# Patient Record
Sex: MALE | Race: OTHER | HISPANIC OR LATINO | URBAN - METROPOLITAN AREA
[De-identification: names, ages, dates, MRNs, and addresses within clinical notes are randomized per-mention and may not be internally consistent; named-entity substitution may affect disease eponyms.]

---

## 2017-01-19 ENCOUNTER — EMERGENCY (EMERGENCY)
Facility: HOSPITAL | Age: 62
LOS: 1 days | Discharge: HOME | End: 2017-01-19
Attending: EMERGENCY MEDICINE | Admitting: EMERGENCY MEDICINE
Payer: MEDICARE

## 2017-01-19 VITALS
DIASTOLIC BLOOD PRESSURE: 81 MMHG | TEMPERATURE: 99 F | OXYGEN SATURATION: 99 % | WEIGHT: 160.06 LBS | SYSTOLIC BLOOD PRESSURE: 128 MMHG | HEART RATE: 88 BPM | RESPIRATION RATE: 17 BRPM

## 2017-01-19 VITALS
RESPIRATION RATE: 16 BRPM | TEMPERATURE: 99 F | HEART RATE: 74 BPM | SYSTOLIC BLOOD PRESSURE: 139 MMHG | OXYGEN SATURATION: 99 % | DIASTOLIC BLOOD PRESSURE: 79 MMHG

## 2017-01-19 DIAGNOSIS — Z88.0 ALLERGY STATUS TO PENICILLIN: ICD-10-CM

## 2017-01-19 DIAGNOSIS — Z79.01 LONG TERM (CURRENT) USE OF ANTICOAGULANTS: ICD-10-CM

## 2017-01-19 DIAGNOSIS — K64.9 UNSPECIFIED HEMORRHOIDS: ICD-10-CM

## 2017-01-19 DIAGNOSIS — K62.5 HEMORRHAGE OF ANUS AND RECTUM: ICD-10-CM

## 2017-01-19 DIAGNOSIS — Z88.8 ALLERGY STATUS TO OTHER DRUGS, MEDICAMENTS AND BIOLOGICAL SUBSTANCES STATUS: ICD-10-CM

## 2017-01-19 DIAGNOSIS — Z79.899 OTHER LONG TERM (CURRENT) DRUG THERAPY: ICD-10-CM

## 2017-01-19 DIAGNOSIS — Z79.82 LONG TERM (CURRENT) USE OF ASPIRIN: ICD-10-CM

## 2017-01-19 DIAGNOSIS — I25.10 ATHEROSCLEROTIC HEART DISEASE OF NATIVE CORONARY ARTERY WITHOUT ANGINA PECTORIS: ICD-10-CM

## 2017-01-19 LAB
ALBUMIN SERPL ELPH-MCNC: 3.9 G/DL — SIGNIFICANT CHANGE UP (ref 3.4–5)
ALP SERPL-CCNC: 58 U/L — SIGNIFICANT CHANGE UP (ref 40–120)
ALT FLD-CCNC: 27 U/L — SIGNIFICANT CHANGE UP (ref 12–42)
ANION GAP SERPL CALC-SCNC: 12 MMOL/L — SIGNIFICANT CHANGE UP (ref 9–16)
APTT BLD: 27.1 SEC — LOW (ref 27.5–37.4)
AST SERPL-CCNC: 39 U/L — HIGH (ref 15–37)
BASOPHILS NFR BLD AUTO: 0.5 % — SIGNIFICANT CHANGE UP (ref 0–2)
BILIRUB SERPL-MCNC: 0.7 MG/DL — SIGNIFICANT CHANGE UP (ref 0.2–1.2)
BUN SERPL-MCNC: 15 MG/DL — SIGNIFICANT CHANGE UP (ref 7–23)
CALCIUM SERPL-MCNC: 8.7 MG/DL — SIGNIFICANT CHANGE UP (ref 8.5–10.5)
CHLORIDE SERPL-SCNC: 100 MMOL/L — SIGNIFICANT CHANGE UP (ref 96–108)
CO2 SERPL-SCNC: 25 MMOL/L — SIGNIFICANT CHANGE UP (ref 22–31)
CREAT SERPL-MCNC: 1.01 MG/DL — SIGNIFICANT CHANGE UP (ref 0.5–1.3)
EOSINOPHIL NFR BLD AUTO: 0.2 % — SIGNIFICANT CHANGE UP (ref 0–6)
GLUCOSE SERPL-MCNC: 76 MG/DL — SIGNIFICANT CHANGE UP (ref 70–99)
HCT VFR BLD CALC: 31 % — LOW (ref 39–50)
HGB BLD-MCNC: 10.1 G/DL — LOW (ref 13–17)
INR BLD: 1.02 — SIGNIFICANT CHANGE UP (ref 0.88–1.16)
LYMPHOCYTES # BLD AUTO: 35 % — SIGNIFICANT CHANGE UP (ref 13–44)
MCHC RBC-ENTMCNC: 25.9 PG — LOW (ref 27–34)
MCHC RBC-ENTMCNC: 32.6 G/DL — SIGNIFICANT CHANGE UP (ref 32–36)
MCV RBC AUTO: 79.5 FL — LOW (ref 80–100)
MONOCYTES NFR BLD AUTO: 12.9 % — SIGNIFICANT CHANGE UP (ref 2–14)
NEUTROPHILS NFR BLD AUTO: 51.4 % — SIGNIFICANT CHANGE UP (ref 43–77)
PLATELET # BLD AUTO: 223 K/UL — SIGNIFICANT CHANGE UP (ref 150–400)
POTASSIUM SERPL-MCNC: 4.1 MMOL/L — SIGNIFICANT CHANGE UP (ref 3.5–5.3)
POTASSIUM SERPL-SCNC: 4.1 MMOL/L — SIGNIFICANT CHANGE UP (ref 3.5–5.3)
PROT SERPL-MCNC: 7.7 G/DL — SIGNIFICANT CHANGE UP (ref 6.4–8.2)
PROTHROM AB SERPL-ACNC: 11.3 SEC — SIGNIFICANT CHANGE UP (ref 10–13.1)
RBC # BLD: 3.9 M/UL — LOW (ref 4.2–5.8)
RBC # FLD: 17.8 % — HIGH (ref 10.3–16.9)
SODIUM SERPL-SCNC: 137 MMOL/L — SIGNIFICANT CHANGE UP (ref 135–145)
WBC # BLD: 4 K/UL — SIGNIFICANT CHANGE UP (ref 3.8–10.5)
WBC # FLD AUTO: 4 K/UL — SIGNIFICANT CHANGE UP (ref 3.8–10.5)

## 2017-01-19 PROCEDURE — 99284 EMERGENCY DEPT VISIT MOD MDM: CPT

## 2017-01-19 PROCEDURE — 85730 THROMBOPLASTIN TIME PARTIAL: CPT

## 2017-01-19 PROCEDURE — 85025 COMPLETE CBC W/AUTO DIFF WBC: CPT

## 2017-01-19 PROCEDURE — 99283 EMERGENCY DEPT VISIT LOW MDM: CPT

## 2017-01-19 PROCEDURE — 85610 PROTHROMBIN TIME: CPT

## 2017-01-19 PROCEDURE — 80053 COMPREHEN METABOLIC PANEL: CPT

## 2017-01-19 PROCEDURE — 36415 COLL VENOUS BLD VENIPUNCTURE: CPT

## 2017-01-19 RX ORDER — HYDROCORTISONE 1 %
1 OINTMENT (GRAM) TOPICAL
Qty: 1 | Refills: 0
Start: 2017-01-19 | End: 2017-02-02

## 2017-01-19 NOTE — ED ADULT TRIAGE NOTE - CHIEF COMPLAINT QUOTE
Pr c/o bright red rectal bleeding while taking a shover. C/O diffused abdominal discomfort. Denies N/V, dizziness, SOB, CP. "I have not been eating or sleeping well." H/O hemorrhoids.

## 2017-01-19 NOTE — ED PROVIDER NOTE - MEDICAL DECISION MAKING DETAILS
hemorrhoid - extruding- no active bleeding-stable for d/c hemorrhoid - extruding- no active bleeding-stable for d/c - no abd pain or tenderness- well appearing NAD-- siz baths local wound care hydrocortisone cream and GI follow up rec

## 2017-01-19 NOTE — ED ADULT NURSE NOTE - PMH
Atherosclerosis of coronary artery  Coronary artery disease  ETOH abuse    Gastritis    Myocardial infarction involving other coronary artery    Pacemaker    Paroxysmal a-fib

## 2017-01-19 NOTE — ED ADULT NURSE NOTE - OBJECTIVE STATEMENT
60yo M, A&ox3, came into ER c/o rectal bleeding x1 episode while in shower. States bright red blood that lasted [id number]. States previously happened but only when straining bowls. Normal bm earlier today/ States called pcp and was urged to come in for evaluation. Pt appears pale. Denies lightheadedness, dizziness, sob, n/v, cp. Denies bloody emesis. PT admits to drinking x2 small bottles of etoh. No abdominal pain, + hyperactive bowl sounds. No tenderness. Pt states some abdominal discomfort though. Lungs clear, no jvd, no edema. No cp. No active bleeding noted. Noted defibrillator in situ left anterior chest wall. Will continue to monitor.

## 2017-01-19 NOTE — ED ADULT NURSE NOTE - CHPI ED SYMPTOMS NEG
no burning urination/no fever/no dysuria/no abdominal distension/no diarrhea/no chills/no nausea/no hematuria/no vomiting

## 2017-01-19 NOTE — ED PROVIDER NOTE - OBJECTIVE STATEMENT
60 yo male with hx of hemorhoids- Afib- ETOH abuse -PPM -prev MI- CAD- with episode of rectal bleeding 2 hr ago - no straining or constipation- no abd pain or fevers or chills-  tolerating po well -

## 2017-01-19 NOTE — ED ADULT NURSE NOTE - PSH
Automatic implantable cardiac defibrillator in situ  ICD (implantable cardioverter-defibrillator) in place

## 2017-02-14 ENCOUNTER — INPATIENT (INPATIENT)
Facility: HOSPITAL | Age: 62
LOS: 6 days | Discharge: HOME | DRG: 897 | End: 2017-02-21
Attending: INTERNAL MEDICINE | Admitting: INTERNAL MEDICINE
Payer: MEDICARE

## 2017-02-14 VITALS
HEART RATE: 98 BPM | RESPIRATION RATE: 18 BRPM | SYSTOLIC BLOOD PRESSURE: 101 MMHG | DIASTOLIC BLOOD PRESSURE: 71 MMHG | OXYGEN SATURATION: 96 % | TEMPERATURE: 100 F

## 2017-02-14 DIAGNOSIS — N17.9 ACUTE KIDNEY FAILURE, UNSPECIFIED: ICD-10-CM

## 2017-02-14 DIAGNOSIS — Z95.5 PRESENCE OF CORONARY ANGIOPLASTY IMPLANT AND GRAFT: ICD-10-CM

## 2017-02-14 DIAGNOSIS — I21.29 ST ELEVATION (STEMI) MYOCARDIAL INFARCTION INVOLVING OTHER SITES: ICD-10-CM

## 2017-02-14 DIAGNOSIS — I48.0 PAROXYSMAL ATRIAL FIBRILLATION: ICD-10-CM

## 2017-02-14 DIAGNOSIS — Z41.8 ENCOUNTER FOR OTHER PROCEDURES FOR PURPOSES OTHER THAN REMEDYING HEALTH STATE: ICD-10-CM

## 2017-02-14 DIAGNOSIS — F32.9 MAJOR DEPRESSIVE DISORDER, SINGLE EPISODE, UNSPECIFIED: ICD-10-CM

## 2017-02-14 DIAGNOSIS — F10.230 ALCOHOL DEPENDENCE WITH WITHDRAWAL, UNCOMPLICATED: ICD-10-CM

## 2017-02-14 DIAGNOSIS — R63.8 OTHER SYMPTOMS AND SIGNS CONCERNING FOOD AND FLUID INTAKE: ICD-10-CM

## 2017-02-14 DIAGNOSIS — Z02.9 ENCOUNTER FOR ADMINISTRATIVE EXAMINATIONS, UNSPECIFIED: ICD-10-CM

## 2017-02-14 DIAGNOSIS — K29.70 GASTRITIS, UNSPECIFIED, WITHOUT BLEEDING: ICD-10-CM

## 2017-02-14 LAB
ALBUMIN SERPL ELPH-MCNC: 3.7 G/DL — SIGNIFICANT CHANGE UP (ref 3.4–5)
ALP SERPL-CCNC: 59 U/L — SIGNIFICANT CHANGE UP (ref 40–120)
ALT FLD-CCNC: 57 U/L — HIGH (ref 12–42)
ANION GAP SERPL CALC-SCNC: 8 MMOL/L — LOW (ref 9–16)
APTT BLD: 123.6 SEC — CRITICAL HIGH (ref 27.5–37.4)
AST SERPL-CCNC: 79 U/L — HIGH (ref 15–37)
BASOPHILS NFR BLD AUTO: 0.8 % — SIGNIFICANT CHANGE UP (ref 0–2)
BILIRUB SERPL-MCNC: 0.6 MG/DL — SIGNIFICANT CHANGE UP (ref 0.2–1.2)
BUN SERPL-MCNC: 22 MG/DL — SIGNIFICANT CHANGE UP (ref 7–23)
CALCIUM SERPL-MCNC: 8.8 MG/DL — SIGNIFICANT CHANGE UP (ref 8.5–10.5)
CHLORIDE SERPL-SCNC: 101 MMOL/L — SIGNIFICANT CHANGE UP (ref 96–108)
CO2 SERPL-SCNC: 25 MMOL/L — SIGNIFICANT CHANGE UP (ref 22–31)
CREAT SERPL-MCNC: 1.67 MG/DL — HIGH (ref 0.5–1.3)
EOSINOPHIL NFR BLD AUTO: 1.4 % — SIGNIFICANT CHANGE UP (ref 0–6)
ETHANOL SERPL-MCNC: 45 MG/DL — HIGH
EXTRA BLUE TOP TUBE: SIGNIFICANT CHANGE UP
GLUCOSE SERPL-MCNC: 112 MG/DL — HIGH (ref 70–99)
HCT VFR BLD CALC: 33.5 % — LOW (ref 39–50)
HGB BLD-MCNC: 11.2 G/DL — LOW (ref 13–17)
LYMPHOCYTES # BLD AUTO: 24 % — SIGNIFICANT CHANGE UP (ref 13–44)
MAGNESIUM SERPL-MCNC: 1.8 MG/DL — SIGNIFICANT CHANGE UP (ref 1.6–2.4)
MCHC RBC-ENTMCNC: 26.9 PG — LOW (ref 27–34)
MCHC RBC-ENTMCNC: 33.4 G/DL — SIGNIFICANT CHANGE UP (ref 32–36)
MCV RBC AUTO: 80.5 FL — SIGNIFICANT CHANGE UP (ref 80–100)
MONOCYTES NFR BLD AUTO: 6.4 % — SIGNIFICANT CHANGE UP (ref 2–14)
NEUTROPHILS NFR BLD AUTO: 67.4 % — SIGNIFICANT CHANGE UP (ref 43–77)
PCP SPEC-MCNC: SIGNIFICANT CHANGE UP
PLATELET # BLD AUTO: 179 K/UL — SIGNIFICANT CHANGE UP (ref 150–400)
POTASSIUM SERPL-MCNC: 4.9 MMOL/L — SIGNIFICANT CHANGE UP (ref 3.5–5.3)
POTASSIUM SERPL-SCNC: 4.9 MMOL/L — SIGNIFICANT CHANGE UP (ref 3.5–5.3)
PROT SERPL-MCNC: 7.7 G/DL — SIGNIFICANT CHANGE UP (ref 6.4–8.2)
RBC # BLD: 4.16 M/UL — LOW (ref 4.2–5.8)
RBC # FLD: 20.2 % — HIGH (ref 10.3–16.9)
SODIUM SERPL-SCNC: 134 MMOL/L — LOW (ref 135–145)
WBC # BLD: 5 K/UL — SIGNIFICANT CHANGE UP (ref 3.8–10.5)
WBC # FLD AUTO: 5 K/UL — SIGNIFICANT CHANGE UP (ref 3.8–10.5)

## 2017-02-14 PROCEDURE — 99291 CRITICAL CARE FIRST HOUR: CPT | Mod: 25

## 2017-02-14 PROCEDURE — 93010 ELECTROCARDIOGRAM REPORT: CPT

## 2017-02-14 RX ORDER — HEPARIN SODIUM 5000 [USP'U]/ML
3000 INJECTION INTRAVENOUS; SUBCUTANEOUS EVERY 6 HOURS
Qty: 0 | Refills: 0 | Status: DISCONTINUED | OUTPATIENT
Start: 2017-02-14 | End: 2017-02-14

## 2017-02-14 RX ORDER — HEPARIN SODIUM 5000 [USP'U]/ML
6000 INJECTION INTRAVENOUS; SUBCUTANEOUS EVERY 6 HOURS
Qty: 0 | Refills: 0 | Status: DISCONTINUED | OUTPATIENT
Start: 2017-02-14 | End: 2017-02-14

## 2017-02-14 RX ORDER — LISINOPRIL 2.5 MG/1
20 TABLET ORAL DAILY
Qty: 0 | Refills: 0 | Status: DISCONTINUED | OUTPATIENT
Start: 2017-02-15 | End: 2017-02-21

## 2017-02-14 RX ORDER — SODIUM CHLORIDE 9 MG/ML
1000 INJECTION INTRAMUSCULAR; INTRAVENOUS; SUBCUTANEOUS ONCE
Qty: 0 | Refills: 0 | Status: COMPLETED | OUTPATIENT
Start: 2017-02-14 | End: 2017-02-14

## 2017-02-14 RX ORDER — THIAMINE MONONITRATE (VIT B1) 100 MG
100 TABLET ORAL DAILY
Qty: 0 | Refills: 0 | Status: DISCONTINUED | OUTPATIENT
Start: 2017-02-15 | End: 2017-02-21

## 2017-02-14 RX ORDER — HEPARIN SODIUM 5000 [USP'U]/ML
1000 INJECTION INTRAVENOUS; SUBCUTANEOUS
Qty: 25000 | Refills: 0 | Status: DISCONTINUED | OUTPATIENT
Start: 2017-02-14 | End: 2017-02-15

## 2017-02-14 RX ORDER — PANTOPRAZOLE SODIUM 20 MG/1
40 TABLET, DELAYED RELEASE ORAL
Qty: 0 | Refills: 0 | Status: DISCONTINUED | OUTPATIENT
Start: 2017-02-14 | End: 2017-02-21

## 2017-02-14 RX ORDER — ONDANSETRON 8 MG/1
4 TABLET, FILM COATED ORAL ONCE
Qty: 0 | Refills: 0 | Status: COMPLETED | OUTPATIENT
Start: 2017-02-14 | End: 2017-02-14

## 2017-02-14 RX ORDER — MULTIVIT-MIN/FERROUS GLUCONATE 9 MG/15 ML
1 LIQUID (ML) ORAL DAILY
Qty: 0 | Refills: 0 | Status: DISCONTINUED | OUTPATIENT
Start: 2017-02-14 | End: 2017-02-21

## 2017-02-14 RX ORDER — SODIUM CHLORIDE 9 MG/ML
1000 INJECTION, SOLUTION INTRAVENOUS
Qty: 0 | Refills: 0 | Status: DISCONTINUED | OUTPATIENT
Start: 2017-02-14 | End: 2017-02-14

## 2017-02-14 RX ORDER — CLOPIDOGREL BISULFATE 75 MG/1
75 TABLET, FILM COATED ORAL DAILY
Qty: 0 | Refills: 0 | Status: DISCONTINUED | OUTPATIENT
Start: 2017-02-15 | End: 2017-02-21

## 2017-02-14 RX ORDER — FERROUS SULFATE 325(65) MG
325 TABLET ORAL DAILY
Qty: 0 | Refills: 0 | Status: DISCONTINUED | OUTPATIENT
Start: 2017-02-15 | End: 2017-02-21

## 2017-02-14 RX ORDER — ATORVASTATIN CALCIUM 80 MG/1
40 TABLET, FILM COATED ORAL AT BEDTIME
Qty: 0 | Refills: 0 | Status: DISCONTINUED | OUTPATIENT
Start: 2017-02-14 | End: 2017-02-21

## 2017-02-14 RX ORDER — FOLIC ACID 0.8 MG
1 TABLET ORAL DAILY
Qty: 0 | Refills: 0 | Status: DISCONTINUED | OUTPATIENT
Start: 2017-02-15 | End: 2017-02-21

## 2017-02-14 RX ORDER — HEPARIN SODIUM 5000 [USP'U]/ML
INJECTION INTRAVENOUS; SUBCUTANEOUS
Qty: 25000 | Refills: 0 | Status: DISCONTINUED | OUTPATIENT
Start: 2017-02-14 | End: 2017-02-14

## 2017-02-14 RX ORDER — ASPIRIN/CALCIUM CARB/MAGNESIUM 324 MG
81 TABLET ORAL DAILY
Qty: 0 | Refills: 0 | Status: DISCONTINUED | OUTPATIENT
Start: 2017-02-14 | End: 2017-02-21

## 2017-02-14 RX ORDER — METOPROLOL TARTRATE 50 MG
100 TABLET ORAL DAILY
Qty: 0 | Refills: 0 | Status: DISCONTINUED | OUTPATIENT
Start: 2017-02-15 | End: 2017-02-21

## 2017-02-14 RX ORDER — HEPARIN SODIUM 5000 [USP'U]/ML
6000 INJECTION INTRAVENOUS; SUBCUTANEOUS ONCE
Qty: 0 | Refills: 0 | Status: COMPLETED | OUTPATIENT
Start: 2017-02-14 | End: 2017-02-14

## 2017-02-14 RX ADMIN — Medication 2 MILLIGRAM(S): at 13:22

## 2017-02-14 RX ADMIN — SODIUM CHLORIDE 250 MILLILITER(S): 9 INJECTION, SOLUTION INTRAVENOUS at 10:12

## 2017-02-14 RX ADMIN — Medication 25 MILLIGRAM(S): at 10:04

## 2017-02-14 RX ADMIN — Medication 75 MILLIGRAM(S): at 22:12

## 2017-02-14 RX ADMIN — HEPARIN SODIUM 1300 UNIT(S)/HR: 5000 INJECTION INTRAVENOUS; SUBCUTANEOUS at 15:38

## 2017-02-14 RX ADMIN — Medication 2 MILLIGRAM(S): at 17:24

## 2017-02-14 RX ADMIN — SODIUM CHLORIDE 1000 MILLILITER(S): 9 INJECTION INTRAMUSCULAR; INTRAVENOUS; SUBCUTANEOUS at 09:45

## 2017-02-14 RX ADMIN — HEPARIN SODIUM 6000 UNIT(S): 5000 INJECTION INTRAVENOUS; SUBCUTANEOUS at 15:37

## 2017-02-14 RX ADMIN — HEPARIN SODIUM 10 UNIT(S)/HR: 5000 INJECTION INTRAVENOUS; SUBCUTANEOUS at 21:30

## 2017-02-14 RX ADMIN — ATORVASTATIN CALCIUM 40 MILLIGRAM(S): 80 TABLET, FILM COATED ORAL at 22:12

## 2017-02-14 RX ADMIN — Medication 50 MILLIGRAM(S): at 14:05

## 2017-02-14 RX ADMIN — ONDANSETRON 4 MILLIGRAM(S): 8 TABLET, FILM COATED ORAL at 10:04

## 2017-02-14 RX ADMIN — SODIUM CHLORIDE 1000 MILLILITER(S): 9 INJECTION INTRAMUSCULAR; INTRAVENOUS; SUBCUTANEOUS at 13:22

## 2017-02-14 NOTE — ED PROVIDER NOTE - CRITICAL CARE PROVIDED
documentation/consultation with other physicians/additional history taking/interpretation of diagnostic studies/direct patient care (not related to procedure)

## 2017-02-14 NOTE — CONSULT NOTE ADULT - PROBLEM SELECTOR RECOMMENDATION 4
Stable, will continue with ASA, Plavix and Statin.  Patient would need refill for statin on discharge.

## 2017-02-14 NOTE — CONSULT NOTE ADULT - PROBLEM SELECTOR RECOMMENDATION 9
Current CIWA 8-9 (Tremor and anxiety), no problem with serial addition, no hallucination, no neurologic deficit. Was given 25mg Librium in the ED, 30mg taken at home, in addition to 30mg IV valium and 2mg IV ativan, clinically improving.  -Recommend another 50mg PO ativan, and can do 100mg BID after. Ativan IV as needed.  -Continue with banana bag, slow rate since he has CHF, careful with further fluid resuscitation. Current CIWA 8-9 (Tremor and anxiety), no problem with serial addition, no hallucination, no neurologic deficit. Was given 25mg Librium in the ED, 30mg taken at home, in addition to 30mg IV valium and 2mg IV ativan, clinically improving.  -Recommend another 50mg PO ativan, and can do 100mg BID after. Ativan IV as needed.  -Continue with banana bag, slow rate since he has CHF, careful with further fluid resuscitation.  -Would get utox. Current CIWA 8-9 (Tremor and anxiety), no problem with serial addition, no hallucination, no neurologic deficit. Was given 25mg Librium in the ED, 30mg taken at home, in addition to 30mg IV valium and 2mg IV ativan, clinically improving.  -Recommend another 50mg PO ativan, and can do 75 TID after. Ativan IV 2mg Q2H  as needed.  -Continue with banana bag, slow rate since he has CHF, careful with further fluid resuscitation.  -Would get utox. Current CIWA 8-9 (Tremor and anxiety), no problem with serial addition, no hallucination, no neurologic deficit. Was given 25mg Librium in the ED, 30mg taken at home, in addition to 30mg IV valium and 2mg IV ativan, clinically improving.  -Recommend another 50mg PO ativan, and can do 75 TID after. Ativan IV 2mg Q2H  as needed.  -Continue with banana bag, slow rate since he has CHF, careful with further fluid resuscitation.  -Would get utox.  Will admit to 7L

## 2017-02-14 NOTE — PATIENT PROFILE ADULT. - NS TRANSFER PATIENT BELONGINGS
Electronic Device (specify)/Clothing/Jewelry/Money (specify)/S[employer]ung Phone/[employer] Tablet

## 2017-02-14 NOTE — H&P ADULT. - HISTORY OF PRESENT ILLNESS
Patient is a 60 yo M with a PMHx of CAD s/p MI (~2003), stent placement in July of 2016, CHF (EF 35-40%) s/p AICD, gastritis with GIB, A. fib NOT on AC (Patient refused to take), and history of EtOH abuse with recent hospitalization for withdrawal in december of 2015, who presented with withdrawal symptoms. The patient has had withdrawal symptoms in the past for which he was seen at [facility], and this hospital, has not required intubation or had a seizure in the past. His last drink was last night, has had tremors since waking up this morning, took his home dose of librium of 30 but did not have any improvement of his symptoms.He drinks about 1 pint of vodka a day however has been having more "nips" due to stress with the mother of his child.    The patient denies: headaches, nausea or vomiting, abdominal pain, constipation, diarrhea, black or tarry stools, dysuria, decreased exercise tolerance or lower extremity edema, chest pain, SOB, cough, fever, chills,

## 2017-02-14 NOTE — ED ADULT NURSE REASSESSMENT NOTE - NS ED NURSE REASSESS COMMENT FT1
lab results reviewed, pt made aware of plan of care, medicated as prescribed, will continue to monitor.

## 2017-02-14 NOTE — H&P ADULT. - PROBLEM SELECTOR PLAN 1
Patient last drink last night, friends took all of his alcohol and he was tremulous this AM. Took 30 of librium at home but did not have resolution of symptoms. Current CIWA of 9, given 2 of ativan while at bedside  -c/w Librium 75mg PO q8h + Ativan 2mg IV q2h PRN   -c/w Multivitamin, Thiamine, B12, Folate supplementation  -CIWA q2h  -f/u utox

## 2017-02-14 NOTE — H&P ADULT. - RS GEN PE MLT RESP DETAILS PC
clear to auscultation bilaterally/good air movement/breath sounds equal/airway patent/respirations non-labored/normal

## 2017-02-14 NOTE — PROGRESS NOTE ADULT - SUBJECTIVE AND OBJECTIVE BOX
Pt is well knopwn to me s/p long hx of alcohol dependency , s/p anxiety depressive disorder as he goes through a divorce    s/p Hx of CAD with PTCA and stent placement   Pt is admitted for withdrawal therapy    PAST MEDICAL & SURGICAL HISTORY:  Pacemaker  ETOH abuse  Paroxysmal a-fib  Myocardial infarction involving other coronary artery  Gastritis  Essential hypertension: Hypertension  Other specified cardiac dysrhythmias: SVT (supraventricular tachycardia)  Atherosclerosis of coronary artery: Coronary artery disease  History of surgery to heart and great vessels, presenting hazards to health: History of percutaneous coronary intervention  Automatic implantable cardiac defibrillator in situ: ICD (implantable cardioverter-defibrillator) in place    MEDICATIONS  (STANDING):  multivitamin/thiamine/folic acid in sodium chloride 0.9% 1000milliLiter(s) IV Continuous <Continuous>  heparin  Infusion. Unit(s)/Hr IV Continuous <Continuous>  Pt stopped anticoagulation RX as out patient  MEDICATIONS  (PRN):  heparin  Injectable 6000Unit(s) IV Push every 6 hours PRN For aPTT less than 40  heparin  Injectable 3000Unit(s) IV Push every 6 hours PRN For aPTT between 40 - 57    Vital Signs Last 24 Hrs  T(C): 37.3, Max: 37.7 (02-14 @ 09:22)  T(F): 99.2, Max: 99.8 (02-14 @ 09:22)  HR: 78 (78 - 98)  BP: 104/62 (93/71 - 158/80)  BP(mean): --  RR: 19 (16 - 19)  SpO2: 98% (96% - 100%)    Lungs clear   CV s1 s2  abd soft  Ext stable                        11.2   5.0   )-----------( 179      ( 14 Feb 2017 09:52 )             33.5   14 Feb 2017 09:52    134    |  101    |  22     ----------------------------<  112    4.9     |  25     |  1.67     Ca    8.8        14 Feb 2017 09:52  Mg     1.8       14 Feb 2017 09:52    TPro  7.7    /  Alb  3.7    /  TBili  0.6    /  DBili  x      /  AST  79     /  ALT  57     /  AlkPhos  59     14 Feb 2017 09:52  PT/INR - ( 14 Feb 2017 09:52 )   PT: 10.5 sec;   INR: 0.95          PTT - ( 14 Feb 2017 09:52 )  PTT:27.6 sec    NSR   old ASMI  R axis    CXR neg  Last echo EF 35-40 %

## 2017-02-14 NOTE — H&P ADULT. - NEGATIVE CARDIOVASCULAR SYMPTOMS
no chest pain/no dyspnea on exertion/no orthopnea/no palpitations/no paroxysmal nocturnal dyspnea/no peripheral edema

## 2017-02-14 NOTE — ED PROVIDER NOTE - PROGRESS NOTE DETAILS
Pt has received a total of 55mg of Librium and 30mg of IV Valium today.  Reports sxs have somewhat improved but tremors feel like they are worsening.  MICU was consulted at 110pm given severity of withdrawal.  They recommend switching to Ativan and giving 2mg. MICU completes their consultation and they would like to admit pt to step down unit under Dr. Carreon.

## 2017-02-14 NOTE — H&P ADULT. - PROBLEM SELECTOR PLAN 2
-c/w Librium 25mg PO q6h + Ativan 2mg IV q2h PRN   -c/w Multivitamin, Thiamine, B12, Folate supplementation  -CIWA q6h. Patient with history of paroxysmal a-fib, currently in sinus, without any symptoms or palpitations. S/p AICD. CHA2DS2VASC=3. Has not been taking his home prescribed eliquis as he 'does not believe in it'- never took medication. Will need education and discussion on anticoagulation needs before discharge.   - Started on heparin drip, goal aPTT 60-90

## 2017-02-14 NOTE — CONSULT NOTE ADULT - ASSESSMENT
62 yo male pmh of sCHF (EF 30-45%) s/p AICD, CAD s/p stent July of 2016, HTN, paroxysmal afib (self DCed eliquis), EtOH abuse, last admission for withdrawn in december 2016, who presented to ED with anxiety and tremor, likely due to alcohol withdraw

## 2017-02-14 NOTE — ED ADULT TRIAGE NOTE - CHIEF COMPLAINT QUOTE
"I think i'm in withdrawal"   biba from home for severe tremors since this morning with abd pain and nausea, hx of alcohol abuse  last drink was 9pm last night

## 2017-02-14 NOTE — ED ADULT NURSE REASSESSMENT NOTE - NS ED NURSE REASSESS COMMENT FT1
Patient given another sandwich, awaiting disposition and aware with plan of care, will continue to monitor.

## 2017-02-14 NOTE — ED PROVIDER NOTE - DIAGNOSIS COUNSELING, MDM
conducted a detailed discussion... I had a detailed discussion with the patient regarding the historical points, exam findings, and any diagnostic results supporting the admit diagnosis.

## 2017-02-14 NOTE — CONSULT NOTE ADULT - PROBLEM SELECTOR RECOMMENDATION 2
Likely pre-renal due to poor PO intake and alcohol dieuresis effect. Would continue with fluid rescitation at slow rate due to CHF. No sign of CHF exacerbation at this time. Likely pre-renal due to poor PO intake and alcohol dieuresis effect. Would continue with fluid rescitation at slow rate due to CHF. No sign of CHF exacerbation at this time.  Would get UA, urine lytes.

## 2017-02-14 NOTE — H&P ADULT. - ASSESSMENT
Patient is a 60 yo M with a PMHx of CAD s/p MI (~2003), stent placement in July of 2016, CHF (EF 35-40%) s/p AICD, gastritis with GIB, A. fib NOT on AC (Patient refused to take), and history of EtOH abuse who presented with withdrawal symptoms.    In the ED patient was afebrile, HR 98 /71, saturating well on RA. Given 25mg librium, 30mg of IV valium. S/P 2L of NS and 1L Banana bag.

## 2017-02-14 NOTE — ED PROVIDER NOTE - MEDICAL DECISION MAKING DETAILS
Pt with etoh withdrawal.  Will place IV, check labs, start hydration, and give banana bag.  Will start with PO Librium (only 25mg since already took 30mg this AM) and IV Valium 10mg.  Will re-evaluate frequently to check on response to treatment.

## 2017-02-14 NOTE — ED PROVIDER NOTE - OBJECTIVE STATEMENT
Pt is a 62yo M with a h/o CAD (stents in past), PM, HTN, HL, PAF (no on AC) and AA.  Pt presents reporting that he feels like he is in alcohol withdrawal.  Reports that he is a daily drinker and usually drinks more than a pint of vodka a day.  Last drink reported at 8pm last night.  He reports worsening anxiety and tremors.  He has had etoh w/d in the past but never with seizures or DTs.  Reports this episode of w/d is worse than previous.  Denies any HA or sweating.  Denies any AH/VH/TH.  Denies abd pain but does report mild nausea.  No vomiting.  Pt's PCP wrote him for librium 10mg and he reports taking 3 (30mg) this morning.

## 2017-02-14 NOTE — H&P ADULT. - PROBLEM SELECTOR PLAN 4
Patient with major psychosocial stressors, has been seen by psychiatry in the past however patient has not had good follow up and continues to be exposed to stressors.   - Psychiatry consult  - Will consider start of SSRI  - Social Work consult, may not feel safe at home (did not want to speak about it at this time)

## 2017-02-14 NOTE — ED ADULT NURSE NOTE - OBJECTIVE STATEMENT
pt is a 62 y/o male c/o body tremors since he woke up this morning. pt states "I think I am in withdrawal, this happened before, my last drink was last night around 8pm." PMH 3 heart attacks. pt also c/o nausea and mild SOB since this morning. denies any vomiting, diarrhea, dark tarry stools, blood in stool, coughing up blood, fever/chills, seizures, LOC, falls, visual/tactile hallucinations. no tx prior to arrival. pt states he usually drinks more than a pint of vodka a day. resting comfortably with MD at bedside. will continue to monitor. pt is a 62 y/o male c/o body tremors since he woke up this morning. pt states "I think I am in withdrawal, this happened before, my last drink was last night around 8pm." PMH 3 heart attacks. pt also c/o nausea and mild SOB since this morning. denies any vomiting, diarrhea, dark tarry stools, blood in stool, coughing up blood, fever/chills, seizures, LOC, falls, visual/tactile hallucinations. pt reports taking 30 mg of librium this morning with no relief. pt states he usually drinks more than a pint of vodka a day. resting comfortably with MD at bedside. will continue to monitor. pt is a 62 y/o male c/o body tremors since he woke up this morning. pt states "I think I am in withdrawal, this happened before, my last drink was last night around 8pm last night." PMH 3 heart attacks. pt also c/o nausea and mild SOB since this morning. denies any vomiting, diarrhea, dark tarry stools, blood in stool, coughing up blood, fever/chills, seizures, LOC, falls, visual/tactile hallucinations. pt reports taking 30 mg of librium this morning with no relief. pt states he usually drinks more than a pint of vodka a day. resting comfortably with MD at bedside. will continue to monitor.

## 2017-02-14 NOTE — CONSULT NOTE ADULT - PROBLEM SELECTOR RECOMMENDATION 3
Patient now in sinus, self DC eliquis due to personal belief.  Agreeable to heparin drip now, avoid lovenox due to DANUTA.  Continue with metoprolol for rate control Patient now in sinus, self DC eliquis due to personal belief.  Agreeable to heparin drip now, avoid lovenox due to DANUTA.   Continue with metoprolol for rate control Patient now in sinus, self DC eliquis due to personal belief. High [id number]  Agreeable to heparin drip now, avoid lovenox due to DANUTA.   Continue with metoprolol for rate control Patient now in sinus, self DC eliquis due to personal belief. High [id number]  Agreeable to heparin drip now, avoid lovenox now due to DANUTA. Would further discuss with him about AC choice before discharge.  Continue with metoprolol for rate control Patient now in sinus, self DC eliquis due to personal belief. High [id number] = 3  Agreeable to heparin drip now, avoid lovenox now due to DANUTA. Would further discuss with him about AC choice before discharge.  Continue with metoprolol for rate control

## 2017-02-15 DIAGNOSIS — I50.22 CHRONIC SYSTOLIC (CONGESTIVE) HEART FAILURE: ICD-10-CM

## 2017-02-15 DIAGNOSIS — I51.3 INTRACARDIAC THROMBOSIS, NOT ELSEWHERE CLASSIFIED: ICD-10-CM

## 2017-02-15 DIAGNOSIS — I25.10 ATHEROSCLEROTIC HEART DISEASE OF NATIVE CORONARY ARTERY WITHOUT ANGINA PECTORIS: ICD-10-CM

## 2017-02-15 DIAGNOSIS — F10.239 ALCOHOL DEPENDENCE WITH WITHDRAWAL, UNSPECIFIED: ICD-10-CM

## 2017-02-15 DIAGNOSIS — Z29.9 ENCOUNTER FOR PROPHYLACTIC MEASURES, UNSPECIFIED: ICD-10-CM

## 2017-02-15 LAB
ANION GAP SERPL CALC-SCNC: 6 MMOL/L — LOW (ref 9–16)
ANION GAP SERPL CALC-SCNC: 7 MMOL/L — LOW (ref 9–16)
APTT BLD: 53.7 SEC — HIGH (ref 27.5–37.4)
APTT BLD: 56.5 SEC — HIGH (ref 27.5–37.4)
APTT BLD: 61.9 SEC — HIGH (ref 27.5–37.4)
APTT BLD: 99.7 SEC — HIGH (ref 27.5–37.4)
BASOPHILS NFR BLD AUTO: 1.5 % — SIGNIFICANT CHANGE UP (ref 0–2)
BLD GP AB SCN SERPL QL: NEGATIVE — SIGNIFICANT CHANGE UP
BUN SERPL-MCNC: 21 MG/DL — SIGNIFICANT CHANGE UP (ref 7–23)
BUN SERPL-MCNC: 22 MG/DL — SIGNIFICANT CHANGE UP (ref 7–23)
CALCIUM SERPL-MCNC: 8.5 MG/DL — SIGNIFICANT CHANGE UP (ref 8.5–10.5)
CALCIUM SERPL-MCNC: 8.6 MG/DL — SIGNIFICANT CHANGE UP (ref 8.5–10.5)
CHLORIDE SERPL-SCNC: 101 MMOL/L — SIGNIFICANT CHANGE UP (ref 96–108)
CHLORIDE SERPL-SCNC: 102 MMOL/L — SIGNIFICANT CHANGE UP (ref 96–108)
CO2 SERPL-SCNC: 28 MMOL/L — SIGNIFICANT CHANGE UP (ref 22–31)
CO2 SERPL-SCNC: 28 MMOL/L — SIGNIFICANT CHANGE UP (ref 22–31)
CREAT SERPL-MCNC: 1.2 MG/DL — SIGNIFICANT CHANGE UP (ref 0.5–1.3)
CREAT SERPL-MCNC: 1.4 MG/DL — HIGH (ref 0.5–1.3)
EOSINOPHIL NFR BLD AUTO: 5.2 % — SIGNIFICANT CHANGE UP (ref 0–6)
FERRITIN SERPL-MCNC: 161.3 NG/ML — SIGNIFICANT CHANGE UP (ref 26–388)
GLUCOSE SERPL-MCNC: 87 MG/DL — SIGNIFICANT CHANGE UP (ref 70–99)
GLUCOSE SERPL-MCNC: 89 MG/DL — SIGNIFICANT CHANGE UP (ref 70–99)
HCT VFR BLD CALC: 28.8 % — LOW (ref 39–50)
HCT VFR BLD CALC: 29.7 % — LOW (ref 39–50)
HGB BLD-MCNC: 9.3 G/DL — LOW (ref 13–17)
HGB BLD-MCNC: 9.7 G/DL — LOW (ref 13–17)
IRON SATN MFR SERPL: 144 UG/DL — SIGNIFICANT CHANGE UP (ref 65–175)
IRON SATN MFR SERPL: 55 % — HIGH (ref 26–39)
LYMPHOCYTES # BLD AUTO: 33.4 % — SIGNIFICANT CHANGE UP (ref 13–44)
MAGNESIUM SERPL-MCNC: 1.5 MG/DL — LOW (ref 1.6–2.4)
MAGNESIUM SERPL-MCNC: 1.7 MG/DL — SIGNIFICANT CHANGE UP (ref 1.6–2.4)
MCHC RBC-ENTMCNC: 26.5 PG — LOW (ref 27–34)
MCHC RBC-ENTMCNC: 26.7 PG — LOW (ref 27–34)
MCHC RBC-ENTMCNC: 32.3 G/DL — SIGNIFICANT CHANGE UP (ref 32–36)
MCHC RBC-ENTMCNC: 32.7 G/DL — SIGNIFICANT CHANGE UP (ref 32–36)
MCV RBC AUTO: 81.8 FL — SIGNIFICANT CHANGE UP (ref 80–100)
MCV RBC AUTO: 82.1 FL — SIGNIFICANT CHANGE UP (ref 80–100)
MONOCYTES NFR BLD AUTO: 6.7 % — SIGNIFICANT CHANGE UP (ref 2–14)
NEUTROPHILS NFR BLD AUTO: 53.2 % — SIGNIFICANT CHANGE UP (ref 43–77)
PHOSPHATE SERPL-MCNC: 2.9 MG/DL — SIGNIFICANT CHANGE UP (ref 2.5–4.5)
PHOSPHATE SERPL-MCNC: 2.9 MG/DL — SIGNIFICANT CHANGE UP (ref 2.5–4.5)
PLATELET # BLD AUTO: 107 K/UL — LOW (ref 150–400)
PLATELET # BLD AUTO: 110 K/UL — LOW (ref 150–400)
POTASSIUM SERPL-MCNC: 4.2 MMOL/L — SIGNIFICANT CHANGE UP (ref 3.5–5.3)
POTASSIUM SERPL-MCNC: 4.2 MMOL/L — SIGNIFICANT CHANGE UP (ref 3.5–5.3)
POTASSIUM SERPL-SCNC: 4.2 MMOL/L — SIGNIFICANT CHANGE UP (ref 3.5–5.3)
POTASSIUM SERPL-SCNC: 4.2 MMOL/L — SIGNIFICANT CHANGE UP (ref 3.5–5.3)
RBC # BLD: 3.51 M/UL — LOW (ref 4.2–5.8)
RBC # BLD: 3.63 M/UL — LOW (ref 4.2–5.8)
RBC # FLD: 20 % — HIGH (ref 10.3–16.9)
RBC # FLD: 20 % — HIGH (ref 10.3–16.9)
RH IG SCN BLD-IMP: POSITIVE — SIGNIFICANT CHANGE UP
SODIUM SERPL-SCNC: 136 MMOL/L — SIGNIFICANT CHANGE UP (ref 135–145)
SODIUM SERPL-SCNC: 136 MMOL/L — SIGNIFICANT CHANGE UP (ref 135–145)
TIBC SERPL-MCNC: 260 UG/DL — SIGNIFICANT CHANGE UP (ref 250–450)
WBC # BLD: 3.3 K/UL — LOW (ref 3.8–10.5)
WBC # BLD: 3.7 K/UL — LOW (ref 3.8–10.5)
WBC # FLD AUTO: 3.3 K/UL — LOW (ref 3.8–10.5)
WBC # FLD AUTO: 3.7 K/UL — LOW (ref 3.8–10.5)

## 2017-02-15 PROCEDURE — 99223 1ST HOSP IP/OBS HIGH 75: CPT

## 2017-02-15 PROCEDURE — 99233 SBSQ HOSP IP/OBS HIGH 50: CPT | Mod: GC

## 2017-02-15 PROCEDURE — 93306 TTE W/DOPPLER COMPLETE: CPT | Mod: 26

## 2017-02-15 RX ORDER — MAGNESIUM SULFATE 500 MG/ML
2 VIAL (ML) INJECTION ONCE
Qty: 0 | Refills: 0 | Status: COMPLETED | OUTPATIENT
Start: 2017-02-15 | End: 2017-02-15

## 2017-02-15 RX ORDER — HEPARIN SODIUM 5000 [USP'U]/ML
900 INJECTION INTRAVENOUS; SUBCUTANEOUS
Qty: 25000 | Refills: 0 | Status: DISCONTINUED | OUTPATIENT
Start: 2017-02-15 | End: 2017-02-15

## 2017-02-15 RX ORDER — HEPARIN SODIUM 5000 [USP'U]/ML
950 INJECTION INTRAVENOUS; SUBCUTANEOUS
Qty: 25000 | Refills: 0 | Status: DISCONTINUED | OUTPATIENT
Start: 2017-02-15 | End: 2017-02-16

## 2017-02-15 RX ADMIN — HEPARIN SODIUM 9 UNIT(S)/HR: 5000 INJECTION INTRAVENOUS; SUBCUTANEOUS at 03:40

## 2017-02-15 RX ADMIN — Medication 100 MILLIGRAM(S): at 07:30

## 2017-02-15 RX ADMIN — Medication 75 MILLIGRAM(S): at 21:30

## 2017-02-15 RX ADMIN — ATORVASTATIN CALCIUM 40 MILLIGRAM(S): 80 TABLET, FILM COATED ORAL at 21:30

## 2017-02-15 RX ADMIN — Medication 1 MILLIGRAM(S): at 11:24

## 2017-02-15 RX ADMIN — CLOPIDOGREL BISULFATE 75 MILLIGRAM(S): 75 TABLET, FILM COATED ORAL at 11:20

## 2017-02-15 RX ADMIN — LISINOPRIL 20 MILLIGRAM(S): 2.5 TABLET ORAL at 06:42

## 2017-02-15 RX ADMIN — PANTOPRAZOLE SODIUM 40 MILLIGRAM(S): 20 TABLET, DELAYED RELEASE ORAL at 06:42

## 2017-02-15 RX ADMIN — Medication 1 TABLET(S): at 11:20

## 2017-02-15 RX ADMIN — Medication 2 MILLIGRAM(S): at 11:38

## 2017-02-15 RX ADMIN — Medication 50 GRAM(S): at 05:26

## 2017-02-15 RX ADMIN — HEPARIN SODIUM 9 UNIT(S)/HR: 5000 INJECTION INTRAVENOUS; SUBCUTANEOUS at 11:20

## 2017-02-15 RX ADMIN — Medication 325 MILLIGRAM(S): at 11:20

## 2017-02-15 RX ADMIN — Medication 81 MILLIGRAM(S): at 11:20

## 2017-02-15 RX ADMIN — Medication 75 MILLIGRAM(S): at 13:36

## 2017-02-15 RX ADMIN — Medication 50 GRAM(S): at 08:42

## 2017-02-15 RX ADMIN — Medication 75 MILLIGRAM(S): at 06:42

## 2017-02-15 RX ADMIN — Medication 2 MILLIGRAM(S): at 17:45

## 2017-02-15 RX ADMIN — Medication 100 MILLIGRAM(S): at 11:20

## 2017-02-15 NOTE — DIETITIAN INITIAL EVALUATION ADULT. - ENERGY NEEDS
IBW: 75.5kg %IBW: 94%  BMI: 22.4; admission wt utilized for needs 2/2 pt within % of IBW; needs increased per unintentional/sig wt loss

## 2017-02-15 NOTE — DIETITIAN INITIAL EVALUATION ADULT. - PROBLEM SELECTOR PLAN 2
Patient with history of paroxysmal a-fib, currently in sinus, without any symptoms or palpitations. S/p AICD. CHA2DS2VASC=3. Has not been taking his home prescribed eliquis as he 'does not believe in it'- never took medication. Will need education and discussion on anticoagulation needs before discharge.   - Started on heparin drip, goal aPTT 60-90

## 2017-02-15 NOTE — PROGRESS NOTE ADULT - ASSESSMENT
Patient is a 62 yo M with a PMHx of CAD s/p MI (~2003), stent placement in July of 2016, CHF (EF 35-40%) s/p AICD, gastritis with GIB, A. fib NOT on AC (Patient refused to take), and history of EtOH abuse who presented with withdrawal symptoms, has been controlled on librium and ativan, CIWA of 4 this AM, up for step down to regional medical floor

## 2017-02-15 NOTE — PROGRESS NOTE ADULT - ATTENDING COMMENTS
Pt does not want to use anticoagulation as outpatient due to his "active lifestyle and participation in contact sports" despite understanding risk of stroke. He will continue on Heparin gtt for afib in hospital and we offered alcohol rehab which he declined stating he has a list of programs at home some of which he has attended.

## 2017-02-15 NOTE — PROGRESS NOTE ADULT - SUBJECTIVE AND OBJECTIVE BOX
PGY-1 TRANSFER NOTE  Hospital Course: Patient is a 62 yo M with a PMHx of CAD s/p MI (~2003), stent placement in July of 2016, CHF (EF 35-40%) s/p AICD, gastritis with GIB, A. fib NOT on AC (Patient refused to take due to active lifestyle with boxing and kickboxing), and history of EtOH abuse with recent hospitalization for withdrawal in december of 2016, who presented with withdrawal symptoms on 2/14. The patient has had withdrawal symptoms in the past for which he was seen at [facility], and this hospital, has not required intubation or had a seizure in the past. His last drink was 2/13 at night, has had tremors since waking up on 2/14, took his home PRN medication of librium of 30 but did not have any improvement of his symptoms. He drinks about 1 pint of vodka a day however has been having more "nips" due to stress with the mother of his child. On admission patient was afebrile, HR 98 /71, saturating well on RA. Given 25mg librium, 30mg of IV valium, 2L of NS and 1L Banana bag. Patient started on 75 mg librium PO q8Hr, and ativan 2mg IV q2hr. Patient did well overnight, required IV ativan only twice. Patient seen in the morning od 2/15, CIWA of 3-4, given tremulousness decided to keep on 75 of librium as above but since doing well switch ativan to PO. Patient has social issues that can complicate discharge regarding his insurance and access to medication as well as above stated avoidance of anticoagulation as an outpatient. Patient has otherwise been hemodynamically stable, and will be moved to regional medical floor.     Overnight Events: Patient slept overnight, has no complaints, did not require additional ativan.     Subjective: Patient seen and examined at bedside. States that other than stress from social issues he feels okay. The patient denies: headaches, nausea or vomiting, abdominal pain, constipation, diarrhea, black or tarry stools, dysuria, decreased exercise tolerance or lower extremity edema, chest pain, SOB, cough, fever, chills. ROS otherwise negative.     [OBJECTIVE]:    Vital Signs:  T(F): , Max: 99.2 (02-14 @ 13:04)  HR:  (58 - 89)  BP:  (104/62 - 128/76)  BP(mean):  (87 - 96)  RR:  (16 - 19)  SpO2:  (97% - 99%)  Wt(kg): --  CVP(cm H2O): --    I & Os for 24h ending 02-15 @ 07:00  =============================================  IN: 87 ml / OUT: 400 ml / NET: -313 ml    I & Os for current day (as of 02-15 @ 11:48)  =============================================  IN: 18 ml / OUT: 0 ml / NET: 18 ml    CAPILLARY BLOOD GLUCOSE      Physcial Exam:  T(F): 98.3  HR: 76  BP: 117/81  RR: 16  SpO2: 98%  Wt(kg): --    General: AO x 3, NAD, Comfortable, Anxious but feeling well  HEENT: PERRL/ EOMI, no scleral icterus, no ptosis, MMM, JVD, no thyromegaly, poor dentition  Respiratory: CTA b/l, no wheezes, rales or rhonchi  Cardiovascular: Regular, +S1 + S2  Abdomen: Soft, NTND, normoactive bowel sounds, no rebound, no guarding, no suprapubic tenderness  Extremities: No cyanosis, no clubbing, no edema, pulses equal, no calf tenderness  Skin: No rashes  Lymphatic: No cervical/supraclavicular LAD  Neurological: CN II-XII grossly intact, follows commands, moves all extremities  CIWA of 4    Medications:  MEDICATIONS  (STANDING):  aspirin enteric coated 81milliGRAM(s) Oral daily  atorvastatin 40milliGRAM(s) Oral at bedtime  clopidogrel Tablet 75milliGRAM(s) Oral daily  metoprolol succinate ER 100milliGRAM(s) Oral daily  thiamine 100milliGRAM(s) Oral daily  folic acid 1milliGRAM(s) Oral daily  multivitamin/minerals 1Tablet(s) Oral daily  lisinopril 20milliGRAM(s) Oral daily  ferrous    sulfate 325milliGRAM(s) Oral daily  pantoprazole    Tablet 40milliGRAM(s) Oral before breakfast  heparin  Infusion 900Unit(s)/Hr IV Continuous <Continuous>  chlordiazePOXIDE 75milliGRAM(s) Oral every 8 hours    MEDICATIONS  (PRN):  LORazepam     Tablet 2milliGRAM(s) Oral every 4 hours PRN Agitation      Allergies:  Allergies    Capoten (Short breath; Rash; Hives)  penicillin (Short breath; Rash; Hives)    Intolerances        Labs:                        9.7    3.3   )-----------( 110      ( 15 Feb 2017 06:11 )             29.7     15 Feb 2017 06:09    136    |  101    |  21     ----------------------------<  87     4.2     |  28     |  1.20     Ca    8.6        15 Feb 2017 06:09  Phos  2.9       15 Feb 2017 06:09  Mg     1.7       15 Feb 2017 06:09    TPro  7.7    /  Alb  3.7    /  TBili  0.6    /  DBili  x      /  AST  79     /  ALT  57     /  AlkPhos  59     14 Feb 2017 09:52    PT/INR - ( 14 Feb 2017 09:52 )   PT: 10.5 sec;   INR: 0.95          PTT - ( 15 Feb 2017 03:03 )  PTT:99.7 sec      Radiology and other tests:    EXAM:  XR CHEST 1 VIEW PORT URGENT                           PROCEDURE DATE:  02/14/2017                 INTERPRETATION:  Indication: etoh w/d    A single portable view of the chest is submitted. Comparison is made to   the most recent prior study dated 12/12/2016. No significant interval   change is present. Cardiac size is difficult to assess secondary to   portable technique.  The pulmonary vasculature is within normal limits.    The lungs are expanded and free of infiltrates or effusions.  There is no   pneumothorax.     Impression:    No significant interval change from the prior examination            "Thank you for the opportunity to participate in the care of this   patient."        MELBA DEY M.D., ATTENDING RADIOLOGIST  This document has been electronically signed. Feb 14 2017 10:09AM

## 2017-02-15 NOTE — PROGRESS NOTE ADULT - SUBJECTIVE AND OBJECTIVE BOX
PGY-2 Medicine Transfer Acceptance Note    Patient is a 61 year old male with past medical history of ETOH Abuse and Withdrawal (Patient with History of ETOH Withdrawal with Recent Admission To [facility]), CAD (S/P Stent Placement in 2016 on Aspirin and Plavix), Systolic Congestive Heart Failure (EF 35-40% with AICD), Atrial Fibrillation, GI Bleeding who presented with Signs and Symptoms of ETOH Withdrawal. Patient reports Last Drink on Evening on 2017, and Woke Up on 2017 with Severe Tremors. Patient reports that he is Prescribed Librium, and took One 30mg Tablet, without Relief of Symptoms. Patient reports he Drinks 1 Pint Vodka daily, and if he gets "Tremors" will Drink "Airplane" Size Bottles of Vodka to alleviate symptoms. Patient reports he has been Drinking this Amount for Approximately 1 Month, reporting he has been "Stressed". Patient reports he was Sober for 5 Weeks prior to that. Patient Treated with IV Ativan and Librium on Admission. Librium Titrated Up to 75mg PO Q8 hours, and IV Ativan eventually Switched For Ativan 2mg PO Q4 hours PRN for Withdrawal Symptoms. Patient currently doing well on Currently Regimen. In addition, patient had Repeat Echocardiogram performed during this Admission, which demonstrated EF 35%, but Now With Clot in Left Ventricular Star. Patient had been started on Heparin Drip prior to Results, Continue on Heparin Drip For Now.     Patient Feeling "Much Better". Denies Headaches, Vomiting, Diaphoresis, Visual/Auditory/Tactile Hallucinations, Agitation, and is AAOx3. Patient reports Mild Nausea with Decreased Appetite, and Anxiety, as well as Hand Tremor, which is also "Much Improved". Patient denies Fever, Chills, Chest Pain, SOB, Palpitations, Abdominal Pain, Diarrhea, Constipation, Dysuria, Hematuria, Pain/Swelling of Lower Extremities. Feeling Well.     Physical Examination:   Vital Signs Last 24 Hrs  T(C): 36.7, Max: 36.8 (-15 @ 01:00)  T(F): 98.1, Max: 98.3 (-15 @ 10:02)  HR: 89 (58 - 92)  BP: 103/71 (103/71 - 128/76)  BP(mean): 94 (87 - 96)  RR: 18 (16 - 18)  SpO2: 99% (97% - 99%)    General: Patient with Mild Hand Tremor at Rest, NAD, Non-Toxic Appearing, Pleasant.   HEENT: NCAT, PERRLA B/L, EOMI B/L, + Horizontal Nystagmus, MMM  Neck: Supple, No JVD, No Thyromegaly  Heart: Normal S1+S2, RRR, No M/R/G  Lungs: CTA B/L, No W/R/R  Abdomen: Soft, NT/ND, Normoactive Bowel Sounds  Extremities: Warm, Well Perfused, 2+ Radial and DP Pulses Bilaterally, No Edema, No Calf Tenderness  Neurological: CN II-XII Intact, 5/5 Motor Strength in Upper and Lower Extremities Bilaterally, Sensation Intact, Normal DTRs Bilaterally Patellar, Normal Finger To Nose, AAOx3    Labs/Imagin.7    3.3   )-----------( 110      ( 15 Feb 2017 06:11 )             29.7   15 Feb 2017 06:09    136    |  101    |  21     ----------------------------<  87     4.2     |  28     |  1.20     Ca    8.6        15 Feb 2017 06:09  Phos  2.9       15 Feb 2017 06:09  Mg     1.7       15 Feb 2017 06:09    TPro  7.7    /  Alb  3.7    /  TBili  0.6    /  DBili  x      /  AST  79     /  ALT  57     /  AlkPhos  59     2017 09:52  PT/INR - ( 2017 09:52 )   PT: 10.5 sec;   INR: 0.95          PTT - ( 15 Feb 2017 18:53 )  PTT:53.7 sec    Echocardiogram: Normal left ventricular size and wall thickness. The apical and mid interventricular septum, apical anterior, apical inferior and true apex are all akinetic.  The left ventricular ejection fraction is moderately reduced. The left ventricular ejection fraction is 35%.  A clot (1.4cm) is noted in the left ventriclar apex. The left atrial size is normal. Right atrial size is normal. The right ventricle is normal in size and function. There is a pacemaker wire in the right heart.  No evidence for any hemodynamically significant valvular disease. There was insufficient TR detected from which to calculate pulmonary artery systolic pressure.  The inferior vena cava is normal in size (<2.1 cm) with normal inspiratory collapse (>50%) consistent with normal right atrial pressure.  No aortic root dilatation. Normal size aortic arch with no hemodynamic evidence for coarctation  There is no pericardial effusion.

## 2017-02-15 NOTE — DIETITIAN INITIAL EVALUATION ADULT. - PROBLEM SELECTOR PLAN 3
Patient with poor PO intake other than alcohol, most likely pre-renal cause. Patient is s/p 3L in the ED, will hold off on further fluids due to low EF CHF (35-40 on past admission)  - Recheck Cr in AM, if not resolved will order urine lytes and retroperitoneal U/S

## 2017-02-15 NOTE — DIETITIAN INITIAL EVALUATION ADULT. - OTHER INFO
pt admitted for EtOH withdrawl; observed during lunch meal, was able to consume ~60%, denies any GI stress, w/allergies scallops and shrimps; pt states that d/t depression he has not been eating well x 1 month and lost 14#, otherwise pt was following healthy diet in compliance w/ DASH/TLC; pt is familiar w/ Ensure supplement; shows willingness to consume to defer further wt loss--communicated to MD;  diet education reviewed; micronutrient supplementation per EtOH abuse explained, and available sources provided if pt willing/ready to quit. Skin intact w/ no edema.

## 2017-02-15 NOTE — CONSULT NOTE ADULT - SUBJECTIVE AND OBJECTIVE BOX
HPI:  Patient is a 62 yo M with a PMHx of CAD s/p MI (~2003), stent placement in July of 2016, CHF (EF 35-40%) s/p AICD, gastritis with GIB, A. fib NOT on AC (Patient refused to take), and history of EtOH abuse with recent hospitalization for withdrawal in december of 2015, who presented with withdrawal symptoms. The patient has had withdrawal symptoms in the past for which he was seen at [facility], and this hospital, has not required intubation or had a seizure in the past. His last drink was last night, has had tremors since waking up this morning, took his home dose of librium of 30 but did not have any improvement of his symptoms.He drinks about 1 pint of vodka a day however has been having more "nips" due to stress with the mother of his child.    The patient denies: headaches, nausea or vomiting, abdominal pain, constipation, diarrhea, black or tarry stools, dysuria, decreased exercise tolerance or lower extremity edema, chest pain, SOB, cough, fever, chills, (14 Feb 2017 17:31)      Psych HPI:    Past Psych Hx:    PAST MEDICAL & SURGICAL HISTORY:  Pacemaker  ETOH abuse  Paroxysmal a-fib  Myocardial infarction involving other coronary artery  Gastritis  Essential hypertension: Hypertension  Other specified cardiac dysrhythmias: SVT (supraventricular tachycardia)  Atherosclerosis of coronary artery: Coronary artery disease  History of surgery to heart and great vessels, presenting hazards to health: History of percutaneous coronary intervention  Automatic implantable cardiac defibrillator in situ: ICD (implantable cardioverter-defibrillator) in place      Allergies    Capoten (Short breath; Rash; Hives)  penicillin (Short breath; Rash; Hives)    Intolerances      MEDICATIONS  (STANDING):  aspirin enteric coated 81milliGRAM(s) Oral daily  atorvastatin 40milliGRAM(s) Oral at bedtime  clopidogrel Tablet 75milliGRAM(s) Oral daily  metoprolol succinate ER 100milliGRAM(s) Oral daily  thiamine 100milliGRAM(s) Oral daily  folic acid 1milliGRAM(s) Oral daily  multivitamin/minerals 1Tablet(s) Oral daily  lisinopril 20milliGRAM(s) Oral daily  ferrous    sulfate 325milliGRAM(s) Oral daily  pantoprazole    Tablet 40milliGRAM(s) Oral before breakfast  chlordiazePOXIDE 75milliGRAM(s) Oral every 8 hours  heparin  Infusion 950Unit(s)/Hr IV Continuous <Continuous>    MEDICATIONS  (PRN):  LORazepam     Tablet 2milliGRAM(s) Oral every 4 hours PRN Agitation      SH:  Sub Abuse Hx:    FPH:    ROS: Psych: See HPI.  All other systems negative.                          9.7    3.3   )-----------( 110      ( 15 Feb 2017 06:11 )             29.7   15 Feb 2017 06:09    136    |  101    |  21     ----------------------------<  87     4.2     |  28     |  1.20     Ca    8.6        15 Feb 2017 06:09  Phos  2.9       15 Feb 2017 06:09  Mg     1.7       15 Feb 2017 06:09    TPro  7.7    /  Alb  3.7    /  TBili  0.6    /  DBili  x      /  AST  79     /  ALT  57     /  AlkPhos  59     14 Feb 2017 09:52    TSH:     Utox:  Imaging:  Other Tests:    Old Records reviewed:    Collateral:    EXAM:  Vital Signs Last 24 Hrs  T(C): 36.7, Max: 36.8 (02-15 @ 01:00)  T(F): 98.1, Max: 98.3 (02-15 @ 10:02)  HR: 89 (58 - 92)  BP: 103/71 (103/71 - 128/76)  BP(mean): 94 (87 - 96)  RR: 18 (16 - 18)  SpO2: 99% (97% - 99%)  Gen Appearance:  Gait/Station/Muscle Tone:  Abnl Movements:  Speech:  TP;  Associations:  TC:  Mood:  Affect:  Consciousness/orientation:  Memory:   Recent:    Remote:  Attention/Concentration:  Language:  Fund of Knowledge:  Insight:  Judgment:    Suicide Risk Assessment:    IMP:    DX:    REC:  Observation Status:  Additional Work-up:  Medication Recs:  Follow-up: Pt seen; chart reviewed and discussed with team.   HPI from admit:  "Patient is a 60 yo M with a PMHx of CAD s/p MI (~2003), stent placement in July of 2016, CHF (EF 35-40%) s/p AICD, gastritis with GIB, A. fib NOT on AC (Patient refused to take), and history of EtOH abuse with recent hospitalization for withdrawal in December of 2015, who presented with withdrawal symptoms. The patient has had withdrawal symptoms in the past for which he was seen at [facility], and this hospital, has not required intubation or had a seizure in the past. His last drink was last night, has had tremors since waking up this morning, took his home dose of librium of 30 but did not have any improvement of his symptoms.He drinks about 1 pint of vodka a day however has been having more "nips" due to stress with the mother of his child.    The patient denies: headaches, nausea or vomiting, abdominal pain, constipation, diarrhea, black or tarry stools, dysuria, decreased exercise tolerance or lower extremity edema, chest pain, SOB, cough, fever, chills," (14 Feb 2017 17:31)      Psych HPI: As above. Pt reports, "I have had 3 weeks of a deep depression. My past keeps haunting me, but I am doing better."    Of note: Pt seen by lindsey RUSS during prior medicine admit, for similar reasons. At that time, pt encouraged to pursue outpt psychiatric tx to deal with multiple psychosocial issues, mostly regarding relationship with his ex-wife.    Past Psych Hx: As above. No prior inpt/outpt rx/tx.    PAST MEDICAL & SURGICAL HISTORY:  As above.  Pacemaker  ETOH abuse  Paroxysmal a-fib  Myocardial infarction involving other coronary artery  Gastritis  Essential hypertension: Hypertension  Other specified cardiac dysrhythmias: SVT (supraventricular tachycardia)  Atherosclerosis of coronary artery: Coronary artery disease  History of surgery to heart and great vessels, presenting hazards to health: History of percutaneous coronary intervention  Automatic implantable cardiac defibrillator in situ: ICD (implantable cardioverter-defibrillator) in place      Allergies    Capoten (Short breath; Rash; Hives)  penicillin (Short breath; Rash; Hives)    Intolerances      MEDICATIONS  (STANDING):  aspirin enteric coated 81milliGRAM(s) Oral daily  atorvastatin 40milliGRAM(s) Oral at bedtime  clopidogrel Tablet 75milliGRAM(s) Oral daily  metoprolol succinate ER 100milliGRAM(s) Oral daily  thiamine 100milliGRAM(s) Oral daily  folic acid 1milliGRAM(s) Oral daily  multivitamin/minerals 1Tablet(s) Oral daily  lisinopril 20milliGRAM(s) Oral daily  ferrous    sulfate 325milliGRAM(s) Oral daily  pantoprazole    Tablet 40milliGRAM(s) Oral before breakfast  chlordiazePOXIDE 75milliGRAM(s) Oral every 8 hours  heparin  Infusion 950Unit(s)/Hr IV Continuous <Continuous>    MEDICATIONS  (PRN):  LORazepam     Tablet 2milliGRAM(s) Oral every 4 hours PRN Agitation      Social Hx:   from 2nd wife; one 10-year-old daughter he adores. Pt also has 2 adult sons, ages 31 and 37 and a step son.     Substance Abuse Hx: As above. Denies illicit drugs or cigarettes.    Family Hx:     ROS: Psych: See HPI.  All other systems negative.                          9.7    3.3   )-----------( 110      ( 15 Feb 2017 06:11 )             29.7   15 Feb 2017 06:09    136    |  101    |  21     ----------------------------<  87     4.2     |  28     |  1.20     Ca    8.6        15 Feb 2017 06:09  Phos  2.9       15 Feb 2017 06:09  Mg     1.7       15 Feb 2017 06:09    TPro  7.7    /  Alb  3.7    /  TBili  0.6    /  DBili  x      /  AST  79     /  ALT  57     /  AlkPhos  59     14 Feb 2017 09:52    TSH:     Utox:  Imaging:  Other Tests:    Old Records reviewed:    Collateral:    EXAM:  Vital Signs Last 24 Hrs  T(C): 36.7, Max: 36.8 (02-15 @ 01:00)  T(F): 98.1, Max: 98.3 (02-15 @ 10:02)  HR: 89 (58 - 92)  BP: 103/71 (103/71 - 128/76)  BP(mean): 94 (87 - 96)  RR: 18 (16 - 18)  SpO2: 99% (97% - 99%)    MSE:  Gen Appearance: In bed. Recalls meeting me. Cooperative.   Gait/Station/Muscle Tone: In bed.  Abnl Movements: N/A  Speech: Reg rate and vol  TP; Logical, GD,   TC: No SI/HI, AH/VH/PI (Of note, during last admit, pt noted to have paranoid delusions regarding his ex-wife.)  Mood: "Anxious."  Affect: Mood congruent, appropriate, stable.  Consciousness/orientation: A+Ox4  Memory:   Recent:    Remote:  Attention/Concentration: Intact  Language: Articulate  Fund of Knowledge: Intact  Insight: Fair  Judgment: Fair    Suicide Risk Assessment: No hx of/current SI/intent/plan. Low risk. Pt seen; chart reviewed and discussed with team.   HPI from admit:  "Patient is a 60 yo M with a PMHx of CAD s/p MI (~2003), stent placement in July of 2016, CHF (EF 35-40%) s/p AICD, gastritis with GIB, A. fib NOT on AC (Patient refused to take), and history of EtOH abuse with recent hospitalization for withdrawal in December of 2015, who presented with withdrawal symptoms. The patient has had withdrawal symptoms in the past for which he was seen at [facility], and this hospital, has not required intubation or had a seizure in the past. His last drink was last night, has had tremors since waking up this morning, took his home dose of librium of 30 but did not have any improvement of his symptoms.He drinks about 1 pint of vodka a day however has been having more "nips" due to stress with the mother of his child.    The patient denies: headaches, nausea or vomiting, abdominal pain, constipation, diarrhea, black or tarry stools, dysuria, decreased exercise tolerance or lower extremity edema, chest pain, SOB, cough, fever, chills," (14 Feb 2017 17:31)      Psych HPI: As above. Pt reports, "I have had 3 weeks of a deep depression. My past keeps haunting me, but I am doing better."    Of note: Pt seen by lindsey RUSS during prior medicine admit, for similar reasons. At that time, pt encouraged to pursue outpt psychiatric tx to deal with multiple psychosocial issues, mostly regarding relationship with his ex-wife.    Past Psych Hx: As above. No prior inpt/outpt rx/tx.    PAST MEDICAL & SURGICAL HISTORY:  As above.  Pacemaker  ETOH abuse  Paroxysmal a-fib  Myocardial infarction involving other coronary artery  Gastritis  Essential hypertension: Hypertension  Other specified cardiac dysrhythmias: SVT (supraventricular tachycardia)  Atherosclerosis of coronary artery: Coronary artery disease  History of surgery to heart and great vessels, presenting hazards to health: History of percutaneous coronary intervention  Automatic implantable cardiac defibrillator in situ: ICD (implantable cardioverter-defibrillator) in place      Allergies    Capoten (Short breath; Rash; Hives)  penicillin (Short breath; Rash; Hives)    Intolerances      MEDICATIONS  (STANDING):  aspirin enteric coated 81milliGRAM(s) Oral daily  atorvastatin 40milliGRAM(s) Oral at bedtime  clopidogrel Tablet 75milliGRAM(s) Oral daily  metoprolol succinate ER 100milliGRAM(s) Oral daily  thiamine 100milliGRAM(s) Oral daily  folic acid 1milliGRAM(s) Oral daily  multivitamin/minerals 1Tablet(s) Oral daily  lisinopril 20milliGRAM(s) Oral daily  ferrous    sulfate 325milliGRAM(s) Oral daily  pantoprazole    Tablet 40milliGRAM(s) Oral before breakfast  chlordiazePOXIDE 75milliGRAM(s) Oral every 8 hours  heparin  Infusion 950Unit(s)/Hr IV Continuous <Continuous>    MEDICATIONS  (PRN):  LORazepam     Tablet 2milliGRAM(s) Oral every 4 hours PRN Agitation      Social Hx:   from 2nd wife; one 10-year-old daughter he adores. Pt also has 2 adult sons, ages 31 and 37 and a step son.     Substance Abuse Hx: As above. Denies illicit drugs or cigarettes. Pt states he was clean and sober x 5 weeks in October but relapsed due to pressures of the holidays.    Family Hx:     ROS: Psych: See HPI.  All other systems negative.                          9.7    3.3   )-----------( 110      ( 15 Feb 2017 06:11 )             29.7   15 Feb 2017 06:09    136    |  101    |  21     ----------------------------<  87     4.2     |  28     |  1.20     Ca    8.6        15 Feb 2017 06:09  Phos  2.9       15 Feb 2017 06:09  Mg     1.7       15 Feb 2017 06:09    TPro  7.7    /  Alb  3.7    /  TBili  0.6    /  DBili  x      /  AST  79     /  ALT  57     /  AlkPhos  59     14 Feb 2017 09:52    TSH:     Utox:  Imaging:  Other Tests:    Old Records reviewed:    Collateral:    EXAM:  Vital Signs Last 24 Hrs  T(C): 36.7, Max: 36.8 (02-15 @ 01:00)  T(F): 98.1, Max: 98.3 (02-15 @ 10:02)  HR: 89 (58 - 92)  BP: 103/71 (103/71 - 128/76)  BP(mean): 94 (87 - 96)  RR: 18 (16 - 18)  SpO2: 99% (97% - 99%)    MSE:  Gen Appearance: In bed. Recalls meeting me. Cooperative.   Gait/Station/Muscle Tone: In bed.  Abnl Movements: N/A  Speech: Reg rate and vol  TP; Logical, GD,   TC: No SI/HI, AH/VH/PI (Of note, during last admit, pt noted to have paranoid delusions regarding his ex-wife.)  Mood: "Anxious."  Affect: Mood congruent, appropriate, stable.  Consciousness/orientation: A+Ox4  Memory:   Recent:    Remote:  Attention/Concentration: Intact  Language: Articulate  Fund of Knowledge: Intact  Insight: Fair  Judgment: Fair    Suicide Risk Assessment: No hx of/current SI/intent/plan. Low risk.

## 2017-02-15 NOTE — PROGRESS NOTE ADULT - SUBJECTIVE AND OBJECTIVE BOX
Pt is better today   les shaky  Long discussion held with Pt about cessation drinking    MEDICATIONS  (STANDING):  aspirin enteric coated 81milliGRAM(s) Oral daily  atorvastatin 40milliGRAM(s) Oral at bedtime  clopidogrel Tablet 75milliGRAM(s) Oral daily  metoprolol succinate ER 100milliGRAM(s) Oral daily  chlordiazePOXIDE 75milliGRAM(s) Oral every 8 hours  thiamine 100milliGRAM(s) Oral daily  folic acid 1milliGRAM(s) Oral daily  multivitamin/minerals 1Tablet(s) Oral daily  lisinopril 20milliGRAM(s) Oral daily  ferrous    sulfate 325milliGRAM(s) Oral daily  pantoprazole    Tablet 40milliGRAM(s) Oral before breakfast  heparin  Infusion 900Unit(s)/Hr IV Continuous <Continuous>    MEDICATIONS  (PRN):  LORazepam   Injectable 2milliGRAM(s) IV Push every 2 hours PRN Alcohol Withdrawal symptoms      PAST MEDICAL & SURGICAL HISTORY:  Pacemaker  ETOH abuse  Paroxysmal a-fib  Myocardial infarction involving other coronary artery  Gastritis  Essential hypertension: Hypertension  Other specified cardiac dysrhythmias: SVT (supraventricular tachycardia)  Atherosclerosis of coronary artery: Coronary artery disease  History of surgery to heart and great vessels, presenting hazards to health: History of percutaneous coronary intervention  Automatic implantable cardiac defibrillator in situ: ICD (implantable cardioverter-defibrillator) in place      ICU Vital Signs Last 24 Hrs  T(C): 36.7, Max: 37.3 (02-14 @ 13:04)  T(F): 98.1, Max: 99.2 (02-14 @ 13:04)  HR: 63 (58 - 89)  BP: 128/76 (101/73 - 158/80)  BP(mean): 96 (87 - 96)  ABP: --  ABP(mean): --  RR: 16 (16 - 19)  SpO2: 97% (97% - 100%)      Lungs clear   CV s1 s2     Abd soft  Ext stable                          9.7    3.3   )-----------( 110      ( 15 Feb 2017 06:11 )             29.7   15 Feb 2017 06:09    136    |  101    |  21     ----------------------------<  87     4.2     |  28     |  1.20     Ca    8.6        15 Feb 2017 06:09  Phos  2.9       15 Feb 2017 06:09  Mg     1.7       15 Feb 2017 06:09    TPro  7.7    /  Alb  3.7    /  TBili  0.6    /  DBili  x      /  AST  79     /  ALT  57     /  AlkPhos  59     14 Feb 2017 09:52  PT/INR - ( 14 Feb 2017 09:52 )   PT: 10.5 sec;   INR: 0.95          PTT - ( 15 Feb 2017 03:03 )  PTT:99.7 sec

## 2017-02-15 NOTE — PROGRESS NOTE ADULT - ASSESSMENT
Patient is a 61 year old male with past medical history of ETOH Abuse and Withdrawal (Patient with History of ETOH Withdrawal with Recent Admission To [facility]), CAD (S/P Stent Placement in 7/2016 on Aspirin and Plavix), Systolic Congestive Heart Failure (EF 35-45% with AICD), Atrial Fibrillation, GI Bleeding admitted for ETOH Withdrawal, Now Improving.

## 2017-02-15 NOTE — DIETITIAN INITIAL EVALUATION ADULT. - NS AS NUTRI INTERV MEALS SNACK
continue w/ DASH/TLC diet and micronutrient supplementation per Cardiac issues and EtOH abuse/Other (specify)

## 2017-02-15 NOTE — CONSULT NOTE ADULT - ASSESSMENT
IMP:  61-year-old male with   DX:    REC:  Observation Status:  Additional Work-up:  Medication Recs:  Follow-up: IMP:  61-year-old male with PMHx of CAD s/p MI (~2003), stent placement in July of 2016, CHF (EF 35-40%) s/p AICD, gastritis with GIB, A. fib NOT on AC (Patient refused to take), and history of EtOH abuse with recent hospitalization for withdrawal in December of 2015, who presented with withdrawal symptoms.  Pt also with hx of anxiety and depression, mostly in context of ex-wife and shared care of their 10-year-old daughter.  Pt would benefit from inpt rehab to address his EtOH Use Disorder but he currently states he is too busy to contemplate committing to this at present time.   Pt encouraged to return to AA.  Pt also encouraged to consider outpt therapy to address his depression and anxiety. Provided with [id number] referral number.    DX:    REC:  Observation Status:  Additional Work-up:  Medication Recs:  Follow-up: IMP:  61-year-old male with PMHx of CAD s/p MI (~2003), stent placement in July of 2016, CHF (EF 35-40%) s/p AICD, gastritis with GIB, A. fib NOT on AC (Patient refused to take), and history of EtOH abuse with recent hospitalization for withdrawal in December of 2015, who presented with withdrawal symptoms.  Pt also with hx of anxiety and depression, mostly in context of ex-wife and shared care of their 10-year-old daughter.  Pt would benefit from inpt rehab to address his EtOH Use Disorder but he currently states he is too busy to contemplate committing to this at present time.   Pt encouraged to return to AA.  Pt also encouraged to consider outpt therapy to address his depression and anxiety. Provided with [id number] referral number.    DX: EtOH Use Disorder. Adjustment Disorder with Depression and Anxiety    REC:  Observation Status: As per unit protocol. No need for C.O.  Additional Work-up: Please check TSH, B12/Folate, Vit D level  Medication Recs: Continue librium taper and vitamins.  Supportive tx.  Follow-up:

## 2017-02-16 ENCOUNTER — TRANSCRIPTION ENCOUNTER (OUTPATIENT)
Age: 62
End: 2017-02-16

## 2017-02-16 LAB
24R-OH-CALCIDIOL SERPL-MCNC: 11.5 NG/ML — LOW (ref 30–100)
ANION GAP SERPL CALC-SCNC: 10 MMOL/L — SIGNIFICANT CHANGE UP (ref 9–16)
APTT BLD: 58.6 SEC — HIGH (ref 27.5–37.4)
APTT BLD: 59.2 SEC — HIGH (ref 27.5–37.4)
BLD GP AB SCN SERPL QL: NEGATIVE — SIGNIFICANT CHANGE UP
BUN SERPL-MCNC: 16 MG/DL — SIGNIFICANT CHANGE UP (ref 7–23)
CALCIUM SERPL-MCNC: 8.8 MG/DL — SIGNIFICANT CHANGE UP (ref 8.5–10.5)
CHLORIDE SERPL-SCNC: 101 MMOL/L — SIGNIFICANT CHANGE UP (ref 96–108)
CO2 SERPL-SCNC: 26 MMOL/L — SIGNIFICANT CHANGE UP (ref 22–31)
CREAT SERPL-MCNC: 1.16 MG/DL — SIGNIFICANT CHANGE UP (ref 0.5–1.3)
FERRITIN SERPL-MCNC: 164.3 NG/ML — SIGNIFICANT CHANGE UP (ref 26–388)
GLUCOSE SERPL-MCNC: 92 MG/DL — SIGNIFICANT CHANGE UP (ref 70–99)
HCT VFR BLD CALC: 30.7 % — LOW (ref 39–50)
HGB BLD-MCNC: 10.3 G/DL — LOW (ref 13–17)
INR BLD: 0.96 — SIGNIFICANT CHANGE UP (ref 0.88–1.16)
IRON SATN MFR SERPL: 11 % — LOW (ref 26–39)
IRON SATN MFR SERPL: 32 UG/DL — LOW (ref 65–175)
MAGNESIUM SERPL-MCNC: 2 MG/DL — SIGNIFICANT CHANGE UP (ref 1.6–2.4)
MCHC RBC-ENTMCNC: 26.9 PG — LOW (ref 27–34)
MCHC RBC-ENTMCNC: 33.6 G/DL — SIGNIFICANT CHANGE UP (ref 32–36)
MCV RBC AUTO: 80.2 FL — SIGNIFICANT CHANGE UP (ref 80–100)
PHOSPHATE SERPL-MCNC: 3 MG/DL — SIGNIFICANT CHANGE UP (ref 2.5–4.5)
PLATELET # BLD AUTO: 100 K/UL — LOW (ref 150–400)
POTASSIUM SERPL-MCNC: 4.2 MMOL/L — SIGNIFICANT CHANGE UP (ref 3.5–5.3)
POTASSIUM SERPL-SCNC: 4.2 MMOL/L — SIGNIFICANT CHANGE UP (ref 3.5–5.3)
PROTHROM AB SERPL-ACNC: 10.6 SEC — SIGNIFICANT CHANGE UP (ref 10–13.1)
RBC # BLD: 3.83 M/UL — LOW (ref 4.2–5.8)
RBC # FLD: 19 % — HIGH (ref 10.3–16.9)
RH IG SCN BLD-IMP: POSITIVE — SIGNIFICANT CHANGE UP
SODIUM SERPL-SCNC: 137 MMOL/L — SIGNIFICANT CHANGE UP (ref 135–145)
TIBC SERPL-MCNC: 300 UG/DL — SIGNIFICANT CHANGE UP (ref 250–450)
TSH SERPL-MCNC: 0.79 UIU/ML — SIGNIFICANT CHANGE UP (ref 0.35–4.94)
VIT D25+D1,25 OH+D1,25 PNL SERPL-MCNC: 68.6 PG/ML — SIGNIFICANT CHANGE UP (ref 19.9–79.3)
WBC # BLD: 3.3 K/UL — LOW (ref 3.8–10.5)
WBC # FLD AUTO: 3.3 K/UL — LOW (ref 3.8–10.5)

## 2017-02-16 RX ORDER — APIXABAN 2.5 MG/1
5 TABLET, FILM COATED ORAL EVERY 12 HOURS
Qty: 0 | Refills: 0 | Status: DISCONTINUED | OUTPATIENT
Start: 2017-02-16 | End: 2017-02-21

## 2017-02-16 RX ORDER — HEPARIN SODIUM 5000 [USP'U]/ML
1000 INJECTION INTRAVENOUS; SUBCUTANEOUS
Qty: 25000 | Refills: 0 | Status: DISCONTINUED | OUTPATIENT
Start: 2017-02-16 | End: 2017-02-16

## 2017-02-16 RX ADMIN — Medication 100 MILLIGRAM(S): at 07:09

## 2017-02-16 RX ADMIN — Medication 75 MILLIGRAM(S): at 22:11

## 2017-02-16 RX ADMIN — Medication 75 MILLIGRAM(S): at 13:56

## 2017-02-16 RX ADMIN — Medication 1 TABLET(S): at 11:27

## 2017-02-16 RX ADMIN — PANTOPRAZOLE SODIUM 40 MILLIGRAM(S): 20 TABLET, DELAYED RELEASE ORAL at 07:09

## 2017-02-16 RX ADMIN — Medication 325 MILLIGRAM(S): at 11:27

## 2017-02-16 RX ADMIN — CLOPIDOGREL BISULFATE 75 MILLIGRAM(S): 75 TABLET, FILM COATED ORAL at 11:27

## 2017-02-16 RX ADMIN — Medication 75 MILLIGRAM(S): at 07:09

## 2017-02-16 RX ADMIN — Medication 1 MILLIGRAM(S): at 11:27

## 2017-02-16 RX ADMIN — Medication 100 MILLIGRAM(S): at 11:27

## 2017-02-16 RX ADMIN — Medication 2 MILLIGRAM(S): at 08:40

## 2017-02-16 RX ADMIN — APIXABAN 5 MILLIGRAM(S): 2.5 TABLET, FILM COATED ORAL at 12:15

## 2017-02-16 RX ADMIN — LISINOPRIL 20 MILLIGRAM(S): 2.5 TABLET ORAL at 07:09

## 2017-02-16 RX ADMIN — Medication 81 MILLIGRAM(S): at 11:27

## 2017-02-16 RX ADMIN — ATORVASTATIN CALCIUM 40 MILLIGRAM(S): 80 TABLET, FILM COATED ORAL at 22:07

## 2017-02-16 NOTE — DISCHARGE NOTE ADULT - MEDICATION SUMMARY - MEDICATIONS TO CHANGE
I will SWITCH the dose or number of times a day I take the medications listed below when I get home from the hospital:    chlordiazePOXIDE 25 mg oral capsule  -- 1 cap(s) by mouth 3 times a day, As Needed MDD:75mg  -- Caution federal law prohibits the transfer of this drug to any person other  than the person for whom it was prescribed.  May cause drowsiness.  Alcohol may intensify this effect.  Use care when operating dangerous machinery.

## 2017-02-16 NOTE — PROGRESS NOTE ADULT - ASSESSMENT
61M PMH ETOH Abuse and Withdrawal (Patient with History of ETOH Withdrawal with Recent Admission To [facility]), CAD (S/P Stent Placement in 7/2016 on Aspirin and Plavix), Systolic Congestive Heart Failure (EF 35-45% with AICD), Atrial Fibrillation, GI Bleeding admitted for ETOH Withdrawal, Now Improving.

## 2017-02-16 NOTE — PROGRESS NOTE ADULT - ASSESSMENT
Ethanolism   CAD Cardiomyopathy  Apical Clot   to start Elliquis 5 mg po BID   cont cardiac meds  and Librium     [facility] follow up   LEOPOLDO Levy

## 2017-02-16 NOTE — DISCHARGE NOTE ADULT - PLAN OF CARE
Treating your alcohol withdrawal You were treated for symptoms of alcohol withdrawal. You should return to the hospital or call your doctor with return of symptoms. You should abstain from drinking to prevent recurrence of symptoms. You were found to have a blood clot in your left ventricle of your heart. This is a dangerous condition that could lead to stroke if not treated appropriately. You should take the medication as directed and follow up with Dr. Levy. Please continue taking your medications as directed and follow up with Dr. Levy. Please keep taking the eliquis and your other medications as directed, please follow up with Dr. Levy. If you experience any bleeding, you speak with Dr. Levy about this or return to the hospital. You were treated for symptoms of alcohol withdrawal. You should return to the hospital or call your doctor with return of symptoms. You should abstain from drinking to prevent recurrence of symptoms. Please continue taking the medication as directed and see Dr. Levy within one week. You were found to have a blood clot in your left ventricle of your heart. This is a dangerous condition that could lead to stroke if not treated appropriately. You should take the medication as directed and follow up with Dr. Levy. You should avoid physical activity that results in physical trauma as you have a risk of bleeding this medication. Please keep taking the eliquis (apixaban) and your other medications as directed, please follow up with Dr. Levy. If you experience any bleeding, you speak with Dr. Levy about this or return to the hospital.

## 2017-02-16 NOTE — DISCHARGE NOTE ADULT - HOSPITAL COURSE
61M PMH CAD MI with stents, most recently 7/2016, CHF EF 40% with AICD, GIB, afib no AC due to active lifestyle, alcohol abuse presenting with alcohol withdrawal. Patient in withdrawal and managed well with librium and ativan PRN. Patient initially admitted to [facility] for closer monitoring then transitioned to regional medical floors. On echocardiogram patient found with EF 40% but had LV thrombus. Started on heparin drip and transitioned to eliquis 5mg PO BID. Psychiatry consulted, and recommended treating with alcohol withdrawal and supportive care. Patient stable for discharge home to follow up with Dr. Levy. 61M PMH CAD MI with stents, most recently 7/2016, CHF EF 40% with AICD, GIB, afib no AC due to active lifestyle, alcohol abuse presenting with alcohol withdrawal. Patient in withdrawal and managed well with librium and ativan PRN. Patient initially admitted to [facility] for closer monitoring then transitioned to regional medical floors. On echocardiogram patient found with EF 40% but had LV thrombus. Started on heparin drip and transitioned to eliquis 5mg PO BID. Psychiatry consulted, and recommended treating with alcohol withdrawal and supportive care. Librium and ativan tapered as tolerated. Patient stable for discharge home to follow up with Dr. Levy. 61M PMH CAD MI with stents, most recently 7/2016, CHF EF 40% with AICD, GIB, afib no AC due to active lifestyle, alcohol abuse presenting with alcohol withdrawal. Patient in withdrawal and managed well with librium and ativan PRN. Patient initially admitted to [facility] for closer monitoring then transitioned to regional medical floors. On echocardiogram patient found with EF 40% but had LV thrombus. Started on heparin drip and transitioned to eliquis 5mg PO BID. Psychiatry consulted, and recommended treating with alcohol withdrawal and supportive care. Librium and ativan tapered as tolerated, to leave with librium 25mg PO Q12 and to taper with Dr. Levy. Patient stable for discharge home to follow up with Dr. Levy.

## 2017-02-16 NOTE — PROGRESS NOTE ADULT - SUBJECTIVE AND OBJECTIVE BOX
OVERNIGHT EVENTS: No events    Interval HPI: well tremors improving, no sob, no pain, no nausea, no hallucinations, no weakness.    VITAL SIGNS:  T(F): 99.3  HR: 63  BP: 152/93  RR: 18  SpO2: 100%  Wt(kg): --    PHYSICAL EXAM:    General: NAD sitting up  Eyes: eomi no si  Oropharynx: moist no acute lesions  Respiratory: ctab no wrc  Cardiovascular: rrr no mrg  Gastrointestinal: soft ntnd normoactive bs  Extremities: strength 5/5 sensation + , pules + no edema  Neurological: aaox3 no gross deficits, ciwa 2    MEDICATIONS  (STANDING):  aspirin enteric coated 81milliGRAM(s) Oral daily  atorvastatin 40milliGRAM(s) Oral at bedtime  clopidogrel Tablet 75milliGRAM(s) Oral daily  metoprolol succinate ER 100milliGRAM(s) Oral daily  thiamine 100milliGRAM(s) Oral daily  folic acid 1milliGRAM(s) Oral daily  multivitamin/minerals 1Tablet(s) Oral daily  lisinopril 20milliGRAM(s) Oral daily  ferrous    sulfate 325milliGRAM(s) Oral daily  pantoprazole    Tablet 40milliGRAM(s) Oral before breakfast  chlordiazePOXIDE 75milliGRAM(s) Oral every 8 hours  heparin  Infusion 1000Unit(s)/Hr IV Continuous <Continuous>    MEDICATIONS  (PRN):  LORazepam     Tablet 2milliGRAM(s) Oral every 4 hours PRN Agitation      LABS:                        10.3   3.3   )-----------( 100      ( 16 Feb 2017 07:58 )             30.7     16 Feb 2017 07:58    137    |  101    |  16     ----------------------------<  92     4.2     |  26     |  1.16     Ca    8.8        16 Feb 2017 07:58  Phos  3.0       16 Feb 2017 07:58  Mg     2.0       16 Feb 2017 07:58    TPro  7.7    /  Alb  3.7    /  TBili  0.6    /  DBili  x      /  AST  79     /  ALT  57     /  AlkPhos  59     14 Feb 2017 09:52    PT/INR - ( 16 Feb 2017 07:58 )   PT: 10.6 sec;   INR: 0.96          PTT - ( 16 Feb 2017 03:23 )  PTT:59.2 sec      RADIOLOGY & ADDITIONAL TESTS:    Plan:

## 2017-02-16 NOTE — PHYSICAL THERAPY INITIAL EVALUATION ADULT - PREDICTED DURATION OF THERAPY (DAYS/WKS), PT EVAL
Pt. will benefit from further Pt follow up to improve endurance, gait, balance to promote functional independence.

## 2017-02-16 NOTE — DISCHARGE NOTE ADULT - CARE PROVIDERS DIRECT ADDRESSES
[email or web address]

## 2017-02-16 NOTE — DISCHARGE NOTE ADULT - PATIENT PORTAL LINK FT
“You can access the FollowHealth Patient Portal, offered by [facility], by registering with the following website: [email or web address]”

## 2017-02-16 NOTE — DISCHARGE NOTE ADULT - MEDICATION SUMMARY - MEDICATIONS TO TAKE
I will START or STAY ON the medications listed below when I get home from the hospital:    aspirin 81 mg oral delayed release tablet  -- 1 tab(s) by mouth once a day  -- Indication: For Coronary artery disease    lisinopril 20 mg oral tablet  -- 1 tab(s) by mouth once a day  -- Indication: For Chronic systolic congestive heart failure    apixaban 5 mg oral tablet  -- 1 tab(s) by mouth every 12 hours  -- Indication: For Left ventricular thrombus without MI    atorvastatin 40 mg oral tablet  -- 1 tab(s) by mouth once a day (at bedtime)  -- Avoid grapefruit and grapefruit juice while taking this medication.  Do not take this drug if you are pregnant.  It is very important that you take or use this exactly as directed.  Do not skip doses or discontinue unless directed by your doctor.  Obtain medical advice before taking any non-prescription drugs as some may affect the action of this medication.  Take with food or milk.    -- Indication: For Nutrition, metabolism, and development symptoms    clopidogrel 75 mg oral tablet  -- 1 tab(s) by mouth once a day  -- Indication: For Coronary artery disease    chlordiazePOXIDE 25 mg oral capsule  -- 1 cap(s) by mouth every 12 hours MDD:50mg  -- Caution federal law prohibits the transfer of this drug to any person other  than the person for whom it was prescribed.  May cause drowsiness.  Alcohol may intensify this effect.  Use care when operating dangerous machinery.    -- Indication: For Alcohol withdrawal syndrome without complication    Metoprolol Succinate  mg oral tablet, extended release  -- 1 tab(s) by mouth once a day  -- It is very important that you take or use this exactly as directed.  Do not skip doses or discontinue unless directed by your doctor.  May cause drowsiness.  Alcohol may intensify this effect.  Use care when operating dangerous machinery.  Some non-prescription drugs may aggravate your condition.  Read all labels carefully.  If a warning appears, check with your doctor before taking.  Swallow whole.  Do not crush.  Take with food or milk.  This drug may impair the ability to drive or operate machinery.  Use care until you become familiar with its effects.    -- Indication: For Chronic systolic congestive heart failure    FeroSul 325 mg (65 mg elemental iron) oral tablet  -- 1 tab(s) by mouth once a day  -- Indication: For Nutrition, metabolism, and development symptoms    pantoprazole 40 mg oral delayed release tablet  -- 1 tab(s) by mouth once a day (before a meal)  -- Indication: For Need for prophylactic measure I will START or STAY ON the medications listed below when I get home from the hospital:    aspirin 81 mg oral delayed release tablet  -- 1 tab(s) by mouth once a day  -- Indication: For Coronary artery disease    lisinopril 20 mg oral tablet  -- 1 tab(s) by mouth once a day  -- Indication: For Chronic systolic congestive heart failure    apixaban 5 mg oral tablet  -- 1 tab(s) by mouth every 12 hours  -- Indication: For Left ventricular thrombus without MI    atorvastatin 40 mg oral tablet  -- 1 tab(s) by mouth once a day (at bedtime)  -- Avoid grapefruit and grapefruit juice while taking this medication.  Do not take this drug if you are pregnant.  It is very important that you take or use this exactly as directed.  Do not skip doses or discontinue unless directed by your doctor.  Obtain medical advice before taking any non-prescription drugs as some may affect the action of this medication.  Take with food or milk.    -- Indication: For Nutrition, metabolism, and development symptoms    clopidogrel 75 mg oral tablet  -- 1 tab(s) by mouth once a day  -- Indication: For Coronary artery disease    chlordiazePOXIDE 10 mg oral capsule  -- 1 cap(s) by mouth every 4 hours  -- Indication: For Alcohol withdrawal    Metoprolol Succinate  mg oral tablet, extended release  -- 1 tab(s) by mouth once a day  -- It is very important that you take or use this exactly as directed.  Do not skip doses or discontinue unless directed by your doctor.  May cause drowsiness.  Alcohol may intensify this effect.  Use care when operating dangerous machinery.  Some non-prescription drugs may aggravate your condition.  Read all labels carefully.  If a warning appears, check with your doctor before taking.  Swallow whole.  Do not crush.  Take with food or milk.  This drug may impair the ability to drive or operate machinery.  Use care until you become familiar with its effects.    -- Indication: For Chronic systolic congestive heart failure    FeroSul 325 mg (65 mg elemental iron) oral tablet  -- 1 tab(s) by mouth once a day  -- Indication: For Nutrition, metabolism, and development symptoms    pantoprazole 40 mg oral delayed release tablet  -- 1 tab(s) by mouth once a day (before a meal)  -- Indication: For Need for prophylactic measure

## 2017-02-16 NOTE — DISCHARGE NOTE ADULT - SECONDARY DIAGNOSIS.
Left ventricular thrombus without MI Chronic systolic congestive heart failure Coronary artery disease without angina pectoris, unspecified vessel or lesion type, unspecified whether native or transplanted heart Atrial fibrillation, unspecified type

## 2017-02-16 NOTE — PROGRESS NOTE ADULT - SUBJECTIVE AND OBJECTIVE BOX
Pt is improving re ETOH withdrawl   no cp no dyspnea    PAST MEDICAL & SURGICAL HISTORY:  Pacemaker  ETOH abuse  Paroxysmal a-fib  Myocardial infarction involving other coronary artery  Gastritis  Essential hypertension: Hypertension  Other specified cardiac dysrhythmias: SVT (supraventricular tachycardia)  Atherosclerosis of coronary artery: Coronary artery disease  History of surgery to heart and great vessels, presenting hazards to health: History of percutaneous coronary intervention  Automatic implantable cardiac defibrillator in situ: ICD (implantable cardioverter-defibrillator) in place      MEDICATIONS  (STANDING):  aspirin enteric coated 81milliGRAM(s) Oral daily  atorvastatin 40milliGRAM(s) Oral at bedtime  clopidogrel Tablet 75milliGRAM(s) Oral daily  metoprolol succinate ER 100milliGRAM(s) Oral daily  thiamine 100milliGRAM(s) Oral daily  folic acid 1milliGRAM(s) Oral daily  multivitamin/minerals 1Tablet(s) Oral daily  lisinopril 20milliGRAM(s) Oral daily  ferrous    sulfate 325milliGRAM(s) Oral daily  pantoprazole    Tablet 40milliGRAM(s) Oral before breakfast  chlordiazePOXIDE 75milliGRAM(s) Oral every 8 hours  heparin  Infusion 1000Unit(s)/Hr IV Continuous <Continuous>    MEDICATIONS  (PRN):  LORazepam     Tablet 2milliGRAM(s) Oral every 4 hours PRN Agitation    Vital Signs Last 24 Hrs  T(C): 37.4, Max: 37.4 (02-16 @ 05:48)  T(F): 99.3, Max: 99.3 (02-16 @ 05:48)  HR: 63 (63 - 92)  BP: 152/93 (103/71 - 152/93)  BP(mean): --  RR: 18 (16 - 18)  SpO2: 100% (99% - 100%)    Echo   EF 35 %  Apical and mid Interventricular apical anterior and apical inferior akinesis   clot in apex     CXR   No overt  CHF   EKG NSR AS MI age undetermined    Complete Blood Count (02.16.17 @ 07:58)    WBC Count: 3.3 K/uL    RBC Count: 3.83 M/uL    Hemoglobin: 10.3 g/dL    Hematocrit: 30.7 %    Mean Cell Volume: 80.2 fL    Mean Cell Hemoglobin: 26.9 pg    Mean Cell Hemoglobin Conc: 33.6 g/dL    Red Cell Distrib Width: 19.0 %    Platelet Count - Automated: 100 K/uL                      10.3   3.3   )-----------( 100      ( 16 Feb 2017 07:58 )             30.7   16 Feb 2017 07:58    137    |  101    |  16     ----------------------------<  92     4.2     |  26     |  1.16     Ca    8.8        16 Feb 2017 07:58  Phos  3.0       16 Feb 2017 07:58  Mg     2.0       16 Feb 2017 07:58    PT/INR - ( 16 Feb 2017 07:58 )   PT: 10.6 sec;   INR: 0.96          PTT - ( 16 Feb 2017 03:23 )  PTT:59.2 sec

## 2017-02-17 LAB
ANION GAP SERPL CALC-SCNC: 5 MMOL/L — LOW (ref 9–16)
BUN SERPL-MCNC: 16 MG/DL — SIGNIFICANT CHANGE UP (ref 7–23)
CALCIUM SERPL-MCNC: 8.6 MG/DL — SIGNIFICANT CHANGE UP (ref 8.5–10.5)
CHLORIDE SERPL-SCNC: 102 MMOL/L — SIGNIFICANT CHANGE UP (ref 96–108)
CO2 SERPL-SCNC: 29 MMOL/L — SIGNIFICANT CHANGE UP (ref 22–31)
CREAT SERPL-MCNC: 1.16 MG/DL — SIGNIFICANT CHANGE UP (ref 0.5–1.3)
FOLATE SERPL-MCNC: 16.6 NG/ML — SIGNIFICANT CHANGE UP (ref 4.8–24.2)
GLUCOSE SERPL-MCNC: 95 MG/DL — SIGNIFICANT CHANGE UP (ref 70–99)
HCT VFR BLD CALC: 31.2 % — LOW (ref 39–50)
HGB BLD-MCNC: 10.1 G/DL — LOW (ref 13–17)
MAGNESIUM SERPL-MCNC: 1.7 MG/DL — SIGNIFICANT CHANGE UP (ref 1.6–2.4)
MCHC RBC-ENTMCNC: 26.6 PG — LOW (ref 27–34)
MCHC RBC-ENTMCNC: 32.4 G/DL — SIGNIFICANT CHANGE UP (ref 32–36)
MCV RBC AUTO: 82.3 FL — SIGNIFICANT CHANGE UP (ref 80–100)
PLATELET # BLD AUTO: 113 K/UL — LOW (ref 150–400)
POTASSIUM SERPL-MCNC: 4.4 MMOL/L — SIGNIFICANT CHANGE UP (ref 3.5–5.3)
POTASSIUM SERPL-SCNC: 4.4 MMOL/L — SIGNIFICANT CHANGE UP (ref 3.5–5.3)
RBC # BLD: 3.79 M/UL — LOW (ref 4.2–5.8)
RBC # FLD: 19.8 % — HIGH (ref 10.3–16.9)
SODIUM SERPL-SCNC: 136 MMOL/L — SIGNIFICANT CHANGE UP (ref 135–145)
VIT B12 SERPL-MCNC: 314 PG/ML — SIGNIFICANT CHANGE UP (ref 243–894)
WBC # BLD: 3.1 K/UL — LOW (ref 3.8–10.5)
WBC # FLD AUTO: 3.1 K/UL — LOW (ref 3.8–10.5)

## 2017-02-17 RX ORDER — MAGNESIUM SULFATE 500 MG/ML
1 VIAL (ML) INJECTION ONCE
Qty: 0 | Refills: 0 | Status: COMPLETED | OUTPATIENT
Start: 2017-02-17 | End: 2017-02-17

## 2017-02-17 RX ADMIN — Medication 100 MILLIGRAM(S): at 11:38

## 2017-02-17 RX ADMIN — ATORVASTATIN CALCIUM 40 MILLIGRAM(S): 80 TABLET, FILM COATED ORAL at 21:20

## 2017-02-17 RX ADMIN — Medication 100 GRAM(S): at 07:49

## 2017-02-17 RX ADMIN — Medication 1 MILLIGRAM(S): at 11:37

## 2017-02-17 RX ADMIN — CLOPIDOGREL BISULFATE 75 MILLIGRAM(S): 75 TABLET, FILM COATED ORAL at 11:38

## 2017-02-17 RX ADMIN — Medication 100 MILLIGRAM(S): at 06:06

## 2017-02-17 RX ADMIN — LISINOPRIL 20 MILLIGRAM(S): 2.5 TABLET ORAL at 06:06

## 2017-02-17 RX ADMIN — APIXABAN 5 MILLIGRAM(S): 2.5 TABLET, FILM COATED ORAL at 11:37

## 2017-02-17 RX ADMIN — Medication 2 MILLIGRAM(S): at 11:36

## 2017-02-17 RX ADMIN — Medication 50 MILLIGRAM(S): at 21:20

## 2017-02-17 RX ADMIN — Medication 2 MILLIGRAM(S): at 21:26

## 2017-02-17 RX ADMIN — Medication 81 MILLIGRAM(S): at 11:38

## 2017-02-17 RX ADMIN — PANTOPRAZOLE SODIUM 40 MILLIGRAM(S): 20 TABLET, DELAYED RELEASE ORAL at 06:06

## 2017-02-17 RX ADMIN — Medication 75 MILLIGRAM(S): at 06:06

## 2017-02-17 RX ADMIN — Medication 50 MILLIGRAM(S): at 14:14

## 2017-02-17 RX ADMIN — Medication 325 MILLIGRAM(S): at 11:38

## 2017-02-17 RX ADMIN — APIXABAN 5 MILLIGRAM(S): 2.5 TABLET, FILM COATED ORAL at 00:25

## 2017-02-17 RX ADMIN — Medication 1 TABLET(S): at 11:38

## 2017-02-17 NOTE — PROGRESS NOTE ADULT - ASSESSMENT
61M PMH ETOH Abuse and Withdrawal (Patient with History of ETOH Withdrawal with Recent Admission To [facility]), CAD (S/P Stent Placement in 7/2016 on Aspirin and Plavix), Systolic Congestive Heart Failure (EF 35-45% with AICD), Atrial Fibrillation, GI Bleeding admitted for ETOH Withdrawal, Now Improving.

## 2017-02-17 NOTE — PROGRESS NOTE ADULT - SUBJECTIVE AND OBJECTIVE BOX
OVERNIGHT EVENTS: No events    Interval HPI: Well, still some tremor, CIWA 2, no pain, sob, nv, weakness.    VITAL SIGNS:  T(F): 98.4  HR: 86  BP: 112/69  RR: 17  SpO2: 98%  Wt(kg): --    PHYSICAL EXAM:    General:  Eyes:  Respiratory:  Cardiovascular:  Gastrointestinal:  Extremities:  Neurological:    MEDICATIONS  (STANDING):  aspirin enteric coated 81milliGRAM(s) Oral daily  atorvastatin 40milliGRAM(s) Oral at bedtime  clopidogrel Tablet 75milliGRAM(s) Oral daily  metoprolol succinate ER 100milliGRAM(s) Oral daily  thiamine 100milliGRAM(s) Oral daily  folic acid 1milliGRAM(s) Oral daily  multivitamin/minerals 1Tablet(s) Oral daily  lisinopril 20milliGRAM(s) Oral daily  ferrous    sulfate 325milliGRAM(s) Oral daily  pantoprazole    Tablet 40milliGRAM(s) Oral before breakfast  apixaban 5milliGRAM(s) Oral every 12 hours  chlordiazePOXIDE 50milliGRAM(s) Oral every 8 hours    MEDICATIONS  (PRN):  LORazepam     Tablet 2milliGRAM(s) Oral every 4 hours PRN Agitation      LABS:                        10.1   3.1   )-----------( 113      ( 17 Feb 2017 06:44 )             31.2     17 Feb 2017 06:44    136    |  102    |  16     ----------------------------<  95     4.4     |  29     |  1.16     Ca    8.6        17 Feb 2017 06:44  Phos  3.0       16 Feb 2017 07:58  Mg     1.7       17 Feb 2017 06:44      PT/INR - ( 16 Feb 2017 07:58 )   PT: 10.6 sec;   INR: 0.96          PTT - ( 16 Feb 2017 11:25 )  PTT:58.6 sec      RADIOLOGY & ADDITIONAL TESTS:    Plan: OVERNIGHT EVENTS: No events    Interval HPI: Well, still some tremor, CIWA 2, no pain, sob, nv, weakness.    VITAL SIGNS:  T(F): 98.4  HR: 86  BP: 112/69  RR: 17  SpO2: 98%  Wt(kg): --    PHYSICAL EXAM:    General: NAD sitting up  Eyes: eomi no si  Oropharynx: moist no acute lesions  Respiratory: ctab no wrc  Cardiovascular: rrr no mrg  Gastrointestinal: soft ntnd normoactive bs  Extremities: strength 5/5 sensation + , pulses + no edema  Neurological: aaox3 no gross deficits, ciwa 2    MEDICATIONS  (STANDING):  aspirin enteric coated 81milliGRAM(s) Oral daily  atorvastatin 40milliGRAM(s) Oral at bedtime  clopidogrel Tablet 75milliGRAM(s) Oral daily  metoprolol succinate ER 100milliGRAM(s) Oral daily  thiamine 100milliGRAM(s) Oral daily  folic acid 1milliGRAM(s) Oral daily  multivitamin/minerals 1Tablet(s) Oral daily  lisinopril 20milliGRAM(s) Oral daily  ferrous    sulfate 325milliGRAM(s) Oral daily  pantoprazole    Tablet 40milliGRAM(s) Oral before breakfast  apixaban 5milliGRAM(s) Oral every 12 hours  chlordiazePOXIDE 50milliGRAM(s) Oral every 8 hours    MEDICATIONS  (PRN):  LORazepam     Tablet 2milliGRAM(s) Oral every 4 hours PRN Agitation      LABS:                        10.1   3.1   )-----------( 113      ( 17 Feb 2017 06:44 )             31.2     17 Feb 2017 06:44    136    |  102    |  16     ----------------------------<  95     4.4     |  29     |  1.16     Ca    8.6        17 Feb 2017 06:44  Phos  3.0       16 Feb 2017 07:58  Mg     1.7       17 Feb 2017 06:44      PT/INR - ( 16 Feb 2017 07:58 )   PT: 10.6 sec;   INR: 0.96          PTT - ( 16 Feb 2017 11:25 )  PTT:58.6 sec      RADIOLOGY & ADDITIONAL TESTS:    Plan:

## 2017-02-17 NOTE — PROGRESS NOTE ADULT - SUBJECTIVE AND OBJECTIVE BOX
Pt is slowly improved but very shaky    gait instability noted    ICU Vital Signs Last 24 Hrs  T(C): 36.9, Max: 36.9 (02-17 @ 05:42)  T(F): 98.4, Max: 98.4 (02-17 @ 05:42)  HR: 86 (69 - 86)  BP: 112/69 (106/70 - 118/-)  BP(mean): --  ABP: --  ABP(mean): --  RR: 17 (16 - 18)    PAST MEDICAL & SURGICAL HISTORY:  Pacemaker  ETOH abuse  Paroxysmal a-fib  Myocardial infarction involving other coronary artery  Gastritis  Essential hypertension: Hypertension  Other specified cardiac dysrhythmias: SVT (supraventricular tachycardia)  Atherosclerosis of coronary artery: Coronary artery disease  History of surgery to heart and great vessels, presenting hazards to health: History of percutaneous coronary intervention  Automatic implantable cardiac defibrillator in situ: ICD (implantable cardioverter-defibrillator) in place    SpO2: 98% (97% - 100%)    MEDICATIONS  (STANDING):  aspirin enteric coated 81milliGRAM(s) Oral daily  atorvastatin 40milliGRAM(s) Oral at bedtime  clopidogrel Tablet 75milliGRAM(s) Oral daily  metoprolol succinate ER 100milliGRAM(s) Oral daily  thiamine 100milliGRAM(s) Oral daily  folic acid 1milliGRAM(s) Oral daily  multivitamin/minerals 1Tablet(s) Oral daily  lisinopril 20milliGRAM(s) Oral daily  ferrous    sulfate 325milliGRAM(s) Oral daily  pantoprazole    Tablet 40milliGRAM(s) Oral before breakfast  apixaban 5milliGRAM(s) Oral every 12 hours  chlordiazePOXIDE 50milliGRAM(s) Oral every 8 hours    MEDICATIONS  (PRN):  LORazepam     Tablet 2milliGRAM(s) Oral every 4 hours PRN Agitation    Lungs clear   CV s1 s2   Abd soft  Ext stable      17 Feb 2017 06:44    136    |  102    |  16     ----------------------------<  95     4.4     |  29     |  1.16     Ca    8.6        17 Feb 2017 06:44  Phos  3.0       16 Feb 2017 07:58  Mg     1.7       17 Feb 2017 06:44                          10.1   3.1   )-----------( 113      ( 17 Feb 2017 06:44 )             31.2   PT/INR - ( 16 Feb 2017 07:58 )   PT: 10.6 sec;   INR: 0.96          PTT - ( 16 Feb 2017 11:25 )  PTT:58.6 sec

## 2017-02-17 NOTE — CONSULT NOTE ADULT - ASSESSMENT
61M PMH ETOH Abuse and Withdrawal (Patient with History of ETOH Withdrawal with Recent Admission To [facility]), CAD (S/P Stent Placement in 7/2016 on Aspirin and Plavix), Systolic Congestive Heart Failure (EF 35-45% with AICD), Atrial Fibrillation, GI Bleeding admitted for ETOH Withdrawal, Now Improving.     Problem/Plan - 1:  ·  Problem: Alcohol withdrawal.  Plan: - librium 50mg PO Q8- will monitor and taper as tolerated  - ativan 2mg PO PRN agitation  - CIWA 2, improving  -Continue Folic Acid 1mg PO Q daily, Thiamine 100mg PO Q daily, and Multivitamin PO Q daily..     Problem/Plan - 2:  ·  Problem: Left ventricular thrombus without MI.  Plan: - Echo: EF 35% with LV Thrombus.   - eliquis 5mg PO BID- monitor for bleeding signs and trend CBC  - will likely need outpatient AC, previously declined due to active lifestyle will discuss with patient.

## 2017-02-17 NOTE — CONSULT NOTE ADULT - SUBJECTIVE AND OBJECTIVE BOX
Patient is a 61y old  Male who presents with a chief complaint of Alcohol withdrawal (16 Feb 2017 11:14)      HPI:  Patient is a 62 yo M with a PMHx of CAD s/p MI (~2003), stent placement in July of 2016, CHF (EF 35-40%) s/p AICD, gastritis with GIB, A. fib NOT on AC (Patient refused to take), and history of EtOH abuse with recent hospitalization for withdrawal in december of 2015, who presented with withdrawal symptoms. The patient has had withdrawal symptoms in the past for which he was seen at [facility], and this hospital, has not required intubation or had a seizure in the past. His last drink was last night, has had tremors since waking up this morning, took his home dose of librium of 30 but did not have any improvement of his symptoms.He drinks about 1 pint of vodka a day however has been having more "nips" due to stress with the mother of his child.    The patient denies: headaches, nausea or vomiting, abdominal pain, constipation, diarrhea, black or tarry stools, dysuria, decreased exercise tolerance or lower extremity edema, chest pain, SOB, cough, fever, chills, (14 Feb 2017 17:31)      PAST MEDICAL & SURGICAL HISTORY:  Pacemaker  ETOH abuse  Paroxysmal a-fib  Myocardial infarction involving other coronary artery  Gastritis  Essential hypertension: Hypertension  Other specified cardiac dysrhythmias: SVT (supraventricular tachycardia)  Atherosclerosis of coronary artery: Coronary artery disease  History of surgery to heart and great vessels, presenting hazards to health: History of percutaneous coronary intervention  Automatic implantable cardiac defibrillator in situ: ICD (implantable cardioverter-defibrillator) in place      MEDICATIONS  (STANDING):  aspirin enteric coated 81milliGRAM(s) Oral daily  atorvastatin 40milliGRAM(s) Oral at bedtime  clopidogrel Tablet 75milliGRAM(s) Oral daily  metoprolol succinate ER 100milliGRAM(s) Oral daily  thiamine 100milliGRAM(s) Oral daily  folic acid 1milliGRAM(s) Oral daily  multivitamin/minerals 1Tablet(s) Oral daily  lisinopril 20milliGRAM(s) Oral daily  ferrous    sulfate 325milliGRAM(s) Oral daily  pantoprazole    Tablet 40milliGRAM(s) Oral before breakfast  apixaban 5milliGRAM(s) Oral every 12 hours  chlordiazePOXIDE 50milliGRAM(s) Oral once    MEDICATIONS  (PRN):  LORazepam     Tablet 2milliGRAM(s) Oral every 4 hours PRN Agitation      Social History: lives with friend in an elevator accessible apartment building, no home care services    Functional Level Prior to Admission: reports AL independent, walks without devices    FAMILY HISTORY:  No pertinent family history in first degree relatives      CBC Full  -  ( 17 Feb 2017 06:44 )  WBC Count : 3.1 K/uL  Hemoglobin : 10.1 g/dL  Hematocrit : 31.2 %  Platelet Count - Automated : 113 K/uL  Mean Cell Volume : 82.3 fL  Mean Cell Hemoglobin : 26.6 pg  Mean Cell Hemoglobin Concentration : 32.4 g/dL  Auto Neutrophil # : x  Auto Lymphocyte # : x  Auto Monocyte # : x  Auto Eosinophil # : x  Auto Basophil # : x  Auto Neutrophil % : x  Auto Lymphocyte % : x  Auto Monocyte % : x  Auto Eosinophil % : x  Auto Basophil % : x      17 Feb 2017 06:44    136    |  102    |  16     ----------------------------<  95     4.4     |  29     |  1.16     Ca    8.6        17 Feb 2017 06:44  Phos  3.0       16 Feb 2017 07:58  Mg     1.7       17 Feb 2017 06:44          Radiology:      EXAM:  XR CHEST 1 VIEW PORT URGENT                           PROCEDURE DATE:  02/14/2017                 INTERPRETATION:  Indication: etoh w/d    A single portable view of the chest is submitted. Comparison is made to   the most recent prior study dated 12/12/2016. No significant interval   change is present. Cardiac size is difficult to assess secondary to   portable technique.  The pulmonary vasculature is within normal limits.    The lungs are expanded and free of infiltrates or effusions.  There is no   pneumothorax.     Impression:    No significant interval change from the prior examination        Vital Signs Last 24 Hrs  T(C): 36.9, Max: 37.1 (02-17 @ 09:00)  T(F): 98.5, Max: 98.8 (02-17 @ 09:00)  HR: 81 (79 - 96)  BP: 98/63 (98/63 - 118/76)  BP(mean): --  RR: 18 (16 - 18)  SpO2: 100% (98% - 100%)    REVIEW OF SYSTEMS:    CONSTITUTIONAL:  fatigue  EYES: No eye pain, visual disturbances, or discharge  ENMT:  No difficulty hearing, tinnitus, vertigo; No sinus or throat pain  NECK: No pain or stiffness  BREASTS: No pain, masses, or nipple discharge  RESPIRATORY: No cough, wheezing, chills or hemoptysis; No shortness of breath  CARDIOVASCULAR: No chest pain, palpitations, dizziness, or leg swelling  GASTROINTESTINAL: No abdominal or epigastric pain. No nausea, vomiting, or hematemesis; No diarrhea or constipation. No melena or hematochezia.  GENITOURINARY: No dysuria, frequency, hematuria, or incontinence  NEUROLOGICAL: No headaches, memory loss, loss of strength, numbness, or tremors  SKIN: No itching, burning, rashes, or lesions   LYMPH NODES: No enlarged glands  ENDOCRINE: No heat or cold intolerance; No hair loss  MUSCULOSKELETAL: No joint pain or swelling; No muscle, back, or extremity pain  PSYCHIATRIC: No depression, anxiety, mood swings, or difficulty sleeping  HEME/LYMPH: No easy bruising, or bleeding gums  ALLERGY AND IMMUNOLOGIC: No hives or eczema      Physical Exam: well developed, well nourished  male sitting up in bed, c/o feeling tired    HEENT: normocephalic/ atraumatic, anicteric    Neck: supple, negative JVD, negative carotid bruits,    Chest: CTA bilaterally, neg wheeze, rhonchi, rales, crackles, egophany    Cardiovascular: regular rate and rhythm, neg murmurs/rubs/gallops    Abdomen: soft, non tender, negative rebound/guarding, non distended, normal bowel sounds, neg hepatosplenomegaly    Extremities: WWP, neg cyanosis/clubbing/edema, negative calf tenderness to palpation, negative Kee's sign    Neurologic Exam:    Alert and oriented to person, place, date/year, speech fluent w/o dysarthria, repetition intact, comprehension intact,     Cranial Nerves:     II:                       pupils equal, round and reactive to light, visual fields intact   III/ IV/VI:             extraocular movements intact, neg nystagmus, ptosis  V:                      facial sensation intact, V1-3 normal  VII:                     face symmetric, no droop, normal eye closure and smile  VIII:                    hearing intact to finger rub bilaterally  IX/ X:                  soft palate rise symmetrical  XI:                      head turning, shoulder shrug normal  XII:                     tongue midline    Motor Exam:    Bilateral UE:        5/5 /intrinsics                            5/5 biceps/triceps/wrist extensors-flexors/deltoid                            negative pronator drift      Bilateral LE:        4/5 hip flexors/adductors/abductors                            4+/5 quadriceps/hamstrings                            5/5 dorsiflexors/plantar flexors/invertors-evertors    Sensory: decreased sensation/vibration LE's    DTR:      1 + = biceps/     triceps/     brachioradialis                depressed patella/   medial hamstring/    ankle                 neg clonus                 neg Babinski                 neg Hoffmans    Finger to Nose: wnl    Heel to Shin:      wnl with tremor    Rapid Alternating movements:  slightly slowed L>R    Joint Position Sense:  intact    Gait:  NT        PM&R Impression:    1) deconditioned  2) probable ETOH polyneuropathy  3) gait dysfunction      Recommendations:    1) Physical therapy focusing on therapeutic exercises, bed mobility/transfer out of bed evaluation, progressive ambulation with assistive devices prn    2) Disposition Plan: d/c home

## 2017-02-18 LAB
ANION GAP SERPL CALC-SCNC: 6 MMOL/L — LOW (ref 9–16)
BUN SERPL-MCNC: 18 MG/DL — SIGNIFICANT CHANGE UP (ref 7–23)
CALCIUM SERPL-MCNC: 9 MG/DL — SIGNIFICANT CHANGE UP (ref 8.5–10.5)
CHLORIDE SERPL-SCNC: 104 MMOL/L — SIGNIFICANT CHANGE UP (ref 96–108)
CO2 SERPL-SCNC: 28 MMOL/L — SIGNIFICANT CHANGE UP (ref 22–31)
CREAT SERPL-MCNC: 1.25 MG/DL — SIGNIFICANT CHANGE UP (ref 0.5–1.3)
GLUCOSE SERPL-MCNC: 96 MG/DL — SIGNIFICANT CHANGE UP (ref 70–99)
HCT VFR BLD CALC: 30 % — LOW (ref 39–50)
HGB BLD-MCNC: 9.7 G/DL — LOW (ref 13–17)
MAGNESIUM SERPL-MCNC: 1.8 MG/DL — SIGNIFICANT CHANGE UP (ref 1.6–2.4)
MCHC RBC-ENTMCNC: 26.9 PG — LOW (ref 27–34)
MCHC RBC-ENTMCNC: 32.3 G/DL — SIGNIFICANT CHANGE UP (ref 32–36)
MCV RBC AUTO: 83.3 FL — SIGNIFICANT CHANGE UP (ref 80–100)
PLATELET # BLD AUTO: 125 K/UL — LOW (ref 150–400)
POTASSIUM SERPL-MCNC: 4.1 MMOL/L — SIGNIFICANT CHANGE UP (ref 3.5–5.3)
POTASSIUM SERPL-SCNC: 4.1 MMOL/L — SIGNIFICANT CHANGE UP (ref 3.5–5.3)
RBC # BLD: 3.6 M/UL — LOW (ref 4.2–5.8)
RBC # FLD: 20.1 % — HIGH (ref 10.3–16.9)
SODIUM SERPL-SCNC: 138 MMOL/L — SIGNIFICANT CHANGE UP (ref 135–145)
WBC # BLD: 3 K/UL — LOW (ref 3.8–10.5)
WBC # FLD AUTO: 3 K/UL — LOW (ref 3.8–10.5)

## 2017-02-18 RX ORDER — MAGNESIUM SULFATE 500 MG/ML
2 VIAL (ML) INJECTION ONCE
Qty: 0 | Refills: 0 | Status: COMPLETED | OUTPATIENT
Start: 2017-02-18 | End: 2017-02-18

## 2017-02-18 RX ADMIN — LISINOPRIL 20 MILLIGRAM(S): 2.5 TABLET ORAL at 06:12

## 2017-02-18 RX ADMIN — PANTOPRAZOLE SODIUM 40 MILLIGRAM(S): 20 TABLET, DELAYED RELEASE ORAL at 06:12

## 2017-02-18 RX ADMIN — CLOPIDOGREL BISULFATE 75 MILLIGRAM(S): 75 TABLET, FILM COATED ORAL at 10:12

## 2017-02-18 RX ADMIN — Medication 100 MILLIGRAM(S): at 10:12

## 2017-02-18 RX ADMIN — ATORVASTATIN CALCIUM 40 MILLIGRAM(S): 80 TABLET, FILM COATED ORAL at 21:43

## 2017-02-18 RX ADMIN — APIXABAN 5 MILLIGRAM(S): 2.5 TABLET, FILM COATED ORAL at 00:13

## 2017-02-18 RX ADMIN — APIXABAN 5 MILLIGRAM(S): 2.5 TABLET, FILM COATED ORAL at 10:12

## 2017-02-18 RX ADMIN — Medication 100 MILLIGRAM(S): at 06:12

## 2017-02-18 RX ADMIN — Medication 81 MILLIGRAM(S): at 10:12

## 2017-02-18 RX ADMIN — Medication 2 MILLIGRAM(S): at 13:12

## 2017-02-18 RX ADMIN — Medication 2 MILLIGRAM(S): at 22:16

## 2017-02-18 RX ADMIN — Medication 50 GRAM(S): at 10:12

## 2017-02-18 RX ADMIN — APIXABAN 5 MILLIGRAM(S): 2.5 TABLET, FILM COATED ORAL at 23:22

## 2017-02-18 RX ADMIN — Medication 325 MILLIGRAM(S): at 10:11

## 2017-02-18 RX ADMIN — Medication 25 MILLIGRAM(S): at 21:43

## 2017-02-18 RX ADMIN — Medication 1 TABLET(S): at 13:12

## 2017-02-18 RX ADMIN — Medication 1 MILLIGRAM(S): at 10:12

## 2017-02-18 RX ADMIN — Medication 25 MILLIGRAM(S): at 13:17

## 2017-02-18 RX ADMIN — Medication 25 MILLIGRAM(S): at 06:12

## 2017-02-18 NOTE — PROGRESS NOTE ADULT - SUBJECTIVE AND OBJECTIVE BOX
61 year old male admitted for etoh detox. Admitted 5 days ago.    PAST MEDICAL & SURGICAL HISTORY:  Pacemaker  ETOH abuse  Paroxysmal a-fib  Myocardial infarction involving other coronary artery  Gastritis  Essential hypertension: Hypertension  Other specified cardiac dysrhythmias: SVT (supraventricular tachycardia)  Atherosclerosis of coronary artery: Coronary artery disease  History of surgery to heart and great vessels, presenting hazards to health: History of percutaneous coronary intervention  Automatic implantable cardiac defibrillator in situ: ICD (implantable cardioverter-defibrillator) in place    MEDICATIONS  (STANDING):  aspirin enteric coated 81milliGRAM(s) Oral daily  atorvastatin 40milliGRAM(s) Oral at bedtime  clopidogrel Tablet 75milliGRAM(s) Oral daily  metoprolol succinate ER 100milliGRAM(s) Oral daily  thiamine 100milliGRAM(s) Oral daily  folic acid 1milliGRAM(s) Oral daily  multivitamin/minerals 1Tablet(s) Oral daily  lisinopril 20milliGRAM(s) Oral daily  ferrous    sulfate 325milliGRAM(s) Oral daily  pantoprazole    Tablet 40milliGRAM(s) Oral before breakfast  apixaban 5milliGRAM(s) Oral every 12 hours  chlordiazePOXIDE 25milliGRAM(s) Oral every 8 hours    MEDICATIONS  (PRN):  LORazepam     Tablet 2milliGRAM(s) Oral every 4 hours PRN Agitation    Vital Signs Last 24 Hrs  T(C): 36.8, Max: 36.9 (02-17 @ 16:28)  T(F): 98.3, Max: 98.5 (02-17 @ 16:28)  HR: 74 (74 - 95)  BP: 114/75 (98/63 - 114/75)  BP(mean): --  RR: 16 (16 - 18)  SpO2: 97% (97% - 100%)  awake, verbal  c/o ataxia  chest clear  heart S1, S2  abd-benign  ext-no edema  Neuro-asterixis                          9.7    3.0   )-----------( 125      ( 18 Feb 2017 07:47 )             30.0     18 Feb 2017 07:47    138    |  104    |  18     ----------------------------<  96     4.1     |  28     |  1.25     Ca    9.0        18 Feb 2017 07:47  Mg     1.8       18 Feb 2017 07:47

## 2017-02-18 NOTE — PROGRESS NOTE ADULT - ASSESSMENT
ETOH withdrawal syndrome  on detox protocol  still with ataxia and asterixis after 5 days    CAD LV thrombus    Cont cardiac meds    Plan:    continue detox  may need neuro consult if situation does not resolve soon    TK Patterson MD for LEOPOLDO Levy MD

## 2017-02-19 LAB
ANION GAP SERPL CALC-SCNC: 7 MMOL/L — LOW (ref 9–16)
BUN SERPL-MCNC: 17 MG/DL — SIGNIFICANT CHANGE UP (ref 7–23)
CALCIUM SERPL-MCNC: 8.7 MG/DL — SIGNIFICANT CHANGE UP (ref 8.5–10.5)
CHLORIDE SERPL-SCNC: 103 MMOL/L — SIGNIFICANT CHANGE UP (ref 96–108)
CO2 SERPL-SCNC: 27 MMOL/L — SIGNIFICANT CHANGE UP (ref 22–31)
CREAT SERPL-MCNC: 1.09 MG/DL — SIGNIFICANT CHANGE UP (ref 0.5–1.3)
GLUCOSE SERPL-MCNC: 91 MG/DL — SIGNIFICANT CHANGE UP (ref 70–99)
HCT VFR BLD CALC: 30.8 % — LOW (ref 39–50)
HGB BLD-MCNC: 10.1 G/DL — LOW (ref 13–17)
MAGNESIUM SERPL-MCNC: 2.4 MG/DL — SIGNIFICANT CHANGE UP (ref 1.6–2.4)
MCHC RBC-ENTMCNC: 27.4 PG — SIGNIFICANT CHANGE UP (ref 27–34)
MCHC RBC-ENTMCNC: 32.8 G/DL — SIGNIFICANT CHANGE UP (ref 32–36)
MCV RBC AUTO: 83.5 FL — SIGNIFICANT CHANGE UP (ref 80–100)
PLATELET # BLD AUTO: 148 K/UL — LOW (ref 150–400)
POTASSIUM SERPL-MCNC: 4.4 MMOL/L — SIGNIFICANT CHANGE UP (ref 3.5–5.3)
POTASSIUM SERPL-SCNC: 4.4 MMOL/L — SIGNIFICANT CHANGE UP (ref 3.5–5.3)
RBC # BLD: 3.69 M/UL — LOW (ref 4.2–5.8)
RBC # FLD: 20.2 % — HIGH (ref 10.3–16.9)
SODIUM SERPL-SCNC: 137 MMOL/L — SIGNIFICANT CHANGE UP (ref 135–145)
WBC # BLD: 3.3 K/UL — LOW (ref 3.8–10.5)
WBC # FLD AUTO: 3.3 K/UL — LOW (ref 3.8–10.5)

## 2017-02-19 RX ADMIN — Medication 2 MILLIGRAM(S): at 23:22

## 2017-02-19 RX ADMIN — CLOPIDOGREL BISULFATE 75 MILLIGRAM(S): 75 TABLET, FILM COATED ORAL at 11:46

## 2017-02-19 RX ADMIN — Medication 81 MILLIGRAM(S): at 11:46

## 2017-02-19 RX ADMIN — ATORVASTATIN CALCIUM 40 MILLIGRAM(S): 80 TABLET, FILM COATED ORAL at 21:11

## 2017-02-19 RX ADMIN — APIXABAN 5 MILLIGRAM(S): 2.5 TABLET, FILM COATED ORAL at 11:46

## 2017-02-19 RX ADMIN — Medication 100 MILLIGRAM(S): at 11:46

## 2017-02-19 RX ADMIN — Medication 1 TABLET(S): at 12:16

## 2017-02-19 RX ADMIN — PANTOPRAZOLE SODIUM 40 MILLIGRAM(S): 20 TABLET, DELAYED RELEASE ORAL at 06:46

## 2017-02-19 RX ADMIN — Medication 25 MILLIGRAM(S): at 06:46

## 2017-02-19 RX ADMIN — LISINOPRIL 20 MILLIGRAM(S): 2.5 TABLET ORAL at 06:46

## 2017-02-19 RX ADMIN — Medication 100 MILLIGRAM(S): at 06:46

## 2017-02-19 RX ADMIN — Medication 25 MILLIGRAM(S): at 21:11

## 2017-02-19 RX ADMIN — Medication 325 MILLIGRAM(S): at 11:46

## 2017-02-19 RX ADMIN — Medication 25 MILLIGRAM(S): at 13:30

## 2017-02-19 RX ADMIN — Medication 1 MILLIGRAM(S): at 11:46

## 2017-02-19 RX ADMIN — APIXABAN 5 MILLIGRAM(S): 2.5 TABLET, FILM COATED ORAL at 23:12

## 2017-02-19 NOTE — PROGRESS NOTE ADULT - ASSESSMENT
ETOH withdrawal syndrome  on detox protocol  mild asterixis     CAD LV thrombus    Cont cardiac meds    Plan:    continue detox    TK Patterson MD for LEOPOLDO Levy MD

## 2017-02-19 NOTE — PROGRESS NOTE ADULT - SUBJECTIVE AND OBJECTIVE BOX
OVERNIGHT EVENTS:    Interval HPI:    VITAL SIGNS:  T(F): 98.4  HR: 79  BP: 111/72  RR: 15  SpO2: 99%  Wt(kg): --    PHYSICAL EXAM:    General:  Eyes:  Respiratory:  Cardiovascular:  Gastrointestinal:  Extremities:  Neurological:    MEDICATIONS  (STANDING):  aspirin enteric coated 81milliGRAM(s) Oral daily  atorvastatin 40milliGRAM(s) Oral at bedtime  clopidogrel Tablet 75milliGRAM(s) Oral daily  metoprolol succinate ER 100milliGRAM(s) Oral daily  thiamine 100milliGRAM(s) Oral daily  folic acid 1milliGRAM(s) Oral daily  multivitamin/minerals 1Tablet(s) Oral daily  lisinopril 20milliGRAM(s) Oral daily  ferrous    sulfate 325milliGRAM(s) Oral daily  pantoprazole    Tablet 40milliGRAM(s) Oral before breakfast  apixaban 5milliGRAM(s) Oral every 12 hours  chlordiazePOXIDE 25milliGRAM(s) Oral every 8 hours    MEDICATIONS  (PRN):  LORazepam     Tablet 2milliGRAM(s) Oral every 4 hours PRN Agitation      LABS:                        10.1   3.3   )-----------( 148      ( 19 Feb 2017 08:00 )             30.8     19 Feb 2017 08:00    137    |  103    |  17     ----------------------------<  91     4.4     |  27     |  1.09     Ca    8.7        19 Feb 2017 08:00  Mg     2.4       19 Feb 2017 08:00            RADIOLOGY & ADDITIONAL TESTS:    Plan: OVERNIGHT EVENTS: No events    Interval HPI: Still with some agitation, tremors, and feet unsteadiness. No pain, sob, fever/chills.    VITAL SIGNS:  T(F): 98.4  HR: 79  BP: 111/72  RR: 15  SpO2: 99%  Wt(kg): --    PHYSICAL EXAM:    General: NAD sitting up  Eyes: eomi no si  Oropharynx: moist no acute lesions  Respiratory: ctab no wrc  Cardiovascular: rrr no mrg  Gastrointestinal: soft ntnd normoactive bs  Extremities: strength 5/5 sensation + , pulses + no edema  Neurological: aaox3 no gross deficits, ciwa 4    MEDICATIONS  (STANDING):  aspirin enteric coated 81milliGRAM(s) Oral daily  atorvastatin 40milliGRAM(s) Oral at bedtime  clopidogrel Tablet 75milliGRAM(s) Oral daily  metoprolol succinate ER 100milliGRAM(s) Oral daily  thiamine 100milliGRAM(s) Oral daily  folic acid 1milliGRAM(s) Oral daily  multivitamin/minerals 1Tablet(s) Oral daily  lisinopril 20milliGRAM(s) Oral daily  ferrous    sulfate 325milliGRAM(s) Oral daily  pantoprazole    Tablet 40milliGRAM(s) Oral before breakfast  apixaban 5milliGRAM(s) Oral every 12 hours  chlordiazePOXIDE 25milliGRAM(s) Oral every 8 hours    MEDICATIONS  (PRN):  LORazepam     Tablet 2milliGRAM(s) Oral every 4 hours PRN Agitation      LABS:                        10.1   3.3   )-----------( 148      ( 19 Feb 2017 08:00 )             30.8     19 Feb 2017 08:00    137    |  103    |  17     ----------------------------<  91     4.4     |  27     |  1.09     Ca    8.7        19 Feb 2017 08:00  Mg     2.4       19 Feb 2017 08:00            RADIOLOGY & ADDITIONAL TESTS:    Plan:

## 2017-02-19 NOTE — PROGRESS NOTE ADULT - SUBJECTIVE AND OBJECTIVE BOX
61 year old male admitted for etoh detox. Admitted 5 days ago.    PAST MEDICAL & SURGICAL HISTORY:  Pacemaker  ETOH abuse  Paroxysmal a-fib  Myocardial infarction involving other coronary artery  Gastritis  Essential hypertension: Hypertension  Other specified cardiac dysrhythmias: SVT (supraventricular tachycardia)  Atherosclerosis of coronary artery: Coronary artery disease  History of surgery to heart and great vessels, presenting hazards to health: History of percutaneous coronary intervention  Automatic implantable cardiac defibrillator in situ: ICD (implantable cardioverter-defibrillator) in place    MEDICATIONS  (STANDING):  aspirin enteric coated 81milliGRAM(s) Oral daily  atorvastatin 40milliGRAM(s) Oral at bedtime  clopidogrel Tablet 75milliGRAM(s) Oral daily  metoprolol succinate ER 100milliGRAM(s) Oral daily  thiamine 100milliGRAM(s) Oral daily  folic acid 1milliGRAM(s) Oral daily  multivitamin/minerals 1Tablet(s) Oral daily  lisinopril 20milliGRAM(s) Oral daily  ferrous    sulfate 325milliGRAM(s) Oral daily  pantoprazole    Tablet 40milliGRAM(s) Oral before breakfast  apixaban 5milliGRAM(s) Oral every 12 hours  chlordiazePOXIDE 25milliGRAM(s) Oral every 8 hours    MEDICATIONS  (PRN):  LORazepam     Tablet 2milliGRAM(s) Oral every 4 hours PRN Agitation    Vital Signs Last 24 Hrs  T(C): 36.9, Max: 36.9 (02-18 @ 16:35)  T(F): 98.4, Max: 98.5 (02-18 @ 16:35)  HR: 79 (71 - 79)  BP: 111/72 (107/70 - 127/77)  BP(mean): --  RR: 15 (15 - 18)  SpO2: 99% (97% - 100%  awake, verbal  standing without difficulty  chest clear  heart S1, S2  abd-benign  ext-no edema  Neuro-mild asterixis                          10.1   3.3   )-----------( 148      ( 19 Feb 2017 08:00 )             30.8     19 Feb 2017 08:00    137    |  103    |  17     ----------------------------<  91     4.4     |  27     |  1.09     Ca    8.7        19 Feb 2017 08:00  Mg     2.4       19 Feb 2017 08:00

## 2017-02-19 NOTE — CONSULT NOTE ADULT - SUBJECTIVE AND OBJECTIVE BOX
CHIEF COMPLAINT:    CHF    HISTORY OF PRESENT ILLNESS:    History of Present Illness		  Patient is a 62 yo M with a PMHx of CAD s/p MI (~2003), stent placement in July of 2016, CHF (EF 35-40%) s/p AICD, gastritis with GIB, A. fib NOT on AC (Patient refused to take), and history of EtOH abuse with recent hospitalization for withdrawal in december of 2015, who presented with withdrawal symptoms. The patient has had withdrawal symptoms in the past for which he was seen at [facility], and this hospital, has not required intubation or had a seizure in the past.  EP evaluation eas requested for ICD management.   The patient denies: headaches, nausea or vomiting, abdominal pain, constipation, diarrhea, black or tarry stools, dysuria, decreased exercise tolerance or lower extremity edema, chest pain, SOB, cough, fever, chills,       PAST MEDICAL & SURGICAL HISTORY:  ICD  ETOH abuse  Paroxysmal a-fib  Myocardial infarction involving other coronary artery  Gastritis  Essential hypertension: Hypertension  Other specified cardiac dysrhythmias: AF  Atherosclerosis of coronary artery: Coronary artery disease  History of surgery to heart and great vessels, presenting hazards to health: History of percutaneous coronary intervention  Automatic implantable cardiac defibrillator in situ: ICD (implantable cardioverter-defibrillator) in place        PERTINENT DIAGNOSTIC TESTING:    [ ] Echocardiogram:      Allergies    Capoten (Short breath; Rash; Hives)  penicillin (Short breath; Rash; Hives)    Intolerances    	    MEDICATIONS:  metoprolol succinate ER 100milliGRAM(s) Oral daily  lisinopril 20milliGRAM(s) Oral daily        LORazepam     Tablet 2milliGRAM(s) Oral every 4 hours PRN  chlordiazePOXIDE 25milliGRAM(s) Oral every 8 hours    pantoprazole    Tablet 40milliGRAM(s) Oral before breakfast    atorvastatin 40milliGRAM(s) Oral at bedtime    aspirin enteric coated 81milliGRAM(s) Oral daily  clopidogrel Tablet 75milliGRAM(s) Oral daily  thiamine 100milliGRAM(s) Oral daily  folic acid 1milliGRAM(s) Oral daily  multivitamin/minerals 1Tablet(s) Oral daily  ferrous    sulfate 325milliGRAM(s) Oral daily  apixaban 5milliGRAM(s) Oral every 12 hours      FAMILY HISTORY:  No pertinent family history in first degree relatives      SOCIAL HISTORY:    [x ] Non-smoker  [ ] Smoker  [+ ] Alcohol        REVIEW OF SYSTEMS:    CONSTITUTIONAL: No fever, weight loss, or fatigue  EYES: No eye pain, visual disturbances, or discharge  ENMT:  No difficulty hearing, tinnitus, vertigo; No sinus or throat pain  NECK: No pain or stiffness  BREASTS: No pain, masses, or nipple discharge  RESPIRATORY: No cough, wheezing, chills or hemoptysis; No Shortness of Breath  CARDIOVASCULAR: No chest pain, palpitations, dizziness, or leg swelling  GASTROINTESTINAL: No abdominal or epigastric pain. No nausea, vomiting, or hematemesis; No diarrhea or constipation. No melena or hematochezia.  GENITOURINARY: No dysuria, frequency, hematuria, or incontinence  NEUROLOGICAL: No headaches, memory loss, loss of strength, numbness, + tremors  SKIN: No itching, burning, rashes, or lesions   LYMPH Nodes: No enlarged glands  ENDOCRINE: No heat or cold intolerance; No hair loss  MUSCULOSKELETAL: No joint pain or swelling; No muscle, back, or extremity pain  PSYCHIATRIC: No depression, anxiety, mood swings, or difficulty sleeping  HEME/LYMPH: No easy bruising, or bleeding gums  ALLERY AND IMMUNOLOGIC: No hives or eczema	    [ ] All others negative	  [ ] Unable to obtain    PHYSICAL EXAM:  T(C): 36.9, Max: 36.9 (02-18 @ 16:35)  HR: 79 (71 - 79)  BP: 111/72 (107/70 - 127/77)  RR: 15 (15 - 18)  SpO2: 99% (97% - 100%)  Wt(kg): --  I&O's Summary      TELEMETRY: 	    ECG:     Appearance: Normal	  HEENT:   Normal oral mucosa, PERRL, EOMI	  Cardiovascular: Normal S1 S2, No JVD, No murmurs, No edema  Respiratory: Lungs clear to auscultation	  Gastrointestinal:  Soft, Non-tender, + BS	  Neurologic: A&O x 3, Non-focal  Extremities: Normal range of motion, No clubbing, cyanosis or edema  Vascular: Peripheral pulses palpable 2+ bilaterally    	  LABS:	 	    CARDIAC MARKERS:    ECG: sinus, Q waves V1-V4  Echo: EF 35%, LV thrombus                              10.1   3.3   )-----------( 148      ( 19 Feb 2017 08:00 )             30.8     19 Feb 2017 08:00    137    |  103    |  17     ----------------------------<  91     4.4     |  27     |  1.09     Ca    8.7        19 Feb 2017 08:00  Mg     2.4       19 Feb 2017 08:00      proBNP:   Lipid Profile:   HgA1c:   TSH:     ASSESSMENT/PLAN: 	    The patient has ICM, CHF, EF 35%  s/p ICD  Admitted with ETOH withdrawal  Will check the ICD  Anticoagulation for LV thrombus  Continue CHF medications.

## 2017-02-19 NOTE — PROGRESS NOTE ADULT - ASSESSMENT
61M PMH ETOH Abuse and Withdrawal (Patient with History of ETOH Withdrawal with Recent Admission To [facility]), CAD (S/P Stent Placement in 7/2016 on Aspirin and Plavix), Systolic Congestive Heart Failure (EF 35-45% with AICD), Atrial Fibrillation, GI Bleeding admitted for ETOH Withdrawal, Now Improving and tapering librium.

## 2017-02-20 LAB
HCT VFR BLD CALC: 31.4 % — LOW (ref 39–50)
HGB BLD-MCNC: 10.1 G/DL — LOW (ref 13–17)
MCHC RBC-ENTMCNC: 27 PG — SIGNIFICANT CHANGE UP (ref 27–34)
MCHC RBC-ENTMCNC: 32.2 G/DL — SIGNIFICANT CHANGE UP (ref 32–36)
MCV RBC AUTO: 84 FL — SIGNIFICANT CHANGE UP (ref 80–100)
PLATELET # BLD AUTO: 172 K/UL — SIGNIFICANT CHANGE UP (ref 150–400)
RBC # BLD: 3.74 M/UL — LOW (ref 4.2–5.8)
RBC # FLD: 20.2 % — HIGH (ref 10.3–16.9)
WBC # BLD: 4.2 K/UL — SIGNIFICANT CHANGE UP (ref 3.8–10.5)
WBC # FLD AUTO: 4.2 K/UL — SIGNIFICANT CHANGE UP (ref 3.8–10.5)

## 2017-02-20 RX ADMIN — PANTOPRAZOLE SODIUM 40 MILLIGRAM(S): 20 TABLET, DELAYED RELEASE ORAL at 06:28

## 2017-02-20 RX ADMIN — Medication 1 TABLET(S): at 11:07

## 2017-02-20 RX ADMIN — Medication 2 MILLIGRAM(S): at 22:51

## 2017-02-20 RX ADMIN — Medication 325 MILLIGRAM(S): at 11:07

## 2017-02-20 RX ADMIN — Medication 25 MILLIGRAM(S): at 06:27

## 2017-02-20 RX ADMIN — CLOPIDOGREL BISULFATE 75 MILLIGRAM(S): 75 TABLET, FILM COATED ORAL at 11:07

## 2017-02-20 RX ADMIN — LISINOPRIL 20 MILLIGRAM(S): 2.5 TABLET ORAL at 06:28

## 2017-02-20 RX ADMIN — ATORVASTATIN CALCIUM 40 MILLIGRAM(S): 80 TABLET, FILM COATED ORAL at 22:51

## 2017-02-20 RX ADMIN — APIXABAN 5 MILLIGRAM(S): 2.5 TABLET, FILM COATED ORAL at 11:07

## 2017-02-20 RX ADMIN — Medication 100 MILLIGRAM(S): at 11:07

## 2017-02-20 RX ADMIN — Medication 1 MILLIGRAM(S): at 11:07

## 2017-02-20 RX ADMIN — APIXABAN 5 MILLIGRAM(S): 2.5 TABLET, FILM COATED ORAL at 23:07

## 2017-02-20 RX ADMIN — Medication 100 MILLIGRAM(S): at 06:27

## 2017-02-20 RX ADMIN — Medication 81 MILLIGRAM(S): at 11:07

## 2017-02-20 RX ADMIN — Medication 25 MILLIGRAM(S): at 17:20

## 2017-02-20 NOTE — PROGRESS NOTE ADULT - ASSESSMENT
61M PMH ETOH Abuse and Withdrawal (Patient with History of ETOH Withdrawal with Recent Admission To [facility]), CAD (S/P Stent Placement in 7/2016 on Aspirin and Plavix), Systolic Congestive Heart Failure (EF 35-45% with AICD), Atrial Fibrillation, GI Bleeding admitted for ETOH Withdrawal, Now Improving and tapering librium.    Problem/Plan - 1:  ·  Problem: Alcohol withdrawal.  Plan: - librium 25mg PO Q8, hold at current dose at reduced to Q12 tomorrow if tolerated  - ativan 2mg PO PRN agitation  - CIWA 2, improving  -Continue Folic Acid 1mg PO Q daily, Thiamine 100mg PO Q daily, and Multivitamin PO Q daily..     Problem/Plan - 2:  ·  Problem: Left ventricular thrombus without MI.  Plan: - Echo: EF 35% with LV Thrombus.   - eliquis 5mg PO BID- monitor for bleeding signs and trend CBC  - will likely need outpatient AC, previously declined due to active lifestyle will discuss with patient.

## 2017-02-20 NOTE — PROGRESS NOTE ADULT - SUBJECTIVE AND OBJECTIVE BOX
Physical Medicine and Rehabilitation Progress Note:    Patient is a 61y old  Male who presents with a chief complaint of Alcohol withdrawal (16 Feb 2017 11:14)      HPI:  Patient is a 62 yo M with a PMHx of CAD s/p MI (~2003), stent placement in July of 2016, CHF (EF 35-40%) s/p AICD, gastritis with GIB, A. fib NOT on AC (Patient refused to take), and history of EtOH abuse with recent hospitalization for withdrawal in december of 2015, who presented with withdrawal symptoms. The patient has had withdrawal symptoms in the past for which he was seen at [facility], and this hospital, has not required intubation or had a seizure in the past. His last drink was last night, has had tremors since waking up this morning, took his home dose of librium of 30 but did not have any improvement of his symptoms.He drinks about 1 pint of vodka a day however has been having more "nips" due to stress with the mother of his child.    The patient denies: headaches, nausea or vomiting, abdominal pain, constipation, diarrhea, black or tarry stools, dysuria, decreased exercise tolerance or lower extremity edema, chest pain, SOB, cough, fever, chills, (14 Feb 2017 17:31)                            10.1   4.2   )-----------( 172      ( 20 Feb 2017 07:46 )             31.4       19 Feb 2017 08:00    137    |  103    |  17     ----------------------------<  91     4.4     |  27     |  1.09     Ca    8.7        19 Feb 2017 08:00  Mg     2.4       19 Feb 2017 08:00      Vital Signs Last 24 Hrs  T(C): 36.6, Max: 37.4 (02-19 @ 15:51)  T(F): 97.8, Max: 99.4 (02-19 @ 15:51)  HR: 82 (74 - 88)  BP: 100/64 (94/62 - 124/79)  BP(mean): --  RR: 17 (16 - 17)  SpO2: 100% (98% - 100%)    MEDICATIONS  (STANDING):  aspirin enteric coated 81milliGRAM(s) Oral daily  atorvastatin 40milliGRAM(s) Oral at bedtime  clopidogrel Tablet 75milliGRAM(s) Oral daily  metoprolol succinate ER 100milliGRAM(s) Oral daily  thiamine 100milliGRAM(s) Oral daily  folic acid 1milliGRAM(s) Oral daily  multivitamin/minerals 1Tablet(s) Oral daily  lisinopril 20milliGRAM(s) Oral daily  ferrous    sulfate 325milliGRAM(s) Oral daily  pantoprazole    Tablet 40milliGRAM(s) Oral before breakfast  apixaban 5milliGRAM(s) Oral every 12 hours  chlordiazePOXIDE 25milliGRAM(s) Oral every 12 hours    MEDICATIONS  (PRN):  LORazepam     Tablet 2milliGRAM(s) Oral every 4 hours PRN Agitation    Currently Undergoing Physical Therapy at bedside.    Functional Status Assessment:    Previous Level of Function:   · Ambulation Skills	independent	  · Transfer Skills	independent	  · ADL Skills	independent	  · Work/Leisure Activity	independent	  · Additional Comments	Pt. reports living in an elevator building, denies using an assistive device prior to admission.	    Cognitive Status Examination:   · Orientation	oriented to person, place, time and situation	  · Level of Consciousness	alert	  · Follows Commands and Answers Questions	100% of the time	  · Personal Safety and Judgment	intact	    Range of Motion Exam:   · Range of Motion Examination	bilateral upper extremity ROM was WFL (within functional limits); bilateral lower extremity ROM was WFL (within functional limits)	  Bed Mobility: Rolling/Turning:   · Level of [place]	independent	    Bed Mobility: Scooting/Bridging:   · Level of [place]	independent	    Bed Mobility: Sit to Supine:   · Level of [place]	NT	    Bed Mobility: Supine to Sit:   · Level of [place]	independent	    Transfer: Sit to Stand:   · Level of [place]	contact guard	  · Physical Assist/Nonphysical Assist	1 person assist	  · Weight-Bearing Restrictions	full weight-bearing	    Transfer: Stand to Sit:   · Level of [place]	contact guard	  · Physical Assist/Nonphysical Assist	1 person assist	  · Weight-Bearing Restrictions	full weight-bearing	    Gait Skills:   · Level of [place]	contact guard	  · Physical Assist/Nonphysical Assist	1 person assist	  · Weight-Bearing Restrictions	full weight-bearing	  · Assistive Device	IV pole hold	  · Gait Distance	50 feet; x3	    Gait Analysis:   · Gait Deviations Noted	decreased renetta; + LE tremors, decreased heel strike bilaterally; decreased step length	  · Impairments Contributing to Gait Deviations	impaired balance	    Stair Negotiation:   · Level of [place]	TBA for community ambulation	  Balance Skills Assessment:   · Sitting Balance: Static	good balance	  · Sitting Balance: Dynamic	good balance	  · Sit-to-Stand Balance	fair plus	  · Standing Balance: Static	fair plus	  · Standing Balance: Dynamic	fair balance	  · Identified Impairments Contributing to Balance Disturbance	withdrawal tremors	    Sensory Examination:   Sensory Examination:  Grossly Intact:   · Gross Sensory Examination	Grossly Intact; throughout to light touch	      Treatment Location:   · Comments	Strength > 3+/5 throughout by functional assessment against gravity.	  Clinical Impressions:   · Criteria for Skilled Therapeutic Interventions	impairments found; functional limitations in following categories	  · Impairments Found (describe specific impairments)	gait, locomotion, and balance; aerobic capacity/endurance	  · Functional Limitations in Following Categories (describe specific limitations)	home management; community/leisure	  · Risk Reduction/Prevention (Describe Specific Areas of risk reduction/prevention)	risk factors	  · Risk Areas	fall	  · Therapy Frequency	3-5x/week	    PM&R Impression: as above    Disposition Plan Recommendations: d/c home

## 2017-02-20 NOTE — PROGRESS NOTE ADULT - SUBJECTIVE AND OBJECTIVE BOX
61 year old male admitted for etoh detox. Admitted 5 days ago.    PAST MEDICAL & SURGICAL HISTORY:  Pacemaker  ETOH abuse  Paroxysmal a-fib  Myocardial infarction involving other coronary artery  Gastritis  Essential hypertension: Hypertension  Other specified cardiac dysrhythmias: SVT (supraventricular tachycardia)  Atherosclerosis of coronary artery: Coronary artery disease  History of surgery to heart and great vessels, presenting hazards to health: History of percutaneous coronary intervention  Automatic implantable cardiac defibrillator in situ: ICD (implantable cardioverter-defibrillator) in place    MEDICATIONS  (STANDING):  aspirin enteric coated 81milliGRAM(s) Oral daily  atorvastatin 40milliGRAM(s) Oral at bedtime  clopidogrel Tablet 75milliGRAM(s) Oral daily  metoprolol succinate ER 100milliGRAM(s) Oral daily  thiamine 100milliGRAM(s) Oral daily  folic acid 1milliGRAM(s) Oral daily  multivitamin/minerals 1Tablet(s) Oral daily  lisinopril 20milliGRAM(s) Oral daily  ferrous    sulfate 325milliGRAM(s) Oral daily  pantoprazole    Tablet 40milliGRAM(s) Oral before breakfast  apixaban 5milliGRAM(s) Oral every 12 hours  chlordiazePOXIDE 25milliGRAM(s) Oral every 12 hours    MEDICATIONS  (PRN):  LORazepam     Tablet 2milliGRAM(s) Oral every 4 hours PRN Agitation    Vital Signs Last 24 Hrs  T(C): 36.6, Max: 37.4 (02-19 @ 15:51)  T(F): 97.8, Max: 99.4 (02-19 @ 15:51)  HR: 82 (74 - 88)  BP: 100/64 (94/62 - 124/79)  BP(mean): --  RR: 17 (16 - 17)  SpO2: 100% (98% - 100%)  awake, verbal  standing without difficulty  chest clear  heart S1, S2  abd-benign  ext-no edema  Neuro-mild asterixis                          10.1   4.2   )-----------( 172      ( 20 Feb 2017 07:46 )             31.4     19 Feb 2017 08:00    137    |  103    |  17     ----------------------------<  91     4.4     |  27     |  1.09     Ca    8.7        19 Feb 2017 08:00  Mg     2.4       19 Feb 2017 08:00

## 2017-02-21 VITALS
OXYGEN SATURATION: 99 % | DIASTOLIC BLOOD PRESSURE: 62 MMHG | SYSTOLIC BLOOD PRESSURE: 94 MMHG | HEART RATE: 87 BPM | TEMPERATURE: 98 F | RESPIRATION RATE: 17 BRPM

## 2017-02-21 PROCEDURE — 97530 THERAPEUTIC ACTIVITIES: CPT

## 2017-02-21 PROCEDURE — 96366 THER/PROPH/DIAG IV INF ADDON: CPT

## 2017-02-21 PROCEDURE — 96376 TX/PRO/DX INJ SAME DRUG ADON: CPT

## 2017-02-21 PROCEDURE — 86901 BLOOD TYPING SEROLOGIC RH(D): CPT

## 2017-02-21 PROCEDURE — 96365 THER/PROPH/DIAG IV INF INIT: CPT

## 2017-02-21 PROCEDURE — C8929: CPT

## 2017-02-21 PROCEDURE — 82652 VIT D 1 25-DIHYDROXY: CPT

## 2017-02-21 PROCEDURE — 80048 BASIC METABOLIC PNL TOTAL CA: CPT

## 2017-02-21 PROCEDURE — 86900 BLOOD TYPING SEROLOGIC ABO: CPT

## 2017-02-21 PROCEDURE — 84100 ASSAY OF PHOSPHORUS: CPT

## 2017-02-21 PROCEDURE — 36415 COLL VENOUS BLD VENIPUNCTURE: CPT

## 2017-02-21 PROCEDURE — 82607 VITAMIN B-12: CPT

## 2017-02-21 PROCEDURE — 82728 ASSAY OF FERRITIN: CPT

## 2017-02-21 PROCEDURE — 82306 VITAMIN D 25 HYDROXY: CPT

## 2017-02-21 PROCEDURE — 99285 EMERGENCY DEPT VISIT HI MDM: CPT | Mod: 25

## 2017-02-21 PROCEDURE — 97161 PT EVAL LOW COMPLEX 20 MIN: CPT

## 2017-02-21 PROCEDURE — 82746 ASSAY OF FOLIC ACID SERUM: CPT

## 2017-02-21 PROCEDURE — 97116 GAIT TRAINING THERAPY: CPT

## 2017-02-21 PROCEDURE — 84443 ASSAY THYROID STIM HORMONE: CPT

## 2017-02-21 PROCEDURE — 85610 PROTHROMBIN TIME: CPT

## 2017-02-21 PROCEDURE — 85027 COMPLETE CBC AUTOMATED: CPT

## 2017-02-21 PROCEDURE — 71045 X-RAY EXAM CHEST 1 VIEW: CPT

## 2017-02-21 PROCEDURE — 85025 COMPLETE CBC W/AUTO DIFF WBC: CPT

## 2017-02-21 PROCEDURE — 93005 ELECTROCARDIOGRAM TRACING: CPT

## 2017-02-21 PROCEDURE — 83550 IRON BINDING TEST: CPT

## 2017-02-21 PROCEDURE — 80307 DRUG TEST PRSMV CHEM ANLYZR: CPT

## 2017-02-21 PROCEDURE — 86850 RBC ANTIBODY SCREEN: CPT

## 2017-02-21 PROCEDURE — 96375 TX/PRO/DX INJ NEW DRUG ADDON: CPT

## 2017-02-21 PROCEDURE — 80053 COMPREHEN METABOLIC PANEL: CPT

## 2017-02-21 PROCEDURE — 85730 THROMBOPLASTIN TIME PARTIAL: CPT

## 2017-02-21 PROCEDURE — 83735 ASSAY OF MAGNESIUM: CPT

## 2017-02-21 RX ORDER — CLOPIDOGREL BISULFATE 75 MG/1
1 TABLET, FILM COATED ORAL
Qty: 30 | Refills: 2 | OUTPATIENT
Start: 2017-02-21 | End: 2017-05-21

## 2017-02-21 RX ORDER — LISINOPRIL 2.5 MG/1
1 TABLET ORAL
Qty: 30 | Refills: 2 | OUTPATIENT
Start: 2017-02-21 | End: 2017-05-21

## 2017-02-21 RX ORDER — ATORVASTATIN CALCIUM 80 MG/1
1 TABLET, FILM COATED ORAL
Qty: 0 | Refills: 2 | COMMUNITY
Start: 2017-02-21 | End: 2017-05-21

## 2017-02-21 RX ORDER — PANTOPRAZOLE SODIUM 20 MG/1
1 TABLET, DELAYED RELEASE ORAL
Qty: 14 | Refills: 0
Start: 2017-02-21 | End: 2017-03-07

## 2017-02-21 RX ORDER — ATORVASTATIN CALCIUM 80 MG/1
1 TABLET, FILM COATED ORAL
Qty: 30 | Refills: 2 | OUTPATIENT
Start: 2017-02-21 | End: 2017-05-21

## 2017-02-21 RX ORDER — METOPROLOL TARTRATE 50 MG
1 TABLET ORAL
Qty: 30 | Refills: 2 | OUTPATIENT
Start: 2017-02-21 | End: 2017-05-21

## 2017-02-21 RX ORDER — APIXABAN 2.5 MG/1
1 TABLET, FILM COATED ORAL
Qty: 60 | Refills: 2
Start: 2017-02-21 | End: 2017-05-21

## 2017-02-21 RX ORDER — ASPIRIN/CALCIUM CARB/MAGNESIUM 324 MG
1 TABLET ORAL
Qty: 30 | Refills: 2
Start: 2017-02-21 | End: 2017-05-21

## 2017-02-21 RX ORDER — FERROUS SULFATE 325(65) MG
1 TABLET ORAL
Qty: 30 | Refills: 2
Start: 2017-02-21 | End: 2017-05-21

## 2017-02-21 RX ADMIN — APIXABAN 5 MILLIGRAM(S): 2.5 TABLET, FILM COATED ORAL at 11:54

## 2017-02-21 RX ADMIN — PANTOPRAZOLE SODIUM 40 MILLIGRAM(S): 20 TABLET, DELAYED RELEASE ORAL at 06:58

## 2017-02-21 RX ADMIN — Medication 100 MILLIGRAM(S): at 11:54

## 2017-02-21 RX ADMIN — Medication 25 MILLIGRAM(S): at 06:58

## 2017-02-21 RX ADMIN — Medication 325 MILLIGRAM(S): at 11:53

## 2017-02-21 RX ADMIN — CLOPIDOGREL BISULFATE 75 MILLIGRAM(S): 75 TABLET, FILM COATED ORAL at 11:54

## 2017-02-21 RX ADMIN — Medication 81 MILLIGRAM(S): at 11:54

## 2017-02-21 RX ADMIN — Medication 1 TABLET(S): at 11:54

## 2017-02-21 RX ADMIN — Medication 1 MILLIGRAM(S): at 11:54

## 2017-02-21 NOTE — PROGRESS NOTE ADULT - SUBJECTIVE AND OBJECTIVE BOX
OVERNIGHT EVENTS: Lisinopril and Toprol held for hypotension SBP 90s.    Interval HPI: Well, agitation improving, feeling overall better, no headache, dizziness, fever/chills, nv, focal weakness, still some tremors.    VITAL SIGNS:  T(F): 97.7  HR: 87  BP: 94/62  RR: 17  SpO2: 99%  Wt(kg): --    PHYSICAL EXAM:    General:  Eyes:  Respiratory:  Cardiovascular:  Gastrointestinal:  Extremities:  Neurological:    MEDICATIONS  (STANDING):  aspirin enteric coated 81milliGRAM(s) Oral daily  atorvastatin 40milliGRAM(s) Oral at bedtime  clopidogrel Tablet 75milliGRAM(s) Oral daily  metoprolol succinate ER 100milliGRAM(s) Oral daily  thiamine 100milliGRAM(s) Oral daily  folic acid 1milliGRAM(s) Oral daily  multivitamin/minerals 1Tablet(s) Oral daily  lisinopril 20milliGRAM(s) Oral daily  ferrous    sulfate 325milliGRAM(s) Oral daily  pantoprazole    Tablet 40milliGRAM(s) Oral before breakfast  apixaban 5milliGRAM(s) Oral every 12 hours  chlordiazePOXIDE 25milliGRAM(s) Oral every 12 hours    MEDICATIONS  (PRN):  LORazepam     Tablet 2milliGRAM(s) Oral every 4 hours PRN Agitation      LABS:                        10.1   4.2   )-----------( 172      ( 20 Feb 2017 07:46 )             31.4                 RADIOLOGY & ADDITIONAL TESTS:    Plan: OVERNIGHT EVENTS: Lisinopril and Toprol held for hypotension SBP 90s.    Interval HPI: Well, agitation improving, feeling overall better, no headache, dizziness, fever/chills, nv, focal weakness, still some tremors.    VITAL SIGNS:  T(F): 97.7  HR: 87  BP: 94/62  RR: 17  SpO2: 99%  Wt(kg): --    PHYSICAL EXAM:    General: NAD sitting up  Eyes: eomi no si  Oropharynx: moist no acute lesions  Respiratory: ctab no wrc  Cardiovascular: rrr no mrg  Gastrointestinal: soft ntnd normoactive bs  Extremities: strength 5/5 sensation + , pulses + no edema  Neurological: aaox3 no gross deficits, ciwa 3    MEDICATIONS  (STANDING):  aspirin enteric coated 81milliGRAM(s) Oral daily  atorvastatin 40milliGRAM(s) Oral at bedtime  clopidogrel Tablet 75milliGRAM(s) Oral daily  metoprolol succinate ER 100milliGRAM(s) Oral daily  thiamine 100milliGRAM(s) Oral daily  folic acid 1milliGRAM(s) Oral daily  multivitamin/minerals 1Tablet(s) Oral daily  lisinopril 20milliGRAM(s) Oral daily  ferrous    sulfate 325milliGRAM(s) Oral daily  pantoprazole    Tablet 40milliGRAM(s) Oral before breakfast  apixaban 5milliGRAM(s) Oral every 12 hours  chlordiazePOXIDE 25milliGRAM(s) Oral every 12 hours    MEDICATIONS  (PRN):  LORazepam     Tablet 2milliGRAM(s) Oral every 4 hours PRN Agitation      LABS:                        10.1   4.2   )-----------( 172      ( 20 Feb 2017 07:46 )             31.4                 RADIOLOGY & ADDITIONAL TESTS:    Plan:

## 2017-02-21 NOTE — PROGRESS NOTE ADULT - PROBLEM SELECTOR PROBLEM 7
Nutrition, metabolism, and development symptoms
Preventive measure

## 2017-02-21 NOTE — PROGRESS NOTE ADULT - PROBLEM SELECTOR PROBLEM 4
Coronary artery disease
Depression
Coronary artery disease

## 2017-02-21 NOTE — PROGRESS NOTE ADULT - SUBJECTIVE AND OBJECTIVE BOX
Pt is still shaky but is subject to emotional upset when he deals with his former   wife which aggravated his physical status    PAST MEDICAL & SURGICAL HISTORY:  Pacemaker  ETOH abuse  Paroxysmal a-fib  Myocardial infarction involving other coronary artery  Gastritis  Essential hypertension: Hypertension  Other specified cardiac dysrhythmias: SVT (supraventricular tachycardia)  Atherosclerosis of coronary artery: Coronary artery disease  History of surgery to heart and great vessels, presenting hazards to health: History of percutaneous coronary intervention  Automatic implantable cardiac defibrillator in situ: ICD (implantable cardioverter-defibrillator) in place    MEDICATIONS  (STANDING):  aspirin enteric coated 81milliGRAM(s) Oral daily  atorvastatin 40milliGRAM(s) Oral at bedtime  clopidogrel Tablet 75milliGRAM(s) Oral daily  metoprolol succinate ER 100milliGRAM(s) Oral daily  thiamine 100milliGRAM(s) Oral daily  folic acid 1milliGRAM(s) Oral daily  multivitamin/minerals 1Tablet(s) Oral daily  lisinopril 20milliGRAM(s) Oral daily  ferrous    sulfate 325milliGRAM(s) Oral daily  pantoprazole    Tablet 40milliGRAM(s) Oral before breakfast  apixaban 5milliGRAM(s) Oral every 12 hours  chlordiazePOXIDE 25milliGRAM(s) Oral every 12 hours    MEDICATIONS  (PRN):  LORazepam     Tablet 2milliGRAM(s) Oral every 4 hours PRN Agitation    Vital Signs Last 24 Hrs  T(C): 36.5, Max: 36.8 (02-20 @ 15:21)  T(F): 97.7, Max: 98.2 (02-20 @ 15:21)  HR: 87 (73 - 95)  BP: 94/62 (94/62 - 116/73)  BP(mean): --  RR: 17 (17 - 18)  SpO2: 99% (99% - 100%)    Lungs clear  CV s1 s2  abd soft   ext stable                          10.1   4.2   )-----------( 172      ( 20 Feb 2017 07:46 )             31.4

## 2017-02-21 NOTE — PROGRESS NOTE ADULT - PROBLEM SELECTOR PLAN 8
Diet: Cardiac Diet  Dispo: floors for alcohol withdrawal,  needs continued care  Code Full
Diet: Cardiac Diet  Dispo: floors for alcohol withdrawal,  needs continued care  Code Full
Diet: Cardiac Diet  Dispo: floors for alcohol withdrawal, LV thrombus AC, needs continued care  Code Full
Diet: Cardiac Diet  Dispo: floors for alcohol withdrawal, discharge planning now  Code Full
FULL CODE  Patient to be stepped down to [facility]
Cardiac Diet    Patient Accepted to [facility]  FULL CODE

## 2017-02-21 NOTE — PROGRESS NOTE ADULT - PROBLEM SELECTOR PROBLEM 8
Discharge planning issues
Nutrition, metabolism, and development symptoms

## 2017-02-21 NOTE — PROGRESS NOTE ADULT - PROBLEM SELECTOR PLAN 5
- eliquis 5mg PO BID  - Continue Metoprolol Succinate 100mg PO Q daily.   - discuss for outpatient ac as above
-Continue on Heparin Drip for Now, Goal PTT 60-80.   -Continue Metoprolol Succinate 100mg PO Q daily.   - discuss for outpatient ac as above
Patient had coronary artery stent placement, history of MI.   - contine ASA, Plavix and Statin
CHADSVASC2 2.   -Continue on Heparin Drip for Now, Goal PTT 60-80.   -Continue Metoprolol Succinate 100mg PO Q daily.   -Will need to continue discussion regarding continuing anticoagulation outpatient, as detailed above.

## 2017-02-21 NOTE — PROGRESS NOTE ADULT - PROBLEM SELECTOR PLAN 6
AC as above  No indication for stress ulcer prophylaxis at this time  OOB  PT/OT
f/u psych recs
f/u psych recs- no acute intervention
Psychiatry Consult. Follow-Up Recommendations.

## 2017-02-21 NOTE — PROGRESS NOTE ADULT - PROBLEM SELECTOR PLAN 3
- echo: EF 35% AICD/PPM  - lisinopril 20mg PO Qd, Metoprolol Succinate 100mg PO Qd
- echo: EF 35% AICD/PPM  - lisinopril 20mg PO Qd, Metoprolol Succinate 100mg PO Qd  - monitor BP
Patient with poor PO intake other than alcohol, most likely pre-renal cause. Patient is s/p 3L in the ED, will hold off on further fluids due to low EF CHF (35-40 on past admission)  - Recheck Cr in AM, if not resolved will order urine lytes and retroperitoneal U/S
EF 35%. Patient with AICD/PPM. No Signs or Symptoms of Acute Decompensation.   -Continue Lisinopril 20mg PO Q daily, Metoprolol Succinate 100mg PO Q daily.

## 2017-02-21 NOTE — PROGRESS NOTE ADULT - PROBLEM SELECTOR PLAN 4
- stent 7/2016  -  Aspirin 81mg PO Qd, Plavix 75mg PO Qd, Lisinopril 20mg PO Qd, Metoprolol Succinate 100mg PO Qd, and Atorvastatin 40mg PO Qd
Patient with major psychosocial stressors, has been seen by psychiatry in the past however patient has not had good follow up and continues to be exposed to stressors.   - Psychiatry consult  - Will consider start of SSRI  - Social Work consult, may not feel safe at home (did not want to speak about it at this time)
Patient with Stent Placement in July 2016. No Signs or Symptoms of ACS.   -Continue Aspirin 81mg PO Q daily, Plavix 75mg PO Q daily, Lisinopril 20mg PO Q daily, Metoprolol Succinate 100mg PO Q daily, and Atorvastatin 40mg PO Q daily.

## 2017-02-21 NOTE — PROGRESS NOTE ADULT - PROBLEM SELECTOR PROBLEM 2
Left ventricular thrombus without MI
Paroxysmal a-fib
Left ventricular thrombus without MI

## 2017-02-21 NOTE — PROGRESS NOTE ADULT - PROBLEM SELECTOR PROBLEM 5
History of coronary artery stent placement
Paroxysmal a-fib

## 2017-02-21 NOTE — PROGRESS NOTE ADULT - PROBLEM SELECTOR PLAN 1
- librium 25mg PO Q12, can discontinue  - ativan 2mg PO PRN agitation  - CIWA 2, improving  -Continue Folic Acid 1mg PO Q daily, Thiamine 100mg PO Q daily, and Multivitamin PO Q daily.
- librium 25mg PO Q8, hold at current dose at reduced to Q12 tomorrow if tolerated  - ativan 2mg PO PRN agitation  - CIWA 2, improving  -Continue Folic Acid 1mg PO Q daily, Thiamine 100mg PO Q daily, and Multivitamin PO Q daily.
- librium 50mg PO Q8- will monitor and taper as tolerated  - ativan 2mg PO PRN agitation  - CIWA 2, improving  -Continue Folic Acid 1mg PO Q daily, Thiamine 100mg PO Q daily, and Multivitamin PO Q daily.
- librium 75mg PO Q8  - ativan 2mg PO PRN agitation  - CIWA 2, improving  -Continue Folic Acid 1mg PO Q daily, Thiamine 100mg PO Q daily, and Multivitamin PO Q daily.
Patient last drink 2/13 overnight. Current CIWA of 4, denies AH/VH/formication.   - c/w Librium 75mg PO q8h, switch IV to Ativan 2mg PO q4h PRN   - c/w Multivitamin, Thiamine, Folate supplementation  - CIWA q2h  - utox confirms benzodiazepines, no other illicit drugs, had EtOH level of 45 on 2/14 10am
CIWA 5. Improving. Last Drink Evening 2/13. Now 48 Hours Since Last Drink.   -Continue Librium 75mg PO Q8 hours, Will Consider Tapering Dose in the AM.   -Continue Ativan 2mg PO Q4 hours PRN ETOH Withdrawal Symptoms.   -Continue Monitoring CIWA Score.   -Continue Folic Acid 1mg PO Q daily, Thiamine 100mg PO Q daily, and Multivitamin PO Q daily.

## 2017-02-21 NOTE — PROGRESS NOTE ADULT - PROVIDER SPECIALTY LIST ADULT
Cardiology
Internal Medicine
Rehab Medicine
Psychiatry

## 2017-02-21 NOTE — PROGRESS NOTE ADULT - PROBLEM SELECTOR PLAN 7
DASH diet  Replete lytes to keep K>4 and Mg>2
PPX: Eliquis, SCDs, Pantoprazole 40mg PO Qd
PPX: Heparin Drip, SCDs, Pantoprazole 40mg PO Qd
Heparin Drip, SCDs, Pantoprazole 40mg PO Q daily.

## 2017-02-21 NOTE — PROGRESS NOTE ADULT - PROBLEM SELECTOR PROBLEM 3
Chronic systolic congestive heart failure
DANUTA (acute kidney injury)
Chronic systolic congestive heart failure

## 2017-02-21 NOTE — PROGRESS NOTE ADULT - ASSESSMENT
61M PMH ETOH Abuse and Withdrawal (Patient with History of ETOH Withdrawal with Recent Admission To [facility]), CAD (S/P Stent Placement in 7/2016 on Aspirin and Plavix), Systolic Congestive Heart Failure (EF 35-45% with AICD), Atrial Fibrillation, GI Bleeding admitted for ETOH Withdrawal, Now Improving and tapering librium.

## 2017-02-21 NOTE — PROGRESS NOTE ADULT - PROBLEM SELECTOR PLAN 2
- Echo: EF 35% with LV Thrombus.   - Heparin Drip, PTT Goal 60-80.   - will likely need outpatient AC, previously declined due to active lifestyle will discuss with patient
- Echo: EF 35% with LV Thrombus.   - eliquis 5mg PO BID- monitor for bleeding signs and trend CBC  - will likely need outpatient AC, previously declined due to active lifestyle will discuss with patient
Patient with history of paroxysmal a-fib, currently in sinus, without any symptoms or palpitations. S/p AICD. CHA2DS2VASC=3. Has not been taking his home prescribed eliquis. Will need education and discussion on anticoagulation needs before discharge.   - continue heparin drip, goal aPTT 60-90
Patient with Echocardiogram Today, demonstrating EF 35% with LV Thrombus.   -Continue patient on Anticoagulation, Heparin Drip, PTT Goal 60-80.   -Will likely need Anticoagulation Outpatient Given Findings, which was being held for Atrial Fibrillation in setting of Active Lifestyle (Kick Boxing). Discussed he may need to change lifestyle to be safer with Oral Anticoagulation Outpatient.

## 2017-02-21 NOTE — PROGRESS NOTE ADULT - PROBLEM SELECTOR PROBLEM 1
Alcohol withdrawal
Alcohol withdrawal syndrome without complication
Alcohol withdrawal

## 2017-02-23 DIAGNOSIS — Z87.19 PERSONAL HISTORY OF OTHER DISEASES OF THE DIGESTIVE SYSTEM: ICD-10-CM

## 2017-02-23 DIAGNOSIS — F10.239 ALCOHOL DEPENDENCE WITH WITHDRAWAL, UNSPECIFIED: ICD-10-CM

## 2017-02-23 DIAGNOSIS — Z79.82 LONG TERM (CURRENT) USE OF ASPIRIN: ICD-10-CM

## 2017-02-23 DIAGNOSIS — I25.10 ATHEROSCLEROTIC HEART DISEASE OF NATIVE CORONARY ARTERY WITHOUT ANGINA PECTORIS: ICD-10-CM

## 2017-02-23 DIAGNOSIS — Z95.810 PRESENCE OF AUTOMATIC (IMPLANTABLE) CARDIAC DEFIBRILLATOR: ICD-10-CM

## 2017-02-23 DIAGNOSIS — Z95.5 PRESENCE OF CORONARY ANGIOPLASTY IMPLANT AND GRAFT: ICD-10-CM

## 2017-02-23 DIAGNOSIS — Z88.8 ALLERGY STATUS TO OTHER DRUGS, MEDICAMENTS AND BIOLOGICAL SUBSTANCES STATUS: ICD-10-CM

## 2017-02-23 DIAGNOSIS — I48.0 PAROXYSMAL ATRIAL FIBRILLATION: ICD-10-CM

## 2017-02-23 DIAGNOSIS — N17.9 ACUTE KIDNEY FAILURE, UNSPECIFIED: ICD-10-CM

## 2017-02-23 DIAGNOSIS — F43.23 ADJUSTMENT DISORDER WITH MIXED ANXIETY AND DEPRESSED MOOD: ICD-10-CM

## 2017-02-23 DIAGNOSIS — I10 ESSENTIAL (PRIMARY) HYPERTENSION: ICD-10-CM

## 2017-02-23 DIAGNOSIS — Z88.0 ALLERGY STATUS TO PENICILLIN: ICD-10-CM

## 2017-02-23 DIAGNOSIS — I25.2 OLD MYOCARDIAL INFARCTION: ICD-10-CM

## 2017-02-23 DIAGNOSIS — Z87.891 PERSONAL HISTORY OF NICOTINE DEPENDENCE: ICD-10-CM

## 2017-02-23 DIAGNOSIS — I50.22 CHRONIC SYSTOLIC (CONGESTIVE) HEART FAILURE: ICD-10-CM
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
## 2017-02-23 DIAGNOSIS — I51.3 INTRACARDIAC THROMBOSIS, NOT ELSEWHERE CLASSIFIED: ICD-10-CM

## 2017-04-05 ENCOUNTER — EMERGENCY (EMERGENCY)
Facility: HOSPITAL | Age: 62
LOS: 1 days | Discharge: HOME | End: 2017-04-05
Attending: EMERGENCY MEDICINE | Admitting: EMERGENCY MEDICINE
Payer: MEDICARE

## 2017-04-05 VITALS
DIASTOLIC BLOOD PRESSURE: 58 MMHG | RESPIRATION RATE: 16 BRPM | SYSTOLIC BLOOD PRESSURE: 81 MMHG | HEART RATE: 79 BPM | TEMPERATURE: 99 F | OXYGEN SATURATION: 98 %

## 2017-04-05 VITALS
SYSTOLIC BLOOD PRESSURE: 122 MMHG | OXYGEN SATURATION: 98 % | HEART RATE: 75 BPM | DIASTOLIC BLOOD PRESSURE: 63 MMHG | TEMPERATURE: 98 F | RESPIRATION RATE: 18 BRPM

## 2017-04-05 DIAGNOSIS — Z88.0 ALLERGY STATUS TO PENICILLIN: ICD-10-CM

## 2017-04-05 DIAGNOSIS — Z79.82 LONG TERM (CURRENT) USE OF ASPIRIN: ICD-10-CM

## 2017-04-05 DIAGNOSIS — I25.10 ATHEROSCLEROTIC HEART DISEASE OF NATIVE CORONARY ARTERY WITHOUT ANGINA PECTORIS: ICD-10-CM

## 2017-04-05 DIAGNOSIS — F10.10 ALCOHOL ABUSE, UNCOMPLICATED: ICD-10-CM

## 2017-04-05 DIAGNOSIS — Z88.8 ALLERGY STATUS TO OTHER DRUGS, MEDICAMENTS AND BIOLOGICAL SUBSTANCES STATUS: ICD-10-CM

## 2017-04-05 DIAGNOSIS — R55 SYNCOPE AND COLLAPSE: ICD-10-CM

## 2017-04-05 DIAGNOSIS — I10 ESSENTIAL (PRIMARY) HYPERTENSION: ICD-10-CM

## 2017-04-05 DIAGNOSIS — Z79.899 OTHER LONG TERM (CURRENT) DRUG THERAPY: ICD-10-CM

## 2017-04-05 DIAGNOSIS — E78.5 HYPERLIPIDEMIA, UNSPECIFIED: ICD-10-CM

## 2017-04-05 LAB
ALBUMIN SERPL ELPH-MCNC: 3.4 G/DL — SIGNIFICANT CHANGE UP (ref 3.4–5)
ALP SERPL-CCNC: 90 U/L — SIGNIFICANT CHANGE UP (ref 40–120)
ALT FLD-CCNC: 100 U/L — HIGH (ref 12–42)
ANION GAP SERPL CALC-SCNC: 10 MMOL/L — SIGNIFICANT CHANGE UP (ref 9–16)
AST SERPL-CCNC: 206 U/L — HIGH (ref 15–37)
BILIRUB SERPL-MCNC: 0.4 MG/DL — SIGNIFICANT CHANGE UP (ref 0.2–1.2)
BUN SERPL-MCNC: 14 MG/DL — SIGNIFICANT CHANGE UP (ref 7–23)
CALCIUM SERPL-MCNC: 8.3 MG/DL — LOW (ref 8.5–10.5)
CHLORIDE SERPL-SCNC: 101 MMOL/L — SIGNIFICANT CHANGE UP (ref 96–108)
CO2 SERPL-SCNC: 26 MMOL/L — SIGNIFICANT CHANGE UP (ref 22–31)
CREAT SERPL-MCNC: 1.19 MG/DL — SIGNIFICANT CHANGE UP (ref 0.5–1.3)
GLUCOSE SERPL-MCNC: 113 MG/DL — HIGH (ref 70–99)
HCT VFR BLD CALC: 32.7 % — LOW (ref 39–50)
HGB BLD-MCNC: 11.4 G/DL — LOW (ref 13–17)
MCHC RBC-ENTMCNC: 28.6 PG — SIGNIFICANT CHANGE UP (ref 27–34)
MCHC RBC-ENTMCNC: 34.9 G/DL — SIGNIFICANT CHANGE UP (ref 32–36)
MCV RBC AUTO: 82.2 FL — SIGNIFICANT CHANGE UP (ref 80–100)
PLATELET # BLD AUTO: 159 K/UL — SIGNIFICANT CHANGE UP (ref 150–400)
POTASSIUM SERPL-MCNC: 3.8 MMOL/L — SIGNIFICANT CHANGE UP (ref 3.5–5.3)
POTASSIUM SERPL-SCNC: 3.8 MMOL/L — SIGNIFICANT CHANGE UP (ref 3.5–5.3)
PROT SERPL-MCNC: 6.9 G/DL — SIGNIFICANT CHANGE UP (ref 6.4–8.2)
RBC # BLD: 3.98 M/UL — LOW (ref 4.2–5.8)
RBC # FLD: 17.8 % — HIGH (ref 10.3–16.9)
SODIUM SERPL-SCNC: 137 MMOL/L — SIGNIFICANT CHANGE UP (ref 135–145)
TROPONIN I SERPL-MCNC: <0.015 NG/ML — SIGNIFICANT CHANGE UP (ref 0.01–0.04)
WBC # BLD: 3.6 K/UL — LOW (ref 3.8–10.5)
WBC # FLD AUTO: 3.6 K/UL — LOW (ref 3.8–10.5)

## 2017-04-05 PROCEDURE — 99285 EMERGENCY DEPT VISIT HI MDM: CPT | Mod: 25

## 2017-04-05 PROCEDURE — 99284 EMERGENCY DEPT VISIT MOD MDM: CPT | Mod: 25

## 2017-04-05 PROCEDURE — 85027 COMPLETE CBC AUTOMATED: CPT

## 2017-04-05 PROCEDURE — 93005 ELECTROCARDIOGRAM TRACING: CPT

## 2017-04-05 PROCEDURE — 93010 ELECTROCARDIOGRAM REPORT: CPT

## 2017-04-05 PROCEDURE — 80053 COMPREHEN METABOLIC PANEL: CPT

## 2017-04-05 PROCEDURE — 84484 ASSAY OF TROPONIN QUANT: CPT

## 2017-04-05 PROCEDURE — 36415 COLL VENOUS BLD VENIPUNCTURE: CPT

## 2017-04-05 RX ORDER — SODIUM CHLORIDE 9 MG/ML
1000 INJECTION INTRAMUSCULAR; INTRAVENOUS; SUBCUTANEOUS ONCE
Qty: 0 | Refills: 0 | Status: COMPLETED | OUTPATIENT
Start: 2017-04-05 | End: 2017-04-05

## 2017-04-05 RX ADMIN — Medication 25 MILLIGRAM(S): at 18:36

## 2017-04-05 RX ADMIN — Medication 25 MILLIGRAM(S): at 16:25

## 2017-04-05 RX ADMIN — SODIUM CHLORIDE 1000 MILLILITER(S): 9 INJECTION INTRAMUSCULAR; INTRAVENOUS; SUBCUTANEOUS at 17:33

## 2017-04-05 RX ADMIN — SODIUM CHLORIDE 1000 MILLILITER(S): 9 INJECTION INTRAMUSCULAR; INTRAVENOUS; SUBCUTANEOUS at 16:00

## 2017-04-05 NOTE — ED PROVIDER NOTE - MEDICAL DECISION MAKING DETAILS
61y male near syncopal episode this morning while attempting to stand. +EtOH. Hypotensive by EMS and on arrival. EKG unchanged. Labs and orthostatics pending.

## 2017-04-05 NOTE — PROGRESS NOTE ADULT - SUBJECTIVE AND OBJECTIVE BOX
Pt admitted for acute alcoholic exacerbation  in setting of chronic ethanolism     S/P Cardiomyopathy  CAD PTCA with stent GI Bleed      PAST MEDICAL & SURGICAL HISTORY:  Pacemaker  ETOH abuse  Paroxysmal a-fib  Myocardial infarction involving other coronary artery  Gastritis  Essential hypertension: Hypertension  Other specified cardiac dysrhythmias: SVT (supraventricular tachycardia)  Atherosclerosis of coronary artery: Coronary artery disease  History of surgery to heart and great vessels, presenting hazards to health: History of percutaneous coronary intervention  Automatic implantable cardiac defibrillator in situ: ICD (implantable cardioverter-defibrillator) in place    Vital Signs Last 24 Hrs  T(C): 37, Max: 37 (04-05 @ 15:20)  T(F): 98.6, Max: 98.6 (04-05 @ 15:20)  HR: 79 (79 - 79)  BP: 81/58 (81/58 - 81/58)  BP(mean): --  RR: 16 (16 - 16)  SpO2: 98% (98% - 98%)    s/p Hypotension requiring volume repletion    Heent  neg  Lungs Clear   CV S1 s2    Abd soft  Ext stable  tremulous hands   , shaking                          11.4   3.6   )-----------( 159      ( 05 Apr 2017 15:40 )             32.7   04-05    137  |  101  |  14  ----------------------------<  113<H>  3.8   |  26  |  1.19    Ca    8.3<L>      05 Apr 2017 15:40    TPro  6.9  /  Alb  3.4  /  TBili  0.4  /  DBili  x   /  AST  206<H>  /  ALT  100<H>  /  AlkPhos  90  04-05  CARDIAC MARKERS ( 05 Apr 2017 15:40 )  <0.015 ng/mL / x     / x     / x     / x

## 2017-04-05 NOTE — ED PROVIDER NOTE - PROGRESS NOTE DETAILS
Labs noted with elevated LFTs. +orthostatics. Given 1L IVF, reports improved symptoms. 2nd L ordered. Given librium as slightly tremulous. D/w PMD Mildred, will see pt in ED. Seen in ED by Dr Levy. Pt feeling better. Steady gait in ED. DC home with return precautions.

## 2017-04-05 NOTE — ED ADULT NURSE NOTE - OBJECTIVE STATEMENT
Patient BIBA for fall and near syncope. Patient denies hitting his head. Patient reports he is an alcoholic and has been "controlling his shakes with alcohol for the past two weeks." Patient denies LOC. Moving all extremities, sensation intact, no facial droop. Mild tremor present. HRR, pacemaker noted to left chest. Lungs clear throughout. Abdomen soft, non-distended, non-tender, bowel sounds present. Denies dysuria. Will continue to monitor patient.

## 2017-04-05 NOTE — ED PROVIDER NOTE - OBJECTIVE STATEMENT
61y male h/o CAD s/p MI (~2003), stent placement in July of 2016, CHF (EF 35-40%) s/p AICD, gastritis with GIB, Afib NOT on AC, HTN, EtOH abuse, HLD, states attempted to stand from bed this AM and nearly passed out. Was able to sit back down, gather himself, and then get up to use restroom. Admits to drinking 2 airplane-sized bottles of liquor prior to this happening. No chest pain, shortness of breath, abd pain. +diarrhea x2wk, poor nutritional intake. On EMS arrival, pt hypotensive.

## 2017-08-18 NOTE — CONSULT NOTE ADULT - SUBJECTIVE AND OBJECTIVE BOX
Patient is a 60 yo male pmh of sCHF (EF 30-45%) s/p AICD, CAD s/p stent July of 2016, HTN, paroxysmal afib (self DCed eliquis), EtOH abuse, last admission for withdrawn in december 2016, who presented to ED with anxiety and tremor. Patient stated tremors started this AM when he wakes up from sleep, similar to his withdrawn symptoms in the past where he usually self treated with more alcohol. However his friends took his alcohol today, so he took 30mg total of librium (was prescribed by PCP for PRN use) without improvement of tremor, so he decided to come to ED. Other wise no fever/chill, no chest pain, no sob, no hallucination, no numbness, no dysuria. Reported poor PO intake due to depression, however no SI/HI at this time.  In the ED, pt afebrile, HR 98, /71, Satting well on RA. Patient initial CIWA reported of 9 for tremor and anxiety, he was given 25mg Librium, 30mg total of IV valium, with improvement of symptoms. Given 1L bolus NS and Banana bag started. ICU was consulted.    Patient examined by bedside, stated he feeling much better, anxiety has improved. Reported some nausea, however feeling hungry right now.    V/S    T(F): 99.2, Max: 99.8 (02-14 @ 09:22)  HR: 84  BP: 125/67  RR: 16  SpO2: 98%  Wt(kg): --  PE     GEN - Anxious appear male.           HEENT - NCAT, EOMI, PERRLA, mucosa slightly dry, moderate tongue fasciculation            CVS - RRR, no M/R/G, no JVD           PULM - CTA B/L, equal air entry, no increased work of breathing           ABD - Soft, nontender, nondistended, normal BS           EXT - Distal extremities warm, no cyanosis, no edema. Moderate tremor on outstretched hands bilaterally           NEURO - AOx3, Moves all extremities, sensation intact through out     LAB                        11.2   5.0   )-----------( 179      ( 14 Feb 2017 09:52 )             33.5               14 Feb 2017 09:52    134    |  101    |  22     ----------------------------<  112    4.9     |  25     |  1.67     Ca    8.8        14 Feb 2017 09:52  Mg     1.8       14 Feb 2017 09:52    TPro  7.7    /  Alb  3.7    /  TBili  0.6    /  DBili  x      /  AST  79     /  ALT  57     /  AlkPhos  59     14 Feb 2017 09:52                          LIVER FUNCTIONS - ( 14 Feb 2017 09:52 )  Alb: 3.7 g/dL / Pro: 7.7 g/dL / ALK PHOS: 59 U/L / ALT: 57 U/L / AST: 79 U/L / GGT: x             Additional tests & radiology reviewed. Patient is a 62 yo male pmh of sCHF (EF 30-45%) s/p AICD, CAD s/p stent July of 2016, HTN, paroxysmal afib (self DCed eliquis), EtOH abuse, last admission for withdrawn in december 2016, who presented to ED with anxiety and tremor. Patient stated tremors started this AM when he wakes up from sleep, similar to his withdrawn symptoms in the past where he usually self treated with more alcohol. However his friends took his alcohol today, so he took 30mg total of librium (was prescribed by PCP for PRN use) without improvement of tremor, so he decided to come to ED. Usually drink about a pint a day, sometimes more, yesterday had 12Oz during the day and another pint (16oz) at 8PM. Other wise no fever/chill, no chest pain, no sob, no hallucination, no numbness, no dysuria. Reported poor PO intake due to depression, however no SI/HI at this time.  In the ED, pt afebrile, HR 98, /71, Satting well on RA. Patient initial CIWA reported of 9 for tremor and anxiety, he was given 25mg Librium, 30mg total of IV valium, with improvement of symptoms. Given 1L bolus NS and Banana bag started. ICU was consulted.    Patient examined by bedside, stated he feeling much better, anxiety has improved. Reported some nausea, however feeling hungry right now.    V/S    T(F): 99.2, Max: 99.8 (02-14 @ 09:22)  HR: 84  BP: 125/67  RR: 16  SpO2: 98%  Wt(kg): --  PE     GEN - Anxious appear male.           HEENT - NCAT, EOMI, PERRLA, mucosa slightly dry, moderate tongue fasciculation            CVS - RRR, no M/R/G, no JVD           PULM - CTA B/L, equal air entry, no increased work of breathing           ABD - Soft, nontender, nondistended, normal BS           EXT - Distal extremities warm, no cyanosis, no edema. Moderate tremor on outstretched hands bilaterally           NEURO - AOx3, Moves all extremities, sensation intact through out     LAB                        11.2   5.0   )-----------( 179      ( 14 Feb 2017 09:52 )             33.5               14 Feb 2017 09:52    134    |  101    |  22     ----------------------------<  112    4.9     |  25     |  1.67     Ca    8.8        14 Feb 2017 09:52  Mg     1.8       14 Feb 2017 09:52    TPro  7.7    /  Alb  3.7    /  TBili  0.6    /  DBili  x      /  AST  79     /  ALT  57     /  AlkPhos  59     14 Feb 2017 09:52                          LIVER FUNCTIONS - ( 14 Feb 2017 09:52 )  Alb: 3.7 g/dL / Pro: 7.7 g/dL / ALK PHOS: 59 U/L / ALT: 57 U/L / AST: 79 U/L / GGT: x             Additional tests & radiology reviewed. Patient is a 62 yo male pmh of sCHF (EF 30-45%) s/p AICD, CAD s/p stent July of 2016, HTN, paroxysmal afib (self DCed eliquis), EtOH abuse, last admission for withdrawn in december 2016, who presented to ED with anxiety and tremor. Patient stated tremors started this AM when he wakes up from sleep, similar to his withdrawn symptoms in the past where he usually self treated with more alcohol. However his friends took his alcohol today, so he took 30mg total of librium (was prescribed by PCP for PRN use) without improvement of tremor, so he decided to come to ED. Usually drink about a pint a day, sometimes more, yesterday had 12Oz during the day and another pint (16oz) at 8PM. Other wise no fever/chill, no chest pain, no sob, no hallucination, no numbness, no dysuria. Reported poor PO intake due to depression, however no SI/HI at this time.  In the ED, pt afebrile, HR 98, /71, Satting well on RA. Patient initial CIWA reported of 9 for tremor and anxiety, he was given 25mg Librium, 30mg total of IV valium, with improvement of symptoms. Given 1L bolus NS and Banana bag started. ICU was consulted.    PMH: as above  PSH: as above  SH: lives by himself, former smoker quit 30 years ago, EoTH as above, no illicit drug  ALL: Penicillin    Patient examined by bedside, stated he feeling much better, anxiety has improved. Reported some nausea, however feeling hungry right now.    V/S    T(F): 99.2, Max: 99.8 (02-14 @ 09:22)  HR: 84  BP: 125/67  RR: 16  SpO2: 98%  Wt(kg): --  PE     GEN - Anxious appear male.           HEENT - NCAT, EOMI, PERRLA, mucosa slightly dry, moderate tongue fasciculation            CVS - RRR, no M/R/G, no JVD           PULM - CTA B/L, equal air entry, no increased work of breathing           ABD - Soft, nontender, nondistended, normal BS           EXT - Distal extremities warm, no cyanosis, no edema. Moderate tremor on outstretched hands bilaterally           NEURO - AOx3, Moves all extremities, sensation intact through out     LAB                        11.2   5.0   )-----------( 179      ( 14 Feb 2017 09:52 )             33.5               14 Feb 2017 09:52    134    |  101    |  22     ----------------------------<  112    4.9     |  25     |  1.67     Ca    8.8        14 Feb 2017 09:52  Mg     1.8       14 Feb 2017 09:52    TPro  7.7    /  Alb  3.7    /  TBili  0.6    /  DBili  x      /  AST  79     /  ALT  57     /  AlkPhos  59     14 Feb 2017 09:52                          LIVER FUNCTIONS - ( 14 Feb 2017 09:52 )  Alb: 3.7 g/dL / Pro: 7.7 g/dL / ALK PHOS: 59 U/L / ALT: 57 U/L / AST: 79 U/L / GGT: x             Additional tests & radiology reviewed. 0=oriented and can do serial additions

## 2017-09-10 ENCOUNTER — INPATIENT (INPATIENT)
Facility: HOSPITAL | Age: 62
LOS: 1 days | Discharge: HOME | DRG: 392 | End: 2017-09-12
Attending: INTERNAL MEDICINE | Admitting: INTERNAL MEDICINE
Payer: MEDICARE

## 2017-09-10 VITALS
HEART RATE: 89 BPM | WEIGHT: 164.91 LBS | TEMPERATURE: 98 F | OXYGEN SATURATION: 97 % | DIASTOLIC BLOOD PRESSURE: 82 MMHG | HEIGHT: 70 IN | SYSTOLIC BLOOD PRESSURE: 128 MMHG | RESPIRATION RATE: 17 BRPM

## 2017-09-10 DIAGNOSIS — I48.0 PAROXYSMAL ATRIAL FIBRILLATION: ICD-10-CM

## 2017-09-10 DIAGNOSIS — R53.1 WEAKNESS: ICD-10-CM

## 2017-09-10 DIAGNOSIS — F10.10 ALCOHOL ABUSE, UNCOMPLICATED: ICD-10-CM

## 2017-09-10 DIAGNOSIS — I50.22 CHRONIC SYSTOLIC (CONGESTIVE) HEART FAILURE: ICD-10-CM

## 2017-09-10 DIAGNOSIS — D64.9 ANEMIA, UNSPECIFIED: ICD-10-CM

## 2017-09-10 DIAGNOSIS — Z29.9 ENCOUNTER FOR PROPHYLACTIC MEASURES, UNSPECIFIED: ICD-10-CM

## 2017-09-10 LAB
AMYLASE P1 CFR SERPL: 45 U/L — SIGNIFICANT CHANGE UP (ref 25–125)
TROPONIN T SERPL-MCNC: <0.01 NG/ML — SIGNIFICANT CHANGE UP (ref 0–0.01)

## 2017-09-10 PROCEDURE — 93010 ELECTROCARDIOGRAM REPORT: CPT | Mod: 77

## 2017-09-10 PROCEDURE — 93010 ELECTROCARDIOGRAM REPORT: CPT

## 2017-09-10 PROCEDURE — 99285 EMERGENCY DEPT VISIT HI MDM: CPT | Mod: 25

## 2017-09-10 PROCEDURE — 71010: CPT | Mod: 26

## 2017-09-10 RX ORDER — ATORVASTATIN CALCIUM 80 MG/1
40 TABLET, FILM COATED ORAL AT BEDTIME
Qty: 0 | Refills: 0 | Status: DISCONTINUED | OUTPATIENT
Start: 2017-09-10 | End: 2017-09-12

## 2017-09-10 RX ORDER — ASPIRIN/CALCIUM CARB/MAGNESIUM 324 MG
81 TABLET ORAL DAILY
Qty: 0 | Refills: 0 | Status: DISCONTINUED | OUTPATIENT
Start: 2017-09-10 | End: 2017-09-12

## 2017-09-10 RX ORDER — SODIUM CHLORIDE 9 MG/ML
1000 INJECTION INTRAMUSCULAR; INTRAVENOUS; SUBCUTANEOUS
Qty: 0 | Refills: 0 | Status: DISCONTINUED | OUTPATIENT
Start: 2017-09-10 | End: 2017-09-11

## 2017-09-10 RX ORDER — SODIUM CHLORIDE 9 MG/ML
500 INJECTION INTRAMUSCULAR; INTRAVENOUS; SUBCUTANEOUS ONCE
Qty: 0 | Refills: 0 | Status: COMPLETED | OUTPATIENT
Start: 2017-09-10 | End: 2017-09-10

## 2017-09-10 RX ORDER — FAMOTIDINE 10 MG/ML
20 INJECTION INTRAVENOUS ONCE
Qty: 0 | Refills: 0 | Status: COMPLETED | OUTPATIENT
Start: 2017-09-10 | End: 2017-09-10

## 2017-09-10 RX ORDER — PSYLLIUM SEED (WITH DEXTROSE)
1 POWDER (GRAM) ORAL
Qty: 0 | Refills: 0 | Status: DISCONTINUED | OUTPATIENT
Start: 2017-09-10 | End: 2017-09-12

## 2017-09-10 RX ORDER — PSYLLIUM SEED (WITH DEXTROSE)
1 POWDER (GRAM) ORAL
Qty: 0 | Refills: 0 | Status: DISCONTINUED | OUTPATIENT
Start: 2017-09-10 | End: 2017-09-10

## 2017-09-10 RX ORDER — PANTOPRAZOLE SODIUM 20 MG/1
40 TABLET, DELAYED RELEASE ORAL
Qty: 0 | Refills: 0 | Status: DISCONTINUED | OUTPATIENT
Start: 2017-09-10 | End: 2017-09-12

## 2017-09-10 RX ORDER — LISINOPRIL 2.5 MG/1
20 TABLET ORAL DAILY
Qty: 0 | Refills: 0 | Status: DISCONTINUED | OUTPATIENT
Start: 2017-09-10 | End: 2017-09-12

## 2017-09-10 RX ORDER — SENNA PLUS 8.6 MG/1
1 TABLET ORAL DAILY
Qty: 0 | Refills: 0 | Status: DISCONTINUED | OUTPATIENT
Start: 2017-09-10 | End: 2017-09-12

## 2017-09-10 RX ORDER — SODIUM CHLORIDE 9 MG/ML
3 INJECTION INTRAMUSCULAR; INTRAVENOUS; SUBCUTANEOUS ONCE
Qty: 0 | Refills: 0 | Status: COMPLETED | OUTPATIENT
Start: 2017-09-10 | End: 2017-09-10

## 2017-09-10 RX ORDER — SODIUM CHLORIDE 9 MG/ML
1000 INJECTION INTRAMUSCULAR; INTRAVENOUS; SUBCUTANEOUS ONCE
Qty: 0 | Refills: 0 | Status: DISCONTINUED | OUTPATIENT
Start: 2017-09-10 | End: 2017-09-10

## 2017-09-10 RX ORDER — APIXABAN 2.5 MG/1
5 TABLET, FILM COATED ORAL EVERY 12 HOURS
Qty: 0 | Refills: 0 | Status: DISCONTINUED | OUTPATIENT
Start: 2017-09-10 | End: 2017-09-10

## 2017-09-10 RX ORDER — POLYETHYLENE GLYCOL 3350 17 G/17G
17 POWDER, FOR SOLUTION ORAL DAILY
Qty: 0 | Refills: 0 | Status: DISCONTINUED | OUTPATIENT
Start: 2017-09-10 | End: 2017-09-10

## 2017-09-10 RX ORDER — METOPROLOL TARTRATE 50 MG
100 TABLET ORAL DAILY
Qty: 0 | Refills: 0 | Status: DISCONTINUED | OUTPATIENT
Start: 2017-09-10 | End: 2017-09-12

## 2017-09-10 RX ORDER — ONDANSETRON 8 MG/1
4 TABLET, FILM COATED ORAL ONCE
Qty: 0 | Refills: 0 | Status: COMPLETED | OUTPATIENT
Start: 2017-09-10 | End: 2017-09-10

## 2017-09-10 RX ORDER — CLOPIDOGREL BISULFATE 75 MG/1
75 TABLET, FILM COATED ORAL DAILY
Qty: 0 | Refills: 0 | Status: DISCONTINUED | OUTPATIENT
Start: 2017-09-10 | End: 2017-09-12

## 2017-09-10 RX ADMIN — SODIUM CHLORIDE 1000 MILLILITER(S): 9 INJECTION INTRAMUSCULAR; INTRAVENOUS; SUBCUTANEOUS at 12:26

## 2017-09-10 RX ADMIN — Medication 1 MILLIGRAM(S): at 12:24

## 2017-09-10 RX ADMIN — SODIUM CHLORIDE 3 MILLILITER(S): 9 INJECTION INTRAMUSCULAR; INTRAVENOUS; SUBCUTANEOUS at 12:13

## 2017-09-10 RX ADMIN — FAMOTIDINE 20 MILLIGRAM(S): 10 INJECTION INTRAVENOUS at 12:25

## 2017-09-10 RX ADMIN — Medication 2 MILLIGRAM(S): at 22:59

## 2017-09-10 RX ADMIN — ONDANSETRON 4 MILLIGRAM(S): 8 TABLET, FILM COATED ORAL at 12:25

## 2017-09-10 RX ADMIN — SODIUM CHLORIDE 100 MILLILITER(S): 9 INJECTION INTRAMUSCULAR; INTRAVENOUS; SUBCUTANEOUS at 22:59

## 2017-09-10 NOTE — ED PROVIDER NOTE - MEDICAL DECISION MAKING DETAILS
Pt w h/o cad, chf, htn, hld, etoh abuse c/o epigastric pain and generalized weakness, tremor on exam, last drink last pm.  Pt denies cp, sob.  Sx onset 6 am - constant x ~ 6 hr.  EKG read by machine as acute mi but st-t morphology v similar to 4/17 ekg except v4 possibly more elevated.  Trop neg after > 6 h of pain.  I have low suspicion pt's sx are acs/stemi based on hpi, exam, and ekg comparison.  Repeat ekg unchanged from initial ed ekg.  Will have cardiology review and also discuss w Dr Levy/coverage.  I suspect sx more likely related to pt's gastritis and etoh use/abuse.  CIWA 5 but ? mild withdrawal.  Will try gi meds, ivf, ativan for w/d, check labs, reassess.

## 2017-09-10 NOTE — H&P ADULT - PROBLEM SELECTOR PLAN 2
Previous history of etoh abuse drinking 1 pint of vodka daily, now endorses only drinking 3 shots as needed.   -s/p 1mg ativan in ER for CIWA of 5; current CIWA 3-4 with patient mentating well, avss and without symptoms of DTs; tongue fasiculations and hand tremors present.  -monitor CIWA Q6 hours.

## 2017-09-10 NOTE — ED ADULT NURSE NOTE - OBJECTIVE STATEMENT
Patient alert and oriented x 3 came for constant worsening abdominal pain , nausea , weakness , fever , chills , epigastric pain , loss of appetite  and slight sob for 2 days got worse this morning upon waking up .  Denies any palpitations . History of CHF with 3 stents in the past , also noted with Lt AC pacemaker intact . Seen and  examined by Dr. Farrell , ekg showing ST  elevation , placed on cardiac monitor and pulse oximeter , waiting for cardiologist for evaluation .

## 2017-09-10 NOTE — H&P ADULT - NSHPPHYSICALEXAM_GEN_ALL_CORE
.  VITAL SIGNS:  T(C): 37.2 (09-10-17 @ 14:53), Max: 37.2 (09-10-17 @ 14:53)  T(F): 99 (09-10-17 @ 14:53), Max: 99 (09-10-17 @ 14:53)  HR: 76 (09-10-17 @ 15:06) (72 - 89)  BP: 141/79 (09-10-17 @ 15:06) (125/77 - 141/79)  BP(mean): --  RR: 18 (09-10-17 @ 15:06) (17 - 20)  SpO2: 99% (09-10-17 @ 15:06) (97% - 100%)  Wt(kg): --    PHYSICAL EXAM:    Constitutional: WDWN resting comfortably in bed; NAD  Head: NC/AT  Eyes: PERRL, EOMI, anicteric sclera  ENT: no nasal discharge; uvula midline, no oropharyngeal erythema or exudates; MMM  Neck: supple; no JVD or thyromegaly  Respiratory: CTA B/L; no W/R/R, no retractions  Cardiac: +S1/S2; RRR; no M/R/G; PMI non-displaced  Gastrointestinal: soft, NT/ND; no rebound or guarding; +BSx4  Genitourinary: normal external genitalia  Back: spine midline, no bony tenderness or step-offs; no CVAT B/L  Extremities: WWP, no clubbing or cyanosis; no peripheral edema  Musculoskeletal: NROM x4; no joint swelling, tenderness or erythema  Vascular: 2+ radial, femoral, DP/PT pulses B/L  Dermatologic: skin warm, dry and intact; no rashes, wounds, or scars  Lymphatic: no submandibular or cervical LAD  Neurologic: AAOx3; CNII-XII grossly intact; no focal deficits  Psychiatric: affect and characteristics of appearance, verbalizations, behaviors are appropriate .  VITAL SIGNS:  T(C): 37.2 (09-10-17 @ 14:53), Max: 37.2 (09-10-17 @ 14:53)  T(F): 99 (09-10-17 @ 14:53), Max: 99 (09-10-17 @ 14:53)  HR: 76 (09-10-17 @ 15:06) (72 - 89)  BP: 141/79 (09-10-17 @ 15:06) (125/77 - 141/79)  BP(mean): --  RR: 18 (09-10-17 @ 15:06) (17 - 20)  SpO2: 99% (09-10-17 @ 15:06) (97% - 100%)  Wt(kg): --    PHYSICAL EXAM:    Constitutional: WDWN resting comfortably in bed; NAD  Head: NC/AT  Eyes: PERRL, EOMI, anicteric sclera  ENT: no nasal discharge; uvula midline, no oropharyngeal erythema or exudates; MMM  Neck: supple; no JVD or thyromegaly  Respiratory: CTA B/L; no W/R/R, no retractions  Cardiac: +S1/S2; RRR; no M/R/G; PMI non-displaced  Gastrointestinal: soft, NT/ND; no rebound or guarding; +BSx4  Genitourinary: normal external genitalia  Back: spine midline, no bony tenderness or step-offs; no CVAT B/L  Extremities: WWP, no clubbing or cyanosis; no peripheral edema  Musculoskeletal: NROM x4; no joint swelling, tenderness or erythema  Vascular: 2+ radial, femoral, DP/PT pulses B/L  Dermatologic: skin warm, dry and intact; scar overlying left orbit, from previous fall; some healing scars on bilateral LE  Lymphatic: no submandibular or cervical LAD  Neurologic: AAOx3; CNII-XII grossly intact; no focal deficits  Psychiatric: affect and characteristics of appearance, verbalizations, behaviors are appropriate .  VITAL SIGNS:  T(C): 37.2 (09-10-17 @ 14:53), Max: 37.2 (09-10-17 @ 14:53)  T(F): 99 (09-10-17 @ 14:53), Max: 99 (09-10-17 @ 14:53)  HR: 76 (09-10-17 @ 15:06) (72 - 89)  BP: 141/79 (09-10-17 @ 15:06) (125/77 - 141/79)  BP(mean): --  RR: 18 (09-10-17 @ 15:06) (17 - 20)  SpO2: 99% (09-10-17 @ 15:06) (97% - 100%)  Wt(kg): --    PHYSICAL EXAM:    Constitutional: WDWN resting comfortably in bed; NAD  Head: NC/AT  Eyes: PERRL, EOMI, anicteric sclera  ENT: no nasal discharge; uvula midline, no oropharyngeal erythema or exudates; + tongue fasiculations  Neck: supple; no JVD or thyromegaly  Respiratory: CTA B/L; no W/R/R, no retractions  Cardiac: +S1/S2; RRR; no M/R/G; PMI non-displaced  Gastrointestinal: soft, NT/ND; no rebound or guarding; +BSx4  Genitourinary: normal external genitalia  Back: spine midline, no bony tenderness or step-offs; no CVAT B/L  Extremities: WWP, no clubbing or cyanosis; no peripheral edema  Musculoskeletal: NROM x4; no joint swelling, tenderness or erythema; + hand fasiculations  Vascular: 2+ radial, femoral, DP/PT pulses B/L  Dermatologic: skin warm, dry and intact; scar overlying left orbit, from previous fall; some healing scars on bilateral LE  Lymphatic: no submandibular or cervical LAD  Neurologic: AAOx3; CNII-XII grossly intact; no focal deficits  Psychiatric: affect and characteristics of appearance, verbalizations, behaviors are appropriate

## 2017-09-10 NOTE — H&P ADULT - HISTORY OF PRESENT ILLNESS
Patient is a 60 yo M with a PMHx of CAD s/p MI (~2003), stent placement in July of 2016, CHF (EF 35-40%) s/p AICD, gastritis with GIB, A. fib NOT on AC (Patient refused to take), and history of EtOH abuse with recent hospitalization for withdrawal in February 2017 presents for weakness. Patient is a 62 yo M with a PMHx of CAD s/p MI (~2003), stent placement in July of 2016, CHF (EF 35-40%) s/p AICD, gastritis with GIB, A. fib NOT on AC (Patient refused to take), and history of EtOH abuse with recent hospitalization for withdrawal in February 2017 presents for weakness which started since last week. Patient last drink was tuesday 3 shots of vodka, prior to that last drink was 1 month previously. States that he noticed he was not eating as much as normal which he believes is associated with recent stressors, such as his impending divorce; he has     In the ED:  ICU Vital Signs Last 24 Hrs  T(C): 37.2 (10 Sep 2017 14:53), Max: 37.2 (10 Sep 2017 14:53)  T(F): 99 (10 Sep 2017 14:53), Max: 99 (10 Sep 2017 14:53)  HR: 76 (10 Sep 2017 15:06) (72 - 89)  BP: 141/79 (10 Sep 2017 15:06) (125/77 - 141/79)  BP(mean): --  ABP: --  ABP(mean): --  RR: 18 (10 Sep 2017 15:06) (17 - 20)  SpO2: 99% (10 Sep 2017 15:06) (97% - 100%)    Patient received 1.5 liters of NS as bolus, Zofran 4mg IVP x1 for nasuea, 1mg Ativan x1 for CIWA of 4-5 per ER attending, and famotidine 20mg IVP x1. Patient had EKG wchich showed ST elevations in V1-V4, unchanged from prior with repeat showing the same.  Troponin negative x1 Patient is a 60 yo M with a PMHx of CAD s/p MI (~2003), stent placement in July of 2016, CHF (EF 35-40%) s/p AICD, gastritis with GIB, A. fib NOT on AC (Patient refused to take), and history of EtOH abuse with recent hospitalization for withdrawal in February 2017 presents for weakness which started since last week. Patient last drink was tuesday 3 shots of vodka, prior to that last drink was 1 month previously. States that he noticed he was not eating as much as normal which he believes is associated with recent stressors, such as the finalizing of divorce from wife and custody of his daughter. Patient endorses +nausea, no vomiting, no diarrhea, constipation, no fevers, no trauma, no abdominal pain, cough, urinary tract issues. +metallic taste in mouth.     In the ED:  ICU Vital Signs Last 24 Hrs  T(C): 37.2 (10 Sep 2017 14:53), Max: 37.2 (10 Sep 2017 14:53)  T(F): 99 (10 Sep 2017 14:53), Max: 99 (10 Sep 2017 14:53)  HR: 76 (10 Sep 2017 15:06) (72 - 89)  BP: 141/79 (10 Sep 2017 15:06) (125/77 - 141/79)  BP(mean): --  ABP: --  ABP(mean): --  RR: 18 (10 Sep 2017 15:06) (17 - 20)  SpO2: 99% (10 Sep 2017 15:06) (97% - 100%)    Patient received 1.5 liters of NS as bolus, Zofran 4mg IVP x1 for nasuea, 1mg Ativan x1 for CIWA of 4-5 per ER attending, and famotidine 20mg IVP x1. Patient had EKG wchich showed ST elevations in V1-V4, unchanged from prior with repeat showing the same.  Troponin negative x1

## 2017-09-10 NOTE — H&P ADULT - ASSESSMENT
62 yo M with a PMHx of CAD s/p MI (~2003), stent placement in July of 2016, CHF (EF 35-40%) s/p AICD, gastritis with GIB,presents with weakness, nausea x1 week associated with decreased PO intake which may be secondary to gastritis with concomittent depressive symptoms.

## 2017-09-10 NOTE — ED PROVIDER NOTE - PROGRESS NOTE DETAILS
Repeat ekg unchanged from initial.  I stat Trop 0.00 after sx since 6am.  Pt discussed w cardiology fellow who will review ekg's and pt. Eval by cardiology who feels ekg similar to prev and difference 2/2 lead placement. Pt eval by Dr Levy in ed - he wants pt admitted to him on tele bed. Pt eval by Dr Levy in ed - he wants pt admitted to him on tele bed.  Pt feeling improved. Dr Levy changed his mind about tele bed and wants regional admission.  Bedboard contacted.

## 2017-09-10 NOTE — H&P ADULT - PROBLEM SELECTOR PLAN 6
Originally would have continued on eliquis; patient   DASH Diet, bowel regimen  GI PPX  Replete electrolytes as needed.  Full Code

## 2017-09-10 NOTE — H&P ADULT - PROBLEM SELECTOR PLAN 1
Likely secondary to decreased PO intake, from gastritis with concomittent depressive symptoms. Patient previously seen by psychiatry, and they had recommended inpatient detox (when admitted for etoh withdrawal) but he declined;  -Will start maintenance fluids  -Physical Therapy, social work consult.  -consider psych consultation.   -Low suspicion for pancreatitis; no abdominal pain with nausea, will continue protonix.

## 2017-09-10 NOTE — ED PROVIDER NOTE - CONSTITUTIONAL, MLM
normal... Well appearing, well nourished, awake, alert, oriented to person, place, time/situation and anxious, mildly tremulous

## 2017-09-10 NOTE — H&P ADULT - PROBLEM SELECTOR PLAN 4
HSQ  DASH Diet  Replete electrolytes as needed.  Full Code With concomitant CAD s/p PCI. EF 35% on last echo with blood clot in LV in february 2017  -continue toprol, lisinopril  -will repeat echocardiogram this admission With concomitant CAD s/p PCI. EF 35% on last echo with blood clot in LV in february 2017  -continue toprol, lisinopril  -will repeat echocardiogram this admission  -Troponin negative x1 in ER; will repeat in 6 hours; if negative can stop as unlikley ACS

## 2017-09-10 NOTE — PROGRESS NOTE ADULT - ASSESSMENT
R/O pancreatitis   GERD     admit Cardiomyopathy   r/o exacerbation of CAD  R/o Pancreatitis  Hepatic Insufficiency   GERD     admit

## 2017-09-10 NOTE — H&P ADULT - PROBLEM SELECTOR PLAN 5
Will continue with eliquis  DASH Diet, bowel regimen  GI PPX  Replete electrolytes as needed.  Full Code Originally would have continued on eliquis; patient   DASH Diet, bowel regimen  GI PPX  Replete electrolytes as needed.  Full Code Appears chronic from previous labs; currently stable. On Iron supplementation at home.  -Will continue to trend.

## 2017-09-10 NOTE — H&P ADULT - NSHPLABSRESULTS_GEN_ALL_CORE
.  LABS:                         10.6   5.2   )-----------( 237      ( 10 Sep 2017 12:18 )             33.8     09-10    137  |  96  |  17  ----------------------------<  136<H>  4.4   |  25  |  1.00    Ca    9.3      10 Sep 2017 12:18    TPro  8.1  /  Alb  4.7  /  TBili  0.5  /  DBili  x   /  AST  61<H>  /  ALT  35  /  AlkPhos  61  09-10        CARDIAC MARKERS ( 10 Sep 2017 12:18 )  x     / <0.01 ng/mL / x     / x     / x                RADIOLOGY, EKG & ADDITIONAL TESTS: Reviewed.

## 2017-09-10 NOTE — ED ADULT TRIAGE NOTE - OTHER COMPLAINTS
pt c.o epigastric discomfort with weakness x 2 days. pt c.o nausea, loss of appetite. denies fevers. ekg in progress.

## 2017-09-10 NOTE — ED ADULT NURSE NOTE - CHPI ED SYMPTOMS POS
DIZZINESS/SHORTNESS OF BREATH/NAUSEA/epigastric pain , generalized weakness , abdominal pain , chills , loss of appetite , fever

## 2017-09-10 NOTE — H&P ADULT - PROBLEM SELECTOR PLAN 3
longstanding history of atrial fibrillation; previously on eliquis but patient refused to take.   -Will continue with metoprolol ER 100mg

## 2017-09-11 ENCOUNTER — TRANSCRIPTION ENCOUNTER (OUTPATIENT)
Age: 62
End: 2017-09-11

## 2017-09-11 DIAGNOSIS — I25.10 ATHEROSCLEROTIC HEART DISEASE OF NATIVE CORONARY ARTERY WITHOUT ANGINA PECTORIS: ICD-10-CM

## 2017-09-11 LAB
ANION GAP SERPL CALC-SCNC: 13 MMOL/L — SIGNIFICANT CHANGE UP (ref 5–17)
BUN SERPL-MCNC: 14 MG/DL — SIGNIFICANT CHANGE UP (ref 7–23)
CALCIUM SERPL-MCNC: 8.8 MG/DL — SIGNIFICANT CHANGE UP (ref 8.4–10.5)
CHLORIDE SERPL-SCNC: 96 MMOL/L — SIGNIFICANT CHANGE UP (ref 96–108)
CO2 SERPL-SCNC: 27 MMOL/L — SIGNIFICANT CHANGE UP (ref 22–31)
CREAT SERPL-MCNC: 1 MG/DL — SIGNIFICANT CHANGE UP (ref 0.5–1.3)
FOLATE SERPL-MCNC: 19.6 NG/ML — SIGNIFICANT CHANGE UP (ref 4.8–24.2)
GLUCOSE SERPL-MCNC: 96 MG/DL — SIGNIFICANT CHANGE UP (ref 70–99)
HCT VFR BLD CALC: 30.3 % — LOW (ref 39–50)
HGB BLD-MCNC: 9.3 G/DL — LOW (ref 13–17)
MAGNESIUM SERPL-MCNC: 1.6 MG/DL — SIGNIFICANT CHANGE UP (ref 1.6–2.6)
MCHC RBC-ENTMCNC: 22.2 PG — LOW (ref 27–34)
MCHC RBC-ENTMCNC: 30.7 G/DL — LOW (ref 32–36)
MCV RBC AUTO: 72.3 FL — LOW (ref 80–100)
PLATELET # BLD AUTO: 167 K/UL — SIGNIFICANT CHANGE UP (ref 150–400)
POTASSIUM SERPL-MCNC: 4.1 MMOL/L — SIGNIFICANT CHANGE UP (ref 3.5–5.3)
POTASSIUM SERPL-SCNC: 4.1 MMOL/L — SIGNIFICANT CHANGE UP (ref 3.5–5.3)
RBC # BLD: 4.19 M/UL — LOW (ref 4.2–5.8)
RBC # FLD: 16.8 % — SIGNIFICANT CHANGE UP (ref 10.3–16.9)
SODIUM SERPL-SCNC: 136 MMOL/L — SIGNIFICANT CHANGE UP (ref 135–145)
VIT B12 SERPL-MCNC: 279 PG/ML — SIGNIFICANT CHANGE UP (ref 243–894)
WBC # BLD: 3.8 K/UL — SIGNIFICANT CHANGE UP (ref 3.8–10.5)
WBC # FLD AUTO: 3.8 K/UL — SIGNIFICANT CHANGE UP (ref 3.8–10.5)

## 2017-09-11 PROCEDURE — 93306 TTE W/DOPPLER COMPLETE: CPT | Mod: 26

## 2017-09-11 RX ORDER — MAGNESIUM SULFATE 500 MG/ML
1 VIAL (ML) INJECTION ONCE
Qty: 0 | Refills: 0 | Status: COMPLETED | OUTPATIENT
Start: 2017-09-11 | End: 2017-09-11

## 2017-09-11 RX ORDER — FOLIC ACID 0.8 MG
1 TABLET ORAL DAILY
Qty: 0 | Refills: 0 | Status: DISCONTINUED | OUTPATIENT
Start: 2017-09-11 | End: 2017-09-12

## 2017-09-11 RX ORDER — THIAMINE MONONITRATE (VIT B1) 100 MG
100 TABLET ORAL DAILY
Qty: 0 | Refills: 0 | Status: DISCONTINUED | OUTPATIENT
Start: 2017-09-11 | End: 2017-09-12

## 2017-09-11 RX ADMIN — SENNA PLUS 1 TABLET(S): 8.6 TABLET ORAL at 12:55

## 2017-09-11 RX ADMIN — LISINOPRIL 20 MILLIGRAM(S): 2.5 TABLET ORAL at 10:51

## 2017-09-11 RX ADMIN — ATORVASTATIN CALCIUM 40 MILLIGRAM(S): 80 TABLET, FILM COATED ORAL at 22:09

## 2017-09-11 RX ADMIN — Medication 100 GRAM(S): at 12:56

## 2017-09-11 RX ADMIN — Medication 100 MILLIGRAM(S): at 05:39

## 2017-09-11 RX ADMIN — Medication 100 MILLIGRAM(S): at 12:55

## 2017-09-11 RX ADMIN — Medication 1 TABLET(S): at 12:55

## 2017-09-11 RX ADMIN — PANTOPRAZOLE SODIUM 40 MILLIGRAM(S): 20 TABLET, DELAYED RELEASE ORAL at 05:39

## 2017-09-11 RX ADMIN — Medication 1 MILLIGRAM(S): at 12:55

## 2017-09-11 RX ADMIN — Medication 2 MILLIGRAM(S): at 17:19

## 2017-09-11 RX ADMIN — Medication 2 MILLIGRAM(S): at 22:09

## 2017-09-11 RX ADMIN — Medication 81 MILLIGRAM(S): at 12:55

## 2017-09-11 RX ADMIN — CLOPIDOGREL BISULFATE 75 MILLIGRAM(S): 75 TABLET, FILM COATED ORAL at 12:55

## 2017-09-11 NOTE — PHYSICAL THERAPY INITIAL EVALUATION ADULT - ADDITIONAL COMMENTS
Pt lives in elevator access apt building w/ no stairs to enter but states that he usually walks down the stairs instead of using the elevator when leaving his apt. No prior use of assistive device for ambulation, no HHA. Last fall was in December when pt states that he felt weak then drank a nip and went to help brother in the kitchen but passed out along the way.

## 2017-09-11 NOTE — PHYSICAL THERAPY INITIAL EVALUATION ADULT - COORDINATION ASSESSED, REHAB EVAL
heel to shin/WNL +tremors in BUE/BLE/finger to nose WNL +tremors in BUE/BLE/finger to nose/heel to shin

## 2017-09-11 NOTE — DISCHARGE NOTE ADULT - PATIENT PORTAL LINK FT
“You can access the [facility] Patient Portal, offered by [facility], by registering with the following website: [email or web address]”

## 2017-09-11 NOTE — DISCHARGE NOTE ADULT - HOSPITAL COURSE
62 yo M with a PMHx of CAD s/p MI (~2003), stent placement in July of 2016, CHF (EF 35-40%) s/p AICD, paroxysmal afib w/ known MERLE clot (refusing anticoag), gastritis with history of GIB (not active), hx of EtOH, presents with “weakness”, nausea x1 week associated with decreased PO intake.  Patient had repeat echo done on 9/11 which was largely unchanged from previous in February: showed akinesis of the apical inferior, mid to apical anterior and mid to apical septal regions with dyskinesis of the true apex as well as myocardial scar formation in the LAD distribution and apical thrombus (1.1cm x 1.0 cm) ,LVEF 35%. Patient was counseled on the risks of refusing anticoagulation vs risks of accepting anticoagulation but chose to refuse eliquis for anticoagulation despite his known thrombus and very high risk for stroke. Patient complained of epigastric pain but was troponin negative x2, ACS unlikely so did not continue to trend. More likely related to his gastritis which will persist as long as patient continues excessive alcohol use. Patient was treated with ativan PRN for alcohol withdrawal since he was noted to be tremulous. He was counseled on alcohol cessation and expresses a desire to quit upon discharge for which he will follow up with Dr. Levy. 60 yo M with a PMHx of CAD s/p MI (~2003), stent placement in July of 2016, CHF (EF 35-40%) s/p AICD, paroxysmal afib w/ known MERLE clot (refusing anticoag), gastritis with history of GIB (not active), hx of EtOH, presents with “weakness”, nausea x1 week associated with decreased PO intake.  Patient had repeat echo done on 9/11 which was largely unchanged from previous in February: showed akinesis of the apical inferior, mid to apical anterior and mid to apical septal regions with dyskinesis of the true apex as well as myocardial scar formation in the LAD distribution and apical thrombus (1.1cm x 1.0 cm) ,LVEF 35%. Patient was counseled on the risks of refusing anticoagulation vs risks of accepting anticoagulation but chose to refuse eliquis for anticoagulation despite his known thrombus and very high risk for stroke. Patient complained of epigastric pain but was troponin negative x2, ACS unlikely so did not continue to trend. More likely related to his gastritis which will persist as long as patient continues excessive alcohol use. Patient was treated with ativan PRN for alcohol withdrawal since he was noted to be tremulous. He was counseled on alcohol cessation and expresses a desire to quit upon discharge for which he will follow up with Dr. Levy. Patient was seen by PT who recommended home with outpatient PT. 62 yo M with a PMHx of CAD s/p MI (~2003), stent placement in July of 2016, CHF (EF 35-40%) s/p AICD, paroxysmal afib w/ known MERLE clot (refusing anticoag), gastritis with history of GIB (not active), hx of EtOH, presents with “weakness”, nausea x1 week associated with decreased PO intake.  Patient had repeat echo done on 9/11 which was largely unchanged from previous in February: showed akinesis of the apical inferior, mid to apical anterior and mid to apical septal regions with dyskinesis of the true apex as well as myocardial scar formation in the LAD distribution and apical thrombus (1.1cm x 1.0 cm) ,LVEF 35%. Patient was counseled on the risks of refusing anticoagulation vs risks of accepting anticoagulation but chose to refuse eliquis for anticoagulation despite his known thrombus and very high risk for stroke. Patient complained of epigastric pain but was troponin negative x2, ACS unlikely so did not continue to trend. More likely related to his gastritis which will persist as long as patient continues excessive alcohol use. Patient was treated with ativan PRN for alcohol withdrawal since he was noted to be tremulous. He was counseled on alcohol cessation and expresses a desire to quit upon discharge for which he will follow up with Dr. Levy. GI saw the patient for his history of BRPBR which did not occur while he was hospitalized. Since vitals and Hb were stable and patient did not have any active bleeding during admission it was recommended that patient follow up outpatient for colonoscopy. Patient was seen by PT who recommended home with outpatient PT. Patient is clinically stable and his ability to tolerate PO has improved. He will be discharged to complete his GI workup outpatient. 60 yo M with a PMHx of CAD s/p MI (~2003), stent placement in July of 2016, CHF (EF 35-40%) s/p AICD, paroxysmal afib w/ known MERLE clot (refusing anticoag), gastritis with history of GIB (not active), hx of EtOH, presents with “weakness”, nausea x1 week associated with decreased PO intake.  Patient had repeat echo done on 9/11 which was largely unchanged from previous in February: showed akinesis of the apical inferior, mid to apical anterior and mid to apical septal regions with dyskinesis of the true apex as well as myocardial scar formation in the LAD distribution and apical thrombus (1.1cm x 1.0 cm) ,LVEF 35%. Patient was counseled on the risks of refusing anticoagulation vs risks of accepting anticoagulation but chose to refuse eliquis for anticoagulation despite his known thrombus and very high risk for stroke. Patient complained of epigastric pain but was troponin negative x2, ACS unlikely so did not continue to trend. More likely related to his gastritis which will persist as long as patient continues excessive alcohol use. Patient was treated with ativan PRN for alcohol withdrawal since he was noted to be tremulous. He was counseled on alcohol cessation and expresses a desire to quit upon discharge for which he will follow up with Dr. Levy. GI saw the patient for his history of BRPBR which did not occur while he was hospitalized. Since vitals and Hb were stable and patient did not have any active bleeding during admission it was recommended that patient follow up outpatient for colonoscopy. He will follow up with Dr. Canales in two weeks. Patient was seen by PT who recommended home with outpatient PT. Patient is clinically stable and his ability to tolerate PO has improved. He will be discharged to complete his GI workup outpatient.

## 2017-09-11 NOTE — PROGRESS NOTE ADULT - SUBJECTIVE AND OBJECTIVE BOX
O/N Events: No acute events overnight.   Subjective: Patient seen and examined at bedside. Reports persistent decreased appetite and aversion to food but otherwise has no new complaints. He denies fevers/chills, abdominal pain, diarrhea, constipation, CP, SOB, palpitations.    VITALS  Vital Signs Last 24 Hrs  T(C): 36.8 (11 Sep 2017 10:46), Max: 37.4 (10 Sep 2017 17:32)  T(F): 98.3 (11 Sep 2017 10:46), Max: 99.3 (10 Sep 2017 17:32)  HR: 64 (11 Sep 2017 10:46) (64 - 88)  BP: 155/83 (11 Sep 2017 10:46) (125/77 - 155/83)  BP(mean): --  RR: 19 (11 Sep 2017 10:46) (18 - 19)  SpO2: 100% (11 Sep 2017 10:46) (97% - 100%)    I&O's Summary    10 Sep 2017 07:01  -  11 Sep 2017 07:00  --------------------------------------------------------  IN: 1000 mL / OUT: 0 mL / NET: 1000 mL        CAPILLARY BLOOD GLUCOSE          PHYSICAL EXAM  General: A&Ox 3; NAD  Head: NC/AT;   Eyes: PERRL; EOMI; anicteric sclera  Neck: Supple; no JVD  Respiratory: CTA B/L; no wheezes/crackles/rales auscultated w/ good air movement  Cardiovascular: Regular rhythm/rate; S1/S2; no gallops or murmurs auscultated  Gastrointestinal: Soft; NTND w/out rebound tenderness or guarding; bowel sounds normal  Extremities: WWP; no edema or cyanosis; radial/pedal pulses palpable. Tremor in left and right hands at rest, improves with action.   Neurological:  CNII-XII grossly intact; no obvious focal deficits    MEDICATIONS  (STANDING):  aspirin enteric coated 81 milliGRAM(s) Oral daily  lisinopril 20 milliGRAM(s) Oral daily  atorvastatin 40 milliGRAM(s) Oral at bedtime  clopidogrel Tablet 75 milliGRAM(s) Oral daily  pantoprazole    Tablet 40 milliGRAM(s) Oral before breakfast  metoprolol succinate  milliGRAM(s) Oral daily  senna 1 Tablet(s) Oral daily  psyllium Powder 1 Packet(s) Oral two times a day  multivitamin 1 Tablet(s) Oral daily  folic acid 1 milliGRAM(s) Oral daily  thiamine 100 milliGRAM(s) Oral daily    MEDICATIONS  (PRN):      LABS                        9.3    3.8   )-----------( 167      ( 11 Sep 2017 06:40 )             30.3     09-11    136  |  96  |  14  ----------------------------<  96  4.1   |  27  |  1.00    Ca    8.8      11 Sep 2017 06:40  Mg     1.6     09-11    TPro  8.1  /  Alb  4.7  /  TBili  0.5  /  DBili  x   /  AST  61<H>  /  ALT  35  /  AlkPhos  61  09-10    LIVER FUNCTIONS - ( 10 Sep 2017 12:18 )  Alb: 4.7 g/dL / Pro: 8.1 g/dL / ALK PHOS: 61 U/L / ALT: 35 U/L / AST: 61 U/L / GGT: x               CARDIAC MARKERS ( 10 Sep 2017 20:17 )  x     / <0.01 ng/mL / x     / x     / x      CARDIAC MARKERS ( 10 Sep 2017 12:18 )  x     / <0.01 ng/mL / x     / x     / x            IMAGING/EKG/ETC  EKG:   Xray:  CT: O/N Events: No acute events overnight.   Subjective: Patient seen and examined at bedside. Reports persistent decreased appetite and aversion to food but otherwise has no new complaints. He denies fevers/chills, abdominal pain, diarrhea, constipation, CP, SOB, palpitations.    VITALS  Vital Signs Last 24 Hrs  T(C): 36.8 (11 Sep 2017 10:46), Max: 37.4 (10 Sep 2017 17:32)  T(F): 98.3 (11 Sep 2017 10:46), Max: 99.3 (10 Sep 2017 17:32)  HR: 64 (11 Sep 2017 10:46) (64 - 88)  BP: 155/83 (11 Sep 2017 10:46) (125/77 - 155/83)  BP(mean): --  RR: 19 (11 Sep 2017 10:46) (18 - 19)  SpO2: 100% (11 Sep 2017 10:46) (97% - 100%)    I&O's Summary    10 Sep 2017 07:01  -  11 Sep 2017 07:00  --------------------------------------------------------  IN: 1000 mL / OUT: 0 mL / NET: 1000 mL        CAPILLARY BLOOD GLUCOSE          PHYSICAL EXAM  General: A&Ox 3; NAD  Head: NC/AT;   Eyes: PERRL; EOMI; anicteric sclera  Neck: Supple; no JVD  Respiratory: CTA B/L; no wheezes/crackles/rales auscultated w/ good air movement  Cardiovascular: Regular rhythm/rate; S1/S2; no gallops or murmurs auscultated  Gastrointestinal: Soft; NTND w/out rebound tenderness or guarding; bowel sounds normal  Extremities: WWP; no edema or cyanosis; radial/pedal pulses palpable. Tremor in left and right hands at rest, improves with action.   Neurological:  CNII-XII grossly intact; no obvious focal deficits    MEDICATIONS  (STANDING):  aspirin enteric coated 81 milliGRAM(s) Oral daily  lisinopril 20 milliGRAM(s) Oral daily  atorvastatin 40 milliGRAM(s) Oral at bedtime  clopidogrel Tablet 75 milliGRAM(s) Oral daily  pantoprazole    Tablet 40 milliGRAM(s) Oral before breakfast  metoprolol succinate  milliGRAM(s) Oral daily  senna 1 Tablet(s) Oral daily  psyllium Powder 1 Packet(s) Oral two times a day  multivitamin 1 Tablet(s) Oral daily  folic acid 1 milliGRAM(s) Oral daily  thiamine 100 milliGRAM(s) Oral daily    MEDICATIONS  (PRN):      LABS                        9.3    3.8   )-----------( 167      ( 11 Sep 2017 06:40 )             30.3     09-11    136  |  96  |  14  ----------------------------<  96  4.1   |  27  |  1.00    Ca    8.8      11 Sep 2017 06:40  Mg     1.6     09-11    TPro  8.1  /  Alb  4.7  /  TBili  0.5  /  DBili  x   /  AST  61<H>  /  ALT  35  /  AlkPhos  61  09-10    LIVER FUNCTIONS - ( 10 Sep 2017 12:18 )  Alb: 4.7 g/dL / Pro: 8.1 g/dL / ALK PHOS: 61 U/L / ALT: 35 U/L / AST: 61 U/L / GGT: x               CARDIAC MARKERS ( 10 Sep 2017 20:17 )  x     / <0.01 ng/mL / x     / x     / x      CARDIAC MARKERS ( 10 Sep 2017 12:18 )  x     / <0.01 ng/mL / x     / x     / x            IMAGING/EKG/ETC: Reviewed

## 2017-09-11 NOTE — DIETITIAN INITIAL EVALUATION ADULT. - ENERGY NEEDS
BMI:23.7,IBW:166lbs+/-10%,100% of IBW.Noted complaints of nausea and vomiting PTA.NOne reported at present.Skin intact.No pain.Increase fluid and nutrient needs due to ETOH and N/V losses

## 2017-09-11 NOTE — DISCHARGE NOTE ADULT - SECONDARY DIAGNOSIS.
Alcohol abuse Paroxysmal a-fib Atherosclerosis of coronary artery of native heart without angina pectoris, unspecified vessel or lesion type

## 2017-09-11 NOTE — DISCHARGE NOTE ADULT - PLAN OF CARE
Please follow up with Dr. Levy and consider joining a program for alcohol use cessation. You were admitted with the complaint of generalized weakness without being able to eat properly for multiple days in a row. You were seen by GI who recommended. While in the hospital you were monitored for signs of alcohol withdrawal since you stated that you drink alcohol fairly regularly. Alcohol use can worsen gastritis and can cause you to feel abdominal discomfort which may cause you to have a poor appetite again in the future. Please consider joining a program for alcohol cessation so you can prevent adverse health effects from long term alcohol use. Please be sure to continue taking vitamin supplementation when you a discharged and follow up with Dr. Levy. You have a history of atrial fibrillation (afib). There are two main concerns when treating patients with atrial fibrillation. The first is your heart rate, which is well controlled on metoprolol. The second and more pressing issue is the possibility of clot formation in the heart. You have had a clot in your heart on a previous echocardiogram and that clot was again visualized on the echocardiogram performed on 9/11. In order to prevent more clots from forming and reduce your risk of stroke, we recommended you start taking the eliquis that you were prescribed. Please continue your home medications and follow up with Dr. Levy. You were admitted with the complaint of generalized weakness without being able to eat properly for multiple days in a row. You were seen by GI who recommended you follow up outpatient for a colonoscopy given your history of polypectomy that was recommended but never completed. You also report a history of internal hemorrhoids so you must be evaluated with a colonoscopy. Please be sure to follow up with GI upon discharge to be sure that your colonoscopy is completed. You were admitted with the complaint of generalized weakness without being able to eat properly for multiple days in a row. You were seen by GI who recommended you follow up outpatient for a colonoscopy given your history of polypectomy that was recommended but never completed. You also report a history of internal hemorrhoids so you must be evaluated with a colonoscopy. Please be sure to follow up with Dr. Canales upon discharge to be sure that your colonoscopy is completed. His office information is provided in this discharge summary. Additionally please be sure to follow up with Dr. Levy within the next two weeks. Please follow up with Dr. Levy and Dr. Canales within the next two weeks.

## 2017-09-11 NOTE — PROGRESS NOTE ADULT - SUBJECTIVE AND OBJECTIVE BOX
Pt is feeling better but still not eating at baseline    PAST MEDICAL & SURGICAL HISTORY:  Pacemaker  ETOH abuse  Paroxysmal a-fib  Myocardial infarction involving other coronary artery  Gastritis  Atherosclerosis of coronary artery: Coronary artery disease  Automatic implantable cardiac defibrillator in situ: ICD (implantable cardioverter-defibrillator) in place    Echo EF unchanged  refusing Eliquis despite apical clot    MEDICATIONS  (STANDING):  aspirin enteric coated 81 milliGRAM(s) Oral daily  lisinopril 20 milliGRAM(s) Oral daily  atorvastatin 40 milliGRAM(s) Oral at bedtime  clopidogrel Tablet 75 milliGRAM(s) Oral daily  pantoprazole    Tablet 40 milliGRAM(s) Oral before breakfast  metoprolol succinate  milliGRAM(s) Oral daily  senna 1 Tablet(s) Oral daily  psyllium Powder 1 Packet(s) Oral two times a day  multivitamin 1 Tablet(s) Oral daily  folic acid 1 milliGRAM(s) Oral daily  thiamine 100 milliGRAM(s) Oral daily    MEDICATIONS  (PRN):    ICU Vital Signs Last 24 Hrs  T(C): 36.3 (11 Sep 2017 15:38), Max: 37.4 (10 Sep 2017 17:32)  T(F): 97.3 (11 Sep 2017 15:38), Max: 99.3 (10 Sep 2017 17:32)  HR: 77 (11 Sep 2017 15:38) (64 - 88)  BP: 152/92 (11 Sep 2017 15:38) (127/83 - 155/83)  BP(mean): --  ABP: --  ABP(mean): --  RR: 18 (11 Sep 2017 15:38) (18 - 19)  SpO2: 99% (11 Sep 2017 15:38) (97% - 100%)    Lungs clear  CV S 1 s2   Abd soft  Ext stable                          9.3    3.8   )-----------( 167      ( 11 Sep 2017 06:40 )             30.3   09-11    136  |  96  |  14  ----------------------------<  96  4.1   |  27  |  1.00    Ca    8.8      11 Sep 2017 06:40  Mg     1.6     09-11    TPro  8.1  /  Alb  4.7  /  TBili  0.5  /  DBili  x   /  AST  61<H>  /  ALT  35  /  AlkPhos  61  09-10

## 2017-09-11 NOTE — DISCHARGE NOTE ADULT - MEDICATION SUMMARY - MEDICATIONS TO STOP TAKING
I will STOP taking the medications listed below when I get home from the hospital:    chlordiazePOXIDE 10 mg oral capsule  -- 1 cap(s) by mouth every 4 hours

## 2017-09-11 NOTE — DISCHARGE NOTE ADULT - MEDICATION SUMMARY - MEDICATIONS TO TAKE
I will START or STAY ON the medications listed below when I get home from the hospital:    aspirin 81 mg oral delayed release tablet  -- 1 tab(s) by mouth once a day  -- Indication: For Coronary artery disease without angina pectoris, unspecified vessel or lesion type, unspecified whether native or transplanted heart    lisinopril 20 mg oral tablet  -- 1 tab(s) by mouth once a day  -- Indication: For Chronic systolic congestive heart failure    apixaban 5 mg oral tablet  -- 1 tab(s) by mouth every 12 hours  -- Indication: For Paroxysmal a-fib    atorvastatin 40 mg oral tablet  -- 1 tab(s) by mouth once a day (at bedtime)  -- Avoid grapefruit and grapefruit juice while taking this medication.  Do not take this drug if you are pregnant.  It is very important that you take or use this exactly as directed.  Do not skip doses or discontinue unless directed by your doctor.  Obtain medical advice before taking any non-prescription drugs as some may affect the action of this medication.  Take with food or milk.    -- Indication: For Coronary artery disease without angina pectoris, unspecified vessel or lesion type, unspecified whether native or transplanted heart    clopidogrel 75 mg oral tablet  -- 1 tab(s) by mouth once a day  -- Indication: For Coronary artery disease without angina pectoris, unspecified vessel or lesion type, unspecified whether native or transplanted heart    Metoprolol Succinate  mg oral tablet, extended release  -- 1 tab(s) by mouth once a day  -- It is very important that you take or use this exactly as directed.  Do not skip doses or discontinue unless directed by your doctor.  May cause drowsiness.  Alcohol may intensify this effect.  Use care when operating dangerous machinery.  Some non-prescription drugs may aggravate your condition.  Read all labels carefully.  If a warning appears, check with your doctor before taking.  Swallow whole.  Do not crush.  Take with food or milk.  This drug may impair the ability to drive or operate machinery.  Use care until you become familiar with its effects.    -- Indication: For Chronic systolic congestive heart failure    FeroSul 325 mg (65 mg elemental iron) oral tablet  -- 1 tab(s) by mouth once a day  -- Indication: For WEAKNESS, ALCOHOL ABUSE    pantoprazole 40 mg oral delayed release tablet  -- 1 tab(s) by mouth once a day (before a meal)  -- Indication: For Gastritis, presence of bleeding unspecified, unspecified chronicity, unspecified gastritis type    Multiple Vitamins oral tablet  -- 1 tab(s) by mouth once a day  -- Indication: For WEAKNESS, ALCOHOL ABUSE    folic acid 1 mg oral tablet  -- 1 tab(s) by mouth once a day  -- Indication: For WEAKNESS, ALCOHOL ABUSE    thiamine 100 mg oral tablet  -- 1 tab(s) by mouth once a day  -- Indication: For WEAKNESS, ALCOHOL ABUSE

## 2017-09-11 NOTE — DISCHARGE NOTE ADULT - CARE PROVIDER_API CALL
Ck Levy), Medicine  [address] [place] [address]  Phone: [phone]  Fax: [phone] Ck Levy), Medicine  [address] [place] [address]  Phone: [phone]  Fax: [phone]    Syed Canales), Medicine  [address] [address]  Phone: [phone]  Fax: [phone]

## 2017-09-11 NOTE — DISCHARGE NOTE ADULT - CARE PLAN
Principal Discharge DX:	Gastritis, presence of bleeding unspecified, unspecified chronicity, unspecified gastritis type  Secondary Diagnosis:	Alcohol abuse  Secondary Diagnosis:	Paroxysmal a-fib  Secondary Diagnosis:	Atherosclerosis of coronary artery of native heart without angina pectoris, unspecified vessel or lesion type Principal Discharge DX:	Gastritis, presence of bleeding unspecified, unspecified chronicity, unspecified gastritis type  Goal:	Please follow up with Dr. Levy and consider joining a program for alcohol use cessation.  Instructions for follow-up, activity and diet:	You were admitted with the complaint of generalized weakness without being able to eat properly for multiple days in a row. You were seen by GI who recommended.  Secondary Diagnosis:	Alcohol abuse  Instructions for follow-up, activity and diet:	While in the hospital you were monitored for signs of alcohol withdrawal since you stated that you drink alcohol fairly regularly. Alcohol use can worsen gastritis and can cause you to feel abdominal discomfort which may cause you to have a poor appetite again in the future. Please consider joining a program for alcohol cessation so you can prevent adverse health effects from long term alcohol use. Please be sure to continue taking vitamin supplementation when you a discharged and follow up with Dr. Levy.  Secondary Diagnosis:	Paroxysmal a-fib  Instructions for follow-up, activity and diet:	You have a history of atrial fibrillation (afib). There are two main concerns when treating patients with atrial fibrillation. The first is your heart rate, which is well controlled on metoprolol. The second and more pressing issue is the possibility of clot formation in the heart. You have had a clot in your heart on a previous echocardiogram and that clot was again visualized on the echocardiogram performed on 9/11. In order to prevent more clots from forming and reduce your risk of stroke, we recommended you start taking the eliquis that you were prescribed.  Secondary Diagnosis:	Atherosclerosis of coronary artery of native heart without angina pectoris, unspecified vessel or lesion type  Instructions for follow-up, activity and diet:	Please continue your home medications and follow up with Dr. Levy. Principal Discharge DX:	Gastritis, presence of bleeding unspecified, unspecified chronicity, unspecified gastritis type  Goal:	Please follow up with Dr. Levy and consider joining a program for alcohol use cessation.  Instructions for follow-up, activity and diet:	You were admitted with the complaint of generalized weakness without being able to eat properly for multiple days in a row. You were seen by GI who recommended you follow up outpatient for a colonoscopy given your history of polypectomy that was recommended but never completed. You also report a history of internal hemorrhoids so you must be evaluated with a colonoscopy. Please be sure to follow up with GI upon discharge to be sure that your colonoscopy is completed.  Secondary Diagnosis:	Alcohol abuse  Instructions for follow-up, activity and diet:	While in the hospital you were monitored for signs of alcohol withdrawal since you stated that you drink alcohol fairly regularly. Alcohol use can worsen gastritis and can cause you to feel abdominal discomfort which may cause you to have a poor appetite again in the future. Please consider joining a program for alcohol cessation so you can prevent adverse health effects from long term alcohol use. Please be sure to continue taking vitamin supplementation when you a discharged and follow up with Dr. Levy.  Secondary Diagnosis:	Paroxysmal a-fib  Instructions for follow-up, activity and diet:	You have a history of atrial fibrillation (afib). There are two main concerns when treating patients with atrial fibrillation. The first is your heart rate, which is well controlled on metoprolol. The second and more pressing issue is the possibility of clot formation in the heart. You have had a clot in your heart on a previous echocardiogram and that clot was again visualized on the echocardiogram performed on 9/11. In order to prevent more clots from forming and reduce your risk of stroke, we recommended you start taking the eliquis that you were prescribed.  Secondary Diagnosis:	Atherosclerosis of coronary artery of native heart without angina pectoris, unspecified vessel or lesion type  Instructions for follow-up, activity and diet:	Please continue your home medications and follow up with Dr. Levy. Principal Discharge DX:	Gastritis, presence of bleeding unspecified, unspecified chronicity, unspecified gastritis type  Goal:	Please follow up with Dr. Levy and Dr. Canales within the next two weeks.  Instructions for follow-up, activity and diet:	You were admitted with the complaint of generalized weakness without being able to eat properly for multiple days in a row. You were seen by GI who recommended you follow up outpatient for a colonoscopy given your history of polypectomy that was recommended but never completed. You also report a history of internal hemorrhoids so you must be evaluated with a colonoscopy. Please be sure to follow up with Dr. Canales upon discharge to be sure that your colonoscopy is completed. His office information is provided in this discharge summary. Additionally please be sure to follow up with Dr. Levy within the next two weeks.  Secondary Diagnosis:	Alcohol abuse  Instructions for follow-up, activity and diet:	While in the hospital you were monitored for signs of alcohol withdrawal since you stated that you drink alcohol fairly regularly. Alcohol use can worsen gastritis and can cause you to feel abdominal discomfort which may cause you to have a poor appetite again in the future. Please consider joining a program for alcohol cessation so you can prevent adverse health effects from long term alcohol use. Please be sure to continue taking vitamin supplementation when you a discharged and follow up with Dr. Levy.  Secondary Diagnosis:	Paroxysmal a-fib  Instructions for follow-up, activity and diet:	You have a history of atrial fibrillation (afib). There are two main concerns when treating patients with atrial fibrillation. The first is your heart rate, which is well controlled on metoprolol. The second and more pressing issue is the possibility of clot formation in the heart. You have had a clot in your heart on a previous echocardiogram and that clot was again visualized on the echocardiogram performed on 9/11. In order to prevent more clots from forming and reduce your risk of stroke, we recommended you start taking the eliquis that you were prescribed.  Secondary Diagnosis:	Atherosclerosis of coronary artery of native heart without angina pectoris, unspecified vessel or lesion type  Instructions for follow-up, activity and diet:	Please continue your home medications and follow up with Dr. Levy.

## 2017-09-11 NOTE — PROGRESS NOTE ADULT - ASSESSMENT
62 yo M with a PMHx of CAD s/p MI (~2003), stent placement in July of 2016, CHF (EF 35-40%) s/p AICD, gastritis with history of GIB, presents with weakness, nausea x1 week associated with decreased PO intake which may be secondary to gastritis with concomittent depressive symptoms.

## 2017-09-11 NOTE — DISCHARGE NOTE ADULT - INSTRUCTIONS
Please eat a well balanced diet rich in fruits and vegetables and low in saturated fats and salt. Please take care to avoid excessive fluid intake since you have congestive heart failure and excessive fluid intake can cause you to have an exacerbation.

## 2017-09-11 NOTE — PHYSICAL THERAPY INITIAL EVALUATION ADULT - PERTINENT HX OF CURRENT PROBLEM, REHAB EVAL
Patient is a 60 yo M with a PMHx of CAD s/p MI (~2003), stent placement in July of 2016, CHF (EF 35-40%) s/p AICD, gastritis with GIB, A. fib NOT on AC (Patient refused to take), and history of EtOH abuse with recent hospitalization for withdrawal in February 2017 presents for weakness which started since last week.

## 2017-09-11 NOTE — DISCHARGE NOTE ADULT - CARE PROVIDERS DIRECT ADDRESSES
[email or web address]

## 2017-09-11 NOTE — PHYSICAL THERAPY INITIAL EVALUATION ADULT - CRITERIA FOR SKILLED THERAPEUTIC INTERVENTIONS
anticipated discharge recommendation/risk reduction/prevention/therapy frequency/impairments found/rehab potential/functional limitations in following categories

## 2017-09-12 VITALS
TEMPERATURE: 99 F | SYSTOLIC BLOOD PRESSURE: 134 MMHG | HEART RATE: 93 BPM | OXYGEN SATURATION: 100 % | DIASTOLIC BLOOD PRESSURE: 90 MMHG | RESPIRATION RATE: 18 BRPM

## 2017-09-12 LAB
ANION GAP SERPL CALC-SCNC: 10 MMOL/L — SIGNIFICANT CHANGE UP (ref 5–17)
APTT BLD: 27.8 SEC — SIGNIFICANT CHANGE UP (ref 27.5–37.4)
BUN SERPL-MCNC: 12 MG/DL — SIGNIFICANT CHANGE UP (ref 7–23)
CALCIUM SERPL-MCNC: 9.3 MG/DL — SIGNIFICANT CHANGE UP (ref 8.4–10.5)
CHLORIDE SERPL-SCNC: 99 MMOL/L — SIGNIFICANT CHANGE UP (ref 96–108)
CO2 SERPL-SCNC: 27 MMOL/L — SIGNIFICANT CHANGE UP (ref 22–31)
CREAT SERPL-MCNC: 1.1 MG/DL — SIGNIFICANT CHANGE UP (ref 0.5–1.3)
GLUCOSE SERPL-MCNC: 108 MG/DL — HIGH (ref 70–99)
HCT VFR BLD CALC: 33.1 % — LOW (ref 39–50)
HGB BLD-MCNC: 10.2 G/DL — LOW (ref 13–17)
INR BLD: 0.96 — SIGNIFICANT CHANGE UP (ref 0.88–1.16)
MAGNESIUM SERPL-MCNC: 1.8 MG/DL — SIGNIFICANT CHANGE UP (ref 1.6–2.6)
MCHC RBC-ENTMCNC: 22.4 PG — LOW (ref 27–34)
MCHC RBC-ENTMCNC: 30.8 G/DL — LOW (ref 32–36)
MCV RBC AUTO: 72.6 FL — LOW (ref 80–100)
PLATELET # BLD AUTO: 134 K/UL — LOW (ref 150–400)
POTASSIUM SERPL-MCNC: 4.6 MMOL/L — SIGNIFICANT CHANGE UP (ref 3.5–5.3)
POTASSIUM SERPL-SCNC: 4.6 MMOL/L — SIGNIFICANT CHANGE UP (ref 3.5–5.3)
PROTHROM AB SERPL-ACNC: 10.6 SEC — SIGNIFICANT CHANGE UP (ref 9.8–12.7)
RBC # BLD: 4.56 M/UL — SIGNIFICANT CHANGE UP (ref 4.2–5.8)
RBC # FLD: 17.1 % — HIGH (ref 10.3–16.9)
SODIUM SERPL-SCNC: 136 MMOL/L — SIGNIFICANT CHANGE UP (ref 135–145)
WBC # BLD: 3.9 K/UL — SIGNIFICANT CHANGE UP (ref 3.8–10.5)
WBC # FLD AUTO: 3.9 K/UL — SIGNIFICANT CHANGE UP (ref 3.8–10.5)

## 2017-09-12 RX ORDER — FOLIC ACID 0.8 MG
1 TABLET ORAL
Qty: 0 | Refills: 0 | COMMUNITY
Start: 2017-09-12

## 2017-09-12 RX ORDER — MAGNESIUM SULFATE 500 MG/ML
1 VIAL (ML) INJECTION ONCE
Qty: 0 | Refills: 0 | Status: DISCONTINUED | OUTPATIENT
Start: 2017-09-12 | End: 2017-09-12

## 2017-09-12 RX ORDER — THIAMINE MONONITRATE (VIT B1) 100 MG
1 TABLET ORAL
Qty: 0 | Refills: 0 | COMMUNITY
Start: 2017-09-12

## 2017-09-12 RX ADMIN — Medication 100 MILLIGRAM(S): at 05:48

## 2017-09-12 RX ADMIN — CLOPIDOGREL BISULFATE 75 MILLIGRAM(S): 75 TABLET, FILM COATED ORAL at 11:11

## 2017-09-12 RX ADMIN — LISINOPRIL 20 MILLIGRAM(S): 2.5 TABLET ORAL at 11:11

## 2017-09-12 RX ADMIN — Medication 1 MILLIGRAM(S): at 11:11

## 2017-09-12 RX ADMIN — PANTOPRAZOLE SODIUM 40 MILLIGRAM(S): 20 TABLET, DELAYED RELEASE ORAL at 05:48

## 2017-09-12 RX ADMIN — Medication 81 MILLIGRAM(S): at 11:11

## 2017-09-12 RX ADMIN — SENNA PLUS 1 TABLET(S): 8.6 TABLET ORAL at 11:11

## 2017-09-12 RX ADMIN — Medication 100 MILLIGRAM(S): at 11:11

## 2017-09-12 RX ADMIN — Medication 1 TABLET(S): at 11:11

## 2017-09-12 NOTE — PROGRESS NOTE ADULT - PROBLEM SELECTOR PLAN 3
longstanding history of atrial fibrillation; previously on eliquis but patient refused to take.   - Patient continues to refuse eliquis stating he does not want to incur the risk of bleeding and understands that he may have a stroke if not taking anticoagulation. This discussion has been brought up repeatedly and he still does not want any anticoagulation.   -Will continue with metoprolol ER 100mg. Will consider changing to atenolol 50 mg PO daily based on pharmacy recs since this can help with alcohol withdrawal as well.  -Patient with known apical thrombus again visualized on TTE today.
longstanding history of atrial fibrillation; previously on eliquis but patient refused to take.   - Patient continues to refuse eliquis stating he does not want to incur the risk of bleeding and understands that he may have a stroke if not taking anticoagulation.   -Will continue with metoprolol ER 100mg. Will consider changing to atenolol 50 mg PO daily based on pharmacy recs since this can help with alcohol withdrawal as well.  -Patient with known apical thrombus again visualized on TTE today.

## 2017-09-12 NOTE — PROGRESS NOTE ADULT - PROBLEM SELECTOR PLAN 4
Patient with a history of CAD s/p stent July 2016. Still takes aspirin and plavix as home medication.  -Patient out of window for duel antiplatelet therapy s/p stent, will clarify with PCP and reassess.
Patient with a history of CAD s/p stent July 2016. Still takes aspirin and plavix as home medication.  -Patient out of window for duel antiplatelet therapy s/p stent, will clarify with PCP and reassess.

## 2017-09-12 NOTE — PROGRESS NOTE ADULT - PROBLEM SELECTOR PLAN 1
Likely secondary to decreased PO intake. Etiology most likely multifactorial since patient reporting both gastritis and depressive symptoms. Patient previously seen by psychiatry, and they had recommended inpatient detox (when admitted for etoh withdrawal) but he declined;  -Was on maintenance fluids, discontinued since patient with systolic CHF EF 35%. Patient tolerating liquids PO and drank a good amount of water this AM. Will reassess.   -Physical Therapy, social work consult.  -consider psych consultation.   -Low suspicion for pancreatitis; no abdominal pain with nausea, will continue protonix and f/u GI recs.
Likely secondary to decreased PO intake. Etiology most likely multifactorial since patient reporting both gastritis and depressive symptoms. Patient previously seen by psychiatry, and they had recommended inpatient detox (when admitted for etoh withdrawal) but he declined;  -Was on maintenance fluids, discontinued since patient with systolic CHF EF 35%. Patient tolerating liquids PO and drank a good amount of water this AM. Will reassess.   -Physical Therapy, social work consult.  -consider psych consultation.   -Low suspicion for pancreatitis; no abdominal pain with nausea, will continue protonix.

## 2017-09-12 NOTE — PROGRESS NOTE ADULT - PROBLEM SELECTOR PLAN 6
Appears chronic from previous labs; currently stable. On Iron supplementation at home.  -Will continue to trend.
Appears chronic from previous labs; currently stable. On Iron supplementation at home.  -Will continue to trend.

## 2017-09-12 NOTE — PROGRESS NOTE ADULT - PROBLEM SELECTOR PROBLEM 4
Coronary artery disease without angina pectoris, unspecified vessel or lesion type, unspecified whether native or transplanted heart
Coronary artery disease without angina pectoris, unspecified vessel or lesion type, unspecified whether native or transplanted heart

## 2017-09-12 NOTE — PROGRESS NOTE ADULT - PROBLEM SELECTOR PLAN 2
Previous history of etoh abuse drinking 1 pint of vodka daily, now endorses only drinking 3 shots as needed, last drink as per patient was >3.5 days ago  -s/p 1mg ativan in ER for CIWA of 5; current CIWA 3-4 with patient mentating well, avss and without symptoms of DTs; tongue fasiculations and hand tremors present.  -monitor CIWA Q6 hours.
Previous history of etoh abuse drinking 1 pint of vodka daily, now endorses only drinking 3 shots as needed, last drink as per patient was >3.5 days ago  -s/p 1mg ativan in ER for CIWA of 5; current CIWA 3-4 with patient mentating well, avss and without symptoms of DTs; tongue fasiculations and hand tremors present.  -monitor CIWA Q6 hours.

## 2017-09-12 NOTE — PROGRESS NOTE ADULT - PROBLEM SELECTOR PLAN 7
DVT PPX: patient refusing eliquis and any anticoag, indicated since patient with known MERLE thrombus and with a history of paroxysmal afib.   DASH Diet, bowel regimen, f/u nutrition recs   Replete electrolytes as needed.  Full Code  Dispo: 7 uris
DVT PPX: patient refusing eliquis, indicated since patient with known MERLE thrombus.   DASH Diet, bowel regimen, f/u nutrition recs   Replete electrolytes as needed.  Full Code  Dispo: 7 uris

## 2017-09-12 NOTE — PROGRESS NOTE ADULT - ASSESSMENT
Improved tolerance to diet since being in hosp     GI consult   and OUT PT follow up for repeat EGD and Colonoscopy    LEOPLODO Levy

## 2017-09-12 NOTE — PROGRESS NOTE ADULT - ASSESSMENT
60 yo M with a PMHx of CAD s/p MI (~2003), stent placement in July of 2016, CHF (EF 35-40%) s/p AICD, gastritis with history of GIB, presents with weakness, nausea x1 week associated with decreased PO intake which may be secondary to gastritis with concomittent depressive symptoms.

## 2017-09-12 NOTE — CONSULT NOTE ADULT - ASSESSMENT
60 yo M with a PMHx of CAD s/p MI (~2003), stent placement in July of 2016, CHF (EF 35-40%) s/p AICD, gastritis with history of GIB, presents with weakness, nausea x1 week associated with decreased PO intake which may be secondary to gastritis with concomittent depressive symptoms.      Problem/Plan - 1:  ·  Problem: Weakness.  Plan: Likely secondary to decreased PO intake. Etiology most likely multifactorial since patient reporting both gastritis and depressive symptoms. Patient previously seen by psychiatry, and they had recommended inpatient detox (when admitted for etoh withdrawal) but he declined;  -Was on maintenance fluids, discontinued since patient with systolic CHF EF 35%. Patient tolerating liquids PO and drank a good amount of water this AM. Will reassess.   -Physical Therapy, social work consult.  -consider psych consultation.   -Low suspicion for pancreatitis; no abdominal pain with nausea, will continue protonix.     Problem/Plan - 2:  ·  Problem: Alcohol abuse.  Plan: Previous history of etoh abuse drinking 1 pint of vodka daily, now endorses only drinking 3 shots as needed, last drink as per patient was >3.5 days ago  -s/p 1mg ativan in ER for CIWA of 5; current CIWA 3-4 with patient mentating well, avss and without symptoms of DTs; tongue fasiculations and hand tremors present.  -monitor CIWA Q6 hours.

## 2017-09-12 NOTE — CONSULT NOTE ADULT - SUBJECTIVE AND OBJECTIVE BOX
HPI:  61yr old M with a PMHx significant for Alcohol abuse, Gastritis, Hx of GI bleed in the past, CAD s/p MI (~2003), stent placement in July of 2016, CHF (EF 35-40%) s/p AICD, A. fib NOT on AC (Patient refused to take), who presents for evaluation of weakness since last week.   GI was consulted for BRBPR  Patient reports that he has been having intermittent episodes of BRBPR for a while now - usually when he drinks a lot, or is under a lot of stress, and this usually is self limiting lasting for the most part of a day, but last week he had some BRBPR which went on for 3 days. He denies any melena, diarreha, hematemesis, or cofee grounds. He does endorse some nausea, constipation, and generally feeling unwell.  He otherwise denies emesis, fever, chills, sick contacts, recent travel, lightheadedness, LOC, chest pain, SOB, palpation.  No more BRBPR since last week tuesday    EGD - 2014 - not sure of findings  Colonoscopy - 2014 reports external hemorrhoids, polyps were found which were not removed      PAST MEDICAL & SURGICAL HISTORY:  Pacemaker  ETOH abuse  Paroxysmal a-fib  Myocardial infarction involving other coronary artery  Gastritis  Atherosclerosis of coronary artery: Coronary artery disease  Automatic implantable cardiac defibrillator in situ: ICD (implantable cardioverter-defibrillator) in place      REVIEW OF SYSTEMS  Apart from items noted in the HPI a 10point ROS was performed and was negative      MEDICATIONS  (STANDING):  aspirin enteric coated 81 milliGRAM(s) Oral daily  lisinopril 20 milliGRAM(s) Oral daily  atorvastatin 40 milliGRAM(s) Oral at bedtime  clopidogrel Tablet 75 milliGRAM(s) Oral daily  pantoprazole    Tablet 40 milliGRAM(s) Oral before breakfast  metoprolol succinate  milliGRAM(s) Oral daily  senna 1 Tablet(s) Oral daily  psyllium Powder 1 Packet(s) Oral two times a day  multivitamin 1 Tablet(s) Oral daily  folic acid 1 milliGRAM(s) Oral daily  thiamine 100 milliGRAM(s) Oral daily  magnesium sulfate  IVPB 1 Gram(s) IV Intermittent once    MEDICATIONS  (PRN):  LORazepam     Tablet 2 milliGRAM(s) Oral every 4 hours PRN For anxiety/agitation      Allergies  Capoten (Short breath; Rash; Hives)  penicillin (Short breath; Rash; Hives)    Intolerances      SOCIAL HISTORY:  Alcohol abuse, denies tobacco or illicits    FAMILY HISTORY:  No pertinent family history in first degree relatives  Paternal half uncle with pancreatic cancer      Vital Signs Last 24 Hrs  T(C): 37 (12 Sep 2017 09:15), Max: 37.1 (11 Sep 2017 20:48)  T(F): 98.6 (12 Sep 2017 09:15), Max: 98.8 (11 Sep 2017 20:48)  HR: 95 (12 Sep 2017 09:15) (77 - 95)  BP: 120/89 (12 Sep 2017 09:15) (120/89 - 152/92)  BP(mean): --  RR: 18 (12 Sep 2017 09:15) (16 - 18)  SpO2: 98% (12 Sep 2017 09:15) (95% - 99%)    PHYSICAL EXAM:  GEN - middle aged male lying in bed in no distress, anicteric, afebrile, not pale  Respiratory: clear to auscultation  Cardiovascular: s1 s2 no M RRR  Gastrointestinal: full, soft, BS+, NT, NR, NG, no masses or organs palpated  Rectal: skin tags, no blood on examining finger, brown stool on finger  Neurological: AAOx3 non focal    LABS:                        10.2   3.9   )-----------( 134      ( 12 Sep 2017 07:01 )             33.1     09-12    136  |  99  |  12  ----------------------------<  108<H>  4.6   |  27  |  1.10    Ca    9.3      12 Sep 2017 07:01  Mg     1.8     09-12      PT/INR - ( 12 Sep 2017 07:01 )   PT: 10.6 sec;   INR: 0.96          PTT - ( 12 Sep 2017 07:01 )  PTT:27.8 sec      RADIOLOGY & ADDITIONAL STUDIES:
Patient is a 61y old  Male who presents with a chief complaint of Weakness (11 Sep 2017 18:09)      HPI:  Patient is a 60 yo M with a PMHx of CAD s/p MI (~2003), stent placement in July of 2016, CHF (EF 35-40%) s/p AICD, gastritis with GIB, A. fib NOT on AC (Patient refused to take), and history of EtOH abuse with recent hospitalization for withdrawal in February 2017 presents for weakness which started since last week. Patient last drink was tuesday 3 shots of vodka, prior to that last drink was 1 month previously. States that he noticed he was not eating as much as normal which he believes is associated with recent stressors, such as the finalizing of divorce from wife and custody of his daughter. Patient endorses +nausea, no vomiting, no diarrhea, constipation, no fevers, no trauma, no abdominal pain, cough, urinary tract issues. +metallic taste in mouth.     In the ED:  ICU Vital Signs Last 24 Hrs  T(C): 37.2 (10 Sep 2017 14:53), Max: 37.2 (10 Sep 2017 14:53)  T(F): 99 (10 Sep 2017 14:53), Max: 99 (10 Sep 2017 14:53)  HR: 76 (10 Sep 2017 15:06) (72 - 89)  BP: 141/79 (10 Sep 2017 15:06) (125/77 - 141/79)  BP(mean): --  ABP: --  ABP(mean): --  RR: 18 (10 Sep 2017 15:06) (17 - 20)  SpO2: 99% (10 Sep 2017 15:06) (97% - 100%)    Patient received 1.5 liters of NS as bolus, Zofran 4mg IVP x1 for nasuea, 1mg Ativan x1 for CIWA of 4-5 per ER attending, and famotidine 20mg IVP x1. Patient had EKG wchich showed ST elevations in V1-V4, unchanged from prior with repeat showing the same.  Troponin negative x1 (10 Sep 2017 16:15)      PAST MEDICAL & SURGICAL HISTORY:  Pacemaker  ETOH abuse  Paroxysmal a-fib  Myocardial infarction involving other coronary artery  Gastritis  Atherosclerosis of coronary artery: Coronary artery disease  Automatic implantable cardiac defibrillator in situ: ICD (implantable cardioverter-defibrillator) in place      MEDICATIONS  (STANDING):  aspirin enteric coated 81 milliGRAM(s) Oral daily  lisinopril 20 milliGRAM(s) Oral daily  atorvastatin 40 milliGRAM(s) Oral at bedtime  clopidogrel Tablet 75 milliGRAM(s) Oral daily  pantoprazole    Tablet 40 milliGRAM(s) Oral before breakfast  metoprolol succinate  milliGRAM(s) Oral daily  senna 1 Tablet(s) Oral daily  psyllium Powder 1 Packet(s) Oral two times a day  multivitamin 1 Tablet(s) Oral daily  folic acid 1 milliGRAM(s) Oral daily  thiamine 100 milliGRAM(s) Oral daily  magnesium sulfate  IVPB 1 Gram(s) IV Intermittent once    MEDICATIONS  (PRN):  LORazepam     Tablet 2 milliGRAM(s) Oral every 4 hours PRN For anxiety/agitation      Social History: lives with "friend" in an elevator accessible apartment building, 4 steps to enter [employer]    Functional Level Prior to Admission: reports ADL independent, walks without assistive devices    FAMILY HISTORY:  No pertinent family history in first degree relatives      CBC Full  -  ( 12 Sep 2017 07:01 )  WBC Count : 3.9 K/uL  Hemoglobin : 10.2 g/dL  Hematocrit : 33.1 %  Platelet Count - Automated : 134 K/uL  Mean Cell Volume : 72.6 fL  Mean Cell Hemoglobin : 22.4 pg  Mean Cell Hemoglobin Concentration : 30.8 g/dL  Auto Neutrophil # : x  Auto Lymphocyte # : x  Auto Monocyte # : x  Auto Eosinophil # : x  Auto Basophil # : x  Auto Neutrophil % : x  Auto Lymphocyte % : x  Auto Monocyte % : x  Auto Eosinophil % : x  Auto Basophil % : x      09-12    136  |  99  |  12  ----------------------------<  108<H>  4.6   |  27  |  1.10    Ca    9.3      12 Sep 2017 07:01  Mg     1.8     09-12    TPro  8.1  /  Alb  4.7  /  TBili  0.5  /  DBili  x   /  AST  61<H>  /  ALT  35  /  AlkPhos  61  09-10            Radiology:              Vital Signs Last 24 Hrs  T(C): 37 (12 Sep 2017 09:15), Max: 37.1 (11 Sep 2017 20:48)  T(F): 98.6 (12 Sep 2017 09:15), Max: 98.8 (11 Sep 2017 20:48)  HR: 95 (12 Sep 2017 09:15) (64 - 95)  BP: 120/89 (12 Sep 2017 09:15) (120/89 - 155/83)  BP(mean): --  RR: 18 (12 Sep 2017 09:15) (16 - 19)  SpO2: 98% (12 Sep 2017 09:15) (95% - 100%)    REVIEW OF SYSTEMS:    CONSTITUTIONAL:  fatigue  EYES: No eye pain, visual disturbances, or discharge  ENMT:  No difficulty hearing, tinnitus, vertigo; No sinus or throat pain  NECK: No pain or stiffness  BREASTS: No pain, masses, or nipple discharge  RESPIRATORY: No cough, wheezing, chills or hemoptysis; No shortness of breath  CARDIOVASCULAR: No chest pain, palpitations, dizziness, or leg swelling  GASTROINTESTINAL: No abdominal or epigastric pain. No nausea, vomiting, or hematemesis; No diarrhea or constipation. No melena or hematochezia.  GENITOURINARY: No dysuria, frequency, hematuria, or incontinence  NEUROLOGICAL: No headaches, memory loss, loss of strength, numbness, or tremors  SKIN: No itching, burning, rashes, or lesions   LYMPH NODES: No enlarged glands  ENDOCRINE: No heat or cold intolerance; No hair loss  MUSCULOSKELETAL: No joint pain or swelling; No muscle, back, or extremity pain  PSYCHIATRIC: No depression, anxiety, mood swings, or difficulty sleeping  HEME/LYMPH: No easy bruising, or bleeding gums  ALLERGY AND IMMUNOLOGIC: No hives or eczema      Physical Exam: WDWN  gentleman lying in bed, c/o weakness    HEENT: normocephalic, left orbital scar, anicteric    Neck: supple, negative JVD, negative carotid bruits,    Chest: CTA bilaterally, neg wheeze, rhonchi, rales, crackles, egophany    Cardiovascular: regular rate and rhythm, neg murmurs/rubs/gallops    Abdomen: soft, non distended, non tender, negative rebound/guarding, normal bowel sounds, neg hepatosplenomegaly    Extremities: WWP, neg cyanosis/clubbing/edema, negative calf tenderness to palpation, negative Kee's sign, magdaleno LE scars    :     Neurologic Exam:    Alert and oriented to person, place, date/year, speech fluent w/o dysarthria, repetition intact, comprehension intact,     Cranial Nerves:     II:                       pupils equal, round and reactive to light, visual fields intact   III/ IV/VI:             extraocular movements intact, neg nystagmus, ptosis  V:                      facial sensation intact, V1-3 normal  VII:                     face symmetric, no droop, normal eye closure and smile  VIII:                    hearing intact to finger rub bilaterally  IX/ X:                  soft palate rise symmetrical  XI:                      head turning, shoulder shrug normal  XII:                     tongue midline    Motor Exam:    Bilateral UE:         5/5 /intrinsics                            5/5 biceps/triceps/wrist extensors-flexors/deltoid                            negative pronator drift      Bilateral LE:         4+/5 hip flexors/adductors/abductors                            5/5 quadriceps/hamstrings                            5/5 dorsiflexors/plantar flexors/invertors-evertors    Sensory: intact to LT/PP in all UE/LE dermatomes    DTR:        = biceps/     triceps/     brachioradialis                 = patella/   medial hamstring/    ankle                 neg clonus                 neg Babinski                 neg Hoffmans    Finger to Nose: wnl    Heel to Shin:      wnl    Rapid Alternating movements:  wnl    Joint Position Sense:  intact    Romberg: NT    Tandem Walking: NT    Gait:  NT        PM&R Impression:    1) deconditioned  2) no focal weakness      Recommendations:    1) Physical therapy focusing on therapeutic exercises, bed mobility/transfer out of bed evaluation, progressive ambulation with assistive devices prn    2) Disposition Plan:  d/c home, recommended outpatient physical therapy

## 2017-09-12 NOTE — PROGRESS NOTE ADULT - SUBJECTIVE AND OBJECTIVE BOX
O/N Events: No acute events overnight.  Required ativan at 8 PM but otherwise did not need any overnight.   Subjective:  Patient seen and examined at bedside. Reports strange dreams after receiving ativan at 8 PM but otherwise no active complaints. Brought up anticoagulation for afib and patient states he thought about it more and decided  not to accept anticoagulation at this time and is aware that he is at high risk for stroke.    ROS: Denies fevers/chills, CP, palpitations, SOB, n/v/abdominal pain, dysuria, diarrhea/const, lower extremity pain.     VITALS  Vital Signs Last 24 Hrs  T(C): 37 (12 Sep 2017 09:15), Max: 37.1 (11 Sep 2017 20:48)  T(F): 98.6 (12 Sep 2017 09:15), Max: 98.8 (11 Sep 2017 20:48)  HR: 95 (12 Sep 2017 09:15) (64 - 95)  BP: 120/89 (12 Sep 2017 09:15) (120/89 - 155/83)  BP(mean): --  RR: 18 (12 Sep 2017 09:15) (16 - 19)  SpO2: 98% (12 Sep 2017 09:15) (95% - 100%)    I&O's Summary      CAPILLARY BLOOD GLUCOSE          PHYSICAL EXAM  General: A&Ox 3; NAD, not anxious or diaphoretic appearing.   Head: NC/AT;   Eyes: PERRL; EOMI; anicteric sclera  Neck: Supple; no JVD  Respiratory: CTA B/L; no wheezes/crackles/rales auscultated w/ good air movement  Cardiovascular: Regular rhythm/rate; S1/S2; no gallops or murmurs auscultated  Gastrointestinal: Soft; NTND w/out rebound tenderness or guarding; bowel sounds normal  Extremities: WWP; no edema or cyanosis; radial/pedal pulses palpable  Neurological:  CNII-XII grossly intact; no obvious focal deficits. Mild tremor worse in left arm than right.     MEDICATIONS  (STANDING):  aspirin enteric coated 81 milliGRAM(s) Oral daily  lisinopril 20 milliGRAM(s) Oral daily  atorvastatin 40 milliGRAM(s) Oral at bedtime  clopidogrel Tablet 75 milliGRAM(s) Oral daily  pantoprazole    Tablet 40 milliGRAM(s) Oral before breakfast  metoprolol succinate  milliGRAM(s) Oral daily  senna 1 Tablet(s) Oral daily  psyllium Powder 1 Packet(s) Oral two times a day  multivitamin 1 Tablet(s) Oral daily  folic acid 1 milliGRAM(s) Oral daily  thiamine 100 milliGRAM(s) Oral daily  magnesium sulfate  IVPB 1 Gram(s) IV Intermittent once    MEDICATIONS  (PRN):  LORazepam     Tablet 2 milliGRAM(s) Oral every 4 hours PRN For anxiety/agitation      LABS                        10.2   3.9   )-----------( 134      ( 12 Sep 2017 07:01 )             33.1     09-12    136  |  99  |  12  ----------------------------<  108<H>  4.6   |  27  |  1.10    Ca    9.3      12 Sep 2017 07:01  Mg     1.8     09-12    TPro  8.1  /  Alb  4.7  /  TBili  0.5  /  DBili  x   /  AST  61<H>  /  ALT  35  /  AlkPhos  61  09-10    LIVER FUNCTIONS - ( 10 Sep 2017 12:18 )  Alb: 4.7 g/dL / Pro: 8.1 g/dL / ALK PHOS: 61 U/L / ALT: 35 U/L / AST: 61 U/L / GGT: x           PT/INR - ( 12 Sep 2017 07:01 )   PT: 10.6 sec;   INR: 0.96          PTT - ( 12 Sep 2017 07:01 )  PTT:27.8 sec    CARDIAC MARKERS ( 10 Sep 2017 20:17 )  x     / <0.01 ng/mL / x     / x     / x      CARDIAC MARKERS ( 10 Sep 2017 12:18 )  x     / <0.01 ng/mL / x     / x     / x            IMAGING/EKG/ETC  EKG:   Xray:  CT:

## 2017-09-12 NOTE — PROGRESS NOTE ADULT - SUBJECTIVE AND OBJECTIVE BOX
Pt is less shaky  , eating slightly  better    States he has curtailed his ETOH habit but did consume "2" shots in days before admission      PAST MEDICAL & SURGICAL HISTORY:  Pacemaker  ETOH abuse  Paroxysmal a-fib  Myocardial infarction involving other coronary artery  Gastritis  Atherosclerosis of coronary artery: Coronary artery disease  Automatic implantable cardiac defibrillator in situ: ICD (implantable cardioverter-defibrillator) in place    MEDICATIONS  (STANDING):  aspirin enteric coated 81 milliGRAM(s) Oral daily  lisinopril 20 milliGRAM(s) Oral daily  atorvastatin 40 milliGRAM(s) Oral at bedtime  clopidogrel Tablet 75 milliGRAM(s) Oral daily  pantoprazole    Tablet 40 milliGRAM(s) Oral before breakfast  metoprolol succinate  milliGRAM(s) Oral daily  senna 1 Tablet(s) Oral daily  psyllium Powder 1 Packet(s) Oral two times a day  multivitamin 1 Tablet(s) Oral daily  folic acid 1 milliGRAM(s) Oral daily  thiamine 100 milliGRAM(s) Oral daily  magnesium sulfate  IVPB 1 Gram(s) IV Intermittent once    MEDICATIONS  (PRN):  LORazepam     Tablet 2 milliGRAM(s) Oral every 4 hours PRN For anxiety/agitation    Vital Signs Last 24 Hrs  T(C): 37 (12 Sep 2017 09:15), Max: 37.1 (11 Sep 2017 20:48)  T(F): 98.6 (12 Sep 2017 09:15), Max: 98.8 (11 Sep 2017 20:48)  HR: 95 (12 Sep 2017 09:15) (64 - 95)  BP: 120/89 (12 Sep 2017 09:15) (120/89 - 155/83)  BP(mean): --  RR: 18 (12 Sep 2017 09:15) (16 - 19)  SpO2: 98% (12 Sep 2017 09:15) (95% - 100%)    Lungs clear  Last CXR Feb 2017  neg for CHF    Echo  EF   35 %  persistent apical clot   thrombus  ( pt refusing elliquis )     CV s1 s2  Last EKG   2/14/17   NSR R axis   AS MI    abd soft  ext stable      Amylase, Serum Total (09.10.17 @ 12:18)    Amylase, Serum Total: 45 U/L    Lipase, Serum (09.10.17 @ 12:18)    Lipase, Serum: 67 U/L    Amylase  lipase normal                          10.2   3.9   )-----------( 134      ( 12 Sep 2017 07:01 )             33.1   09-12    136  |  99  |  12  ----------------------------<  108<H>  4.6   |  27  |  1.10    Ca    9.3      12 Sep 2017 07:01  Mg     1.8     09-12    TPro  8.1  /  Alb  4.7  /  TBili  0.5  /  DBili  x   /  AST  61<H>  /  ALT  35  /  AlkPhos  61  09-10

## 2017-09-12 NOTE — PROGRESS NOTE ADULT - PROBLEM SELECTOR PLAN 5
With concomitant CAD s/p PCI. EF 35% on last echo with blood clot in LV in february 2017  -continue lisinopril, will consider changing toprol to atenolol as per pharmacy since this can help with alc withdrawal.   - Repeat echo 9/11 largely unchanged from previous: showed akinesis of the apical inferior, mid to apical anterior and mid to apical septal regions with dyskinesis of the true apex as well as myocardial scar formation in the LAD distribution and apical thrombus (1.1cm x 1.0 cm) ,LVEF 35%  -Troponin negative x2, ACS unlikely so will no longer trend
With concomitant CAD s/p PCI. EF 35% on last echo with blood clot in LV in february 2017  -continue lisinopril, will consider changing toprol to atenolol as per pharmacy since this can help with alc withdrawal.   - Repeat echo 9/11 largely unchanged from previous: showed akinesis of the apical inferior, mid to apical anterior and mid to apical septal regions with dyskinesis of the true apex as well as myocardial scar formation in the LAD distribution and apical thrombus (1.1cm x 1.0 cm) ,LVEF 35%  -Troponin negative x2, ACS unlikely so will no longer trend

## 2017-09-12 NOTE — CONSULT NOTE ADULT - ASSESSMENT
61yr old M with a PMHx significant for Alcohol abuse, Gastritis, Hx of GI bleed in the past, CAD s/p MI (~2003), stent placement in July of 2016, CHF (EF 35-40%) s/p AICD, A. fib NOT on AC (Patient refused to take), who presents for evaluation of weakness since last week.   GI was consulted for BRBPR.    PLAN -   # LGIB -   - likely 2/2 hemorrhoids, but unable to r/o another underlying pathology given patient reports from last c-scope   - he was told to follow up for a polypectomy but did not  - Hgb is relatively stable and no further bleeding  - patient is likely not going to follow up on dc   -       Will discuss with attending Dr Wanda JOSHI following 61yr old M with a PMHx significant for Alcohol abuse, Gastritis, Hx of GI bleed in the past, CAD s/p MI (~2003), stent placement in July of 2016, CHF (EF 35-40%) s/p AICD, A. fib NOT on AC (Patient refused to take), who presents for evaluation of weakness since last week.   GI was consulted for BRBPR.    PLAN -   # LGIB -   - likely 2/2 hemorrhoids, but unable to r/o another underlying pathology given patient reports from last c-scope   - he was told to follow up for a polypectomy but did not  - Hgb is relatively stable and no further bleeding  - patient is likely not going to follow up on dc   - Will like to schedule for a colonoscopy if possible inpatient if not then please make sure he follows up with Dr Canales for an outpatient colonoscope      Discussed with attending Dr Wanda JOSHI following

## 2017-09-15 DIAGNOSIS — K21.9 GASTRO-ESOPHAGEAL REFLUX DISEASE WITHOUT ESOPHAGITIS: ICD-10-CM

## 2017-09-15 DIAGNOSIS — R53.1 WEAKNESS: ICD-10-CM

## 2017-09-15 DIAGNOSIS — F10.10 ALCOHOL ABUSE, UNCOMPLICATED: ICD-10-CM

## 2017-09-15 DIAGNOSIS — Z95.5 PRESENCE OF CORONARY ANGIOPLASTY IMPLANT AND GRAFT: ICD-10-CM

## 2017-09-15 DIAGNOSIS — D64.9 ANEMIA, UNSPECIFIED: ICD-10-CM

## 2017-09-15 DIAGNOSIS — I42.9 CARDIOMYOPATHY, UNSPECIFIED: ICD-10-CM

## 2017-09-15 DIAGNOSIS — K29.70 GASTRITIS, UNSPECIFIED, WITHOUT BLEEDING: ICD-10-CM

## 2017-09-15 DIAGNOSIS — E78.5 HYPERLIPIDEMIA, UNSPECIFIED: ICD-10-CM

## 2017-09-15 DIAGNOSIS — F32.9 MAJOR DEPRESSIVE DISORDER, SINGLE EPISODE, UNSPECIFIED: ICD-10-CM

## 2017-09-15 DIAGNOSIS — Z95.810 PRESENCE OF AUTOMATIC (IMPLANTABLE) CARDIAC DEFIBRILLATOR: ICD-10-CM

## 2017-09-15 DIAGNOSIS — Z88.0 ALLERGY STATUS TO PENICILLIN: ICD-10-CM

## 2017-09-15 DIAGNOSIS — F10.230 ALCOHOL DEPENDENCE WITH WITHDRAWAL, UNCOMPLICATED: ICD-10-CM

## 2017-09-15 DIAGNOSIS — I50.22 CHRONIC SYSTOLIC (CONGESTIVE) HEART FAILURE: ICD-10-CM

## 2017-09-15 DIAGNOSIS — I25.10 ATHEROSCLEROTIC HEART DISEASE OF NATIVE CORONARY ARTERY WITHOUT ANGINA PECTORIS: ICD-10-CM

## 2017-09-15 DIAGNOSIS — I11.0 HYPERTENSIVE HEART DISEASE WITH HEART FAILURE: ICD-10-CM

## 2017-09-15 DIAGNOSIS — I25.2 OLD MYOCARDIAL INFARCTION: ICD-10-CM
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
## 2017-09-15 DIAGNOSIS — I48.0 PAROXYSMAL ATRIAL FIBRILLATION: ICD-10-CM

## 2017-09-18 ENCOUNTER — INPATIENT (INPATIENT)
Facility: HOSPITAL | Age: 62
LOS: 0 days | Discharge: HOME | DRG: 392 | End: 2017-09-19
Attending: INTERNAL MEDICINE | Admitting: INTERNAL MEDICINE
Payer: MEDICARE

## 2017-09-18 VITALS
OXYGEN SATURATION: 96 % | DIASTOLIC BLOOD PRESSURE: 95 MMHG | HEART RATE: 72 BPM | SYSTOLIC BLOOD PRESSURE: 135 MMHG | WEIGHT: 164.91 LBS | TEMPERATURE: 99 F | RESPIRATION RATE: 18 BRPM

## 2017-09-18 DIAGNOSIS — K29.50 UNSPECIFIED CHRONIC GASTRITIS WITHOUT BLEEDING: ICD-10-CM

## 2017-09-18 DIAGNOSIS — I50.22 CHRONIC SYSTOLIC (CONGESTIVE) HEART FAILURE: ICD-10-CM

## 2017-09-18 DIAGNOSIS — I25.10 ATHEROSCLEROTIC HEART DISEASE OF NATIVE CORONARY ARTERY WITHOUT ANGINA PECTORIS: ICD-10-CM

## 2017-09-18 DIAGNOSIS — R63.8 OTHER SYMPTOMS AND SIGNS CONCERNING FOOD AND FLUID INTAKE: ICD-10-CM

## 2017-09-18 DIAGNOSIS — R94.31 ABNORMAL ELECTROCARDIOGRAM [ECG] [EKG]: ICD-10-CM

## 2017-09-18 DIAGNOSIS — F10.10 ALCOHOL ABUSE, UNCOMPLICATED: ICD-10-CM

## 2017-09-18 DIAGNOSIS — Z29.9 ENCOUNTER FOR PROPHYLACTIC MEASURES, UNSPECIFIED: ICD-10-CM

## 2017-09-18 DIAGNOSIS — D64.9 ANEMIA, UNSPECIFIED: ICD-10-CM

## 2017-09-18 DIAGNOSIS — I48.0 PAROXYSMAL ATRIAL FIBRILLATION: ICD-10-CM

## 2017-09-18 LAB
ALBUMIN SERPL ELPH-MCNC: 4.5 G/DL — SIGNIFICANT CHANGE UP (ref 3.3–5)
ALP SERPL-CCNC: 60 U/L — SIGNIFICANT CHANGE UP (ref 40–120)
ALT FLD-CCNC: 38 U/L — SIGNIFICANT CHANGE UP (ref 10–45)
ANION GAP SERPL CALC-SCNC: 14 MMOL/L — SIGNIFICANT CHANGE UP (ref 5–17)
APTT BLD: 25.5 SEC — LOW (ref 27.5–37.4)
AST SERPL-CCNC: 55 U/L — HIGH (ref 10–40)
BASOPHILS NFR BLD AUTO: 0.5 % — SIGNIFICANT CHANGE UP (ref 0–2)
BILIRUB SERPL-MCNC: 0.6 MG/DL — SIGNIFICANT CHANGE UP (ref 0.2–1.2)
BUN SERPL-MCNC: 18 MG/DL — SIGNIFICANT CHANGE UP (ref 7–23)
CALCIUM SERPL-MCNC: 9.8 MG/DL — SIGNIFICANT CHANGE UP (ref 8.4–10.5)
CHLORIDE SERPL-SCNC: 96 MMOL/L — SIGNIFICANT CHANGE UP (ref 96–108)
CK MB CFR SERPL CALC: 1.8 NG/ML — SIGNIFICANT CHANGE UP (ref 0–6.7)
CK MB CFR SERPL CALC: 2 NG/ML — SIGNIFICANT CHANGE UP (ref 0–6.7)
CK SERPL-CCNC: 136 U/L — SIGNIFICANT CHANGE UP (ref 30–200)
CO2 SERPL-SCNC: 27 MMOL/L — SIGNIFICANT CHANGE UP (ref 22–31)
CREAT SERPL-MCNC: 0.94 MG/DL — SIGNIFICANT CHANGE UP (ref 0.5–1.3)
EOSINOPHIL NFR BLD AUTO: 0.2 % — SIGNIFICANT CHANGE UP (ref 0–6)
GLUCOSE SERPL-MCNC: 121 MG/DL — HIGH (ref 70–99)
HCT VFR BLD CALC: 34.7 % — LOW (ref 39–50)
HGB BLD-MCNC: 11.1 G/DL — LOW (ref 13–17)
INR BLD: 0.9 — SIGNIFICANT CHANGE UP (ref 0.88–1.16)
LACTATE SERPL-SCNC: 1.8 MMOL/L — SIGNIFICANT CHANGE UP (ref 0.5–2)
LACTATE SERPL-SCNC: 2.4 MMOL/L — HIGH (ref 0.5–2)
LIDOCAIN IGE QN: 84 U/L — HIGH (ref 7–60)
LYMPHOCYTES # BLD AUTO: 18.5 % — SIGNIFICANT CHANGE UP (ref 13–44)
MAGNESIUM SERPL-MCNC: 2 MG/DL — SIGNIFICANT CHANGE UP (ref 1.6–2.6)
MCHC RBC-ENTMCNC: 23.4 PG — LOW (ref 27–34)
MCHC RBC-ENTMCNC: 32 G/DL — SIGNIFICANT CHANGE UP (ref 32–36)
MCV RBC AUTO: 73.2 FL — LOW (ref 80–100)
MONOCYTES NFR BLD AUTO: 12.8 % — SIGNIFICANT CHANGE UP (ref 2–14)
NEUTROPHILS NFR BLD AUTO: 68 % — SIGNIFICANT CHANGE UP (ref 43–77)
PLATELET # BLD AUTO: 214 K/UL — SIGNIFICANT CHANGE UP (ref 150–400)
POTASSIUM SERPL-MCNC: 4.6 MMOL/L — SIGNIFICANT CHANGE UP (ref 3.5–5.3)
POTASSIUM SERPL-SCNC: 4.6 MMOL/L — SIGNIFICANT CHANGE UP (ref 3.5–5.3)
PROT SERPL-MCNC: 8.5 G/DL — HIGH (ref 6–8.3)
PROTHROM AB SERPL-ACNC: 10 SEC — SIGNIFICANT CHANGE UP (ref 9.8–12.7)
RBC # BLD: 4.74 M/UL — SIGNIFICANT CHANGE UP (ref 4.2–5.8)
RBC # FLD: 17.9 % — HIGH (ref 10.3–16.9)
SODIUM SERPL-SCNC: 137 MMOL/L — SIGNIFICANT CHANGE UP (ref 135–145)
TROPONIN T SERPL-MCNC: <0.01 NG/ML — SIGNIFICANT CHANGE UP (ref 0–0.01)
TROPONIN T SERPL-MCNC: <0.01 NG/ML — SIGNIFICANT CHANGE UP (ref 0–0.01)
WBC # BLD: 4.1 K/UL — SIGNIFICANT CHANGE UP (ref 3.8–10.5)
WBC # FLD AUTO: 4.1 K/UL — SIGNIFICANT CHANGE UP (ref 3.8–10.5)

## 2017-09-18 PROCEDURE — 71010: CPT | Mod: 26

## 2017-09-18 PROCEDURE — 99285 EMERGENCY DEPT VISIT HI MDM: CPT | Mod: 25

## 2017-09-18 PROCEDURE — 93010 ELECTROCARDIOGRAM REPORT: CPT

## 2017-09-18 RX ORDER — THIAMINE MONONITRATE (VIT B1) 100 MG
100 TABLET ORAL DAILY
Qty: 0 | Refills: 0 | Status: DISCONTINUED | OUTPATIENT
Start: 2017-09-18 | End: 2017-09-19

## 2017-09-18 RX ORDER — HEPARIN SODIUM 5000 [USP'U]/ML
5000 INJECTION INTRAVENOUS; SUBCUTANEOUS EVERY 8 HOURS
Qty: 0 | Refills: 0 | Status: DISCONTINUED | OUTPATIENT
Start: 2017-09-18 | End: 2017-09-19

## 2017-09-18 RX ORDER — SODIUM CHLORIDE 9 MG/ML
500 INJECTION INTRAMUSCULAR; INTRAVENOUS; SUBCUTANEOUS
Qty: 0 | Refills: 0 | Status: DISCONTINUED | OUTPATIENT
Start: 2017-09-18 | End: 2017-09-18

## 2017-09-18 RX ORDER — SODIUM CHLORIDE 9 MG/ML
1000 INJECTION INTRAMUSCULAR; INTRAVENOUS; SUBCUTANEOUS ONCE
Qty: 0 | Refills: 0 | Status: COMPLETED | OUTPATIENT
Start: 2017-09-18 | End: 2017-09-18

## 2017-09-18 RX ORDER — SODIUM CHLORIDE 9 MG/ML
1000 INJECTION INTRAMUSCULAR; INTRAVENOUS; SUBCUTANEOUS
Qty: 0 | Refills: 0 | Status: DISCONTINUED | OUTPATIENT
Start: 2017-09-18 | End: 2017-09-19

## 2017-09-18 RX ORDER — ONDANSETRON 8 MG/1
4 TABLET, FILM COATED ORAL EVERY 6 HOURS
Qty: 0 | Refills: 0 | Status: DISCONTINUED | OUTPATIENT
Start: 2017-09-18 | End: 2017-09-19

## 2017-09-18 RX ORDER — PANTOPRAZOLE SODIUM 20 MG/1
40 TABLET, DELAYED RELEASE ORAL
Qty: 0 | Refills: 0 | Status: DISCONTINUED | OUTPATIENT
Start: 2017-09-18 | End: 2017-09-18

## 2017-09-18 RX ORDER — LISINOPRIL 2.5 MG/1
20 TABLET ORAL DAILY
Qty: 0 | Refills: 0 | Status: DISCONTINUED | OUTPATIENT
Start: 2017-09-19 | End: 2017-09-19

## 2017-09-18 RX ORDER — CLOPIDOGREL BISULFATE 75 MG/1
75 TABLET, FILM COATED ORAL DAILY
Qty: 0 | Refills: 0 | Status: DISCONTINUED | OUTPATIENT
Start: 2017-09-19 | End: 2017-09-19

## 2017-09-18 RX ORDER — FAMOTIDINE 10 MG/ML
20 INJECTION INTRAVENOUS DAILY
Qty: 0 | Refills: 0 | Status: DISCONTINUED | OUTPATIENT
Start: 2017-09-18 | End: 2017-09-19

## 2017-09-18 RX ORDER — ONDANSETRON 8 MG/1
4 TABLET, FILM COATED ORAL ONCE
Qty: 0 | Refills: 0 | Status: COMPLETED | OUTPATIENT
Start: 2017-09-18 | End: 2017-09-18

## 2017-09-18 RX ORDER — METOPROLOL TARTRATE 50 MG
100 TABLET ORAL DAILY
Qty: 0 | Refills: 0 | Status: DISCONTINUED | OUTPATIENT
Start: 2017-09-19 | End: 2017-09-19

## 2017-09-18 RX ORDER — ASPIRIN/CALCIUM CARB/MAGNESIUM 324 MG
162 TABLET ORAL DAILY
Qty: 0 | Refills: 0 | Status: DISCONTINUED | OUTPATIENT
Start: 2017-09-19 | End: 2017-09-19

## 2017-09-18 RX ORDER — ATORVASTATIN CALCIUM 80 MG/1
40 TABLET, FILM COATED ORAL AT BEDTIME
Qty: 0 | Refills: 0 | Status: DISCONTINUED | OUTPATIENT
Start: 2017-09-19 | End: 2017-09-19

## 2017-09-18 RX ORDER — FOLIC ACID 0.8 MG
1 TABLET ORAL DAILY
Qty: 0 | Refills: 0 | Status: DISCONTINUED | OUTPATIENT
Start: 2017-09-18 | End: 2017-09-19

## 2017-09-18 RX ADMIN — ONDANSETRON 4 MILLIGRAM(S): 8 TABLET, FILM COATED ORAL at 10:59

## 2017-09-18 RX ADMIN — FAMOTIDINE 20 MILLIGRAM(S): 10 INJECTION INTRAVENOUS at 16:05

## 2017-09-18 RX ADMIN — SODIUM CHLORIDE 1000 MILLILITER(S): 9 INJECTION INTRAMUSCULAR; INTRAVENOUS; SUBCUTANEOUS at 12:04

## 2017-09-18 RX ADMIN — SODIUM CHLORIDE 1000 MILLILITER(S): 9 INJECTION INTRAMUSCULAR; INTRAVENOUS; SUBCUTANEOUS at 10:59

## 2017-09-18 RX ADMIN — Medication 1 MILLIGRAM(S): at 11:05

## 2017-09-18 RX ADMIN — SODIUM CHLORIDE 100 MILLILITER(S): 9 INJECTION INTRAMUSCULAR; INTRAVENOUS; SUBCUTANEOUS at 16:06

## 2017-09-18 RX ADMIN — Medication 25 MILLIGRAM(S): at 16:05

## 2017-09-18 RX ADMIN — Medication 1 MILLIGRAM(S): at 16:05

## 2017-09-18 RX ADMIN — HEPARIN SODIUM 5000 UNIT(S): 5000 INJECTION INTRAVENOUS; SUBCUTANEOUS at 21:39

## 2017-09-18 RX ADMIN — Medication 100 MILLIGRAM(S): at 17:31

## 2017-09-18 NOTE — H&P ADULT - PROBLEM SELECTOR PLAN 1
Pt w/ significant history of ETOH abuse. Pt reports having more than 1 pint of vodka last night. Pt s/p 1mg of ativan in the ED. Current CIWA of 7. Pt w/ b/l upper extremity tremors and tongue fasciculations. Pt reports taking Librium 20mg this AM given concern for alcohol withdrawal.   -Librium 25mg q8hrs.   -CIWA q4hrs  -Zofran 4mg q6hrs   -Thiamine 100mg daily  -Folate 1mg daily   -500 cc NS @100cc/hr Pt w/ significant history of ETOH abuse. Pt reports having more than 1 pint of vodka last night. Pt s/p 1mg of ativan in the ED. Current CIWA of 7. Pt w/ b/l upper extremity tremors and tongue fasciculations. Pt reports taking Librium 20mg this AM given concern for alcohol withdrawal.   -Librium 25mg q8hrs.   -Ativan 2mg PO PRN for CIWA greater than 8  -CIWA q4hrs  -Zofran 4mg q6hrs   -Thiamine 100mg daily  -Folate 1mg daily   -500 cc NS @100cc/hr x1

## 2017-09-18 NOTE — ED PROVIDER NOTE - ATTENDING CONTRIBUTION TO CARE
Alcoholic with n/v after binge drinking last night, generalized weakness and decreased PO.  Pt exam unremarkable, ? early aka, w/d.  Given hx and ETOH addiction Dr. Levy desires admit to detox and tune cardiac conditions.

## 2017-09-18 NOTE — H&P ADULT - ASSESSMENT
60 yo M with a PMHx of CAD s/p MI (~2003), stent placement in July of 2016, CHF (EF 35-40%) s/p AICD, gastritis with GIB (2016),presents with weakness, nausea, vomiting x1, associated with decreased PO. 60 yo M with a PMHx of CAD s/p MI (~2003), stent placement in July of 2016, CHF (EF 35-40%) s/p AICD, gastritis, GIB (2016),presents with weakness, nausea, vomiting x1, associated with decreased PO.

## 2017-09-18 NOTE — ED ADULT NURSE NOTE - OBJECTIVE STATEMENT
patient complains of epigastric pain and vomiting with decreased appetite since Saturday. he states that he has a pacemaker and stent and also recently relapsed with drinking and had a pint of vodka yesterday. denies chest pain but does complains of exertional SOB. tremulous on exam. patient placed on cardiac monitor labs drawn and sent fluid bolus started and patient medicated as per PA order. will continue to monitor

## 2017-09-18 NOTE — ED PROVIDER NOTE - MEDICAL DECISION MAKING DETAILS
Patient is a 61 year old male with PMH of CAD s/p MI (~2003), stent placement in July of 2016, CHF (EF 35-40%) s/p AICD, paroxysmal afib w/ known MERLE clot (refusing anticoag), gastritis with history of GIB (not active), hx of EtOH, presents to ED c/o persistent weakness, nausea, and no appetite. Pt was admitted earlier this month for similar symptoms. Labs sent. Given IVNS x 2 liters, ativan 1mg IV and zofran. Vitals remained stable. Lactate decreased to 1.8 after hydration. Labs otherwise stable from previous. Case discussed with Dr. Zacarias, will admit for etoh withdrawal, agrees to further placement for detox.

## 2017-09-18 NOTE — H&P ADULT - PROBLEM SELECTOR PLAN 3
Pt w/ EF of 35% in 9/2017. Pt w/ [employer] AICD placement in 11/2009. Pt does not appear to be in acute exacerbation.   -s/p 2 L of NS in the ED  -gentle hydration w/ 500cc NS @100cc/hr  -c/w Metoprolol succinate 100mg   -c/w Lisinopril 20mg daily  -daily weight  -I&Os Pt w/ EF of 35% in 9/2017. Pt w/ [employer] AICD placement in 11/2009. Pt does not appear to be in acute exacerbation.   -s/p 2 L of NS in the ED  -will avoid excessive IV fluid  -c/w Metoprolol succinate 100mg   -c/w Lisinopril 20mg daily  -daily weight  -I&Os

## 2017-09-18 NOTE — H&P ADULT - HISTORY OF PRESENT ILLNESS
60 y/o M w/ a PMHx of CAD s/p MI (2003), HFrEF (35%) s/p [employer] AICD (11/2009), paroxysmal afib w/ known MERLE clot (not on anticoagulation), gastritis w/ hx of GI bleed (3/2016), EtOH use disorder      ED course:   T: 98.9, HR 72, /95, RR 18, O2 96%  Ativan 1mg x1, Zofran 4mg x1, NS 2L 62 y/o M w/ a PMHx of CAD s/p MI (2003) and JOSHUA in 2016, HFrEF (35%) s/p [employer] AICD (11/2009), paroxysmal afib w/ known MERLE clot (not on anticoagulation), gastritis w/ hx of GI bleed (3/2016), EtOH use disorder presenting w/ weakness and non-bloody, non-bilious vomiting x1 today. Pt reports that he awoke this morning not feeling well and in fear of having withdrawal symptoms pt took 20mg of Chlordiazepoxide. He endorses poor PO intake secondary to lack of appetite for the past month. Pt reports a 10 pound weight loss in past month. Also of note pt reports that he had more than 1 pint of vodka yesterday.     Currently on ROS pt reports that he       ED course:   T: 98.9, HR 72, /95, RR 18, O2 96%  Ativan 1mg x1, Zofran 4mg x1, NS 2L 62 y/o M w/ a PMHx of CAD s/p MI (2003) and JOSHUA in 2016, HFrEF (35%-40%) s/p [employer] AICD (11/2009), paroxysmal afib w/ known MERLE clot (not on anticoagulation), gastritis w/ hx of GI bleed (3/2016), EtOH use disorder presenting w/ weakness and non-bloody, non-bilious vomiting x1 today. Pt reports that he awoke this morning not feeling well and in fear of having withdrawal symptoms pt took 20mg of Chlordiazepoxide. He endorses poor PO intake secondary to lack of appetite for the past month. Pt reports a 10 pound weight loss in past month. Also of note pt reports that he had more than 1 pint of vodka yesterday. Pt called Dr. Levy this morning who recommended pt come to the ED.     Currently on ROS pt reports that he is feeling better. Denies chest pain, shortness of breath, abdominal pain, nausea/vomiting, diarrhea/constipation, fever/chills, headache/dizziness. Pt complaining of mild tremor.       ED course:   T: 98.9, HR 72, /95, RR 18, O2 96%  Ativan 1mg x1, Zofran 4mg x1, NS 2L 60 y/o M w/ a PMHx of CAD s/p MI (2003) and JOSHUA in 2016, HFrEF (35%-40%) s/p [employer] AICD (11/2009), paroxysmal afib w/ known MERLE clot (not on anticoagulation), gastritis, hx of lower GI bleed (3/2016), EtOH use disorder presenting w/ weakness and non-bloody, non-bilious vomiting x1 today. Pt reports that he awoke this morning not feeling well and in fear of having withdrawal symptoms pt took 20mg of Chlordiazepoxide. He endorses poor PO intake secondary to lack of appetite for the past month. Pt reports a 10 pound weight loss in past month. Also of note pt reports that he had more than 1 pint of vodka yesterday. Pt called Dr. Levy this morning who recommended pt come to the ED.     Currently on ROS pt reports that he is feeling better. Denies chest pain, shortness of breath, abdominal pain, nausea/vomiting, diarrhea/constipation, fever/chills, headache/dizziness. Pt complaining of mild tremor.       ED course:   T: 98.9, HR 72, /95, RR 18, O2 96%  Ativan 1mg x1, Zofran 4mg x1, NS 2L

## 2017-09-18 NOTE — ED ADULT TRIAGE NOTE - CHIEF COMPLAINT QUOTE
c/o epigastric pain and accompanied with nausea. pt also reports "had a relapse." Pt states had been [id number] a pint of vodka since Saturday. PHX cardiac, PPM, ETOH

## 2017-09-18 NOTE — H&P ADULT - NSHPPHYSICALEXAM_GEN_ALL_CORE
.  VITAL SIGNS:  T(C): 36.7 (09-18-17 @ 13:18), Max: 37.2 (09-18-17 @ 10:18)  T(F): 98 (09-18-17 @ 13:18), Max: 98.9 (09-18-17 @ 10:18)  HR: 77 (09-18-17 @ 13:18) (68 - 77)  BP: 149/71 (09-18-17 @ 13:18) (135/95 - 149/71)  BP(mean): --  RR: 18 (09-18-17 @ 13:18) (18 - 18)  SpO2: 97% (09-18-17 @ 13:18) (95% - 97%)  Wt(kg): --    PHYSICAL EXAM:    Constitutional: WDWN resting comfortably in bed; NAD  Head: NC/AT  Eyes: PERRL, EOMI, anicteric sclera  ENT: no nasal discharge; uvula midline, no oropharyngeal erythema or exudates; MMM. Tongue fasciculations.   Neck: supple; no JVD or thyromegaly  Respiratory: CTA B/L; no W/R/R, no retractions  Cardiac: +S1/S2; RRR; no M/R/G; PMI non-displaced  Gastrointestinal: abdomen soft, NT/ND; no rebound or guarding; +BSx4  Extremities: WWP, no clubbing or cyanosis; no peripheral edema  Musculoskeletal: NROM x4; no joint swelling, tenderness or erythema  Vascular: 2+ DP/PT pulses B/L  Neurologic: AAOx3; CNII-XII grossly intact; no focal deficits. +tremor b/l upper extremities. 5/5 strenght b/l upper and lower extremities. No tongue deviation. Uvula midline.

## 2017-09-18 NOTE — H&P ADULT - PROBLEM SELECTOR PLAN 5
Pt w/ Hgb of 11.1 on admission. Anemia likely 2/2 to iron deficiency anemia in the setting of alcohol abuse and poor PO intake.   -ctm Pt w/ Hgb of 11.1 on admission. Anemia likely 2/2 to iron deficiency anemia in the setting of alcohol abuse and poor PO intake. B12 and Folate w/in normal limits as per previous admission.   -f/u iron studies in the AM  -replete as appropriate

## 2017-09-18 NOTE — H&P ADULT - PROBLEM SELECTOR PLAN 2
Pt w/ EKG showing ST elevations in V1,V2,V3 w/ T wave inversions in V5,V6. Low likelihood of ACS given negative troponin x1 and no associated chest pain or SOB, but can not rule out.   -repeat EKG  -f/u repeat troponin at 4pm

## 2017-09-18 NOTE — H&P ADULT - NSHPSOCIALHISTORY_GEN_ALL_CORE
Significant alochol abuse. ~1pint of vodka daily.   Denies tobacco  Denies IV and recreational drug use. Pt endorses cocaine use about 20 years ago.

## 2017-09-18 NOTE — ED PROVIDER NOTE - CONSTITUTIONAL, MLM
normal... Well appearing, well nourished, awake, alert, oriented to person, place, time/situation and in moderate distress, tremors noted.

## 2017-09-18 NOTE — H&P ADULT - PROBLEM SELECTOR PLAN 6
Pt w/ lipase of 84 on admission. Pt reported of mild epigastric pain that has now improved. Pt did not endorse hx of previous pancreatitis.   -Abd US Pt w/ lipase of 84 on admission. Pt reported of mild epigastric pain that has now improved. Pt did not endorse hx of previous pancreatitis but pt w/ questionable abd ultrasound in the past consistent w/ pancreatitis.   -f/u repeat Abd US

## 2017-09-18 NOTE — H&P ADULT - PROBLEM SELECTOR PLAN 7
Pt w/ hx of gastritis w/ GI bleed in 2016. Pt reports that he is scheduled to have a colonoscopy in the following week w/ Dr. Canales.   -pepcid 20mg daily Pt w/ hx of gastritis. Last EGD was in 2014 as per pt.   -pepcid 20mg daily    #History of lower GI bleed  pt w/ hx of lower GI bleed s/p colonoscopy in 2014. Pt currently denies melena or BRBPR. Scheduled to have colonoscopy this upcoming week w/ Dr. Canales.   -monitor for any signs of GI bleed.

## 2017-09-18 NOTE — ED PROVIDER NOTE - OBJECTIVE STATEMENT
Patient is a 61 year old male with PMH of CAD s/p MI (~2003), stent placement in July of 2016, CHF (EF 35-40%) s/p AICD, paroxysmal afib w/ known MERLE clot (refusing anticoag), gastritis with history of GIB (not active), hx of EtOH, presents to ED c/o persistent weakness, nausea, and no appetite. Pt was admitted earlier this month for similar symptoms.  Patient had repeat echo done on 9/11 which was largely unchanged from previous in February: showed akinesis of the apical inferior, mid to apical anterior and mid to apical septal regions with dyskinesis of the true apex as well as myocardial scar formation in the LAD distribution and apical thrombus (1.1cm x 1.0 cm) ,LVEF 35%. Patient was counseled on the risks of refusing anticoagulation vs risks of accepting anticoagulation but chose to refuse eliquis for anticoagulation despite his known thrombus and very high risk for stroke. Pt was treated with ativan PRN for etoh withdrawal during admission. He planned to stop drinking upon discharged, however he relapsed yesterday and drank 1 pint vodka. Pt reports that after he drinks he has an appetite.  He is scheduled to see Dr. Canales for follow up for colonoscopy. He began vomiting this morning, called Dr. Levy and was advised to come to ED. Patient is a 61 year old male with PMH of CAD s/p MI (~2003), stent placement in July of 2016, CHF (EF 35-40%) s/p AICD, paroxysmal afib w/ known MERLE clot (refusing anticoag), gastritis with history of GIB (not active), hx of EtOH, presents to ED c/o persistent weakness, nausea, and no appetite. Pt was admitted earlier this month for similar symptoms.  Patient had repeat echo done on 9/11 which was largely unchanged from previous in February, LVEF 35%. Patient was counseled on the risks of refusing anticoagulation vs risks of accepting anticoagulation but chose to refuse eliquis for anticoagulation despite his known thrombus and very high risk for stroke. Pt was treated with ativan PRN for etoh withdrawal during admission. He planned to stop drinking upon discharged, however he relapsed yesterday and drank 1 pint vodka. Pt reports that after he drinks he has an appetite.  He is scheduled to see Dr. Canales for follow up for colonoscopy. He began vomiting this morning, called Dr. Levy and was advised to come to ED. Denies fever, chills, urinary symptoms, GI bleeding, chest pain, sob.

## 2017-09-18 NOTE — H&P ADULT - NSHPLABSRESULTS_GEN_ALL_CORE
.  LABS:                         11.1   4.1   )-----------( 214      ( 18 Sep 2017 10:57 )             34.7     09-18    137  |  96  |  18  ----------------------------<  121<H>  4.6   |  27  |  0.94    Ca    9.8      18 Sep 2017 10:57  Mg     2.0     09-18    TPro  8.5<H>  /  Alb  4.5  /  TBili  0.6  /  DBili  x   /  AST  55<H>  /  ALT  38  /  AlkPhos  60  09-18    PT/INR - ( 18 Sep 2017 10:57 )   PT: 10.0 sec;   INR: 0.90          PTT - ( 18 Sep 2017 10:57 )  PTT:25.5 sec    CARDIAC MARKERS ( 18 Sep 2017 10:57 )  x     / <0.01 ng/mL / 174 U/L / x     / 2.0 ng/mL        Lactate, Blood: 1.8 mmoL/L (09-18 @ 12:55)  Lactate, Blood: 2.4 mmoL/L (09-18 @ 10:57)      RADIOLOGY, EKG & ADDITIONAL TESTS: Reviewed.

## 2017-09-18 NOTE — ED ADULT NURSE NOTE - CHIEF COMPLAINT QUOTE
c/o epigastric pain and accompanied with nausea. pt also reports "had a relapse." Pt states had been [id number] a pint of vodka since Saturday. PHX cardiac, PPM, ETOH

## 2017-09-18 NOTE — H&P ADULT - PROBLEM SELECTOR PLAN 4
Pt w/ Paroxysmal afib with a known MERLE clot. Not on anticoagulation. Pt understands the risks of not being on anticoagulation. Currently rate controlled. CHADSVASC2 of 4.   -c/w metoprolol succinate 100mg   -no anticoagulation per outpatient MD.

## 2017-09-18 NOTE — PROGRESS NOTE ADULT - SUBJECTIVE AND OBJECTIVE BOX
Re admission for this Pt for lack of appetite and decreased intake     He was re hydrated in ER today   He is still drinking alcohol     PAST MEDICAL & SURGICAL HISTORY:  Pacemaker  ETOH abuse  Paroxysmal a-fib  Myocardial infarction involving other coronary artery  Gastritis  Atherosclerosis of coronary artery: Coronary artery disease  Automatic implantable cardiac defibrillator in situ: ICD (implantable cardioverter-defibrillator) in place      Vital Signs Last 24 Hrs  T(C): 37.2 (18 Sep 2017 16:35), Max: 37.2 (18 Sep 2017 10:18)  T(F): 98.9 (18 Sep 2017 16:35), Max: 98.9 (18 Sep 2017 10:18)  HR: 92 (18 Sep 2017 16:35) (68 - 92)  BP: 142/72 (18 Sep 2017 16:35) (135/95 - 149/71)  BP(mean): --  RR: 16 (18 Sep 2017 16:35) (16 - 18)  SpO2: 96% (18 Sep 2017 16:35) (95% - 97%)    MEDICATIONS  (STANDING):  folic acid 1 milliGRAM(s) Oral daily  thiamine 100 milliGRAM(s) Oral daily  heparin  Injectable 5000 Unit(s) SubCutaneous every 8 hours  chlordiazePOXIDE 25 milliGRAM(s) Oral every 8 hours  sodium chloride 0.9%. 500 milliLiter(s) (100 mL/Hr) IV Continuous <Continuous>  famotidine    Tablet 20 milliGRAM(s) Oral daily    MEDICATIONS  (PRN):  ondansetron Injectable 4 milliGRAM(s) IV Push every 6 hours PRN Nausea and/or Vomiting    Lungs clear  CV s1 s2  Abd soft  Ext stable                          11.1   4.1   )-----------( 214      ( 18 Sep 2017 10:57 )             34.7   09-18    137  |  96  |  18  ----------------------------<  121<H>  4.6   |  27  |  0.94    Ca    9.8      18 Sep 2017 10:57  Mg     2.0     09-18    TPro  8.5<H>  /  Alb  4.5  /  TBili  0.6  /  DBili  x   /  AST  55<H>  /  ALT  38  /  AlkPhos  60  09-18  PT/INR - ( 18 Sep 2017 10:57 )   PT: 10.0 sec;   INR: 0.90          PTT - ( 18 Sep 2017 10:57 )  PTT:25.5 sec    Amylase, Serum Total (09.10.17 @ 12:18)    Amylase, Serum Total: 45 U/L

## 2017-09-19 ENCOUNTER — TRANSCRIPTION ENCOUNTER (OUTPATIENT)
Age: 62
End: 2017-09-19

## 2017-09-19 VITALS — WEIGHT: 169.98 LBS

## 2017-09-19 LAB
AFP-TM SERPL-MCNC: 6.3 NG/ML — SIGNIFICANT CHANGE UP
ALBUMIN SERPL ELPH-MCNC: 3.8 G/DL — SIGNIFICANT CHANGE UP (ref 3.3–5)
ALP SERPL-CCNC: 48 U/L — SIGNIFICANT CHANGE UP (ref 40–120)
ALT FLD-CCNC: 23 U/L — SIGNIFICANT CHANGE UP (ref 10–45)
ANION GAP SERPL CALC-SCNC: 10 MMOL/L — SIGNIFICANT CHANGE UP (ref 5–17)
AST SERPL-CCNC: 30 U/L — SIGNIFICANT CHANGE UP (ref 10–40)
BILIRUB SERPL-MCNC: 1 MG/DL — SIGNIFICANT CHANGE UP (ref 0.2–1.2)
BUN SERPL-MCNC: 14 MG/DL — SIGNIFICANT CHANGE UP (ref 7–23)
CALCIUM SERPL-MCNC: 8.8 MG/DL — SIGNIFICANT CHANGE UP (ref 8.4–10.5)
CHLORIDE SERPL-SCNC: 99 MMOL/L — SIGNIFICANT CHANGE UP (ref 96–108)
CO2 SERPL-SCNC: 26 MMOL/L — SIGNIFICANT CHANGE UP (ref 22–31)
CREAT SERPL-MCNC: 1.09 MG/DL — SIGNIFICANT CHANGE UP (ref 0.5–1.3)
FERRITIN SERPL-MCNC: 28.2 NG/ML — LOW (ref 30–400)
GLUCOSE SERPL-MCNC: 94 MG/DL — SIGNIFICANT CHANGE UP (ref 70–99)
HCT VFR BLD CALC: 28.2 % — LOW (ref 39–50)
HGB BLD-MCNC: 9 G/DL — LOW (ref 13–17)
IRON SATN MFR SERPL: 185 UG/DL — HIGH (ref 45–165)
IRON SATN MFR SERPL: 61 % — HIGH (ref 16–55)
MAGNESIUM SERPL-MCNC: 1.7 MG/DL — SIGNIFICANT CHANGE UP (ref 1.6–2.6)
MCHC RBC-ENTMCNC: 23.6 PG — LOW (ref 27–34)
MCHC RBC-ENTMCNC: 31.9 G/DL — LOW (ref 32–36)
MCV RBC AUTO: 74 FL — LOW (ref 80–100)
PHOSPHATE SERPL-MCNC: 3.3 MG/DL — SIGNIFICANT CHANGE UP (ref 2.5–4.5)
PLATELET # BLD AUTO: 142 K/UL — LOW (ref 150–400)
POTASSIUM SERPL-MCNC: 4.3 MMOL/L — SIGNIFICANT CHANGE UP (ref 3.5–5.3)
POTASSIUM SERPL-SCNC: 4.3 MMOL/L — SIGNIFICANT CHANGE UP (ref 3.5–5.3)
PROT SERPL-MCNC: 6.3 G/DL — SIGNIFICANT CHANGE UP (ref 6–8.3)
RBC # BLD: 3.81 M/UL — LOW (ref 4.2–5.8)
RBC # FLD: 17.9 % — HIGH (ref 10.3–16.9)
SODIUM SERPL-SCNC: 135 MMOL/L — SIGNIFICANT CHANGE UP (ref 135–145)
TIBC SERPL-MCNC: 301 UG/DL — SIGNIFICANT CHANGE UP (ref 220–430)
TRANSFERRIN SERPL-MCNC: 245 MG/DL — SIGNIFICANT CHANGE UP (ref 200–360)
UIBC SERPL-MCNC: 116 UG/DL — SIGNIFICANT CHANGE UP (ref 110–370)
WBC # BLD: 2.5 K/UL — LOW (ref 3.8–10.5)
WBC # FLD AUTO: 2.5 K/UL — LOW (ref 3.8–10.5)

## 2017-09-19 PROCEDURE — 83550 IRON BINDING TEST: CPT

## 2017-09-19 PROCEDURE — 85025 COMPLETE CBC W/AUTO DIFF WBC: CPT

## 2017-09-19 PROCEDURE — 84484 ASSAY OF TROPONIN QUANT: CPT

## 2017-09-19 PROCEDURE — 82550 ASSAY OF CK (CPK): CPT

## 2017-09-19 PROCEDURE — 96374 THER/PROPH/DIAG INJ IV PUSH: CPT

## 2017-09-19 PROCEDURE — 84100 ASSAY OF PHOSPHORUS: CPT

## 2017-09-19 PROCEDURE — 36415 COLL VENOUS BLD VENIPUNCTURE: CPT

## 2017-09-19 PROCEDURE — 96375 TX/PRO/DX INJ NEW DRUG ADDON: CPT

## 2017-09-19 PROCEDURE — 82553 CREATINE MB FRACTION: CPT

## 2017-09-19 PROCEDURE — 99285 EMERGENCY DEPT VISIT HI MDM: CPT | Mod: 25

## 2017-09-19 PROCEDURE — 84466 ASSAY OF TRANSFERRIN: CPT

## 2017-09-19 PROCEDURE — 85610 PROTHROMBIN TIME: CPT

## 2017-09-19 PROCEDURE — 85027 COMPLETE CBC AUTOMATED: CPT

## 2017-09-19 PROCEDURE — 80053 COMPREHEN METABOLIC PANEL: CPT

## 2017-09-19 PROCEDURE — 93005 ELECTROCARDIOGRAM TRACING: CPT

## 2017-09-19 PROCEDURE — 82728 ASSAY OF FERRITIN: CPT

## 2017-09-19 PROCEDURE — 83605 ASSAY OF LACTIC ACID: CPT

## 2017-09-19 PROCEDURE — 71045 X-RAY EXAM CHEST 1 VIEW: CPT

## 2017-09-19 PROCEDURE — 83735 ASSAY OF MAGNESIUM: CPT

## 2017-09-19 PROCEDURE — 82105 ALPHA-FETOPROTEIN SERUM: CPT

## 2017-09-19 PROCEDURE — 83690 ASSAY OF LIPASE: CPT

## 2017-09-19 PROCEDURE — 85730 THROMBOPLASTIN TIME PARTIAL: CPT

## 2017-09-19 RX ADMIN — Medication 25 MILLIGRAM(S): at 00:25

## 2017-09-19 RX ADMIN — Medication 162 MILLIGRAM(S): at 11:47

## 2017-09-19 RX ADMIN — FAMOTIDINE 20 MILLIGRAM(S): 10 INJECTION INTRAVENOUS at 11:47

## 2017-09-19 RX ADMIN — LISINOPRIL 20 MILLIGRAM(S): 2.5 TABLET ORAL at 06:52

## 2017-09-19 RX ADMIN — HEPARIN SODIUM 5000 UNIT(S): 5000 INJECTION INTRAVENOUS; SUBCUTANEOUS at 06:52

## 2017-09-19 RX ADMIN — Medication 25 MILLIGRAM(S): at 07:02

## 2017-09-19 RX ADMIN — Medication 25 MILLIGRAM(S): at 16:31

## 2017-09-19 RX ADMIN — Medication 100 MILLIGRAM(S): at 11:47

## 2017-09-19 RX ADMIN — SODIUM CHLORIDE 100 MILLILITER(S): 9 INJECTION INTRAMUSCULAR; INTRAVENOUS; SUBCUTANEOUS at 00:25

## 2017-09-19 RX ADMIN — Medication 1 MILLIGRAM(S): at 11:47

## 2017-09-19 RX ADMIN — CLOPIDOGREL BISULFATE 75 MILLIGRAM(S): 75 TABLET, FILM COATED ORAL at 11:47

## 2017-09-19 RX ADMIN — Medication 100 MILLIGRAM(S): at 06:53

## 2017-09-19 NOTE — DISCHARGE NOTE ADULT - SECONDARY DIAGNOSIS.
Abnormal EKG Chronic systolic congestive heart failure Paroxysmal atrial fibrillation Anemia Chronic pancreatitis Gastritis, chronic

## 2017-09-19 NOTE — DISCHARGE NOTE ADULT - MEDICATION SUMMARY - MEDICATIONS TO TAKE
I will START or STAY ON the medications listed below when I get home from the hospital:    aspirin 162 mg oral delayed release tablet  -- 1 tab(s) by mouth once a day  -- Indication: For CAD S/P percutaneous coronary angioplasty    lisinopril 20 mg oral tablet  -- 1 tab(s) by mouth once a day  -- Indication: For CAD S/P percutaneous coronary angioplasty    atorvastatin 40 mg oral tablet  -- 1 tab(s) by mouth once a day (at bedtime)  -- Avoid grapefruit and grapefruit juice while taking this medication.  Do not take this drug if you are pregnant.  It is very important that you take or use this exactly as directed.  Do not skip doses or discontinue unless directed by your doctor.  Obtain medical advice before taking any non-prescription drugs as some may affect the action of this medication.  Take with food or milk.    -- Indication: For Hyperlipidemia    clopidogrel 75 mg oral tablet  -- 1 tab(s) by mouth once a day  -- Indication: For CAD S/P percutaneous coronary angioplasty    Metoprolol Succinate  mg oral tablet, extended release  -- 1 tab(s) by mouth once a day  -- It is very important that you take or use this exactly as directed.  Do not skip doses or discontinue unless directed by your doctor.  May cause drowsiness.  Alcohol may intensify this effect.  Use care when operating dangerous machinery.  Some non-prescription drugs may aggravate your condition.  Read all labels carefully.  If a warning appears, check with your doctor before taking.  Swallow whole.  Do not crush.  Take with food or milk.  This drug may impair the ability to drive or operate machinery.  Use care until you become familiar with its effects.    -- Indication: For Atrial fibrillation    Multiple Vitamins oral tablet  -- 1 tab(s) by mouth once a day  -- Indication: For vitamin    folic acid 1 mg oral tablet  -- 1 tab(s) by mouth once a day  -- Indication: For Alcohol abuse    thiamine 100 mg oral tablet  -- 1 tab(s) by mouth once a day  -- Indication: For Alcohol abse

## 2017-09-19 NOTE — CONSULT NOTE ADULT - SUBJECTIVE AND OBJECTIVE BOX
62 y/o M w/ a PMHx of CAD s/p MI (2003) and JOSHUA in 2016, HFrEF (35%-40%) s/p [employer] AICD (11/2009), paroxysmal afib w/ known MERLE clot (not on anticoagulation), gastritis, hx of lower GI bleed (3/2016), EtOH use disorder presenting w/ weakness and non-bloody, non-bilious vomiting x1 today. Pt reports that he awoke this morning not feeling well and in fear of having withdrawal symptoms pt took 20mg of Chlordiazepoxide. He endorses poor PO intake secondary to lack of appetite for the past month. Pt reports a 10 pound weight loss in past month. Also of note pt reports that he had more than 1 pint of vodka yesterday.         REVIEW OF SYSTEMS:  Constitutional: No fever, weight loss or fatigue  ENMT:  No difficulty hearing, tinnitus, vertigo; No sinus or throat pain  Respiratory: No cough, wheezing, chills or hemoptysis  Cardiovascular: No chest pain, palpitations, dizziness or leg swelling  Gastrointestinal: mild epigastric pain. No nausea, vomiting or hematemesis; No diarrhea or constipation. No melena or hematochezia.  Skin: No itching, burning, rashes or lesions   Musculoskeletal: No joint pain or swelling; No muscle, back or extremity pain    PAST MEDICAL & SURGICAL HISTORY:  Pacemaker  ETOH abuse  Paroxysmal a-fib  Myocardial infarction involving other coronary artery  Gastritis  Atherosclerosis of coronary artery: Coronary artery disease  Automatic implantable cardiac defibrillator in situ: ICD (implantable cardioverter-defibrillator) in place      FAMILY HISTORY:  No pertinent family history in first degree relatives      SOCIAL HISTORY:  Smoking Status: [ ] Current, [ ] Former, [ ] Never  Pack Years:    MEDICATIONS:  MEDICATIONS  (STANDING):  aspirin enteric coated 162 milliGRAM(s) Oral daily  lisinopril 20 milliGRAM(s) Oral daily  atorvastatin 40 milliGRAM(s) Oral at bedtime  clopidogrel Tablet 75 milliGRAM(s) Oral daily  metoprolol succinate  milliGRAM(s) Oral daily  folic acid 1 milliGRAM(s) Oral daily  thiamine 100 milliGRAM(s) Oral daily  heparin  Injectable 5000 Unit(s) SubCutaneous every 8 hours  chlordiazePOXIDE 25 milliGRAM(s) Oral every 8 hours  famotidine    Tablet 20 milliGRAM(s) Oral daily  sodium chloride 0.9%. 1000 milliLiter(s) (100 mL/Hr) IV Continuous <Continuous>    MEDICATIONS  (PRN):  ondansetron Injectable 4 milliGRAM(s) IV Push every 6 hours PRN Nausea and/or Vomiting      Allergies    Capoten (Short breath; Rash; Hives)  penicillin (Short breath; Rash; Hives)    Intolerances        Vital Signs Last 24 Hrs  T(C): 36.5 (19 Sep 2017 06:05), Max: 37.2 (18 Sep 2017 16:35)  T(F): 97.7 (19 Sep 2017 06:05), Max: 98.9 (18 Sep 2017 16:35)  HR: 62 (19 Sep 2017 06:05) (62 - 92)  BP: 114/72 (19 Sep 2017 06:05) (114/72 - 149/71)  BP(mean): --  RR: 15 (19 Sep 2017 06:05) (15 - 18)  SpO2: 99% (19 Sep 2017 06:05) (95% - 99%)    09-18 @ 07:01  -  09-19 @ 07:00  --------------------------------------------------------  IN: 1600 mL / OUT: 0 mL / NET: 1600 mL    09-19 @ 07:01  -  09-19 @ 10:31  --------------------------------------------------------  IN: 100 mL / OUT: 0 mL / NET: 100 mL          PHYSICAL EXAM:    General: Well developed; well nourished; in no acute distress  HEENT: MMM, conjunctiva and sclera clear  Gastrointestinal: Soft, non-tender non-distended; Normal bowel sounds; No rebound or guarding  Extremities: Normal range of motion, No clubbing, cyanosis or edema  Neurological: Alert and oriented x3  Skin: Warm and dry. No obvious rash      LABS:                        9.0    2.5   )-----------( 142      ( 19 Sep 2017 06:18 )             28.2     09-19    135  |  99  |  14  ----------------------------<  94  4.3   |  26  |  1.09    Ca    8.8      19 Sep 2017 06:18  Phos  3.3     09-19  Mg     1.7     09-19    TPro  6.3  /  Alb  3.8  /  TBili  1.0  /  DBili  x   /  AST  30  /  ALT  23  /  AlkPhos  48  09-19          RADIOLOGY & ADDITIONAL STUDIES:

## 2017-09-19 NOTE — DISCHARGE NOTE ADULT - PLAN OF CARE
Stable You were found to have some EKG changes. Subsequent workup does not suggest a new heart attack. Continue to take atorvastatin as prescribed and follow up as usual with your cardiologist. Continue to take metoprolol as prescribed and  follow up as usual with your cardiologist. Given the existing clot in your heart, we would recommend beginning a blood thinning medication. Please continue Plavix as prescribed and discuss with Dr. Levy at your next appointment. Continue with home Librium as prescribed, follow up with Dr. Mildred Fraser, Oct 10 at 2pm You were found to have some EKG changes. Subsequent workup does not suggest a new heart attack. Continue to take atorvastatin as prescribed and follow up  with Dr. Mildred Fraser, Oct 10 at 2pm Continue to take metoprolol as prescribed and follow up with Dr. Mildred Fraser, Oct 10 at 2pm Given the existing clot in your heart, we would recommend beginning a blood thinning medication. Please continue Plavix as prescribed and discuss with Dr. Levy at your next appointment. Tuesday, Oct 10 at 2pm Follow up with Dr. Mildred Fraser, Oct 10 at 2pm You were found to have EKG changes. Subsequent workup does not suggest a new heart attack. Continue to take atorvastatin as prescribed and follow up with Dr. Mildred Fraser, Oct 10 at 2pm

## 2017-09-19 NOTE — DIETITIAN INITIAL EVALUATION ADULT. - PROBLEM SELECTOR PLAN 5
Pt w/ Hgb of 11.1 on admission. Anemia likely 2/2 to iron deficiency anemia in the setting of alcohol abuse and poor PO intake. B12 and Folate w/in normal limits as per previous admission.   -f/u iron studies in the AM  -replete as appropriate

## 2017-09-19 NOTE — DISCHARGE NOTE ADULT - CARE PLAN
Principal Discharge DX:	ETOH abuse  Secondary Diagnosis:	Abnormal EKG  Secondary Diagnosis:	Chronic systolic congestive heart failure  Goal:	Stable  Secondary Diagnosis:	Paroxysmal atrial fibrillation  Goal:	Stable  Secondary Diagnosis:	Anemia  Secondary Diagnosis:	Chronic pancreatitis  Secondary Diagnosis:	Gastritis, chronic Principal Discharge DX:	ETOH abuse  Secondary Diagnosis:	Abnormal EKG  Instructions for follow-up, activity and diet:	You were found to have some EKG changes. Subsequent workup does not suggest a new heart attack. Continue to take atorvastatin as prescribed and follow up as usual with your cardiologist.  Secondary Diagnosis:	Chronic systolic congestive heart failure  Goal:	Stable  Instructions for follow-up, activity and diet:	Continue to take metoprolol as prescribed and  follow up as usual with your cardiologist.  Secondary Diagnosis:	Paroxysmal atrial fibrillation  Goal:	Stable  Instructions for follow-up, activity and diet:	Given the existing clot in your heart, we would recommend beginning a blood thinning medication. Please continue Plavix as prescribed and discuss with Dr. Levy at your next appointment.  Secondary Diagnosis:	Anemia  Goal:	Stable  Secondary Diagnosis:	Chronic pancreatitis  Secondary Diagnosis:	Gastritis, chronic Principal Discharge DX:	ETOH abuse  Goal:	Stable  Instructions for follow-up, activity and diet:	Continue with home Librium as prescribed, follow up with Dr. Levy Tufrancisday, Oct 10 at 2pm  Secondary Diagnosis:	Abnormal EKG  Instructions for follow-up, activity and diet:	You were found to have some EKG changes. Subsequent workup does not suggest a new heart attack. Continue to take atorvastatin as prescribed and follow up  with Dr. Levy Tuesday, Oct 10 at 2pm  Secondary Diagnosis:	Chronic systolic congestive heart failure  Goal:	Stable  Instructions for follow-up, activity and diet:	Continue to take metoprolol as prescribed and follow up with Dr. Levy Tufrancisday, Oct 10 at 2pm  Secondary Diagnosis:	Paroxysmal atrial fibrillation  Goal:	Stable  Instructions for follow-up, activity and diet:	Given the existing clot in your heart, we would recommend beginning a blood thinning medication. Please continue Plavix as prescribed and discuss with Dr. Levy at your next appointment. Tuesday, Oct 10 at 2pm  Secondary Diagnosis:	Anemia  Goal:	Stable  Instructions for follow-up, activity and diet:	Follow up with Dr. Levy Tufrancisday, Oct 10 at 2pm  Secondary Diagnosis:	Chronic pancreatitis  Goal:	Stable  Instructions for follow-up, activity and diet:	Follow up with Dr. Levy Tufrancisday, Oct 10 at 2pm  Secondary Diagnosis:	Gastritis, chronic  Goal:	Stable  Instructions for follow-up, activity and diet:	Follow up with Dr. Levy Tufrancisday, Oct 10 at 2pm Principal Discharge DX:	ETOH abuse  Goal:	Stable  Instructions for follow-up, activity and diet:	Continue with home Librium as prescribed, follow up with Dr. Levy Tufrancisday, Oct 10 at 2pm  Secondary Diagnosis:	Abnormal EKG  Goal:	Stable  Instructions for follow-up, activity and diet:	You were found to have EKG changes. Subsequent workup does not suggest a new heart attack. Continue to take atorvastatin as prescribed and follow up with Dr. Levy Tufrancisday, Oct 10 at 2pm  Secondary Diagnosis:	Chronic systolic congestive heart failure  Goal:	Stable  Instructions for follow-up, activity and diet:	Continue to take metoprolol as prescribed and follow up with Dr. Levy Tufrancisday, Oct 10 at 2pm  Secondary Diagnosis:	Paroxysmal atrial fibrillation  Goal:	Stable  Instructions for follow-up, activity and diet:	Given the existing clot in your heart, we would recommend beginning a blood thinning medication. Please continue Plavix as prescribed and discuss with Dr. Levy at your next appointment. Tuesday, Oct 10 at 2pm  Secondary Diagnosis:	Anemia  Goal:	Stable  Instructions for follow-up, activity and diet:	Follow up with Dr. Levy Tufrancisday, Oct 10 at 2pm  Secondary Diagnosis:	Chronic pancreatitis  Goal:	Stable  Instructions for follow-up, activity and diet:	Follow up with Dr. Levy Tufrancisday, Oct 10 at 2pm  Secondary Diagnosis:	Gastritis, chronic  Goal:	Stable  Instructions for follow-up, activity and diet:	Follow up with Dr. Levy Tufrancisday, Oct 10 at 2pm

## 2017-09-19 NOTE — DISCHARGE NOTE ADULT - PATIENT PORTAL LINK FT
“You can access the Follow[facility] Patient Portal, offered by [facility], by registering with the following website: [email or web address]”

## 2017-09-19 NOTE — PROGRESS NOTE ADULT - ASSESSMENT
Ethanolism  Continue Librium   Pt needs to decide for himself that he will stop drinking permanently   He has been well advised of the medical consequences of continuing to drink alcohol   and has been encouraged to seek in patient Alcohol rehab programs in the past   or at minimum out pt programs with consistency   Dehydration resolved    Gastritis   Increase Famotidine as per recommendation Of Dr Canales    If  no further intervention planned by GI  For Discharge

## 2017-09-19 NOTE — DIETITIAN INITIAL EVALUATION ADULT. - OTHER INFO
61 y.o./male admitted 2/2 EtOH abuse. Pt reports persistent lack of appetite over the past 1-2 months 2/2 alcohol intake. Reports that he "watches what he eats"; lots of fish, chicken, minimal red meat. When he does have an appetite he reports eating well. No N/V/C/D reported at this time; BM+ 9/18. No pain at this time. No difficulty chewing or swallowing. 100% PO intake per meal while in-house, reports starting to feel better. Reports 6# weight loss over past month 2/2 decrease appetite and intake. Skin intact pressure-wise.

## 2017-09-19 NOTE — DIETITIAN INITIAL EVALUATION ADULT. - PROBLEM SELECTOR PLAN 8
Hx fo CAD s/p MI in 2003 and JOSHUA in 2016. Currently pt does not endorse any chest pain.   -c/w aspirin 162mg   -c/w clopidogrel 75mg   -c/w atorvastatin 40mg

## 2017-09-19 NOTE — DISCHARGE NOTE ADULT - NS AS ACTIVITY OBS
Driving allowed/Sex allowed/Walking-Outdoors allowed/Stairs allowed/Bathing allowed/Showering allowed/Walking-Indoors allowed/Return to Work/School allowed

## 2017-09-19 NOTE — DIETITIAN INITIAL EVALUATION ADULT. - PROBLEM SELECTOR PLAN 3
Pt w/ EF of 35% in 9/2017. Pt w/ [employer] AICD placement in 11/2009. Pt does not appear to be in acute exacerbation.   -s/p 2 L of NS in the ED  -will avoid excessive IV fluid  -c/w Metoprolol succinate 100mg   -c/w Lisinopril 20mg daily  -daily weight  -I&Os

## 2017-09-19 NOTE — DIETITIAN INITIAL EVALUATION ADULT. - PROBLEM SELECTOR PLAN 1
Pt w/ significant history of ETOH abuse. Pt reports having more than 1 pint of vodka last night. Pt s/p 1mg of ativan in the ED. Current CIWA of 7. Pt w/ b/l upper extremity tremors and tongue fasciculations. Pt reports taking Librium 20mg this AM given concern for alcohol withdrawal.   -Librium 25mg q8hrs.   -Ativan 2mg PO PRN for CIWA greater than 8  -CIWA q4hrs  -Zofran 4mg q6hrs   -Thiamine 100mg daily  -Folate 1mg daily   -500 cc NS @100cc/hr x1

## 2017-09-19 NOTE — DISCHARGE NOTE ADULT - HOSPITAL COURSE
60 y/o M w/ a PMHx of CAD s/p MI (2003) and JOSHUA in 2016, HFrEF (35%-40%) s/p [employer] AICD (11/2009), paroxysmal afib w/ known MERLE clot (not on anticoagulation), gastritis, hx of lower GI bleed (3/2016), EtOH use disorder presenting w/ weakness and non-bloody, non-bilious vomiting. Pt reports that he awoke yesterday morning not feeling well and in fear of having withdrawal symptoms pt took 20mg of Chlordiazepoxide. He endorses poor PO intake secondary to lack of appetite for the past month. Pt reports a 10 pound weight loss in past month. Also of note pt reports that he had more than 1 pint of vodka on Sunday 9/17. Pt called Dr. Levy who recommended coming to ED. Pt has been clinically stable, no signs of DT. Dr. Levy agrees patient can be followed up as outpatient. 62 y/o M w/ a PMHx of CAD s/p MI (2003) and JOSHUA in 2016, HFrEF (35%-40%) s/p [employer] AICD (11/2009), paroxysmal afib w/ known MERLE clot (not on anticoagulation), gastritis, hx of lower GI bleed (3/2016), EtOH use disorder presenting w/ weakness and non-bloody, non-bilious vomiting. Pt reports that he awoke yesterday morning not feeling well and in fear of having withdrawal symptoms pt took 20mg of Chlordiazepoxide. He endorses poor PO intake secondary to lack of appetite for the past month. Pt reports a 10 pound weight loss in past month. Also of note pt reports that he had more than 1 pint of vodka on Sunday 9/17. Pt called Dr. Levy who recommended coming to ED. Pt has been clinically stable, no signs of DT. Pt has follow up appointment with Dr. Levy on Tuesday, Oct 10 at 2pm 60 y/o M w/ a PMHx of CAD s/p MI (2003) and JOSHUA in 2016, HFrEF (35%-40%) s/p [employer] AICD (11/2009), paroxysmal afib w/ known MERLE clot (not on anticoagulation), gastritis, hx of lower GI bleed (3/2016), EtOH use disorder presenting w/ weakness and non-bloody, non-bilious vomiting. Pt reports that he awoke yesterday morning not feeling well and in fear of having withdrawal symptoms pt took 20mg of Chlordiazepoxide. He endorses poor PO intake secondary to lack of appetite for the past month. Pt reports a 10 pound weight loss in past month. Also of note pt reports that he had more than 1 pint of vodka on Sunday 9/17. Pt called Dr. Levy who recommended coming to ED. Pt has been clinically stable, no signs of DT. Pt has follow up appointment with Dr. Levy on Tuesday, Oct 10 at 2pm.

## 2017-09-19 NOTE — DISCHARGE NOTE ADULT - CARE PROVIDER_API CALL
Ck Levy), Medicine  [address] [place] [address]  Phone: [phone]  Fax: [phone]

## 2017-09-19 NOTE — PROGRESS NOTE ADULT - SUBJECTIVE AND OBJECTIVE BOX
Gastro Intestinal Consult noted    Pt  is unchanged   with loss of appetitie and abdominal discomfort    PAST MEDICAL & SURGICAL HISTORY:  Pacemaker  ETOH abuse  Paroxysmal a-fib  Myocardial infarction involving other coronary artery  Gastritis  Atherosclerosis of coronary artery: Coronary artery disease  Automatic implantable cardiac defibrillator in situ: ICD (implantable cardioverter-defibrillator) in place  LV Thrombus    MEDICATIONS  (STANDING):  aspirin enteric coated 162 milliGRAM(s) Oral daily  lisinopril 20 milliGRAM(s) Oral daily  atorvastatin 40 milliGRAM(s) Oral at bedtime  clopidogrel Tablet 75 milliGRAM(s) Oral daily  metoprolol succinate  milliGRAM(s) Oral daily  folic acid 1 milliGRAM(s) Oral daily  thiamine 100 milliGRAM(s) Oral daily  heparin  Injectable 5000 Unit(s) SubCutaneous every 8 hours  chlordiazePOXIDE 25 milliGRAM(s) Oral every 8 hours  famotidine    Tablet 20 milliGRAM(s) Oral daily  sodium chloride 0.9%. 1000 milliLiter(s) (100 mL/Hr) IV Continuous <Continuous>    MEDICATIONS  (PRN):  ondansetron Injectable 4 milliGRAM(s) IV Push every 6 hours PRN Nausea and/or Vomiting      Lungs clear  CV s 1 s 2    ICU Vital Signs Last 24 Hrs  T(C): 36.5 (19 Sep 2017 06:05), Max: 37.2 (18 Sep 2017 16:35)  T(F): 97.7 (19 Sep 2017 06:05), Max: 98.9 (18 Sep 2017 16:35)  HR: 62 (19 Sep 2017 06:05) (62 - 92)  BP: 114/72 (19 Sep 2017 06:05) (114/72 - 149/71)  BP(mean): --  ABP: --  ABP(mean): --  RR: 15 (19 Sep 2017 06:05) (15 - 18)  SpO2: 99% (19 Sep 2017 06:05) (95% - 99%)    Abd soft  ext stable                          9.0    2.5   )-----------( 142      ( 19 Sep 2017 06:18 )             28.2   09-19    135  |  99  |  14  ----------------------------<  94  4.3   |  26  |  1.09    Ca    8.8      19 Sep 2017 06:18  Phos  3.3     09-19  Mg     1.7     09-19    TPro  6.3  /  Alb  3.8  /  TBili  1.0  /  DBili  x   /  AST  30  /  ALT  23  /  AlkPhos  48  09-19  Hepatitis C Antibody Test (08.03.16 @ 02:36)    Hepatitis C Virus S/CO Ratio: 0.09 S/CO    Hepatitis C Virus Interpretation: Nonreact: Hepatitis C AB  S/CO Ratio                        Interpretation  < 1.0                                     Non-Reactive  1.0 - 4.9                           Weakly-Reactive  > 5.0                                 Reactive  Non-Reactive: A person withNonreact: a non-reactive HCV antibody result is  considered uninfected.  No further action is needed unless recent  infection is suspected.  In these cases, consider repeat testing later to  detect seroconversion..  Weakly-Reactive: HCV antibody test is abnormaNonreact: l, HCV RNA Qualitative test  will follow.  Reactive: HCV antibody test is abnormal, HCV RNA Qualitative test will  follow.  Note: HCV antibody testing is performed on the [employer]  system.    Hepatitis B Surface Antibody (03.08.16 @ 06:22)    Hepatitis B Surface Antibody: Indeterminate    Amylase, Serum Total (09.10.17 @ 12:18)    Amylase, Serum Total: 45 U/L

## 2017-09-19 NOTE — DIETITIAN INITIAL EVALUATION ADULT. - PROBLEM SELECTOR PLAN 7
Pt w/ hx of gastritis. Last EGD was in 2014 as per pt.   -pepcid 20mg daily    #History of lower GI bleed  pt w/ hx of lower GI bleed s/p colonoscopy in 2014. Pt currently denies melena or BRBPR. Scheduled to have colonoscopy this upcoming week w/ Dr. Canales.   -monitor for any signs of GI bleed.

## 2017-09-21 DIAGNOSIS — I48.0 PAROXYSMAL ATRIAL FIBRILLATION: ICD-10-CM

## 2017-09-21 DIAGNOSIS — D50.9 IRON DEFICIENCY ANEMIA, UNSPECIFIED: ICD-10-CM

## 2017-09-21 DIAGNOSIS — K29.00 ACUTE GASTRITIS WITHOUT BLEEDING: ICD-10-CM

## 2017-09-21 DIAGNOSIS — I25.10 ATHEROSCLEROTIC HEART DISEASE OF NATIVE CORONARY ARTERY WITHOUT ANGINA PECTORIS: ICD-10-CM

## 2017-09-21 DIAGNOSIS — F10.10 ALCOHOL ABUSE, UNCOMPLICATED: ICD-10-CM

## 2017-09-21 DIAGNOSIS — Z98.61 CORONARY ANGIOPLASTY STATUS: ICD-10-CM

## 2017-09-21 DIAGNOSIS — K86.1 OTHER CHRONIC PANCREATITIS: ICD-10-CM

## 2017-09-21 DIAGNOSIS — E78.5 HYPERLIPIDEMIA, UNSPECIFIED: ICD-10-CM

## 2017-09-21 DIAGNOSIS — Z95.0 PRESENCE OF CARDIAC PACEMAKER: ICD-10-CM

## 2017-09-21 DIAGNOSIS — K29.70 GASTRITIS, UNSPECIFIED, WITHOUT BLEEDING: ICD-10-CM

## 2017-09-21 DIAGNOSIS — I50.22 CHRONIC SYSTOLIC (CONGESTIVE) HEART FAILURE: ICD-10-CM

## 2017-09-21 DIAGNOSIS — E86.0 DEHYDRATION: ICD-10-CM

## 2017-09-21 DIAGNOSIS — I25.2 OLD MYOCARDIAL INFARCTION: ICD-10-CM

## 2017-10-07 ENCOUNTER — INPATIENT (INPATIENT)
Facility: HOSPITAL | Age: 62
LOS: 2 days | Discharge: HOME | DRG: 309 | End: 2017-10-10
Attending: INTERNAL MEDICINE | Admitting: INTERNAL MEDICINE
Payer: MEDICARE

## 2017-10-07 VITALS
TEMPERATURE: 98 F | RESPIRATION RATE: 16 BRPM | WEIGHT: 164.02 LBS | SYSTOLIC BLOOD PRESSURE: 92 MMHG | OXYGEN SATURATION: 98 % | HEART RATE: 91 BPM | DIASTOLIC BLOOD PRESSURE: 50 MMHG

## 2017-10-07 DIAGNOSIS — I48.91 UNSPECIFIED ATRIAL FIBRILLATION: ICD-10-CM

## 2017-10-07 DIAGNOSIS — R63.8 OTHER SYMPTOMS AND SIGNS CONCERNING FOOD AND FLUID INTAKE: ICD-10-CM

## 2017-10-07 DIAGNOSIS — E87.2 ACIDOSIS: ICD-10-CM

## 2017-10-07 DIAGNOSIS — I50.22 CHRONIC SYSTOLIC (CONGESTIVE) HEART FAILURE: ICD-10-CM

## 2017-10-07 DIAGNOSIS — I25.10 ATHEROSCLEROTIC HEART DISEASE OF NATIVE CORONARY ARTERY WITHOUT ANGINA PECTORIS: ICD-10-CM

## 2017-10-07 DIAGNOSIS — Z29.9 ENCOUNTER FOR PROPHYLACTIC MEASURES, UNSPECIFIED: ICD-10-CM

## 2017-10-07 DIAGNOSIS — F10.230 ALCOHOL DEPENDENCE WITH WITHDRAWAL, UNCOMPLICATED: ICD-10-CM

## 2017-10-07 DIAGNOSIS — F10.10 ALCOHOL ABUSE, UNCOMPLICATED: ICD-10-CM

## 2017-10-07 LAB
ALBUMIN SERPL ELPH-MCNC: 4.5 G/DL — SIGNIFICANT CHANGE UP (ref 3.3–5)
ALP SERPL-CCNC: 70 U/L — SIGNIFICANT CHANGE UP (ref 40–120)
ALT FLD-CCNC: 33 U/L — SIGNIFICANT CHANGE UP (ref 10–45)
ANION GAP SERPL CALC-SCNC: 16 MMOL/L — SIGNIFICANT CHANGE UP (ref 5–17)
ANION GAP SERPL CALC-SCNC: 24 MMOL/L — HIGH (ref 5–17)
APPEARANCE UR: CLEAR — SIGNIFICANT CHANGE UP
APTT BLD: 27 SEC — LOW (ref 27.5–37.4)
AST SERPL-CCNC: 60 U/L — HIGH (ref 10–40)
B-OH-BUTYR SERPL-SCNC: 0.3 MMOL/L — SIGNIFICANT CHANGE UP
BASE EXCESS BLDV CALC-SCNC: -4.7 MMOL/L — SIGNIFICANT CHANGE UP
BASOPHILS NFR BLD AUTO: 1.3 % — SIGNIFICANT CHANGE UP (ref 0–2)
BILIRUB SERPL-MCNC: 0.6 MG/DL — SIGNIFICANT CHANGE UP (ref 0.2–1.2)
BILIRUB UR-MCNC: NEGATIVE — SIGNIFICANT CHANGE UP
BUN SERPL-MCNC: 19 MG/DL — SIGNIFICANT CHANGE UP (ref 7–23)
BUN SERPL-MCNC: 25 MG/DL — HIGH (ref 7–23)
CALCIUM SERPL-MCNC: 8.3 MG/DL — LOW (ref 8.4–10.5)
CALCIUM SERPL-MCNC: 9.2 MG/DL — SIGNIFICANT CHANGE UP (ref 8.4–10.5)
CHLORIDE SERPL-SCNC: 91 MMOL/L — LOW (ref 96–108)
CHLORIDE SERPL-SCNC: 98 MMOL/L — SIGNIFICANT CHANGE UP (ref 96–108)
CK MB CFR SERPL CALC: 1.4 NG/ML — SIGNIFICANT CHANGE UP (ref 0–6.7)
CK SERPL-CCNC: 131 U/L — SIGNIFICANT CHANGE UP (ref 30–200)
CO2 SERPL-SCNC: 19 MMOL/L — LOW (ref 22–31)
CO2 SERPL-SCNC: 22 MMOL/L — SIGNIFICANT CHANGE UP (ref 22–31)
COLOR SPEC: YELLOW — SIGNIFICANT CHANGE UP
CREAT SERPL-MCNC: 1.05 MG/DL — SIGNIFICANT CHANGE UP (ref 0.5–1.3)
CREAT SERPL-MCNC: 1.51 MG/DL — HIGH (ref 0.5–1.3)
DIFF PNL FLD: NEGATIVE — SIGNIFICANT CHANGE UP
EOSINOPHIL NFR BLD AUTO: 0.2 % — SIGNIFICANT CHANGE UP (ref 0–6)
ETHANOL SERPL-MCNC: 221 MG/DL — HIGH (ref 0–10)
GAS PNL BLDV: SIGNIFICANT CHANGE UP
GLUCOSE SERPL-MCNC: 106 MG/DL — HIGH (ref 70–99)
GLUCOSE SERPL-MCNC: 218 MG/DL — HIGH (ref 70–99)
GLUCOSE UR QL: 100
HCO3 BLDV-SCNC: 21 MMOL/L — SIGNIFICANT CHANGE UP (ref 20–27)
HCT VFR BLD CALC: 34.5 % — LOW (ref 39–50)
HGB BLD-MCNC: 11 G/DL — LOW (ref 13–17)
INR BLD: 0.98 — SIGNIFICANT CHANGE UP (ref 0.88–1.16)
KETONES UR-MCNC: 15 MG/DL
LACTATE SERPL-SCNC: 2.8 MMOL/L — HIGH (ref 0.5–2)
LACTATE SERPL-SCNC: 3.9 MMOL/L — HIGH (ref 0.5–2)
LEUKOCYTE ESTERASE UR-ACNC: NEGATIVE — SIGNIFICANT CHANGE UP
LYMPHOCYTES # BLD AUTO: 34.7 % — SIGNIFICANT CHANGE UP (ref 13–44)
MCHC RBC-ENTMCNC: 23.6 PG — LOW (ref 27–34)
MCHC RBC-ENTMCNC: 31.9 G/DL — LOW (ref 32–36)
MCV RBC AUTO: 73.9 FL — LOW (ref 80–100)
MONOCYTES NFR BLD AUTO: 5.6 % — SIGNIFICANT CHANGE UP (ref 2–14)
NEUTROPHILS NFR BLD AUTO: 58.2 % — SIGNIFICANT CHANGE UP (ref 43–77)
NITRITE UR-MCNC: NEGATIVE — SIGNIFICANT CHANGE UP
PCO2 BLDV: 42 MMHG — SIGNIFICANT CHANGE UP (ref 41–51)
PH BLDV: 7.32 — SIGNIFICANT CHANGE UP (ref 7.32–7.43)
PH UR: 6 — SIGNIFICANT CHANGE UP (ref 5–8)
PLATELET # BLD AUTO: 224 K/UL — SIGNIFICANT CHANGE UP (ref 150–400)
PO2 BLDV: 45 MMHG — SIGNIFICANT CHANGE UP
POTASSIUM SERPL-MCNC: 4.2 MMOL/L — SIGNIFICANT CHANGE UP (ref 3.5–5.3)
POTASSIUM SERPL-MCNC: 4.8 MMOL/L — SIGNIFICANT CHANGE UP (ref 3.5–5.3)
POTASSIUM SERPL-SCNC: 4.2 MMOL/L — SIGNIFICANT CHANGE UP (ref 3.5–5.3)
POTASSIUM SERPL-SCNC: 4.8 MMOL/L — SIGNIFICANT CHANGE UP (ref 3.5–5.3)
PROT SERPL-MCNC: 7.8 G/DL — SIGNIFICANT CHANGE UP (ref 6–8.3)
PROT UR-MCNC: NEGATIVE MG/DL — SIGNIFICANT CHANGE UP
PROTHROM AB SERPL-ACNC: 10.9 SEC — SIGNIFICANT CHANGE UP (ref 9.8–12.7)
RBC # BLD: 4.67 M/UL — SIGNIFICANT CHANGE UP (ref 4.2–5.8)
RBC # FLD: 20.7 % — HIGH (ref 10.3–16.9)
SAO2 % BLDV: 77 % — SIGNIFICANT CHANGE UP
SODIUM SERPL-SCNC: 134 MMOL/L — LOW (ref 135–145)
SODIUM SERPL-SCNC: 136 MMOL/L — SIGNIFICANT CHANGE UP (ref 135–145)
SP GR SPEC: >=1.03 — SIGNIFICANT CHANGE UP (ref 1–1.03)
TROPONIN T SERPL-MCNC: <0.01 NG/ML — SIGNIFICANT CHANGE UP (ref 0–0.01)
UROBILINOGEN FLD QL: 0.2 E.U./DL — SIGNIFICANT CHANGE UP
WBC # BLD: 4.5 K/UL — SIGNIFICANT CHANGE UP (ref 3.8–10.5)
WBC # FLD AUTO: 4.5 K/UL — SIGNIFICANT CHANGE UP (ref 3.8–10.5)

## 2017-10-07 PROCEDURE — 71010: CPT | Mod: 26

## 2017-10-07 PROCEDURE — 93010 ELECTROCARDIOGRAM REPORT: CPT

## 2017-10-07 PROCEDURE — 99291 CRITICAL CARE FIRST HOUR: CPT | Mod: 25

## 2017-10-07 RX ORDER — SODIUM CHLORIDE 9 MG/ML
1000 INJECTION, SOLUTION INTRAVENOUS
Qty: 0 | Refills: 0 | Status: DISCONTINUED | OUTPATIENT
Start: 2017-10-07 | End: 2017-10-07

## 2017-10-07 RX ORDER — METOPROLOL TARTRATE 50 MG
12.5 TABLET ORAL ONCE
Qty: 0 | Refills: 0 | Status: COMPLETED | OUTPATIENT
Start: 2017-10-07 | End: 2017-10-07

## 2017-10-07 RX ORDER — INSULIN LISPRO 100/ML
VIAL (ML) SUBCUTANEOUS
Qty: 0 | Refills: 0 | Status: DISCONTINUED | OUTPATIENT
Start: 2017-10-07 | End: 2017-10-10

## 2017-10-07 RX ORDER — THIAMINE MONONITRATE (VIT B1) 100 MG
100 TABLET ORAL DAILY
Qty: 0 | Refills: 0 | Status: DISCONTINUED | OUTPATIENT
Start: 2017-10-07 | End: 2017-10-10

## 2017-10-07 RX ORDER — METOPROLOL TARTRATE 50 MG
5 TABLET ORAL ONCE
Qty: 0 | Refills: 0 | Status: COMPLETED | OUTPATIENT
Start: 2017-10-07 | End: 2017-10-07

## 2017-10-07 RX ORDER — SODIUM CHLORIDE 9 MG/ML
1000 INJECTION INTRAMUSCULAR; INTRAVENOUS; SUBCUTANEOUS ONCE
Qty: 0 | Refills: 0 | Status: COMPLETED | OUTPATIENT
Start: 2017-10-07 | End: 2017-10-07

## 2017-10-07 RX ORDER — ATORVASTATIN CALCIUM 80 MG/1
40 TABLET, FILM COATED ORAL AT BEDTIME
Qty: 0 | Refills: 0 | Status: DISCONTINUED | OUTPATIENT
Start: 2017-10-07 | End: 2017-10-10

## 2017-10-07 RX ORDER — SODIUM CHLORIDE 9 MG/ML
1000 INJECTION INTRAMUSCULAR; INTRAVENOUS; SUBCUTANEOUS
Qty: 0 | Refills: 0 | Status: DISCONTINUED | OUTPATIENT
Start: 2017-10-07 | End: 2017-10-08

## 2017-10-07 RX ORDER — FOLIC ACID 0.8 MG
1 TABLET ORAL DAILY
Qty: 0 | Refills: 0 | Status: DISCONTINUED | OUTPATIENT
Start: 2017-10-07 | End: 2017-10-10

## 2017-10-07 RX ORDER — CLOPIDOGREL BISULFATE 75 MG/1
75 TABLET, FILM COATED ORAL DAILY
Qty: 0 | Refills: 0 | Status: DISCONTINUED | OUTPATIENT
Start: 2017-10-07 | End: 2017-10-10

## 2017-10-07 RX ORDER — METOPROLOL TARTRATE 50 MG
100 TABLET ORAL DAILY
Qty: 0 | Refills: 0 | Status: DISCONTINUED | OUTPATIENT
Start: 2017-10-08 | End: 2017-10-08

## 2017-10-07 RX ORDER — ASPIRIN/CALCIUM CARB/MAGNESIUM 324 MG
162 TABLET ORAL DAILY
Qty: 0 | Refills: 0 | Status: DISCONTINUED | OUTPATIENT
Start: 2017-10-07 | End: 2017-10-10

## 2017-10-07 RX ORDER — LISINOPRIL 2.5 MG/1
20 TABLET ORAL DAILY
Qty: 0 | Refills: 0 | Status: DISCONTINUED | OUTPATIENT
Start: 2017-10-08 | End: 2017-10-10

## 2017-10-07 RX ORDER — HEPARIN SODIUM 5000 [USP'U]/ML
5000 INJECTION INTRAVENOUS; SUBCUTANEOUS EVERY 8 HOURS
Qty: 0 | Refills: 0 | Status: DISCONTINUED | OUTPATIENT
Start: 2017-10-07 | End: 2017-10-10

## 2017-10-07 RX ADMIN — SODIUM CHLORIDE 75 MILLILITER(S): 9 INJECTION INTRAMUSCULAR; INTRAVENOUS; SUBCUTANEOUS at 23:13

## 2017-10-07 RX ADMIN — Medication 25 MILLIGRAM(S): at 23:53

## 2017-10-07 RX ADMIN — SODIUM CHLORIDE 125 MILLILITER(S): 9 INJECTION, SOLUTION INTRAVENOUS at 17:38

## 2017-10-07 RX ADMIN — Medication 1 MILLIGRAM(S): at 23:52

## 2017-10-07 RX ADMIN — Medication 1 TABLET(S): at 23:53

## 2017-10-07 RX ADMIN — Medication 5 MILLIGRAM(S): at 19:25

## 2017-10-07 RX ADMIN — Medication 12.5 MILLIGRAM(S): at 23:52

## 2017-10-07 RX ADMIN — Medication 2 MILLIGRAM(S): at 23:13

## 2017-10-07 RX ADMIN — SODIUM CHLORIDE 1000 MILLILITER(S): 9 INJECTION INTRAMUSCULAR; INTRAVENOUS; SUBCUTANEOUS at 16:20

## 2017-10-07 NOTE — H&P ADULT - PROBLEM SELECTOR PLAN 5
Patient counseled on alcohol cessation and discussed options including inpatient and outpatient rehabilitation programs as well as the possibility of seeing a psychologist or counselor for help dealing with the stressors in his life that seem to precede his drinking (family conflicts). Patient expressed interested in outpatient rehabilitation and/or counseling/psychologist.

## 2017-10-07 NOTE — H&P ADULT - PROBLEM SELECTOR PLAN 4
Pt w/o current CP, SOB, CARRERO. S/p MI (2003) and JOSHUA (2016).   - c/w home ASA, Plavix  - c/w Lipitor 40 mg qHS

## 2017-10-07 NOTE — H&P ADULT - HISTORY OF PRESENT ILLNESS
61M PMH EtOH abuse, CAD (s/p MI 2003, s/p JOSHUA 7/16), CHF (EF 35-40%) s/p AICD, lower GIB (2016), p/w 1d. decreased PO intake, weakness, and hypotension measured on home BP machine. Patient states that he has had a limited appetite over the last few weeks and is only hungry when he is drinking. He drank 1 pint of vodka on 10/6 late afternoon and has not had any alcohol since. This morning, he woke up feeling weak, tired, and lightheaded upon standing. Endorses some associated dizziness, tremulousness, and epigastric pain. He attempted to drink water for a few hours but did not eat anything, and then took his blood pressure at home which he states was in the SBP 90s. At this time he "felt like he was going to pass out" so he went to ED. Patient denies fever, chills, nausea, vomiting, diarrhea, constipation, myaglias, arthralgias, chest pain, palpitations, shortness of breath, CARRERO, blood in the stool, LE edema. No recent sick contacts. Denies current tactile/audiovisual hallucinations, confusion, memory loss, vision changes, headache.    In the ED, patient's vitals were as follows: T98.4, HR , BP 87/65-->137/79, RR 16-20, SpO2 % on RA.   Labs in the ED notable for normal WBC, normocytic anemia [Hgb 11.0 (baseline ~9-10 on past admissions), MCV 73.9], mild hyponatremia (Na 134), hypochloremia (Cl 91), metabolic acidosis w/ elevated AG (HCO3 19, AG 24), DANUTA (BUN 25/Cr 1.51), elevated lactate 3.9, UA + ketones (no B hydroxybutyrate), trops negative, EtOH level 221.     In the ED, pt was given 1L NS bolus x1 and metoprolol 5mg IVP x1.

## 2017-10-07 NOTE — H&P ADULT - PROBLEM SELECTOR PLAN 7
Fluids: NS @ 75cc/hr  Monitor BMP, replete lytes K>4, Mg>2  Diet: NPO for now, DASH/TLC when can tolerate PO

## 2017-10-07 NOTE — H&P ADULT - PROBLEM SELECTOR PLAN 2
Patient w/ metabolic acidosis upon presentation, elevated AG at 24 w/ UA + for ketones and elevated lactate. No hx DM, sugars WNL on BMP, and no betahydroxybutyrate to suggest DKA. Etiology of HAGMA likely 2/2 to lactic acidosis and alcoholic ketoacidosis. Pt given IVF in ED, lactate now downtrending and resolution of acidosis.   - monitor BMP  - trend lactate to normalization   - c/w IVF NS @ 75cc/hr

## 2017-10-07 NOTE — H&P ADULT - PROBLEM SELECTOR PLAN 8
DVT ppx: SQH 5000u q8hrs  GI ppx: not indicated at this time    Code status: FULL CODE    Dispo: admit to [facility] for telemetry monitoring

## 2017-10-07 NOTE — H&P ADULT - NSHPPHYSICALEXAM_GEN_ALL_CORE
VITAL SIGNS:  T(C): 36.8 (10-07-17 @ 22:40), Max: 36.9 (10-07-17 @ 16:08)  T(F): 98.3 (10-07-17 @ 22:40), Max: 98.4 (10-07-17 @ 16:08)  HR: 117 (10-07-17 @ 22:35) (91 - 117)  BP: 104/71 (10-07-17 @ 22:35) (87/65 - 137/79)  BP(mean): 85 (10-07-17 @ 22:35) (85 - 85)  RR: 20 (10-07-17 @ 22:35) (16 - 20)  SpO2: 98% (10-07-17 @ 21:40) (98% - 100%)  Wt(kg): 74.4kg    PHYSICAL EXAM:    Constitutional: WDWN, laying in bed, appears mildly anxious, tremors of the face/hands/legs at rest  Head: NC/AT  Eyes: PERRL, EOMI, anicteric sclera  ENT: no nasal discharge; uvula midline, no oropharyngeal erythema or exudates; dry MM; +fasciculations of tongue  Neck: supple; no JVD  Respiratory: breathing appears comfortable on room air. CTA B/L; no W/R/R  Cardiac: +S1/S2; irregularly irregular; no M/R/G appreciated  Gastrointestinal: soft, NT/ND; no rebound or guarding; +BSx4  Extremities: WWP, no clubbing or cyanosis; no peripheral edema  Musculoskeletal: NROM x4; no joint swelling, tenderness or erythema  Vascular: 2+ radial, femoral, DP pulses B/L  Lymphatic: no submandibular or cervical LAD  Neurologic: AAOx3; CNII-XII grossly intact; no focal deficits; +tremor of the b/l UE, face, and lower extremities at rest and when moving; finger to nose and rapid alternating movements intact; 5/5 strength b/l upper and lower extremities  Psychiatric: appropriate mood and affect

## 2017-10-07 NOTE — H&P ADULT - PROBLEM SELECTOR PLAN 1
Patient with known history of paroxysmal AFib, on metoprolol 100mg qd at home. Hypotensive as low as SBP 80s in the ED with EKG showing AFib w/ RVR. Given additional 5mg metoprolol IVP in the ED as well as IVF with improvement in BP, now normotensive.  - Pt still w/ HR in 110s-130 upon arrival to [facility], will give additional metoprolol 12.5mg PO   - c/w home metoprolol 100mg PO qd  - pt not on AC given history of GI bleed  - will monitor on tele  - EKG in AM

## 2017-10-07 NOTE — ED PROVIDER NOTE - OBJECTIVE STATEMENT
60 y/o m with PMH of CAD s/p stent, CHF EF 35-40%, [employer] AICD, paroxysmal atrail fib (not on anticoagulation), gastritis, lower GI bleed, ETOH abuse presents with weakness, nausea, lightheaded, dizziness.  Pt states he has been on etoh binge for several days.  Last drink was night prior.  Denies withdrawal seizures.  No cp or dyspnea.  Has had decrease po intake.

## 2017-10-07 NOTE — ED ADULT TRIAGE NOTE - OTHER COMPLAINTS
Pt c/o of feeling weak & faint since this morning.  Pt has defibriallator/pacemaker, unsure which one, device noted to L upper wall.  Denies chest pain.  States he has been drinking daily for 1 week, slight tremors noted, states last drink last night.  Appears pale & diaphoretic.

## 2017-10-07 NOTE — H&P ADULT - NSHPLABSRESULTS_GEN_ALL_CORE
LABS:                         11.0   4.5   )-----------( 224      ( 07 Oct 2017 16:30 )             34.5     10-07    136  |  98  |  19  ----------------------------<  106<H>  4.8   |  22  |  1.05    Ca    8.3<L>      07 Oct 2017 21:44    TPro  7.8  /  Alb  4.5  /  TBili  0.6  /  DBili  x   /  AST  60<H>  /  ALT  33  /  AlkPhos  70  10-07    PT/INR - ( 07 Oct 2017 16:30 )   PT: 10.9 sec;   INR: 0.98          PTT - ( 07 Oct 2017 16:30 )  PTT:27.0 sec  Urinalysis Basic - ( 07 Oct 2017 19:32 )    Color: Yellow / Appearance: Clear / SG: >=1.030 / pH: x  Gluc: x / Ketone: 15 mg/dL  / Bili: Negative / Urobili: 0.2 E.U./dL   Blood: x / Protein: NEGATIVE mg/dL / Nitrite: NEGATIVE   Leuk Esterase: NEGATIVE / RBC: x / WBC x   Sq Epi: x / Non Sq Epi: x / Bacteria: x      CARDIAC MARKERS ( 07 Oct 2017 16:30 )  x     / <0.01 ng/mL / 131 U/L / x     / 1.4 ng/mL        Lactate, Blood: 2.8 mmoL/L (10-07 @ 21:44)  Lactate, Blood: 3.9 mmoL/L (10-07 @ 17:40)      RADIOLOGY, EKG & ADDITIONAL TESTS:   EKG in ED: AFib w/ RVR, no acute ischemic changes.

## 2017-10-07 NOTE — ED PROVIDER NOTE - CRITICAL CARE PROVIDED
additional history taking/interpretation of diagnostic studies/consultation with other physicians/consult w/ pt's family directly relating to pts condition/direct patient care (not related to procedure)/telephone consultation with the patient's family/documentation/conducted a detailed discussion of DNR status

## 2017-10-07 NOTE — H&P ADULT - PROBLEM SELECTOR PLAN 6
Pt w/ echo showing EF 35% as of 9/2017, s/p AICD placement in 2009. No signs/sx of acute CHF exacerbation.   - c/w home lisinopril 20mg qd   - daily weights  - will avoid excessive fluids given CHF hx

## 2017-10-07 NOTE — ED ADULT NURSE NOTE - OBJECTIVE STATEMENT
Pt presented to ED with dizziness, chest discomfort, nausea and SOB. As per pt, onset of symptom was this morning, pt had drunk alcohol last night. Pt describes chest discomfort as "tightness and pressure," "not the same as when I had a heart attack." Pt also reports "I felt like passing out, but I didn't." Pt is A&O x 3, able to speak coherently in full sentences, respiration unlabored and even, skin warm and non-diaphoretic. afib noted on cardiac monitor, MD Tafoya aware. Pt denies fever, cough, vomiting, syncope, headache, vision change, numbness.

## 2017-10-07 NOTE — H&P ADULT - ASSESSMENT
61M PMH EtOH abuse, CAD (s/p MI 2003, s/p JOSHUA 7/16), CHF (EF 35-40%) s/p AICD, lower GIB (2016), p/w 1d. decreased PO intake, weakness, and hypotension measured on home BP machine, found to be in AFib w/ RVR in ED, admitted to [facility] for EtOH withdrawal and AFib with RVR.

## 2017-10-07 NOTE — H&P ADULT - PROBLEM SELECTOR PLAN 3
Patient with significant history of EtOH abuse requiring multiple admissions for alcohol detoxification. Denies history of withdrawal seizures. Currently w/ signs and sx of EtOH withdrawal and CIWA=13 on arrival to [facility] (8 pts for tremor, 4 pts for diaphoresis, 1pt for mild anxiety).   - Librium 25mg q8  - Ativan 2mg IVP q2 PRN for CIWA>8  - thiamine 100mg PO qd  - folic acid 1mg PO qd  - multivitamin qd  - elevate head of bed, fall precautions

## 2017-10-08 LAB
ANION GAP SERPL CALC-SCNC: 14 MMOL/L — SIGNIFICANT CHANGE UP (ref 5–17)
BUN SERPL-MCNC: 19 MG/DL — SIGNIFICANT CHANGE UP (ref 7–23)
CALCIUM SERPL-MCNC: 8.7 MG/DL — SIGNIFICANT CHANGE UP (ref 8.4–10.5)
CHLORIDE SERPL-SCNC: 96 MMOL/L — SIGNIFICANT CHANGE UP (ref 96–108)
CO2 SERPL-SCNC: 23 MMOL/L — SIGNIFICANT CHANGE UP (ref 22–31)
CREAT SERPL-MCNC: 1.13 MG/DL — SIGNIFICANT CHANGE UP (ref 0.5–1.3)
GLUCOSE SERPL-MCNC: 101 MG/DL — HIGH (ref 70–99)
HBA1C BLD-MCNC: 4.8 % — SIGNIFICANT CHANGE UP (ref 4–5.6)
HCT VFR BLD CALC: 27.8 % — LOW (ref 39–50)
HCT VFR BLD CALC: 29.3 % — LOW (ref 39–50)
HGB BLD-MCNC: 9.2 G/DL — LOW (ref 13–17)
HGB BLD-MCNC: 9.6 G/DL — LOW (ref 13–17)
LACTATE SERPL-SCNC: 1.2 MMOL/L — SIGNIFICANT CHANGE UP (ref 0.5–2)
MAGNESIUM SERPL-MCNC: 1.7 MG/DL — SIGNIFICANT CHANGE UP (ref 1.6–2.6)
MCHC RBC-ENTMCNC: 24 PG — LOW (ref 27–34)
MCHC RBC-ENTMCNC: 24.3 PG — LOW (ref 27–34)
MCHC RBC-ENTMCNC: 32.8 G/DL — SIGNIFICANT CHANGE UP (ref 32–36)
MCHC RBC-ENTMCNC: 33.1 G/DL — SIGNIFICANT CHANGE UP (ref 32–36)
MCV RBC AUTO: 73.3 FL — LOW (ref 80–100)
MCV RBC AUTO: 73.5 FL — LOW (ref 80–100)
PHOSPHATE SERPL-MCNC: 3 MG/DL — SIGNIFICANT CHANGE UP (ref 2.5–4.5)
PLATELET # BLD AUTO: 149 K/UL — LOW (ref 150–400)
PLATELET # BLD AUTO: 162 K/UL — SIGNIFICANT CHANGE UP (ref 150–400)
POTASSIUM SERPL-MCNC: 4.5 MMOL/L — SIGNIFICANT CHANGE UP (ref 3.5–5.3)
POTASSIUM SERPL-SCNC: 4.5 MMOL/L — SIGNIFICANT CHANGE UP (ref 3.5–5.3)
RBC # BLD: 3.78 M/UL — LOW (ref 4.2–5.8)
RBC # BLD: 4 M/UL — LOW (ref 4.2–5.8)
RBC # FLD: 20 % — HIGH (ref 10.3–16.9)
RBC # FLD: 20.1 % — HIGH (ref 10.3–16.9)
SODIUM SERPL-SCNC: 133 MMOL/L — LOW (ref 135–145)
TSH SERPL-MCNC: 1.56 UIU/ML — SIGNIFICANT CHANGE UP (ref 0.35–4.94)
WBC # BLD: 3.2 K/UL — LOW (ref 3.8–10.5)
WBC # BLD: 3.6 K/UL — LOW (ref 3.8–10.5)
WBC # FLD AUTO: 3.2 K/UL — LOW (ref 3.8–10.5)
WBC # FLD AUTO: 3.6 K/UL — LOW (ref 3.8–10.5)

## 2017-10-08 RX ORDER — METOPROLOL TARTRATE 50 MG
100 TABLET ORAL DAILY
Qty: 0 | Refills: 0 | Status: DISCONTINUED | OUTPATIENT
Start: 2017-10-08 | End: 2017-10-10

## 2017-10-08 RX ADMIN — HEPARIN SODIUM 5000 UNIT(S): 5000 INJECTION INTRAVENOUS; SUBCUTANEOUS at 22:02

## 2017-10-08 RX ADMIN — ATORVASTATIN CALCIUM 40 MILLIGRAM(S): 80 TABLET, FILM COATED ORAL at 22:01

## 2017-10-08 RX ADMIN — HEPARIN SODIUM 5000 UNIT(S): 5000 INJECTION INTRAVENOUS; SUBCUTANEOUS at 14:17

## 2017-10-08 RX ADMIN — Medication 1 TABLET(S): at 12:09

## 2017-10-08 RX ADMIN — LISINOPRIL 20 MILLIGRAM(S): 2.5 TABLET ORAL at 06:04

## 2017-10-08 RX ADMIN — Medication 100 MILLIGRAM(S): at 09:12

## 2017-10-08 RX ADMIN — Medication 100 MILLIGRAM(S): at 12:09

## 2017-10-08 RX ADMIN — HEPARIN SODIUM 5000 UNIT(S): 5000 INJECTION INTRAVENOUS; SUBCUTANEOUS at 06:04

## 2017-10-08 RX ADMIN — Medication 1 MILLIGRAM(S): at 12:09

## 2017-10-08 RX ADMIN — Medication 25 MILLIGRAM(S): at 12:09

## 2017-10-08 RX ADMIN — CLOPIDOGREL BISULFATE 75 MILLIGRAM(S): 75 TABLET, FILM COATED ORAL at 06:04

## 2017-10-08 RX ADMIN — Medication 25 MILLIGRAM(S): at 23:45

## 2017-10-08 RX ADMIN — Medication 1 MILLIGRAM(S): at 15:27

## 2017-10-08 RX ADMIN — Medication 162 MILLIGRAM(S): at 06:09

## 2017-10-08 RX ADMIN — Medication 100 MILLIGRAM(S): at 00:37

## 2017-10-08 RX ADMIN — Medication 25 MILLIGRAM(S): at 06:04

## 2017-10-08 RX ADMIN — Medication 25 MILLIGRAM(S): at 18:54

## 2017-10-08 NOTE — PROGRESS NOTE ADULT - SUBJECTIVE AND OBJECTIVE BOX
CARDIOLOGY NP PROGRESS NOTE    Subjective:    Overnight Events:     TELEMETRY:  EKG:      VITAL SIGNS:  T(C): 36.4 (10-08-17 @ 10:00), Max: 36.9 (10-07-17 @ 16:08)  HR: 86 (10-08-17 @ 12:54) (86 - 124)  BP: 107/81 (10-08-17 @ 12:54) (87/65 - 137/79)  RR: 18 (10-08-17 @ 12:54) (16 - 20)  SpO2: 98% (10-08-17 @ 09:05) (96% - 100%)  Wt(kg): --    I&O's Summary    07 Oct 2017 07:01  -  08 Oct 2017 07:00  --------------------------------------------------------  IN: 1500 mL / OUT: 0 mL / NET: 1500 mL    08 Oct 2017 07:01  -  08 Oct 2017 14:29  --------------------------------------------------------  IN: 594 mL / OUT: 0 mL / NET: 594 mL          PHYSICAL EXAM:    General: A/ox 3, No acute Distress  Neck: Supple, NO JVD  Cardiac: S1 S2, No M/R/G  Pulmonary: CTAB, Breathing unlabored, No Rhonchi/Rales/Wheezing  Abdomen: Soft, Non -tender, +BS x 4 quads  Extremities: No Rashes, No edema  Neuro: A/o x 3, No focal deficits          LABS:                          9.6    3.2   )-----------( 149      ( 08 Oct 2017 13:22 )             29.3                              10-08    133<L>  |  96  |  19  ----------------------------<  101<H>  4.5   |  23  |  1.13    Ca    8.7      08 Oct 2017 06:15  Phos  3.0     10-08  Mg     1.7     10-08    TPro  7.8  /  Alb  4.5  /  TBili  0.6  /  DBili  x   /  AST  60<H>  /  ALT  33  /  AlkPhos  70  10-07    LIVER FUNCTIONS - ( 07 Oct 2017 16:30 )  Alb: 4.5 g/dL / Pro: 7.8 g/dL / ALK PHOS: 70 U/L / ALT: 33 U/L / AST: 60 U/L / GGT: x                                 PT/INR - ( 07 Oct 2017 16:30 )   PT: 10.9 sec;   INR: 0.98          PTT - ( 07 Oct 2017 16:30 )  PTT:27.0 sec  CAPILLARY BLOOD GLUCOSE  116 (08 Oct 2017 06:34)  176 (07 Oct 2017 16:43)        CARDIAC MARKERS ( 07 Oct 2017 16:30 )  x     / <0.01 ng/mL / 131 U/L / x     / 1.4 ng/mL            Capoten (Short breath; Rash; Hives)  penicillin (Short breath; Rash; Hives)    MEDICATIONS  (STANDING):  aspirin  chewable 162 milliGRAM(s) Oral daily  atorvastatin 40 milliGRAM(s) Oral at bedtime  chlordiazePOXIDE 25 milliGRAM(s) Oral every 6 hours  clopidogrel Tablet 75 milliGRAM(s) Oral daily  folic acid 1 milliGRAM(s) Oral daily  heparin  Injectable 5000 Unit(s) SubCutaneous every 8 hours  insulin lispro (HumaLOG) corrective regimen sliding scale   SubCutaneous Before meals and at bedtime  lisinopril 20 milliGRAM(s) Oral daily  metoprolol succinate  milliGRAM(s) Oral daily  multivitamin 1 Tablet(s) Oral daily  thiamine 100 milliGRAM(s) Oral daily    MEDICATIONS  (PRN):  LORazepam   Injectable 2 milliGRAM(s) IV Push every 2 hours PRN CIWA > 8        DIAGNOSTIC TESTS: CARDIOLOGY NP PROGRESS NOTE    Subjective: Pt seen and examined this AM. Reports feeling better this morning, but "not great". States still feeling anxious and tremulous. Reports decreased appetite for 2 days and unable to tolerate solid foods. Last drink was on Friday, 1 pint of Vodka. States that only drinks when he thinks about the stressors in his life. When he takes care of his daughter on the weekends, states that he doesn't drink. Denies nausea/vomiting, headache, diaphoresis, auditory or visual hallucinations.     Overnight Events: Admitted overnight,     TELEMETRY:  EKG:      VITAL SIGNS:  T(C): 36.4 (10-08-17 @ 10:00), Max: 36.9 (10-07-17 @ 16:08)  HR: 86 (10-08-17 @ 12:54) (86 - 124)  BP: 107/81 (10-08-17 @ 12:54) (87/65 - 137/79)  RR: 18 (10-08-17 @ 12:54) (16 - 20)  SpO2: 98% (10-08-17 @ 09:05) (96% - 100%)  Wt(kg): --    I&O's Summary    07 Oct 2017 07:01  -  08 Oct 2017 07:00  --------------------------------------------------------  IN: 1500 mL / OUT: 0 mL / NET: 1500 mL    08 Oct 2017 07:01  -  08 Oct 2017 14:29  --------------------------------------------------------  IN: 594 mL / OUT: 0 mL / NET: 594 mL          PHYSICAL EXAM:    General: A/ox 3, No acute Distress  Neck: Supple, NO JVD  Cardiac: S1 S2, No M/R/G  Pulmonary: CTAB, Breathing unlabored, No Rhonchi/Rales/Wheezing  Abdomen: Soft, Non -tender, +BS x 4 quads  Extremities: No Rashes, No edema  Neuro: A/o x 3, No focal deficits          LABS:                          9.6    3.2   )-----------( 149      ( 08 Oct 2017 13:22 )             29.3                              10-08    133<L>  |  96  |  19  ----------------------------<  101<H>  4.5   |  23  |  1.13    Ca    8.7      08 Oct 2017 06:15  Phos  3.0     10-08  Mg     1.7     10-08    TPro  7.8  /  Alb  4.5  /  TBili  0.6  /  DBili  x   /  AST  60<H>  /  ALT  33  /  AlkPhos  70  10-07    LIVER FUNCTIONS - ( 07 Oct 2017 16:30 )  Alb: 4.5 g/dL / Pro: 7.8 g/dL / ALK PHOS: 70 U/L / ALT: 33 U/L / AST: 60 U/L / GGT: x                                 PT/INR - ( 07 Oct 2017 16:30 )   PT: 10.9 sec;   INR: 0.98          PTT - ( 07 Oct 2017 16:30 )  PTT:27.0 sec  CAPILLARY BLOOD GLUCOSE  116 (08 Oct 2017 06:34)  176 (07 Oct 2017 16:43)        CARDIAC MARKERS ( 07 Oct 2017 16:30 )  x     / <0.01 ng/mL / 131 U/L / x     / 1.4 ng/mL            Capoten (Short breath; Rash; Hives)  penicillin (Short breath; Rash; Hives)    MEDICATIONS  (STANDING):  aspirin  chewable 162 milliGRAM(s) Oral daily  atorvastatin 40 milliGRAM(s) Oral at bedtime  chlordiazePOXIDE 25 milliGRAM(s) Oral every 6 hours  clopidogrel Tablet 75 milliGRAM(s) Oral daily  folic acid 1 milliGRAM(s) Oral daily  heparin  Injectable 5000 Unit(s) SubCutaneous every 8 hours  insulin lispro (HumaLOG) corrective regimen sliding scale   SubCutaneous Before meals and at bedtime  lisinopril 20 milliGRAM(s) Oral daily  metoprolol succinate  milliGRAM(s) Oral daily  multivitamin 1 Tablet(s) Oral daily  thiamine 100 milliGRAM(s) Oral daily    MEDICATIONS  (PRN):  LORazepam   Injectable 2 milliGRAM(s) IV Push every 2 hours PRN CIWA > 8        DIAGNOSTIC TESTS: CARDIOLOGY NP PROGRESS NOTE    Subjective: Pt seen and examined this AM. Reports feeling better this morning, but "not great". States still feeling anxious and tremulous. Reports decreased appetite for 2 days and unable to tolerate solid foods. Last drink was on Friday, 1 pint of Vodka. States that only drinks when he thinks about the stressors in his life. When he takes care of his daughter on the weekends, states that he doesn't drink. Denies nausea/vomiting, headache, diaphoresis, auditory or visual hallucinations.     Overnight Events: Admitted overnight, CIWA 13. Received IV Ativan and started on Librium 25mg po Q8hr     TELEMETRY: Afib rate 104-118, up to 150s this AM. This afternoon, afib 80s-100s  EKG:      VITAL SIGNS:  T(C): 36.4 (10-08-17 @ 10:00), Max: 36.9 (10-07-17 @ 16:08)  HR: 86 (10-08-17 @ 12:54) (86 - 124)  BP: 107/81 (10-08-17 @ 12:54) (87/65 - 137/79)  RR: 18 (10-08-17 @ 12:54) (16 - 20)  SpO2: 98% (10-08-17 @ 09:05) (96% - 100%)  Wt(kg): --    I&O's Summary    07 Oct 2017 07:01  -  08 Oct 2017 07:00  --------------------------------------------------------  IN: 1500 mL / OUT: 0 mL / NET: 1500 mL    08 Oct 2017 07:01  -  08 Oct 2017 14:29  --------------------------------------------------------  IN: 594 mL / OUT: 0 mL / NET: 594 mL          PHYSICAL EXAM:    General: A/ox 3, No acute Distress  Neck: Supple, NO JVD  Cardiac: S1 S2, MARK III/VI, no rub or gallop   Pulmonary: CTAB, Breathing unlabored, No Rhonchi/Rales/Wheezing  Abdomen: Soft, Non -tender, +BS x 4 quads  Extremities: No Rashes, No edema  Neuro: A/o x 3, No focal deficits          LABS:                          9.6    3.2   )-----------( 149      ( 08 Oct 2017 13:22 )             29.3                              10-08    133<L>  |  96  |  19  ----------------------------<  101<H>  4.5   |  23  |  1.13    Ca    8.7      08 Oct 2017 06:15  Phos  3.0     10-08  Mg     1.7     10-08    TPro  7.8  /  Alb  4.5  /  TBili  0.6  /  DBili  x   /  AST  60<H>  /  ALT  33  /  AlkPhos  70  10-07    LIVER FUNCTIONS - ( 07 Oct 2017 16:30 )  Alb: 4.5 g/dL / Pro: 7.8 g/dL / ALK PHOS: 70 U/L / ALT: 33 U/L / AST: 60 U/L / GGT: x                                 PT/INR - ( 07 Oct 2017 16:30 )   PT: 10.9 sec;   INR: 0.98          PTT - ( 07 Oct 2017 16:30 )  PTT:27.0 sec  CAPILLARY BLOOD GLUCOSE  116 (08 Oct 2017 06:34)  176 (07 Oct 2017 16:43)        CARDIAC MARKERS ( 07 Oct 2017 16:30 )  x     / <0.01 ng/mL / 131 U/L / x     / 1.4 ng/mL            Capoten (Short breath; Rash; Hives)  penicillin (Short breath; Rash; Hives)    MEDICATIONS  (STANDING):  aspirin  chewable 162 milliGRAM(s) Oral daily  atorvastatin 40 milliGRAM(s) Oral at bedtime  chlordiazePOXIDE 25 milliGRAM(s) Oral every 6 hours  clopidogrel Tablet 75 milliGRAM(s) Oral daily  folic acid 1 milliGRAM(s) Oral daily  heparin  Injectable 5000 Unit(s) SubCutaneous every 8 hours  insulin lispro (HumaLOG) corrective regimen sliding scale   SubCutaneous Before meals and at bedtime  lisinopril 20 milliGRAM(s) Oral daily  metoprolol succinate  milliGRAM(s) Oral daily  multivitamin 1 Tablet(s) Oral daily  thiamine 100 milliGRAM(s) Oral daily    MEDICATIONS  (PRN):  LORazepam   Injectable 2 milliGRAM(s) IV Push every 2 hours PRN CIWA > 8        DIAGNOSTIC TESTS:

## 2017-10-08 NOTE — PROGRESS NOTE ADULT - SUBJECTIVE AND OBJECTIVE BOX
Pt is unfortunate in that he cannot stop his Alcohol addiction despite years of discussion, treatment   He is s/p Cardiomyopathy    CAD PCI   admitted for Rapid A fib, he is refusing and/or unable to take anticoagulants secondary  to GI bleeding    PAST MEDICAL & SURGICAL HISTORY:  Pacemaker  ETOH abuse  Paroxysmal a-fib  Myocardial infarction involving other coronary artery  Gastritis  Atherosclerosis of coronary artery: Coronary artery disease  Automatic implantable cardiac defibrillator in situ: ICD (implantable cardioverter-defibrillator) in place    MEDICATIONS  (STANDING):  aspirin  chewable 162 milliGRAM(s) Oral daily  atorvastatin 40 milliGRAM(s) Oral at bedtime  chlordiazePOXIDE 25 milliGRAM(s) Oral every 8 hours  clopidogrel Tablet 75 milliGRAM(s) Oral daily  folic acid 1 milliGRAM(s) Oral daily  heparin  Injectable 5000 Unit(s) SubCutaneous every 8 hours  insulin lispro (HumaLOG) corrective regimen sliding scale   SubCutaneous Before meals and at bedtime  lisinopril 20 milliGRAM(s) Oral daily  metoprolol succinate  milliGRAM(s) Oral daily  multivitamin 1 Tablet(s) Oral daily  thiamine 100 milliGRAM(s) Oral daily    MEDICATIONS  (PRN):  LORazepam   Injectable 2 milliGRAM(s) IV Push every 2 hours PRN CIWA > 8    ICU Vital Signs Last 24 Hrs  T(C): 36.6 (08 Oct 2017 05:30), Max: 36.9 (07 Oct 2017 16:08)  T(F): 97.8 (08 Oct 2017 05:30), Max: 98.4 (07 Oct 2017 16:08)  HR: 120 (08 Oct 2017 09:05) (91 - 124)  BP: 95/75 (08 Oct 2017 09:05) (87/65 - 137/79)  BP(mean): 88 (08 Oct 2017 09:05) (83 - 88)  ABP: --  ABP(mean): --  RR: 18 (08 Oct 2017 09:05) (16 - 20)  SpO2: 98% (08 Oct 2017 09:05) (96% - 100%)      Lungs decreased breath sounds at bases  CXR neg    EKG A Fib  with rapid VR old AS MI    CV s1 s2  Abd soft  Ext stable                          9.2    3.6   )-----------( 162      ( 08 Oct 2017 06:15 )             27.8   10-08    133<L>  |  96  |  19  ----------------------------<  101<H>  4.5   |  23  |  1.13    Ca    8.7      08 Oct 2017 06:15  Phos  3.0     10-08  Mg     1.7     10-08    TPro  7.8  /  Alb  4.5  /  TBili  0.6  /  DBili  x   /  AST  60<H>  /  ALT  33  /  AlkPhos  70  10-07    PT/INR - ( 07 Oct 2017 16:30 )   PT: 10.9 sec;   INR: 0.98          PTT - ( 07 Oct 2017 16:30 )  PTT:27.0 secCARDIAC MARKERS ( 07 Oct 2017 16:30 )  x     / <0.01 ng/mL / 131 U/L / x     / 1.4 ng/mL

## 2017-10-09 DIAGNOSIS — D61.818 OTHER PANCYTOPENIA: ICD-10-CM

## 2017-10-09 LAB
ALBUMIN SERPL ELPH-MCNC: 4 G/DL — SIGNIFICANT CHANGE UP (ref 3.3–5)
ALP SERPL-CCNC: 57 U/L — SIGNIFICANT CHANGE UP (ref 40–120)
ALT FLD-CCNC: 20 U/L — SIGNIFICANT CHANGE UP (ref 10–45)
ANION GAP SERPL CALC-SCNC: 14 MMOL/L — SIGNIFICANT CHANGE UP (ref 5–17)
AST SERPL-CCNC: 30 U/L — SIGNIFICANT CHANGE UP (ref 10–40)
BILIRUB SERPL-MCNC: 0.7 MG/DL — SIGNIFICANT CHANGE UP (ref 0.2–1.2)
BUN SERPL-MCNC: 15 MG/DL — SIGNIFICANT CHANGE UP (ref 7–23)
CALCIUM SERPL-MCNC: 9.5 MG/DL — SIGNIFICANT CHANGE UP (ref 8.4–10.5)
CHLORIDE SERPL-SCNC: 96 MMOL/L — SIGNIFICANT CHANGE UP (ref 96–108)
CO2 SERPL-SCNC: 22 MMOL/L — SIGNIFICANT CHANGE UP (ref 22–31)
CREAT SERPL-MCNC: 1 MG/DL — SIGNIFICANT CHANGE UP (ref 0.5–1.3)
GLUCOSE SERPL-MCNC: 95 MG/DL — SIGNIFICANT CHANGE UP (ref 70–99)
HCT VFR BLD CALC: 29.7 % — LOW (ref 39–50)
HGB BLD-MCNC: 9.6 G/DL — LOW (ref 13–17)
MAGNESIUM SERPL-MCNC: 1.7 MG/DL — SIGNIFICANT CHANGE UP (ref 1.6–2.6)
MCHC RBC-ENTMCNC: 23.8 PG — LOW (ref 27–34)
MCHC RBC-ENTMCNC: 32.3 G/DL — SIGNIFICANT CHANGE UP (ref 32–36)
MCV RBC AUTO: 73.5 FL — LOW (ref 80–100)
PLATELET # BLD AUTO: 123 K/UL — LOW (ref 150–400)
POTASSIUM SERPL-MCNC: 4.1 MMOL/L — SIGNIFICANT CHANGE UP (ref 3.5–5.3)
POTASSIUM SERPL-SCNC: 4.1 MMOL/L — SIGNIFICANT CHANGE UP (ref 3.5–5.3)
PROT SERPL-MCNC: 6.8 G/DL — SIGNIFICANT CHANGE UP (ref 6–8.3)
RBC # BLD: 4.04 M/UL — LOW (ref 4.2–5.8)
RBC # FLD: 19.9 % — HIGH (ref 10.3–16.9)
SODIUM SERPL-SCNC: 132 MMOL/L — LOW (ref 135–145)
WBC # BLD: 2.8 K/UL — LOW (ref 3.8–10.5)
WBC # FLD AUTO: 2.8 K/UL — LOW (ref 3.8–10.5)

## 2017-10-09 PROCEDURE — 99223 1ST HOSP IP/OBS HIGH 75: CPT

## 2017-10-09 PROCEDURE — 93282 PRGRMG EVAL IMPLANTABLE DFB: CPT | Mod: 26

## 2017-10-09 RX ORDER — MAGNESIUM OXIDE 400 MG ORAL TABLET 241.3 MG
400 TABLET ORAL ONCE
Qty: 0 | Refills: 0 | Status: COMPLETED | OUTPATIENT
Start: 2017-10-09 | End: 2017-10-09

## 2017-10-09 RX ORDER — SODIUM CHLORIDE 9 MG/ML
250 INJECTION INTRAMUSCULAR; INTRAVENOUS; SUBCUTANEOUS
Qty: 0 | Refills: 0 | Status: DISCONTINUED | OUTPATIENT
Start: 2017-10-09 | End: 2017-10-09

## 2017-10-09 RX ORDER — SODIUM CHLORIDE 9 MG/ML
250 INJECTION INTRAMUSCULAR; INTRAVENOUS; SUBCUTANEOUS
Qty: 0 | Refills: 0 | Status: DISCONTINUED | OUTPATIENT
Start: 2017-10-09 | End: 2017-10-10

## 2017-10-09 RX ADMIN — SODIUM CHLORIDE 50 MILLILITER(S): 9 INJECTION INTRAMUSCULAR; INTRAVENOUS; SUBCUTANEOUS at 11:18

## 2017-10-09 RX ADMIN — Medication 25 MILLIGRAM(S): at 14:56

## 2017-10-09 RX ADMIN — Medication 100 MILLIGRAM(S): at 11:12

## 2017-10-09 RX ADMIN — MAGNESIUM OXIDE 400 MG ORAL TABLET 400 MILLIGRAM(S): 241.3 TABLET ORAL at 18:10

## 2017-10-09 RX ADMIN — Medication 25 MILLIGRAM(S): at 05:57

## 2017-10-09 RX ADMIN — Medication 1 TABLET(S): at 11:12

## 2017-10-09 RX ADMIN — Medication 162 MILLIGRAM(S): at 05:57

## 2017-10-09 RX ADMIN — Medication 1 MILLIGRAM(S): at 11:12

## 2017-10-09 RX ADMIN — Medication 25 MILLIGRAM(S): at 21:18

## 2017-10-09 RX ADMIN — SODIUM CHLORIDE 50 MILLILITER(S): 9 INJECTION INTRAMUSCULAR; INTRAVENOUS; SUBCUTANEOUS at 19:02

## 2017-10-09 RX ADMIN — LISINOPRIL 20 MILLIGRAM(S): 2.5 TABLET ORAL at 05:58

## 2017-10-09 RX ADMIN — HEPARIN SODIUM 5000 UNIT(S): 5000 INJECTION INTRAVENOUS; SUBCUTANEOUS at 21:18

## 2017-10-09 RX ADMIN — SODIUM CHLORIDE 50 MILLILITER(S): 9 INJECTION INTRAMUSCULAR; INTRAVENOUS; SUBCUTANEOUS at 23:33

## 2017-10-09 RX ADMIN — Medication 100 MILLIGRAM(S): at 05:57

## 2017-10-09 RX ADMIN — CLOPIDOGREL BISULFATE 75 MILLIGRAM(S): 75 TABLET, FILM COATED ORAL at 11:12

## 2017-10-09 RX ADMIN — HEPARIN SODIUM 5000 UNIT(S): 5000 INJECTION INTRAVENOUS; SUBCUTANEOUS at 05:57

## 2017-10-09 RX ADMIN — ATORVASTATIN CALCIUM 40 MILLIGRAM(S): 80 TABLET, FILM COATED ORAL at 21:18

## 2017-10-09 RX ADMIN — HEPARIN SODIUM 5000 UNIT(S): 5000 INJECTION INTRAVENOUS; SUBCUTANEOUS at 14:56

## 2017-10-09 NOTE — PROGRESS NOTE ADULT - PROBLEM SELECTOR PLAN 6
Patient w/ metabolic acidosis upon presentation, elevated AG at 24 w/ UA + for ketones and elevated lactate. No hx DM, sugars WNL on BMP, and no betahydroxybutyrate to suggest DKA. Etiology of HAGMA likely 2/2 to lactic acidosis and alcoholic ketoacidosis. Pt given IVF in ED, lactate and acidosis resolved.  - monitor BMP
Pt w/ echo showing EF 35% as of 9/2017, s/p AICD placement in 2009. No signs/sx of acute CHF exacerbation.   - c/w home lisinopril 20mg qd   - daily weights  - will avoid excessive fluids given CHF hx

## 2017-10-09 NOTE — PROGRESS NOTE ADULT - PROBLEM SELECTOR PLAN 8
DVT ppx: SQH 5000u q8hrs  GI ppx: not indicated at this time    Code status: FULL CODE    Dispo: Pt does not require further cardiac tele monitoring, currently SR 80s. Transfer to regional medicine floor under Dr Levy pending Psych consult and possible inpatient ETOH rehab
DVT ppx: SQH 5000u q8hrs  GI ppx: not indicated at this time    Code status: FULL CODE    Dispo: admit to [facility] for telemetry monitoring

## 2017-10-09 NOTE — PROCEDURE NOTE - ADDITIONAL PROCEDURE DETAILS
Implant Date 18-Nov-2009  R wave amplitude >25mV @ 87bpm  Battery: 5 years    - NSR, patient is not pacemaker dependent.   - Several episodes of NSVT. Patient with known Afib with RVR. No shocks delivered by device since last interrogation (Aug 23 2016).  - Patient followed by Dr. Ramos. Patient should follow-up with Dr. Ramos's office.

## 2017-10-09 NOTE — BEHAVIORAL HEALTH ASSESSMENT NOTE - SUMMARY
61M PMH EtOH abuse, CAD (s/p MI 2003, s/p JOSHUA 7/16), CHF (EF 35-40%) s/p AICD, lower GIB (2016), now admitted with weakness, decreased PO intake and hypotension in context of ongoing EtOH intake.    Although pt acknowledges that his drinking is problematic and potentially life-threatening, he is ambivalent regarding getting treatment, [facility] inpt rehab, based on caregiving responsibilities for his daughter.    Pt should be encouraged to pursue inpt rehab, but obviously cannot be forced to pursue treatment.

## 2017-10-09 NOTE — BEHAVIORAL HEALTH ASSESSMENT NOTE - NSBHADMITCOUNSEL_PSY_A_CORE
client/family/caregiver education/prognosis/importance of adherence to chosen treatment/instructions for management, treatment and follow up/risk factor reduction

## 2017-10-09 NOTE — PROGRESS NOTE ADULT - SUBJECTIVE AND OBJECTIVE BOX
Pt is back in NSR at rate to 100  with Occ PC    less shaky today      PAST MEDICAL & SURGICAL HISTORY:  Pacemaker  ETOH abuse  Paroxysmal a-fib  Myocardial infarction involving other coronary artery  Gastritis  Atherosclerosis of coronary artery: Coronary artery disease  Automatic implantable cardiac defibrillator in situ: ICD (implantable cardioverter-defibrillator) in place    MEDICATIONS  (STANDING):  aspirin  chewable 162 milliGRAM(s) Oral daily  atorvastatin 40 milliGRAM(s) Oral at bedtime  chlordiazePOXIDE 25 milliGRAM(s) Oral every 6 hours  clopidogrel Tablet 75 milliGRAM(s) Oral daily  folic acid 1 milliGRAM(s) Oral daily  heparin  Injectable 5000 Unit(s) SubCutaneous every 8 hours  insulin lispro (HumaLOG) corrective regimen sliding scale   SubCutaneous Before meals and at bedtime  lisinopril 20 milliGRAM(s) Oral daily  metoprolol succinate  milliGRAM(s) Oral daily  multivitamin 1 Tablet(s) Oral daily  thiamine 100 milliGRAM(s) Oral daily    MEDICATIONS  (PRN):  LORazepam   Injectable 2 milliGRAM(s) IV Push every 2 hours PRN CIWA > 8    ICU Vital Signs Last 24 Hrs  T(C): 36.1 (09 Oct 2017 09:26), Max: 37.3 (08 Oct 2017 22:00)  T(F): 97 (09 Oct 2017 09:26), Max: 99.2 (08 Oct 2017 22:00)  HR: 80 (09 Oct 2017 05:00) (80 - 88)  BP: 122/88 (09 Oct 2017 05:00) (104/71 - 122/88)  BP(mean): 100 (09 Oct 2017 05:00) (80 - 100)  ABP: --  ABP(mean): --  RR: 16 (09 Oct 2017 05:00) (16 - 18)  SpO2: 97% (09 Oct 2017 05:00) (97% - 99%)      Lungs clear  CXR clear  CV s1 s2  Abd soft  Ext stable                          9.6    2.8   )-----------( 123      ( 09 Oct 2017 07:40 )             29.7   Leukopenia not a new issue    10-09    132<L>  |  96  |  15  ----------------------------<  95  4.1   |  22  |  1.00    Ca    9.5      09 Oct 2017 07:40  Phos  3.0     10-08  Mg     1.7     10-09    TPro  6.8  /  Alb  4.0  /  TBili  0.7  /  DBili  x   /  AST  30  /  ALT  20  /  AlkPhos  57  10-09    echo Sept 2017  Apical Thrombus   Akinesis of apical inf  mid apical anterior and apical septal regions    EF 35 %

## 2017-10-09 NOTE — PROGRESS NOTE ADULT - PROBLEM SELECTOR PLAN 2
Patient with significant history of EtOH abuse requiring multiple admissions for alcohol detoxification. Denies history of withdrawal seizures. Currently w/ signs and sx of EtOH withdrawal and CIWA=13 on arrival to [facility] (8 pts for tremor, 4 pts for diaphoresis, 1pt for mild anxiety).   - CIWA 5 today  - Started tapering of Librium dose from 25mg Q6hr to Q8hr today  - Ativan 2mg IVP q2 PRN for CIWA>8  - thiamine 100mg PO qd  - folic acid 1mg PO qd  - multivitamin qd  - elevate head of bed, fall precautions  -Patient counseled on alcohol cessation and discussed options including inpatient and outpatient rehabilitation programs as well as the possibility of seeing a psychologist or counselor for help dealing with the stressors in his life that seem to precede his drinking (family conflicts). Patient expressed interested in outpatient rehabilitation and/or counseling/psychologist.  - Pending Psych consult for possible inpatient ETOH rehab, discussed w/ Dr Montalvo. Seen previous admission where pt refused inpatient rehab  -D/w SW for possible inpatient if pt is agreeable and is qualified
Patient w/ metabolic acidosis upon presentation, elevated AG at 24 w/ UA + for ketones and elevated lactate. No hx DM, sugars WNL on BMP, and no betahydroxybutyrate to suggest DKA. Etiology of HAGMA likely 2/2 to lactic acidosis and alcoholic ketoacidosis. Pt given IVF in ED, lactate now downtrending and resolution of acidosis.   - monitor BMP  - trend lactate to normalization   - fluids d/c as lactate has normalized

## 2017-10-09 NOTE — PROGRESS NOTE ADULT - SUBJECTIVE AND OBJECTIVE BOX
CARDIOLOGY NP PROGRESS NOTE    Hospital Course:      Subjective: Pt seen and examined at bedside. Reports feeling mildly anxious, tremors with arms extended. Denies chest pain or sob.    Overnight Events: CIWA 5 remains stable. HR improved, converted to SR 80s yesterday    TELEMETRY: SR 90s, ST 150s overnight when getting up to bathroom.        VITAL SIGNS:  T(C): 36.6 (10-09-17 @ 13:46), Max: 37.3 (10-08-17 @ 22:00)  HR: 82 (10-09-17 @ 11:07) (80 - 88)  BP: 99/70 (10-09-17 @ 11:07) (99/70 - 122/88)  RR: 18 (10-09-17 @ 11:07) (16 - 18)  SpO2: 97% (10-09-17 @ 11:07) (97% - 99%)  Wt(kg): --    I&O's Summary    08 Oct 2017 07:01  -  09 Oct 2017 07:00  --------------------------------------------------------  IN: 594 mL / OUT: 0 mL / NET: 594 mL    09 Oct 2017 07:01  -  09 Oct 2017 15:50  --------------------------------------------------------  IN: 300 mL / OUT: 0 mL / NET: 300 mL          PHYSICAL EXAM:    General: A/ox 3, No acute Distress  Neck: Supple, NO JVD  Cardiac: S1 S2, No M/R/G  Pulmonary: CTAB, Breathing unlabored, No Rhonchi/Rales/Wheezing  Abdomen: Soft, Non -tender, +BS x 4 quads  Extremities: No Rashes, No LE edema  Neuro: A/o x 3, No focal deficits          LABS:                          9.6    2.8   )-----------( 123      ( 09 Oct 2017 07:40 )             29.7                              10-09    132<L>  |  96  |  15  ----------------------------<  95  4.1   |  22  |  1.00    Ca    9.5      09 Oct 2017 07:40  Phos  3.0     10-08  Mg     1.7     10-09    TPro  6.8  /  Alb  4.0  /  TBili  0.7  /  DBili  x   /  AST  30  /  ALT  20  /  AlkPhos  57  10-09    LIVER FUNCTIONS - ( 09 Oct 2017 07:40 )  Alb: 4.0 g/dL / Pro: 6.8 g/dL / ALK PHOS: 57 U/L / ALT: 20 U/L / AST: 30 U/L / GGT: x                                 PT/INR - ( 07 Oct 2017 16:30 )   PT: 10.9 sec;   INR: 0.98          PTT - ( 07 Oct 2017 16:30 )  PTT:27.0 sec  CAPILLARY BLOOD GLUCOSE  154 (09 Oct 2017 11:03)  94 (09 Oct 2017 06:14)  120 (08 Oct 2017 22:00)  114 (08 Oct 2017 17:40)        CARDIAC MARKERS ( 07 Oct 2017 16:30 )  x     / <0.01 ng/mL / 131 U/L / x     / 1.4 ng/mL            Capoten (Short breath; Rash; Hives)  penicillin (Short breath; Rash; Hives)    MEDICATIONS  (STANDING):  aspirin  chewable 162 milliGRAM(s) Oral daily  atorvastatin 40 milliGRAM(s) Oral at bedtime  chlordiazePOXIDE 25 milliGRAM(s) Oral every 8 hours  clopidogrel Tablet 75 milliGRAM(s) Oral daily  folic acid 1 milliGRAM(s) Oral daily  heparin  Injectable 5000 Unit(s) SubCutaneous every 8 hours  insulin lispro (HumaLOG) corrective regimen sliding scale   SubCutaneous Before meals and at bedtime  lisinopril 20 milliGRAM(s) Oral daily  metoprolol succinate  milliGRAM(s) Oral daily  multivitamin 1 Tablet(s) Oral daily  sodium chloride 0.9%. 250 milliLiter(s) (50 mL/Hr) IV Continuous <Continuous>  thiamine 100 milliGRAM(s) Oral daily    MEDICATIONS  (PRN):  LORazepam   Injectable 2 milliGRAM(s) IV Push every 2 hours PRN CIWA > 8        DIAGNOSTIC TESTS: CARDIOLOGY NP PROGRESS NOTE    Hospital Course:  61M PMH EtOH abuse, CAD (s/p MI 2003, s/p JOSHUA 7/16), CHF (EF 35-40%) s/p AICD, lower GIB (2016), p/w 1d. decreased PO intake, weakness, and hypotension measured on home BP machine. Patient states that he has had a limited appetite over the last few weeks and is only hungry when he is drinking. He drank 1 pint of vodka on 10/6 late afternoon and has not had any alcohol since. Reports waking up feeling weak, tired, and lightheaded upon standing. Endorses some associated dizziness, tremulousness, and epigastric pain. He attempted to drink water for a few hours but did not eat anything, and then took his blood pressure at home which he states was in the SBP 90s. At this time he "felt like he was going to pass out" so he went to ED. Patient denies fever, chills, nausea, vomiting, diarrhea, constipation, myaglias, arthralgias, chest pain, palpitations, shortness of breath, ACRRERO, blood in the stool, LE edema. No recent sick contacts. Denies current tactile/audiovisual hallucinations, confusion, memory loss, vision changes, headache. In the ED, patient's vitals were as follows: T98.4, HR , BP 87/65-->137/79, RR 16-20, SpO2 % on RA. Given 1L NS bolus x1 and Metoprolol 5mg IVP x1. EKG w. Afib RVR 110s. CXR clear, Admitted to tele 5 [facility] for Afib w/ RVR and ETOH withdrawal. Self-converted to SR 80s on 10/8. Had episode of ST 150s when ambulating to bathroom overnight, received gentle IV hydration. CIWA 9 on admission to floor, improved on Librium/Ativan to CIWA 5. Psych was consulted for ETOH abuse and withdrawal, possible inpatient ETOH rehab if pt agreeable and qualifies. Pt does not require further cardiac tele monitoring and is pending transfer to [facility] floor.      Subjective: Pt seen and examined at bedside. Reports feeling mildly anxious, tremors with arms extended. Denies chest pain or sob.    Overnight Events: CIWA 5 remains stable. HR improved, converted to SR 80s yesterday    TELEMETRY: SR 90s, ST 150s overnight when getting up to bathroom.        VITAL SIGNS:  T(C): 36.6 (10-09-17 @ 13:46), Max: 37.3 (10-08-17 @ 22:00)  HR: 82 (10-09-17 @ 11:07) (80 - 88)  BP: 99/70 (10-09-17 @ 11:07) (99/70 - 122/88)  RR: 18 (10-09-17 @ 11:07) (16 - 18)  SpO2: 97% (10-09-17 @ 11:07) (97% - 99%)  Wt(kg): --    I&O's Summary    08 Oct 2017 07:01  -  09 Oct 2017 07:00  --------------------------------------------------------  IN: 594 mL / OUT: 0 mL / NET: 594 mL    09 Oct 2017 07:01  -  09 Oct 2017 15:50  --------------------------------------------------------  IN: 300 mL / OUT: 0 mL / NET: 300 mL          PHYSICAL EXAM:    General: A/ox 3, No acute Distress  Neck: Supple, NO JVD  Cardiac: S1 S2, No M/R/G  Pulmonary: CTAB, Breathing unlabored, No Rhonchi/Rales/Wheezing  Abdomen: Soft, Non -tender, +BS x 4 quads  Extremities: No Rashes, No LE edema  Neuro: A/o x 3, No focal deficits          LABS:                          9.6    2.8   )-----------( 123      ( 09 Oct 2017 07:40 )             29.7                              10-09    132<L>  |  96  |  15  ----------------------------<  95  4.1   |  22  |  1.00    Ca    9.5      09 Oct 2017 07:40  Phos  3.0     10-08  Mg     1.7     10-09    TPro  6.8  /  Alb  4.0  /  TBili  0.7  /  DBili  x   /  AST  30  /  ALT  20  /  AlkPhos  57  10-09    LIVER FUNCTIONS - ( 09 Oct 2017 07:40 )  Alb: 4.0 g/dL / Pro: 6.8 g/dL / ALK PHOS: 57 U/L / ALT: 20 U/L / AST: 30 U/L / GGT: x                                 PT/INR - ( 07 Oct 2017 16:30 )   PT: 10.9 sec;   INR: 0.98          PTT - ( 07 Oct 2017 16:30 )  PTT:27.0 sec  CAPILLARY BLOOD GLUCOSE  154 (09 Oct 2017 11:03)  94 (09 Oct 2017 06:14)  120 (08 Oct 2017 22:00)  114 (08 Oct 2017 17:40)        CARDIAC MARKERS ( 07 Oct 2017 16:30 )  x     / <0.01 ng/mL / 131 U/L / x     / 1.4 ng/mL            Capoten (Short breath; Rash; Hives)  penicillin (Short breath; Rash; Hives)    MEDICATIONS  (STANDING):  aspirin  chewable 162 milliGRAM(s) Oral daily  atorvastatin 40 milliGRAM(s) Oral at bedtime  chlordiazePOXIDE 25 milliGRAM(s) Oral every 8 hours  clopidogrel Tablet 75 milliGRAM(s) Oral daily  folic acid 1 milliGRAM(s) Oral daily  heparin  Injectable 5000 Unit(s) SubCutaneous every 8 hours  insulin lispro (HumaLOG) corrective regimen sliding scale   SubCutaneous Before meals and at bedtime  lisinopril 20 milliGRAM(s) Oral daily  metoprolol succinate  milliGRAM(s) Oral daily  multivitamin 1 Tablet(s) Oral daily  sodium chloride 0.9%. 250 milliLiter(s) (50 mL/Hr) IV Continuous <Continuous>  thiamine 100 milliGRAM(s) Oral daily    MEDICATIONS  (PRN):  LORazepam   Injectable 2 milliGRAM(s) IV Push every 2 hours PRN CIWA > 8        DIAGNOSTIC TESTS:

## 2017-10-09 NOTE — PROGRESS NOTE ADULT - PROBLEM SELECTOR PLAN 1
Patient with known history of paroxysmal AFib, on metoprolol ER 100mg qd at home. Hypotensive as low as SBP 80s in the ED with EKG showing AFib w/ RVR. Given additional 5mg metoprolol IVP in the ED as well as IVF with improvement in BP, now normotensive.  - Pt still w/ HR in 110s-130 upon arrival to floor. Now SR 80-90s.  - c/w home metoprolol succ 100mg PO qd  - pt not on AC given history of GI bleed  - EKG w/ Afib no acute ischemic changes
Patient with known history of paroxysmal AFib, on metoprolol ER 100mg qd at home. Hypotensive as low as SBP 80s in the ED with EKG showing AFib w/ RVR. Given additional 5mg metoprolol IVP in the ED as well as IVF with improvement in BP, now normotensive.  - Pt still w/ HR in 110s-130 upon arrival to [facility], additional metoprolol 12.5mg PO was given overnight. This Afternoon, HR fluctuating from 80s-100s  - c/w home metoprolol succ 100mg PO qd  - pt not on AC given history of GI bleed  - will monitor on tele  - Daily EKGs

## 2017-10-09 NOTE — PROGRESS NOTE ADULT - PROBLEM SELECTOR PLAN 5
Likely 2/2 ETOH abuse.  -HIV neg.  -Abd US performed 12/2016 w/ hepatic steatosis  -Consider heme consult
Patient counseled on alcohol cessation and discussed options including inpatient and outpatient rehabilitation programs as well as the possibility of seeing a psychologist or counselor for help dealing with the stressors in his life that seem to precede his drinking (family conflicts). Patient expressed interested in outpatient rehabilitation and/or counseling/psychologist.  -Call Psych consult in AM   -D/w SW for possible inpatient if pt is agreeable and is qualified

## 2017-10-09 NOTE — PROGRESS NOTE ADULT - PROBLEM SELECTOR PLAN 3
Pt w/ echo showing EF 35% as of 9/2017, s/p AICD placement in 2009. No signs/sx of acute CHF exacerbation.   - c/w home lisinopril 20mg qd   - daily weights  -Gentle IV hydration 50cc x5hrs today as pt is orthostatic positive. Monitor resp status closely.
Patient with significant history of EtOH abuse requiring multiple admissions for alcohol detoxification. Denies history of withdrawal seizures. Currently w/ signs and sx of EtOH withdrawal and CIWA=13 on arrival to [facility] (8 pts for tremor, 4 pts for diaphoresis, 1pt for mild anxiety).   - Increased Librium dose from 25mg Q8hr to Q6hr   - Ativan 2mg IVP q2 PRN for CIWA>8  - thiamine 100mg PO qd  - folic acid 1mg PO qd  - multivitamin qd  - elevate head of bed, fall precautions

## 2017-10-09 NOTE — PROGRESS NOTE ADULT - PROBLEM SELECTOR PLAN 7
Fluids: FR 1L/day  Monitor BMP, replete lytes K>4, Mg>2  Diet: DASH
Fluids: FR 1L/day  Monitor BMP, replete lytes K>4, Mg>2  Diet: DASH

## 2017-10-09 NOTE — BEHAVIORAL HEALTH ASSESSMENT NOTE - NSBHCONSULTFOLLOWAFTERCARE_PSY_A_CORE FT
Pt strongly advised regarding benefit and increasingly dire need to pursue inpt rehab. As on prior visits with patient, he states that his responsibilities--especially regarding his daughter--preclude him going into inpt rehab. SW should continue to pursue possible inpt options for pt.

## 2017-10-09 NOTE — PROGRESS NOTE ADULT - PROBLEM SELECTOR PLAN 4
Pt w/o current CP, SOB, CARRERO. S/p MI (2003) and JOSHUA (2016).   - c/w home ASA, Plavix  - c/w Lipitor 40 mg qHS
Pt w/o current CP, SOB, CARRERO. S/p MI (2003) and JOSHUA (2016).   - c/w home ASA, Plavix  - c/w Lipitor 40 mg qHS

## 2017-10-10 ENCOUNTER — TRANSCRIPTION ENCOUNTER (OUTPATIENT)
Age: 62
End: 2017-10-10

## 2017-10-10 VITALS — TEMPERATURE: 98 F

## 2017-10-10 LAB
ALBUMIN SERPL ELPH-MCNC: 4.1 G/DL — SIGNIFICANT CHANGE UP (ref 3.3–5)
ALP SERPL-CCNC: 57 U/L — SIGNIFICANT CHANGE UP (ref 40–120)
ALT FLD-CCNC: 19 U/L — SIGNIFICANT CHANGE UP (ref 10–45)
ANION GAP SERPL CALC-SCNC: 13 MMOL/L — SIGNIFICANT CHANGE UP (ref 5–17)
AST SERPL-CCNC: 27 U/L — SIGNIFICANT CHANGE UP (ref 10–40)
BILIRUB SERPL-MCNC: 0.3 MG/DL — SIGNIFICANT CHANGE UP (ref 0.2–1.2)
BUN SERPL-MCNC: 15 MG/DL — SIGNIFICANT CHANGE UP (ref 7–23)
CALCIUM SERPL-MCNC: 9.5 MG/DL — SIGNIFICANT CHANGE UP (ref 8.4–10.5)
CHLORIDE SERPL-SCNC: 99 MMOL/L — SIGNIFICANT CHANGE UP (ref 96–108)
CO2 SERPL-SCNC: 23 MMOL/L — SIGNIFICANT CHANGE UP (ref 22–31)
CREAT SERPL-MCNC: 1.16 MG/DL — SIGNIFICANT CHANGE UP (ref 0.5–1.3)
GLUCOSE BLDC GLUCOMTR-MCNC: 101 MG/DL — HIGH (ref 70–99)
GLUCOSE SERPL-MCNC: 104 MG/DL — HIGH (ref 70–99)
HCT VFR BLD CALC: 28.6 % — LOW (ref 39–50)
HGB BLD-MCNC: 9.1 G/DL — LOW (ref 13–17)
MAGNESIUM SERPL-MCNC: 1.7 MG/DL — SIGNIFICANT CHANGE UP (ref 1.6–2.6)
MCHC RBC-ENTMCNC: 23.5 PG — LOW (ref 27–34)
MCHC RBC-ENTMCNC: 31.8 G/DL — LOW (ref 32–36)
MCV RBC AUTO: 73.9 FL — LOW (ref 80–100)
PLATELET # BLD AUTO: 117 K/UL — LOW (ref 150–400)
POTASSIUM SERPL-MCNC: 4.2 MMOL/L — SIGNIFICANT CHANGE UP (ref 3.5–5.3)
POTASSIUM SERPL-SCNC: 4.2 MMOL/L — SIGNIFICANT CHANGE UP (ref 3.5–5.3)
PROT SERPL-MCNC: 6.9 G/DL — SIGNIFICANT CHANGE UP (ref 6–8.3)
RBC # BLD: 3.87 M/UL — LOW (ref 4.2–5.8)
RBC # FLD: 20 % — HIGH (ref 10.3–16.9)
SODIUM SERPL-SCNC: 135 MMOL/L — SIGNIFICANT CHANGE UP (ref 135–145)
WBC # BLD: 3.4 K/UL — LOW (ref 3.8–10.5)
WBC # FLD AUTO: 3.4 K/UL — LOW (ref 3.8–10.5)

## 2017-10-10 RX ORDER — MAGNESIUM SULFATE 500 MG/ML
1 VIAL (ML) INJECTION ONCE
Qty: 0 | Refills: 0 | Status: COMPLETED | OUTPATIENT
Start: 2017-10-10 | End: 2017-10-10

## 2017-10-10 RX ADMIN — Medication 162 MILLIGRAM(S): at 06:01

## 2017-10-10 RX ADMIN — Medication 1 TABLET(S): at 11:51

## 2017-10-10 RX ADMIN — CLOPIDOGREL BISULFATE 75 MILLIGRAM(S): 75 TABLET, FILM COATED ORAL at 11:50

## 2017-10-10 RX ADMIN — LISINOPRIL 20 MILLIGRAM(S): 2.5 TABLET ORAL at 11:51

## 2017-10-10 RX ADMIN — Medication 25 MILLIGRAM(S): at 13:57

## 2017-10-10 RX ADMIN — HEPARIN SODIUM 5000 UNIT(S): 5000 INJECTION INTRAVENOUS; SUBCUTANEOUS at 13:57

## 2017-10-10 RX ADMIN — Medication 1 MILLIGRAM(S): at 11:50

## 2017-10-10 RX ADMIN — Medication 100 MILLIGRAM(S): at 11:51

## 2017-10-10 RX ADMIN — Medication 100 GRAM(S): at 11:50

## 2017-10-10 RX ADMIN — Medication 100 MILLIGRAM(S): at 06:01

## 2017-10-10 RX ADMIN — HEPARIN SODIUM 5000 UNIT(S): 5000 INJECTION INTRAVENOUS; SUBCUTANEOUS at 06:01

## 2017-10-10 RX ADMIN — Medication 25 MILLIGRAM(S): at 06:01

## 2017-10-10 NOTE — PROGRESS NOTE ADULT - SUBJECTIVE AND OBJECTIVE BOX
Pt  is stating he needs "alone time " before he commits to rehab    no change clinically   He remains in NSR    PAST MEDICAL & SURGICAL HISTORY:  Pacemaker  ETOH abuse  Paroxysmal a-fib  Myocardial infarction involving other coronary artery  Gastritis  Atherosclerosis of coronary artery: Coronary artery disease  Automatic implantable cardiac defibrillator in situ: ICD (implantable cardioverter-defibrillator) in place    MEDICATIONS  (STANDING):  aspirin  chewable 162 milliGRAM(s) Oral daily  atorvastatin 40 milliGRAM(s) Oral at bedtime  chlordiazePOXIDE 25 milliGRAM(s) Oral every 8 hours  clopidogrel Tablet 75 milliGRAM(s) Oral daily  folic acid 1 milliGRAM(s) Oral daily  heparin  Injectable 5000 Unit(s) SubCutaneous every 8 hours  insulin lispro (HumaLOG) corrective regimen sliding scale   SubCutaneous Before meals and at bedtime  lisinopril 20 milliGRAM(s) Oral daily  magnesium sulfate  IVPB 1 Gram(s) IV Intermittent once  metoprolol succinate  milliGRAM(s) Oral daily  multivitamin 1 Tablet(s) Oral daily  sodium chloride 0.9%. 250 milliLiter(s) (50 mL/Hr) IV Continuous <Continuous>  thiamine 100 milliGRAM(s) Oral daily    MEDICATIONS  (PRN):  LORazepam   Injectable 2 milliGRAM(s) IV Push every 2 hours PRN CIWA > 8    ICU Vital Signs Last 24 Hrs  T(C): 36.7 (10 Oct 2017 05:33), Max: 36.7 (10 Oct 2017 05:33)  T(F): 98 (10 Oct 2017 05:33), Max: 98 (10 Oct 2017 05:33)  HR: 74 (10 Oct 2017 08:30) (74 - 96)  BP: 100/67 (10 Oct 2017 08:30) (92/73 - 108/73)  BP(mean): 76 (10 Oct 2017 08:30) (76 - 94)  ABP: --  ABP(mean): --  RR: 18 (10 Oct 2017 08:30) (16 - 18)  SpO2: 98% (10 Oct 2017 08:30) (97% - 100%)      Lungs clear  CV S1 sd2   Abd soift   ext stable                          9.1    3.4   )-----------( 117      ( 10 Oct 2017 07:29 )             28.6   10-10    135  |  99  |  15  ----------------------------<  104<H>  4.2   |  23  |  1.16    Ca    9.5      10 Oct 2017 07:27  Mg     1.7     10-10    TPro  6.9  /  Alb  4.1  /  TBili  0.3  /  DBili  x   /  AST  27  /  ALT  19  /  AlkPhos  57  10-10

## 2017-10-10 NOTE — PROGRESS NOTE ADULT - ASSESSMENT
Atrial Fib with Rapid VR    for continued rate control  ETOH dependency      continue Librium  Pt states that he never retrieved RX in pharmacy for Librium given after his last admission  Psyc  consult and hope for placement in Alcohol program, preferably in patient
61M PMH EtOH abuse, CAD (s/p MI 2003, s/p JOSHUA 7/16), CHF (EF 35-40%) s/p AICD, lower GIB (2016), p/w 1d. decreased PO intake, weakness, and hypotension measured on home BP machine, found to be in AFib w/ RVR in ED, admitted to [facility] for EtOH withdrawal and AFib with RVR.
Ethanolism  Cont med rx with Librium  Psyc follow up    S/P CAD paroxysmal A Fib  LV Thrombus   Cardiomyopathy    Cont Hydration
Pt is s/p Alcoholism   on Withdrawl protocol   Less shaking noted     s/p CAD Cardiomyopathy    Leukopenia  improved     For PT     Psyc follow up
61M PMH EtOH abuse, CAD (s/p MI 2003, s/p JOSHUA 7/16), CHF (EF 35-40%) s/p AICD, lower GIB (2016), p/w 1d. decreased PO intake, weakness, and hypotension measured on home BP machine, found to be in AFib w/ RVR in ED, admitted to [facility] for EtOH withdrawal and AFib with RVR.

## 2017-10-10 NOTE — DISCHARGE NOTE ADULT - PLAN OF CARE
Prevent afib with RVR. You were admitted to the hospital for an abnormal heart rhythm called atrial fibrillation. This was likely caused by consumption of too much alcohol. You have converted back to normal rhythm again. Please continue to take Metoprolol as prescribed without missing doses. You cannot be on a blood thinner because of your history of gastrointestinal bleeding. We strongly recommend you to abstain from further alcohol consumption to prevent further occurrences. You were treated for alcohol withdrawal while you were in the hospital and seen by a Psychiatrist who recommends you to be admitted to inpatient rehab to help you with your addiction which you have refused. Please take the Librium taper as prescribed and follow up with Dr. Levy on the scheduled date.

## 2017-10-10 NOTE — DISCHARGE NOTE ADULT - MEDICATION SUMMARY - MEDICATIONS TO TAKE
I will START or STAY ON the medications listed below when I get home from the hospital:    aspirin 162 mg oral delayed release tablet  -- 1 tab(s) by mouth once a day  -- Indication: For Atrial fibrillation with RVR/ Coronary artery disease    lisinopril 20 mg oral tablet  -- 1 tab(s) by mouth once a day  -- Indication: For Congestive heart failure/coronary artery disease    atorvastatin 40 mg oral tablet  -- 1 tab(s) by mouth once a day (at bedtime)  -- Avoid grapefruit and grapefruit juice while taking this medication.  Do not take this drug if you are pregnant.  It is very important that you take or use this exactly as directed.  Do not skip doses or discontinue unless directed by your doctor.  Obtain medical advice before taking any non-prescription drugs as some may affect the action of this medication.  Take with food or milk.    -- Indication: For Coronary artery disease    clopidogrel 75 mg oral tablet  -- 1 tab(s) by mouth once a day  -- Indication: For Coronary artery disease    chlordiazePOXIDE 25 mg oral capsule  -- 1 cap(s) by mouth every 12 hours. STOP on 10/12 after taking morning dose. MDD:2  -- Indication: For Alcohol withdrawal syndrome without complication    Metoprolol Succinate  mg oral tablet, extended release  -- 1 tab(s) by mouth once a day  -- It is very important that you take or use this exactly as directed.  Do not skip doses or discontinue unless directed by your doctor.  May cause drowsiness.  Alcohol may intensify this effect.  Use care when operating dangerous machinery.  Some non-prescription drugs may aggravate your condition.  Read all labels carefully.  If a warning appears, check with your doctor before taking.  Swallow whole.  Do not crush.  Take with food or milk.  This drug may impair the ability to drive or operate machinery.  Use care until you become familiar with its effects.    -- Indication: For Atrial fibrillation with RVR    Multiple Vitamins oral tablet  -- 1 tab(s) by mouth once a day  -- Indication: For Supplement    folic acid 1 mg oral tablet  -- 1 tab(s) by mouth once a day  -- Indication: For Supplement    thiamine 100 mg oral tablet  -- 1 tab(s) by mouth once a day  -- Indication: For Supplement

## 2017-10-10 NOTE — DISCHARGE NOTE ADULT - CARE PLAN
Principal Discharge DX:	Atrial fibrillation with RVR  Goal:	Prevent afib with RVR.  Instructions for follow-up, activity and diet:	You were admitted to the hospital for an abnormal heart rhythm called atrial fibrillation. This was likely caused by consumption of too much alcohol. You have converted back to normal rhythm again. Please continue to take Metoprolol as prescribed without missing doses. You cannot be on a blood thinner because of your history of gastrointestinal bleeding. We strongly recommend you to abstain from further alcohol consumption to prevent further occurrences.  Secondary Diagnosis:	ETOH abuse  Instructions for follow-up, activity and diet:	You were treated for alcohol withdrawal while you were in the hospital and seen by a Psychiatrist who recommends you to be admitted to inpatient rehab to help you with your addiction which you have refused. Please take the Librium taper as prescribed and follow up with Dr. Levy on the scheduled date.

## 2017-10-10 NOTE — DISCHARGE NOTE ADULT - HOSPITAL COURSE
61M PMH EtOH abuse, CAD (s/p MI 2003, s/p JOSHUA 7/16), CHF (EF 35-40%) s/p AICD, lower GIB (2016), p/w 1d. decreased PO intake, weakness, and hypotension measured on home BP machine. Patient states that he has had a limited appetite over the last few weeks and is only hungry when he is drinking. He drank 1 pint of vodka on 10/6 late afternoon and has not had any alcohol since. Reports waking up feeling weak, tired, and lightheaded upon standing. Endorses some associated dizziness, tremulousness, and epigastric pain. He attempted to drink water for a few hours but did not eat anything, and then took his blood pressure at home which he states was in the SBP 90s. At this time he "felt like he was going to pass out" so he went to ED. Patient denies fever, chills, nausea, vomiting, diarrhea, constipation, myaglias, arthralgias, chest pain, palpitations, shortness of breath, CARRERO, blood in the stool, LE edema. No recent sick contacts. Denies current tactile/audiovisual hallucinations, confusion, memory loss, vision changes, headache. In the ED, patient's vitals were as follows: T98.4, HR , BP 87/65-->137/79, RR 16-20, SpO2 % on RA. Given 1L NS bolus x1 and Metoprolol 5mg IVP x1. EKG w. Afib RVR 110s. CXR clear, Admitted to tele 5 L[facility]man for Afib w/ RVR and ETOH withdrawal. Self-converted to SR 80s on 10/8. Had episode of ST 150s when ambulating to bathroom overnight, received gentle IV hydration. CIWA 9 on admission to floor, improved on Librium/Ativan to CIWA 5. Psych was consulted for ETOH abuse and withdrawal, possible inpatient ETOH rehab , however patient refused in patient rehab as he has to arrange for someone to care for his daughter. Vitals stable overnight, no events on tele except sinus tachycardia to 101, ambulating by himself to bathroom. Per Dr. Levy, patient was discharged home with a Librium taper and followup apt with Dr. Levy within one week. 61M PMH EtOH abuse, CAD (s/p MI 2003, s/p JOSHUA 7/16), CHF (EF 35-40%) s/p AICD, lower GIB (2016), p/w 1d. decreased PO intake, weakness, and hypotension measured on home BP machine. Patient states that he has had a limited appetite over the last few weeks and is only hungry when he is drinking. He drank 1 pint of vodka on 10/6 late afternoon and has not had any alcohol since. Reports waking up feeling weak, tired, and lightheaded upon standing. Endorses some associated dizziness, tremulousness, and epigastric pain. He attempted to drink water for a few hours but did not eat anything, and then took his blood pressure at home which he states was in the SBP 90s. At this time he "felt like he was going to pass out" so he went to ED. Patient denies fever, chills, nausea, vomiting, diarrhea, constipation, myaglias, arthralgias, chest pain, palpitations, shortness of breath, CARRERO, blood in the stool, LE edema. No recent sick contacts. Denies current tactile/audiovisual hallucinations, confusion, memory loss, vision changes, headache. In the ED, patient's vitals were as follows: T98.4, HR , BP 87/65-->137/79, RR 16-20, SpO2 % on RA. Given 1L NS bolus x1 and Metoprolol 5mg IVP x1. EKG w. Afib RVR 110s. CXR clear, Admitted to tele 5 [facility]man for Afib w/ RVR and ETOH withdrawal. Self-converted to SR 80s on 10/8. Had episode of ST 150s when ambulating to bathroom overnight, received gentle IV hydration. CIWA 9 on admission to floor, improved on Librium/Ativan to CIWA 5. Psych was consulted for ETOH abuse and withdrawal, possible inpatient ETOH rehab , however patient refused in patient rehab as he has to arrange for someone to care for his daughter. Vitals stable overnight, no events on tele except sinus tachycardia to 101, ambulating by himself to bathroom. Per Dr. Levy, patient was discharged home with a Librium taper and followup apt with Dr. Levy on OCtober 24th. 61M PMH EtOH abuse, CAD (s/p MI 2003, s/p JOSHUA 7/16), CHF (EF 35-40%) s/p AICD, lower GIB (2016), Afib and LV thrombus not on AC 2/2 GIB, ETOH abuse and refusal,  p/w 1d. decreased PO intake, weakness, and hypotension measured on home BP machine. Patient states that he has had a limited appetite over the last few weeks and is only hungry when he is drinking. He drank 1 pint of vodka on 10/6 late afternoon and has not had any alcohol since. Reports waking up feeling weak, tired, and lightheaded upon standing. Endorses some associated dizziness, tremulousness, and epigastric pain. He attempted to drink water for a few hours but did not eat anything, and then took his blood pressure at home which he states was in the SBP 90s. At this time he "felt like he was going to pass out" so he went to ED. Patient denies fever, chills, nausea, vomiting, diarrhea, constipation, myaglias, arthralgias, chest pain, palpitations, shortness of breath, CARRERO, blood in the stool, LE edema. No recent sick contacts. Denies current tactile/audiovisual hallucinations, confusion, memory loss, vision changes, headache. In the ED, patient's vitals were as follows: T98.4, HR , BP 87/65-->137/79, RR 16-20, SpO2 % on RA. Given 1L NS bolus x1 and Metoprolol 5mg IVP x1. EKG w. Afib RVR 110s. CXR clear, Admitted to tele 5 [facility]man for Afib w/ RVR and ETOH withdrawal. Self-converted to SR 80s on 10/8. Had episode of ST 150s when ambulating to bathroom overnight, received gentle IV hydration. CIWA 9 on admission to floor, improved on Librium/Ativan to CIWA 5. Psych was consulted for ETOH abuse and withdrawal, possible inpatient ETOH rehab , however patient refused in patient rehab as he has to arrange for someone to care for his daughter. Vitals stable overnight, no events on tele except sinus tachycardia to 101, ambulating by himself to bathroom. Per Dr. Levy, patient was discharged home with a Librium taper and followup apt with Dr. Levy on OCtober 24th.

## 2017-10-10 NOTE — DISCHARGE NOTE ADULT - ADDITIONAL INSTRUCTIONS
1. Follow up with DR. Levy on October 24th at 2 pm.   [address] [place] [address]  Phone: [phone]  Fax: [phone]

## 2017-10-10 NOTE — DISCHARGE NOTE ADULT - PATIENT PORTAL LINK FT
“You can access the FollowHealth Patient Portal, offered by [facility], by registering with the following website: [email or web address]”

## 2017-10-15 DIAGNOSIS — I48.0 PAROXYSMAL ATRIAL FIBRILLATION: ICD-10-CM

## 2017-10-15 DIAGNOSIS — F10.230 ALCOHOL DEPENDENCE WITH WITHDRAWAL, UNCOMPLICATED: ICD-10-CM

## 2017-10-15 DIAGNOSIS — Z79.82 LONG TERM (CURRENT) USE OF ASPIRIN: ICD-10-CM

## 2017-10-15 DIAGNOSIS — I25.10 ATHEROSCLEROTIC HEART DISEASE OF NATIVE CORONARY ARTERY WITHOUT ANGINA PECTORIS: ICD-10-CM

## 2017-10-15 DIAGNOSIS — I50.22 CHRONIC SYSTOLIC (CONGESTIVE) HEART FAILURE: ICD-10-CM

## 2017-10-15 DIAGNOSIS — D61.818 OTHER PANCYTOPENIA: ICD-10-CM

## 2017-10-15 DIAGNOSIS — Z88.0 ALLERGY STATUS TO PENICILLIN: ICD-10-CM

## 2017-10-15 DIAGNOSIS — I25.2 OLD MYOCARDIAL INFARCTION: ICD-10-CM

## 2017-10-15 DIAGNOSIS — E87.2 ACIDOSIS: ICD-10-CM

## 2017-10-15 DIAGNOSIS — E87.1 HYPO-OSMOLALITY AND HYPONATREMIA: ICD-10-CM

## 2017-10-15 DIAGNOSIS — Z95.810 PRESENCE OF AUTOMATIC (IMPLANTABLE) CARDIAC DEFIBRILLATOR: ICD-10-CM

## 2017-10-15 DIAGNOSIS — Z95.5 PRESENCE OF CORONARY ANGIOPLASTY IMPLANT AND GRAFT: ICD-10-CM

## 2017-10-15 DIAGNOSIS — Z79.02 LONG TERM (CURRENT) USE OF ANTITHROMBOTICS/ANTIPLATELETS: ICD-10-CM

## 2017-10-15 DIAGNOSIS — D72.819 DECREASED WHITE BLOOD CELL COUNT, UNSPECIFIED: ICD-10-CM
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
## 2017-10-19 PROCEDURE — 82607 VITAMIN B-12: CPT

## 2017-10-19 PROCEDURE — 85610 PROTHROMBIN TIME: CPT

## 2017-10-19 PROCEDURE — 82746 ASSAY OF FOLIC ACID SERUM: CPT

## 2017-10-19 PROCEDURE — 36415 COLL VENOUS BLD VENIPUNCTURE: CPT

## 2017-10-19 PROCEDURE — 84484 ASSAY OF TROPONIN QUANT: CPT

## 2017-10-19 PROCEDURE — 96375 TX/PRO/DX INJ NEW DRUG ADDON: CPT

## 2017-10-19 PROCEDURE — 71045 X-RAY EXAM CHEST 1 VIEW: CPT

## 2017-10-19 PROCEDURE — C8929: CPT

## 2017-10-19 PROCEDURE — 85025 COMPLETE CBC W/AUTO DIFF WBC: CPT

## 2017-10-19 PROCEDURE — 85027 COMPLETE CBC AUTOMATED: CPT

## 2017-10-19 PROCEDURE — 85730 THROMBOPLASTIN TIME PARTIAL: CPT

## 2017-10-19 PROCEDURE — 83690 ASSAY OF LIPASE: CPT

## 2017-10-19 PROCEDURE — 80053 COMPREHEN METABOLIC PANEL: CPT

## 2017-10-19 PROCEDURE — 96374 THER/PROPH/DIAG INJ IV PUSH: CPT

## 2017-10-19 PROCEDURE — 99285 EMERGENCY DEPT VISIT HI MDM: CPT | Mod: 25

## 2017-10-19 PROCEDURE — 80048 BASIC METABOLIC PNL TOTAL CA: CPT

## 2017-10-19 PROCEDURE — 93005 ELECTROCARDIOGRAM TRACING: CPT | Mod: 77

## 2017-10-19 PROCEDURE — 82150 ASSAY OF AMYLASE: CPT

## 2017-10-19 PROCEDURE — 83735 ASSAY OF MAGNESIUM: CPT

## 2017-11-27 ENCOUNTER — INPATIENT (INPATIENT)
Facility: HOSPITAL | Age: 62
LOS: 0 days | Discharge: HOME | DRG: 897 | End: 2017-11-28
Attending: INTERNAL MEDICINE | Admitting: INTERNAL MEDICINE
Payer: MEDICARE

## 2017-11-27 VITALS
OXYGEN SATURATION: 98 % | TEMPERATURE: 98 F | WEIGHT: 164.91 LBS | HEART RATE: 90 BPM | HEIGHT: 69 IN | RESPIRATION RATE: 18 BRPM | DIASTOLIC BLOOD PRESSURE: 129 MMHG | SYSTOLIC BLOOD PRESSURE: 108 MMHG

## 2017-11-27 DIAGNOSIS — I50.22 CHRONIC SYSTOLIC (CONGESTIVE) HEART FAILURE: ICD-10-CM

## 2017-11-27 DIAGNOSIS — F10.10 ALCOHOL ABUSE, UNCOMPLICATED: ICD-10-CM

## 2017-11-27 DIAGNOSIS — F10.239 ALCOHOL DEPENDENCE WITH WITHDRAWAL, UNSPECIFIED: ICD-10-CM

## 2017-11-27 DIAGNOSIS — D64.9 ANEMIA, UNSPECIFIED: ICD-10-CM

## 2017-11-27 DIAGNOSIS — I51.3 INTRACARDIAC THROMBOSIS, NOT ELSEWHERE CLASSIFIED: ICD-10-CM

## 2017-11-27 DIAGNOSIS — I25.10 ATHEROSCLEROTIC HEART DISEASE OF NATIVE CORONARY ARTERY WITHOUT ANGINA PECTORIS: ICD-10-CM

## 2017-11-27 DIAGNOSIS — R00.2 PALPITATIONS: ICD-10-CM

## 2017-11-27 LAB
ALBUMIN SERPL ELPH-MCNC: 4.2 G/DL — SIGNIFICANT CHANGE UP (ref 3.3–5)
ALP SERPL-CCNC: 51 U/L — SIGNIFICANT CHANGE UP (ref 40–120)
ALT FLD-CCNC: 13 U/L — SIGNIFICANT CHANGE UP (ref 10–45)
ANION GAP SERPL CALC-SCNC: 19 MMOL/L — HIGH (ref 5–17)
AST SERPL-CCNC: 36 U/L — SIGNIFICANT CHANGE UP (ref 10–40)
BASOPHILS NFR BLD AUTO: 0.9 % — SIGNIFICANT CHANGE UP (ref 0–2)
BILIRUB SERPL-MCNC: 0.3 MG/DL — SIGNIFICANT CHANGE UP (ref 0.2–1.2)
BUN SERPL-MCNC: 12 MG/DL — SIGNIFICANT CHANGE UP (ref 7–23)
CALCIUM SERPL-MCNC: 9 MG/DL — SIGNIFICANT CHANGE UP (ref 8.4–10.5)
CHLORIDE SERPL-SCNC: 99 MMOL/L — SIGNIFICANT CHANGE UP (ref 96–108)
CK MB CFR SERPL CALC: 1.2 NG/ML — SIGNIFICANT CHANGE UP (ref 0–6.7)
CK MB CFR SERPL CALC: 1.3 NG/ML — SIGNIFICANT CHANGE UP (ref 0–6.7)
CK SERPL-CCNC: 103 U/L — SIGNIFICANT CHANGE UP (ref 30–200)
CK SERPL-CCNC: 127 U/L — SIGNIFICANT CHANGE UP (ref 30–200)
CO2 SERPL-SCNC: 20 MMOL/L — LOW (ref 22–31)
CREAT SERPL-MCNC: 0.9 MG/DL — SIGNIFICANT CHANGE UP (ref 0.5–1.3)
EOSINOPHIL NFR BLD AUTO: 0.6 % — SIGNIFICANT CHANGE UP (ref 0–6)
GLUCOSE SERPL-MCNC: 122 MG/DL — HIGH (ref 70–99)
HCT VFR BLD CALC: 26.3 % — LOW (ref 39–50)
HGB BLD-MCNC: 8 G/DL — LOW (ref 13–17)
INR BLD: 0.98 — SIGNIFICANT CHANGE UP (ref 0.88–1.16)
LYMPHOCYTES # BLD AUTO: 24.6 % — SIGNIFICANT CHANGE UP (ref 13–44)
MAGNESIUM SERPL-MCNC: 2 MG/DL — SIGNIFICANT CHANGE UP (ref 1.6–2.6)
MCHC RBC-ENTMCNC: 22 PG — LOW (ref 27–34)
MCHC RBC-ENTMCNC: 30.4 G/DL — LOW (ref 32–36)
MCV RBC AUTO: 72.3 FL — LOW (ref 80–100)
MONOCYTES NFR BLD AUTO: 6.1 % — SIGNIFICANT CHANGE UP (ref 2–14)
NEUTROPHILS NFR BLD AUTO: 67.8 % — SIGNIFICANT CHANGE UP (ref 43–77)
PLATELET # BLD AUTO: 149 K/UL — LOW (ref 150–400)
POTASSIUM SERPL-MCNC: 4.3 MMOL/L — SIGNIFICANT CHANGE UP (ref 3.5–5.3)
POTASSIUM SERPL-SCNC: 4.3 MMOL/L — SIGNIFICANT CHANGE UP (ref 3.5–5.3)
PROT SERPL-MCNC: 7.6 G/DL — SIGNIFICANT CHANGE UP (ref 6–8.3)
PROTHROM AB SERPL-ACNC: 10.9 SEC — SIGNIFICANT CHANGE UP (ref 9.8–12.7)
RBC # BLD: 3.64 M/UL — LOW (ref 4.2–5.8)
RBC # FLD: 20.6 % — HIGH (ref 10.3–16.9)
SODIUM SERPL-SCNC: 138 MMOL/L — SIGNIFICANT CHANGE UP (ref 135–145)
TROPONIN T SERPL-MCNC: <0.01 NG/ML — SIGNIFICANT CHANGE UP (ref 0–0.01)
TROPONIN T SERPL-MCNC: <0.01 NG/ML — SIGNIFICANT CHANGE UP (ref 0–0.01)
WBC # BLD: 3.4 K/UL — LOW (ref 3.8–10.5)
WBC # FLD AUTO: 3.4 K/UL — LOW (ref 3.8–10.5)

## 2017-11-27 PROCEDURE — 71020: CPT | Mod: 26

## 2017-11-27 PROCEDURE — 99285 EMERGENCY DEPT VISIT HI MDM: CPT | Mod: 25

## 2017-11-27 PROCEDURE — 93010 ELECTROCARDIOGRAM REPORT: CPT

## 2017-11-27 RX ORDER — LISINOPRIL 2.5 MG/1
20 TABLET ORAL DAILY
Qty: 0 | Refills: 0 | Status: DISCONTINUED | OUTPATIENT
Start: 2017-11-27 | End: 2017-11-28

## 2017-11-27 RX ORDER — SODIUM CHLORIDE 9 MG/ML
1000 INJECTION INTRAMUSCULAR; INTRAVENOUS; SUBCUTANEOUS ONCE
Qty: 0 | Refills: 0 | Status: COMPLETED | OUTPATIENT
Start: 2017-11-27 | End: 2017-11-27

## 2017-11-27 RX ORDER — ATORVASTATIN CALCIUM 80 MG/1
40 TABLET, FILM COATED ORAL AT BEDTIME
Qty: 0 | Refills: 0 | Status: DISCONTINUED | OUTPATIENT
Start: 2017-11-27 | End: 2017-11-28

## 2017-11-27 RX ORDER — THIAMINE MONONITRATE (VIT B1) 100 MG
100 TABLET ORAL DAILY
Qty: 0 | Refills: 0 | Status: DISCONTINUED | OUTPATIENT
Start: 2017-11-27 | End: 2017-11-28

## 2017-11-27 RX ORDER — ONDANSETRON 8 MG/1
4 TABLET, FILM COATED ORAL ONCE
Qty: 0 | Refills: 0 | Status: COMPLETED | OUTPATIENT
Start: 2017-11-27 | End: 2017-11-27

## 2017-11-27 RX ORDER — FOLIC ACID 0.8 MG
1 TABLET ORAL DAILY
Qty: 0 | Refills: 0 | Status: DISCONTINUED | OUTPATIENT
Start: 2017-11-27 | End: 2017-11-28

## 2017-11-27 RX ORDER — METOPROLOL TARTRATE 50 MG
100 TABLET ORAL DAILY
Qty: 0 | Refills: 0 | Status: DISCONTINUED | OUTPATIENT
Start: 2017-11-27 | End: 2017-11-28

## 2017-11-27 RX ORDER — CLOPIDOGREL BISULFATE 75 MG/1
75 TABLET, FILM COATED ORAL DAILY
Qty: 0 | Refills: 0 | Status: DISCONTINUED | OUTPATIENT
Start: 2017-11-27 | End: 2017-11-28

## 2017-11-27 RX ADMIN — SODIUM CHLORIDE 2000 MILLILITER(S): 9 INJECTION INTRAMUSCULAR; INTRAVENOUS; SUBCUTANEOUS at 12:30

## 2017-11-27 RX ADMIN — SODIUM CHLORIDE 2000 MILLILITER(S): 9 INJECTION INTRAMUSCULAR; INTRAVENOUS; SUBCUTANEOUS at 14:04

## 2017-11-27 RX ADMIN — Medication 1 MILLIGRAM(S): at 19:23

## 2017-11-27 RX ADMIN — ONDANSETRON 4 MILLIGRAM(S): 8 TABLET, FILM COATED ORAL at 12:30

## 2017-11-27 RX ADMIN — Medication 50 MILLIGRAM(S): at 12:30

## 2017-11-27 NOTE — H&P ADULT - NSHPLABSRESULTS_GEN_ALL_CORE
8.0    3.4   )-----------( 149      ( 27 Nov 2017 13:05 )             26.3   11-27    138  |  99  |  12  ----------------------------<  122<H>  4.3   |  20<L>  |  0.90    Ca    9.0      27 Nov 2017 12:30  Mg     2.0     11-27    TPro  7.6  /  Alb  4.2  /  TBili  0.3  /  DBili  x   /  AST  36  /  ALT  13  /  AlkPhos  51  11-27  PT/INR - ( 27 Nov 2017 13:05 )   PT: 10.9 sec;   INR: 0.98

## 2017-11-27 NOTE — H&P ADULT - PROBLEM SELECTOR PLAN 5
Librium 25 mg PO q6 hrs as d/w Dr. Levy.  thiamine, folic acid, vit B 12. - Guaiac negative(H/H 8.0/26.3 from 9.0/27 1 month ago)  - likely 2/2 ETOH use   - f/u iron studies.  - GI service consult in am.

## 2017-11-27 NOTE — PATIENT PROFILE ADULT. - HEALTHCARE INFORMATION NEEDED, PROFILE
Pt feels he knows what is necessary to stop drinking again. He has done it before and had temporary relapse.

## 2017-11-27 NOTE — H&P ADULT - PROBLEM SELECTOR PLAN 1
Pt. s/p MI 2003 and JOSHUA 2016. Pt w/o CP, CARRERO.  Continue with ASA 81 mg and Plavix 75 mg.  Continue lipitor 40 mh -Currently asx-symptoms likely 2/2 ETOH use  -EKG SR, CE negative x 2  -Continue telemetry

## 2017-11-27 NOTE — H&P ADULT - PROBLEM SELECTOR PLAN 4
- possible psych consult.  - Pt. refusing inpatient rehab as he wants to spend time with his daughter. Consider outpatient rehab. -s/p AICD EF 35-45% by Echo 9/2017  -s/p 2L IVF in ED  -c/w Lisinopril 20 mg PO daily, consider adding beta blocker  -No repeat Echo at this time as d/w Dr. Levy  -Strict Is and Os and daily weights

## 2017-11-27 NOTE — H&P ADULT - RS GEN PE MLT RESP DETAILS PC
respirations non-labored/clear to auscultation bilaterally/no rales/no rhonchi/airway patent/no wheezes/good air movement/normal/breath sounds equal

## 2017-11-27 NOTE — ED PROVIDER NOTE - DIAGNOSTIC INTERPRETATION
ER PA: Laly Garcia, PAC  CHEST XRAY INTERPRETATION: lungs clear, heart shadow normal, bony structures intact

## 2017-11-27 NOTE — H&P ADULT - PROBLEM SELECTOR PLAN 6
- Psych saw pt last admission(Dr. Montalvo)- pt refusing inpatient rehab as he wants to spend time with his daughter.   -SW consult for possible o/p rehab placement if pt amenable  -No further psych consult at this time as per Dr. Levy  -CIWA score 10 in ED s/p Librium 50mg PO x 1  -c/w CIWA monitoring/Ativan PRN   -Librium 25mg PO q5curee as per Dr. Levy  -Folate/Thiamine/B12    DVT ppx: Compression stockings  Dispo: f/u iron studies, GI consult in AM for anemia, tele o/n and consider transfer to medicine service

## 2017-11-27 NOTE — ED PROVIDER NOTE - MEDICAL DECISION MAKING DETAILS
63 yo m with pmh of etoh abuse, CAD s/p MI 2003, s/p JOSHUA 7/16, CHF (EF 35-40%), AICD, lower GIB 2016, pafib and LV thrombus (on asa and plavix) c/o palpitations since this morning around 10am with nausea, lightheadedness and sob. Last drink yesterday. VSS. +with fine tremors. Cardio rrr, lungs cta b/l. EKG NSR @ 94, q waves in anterolat leads with ST elevated (unchanged from prior). Likely etoh withdrawal, however given extensive cardiac hx will  r/o acs vs chf.

## 2017-11-27 NOTE — H&P ADULT - PROBLEM SELECTOR PLAN 3
no signs/sxs of acute CHF exacerbation.  continue Lisinopril 20 mg PO X 1.  2 L IVF given in ED, avoid excessive fluids given CHF hx  daily weights, I & O's -pt with h/o LV thrombus not on AC 2/2 h/o GI bleeding  -no repeat echo at this time as per Dr. Levy  -c/w ASA/Plavix

## 2017-11-27 NOTE — H&P ADULT - PROBLEM SELECTOR PLAN 2
- f/u iron studies.  - guaic negative.   - GI service consult in am. -h/o JOSHUA to mLAD in 2016 with residual 50% OM1  -Reports compliance with ASA/Plavix  -c/w ASA 81mg and Plavix 75mg daily  -EKG unchanged, CE neg x 2

## 2017-11-27 NOTE — ED ADULT NURSE NOTE - OBJECTIVE STATEMENT
Patient presents to the ED, reports that he feels palpitations and faint, states that he was binge drinking for the past 4 days. Patient states that he was fine for the past three weeks then relapsed. Denies any trembling.

## 2017-11-27 NOTE — ED PROVIDER NOTE - PHYSICAL EXAMINATION
CONSTITUTIONAL: Well-appearing; well-nourished; in no apparent distress.   HEAD: Normocephalic; atraumatic.   EYES: PERRL; EOM intact; conjunctiva and sclera clear  ENT: normal nose; no rhinorrhea; normal pharynx with no erythema or lesions. +tongue fasiculations  NECK: Supple; non-tender; no LAD  CARDIOVASCULAR: Normal S1, S2; no murmurs, rubs, or gallops. Regular rate and rhythm.   RESPIRATORY: Breathing easily; breath sounds clear and equal bilaterally; no wheezes, rhonchi, or rales.  GI: Soft; non-distended; non-tender; no palpable organomegaly.   MSK: FROM at all extremities, normal tone, +fine tremor L hand > R  EXT: No cyanosis or edema; N/V intact  SKIN: Normal for age and race; warm; dry; good turgor; no apparent lesions or rash.   NEURO: A & O x 3; face symmetric; grossly unremarkable.   PSYCHOLOGICAL: The patient’s mood and manner are appropriate.

## 2017-11-27 NOTE — ED PROVIDER NOTE - ATTENDING CONTRIBUTION TO CARE
63 yo m with pmh of etoh abuse, CAD s/p MI 2003, s/p JOSHUA 7/16, CHF (EF 35-40%), AICD, lower GIB 2016, pafib and LV thrombus (on asa and plavix) c/o palpitations since this morning around 10am. Pt was lying in bed when the palpitations first started. Pt states they are intermittent. Associated with sob, generalized weakness, nausea and lightheadedness. Also reports a productive cough. Denies fever, chills, vomiting, shakes, sweats, ha, abd pain. Pt states he has been drinking for the past 4 days, last drink was yesterday. Denies recent travel. Denies smoking. 63 yo M with hx of etoh abuse, CAD s/p MI 2003, CHF (EF 35-40%), AICD, lower GIB 2016, pafib and LV thrombus (on asa and plavix) c/o palpitations since this morning around 10am. Pt was lying in bed when the palpitations first started, associated with sob, generalized weakness, nausea and lightheadedness. Also reports a productive cough. Denies fever, chills, vomiting, shakes, sweats, ha, abd pain. Pt states he has been drinking for the past 4 days, last drink was yesterday. Pt AAO, NAD, RRR, Abd: soft/NT, CTA b/l, (+) tremors noted. Labs/ studies noted. EKG unchanged. Librium given. Pt admitted for r/o ACS and alcohol withdrawal.

## 2017-11-27 NOTE — ED ADULT TRIAGE NOTE - CHIEF COMPLAINT QUOTE
Pt CO Palpitation and weakness s/t ETOH abuse.  Pt states "I woke up with palpitations and I know its because yesterday I had two pints of vodka and on Saturday two pints of [place]."  Pt denies falls, Trauma, dizziness, N/V/D, SOB, Fevers.  EKG in progress

## 2017-11-27 NOTE — ED PROVIDER NOTE - OBJECTIVE STATEMENT
61 yo m with pmh of etoh abuse, CAD s/p MI 2003, s/p JOSHUA 7/16, CHF (EF 35-40%), AICD, lower GIB 2016, pafib and LV thrombus (on asa and plavix) c/o palpitations since this morning around 10am. Pt was lying in bed when the palpitations first started. Pt states they are intermittent. Associated with sob, generalized weakness, nausea and lightheadedness. Also reports a productive cough. Denies fever, chills, vomiting, shakes, sweats, ha, abd pain. Pt states he has been drinking for the past 4 days, last drink was yesterday. Denies recent travel. Denies smoking.

## 2017-11-27 NOTE — H&P ADULT - ASSESSMENT
61 yo male ETOH abuse (last drink 2am 11/27/17; drank 1 pint of vodka and 1 pint of cognac throughout yesterday); CAD s/p MI 2003 and JOSHUA at [facility] in 07/2016, CHF (EF 35-45 %) s/p AICD , lower GIB (2016), pafib and LV thrombus (on ASA and Plavix) admitted to cardiology in setting of recent ETOH use and extensive cardiac history, 61 yo male ETOH abuse (last drink 2am 11/27/17; drank 1 pint of vodka and 1 pint of cognac throughout yesterday); CAD s/p MI 2003 most recent PCI at [facility] 7/11/16(JOSHUA mLAD residual 50% OM1), sCHF (EF 35-45 %) s/p AICD in 2009 ,  pafib and LV thrombus (on ASA and Plavix only 2/2 h/o GI bleed in 2016) admitted to cardiology with palpitations likely in the setting of recent ETOH abuse.

## 2017-11-27 NOTE — H&P ADULT - HISTORY OF PRESENT ILLNESS
63 yo male ETOH abuse (last drink 2am 11/27/17; drank 1 pint of vodka and 1 pint of cognac throughout yesterday); CAD s/p MI 2003 and JOSHUA at [facility] in 07/2016, CHF (EF 35-45 %) s/p AICD , lower GIB (2016), pafib and LV thrombus (on ASA and Plavix) presents to [facility] ED 11/27/17 with CC of intermittent palpitations associated with SOB, generalized weakness, nausea and lightheadedness. Pt. was discharged on October 2017 with tapering doses of LIbrium. Pt. states that he started drinking 4 days ago; states that he started drinking 1 pint of cognac for 2 days and then increased to 1 pint of cognac and 1 pint of vodka the remaining 2 days; pt. states that his last drink was at 2 am today, he woke up and took his home medication at 9: 30 am and then noticed the palpitations. Pt. denies any CP, abdominal pain, orthopnea/PND, fever, chills, vomitting, shakes, HA, tactile/audiovisual hallucinations, confusion, memory loss and vision changes. In the ED, VSS, afebrile, not tachycardiac, EKG unchanged from prior admission, CE negative X 2, Hg/Ht 8.0/26.3 (baseline 9-10/27-29). Pt given 2 L IVF, librium 50 mg PO X 1 and Zofran 4 mg IVP X1 . In light of pt. symptoms in setting of recent ETOH use and extensive cardiac history, he is now admitted to cardiology for further workup. 63 yo male ETOH abuse (last drink 2am 11/27/17; drank 1 pint of vodka and 1 pint of cognac throughout yesterday); CAD s/p MI 2003 , most recent PCI at [facility]  07/2016 JOSHUA to mLAD with residual 50 % OM1, sCHF (EF 35-45 %) s/p AICD on 2009 , pafib and LV thrombus (on ASA and Plavix only 2/2 to lower GIB 2016 presents to [facility] ED 11/27/17 with CC of intermittent palpitations associated with SOB, generalized weakness, nausea and lightheadedness. Pt. was discharged on October 2017 with tapering doses of LIbrium. Pt. states that he started drinking 4 days ago; states that he started drinking 1 pint of cognac for 2 days and then increased to 1 pint of cognac and 1 pint of vodka the remaining 2 days; pt. states that his last drink was at 2 am today, he woke up and took his home medication at 9: 30 am and then noticed the palpitations. Pt. denies any CP, abdominal pain, orthopnea/PND, fever, chills, vomitting, shakes, HA, tactile/audiovisual hallucinations, confusion, memory loss and vision changes. In the ED, VSS, afebrile, not tachycardiac, EKG unchanged from prior admission, CE negative X 2, Hg/Ht 8.0/26.3 (baseline 9-10/27-29). Pt given 2 L IVF, librium 50 mg PO X 1 and Zofran 4 mg IVP X1 . In light of pt. symptoms in setting of recent ETOH use and extensive cardiac history, he is now admitted to cardiology for further workup.

## 2017-11-28 ENCOUNTER — TRANSCRIPTION ENCOUNTER (OUTPATIENT)
Age: 62
End: 2017-11-28

## 2017-11-28 VITALS
TEMPERATURE: 98 F | HEART RATE: 73 BPM | SYSTOLIC BLOOD PRESSURE: 136 MMHG | DIASTOLIC BLOOD PRESSURE: 87 MMHG | RESPIRATION RATE: 18 BRPM | OXYGEN SATURATION: 100 %

## 2017-11-28 LAB
ALBUMIN SERPL ELPH-MCNC: 3.9 G/DL — SIGNIFICANT CHANGE UP (ref 3.3–5)
ALP SERPL-CCNC: 47 U/L — SIGNIFICANT CHANGE UP (ref 40–120)
ALT FLD-CCNC: 10 U/L — SIGNIFICANT CHANGE UP (ref 10–45)
ANION GAP SERPL CALC-SCNC: 13 MMOL/L — SIGNIFICANT CHANGE UP (ref 5–17)
AST SERPL-CCNC: 20 U/L — SIGNIFICANT CHANGE UP (ref 10–40)
BILIRUB SERPL-MCNC: 0.9 MG/DL — SIGNIFICANT CHANGE UP (ref 0.2–1.2)
BUN SERPL-MCNC: 15 MG/DL — SIGNIFICANT CHANGE UP (ref 7–23)
CALCIUM SERPL-MCNC: 9 MG/DL — SIGNIFICANT CHANGE UP (ref 8.4–10.5)
CHLORIDE SERPL-SCNC: 98 MMOL/L — SIGNIFICANT CHANGE UP (ref 96–108)
CHOLEST SERPL-MCNC: 173 MG/DL — SIGNIFICANT CHANGE UP (ref 10–199)
CO2 SERPL-SCNC: 25 MMOL/L — SIGNIFICANT CHANGE UP (ref 22–31)
CREAT SERPL-MCNC: 1.07 MG/DL — SIGNIFICANT CHANGE UP (ref 0.5–1.3)
FERRITIN SERPL-MCNC: 12 NG/ML — LOW (ref 30–400)
FOLATE SERPL-MCNC: 14.8 NG/ML — SIGNIFICANT CHANGE UP (ref 4.8–24.2)
GLUCOSE SERPL-MCNC: 97 MG/DL — SIGNIFICANT CHANGE UP (ref 70–99)
HBA1C BLD-MCNC: 5.2 % — SIGNIFICANT CHANGE UP (ref 4–5.6)
HCT VFR BLD CALC: 26.8 % — LOW (ref 39–50)
HDLC SERPL-MCNC: 81 MG/DL — SIGNIFICANT CHANGE UP (ref 40–125)
HGB BLD-MCNC: 8 G/DL — LOW (ref 13–17)
IRON SATN MFR SERPL: 18 % — SIGNIFICANT CHANGE UP (ref 16–55)
IRON SATN MFR SERPL: 63 UG/DL — SIGNIFICANT CHANGE UP (ref 45–165)
LIPID PNL WITH DIRECT LDL SERPL: 73 MG/DL — SIGNIFICANT CHANGE UP
MAGNESIUM SERPL-MCNC: 1.8 MG/DL — SIGNIFICANT CHANGE UP (ref 1.6–2.6)
MCHC RBC-ENTMCNC: 21.9 PG — LOW (ref 27–34)
MCHC RBC-ENTMCNC: 29.9 G/DL — LOW (ref 32–36)
MCV RBC AUTO: 73.2 FL — LOW (ref 80–100)
PCP SPEC-MCNC: SIGNIFICANT CHANGE UP
PLATELET # BLD AUTO: 158 K/UL — SIGNIFICANT CHANGE UP (ref 150–400)
POTASSIUM SERPL-MCNC: 4.4 MMOL/L — SIGNIFICANT CHANGE UP (ref 3.5–5.3)
POTASSIUM SERPL-SCNC: 4.4 MMOL/L — SIGNIFICANT CHANGE UP (ref 3.5–5.3)
PROT SERPL-MCNC: 6.9 G/DL — SIGNIFICANT CHANGE UP (ref 6–8.3)
RBC # BLD: 3.66 M/UL — LOW (ref 4.2–5.8)
RBC # FLD: 19.9 % — HIGH (ref 10.3–16.9)
SODIUM SERPL-SCNC: 136 MMOL/L — SIGNIFICANT CHANGE UP (ref 135–145)
TIBC SERPL-MCNC: 344 UG/DL — SIGNIFICANT CHANGE UP (ref 220–430)
TOTAL CHOLESTEROL/HDL RATIO MEASUREMENT: 2.1 RATIO — LOW (ref 3.4–9.6)
TRANSFERRIN SERPL-MCNC: 286 MG/DL — SIGNIFICANT CHANGE UP (ref 200–360)
TRIGL SERPL-MCNC: 93 MG/DL — SIGNIFICANT CHANGE UP (ref 10–149)
TROPONIN T SERPL-MCNC: <0.01 NG/ML — SIGNIFICANT CHANGE UP (ref 0–0.01)
UIBC SERPL-MCNC: 281 UG/DL — SIGNIFICANT CHANGE UP (ref 110–370)
VIT B12 SERPL-MCNC: 271 PG/ML — SIGNIFICANT CHANGE UP (ref 243–894)
WBC # BLD: 3.3 K/UL — LOW (ref 3.8–10.5)
WBC # FLD AUTO: 3.3 K/UL — LOW (ref 3.8–10.5)

## 2017-11-28 RX ORDER — MAGNESIUM OXIDE 400 MG ORAL TABLET 241.3 MG
800 TABLET ORAL ONCE
Qty: 0 | Refills: 0 | Status: COMPLETED | OUTPATIENT
Start: 2017-11-28 | End: 2017-11-28

## 2017-11-28 RX ADMIN — Medication 1 MILLIGRAM(S): at 00:04

## 2017-11-28 RX ADMIN — LISINOPRIL 20 MILLIGRAM(S): 2.5 TABLET ORAL at 06:13

## 2017-11-28 RX ADMIN — MAGNESIUM OXIDE 400 MG ORAL TABLET 800 MILLIGRAM(S): 241.3 TABLET ORAL at 11:09

## 2017-11-28 RX ADMIN — Medication 1 MILLIGRAM(S): at 13:30

## 2017-11-28 RX ADMIN — Medication 1 TABLET(S): at 12:19

## 2017-11-28 RX ADMIN — Medication 1 MILLIGRAM(S): at 12:19

## 2017-11-28 RX ADMIN — CLOPIDOGREL BISULFATE 75 MILLIGRAM(S): 75 TABLET, FILM COATED ORAL at 12:19

## 2017-11-28 RX ADMIN — Medication 100 MILLIGRAM(S): at 12:19

## 2017-11-28 RX ADMIN — Medication 100 MILLIGRAM(S): at 06:13

## 2017-11-28 NOTE — DISCHARGE NOTE ADULT - PLAN OF CARE
follow up You came to the emergency room with palpitations. These symptoms were related to alcohol use. Please avoid alcohol. Your cardiac enzymes (show if the heart is under stress) were negative three times. Please follow up with Dr. Levy in 1-2 weeks. Continue your current medication regimen. You came to the emergency room with palpitations. These symptoms were related to alcohol use. Please avoid alcohol. Your cardiac enzymes (show if the heart is under stress) were negative three times. Please follow up with Dr. Levy in 1-2 weeks. Continue your current medication regimen. Please  libirum 10mg from your pharmacy and take it four times a day as directed to prevent alcohol withdrawal. Please ensure you see Dr. Levy within two weeks as this is when the libirum will run out. Call Dr. Levy's office for any questions or problems with your medications. You came to the emergency room with palpitations. These symptoms were related to alcohol use. Please avoid alcohol. Your cardiac enzymes (show if the heart is under stress) were negative three times. Please follow up with Dr. Levy on Tuesday, December 12th at 2PM. Continue your current medication regimen. Please  libirum 10mg from your pharmacy and take it four times a day as directed to prevent alcohol withdrawal. Please ensure you see Dr. Levy within two weeks as this is when the libirum will run out. Call Dr. Levy's office for any questions or problems with your medications.

## 2017-11-28 NOTE — DISCHARGE NOTE ADULT - CARE PLAN
Principal Discharge DX:	Alcohol withdrawal  Goal:	follow up  Instructions for follow-up, activity and diet:	You came to the emergency room with palpitations. These symptoms were related to alcohol use. Please avoid alcohol. Your cardiac enzymes (show if the heart is under stress) were negative three times. Please follow up with Dr. Levy in 1-2 weeks. Continue your current medication regimen. Principal Discharge DX:	Alcohol withdrawal  Goal:	follow up  Instructions for follow-up, activity and diet:	You came to the emergency room with palpitations. These symptoms were related to alcohol use. Please avoid alcohol. Your cardiac enzymes (show if the heart is under stress) were negative three times. Please follow up with Dr. Levy in 1-2 weeks. Continue your current medication regimen. Please  libirum 10mg from your pharmacy and take it four times a day as directed to prevent alcohol withdrawal. Please ensure you see Dr. Levy within two weeks as this is when the libirum will run out. Call Dr. Levy's office for any questions or problems with your medications. Principal Discharge DX:	Alcohol withdrawal  Goal:	follow up  Instructions for follow-up, activity and diet:	You came to the emergency room with palpitations. These symptoms were related to alcohol use. Please avoid alcohol. Your cardiac enzymes (show if the heart is under stress) were negative three times. Please follow up with Dr. Levy on Tuesday, December 12th at 2PM. Continue your current medication regimen. Please  libirum 10mg from your pharmacy and take it four times a day as directed to prevent alcohol withdrawal. Please ensure you see Dr. Levy within two weeks as this is when the libirum will run out. Call Dr. Levy's office for any questions or problems with your medications.

## 2017-11-28 NOTE — DISCHARGE NOTE ADULT - CARE PROVIDER_API CALL
Ck Levy), Medicine  [address] [place] [address]  Phone: [phone]  Fax: [phone]

## 2017-11-28 NOTE — PROGRESS NOTE ADULT - ASSESSMENT
ETHANOLISM      CAD      FOR WITHDRAWL RX  WITH LIBRIUM  GI CONSULT FOR ANEMIA   CONT CARDIAC MEDS   RE CARDIOMYOPATHY
Ethanolism    stabilized with Librium      for discharge  planning     LEOPOLDO Levy

## 2017-11-28 NOTE — DISCHARGE NOTE ADULT - PATIENT PORTAL LINK FT
“You can access the FollowHealth Patient Portal, offered by [facility], by registering with the following website: [email or web address]”

## 2017-11-28 NOTE — DISCHARGE NOTE ADULT - HOSPITAL COURSE
Patient is a 61yo M, ETOH abuse (last drink 2am 11/27/17; drank 1 pint of vodka and 1 pint of cognac throughout yesterday); CAD s/p MI 2003 , most recent PCI at [facility]  07/2016 JOSHUA to mLAD with residual 50 % OM1, sCHF (EF 35-45 %) s/p AICD on 2009 , pafib and LV thrombus (on ASA and Plavix only 2/2 to lower GIB 2016 presents to [facility] ED 11/27/17 with CC of intermittent palpitations associated with SOB, generalized weakness, nausea and lightheadedness. Pt. was discharged on October 2017 with tapering doses of Librium. Pt. states that he started drinking 4 days ago; states that he started drinking 1 pint of cognac for 2 days and then increased to 1 pint of cognac and 1 pint of vodka the remaining 2 days; pt. states that his last drink was at 2 am today, he woke up and took his home medication at 9: 30 am and then noticed the palpitations. Pt. denies any CP, abdominal pain, orthopnea/PND, fever, chills, vomitting, shakes, HA, tactile/audiovisual hallucinations, confusion, memory loss and vision changes. In the ED, VSS, afebrile, not tachycardiac, EKG unchanged from prior admission, CE negative X 2, Hg/Ht 8.0/26.3 (baseline 9-10/27-29). Pt given 2 L IVF, librium 50 mg PO X 1 and Zofran 4 mg IVP X1 . In light of pt. symptoms in setting of recent ETOH use and extensive cardiac history, he is now admitted to cardiology for further workup. Patient had three negative cardiac enzymes and no events overnight on telemetry. Patient was seen and examined this AM and reports he is feeling much better, asymptomatic without complaints. Palpitations likely in the setting of ETOH withdrawal. No need for repeat echo at this time as discussed with Dr. Levy. Patient is stable for discharge as per Dr. Levy. Patient has been given appropriate discharge instructions including medication regimen and follow up.     Temp 98.2F, HR 61bpm, /81, RR 18, O2 sat 99% RA  Gen: NAD, A&O x 2  Cards: RRR, clear S1 and S2 without murmur, no JVD  Pulm: CTA b/l without w/r/r  Abd: soft, NT  Ext: No LE edema or ulcerations b/l Patient is a 63yo M, ETOH abuse (last drink 2am 11/27/17; drank 1 pint of vodka and 1 pint of cognac throughout yesterday); CAD s/p MI 2003 , most recent PCI at [facility]  07/2016 JOSHUA to mLAD with residual 50 % OM1, sCHF (EF 35-45 %) s/p AICD on 2009 , pafib and LV thrombus (on ASA and Plavix only 2/2 to lower GIB 2016 presents to [facility] ED 11/27/17 with CC of intermittent palpitations associated with SOB, generalized weakness, nausea and lightheadedness. Pt. was discharged on October 2017 with tapering doses of Librium. Pt. states that he started drinking 4 days ago; states that he started drinking 1 pint of cognac for 2 days and then increased to 1 pint of cognac and 1 pint of vodka the remaining 2 days; pt. states that his last drink was at 2 am today, he woke up and took his home medication at 9: 30 am and then noticed the palpitations. Pt. denies any CP, abdominal pain, orthopnea/PND, fever, chills, vomitting, shakes, HA, tactile/audiovisual hallucinations, confusion, memory loss and vision changes. In the ED, VSS, afebrile, not tachycardiac, EKG unchanged from prior admission, CE negative X 2, Hg/Ht 8.0/26.3 (baseline 9-10/27-29). Pt given 2 L IVF, librium 50 mg PO X 1 and Zofran 4 mg IVP X1 . In light of pt. symptoms in setting of recent ETOH use and extensive cardiac history, he is now admitted to cardiology for further workup. Patient had three negative cardiac enzymes and no events overnight on telemetry. Patient was seen and examined this AM and reports he is feeling much better, asymptomatic without complaints. Palpitations likely in the setting of ETOH withdrawal. No need for repeat echo at this time as discussed with Dr. Levy. Patient is to be discharged on librium 10mg four times daily which will be prescribed by Dr. Levy. Patient should follow up with Dr. Levy on Tuesday 12/12 at 2PM (2 week supply of librium prescribed). Patient is stable for discharge as per Dr. Raifman. Patient has been given appropriate discharge instructions including medication regimen and follow up.     Temp 98.2F, HR 61bpm, /81, RR 18, O2 sat 99% RA  Gen: NAD, A&O x 2  Cards: RRR, clear S1 and S2 without murmur, no JVD  Pulm: CTA b/l without w/r/r  Abd: soft, NT  Ext: No LE edema or ulcerations b/l

## 2017-11-28 NOTE — PROGRESS NOTE ADULT - SUBJECTIVE AND OBJECTIVE BOX
Pt is improved  Shaking has markedly diminished    PAST MEDICAL & SURGICAL HISTORY:  Pacemaker  ETOH abuse  Paroxysmal a-fib  Myocardial infarction involving other coronary artery  Gastritis  Atherosclerosis of coronary artery: Coronary artery disease  Automatic implantable cardiac defibrillator in situ: ICD (implantable cardioverter-defibrillator) in place    Vital Signs Last 24 Hrs  T(C): 36.8 (28 Nov 2017 06:30), Max: 37.3 (27 Nov 2017 20:04)  T(F): 98.2 (28 Nov 2017 06:30), Max: 99.2 (27 Nov 2017 20:04)  HR: 61 (28 Nov 2017 06:30) (61 - 90)  BP: 133/81 (28 Nov 2017 06:30) (108/129 - 136/82)  BP(mean): 94 (27 Nov 2017 17:39) (94 - 94)  RR: 18 (28 Nov 2017 06:30) (16 - 18)  SpO2: 99% (28 Nov 2017 06:30) (95% - 99%)    MEDICATIONS  (STANDING):  atorvastatin 40 milliGRAM(s) Oral at bedtime  clopidogrel Tablet 75 milliGRAM(s) Oral daily  folic acid 1 milliGRAM(s) Oral daily  lisinopril 20 milliGRAM(s) Oral daily  magnesium oxide 800 milliGRAM(s) Oral once  metoprolol succinate  milliGRAM(s) Oral daily  multivitamin 1 Tablet(s) Oral daily  thiamine 100 milliGRAM(s) Oral daily    MEDICATIONS  (PRN):  LORazepam     Tablet 1 milliGRAM(s) Oral daily PRN Agitation      Lungs clear   CV s1 s2  Abd soft  Ext stable                          8.0    3.3   )-----------( 158      ( 28 Nov 2017 06:05 )             26.8   11-28    136  |  98  |  15  ----------------------------<  97  4.4   |  25  |  1.07    Ca    9.0      28 Nov 2017 06:05  Mg     1.8     11-28    TPro  6.9  /  Alb  3.9  /  TBili  0.9  /  DBili  x   /  AST  20  /  ALT  10  /  AlkPhos  47  11-28  PT/INR - ( 27 Nov 2017 13:05 )   PT: 10.9 sec;   INR: 0.98
PT IS S.P ETHANOLISM   CAD  L V THROMBUS  WHO RESTARTED DRINKING ON THANKSGIVING     HE HAS NOT BEEN COMPLAINT WITH ABSTINENCE OR  ATTENDING  AA MEETING OR SEEKING THERAPY    PAST MEDICAL & SURGICAL HISTORY:  Pacemaker  ETOH abuse  Paroxysmal a-fib  Myocardial infarction involving other coronary artery  Gastritis  Atherosclerosis of coronary artery: Coronary artery disease  Automatic implantable cardiac defibrillator in situ: ICD (implantable cardioverter-defibrillator) in place    MEDICATIONS  (STANDING):    MEDICATIONS  (PRN):    \  Home Medications:  aspirin 162 mg oral delayed release tablet: 1 tab(s) orally once a day (18 Sep 2017 11:08)  folic acid 1 mg oral tablet: 1 tab(s) orally once a day (18 Sep 2017 11:08)  Multiple Vitamins oral tablet: 1 tab(s) orally once a day (18 Sep 2017 11:08)  thiamine 100 mg oral tablet: 1 tab(s) orally once a day (18 Sep 2017 11:08)    LUNGS CLEAR  CV S1 S2  ABD SOFT  EXT STABLE                          8.0    3.4   )-----------( 149      ( 27 Nov 2017 13:05 )             26.3   11-27    138  |  99  |  12  ----------------------------<  122<H>  4.3   |  20<L>  |  0.90    Ca    9.0      27 Nov 2017 12:30  Mg     2.0     11-27    TPro  7.6  /  Alb  4.2  /  TBili  0.3  /  DBili  x   /  AST  36  /  ALT  13  /  AlkPhos  51  11-27  PT/INR - ( 27 Nov 2017 13:05 )   PT: 10.9 sec;   INR: 0.98

## 2017-11-30 DIAGNOSIS — K29.70 GASTRITIS, UNSPECIFIED, WITHOUT BLEEDING: ICD-10-CM
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
## 2017-11-30 DIAGNOSIS — F10.239 ALCOHOL DEPENDENCE WITH WITHDRAWAL, UNSPECIFIED: ICD-10-CM

## 2017-11-30 DIAGNOSIS — D64.9 ANEMIA, UNSPECIFIED: ICD-10-CM

## 2017-11-30 DIAGNOSIS — I25.10 ATHEROSCLEROTIC HEART DISEASE OF NATIVE CORONARY ARTERY WITHOUT ANGINA PECTORIS: ICD-10-CM

## 2017-11-30 DIAGNOSIS — Z79.01 LONG TERM (CURRENT) USE OF ANTICOAGULANTS: ICD-10-CM

## 2017-11-30 DIAGNOSIS — I50.22 CHRONIC SYSTOLIC (CONGESTIVE) HEART FAILURE: ICD-10-CM

## 2017-11-30 DIAGNOSIS — I48.0 PAROXYSMAL ATRIAL FIBRILLATION: ICD-10-CM

## 2017-11-30 DIAGNOSIS — I51.3 INTRACARDIAC THROMBOSIS, NOT ELSEWHERE CLASSIFIED: ICD-10-CM

## 2017-11-30 DIAGNOSIS — I42.9 CARDIOMYOPATHY, UNSPECIFIED: ICD-10-CM

## 2017-11-30 DIAGNOSIS — I25.2 OLD MYOCARDIAL INFARCTION: ICD-10-CM

## 2017-11-30 DIAGNOSIS — Z95.810 PRESENCE OF AUTOMATIC (IMPLANTABLE) CARDIAC DEFIBRILLATOR: ICD-10-CM

## 2017-11-30 DIAGNOSIS — Z88.0 ALLERGY STATUS TO PENICILLIN: ICD-10-CM

## 2017-12-19 PROCEDURE — 82803 BLOOD GASES ANY COMBINATION: CPT

## 2017-12-19 PROCEDURE — 83036 HEMOGLOBIN GLYCOSYLATED A1C: CPT

## 2017-12-19 PROCEDURE — 36415 COLL VENOUS BLD VENIPUNCTURE: CPT

## 2017-12-19 PROCEDURE — 93005 ELECTROCARDIOGRAM TRACING: CPT

## 2017-12-19 PROCEDURE — 83735 ASSAY OF MAGNESIUM: CPT

## 2017-12-19 PROCEDURE — 82550 ASSAY OF CK (CPK): CPT

## 2017-12-19 PROCEDURE — 82746 ASSAY OF FOLIC ACID SERUM: CPT

## 2017-12-19 PROCEDURE — 80048 BASIC METABOLIC PNL TOTAL CA: CPT

## 2017-12-19 PROCEDURE — 80307 DRUG TEST PRSMV CHEM ANLYZR: CPT

## 2017-12-19 PROCEDURE — 85027 COMPLETE CBC AUTOMATED: CPT

## 2017-12-19 PROCEDURE — 85610 PROTHROMBIN TIME: CPT

## 2017-12-19 PROCEDURE — 84466 ASSAY OF TRANSFERRIN: CPT

## 2017-12-19 PROCEDURE — 96374 THER/PROPH/DIAG INJ IV PUSH: CPT

## 2017-12-19 PROCEDURE — 85025 COMPLETE CBC W/AUTO DIFF WBC: CPT

## 2017-12-19 PROCEDURE — 84100 ASSAY OF PHOSPHORUS: CPT

## 2017-12-19 PROCEDURE — 85730 THROMBOPLASTIN TIME PARTIAL: CPT

## 2017-12-19 PROCEDURE — 84484 ASSAY OF TROPONIN QUANT: CPT

## 2017-12-19 PROCEDURE — 82962 GLUCOSE BLOOD TEST: CPT

## 2017-12-19 PROCEDURE — 82607 VITAMIN B-12: CPT

## 2017-12-19 PROCEDURE — 83880 ASSAY OF NATRIURETIC PEPTIDE: CPT

## 2017-12-19 PROCEDURE — 71046 X-RAY EXAM CHEST 2 VIEWS: CPT

## 2017-12-19 PROCEDURE — 71045 X-RAY EXAM CHEST 1 VIEW: CPT

## 2017-12-19 PROCEDURE — 80061 LIPID PANEL: CPT

## 2017-12-19 PROCEDURE — 83550 IRON BINDING TEST: CPT

## 2017-12-19 PROCEDURE — 99285 EMERGENCY DEPT VISIT HI MDM: CPT | Mod: 25

## 2017-12-19 PROCEDURE — 80053 COMPREHEN METABOLIC PANEL: CPT

## 2017-12-19 PROCEDURE — 83605 ASSAY OF LACTIC ACID: CPT

## 2017-12-19 PROCEDURE — 82553 CREATINE MB FRACTION: CPT

## 2017-12-19 PROCEDURE — 82728 ASSAY OF FERRITIN: CPT

## 2017-12-19 PROCEDURE — 84443 ASSAY THYROID STIM HORMONE: CPT

## 2017-12-19 PROCEDURE — 99291 CRITICAL CARE FIRST HOUR: CPT | Mod: 25

## 2017-12-21 ENCOUNTER — EMERGENCY (EMERGENCY)
Facility: HOSPITAL | Age: 62
LOS: 1 days | Discharge: HOME | End: 2017-12-21
Attending: EMERGENCY MEDICINE | Admitting: EMERGENCY MEDICINE
Payer: MEDICARE

## 2017-12-21 VITALS
SYSTOLIC BLOOD PRESSURE: 127 MMHG | HEART RATE: 89 BPM | RESPIRATION RATE: 20 BRPM | DIASTOLIC BLOOD PRESSURE: 77 MMHG | TEMPERATURE: 98 F | OXYGEN SATURATION: 99 % | WEIGHT: 162.04 LBS

## 2017-12-21 VITALS
TEMPERATURE: 98 F | SYSTOLIC BLOOD PRESSURE: 122 MMHG | RESPIRATION RATE: 20 BRPM | HEART RATE: 75 BPM | OXYGEN SATURATION: 98 % | DIASTOLIC BLOOD PRESSURE: 75 MMHG

## 2017-12-21 DIAGNOSIS — Z79.02 LONG TERM (CURRENT) USE OF ANTITHROMBOTICS/ANTIPLATELETS: ICD-10-CM

## 2017-12-21 DIAGNOSIS — R53.1 WEAKNESS: ICD-10-CM

## 2017-12-21 DIAGNOSIS — Z88.0 ALLERGY STATUS TO PENICILLIN: ICD-10-CM

## 2017-12-21 DIAGNOSIS — Z79.899 OTHER LONG TERM (CURRENT) DRUG THERAPY: ICD-10-CM

## 2017-12-21 DIAGNOSIS — Z95.0 PRESENCE OF CARDIAC PACEMAKER: ICD-10-CM

## 2017-12-21 DIAGNOSIS — R10.13 EPIGASTRIC PAIN: ICD-10-CM

## 2017-12-21 DIAGNOSIS — I25.10 ATHEROSCLEROTIC HEART DISEASE OF NATIVE CORONARY ARTERY WITHOUT ANGINA PECTORIS: ICD-10-CM

## 2017-12-21 DIAGNOSIS — Z88.8 ALLERGY STATUS TO OTHER DRUGS, MEDICAMENTS AND BIOLOGICAL SUBSTANCES: ICD-10-CM

## 2017-12-21 DIAGNOSIS — G47.00 INSOMNIA, UNSPECIFIED: ICD-10-CM

## 2017-12-21 DIAGNOSIS — Z95.810 PRESENCE OF AUTOMATIC (IMPLANTABLE) CARDIAC DEFIBRILLATOR: ICD-10-CM

## 2017-12-21 DIAGNOSIS — R63.0 ANOREXIA: ICD-10-CM

## 2017-12-21 DIAGNOSIS — R25.1 TREMOR, UNSPECIFIED: ICD-10-CM

## 2017-12-21 LAB
ALBUMIN SERPL ELPH-MCNC: 4.5 G/DL — SIGNIFICANT CHANGE UP (ref 3.3–5)
ALP SERPL-CCNC: 60 U/L — SIGNIFICANT CHANGE UP (ref 40–120)
ALT FLD-CCNC: 31 U/L — SIGNIFICANT CHANGE UP (ref 10–45)
ANION GAP SERPL CALC-SCNC: 16 MMOL/L — SIGNIFICANT CHANGE UP (ref 5–17)
APTT BLD: 26.8 SEC — LOW (ref 27.5–37.4)
AST SERPL-CCNC: 55 U/L — HIGH (ref 10–40)
BASOPHILS NFR BLD AUTO: 1.1 % — SIGNIFICANT CHANGE UP (ref 0–2)
BILIRUB SERPL-MCNC: 0.8 MG/DL — SIGNIFICANT CHANGE UP (ref 0.2–1.2)
BUN SERPL-MCNC: 25 MG/DL — HIGH (ref 7–23)
CALCIUM SERPL-MCNC: 10 MG/DL — SIGNIFICANT CHANGE UP (ref 8.4–10.5)
CHLORIDE SERPL-SCNC: 91 MMOL/L — LOW (ref 96–108)
CK MB CFR SERPL CALC: 1.7 NG/ML — SIGNIFICANT CHANGE UP (ref 0–6.7)
CK SERPL-CCNC: 150 U/L — SIGNIFICANT CHANGE UP (ref 30–200)
CO2 SERPL-SCNC: 24 MMOL/L — SIGNIFICANT CHANGE UP (ref 22–31)
CREAT SERPL-MCNC: 1.47 MG/DL — HIGH (ref 0.5–1.3)
EOSINOPHIL NFR BLD AUTO: 3 % — SIGNIFICANT CHANGE UP (ref 0–6)
GLUCOSE SERPL-MCNC: 113 MG/DL — HIGH (ref 70–99)
HCT VFR BLD CALC: 30.4 % — LOW (ref 39–50)
HGB BLD-MCNC: 9.4 G/DL — LOW (ref 13–17)
INR BLD: 0.89 — SIGNIFICANT CHANGE UP (ref 0.88–1.16)
LYMPHOCYTES # BLD AUTO: 29.1 % — SIGNIFICANT CHANGE UP (ref 13–44)
MCHC RBC-ENTMCNC: 21.7 PG — LOW (ref 27–34)
MCHC RBC-ENTMCNC: 30.9 G/DL — LOW (ref 32–36)
MCV RBC AUTO: 70.2 FL — LOW (ref 80–100)
MONOCYTES NFR BLD AUTO: 8.4 % — SIGNIFICANT CHANGE UP (ref 2–14)
NEUTROPHILS NFR BLD AUTO: 58.4 % — SIGNIFICANT CHANGE UP (ref 43–77)
PLATELET # BLD AUTO: 147 K/UL — LOW (ref 150–400)
POTASSIUM SERPL-MCNC: 4.7 MMOL/L — SIGNIFICANT CHANGE UP (ref 3.5–5.3)
POTASSIUM SERPL-SCNC: 4.7 MMOL/L — SIGNIFICANT CHANGE UP (ref 3.5–5.3)
PROT SERPL-MCNC: 8.2 G/DL — SIGNIFICANT CHANGE UP (ref 6–8.3)
PROTHROM AB SERPL-ACNC: 9.9 SEC — SIGNIFICANT CHANGE UP (ref 9.8–12.7)
RBC # BLD: 4.33 M/UL — SIGNIFICANT CHANGE UP (ref 4.2–5.8)
RBC # FLD: 20.1 % — HIGH (ref 10.3–16.9)
SODIUM SERPL-SCNC: 131 MMOL/L — LOW (ref 135–145)
TROPONIN T SERPL-MCNC: <0.01 NG/ML — SIGNIFICANT CHANGE UP (ref 0–0.01)
WBC # BLD: 4.7 K/UL — SIGNIFICANT CHANGE UP (ref 3.8–10.5)
WBC # FLD AUTO: 4.7 K/UL — SIGNIFICANT CHANGE UP (ref 3.8–10.5)

## 2017-12-21 PROCEDURE — 36415 COLL VENOUS BLD VENIPUNCTURE: CPT

## 2017-12-21 PROCEDURE — 85730 THROMBOPLASTIN TIME PARTIAL: CPT

## 2017-12-21 PROCEDURE — 99283 EMERGENCY DEPT VISIT LOW MDM: CPT | Mod: 25

## 2017-12-21 PROCEDURE — 80053 COMPREHEN METABOLIC PANEL: CPT

## 2017-12-21 PROCEDURE — 84484 ASSAY OF TROPONIN QUANT: CPT

## 2017-12-21 PROCEDURE — 82553 CREATINE MB FRACTION: CPT

## 2017-12-21 PROCEDURE — 82962 GLUCOSE BLOOD TEST: CPT

## 2017-12-21 PROCEDURE — 83690 ASSAY OF LIPASE: CPT

## 2017-12-21 PROCEDURE — 85610 PROTHROMBIN TIME: CPT

## 2017-12-21 PROCEDURE — 99284 EMERGENCY DEPT VISIT MOD MDM: CPT

## 2017-12-21 PROCEDURE — 85025 COMPLETE CBC W/AUTO DIFF WBC: CPT

## 2017-12-21 PROCEDURE — 82550 ASSAY OF CK (CPK): CPT

## 2017-12-21 RX ORDER — SODIUM CHLORIDE 9 MG/ML
1000 INJECTION INTRAMUSCULAR; INTRAVENOUS; SUBCUTANEOUS ONCE
Qty: 0 | Refills: 0 | Status: COMPLETED | OUTPATIENT
Start: 2017-12-21 | End: 2017-12-21

## 2017-12-21 RX ADMIN — SODIUM CHLORIDE 1000 MILLILITER(S): 9 INJECTION INTRAMUSCULAR; INTRAVENOUS; SUBCUTANEOUS at 10:52

## 2017-12-21 NOTE — ED ADULT NURSE NOTE - OBJECTIVE STATEMENT
Pt states that about 2 weeks ago he started losing his appetite, had intermittent epigastric abdominal pain, nausea, and insomnia. Pt reports that these symptoms have been worsening over the last 2 weeks. Pt reports he has not been able to eat or sleep in the last 4 days and since has lost 7 pounds. Pt also reports that he had a hx of alcohol abuse in the past but has been sober "for a while now," however had to drink a liter of liquor last night to be able to sleep. Pt denies chest pain, SOB, dizziness, vomiting, diarrhea, chills, fever.

## 2017-12-21 NOTE — ED PROVIDER NOTE - OBJECTIVE STATEMENT
63yo man with c/o insomnia, decreased appetite, weight loss and not feeling well. Pt states he has some burning discomfort to the epigastric region, no CP, no SOB, states vomited yesterday when he went to the store with his son. Pt notes he drank yesterday to attempt in increase his appetite as this is the only thing that works. Pt has a h/o gastritis, paroxysmal A-fib, PPM, CAD, and alcohol abuse.

## 2017-12-21 NOTE — ED PROVIDER NOTE - MEDICAL DECISION MAKING DETAILS
Pt afVSS, well appearing, no acute findings on labs, pt tolerating po, able to ambulate easily in the ED. Feel pt safe for discharge at this time to f/u with PMD.

## 2017-12-21 NOTE — ED PROVIDER NOTE - PHYSICAL EXAMINATION
CONSTITUTIONAL: Well-appearing; well-nourished; in no apparent distress.   HEAD: Normocephalic; atraumatic.   EYES: PERRL; EOM intact; conjunctiva and sclera clear  ENT: normal nose; no rhinorrhea; MMM  NECK: Supple; non-tender;   CARDIOVASCULAR: Normal S1, S2; no murmurs, rubs, or gallops. Regular rate and rhythm.   RESPIRATORY: Breathing easily; breath sounds clear and equal bilaterally; no wheezes, rhonchi, or rales.  GI: Soft; non-distended; non-tender;   EXT: No cyanosis or edema; N/V intact  SKIN: Normal for age and race; warm; dry; good turgor;   NEURO: A & O x 3; face symmetric; mild tremor to the BUE, B/L; grossly unremarkable.   PSYCHOLOGICAL: The patient’s mood and manner are appropriate.

## 2017-12-21 NOTE — ED ADULT TRIAGE NOTE - OTHER COMPLAINTS
Pt states he has a hx of a congential heart defect and has a pacemaker and stent. Pt states he had a pint of alcohol to help him sleep and states he has been feeling very bad and also took librium this morning.

## 2017-12-21 NOTE — ED ADULT NURSE NOTE - CHPI ED SYMPTOMS NEG
no blood in stool/no fever/no burning urination/no chills/no diarrhea/no vomiting/no hematuria/no abdominal distension/no dysuria

## 2017-12-24 VITALS
WEIGHT: 165.35 LBS | HEART RATE: 126 BPM | DIASTOLIC BLOOD PRESSURE: 72 MMHG | TEMPERATURE: 98 F | RESPIRATION RATE: 18 BRPM | OXYGEN SATURATION: 99 % | SYSTOLIC BLOOD PRESSURE: 109 MMHG | HEIGHT: 69 IN

## 2017-12-24 LAB
ALBUMIN SERPL ELPH-MCNC: 4.6 G/DL — SIGNIFICANT CHANGE UP (ref 3.3–5)
ALP SERPL-CCNC: 63 U/L — SIGNIFICANT CHANGE UP (ref 40–120)
ALT FLD-CCNC: 33 U/L — SIGNIFICANT CHANGE UP (ref 10–45)
ANION GAP SERPL CALC-SCNC: 19 MMOL/L — HIGH (ref 5–17)
AST SERPL-CCNC: 59 U/L — HIGH (ref 10–40)
B-OH-BUTYR SERPL-SCNC: 0.3 MMOL/L — SIGNIFICANT CHANGE UP
BILIRUB SERPL-MCNC: 0.3 MG/DL — SIGNIFICANT CHANGE UP (ref 0.2–1.2)
BUN SERPL-MCNC: 33 MG/DL — HIGH (ref 7–23)
CALCIUM SERPL-MCNC: 9.7 MG/DL — SIGNIFICANT CHANGE UP (ref 8.4–10.5)
CHLORIDE SERPL-SCNC: 96 MMOL/L — SIGNIFICANT CHANGE UP (ref 96–108)
CO2 SERPL-SCNC: 22 MMOL/L — SIGNIFICANT CHANGE UP (ref 22–31)
CREAT SERPL-MCNC: 1.6 MG/DL — HIGH (ref 0.5–1.3)
GLUCOSE SERPL-MCNC: 139 MG/DL — HIGH (ref 70–99)
MAGNESIUM SERPL-MCNC: 2.5 MG/DL — SIGNIFICANT CHANGE UP (ref 1.6–2.6)
POTASSIUM SERPL-MCNC: 5.1 MMOL/L — SIGNIFICANT CHANGE UP (ref 3.5–5.3)
POTASSIUM SERPL-SCNC: 5.1 MMOL/L — SIGNIFICANT CHANGE UP (ref 3.5–5.3)
PROT SERPL-MCNC: 8.2 G/DL — SIGNIFICANT CHANGE UP (ref 6–8.3)
SODIUM SERPL-SCNC: 137 MMOL/L — SIGNIFICANT CHANGE UP (ref 135–145)
TROPONIN T SERPL-MCNC: <0.01 NG/ML — SIGNIFICANT CHANGE UP (ref 0–0.01)

## 2017-12-24 PROCEDURE — 93010 ELECTROCARDIOGRAM REPORT: CPT

## 2017-12-24 PROCEDURE — 99285 EMERGENCY DEPT VISIT HI MDM: CPT | Mod: 25

## 2017-12-24 RX ORDER — SODIUM CHLORIDE 9 MG/ML
1000 INJECTION INTRAMUSCULAR; INTRAVENOUS; SUBCUTANEOUS ONCE
Qty: 0 | Refills: 0 | Status: COMPLETED | OUTPATIENT
Start: 2017-12-24 | End: 2017-12-24

## 2017-12-24 RX ORDER — THIAMINE MONONITRATE (VIT B1) 100 MG
100 TABLET ORAL ONCE
Qty: 0 | Refills: 0 | Status: COMPLETED | OUTPATIENT
Start: 2017-12-24 | End: 2017-12-24

## 2017-12-24 RX ADMIN — Medication 100 MILLIGRAM(S): at 23:26

## 2017-12-24 RX ADMIN — SODIUM CHLORIDE 2000 MILLILITER(S): 9 INJECTION INTRAMUSCULAR; INTRAVENOUS; SUBCUTANEOUS at 23:25

## 2017-12-24 NOTE — ED PROVIDER NOTE - NS ED ROS FT
CONSTITUTIONAL: weakness, no fever  NEURO: No headache, no dizziness, no syncope; No weakness/tingling/numbness  EYES: No visual changes  ENT: No rhinorrhea or sore throat  PULM: No cough or dyspnea  CV: HPI  GI: No abdominal pain, vomiting, or diarrhea  : No dysuria, hematuria, frequency  MSK: No neck pain or back pain, no joint pain  SKIN: no rash CONSTITUTIONAL: weakness, no fever  NEURO: No headache, no dizziness, no syncope; No weakness/tingling/numbness  EYES: No visual changes  ENT: No rhinorrhea or sore throat  PULM: No cough or dyspnea  CV: HPI  GI: No abdominal pain, vomiting, or diarrhea  : No dysuria, hematuria, frequency  MSK: No neck pain or back pain, no joint pain  SKIN: no rash  PSYCH: admits to being depressed. denies SI/HI/hallucinations.  says he has a great support system of children and neighbors.

## 2017-12-24 NOTE — ED PROVIDER NOTE - MEDICAL DECISION MAKING DETAILS
pacemaker possibly fired, cardiology fellow to interrogate.  check labs to eval for electrolyte abnl.  CT head due to asa/plavix and recent fall.  suspect having some mild alcohol withdrawal, will give IVF, ativan, thiamine.

## 2017-12-24 NOTE — ED PROVIDER NOTE - ATTENDING CONTRIBUTION TO CARE
62M with hx of PM, alcohol abuse, hx of CAD and stent placement, hx of afib pacemaker/AICD. Pt has been binge drinking the past few days. Pt felt that defibrillator fired. Pt has had recent episodes of vertigo, reporting poor PO intake. Pt's EKG shows sinus tachycardia, anteroseptal infarct. No chest pain, palpitations, cards fellow to interrogate PM, plan for fluids, thiamine. Can give Librium or Ativan.

## 2017-12-24 NOTE — ED PROVIDER NOTE - OBJECTIVE STATEMENT
pt with hx of cad, afib, ppm/aicd, alcoholism  pt states binge drinking for past 2 days; appetite poor and having some soft stools.  feeling weak and tonight felt a "kick in the chest" and thinks his defib fired around 7:30 pm. no prodrome of chest pain or palpitations.  he also states he fell about 3 days ago after having an episode of vertigo, which he says has been happening frequently in th past couple of years; he has a good recollection of that and doesn't think he fainted.  he says he has been drinking vodka heavily in the past 2 days, split a ? 750 mL bottle between yesterday and today.  last drink around 7 pm tonight.  also seen here 3 days ago for feeling weak.

## 2017-12-24 NOTE — ED ADULT NURSE NOTE - OBJECTIVE STATEMENT
felt his pacemaker/defib fired x 1 at 1930, denies pain, SOB, felt his pacemaker/defib fired x 1 at 1930, denies pain, SOB, pt reports drinking 1 large btl of Vodka since 2days ago, went to the bathroom to clean himself up and suddenly felt a "thump" on his left upper chest,

## 2017-12-24 NOTE — ED PROVIDER NOTE - PROGRESS NOTE DETAILS
spoke with cardiology resident, he will notify card fellow re: possible ppm interrogation evaluated by cardiology, defib fired after run of afib with rvr.  to be admitted, increase metoprolol.  for EP consult in morning.

## 2017-12-24 NOTE — ED PROVIDER NOTE - PHYSICAL EXAMINATION
CONSTITUTIONAL: WD,WN. NAD.    SKIN: Normal color and turgor. No rash.    HEAD: NC/AT.  EYES: Conjunctiva clear. EOMI. PERRL.    ENT: Airway patent, OP without erythema, tonsillar swelling or exudate; uvula midline without swelling. Nasal mucosa clear, no rhinorrhea.   RESPIRATORY:  Breathing non-labored. No retractions or accessory muscle use.  Lungs CTA bilat.  CARDIOVASCULAR:  tachy RRR, S1S2. No M/R/G.      GI:  Abdomen soft, nontender.    MSK: Neck supple with painless ROM.  No extremity edema or tenderness.  No joint swelling or ROM limitation.  NEURO: Alert and oriented; 5/5 strength in all extremities. Normal speech and gait.  +hand tremors and tongue fasciculations

## 2017-12-24 NOTE — ED PROVIDER NOTE - CARE PLAN
Principal Discharge DX:	Defibrillator discharge  Secondary Diagnosis:	Renal insufficiency  Secondary Diagnosis:	Alcohol withdrawal

## 2017-12-25 ENCOUNTER — INPATIENT (INPATIENT)
Facility: HOSPITAL | Age: 62
LOS: 0 days | Discharge: HOME | DRG: 309 | End: 2017-12-26
Attending: INTERNAL MEDICINE | Admitting: INTERNAL MEDICINE
Payer: MEDICARE

## 2017-12-25 DIAGNOSIS — I50.42 CHRONIC COMBINED SYSTOLIC (CONGESTIVE) AND DIASTOLIC (CONGESTIVE) HEART FAILURE: ICD-10-CM

## 2017-12-25 DIAGNOSIS — I48.0 PAROXYSMAL ATRIAL FIBRILLATION: ICD-10-CM

## 2017-12-25 DIAGNOSIS — N28.9 DISORDER OF KIDNEY AND URETER, UNSPECIFIED: ICD-10-CM

## 2017-12-25 DIAGNOSIS — F10.239 ALCOHOL DEPENDENCE WITH WITHDRAWAL, UNSPECIFIED: ICD-10-CM

## 2017-12-25 DIAGNOSIS — Z45.02 ENCOUNTER FOR ADJUSTMENT AND MANAGEMENT OF AUTOMATIC IMPLANTABLE CARDIAC DEFIBRILLATOR: ICD-10-CM

## 2017-12-25 DIAGNOSIS — I25.10 ATHEROSCLEROTIC HEART DISEASE OF NATIVE CORONARY ARTERY WITHOUT ANGINA PECTORIS: ICD-10-CM

## 2017-12-25 LAB
ALBUMIN SERPL ELPH-MCNC: 4.4 G/DL — SIGNIFICANT CHANGE UP (ref 3.3–5)
ALP SERPL-CCNC: 56 U/L — SIGNIFICANT CHANGE UP (ref 40–120)
ALT FLD-CCNC: 28 U/L — SIGNIFICANT CHANGE UP (ref 10–45)
ANION GAP SERPL CALC-SCNC: 15 MMOL/L — SIGNIFICANT CHANGE UP (ref 5–17)
APTT BLD: 22.8 SEC — LOW (ref 27.5–37.4)
APTT BLD: 24.6 SEC — LOW (ref 27.5–37.4)
AST SERPL-CCNC: 43 U/L — HIGH (ref 10–40)
BASOPHILS NFR BLD AUTO: 1 % — SIGNIFICANT CHANGE UP (ref 0–2)
BILIRUB SERPL-MCNC: 0.6 MG/DL — SIGNIFICANT CHANGE UP (ref 0.2–1.2)
BUN SERPL-MCNC: 33 MG/DL — HIGH (ref 7–23)
CALCIUM SERPL-MCNC: 9.4 MG/DL — SIGNIFICANT CHANGE UP (ref 8.4–10.5)
CHLORIDE SERPL-SCNC: 92 MMOL/L — LOW (ref 96–108)
CHOLEST SERPL-MCNC: 159 MG/DL — SIGNIFICANT CHANGE UP (ref 10–199)
CK SERPL-CCNC: 132 U/L — SIGNIFICANT CHANGE UP (ref 30–200)
CO2 SERPL-SCNC: 24 MMOL/L — SIGNIFICANT CHANGE UP (ref 22–31)
CREAT SERPL-MCNC: 1.52 MG/DL — HIGH (ref 0.5–1.3)
EOSINOPHIL NFR BLD AUTO: 1.5 % — SIGNIFICANT CHANGE UP (ref 0–6)
GLUCOSE SERPL-MCNC: 138 MG/DL — HIGH (ref 70–99)
HBA1C BLD-MCNC: 5 % — SIGNIFICANT CHANGE UP (ref 4–5.6)
HCT VFR BLD CALC: 27.6 % — LOW (ref 39–50)
HCT VFR BLD CALC: 30.4 % — LOW (ref 39–50)
HDLC SERPL-MCNC: 88 MG/DL — SIGNIFICANT CHANGE UP (ref 40–125)
HGB BLD-MCNC: 8.6 G/DL — LOW (ref 13–17)
HGB BLD-MCNC: 9.4 G/DL — LOW (ref 13–17)
INR BLD: 0.91 — SIGNIFICANT CHANGE UP (ref 0.88–1.16)
INR BLD: 0.97 — SIGNIFICANT CHANGE UP (ref 0.88–1.16)
LIPID PNL WITH DIRECT LDL SERPL: 63 MG/DL — SIGNIFICANT CHANGE UP
LYMPHOCYTES # BLD AUTO: 22.1 % — SIGNIFICANT CHANGE UP (ref 13–44)
MCHC RBC-ENTMCNC: 22.1 PG — LOW (ref 27–34)
MCHC RBC-ENTMCNC: 22.2 PG — LOW (ref 27–34)
MCHC RBC-ENTMCNC: 30.9 G/DL — LOW (ref 32–36)
MCHC RBC-ENTMCNC: 31.2 G/DL — LOW (ref 32–36)
MCV RBC AUTO: 71.1 FL — LOW (ref 80–100)
MCV RBC AUTO: 71.4 FL — LOW (ref 80–100)
MONOCYTES NFR BLD AUTO: 6.9 % — SIGNIFICANT CHANGE UP (ref 2–14)
NEUTROPHILS NFR BLD AUTO: 68.5 % — SIGNIFICANT CHANGE UP (ref 43–77)
PCP SPEC-MCNC: SIGNIFICANT CHANGE UP
PLATELET # BLD AUTO: 165 K/UL — SIGNIFICANT CHANGE UP (ref 150–400)
PLATELET # BLD AUTO: 177 K/UL — SIGNIFICANT CHANGE UP (ref 150–400)
POTASSIUM SERPL-MCNC: 5.1 MMOL/L — SIGNIFICANT CHANGE UP (ref 3.5–5.3)
POTASSIUM SERPL-SCNC: 5.1 MMOL/L — SIGNIFICANT CHANGE UP (ref 3.5–5.3)
PROT SERPL-MCNC: 7.8 G/DL — SIGNIFICANT CHANGE UP (ref 6–8.3)
PROTHROM AB SERPL-ACNC: 10.1 SEC — SIGNIFICANT CHANGE UP (ref 9.8–12.7)
PROTHROM AB SERPL-ACNC: 10.8 SEC — SIGNIFICANT CHANGE UP (ref 9.8–12.7)
RBC # BLD: 3.88 M/UL — LOW (ref 4.2–5.8)
RBC # BLD: 4.26 M/UL — SIGNIFICANT CHANGE UP (ref 4.2–5.8)
RBC # FLD: 20.4 % — HIGH (ref 10.3–16.9)
RBC # FLD: 21 % — HIGH (ref 10.3–16.9)
SODIUM SERPL-SCNC: 131 MMOL/L — LOW (ref 135–145)
T4 AB SER-ACNC: 4.33 UG/DL — SIGNIFICANT CHANGE UP (ref 3.17–11.72)
TOTAL CHOLESTEROL/HDL RATIO MEASUREMENT: 1.8 RATIO — LOW (ref 3.4–9.6)
TRIGL SERPL-MCNC: 42 MG/DL — SIGNIFICANT CHANGE UP (ref 10–149)
TROPONIN T SERPL-MCNC: 0.01 NG/ML — SIGNIFICANT CHANGE UP (ref 0–0.01)
TROPONIN T SERPL-MCNC: 0.02 NG/ML — HIGH (ref 0–0.01)
TSH SERPL-MCNC: 0.3 UIU/ML — LOW (ref 0.35–4.94)
WBC # BLD: 4.1 K/UL — SIGNIFICANT CHANGE UP (ref 3.8–10.5)
WBC # BLD: 4.8 K/UL — SIGNIFICANT CHANGE UP (ref 3.8–10.5)
WBC # FLD AUTO: 4.1 K/UL — SIGNIFICANT CHANGE UP (ref 3.8–10.5)
WBC # FLD AUTO: 4.8 K/UL — SIGNIFICANT CHANGE UP (ref 3.8–10.5)

## 2017-12-25 PROCEDURE — 70450 CT HEAD/BRAIN W/O DYE: CPT | Mod: 26

## 2017-12-25 PROCEDURE — 71010: CPT | Mod: 26

## 2017-12-25 RX ORDER — SODIUM CHLORIDE 9 MG/ML
500 INJECTION INTRAMUSCULAR; INTRAVENOUS; SUBCUTANEOUS ONCE
Qty: 0 | Refills: 0 | Status: COMPLETED | OUTPATIENT
Start: 2017-12-25 | End: 2017-12-25

## 2017-12-25 RX ORDER — CLOPIDOGREL BISULFATE 75 MG/1
75 TABLET, FILM COATED ORAL DAILY
Qty: 0 | Refills: 0 | Status: DISCONTINUED | OUTPATIENT
Start: 2017-12-25 | End: 2017-12-26

## 2017-12-25 RX ORDER — ATORVASTATIN CALCIUM 80 MG/1
40 TABLET, FILM COATED ORAL AT BEDTIME
Qty: 0 | Refills: 0 | Status: DISCONTINUED | OUTPATIENT
Start: 2017-12-25 | End: 2017-12-26

## 2017-12-25 RX ORDER — INFLUENZA VIRUS VACCINE 15; 15; 15; 15 UG/.5ML; UG/.5ML; UG/.5ML; UG/.5ML
0.5 SUSPENSION INTRAMUSCULAR ONCE
Qty: 0 | Refills: 0 | Status: COMPLETED | OUTPATIENT
Start: 2017-12-25 | End: 2017-12-25

## 2017-12-25 RX ORDER — METOPROLOL TARTRATE 50 MG
100 TABLET ORAL DAILY
Qty: 0 | Refills: 0 | Status: DISCONTINUED | OUTPATIENT
Start: 2017-12-25 | End: 2017-12-25

## 2017-12-25 RX ORDER — ASPIRIN/CALCIUM CARB/MAGNESIUM 324 MG
81 TABLET ORAL DAILY
Qty: 0 | Refills: 0 | Status: DISCONTINUED | OUTPATIENT
Start: 2017-12-25 | End: 2017-12-26

## 2017-12-25 RX ORDER — METOPROLOL TARTRATE 50 MG
100 TABLET ORAL ONCE
Qty: 0 | Refills: 0 | Status: COMPLETED | OUTPATIENT
Start: 2017-12-25 | End: 2017-12-25

## 2017-12-25 RX ORDER — THIAMINE MONONITRATE (VIT B1) 100 MG
100 TABLET ORAL DAILY
Qty: 0 | Refills: 0 | Status: DISCONTINUED | OUTPATIENT
Start: 2017-12-25 | End: 2017-12-26

## 2017-12-25 RX ORDER — METOPROLOL TARTRATE 50 MG
50 TABLET ORAL DAILY
Qty: 0 | Refills: 0 | Status: DISCONTINUED | OUTPATIENT
Start: 2017-12-25 | End: 2017-12-25

## 2017-12-25 RX ORDER — METOPROLOL TARTRATE 50 MG
100 TABLET ORAL DAILY
Qty: 0 | Refills: 0 | Status: DISCONTINUED | OUTPATIENT
Start: 2017-12-26 | End: 2017-12-26

## 2017-12-25 RX ORDER — LISINOPRIL 2.5 MG/1
20 TABLET ORAL DAILY
Qty: 0 | Refills: 0 | Status: DISCONTINUED | OUTPATIENT
Start: 2017-12-25 | End: 2017-12-26

## 2017-12-25 RX ORDER — FOLIC ACID 0.8 MG
1 TABLET ORAL DAILY
Qty: 0 | Refills: 0 | Status: DISCONTINUED | OUTPATIENT
Start: 2017-12-25 | End: 2017-12-26

## 2017-12-25 RX ADMIN — Medication 2 MILLIGRAM(S): at 03:41

## 2017-12-25 RX ADMIN — Medication 2 MILLIGRAM(S): at 08:36

## 2017-12-25 RX ADMIN — SODIUM CHLORIDE 500 MILLILITER(S): 9 INJECTION INTRAMUSCULAR; INTRAVENOUS; SUBCUTANEOUS at 01:38

## 2017-12-25 RX ADMIN — Medication 81 MILLIGRAM(S): at 11:38

## 2017-12-25 RX ADMIN — CLOPIDOGREL BISULFATE 75 MILLIGRAM(S): 75 TABLET, FILM COATED ORAL at 11:38

## 2017-12-25 RX ADMIN — Medication 50 MILLIGRAM(S): at 01:38

## 2017-12-25 RX ADMIN — Medication 1 MILLIGRAM(S): at 11:38

## 2017-12-25 RX ADMIN — Medication 2 MILLIGRAM(S): at 06:00

## 2017-12-25 RX ADMIN — Medication 100 MILLIGRAM(S): at 04:01

## 2017-12-25 RX ADMIN — LISINOPRIL 20 MILLIGRAM(S): 2.5 TABLET ORAL at 06:00

## 2017-12-25 RX ADMIN — Medication 1 TABLET(S): at 11:38

## 2017-12-25 RX ADMIN — Medication 100 MILLIGRAM(S): at 11:38

## 2017-12-25 RX ADMIN — ATORVASTATIN CALCIUM 40 MILLIGRAM(S): 80 TABLET, FILM COATED ORAL at 22:29

## 2017-12-25 RX ADMIN — Medication 2 MILLIGRAM(S): at 00:45

## 2017-12-25 NOTE — PROGRESS NOTE ADULT - PROBLEM SELECTOR PLAN 2
-Last drink 12/24/17 in AM  - CIWA score 5 on admission, 7 this AM(tremors, headache)  - CT head negative  - Continue CIWA evaluation, Librium 10mg q6hrs as per Dr. Levy  - Continue Folate, Thiamine, MVI  - Pt states he is willing to go to o/p ETOH abuse upon d/c

## 2017-12-25 NOTE — H&P ADULT - PROBLEM SELECTOR PLAN 1
Cardiac Fellow interrogated ICD:  ICD fired after episode of Afib w/ RVR.  Likely in the setting of ETOH abuse.  - EP consult in AM. Cardiac Fellow interrogated ICD:  ICD fired after episode of Afib w/ RVR.  Likely in the setting of ETOH abuse.  - EP consult in AM.  - TSH low 0.296, f/u T3 and T4 in AM

## 2017-12-25 NOTE — PROGRESS NOTE ADULT - PROBLEM SELECTOR PLAN 1
-As per verbal report from CCU fellow o/n ICD fired s/p AFib w/RVR(not in chart)  -Dr. Ramos made aware-CCU fellow to attempt to reinterrogate this afternoon if available  -Likely in the setting of ETOH abuse.  -Currrently SR on tele in 80s

## 2017-12-25 NOTE — H&P ADULT - PROBLEM SELECTOR PLAN 2
CIWA score 5 on admit (tremors, anxious, tachycardia)  - Continue CIWA evaluation, Ativan 2mg IV PRN q2hrs.  - Continue Folate, Thiamine, MVI.

## 2017-12-25 NOTE — PROGRESS NOTE ADULT - PROBLEM SELECTOR PLAN 3
- Pt currently NSR in 80s  - c/w Toprol 100mg daily  - Dr. Ramos states pt has poor follow up with him in his office  - Continue telemetry monitoring  - No A/C as patient w/ Hx of GI bleeding/frequent falls.

## 2017-12-25 NOTE — PROGRESS NOTE ADULT - PROBLEM SELECTOR PLAN 6
- DANUTA on admit, BUN/Crt 33/1.60.  Likely in the setting of dehydration w/ ETOH use.  - s/p fluid bolus in ED  - Improving, continue to monitor    Dispo: Monitor on tele o/n, Librium 10mg q6hrs PRN as per Dr. Levy. Plan for probable d/c in AM.

## 2017-12-25 NOTE — H&P ADULT - PROBLEM SELECTOR PLAN 6
DANUTA on admit, BUN/Crt 33/1.60.  Likely in the setting of dehydration w/ ETOH use.  s/p fluid bolus in ED  - Continue to monitor.

## 2017-12-25 NOTE — PROGRESS NOTE ADULT - ASSESSMENT
Hx of a Fib with RVR  on interrogation of AICD  now in RSR   For EPS follow up    CAD   cont current meds

## 2017-12-25 NOTE — PROGRESS NOTE ADULT - SUBJECTIVE AND OBJECTIVE BOX
Pt is re admitted for Arrhythmia with AICD response   Hx of recent fall     PAST MEDICAL & SURGICAL HISTORY:  Pacemaker  ETOH abuse  Paroxysmal a-fib  Myocardial infarction involving other coronary artery  Gastritis  Atherosclerosis of coronary artery: Coronary artery disease  Automatic implantable cardiac defibrillator in situ: ICD (implantable cardioverter-defibrillator) in place    MEDICATIONS  (STANDING):  aspirin enteric coated 81 milliGRAM(s) Oral daily  atorvastatin 40 milliGRAM(s) Oral at bedtime  clopidogrel Tablet 75 milliGRAM(s) Oral daily  folic acid 1 milliGRAM(s) Oral daily  lisinopril 20 milliGRAM(s) Oral daily  multivitamin 1 Tablet(s) Oral daily  thiamine 100 milliGRAM(s) Oral daily    MEDICATIONS  (PRN):  LORazepam   Injectable 2 milliGRAM(s) IV Push every 2 hours PRN CIWA-Ar score increase by 2 points and a total score of 7 or less    ICU Vital Signs Last 24 Hrs  T(C): 36.6 (25 Dec 2017 09:55), Max: 36.9 (24 Dec 2017 21:39)  T(F): 97.9 (25 Dec 2017 09:55), Max: 98.4 (24 Dec 2017 21:39)  HR: 93 (25 Dec 2017 08:35) (93 - 132)  BP: 115/79 (25 Dec 2017 08:35) (109/72 - 127/84)  BP(mean): 84 (25 Dec 2017 03:00) (84 - 84)  ABP: --  ABP(mean): --  RR: 16 (25 Dec 2017 08:35) (16 - 18)  SpO2: 98% (25 Dec 2017 08:35) (96% - 99%)      Lungs decreased breath sounds at bases  CV s1 s2  Abd soft  Ext stable                          8.6    4.8   )-----------( 177      ( 25 Dec 2017 06:06 )             27.6   12-25    131<L>  |  92<L>  |  33<H>  ----------------------------<  138<H>  5.1   |  24  |  1.52<H>    Ca    9.4      25 Dec 2017 06:06  Mg     2.5     12-24    TPro  7.8  /  Alb  4.4  /  TBili  0.6  /  DBili  x   /  AST  43<H>  /  ALT  28  /  AlkPhos  56  12-25    Echo   EF 35 % sept 2017

## 2017-12-25 NOTE — PROGRESS NOTE ADULT - SUBJECTIVE AND OBJECTIVE BOX
Interventional Cardiology PA Adult Progress Note    Subjective Assessment: Pt seen this AM reports feeling slightly dizzy which he attributes to not eating for a couple days. Denies any sweating, CP, SOB, palpitations, PND, orthopnea  	  MEDICATIONS:  lisinopril 20 milliGRAM(s) Oral daily        chlordiazePOXIDE 10 milliGRAM(s) Oral four times a day PRN      atorvastatin 40 milliGRAM(s) Oral at bedtime    aspirin enteric coated 81 milliGRAM(s) Oral daily  clopidogrel Tablet 75 milliGRAM(s) Oral daily  folic acid 1 milliGRAM(s) Oral daily  multivitamin 1 Tablet(s) Oral daily  thiamine 100 milliGRAM(s) Oral daily      	    [PHYSICAL EXAM:  TELEMETRY: SR 80s  T(C): 36.6 (12-25-17 @ 09:55), Max: 36.9 (12-24-17 @ 21:39)  HR: 93 (12-25-17 @ 08:35) (93 - 132)  BP: 115/79 (12-25-17 @ 08:35) (109/72 - 127/84)  RR: 16 (12-25-17 @ 08:35) (16 - 18)  SpO2: 98% (12-25-17 @ 08:35) (96% - 99%)  Wt(kg): --  I&O's Summary    24 Dec 2017 07:01  -  25 Dec 2017 07:00  --------------------------------------------------------  IN: 120 mL / OUT: 0 mL / NET: 120 mL    25 Dec 2017 07:01  -  25 Dec 2017 11:04  --------------------------------------------------------  IN: 380 mL / OUT: 250 mL / NET: 130 mL      Height (cm): 175.26 (12-24 @ 21:39)  Weight (kg): 75 (12-24 @ 21:39)  BMI (kg/m2): 24.4 (12-24 @ 21:39)  BSA (m2): 1.91 (12-24 @ 21:39)  Duffy:  Central/PICC/Mid Line:                                         Appearance: Normal	  HEENT:   Normal oral mucosa, PERRL, EOMI	  Neck: Supple, - JVD  Cardiovascular: Normal S1 S2  Respiratory: Lungs clear to auscultation  Gastrointestinal:  Soft, Non-tender, + BS	  Skin: No rashes, No ecchymoses, No cyanosis  Extremities: Normal range of motion, No clubbing, cyanosis or edema  Vascular: Peripheral pulses palpable 2+ bilaterally  Neurologic: + visible tremors L>R  Psychiatry: A & O x 3, Mood & affect appropriate      	    ECG:  	  RADIOLOGY:   DIAGNOSTIC TESTING:  [ ] Echocardiogram:  [ ]  Catheterization:  [ ] Stress Test:    [ ] MERLYN  OTHER: 	    LABS:	 	  CARDIAC MARKERS:                                  8.6    4.8   )-----------( 177      ( 25 Dec 2017 06:06 )             27.6     12-25    131<L>  |  92<L>  |  33<H>  ----------------------------<  138<H>  5.1   |  24  |  1.52<H>    Ca    9.4      25 Dec 2017 06:06  Mg     2.5     12-24    TPro  7.8  /  Alb  4.4  /  TBili  0.6  /  DBili  x   /  AST  43<H>  /  ALT  28  /  AlkPhos  56  12-25    proBNP:   Lipid Profile:   HgA1c: Hemoglobin A1C, Whole Blood: 5.0 % (12-25 @ 06:06)    TSH: Thyroid Stimulating Hormone, Serum: 0.296 uIU/mL (12-24 @ 23:21)    PT/INR - ( 25 Dec 2017 06:06 )   PT: 10.8 sec;   INR: 0.97          PTT - ( 25 Dec 2017 06:06 )  PTT:24.6 sec      DVT ppx: SCDs    Dispo: Monitor on tele o/n, Librium 10mg q6hrs PRN as per Dr. Levy. Plan for probable d/c in AM.

## 2017-12-25 NOTE — PROGRESS NOTE ADULT - ASSESSMENT
63 yo male ETOH abuser, CAD s/p MI 2003, most recent PCI at [facility] 7/2016 (JOSHUA to mLAD with residual 50 % OM1), chronic systolic CHF (EF 35% by Echo 9/2017) s/p AICD in 2009 ([employer]) , pafib and LV thrombus (on ASA and Plavix only 2/2 to lower GIB 2016), with frequent admission @ [facility] most recent admission 11/2017 for palpitations in the setting of ETOH use/withdrawal discharged home on librium x2 weeks who now presents to [facility] ED 12/14 with c/o ICD firing in the setting of ETOH abuse.

## 2017-12-25 NOTE — H&P ADULT - NEGATIVE CARDIOVASCULAR SYMPTOMS
no orthopnea/no chest pain/no palpitations/no claudication/no paroxysmal nocturnal dyspnea/no peripheral edema

## 2017-12-25 NOTE — PROGRESS NOTE ADULT - PROBLEM SELECTOR PLAN 4
- Currently euvolemic  - Prior Echo 9/2017: EF 35%. No repeat Echo at this time as per Dr. Levy  - Does not take lasix at home, Continue Lisinopril 20mg PO QD  - Continue Strict I/Os, daily weights

## 2017-12-25 NOTE — H&P ADULT - PROBLEM SELECTOR PLAN 3
Currently sinus tachycardia 120-130s on arrival to PCU, likely in setting of ETOH withdrawal.  - Given Toprol XL 50mg PO x1 in ED, patient states he did take his metoprolol today and Given home Toprol XL 100mg PO x1 @ 3:30am.  - Continue telemetry monitoring, EP consult in AM.  - Continue Toprol XL 100mg QD, consider increasing as needed.  No A/C as patient w/ Hx of GI bleeding/frequent falls. Currently sinus tachycardia 120-130s on arrival to PCU, likely in setting of ETOH withdrawal. f/u EKG in AM.  - Given Toprol XL 50mg PO x1 in ED, patient states he did take his metoprolol today and Given home Toprol XL 100mg PO x1 @ 3:30am.  - Continue telemetry monitoring, EP consult in AM.  - Continue Toprol XL 100mg QD, consider increasing as needed.  No A/C as patient w/ Hx of GI bleeding/frequent falls.

## 2017-12-25 NOTE — H&P ADULT - HISTORY OF PRESENT ILLNESS
63 yo male ETOH abuser, CAD s/p MI 2003, most recent PCI at [facility] 7/2016 (JOSHUA to mLAD with residual 50 % OM1), chronic systolic CHF (EF 35-45 %) s/p AICD in 2009 , pafib and LV thrombus (on ASA and Plavix only 2/2 to lower GIB 2016), with frequent admission @ [facility] most recent admission 11/2017 for palpitations in the setting of ETOH use/withdrawal discharged home librium x2 weeks who now presents to [facility] ED 12/14 with c/o ICD firing. 61 yo male ETOH abuser, CAD s/p MI 2003, most recent PCI at [facility] 7/2016 (JOSHUA to mLAD with residual 50 % OM1), chronic systolic CHF (EF 35% by Echo 9/2017) s/p AICD in 2009 , pafib and LV thrombus (on ASA and Plavix only 2/2 to lower GIB 2016), with frequent admission @ [facility] most recent admission 11/2017 for palpitations in the setting of ETOH use/withdrawal discharged home librium x2 weeks who now presents to [facility] ED 12/14 with c/o ICD firing.  Patient states he went on a ETOH binge and drank a large bottle of vodka over 2 days, last drink 7am this morning.  Soon after the last drink he states he was walking in his home when he became light headed and felt a "kick in the chest" similar to when his ICD fired before.  Patient also with c/o 2 recent falls secondary to vertigo over several weeks which he hit his head but denied LOC.  He also states for several weeks he has lost his appetite w/ associated nausea and insomnia.  Patient denies any CP or palpitations prior to the ICD shocks, he also denies SOB, orthopnea, LE swelling, syncope, fevers, chills.  On arrival to [facility] ED patient noted to be in mild ETOH withdrawal with tremors.  EKG revealing sinus tachycardia w/ inferior, anteroseptal Q waves 61 yo male ETOH abuser, CAD s/p MI 2003, most recent PCI at [facility] 7/2016 (JOSHUA to mLAD with residual 50 % OM1), chronic systolic CHF (EF 35% by Echo 9/2017) s/p AICD in 2009 ([employer]) , pafib and LV thrombus (on ASA and Plavix only 2/2 to lower GIB 2016), with frequent admission @ [facility] most recent admission 11/2017 for palpitations in the setting of ETOH use/withdrawal discharged home on librium x2 weeks who now presents to [facility] ED 12/14 with c/o ICD firing.  Patient states he went on a ETOH binge and drank a large bottle of vodka over 2 days, last drink 7am this morning.  Soon after the last drink he states he was walking in his home when he became light headed and felt a "kick in the chest" similar to when his ICD fired before.  Patient also with c/o 2 recent falls secondary to vertigo over several weeks which he hit his head but denied LOC.  He also states for several weeks he has lost his appetite w/ associated nausea and insomnia.  Patient denies any CP or palpitations prior to the ICD shocks, he also denies SOB, orthopnea, LE swelling, syncope, fevers, chills.  On arrival to [facility] ED patient noted to be in mild ETOH withdrawal with tremors.  EKG revealing sinus tachycardia w/ inferior, anteroseptal Q waves.  Vitals significant for , other VSS.  Labs significant for H/H 9.4/30.4, BUN/Crt 33/1.60, AST 59, TSH 0.296, CE negative x1.  CT head w/o evidence of hemorrhage.  Patient received NS bolus, Thiamine 100mg IV x1 and Ativan 2mg IV x1 for ETOH withdrawal.  Cardiac fellow consulted for ICD interrogation and revealed the ICD fired after run of Afib w/ RVR.  Patient received Toprol XL 50mg PO x1.  He is now admitted to 5 ur for ICD fire in the setting of ETOH use for further telemetry monitoring and EP evaluation.

## 2017-12-25 NOTE — PATIENT PROFILE ADULT. - NS TRANSFER PATIENT BELONGINGS
Cell Phone/PDA (specify)/None Cell Phone/PDA (specify)/Clothing/Jewelry/Money (specify)/Other belongings/Electronic Device (specify)

## 2017-12-25 NOTE — H&P ADULT - PROBLEM SELECTOR PLAN 4
currently euvolemic.   - Prior Echo 9/2017: EF 35%, consider repeat Echo   - Does not take lasix at home, Continue Lisinopril 20mg PO QD  - Continue Strict I/Os, daily weights

## 2017-12-25 NOTE — PROGRESS NOTE ADULT - PROBLEM SELECTOR PLAN 5
- h/o CAD w/ PCI, currently CP free, CE neg x1, second trop slightly elevated to 0.02  - Continue ASA/Plavix/Statin.

## 2017-12-25 NOTE — H&P ADULT - ASSESSMENT
63 yo male ETOH abuser, CAD s/p MI 2003, most recent PCI at [facility] 7/2016 (JOSHUA to mLAD with residual 50 % OM1), chronic systolic CHF (EF 35% by Echo 9/2017) s/p AICD in 2009 ([employer]) , pafib and LV thrombus (on ASA and Plavix only 2/2 to lower GIB 2016), with frequent admission @ [facility] most recent admission 11/2017 for palpitations in the setting of ETOH use/withdrawal discharged home on librium x2 weeks who now presents to [facility] ED 12/14 with c/o ICD firing in the setting of ETOH abuse.

## 2017-12-26 ENCOUNTER — TRANSCRIPTION ENCOUNTER (OUTPATIENT)
Age: 62
End: 2017-12-26

## 2017-12-26 VITALS — TEMPERATURE: 98 F

## 2017-12-26 LAB
ANION GAP SERPL CALC-SCNC: 11 MMOL/L — SIGNIFICANT CHANGE UP (ref 5–17)
BUN SERPL-MCNC: 22 MG/DL — SIGNIFICANT CHANGE UP (ref 7–23)
CALCIUM SERPL-MCNC: 10.1 MG/DL — SIGNIFICANT CHANGE UP (ref 8.4–10.5)
CHLORIDE SERPL-SCNC: 93 MMOL/L — LOW (ref 96–108)
CO2 SERPL-SCNC: 25 MMOL/L — SIGNIFICANT CHANGE UP (ref 22–31)
CREAT SERPL-MCNC: 1.28 MG/DL — SIGNIFICANT CHANGE UP (ref 0.5–1.3)
GLUCOSE SERPL-MCNC: 109 MG/DL — HIGH (ref 70–99)
HCT VFR BLD CALC: 28.2 % — LOW (ref 39–50)
HGB BLD-MCNC: 8.8 G/DL — LOW (ref 13–17)
MAGNESIUM SERPL-MCNC: 2 MG/DL — SIGNIFICANT CHANGE UP (ref 1.6–2.6)
MCHC RBC-ENTMCNC: 22.1 PG — LOW (ref 27–34)
MCHC RBC-ENTMCNC: 31.2 G/DL — LOW (ref 32–36)
MCV RBC AUTO: 70.9 FL — LOW (ref 80–100)
PLATELET # BLD AUTO: 133 K/UL — LOW (ref 150–400)
POTASSIUM SERPL-MCNC: 5 MMOL/L — SIGNIFICANT CHANGE UP (ref 3.5–5.3)
POTASSIUM SERPL-SCNC: 5 MMOL/L — SIGNIFICANT CHANGE UP (ref 3.5–5.3)
RBC # BLD: 3.98 M/UL — LOW (ref 4.2–5.8)
RBC # FLD: 21.1 % — HIGH (ref 10.3–16.9)
SODIUM SERPL-SCNC: 129 MMOL/L — LOW (ref 135–145)
T3 SERPL-MCNC: 59 NG/DL — LOW (ref 80–200)
WBC # BLD: 4.3 K/UL — SIGNIFICANT CHANGE UP (ref 3.8–10.5)
WBC # FLD AUTO: 4.3 K/UL — SIGNIFICANT CHANGE UP (ref 3.8–10.5)

## 2017-12-26 PROCEDURE — 80053 COMPREHEN METABOLIC PANEL: CPT

## 2017-12-26 PROCEDURE — 83036 HEMOGLOBIN GLYCOSYLATED A1C: CPT

## 2017-12-26 PROCEDURE — 80320 DRUG SCREEN QUANTALCOHOLS: CPT

## 2017-12-26 PROCEDURE — 93005 ELECTROCARDIOGRAM TRACING: CPT

## 2017-12-26 PROCEDURE — 84436 ASSAY OF TOTAL THYROXINE: CPT

## 2017-12-26 PROCEDURE — 84443 ASSAY THYROID STIM HORMONE: CPT

## 2017-12-26 PROCEDURE — 82550 ASSAY OF CK (CPK): CPT

## 2017-12-26 PROCEDURE — 85027 COMPLETE CBC AUTOMATED: CPT

## 2017-12-26 PROCEDURE — 96372 THER/PROPH/DIAG INJ SC/IM: CPT | Mod: XU

## 2017-12-26 PROCEDURE — 99285 EMERGENCY DEPT VISIT HI MDM: CPT | Mod: 25

## 2017-12-26 PROCEDURE — 84480 ASSAY TRIIODOTHYRONINE (T3): CPT

## 2017-12-26 PROCEDURE — 80061 LIPID PANEL: CPT

## 2017-12-26 PROCEDURE — 71045 X-RAY EXAM CHEST 1 VIEW: CPT

## 2017-12-26 PROCEDURE — 85610 PROTHROMBIN TIME: CPT

## 2017-12-26 PROCEDURE — 93010 ELECTROCARDIOGRAM REPORT: CPT

## 2017-12-26 PROCEDURE — 80048 BASIC METABOLIC PNL TOTAL CA: CPT

## 2017-12-26 PROCEDURE — 85730 THROMBOPLASTIN TIME PARTIAL: CPT

## 2017-12-26 PROCEDURE — 84484 ASSAY OF TROPONIN QUANT: CPT

## 2017-12-26 PROCEDURE — 82010 KETONE BODYS QUAN: CPT

## 2017-12-26 PROCEDURE — 96374 THER/PROPH/DIAG INJ IV PUSH: CPT

## 2017-12-26 PROCEDURE — 80307 DRUG TEST PRSMV CHEM ANLYZR: CPT

## 2017-12-26 PROCEDURE — 36415 COLL VENOUS BLD VENIPUNCTURE: CPT

## 2017-12-26 PROCEDURE — 83735 ASSAY OF MAGNESIUM: CPT

## 2017-12-26 PROCEDURE — 85025 COMPLETE CBC W/AUTO DIFF WBC: CPT

## 2017-12-26 PROCEDURE — 70450 CT HEAD/BRAIN W/O DYE: CPT

## 2017-12-26 RX ORDER — THIAMINE MONONITRATE (VIT B1) 100 MG
1 TABLET ORAL
Qty: 30 | Refills: 3 | OUTPATIENT
Start: 2017-12-26 | End: 2018-04-24

## 2017-12-26 RX ORDER — FOLIC ACID 0.8 MG
1 TABLET ORAL
Qty: 30 | Refills: 3 | OUTPATIENT
Start: 2017-12-26 | End: 2018-04-24

## 2017-12-26 RX ORDER — METOPROLOL TARTRATE 50 MG
1 TABLET ORAL
Qty: 30 | Refills: 3 | OUTPATIENT
Start: 2017-12-26 | End: 2018-04-24

## 2017-12-26 RX ADMIN — LISINOPRIL 20 MILLIGRAM(S): 2.5 TABLET ORAL at 05:34

## 2017-12-26 RX ADMIN — Medication 1 MILLIGRAM(S): at 11:57

## 2017-12-26 RX ADMIN — CLOPIDOGREL BISULFATE 75 MILLIGRAM(S): 75 TABLET, FILM COATED ORAL at 11:57

## 2017-12-26 RX ADMIN — Medication 81 MILLIGRAM(S): at 11:57

## 2017-12-26 RX ADMIN — Medication 10 MILLIGRAM(S): at 00:53

## 2017-12-26 RX ADMIN — Medication 1 TABLET(S): at 11:57

## 2017-12-26 RX ADMIN — Medication 100 MILLIGRAM(S): at 05:34

## 2017-12-26 RX ADMIN — Medication 100 MILLIGRAM(S): at 11:57

## 2017-12-26 RX ADMIN — Medication 10 MILLIGRAM(S): at 09:46

## 2017-12-26 NOTE — DISCHARGE NOTE ADULT - PATIENT PORTAL LINK FT
“You can access the Follow[facility] Patient Portal, offered by [facility], by registering with the following website: [email or web address]”

## 2017-12-26 NOTE — PROGRESS NOTE ADULT - SUBJECTIVE AND OBJECTIVE BOX
Pt is stable this am     Pt was tachycardic overnight     PAST MEDICAL & SURGICAL HISTORY:  Pacemaker  ETOH abuse  Paroxysmal a-fib  Myocardial infarction involving other coronary artery  Gastritis  Atherosclerosis of coronary artery: Coronary artery disease  Automatic implantable cardiac defibrillator in situ: ICD (implantable cardioverter-defibrillator) in place    ICU Vital Signs Last 24 Hrs  T(C): 36.7 (26 Dec 2017 08:27), Max: 36.7 (26 Dec 2017 08:27)  T(F): 98.1 (26 Dec 2017 08:27), Max: 98.1 (26 Dec 2017 08:27)  HR: 91 (26 Dec 2017 05:33) (76 - 91)  BP: 130/88 (26 Dec 2017 05:33) (118/76 - 130/88)  BP(mean): --  ABP: --  ABP(mean): --  RR: 16 (26 Dec 2017 05:33) (16 - 16)  SpO2: 100% (26 Dec 2017 05:33) (100% - 100%)    MEDICATIONS  (STANDING):  aluminum hydroxide/magnesium hydroxide/simethicone Suspension 30 milliLiter(s) Oral once  aspirin enteric coated 81 milliGRAM(s) Oral daily  atorvastatin 40 milliGRAM(s) Oral at bedtime  clopidogrel Tablet 75 milliGRAM(s) Oral daily  folic acid 1 milliGRAM(s) Oral daily  lisinopril 20 milliGRAM(s) Oral daily  metoprolol succinate  milliGRAM(s) Oral daily  multivitamin 1 Tablet(s) Oral daily  thiamine 100 milliGRAM(s) Oral daily    MEDICATIONS  (PRN):  chlordiazePOXIDE 10 milliGRAM(s) Oral four times a day PRN Anxiety and/or Alcohol Withdrawal Symptoms    Lungs clear   CV s1 s2   Abd soft  Ext stable    EKG NSR Anteroseptal infarct   Inf infarct  age undetermined

## 2017-12-26 NOTE — CHART NOTE - NSCHARTNOTEFT_GEN_A_CORE
Notified by RN that pt had increasing resting tremors and was given Librium. Patient seen and resting tremors have significantly improved. Continue to closely monitor.

## 2017-12-26 NOTE — PROGRESS NOTE ADULT - ASSESSMENT
S/P episode A Fib with rapid VR  resolved   S/P Firing discharge of AICD   EPS clearance for discharge   Cont ETOH withdrawl rx

## 2017-12-26 NOTE — DISCHARGE NOTE ADULT - PLAN OF CARE
Please follow up with Dr. Levy January 9, 2018 @ 2 PM for a check up. In addition, please follow up with Electrophysiologist Dr. Ramos for a check up in one month at which time he will recheck your ICD device. Your ICD shocked you secondary to Rapid Atrial Fibrillation. This was likely due to Alcohol intoxication. If you abstain from Alcohol this likely will not happen again. Please continue to take Aspirin 81 mg daily, Plavix 75 mg daily, Toprol  mg daily. Your heart rate intermittent converts to rapid (fast) Atrial Fibrillation. This is a fast irregular heart beat. Your ICD fired because your heart went into rapid Atrial Fibrillation due to alcohol intoxication. If you abstain from alcohol this will likely NOT occur.   Alcohol is NOT good for your heart and makes you more prone to arrhythmias such as Atrial Fibrillation. PLEASE ABSTAIN FROM ALCOHOL AS IT IS DAMAGING YOUR HEART Please take Thiamine 100 mg daily, Folic Acid 1 mg daily and Multivitamin daily  In addition, please take the Librium Taper as prescribed by Dr. Levy. This medication will help alcohol withdrawal. Please continue your Toprol  mg daily and Lisinopril 20 mg daily You have a history of a weak You have a history of a weakened heart muscle. It is important you take your medication as prescribed and follow up with Dr. Levy regularly.   Please abstain from alcohol! This is damaging your heart!   If you develop weight gain (more than 3 lbs in one day), shortness of breath, swelling in legs; contact your doctor as these are signs of heart failure.

## 2017-12-26 NOTE — DISCHARGE NOTE ADULT - CARE PROVIDER_API CALL
Ck Levy), Medicine  [address] [place] [address]  Phone: [phone]  Fax: [phone]

## 2017-12-26 NOTE — DISCHARGE NOTE ADULT - MEDICATION SUMMARY - MEDICATIONS TO TAKE
I will START or STAY ON the medications listed below when I get home from the hospital:    Aspirin Enteric Coated 81 mg oral delayed release tablet  -- 1 tab(s) by mouth once a day  -- Indication: For BLOOD THINNER FOR ARTIAL FIBRILLATION    lisinopril 20 mg oral tablet  -- 1 tab(s) by mouth once a day  -- Indication: For BLOOD PRESSURE/CONGESTIVE HEART FAILURE    atorvastatin 40 mg oral tablet  -- 1 tab(s) by mouth once a day (at bedtime)  -- Avoid grapefruit and grapefruit juice while taking this medication.  Do not take this drug if you are pregnant.  It is very important that you take or use this exactly as directed.  Do not skip doses or discontinue unless directed by your doctor.  Obtain medical advice before taking any non-prescription drugs as some may affect the action of this medication.  Take with food or milk.    -- Indication: For CHOLESTEROL    clopidogrel 75 mg oral tablet  -- 1 tab(s) by mouth once a day  -- Indication: For BLOOD THINNER     Librium  -- PLEASE TAKE LIBRIUM AS PRESCRIBED BY DR. ELLIS  -- Indication: For Alcohol withdrawal    Metoprolol Succinate  mg oral tablet, extended release  -- 1 tab(s) by mouth once a day  -- It is very important that you take or use this exactly as directed.  Do not skip doses or discontinue unless directed by your doctor.  May cause drowsiness.  Alcohol may intensify this effect.  Use care when operating dangerous machinery.  Some non-prescription drugs may aggravate your condition.  Read all labels carefully.  If a warning appears, check with your doctor before taking.  Swallow whole.  Do not crush.  Take with food or milk.  This drug may impair the ability to drive or operate machinery.  Use care until you become familiar with its effects.    -- Indication: For ATRIAL FIBRILLATION/HEART RATE CONTROL    Multiple Vitamins oral tablet  -- 1 tab(s) by mouth once a day  -- Indication: For Alcohol withdrawal    thiamine 100 mg oral tablet  -- 1 tab(s) by mouth once a day  -- Indication: For Alcohol withdrawal    folic acid 1 mg oral tablet  -- 1 tab(s) by mouth once a day  -- Indication: For Alcohol withdrawal

## 2017-12-26 NOTE — DISCHARGE NOTE ADULT - HOSPITAL COURSE
61 yo male ETOH abuser, CAD s/p MI 2003, most recent PCI at [facility] 7/2016 (JOSHUA to mLAD with residual 50 % OM1), chronic systolic CHF (EF 35% by Echo 9/2017) s/p AICD in 2009 ([employer]) , pafib and LV thrombus (on ASA and Plavix only 2/2 to lower GIB 2016), with frequent admission @ [facility] most recent admission 11/2017 for palpitations in the setting of ETOH use/withdrawal discharged home on librium x2 weeks who now presents to [facility] ED 12/14 with c/o ICD firing.  Patient states he went on an ETOH binge and drank a large bottle of vodka over 2 days, last drink 7am morning of admission. Soon after the last drink he states he was walking in his home when he became light headed and felt a "kick in the chest" similar to when his ICD fired before.  Patient also with c/o 2 recent falls secondary to vertigo over several weeks which he hit his head but denied LOC.  He also states for several weeks he has lost his appetite w/ associated nausea and insomnia.  Patient denies any CP or palpitations prior to the ICD shocks, he also denies SOB, orthopnea, LE swelling, syncope, fevers, chills.  On arrival to [facility] ED patient noted to be in mild ETOH withdrawal with tremors.  EKG revealing sinus tachycardia w/ inferior, anteroseptal Q waves.  Vitals significant for , other VSS.  Labs significant for H/H 9.4/30.4, BUN/Crt 33/1.60, AST 59, TSH 0.296, CE negative x1.  CT head w/o evidence of hemorrhage.  Patient received NS bolus, Thiamine 100mg IV x1 and Ativan 2mg IV x1 for ETOH withdrawal.  Cardiac fellow consulted for ICD interrogation and revealed the ICD fired after run of Afib w/ RVR.  Patient received Toprol XL 50mg PO x1 in ED.  He was admitted to 5 uris for ICD firing secondary to rapid Atrial Fibrillation in the setting of ETOH use.   Electrophysiologist Dr. Ramos was consulted and [employer] ICD officially interrogated today by EP team revealing rapid Atrial Fibrillation prompting ICD shock. Strips reviewed by Dr. Ramos today. Patient recommended to continue Toprol  mg daily for rate control, no anticoagulation due to history of lower GI Bleed, ETOH abuse and questionable medication compliance.  In terms of ETOH withdrawal, pt with CIWA score of 5 on admission requiring Librium and Ativan prn. Per Dr. Levy pt will be discharged home with Librium taper (for which Dr. Levy will prescribe from his office today). In addition, pt has expressed he is going to attend ETOH rehab upon discharge. Pt will also continue Folate, Thiamine, MVI. Patient with history of chronic systolic CHF, appears euvolemic at this time. Most recent Echo 9/2017 revealed akinesis of the apical inferior, mid to apical anterior and mid to apical septal regions with dyskinesis of the true apex.  There is myocardial thinning of the mid to apical anteroseptal region. This suggests scar formation in the LAD distribution. There is an apical thrombus seen with Definity injection measuring 1.1cm x 1.0 cm. LVEF 35%, mildly elevated left atrial pressure, trace TR.  No need for repeat Echo this admission per Dr. Levy. Lastly patient with DANUTA on admission with Cr: 1.60 likely in the setting of dehydration with ETOH; pt is s/p fluid bolus in ED. Renal function is stable but patient noted to by hyponatremic with NA level of 129. No further work up at this time per Dr. Levy. Patient will follow up with Dr. Levy 1/9/2017 @ 2 PM.  Pt deemed stable for discharge at this time per Dr. Levy.

## 2017-12-26 NOTE — DISCHARGE NOTE ADULT - CARE PLAN
Principal Discharge DX:	Defibrillator discharge  Goal:	Please follow up with Dr. Levy January 9, 2018 @ 2 PM for a check up. In addition, please follow up with Electrophysiologist Dr. Ramos for a check up in one month at which time he will recheck your ICD device.  Instructions for follow-up, activity and diet:	Your ICD shocked you secondary to Rapid Atrial Fibrillation. This was likely due to Alcohol intoxication. If you abstain from Alcohol this likely will not happen again.  Secondary Diagnosis:	Paroxysmal a-fib  Goal:	Please continue to take Aspirin 81 mg daily, Plavix 75 mg daily, Toprol  mg daily.  Instructions for follow-up, activity and diet:	Your heart rate intermittent converts to rapid (fast) Atrial Fibrillation. This is a fast irregular heart beat. Your ICD fired because your heart went into rapid Atrial Fibrillation due to alcohol intoxication. If you abstain from alcohol this will likely NOT occur.   Alcohol is NOT good for your heart and makes you more prone to arrhythmias such as Atrial Fibrillation.  Secondary Diagnosis:	ETOH abuse  Goal:	PLEASE ABSTAIN FROM ALCOHOL AS IT IS DAMAGING YOUR HEART  Instructions for follow-up, activity and diet:	Please take Thiamine 100 mg daily, Folic Acid 1 mg daily and Multivitamin daily  In addition, please take the Librium Taper as prescribed by Dr. Levy. This medication will help alcohol withdrawal.  Secondary Diagnosis:	Systolic and diastolic CHF, chronic  Goal:	Please continue your Toprol  mg daily and Lisinopril 20 mg daily  Instructions for follow-up, activity and diet:	You have a history of a weak Principal Discharge DX:	Defibrillator discharge  Goal:	Please follow up with Dr. Levy January 9, 2018 @ 2 PM for a check up. In addition, please follow up with Electrophysiologist Dr. Ramos for a check up in one month at which time he will recheck your ICD device.  Instructions for follow-up, activity and diet:	Your ICD shocked you secondary to Rapid Atrial Fibrillation. This was likely due to Alcohol intoxication. If you abstain from Alcohol this likely will not happen again.  Secondary Diagnosis:	Paroxysmal a-fib  Goal:	Please continue to take Aspirin 81 mg daily, Plavix 75 mg daily, Toprol  mg daily.  Instructions for follow-up, activity and diet:	Your heart rate intermittent converts to rapid (fast) Atrial Fibrillation. This is a fast irregular heart beat. Your ICD fired because your heart went into rapid Atrial Fibrillation due to alcohol intoxication. If you abstain from alcohol this will likely NOT occur.   Alcohol is NOT good for your heart and makes you more prone to arrhythmias such as Atrial Fibrillation.  Secondary Diagnosis:	ETOH abuse  Goal:	PLEASE ABSTAIN FROM ALCOHOL AS IT IS DAMAGING YOUR HEART  Instructions for follow-up, activity and diet:	Please take Thiamine 100 mg daily, Folic Acid 1 mg daily and Multivitamin daily  In addition, please take the Librium Taper as prescribed by Dr. Levy. This medication will help alcohol withdrawal.  Secondary Diagnosis:	Systolic and diastolic CHF, chronic  Goal:	Please continue your Toprol  mg daily and Lisinopril 20 mg daily  Instructions for follow-up, activity and diet:	You have a history of a weakened heart muscle. It is important you take your medication as prescribed and follow up with Dr. Levy regularly.   Please abstain from alcohol! This is damaging your heart!   If you develop weight gain (more than 3 lbs in one day), shortness of breath, swelling in legs; contact your doctor as these are signs of heart failure.

## 2017-12-28 DIAGNOSIS — I50.42 CHRONIC COMBINED SYSTOLIC (CONGESTIVE) AND DIASTOLIC (CONGESTIVE) HEART FAILURE: ICD-10-CM

## 2017-12-28 DIAGNOSIS — I25.2 OLD MYOCARDIAL INFARCTION: ICD-10-CM

## 2017-12-28 DIAGNOSIS — N17.9 ACUTE KIDNEY FAILURE, UNSPECIFIED: ICD-10-CM

## 2017-12-28 DIAGNOSIS — F10.239 ALCOHOL DEPENDENCE WITH WITHDRAWAL, UNSPECIFIED: ICD-10-CM

## 2017-12-28 DIAGNOSIS — E86.0 DEHYDRATION: ICD-10-CM

## 2017-12-28 DIAGNOSIS — Z79.02 LONG TERM (CURRENT) USE OF ANTITHROMBOTICS/ANTIPLATELETS: ICD-10-CM

## 2017-12-28 DIAGNOSIS — E87.1 HYPO-OSMOLALITY AND HYPONATREMIA: ICD-10-CM

## 2017-12-28 DIAGNOSIS — Z95.810 PRESENCE OF AUTOMATIC (IMPLANTABLE) CARDIAC DEFIBRILLATOR: ICD-10-CM

## 2017-12-28 DIAGNOSIS — I25.10 ATHEROSCLEROTIC HEART DISEASE OF NATIVE CORONARY ARTERY WITHOUT ANGINA PECTORIS: ICD-10-CM

## 2017-12-28 DIAGNOSIS — I11.0 HYPERTENSIVE HEART DISEASE WITH HEART FAILURE: ICD-10-CM

## 2017-12-28 DIAGNOSIS — I48.0 PAROXYSMAL ATRIAL FIBRILLATION: ICD-10-CM

## 2017-12-28 DIAGNOSIS — I48.91 UNSPECIFIED ATRIAL FIBRILLATION: ICD-10-CM

## 2017-12-29 LAB — ETHANOL UR-MCNC: SIGNIFICANT CHANGE UP

## 2018-01-06 ENCOUNTER — INPATIENT (INPATIENT)
Facility: HOSPITAL | Age: 63
LOS: 19 days | Discharge: HOME | DRG: 287 | End: 2018-01-26
Attending: INTERNAL MEDICINE | Admitting: INTERNAL MEDICINE
Payer: MEDICARE

## 2018-01-06 VITALS
OXYGEN SATURATION: 98 % | HEART RATE: 116 BPM | DIASTOLIC BLOOD PRESSURE: 60 MMHG | WEIGHT: 162.04 LBS | RESPIRATION RATE: 18 BRPM | TEMPERATURE: 99 F | SYSTOLIC BLOOD PRESSURE: 100 MMHG

## 2018-01-06 DIAGNOSIS — N18.3 CHRONIC KIDNEY DISEASE, STAGE 3 (MODERATE): ICD-10-CM

## 2018-01-06 DIAGNOSIS — I48.0 PAROXYSMAL ATRIAL FIBRILLATION: ICD-10-CM

## 2018-01-06 DIAGNOSIS — F10.10 ALCOHOL ABUSE, UNCOMPLICATED: ICD-10-CM

## 2018-01-06 DIAGNOSIS — R74.8 ABNORMAL LEVELS OF OTHER SERUM ENZYMES: ICD-10-CM

## 2018-01-06 DIAGNOSIS — I50.22 CHRONIC SYSTOLIC (CONGESTIVE) HEART FAILURE: ICD-10-CM

## 2018-01-06 LAB
ALBUMIN SERPL ELPH-MCNC: 4.3 G/DL — SIGNIFICANT CHANGE UP (ref 3.3–5)
ALP SERPL-CCNC: 56 U/L — SIGNIFICANT CHANGE UP (ref 40–120)
ALT FLD-CCNC: 41 U/L — SIGNIFICANT CHANGE UP (ref 10–45)
ANION GAP SERPL CALC-SCNC: 16 MMOL/L — SIGNIFICANT CHANGE UP (ref 5–17)
APTT BLD: 26.7 SEC — LOW (ref 27.5–37.4)
AST SERPL-CCNC: 58 U/L — HIGH (ref 10–40)
BASOPHILS NFR BLD AUTO: 1.5 % — SIGNIFICANT CHANGE UP (ref 0–2)
BILIRUB SERPL-MCNC: 0.4 MG/DL — SIGNIFICANT CHANGE UP (ref 0.2–1.2)
BUN SERPL-MCNC: 14 MG/DL — SIGNIFICANT CHANGE UP (ref 7–23)
CALCIUM SERPL-MCNC: 9.5 MG/DL — SIGNIFICANT CHANGE UP (ref 8.4–10.5)
CHLORIDE SERPL-SCNC: 92 MMOL/L — LOW (ref 96–108)
CK MB CFR SERPL CALC: 1.1 NG/ML — SIGNIFICANT CHANGE UP (ref 0–6.7)
CK MB CFR SERPL CALC: 1.1 NG/ML — SIGNIFICANT CHANGE UP (ref 0–6.7)
CK MB CFR SERPL CALC: 1.3 NG/ML — SIGNIFICANT CHANGE UP (ref 0–6.7)
CK SERPL-CCNC: 85 U/L — SIGNIFICANT CHANGE UP (ref 30–200)
CK SERPL-CCNC: 89 U/L — SIGNIFICANT CHANGE UP (ref 30–200)
CO2 SERPL-SCNC: 23 MMOL/L — SIGNIFICANT CHANGE UP (ref 22–31)
CREAT SERPL-MCNC: 1.34 MG/DL — HIGH (ref 0.5–1.3)
EOSINOPHIL NFR BLD AUTO: 2.6 % — SIGNIFICANT CHANGE UP (ref 0–6)
EXTRA SST TUBE: SIGNIFICANT CHANGE UP
GLUCOSE SERPL-MCNC: 121 MG/DL — HIGH (ref 70–99)
HCT VFR BLD CALC: 42.6 % — SIGNIFICANT CHANGE UP (ref 39–50)
HGB BLD-MCNC: 13.3 G/DL — SIGNIFICANT CHANGE UP (ref 13–17)
INR BLD: 0.95 — SIGNIFICANT CHANGE UP (ref 0.88–1.16)
LYMPHOCYTES # BLD AUTO: 35.7 % — SIGNIFICANT CHANGE UP (ref 13–44)
MCHC RBC-ENTMCNC: 21.9 PG — LOW (ref 27–34)
MCHC RBC-ENTMCNC: 31.2 G/DL — LOW (ref 32–36)
MCV RBC AUTO: 70.1 FL — LOW (ref 80–100)
MONOCYTES NFR BLD AUTO: 5.6 % — SIGNIFICANT CHANGE UP (ref 2–14)
NEUTROPHILS NFR BLD AUTO: 54.6 % — SIGNIFICANT CHANGE UP (ref 43–77)
PLATELET # BLD AUTO: 129 K/UL — LOW (ref 150–400)
POTASSIUM SERPL-MCNC: 4.2 MMOL/L — SIGNIFICANT CHANGE UP (ref 3.5–5.3)
POTASSIUM SERPL-SCNC: 4.2 MMOL/L — SIGNIFICANT CHANGE UP (ref 3.5–5.3)
PROT SERPL-MCNC: 7.7 G/DL — SIGNIFICANT CHANGE UP (ref 6–8.3)
PROTHROM AB SERPL-ACNC: 10.5 SEC — SIGNIFICANT CHANGE UP (ref 9.8–12.7)
RBC # BLD: 6.08 M/UL — HIGH (ref 4.2–5.8)
RBC # FLD: 22.7 % — HIGH (ref 10.3–16.9)
SODIUM SERPL-SCNC: 131 MMOL/L — LOW (ref 135–145)
TROPONIN T SERPL-MCNC: 0.03 NG/ML — HIGH (ref 0–0.01)
WBC # BLD: 2.7 K/UL — LOW (ref 3.8–10.5)
WBC # FLD AUTO: 2.7 K/UL — LOW (ref 3.8–10.5)

## 2018-01-06 PROCEDURE — 71046 X-RAY EXAM CHEST 2 VIEWS: CPT | Mod: 26

## 2018-01-06 PROCEDURE — 99285 EMERGENCY DEPT VISIT HI MDM: CPT | Mod: 25

## 2018-01-06 PROCEDURE — 93010 ELECTROCARDIOGRAM REPORT: CPT

## 2018-01-06 PROCEDURE — 71045 X-RAY EXAM CHEST 1 VIEW: CPT | Mod: 26

## 2018-01-06 RX ORDER — METOPROLOL TARTRATE 50 MG
100 TABLET ORAL DAILY
Qty: 0 | Refills: 0 | Status: DISCONTINUED | OUTPATIENT
Start: 2018-01-07 | End: 2018-01-08

## 2018-01-06 RX ORDER — FOLIC ACID 0.8 MG
1 TABLET ORAL DAILY
Qty: 0 | Refills: 0 | Status: DISCONTINUED | OUTPATIENT
Start: 2018-01-06 | End: 2018-01-26

## 2018-01-06 RX ORDER — ONDANSETRON 8 MG/1
4 TABLET, FILM COATED ORAL EVERY 6 HOURS
Qty: 0 | Refills: 0 | Status: DISCONTINUED | OUTPATIENT
Start: 2018-01-06 | End: 2018-01-08

## 2018-01-06 RX ORDER — SODIUM CHLORIDE 9 MG/ML
1000 INJECTION INTRAMUSCULAR; INTRAVENOUS; SUBCUTANEOUS ONCE
Qty: 0 | Refills: 0 | Status: COMPLETED | OUTPATIENT
Start: 2018-01-06 | End: 2018-01-06

## 2018-01-06 RX ORDER — LISINOPRIL 2.5 MG/1
20 TABLET ORAL DAILY
Qty: 0 | Refills: 0 | Status: DISCONTINUED | OUTPATIENT
Start: 2018-01-07 | End: 2018-01-08

## 2018-01-06 RX ORDER — THIAMINE MONONITRATE (VIT B1) 100 MG
100 TABLET ORAL DAILY
Qty: 0 | Refills: 0 | Status: DISCONTINUED | OUTPATIENT
Start: 2018-01-06 | End: 2018-01-07

## 2018-01-06 RX ORDER — ASPIRIN/CALCIUM CARB/MAGNESIUM 324 MG
81 TABLET ORAL DAILY
Qty: 0 | Refills: 0 | Status: DISCONTINUED | OUTPATIENT
Start: 2018-01-06 | End: 2018-01-09

## 2018-01-06 RX ORDER — CLOPIDOGREL BISULFATE 75 MG/1
75 TABLET, FILM COATED ORAL DAILY
Qty: 0 | Refills: 0 | Status: DISCONTINUED | OUTPATIENT
Start: 2018-01-06 | End: 2018-01-11

## 2018-01-06 RX ORDER — ATORVASTATIN CALCIUM 80 MG/1
40 TABLET, FILM COATED ORAL AT BEDTIME
Qty: 0 | Refills: 0 | Status: DISCONTINUED | OUTPATIENT
Start: 2018-01-06 | End: 2018-01-26

## 2018-01-06 RX ADMIN — Medication 1 MILLIGRAM(S): at 16:15

## 2018-01-06 RX ADMIN — Medication 1 TABLET(S): at 16:15

## 2018-01-06 RX ADMIN — ONDANSETRON 4 MILLIGRAM(S): 8 TABLET, FILM COATED ORAL at 20:52

## 2018-01-06 RX ADMIN — Medication 2 MILLIGRAM(S): at 12:34

## 2018-01-06 RX ADMIN — SODIUM CHLORIDE 1000 MILLILITER(S): 9 INJECTION INTRAMUSCULAR; INTRAVENOUS; SUBCUTANEOUS at 09:32

## 2018-01-06 RX ADMIN — Medication 100 MILLIGRAM(S): at 16:15

## 2018-01-06 RX ADMIN — Medication 2 MILLIGRAM(S): at 18:00

## 2018-01-06 RX ADMIN — Medication 81 MILLIGRAM(S): at 16:15

## 2018-01-06 NOTE — ED PROVIDER NOTE - MEDICAL DECISION MAKING DETAILS
61 yo male co palpitations for 1 day, also syncope yesterday.  nsr here, no cp.  will check cardiac labs, cxr

## 2018-01-06 NOTE — H&P ADULT - NSHPLABSRESULTS_GEN_ALL_CORE
13.3   2.7   )-----------( 129      ( 06 Jan 2018 09:32 )             42.6       01-06    131<L>  |  92<L>  |  14  ----------------------------<  121<H>  4.2   |  23  |  1.34<H>    Ca    9.5      06 Jan 2018 09:32    TPro  7.7  /  Alb  4.3  /  TBili  0.4  /  DBili  x   /  AST  58<H>  /  ALT  41  /  AlkPhos  56  01-06      PT/INR - ( 06 Jan 2018 09:32 )   PT: 10.5 sec;   INR: 0.95          PTT - ( 06 Jan 2018 09:32 )  PTT:26.7 sec    CARDIAC MARKERS ( 06 Jan 2018 13:47 )  x     / 0.03 ng/mL / 89 U/L / x     / 1.1 ng/mL  CARDIAC MARKERS ( 06 Jan 2018 09:32 )  x     / 0.03 ng/mL / 106 U/L / x     / 1.3 ng/mL            EKG:

## 2018-01-06 NOTE — H&P ADULT - NEGATIVE CARDIOVASCULAR SYMPTOMS
no chest pain/no dyspnea on exertion/no orthopnea/no peripheral edema/no paroxysmal nocturnal dyspnea

## 2018-01-06 NOTE — H&P ADULT - PROBLEM SELECTOR PLAN 3
Troponin 0.03 x 2. Previous admission Troponin T 0.02. Elevated Troponin likely secondary to DANUTA on CKD, ? run of paroxysmal afib as an outpatient.  -Patient C/P free and HD stable.  -EKG abnormal but unchanged from prior.   -Follow-up repeat EKG in AM  -Continue telemetry Troponin 0.03 x 2. Follow-up cardiac enzymes 6 pm. Previous admission Troponin T 0.02. Elevated Troponin likely secondary to DANUTA on CKD, ? run of paroxysmal afib as an outpatient.  -Patient C/P free and HD stable.  -EKG abnormal but unchanged from prior.   -Follow-up repeat EKG in AM  -Continue telemetry

## 2018-01-06 NOTE — PATIENT PROFILE ADULT. - NS TRANSFER PATIENT BELONGINGS
Jewelry/Money (specify)/Electronic Device (specify)/Clothing/Cell Phone/PDA (specify)/Other belongings

## 2018-01-06 NOTE — ED ADULT TRIAGE NOTE - OTHER COMPLAINTS
biba from home c/o of palpitations since this morning with generalzied weakness and nausea, pt has hx of etoh abuse last drink 530am

## 2018-01-06 NOTE — H&P ADULT - PROBLEM SELECTOR PLAN 5
Patient currently appears euvolemic.  EF 35% by MERLYN 9/2017.  -Strict Is and Os and Daily Weights  -Continue Losartan and Metoprolol     DVT Prophylaxis: SCDs  Dispo: Patient currently stable. Continue to monitor for signs of withdrawal Patient currently appears euvolemic.  EF 35% by MERLYN 9/2017.  -Strict Is and Os and Daily Weights  -Continue Metoprolol. Hold Lisinopril in the setting of DANUTA on CKD    DVT Prophylaxis: SCDs  Dispo: Patient currently stable. Continue to monitor for signs of withdrawal

## 2018-01-06 NOTE — H&P ADULT - ASSESSMENT
62 M former smoker and drug abuser current ETOH abuser (2 pints vodka day) with PMHx CAD s/p MI 2003, most recent PCI at [facility] 7/2016 (JOSHUA to mLAD with residual 50 % OM1), chronic systolic CHF (EF 35% by Echo 9/2017) s/p AICD in 2009 ([employer]) , pafib and LV thrombus (on ASA and Plavix only 2/2 to lower GIB 2016), CKD Stage III (Baseline Cr normal 0.90-1.1), with frequent admissions to [facility] 11/2017 for palpitations in the setting of ETOH use/withdrawal and most recently 12/2017 for ICD firing who presents to [facility] ED 1/6 c/o palpitations in the setting of ETOH use/alcohol withdrawal admitted for further management.

## 2018-01-06 NOTE — H&P ADULT - PROBLEM SELECTOR PLAN 2
-Blood Alcohol level 155 on admission.   -CIWA protocol-CIWA 8 on admission. Ativan 2 mg IV q 1 hr PRN CIWA 8 or greater.  -Out of Bed with Assistance- Fall precautions.  -Zofran PRN Nausea, vomiting  -Continue Folic Acid, Multivitamin, and Thiamine.

## 2018-01-06 NOTE — H&P ADULT - PROBLEM SELECTOR PLAN 1
Patient currently in NSR. No AC due history of lower GIB 2016, frequent falls, ETOH use and ? non-compliance.   -Continue Metoprolol.  -Consider repeat ICD interrogation.

## 2018-01-06 NOTE — ED ADULT NURSE NOTE - CHPI ED SYMPTOMS NEG
no dizziness/no fever/no chills/no syncope/no diaphoresis/no shortness of breath/no vomiting/no chest pain/no cough/no nausea/no back pain

## 2018-01-06 NOTE — H&P ADULT - HISTORY OF PRESENT ILLNESS
62 M former smoker and drug abuser current ETOH abuser (2 pints vodka day) with PMHx CAD s/p MI 2003, most recent PCI at [facility] 7/2016 (JOSHUA to mLAD with residual 50 % OM1), chronic systolic CHF (EF 35% by Echo 9/2017) s/p AICD in 2009 ([employer]) , pafib and LV thrombus (on ASA and Plavix only 2/2 to lower GIB 2016), with frequent admissions to [facility] 11/2017 for palpitations in the setting of ETOH use/withdrawal and most recently 12/2017 for ICD firing who presents to [facility] ED 1/6 c/o palpitations that awoke him from sleep this AM. Patient admits to dizziness and nausea this AM but no vomiting or syncope. Patient reports he has been drinking 1-2 pints of vodka for the past few days and has not had an appetite since Sunday. He states he hasn’t really eaten in 3 days and has been drinking water and taking Nutrament twice a day for nutrition. Patient denies C/P, SOB, diaphoresis, syncope, LE edema, PND, orthopnea, fevers, chills. On arrival to ER /60 P 116 RR 18 Temp 98 F O2 sat 98% RA. EKG revealed NSR at 84 bpm with 1 mm ST elevation III, AVF, V1-V3  with TWI V4-V5. Inferior anteroseptal Q waves  (unchanged from prior EKG). Troponin T mildly elevated at 0.03. Labs significant for Cr 1.34, Hyponatremia Na 131, Thrombocytopenia Platelets 129,000, Leukopenia WBC 2.7 AST 58. Patient treated with 1 L NS and Ativan 2 mg IV x 1 (for tremors). Patient admitted for further management 62 M former smoker and drug abuser current ETOH abuser (2 pints vodka day) with PMHx CAD s/p MI 2003, most recent PCI at [facility] 7/2016 (JOSHUA to mLAD with residual 50 % OM1), chronic systolic CHF (EF 35% by Echo 9/2017) s/p AICD in 2009 ([employer]) , pafib and LV thrombus (on ASA and Plavix only 2/2 to lower GIB 2016), CKD Stage III (Baseline Cr normal 0.90-1.1), with frequent admissions to [facility] 11/2017 for palpitations in the setting of ETOH use/withdrawal and most recently 12/2017 for ICD firing who presents to [facility] ED 1/6 c/o palpitations that awoke him from sleep this AM. Patient admits to dizziness and nausea this AM but no vomiting or syncope. Patient reports he has been drinking 1-2 pints of vodka for the past few days and has not had an appetite since Sunday. He states he hasn’t really eaten in 3 days and has been drinking water and taking Nutrament twice a day for nutrition. Patient denies C/P, SOB, diaphoresis, syncope, LE edema, PND, orthopnea, fevers, chills. On arrival to ER /60 P 116 RR 18 Temp 98 F O2 sat 98% RA. EKG revealed NSR at 84 bpm with 1 mm ST elevation III, AVF, V1-V3  with TWI V4-V5. Inferior anteroseptal Q waves  (unchanged from prior EKG). Troponin T mildly elevated at 0.03. Labs significant for Cr 1.34, Hyponatremia Na 131, Thrombocytopenia Platelets 129,000, Leukopenia WBC 2.7 AST 58. CXR negative for acute infiltrates. Patient treated with 1 L NS and Ativan 2 mg IV x 1 (for tremors). Patient admitted for further management 62 M former smoker and drug abuser current ETOH abuser (2 pints vodka day) with PMHx CAD s/p MI 2003, most recent PCI at [facility] 7/2016 (JOSHUA to mLAD with residual 50 % OM1), chronic systolic CHF (EF 35% by Echo 9/2017) s/p AICD in 2009 ([employer]) , pafib and LV thrombus (on ASA and Plavix only 2/2 to lower GIB 2016), CKD Stage III (Baseline Cr normal 0.90-1.1), with frequent admissions to [facility] 11/2017 for palpitations in the setting of ETOH use/withdrawal and most recently 12/2017 for ICD firing who presents to [facility] ED 1/6 c/o palpitations that awoke him from sleep this AM. Patient admits to dizziness and nausea this AM but no vomiting or syncope. Patient reports he has been drinking 1-2 pints of vodka for the past few days and has not had an appetite since Sunday. He states he hasn’t really eaten in 3 days and has been drinking water and taking Nutrament twice a day for nutrition. Patient denies C/P, SOB, diaphoresis, syncope, LE edema, PND, orthopnea, fevers, chills. On arrival to ER /60 P 116 RR 18 Temp 98 F O2 sat 98% RA. EKG revealed NSR at 84 bpm with 1 mm ST elevation III, AVF, V1-V3  with TWI V4-V5. Inferior anteroseptal Q waves  (unchanged from prior EKG). Troponin T mildly elevated at 0.03. Labs significant for Cr 1.34, Hyponatremia Na 131, Thrombocytopenia Platelets 129,000, Leukopenia WBC 2.7 AST 58, Blood Alcohol 155.  CXR negative for acute infiltrates. Patient treated with 1 L NS and Ativan 2 mg IV x 1 (for tremors). Patient admitted for further management

## 2018-01-06 NOTE — ED ADULT NURSE NOTE - OBJECTIVE STATEMENT
Patient is a 62 year old male BIBA complaining of palpitations that woke him up this morning. Patient states "I haven't been able to eat lately and have been feeling weak, I started drinking 2 pints of vodka and I woke up with my heart racing this morning". Patient reports his last drink at 530 am. Patient is A&Ox3, speaking in full sentences, VSS, in NAD. Patient placed on a cardiac monitor, awaiting MD boykin.

## 2018-01-06 NOTE — H&P ADULT - NSHPSOCIALHISTORY_GEN_ALL_CORE
TOB: Former smoker. Quit 1996. Used to smoke 10 cigarettes/day.   ETOH: Cognac, vodka. 1-2 pints day  Drugs: Quit 1996. Cocaine and marijuana.

## 2018-01-07 LAB
ANION GAP SERPL CALC-SCNC: 12 MMOL/L — SIGNIFICANT CHANGE UP (ref 5–17)
BUN SERPL-MCNC: 18 MG/DL — SIGNIFICANT CHANGE UP (ref 7–23)
CALCIUM SERPL-MCNC: 9.1 MG/DL — SIGNIFICANT CHANGE UP (ref 8.4–10.5)
CHLORIDE SERPL-SCNC: 96 MMOL/L — SIGNIFICANT CHANGE UP (ref 96–108)
CO2 SERPL-SCNC: 26 MMOL/L — SIGNIFICANT CHANGE UP (ref 22–31)
CREAT SERPL-MCNC: 1.2 MG/DL — SIGNIFICANT CHANGE UP (ref 0.5–1.3)
GLUCOSE SERPL-MCNC: 138 MG/DL — HIGH (ref 70–99)
MAGNESIUM SERPL-MCNC: 1.6 MG/DL — SIGNIFICANT CHANGE UP (ref 1.6–2.6)
POTASSIUM SERPL-MCNC: 4.7 MMOL/L — SIGNIFICANT CHANGE UP (ref 3.5–5.3)
POTASSIUM SERPL-SCNC: 4.7 MMOL/L — SIGNIFICANT CHANGE UP (ref 3.5–5.3)
SODIUM SERPL-SCNC: 134 MMOL/L — LOW (ref 135–145)

## 2018-01-07 RX ORDER — THIAMINE MONONITRATE (VIT B1) 100 MG
100 TABLET ORAL THREE TIMES A DAY
Qty: 0 | Refills: 0 | Status: DISCONTINUED | OUTPATIENT
Start: 2018-01-07 | End: 2018-01-25

## 2018-01-07 RX ORDER — MAGNESIUM SULFATE 500 MG/ML
2 VIAL (ML) INJECTION ONCE
Qty: 0 | Refills: 0 | Status: COMPLETED | OUTPATIENT
Start: 2018-01-07 | End: 2018-01-07

## 2018-01-07 RX ADMIN — CLOPIDOGREL BISULFATE 75 MILLIGRAM(S): 75 TABLET, FILM COATED ORAL at 14:07

## 2018-01-07 RX ADMIN — Medication 100 MILLIGRAM(S): at 21:11

## 2018-01-07 RX ADMIN — ATORVASTATIN CALCIUM 40 MILLIGRAM(S): 80 TABLET, FILM COATED ORAL at 21:11

## 2018-01-07 RX ADMIN — LISINOPRIL 20 MILLIGRAM(S): 2.5 TABLET ORAL at 05:49

## 2018-01-07 RX ADMIN — Medication 81 MILLIGRAM(S): at 14:07

## 2018-01-07 RX ADMIN — Medication 100 MILLIGRAM(S): at 05:49

## 2018-01-07 RX ADMIN — Medication 1 MILLIGRAM(S): at 14:06

## 2018-01-07 RX ADMIN — Medication 1 TABLET(S): at 14:07

## 2018-01-07 RX ADMIN — Medication 2 MILLIGRAM(S): at 05:48

## 2018-01-07 RX ADMIN — Medication 100 MILLIGRAM(S): at 14:06

## 2018-01-07 RX ADMIN — Medication 50 GRAM(S): at 10:03

## 2018-01-07 RX ADMIN — Medication 10 MILLIGRAM(S): at 17:57

## 2018-01-07 NOTE — CHART NOTE - NSCHARTNOTEFT_GEN_A_CORE
o/n pt seen at bedside resting comfortably, no tremors, VSS-- no need for ativan at this time. continue to monitor closely for withdrawals

## 2018-01-07 NOTE — PROGRESS NOTE ADULT - SUBJECTIVE AND OBJECTIVE BOX
Pt is readmitted  for syncope     PAST MEDICAL & SURGICAL HISTORY:  Pacemaker  ETOH abuse  Paroxysmal a-fib  Myocardial infarction involving other coronary artery  Gastritis  Atherosclerosis of coronary artery: Coronary artery disease  Automatic implantable cardiac defibrillator in situ: ICD (implantable cardioverter-defibrillator) in place    MEDICATIONS  (STANDING):  aspirin enteric coated 81 milliGRAM(s) Oral daily  atorvastatin 40 milliGRAM(s) Oral at bedtime  clopidogrel Tablet 75 milliGRAM(s) Oral daily  folic acid 1 milliGRAM(s) Oral daily  lisinopril 20 milliGRAM(s) Oral daily  metoprolol succinate  milliGRAM(s) Oral daily  multivitamin 1 Tablet(s) Oral daily  thiamine 100 milliGRAM(s) Oral daily    ICU Vital Signs Last 24 Hrs  T(C): 36.3 (07 Jan 2018 08:47), Max: 36.7 (06 Jan 2018 12:16)  T(F): 97.3 (07 Jan 2018 08:47), Max: 98.1 (06 Jan 2018 12:16)  HR: 70 (07 Jan 2018 08:29) (70 - 93)  BP: 126/80 (07 Jan 2018 08:29) (112/77 - 132/84)  BP(mean): 99 (07 Jan 2018 04:20) (90 - 99)  ABP: --  ABP(mean): --  RR: 16 (07 Jan 2018 08:29) (16 - 18)  SpO2: 100% (07 Jan 2018 08:29) (97% - 100%)    Lungs decreased breath sounds   CV  s1 s2    abd soft    ext   stable                          13.3   2.7   )-----------( 129      ( 06 Jan 2018 09:32 )             42.6   01-07    134<L>  |  96  |  18  ----------------------------<  138<H>  4.7   |  26  |  1.20    Ca    9.1      07 Jan 2018 06:43  Mg     1.6     01-07    TPro  7.7  /  Alb  4.3  /  TBili  0.4  /  DBili  x   /  AST  58<H>  /  ALT  41  /  AlkPhos  56  01-06  PT/INR - ( 06 Jan 2018 09:32 )   PT: 10.5 sec;   INR: 0.95          PTT - ( 06 Jan 2018 09:32 )  PTT:26.7 sec    CXR neg   Alcohol, Blood (01.06.18 @ 09:32)    Alcohol, Blood: 155: TOXIC CONCENTRATIONS:  Flushing, Slowing of  Reflexes, Impaired Visual Acuity:     Depression of CNS: > 100  Fatalities Reported:  > 400  These ranges are intended as general guidelines.  Alcohol metabolism can  vary widely among individuals.  This test is approved for clinical and  not for forensic purposes. mg/dL

## 2018-01-07 NOTE — PROGRESS NOTE ADULT - SUBJECTIVE AND OBJECTIVE BOX
S: Pt seen and examined bedside.  Patient denies C/P, SOB, N/V, dizziness, palpitations, and diaphoresis.  Pt denies fever/chills, dysuria, abdominal pain, diarrhea, and cough    PHYSICAL EXAM:  GEN: NAD  PULM:  CTA B/L  CARD: No JVD B/L, RRR, S1 and S2   ABD: +BS, NT, soft/ND	  EXT: No Edema B/L LE  NEURO: A+Ox3, no focal deficit    LABS:                        13.3   2.7   )-----------( 129      ( 06 Jan 2018 09:32 )             42.6     01-07    134<L>  |  96  |  18  ----------------------------<  138<H>  4.7   |  26  |  1.20    Ca    9.1      07 Jan 2018 06:43  Mg     1.6     01-07    TPro  7.7  /  Alb  4.3  /  TBili  0.4  /  DBili  x   /  AST  58<H>  /  ALT  41  /  AlkPhos  56  01-06    PT/INR - ( 06 Jan 2018 09:32 )   PT: 10.5 sec;   INR: 0.95          PTT - ( 06 Jan 2018 09:32 )  PTT:26.7 sec    01-07 @ 07:01  -  01-07 @ 19:56  --------------------------------------------------------  IN: 540 mL / OUT: 550 mL / NET: -10 mL    Daily Weight not yet documented

## 2018-01-07 NOTE — PROGRESS NOTE ADULT - ASSESSMENT
Ethanolism   persistent and appearing intractable   continue Librium  Psyc consult     CAD   s/p PCI      leukopenia

## 2018-01-07 NOTE — PROGRESS NOTE ADULT - ASSESSMENT
62 M Ex-Smoker, current ETOH abuser, w/ PMHx CAD s/p prior MI, s/p prior PCI, Chronic Systolic CHF s/p ICD, paroxysmal Afib and LV thrombus (on ASA and Plavix only 2/2 to lower GI bleed 2016), CKD Stage III, h/o ETOH use/withdrawal, recently admitted for ICD firing in 12/2017 who returns c/o palpitations in the setting of ETOH use admitted for further management.     1) Paroxysmal Afib  Currently in SR.   Not on anticoagulation as per Dr. Levy due to h/o GI bleeding, ETOH and possible non-compliance  Continue Metoprolol ER.  Consider repeat ICD interrogation.     2) ETOH abuse.    Blood Alcohol level 155 on admission.   CIWA protocol-CIWA 8 on admission. Ativan 2 mg IV q1 hr PRN CIWA 8 or greater ordered.  As per Dr. Levy Librium 10mg PO q6hrs as need for withdrawal symptoms added to regimen.  Psych consulted  CIWA currently 3. Baseline resting tremor B/L arms.   Out of Bed with Assistance- Fall precautions.  Continue Folic Acid, Multivitamin, and Thiamine.     3) CAD  Troponin 0.03 x 3. Troponin was 0.02 on recent admission.   No ischemia work-up at this time as discussed with Dr. Levy.  C/P free.   Continue ASA/Plavix/Toprol XL/Lipitor    4) CKD stage 3  Cr 1.34 likely secondary to dehydration from decreased oral intake and ETOH abuse.   S/p 1 L IVF in ER. Cr. 1.2 today.     5) Chronic systolic (congestive) heart failure.    Euvolemic  EF 35% by Echo in 9/2017.  On Metoprolol/Lisinopril    6) Hypomagnesemia: Mg 1.6 repleted w/ Magnesium 2g IV    7) Hyponatremia: improved from 131 to 134.     6) DVT PPX: Intermittent Pneumatic Compression B/L LE    Dispo: Patient currently stable. Continue to monitor for signs of withdrawal.

## 2018-01-08 DIAGNOSIS — F10.20 ALCOHOL DEPENDENCE, UNCOMPLICATED: ICD-10-CM

## 2018-01-08 DIAGNOSIS — F32.0 MAJOR DEPRESSIVE DISORDER, SINGLE EPISODE, MILD: ICD-10-CM

## 2018-01-08 LAB
ANION GAP SERPL CALC-SCNC: 11 MMOL/L — SIGNIFICANT CHANGE UP (ref 5–17)
ANION GAP SERPL CALC-SCNC: 15 MMOL/L — SIGNIFICANT CHANGE UP (ref 5–17)
APTT BLD: 29 SEC — SIGNIFICANT CHANGE UP (ref 27.5–37.4)
BLD GP AB SCN SERPL QL: NEGATIVE — SIGNIFICANT CHANGE UP
BUN SERPL-MCNC: 14 MG/DL — SIGNIFICANT CHANGE UP (ref 7–23)
BUN SERPL-MCNC: 15 MG/DL — SIGNIFICANT CHANGE UP (ref 7–23)
CALCIUM SERPL-MCNC: 9.6 MG/DL — SIGNIFICANT CHANGE UP (ref 8.4–10.5)
CALCIUM SERPL-MCNC: 9.8 MG/DL — SIGNIFICANT CHANGE UP (ref 8.4–10.5)
CHLORIDE SERPL-SCNC: 96 MMOL/L — SIGNIFICANT CHANGE UP (ref 96–108)
CHLORIDE SERPL-SCNC: 97 MMOL/L — SIGNIFICANT CHANGE UP (ref 96–108)
CK MB CFR SERPL CALC: 1.7 NG/ML — SIGNIFICANT CHANGE UP (ref 0–6.7)
CK MB CFR SERPL CALC: 1.7 NG/ML — SIGNIFICANT CHANGE UP (ref 0–6.7)
CK SERPL-CCNC: 66 U/L — SIGNIFICANT CHANGE UP (ref 30–200)
CK SERPL-CCNC: 71 U/L — SIGNIFICANT CHANGE UP (ref 30–200)
CO2 SERPL-SCNC: 24 MMOL/L — SIGNIFICANT CHANGE UP (ref 22–31)
CO2 SERPL-SCNC: 27 MMOL/L — SIGNIFICANT CHANGE UP (ref 22–31)
CREAT SERPL-MCNC: 1.02 MG/DL — SIGNIFICANT CHANGE UP (ref 0.5–1.3)
CREAT SERPL-MCNC: 1.17 MG/DL — SIGNIFICANT CHANGE UP (ref 0.5–1.3)
GLUCOSE SERPL-MCNC: 111 MG/DL — HIGH (ref 70–99)
GLUCOSE SERPL-MCNC: 131 MG/DL — HIGH (ref 70–99)
HCT VFR BLD CALC: 29 % — LOW (ref 39–50)
HCT VFR BLD CALC: 30.1 % — LOW (ref 39–50)
HGB BLD-MCNC: 9.1 G/DL — LOW (ref 13–17)
HGB BLD-MCNC: 9.2 G/DL — LOW (ref 13–17)
INR BLD: 0.91 — SIGNIFICANT CHANGE UP (ref 0.88–1.16)
MAGNESIUM SERPL-MCNC: 2 MG/DL — SIGNIFICANT CHANGE UP (ref 1.6–2.6)
MCHC RBC-ENTMCNC: 21.8 PG — LOW (ref 27–34)
MCHC RBC-ENTMCNC: 22.2 PG — LOW (ref 27–34)
MCHC RBC-ENTMCNC: 30.6 G/DL — LOW (ref 32–36)
MCHC RBC-ENTMCNC: 31.4 G/DL — LOW (ref 32–36)
MCV RBC AUTO: 70.7 FL — LOW (ref 80–100)
MCV RBC AUTO: 71.3 FL — LOW (ref 80–100)
PLATELET # BLD AUTO: 200 K/UL — SIGNIFICANT CHANGE UP (ref 150–400)
PLATELET # BLD AUTO: 217 K/UL — SIGNIFICANT CHANGE UP (ref 150–400)
POTASSIUM SERPL-MCNC: 4.7 MMOL/L — SIGNIFICANT CHANGE UP (ref 3.5–5.3)
POTASSIUM SERPL-MCNC: 5.8 MMOL/L — HIGH (ref 3.5–5.3)
POTASSIUM SERPL-SCNC: 4.7 MMOL/L — SIGNIFICANT CHANGE UP (ref 3.5–5.3)
POTASSIUM SERPL-SCNC: 5.8 MMOL/L — HIGH (ref 3.5–5.3)
PROTHROM AB SERPL-ACNC: 10.1 SEC — SIGNIFICANT CHANGE UP (ref 9.8–12.7)
RBC # BLD: 4.1 M/UL — LOW (ref 4.2–5.8)
RBC # BLD: 4.22 M/UL — SIGNIFICANT CHANGE UP (ref 4.2–5.8)
RBC # FLD: 22 % — HIGH (ref 10.3–16.9)
RBC # FLD: 22.2 % — HIGH (ref 10.3–16.9)
RH IG SCN BLD-IMP: POSITIVE — SIGNIFICANT CHANGE UP
SODIUM SERPL-SCNC: 135 MMOL/L — SIGNIFICANT CHANGE UP (ref 135–145)
SODIUM SERPL-SCNC: 135 MMOL/L — SIGNIFICANT CHANGE UP (ref 135–145)
TROPONIN T SERPL-MCNC: <0.01 NG/ML — SIGNIFICANT CHANGE UP (ref 0–0.01)
TROPONIN T SERPL-MCNC: <0.01 NG/ML — SIGNIFICANT CHANGE UP (ref 0–0.01)
WBC # BLD: 4.6 K/UL — SIGNIFICANT CHANGE UP (ref 3.8–10.5)
WBC # BLD: 4.8 K/UL — SIGNIFICANT CHANGE UP (ref 3.8–10.5)
WBC # FLD AUTO: 4.6 K/UL — SIGNIFICANT CHANGE UP (ref 3.8–10.5)
WBC # FLD AUTO: 4.8 K/UL — SIGNIFICANT CHANGE UP (ref 3.8–10.5)

## 2018-01-08 PROCEDURE — 93010 ELECTROCARDIOGRAM REPORT: CPT

## 2018-01-08 PROCEDURE — 90792 PSYCH DIAG EVAL W/MED SRVCS: CPT

## 2018-01-08 PROCEDURE — 93306 TTE W/DOPPLER COMPLETE: CPT | Mod: 26

## 2018-01-08 PROCEDURE — 93282 PRGRMG EVAL IMPLANTABLE DFB: CPT | Mod: 26

## 2018-01-08 RX ORDER — METOPROLOL TARTRATE 50 MG
50 TABLET ORAL ONCE
Qty: 0 | Refills: 0 | Status: COMPLETED | OUTPATIENT
Start: 2018-01-08 | End: 2018-01-08

## 2018-01-08 RX ORDER — METOPROLOL TARTRATE 50 MG
150 TABLET ORAL DAILY
Qty: 0 | Refills: 0 | Status: DISCONTINUED | OUTPATIENT
Start: 2018-01-09 | End: 2018-01-26

## 2018-01-08 RX ORDER — HEPARIN SODIUM 5000 [USP'U]/ML
1300 INJECTION INTRAVENOUS; SUBCUTANEOUS
Qty: 25000 | Refills: 0 | Status: DISCONTINUED | OUTPATIENT
Start: 2018-01-08 | End: 2018-01-11

## 2018-01-08 RX ORDER — LISINOPRIL 2.5 MG/1
10 TABLET ORAL DAILY
Qty: 0 | Refills: 0 | Status: DISCONTINUED | OUTPATIENT
Start: 2018-01-09 | End: 2018-01-09

## 2018-01-08 RX ADMIN — Medication 100 MILLIGRAM(S): at 05:31

## 2018-01-08 RX ADMIN — Medication 25 MILLIGRAM(S): at 16:17

## 2018-01-08 RX ADMIN — LISINOPRIL 20 MILLIGRAM(S): 2.5 TABLET ORAL at 05:30

## 2018-01-08 RX ADMIN — CLOPIDOGREL BISULFATE 75 MILLIGRAM(S): 75 TABLET, FILM COATED ORAL at 12:09

## 2018-01-08 RX ADMIN — Medication 1 TABLET(S): at 12:09

## 2018-01-08 RX ADMIN — Medication 1 MILLIGRAM(S): at 11:09

## 2018-01-08 RX ADMIN — Medication 25 MILLIGRAM(S): at 22:48

## 2018-01-08 RX ADMIN — Medication 10 MILLIGRAM(S): at 00:29

## 2018-01-08 RX ADMIN — Medication 81 MILLIGRAM(S): at 12:09

## 2018-01-08 RX ADMIN — Medication 100 MILLIGRAM(S): at 05:30

## 2018-01-08 RX ADMIN — Medication 100 MILLIGRAM(S): at 16:19

## 2018-01-08 RX ADMIN — Medication 10 MILLIGRAM(S): at 07:12

## 2018-01-08 RX ADMIN — HEPARIN SODIUM 13 UNIT(S)/HR: 5000 INJECTION INTRAVENOUS; SUBCUTANEOUS at 18:02

## 2018-01-08 RX ADMIN — Medication 1 MILLIGRAM(S): at 12:09

## 2018-01-08 RX ADMIN — Medication 50 MILLIGRAM(S): at 18:03

## 2018-01-08 RX ADMIN — Medication 100 MILLIGRAM(S): at 22:47

## 2018-01-08 RX ADMIN — ATORVASTATIN CALCIUM 40 MILLIGRAM(S): 80 TABLET, FILM COATED ORAL at 22:47

## 2018-01-08 NOTE — BEHAVIORAL HEALTH ASSESSMENT NOTE - NSBHCHARTREVIEWVS_PSY_A_CORE FT
Vital Signs Last 24 Hrs  T(C): 35.7 (08 Jan 2018 09:22), Max: 36.5 (07 Jan 2018 22:23)  T(F): 96.3 (08 Jan 2018 09:22), Max: 97.7 (07 Jan 2018 22:23)  HR: 93 (08 Jan 2018 10:50) (69 - 95)  BP: 125/90 (08 Jan 2018 10:50) (109/76 - 133/78)  BP(mean): --  RR: 16 (08 Jan 2018 05:25) (16 - 17)  SpO2: 100% (08 Jan 2018 05:25) (98% - 100%)

## 2018-01-08 NOTE — DIETITIAN INITIAL EVALUATION ADULT. - PROBLEM SELECTOR PLAN 5
Patient currently appears euvolemic.  EF 35% by MERLYN 9/2017.  -Strict Is and Os and Daily Weights  -Continue Metoprolol. Hold Lisinopril in the setting of DANUTA on CKD    DVT Prophylaxis: SCDs  Dispo: Patient currently stable. Continue to monitor for signs of withdrawal

## 2018-01-08 NOTE — PHYSICAL THERAPY INITIAL EVALUATION ADULT - CRITERIA FOR SKILLED THERAPEUTIC INTERVENTIONS
therapy frequency/functional limitations in following categories/impairments found/risk reduction/prevention/rehab potential/anticipated discharge recommendation

## 2018-01-08 NOTE — DIETITIAN INITIAL EVALUATION ADULT. - PROBLEM SELECTOR PLAN 3
Troponin 0.03 x 2. Follow-up cardiac enzymes 6 pm. Previous admission Troponin T 0.02. Elevated Troponin likely secondary to DANTUA on CKD, ? run of paroxysmal afib as an outpatient.  -Patient C/P free and HD stable.  -EKG abnormal but unchanged from prior.   -Follow-up repeat EKG in AM  -Continue telemetry

## 2018-01-08 NOTE — PHYSICAL THERAPY INITIAL EVALUATION ADULT - GENERAL OBSERVATIONS, REHAB EVAL
Received standing in room in NAD, denies pain +EKG, IV hep. Left seated at EOB in NAD +RN Jayla briggs, call renae in reach

## 2018-01-08 NOTE — PROVIDER CONTACT NOTE (OTHER) - ASSESSMENT
Pt currently in no apparent distress. Pt reports not in any current pain or has anymore feelings of shock. No abnormal events noted on monitor except for artifact. VSS.

## 2018-01-08 NOTE — DIETITIAN INITIAL EVALUATION ADULT. - OTHER INFO
61 yo/male with PMHx CAD, CHF, CKD, EtOH abuse admitted with palpitations, decreased PO intake x 1week. Pt seen in room, awake, alert, pleasant. Pt reports in the past that drinking would encourage him to eat, but now when he drinks he has no appetite and unable to tolerate food. Was drinking 2 Nutrament drinks each day over past week but that is it. Current intake drastically improved; 100% intake at lunch observed. No N/V/C/D reported at this time. NKFA. No difficulty chewing or swallowing. Skin intact, emilia 20. No recent weight changes reported but pt not entirely sure. Pt aware of need to cut back on EtOH use and is looking into rehab centers. Encouraged PO intake; provided DASH/TLC education.

## 2018-01-08 NOTE — PHYSICAL THERAPY INITIAL EVALUATION ADULT - GAIT DEVIATIONS NOTED, PT EVAL
wide MARTINE, hi9gh guard gait, slightly unsteady, shakiness throughout, HR in 140s bpm, reports of dizziness, further ambulation not appropriate/decreased renetta

## 2018-01-08 NOTE — BEHAVIORAL HEALTH ASSESSMENT NOTE - HPI (INCLUDE ILLNESS QUALITY, SEVERITY, DURATION, TIMING, CONTEXT, MODIFYING FACTORS, ASSOCIATED SIGNS AND SYMPTOMS)
62M PPH alcohol use disorder, PMH CAD s/p MI 2003, CHF s/p AICD, afib and LV thrombus, CKD, with frequent admissions to [facility] 11/2017 for palpitations in the setting of ETOH use/withdrawal and most recently 12/2017 for ICD firing who presented to [facility] ED 1/6 reporting palpitations.  Psychiatry consulted for recommendations regarding alcohol withdrawal.  Per team patient reporting oversedation on IV Ativan.    Patient seen at bedside.  Calm and cooperative.  Reports long history of heavy alcohol use, exacerbated 2 years ago by breakup with wife.  Reports cycles of sobriety followed by 5 day binges which he says are precipitated by depressed mood, insomnia, and low appetite most recently in setting of holidays and lack of work.  Says that he feels faint after not eating during binges and worries about his defibrillator firing which was what prompted his presentation to the hospital currently.  Patient denies changes in energy, concentration.  Denies guilt, anhedonia.  Denies SI/HI.  Denies past or present manic symptoms or AH/VH.  Reports anxiety regarding his financial situation.  Patient spoke at length about his social situation, disappointment with lack of appreciation of people in his life, etc.    Patient reports he is taking "low dose" Librium and prn Ativan.  Says he wants to try lower dose prn Ativan as opposed to alternative Librium dosing, despite contradictory reports to primary team.  Currently has mild tremor and anxiety.  Last dose Librium 2 hours previously.  Denies nausea, headache, AH/VH, tactile hallucinations.  AAOx3.  Not agitated.    Patient reports he is aware of his problematic drinking; has already investigated outpatient addiction treatment at [facility], where he was intending to go for intake this week.  Now reports he is likely amenable to inpatient rehab as long as his daughter is well taken care of (daughter primary lives with mother).

## 2018-01-08 NOTE — PHYSICAL THERAPY INITIAL EVALUATION ADULT - LIVES WITH, PROFILE
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

## 2018-01-08 NOTE — PROGRESS NOTE ADULT - SUBJECTIVE AND OBJECTIVE BOX
Held another discussion with Pt re his alcohol level and his drinking which I told him is clearly related to his decreased appetite   and insomnia    He feels defibrillator discharged overnight      PAST MEDICAL & SURGICAL HISTORY:  Pacemaker  ETOH abuse  Paroxysmal a-fib  Myocardial infarction involving other coronary artery  Gastritis  Atherosclerosis of coronary artery: Coronary artery disease  Automatic implantable cardiac defibrillator in situ: ICD (implantable cardioverter-defibrillator) in place    MEDICATIONS  (STANDING):  aspirin enteric coated 81 milliGRAM(s) Oral daily  atorvastatin 40 milliGRAM(s) Oral at bedtime  clopidogrel Tablet 75 milliGRAM(s) Oral daily  folic acid 1 milliGRAM(s) Oral daily  lisinopril 20 milliGRAM(s) Oral daily  metoprolol succinate  milliGRAM(s) Oral daily  multivitamin 1 Tablet(s) Oral daily  thiamine 100 milliGRAM(s) Oral three times a day    MEDICATIONS  (PRN):  chlordiazePOXIDE 10 milliGRAM(s) Oral every 6 hours PRN Alcohol Withdrawal Symptoms  LORazepam   Injectable 2 milliGRAM(s) IV Push every 1 hour PRN CIWA-Ar score 8 or greater  ondansetron Injectable 4 milliGRAM(s) IV Push every 6 hours PRN Nausea and/or Vomiting      ICU Vital Signs Last 24 Hrs  T(C): 35.7 (08 Jan 2018 05:56), Max: 36.5 (07 Jan 2018 22:23)  T(F): 96.3 (08 Jan 2018 05:56), Max: 97.7 (07 Jan 2018 22:23)  HR: 79 (08 Jan 2018 05:25) (69 - 95)  BP: 124/74 (08 Jan 2018 05:25) (109/76 - 133/78)  BP(mean): --  ABP: --  ABP(mean): --  RR: 16 (08 Jan 2018 05:25) (16 - 17)  SpO2: 100% (08 Jan 2018 05:25) (98% - 100%)      Lungs decreased breath sounds at bases  CV s1 s2  Abd soft  Ext stable                          13.3   2.7   )-----------( 129      ( 06 Jan 2018 09:32 )             42.6   01-08    135  |  97  |  15  ----------------------------<  111<H>  5.8<H>   |  27  |  1.17    Ca    9.8      08 Jan 2018 08:26  Mg     2.0     01-08    TPro  7.7  /  Alb  4.3  /  TBili  0.4  /  DBili  x   /  AST  58<H>  /  ALT  41  /  AlkPhos  56  01-06  PT/INR - ( 06 Jan 2018 09:32 )   PT: 10.5 sec;   INR: 0.95          PTT - ( 06 Jan 2018 09:32 )  PTT:26.7 sec

## 2018-01-08 NOTE — BEHAVIORAL HEALTH ASSESSMENT NOTE - NSBHCHARTREVIEWLAB_PSY_A_CORE FT
9.2    4.6   )-----------( 217      ( 08 Jan 2018 08:26 )             30.1   01-08    135  |  97  |  15  ----------------------------<  111<H>  5.8<H>   |  27  |  1.17    Ca    9.8      08 Jan 2018 08:26  Mg     2.0     01-08 1/6

## 2018-01-08 NOTE — BEHAVIORAL HEALTH ASSESSMENT NOTE - NSBHCONSULTMEDS_PSY_A_CORE FT
1. Increase Librium to 25 mg PO q6h  2. Decrease Ativan to 1 mg IV q4h prn CIWA > 8  3. Start Remeron 15 mg PO HS  4. Continue thiamine, folate, MVI

## 2018-01-08 NOTE — PROCEDURE NOTE - ADDITIONAL PROCEDURE DETAILS
The team on [facility] asked EP to check the patient's device because last night, the patient thought that he was shocked by his ICD. The telemetry doesn't show any episodes of VT/VF or any ATP or defibrillation activity. The patient's device was checked. It reveals one ICD shock on 12/24 for a detected rate of 206 bpm. He received 3 bursts of ATP followed by a 41J shock. Following this, the rate dropped to around 150 bpm, which was outside the VT monitoring zone. Based on the EGMs, the episode was consistent with Afib with RVR and not VT/VF.     The patient has not had any ATP or shocks since 12/24. The check was otherwise normal. Please call Dr. Ramos to communicate results and for further direction. The team on [facility] asked EP to check the patient's device because last night, the patient thought that he was shocked by his ICD. The telemetry doesn't show any episodes of VT/VF or any ATP or defibrillation activity. The patient's device was checked. It reveals one ICD shock on 12/24 for a detected rate of 206 bpm. He received 3 bursts of ATP followed by a 41J shock. Following this, the rate dropped to around 150 bpm, which was outside the VT monitoring zone. Based on the EGMs, the episode was consistent with Afib with RVR and not VT/VF.     The patient has not had any ATP or shocks since 12/24. The check was otherwise normal. He is V-pacing <1% of the time. Please call Dr. Ramos to communicate results and for further direction.

## 2018-01-08 NOTE — BEHAVIORAL HEALTH ASSESSMENT NOTE - NSBHADMITCOUNSEL_PSY_A_CORE
diagnostic results/impressions and/or recommended studies/importance of adherence to chosen treatment/prognosis/instructions for management, treatment and follow up/risk factor reduction/risks and benefits of treatment options/client/family/caregiver education

## 2018-01-08 NOTE — BEHAVIORAL HEALTH ASSESSMENT NOTE - NSBHSOCIALHXDETAILSFT_PSY_A_CORE
Went to college.   twice.  Three children.  Currently lives with roommate who doesn't pay rent; is kicking her out.  Has 12 year old daughter whom he sees regularly.  On disability for cardiac disease.  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

## 2018-01-08 NOTE — PROGRESS NOTE ADULT - SUBJECTIVE AND OBJECTIVE BOX
S: Pt seen and examined bedside.  Pt reports prior palpitations, dizziness and abdominal burning discomfort.   Patient denies current C/P, SOB, N/V, dizziness, palpitations, and diaphoresis.  Pt denies fever/chills, dysuria, abdominal pain, diarrhea, and cough  Pt denies current N/V, tactile or auditory disturbances, headache, anxiety, diaphoresis, agitation, clouded sensorium.     O: Vital Signs Last 24 Hrs  T(C): 35.7 (2018 14:39), Max: 36.5 (2018 22:23)  T(F): 96.3 (2018 14:39), Max: 97.7 (2018 22:23)  HR: 79 (2018 13:40) (69 - 93)  BP: 111/75 (2018 13:40) (109/76 - 126/90  RR: 18 (2018 13:40) (16 - 18)  SpO2: 100% (2018 05:25) (98% - 100%)    PHYSICAL EXAM:  GEN: NAD  PULM:  CTA B/L  CARD: No JVD B/L, RRR, S1 and S2   ABD: +BS, NT, soft/ND	  EXT: No Edema B/L LE  NEURO: A+Ox3, no focal deficit, tremors to bilateral hands almost completed resolved after IV Ativan (CIWA now 2)    LABS:                        9.1    4.8   )-----------( 200      ( 2018 10:59 )             29.0         135  |  96  |  14  ----------------------------<  131<H>  4.7   |  24  |  1.02    Ca    9.6      2018 10:59  Mg     2.0      @ 07: @ 07:00  --------------------------------------------------------  IN: 1440 mL / OUT: 1275 mL / NET: 165 mL     @ 07: @ 17:52  --------------------------------------------------------  IN: 360 mL / OUT: 0 mL / NET: 360 mL     Daily Weight in k.4 (2018 15:01)

## 2018-01-08 NOTE — PHYSICAL THERAPY INITIAL EVALUATION ADULT - PERTINENT HX OF CURRENT PROBLEM, REHAB EVAL
62M with frequent admissions to [facility] 11/2017 for palpitations in the setting of ETOH use/withdrawal and most recently 12/2017 for ICD firing who presents to [facility] ED 1/6 c/o palpitations that awoke him from sleep this AM.

## 2018-01-08 NOTE — PROGRESS NOTE ADULT - ASSESSMENT
61 y/o M Ex-Smoker, current ETOH abuser, w/ PMHx CAD s/p prior MI, s/p prior PCI, Chronic Systolic CHF s/p ICD, paroxysmal Afib and LV thrombus (on ASA and Plavix only 2/2 to lower GI bleed 2016), CKD Stage III, h/o ETOH use/withdrawal, recently admitted for ICD firing in 12/2017 who returns c/o palpitations in the setting of ETOH use admitted for further management.     1) Paroxysmal Afib  Currently in SR.   Not on anticoagulation as per Dr. Levy due to h/o GI bleeding, ETOH and possible non-compliance  Pt noted to have Sinus Tachycardia overnight on telemetry at 130s and today (possible secondary to ETOH withdrawal as discussed with Dr. Levy)   Increase Metoprolol ER to 150mg daily as per Dr. Levy.  Pt reported possible shock from ICD device overnight  ICD interrogated by EP today: The patient has not had any ATP or shocks since 12/24/17. The check was otherwise normal. He is V-pacing <1% of the time. Underlying sinus rhythm with paroxysmal Afib.   Dr. Ramos aware and has no further recommendations.       2) ETOH abuse.    Blood Alcohol level 155 on admission.   CIWA protocol-CIWA 8 on admission.   Psych consulted. Per Psych  Increase Librium to 25 mg PO q6h Decrease Ativan to 1 mg IV q4h prn CIWA > 8 Start Remeron 15 mg PO QHS Continue thiamine, folate, MVI	  Patient at this time reporting he is amenable to inpatient rehab. CIWA 2-3 this AM with mild tremors.  Pt reported increased tremors this afternoon. CIWA increased to 5 (hands tremulous). VSS.  Given Ativan to 1 mg IV with improvement of tremors. Prior nausea and headache overnight resolved.  Closely monitor.  Dr. Levy aware. 	    3) CAD  Troponin 0.03 x 3. Troponin was 0.02 on recent admission.   Last cardiac cath. in 7/2016 showed BMS mLAD, 50% OM, patent pLAD stent  C/P free.   Continue ASA/Plavix/Toprol XL/Lipitor  EKG today at 11 AM showed NSR @ 73bpm, and new worsened TWI in V2-V6, q waves in anterior leads. Repeat EKG this evening unchanged.   EKG reviewed with fellow Crispin and Dr. Levy and showed J point elevation only in anterior leads and not ST elevation.   Pt reported burning to epigastric region, dizziness and palpitations earlier and self-resolved.     Denies C/P and SOB  CE negative at 4PM. Pt C/P free. Trend CE at 8PM.  STAT repeat Echo showed LVH, apical and mid interventricular septum, apical anterior, apical inferior are all akinetic.  The true apex appears dyskinetic.  There is an apical LV thrombus measuring 2 x 1.7 cm. LV clot increased from 1cm in 9/2017.   NPO after MN in case of cardiac catheterization. No cath. at this time as per Dr. Levy given ETOH withdrawal, negative CE, C/P free.   Trend CE and repeat at 10PM. Repeat EKG tonight and in AM    4) LV clot increased from 1cm to 2 x 1.7cm.  Draw PTT and start heparin gtt at 13ml/hr without bolus as per Dr. Levy due to h/o GI bleeding in 2016  Patient specific protocol, goal PTT 60-80. F/u PTT at 11PM.  Repeat CBC at 11PM    4) CKD stage 3  Cr 1.34 likely secondary to dehydration from decreased oral intake and ETOH abuse on admission.   S/p 1 L IVF in ER. Cr. 1.0 today.     5) Chronic systolic (congestive) heart failure.    Euvolemic  EF 35% by Echo in 9/2017.  Repeat Echo as above  On Metoprolol. Decrease Lisinopril secondary to prior hyperkalemia.    6) Hyperkalemia  K 5.8 this AM, no hemolysis. Repeat K 4.7. Decrease Lisinopril to 10mg daily as per Dr. Levy.    7) Chronic Anemia  Baseline Hgb 8-9, outlier Hgb 13.3 on 1/6/18. Repeat Hgb stable today 9.1-9.2.  F/u Iron, B12, Folate  Pt denies BPBPR, melena, hematuria, hematemesis.   Guaiac negative today.   Monitor CBC on Heparin gtt and for signs and symptoms of bleeding       Dispo: Monitor CIWA closely, NPO after MN in case of cardiac catheterization 61 y/o M Ex-Smoker, current ETOH abuser, w/ PMHx CAD s/p prior MI, s/p prior PCI, Chronic Systolic CHF s/p ICD, paroxysmal Afib and LV thrombus (on ASA and Plavix only 2/2 to lower GI bleed 2016), CKD Stage III, h/o ETOH use/withdrawal, recently admitted for ICD firing in 12/2017 who returns c/o palpitations in the setting of ETOH use admitted for further management.     1) Paroxysmal Afib  Currently in SR.   Not on anticoagulation as per Dr. Levy due to h/o GI bleeding, ETOH and possible non-compliance  Pt noted to have Sinus Tachycardia overnight on telemetry at 130s and today (possible secondary to ETOH withdrawal as discussed with Dr. Levy)   Increase Metoprolol ER to 150mg daily as per Dr. Levy.  Pt reported possible shock from ICD device overnight  ICD interrogated by EP today: The patient has not had any ATP or shocks since 12/24/17. The check was otherwise normal. He is V-pacing <1% of the time. Underlying sinus rhythm with paroxysmal Afib.   Dr. Ramos aware and has no further recommendations.       2) ETOH abuse.    Blood Alcohol level 155 on admission.   CIWA protocol-CIWA 8 on admission.   Psych consulted. Per Psych  Increase Librium to 25 mg PO q6h Decrease Ativan to 1 mg IV q4h prn CIWA > 8 Start Remeron 15 mg PO QHS Continue thiamine, folate, MVI	  Patient at this time reporting he is amenable to inpatient rehab. CIWA 2-3 this AM with mild tremors.  Pt reported increased tremors this afternoon. CIWA increased to 5 (hands tremulous). VSS.  Given Ativan to 1 mg IV with improvement of tremors. Prior nausea and headache overnight resolved.  Closely monitor.  Dr. Levy aware. 	    3) CAD  Troponin 0.03 x 3. Troponin was 0.02 on recent admission.   Last cardiac cath. in 7/2016 showed BMS mLAD, 50% OM, patent pLAD stent  C/P free.   Continue Plavix/Toprol XL/Lipitor.   EKG today at 11 AM showed NSR @ 73bpm, and new worsened TWI in V2-V6, q waves in anterior leads. Repeat EKG this evening unchanged.   EKG reviewed with fellow Crispin and Dr. Levy and showed J point elevation only in anterior leads and not ST elevation.   Pt reported burning to epigastric region, dizziness and palpitations earlier and self-resolved.     Denies C/P and SOB  CE negative at 4PM. Pt C/P free. Trend CE at 8PM.  STAT repeat Echo showed LVH, apical and mid interventricular septum, apical anterior, apical inferior are all akinetic.  The true apex appears dyskinetic.  There is an apical LV thrombus measuring 2 x 1.7 cm. LV clot increased from 1cm in 9/2017.   NPO after MN in case of cardiac catheterization. No cath. at this time as per Dr. Levy given ETOH withdrawal, negative CE, C/P free.   Trend CE and repeat at 10PM. Repeat EKG tonight and in AM    4) LV clot increased from 1cm to 2 x 1.7cm.  Draw PTT and start heparin gtt at 13ml/hr without bolus as per Dr. Levy due to h/o GI bleeding in 2016  Patient specific protocol, goal PTT 60-80. F/u PTT at 11PM.  Repeat CBC at 11PM  D/C ASA for CAD, continue Plavix for CAD and Heparin gtt as discussed with Dr. Levy.     4) CKD stage 3  Cr 1.34 likely secondary to dehydration from decreased oral intake and ETOH abuse on admission.   S/p 1 L IVF in ER. Cr. 1.0 today.     5) Chronic systolic (congestive) heart failure.    Euvolemic  EF 35% by Echo in 9/2017.  Repeat Echo as above  On Metoprolol. Decrease Lisinopril secondary to prior hyperkalemia.    6) Hyperkalemia  K 5.8 this AM, no hemolysis. Repeat K 4.7. Decrease Lisinopril to 10mg daily as per Dr. Levy.    7) Chronic Anemia  Baseline Hgb 8-9, outlier Hgb 13.3 on 1/6/18. Repeat Hgb stable today 9.1-9.2.  F/u Iron, B12, Folate  Pt denies BPBPR, melena, hematuria, hematemesis.   Guaiac negative today.   Monitor CBC on Heparin gtt and for signs and symptoms of bleeding       Dispo: Monitor CIWA closely, NPO after MN in case of cardiac catheterization 61 y/o M Ex-Smoker, current ETOH abuser, w/ PMHx CAD s/p prior MI, s/p prior PCI, Chronic Systolic CHF s/p ICD, paroxysmal Afib and LV thrombus (on ASA and Plavix only 2/2 to lower GI bleed 2016), CKD Stage III, h/o ETOH use/withdrawal, recently admitted for ICD firing in 12/2017 who returns c/o palpitations in the setting of ETOH use admitted for further management.     1) Paroxysmal Afib  Currently in SR.   Not on anticoagulation as per Dr. Leyv due to h/o GI bleeding, ETOH and possible non-compliance  Pt noted to have Sinus Tachycardia overnight on telemetry at 130s and today (possible secondary to ETOH withdrawal as discussed with Dr. Levy)   Increase Metoprolol ER to 150mg daily as per Dr. Levy.  Pt reported possible shock from ICD device overnight  ICD interrogated by EP today: The patient has not had any ATP or shocks since 12/24/17. The check was otherwise normal. He is V-pacing <1% of the time. Underlying sinus rhythm with paroxysmal Afib.   Dr. Ramos aware and has no further recommendations.       2) ETOH abuse.    Blood Alcohol level 155 on admission.   CIWA protocol-CIWA 8 on admission.   Psych consulted. Per Psych  Increase Librium to 25 mg PO q6h Decrease Ativan to 1 mg IV q4h prn CIWA > 8 Start Remeron 15 mg PO QHS Continue thiamine, folate, MVI	  Patient at this time reporting he is amenable to inpatient rehab. CIWA 2-3 this AM with mild tremors.  Pt reported increased tremors this afternoon. CIWA increased to 5 (hands tremulous). VSS.  Given Ativan to 1 mg IV with improvement of tremors. Prior nausea and headache overnight resolved.  Closely monitor.  Dr. Levy aware. 	    3) CAD  Troponin 0.03 x 3. Troponin was 0.02 on recent admission.   Last cardiac cath. in 7/2016 showed BMS mLAD, 50% OM, patent pLAD stent  C/P free.   Continue Plavix/Toprol XL/Lipitor.   EKG today at 11 AM showed NSR @ 73bpm, and new worsened TWI in V2-V6, q waves in anterior leads. Repeat EKG this evening unchanged.   EKG reviewed with fellow Crispin and Dr. Levy and showed J point elevation only in anterior leads and not ST elevation.   Pt reported burning to epigastric region, dizziness and palpitations earlier and self-resolved.     Denies C/P and SOB  CE negative at 4PM. Pt C/P free. Trend CE at 8PM.  STAT repeat Echo showed LVH, apical and mid interventricular septum, apical anterior, apical inferior are all akinetic.  The true apex appears dyskinetic.  There is an apical LV thrombus measuring 2 x 1.7 cm. LV clot increased from 1cm in 9/2017.   NPO after MN in case of cardiac catheterization. No cath. at this time as per Dr. Levy given ETOH withdrawal, negative CE, C/P free.   Trend CE and repeat at 8PM. Repeat EKG tonight and in AM    4) LV clot increased from 1cm to 2 x 1.7cm.  Draw PTT and start heparin gtt at 13ml/hr without bolus as per Dr. Levy due to h/o GI bleeding in 2016  Patient specific protocol, goal PTT 60-80. F/u PTT at 11PM.  Repeat CBC at 11PM  D/C ASA for CAD, continue Plavix for CAD and Heparin gtt as discussed with Dr. Levy.     4) CKD stage 3  Cr 1.34 likely secondary to dehydration from decreased oral intake and ETOH abuse on admission.   S/p 1 L IVF in ER. Cr. 1.0 today.     5) Chronic systolic (congestive) heart failure.    Euvolemic  EF 35% by Echo in 9/2017.  Repeat Echo as above  On Metoprolol. Decrease Lisinopril secondary to prior hyperkalemia.    6) Hyperkalemia  K 5.8 this AM, no hemolysis. Repeat K 4.7. Decrease Lisinopril to 10mg daily as per Dr. Levy.    7) Chronic Anemia  Baseline Hgb 8-9, outlier Hgb 13.3 on 1/6/18. Repeat Hgb stable today 9.1-9.2.  F/u Iron, B12, Folate  Pt denies BPBPR, melena, hematuria, hematemesis.   Guaiac negative today.   Monitor CBC on Heparin gtt and for signs and symptoms of bleeding       Dispo: Monitor CIWA closely, NPO after MN in case of cardiac catheterization

## 2018-01-08 NOTE — BEHAVIORAL HEALTH ASSESSMENT NOTE - DESCRIPTION (FIRST USE, LAST USE, QUANTITY, FREQUENCY, DURATION)
Reports quit 1996 Reporst quit 1996 Reports long term heavy drinking, with worsening symptoms of withdrawal and need for detox last 2 years

## 2018-01-08 NOTE — BEHAVIORAL HEALTH ASSESSMENT NOTE - SUMMARY
62M PPH alcohol use disorder, PMH CAD, CHF, CKD, presenting reporting palpitations in setting of recent alcohol binge.  Patient with mild tremor ~2 hours after most recent Librium dose.  Would increase Librium to 25 mg PO q6h standing and decrease prn Ativan to 1 mg IV q4h prn CIWA>8.  Would start Remeron for depressed mood, low appetite, and insomnia.  Patient already has outpatient follow up for addiction planned but currently reports he is amenable to inpatient rehab.  Would involve social work in dispo planning.

## 2018-01-08 NOTE — DIETITIAN INITIAL EVALUATION ADULT. - PROBLEM SELECTOR PLAN 4
Baseline Cr normal. On admission Cr 1.34 likely secondary to dehydration from decreased oral intake and ETOH abuse.   -s/p 1 L IVF in ER. Continue to monitor for signs of CHF  -Monitor renal function, BP, volume status, urine output, and electrolytes.

## 2018-01-09 LAB
ALBUMIN SERPL ELPH-MCNC: 4 G/DL — SIGNIFICANT CHANGE UP (ref 3.3–5)
ALP SERPL-CCNC: 47 U/L — SIGNIFICANT CHANGE UP (ref 40–120)
ALT FLD-CCNC: 25 U/L — SIGNIFICANT CHANGE UP (ref 10–45)
ANION GAP SERPL CALC-SCNC: 13 MMOL/L — SIGNIFICANT CHANGE UP (ref 5–17)
ANION GAP SERPL CALC-SCNC: 13 MMOL/L — SIGNIFICANT CHANGE UP (ref 5–17)
APTT BLD: 62.4 SEC — HIGH (ref 27.5–37.4)
APTT BLD: 75.5 SEC — HIGH (ref 27.5–37.4)
AST SERPL-CCNC: 28 U/L — SIGNIFICANT CHANGE UP (ref 10–40)
BILIRUB SERPL-MCNC: 0.4 MG/DL — SIGNIFICANT CHANGE UP (ref 0.2–1.2)
BUN SERPL-MCNC: 17 MG/DL — SIGNIFICANT CHANGE UP (ref 7–23)
BUN SERPL-MCNC: 19 MG/DL — SIGNIFICANT CHANGE UP (ref 7–23)
CALCIUM SERPL-MCNC: 9.6 MG/DL — SIGNIFICANT CHANGE UP (ref 8.4–10.5)
CALCIUM SERPL-MCNC: 9.6 MG/DL — SIGNIFICANT CHANGE UP (ref 8.4–10.5)
CHLORIDE SERPL-SCNC: 95 MMOL/L — LOW (ref 96–108)
CHLORIDE SERPL-SCNC: 96 MMOL/L — SIGNIFICANT CHANGE UP (ref 96–108)
CK MB CFR SERPL CALC: 1.3 NG/ML — SIGNIFICANT CHANGE UP (ref 0–6.7)
CK MB CFR SERPL CALC: 1.6 NG/ML — SIGNIFICANT CHANGE UP (ref 0–6.7)
CK SERPL-CCNC: 52 U/L — SIGNIFICANT CHANGE UP (ref 30–200)
CK SERPL-CCNC: 57 U/L — SIGNIFICANT CHANGE UP (ref 30–200)
CK SERPL-CCNC: 58 U/L — SIGNIFICANT CHANGE UP (ref 30–200)
CO2 SERPL-SCNC: 23 MMOL/L — SIGNIFICANT CHANGE UP (ref 22–31)
CO2 SERPL-SCNC: 24 MMOL/L — SIGNIFICANT CHANGE UP (ref 22–31)
CREAT ?TM UR-MCNC: 158 MG/DL — SIGNIFICANT CHANGE UP
CREAT SERPL-MCNC: 1.15 MG/DL — SIGNIFICANT CHANGE UP (ref 0.5–1.3)
CREAT SERPL-MCNC: 1.18 MG/DL — SIGNIFICANT CHANGE UP (ref 0.5–1.3)
D DIMER BLD IA.RAPID-MCNC: 373 NG/ML DDU — HIGH
GLUCOSE SERPL-MCNC: 101 MG/DL — HIGH (ref 70–99)
GLUCOSE SERPL-MCNC: 114 MG/DL — HIGH (ref 70–99)
HCT VFR BLD CALC: 26.5 % — LOW (ref 39–50)
HCT VFR BLD CALC: 28.3 % — LOW (ref 39–50)
HCT VFR BLD CALC: 30.2 % — LOW (ref 39–50)
HGB BLD-MCNC: 8.2 G/DL — LOW (ref 13–17)
HGB BLD-MCNC: 8.8 G/DL — LOW (ref 13–17)
HGB BLD-MCNC: 9.5 G/DL — LOW (ref 13–17)
INR BLD: 0.9 — SIGNIFICANT CHANGE UP (ref 0.88–1.16)
INR BLD: 0.93 — SIGNIFICANT CHANGE UP (ref 0.88–1.16)
MAGNESIUM SERPL-MCNC: 1.8 MG/DL — SIGNIFICANT CHANGE UP (ref 1.6–2.6)
MCHC RBC-ENTMCNC: 21.8 PG — LOW (ref 27–34)
MCHC RBC-ENTMCNC: 21.9 PG — LOW (ref 27–34)
MCHC RBC-ENTMCNC: 22.2 PG — LOW (ref 27–34)
MCHC RBC-ENTMCNC: 30.9 G/DL — LOW (ref 32–36)
MCHC RBC-ENTMCNC: 31.1 G/DL — LOW (ref 32–36)
MCHC RBC-ENTMCNC: 31.5 G/DL — LOW (ref 32–36)
MCV RBC AUTO: 70.2 FL — LOW (ref 80–100)
MCV RBC AUTO: 70.7 FL — LOW (ref 80–100)
MCV RBC AUTO: 70.7 FL — LOW (ref 80–100)
OSMOLALITY UR: 712 MOSMOL/KG — HIGH (ref 100–650)
PLATELET # BLD AUTO: 159 K/UL — SIGNIFICANT CHANGE UP (ref 150–400)
PLATELET # BLD AUTO: 169 K/UL — SIGNIFICANT CHANGE UP (ref 150–400)
PLATELET # BLD AUTO: 186 K/UL — SIGNIFICANT CHANGE UP (ref 150–400)
POTASSIUM SERPL-MCNC: 4.3 MMOL/L — SIGNIFICANT CHANGE UP (ref 3.5–5.3)
POTASSIUM SERPL-MCNC: 4.9 MMOL/L — SIGNIFICANT CHANGE UP (ref 3.5–5.3)
POTASSIUM SERPL-SCNC: 4.3 MMOL/L — SIGNIFICANT CHANGE UP (ref 3.5–5.3)
POTASSIUM SERPL-SCNC: 4.9 MMOL/L — SIGNIFICANT CHANGE UP (ref 3.5–5.3)
PROT SERPL-MCNC: 7.3 G/DL — SIGNIFICANT CHANGE UP (ref 6–8.3)
PROTHROM AB SERPL-ACNC: 10 SEC — SIGNIFICANT CHANGE UP (ref 9.8–12.7)
PROTHROM AB SERPL-ACNC: 10.3 SEC — SIGNIFICANT CHANGE UP (ref 9.8–12.7)
RBC # BLD: 3.75 M/UL — LOW (ref 4.2–5.8)
RBC # BLD: 4.03 M/UL — LOW (ref 4.2–5.8)
RBC # BLD: 4.27 M/UL — SIGNIFICANT CHANGE UP (ref 4.2–5.8)
RBC # FLD: 22.2 % — HIGH (ref 10.3–16.9)
RBC # FLD: 22.4 % — HIGH (ref 10.3–16.9)
RBC # FLD: 22.5 % — HIGH (ref 10.3–16.9)
SODIUM SERPL-SCNC: 132 MMOL/L — LOW (ref 135–145)
SODIUM SERPL-SCNC: 132 MMOL/L — LOW (ref 135–145)
SODIUM UR-SCNC: 84 MMOL/L — SIGNIFICANT CHANGE UP
TROPONIN T SERPL-MCNC: <0.01 NG/ML — SIGNIFICANT CHANGE UP (ref 0–0.01)
WBC # BLD: 3.9 K/UL — SIGNIFICANT CHANGE UP (ref 3.8–10.5)
WBC # BLD: 4.3 K/UL — SIGNIFICANT CHANGE UP (ref 3.8–10.5)
WBC # BLD: 5.2 K/UL — SIGNIFICANT CHANGE UP (ref 3.8–10.5)
WBC # FLD AUTO: 3.9 K/UL — SIGNIFICANT CHANGE UP (ref 3.8–10.5)
WBC # FLD AUTO: 4.3 K/UL — SIGNIFICANT CHANGE UP (ref 3.8–10.5)
WBC # FLD AUTO: 5.2 K/UL — SIGNIFICANT CHANGE UP (ref 3.8–10.5)

## 2018-01-09 PROCEDURE — 99233 SBSQ HOSP IP/OBS HIGH 50: CPT

## 2018-01-09 RX ORDER — LISINOPRIL 2.5 MG/1
5 TABLET ORAL DAILY
Qty: 0 | Refills: 0 | Status: DISCONTINUED | OUTPATIENT
Start: 2018-01-10 | End: 2018-01-26

## 2018-01-09 RX ORDER — MAGNESIUM OXIDE 400 MG ORAL TABLET 241.3 MG
800 TABLET ORAL ONCE
Qty: 0 | Refills: 0 | Status: COMPLETED | OUTPATIENT
Start: 2018-01-09 | End: 2018-01-09

## 2018-01-09 RX ORDER — SODIUM CHLORIDE 9 MG/ML
1000 INJECTION INTRAMUSCULAR; INTRAVENOUS; SUBCUTANEOUS
Qty: 0 | Refills: 0 | Status: DISCONTINUED | OUTPATIENT
Start: 2018-01-09 | End: 2018-01-11

## 2018-01-09 RX ADMIN — HEPARIN SODIUM 13 UNIT(S)/HR: 5000 INJECTION INTRAVENOUS; SUBCUTANEOUS at 01:49

## 2018-01-09 RX ADMIN — SODIUM CHLORIDE 50 MILLILITER(S): 9 INJECTION INTRAMUSCULAR; INTRAVENOUS; SUBCUTANEOUS at 13:06

## 2018-01-09 RX ADMIN — ATORVASTATIN CALCIUM 40 MILLIGRAM(S): 80 TABLET, FILM COATED ORAL at 22:17

## 2018-01-09 RX ADMIN — MAGNESIUM OXIDE 400 MG ORAL TABLET 800 MILLIGRAM(S): 241.3 TABLET ORAL at 10:55

## 2018-01-09 RX ADMIN — Medication 25 MILLIGRAM(S): at 10:55

## 2018-01-09 RX ADMIN — Medication 100 MILLIGRAM(S): at 22:17

## 2018-01-09 RX ADMIN — Medication 1 MILLIGRAM(S): at 10:55

## 2018-01-09 RX ADMIN — Medication 1 TABLET(S): at 10:55

## 2018-01-09 RX ADMIN — Medication 25 MILLIGRAM(S): at 04:03

## 2018-01-09 RX ADMIN — Medication 150 MILLIGRAM(S): at 06:00

## 2018-01-09 RX ADMIN — Medication 100 MILLIGRAM(S): at 15:28

## 2018-01-09 RX ADMIN — Medication 25 MILLIGRAM(S): at 16:56

## 2018-01-09 RX ADMIN — CLOPIDOGREL BISULFATE 75 MILLIGRAM(S): 75 TABLET, FILM COATED ORAL at 10:55

## 2018-01-09 RX ADMIN — Medication 25 MILLIGRAM(S): at 22:17

## 2018-01-09 RX ADMIN — Medication 100 MILLIGRAM(S): at 06:01

## 2018-01-09 NOTE — PROGRESS NOTE ADULT - ASSESSMENT
63 y/o M Ex-Smoker, current ETOH abuser, w/ PMHx CAD s/p prior MI, s/p prior PCI, Chronic Systolic CHF s/p ICD, paroxysmal Afib and LV thrombus (on ASA and Plavix only 2/2 to lower GI bleed 2016), CKD Stage III, h/o ETOH use/withdrawal, recently admitted for ICD firing in 12/2017 who returns c/o palpitations in the setting of ETOH use admitted for further management.     1) Paroxysmal Afib  Currently in SR.   Not on anticoagulation as per Dr. Levy due to h/o GI bleeding, ETOH and possible non-compliance  Pt noted to have Sinus Tachycardia overnight on telemetry at 130s and today (secondary to ETOH withdrawal as discussed with Dr. Levy)   Increase Metoprolol ER to 150mg daily as per Dr. Levy.  Pt reported possible shock from ICD device overnight  ICD interrogated by EP 1/8/18: The patient has not had any ATP or shocks since 12/24/17. The check was otherwise normal. He is V-pacing <1% of the time. Underlying sinus rhythm with paroxysmal Afib.   Dr. Ramos aware and has no further recommendations.     Sinus Tachycardia possibly secondary to dehydration. Orthostatic VS: Lying /69, HR 88, Sitting BP 94/72, HR 95, Standing /73,   +Orthostatic VS. NS @ 50cc x 10 hrs w/ frequent lung checks as per Dr. Levy. Compression stockings B/L LE.     2) ETOH abuse.    Blood Alcohol level 155 on admission.   CIWA protocol-CIWA 8 on admission.   Psych consulted. Per Psych:  Decreased Librium to 25 mg PO q8hrs Continue Ativan to 1 mg IV q4h prn CIWA > 8 Start Remeron 15 mg PO QHS (for depression) Continue thiamine, folate, MVI	  Patient at this time reporting he is amenable to inpatient rehab. CIWA 1-2 with mild tremors to hands almost completely resolved.  Inpatient rehab vs. [facility] outpatient addiction treatment recommended on D/C per Psych	    3) CAD  On admission Troponin 0.03 x 3. Troponin was 0.02 on recent admission.   Last cardiac cath. in 7/2016:  BMS mLAD, 50% OM, patent pLAD stent  C/P free.   Continue Plavix/Toprol XL/Lipitor. On Heparin gtt for LV clot.   ASA was discontinued as per Dr. Levy once Heparin gtt started.   EKG 1/8/18 showed NSR @ 73bpm, and new worsened TWI in V2-V6, q waves in anterior leads. Repeat EKG this evening and this AM similar.     EKG reviewed with fellow Crispin and Dr. Levy and showed J point elevation only in anterior leads and not ST elevation.   Pt reported burning to epigastric region, dizziness and palpitations earlier and self-resolved on 1/8/18.  Denies C/P and SOB. Pt asymptomatic today.   Since acute change to EKG noted on 1/8/18, pt has had CE negative x 4. Continue to check BID.  Dr. Sepulveda reviewed EKG and concerned for EKG representing evolving MI. Given CE are negative no cath. until ETOH withdrawal stabilized. Possible cardiac cath. on Thurs/Fri.  F/u CE at 8PM.   Echo 1/8/18 showed LVH, apical and mid interventricular septum, apical anterior, apical inferior are all akinetic.  The true apex appears dyskinetic.  There is an apical LV thrombus measuring 2 x 1.7 cm. LV clot increased from 1cm in 9/2017.   Repeat EKG in AM    4) LV clot increased from 1cm to 2 x 1.7cm.  Started on Heparin gtt w/o bolus on 1/8/18 as per Dr. Levy due to h/o GI Bleed in 2016  Patient specific protocol, goal PTT 60-80.   PTT therapeutic x 2 lab draws. F/u PTT in AM.   No GI consult at this time as per Dr. Levy for clearance for anticoagulation given h/o GI bleeding.     5) HTN  BP as low as 97/58 overnight. Started on IV fluids as above. BP improved to 110s/70s.  Cont. BB. Decrease Lisinopril to 5mg daily as per Dr. Levy starting tomorrow.     6) CKD stage 3  Cr 1.34, Acute Renal insufficiency likely secondary to dehydration from decreased oral intake and ETOH abuse on admission.   S/p 1 L IVF in ER. Cr. 1.1 today.     7) Chronic systolic (congestive) heart failure.    Euvolemic  EF 35% by Echo in 9/2017.  Repeat Echo as above  On Metoprolol. Decreased Lisinopril secondary to prior hyperkalemia.    8) Hyperkalemia  On 1/8/18 K 5.8 AM, no hemolysis. Repeat K 4.7. K 4.9 this AM  Lisinopril decreased as above.     9) Chronic Anemia  Baseline Hgb 8-9, outlier Hgb 13.3 on 1/6/18. Hgb stable at 9.5/30.  F/u Iron, B12, Folate  Pt denies BPBPR, melena, hematuria, hematemesis.   Guaiac negative 1/8/18   Monitor CBC on Heparin gtt and for signs and symptoms of bleeding   Repeat CBC 8PM, check BID on Hep. gtt      Dispo: Monitor CIWA. Plan for cardiac cath. Thursday or Friday. 61 y/o M Ex-Smoker, current ETOH abuser, w/ PMHx CAD s/p prior MI, s/p prior PCI, Chronic Systolic CHF s/p ICD, paroxysmal Afib and LV thrombus (on ASA and Plavix only 2/2 to lower GI bleed 2016), CKD Stage III, h/o ETOH use/withdrawal, recently admitted for ICD firing in 12/2017 who returns c/o palpitations in the setting of ETOH use admitted for further management.     1) Paroxysmal Afib  Currently in SR.   Not on anticoagulation as per Dr. Levy due to h/o GI bleeding, ETOH and possible non-compliance  Pt noted to have Sinus Tachycardia overnight on telemetry at 130s and today (secondary to ETOH withdrawal as discussed with Dr. Levy)   Increase Metoprolol ER to 150mg daily as per Dr. Levy.  Pt reported possible shock from ICD device overnight  ICD interrogated by EP 1/8/18: The patient has not had any ATP or shocks since 12/24/17. The check was otherwise normal. He is V-pacing <1% of the time. Underlying sinus rhythm with paroxysmal Afib.   Dr. Ramos aware and has no further recommendations.     Sinus Tachycardia possibly secondary to dehydration. +Orthostatic VS: Lying /69, HR 88, Sitting BP 94/72, HR 95, Standing /73,   Assymptomatic. NS @ 50cc x 10 hrs w/ frequent lung checks as per Dr. Levy. Compression stockings B/L LE.   HR currently 80s.   For intermittent Sinus Tachycardia D-Dimer checked - 373, age adjusted positive value would be 600. F/u LE Venous Doppler and no work-up for PE at this time as discussed with Dr. Levy.      2) ETOH abuse.    Blood Alcohol level 155 on admission.   CIWA protocol-CIWA 8 on admission.   Psych consulted. Per Psych:  Decreased Librium to 25 mg PO q8hrs Continue Ativan to 1 mg IV q4h prn CIWA > 8 Start Remeron 15 mg PO QHS (for depression) Continue thiamine, folate, MVI	  Patient at this time reporting he is amenable to inpatient rehab. CIWA 1-2 with mild tremors to hands almost completely resolved.  Inpatient rehab vs. [facility] outpatient addiction treatment recommended on D/C per Psych	    3) CAD  On admission Troponin 0.03 x 3. Troponin was 0.02 on recent admission.   Last cardiac cath. in 7/2016:  BMS mLAD, 50% OM, patent pLAD stent  C/P free.   Continue Plavix/Toprol XL/Lipitor. On Heparin gtt for LV clot.   ASA was discontinued as per Dr. Levy once Heparin gtt started.   EKG 1/8/18 showed NSR @ 73bpm, and new worsened TWI in V2-V6, q waves in anterior leads. Repeat EKG this evening and this AM similar.     EKG reviewed with fellow Crispin and Dr. Levy and showed J point elevation only in anterior leads and not ST elevation.   Pt reported burning to epigastric region, dizziness and palpitations earlier and self-resolved on 1/8/18.  Denies C/P and SOB. Pt asymptomatic today.   Since acute change to EKG noted on 1/8/18, pt has had CE negative x 4. Continue to check BID.  Dr. Sepulveda reviewed EKG and concerned for EKG representing evolving MI. Given CE are negative no cath. until ETOH withdrawal stabilized. Possible cardiac cath. on Thurs/Fri.  F/u CE at 8PM.   Echo 1/8/18 showed LVH, apical and mid interventricular septum, apical anterior, apical inferior are all akinetic.  The true apex appears dyskinetic.  There is an apical LV thrombus measuring 2 x 1.7 cm. LV clot increased from 1cm in 9/2017.   Repeat EKG in AM    4) LV clot increased from 1cm to 2 x 1.7cm.  Started on Heparin gtt w/o bolus on 1/8/18 as per Dr. Levy due to h/o GI Bleed in 2016  Patient specific protocol, goal PTT 60-80.   PTT therapeutic x 2 lab draws. F/u PTT in AM.   No GI consult at this time as per Dr. Levy for clearance for anticoagulation given h/o GI bleeding.     5) HTN  BP as low as 97/58 overnight. Started on IV fluids as above. BP improved to 110s/70s.  Cont. BB. Decrease Lisinopril to 5mg daily as per Dr. Levy starting tomorrow.     6) CKD stage 3  Cr 1.34, Acute Renal insufficiency likely secondary to dehydration from decreased oral intake and ETOH abuse on admission.   S/p 1 L IVF in ER. Cr. 1.1 today.     7) Chronic systolic (congestive) heart failure.    Euvolemic  EF 35% by Echo in 9/2017.  Repeat Echo as above  On Metoprolol. Decreased Lisinopril secondary to prior hyperkalemia.    8) Hyperkalemia  On 1/8/18 K 5.8 AM, no hemolysis. Repeat K 4.7. K 4.9 this AM  Lisinopril decreased as above.     9) Chronic Anemia  Baseline Hgb 8-9, outlier Hgb 13.3 on 1/6/18. Hgb stable at 9.5/30.  F/u Iron, B12, Folate  Pt denies BPBPR, melena, hematuria, hematemesis.   Guaiac negative 1/8/18   Monitor CBC on Heparin gtt and for signs and symptoms of bleeding   Repeat CBC 8PM, check BID on Hep. gtt      Dispo: Monitor CIWA. Plan for cardiac cath. Thursday or Friday.

## 2018-01-09 NOTE — PROGRESS NOTE BEHAVIORAL HEALTH - NSBHFUPINTERVALHXFT_PSY_A_CORE
Patient was examined at bedside. No acute events overnight. Remeron not started.  Patient has no complaints due to EtOH at this time; no nausea/vomiting, no paroxysmal sweating, no agitation, no tactile, visual or auditory disturbances, no headache since yesterday, and normal orientation. Patient still has minimal tremor primarily in his left hand that is markedly improved since yesterday. Patient was able to sleep through the night and reports good effect of Librium 25 mg PO. Patient admits to some anxiety due to recent changes in medical condition; he is anxious about starting on coumadin and interaction with EtOH.  Patient not agreeable with inpatient rehab at this point; wants to seek outpatient addiction services at [facility].  Worried about his daughter.  Says he intends not to drink again; this hospitalization has been a "wake up call."  Wanst to defer Remeron.

## 2018-01-09 NOTE — PROGRESS NOTE ADULT - SUBJECTIVE AND OBJECTIVE BOX
S: Pt seen and examined bedside.  Patient denies C/P, SOB, N/V, dizziness, palpitations, and diaphoresis.  Pt denies fever/chills, dysuria, abdominal pain, diarrhea, and cough    O: Vital Signs Last 24 Hrs  T(C): 36.6 (2018 14:33), Max: 36.7 (2018 09:23)  T(F): 97.8 (2018 14:33), Max: 98.1 (2018 09:23)  HR: 88 (2018 16:57) (71 - 88)  BP: 111/74 (2018 16:57) (97/58 - 111/74)  RR: 18 (2018 08:30) (18 - 18)  SpO2: 98% (2018 08:30) (98% - 98%)    PHYSICAL EXAM:  GEN: NAD  PULM:  CTA B/L  CARD: No JVD B/L, RRR, S1 and S2   ABD: +BS, NT, soft/ND	  EXT: No Edema B/L LE  NEURO: A+Ox3, no focal deficit    LABS:                        9.5    5.2   )-----------( 169      ( 2018 07:56 )             30.2         132<L>  |  96  |  19  ----------------------------<  101<H>  4.9   |  23  |  1.15    Ca    9.6      2018 07:56  Mg     1.8      repleted w/ Magnesium 800mg PO    TPro  7.3  /  Alb  4.0  /  TBili  0.4  /  DBili  x   /  AST  28  /  ALT  25  /  AlkPhos  47      PT/INR - ( 2018 07:56 )   PT: 10.3 sec;   INR: 0.93          PTT - ( 2018 07:56 )  PTT:75.5 sec     @ 07:01  -   @ 07:00  --------------------------------------------------------  IN: 571 mL / OUT: 1000 mL / NET: -429 mL    Daily Weight in k.5 (2018 06:09)

## 2018-01-09 NOTE — PROGRESS NOTE ADULT - SUBJECTIVE AND OBJECTIVE BOX
Pt  is still in ETOH withdrawl     He is occasionally tachycardic     PAST MEDICAL & SURGICAL HISTORY:  Pacemaker  ETOH abuse  Paroxysmal a-fib  Myocardial infarction involving other coronary artery  Gastritis  Atherosclerosis of coronary artery: Coronary artery disease  Automatic implantable cardiac defibrillator in situ: ICD (implantable cardioverter-defibrillator) in place    MEDICATIONS  (STANDING):  atorvastatin 40 milliGRAM(s) Oral at bedtime  chlordiazePOXIDE 25 milliGRAM(s) Oral every 6 hours  clopidogrel Tablet 75 milliGRAM(s) Oral daily  folic acid 1 milliGRAM(s) Oral daily  heparin  Infusion 1300 Unit(s)/Hr (13 mL/Hr) IV Continuous <Continuous>  lisinopril 10 milliGRAM(s) Oral daily  magnesium oxide 800 milliGRAM(s) Oral once  metoprolol succinate  milliGRAM(s) Oral daily  multivitamin 1 Tablet(s) Oral daily  thiamine 100 milliGRAM(s) Oral three times a day    MEDICATIONS  (PRN):  LORazepam   Injectable 1 milliGRAM(s) IV Push every 4 hours PRN CIWA-Ar score 8 or greater      ICU Vital Signs Last 24 Hrs  T(C): 36.7 (09 Jan 2018 09:23), Max: 36.7 (09 Jan 2018 09:23)  T(F): 98.1 (09 Jan 2018 09:23), Max: 98.1 (09 Jan 2018 09:23)  HR: 76 (09 Jan 2018 06:03) (71 - 93)  BP: 102/62 (09 Jan 2018 06:03) (97/58 - 125/90)  BP(mean): --  ABP: --  ABP(mean): --  RR: 18 (09 Jan 2018 06:03) (18 - 18)  SpO2: 98% (09 Jan 2018 06:03) (98% - 98%)      Echo      LVH   akinesis of apical  mid interventricular septum   apical  anterior  a[ical inferior wall    apical LV thrombus now 2x1.7 cn   AI  trace MR     EKG  NSR   old shukri septal MI   Marked T wave abnormality   consider lateral ischemia     Represents a change from previous EKG      CARDIAC MARKERS ( 09 Jan 2018 07:56 )  x     / <0.01 ng/mL / 57 U/L / x     / x      CARDIAC MARKERS ( 09 Jan 2018 00:30 )  x     / <0.01 ng/mL / 58 U/L / x     / 1.6 ng/mL  CARDIAC MARKERS ( 08 Jan 2018 18:49 )  x     / <0.01 ng/mL / 66 U/L / x     / 1.7 ng/mL  CARDIAC MARKERS ( 08 Jan 2018 16:04 )  x     / <0.01 ng/mL / 71 U/L / x     / 1.7 ng/mL    CIE neg    Lungs decrease in breath sounds at bases  Cv s 1s2  Abd soft  ext  stable    CXR   neg                          9.5    5.2   )-----------( 169      ( 09 Jan 2018 07:56 )             30.2   01-09    132<L>  |  96  |  19  ----------------------------<  101<H>  4.9   |  23  |  1.15    Ca    9.6      09 Jan 2018 07:56  Mg     1.8     01-09    TPro  7.3  /  Alb  4.0  /  TBili  0.4  /  DBili  x   /  AST  28  /  ALT  25  /  AlkPhos  47  01-09

## 2018-01-09 NOTE — PROGRESS NOTE ADULT - ASSESSMENT
New EKG changes     will discuss cardiac  cath       Pt will need anticoag with coumadin but he is none compliant with persistent ETOH and has refused eliquis on the past    LEOPOLDO Levy

## 2018-01-10 DIAGNOSIS — D63.8 ANEMIA IN OTHER CHRONIC DISEASES CLASSIFIED ELSEWHERE: ICD-10-CM

## 2018-01-10 DIAGNOSIS — I51.3 INTRACARDIAC THROMBOSIS, NOT ELSEWHERE CLASSIFIED: ICD-10-CM

## 2018-01-10 DIAGNOSIS — R00.0 TACHYCARDIA, UNSPECIFIED: ICD-10-CM

## 2018-01-10 DIAGNOSIS — N18.3 CHRONIC KIDNEY DISEASE, STAGE 3 (MODERATE): ICD-10-CM

## 2018-01-10 DIAGNOSIS — I25.10 ATHEROSCLEROTIC HEART DISEASE OF NATIVE CORONARY ARTERY WITHOUT ANGINA PECTORIS: ICD-10-CM

## 2018-01-10 DIAGNOSIS — I10 ESSENTIAL (PRIMARY) HYPERTENSION: ICD-10-CM

## 2018-01-10 LAB
ALBUMIN SERPL ELPH-MCNC: 3.8 G/DL — SIGNIFICANT CHANGE UP (ref 3.3–5)
ALP SERPL-CCNC: 44 U/L — SIGNIFICANT CHANGE UP (ref 40–120)
ALT FLD-CCNC: 20 U/L — SIGNIFICANT CHANGE UP (ref 10–45)
ANION GAP SERPL CALC-SCNC: 12 MMOL/L — SIGNIFICANT CHANGE UP (ref 5–17)
APTT BLD: 66.3 SEC — HIGH (ref 27.5–37.4)
AST SERPL-CCNC: 23 U/L — SIGNIFICANT CHANGE UP (ref 10–40)
BILIRUB SERPL-MCNC: 0.4 MG/DL — SIGNIFICANT CHANGE UP (ref 0.2–1.2)
BUN SERPL-MCNC: 20 MG/DL — SIGNIFICANT CHANGE UP (ref 7–23)
CALCIUM SERPL-MCNC: 9.4 MG/DL — SIGNIFICANT CHANGE UP (ref 8.4–10.5)
CHLORIDE SERPL-SCNC: 99 MMOL/L — SIGNIFICANT CHANGE UP (ref 96–108)
CK MB CFR SERPL CALC: 1.4 NG/ML — SIGNIFICANT CHANGE UP (ref 0–6.7)
CK SERPL-CCNC: 54 U/L — SIGNIFICANT CHANGE UP (ref 30–200)
CO2 SERPL-SCNC: 23 MMOL/L — SIGNIFICANT CHANGE UP (ref 22–31)
CREAT SERPL-MCNC: 1.28 MG/DL — SIGNIFICANT CHANGE UP (ref 0.5–1.3)
GLUCOSE SERPL-MCNC: 103 MG/DL — HIGH (ref 70–99)
HCT VFR BLD CALC: 28.1 % — LOW (ref 39–50)
HCT VFR BLD CALC: 29.1 % — LOW (ref 39–50)
HGB BLD-MCNC: 8.5 G/DL — LOW (ref 13–17)
HGB BLD-MCNC: 8.8 G/DL — LOW (ref 13–17)
INR BLD: 0.93 — SIGNIFICANT CHANGE UP (ref 0.88–1.16)
MAGNESIUM SERPL-MCNC: 2 MG/DL — SIGNIFICANT CHANGE UP (ref 1.6–2.6)
MCHC RBC-ENTMCNC: 21.9 PG — LOW (ref 27–34)
MCHC RBC-ENTMCNC: 21.9 PG — LOW (ref 27–34)
MCHC RBC-ENTMCNC: 30.2 G/DL — LOW (ref 32–36)
MCHC RBC-ENTMCNC: 30.2 G/DL — LOW (ref 32–36)
MCV RBC AUTO: 72.4 FL — LOW (ref 80–100)
MCV RBC AUTO: 72.4 FL — LOW (ref 80–100)
PLATELET # BLD AUTO: 148 K/UL — LOW (ref 150–400)
PLATELET # BLD AUTO: 208 K/UL — SIGNIFICANT CHANGE UP (ref 150–400)
POTASSIUM SERPL-MCNC: 4.4 MMOL/L — SIGNIFICANT CHANGE UP (ref 3.5–5.3)
POTASSIUM SERPL-SCNC: 4.4 MMOL/L — SIGNIFICANT CHANGE UP (ref 3.5–5.3)
PROT SERPL-MCNC: 7.1 G/DL — SIGNIFICANT CHANGE UP (ref 6–8.3)
PROTHROM AB SERPL-ACNC: 10.3 SEC — SIGNIFICANT CHANGE UP (ref 9.8–12.7)
RBC # BLD: 3.88 M/UL — LOW (ref 4.2–5.8)
RBC # BLD: 4.02 M/UL — LOW (ref 4.2–5.8)
RBC # FLD: 22.4 % — HIGH (ref 10.3–16.9)
RBC # FLD: 22.7 % — HIGH (ref 10.3–16.9)
SODIUM SERPL-SCNC: 134 MMOL/L — LOW (ref 135–145)
TROPONIN T SERPL-MCNC: <0.01 NG/ML — SIGNIFICANT CHANGE UP (ref 0–0.01)
WBC # BLD: 3.9 K/UL — SIGNIFICANT CHANGE UP (ref 3.8–10.5)
WBC # BLD: 4.3 K/UL — SIGNIFICANT CHANGE UP (ref 3.8–10.5)
WBC # FLD AUTO: 3.9 K/UL — SIGNIFICANT CHANGE UP (ref 3.8–10.5)
WBC # FLD AUTO: 4.3 K/UL — SIGNIFICANT CHANGE UP (ref 3.8–10.5)

## 2018-01-10 PROCEDURE — 93010 ELECTROCARDIOGRAM REPORT: CPT

## 2018-01-10 PROCEDURE — 93970 EXTREMITY STUDY: CPT | Mod: 26

## 2018-01-10 PROCEDURE — 99233 SBSQ HOSP IP/OBS HIGH 50: CPT

## 2018-01-10 RX ADMIN — Medication 1 TABLET(S): at 12:13

## 2018-01-10 RX ADMIN — CLOPIDOGREL BISULFATE 75 MILLIGRAM(S): 75 TABLET, FILM COATED ORAL at 12:13

## 2018-01-10 RX ADMIN — Medication 150 MILLIGRAM(S): at 05:46

## 2018-01-10 RX ADMIN — LISINOPRIL 5 MILLIGRAM(S): 2.5 TABLET ORAL at 10:54

## 2018-01-10 RX ADMIN — ATORVASTATIN CALCIUM 40 MILLIGRAM(S): 80 TABLET, FILM COATED ORAL at 23:55

## 2018-01-10 RX ADMIN — Medication 100 MILLIGRAM(S): at 05:46

## 2018-01-10 RX ADMIN — Medication 100 MILLIGRAM(S): at 14:22

## 2018-01-10 RX ADMIN — Medication 100 MILLIGRAM(S): at 23:55

## 2018-01-10 RX ADMIN — Medication 25 MILLIGRAM(S): at 14:22

## 2018-01-10 RX ADMIN — Medication 25 MILLIGRAM(S): at 23:55

## 2018-01-10 RX ADMIN — Medication 1 MILLIGRAM(S): at 12:13

## 2018-01-10 RX ADMIN — Medication 25 MILLIGRAM(S): at 05:46

## 2018-01-10 NOTE — PROGRESS NOTE ADULT - PROBLEM SELECTOR PLAN 1
- Currently in SR. Previously not on AC, per Dr. Levy due to h/o GI bleeding, ETOH and possible non-compliance.  - On 1/9 Toprol 100mg PO daily was increased to Toprol XL 150mg daily for episodes of Sinus Tachycardia. No further episodes of tachycardia.  - ICD interrogated by EP 1/8/18: The patient has not had any ATP or shocks since 12/24/17. The check was otherwise normal. He is V-pacing <1% of the time. Underlying sinus rhythm with paroxysmal Afib.   - Dr. Ramos aware and has no further recommendations.

## 2018-01-10 NOTE — PROGRESS NOTE ADULT - PROBLEM SELECTOR PLAN 5
- Troponin 0.03 x 3. Troponin was 0.02 on recent admission. EKG 1/8/18 showed NSR @ 73bpm, and new worsened TWI in V2-V6, q waves in anterior leads. Chest Pain free.  - Last cardiac cath. in 7/2016:  BMS mLAD, 50% OM, patent pLAD stent  - Continue Plavix/Toprol XL/Lipitor. On Heparin gtt for LV clot. ASA was discontinued once Heparin gtt started.   - Dr. Sepulveda reviewed EKG and concerned for EKG representing evolving MI. NPO after MN for cardiac cath in AM. Pre-cathed/consented.

## 2018-01-10 NOTE — PROGRESS NOTE ADULT - PROBLEM SELECTOR PLAN 3
- Blood Alcohol level 155 on admission. CIWA 8 on admission. C/w CIWA scores.  - Psych consulted. Per Psych: Decreased Librium to 25 mg PO q12h, Continue Ativan to 1 mg IV q4h prn CIWA > 8. Recommending starting Remeron 15 mg PO QHS (for depression)  - Continue thiamine, folate, MVI  - Patient at this time reporting he is amenable to inpatient rehab. Inpatient rehab vs. [facility] outpatient addiction treatment recommended on D/C per Psych.  - CIWA 1-2 with mild tremors to hands almost completely resolved.

## 2018-01-10 NOTE — PROGRESS NOTE ADULT - PROBLEM SELECTOR PLAN 2
- D-Dimer 373 (negative for age adjusted 600). F/u LE Venous Doppler and no work-up for PE at this time as discussed with Dr. Levy.    - Denies SOB, LE edema

## 2018-01-10 NOTE — PROGRESS NOTE ADULT - PROBLEM SELECTOR PLAN 9
- Baseline Hgb 8-9, outlier Hgb 13.3 on 1/6/18. Hgb stable at 8.8/29.1  - F/u Iron, B12, Folate  - Guaiac negative 1/8/18, denies BPBPR, melena, hematuria, hematemesis.  - Monitor CBC BID on Heparin gtt and for signs and symptoms of bleeding       Dispo: Monitor CIWA. NPO after MN for Cardiac Cath in AM with Dr. Sepulveda.

## 2018-01-10 NOTE — PROGRESS NOTE ADULT - PROBLEM SELECTOR PLAN 4
- Echo 1/8/18: EF 35%, LVH, apical and mid interventricular septum, apical anterior, apical inferior are all akinetic.  The true apex appears dyskinetic.  There is an apical LV thrombus measuring 2 x 1.7 cm. LV clot increased from 1cm in 9/2017.  - Started on Heparin gtt w/o bolus on 1/8/18 as per Dr. Levy due to h/o GI Bleed in 2016  - Patient specific protocol, goal PTT 60-80.   - Holding ASA, continue Plavix per Dr. Sepulveda  - No GI consult at this time as per Dr. Levy for clearance for anticoagulation given h/o GI bleeding.

## 2018-01-10 NOTE — PROGRESS NOTE BEHAVIORAL HEALTH - NSBHFUPINTERVALHXFT_PSY_A_CORE
Patient seen at bedside.  Reports he feels better today emotionally and physically.  CIWA 0.  Reports some anxiety regarding building back physical stamina following discharge.  Still opposed to inpatient rehab; plans to follow up at [facility], become more involved in physical activity and his brother's business, and potentially have his son move in with him who doesn't drink or do drugs.

## 2018-01-10 NOTE — PROGRESS NOTE ADULT - SUBJECTIVE AND OBJECTIVE BOX
Pt feels better today  slept better ant is eating better    PAST MEDICAL & SURGICAL HISTORY:  Pacemaker  ETOH abuse  Paroxysmal a-fib  Myocardial infarction involving other coronary artery  Gastritis  Atherosclerosis of coronary artery: Coronary artery disease  Automatic implantable cardiac defibrillator in situ: ICD (implantable cardioverter-defibrillator) in place  LV Thrombus on Echo  EF 35 % on echo    MEDICATIONS  (STANDING):  atorvastatin 40 milliGRAM(s) Oral at bedtime  chlordiazePOXIDE 25 milliGRAM(s) Oral every 8 hours  clopidogrel Tablet 75 milliGRAM(s) Oral daily  folic acid 1 milliGRAM(s) Oral daily  heparin  Infusion 1300 Unit(s)/Hr (13 mL/Hr) IV Continuous <Continuous>  lisinopril 5 milliGRAM(s) Oral daily  metoprolol succinate  milliGRAM(s) Oral daily  multivitamin 1 Tablet(s) Oral daily  sodium chloride 0.9%. 1000 milliLiter(s) (50 mL/Hr) IV Continuous <Continuous>  thiamine 100 milliGRAM(s) Oral three times a day    MEDICATIONS  (PRN):  LORazepam   Injectable 1 milliGRAM(s) IV Push every 4 hours PRN CIWA-Ar score 8 or greater    ICU Vital Signs Last 24 Hrs  T(C): 36.4 (10 Forrest 2018 09:12), Max: 36.6 (09 Jan 2018 14:33)  T(F): 97.6 (10 Forrest 2018 09:12), Max: 97.8 (09 Jan 2018 14:33)  HR: 84 (10 Forrest 2018 08:35) (73 - 88)  BP: 111/69 (10 Forrest 2018 08:35) (97/55 - 111/74)  BP(mean): --  ABP: --  ABP(mean): --  RR: 18 (10 Forrest 2018 08:35) (18 - 18)  SpO2: 98% (10 Forrest 2018 08:35) (98% - 99%)      lungs clear  CXR neg  CV s1 s2  Abd soft  Ext stable                          8.8    4.3   )-----------( 148      ( 10 Forrest 2018 06:44 )             29.1   01-10    134<L>  |  99  |  20  ----------------------------<  103<H>  4.4   |  23  |  1.28    Ca    9.4      10 Forrest 2018 06:44  Mg     2.0     01-10    TPro  7.1  /  Alb  3.8  /  TBili  0.4  /  DBili  x   /  AST  23  /  ALT  20  /  AlkPhos  44  01-10  EKG   T wave inversions c/w Lateral ischemia    CARDIAC MARKERS ( 10 Forrest 2018 06:44 )  x     / <0.01 ng/mL / 54 U/L / x     / 1.4 ng/mL  CARDIAC MARKERS ( 09 Jan 2018 20:14 )  x     / <0.01 ng/mL / 52 U/L / x     / 1.3 ng/mL  CARDIAC MARKERS ( 09 Jan 2018 07:56 )  x     / <0.01 ng/mL / 57 U/L / x     / x      CARDIAC MARKERS ( 09 Jan 2018 00:30 )  x     / <0.01 ng/mL / 58 U/L / x     / 1.6 ng/mL  CARDIAC MARKERS ( 08 Jan 2018 18:49 )  x     / <0.01 ng/mL / 66 U/L / x     / 1.7 ng/mL  CARDIAC MARKERS ( 08 Jan 2018 16:04 )  x     / <0.01 ng/mL / 71 U/L / x     / 1.7 ng/mL

## 2018-01-10 NOTE — PROGRESS NOTE ADULT - SUBJECTIVE AND OBJECTIVE BOX
Interventional Cardiology PA Adult Progress Note    Subjective Assessment: Pt seen and examined at bedside. Feeling well today and that his "shakes" are improving. Denies CP, SOB, palpitations. CIWA score is 1-2 today.   	  MEDICATIONS:  lisinopril 5 milliGRAM(s) Oral daily  metoprolol succinate  milliGRAM(s) Oral daily  chlordiazePOXIDE 25 milliGRAM(s) Oral every 12 hours  LORazepam   Injectable 1 milliGRAM(s) IV Push every 4 hours PRN  atorvastatin 40 milliGRAM(s) Oral at bedtime  clopidogrel Tablet 75 milliGRAM(s) Oral daily  folic acid 1 milliGRAM(s) Oral daily  heparin  Infusion 1300 Unit(s)/Hr IV Continuous <Continuous>  multivitamin 1 Tablet(s) Oral daily  sodium chloride 0.9%. 1000 milliLiter(s) IV Continuous <Continuous>  thiamine 100 milliGRAM(s) Oral three times a day      	    [PHYSICAL EXAM:  TELEMETRY:  T(C): 36.3 (01-10-18 @ 14:42), Max: 36.5 (01-09-18 @ 18:05)  HR: 84 (01-10-18 @ 08:35) (73 - 84)  BP: 111/69 (01-10-18 @ 08:35) (97/55 - 111/69)  RR: 18 (01-10-18 @ 08:35) (18 - 18)  SpO2: 98% (01-10-18 @ 08:35) (98% - 99%)  Wt(kg): --  I&O's Summary    09 Jan 2018 07:01  -  10 Forrest 2018 07:00  --------------------------------------------------------  IN: 1410 mL / OUT: 2325 mL / NET: -915 mL    10 Forrest 2018 07:01  -  10 Forrest 2018 17:28  --------------------------------------------------------  IN: 699 mL / OUT: 0 mL / NET: 699 mL    Appearance: Normal	  Neck: Supple,  - JVD  Cardiovascular: Normal S1 S2, No JVD, No murmurs  Respiratory: Lungs clear to auscultation, No Rales, Rhonchi, Wheezing	  Gastrointestinal:  Soft, Non-tender, + BS	  Skin: No rashes, No ecchymoses, No cyanosis  Extremities: Normal range of motion, No clubbing, cyanosis or edema  Vascular: DP/PT +1 b/l, radial 2+ b/l  Neurologic: Non-focal  Psychiatry: A & O x 3, Mood & affect appropriate, + mild Left sided hand tremor with arm extended        LABS:	 	  CARDIAC MARKERS:                            8.8    4.3   )-----------( 148      ( 10 Forrest 2018 06:44 )             29.1     01-10    134<L>  |  99  |  20  ----------------------------<  103<H>  4.4   |  23  |  1.28    Ca    9.4      10 Forrest 2018 06:44  Mg     2.0     01-10    TPro  7.1  /  Alb  3.8  /  TBili  0.4  /  DBili  x   /  AST  23  /  ALT  20  /  AlkPhos  44  01-10    proBNP:   Lipid Profile:   HgA1c:   TSH:   PT/INR - ( 10 Forrest 2018 06:44 )   PT: 10.3 sec;   INR: 0.93          PTT - ( 10 Forrest 2018 06:44 )  PTT:66.3 sec

## 2018-01-10 NOTE — PROGRESS NOTE ADULT - PROBLEM SELECTOR PLAN 8
- Euvolemic  - EF 35% by Echo in 9/2017.  - On Toprol 150mg PO Daily. Decreased Lisinopril secondary to prior hyperkalemia (K 5.8 on admit, K 4.4)

## 2018-01-10 NOTE — PROGRESS NOTE ADULT - ASSESSMENT
61 y/o M Ex-Smoker, current ETOH abuser, w/ PMHx CAD s/p prior MI, s/p prior PCI, Chronic Systolic CHF s/p ICD, paroxysmal Afib and LV thrombus (on ASA and Plavix only 2/2 to lower GI bleed 2016), CKD Stage III, h/o ETOH use/withdrawal, recently admitted for ICD firing in 12/2017 who returns c/o palpitations in the setting of ETOH use admitted for further management.     1) Paroxysmal Afib  Currently in SR.   Not on anticoagulation as per Dr. Levy due to h/o GI bleeding, ETOH and possible non-compliance  Pt noted to have Sinus Tachycardia overnight on telemetry at 130s and today (secondary to ETOH withdrawal as discussed with Dr. Levy)   Increase Metoprolol ER to 150mg daily as per Dr. Levy.  Pt reported possible shock from ICD device overnight  ICD interrogated by EP 1/8/18: The patient has not had any ATP or shocks since 12/24/17. The check was otherwise normal. He is V-pacing <1% of the time. Underlying sinus rhythm with paroxysmal Afib.   Dr. Ramos aware and has no further recommendations.     Sinus Tachycardia possibly secondary to dehydration. +Orthostatic VS: Lying /69, HR 88, Sitting BP 94/72, HR 95, Standing /73,   Assymptomatic. NS @ 50cc x 10 hrs w/ frequent lung checks as per Dr. Levy. Compression stockings B/L LE.   HR currently 80s.   For intermittent Sinus Tachycardia D-Dimer checked - 373, age adjusted positive value would be 600. F/u LE Venous Doppler and no work-up for PE at this time as discussed with Dr. Levy.      2) ETOH abuse.    Blood Alcohol level 155 on admission.   CIWA protocol-CIWA 8 on admission.   Psych consulted. Per Psych:  Decreased Librium to 25 mg PO q8hrs Continue Ativan to 1 mg IV q4h prn CIWA > 8 Start Remeron 15 mg PO QHS (for depression) Continue thiamine, folate, MVI	  Patient at this time reporting he is amenable to inpatient rehab. CIWA 1-2 with mild tremors to hands almost completely resolved.  Inpatient rehab vs. [facility] outpatient addiction treatment recommended on D/C per Psych	    3) CAD  On admission Troponin 0.03 x 3. Troponin was 0.02 on recent admission.   Last cardiac cath. in 7/2016:  BMS mLAD, 50% OM, patent pLAD stent  C/P free.   Continue Plavix/Toprol XL/Lipitor. On Heparin gtt for LV clot.   ASA was discontinued as per Dr. Levy once Heparin gtt started.   EKG 1/8/18 showed NSR @ 73bpm, and new worsened TWI in V2-V6, q waves in anterior leads. Repeat EKG this evening and this AM similar.     EKG reviewed with fellow Crispin and Dr. Levy and showed J point elevation only in anterior leads and not ST elevation.   Pt reported burning to epigastric region, dizziness and palpitations earlier and self-resolved on 1/8/18.  Denies C/P and SOB. Pt asymptomatic today.   Since acute change to EKG noted on 1/8/18, pt has had CE negative x 4. Continue to check BID.  Dr. Sepulveda reviewed EKG and concerned for EKG representing evolving MI. Given CE are negative no cath. until ETOH withdrawal stabilized. Possible cardiac cath. on Thurs/Fri.  F/u CE at 8PM.   Echo 1/8/18 showed LVH, apical and mid interventricular septum, apical anterior, apical inferior are all akinetic.  The true apex appears dyskinetic.  There is an apical LV thrombus measuring 2 x 1.7 cm. LV clot increased from 1cm in 9/2017.   Repeat EKG in AM    4) LV clot increased from 1cm to 2 x 1.7cm.  Started on Heparin gtt w/o bolus on 1/8/18 as per Dr. Levy due to h/o GI Bleed in 2016  Patient specific protocol, goal PTT 60-80.   PTT therapeutic x 2 lab draws. F/u PTT in AM.   No GI consult at this time as per Dr. Levy for clearance for anticoagulation given h/o GI bleeding.     5) HTN  BP as low as 97/58 overnight. Started on IV fluids as above. BP improved to 110s/70s.  Cont. BB. Decrease Lisinopril to 5mg daily as per Dr. Levy starting tomorrow.     6) CKD stage 3  Cr 1.34, Acute Renal insufficiency likely secondary to dehydration from decreased oral intake and ETOH abuse on admission.   S/p 1 L IVF in ER. Cr. 1.1 today.     7) Chronic systolic (congestive) heart failure.    Euvolemic  EF 35% by Echo in 9/2017.  Repeat Echo as above  On Metoprolol. Decreased Lisinopril secondary to prior hyperkalemia.    8) Hyperkalemia  On 1/8/18 K 5.8 AM, no hemolysis. Repeat K 4.7. K 4.9 this AM  Lisinopril decreased as above.     9) Chronic Anemia  Baseline Hgb 8-9, outlier Hgb 13.3 on 1/6/18. Hgb stable at 9.5/30.  F/u Iron, B12, Folate  Pt denies BPBPR, melena, hematuria, hematemesis.   Guaiac negative 1/8/18   Monitor CBC on Heparin gtt and for signs and symptoms of bleeding   Repeat CBC 8PM, check BID on Hep. gtt      Dispo: Monitor CIWA. Plan for cardiac cath. Thursday or Friday. 63 y/o M Ex-Smoker, current ETOH abuser, w/ PMHx CAD s/p prior MI, s/p prior PCI, Chronic Systolic CHF s/p ICD, paroxysmal Afib and LV thrombus (on ASA and Plavix only 2/2 to lower GI bleed 2016), CKD Stage III, h/o ETOH use/withdrawal, recently admitted for ICD firing in 12/2017 who returns c/o palpitations in the setting of ETOH use admitted for further management.

## 2018-01-11 LAB
ANION GAP SERPL CALC-SCNC: 10 MMOL/L — SIGNIFICANT CHANGE UP (ref 5–17)
APTT BLD: 56.6 SEC — HIGH (ref 27.5–37.4)
APTT BLD: 69.2 SEC — HIGH (ref 27.5–37.4)
BASOPHILS NFR BLD AUTO: 0.5 % — SIGNIFICANT CHANGE UP (ref 0–2)
BUN SERPL-MCNC: 17 MG/DL — SIGNIFICANT CHANGE UP (ref 7–23)
CALCIUM SERPL-MCNC: 9.3 MG/DL — SIGNIFICANT CHANGE UP (ref 8.4–10.5)
CHLORIDE SERPL-SCNC: 100 MMOL/L — SIGNIFICANT CHANGE UP (ref 96–108)
CO2 SERPL-SCNC: 24 MMOL/L — SIGNIFICANT CHANGE UP (ref 22–31)
CREAT SERPL-MCNC: 1.22 MG/DL — SIGNIFICANT CHANGE UP (ref 0.5–1.3)
EOSINOPHIL NFR BLD AUTO: 5.6 % — SIGNIFICANT CHANGE UP (ref 0–6)
GLUCOSE SERPL-MCNC: 102 MG/DL — HIGH (ref 70–99)
HCT VFR BLD CALC: 26.1 % — LOW (ref 39–50)
HCT VFR BLD CALC: 26.6 % — LOW (ref 39–50)
HGB BLD-MCNC: 8 G/DL — LOW (ref 13–17)
HGB BLD-MCNC: 8 G/DL — LOW (ref 13–17)
INR BLD: 0.96 — SIGNIFICANT CHANGE UP (ref 0.88–1.16)
LYMPHOCYTES # BLD AUTO: 34.1 % — SIGNIFICANT CHANGE UP (ref 13–44)
MAGNESIUM SERPL-MCNC: 2 MG/DL — SIGNIFICANT CHANGE UP (ref 1.6–2.6)
MCHC RBC-ENTMCNC: 21.8 PG — LOW (ref 27–34)
MCHC RBC-ENTMCNC: 21.9 PG — LOW (ref 27–34)
MCHC RBC-ENTMCNC: 30.1 G/DL — LOW (ref 32–36)
MCHC RBC-ENTMCNC: 30.7 G/DL — LOW (ref 32–36)
MCV RBC AUTO: 71.5 FL — LOW (ref 80–100)
MCV RBC AUTO: 72.5 FL — LOW (ref 80–100)
MONOCYTES NFR BLD AUTO: 9.6 % — SIGNIFICANT CHANGE UP (ref 2–14)
NEUTROPHILS NFR BLD AUTO: 50.2 % — SIGNIFICANT CHANGE UP (ref 43–77)
PLATELET # BLD AUTO: 141 K/UL — LOW (ref 150–400)
PLATELET # BLD AUTO: 169 K/UL — SIGNIFICANT CHANGE UP (ref 150–400)
POTASSIUM SERPL-MCNC: 4.5 MMOL/L — SIGNIFICANT CHANGE UP (ref 3.5–5.3)
POTASSIUM SERPL-SCNC: 4.5 MMOL/L — SIGNIFICANT CHANGE UP (ref 3.5–5.3)
PROTHROM AB SERPL-ACNC: 10.6 SEC — SIGNIFICANT CHANGE UP (ref 9.8–12.7)
RBC # BLD: 3.65 M/UL — LOW (ref 4.2–5.8)
RBC # BLD: 3.67 M/UL — LOW (ref 4.2–5.8)
RBC # FLD: 22.6 % — HIGH (ref 10.3–16.9)
RBC # FLD: 22.9 % — HIGH (ref 10.3–16.9)
SODIUM SERPL-SCNC: 134 MMOL/L — LOW (ref 135–145)
WBC # BLD: 4 K/UL — SIGNIFICANT CHANGE UP (ref 3.8–10.5)
WBC # BLD: 4.1 K/UL — SIGNIFICANT CHANGE UP (ref 3.8–10.5)
WBC # FLD AUTO: 4 K/UL — SIGNIFICANT CHANGE UP (ref 3.8–10.5)
WBC # FLD AUTO: 4.1 K/UL — SIGNIFICANT CHANGE UP (ref 3.8–10.5)

## 2018-01-11 PROCEDURE — 93454 CORONARY ARTERY ANGIO S&I: CPT | Mod: 26

## 2018-01-11 PROCEDURE — 99233 SBSQ HOSP IP/OBS HIGH 50: CPT

## 2018-01-11 RX ORDER — HEPARIN SODIUM 5000 [USP'U]/ML
6000 INJECTION INTRAVENOUS; SUBCUTANEOUS EVERY 6 HOURS
Qty: 0 | Refills: 0 | Status: DISCONTINUED | OUTPATIENT
Start: 2018-01-11 | End: 2018-01-12

## 2018-01-11 RX ORDER — HEPARIN SODIUM 5000 [USP'U]/ML
3000 INJECTION INTRAVENOUS; SUBCUTANEOUS EVERY 6 HOURS
Qty: 0 | Refills: 0 | Status: DISCONTINUED | OUTPATIENT
Start: 2018-01-11 | End: 2018-01-12

## 2018-01-11 RX ORDER — HEPARIN SODIUM 5000 [USP'U]/ML
INJECTION INTRAVENOUS; SUBCUTANEOUS
Qty: 25000 | Refills: 0 | Status: DISCONTINUED | OUTPATIENT
Start: 2018-01-11 | End: 2018-01-12

## 2018-01-11 RX ORDER — HEPARIN SODIUM 5000 [USP'U]/ML
INJECTION INTRAVENOUS; SUBCUTANEOUS
Qty: 25000 | Refills: 0 | Status: DISCONTINUED | OUTPATIENT
Start: 2018-01-11 | End: 2018-01-11

## 2018-01-11 RX ORDER — HEPARIN SODIUM 5000 [USP'U]/ML
3000 INJECTION INTRAVENOUS; SUBCUTANEOUS EVERY 6 HOURS
Qty: 0 | Refills: 0 | Status: DISCONTINUED | OUTPATIENT
Start: 2018-01-11 | End: 2018-01-11

## 2018-01-11 RX ORDER — SODIUM CHLORIDE 9 MG/ML
500 INJECTION, SOLUTION INTRAVENOUS
Qty: 0 | Refills: 0 | Status: DISCONTINUED | OUTPATIENT
Start: 2018-01-11 | End: 2018-01-11

## 2018-01-11 RX ORDER — WARFARIN SODIUM 2.5 MG/1
10 TABLET ORAL ONCE
Qty: 0 | Refills: 0 | Status: COMPLETED | OUTPATIENT
Start: 2018-01-11 | End: 2018-01-11

## 2018-01-11 RX ORDER — HEPARIN SODIUM 5000 [USP'U]/ML
6000 INJECTION INTRAVENOUS; SUBCUTANEOUS EVERY 6 HOURS
Qty: 0 | Refills: 0 | Status: DISCONTINUED | OUTPATIENT
Start: 2018-01-11 | End: 2018-01-11

## 2018-01-11 RX ADMIN — Medication 100 MILLIGRAM(S): at 15:55

## 2018-01-11 RX ADMIN — SODIUM CHLORIDE 40 MILLILITER(S): 9 INJECTION, SOLUTION INTRAVENOUS at 10:43

## 2018-01-11 RX ADMIN — Medication 100 MILLIGRAM(S): at 23:12

## 2018-01-11 RX ADMIN — Medication 10 MILLIGRAM(S): at 19:15

## 2018-01-11 RX ADMIN — ATORVASTATIN CALCIUM 40 MILLIGRAM(S): 80 TABLET, FILM COATED ORAL at 23:12

## 2018-01-11 RX ADMIN — Medication 100 MILLIGRAM(S): at 05:51

## 2018-01-11 RX ADMIN — Medication 25 MILLIGRAM(S): at 11:54

## 2018-01-11 RX ADMIN — HEPARIN SODIUM 1500 UNIT(S)/HR: 5000 INJECTION INTRAVENOUS; SUBCUTANEOUS at 23:13

## 2018-01-11 RX ADMIN — Medication 150 MILLIGRAM(S): at 05:51

## 2018-01-11 RX ADMIN — HEPARIN SODIUM 1300 UNIT(S)/HR: 5000 INJECTION INTRAVENOUS; SUBCUTANEOUS at 15:53

## 2018-01-11 RX ADMIN — Medication 1 MILLIGRAM(S): at 11:54

## 2018-01-11 RX ADMIN — CLOPIDOGREL BISULFATE 75 MILLIGRAM(S): 75 TABLET, FILM COATED ORAL at 07:33

## 2018-01-11 RX ADMIN — Medication 1 TABLET(S): at 11:54

## 2018-01-11 RX ADMIN — WARFARIN SODIUM 10 MILLIGRAM(S): 2.5 TABLET ORAL at 23:12

## 2018-01-11 NOTE — PROGRESS NOTE ADULT - PROBLEM SELECTOR PLAN 4
- Echo 1/8/18: EF 35%, LVH, apical and mid interventricular septum, apical anterior, apical inferior are all akinetic.  The true apex appears dyskinetic.  There is an apical LV thrombus measuring 2 x 1.7 cm. LV clot increased from 1cm in 9/2017.  - Started on Heparin gtt w/o bolus on 1/8/18 as per Dr. Levy due to h/o GI Bleed in 2016  - Patient specific protocol, goal PTT 60-80.   - Holding ASA, continue Plavix per Dr. Sepulveda  - No GI consult at this time as per Dr. Levy for clearance for anticoagulation given h/o GI bleeding. - Blood Alcohol level 155 on admission. CIWA 8 on admission. C/w CIWA scores.  - Psych consulted. Librium taper for withdrawal. Continue Ativan to 1 mg IV q4h prn CIWA > 8. Recommending starting Remeron 15 mg PO QHS (for depression)  - Continue thiamine, folate, MVI  - Pt amenable to Inpatient rehab vs. [facility] outpatient addiction treatment recommended on D/C per Psych.  - CIWA 0 today - Blood Alcohol level 155 on admission. CIWA 8 on admission. C/w CIWA scores.  - Psych consulted. Librium taper for withdrawal. Will get Librium 10mg PO q12h tonight. Continue Ativan to 1 mg IV q4h prn CIWA > 8. Recommending starting Remeron 15 mg PO QHS (for depression)  - Continue thiamine, folate, MVI  - Pt amenable to Inpatient rehab vs. [facility] outpatient addiction treatment recommended on D/C per Psych.  - CIWA 0 today - Blood Alcohol level 155 on admission. CIWA 8 on admission. C/w CIWA scores.  - Psych consulted. Librium taper for withdrawal. Will get Librium 10mg PO q12h tonight. Continue Ativan to 1 mg IV q4h prn CIWA > 8.  - Continue thiamine, folate, MVI  - Pt amenable to Inpatient rehab vs. [facility] outpatient addiction treatment recommended on D/C per Psych.  - CIWA 0 today

## 2018-01-11 NOTE — PROGRESS NOTE ADULT - SUBJECTIVE AND OBJECTIVE BOX
Pt is s/p Cardiac Cath which revealed Non obstructive CAD   No intervention currently required    S/P Cardiomyopathy and LV Thrombus on Echo    PAST MEDICAL & SURGICAL HISTORY:  Pacemaker  ETOH abuse  Paroxysmal a-fib  Myocardial infarction involving other coronary artery  Gastritis  Atherosclerosis of coronary artery: Coronary artery disease  Automatic implantable cardiac defibrillator in situ: ICD (implantable cardioverter-defibrillator) in place    MEDICATIONS  (STANDING):  atorvastatin 40 milliGRAM(s) Oral at bedtime  chlordiazePOXIDE 25 milliGRAM(s) Oral every 12 hours  clopidogrel Tablet 75 milliGRAM(s) Oral daily  folic acid 1 milliGRAM(s) Oral daily  lisinopril 5 milliGRAM(s) Oral daily  metoprolol succinate  milliGRAM(s) Oral daily  multivitamin 1 Tablet(s) Oral daily  sodium chloride 0.45%. 500 milliLiter(s) (40 mL/Hr) IV Continuous <Continuous>  sodium chloride 0.9%. 1000 milliLiter(s) (50 mL/Hr) IV Continuous <Continuous>  thiamine 100 milliGRAM(s) Oral three times a day    MEDICATIONS  (PRN):  LORazepam   Injectable 1 milliGRAM(s) IV Push every 4 hours PRN CIWA-Ar score 8 or greater    Lungs clear  CV s1 s2  Abd soft  Ext stable                          8.0    4.0   )-----------( 141      ( 11 Jan 2018 07:00 )             26.6   01-11    134<L>  |  100  |  17  ----------------------------<  102<H>  4.5   |  24  |  1.22    Ca    9.3      11 Jan 2018 07:00  Mg     2.0     01-11    TPro  7.1  /  Alb  3.8  /  TBili  0.4  /  DBili  x   /  AST  23  /  ALT  20  /  AlkPhos  44  01-10

## 2018-01-11 NOTE — H&P ADULT - NSHPOUTPATIENTPROVIDERS_GEN_ALL_CORE
Learning About Vasculitis  What is vasculitis? Vasculitis means inflamed blood vessels. This happens when the body's own immune system attacks the blood vessels. Your immune system might be reacting to an infection or a medicine. Or you may have an immune disorder. Vasculitis causes blood vessel walls to thicken, weaken, or stretch. This makes it harder for blood to flow. And that can lead to symptoms in any parts of your body that aren't getting enough blood. There are different types of vasculitis. And different parts of the body can be affected. This includes the head, joints and muscles, the skin, and some internal organs. What can you expect when you have vasculitis? Vasculitis can cause a wide variety of symptoms. Which ones you have will depend on what blood vessels are involved and how serious the problem is. Some common symptoms are:  · Fever. · Losing weight and not feeling hungry. · General tiredness. · General aches and pains. · Pain and swelling in an arm, a leg, or some other body part where the blood vessels are inflamed. For some people, the problem is short-term. For others it is long-term, or chronic. This problem may also go away, only to come back again later. How is it treated? The main goal of treatment is to reduce inflammation in the blood vessels. Mild cases may go away on their own. Sometimes over-the-counter pain medicine helps. For more severe cases, a doctor might prescribe stronger medicine, such as a corticosteroid. Follow-up care is a key part of your treatment and safety. Be sure to make and go to all appointments, and call your doctor if you are having problems. It's also a good idea to know your test results and keep a list of the medicines you take. Where can you learn more? Go to http://emilie-levy.info/. Enter [id number] in the search box to learn more about \"Learning About Vasculitis. \"  Current as of: March 20, 2017  Content Version: 11.4  © 7365-4556 [employer]. Care instructions adapted under license by [employer] (which disclaims liability or warranty for this information). If you have questions about a medical condition or this instruction, always ask your healthcare professional. [address] any warranty or liability for your use of this information. Bruises: Care Instructions  Your Care Instructions    Bruises occur when small blood vessels under the skin tear or rupture, most often from a twist, bump, or fall. Blood leaks into tissues under the skin and causes a black-and-blue spot that often turns colors, including purplish black, reddish blue, or yellowish green, as the bruise heals. Bruises hurt, but most are not serious and will go away on their own within 2 to 4 weeks. Sometimes, gravity causes them to spread down the body. A leg bruise usually will take longer to heal than a bruise on the face or arms. Follow-up care is a key part of your treatment and safety. Be sure to make and go to all appointments, and call your doctor if you are having problems. It's also a good idea to know your test results and keep a list of the medicines you take. How can you care for yourself at home? · Take pain medicines exactly as directed. ¨ If the doctor gave you a prescription medicine for pain, take it as prescribed. ¨ If you are not taking a prescription pain medicine, ask your doctor if you can take an over-the-counter medicine. · Put ice or a cold pack on the area for 10 to 20 minutes at a time. Put a thin cloth between the ice and your skin. · If you can, prop up the bruised area on pillows as much as possible for the next few days. Try to keep the bruise above the level of your heart. When should you call for help? Call your doctor now or seek immediate medical care if:  ? · You have signs of infection, such as:  ¨ Increased pain, swelling, warmth, or redness.   ¨ Red streaks leading from the bruise. ¨ Pus draining from the bruise. ¨ A fever. ? · You have a bruise on your leg and signs of a blood clot, such as:  ¨ Increasing redness and swelling along with warmth, tenderness, and pain in the bruised area. ¨ Pain in your calf, back of the knee, thigh, or groin. ¨ Redness and swelling in your leg or groin. ? · Your pain gets worse. ? Watch closely for changes in your health, and be sure to contact your doctor if:  ? · You do not get better as expected. Where can you learn more? Go to http://emilie-levy.info/. Enter [phone] [phone] in the search box to learn more about \"Bruises: Care Instructions. \"  Current as of: March 20, 2017  Content Version: 11.4  © 9244-3807 [employer]. Care instructions adapted under license by [employer] (which disclaims liability or warranty for this information). If you have questions about a medical condition or this instruction, always ask your healthcare professional. [place] [address] any warranty or liability for your use of this information. Dr. Levy (PCP)

## 2018-01-11 NOTE — PROGRESS NOTE ADULT - PROBLEM SELECTOR PLAN 9
- Baseline Hgb 8-9, outlier Hgb 13.3 on 1/6/18. Hgb stable at 8.8/29.1  - F/u Iron, B12, Folate  - Guaiac negative 1/8/18, denies BPBPR, melena, hematuria, hematemesis.  - Monitor CBC BID on Heparin gtt and for signs and symptoms of bleeding       Dispo: Monitor CIWA. NPO after MN for Cardiac Cath in AM with Dr. Sepulveda. - Baseline Hgb 8-9, outlier Hgb 13.3 on 1/6/18. Hgb stable at 8.0/26.6  - F/u Iron, B12, Folate  - Guaiac negative 1/8/18, denies BPBPR, melena, hematuria, hematemesis.  - Monitor CBC BID on Heparin gtt and for signs and symptoms of bleeding       Dispo: Monitor CIWA. D/c planning. Monitor INR  DVT ppx: Heparin gtt/ Coumadin

## 2018-01-11 NOTE — PROGRESS NOTE ADULT - PROBLEM SELECTOR PLAN 2
- D-Dimer 373 (negative for age adjusted 600). LE Venous Doppler 1/10 negative for DVT  - Denies SOB, LE edema - Echo 1/8/18: EF 35%, LVH, apical and mid interventricular septum, apical anterior, apical inferior are all akinetic.  The true apex appears dyskinetic.  There is an apical LV thrombus measuring 2 x 1.7 cm. LV clot increased from 1cm in 9/2017.  - Started on Heparin gtt w/o bolus on 1/8/18 as per Dr. Levy due to h/o GIB. Will give Coumadin 10mg PO x 1 tonight.    - No GI consult at this time as per Dr. Levy for clearance for anticoagulation given h/o GI bleeding.

## 2018-01-11 NOTE — PROGRESS NOTE ADULT - PROBLEM SELECTOR PLAN 7
- Cr 1.34 on arrival likely 2/2 dehydration/ ETOH use. Cr 1.28 today. - Cr 1.34 on arrival likely 2/2 dehydration/ ETOH use. Cr 1.22 today.

## 2018-01-11 NOTE — PROGRESS NOTE ADULT - SUBJECTIVE AND OBJECTIVE BOX
Procedure: Coronary angiography w/o LHC, [place]  Indication: UA, CAD  Complication: none    Result:  1) Non-obstructive CAD (30% pRCA)  2) Patent stent in LAD.    Plan: Medical management.

## 2018-01-11 NOTE — PROGRESS NOTE BEHAVIORAL HEALTH - NSBHFUPINTERVALHXFT_PSY_A_CORE
Patient seen at bedside.  MAIDA 0.  Says he feels much less anxious than on initial interview.  Again states he is aware that if he drinks alcohol with coumadin "I could die."  Intending to follow up with outpatient addiction services as previously stated.

## 2018-01-11 NOTE — PROGRESS NOTE ADULT - ASSESSMENT
S/P CAD     last stent over 2 years ago   will D/C Plavix   Start Coumadin for RX LV Thrombu    Pt was distinctly told today as he has been told in the past that he can no longer drink Alcohol  especially while on Coumadin S/P CAD     last stent over 2 years ago   will D/C Plavix   Start Coumadin for RX LV Thrombus    Pt was distinctly told today as he has been told in the past that he can no longer drink Alcohol  especially while on Coumadin

## 2018-01-11 NOTE — PROGRESS NOTE ADULT - PROBLEM SELECTOR PLAN 1
- Currently in SR. Previously not on AC, per Dr. Levy due to h/o GI bleeding, ETOH and possible non-compliance. Currently on heparin/Coumadin for increasing LV thrombus.   - On 1/9 Toprol 100mg PO daily was increased to Toprol XL 150mg daily for episodes of Sinus Tachycardia. No further episodes of tachycardia.  - ICD interrogated by EP 1/8/18: The patient has not had any ATP or shocks since 12/24/17. The check was otherwise normal. He is V-pacing <1% of the time. Underlying sinus rhythm with paroxysmal Afib.   - Dr. Ramos aware and has no further recommendations. - Currently in SR. Was not on AC pior to admit, per Dr. Levy due to h/o GI bleeding, ETOH and possible non-compliance.   - Currently on heparin/Coumadin for increasing LV thrombus. Will receive Coumadin 10mg PO x 1 tonight per Dr. Levy.   - On 1/9 Toprol 100mg PO daily was increased to Toprol XL 150mg daily for episodes of Sinus Tachycardia. No further episodes of tachycardia.  - ICD interrogated by EP 1/8/18: The patient has not had any ATP or shocks since 12/24/17. The check was otherwise normal. He is V-pacing <1% of the time. Underlying sinus rhythm with paroxysmal Afib.   - Dr. Ramos aware and has no further recommendations. - Currently in SR. Was not on AC prior to admit, per Dr. Levy due to h/o GI bleeding, ETOH and possible non-compliance.   - Currently on heparin/Coumadin for increasing LV thrombus. Will receive Coumadin 10mg PO x 1 tonight per Dr. Levy.   - On 1/9 Toprol 100mg PO daily was increased to Toprol XL 150mg daily for episodes of Sinus Tachycardia. No further episodes of tachycardia.  - ICD interrogated by EP 1/8/18: The patient has not had any ATP or shocks since 12/24/17. The check was otherwise normal. He is V-pacing <1% of the time. Underlying sinus rhythm with paroxysmal Afib.   - Dr. Ramos aware and has no further recommendations.

## 2018-01-11 NOTE — PROGRESS NOTE ADULT - PROBLEM SELECTOR PLAN 5
- Troponin 0.03 x 3. Troponin was 0.02 on recent admission. EKG 1/8/18 showed NSR @ 73bpm, and new worsened TWI in V2-V6, q waves in anterior leads. Chest Pain free.  - Last cardiac cath. in 7/2016:  BMS mLAD, 50% OM, patent pLAD stent  - Continue Plavix/Toprol XL/Lipitor. On Heparin gtt for LV clot. ASA was discontinued once Heparin gtt started.   - Dr. Sepulveda reviewed EKG and concerned for EKG representing evolving MI. NPO after MN for cardiac cath in AM. Pre-cathed/consented. Resolved  - D-Dimer 373 (negative for age adjusted 600). LE Venous Doppler 1/10 negative for DVT  - Denies SOB, LE edema

## 2018-01-11 NOTE — PROGRESS NOTE ADULT - SUBJECTIVE AND OBJECTIVE BOX
Interventional Cardiology PA Adult Progress Note    Subjective Assessment: Pt seen and examined at bedside. Feeling well today. CIWA score 0 today. Denies CP, palpitations, SOB.   	  MEDICATIONS:  lisinopril 5 milliGRAM(s) Oral daily  metoprolol succinate  milliGRAM(s) Oral daily  chlordiazePOXIDE 25 milliGRAM(s) Oral every 12 hours  LORazepam   Injectable 1 milliGRAM(s) IV Push every 4 hours PRN  atorvastatin 40 milliGRAM(s) Oral at bedtime  folic acid 1 milliGRAM(s) Oral daily  heparin  Infusion.  Unit(s)/Hr IV Continuous <Continuous>  heparin  Injectable 6000 Unit(s) IV Push every 6 hours PRN  heparin  Injectable 3000 Unit(s) IV Push every 6 hours PRN  multivitamin 1 Tablet(s) Oral daily  sodium chloride 0.45%. 500 milliLiter(s) IV Continuous <Continuous>  sodium chloride 0.9%. 1000 milliLiter(s) IV Continuous <Continuous>  thiamine 100 milliGRAM(s) Oral three times a day  warfarin 10 milliGRAM(s) Oral once      [PHYSICAL EXAM:  TELEMETRY:  T(C): 36.2 (01-11-18 @ 00:17), Max: 36.3 (01-10-18 @ 14:42)  HR: 72 (01-11-18 @ 10:36) (69 - 96)  BP: 115/64 (01-11-18 @ 10:36) (90/50 - 124/77)  RR: 16 (01-11-18 @ 10:36) (16 - 18)  SpO2: 99% (01-11-18 @ 10:36) (99% - 99%)  Wt(kg): --  I&O's Summary    10 Forrest 2018 07:01  -  11 Jan 2018 07:00  --------------------------------------------------------  IN: 1481 mL / OUT: 1200 mL / NET: 281 mL        Duffy:  Central/PICC/Mid Line:                                         Appearance: Normal		  Neck: Supple,  - JVD  Cardiovascular: Normal S1 S2, No JVD, No murmurs  Respiratory: Lungs clear to auscultation, No Rales, Rhonchi, Wheezing	  Gastrointestinal:  Soft, Non-tender, + BS	  Skin: No rashes, No ecchymoses, No cyanosis  Extremities: Normal range of motion, No clubbing, cyanosis or edema  Neurologic: Non-focal  Psychiatry: A & O x 3, Mood & affect appropriate      LABS:	 	  CARDIAC MARKERS:                        8.0    4.0   )-----------( 141      ( 11 Jan 2018 07:00 )             26.6     01-11    134<L>  |  100  |  17  ----------------------------<  102<H>  4.5   |  24  |  1.22    Ca    9.3      11 Jan 2018 07:00  Mg     2.0     01-11    TPro  7.1  /  Alb  3.8  /  TBili  0.4  /  DBili  x   /  AST  23  /  ALT  20  /  AlkPhos  44  01-10    PT/INR - ( 11 Jan 2018 07:00 )   PT: 10.6 sec;   INR: 0.96          PTT - ( 11 Jan 2018 07:00 )  PTT:69.2 sec    ASSESSMENT/PLAN: 	        DVT ppx:  Dispo:

## 2018-01-11 NOTE — PROGRESS NOTE ADULT - PROBLEM SELECTOR PLAN 8
- Euvolemic  - EF 35% by Echo in 9/2017.  - On Toprol 150mg PO Daily. Decreased Lisinopril secondary to prior hyperkalemia (K 5.8 on admit, K 4.4) - Euvolemic  - EF 35% by Echo in 9/2017.  - On Toprol 150mg PO Daily. Decreased Lisinopril secondary to prior hyperkalemia (K 5.8 on admit, K 4.5)

## 2018-01-11 NOTE — PROGRESS NOTE BEHAVIORAL HEALTH - NSBHCONSULTRECOMMENDOTHER_PSY_A_CORE FT
5. Continue to recommend inpatient rehab upon discharge- social work consult

## 2018-01-11 NOTE — PROGRESS NOTE ADULT - ASSESSMENT
61 y/o M Ex-Smoker, current ETOH abuser, w/ PMHx CAD s/p prior MI, s/p prior PCI, Chronic Systolic CHF s/p ICD, paroxysmal Afib and LV thrombus (on ASA and Plavix only 2/2 to lower GI bleed 2016), CKD Stage III, h/o ETOH use/withdrawal, recently admitted for ICD firing in 12/2017 who returns c/o palpitations in the setting of ETOH use admitted for further management.

## 2018-01-12 LAB
ANION GAP SERPL CALC-SCNC: 12 MMOL/L — SIGNIFICANT CHANGE UP (ref 5–17)
ANION GAP SERPL CALC-SCNC: 13 MMOL/L — SIGNIFICANT CHANGE UP (ref 5–17)
ANISOCYTOSIS BLD QL: SIGNIFICANT CHANGE UP
APTT BLD: 125.4 SEC — CRITICAL HIGH (ref 27.5–37.4)
APTT BLD: 135.7 SEC — CRITICAL HIGH (ref 27.5–37.4)
APTT BLD: 92.1 SEC — HIGH (ref 27.5–37.4)
BASOPHILS NFR BLD AUTO: 0.8 % — SIGNIFICANT CHANGE UP (ref 0–2)
BUN SERPL-MCNC: 17 MG/DL — SIGNIFICANT CHANGE UP (ref 7–23)
BUN SERPL-MCNC: 18 MG/DL — SIGNIFICANT CHANGE UP (ref 7–23)
CALCIUM SERPL-MCNC: 9.1 MG/DL — SIGNIFICANT CHANGE UP (ref 8.4–10.5)
CALCIUM SERPL-MCNC: 9.2 MG/DL — SIGNIFICANT CHANGE UP (ref 8.4–10.5)
CHLORIDE SERPL-SCNC: 101 MMOL/L — SIGNIFICANT CHANGE UP (ref 96–108)
CHLORIDE SERPL-SCNC: 102 MMOL/L — SIGNIFICANT CHANGE UP (ref 96–108)
CO2 SERPL-SCNC: 23 MMOL/L — SIGNIFICANT CHANGE UP (ref 22–31)
CO2 SERPL-SCNC: 24 MMOL/L — SIGNIFICANT CHANGE UP (ref 22–31)
CREAT SERPL-MCNC: 1.23 MG/DL — SIGNIFICANT CHANGE UP (ref 0.5–1.3)
CREAT SERPL-MCNC: 1.32 MG/DL — HIGH (ref 0.5–1.3)
EOSINOPHIL NFR BLD AUTO: 5 % — SIGNIFICANT CHANGE UP (ref 0–6)
GLUCOSE BLDC GLUCOMTR-MCNC: 109 MG/DL — HIGH (ref 70–99)
GLUCOSE SERPL-MCNC: 103 MG/DL — HIGH (ref 70–99)
GLUCOSE SERPL-MCNC: 107 MG/DL — HIGH (ref 70–99)
HCT VFR BLD CALC: 27.5 % — LOW (ref 39–50)
HCT VFR BLD CALC: 27.8 % — LOW (ref 39–50)
HGB BLD-MCNC: 8.1 G/DL — LOW (ref 13–17)
HGB BLD-MCNC: 8.4 G/DL — LOW (ref 13–17)
HYPOCHROMIA BLD QL: SLIGHT — SIGNIFICANT CHANGE UP
INR BLD: 0.98 — SIGNIFICANT CHANGE UP (ref 0.88–1.16)
LG PLATELETS BLD QL AUTO: PRESENT — SIGNIFICANT CHANGE UP
LYMPHOCYTES # BLD AUTO: 34.6 % — SIGNIFICANT CHANGE UP (ref 13–44)
MAGNESIUM SERPL-MCNC: 2 MG/DL — SIGNIFICANT CHANGE UP (ref 1.6–2.6)
MAGNESIUM SERPL-MCNC: 2 MG/DL — SIGNIFICANT CHANGE UP (ref 1.6–2.6)
MANUAL SMEAR VERIFICATION: SIGNIFICANT CHANGE UP
MCHC RBC-ENTMCNC: 21.7 PG — LOW (ref 27–34)
MCHC RBC-ENTMCNC: 22.1 PG — LOW (ref 27–34)
MCHC RBC-ENTMCNC: 29.5 G/DL — LOW (ref 32–36)
MCHC RBC-ENTMCNC: 30.2 G/DL — LOW (ref 32–36)
MCV RBC AUTO: 73.2 FL — LOW (ref 80–100)
MCV RBC AUTO: 73.5 FL — LOW (ref 80–100)
MICROCYTES BLD QL: SLIGHT — SIGNIFICANT CHANGE UP
MONOCYTES NFR BLD AUTO: 12.9 % — SIGNIFICANT CHANGE UP (ref 2–14)
NEUTROPHILS NFR BLD AUTO: 46.7 % — SIGNIFICANT CHANGE UP (ref 43–77)
OVALOCYTES BLD QL SMEAR: SIGNIFICANT CHANGE UP
PLAT MORPH BLD: (no result)
PLATELET # BLD AUTO: 143 K/UL — LOW (ref 150–400)
PLATELET # BLD AUTO: 152 K/UL — SIGNIFICANT CHANGE UP (ref 150–400)
PLATELET CLUMP BLD QL SMEAR: PRESENT
POIKILOCYTOSIS BLD QL AUTO: SIGNIFICANT CHANGE UP
POTASSIUM SERPL-MCNC: 3.9 MMOL/L — SIGNIFICANT CHANGE UP (ref 3.5–5.3)
POTASSIUM SERPL-MCNC: 4.2 MMOL/L — SIGNIFICANT CHANGE UP (ref 3.5–5.3)
POTASSIUM SERPL-SCNC: 3.9 MMOL/L — SIGNIFICANT CHANGE UP (ref 3.5–5.3)
POTASSIUM SERPL-SCNC: 4.2 MMOL/L — SIGNIFICANT CHANGE UP (ref 3.5–5.3)
PROTHROM AB SERPL-ACNC: 10.9 SEC — SIGNIFICANT CHANGE UP (ref 9.8–12.7)
RBC # BLD: 3.74 M/UL — LOW (ref 4.2–5.8)
RBC # BLD: 3.8 M/UL — LOW (ref 4.2–5.8)
RBC # FLD: 22.6 % — HIGH (ref 10.3–16.9)
RBC # FLD: 22.6 % — HIGH (ref 10.3–16.9)
RBC BLD AUTO: (no result)
SODIUM SERPL-SCNC: 136 MMOL/L — SIGNIFICANT CHANGE UP (ref 135–145)
SODIUM SERPL-SCNC: 139 MMOL/L — SIGNIFICANT CHANGE UP (ref 135–145)
SPHEROCYTES BLD QL SMEAR: SLIGHT — SIGNIFICANT CHANGE UP
WBC # BLD: 3.6 K/UL — LOW (ref 3.8–10.5)
WBC # BLD: 3.8 K/UL — SIGNIFICANT CHANGE UP (ref 3.8–10.5)
WBC # FLD AUTO: 3.6 K/UL — LOW (ref 3.8–10.5)
WBC # FLD AUTO: 3.8 K/UL — SIGNIFICANT CHANGE UP (ref 3.8–10.5)

## 2018-01-12 PROCEDURE — 99233 SBSQ HOSP IP/OBS HIGH 50: CPT

## 2018-01-12 RX ORDER — HEPARIN SODIUM 5000 [USP'U]/ML
INJECTION INTRAVENOUS; SUBCUTANEOUS
Qty: 25000 | Refills: 0 | Status: DISCONTINUED | OUTPATIENT
Start: 2018-01-12 | End: 2018-01-17

## 2018-01-12 RX ORDER — WARFARIN SODIUM 2.5 MG/1
5 TABLET ORAL ONCE
Qty: 0 | Refills: 0 | Status: COMPLETED | OUTPATIENT
Start: 2018-01-12 | End: 2018-01-12

## 2018-01-12 RX ORDER — POTASSIUM CHLORIDE 20 MEQ
20 PACKET (EA) ORAL ONCE
Qty: 0 | Refills: 0 | Status: COMPLETED | OUTPATIENT
Start: 2018-01-12 | End: 2018-01-12

## 2018-01-12 RX ORDER — HEPARIN SODIUM 5000 [USP'U]/ML
3000 INJECTION INTRAVENOUS; SUBCUTANEOUS EVERY 6 HOURS
Qty: 0 | Refills: 0 | Status: DISCONTINUED | OUTPATIENT
Start: 2018-01-12 | End: 2018-01-17

## 2018-01-12 RX ORDER — HEPARIN SODIUM 5000 [USP'U]/ML
6000 INJECTION INTRAVENOUS; SUBCUTANEOUS EVERY 6 HOURS
Qty: 0 | Refills: 0 | Status: DISCONTINUED | OUTPATIENT
Start: 2018-01-12 | End: 2018-01-17

## 2018-01-12 RX ADMIN — HEPARIN SODIUM 0 UNIT(S)/HR: 5000 INJECTION INTRAVENOUS; SUBCUTANEOUS at 10:38

## 2018-01-12 RX ADMIN — Medication 100 MILLIGRAM(S): at 11:46

## 2018-01-12 RX ADMIN — Medication 100 MILLIGRAM(S): at 06:55

## 2018-01-12 RX ADMIN — Medication 1 MILLIGRAM(S): at 11:44

## 2018-01-12 RX ADMIN — HEPARIN SODIUM 1300 UNIT(S)/HR: 5000 INJECTION INTRAVENOUS; SUBCUTANEOUS at 08:13

## 2018-01-12 RX ADMIN — LISINOPRIL 5 MILLIGRAM(S): 2.5 TABLET ORAL at 11:44

## 2018-01-12 RX ADMIN — WARFARIN SODIUM 5 MILLIGRAM(S): 2.5 TABLET ORAL at 21:31

## 2018-01-12 RX ADMIN — Medication 1 TABLET(S): at 11:44

## 2018-01-12 RX ADMIN — Medication 10 MILLIGRAM(S): at 06:55

## 2018-01-12 RX ADMIN — Medication 20 MILLIEQUIVALENT(S): at 10:38

## 2018-01-12 RX ADMIN — Medication 10 MILLIGRAM(S): at 17:45

## 2018-01-12 RX ADMIN — ATORVASTATIN CALCIUM 40 MILLIGRAM(S): 80 TABLET, FILM COATED ORAL at 21:31

## 2018-01-12 RX ADMIN — Medication 150 MILLIGRAM(S): at 06:55

## 2018-01-12 RX ADMIN — Medication 100 MILLIGRAM(S): at 21:31

## 2018-01-12 RX ADMIN — HEPARIN SODIUM 1300 UNIT(S)/HR: 5000 INJECTION INTRAVENOUS; SUBCUTANEOUS at 21:37

## 2018-01-12 NOTE — PROGRESS NOTE ADULT - ASSESSMENT
63 y/o M Ex-Smoker, current ETOH abuser, w/ PMHx CAD s/p prior MI, s/p prior PCI, Chronic Systolic CHF s/p ICD, paroxysmal Afib and LV thrombus (on ASA and Plavix only 2/2 to lower GI bleed 2016), CKD Stage III, h/o ETOH use/withdrawal, recently admitted for ICD firing in 12/2017 who returns c/o palpitations in the setting of ETOH use admitted for further management.

## 2018-01-12 NOTE — PROGRESS NOTE ADULT - PROBLEM SELECTOR PLAN 2
- Echo 1/8/18: EF 35%, LVH, apical and mid interventricular septum, apical anterior, apical inferior are all akinetic.  The true apex appears dyskinetic.  There is an apical LV thrombus measuring 2 x 1.7 cm. LV clot increased from 1cm in 9/2017.  - Started on Heparin gtt w/o bolus on 1/8/18 as per Dr. Levy due to h/o GIB. Will give Coumadin 5mg PO x 1 tonight.    - No GI consult at this time as per Dr. Levy for clearance for anticoagulation given h/o GI bleeding.

## 2018-01-12 NOTE — PROGRESS NOTE ADULT - PROBLEM SELECTOR PLAN 4
- Blood Alcohol level 155 on admission. CIWA 8 on admission. C/w CIWA scores.  - Psych consulted. Librium taper for withdrawal. Will get Librium 10mg PO q12h tonight and last dose tomorrow AM. Continue Ativan to 1 mg IV q4h prn CIWA > 8.  - Continue thiamine, folate, MVI  - Pt amenable to Inpatient rehab vs. [facility] outpatient addiction treatment recommended on D/C per Psych.  - CIWA 0 today

## 2018-01-12 NOTE — PROGRESS NOTE ADULT - PROBLEM SELECTOR PLAN 8
- Euvolemic  - EF 35% by Echo in 9/2017.  - On Toprol 150mg PO Daily. Decreased Lisinopril secondary to prior hyperkalemia (K 5.8 on admit, K 3.9)

## 2018-01-12 NOTE — PROGRESS NOTE BEHAVIORAL HEALTH - NSBHFUPINTERVALHXFT_PSY_A_CORE
Patient seen at bedside.  CIWA 0.  Reports he feels hopeful about making better decisions in the future and taking care of his daughter.  Vehemently states he will no longer consume alcohol and will Patient seen at bedside.  MAIDA 0.  Reports he feels hopeful about making better decisions in the future and taking care of his daughter.  Vehemently states he will no longer consume alcohol and will attend outpatient addiction counseling services.

## 2018-01-12 NOTE — PROGRESS NOTE ADULT - SUBJECTIVE AND OBJECTIVE BOX
Interventional Cardiology PA Adult Progress Note    Subjective Assessment: Pt seen and examined at bedside. Feeling well today. Denies CP, SOB, palpitations, numbness/tingling in LEs, bleeding from access site.   	  MEDICATIONS:  lisinopril 5 milliGRAM(s) Oral daily  metoprolol succinate  milliGRAM(s) Oral daily  chlordiazePOXIDE 10 milliGRAM(s) Oral two times a day  LORazepam   Injectable 1 milliGRAM(s) IV Push every 4 hours PRN  atorvastatin 40 milliGRAM(s) Oral at bedtime  folic acid 1 milliGRAM(s) Oral daily  heparin  Infusion.  Unit(s)/Hr IV Continuous <Continuous>  heparin  Injectable 6000 Unit(s) IV Push every 6 hours PRN  heparin  Injectable 3000 Unit(s) IV Push every 6 hours PRN  multivitamin 1 Tablet(s) Oral daily  thiamine 100 milliGRAM(s) Oral three times a day  warfarin 5 milliGRAM(s) Oral once      	    [PHYSICAL EXAM:  TELEMETRY:  T(C): 36.4 (01-12-18 @ 14:37), Max: 36.8 (01-11-18 @ 18:00)  HR: 85 (01-12-18 @ 11:49) (71 - 85)  BP: 112/64 (01-12-18 @ 11:49) (99/62 - 117/75)  RR: 16 (01-12-18 @ 09:20) (16 - 18)  SpO2: 96% (01-12-18 @ 09:20) (96% - 99%)  Wt(kg): --  I&O's Summary    11 Jan 2018 07:01  -  12 Jan 2018 07:00  --------------------------------------------------------  IN: 1080 mL / OUT: 750 mL / NET: 330 mL    12 Jan 2018 07:01  -  12 Jan 2018 16:22  --------------------------------------------------------  IN: 540 mL / OUT: 0 mL / NET: 540 mL      Appearance: Normal		  Neck: Supple,  - JVD  Cardiovascular: Normal S1 S2, No JVD, No murmurs  Respiratory: Lungs clear to auscultation, No Rales, Rhonchi, Wheezing	  Gastrointestinal:  Soft, Non-tender, + BS	  Skin: No rashes, No ecchymoses, No cyanosis  Extremities: Normal range of motion, No clubbing, cyanosis or edema, Right groin access site stable w/o echymosis/hematoma. Distal pulse in tact to baseline.  Neurologic: Non-focal  Psychiatry: A & O x 3, Mood & affect appropriate     LABS:	 	  CARDIAC MARKERS:                              8.1    3.8   )-----------( 143      ( 12 Jan 2018 07:40 )             27.5     01-12    136  |  101  |  17  ----------------------------<  103<H>  3.9   |  23  |  1.23    Ca    9.2      12 Jan 2018 07:39  Mg     2.0     01-12    PT/INR - ( 12 Jan 2018 07:40 )   PT: 10.9 sec;   INR: 0.98          PTT - ( 12 Jan 2018 07:40 )  PTT:135.7 sec    ASSESSMENT/PLAN:

## 2018-01-12 NOTE — PROGRESS NOTE ADULT - SUBJECTIVE AND OBJECTIVE BOX
Clinically stable today   no chest pain dyspnea    PAST MEDICAL & SURGICAL HISTORY:  Pacemaker  ETOH abuse  Paroxysmal a-fib  Myocardial infarction involving other coronary artery  Gastritis  Atherosclerosis of coronary artery: Coronary artery disease  Automatic implantable cardiac defibrillator in situ: ICD (implantable cardioverter-defibrillator) in place    cardiac cath  Non obstructive disease    Echo EF 35 %  LV thrombus    MEDICATIONS  (STANDING):  atorvastatin 40 milliGRAM(s) Oral at bedtime  chlordiazePOXIDE 10 milliGRAM(s) Oral two times a day  folic acid 1 milliGRAM(s) Oral daily  heparin  Infusion.  Unit(s)/Hr (13 mL/Hr) IV Continuous <Continuous>  lisinopril 5 milliGRAM(s) Oral daily  metoprolol succinate  milliGRAM(s) Oral daily  multivitamin 1 Tablet(s) Oral daily  potassium chloride    Tablet ER 20 milliEquivalent(s) Oral once  thiamine 100 milliGRAM(s) Oral three times a day    MEDICATIONS  (PRN):  heparin  Injectable 6000 Unit(s) IV Push every 6 hours PRN For aPTT less than 40  heparin  Injectable 3000 Unit(s) IV Push every 6 hours PRN For aPTT between 40 - 57  LORazepam   Injectable 1 milliGRAM(s) IV Push every 4 hours PRN CIWA-Ar score 8 or greater      ICU Vital Signs Last 24 Hrs  T(C): 36.8 (12 Jan 2018 08:45), Max: 36.8 (11 Jan 2018 18:00)  T(F): 98.2 (12 Jan 2018 08:45), Max: 98.3 (11 Jan 2018 18:00)  HR: 77 (12 Jan 2018 05:00) (66 - 83)  BP: 99/62 (12 Jan 2018 05:00) (99/62 - 117/75)  BP(mean): --  ABP: --  ABP(mean): --  RR: 18 (12 Jan 2018 05:00) (16 - 18)  SpO2: 96% (12 Jan 2018 05:00) (96% - 99%)      Lungs clear   Cv s1 s2   Abd soft  Ext stable                          8.1    3.8   )-----------( 143      ( 12 Jan 2018 07:40 )             27.5   01-12    136  |  101  |  17  ----------------------------<  103<H>  3.9   |  23  |  1.23    Ca    9.2      12 Jan 2018 07:39  Mg     2.0     01-12    PT/INR - ( 12 Jan 2018 07:40 )   PT: 10.9 sec;   INR: 0.98          PTT - ( 12 Jan 2018 07:40 )  PTT:135.7 sec

## 2018-01-12 NOTE — PROGRESS NOTE ADULT - PROBLEM SELECTOR PLAN 1
- Currently in SR. Was not on AC prior to admit, per Dr. Levy due to h/o GI bleeding, ETOH and possible non-compliance.   - Currently on heparin/Coumadin for increasing LV thrombus. Will receive Coumadin 5mg PO x 1 tonight per Dr. Levy.  - Pt is not a candidate for Lovenox/Coumadin bridge per Dr. Levy  - On 1/9 Toprol 100mg PO daily was increased to Toprol XL 150mg daily for episodes of Sinus Tachycardia. No further episodes of tachycardia.  - ICD interrogated by EP 1/8/18: The patient has not had any ATP or shocks since 12/24/17. The check was otherwise normal. He is V-pacing <1% of the time. Underlying sinus rhythm with paroxysmal Afib.   - Dr. Ramos aware and has no further recommendations.

## 2018-01-12 NOTE — PROGRESS NOTE ADULT - ASSESSMENT
CAD  Cardiomyopathy   LV Thrombus  Pt being coumadinized  with continued IV heparin   Will await therapeutic INR before Discharge'

## 2018-01-12 NOTE — PROGRESS NOTE ADULT - PROBLEM SELECTOR PLAN 9
- Baseline Hgb 8-9, outlier Hgb 13.3 on 1/6/18. Hgb stable at 8.1/27.5  - F/u Iron, B12, Folate  - Guaiac negative 1/8/18, denies BPBPR, melena, hematuria, hematemesis.  - Monitor CBC BID on Heparin/Coumadin and for signs and symptoms of bleeding       Dispo: Monitor CIWA. D/c planning. Monitor INR  DVT ppx: Heparin gtt/ Coumadin

## 2018-01-12 NOTE — PROGRESS NOTE ADULT - PROBLEM SELECTOR PLAN 3
- Cardiac Cath 1/11: Non-obstructive CAD (30% pRCA), Patent stent in LAD.  - Troponin 0.03 x 3. EKG 1/8/18 showed NSR @ 73bpm, and new worsened TWI in V2-V6, q waves in anterior leads. Chest Pain free.  - Last cardiac cath. in 7/2016:  BMS mLAD, 50% OM, patent pLAD stent  - ContinueToprol XL/Lipitor. On Heparin gtt for LV clot. Holding ASA/Plavix per Dr. Levy, as pt is now on AC and has hx of GIB/ETOH use.

## 2018-01-13 LAB
ANION GAP SERPL CALC-SCNC: 12 MMOL/L — SIGNIFICANT CHANGE UP (ref 5–17)
APTT BLD: 81.2 SEC — HIGH (ref 27.5–37.4)
APTT BLD: 81.5 SEC — HIGH (ref 27.5–37.4)
BASOPHILS NFR BLD AUTO: 2 % — SIGNIFICANT CHANGE UP (ref 0–2)
BUN SERPL-MCNC: 15 MG/DL — SIGNIFICANT CHANGE UP (ref 7–23)
CALCIUM SERPL-MCNC: 9.2 MG/DL — SIGNIFICANT CHANGE UP (ref 8.4–10.5)
CHLORIDE SERPL-SCNC: 103 MMOL/L — SIGNIFICANT CHANGE UP (ref 96–108)
CO2 SERPL-SCNC: 23 MMOL/L — SIGNIFICANT CHANGE UP (ref 22–31)
CREAT SERPL-MCNC: 1.28 MG/DL — SIGNIFICANT CHANGE UP (ref 0.5–1.3)
EOSINOPHIL NFR BLD AUTO: 4 % — SIGNIFICANT CHANGE UP (ref 0–6)
GLUCOSE SERPL-MCNC: 100 MG/DL — HIGH (ref 70–99)
HCT VFR BLD CALC: 28.5 % — LOW (ref 39–50)
HGB BLD-MCNC: 8.6 G/DL — LOW (ref 13–17)
INR BLD: 1.04 — SIGNIFICANT CHANGE UP (ref 0.88–1.16)
LYMPHOCYTES # BLD AUTO: 35 % — SIGNIFICANT CHANGE UP (ref 13–44)
MAGNESIUM SERPL-MCNC: 1.9 MG/DL — SIGNIFICANT CHANGE UP (ref 1.6–2.6)
MCHC RBC-ENTMCNC: 22 PG — LOW (ref 27–34)
MCHC RBC-ENTMCNC: 30.2 G/DL — LOW (ref 32–36)
MCV RBC AUTO: 72.9 FL — LOW (ref 80–100)
MONOCYTES NFR BLD AUTO: 8 % — SIGNIFICANT CHANGE UP (ref 2–14)
NEUTROPHILS NFR BLD AUTO: 51 % — SIGNIFICANT CHANGE UP (ref 43–77)
PLATELET # BLD AUTO: 140 K/UL — LOW (ref 150–400)
POTASSIUM SERPL-MCNC: 4.6 MMOL/L — SIGNIFICANT CHANGE UP (ref 3.5–5.3)
POTASSIUM SERPL-SCNC: 4.6 MMOL/L — SIGNIFICANT CHANGE UP (ref 3.5–5.3)
PROTHROM AB SERPL-ACNC: 11.5 SEC — SIGNIFICANT CHANGE UP (ref 9.8–12.7)
RBC # BLD: 3.91 M/UL — LOW (ref 4.2–5.8)
RBC # FLD: 22.7 % — HIGH (ref 10.3–16.9)
SODIUM SERPL-SCNC: 138 MMOL/L — SIGNIFICANT CHANGE UP (ref 135–145)
WBC # BLD: 3.7 K/UL — LOW (ref 3.8–10.5)
WBC # FLD AUTO: 3.7 K/UL — LOW (ref 3.8–10.5)

## 2018-01-13 RX ORDER — WARFARIN SODIUM 2.5 MG/1
5 TABLET ORAL ONCE
Qty: 0 | Refills: 0 | Status: COMPLETED | OUTPATIENT
Start: 2018-01-13 | End: 2018-01-13

## 2018-01-13 RX ADMIN — Medication 100 MILLIGRAM(S): at 13:56

## 2018-01-13 RX ADMIN — Medication 10 MILLIGRAM(S): at 06:04

## 2018-01-13 RX ADMIN — ATORVASTATIN CALCIUM 40 MILLIGRAM(S): 80 TABLET, FILM COATED ORAL at 21:32

## 2018-01-13 RX ADMIN — HEPARIN SODIUM 1300 UNIT(S)/HR: 5000 INJECTION INTRAVENOUS; SUBCUTANEOUS at 01:24

## 2018-01-13 RX ADMIN — Medication 150 MILLIGRAM(S): at 06:03

## 2018-01-13 RX ADMIN — Medication 1 TABLET(S): at 12:00

## 2018-01-13 RX ADMIN — WARFARIN SODIUM 5 MILLIGRAM(S): 2.5 TABLET ORAL at 21:33

## 2018-01-13 RX ADMIN — LISINOPRIL 5 MILLIGRAM(S): 2.5 TABLET ORAL at 06:03

## 2018-01-13 RX ADMIN — Medication 100 MILLIGRAM(S): at 06:03

## 2018-01-13 RX ADMIN — Medication 1 MILLIGRAM(S): at 12:00

## 2018-01-13 RX ADMIN — Medication 100 MILLIGRAM(S): at 21:33

## 2018-01-13 NOTE — PROGRESS NOTE ADULT - ASSESSMENT
Cont coumading 5 mg po hs after inital dosing of 10 mg thursday and 5 mg friday nites     CAD   ETOH

## 2018-01-13 NOTE — PROGRESS NOTE ADULT - PROBLEM SELECTOR PLAN 9
- Baseline Hgb 8-9, outlier Hgb 13.3 on 1/6/18. Hgb stable at 8.6/28.5  - Iron, B12, Folate level normal.  - Guaiac negative 1/8/18, denies BPBPR, melena, hematuria, hematemesis.  - Monitor CBC BID on Heparin/Coumadin and for signs and symptoms of bleeding       Dispo: Monitor CIWA. D/c planning. Monitor INR  DVT ppx: Heparin gtt/ Coumadin

## 2018-01-13 NOTE — PROGRESS NOTE ADULT - SUBJECTIVE AND OBJECTIVE BOX
Pt is being transitioned to coumadin    s/p Non obstructive CAD    PAST MEDICAL & SURGICAL HISTORY:  Pacemaker  ETOH abuse  Paroxysmal a-fib  Myocardial infarction involving other coronary artery  Gastritis  Atherosclerosis of coronary artery: Coronary artery disease  Automatic implantable cardiac defibrillator in situ: ICD (implantable cardioverter-defibrillator) in place    MEDICATIONS  (STANDING):  atorvastatin 40 milliGRAM(s) Oral at bedtime  folic acid 1 milliGRAM(s) Oral daily  heparin  Infusion.  Unit(s)/Hr (13 mL/Hr) IV Continuous <Continuous>  lisinopril 5 milliGRAM(s) Oral daily  metoprolol succinate  milliGRAM(s) Oral daily  multivitamin 1 Tablet(s) Oral daily  thiamine 100 milliGRAM(s) Oral three times a day    MEDICATIONS  (PRN):  heparin  Injectable 6000 Unit(s) IV Push every 6 hours PRN For aPTT less than 40  heparin  Injectable 3000 Unit(s) IV Push every 6 hours PRN For aPTT between 40 - 57  LORazepam   Injectable 1 milliGRAM(s) IV Push every 4 hours PRN CIWA-Ar score 8 or greater    ICU Vital Signs Last 24 Hrs  T(C): 35.9 (13 Jan 2018 08:18), Max: 36.7 (12 Jan 2018 18:08)  T(F): 96.6 (13 Jan 2018 08:18), Max: 98.1 (12 Jan 2018 18:08)  HR: 87 (13 Jan 2018 09:35) (71 - 87)  BP: 120/71 (13 Jan 2018 09:35) (106/67 - 120/71)  BP(mean): --  ABP: --  ABP(mean): --  RR: 16 (13 Jan 2018 09:35) (16 - 18)  SpO2: 99% (13 Jan 2018 09:35) (96% - 99%)      Lungs clear  Cv s1 s  Abd soft  Ext stable                          8.6    3.7   )-----------( 140      ( 13 Jan 2018 07:02 )             28.5   01-13    138  |  103  |  15  ----------------------------<  100<H>  4.6   |  23  |  1.28    Ca    9.2      13 Jan 2018 07:02  Mg     1.9     01-13    PT/INR - ( 13 Jan 2018 07:02 )   PT: 11.5 sec;   INR: 1.04          PTT - ( 13 Jan 2018 07:02 )  PTT:81.2 sec

## 2018-01-13 NOTE — PROGRESS NOTE ADULT - SUBJECTIVE AND OBJECTIVE BOX
Interventional Cardiology PA Adult Progress Note    Subjective Assessment: Pt. seen and examined at bedside this morning. pt. comfortable, denies any CP, SOB, palpitations and dizziness.   	  MEDICATIONS:  lisinopril 5 milliGRAM(s) Oral daily  metoprolol succinate  milliGRAM(s) Oral daily  LORazepam   Injectable 1 milliGRAM(s) IV Push every 4 hours PRN  atorvastatin 40 milliGRAM(s) Oral at bedtime  folic acid 1 milliGRAM(s) Oral daily  heparin  Infusion.  Unit(s)/Hr IV Continuous <Continuous>  heparin  Injectable 6000 Unit(s) IV Push every 6 hours PRN  heparin  Injectable 3000 Unit(s) IV Push every 6 hours PRN  multivitamin 1 Tablet(s) Oral daily  thiamine 100 milliGRAM(s) Oral three times a day  	    [PHYSICAL EXAM:  TELEMETRY:  T(C): 35.7 (01-13-18 @ 13:55), Max: 36.7 (01-12-18 @ 18:08)  HR: 71 (01-13-18 @ 13:55) (71 - 87)  BP: 105/70 (01-13-18 @ 13:55) (105/70 - 120/71)  RR: 16 (01-13-18 @ 13:55) (16 - 18)  SpO2: 100% (01-13-18 @ 13:55) (96% - 100%)  Wt(kg): --  I&O's Summary    12 Jan 2018 07:01  -  13 Jan 2018 07:00  --------------------------------------------------------  IN: 1116 mL / OUT: 800 mL / NET: 316 mL    13 Jan 2018 07:01  -  13 Jan 2018 15:48  --------------------------------------------------------  IN: 428 mL / OUT: 200 mL / NET: 228 mL        General: NAD  Neck: no JVD  CV: RRR, s1 and s2 positive, no murmurs noted  Pulm: CTAB/L , no W/R      	    ECG:  	  RADIOLOGY:   DIAGNOSTIC TESTING:  [ ] Echocardiogram:  [ ]  Catheterization:  [ ] Stress Test:    [ ] MERLYN  OTHER: 	    LABS:	 	  CARDIAC MARKERS:                                  8.6    3.7   )-----------( 140      ( 13 Jan 2018 07:02 )             28.5     01-13    138  |  103  |  15  ----------------------------<  100<H>  4.6   |  23  |  1.28    Ca    9.2      13 Jan 2018 07:02  Mg     1.9     01-13      proBNP:   Lipid Profile:   HgA1c:   TSH:   PT/INR - ( 13 Jan 2018 07:02 )   PT: 11.5 sec;   INR: 1.04          PTT - ( 13 Jan 2018 07:02 )  PTT:81.2 sec    ASSESSMENT/PLAN: 	        DVT ppx:  Dispo: Interventional Cardiology PA Adult Progress Note    Subjective Assessment: Pt. seen and examined at bedside this morning. pt. comfortable, denies any CP, SOB, palpitations and dizziness.   	  MEDICATIONS:  lisinopril 5 milliGRAM(s) Oral daily  metoprolol succinate  milliGRAM(s) Oral daily  LORazepam   Injectable 1 milliGRAM(s) IV Push every 4 hours PRN  atorvastatin 40 milliGRAM(s) Oral at bedtime  folic acid 1 milliGRAM(s) Oral daily  heparin  Infusion.  Unit(s)/Hr IV Continuous <Continuous>  heparin  Injectable 6000 Unit(s) IV Push every 6 hours PRN  heparin  Injectable 3000 Unit(s) IV Push every 6 hours PRN  multivitamin 1 Tablet(s) Oral daily  thiamine 100 milliGRAM(s) Oral three times a day  	    [PHYSICAL EXAM:  TELEMETRY:  T(C): 35.7 (01-13-18 @ 13:55), Max: 36.7 (01-12-18 @ 18:08)  HR: 71 (01-13-18 @ 13:55) (71 - 87)  BP: 105/70 (01-13-18 @ 13:55) (105/70 - 120/71)  RR: 16 (01-13-18 @ 13:55) (16 - 18)  SpO2: 100% (01-13-18 @ 13:55) (96% - 100%)  Wt(kg): --  I&O's Summary    12 Jan 2018 07:01  -  13 Jan 2018 07:00  --------------------------------------------------------  IN: 1116 mL / OUT: 800 mL / NET: 316 mL    13 Jan 2018 07:01  -  13 Jan 2018 15:48  --------------------------------------------------------  IN: 428 mL / OUT: 200 mL / NET: 228 mL        General: NAD  Neck: no JVD  CV: RRR, s1 and s2 positive, no murmurs noted  Pulm: CTAB/L , no W/R/R  GI: soft, NT/ND, + BS  Neuro: AO X 3, no focal deficits noted        	    ECG:  	  RADIOLOGY:   DIAGNOSTIC TESTING:  [ ] Echocardiogram:  [ ]  Catheterization:  [ ] Stress Test:    [ ] MERLYN  OTHER: 	    LABS:	 	  CARDIAC MARKERS:                                  8.6    3.7   )-----------( 140      ( 13 Jan 2018 07:02 )             28.5     01-13    138  |  103  |  15  ----------------------------<  100<H>  4.6   |  23  |  1.28    Ca    9.2      13 Jan 2018 07:02  Mg     1.9     01-13      proBNP:   Lipid Profile:   HgA1c:   TSH:   PT/INR - ( 13 Jan 2018 07:02 )   PT: 11.5 sec;   INR: 1.04          PTT - ( 13 Jan 2018 07:02 )  PTT:81.2 sec    ASSESSMENT/PLAN: 	        DVT ppx:  Dispo:

## 2018-01-13 NOTE — PROGRESS NOTE ADULT - PROBLEM SELECTOR PLAN 4
- Blood Alcohol level 155 on admission. CIWA 8 on admission. C/w CIWA scores.  - Psych consulted. Librium taper for withdrawal.  Librium 10mg PO q12h 1/12/18 and last dose 1/13/18 AM. Continue Ativan to 1 mg IV q4h prn CIWA > 8.  - Continue thiamine, folate, MVI  - Pt amenable to Inpatient rehab vs. [facility] outpatient addiction treatment recommended on D/C per Psych.  - CIWA 0 today

## 2018-01-13 NOTE — PROGRESS NOTE ADULT - PROBLEM SELECTOR PLAN 5
Resolved  - D-Dimer 373 (negative for age adjusted 600). LE Venous Doppler 1/10 negative for DVT  - Denies SOB, LE edema

## 2018-01-13 NOTE — PROGRESS NOTE ADULT - PROBLEM SELECTOR PLAN 8
- Euvolemic  - EF 35% by Echo in 9/2017.  - On Toprol 150mg PO Daily. Decreased Lisinopril secondary to prior hyperkalemia (K 5.8 on admit, K 4.6)

## 2018-01-14 LAB
ANION GAP SERPL CALC-SCNC: 12 MMOL/L — SIGNIFICANT CHANGE UP (ref 5–17)
APTT BLD: 70.3 SEC — HIGH (ref 27.5–37.4)
BUN SERPL-MCNC: 16 MG/DL — SIGNIFICANT CHANGE UP (ref 7–23)
CALCIUM SERPL-MCNC: 9.6 MG/DL — SIGNIFICANT CHANGE UP (ref 8.4–10.5)
CHLORIDE SERPL-SCNC: 104 MMOL/L — SIGNIFICANT CHANGE UP (ref 96–108)
CO2 SERPL-SCNC: 23 MMOL/L — SIGNIFICANT CHANGE UP (ref 22–31)
CREAT SERPL-MCNC: 1.17 MG/DL — SIGNIFICANT CHANGE UP (ref 0.5–1.3)
GLUCOSE SERPL-MCNC: 103 MG/DL — HIGH (ref 70–99)
HCT VFR BLD CALC: 26.7 % — LOW (ref 39–50)
HGB BLD-MCNC: 8.1 G/DL — LOW (ref 13–17)
INR BLD: 1.1 — SIGNIFICANT CHANGE UP (ref 0.88–1.16)
MAGNESIUM SERPL-MCNC: 1.9 MG/DL — SIGNIFICANT CHANGE UP (ref 1.6–2.6)
MCHC RBC-ENTMCNC: 22.3 PG — LOW (ref 27–34)
MCHC RBC-ENTMCNC: 30.3 G/DL — LOW (ref 32–36)
MCV RBC AUTO: 73.6 FL — LOW (ref 80–100)
PLATELET # BLD AUTO: 153 K/UL — SIGNIFICANT CHANGE UP (ref 150–400)
POTASSIUM SERPL-MCNC: 5 MMOL/L — SIGNIFICANT CHANGE UP (ref 3.5–5.3)
POTASSIUM SERPL-SCNC: 5 MMOL/L — SIGNIFICANT CHANGE UP (ref 3.5–5.3)
PROTHROM AB SERPL-ACNC: 12.2 SEC — SIGNIFICANT CHANGE UP (ref 9.8–12.7)
RBC # BLD: 3.63 M/UL — LOW (ref 4.2–5.8)
RBC # FLD: 22.5 % — HIGH (ref 10.3–16.9)
SODIUM SERPL-SCNC: 139 MMOL/L — SIGNIFICANT CHANGE UP (ref 135–145)
WBC # BLD: 3.2 K/UL — LOW (ref 3.8–10.5)
WBC # FLD AUTO: 3.2 K/UL — LOW (ref 3.8–10.5)

## 2018-01-14 RX ORDER — WARFARIN SODIUM 2.5 MG/1
5 TABLET ORAL ONCE
Qty: 0 | Refills: 0 | Status: COMPLETED | OUTPATIENT
Start: 2018-01-14 | End: 2018-01-14

## 2018-01-14 RX ORDER — MAGNESIUM OXIDE 400 MG ORAL TABLET 241.3 MG
400 TABLET ORAL ONCE
Qty: 0 | Refills: 0 | Status: COMPLETED | OUTPATIENT
Start: 2018-01-14 | End: 2018-01-14

## 2018-01-14 RX ADMIN — Medication 1 MILLIGRAM(S): at 11:47

## 2018-01-14 RX ADMIN — Medication 100 MILLIGRAM(S): at 15:01

## 2018-01-14 RX ADMIN — HEPARIN SODIUM 1300 UNIT(S)/HR: 5000 INJECTION INTRAVENOUS; SUBCUTANEOUS at 08:21

## 2018-01-14 RX ADMIN — Medication 100 MILLIGRAM(S): at 22:21

## 2018-01-14 RX ADMIN — Medication 100 MILLIGRAM(S): at 06:42

## 2018-01-14 RX ADMIN — WARFARIN SODIUM 5 MILLIGRAM(S): 2.5 TABLET ORAL at 22:21

## 2018-01-14 RX ADMIN — Medication 1 TABLET(S): at 11:47

## 2018-01-14 RX ADMIN — LISINOPRIL 5 MILLIGRAM(S): 2.5 TABLET ORAL at 06:42

## 2018-01-14 RX ADMIN — ATORVASTATIN CALCIUM 40 MILLIGRAM(S): 80 TABLET, FILM COATED ORAL at 22:21

## 2018-01-14 RX ADMIN — MAGNESIUM OXIDE 400 MG ORAL TABLET 400 MILLIGRAM(S): 241.3 TABLET ORAL at 09:40

## 2018-01-14 RX ADMIN — Medication 150 MILLIGRAM(S): at 06:42

## 2018-01-14 NOTE — PROGRESS NOTE ADULT - PROBLEM SELECTOR PLAN 9
- Baseline Hgb 8-9, outlier Hgb 13.3 on 1/6/18. Hgb stable  - Iron, B12, Folate level normal.  - Guaiac negative 1/8/18, denies BPBPR, melena, hematuria, hematemesis.  - Monitor CBC BID on Heparin/Coumadin and for signs and symptoms of bleeding       Dispo: Monitor CIWA. D/c planning. Monitor INR  DVT ppx: Heparin gtt/ Coumadin

## 2018-01-14 NOTE — PROGRESS NOTE ADULT - SUBJECTIVE AND OBJECTIVE BOX
Interventional Cardiology PA Adult Progress Note    Subjective Assessment: Patient seen and examined at bedside, feeling well.  	  MEDICATIONS:  lisinopril 5 milliGRAM(s) Oral daily  metoprolol succinate  milliGRAM(s) Oral daily  LORazepam   Injectable 1 milliGRAM(s) IV Push every 4 hours PRN  atorvastatin 40 milliGRAM(s) Oral at bedtime  folic acid 1 milliGRAM(s) Oral daily  heparin  Infusion.  Unit(s)/Hr IV Continuous <Continuous>  heparin  Injectable 6000 Unit(s) IV Push every 6 hours PRN  heparin  Injectable 3000 Unit(s) IV Push every 6 hours PRN  multivitamin 1 Tablet(s) Oral daily  thiamine 100 milliGRAM(s) Oral three times a day  warfarin 5 milliGRAM(s) Oral once	    [PHYSICAL EXAM:  TELEMETRY:  T(C): 36.4 (01-14-18 @ 15:09), Max: 36.4 (01-14-18 @ 07:17)  HR: 74 (01-14-18 @ 17:58) (69 - 87)  BP: 101/57 (01-14-18 @ 17:58) (101/57 - 132/69)  RR: 16 (01-14-18 @ 17:58) (16 - 18)  SpO2: 99% (01-14-18 @ 17:58) (97% - 100%)    I&O's Summary    13 Jan 2018 07:01  -  14 Jan 2018 07:00  --------------------------------------------------------  IN: 1262 mL / OUT: 1750 mL / NET: -488 mL    14 Jan 2018 07:01  -  14 Jan 2018 18:04  --------------------------------------------------------  IN: 683 mL / OUT: 0 mL / NET: 683 mL      Duffy: No  Central/PICC/Mid Line: No                                         Appearance: Normal	  HEENT:   Normal oral mucosa, PERRL, EOMI	  Neck: Supple,  - JVD; Carotid Bruit   Cardiovascular: Normal S1 S2, No JVD, No murmurs,   Respiratory: Lungs clear to auscultation//No Rales, Rhonchi, Wheezing	  Gastrointestinal:  Soft, Non-tender, + BS	  Skin: No rashes, No ecchymoses, No cyanosis  Extremities: Normal range of motion, No clubbing, cyanosis or edema  Vascular: Peripheral pulses palpable 2+ bilaterally  Neurologic: Non-focal  Psychiatry: A & O x 3, Mood & affect appropriate      LABS:	 	  CARDIAC MARKERS:                  8.1    3.2   )-----------( 153      ( 14 Jan 2018 07:41 )             26.7     01-14    139  |  104  |  16  ----------------------------<  103<H>  5.0   |  23  |  1.17    Ca    9.6      14 Jan 2018 07:41  Mg     1.9     01-14      PT/INR - ( 14 Jan 2018 07:41 )   PT: 12.2 sec;   INR: 1.10     PTT - ( 14 Jan 2018 07:41 )  PTT:70.3 sec    ASSESSMENT/PLAN: 	  DVT ppx: No  Dispo: No

## 2018-01-14 NOTE — PROGRESS NOTE ADULT - PROBLEM SELECTOR PLAN 4
- Blood Alcohol level 155 on admission. CIWA 8 on admission. C/w CIWA scores.  - Psych consulted. Librium taper for withdrawal.  Librium 10mg PO q12h 1/12/18 and last dose 1/13/18 AM. Continue Ativan to 1 mg IV q4h prn CIWA > 8.  - Continue thiamine, folate, MVI  - Pt amenable to Inpatient rehab vs. [facility] outpatient addiction treatment recommended on D/C per Psych.  - CIWA 0 today

## 2018-01-14 NOTE — PROGRESS NOTE ADULT - SUBJECTIVE AND OBJECTIVE BOX
Pt is eating better , sleeping better     PAST MEDICAL & SURGICAL HISTORY:  Pacemaker  ETOH abuse  Paroxysmal a-fib  Myocardial infarction involving other coronary artery  Gastritis  Atherosclerosis of coronary artery: Coronary artery disease  Automatic implantable cardiac defibrillator in situ: ICD (implantable cardioverter-defibrillator) in place    MEDICATIONS  (STANDING):  atorvastatin 40 milliGRAM(s) Oral at bedtime  folic acid 1 milliGRAM(s) Oral daily  heparin  Infusion.  Unit(s)/Hr (13 mL/Hr) IV Continuous <Continuous>  lisinopril 5 milliGRAM(s) Oral daily  metoprolol succinate  milliGRAM(s) Oral daily  multivitamin 1 Tablet(s) Oral daily  thiamine 100 milliGRAM(s) Oral three times a day    MEDICATIONS  (PRN):  heparin  Injectable 6000 Unit(s) IV Push every 6 hours PRN For aPTT less than 40  heparin  Injectable 3000 Unit(s) IV Push every 6 hours PRN For aPTT between 40 - 57  LORazepam   Injectable 1 milliGRAM(s) IV Push every 4 hours PRN CIWA-Ar score 8 or greater    ICU Vital Signs Last 24 Hrs  T(C): 36.2 (14 Jan 2018 08:39), Max: 36.4 (14 Jan 2018 07:17)  T(F): 97.2 (14 Jan 2018 08:39), Max: 97.5 (14 Jan 2018 07:17)  HR: 87 (14 Jan 2018 09:31) (69 - 87)  BP: 121/73 (14 Jan 2018 09:31) (103/59 - 132/69)  BP(mean): --  ABP: --  ABP(mean): --  RR: 16 (14 Jan 2018 09:31) (16 - 18)  SpO2: 100% (14 Jan 2018 09:31) (97% - 100%)        Lungs clear  Cv s1 s2  Abd soft  Ext stable                          8.1    3.2   )-----------( 153      ( 14 Jan 2018 07:41 )             26.7   01-14    139  |  104  |  16  ----------------------------<  103<H>  5.0   |  23  |  1.17    Ca    9.6      14 Jan 2018 07:41  Mg     1.9     01-14    PT/INR - ( 14 Jan 2018 07:41 )   PT: 12.2 sec;   INR: 1.10          PTT - ( 14 Jan 2018 07:41 )  PTT:70.3 sec

## 2018-01-14 NOTE — PROGRESS NOTE ADULT - PROBLEM SELECTOR PLAN 2
- Echo 1/8/18: EF 35%, LVH, apical and mid interventricular septum, apical anterior, apical inferior are all akinetic.  The true apex appears dyskinetic.  There is an apical LV thrombus measuring 2 x 1.7 cm. LV clot increased from 1cm in 9/2017.  - Continue Heparin gtt and Coumadin Bridge, INR 1.10, Coumadin 5mg tonight.  - No GI consult at this time as per Dr. Levy for clearance for anticoagulation given h/o GI bleeding.

## 2018-01-14 NOTE — PROGRESS NOTE ADULT - PROBLEM SELECTOR PLAN 1
- Currently in SR. Was not on AC prior to admit, per Dr. Levy due to h/o GI bleeding, ETOH and possible non-compliance.   - Currently on heparin/Coumadin for increasing LV thrombus. INR Subtherapeutic 1.10, ordered for Coumadin 5mg PO x 1 tonight per Dr. Levy, f/u INR in AM  - Pt is not a candidate for Lovenox/Coumadin bridge per Dr. Levy  - On 1/9 Toprol 100mg PO daily was increased to Toprol XL 150mg daily for episodes of Sinus Tachycardia. No further episodes of tachycardia.  - ICD interrogated by EP 1/8/18: The patient has not had any ATP or shocks since 12/24/17. The check was otherwise normal. He is V-pacing <1% of the time. Underlying sinus rhythm with paroxysmal Afib.   - Dr. Ramos aware and has no further recommendations.

## 2018-01-15 LAB
ANION GAP SERPL CALC-SCNC: 11 MMOL/L — SIGNIFICANT CHANGE UP (ref 5–17)
APTT BLD: 68 SEC — HIGH (ref 27.5–37.4)
BUN SERPL-MCNC: 16 MG/DL — SIGNIFICANT CHANGE UP (ref 7–23)
CALCIUM SERPL-MCNC: 9.8 MG/DL — SIGNIFICANT CHANGE UP (ref 8.4–10.5)
CHLORIDE SERPL-SCNC: 100 MMOL/L — SIGNIFICANT CHANGE UP (ref 96–108)
CO2 SERPL-SCNC: 26 MMOL/L — SIGNIFICANT CHANGE UP (ref 22–31)
CREAT SERPL-MCNC: 1.23 MG/DL — SIGNIFICANT CHANGE UP (ref 0.5–1.3)
GLUCOSE SERPL-MCNC: 101 MG/DL — HIGH (ref 70–99)
HCT VFR BLD CALC: 29 % — LOW (ref 39–50)
HGB BLD-MCNC: 8.8 G/DL — LOW (ref 13–17)
INR BLD: 1.11 — SIGNIFICANT CHANGE UP (ref 0.88–1.16)
MAGNESIUM SERPL-MCNC: 1.9 MG/DL — SIGNIFICANT CHANGE UP (ref 1.6–2.6)
MCHC RBC-ENTMCNC: 22.4 PG — LOW (ref 27–34)
MCHC RBC-ENTMCNC: 30.3 G/DL — LOW (ref 32–36)
MCV RBC AUTO: 73.8 FL — LOW (ref 80–100)
PLATELET # BLD AUTO: 164 K/UL — SIGNIFICANT CHANGE UP (ref 150–400)
POTASSIUM SERPL-MCNC: 4.7 MMOL/L — SIGNIFICANT CHANGE UP (ref 3.5–5.3)
POTASSIUM SERPL-SCNC: 4.7 MMOL/L — SIGNIFICANT CHANGE UP (ref 3.5–5.3)
PROTHROM AB SERPL-ACNC: 12.3 SEC — SIGNIFICANT CHANGE UP (ref 9.8–12.7)
RBC # BLD: 3.93 M/UL — LOW (ref 4.2–5.8)
RBC # FLD: 22.3 % — HIGH (ref 10.3–16.9)
SODIUM SERPL-SCNC: 137 MMOL/L — SIGNIFICANT CHANGE UP (ref 135–145)
WBC # BLD: 4.5 K/UL — SIGNIFICANT CHANGE UP (ref 3.8–10.5)
WBC # FLD AUTO: 4.5 K/UL — SIGNIFICANT CHANGE UP (ref 3.8–10.5)

## 2018-01-15 RX ORDER — WARFARIN SODIUM 2.5 MG/1
7.5 TABLET ORAL ONCE
Qty: 0 | Refills: 0 | Status: COMPLETED | OUTPATIENT
Start: 2018-01-15 | End: 2018-01-15

## 2018-01-15 RX ORDER — MAGNESIUM OXIDE 400 MG ORAL TABLET 241.3 MG
400 TABLET ORAL ONCE
Qty: 0 | Refills: 0 | Status: COMPLETED | OUTPATIENT
Start: 2018-01-15 | End: 2018-01-15

## 2018-01-15 RX ADMIN — WARFARIN SODIUM 7.5 MILLIGRAM(S): 2.5 TABLET ORAL at 22:11

## 2018-01-15 RX ADMIN — Medication 100 MILLIGRAM(S): at 10:49

## 2018-01-15 RX ADMIN — Medication 1 TABLET(S): at 10:49

## 2018-01-15 RX ADMIN — ATORVASTATIN CALCIUM 40 MILLIGRAM(S): 80 TABLET, FILM COATED ORAL at 22:11

## 2018-01-15 RX ADMIN — Medication 100 MILLIGRAM(S): at 22:11

## 2018-01-15 RX ADMIN — HEPARIN SODIUM 1300 UNIT(S)/HR: 5000 INJECTION INTRAVENOUS; SUBCUTANEOUS at 06:16

## 2018-01-15 RX ADMIN — Medication 150 MILLIGRAM(S): at 05:33

## 2018-01-15 RX ADMIN — LISINOPRIL 5 MILLIGRAM(S): 2.5 TABLET ORAL at 05:33

## 2018-01-15 RX ADMIN — Medication 100 MILLIGRAM(S): at 05:32

## 2018-01-15 RX ADMIN — Medication 1 MILLIGRAM(S): at 10:49

## 2018-01-15 RX ADMIN — MAGNESIUM OXIDE 400 MG ORAL TABLET 400 MILLIGRAM(S): 241.3 TABLET ORAL at 10:49

## 2018-01-15 NOTE — PROGRESS NOTE ADULT - PROBLEM SELECTOR PLAN 8
Anemia likely 2/2 chronic disease (Baseline Hgb 8-9, outlier Hgb 13.3 on 1/6/18), Now remains stable.  - Guaiac negative 1/8/18, denies BPBPR, melena, hematuria, hematemesis.  - Continue to monitor CBC while on Heparin/Coumadin.     DISPO:  Plan for discharge home once INR therapeutic.

## 2018-01-15 NOTE — PROGRESS NOTE ADULT - SUBJECTIVE AND OBJECTIVE BOX
Interventional Cardiology PA Adult Progress Note    Subjective Assessment: Patient seen and examined at bedside, no current complaints. awaiting therapeutic INR.  	  MEDICATIONS:  lisinopril 5 milliGRAM(s) Oral daily  metoprolol succinate  milliGRAM(s) Oral daily  LORazepam   Injectable 1 milliGRAM(s) IV Push every 4 hours PRN  atorvastatin 40 milliGRAM(s) Oral at bedtime  folic acid 1 milliGRAM(s) Oral daily  heparin  Infusion.  Unit(s)/Hr IV Continuous <Continuous>  heparin  Injectable 6000 Unit(s) IV Push every 6 hours PRN  heparin  Injectable 3000 Unit(s) IV Push every 6 hours PRN  multivitamin 1 Tablet(s) Oral daily  thiamine 100 milliGRAM(s) Oral three times a day  warfarin 7.5 milliGRAM(s) Oral once    [PHYSICAL EXAM:  TELEMETRY:  T(C): 35.8 (01-15-18 @ 13:52), Max: 36.6 (01-14-18 @ 18:10)  HR: 78 (01-15-18 @ 12:44) (66 - 82)  BP: 107/69 (01-15-18 @ 12:44) (101/57 - 120/72)  RR: 16 (01-15-18 @ 12:44) (14 - 16)  SpO2: 100% (01-15-18 @ 12:44) (98% - 100%)    I&O's Summary    14 Jan 2018 07:01  -  15 Forrest 2018 07:00  --------------------------------------------------------  IN: 1112 mL / OUT: 900 mL / NET: 212 mL    15 Forrest 2018 07:01  -  15 Forrest 2018 14:56  --------------------------------------------------------  IN: 420 mL / OUT: 0 mL / NET: 420 mL    Duffy: No  Central/PICC/Mid Line: No                                         Appearance: Normal, NAD	  HEENT:   Normal oral mucosa, PERRL, EOMI	  Neck: Supple,- JVD;   Cardiovascular: Normal S1 S2, No JVD, No murmurs,   Respiratory: Lungs clear to auscultation//No Rales, Rhonchi, Wheezing	  Gastrointestinal:  Soft, Non-tender, + BS	  Skin: No rashes, No ecchymoses, No cyanosis  Extremities: Normal range of motion, No clubbing, cyanosis or edema  Vascular: Peripheral pulses palpable 2+ bilaterally  Neurologic: Non-focal  Psychiatry: A & O x 3, Mood & affect appropriate	    LABS:	 	  CARDIAC MARKERS:                        8.8    4.5   )-----------( 164      ( 15 Forrest 2018 05:47 )             29.0     01-15    137  |  100  |  16  ----------------------------<  101<H>  4.7   |  26  |  1.23    Ca    9.8      15 Forrest 2018 05:47  Mg     1.9     01-15    PT/INR - ( 15 Forrest 2018 05:47 )   PT: 12.3 sec;   INR: 1.11     PTT - ( 15 Forrest 2018 05:47 )  PTT:68.0 sec    ASSESSMENT/PLAN: 	  DVT ppx: Heparin gtt/ Coumadin

## 2018-01-15 NOTE — PROGRESS NOTE ADULT - PROBLEM SELECTOR PLAN 4
No sx of withdrawal, CIWA 0, ( On admit Blood Alcohol level 155 and CIWA 8)  - Psych consulted.  Patient completed Librium taper on 1/13.  Continue Ativan 1 mg IV q4h prn CIWA > 8.  - Continue thiamine, folate, MVI  - Pt amenable to Inpatient rehab vs. [facility] outpatient addiction treatment as recommended on D/C per Psych.

## 2018-01-15 NOTE — PROGRESS NOTE ADULT - PROBLEM SELECTOR PLAN 1
Remains NSR  - Continue heparin gtt w/ Coumadin Bridge for increasing LV thrombus. INR Subtherapeutic 1.11, ordered for Coumadin 7.5mg PO tonight.  (Previously not on A/C due to Hx of UGI bleed, ETOH use and non compliance)   - Continue Toprol XL 100mg QD  - ICD interrogated by EP 1/8/18: The patient has not had any ATP or shocks since 12/24/17. The check was otherwise normal. He is V-pacing <1% of the time. Underlying sinus rhythm with paroxysmal Afib.   - Dr. Ramos aware and has no further recommendations.

## 2018-01-15 NOTE — PROGRESS NOTE ADULT - ASSESSMENT
CAD    LV Thrombus   Pacemaker  ETOH abuse  Paroxysmal a-fib  Myocardial infarction involving other coronary artery  Gastritis  Atherosclerosis of coronary artery: Coronary artery disease  Automatic implantable cardiac defibrillator in situ: ICD (implantable cardioverter-defibrillator) in place  for therapeutic INR and continue out patient ETOH follow up when ready for discharge

## 2018-01-15 NOTE — CHART NOTE - NSCHARTNOTEFT_GEN_A_CORE
Admitting Diagnosis:   Patient is a 62y old  Male who presents with a chief complaint of palpitations and possible ICD firing.     PAST MEDICAL & SURGICAL HISTORY:  Pacemaker  ETOH abuse  Paroxysmal a-fib  Myocardial infarction involving other coronary artery  Gastritis  Atherosclerosis of coronary artery: Coronary artery disease  Automatic implantable cardiac defibrillator in situ: ICD (implantable cardioverter-defibrillator) in place      Current Nutrition Order:  DASH/TLC, 1.5L fluid restriction      PO Intake: Good (%) [ X  ]  Fair (50-75%) [   ] Poor (<25%) [   ]    GI Issues: No N/V/C/D reported at this time.     Pain: No pain endorsed    Skin Integrity: Surgical wound intact, Moses 22    Labs:   01-15    137  |  100  |  16  ----------------------------<  101<H>  4.7   |  26  |  1.23    Ca    9.8      15 Forrest 2018 05:47  Mg     1.9     01-15      CAPILLARY BLOOD GLUCOSE    101mg/dL      Medications:  MEDICATIONS  (STANDING):  atorvastatin 40 milliGRAM(s) Oral at bedtime  folic acid 1 milliGRAM(s) Oral daily  heparin  Infusion.  Unit(s)/Hr (13 mL/Hr) IV Continuous <Continuous>  lisinopril 5 milliGRAM(s) Oral daily  metoprolol succinate  milliGRAM(s) Oral daily  multivitamin 1 Tablet(s) Oral daily  thiamine 100 milliGRAM(s) Oral three times a day  warfarin 7.5 milliGRAM(s) Oral once    MEDICATIONS  (PRN):  heparin  Injectable 6000 Unit(s) IV Push every 6 hours PRN For aPTT less than 40  heparin  Injectable 3000 Unit(s) IV Push every 6 hours PRN For aPTT between 40 - 57  LORazepam   Injectable 1 milliGRAM(s) IV Push every 4 hours PRN CIWA-Ar score 8 or greater      Weight:  Daily     Daily Weight in k.2 (15 Forrest 2018 05:24)    Weight Change: 2kg weight gain since admit     Estimated energy needs: ABW used for calculations as pt between % of IBW. Needs estimated per age  Calories: 25-30 kcal/kg = 7647-2322 kcal/day  Protein: 0.75-1.0g/kg = 55-73 g protein/day  Fluids: 25-30 mL/kg= 0660-3055 mL/day    Subjective: 63 yo/male admitted with EtOH abuse, palpitations. S/p cath on  revealing non-obstructive CAD. Seen in room, awake, pleasant, eating lunch. No nutrition-related complaints at this time; continues to have 75% or greater PO intake per meal. No N/V/C/D reported at this time. Awaiting therapeutic INR. Planning on going to outpatient rehab for EtOH abuse.     Previous Nutrition Diagnosis:   Poor nutrition quality of life RT lack of appetite 2/2 excessive alcohol intake AEB inadequate energy intake x 1week    Active [ X  ]  Resolved [   ]    If resolved, new PES:     Goal: Pt will continue to have 75% or greater PO intake per meal     Recommendations: Encourage PO intake     Education: N/A- educated on previous visit     Risk Level: High [   ] Moderate [ X  ] Low [   ]

## 2018-01-15 NOTE — PROGRESS NOTE ADULT - PROBLEM SELECTOR PLAN 7
remains euvolemic  - Prior Echo 9/2017: revealing EF 35%  - No standing Lasix for now.  Continue Lisinopril 5mg QD (decreased secondary to hyperkalemia on admit, now resolved)

## 2018-01-15 NOTE — PROGRESS NOTE ADULT - SUBJECTIVE AND OBJECTIVE BOX
Pt looks and feels better     awaiting full therapeutic  coumadinization    PT/INR - ( 15 Forrest 2018 05:47 )   PT: 12.3 sec;   INR: 1.11          PTT - ( 15 Forrest 2018 05:47 )  PTT:68.0 sec    Lungs clear  CV s1 s2  abd soft  Extt stable                          8.8    4.5   )-----------( 164      ( 15 Forrest 2018 05:47 )             29.0       01-15    137  |  100  |  16  ----------------------------<  101<H>  4.7   |  26  |  1.23    Ca    9.8      15 Forrest 2018 05:47  Mg     1.9     01-15

## 2018-01-15 NOTE — PROGRESS NOTE ADULT - ASSESSMENT
61 y/o M Ex-Smoker, current ETOH abuser, w/ PMHx CAD s/p prior MI, s/p prior PCI, Chronic Systolic CHF s/p ICD, paroxysmal Afib and LV thrombus (on ASA and Plavix only 2/2 to lower GI bleed 2016), CKD Stage III, h/o ETOH use/withdrawal, recently admitted for ICD firing in 12/2017 who returns c/o palpitations in the setting of ETOH use, w/ Echo revealing increased size of LV thrombus, started on Heparin/Coumadin bridge awaiting therapeutic INR prior to discharge.

## 2018-01-15 NOTE — PROGRESS NOTE ADULT - PROBLEM SELECTOR PLAN 3
remains CP free, CE 0.03 x3.  - s/p Cardiac Cath 1/11: revealing Non-obstructive CAD (30% pRCA), Patent stent in LAD.  - Continue Toprol XL, Lipitor 40mg Qhs. Holding ASA/Plavix that patient is now on Heparin/Coumadin given Hx of UGIB.

## 2018-01-15 NOTE — PROGRESS NOTE ADULT - PROBLEM SELECTOR PLAN 2
- Echo 1/8/18: EF 35%, LVH, apical and mid interventricular septum, apical anterior, apical inferior are all akinetic.  The true apex appears dyskinetic.  There is an apical LV thrombus measuring 2 x 1.7 cm. LV clot increased from 1cm in 9/2017.  - Due to increased size of LV thromvus started on A/C.  Continue Heparin gtt and Coumadin Bridge, INR 1.11 today, Coumadin 7.5mg tonight.  - Patient is not a candidate for Lovenox/Coumadin bridge per Dr. Alexis  - No GI consult at this time as per Dr. Levy for clearance for anticoagulation given h/o GI bleeding.

## 2018-01-16 DIAGNOSIS — R79.1 ABNORMAL COAGULATION PROFILE: ICD-10-CM

## 2018-01-16 LAB
ANION GAP SERPL CALC-SCNC: 13 MMOL/L — SIGNIFICANT CHANGE UP (ref 5–17)
APTT BLD: 84.4 SEC — HIGH (ref 27.5–37.4)
BUN SERPL-MCNC: 19 MG/DL — SIGNIFICANT CHANGE UP (ref 7–23)
CALCIUM SERPL-MCNC: 9.7 MG/DL — SIGNIFICANT CHANGE UP (ref 8.4–10.5)
CHLORIDE SERPL-SCNC: 99 MMOL/L — SIGNIFICANT CHANGE UP (ref 96–108)
CO2 SERPL-SCNC: 23 MMOL/L — SIGNIFICANT CHANGE UP (ref 22–31)
CREAT SERPL-MCNC: 1.2 MG/DL — SIGNIFICANT CHANGE UP (ref 0.5–1.3)
D DIMER BLD IA.RAPID-MCNC: 163 NG/ML DDU — SIGNIFICANT CHANGE UP
FERRITIN SERPL-MCNC: 9.1 NG/ML — LOW (ref 30–400)
GLUCOSE SERPL-MCNC: 104 MG/DL — HIGH (ref 70–99)
HCT VFR BLD CALC: 28.6 % — LOW (ref 39–50)
HGB BLD-MCNC: 8.8 G/DL — LOW (ref 13–17)
INR BLD: 1.07 — SIGNIFICANT CHANGE UP (ref 0.88–1.16)
IRON SATN MFR SERPL: 15 UG/DL — LOW (ref 45–165)
IRON SATN MFR SERPL: 4 % — LOW (ref 16–55)
MAGNESIUM SERPL-MCNC: 1.8 MG/DL — SIGNIFICANT CHANGE UP (ref 1.6–2.6)
MCHC RBC-ENTMCNC: 22.3 PG — LOW (ref 27–34)
MCHC RBC-ENTMCNC: 30.8 G/DL — LOW (ref 32–36)
MCV RBC AUTO: 72.6 FL — LOW (ref 80–100)
PLATELET # BLD AUTO: 176 K/UL — SIGNIFICANT CHANGE UP (ref 150–400)
POTASSIUM SERPL-MCNC: 4.4 MMOL/L — SIGNIFICANT CHANGE UP (ref 3.5–5.3)
POTASSIUM SERPL-SCNC: 4.4 MMOL/L — SIGNIFICANT CHANGE UP (ref 3.5–5.3)
PROTHROM AB SERPL-ACNC: 11.9 SEC — SIGNIFICANT CHANGE UP (ref 9.8–12.7)
RBC # BLD: 3.76 M/UL — LOW (ref 4.2–5.8)
RBC # BLD: 3.94 M/UL — LOW (ref 4.2–5.8)
RBC # FLD: 22.1 % — HIGH (ref 10.3–16.9)
RETICS/RBC NFR: 0.9 % — SIGNIFICANT CHANGE UP (ref 0.5–2.5)
SODIUM SERPL-SCNC: 135 MMOL/L — SIGNIFICANT CHANGE UP (ref 135–145)
TIBC SERPL-MCNC: 400 UG/DL — SIGNIFICANT CHANGE UP (ref 220–430)
UIBC SERPL-MCNC: 385 UG/DL — HIGH (ref 110–370)
WBC # BLD: 3.6 K/UL — LOW (ref 3.8–10.5)
WBC # FLD AUTO: 3.6 K/UL — LOW (ref 3.8–10.5)

## 2018-01-16 PROCEDURE — 99233 SBSQ HOSP IP/OBS HIGH 50: CPT

## 2018-01-16 RX ORDER — SODIUM FERRIC GLUCONAT/SUCROSE 62.5MG/5ML
25 AMPUL (ML) INTRAVENOUS ONCE
Qty: 0 | Refills: 0 | Status: COMPLETED | OUTPATIENT
Start: 2018-01-16 | End: 2018-01-17

## 2018-01-16 RX ORDER — BACITRACIN ZINC 500 UNIT/G
1 OINTMENT IN PACKET (EA) TOPICAL DAILY
Qty: 0 | Refills: 0 | Status: DISCONTINUED | OUTPATIENT
Start: 2018-01-16 | End: 2018-01-26

## 2018-01-16 RX ORDER — SODIUM FERRIC GLUCONAT/SUCROSE 62.5MG/5ML
100 AMPUL (ML) INTRAVENOUS ONCE
Qty: 0 | Refills: 0 | Status: COMPLETED | OUTPATIENT
Start: 2018-01-16 | End: 2018-01-17

## 2018-01-16 RX ORDER — WARFARIN SODIUM 2.5 MG/1
7.5 TABLET ORAL ONCE
Qty: 0 | Refills: 0 | Status: COMPLETED | OUTPATIENT
Start: 2018-01-16 | End: 2018-01-16

## 2018-01-16 RX ORDER — MAGNESIUM OXIDE 400 MG ORAL TABLET 241.3 MG
800 TABLET ORAL ONCE
Qty: 0 | Refills: 0 | Status: COMPLETED | OUTPATIENT
Start: 2018-01-16 | End: 2018-01-16

## 2018-01-16 RX ADMIN — ATORVASTATIN CALCIUM 40 MILLIGRAM(S): 80 TABLET, FILM COATED ORAL at 22:10

## 2018-01-16 RX ADMIN — MAGNESIUM OXIDE 400 MG ORAL TABLET 800 MILLIGRAM(S): 241.3 TABLET ORAL at 11:30

## 2018-01-16 RX ADMIN — Medication 100 MILLIGRAM(S): at 22:10

## 2018-01-16 RX ADMIN — LISINOPRIL 5 MILLIGRAM(S): 2.5 TABLET ORAL at 06:41

## 2018-01-16 RX ADMIN — Medication 100 MILLIGRAM(S): at 06:41

## 2018-01-16 RX ADMIN — Medication 1 APPLICATION(S): at 14:52

## 2018-01-16 RX ADMIN — WARFARIN SODIUM 7.5 MILLIGRAM(S): 2.5 TABLET ORAL at 22:10

## 2018-01-16 RX ADMIN — Medication 100 MILLIGRAM(S): at 11:31

## 2018-01-16 RX ADMIN — Medication 1 MILLIGRAM(S): at 11:30

## 2018-01-16 RX ADMIN — Medication 1 TABLET(S): at 11:30

## 2018-01-16 RX ADMIN — Medication 150 MILLIGRAM(S): at 06:41

## 2018-01-16 NOTE — PROGRESS NOTE ADULT - PROBLEM SELECTOR PLAN 5
No sx of withdrawal, CIWA 0, ( On admit Blood Alcohol level 155 and CIWA 8)  - Psych consulted.  Patient completed Librium taper on 1/13.  Continue Ativan 1 mg IV q4h prn CIWA > 8.  - Continue thiamine, folate, MVI  - Pt amenable to Inpatient rehab vs. [facility] outpatient addiction treatment as recommended on D/C per Psych.

## 2018-01-16 NOTE — PROGRESS NOTE ADULT - SUBJECTIVE AND OBJECTIVE BOX
Interventional Cardiology PA Adult Progress Note    Subjective Assessment: Pt seen and examined at bedside. Feeling well today. Denies CP, SOB, palpitations. CIWA 0.   	  MEDICATIONS:  lisinopril 5 milliGRAM(s) Oral daily  metoprolol succinate  milliGRAM(s) Oral daily  atorvastatin 40 milliGRAM(s) Oral at bedtime  folic acid 1 milliGRAM(s) Oral daily  heparin  Infusion.  Unit(s)/Hr IV Continuous <Continuous>  heparin  Injectable 6000 Unit(s) IV Push every 6 hours PRN  heparin  Injectable 3000 Unit(s) IV Push every 6 hours PRN  magnesium oxide 800 milliGRAM(s) Oral once  multivitamin 1 Tablet(s) Oral daily  thiamine 100 milliGRAM(s) Oral three times a day      	    [PHYSICAL EXAM:  TELEMETRY: SR @86  T(C): 35.7 (01-16-18 @ 08:23), Max: 36.4 (01-15-18 @ 18:02)  HR: 84 (01-16-18 @ 09:10) (78 - 84)  BP: 116/64 (01-16-18 @ 09:10) (100/60 - 126/73)  RR: 18 (01-16-18 @ 09:10) (16 - 18)  SpO2: 100% (01-16-18 @ 09:10) (100% - 100%)  Wt(kg): --  I&O's Summary    15 Forrest 2018 07:01  -  16 Jan 2018 07:00  --------------------------------------------------------  IN: 860 mL / OUT: 1450 mL / NET: -590 mL    16 Jan 2018 07:01  -  16 Jan 2018 11:10  --------------------------------------------------------  IN: 298 mL / OUT: 0 mL / NET: 298 mL        Weight (kg): 77.1 (01-16 @ 09:10)                                    Appearance: Normal, NAD	  HEENT:   Normal oral mucosa, PERRL, EOMI	  Neck: Supple,- JVD;   Cardiovascular: Normal S1 S2, No JVD, No murmurs,   Respiratory: Lungs clear to auscultation//No Rales, Rhonchi, Wheezing	  Gastrointestinal:  Soft, Non-tender, + BS	  Skin: No rashes, No ecchymoses, No cyanosis  Extremities: Normal range of motion, No clubbing, cyanosis or edema  Neurologic: Non-focal  Psychiatry: A & O x 3, Mood & affect appropriate	        LABS:	 	  CARDIAC MARKERS:                            8.8    3.6   )-----------( 176      ( 16 Jan 2018 08:42 )             28.6     01-16    135  |  99  |  19  ----------------------------<  104<H>  4.4   |  23  |  1.20    Ca    9.7      16 Jan 2018 08:42  Mg     1.8     01-16      proBNP:   Lipid Profile:   HgA1c:   TSH:   PT/INR - ( 16 Jan 2018 08:42 )   PT: 11.9 sec;   INR: 1.07          PTT - ( 16 Jan 2018 08:42 )  PTT:84.4 sec    ASSESSMENT/PLAN: 	        DVT ppx:  Dispo:

## 2018-01-16 NOTE — PROGRESS NOTE ADULT - PROBLEM SELECTOR PLAN 9
Anemia likely 2/2 chronic disease (Baseline Hgb 8-9, outlier Hgb 13.3 on 1/6/18), Now remains stable.  - Guaiac negative 1/8/18, denies BPBPR, melena, hematuria, hematemesis.  - Continue to monitor CBC while on Heparin/Coumadin.     DISPO:  Plan for discharge home pending heme recs/INR.

## 2018-01-16 NOTE — PROGRESS NOTE ADULT - PROBLEM SELECTOR PLAN 1
Remains NSR  - Continue heparin gtt w/ Coumadin Bridge for increasing LV thrombus. INR Subtherapeutic 1.07.  (Previously not on A/C due to Hx of UGI bleed, ETOH use and non compliance)   - Continue Toprol XL 100mg QD  - ICD interrogated by EP 1/8/18: The patient has not had any ATP or shocks since 12/24/17. The check was otherwise normal. He is V-pacing <1% of the time. Underlying sinus rhythm with paroxysmal Afib.   - Dr. Ramos aware and has no further recommendations.

## 2018-01-16 NOTE — PROGRESS NOTE ADULT - ASSESSMENT
Will ask Heme to see pt re possible warfarin resistance      CHF Cardiomyopathy AS CAD   Heather Levy MD

## 2018-01-16 NOTE — PROGRESS NOTE BEHAVIORAL HEALTH - NSBHFUPINTERVALHXFT_PSY_A_CORE
Patient remains hospitalized to optimize INR.  Patient was examined at bedside. No acute events overnight. Librium discontinued three days ago and has not had any significant symptoms of alcohol withdrawal since that time with the exception of a slight tremor. No nausea/vomiting, no paroxysmal sweating, no auditory, visual or tactile hallucinations. 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 Patient to schedule an appointment with a counselor at the [facility] before discharge.

## 2018-01-16 NOTE — PROGRESS NOTE ADULT - PROBLEM SELECTOR PLAN 2
- Echo 1/8/18: EF 35%, LVH, apical and mid interventricular septum, apical anterior, apical inferior are all akinetic.  The true apex appears dyskinetic.  There is an apical LV thrombus measuring 2 x 1.7 cm. LV clot increased from 1cm in 9/2017.  - Due to increased size of LV thromvus started on A/C.  Continue Heparin gtt and Coumadin Bridge, INR 1.07 today  - Patient is not a candidate for Lovenox/Coumadin bridge per Dr. Alexis  - No GI consult at this time as per Dr. Levy for clearance for anticoagulation given h/o GI bleeding. - Echo 1/8/18: EF 35%, LVH, apical and mid interventricular septum, apical anterior, apical inferior are all akinetic.  The true apex appears dyskinetic.  There is an apical LV thrombus measuring 2 x 1.7 cm. LV clot increased from 1cm in 9/2017.  - Due to increased size of LV thromvus started on A/C.  Continue Heparin gtt and Coumadin Bridge, INR 1.07 today. Pt to receive Coumadin 7.5mg PO x 1 tonight.   - Patient is not a candidate for Lovenox/Coumadin bridge per Dr. Alexis  - No GI consult at this time as per Dr. Levy for clearance for anticoagulation given h/o GI bleeding.

## 2018-01-16 NOTE — PROGRESS NOTE ADULT - PROBLEM SELECTOR PLAN 3
- INR persistently subtherapeutic despite Coumadin dosing x 5 days. Appears resistant.  - Dr. Everett consulted, f/u recs regarding AC. - INR persistently subtherapeutic despite Coumadin dosing x 5 days. Appears to not responding normal to Coumadin.  - Will give Coumadin 7.5mg PO x 1 tonight. Monitor INR in AM.   - Dr. Everett consulted, f/u recs.

## 2018-01-16 NOTE — PROGRESS NOTE ADULT - SUBJECTIVE AND OBJECTIVE BOX
Pt is appearing to be resistant to Warfarin  ???  PT/INR - ( 16 Jan 2018 08:42 )   PT: 11.9 sec;   INR: 1.07          PTT - ( 16 Jan 2018 08:42 )  PTT:84.4 sec  after  5 days of coumadin   10  ,,5 ,,5,, 5... 7.5 from 1/11/18      PAST MEDICAL & SURGICAL HISTORY:  Pacemaker  ETOH abuse  Paroxysmal a-fib  Myocardial infarction involving other coronary artery  Gastritis  Atherosclerosis of coronary artery: Coronary artery disease  Automatic implantable cardiac defibrillator in situ: ICD (implantable cardioverter-defibrillator) in place    MEDICATIONS  (STANDING):  atorvastatin 40 milliGRAM(s) Oral at bedtime  folic acid 1 milliGRAM(s) Oral daily  heparin  Infusion.  Unit(s)/Hr (13 mL/Hr) IV Continuous <Continuous>  lisinopril 5 milliGRAM(s) Oral daily  magnesium oxide 800 milliGRAM(s) Oral once  metoprolol succinate  milliGRAM(s) Oral daily  multivitamin 1 Tablet(s) Oral daily  thiamine 100 milliGRAM(s) Oral three times a day      V  MEDICATIONS  (PRN):  heparin  Injectable 6000 Unit(s) IV Push every 6 hours PRN For aPTT less than 40  heparin  Injectable 3000 Unit(s) IV Push every 6 hours PRN For aPTT between 40 - 57      ICU Vital Signs Last 24 Hrs  T(C): 35.7 (16 Jan 2018 08:23), Max: 36.4 (15 Forrest 2018 18:02)  T(F): 96.3 (16 Jan 2018 08:23), Max: 97.5 (15 Forrest 2018 18:02)  HR: 84 (16 Jan 2018 09:10) (78 - 84)  BP: 116/64 (16 Jan 2018 09:10) (100/60 - 126/73)  BP(mean): --  ABP: --  ABP(mean): --  RR: 18 (16 Jan 2018 09:10) (16 - 18)  SpO2: 100% (16 Jan 2018 09:10) (100% - 100%)      Lungs clear   CV s1 s2  Abd soft  Ext stable                          8.8    3.6   )-----------( 176      ( 16 Jan 2018 08:42 )             28.6   01-16    135  |  99  |  19  ----------------------------<  104<H>  4.4   |  23  |  1.20    Ca    9.7      16 Jan 2018 08:42  Mg     1.8     01-16

## 2018-01-17 LAB
ANION GAP SERPL CALC-SCNC: 11 MMOL/L — SIGNIFICANT CHANGE UP (ref 5–17)
APTT BLD: 191 SEC — CRITICAL HIGH (ref 27.5–37.4)
APTT BLD: 59 SEC — HIGH (ref 27.5–37.4)
APTT BLD: 84 SEC — HIGH (ref 27.5–37.4)
BUN SERPL-MCNC: 16 MG/DL — SIGNIFICANT CHANGE UP (ref 7–23)
CALCIUM SERPL-MCNC: 9.2 MG/DL — SIGNIFICANT CHANGE UP (ref 8.4–10.5)
CEA SERPL-MCNC: 2.3 NG/ML — SIGNIFICANT CHANGE UP (ref 0–3.8)
CHLORIDE SERPL-SCNC: 100 MMOL/L — SIGNIFICANT CHANGE UP (ref 96–108)
CO2 SERPL-SCNC: 26 MMOL/L — SIGNIFICANT CHANGE UP (ref 22–31)
CREAT SERPL-MCNC: 1.14 MG/DL — SIGNIFICANT CHANGE UP (ref 0.5–1.3)
EOSINOPHIL NFR BLD AUTO: 3 % — SIGNIFICANT CHANGE UP (ref 0–6)
FACT VIII ACT/NOR PPP: 208 % — HIGH (ref 51–138)
FOLATE SERPL-MCNC: >20 NG/ML — SIGNIFICANT CHANGE UP (ref 4.8–24.2)
GLUCOSE SERPL-MCNC: 98 MG/DL — SIGNIFICANT CHANGE UP (ref 70–99)
HCT VFR BLD CALC: 26.4 % — LOW (ref 39–50)
HCYS SERPL-MCNC: 10.6 UMOL/L — SIGNIFICANT CHANGE UP (ref 5–20)
HGB BLD-MCNC: 8 G/DL — LOW (ref 13–17)
INR BLD: 1.13 — SIGNIFICANT CHANGE UP (ref 0.88–1.16)
LYMPHOCYTES # BLD AUTO: 32 % — SIGNIFICANT CHANGE UP (ref 13–44)
MAGNESIUM SERPL-MCNC: 2 MG/DL — SIGNIFICANT CHANGE UP (ref 1.6–2.6)
MCHC RBC-ENTMCNC: 21.9 PG — LOW (ref 27–34)
MCHC RBC-ENTMCNC: 30.3 G/DL — LOW (ref 32–36)
MCV RBC AUTO: 72.1 FL — LOW (ref 80–100)
MONOCYTES NFR BLD AUTO: 18 % — HIGH (ref 2–14)
NEUTROPHILS NFR BLD AUTO: 47 % — SIGNIFICANT CHANGE UP (ref 43–77)
PLATELET # BLD AUTO: 170 K/UL — SIGNIFICANT CHANGE UP (ref 150–400)
POTASSIUM SERPL-MCNC: 3.9 MMOL/L — SIGNIFICANT CHANGE UP (ref 3.5–5.3)
POTASSIUM SERPL-SCNC: 3.9 MMOL/L — SIGNIFICANT CHANGE UP (ref 3.5–5.3)
PROTHROM AB SERPL-ACNC: 12.6 SEC — SIGNIFICANT CHANGE UP (ref 9.8–12.7)
RBC # BLD: 3.66 M/UL — LOW (ref 4.2–5.8)
RBC # FLD: 22.2 % — HIGH (ref 10.3–16.9)
SODIUM SERPL-SCNC: 137 MMOL/L — SIGNIFICANT CHANGE UP (ref 135–145)
VIT B12 SERPL-MCNC: 440 PG/ML — SIGNIFICANT CHANGE UP (ref 232–1245)
WBC # BLD: 4.3 K/UL — SIGNIFICANT CHANGE UP (ref 3.8–10.5)
WBC # FLD AUTO: 4.3 K/UL — SIGNIFICANT CHANGE UP (ref 3.8–10.5)

## 2018-01-17 RX ORDER — POTASSIUM CHLORIDE 20 MEQ
20 PACKET (EA) ORAL ONCE
Qty: 0 | Refills: 0 | Status: COMPLETED | OUTPATIENT
Start: 2018-01-17 | End: 2018-01-17

## 2018-01-17 RX ORDER — SODIUM FERRIC GLUCONAT/SUCROSE 62.5MG/5ML
125 AMPUL (ML) INTRAVENOUS DAILY
Qty: 0 | Refills: 0 | Status: DISCONTINUED | OUTPATIENT
Start: 2018-01-17 | End: 2018-01-26

## 2018-01-17 RX ORDER — WARFARIN SODIUM 2.5 MG/1
10 TABLET ORAL ONCE
Qty: 0 | Refills: 0 | Status: COMPLETED | OUTPATIENT
Start: 2018-01-17 | End: 2018-01-17

## 2018-01-17 RX ORDER — HEPARIN SODIUM 5000 [USP'U]/ML
1000 INJECTION INTRAVENOUS; SUBCUTANEOUS
Qty: 25000 | Refills: 0 | Status: DISCONTINUED | OUTPATIENT
Start: 2018-01-17 | End: 2018-01-22

## 2018-01-17 RX ADMIN — Medication 150 MILLIGRAM(S): at 06:02

## 2018-01-17 RX ADMIN — Medication 1 APPLICATION(S): at 13:36

## 2018-01-17 RX ADMIN — HEPARIN SODIUM 1000 UNIT(S)/HR: 5000 INJECTION INTRAVENOUS; SUBCUTANEOUS at 16:40

## 2018-01-17 RX ADMIN — Medication 104 MILLIGRAM(S): at 00:01

## 2018-01-17 RX ADMIN — LISINOPRIL 5 MILLIGRAM(S): 2.5 TABLET ORAL at 13:37

## 2018-01-17 RX ADMIN — Medication 100 MILLIGRAM(S): at 06:02

## 2018-01-17 RX ADMIN — WARFARIN SODIUM 10 MILLIGRAM(S): 2.5 TABLET ORAL at 21:40

## 2018-01-17 RX ADMIN — Medication 54 MILLIGRAM(S): at 01:53

## 2018-01-17 RX ADMIN — HEPARIN SODIUM 1000 UNIT(S)/HR: 5000 INJECTION INTRAVENOUS; SUBCUTANEOUS at 08:46

## 2018-01-17 RX ADMIN — Medication 55 MILLIGRAM(S): at 22:03

## 2018-01-17 RX ADMIN — HEPARIN SODIUM 1000 UNIT(S)/HR: 5000 INJECTION INTRAVENOUS; SUBCUTANEOUS at 23:08

## 2018-01-17 RX ADMIN — Medication 100 MILLIGRAM(S): at 13:37

## 2018-01-17 RX ADMIN — Medication 1 MILLIGRAM(S): at 13:37

## 2018-01-17 RX ADMIN — Medication 100 MILLIGRAM(S): at 21:40

## 2018-01-17 RX ADMIN — ATORVASTATIN CALCIUM 40 MILLIGRAM(S): 80 TABLET, FILM COATED ORAL at 21:40

## 2018-01-17 RX ADMIN — Medication 20 MILLIEQUIVALENT(S): at 08:38

## 2018-01-17 RX ADMIN — HEPARIN SODIUM 0 UNIT(S)/HR: 5000 INJECTION INTRAVENOUS; SUBCUTANEOUS at 07:39

## 2018-01-17 RX ADMIN — Medication 1 TABLET(S): at 13:37

## 2018-01-17 NOTE — PROGRESS NOTE ADULT - PROBLEM SELECTOR PLAN 5
No sx of withdrawal, CIWA 0, ( On admit Blood Alcohol level 155 and CIWA 8)  - Psych consulted.  Patient completed Librium taper on 1/13.  Continue Ativan 1 mg IV q4h prn CIWA > 8.  - Continue thiamine, folate, MVI  - Pt amenable to Inpatient rehab vs. [facility] outpatient addiction treatment as recommended on D/C per Psych.

## 2018-01-17 NOTE — PROGRESS NOTE ADULT - SUBJECTIVE AND OBJECTIVE BOX
HPI:  62 M former smoker and drug abuser current ETOH abuser (2 pints vodka day) with PMHx CAD s/p MI 2003, most recent PCI at [facility] 7/2016 (JOSHUA to mLAD with residual 50 % OM1), chronic systolic CHF (EF 35% by Echo 9/2017) s/p AICD in 2009 ([employer]) , pafib and LV thrombus (on ASA and Plavix only 2/2 to lower GIB 2016), CKD Stage III (Baseline Cr normal 0.90-1.1), with frequent admissions to [facility] 11/2017 for palpitations in the setting of ETOH use/withdrawal and most recently 12/2017 for ICD firing who presents to [facility] ED 1/6 c/o palpitations that awoke him from sleep this AM. Patient admits to dizziness and nausea this AM but no vomiting or syncope. Patient reports he has been drinking 1-2 pints of vodka for the past few days and has not had an appetite since Sunday. He states he hasn’t really eaten in 3 days and has been drinking water and taking Nutrament twice a day for nutrition. Patient denies C/P, SOB, diaphoresis, syncope, LE edema, PND, orthopnea, fevers, chills. On arrival to ER /60 P 116 RR 18 Temp 98 F O2 sat 98% RA. EKG revealed NSR at 84 bpm with 1 mm ST elevation III, AVF, V1-V3  with TWI V4-V5. Inferior anteroseptal Q waves  (unchanged from prior EKG). Troponin T mildly elevated at 0.03. Labs significant for Cr 1.34, Hyponatremia Na 131, Thrombocytopenia Platelets 129,000, Leukopenia WBC 2.7 AST 58, Blood Alcohol 155.  CXR negative for acute infiltrates. Patient treated with 1 L NS and Ativan 2 mg IV x 1 (for tremors). Patient admitted for further management (06 Jan 2018 17:58)    FAMILY HISTORY:  No pertinent family history in first degree relatives    MEDICATIONS  (STANDING):  atorvastatin 40 milliGRAM(s) Oral at bedtime  BACItracin   Ointment 1 Application(s) Topical daily  folic acid 1 milliGRAM(s) Oral daily  heparin  Infusion. 1000 Unit(s)/Hr (10 mL/Hr) IV Continuous <Continuous>  lisinopril 5 milliGRAM(s) Oral daily  metoprolol succinate  milliGRAM(s) Oral daily  multivitamin 1 Tablet(s) Oral daily  sodium ferric gluconate complex IVPB 125 milliGRAM(s) IV Intermittent daily  thiamine 100 milliGRAM(s) Oral three times a day    MEDICATIONS  (PRN):    Vital Signs Last 24 Hrs  T(C): 36.6 (17 Jan 2018 07:24), Max: 36.6 (17 Jan 2018 07:24)  T(F): 97.9 (17 Jan 2018 07:24), Max: 97.9 (17 Jan 2018 07:24)  HR: 95 (17 Jan 2018 08:41) (74 - 95)  BP: 109/78 (17 Jan 2018 08:41) (106/64 - 127/80)  BP(mean): --  RR: 20 (17 Jan 2018 08:41) (16 - 20)  SpO2: 99% (17 Jan 2018 08:41) (99% - 100%)    Physical exam:    Overall impression  Lymphadenopathy  Liver  spleen    Labs:  CBC Full  -  ( 17 Jan 2018 06:41 )  WBC Count : 4.3 K/uL  Hemoglobin : 8.0 g/dL  Hematocrit : 26.4 %  Platelet Count - Automated : 170 K/uL  Mean Cell Volume : 72.1 fL  Mean Cell Hemoglobin : 21.9 pg  Mean Cell Hemoglobin Concentration : 30.3 g/dL  Auto Neutrophil # : x  Auto Lymphocyte # : x  Auto Monocyte # : x  Auto Eosinophil # : x  Auto Basophil # : x  Auto Neutrophil % : x  Auto Lymphocyte % : x  Auto Monocyte % : x  Auto Eosinophil % : x  Auto Basophil % : x    01-17    137  |  100  |  16  ----------------------------<  98  3.9   |  26  |  1.14    Ca    9.2      17 Jan 2018 06:41  Mg     2.0     01-17        Radiology:  HEALTH ISSUES - R/O PROBLEM Dx:      Assessmant / Problems  1) Higher than usual doses of Coumadin needed 2/2 blood clot in LV absorbtion of the drug  On 7.5 mg Coumadin INR still normal  Plan give 10 mg tonight  2)LV blood clot 2/2 previous MI  3)Early age MI r/o hypercoagulable syndrome  w/u sent out, results pending  4)iron deficiency anemia 2/2 GI bleeding  iv iron started      Thank you  Liana Everett MD

## 2018-01-17 NOTE — PROGRESS NOTE ADULT - PROBLEM SELECTOR PLAN 9
Anemia likely 2/2 chronic disease (Baseline Hgb 8-9, outlier Hgb 13.3 on 1/6/18), Now remains stable.  - Guaiac negative 1/8/18, denies BPBPR, melena, hematuria, hematemesis.  - Continue to monitor CBC while on Heparin/Coumadin.   - Dr. Everett following, labs consistent with LATOYA. IV iron initiated.     DISPO:  Plan for discharge home pending heme recs/INR.

## 2018-01-17 NOTE — PROGRESS NOTE ADULT - ASSESSMENT
Cont coumadin Rx       Rx iron deficiency     Heme follow up appreciated    for 10 mg coumadin tonight

## 2018-01-17 NOTE — PROGRESS NOTE ADULT - ASSESSMENT
63 y/o M Ex-Smoker, current ETOH abuser, w/ PMHx CAD s/p prior MI, s/p prior PCI, Chronic Systolic CHF s/p ICD, paroxysmal Afib and LV thrombus (on ASA and Plavix only 2/2 to lower GI bleed 2016), CKD Stage III, h/o ETOH use/withdrawal, recently admitted for ICD firing in 12/2017 who returns c/o palpitations in the setting of ETOH use, w/ Echo revealing increased size of LV thrombus, started on Heparin/Coumadin bridge awaiting therapeutic INR prior to discharge.

## 2018-01-17 NOTE — PROGRESS NOTE ADULT - PROBLEM SELECTOR PLAN 1
Remains NSR  - Continue heparin gtt w/ Coumadin Bridge for increasing LV thrombus. INR Subtherapeutic 1.13.  (Previously not on A/C due to Hx of UGI bleed, ETOH use and non compliance)   - Continue Toprol XL 100mg QD  - ICD interrogated by EP 1/8/18: The patient has not had any ATP or shocks since 12/24/17. The check was otherwise normal. He is V-pacing <1% of the time. Underlying sinus rhythm with paroxysmal Afib.   - Dr. Ramos aware and has no further recommendations.

## 2018-01-17 NOTE — PROGRESS NOTE ADULT - PROBLEM SELECTOR PLAN 3
- INR persistently subtherapeutic despite Coumadin dosing x 6 days. Appears to not responding normal to Coumadin.  - Will give Coumadin 10mg PO x 1 tonight. Monitor INR in AM.   - Dr. Everett following. LV clot is absorbing the Coumadin and will likely require longer bridging.  - Hypercoagulable workup sent  - Labs consistent with LATOYA, IV iron initiated

## 2018-01-18 LAB
ANION GAP SERPL CALC-SCNC: 10 MMOL/L — SIGNIFICANT CHANGE UP (ref 5–17)
APTT BLD: 102.8 SEC — HIGH (ref 27.5–37.4)
APTT BLD: 57.1 SEC — HIGH (ref 27.5–37.4)
APTT BLD: 74.4 SEC — HIGH (ref 27.5–37.4)
AT III ACT/NOR PPP CHRO: 67 % — LOW (ref 85–135)
AT III AG PPP IA-MCNC: 20 MG/DL — LOW (ref 22–39)
BASOPHILS NFR BLD AUTO: 1.3 % — SIGNIFICANT CHANGE UP (ref 0–2)
BUN SERPL-MCNC: 18 MG/DL — SIGNIFICANT CHANGE UP (ref 7–23)
CALCIUM SERPL-MCNC: 9.5 MG/DL — SIGNIFICANT CHANGE UP (ref 8.4–10.5)
CHLORIDE SERPL-SCNC: 99 MMOL/L — SIGNIFICANT CHANGE UP (ref 96–108)
CO2 SERPL-SCNC: 26 MMOL/L — SIGNIFICANT CHANGE UP (ref 22–31)
CREAT SERPL-MCNC: 1.33 MG/DL — HIGH (ref 0.5–1.3)
EOSINOPHIL NFR BLD AUTO: 2.2 % — SIGNIFICANT CHANGE UP (ref 0–6)
GLUCOSE SERPL-MCNC: 95 MG/DL — SIGNIFICANT CHANGE UP (ref 70–99)
HCT VFR BLD CALC: 28.3 % — LOW (ref 39–50)
HGB BLD-MCNC: 8.4 G/DL — LOW (ref 13–17)
INR BLD: 1.2 — HIGH (ref 0.88–1.16)
LYMPHOCYTES # BLD AUTO: 35.5 % — SIGNIFICANT CHANGE UP (ref 13–44)
MAGNESIUM SERPL-MCNC: 2.1 MG/DL — SIGNIFICANT CHANGE UP (ref 1.6–2.6)
MCHC RBC-ENTMCNC: 21.8 PG — LOW (ref 27–34)
MCHC RBC-ENTMCNC: 29.7 G/DL — LOW (ref 32–36)
MCV RBC AUTO: 73.3 FL — LOW (ref 80–100)
MONOCYTES NFR BLD AUTO: 15.1 % — HIGH (ref 2–14)
NEUTROPHILS NFR BLD AUTO: 45.9 % — SIGNIFICANT CHANGE UP (ref 43–77)
PLATELET # BLD AUTO: 194 K/UL — SIGNIFICANT CHANGE UP (ref 150–400)
POTASSIUM SERPL-MCNC: 4.4 MMOL/L — SIGNIFICANT CHANGE UP (ref 3.5–5.3)
POTASSIUM SERPL-SCNC: 4.4 MMOL/L — SIGNIFICANT CHANGE UP (ref 3.5–5.3)
PROT C ACT/NOR PPP: 78 % — SIGNIFICANT CHANGE UP (ref 74–150)
PROT S FREE AG PPP IA-ACNC: 93 % — SIGNIFICANT CHANGE UP (ref 67–141)
PROTHROM AB SERPL-ACNC: 13.4 SEC — HIGH (ref 9.8–12.7)
RBC # BLD: 3.86 M/UL — LOW (ref 4.2–5.8)
RBC # FLD: 21.8 % — HIGH (ref 10.3–16.9)
SODIUM SERPL-SCNC: 135 MMOL/L — SIGNIFICANT CHANGE UP (ref 135–145)
WBC # BLD: 4.6 K/UL — SIGNIFICANT CHANGE UP (ref 3.8–10.5)
WBC # FLD AUTO: 4.6 K/UL — SIGNIFICANT CHANGE UP (ref 3.8–10.5)

## 2018-01-18 PROCEDURE — 99233 SBSQ HOSP IP/OBS HIGH 50: CPT

## 2018-01-18 RX ORDER — WARFARIN SODIUM 2.5 MG/1
12 TABLET ORAL ONCE
Qty: 0 | Refills: 0 | Status: COMPLETED | OUTPATIENT
Start: 2018-01-18 | End: 2018-01-18

## 2018-01-18 RX ORDER — PREGABALIN 225 MG/1
1000 CAPSULE ORAL DAILY
Qty: 0 | Refills: 0 | Status: DISCONTINUED | OUTPATIENT
Start: 2018-01-18 | End: 2018-01-26

## 2018-01-18 RX ADMIN — PREGABALIN 1000 MICROGRAM(S): 225 CAPSULE ORAL at 17:00

## 2018-01-18 RX ADMIN — Medication 1 APPLICATION(S): at 11:21

## 2018-01-18 RX ADMIN — Medication 150 MILLIGRAM(S): at 06:49

## 2018-01-18 RX ADMIN — HEPARIN SODIUM 800 UNIT(S)/HR: 5000 INJECTION INTRAVENOUS; SUBCUTANEOUS at 16:59

## 2018-01-18 RX ADMIN — WARFARIN SODIUM 12 MILLIGRAM(S): 2.5 TABLET ORAL at 21:23

## 2018-01-18 RX ADMIN — Medication 100 MILLIGRAM(S): at 21:22

## 2018-01-18 RX ADMIN — Medication 1 TABLET(S): at 11:19

## 2018-01-18 RX ADMIN — LISINOPRIL 5 MILLIGRAM(S): 2.5 TABLET ORAL at 06:49

## 2018-01-18 RX ADMIN — Medication 100 MILLIGRAM(S): at 14:16

## 2018-01-18 RX ADMIN — HEPARIN SODIUM 800 UNIT(S)/HR: 5000 INJECTION INTRAVENOUS; SUBCUTANEOUS at 08:39

## 2018-01-18 RX ADMIN — Medication 55 MILLIGRAM(S): at 22:06

## 2018-01-18 RX ADMIN — Medication 100 MILLIGRAM(S): at 06:49

## 2018-01-18 RX ADMIN — Medication 1 MILLIGRAM(S): at 11:19

## 2018-01-18 RX ADMIN — ATORVASTATIN CALCIUM 40 MILLIGRAM(S): 80 TABLET, FILM COATED ORAL at 21:22

## 2018-01-18 RX ADMIN — HEPARIN SODIUM 1000 UNIT(S)/HR: 5000 INJECTION INTRAVENOUS; SUBCUTANEOUS at 23:50

## 2018-01-18 NOTE — PROGRESS NOTE ADULT - SUBJECTIVE AND OBJECTIVE BOX
HPI:  62 M former smoker and drug abuser current ETOH abuser (2 pints vodka day) with PMHx CAD s/p MI 2003, most recent PCI at [facility] 7/2016 (JOSHUA to mLAD with residual 50 % OM1), chronic systolic CHF (EF 35% by Echo 9/2017) s/p AICD in 2009 ([employer]) , pafib and LV thrombus (on ASA and Plavix only 2/2 to lower GIB 2016), CKD Stage III (Baseline Cr normal 0.90-1.1), with frequent admissions to [facility] 11/2017 for palpitations in the setting of ETOH use/withdrawal and most recently 12/2017 for ICD firing who presents to [facility] ED 1/6 c/o palpitations that awoke him from sleep this AM. Patient admits to dizziness and nausea this AM but no vomiting or syncope. Patient reports he has been drinking 1-2 pints of vodka for the past few days and has not had an appetite since Sunday. He states he hasn’t really eaten in 3 days and has been drinking water and taking Nutrament twice a day for nutrition. Patient denies C/P, SOB, diaphoresis, syncope, LE edema, PND, orthopnea, fevers, chills. On arrival to ER /60 P 116 RR 18 Temp 98 F O2 sat 98% RA. EKG revealed NSR at 84 bpm with 1 mm ST elevation III, AVF, V1-V3  with TWI V4-V5. Inferior anteroseptal Q waves  (unchanged from prior EKG). Troponin T mildly elevated at 0.03. Labs significant for Cr 1.34, Hyponatremia Na 131, Thrombocytopenia Platelets 129,000, Leukopenia WBC 2.7 AST 58, Blood Alcohol 155.  CXR negative for acute infiltrates. Patient treated with 1 L NS and Ativan 2 mg IV x 1 (for tremors). Patient admitted for further management (06 Jan 2018 17:58)    FAMILY HISTORY:  No pertinent family history in first degree relatives    MEDICATIONS  (STANDING):  atorvastatin 40 milliGRAM(s) Oral at bedtime  BACItracin   Ointment 1 Application(s) Topical daily  cyanocobalamin Injectable 1000 MICROGram(s) SubCutaneous daily  folic acid 1 milliGRAM(s) Oral daily  heparin  Infusion. 1000 Unit(s)/Hr (10 mL/Hr) IV Continuous <Continuous>  lisinopril 5 milliGRAM(s) Oral daily  metoprolol succinate  milliGRAM(s) Oral daily  multivitamin 1 Tablet(s) Oral daily  sodium ferric gluconate complex IVPB 125 milliGRAM(s) IV Intermittent daily  thiamine 100 milliGRAM(s) Oral three times a day  warfarin 12 milliGRAM(s) Oral once    MEDICATIONS  (PRN):    Vital Signs Last 24 Hrs  T(C): 36.6 (18 Jan 2018 08:38), Max: 37.1 (17 Jan 2018 18:00)  T(F): 97.8 (18 Jan 2018 08:38), Max: 98.7 (17 Jan 2018 18:00)  HR: 80 (18 Jan 2018 08:38) (63 - 108)  BP: 105/57 (18 Jan 2018 08:38) (103/56 - 116/80)  BP(mean): 90 (17 Jan 2018 16:41) (90 - 94)  RR: 19 (18 Jan 2018 08:38) (16 - 19)  SpO2: 98% (18 Jan 2018 06:42) (98% - 100%)    Physical exam:    Overall impression  Lymphadenopathy  Liver  spleen    Labs:  CBC Full  -  ( 18 Jan 2018 07:27 )  WBC Count : 4.6 K/uL  Hemoglobin : 8.4 g/dL  Hematocrit : 28.3 %  Platelet Count - Automated : 194 K/uL  Mean Cell Volume : 73.3 fL  Mean Cell Hemoglobin : 21.8 pg  Mean Cell Hemoglobin Concentration : 29.7 g/dL  Auto Neutrophil # : x  Auto Lymphocyte # : x  Auto Monocyte # : x  Auto Eosinophil # : x  Auto Basophil # : x  Auto Neutrophil % : 45.9 %  Auto Lymphocyte % : 35.5 %  Auto Monocyte % : 15.1 %  Auto Eosinophil % : 2.2 %  Auto Basophil % : 1.3 %    01-18    135  |  99  |  18  ----------------------------<  95  4.4   |  26  |  1.33<H>    Ca    9.5      18 Jan 2018 07:27  Mg     2.1     01-18        Radiology:  HEALTH ISSUES - R/O PROBLEM Dx:      Assessmant / Problems  1) Difficult to anticoagulate 2/2 blood clot absorbtion of anticoagulant  On 10 mg Coumadin INR 1.2  Plan Coumadin 12 mg tonight  2) Hypercoagulable syndrome as the cause of early personal and family h/o MI : excess factor Vlll ( 200 )  3) LV clot increased 2/2 h/o MI and 2)  4) iron deficiency anemia 2/2 colonic polyp   continue iv iron        Thank you  Liana Everett MD

## 2018-01-18 NOTE — PROGRESS NOTE ADULT - PROBLEM SELECTOR PLAN 5
No sx of withdrawal, CIWA 0, ( On admit Blood Alcohol level 155 and CIWA 8)  - Psych consulted.  Patient completed Librium taper on 1/13.  Continue Ativan 1 mg IV q4h prn CIWA > 8.  - Continue thiamine, folate, MVI  - Pt amenable to Inpatient rehab vs. [facility] outpatient addiction treatment as recommended on D/C per Psych.

## 2018-01-18 NOTE — PROGRESS NOTE ADULT - PROBLEM SELECTOR PLAN 1
Remains NSR  - Continue heparin gtt w/ Coumadin Bridge for increasing LV thrombus. INR Subtherapeutic 1.20.  (Previously not on A/C due to Hx of UGI bleed, ETOH use and non compliance)  Coumadin 12 mg PO x 1 tonight.   - Continue Toprol XL 150mg PO Daily  - ICD interrogated by EP 1/8/18: The patient has not had any ATP or shocks since 12/24/17. The check was otherwise normal. He is V-pacing <1% of the time. Underlying sinus rhythm with paroxysmal Afib.   - Dr. Ramos aware and has no further recommendations.

## 2018-01-18 NOTE — PROGRESS NOTE ADULT - PROBLEM SELECTOR PLAN 9
Anemia likely 2/2 chronic disease (Baseline Hgb 8-9, outlier Hgb 13.3 on 1/6/18), Now remains stable.  - Guaiac negative 1/8/18, denies BPBPR, melena, hematuria, hematemesis.  - Continue to monitor CBC while on Heparin/Coumadin.   - Dr. Everett following, labs consistent with Iron Deficiency Anemia. IV iron initiated. Vitamin B12 and Folate normal. Patient on Vitamin B12 1000 mcg Sub Q Daily    DISPO:  Plan for discharge home pending heme recs/INR.

## 2018-01-18 NOTE — PROGRESS NOTE ADULT - SUBJECTIVE AND OBJECTIVE BOX
Pt is stable clinically no chest pain or dyspnea    PAST MEDICAL & SURGICAL HISTORY:  Pacemaker  ETOH abuse  Paroxysmal a-fib  Myocardial infarction involving other coronary artery  Gastritis  Atherosclerosis of coronary artery: Coronary artery disease  Automatic implantable cardiac defibrillator in situ: ICD (implantable cardioverter-defibrillator) in place    MEDICATIONS  (STANDING):  atorvastatin 40 milliGRAM(s) Oral at bedtime  BACItracin   Ointment 1 Application(s) Topical daily  folic acid 1 milliGRAM(s) Oral daily  heparin  Infusion. 1000 Unit(s)/Hr (10 mL/Hr) IV Continuous <Continuous>  lisinopril 5 milliGRAM(s) Oral daily  metoprolol succinate  milliGRAM(s) Oral daily  multivitamin 1 Tablet(s) Oral daily  sodium ferric gluconate complex IVPB 125 milliGRAM(s) IV Intermittent daily  thiamine 100 milliGRAM(s) Oral three times a day    MEDICATIONS  (PRN):    ICU Vital Signs Last 24 Hrs  T(C): 36.1 (18 Jan 2018 05:37), Max: 37.1 (17 Jan 2018 18:00)  T(F): 97 (18 Jan 2018 05:37), Max: 98.7 (17 Jan 2018 18:00)  HR: 80 (18 Jan 2018 08:38) (63 - 108)  BP: 105/57 (18 Jan 2018 08:38) (103/56 - 116/80)  BP(mean): 90 (17 Jan 2018 16:41) (90 - 94)  ABP: --  ABP(mean): --  RR: 19 (18 Jan 2018 08:38) (16 - 19)  SpO2: 98% (18 Jan 2018 06:42) (98% - 100%)    Lungs clear   CV s1 s2  Abd soft   Ext stable                          8.4    4.6   )-----------( 194      ( 18 Jan 2018 07:27 )             28.3   01-18    135  |  99  |  18  ----------------------------<  95  4.4   |  26  |  1.33<H>    Ca    9.5      18 Jan 2018 07:27  Mg     2.1     01-18    PT/INR - ( 18 Jan 2018 07:27 )   PT: 13.4 sec;   INR: 1.20          PTT - ( 18 Jan 2018 07:27 )  PTT:102.8 sec

## 2018-01-18 NOTE — PROGRESS NOTE ADULT - SUBJECTIVE AND OBJECTIVE BOX
Interventional Cardiology PA Adult Progress Note    Subjective Assessment: Patient seen and examined at bedside and has no complaints.  	  MEDICATIONS:  lisinopril 5 milliGRAM(s) Oral daily  metoprolol succinate  milliGRAM(s) Oral daily  atorvastatin 40 milliGRAM(s) Oral at bedtime  BACItracin   Ointment 1 Application(s) Topical daily  cyanocobalamin Injectable 1000 MICROGram(s) SubCutaneous daily  folic acid 1 milliGRAM(s) Oral daily  heparin  Infusion. 1000 Unit(s)/Hr IV Continuous <Continuous>  multivitamin 1 Tablet(s) Oral daily  sodium ferric gluconate complex IVPB 125 milliGRAM(s) IV Intermittent daily  thiamine 100 milliGRAM(s) Oral three times a day  warfarin 12 milliGRAM(s) Oral once      	    [PHYSICAL EXAM:  TELEMETRY:  T(C): 36.6 (01-18-18 @ 13:43), Max: 37.1 (01-17-18 @ 18:00)  HR: 72 (01-18-18 @ 11:22) (63 - 108)  BP: 118/72 (01-18-18 @ 11:22) (103/56 - 118/72)  RR: 17 (01-18-18 @ 11:22) (16 - 19)  SpO2: 98% (01-18-18 @ 06:42) (98% - 100%)  Wt(kg): --  I&O's Summary    17 Jan 2018 07:01  -  18 Jan 2018 07:00  --------------------------------------------------------  IN: 1516 mL / OUT: 1100 mL / NET: 416 mL    18 Jan 2018 07:01  -  18 Jan 2018 17:31  --------------------------------------------------------  IN: 600 mL / OUT: 775 mL / NET: -175 mL        Duffy: No  Central/PICC/Mid Line:  No                                      Appearance: Normal	  HEENT:   Normal oral mucosa, PERRL, EOMI	  Neck: Supple. No JVD.   Cardiovascular: Normal S1 S2, No JVD, No murmurs,   Respiratory: Lungs clear to auscultation/Decreased Breath Sounds/No Rales, Rhonchi, Wheezing	  Gastrointestinal:  Soft, Non-tender, + BS	  Skin: No rashes, No ecchymoses, No cyanosis  Extremities: Normal range of motion, No clubbing, cyanosis or edema  Vascular: Peripheral pulses palpable 2+ bilaterally  Neurologic: Non-focal  Psychiatry: A & O x 3, Mood & affect appropriate      	    ECG:  	  RADIOLOGY:   DIAGNOSTIC TESTING:  [ ] Echocardiogram:  [ ]  Catheterization:  [ ] Stress Test:    [ ] MERLYN  OTHER: 	    LABS:	 	  CARDIAC MARKERS:                                  8.4    4.6   )-----------( 194      ( 18 Jan 2018 07:27 )             28.3     01-18    135  |  99  |  18  ----------------------------<  95  4.4   |  26  |  1.33<H>    Ca    9.5      18 Jan 2018 07:27  Mg     2.1     01-18      proBNP:   Lipid Profile:   HgA1c:   TSH:   PT/INR - ( 18 Jan 2018 07:27 )   PT: 13.4 sec;   INR: 1.20          PTT - ( 18 Jan 2018 15:41 )  PTT:74.4 sec    ASSESSMENT/PLAN: 	        DVT ppx:  Dispo: Interventional Cardiology PA Adult Progress Note    Subjective Assessment: Patient seen and examined at bedside and has no complaints.  	  MEDICATIONS:  lisinopril 5 milliGRAM(s) Oral daily  metoprolol succinate  milliGRAM(s) Oral daily  atorvastatin 40 milliGRAM(s) Oral at bedtime  BACItracin   Ointment 1 Application(s) Topical daily  cyanocobalamin Injectable 1000 MICROGram(s) SubCutaneous daily  folic acid 1 milliGRAM(s) Oral daily  heparin  Infusion. 1000 Unit(s)/Hr IV Continuous <Continuous>  multivitamin 1 Tablet(s) Oral daily  sodium ferric gluconate complex IVPB 125 milliGRAM(s) IV Intermittent daily  thiamine 100 milliGRAM(s) Oral three times a day  warfarin 12 milliGRAM(s) Oral once      	    [PHYSICAL EXAM:  TELEMETRY:  T(C): 36.6 (01-18-18 @ 13:43), Max: 37.1 (01-17-18 @ 18:00)  HR: 72 (01-18-18 @ 11:22) (63 - 108)  BP: 118/72 (01-18-18 @ 11:22) (103/56 - 118/72)  RR: 17 (01-18-18 @ 11:22) (16 - 19)  SpO2: 98% (01-18-18 @ 06:42) (98% - 100%)  Wt(kg): --  I&O's Summary    17 Jan 2018 07:01  -  18 Jan 2018 07:00  --------------------------------------------------------  IN: 1516 mL / OUT: 1100 mL / NET: 416 mL    18 Jan 2018 07:01  -  18 Jan 2018 17:31  --------------------------------------------------------  IN: 600 mL / OUT: 775 mL / NET: -175 mL        Duffy: No  Central/PICC/Mid Line:  No                                        General : Normal. NAD.  Neck: Supple,- JVD;   Cardiovascular: Normal S1 S2. RRR No murmurs, rubs or gallops  Respiratory: CTA Bilaterally. No rhonchi, wheezes, rales  Gastrointestinal:  BS x 4. Soft NT/ND  Extremities: Normal range of motion, No clubbing, cyanosis or edema ble   Neurologic: Non-focal  Psychiatry: A & O x 3, Mood & affect appropriate	  	  	    ECG:  	  RADIOLOGY:   DIAGNOSTIC TESTING:  [ ] Echocardiogram:  [ ]  Catheterization:  [ ] Stress Test:    [ ] MERLYN  OTHER: 	    LABS:	 	  CARDIAC MARKERS:                                  8.4    4.6   )-----------( 194      ( 18 Jan 2018 07:27 )             28.3     01-18    135  |  99  |  18  ----------------------------<  95  4.4   |  26  |  1.33<H>    Ca    9.5      18 Jan 2018 07:27  Mg     2.1     01-18      proBNP:   Lipid Profile:   HgA1c:   TSH:   PT/INR - ( 18 Jan 2018 07:27 )   PT: 13.4 sec;   INR: 1.20          PTT - ( 18 Jan 2018 15:41 )  PTT:74.4 sec

## 2018-01-18 NOTE — PROGRESS NOTE BEHAVIORAL HEALTH - NSBHFUPINTERVALHXFT_PSY_A_CORE
Patient seen at bedside.  Awaiting therapeutic INR.  Reports he is bored.  Continues to make plans for discharge.  Son and family moving in; will participate in Jehovah's witness and work with brother.  Will schedule appointment for outaptient addiction counseling when knows discharge date.

## 2018-01-18 NOTE — PROGRESS NOTE ADULT - PROBLEM SELECTOR PLAN 2
- Echo 1/8/18: EF 35%, LVH, apical and mid interventricular septum, apical anterior, apical inferior are all akinetic.  The true apex appears dyskinetic.  There is an apical LV thrombus measuring 2 x 1.7 cm. LV clot increased from 1cm in 9/2017.  - Due to increased size of LV thrombus started on A/C.  Continue Heparin gtt and Coumadin Bridge, INR 1.20 today. Pt to receive Coumadin 12 mg PO x 1 tonight.   - Patient is not a candidate for Lovenox/Coumadin bridge per Dr. Alexis  - No GI consult at this time as per Dr. Levy for clearance for anticoagulation given h/o GI bleeding.

## 2018-01-18 NOTE — PROGRESS NOTE ADULT - PROBLEM SELECTOR PLAN 3
- INR persistently subtherapeutic despite Coumadin dosing x 6 days. Appears to not responding normal to Coumadin.  - Will give Coumadin 12 mg PO x 1 tonight. Monitor INR in AM.   - Dr. Everett following. LV clot is absorbing the Coumadin and will likely require longer bridging.  - Hypercoagulable workup sent  - Labs consistent with  Iron Deficiency Anemia, IV iron initiated. - INR persistently subtherapeutic despite Coumadin dosing x 7 days. Appears to not responding normal to Coumadin.  - Will give Coumadin 12 mg PO x 1 tonight. Monitor INR in AM.   - Dr. Everett following. LV clot is absorbing the Coumadin and will likely require longer bridging.  - Hypercoagulable workup sent  - Labs consistent with  Iron Deficiency Anemia, IV iron initiated.

## 2018-01-19 LAB
ANION GAP SERPL CALC-SCNC: 10 MMOL/L — SIGNIFICANT CHANGE UP (ref 5–17)
APTT BLD: 101.3 SEC — HIGH (ref 27.5–37.4)
APTT BLD: 102.3 SEC — HIGH (ref 27.5–37.4)
APTT BLD: 45 SEC — HIGH (ref 27.5–37.4)
BASOPHILS NFR BLD AUTO: 1.5 % — SIGNIFICANT CHANGE UP (ref 0–2)
BUN SERPL-MCNC: 17 MG/DL — SIGNIFICANT CHANGE UP (ref 7–23)
CALCIUM SERPL-MCNC: 9.5 MG/DL — SIGNIFICANT CHANGE UP (ref 8.4–10.5)
CHLORIDE SERPL-SCNC: 102 MMOL/L — SIGNIFICANT CHANGE UP (ref 96–108)
CO2 SERPL-SCNC: 25 MMOL/L — SIGNIFICANT CHANGE UP (ref 22–31)
CONFIRM APTT STACLOT: NEGATIVE — SIGNIFICANT CHANGE UP
CREAT SERPL-MCNC: 1.32 MG/DL — HIGH (ref 0.5–1.3)
DRVVT SCREEN TO CONFIRM RATIO: SIGNIFICANT CHANGE UP
EOSINOPHIL NFR BLD AUTO: 2.1 % — SIGNIFICANT CHANGE UP (ref 0–6)
GLUCOSE SERPL-MCNC: 107 MG/DL — HIGH (ref 70–99)
HCT VFR BLD CALC: 26.8 % — LOW (ref 39–50)
HEMOGLOBIN INTERPRETATION: SIGNIFICANT CHANGE UP
HGB A MFR BLD: 98.2 % — HIGH (ref 95.8–98)
HGB A2 MFR BLD: 1.8 % — LOW (ref 2–3.2)
HGB BLD-MCNC: 8.2 G/DL — LOW (ref 13–17)
INR BLD: 1.26 — HIGH (ref 0.88–1.16)
LA NT DPL PPP QL: 22.9 SEC — SIGNIFICANT CHANGE UP
LYMPHOCYTES # BLD AUTO: 34 % — SIGNIFICANT CHANGE UP (ref 13–44)
MAGNESIUM SERPL-MCNC: 1.9 MG/DL — SIGNIFICANT CHANGE UP (ref 1.6–2.6)
MCHC RBC-ENTMCNC: 21.9 PG — LOW (ref 27–34)
MCHC RBC-ENTMCNC: 30.6 G/DL — LOW (ref 32–36)
MCV RBC AUTO: 71.5 FL — LOW (ref 80–100)
MONOCYTES NFR BLD AUTO: 12.4 % — SIGNIFICANT CHANGE UP (ref 2–14)
NEUTROPHILS NFR BLD AUTO: 50 % — SIGNIFICANT CHANGE UP (ref 43–77)
PLATELET # BLD AUTO: 209 K/UL — SIGNIFICANT CHANGE UP (ref 150–400)
POTASSIUM SERPL-MCNC: 4.2 MMOL/L — SIGNIFICANT CHANGE UP (ref 3.5–5.3)
POTASSIUM SERPL-SCNC: 4.2 MMOL/L — SIGNIFICANT CHANGE UP (ref 3.5–5.3)
PROTHROM AB SERPL-ACNC: 14 SEC — HIGH (ref 9.8–12.7)
RBC # BLD: 3.75 M/UL — LOW (ref 4.2–5.8)
RBC # FLD: 22.1 % — HIGH (ref 10.3–16.9)
SODIUM SERPL-SCNC: 137 MMOL/L — SIGNIFICANT CHANGE UP (ref 135–145)
WBC # BLD: 5.2 K/UL — SIGNIFICANT CHANGE UP (ref 3.8–10.5)
WBC # FLD AUTO: 5.2 K/UL — SIGNIFICANT CHANGE UP (ref 3.8–10.5)

## 2018-01-19 RX ORDER — HEPARIN SODIUM 5000 [USP'U]/ML
3000 INJECTION INTRAVENOUS; SUBCUTANEOUS EVERY 6 HOURS
Qty: 0 | Refills: 0 | Status: DISCONTINUED | OUTPATIENT
Start: 2018-01-19 | End: 2018-01-22

## 2018-01-19 RX ORDER — WARFARIN SODIUM 2.5 MG/1
14 TABLET ORAL ONCE
Qty: 0 | Refills: 0 | Status: COMPLETED | OUTPATIENT
Start: 2018-01-19 | End: 2018-01-19

## 2018-01-19 RX ORDER — HEPARIN SODIUM 5000 [USP'U]/ML
6500 INJECTION INTRAVENOUS; SUBCUTANEOUS ONCE
Qty: 0 | Refills: 0 | Status: DISCONTINUED | OUTPATIENT
Start: 2018-01-19 | End: 2018-01-19

## 2018-01-19 RX ORDER — HEPARIN SODIUM 5000 [USP'U]/ML
6500 INJECTION INTRAVENOUS; SUBCUTANEOUS EVERY 6 HOURS
Qty: 0 | Refills: 0 | Status: DISCONTINUED | OUTPATIENT
Start: 2018-01-19 | End: 2018-01-22

## 2018-01-19 RX ORDER — SODIUM CHLORIDE 9 MG/ML
1000 INJECTION INTRAMUSCULAR; INTRAVENOUS; SUBCUTANEOUS
Qty: 0 | Refills: 0 | Status: DISCONTINUED | OUTPATIENT
Start: 2018-01-19 | End: 2018-01-23

## 2018-01-19 RX ORDER — MAGNESIUM OXIDE 400 MG ORAL TABLET 241.3 MG
400 TABLET ORAL ONCE
Qty: 0 | Refills: 0 | Status: COMPLETED | OUTPATIENT
Start: 2018-01-19 | End: 2018-01-19

## 2018-01-19 RX ADMIN — WARFARIN SODIUM 14 MILLIGRAM(S): 2.5 TABLET ORAL at 21:03

## 2018-01-19 RX ADMIN — Medication 100 MILLIGRAM(S): at 06:41

## 2018-01-19 RX ADMIN — HEPARIN SODIUM 3000 UNIT(S): 5000 INJECTION INTRAVENOUS; SUBCUTANEOUS at 14:46

## 2018-01-19 RX ADMIN — Medication 55 MILLIGRAM(S): at 22:06

## 2018-01-19 RX ADMIN — ATORVASTATIN CALCIUM 40 MILLIGRAM(S): 80 TABLET, FILM COATED ORAL at 21:03

## 2018-01-19 RX ADMIN — Medication 150 MILLIGRAM(S): at 06:40

## 2018-01-19 RX ADMIN — HEPARIN SODIUM 800 UNIT(S)/HR: 5000 INJECTION INTRAVENOUS; SUBCUTANEOUS at 07:15

## 2018-01-19 RX ADMIN — Medication 100 MILLIGRAM(S): at 21:03

## 2018-01-19 RX ADMIN — Medication 1 MILLIGRAM(S): at 12:05

## 2018-01-19 RX ADMIN — PREGABALIN 1000 MICROGRAM(S): 225 CAPSULE ORAL at 12:05

## 2018-01-19 RX ADMIN — Medication 1 APPLICATION(S): at 12:06

## 2018-01-19 RX ADMIN — Medication 100 MILLIGRAM(S): at 13:06

## 2018-01-19 RX ADMIN — HEPARIN SODIUM 800 UNIT(S)/HR: 5000 INJECTION INTRAVENOUS; SUBCUTANEOUS at 21:06

## 2018-01-19 RX ADMIN — HEPARIN SODIUM 1000 UNIT(S)/HR: 5000 INJECTION INTRAVENOUS; SUBCUTANEOUS at 14:45

## 2018-01-19 RX ADMIN — Medication 1 TABLET(S): at 12:05

## 2018-01-19 RX ADMIN — LISINOPRIL 5 MILLIGRAM(S): 2.5 TABLET ORAL at 06:40

## 2018-01-19 RX ADMIN — MAGNESIUM OXIDE 400 MG ORAL TABLET 400 MILLIGRAM(S): 241.3 TABLET ORAL at 08:59

## 2018-01-19 RX ADMIN — SODIUM CHLORIDE 50 MILLILITER(S): 9 INJECTION INTRAMUSCULAR; INTRAVENOUS; SUBCUTANEOUS at 09:00

## 2018-01-19 NOTE — PROGRESS NOTE ADULT - SUBJECTIVE AND OBJECTIVE BOX
Pt is stable     PAST MEDICAL & SURGICAL HISTORY:  Pacemaker  ETOH abuse  Paroxysmal a-fib  Myocardial infarction involving other coronary artery  Gastritis  Atherosclerosis of coronary artery: Coronary artery disease  Automatic implantable cardiac defibrillator in situ: ICD (implantable cardioverter-defibrillator) in place    MEDICATIONS  (STANDING):  atorvastatin 40 milliGRAM(s) Oral at bedtime  BACItracin   Ointment 1 Application(s) Topical daily  cyanocobalamin Injectable 1000 MICROGram(s) SubCutaneous daily  folic acid 1 milliGRAM(s) Oral daily  heparin  Infusion. 1000 Unit(s)/Hr (10 mL/Hr) IV Continuous <Continuous>  lisinopril 5 milliGRAM(s) Oral daily  magnesium oxide 400 milliGRAM(s) Oral once  metoprolol succinate  milliGRAM(s) Oral daily  multivitamin 1 Tablet(s) Oral daily  sodium chloride 0.9%. 1000 milliLiter(s) (50 mL/Hr) IV Continuous <Continuous>  sodium ferric gluconate complex IVPB 125 milliGRAM(s) IV Intermittent daily  thiamine 100 milliGRAM(s) Oral three times a day    MEDICATIONS  (PRN):    ICU Vital Signs Last 24 Hrs  T(C): 36.4 (19 Jan 2018 06:20), Max: 36.9 (18 Jan 2018 22:12)  T(F): 97.5 (19 Jan 2018 06:20), Max: 98.5 (18 Jan 2018 22:12)  HR: 62 (19 Jan 2018 06:37) (62 - 73)  BP: 103/57 (19 Jan 2018 06:37) (100/56 - 118/72)  BP(mean): --  ABP: --  ABP(mean): --  RR: 16 (19 Jan 2018 06:37) (16 - 17)  SpO2: 97% (19 Jan 2018 06:37) (95% - 97%)        Lungs clear  Cv s1 s2  Abd soft  Ext stable                          8.2    5.2   )-----------( 209      ( 19 Jan 2018 06:17 )             26.8   01-19    137  |  102  |  17  ----------------------------<  107<H>  4.2   |  25  |  1.32<H>    Ca    9.5      19 Jan 2018 06:17  Mg     1.9     01-19    PT/INR - ( 19 Jan 2018 06:17 )   PT: 14.0 sec;   INR: 1.26          PTT - ( 19 Jan 2018 06:17 )  PTT:102.3 sec

## 2018-01-19 NOTE — PROGRESS NOTE ADULT - PROBLEM SELECTOR PLAN 1
Remains NSR  - Continue heparin gtt w/ Coumadin Bridge for increasing LV thrombus. INR Subtherapeutic 1.26.  (Previously not on A/C due to Hx of UGI bleed, ETOH use and non compliance)  Coumadin 14 mg PO x 1 tonight per Heme Dr. Everett  - Continue Toprol XL 150mg PO Daily  - ICD interrogated by EP 1/8/18: The patient has not had any ATP or shocks since 12/24/17. The check was otherwise normal. He is V-pacing <1% of the time. Underlying sinus rhythm with paroxysmal Afib.   - Dr. Ramos aware and has no further recommendations.

## 2018-01-19 NOTE — PROGRESS NOTE ADULT - SUBJECTIVE AND OBJECTIVE BOX
Interventional Cardiology PA Adult Progress Note    Subjective Assessment: Patient seen and examined at bedside and has no complaints.   	  MEDICATIONS:  lisinopril 5 milliGRAM(s) Oral daily  metoprolol succinate  milliGRAM(s) Oral daily  atorvastatin 40 milliGRAM(s) Oral at bedtime    BACItracin   Ointment 1 Application(s) Topical daily  cyanocobalamin Injectable 1000 MICROGram(s) SubCutaneous daily  folic acid 1 milliGRAM(s) Oral daily  heparin  Infusion. 1000 Unit(s)/Hr IV Continuous <Continuous>  heparin  Injectable 6500 Unit(s) IV Push once  heparin  Injectable 6500 Unit(s) IV Push every 6 hours PRN  heparin  Injectable 3000 Unit(s) IV Push every 6 hours PRN  multivitamin 1 Tablet(s) Oral daily  sodium chloride 0.9%. 1000 milliLiter(s) IV Continuous <Continuous>  sodium ferric gluconate complex IVPB 125 milliGRAM(s) IV Intermittent daily  thiamine 100 milliGRAM(s) Oral three times a day  warfarin 14 milliGRAM(s) Oral once      	    [PHYSICAL EXAM:  TELEMETRY:  T(C): 36.9 (01-19-18 @ 14:23), Max: 36.9 (01-18-18 @ 22:12)  HR: 67 (01-19-18 @ 09:13) (62 - 72)  BP: 99/58 (01-19-18 @ 09:13) (99/58 - 103/57)  RR: 16 (01-19-18 @ 09:13) (16 - 16)  SpO2: 98% (01-19-18 @ 09:13) (95% - 98%)  Wt(kg): --  I&O's Summary    18 Jan 2018 07:01  -  19 Jan 2018 07:00  --------------------------------------------------------  IN: 2070 mL / OUT: 2050 mL / NET: 20 mL    19 Jan 2018 07:01  -  19 Jan 2018 18:17  --------------------------------------------------------  IN: 466 mL / OUT: 150 mL / NET: 316 mL        Duffy: No  Central/PICC/Mid Line:  No                                       General : Normal. NAD.  Neck: Supple,- JVD;   Cardiovascular: Normal S1 S2. RRR No murmurs, rubs or gallops  Respiratory: CTA Bilaterally. No rhonchi, wheezes, rales  Gastrointestinal:  BS x 4. Soft NT/ND  Extremities: Normal range of motion, No clubbing, cyanosis or edema ble   Neurologic: Non-focal  Psychiatry: A & O x 3, Mood & affect appropriate	  	        	    ECG:  	  RADIOLOGY:   DIAGNOSTIC TESTING:  [ ] Echocardiogram:  [ ]  Catheterization:  [ ] Stress Test:    [ ] MERLYN  OTHER: 	    LABS:	 	  CARDIAC MARKERS:                                  8.2    5.2   )-----------( 209      ( 19 Jan 2018 06:17 )             26.8     01-19    137  |  102  |  17  ----------------------------<  107<H>  4.2   |  25  |  1.32<H>    Ca    9.5      19 Jan 2018 06:17  Mg     1.9     01-19      proBNP:   Lipid Profile:   HgA1c:   TSH:   PT/INR - ( 19 Jan 2018 06:17 )   PT: 14.0 sec;   INR: 1.26          PTT - ( 19 Jan 2018 13:02 )  PTT:45.0 sec

## 2018-01-19 NOTE — PROGRESS NOTE ADULT - SUBJECTIVE AND OBJECTIVE BOX
HPI:  62 M former smoker and drug abuser current ETOH abuser (2 pints vodka day) with PMHx CAD s/p MI 2003, most recent PCI at [facility] 7/2016 (JOSHUA to mLAD with residual 50 % OM1), chronic systolic CHF (EF 35% by Echo 9/2017) s/p AICD in 2009 ([employer]) , pafib and LV thrombus (on ASA and Plavix only 2/2 to lower GIB 2016), CKD Stage III (Baseline Cr normal 0.90-1.1), with frequent admissions to [facility] 11/2017 for palpitations in the setting of ETOH use/withdrawal and most recently 12/2017 for ICD firing who presents to [facility] ED 1/6 c/o palpitations that awoke him from sleep this AM. Patient admits to dizziness and nausea this AM but no vomiting or syncope. Patient reports he has been drinking 1-2 pints of vodka for the past few days and has not had an appetite since Sunday. He states he hasn’t really eaten in 3 days and has been drinking water and taking Nutrament twice a day for nutrition. Patient denies C/P, SOB, diaphoresis, syncope, LE edema, PND, orthopnea, fevers, chills. On arrival to ER /60 P 116 RR 18 Temp 98 F O2 sat 98% RA. EKG revealed NSR at 84 bpm with 1 mm ST elevation III, AVF, V1-V3  with TWI V4-V5. Inferior anteroseptal Q waves  (unchanged from prior EKG). Troponin T mildly elevated at 0.03. Labs significant for Cr 1.34, Hyponatremia Na 131, Thrombocytopenia Platelets 129,000, Leukopenia WBC 2.7 AST 58, Blood Alcohol 155.  CXR negative for acute infiltrates. Patient treated with 1 L NS and Ativan 2 mg IV x 1 (for tremors). Patient admitted for further management (06 Jan 2018 17:58)    FAMILY HISTORY:  No pertinent family history in first degree relatives    MEDICATIONS  (STANDING):  atorvastatin 40 milliGRAM(s) Oral at bedtime  BACItracin   Ointment 1 Application(s) Topical daily  cyanocobalamin Injectable 1000 MICROGram(s) SubCutaneous daily  folic acid 1 milliGRAM(s) Oral daily  heparin  Infusion. 1000 Unit(s)/Hr (10 mL/Hr) IV Continuous <Continuous>  lisinopril 5 milliGRAM(s) Oral daily  metoprolol succinate  milliGRAM(s) Oral daily  multivitamin 1 Tablet(s) Oral daily  sodium chloride 0.9%. 1000 milliLiter(s) (50 mL/Hr) IV Continuous <Continuous>  sodium ferric gluconate complex IVPB 125 milliGRAM(s) IV Intermittent daily  thiamine 100 milliGRAM(s) Oral three times a day    MEDICATIONS  (PRN):  heparin  Injectable 6500 Unit(s) IV Push every 6 hours PRN For aPTT less than 40  heparin  Injectable 3000 Unit(s) IV Push every 6 hours PRN For aPTT between 40 - 57    Vital Signs Last 24 Hrs  T(C): 36.9 (19 Jan 2018 14:23), Max: 36.9 (18 Jan 2018 22:12)  T(F): 98.4 (19 Jan 2018 14:23), Max: 98.5 (18 Jan 2018 22:12)  HR: 67 (19 Jan 2018 20:58) (62 - 67)  BP: 108/78 (19 Jan 2018 20:58) (99/58 - 108/78)  BP(mean): --  RR: 16 (19 Jan 2018 20:58) (16 - 16)  SpO2: 100% (19 Jan 2018 20:58) (97% - 100%)    Physical exam:    Overall impression  Lymphadenopathy  Liver  spleen    Labs:  CBC Full  -  ( 19 Jan 2018 06:17 )  WBC Count : 5.2 K/uL  Hemoglobin : 8.2 g/dL  Hematocrit : 26.8 %  Platelet Count - Automated : 209 K/uL  Mean Cell Volume : 71.5 fL  Mean Cell Hemoglobin : 21.9 pg  Mean Cell Hemoglobin Concentration : 30.6 g/dL  Auto Neutrophil # : x  Auto Lymphocyte # : x  Auto Monocyte # : x  Auto Eosinophil # : x  Auto Basophil # : x  Auto Neutrophil % : 50.0 %  Auto Lymphocyte % : 34.0 %  Auto Monocyte % : 12.4 %  Auto Eosinophil % : 2.1 %  Auto Basophil % : 1.5 %    01-19    137  |  102  |  17  ----------------------------<  107<H>  4.2   |  25  |  1.32<H>    Ca    9.5      19 Jan 2018 06:17  Mg     1.9     01-19        Radiology:  HEALTH ISSUES - R/O PROBLEM Dx:      Assessmant / Problems  1) Dificult to anticoagulate 2/2 large clot in LV   increase Coumadin to 14 mgtonight  2)  hypercoagulable syndrome   factor Vlll excess  Homocystein normal  LA negative  Antithrombin antigen slightly decreased, could be Heparin efect  2) Iron deficiency on iv iron  3) Hemorrhoidal bleeding  4)LV clot 2/2 previous MI and hypercoagulable syndrome      Thank you  Liana Everett MD

## 2018-01-19 NOTE — PROGRESS NOTE ADULT - PROBLEM SELECTOR PLAN 3
- INR persistently subtherapeutic despite Coumadin dosing x 8 days. Appears to not responding normally to Coumadin.  - Will give Coumadin 14 mg PO x 1 tonight. Monitor INR in AM.   - Dr. Everett following. LV clot is absorbing the Coumadin and will likely require longer bridging.  - Hypercoagulable workup sent

## 2018-01-19 NOTE — PROGRESS NOTE ADULT - PROBLEM SELECTOR PLAN 7
DANUTA on CKD on arrival likely 2/2 dehydration/ ETOH use.  Cr 1.34 on admission improved now Cr 1.32. IVF NS 50 cc/hr x 8 hrs ordered.

## 2018-01-19 NOTE — PROGRESS NOTE ADULT - PROBLEM SELECTOR PLAN 2
- Echo 1/8/18: EF 35%, LVH, apical and mid interventricular septum, apical anterior, apical inferior are all akinetic.  The true apex appears dyskinetic.  There is an apical LV thrombus measuring 2 x 1.7 cm. LV clot increased from 1cm in 9/2017.  - Due to increased size of LV thrombus started on A/C.  Continue Heparin gtt and Coumadin Bridge, INR 1.26 today. Pt to receive Coumadin 14 mg PO x 1 tonight.   - Patient is not a candidate for Lovenox/Coumadin bridge per Dr. Levy  - No GI consult at this time as per Dr. Levy for clearance for anticoagulation given h/o GI bleeding.

## 2018-01-19 NOTE — PROGRESS NOTE ADULT - ASSESSMENT
Cont 10 mg Coumadin daily   given Coumadin resistance   Pacemaker  ETOH abuse  Paroxysmal a-fib  Myocardial infarction involving other coronary artery  Gastritis  Atherosclerosis of coronary artery: Coronary artery disease  Automatic implantable cardiac defibrillator in situ: ICD (implantable cardioverter-defibrillator) in place

## 2018-01-19 NOTE — PROGRESS NOTE ADULT - PROBLEM SELECTOR PLAN 5
No sx of withdrawal, CIWA 0, ( On admit Blood Alcohol level 155 and CIWA 8)  - Psych consulted.  Patient completed Librium taper on 1/13.  Continue Ativan 1 mg IV q4h prn CIWA > 8.  - Continue thiamine, folate, MVI  - Pt amenable to Inpatient rehab vs. [facility] outpatient addiction treatment as recommended on D/C per Psych.

## 2018-01-20 LAB
ANION GAP SERPL CALC-SCNC: 9 MMOL/L — SIGNIFICANT CHANGE UP (ref 5–17)
APTT BLD: 62 SEC — HIGH (ref 27.5–37.4)
APTT BLD: 84.2 SEC — HIGH (ref 27.5–37.4)
APTT BLD: 96.9 SEC — HIGH (ref 27.5–37.4)
BASOPHILS NFR BLD AUTO: 1 % — SIGNIFICANT CHANGE UP (ref 0–2)
BUN SERPL-MCNC: 15 MG/DL — SIGNIFICANT CHANGE UP (ref 7–23)
CALCIUM SERPL-MCNC: 9.6 MG/DL — SIGNIFICANT CHANGE UP (ref 8.4–10.5)
CHLORIDE SERPL-SCNC: 100 MMOL/L — SIGNIFICANT CHANGE UP (ref 96–108)
CO2 SERPL-SCNC: 27 MMOL/L — SIGNIFICANT CHANGE UP (ref 22–31)
CREAT SERPL-MCNC: 1.31 MG/DL — HIGH (ref 0.5–1.3)
EOSINOPHIL NFR BLD AUTO: 2.4 % — SIGNIFICANT CHANGE UP (ref 0–6)
GLUCOSE SERPL-MCNC: 99 MG/DL — SIGNIFICANT CHANGE UP (ref 70–99)
HCT VFR BLD CALC: 27.8 % — LOW (ref 39–50)
HGB BLD-MCNC: 8.4 G/DL — LOW (ref 13–17)
INR BLD: 1.44 — HIGH (ref 0.88–1.16)
LYMPHOCYTES # BLD AUTO: 27.3 % — SIGNIFICANT CHANGE UP (ref 13–44)
MAGNESIUM SERPL-MCNC: 1.9 MG/DL — SIGNIFICANT CHANGE UP (ref 1.6–2.6)
MCHC RBC-ENTMCNC: 22.1 PG — LOW (ref 27–34)
MCHC RBC-ENTMCNC: 30.2 G/DL — LOW (ref 32–36)
MCV RBC AUTO: 73.2 FL — LOW (ref 80–100)
MONOCYTES NFR BLD AUTO: 10.4 % — SIGNIFICANT CHANGE UP (ref 2–14)
NEUTROPHILS NFR BLD AUTO: 58.9 % — SIGNIFICANT CHANGE UP (ref 43–77)
PLATELET # BLD AUTO: 209 K/UL — SIGNIFICANT CHANGE UP (ref 150–400)
POTASSIUM SERPL-MCNC: 4.9 MMOL/L — SIGNIFICANT CHANGE UP (ref 3.5–5.3)
POTASSIUM SERPL-SCNC: 4.9 MMOL/L — SIGNIFICANT CHANGE UP (ref 3.5–5.3)
PROTHROM AB SERPL-ACNC: 16.1 SEC — HIGH (ref 9.8–12.7)
RBC # BLD: 3.8 M/UL — LOW (ref 4.2–5.8)
RBC # FLD: 22.9 % — HIGH (ref 10.3–16.9)
SODIUM SERPL-SCNC: 136 MMOL/L — SIGNIFICANT CHANGE UP (ref 135–145)
WBC # BLD: 5 K/UL — SIGNIFICANT CHANGE UP (ref 3.8–10.5)
WBC # FLD AUTO: 5 K/UL — SIGNIFICANT CHANGE UP (ref 3.8–10.5)

## 2018-01-20 RX ORDER — WARFARIN SODIUM 2.5 MG/1
14 TABLET ORAL ONCE
Qty: 0 | Refills: 0 | Status: COMPLETED | OUTPATIENT
Start: 2018-01-20 | End: 2018-01-20

## 2018-01-20 RX ORDER — MAGNESIUM OXIDE 400 MG ORAL TABLET 241.3 MG
400 TABLET ORAL ONCE
Qty: 0 | Refills: 0 | Status: COMPLETED | OUTPATIENT
Start: 2018-01-20 | End: 2018-01-20

## 2018-01-20 RX ADMIN — PREGABALIN 1000 MICROGRAM(S): 225 CAPSULE ORAL at 12:45

## 2018-01-20 RX ADMIN — WARFARIN SODIUM 14 MILLIGRAM(S): 2.5 TABLET ORAL at 22:45

## 2018-01-20 RX ADMIN — Medication 1 TABLET(S): at 12:47

## 2018-01-20 RX ADMIN — Medication 55 MILLIGRAM(S): at 22:44

## 2018-01-20 RX ADMIN — MAGNESIUM OXIDE 400 MG ORAL TABLET 400 MILLIGRAM(S): 241.3 TABLET ORAL at 12:45

## 2018-01-20 RX ADMIN — HEPARIN SODIUM 800 UNIT(S)/HR: 5000 INJECTION INTRAVENOUS; SUBCUTANEOUS at 11:13

## 2018-01-20 RX ADMIN — LISINOPRIL 5 MILLIGRAM(S): 2.5 TABLET ORAL at 06:19

## 2018-01-20 RX ADMIN — Medication 100 MILLIGRAM(S): at 06:19

## 2018-01-20 RX ADMIN — Medication 150 MILLIGRAM(S): at 06:19

## 2018-01-20 RX ADMIN — HEPARIN SODIUM 800 UNIT(S)/HR: 5000 INJECTION INTRAVENOUS; SUBCUTANEOUS at 04:22

## 2018-01-20 RX ADMIN — Medication 1 APPLICATION(S): at 12:47

## 2018-01-20 RX ADMIN — Medication 100 MILLIGRAM(S): at 22:44

## 2018-01-20 RX ADMIN — Medication 100 MILLIGRAM(S): at 14:49

## 2018-01-20 RX ADMIN — ATORVASTATIN CALCIUM 40 MILLIGRAM(S): 80 TABLET, FILM COATED ORAL at 22:44

## 2018-01-20 RX ADMIN — Medication 1 MILLIGRAM(S): at 12:45

## 2018-01-20 NOTE — PROGRESS NOTE ADULT - PROBLEM SELECTOR PLAN 4
No sx of withdrawal, CIWA 0, ( On admit Blood Alcohol level 155 and CIWA 8)  - Psych consulted.  Patient completed Librium taper on 1/13.  Continue Ativan 1 mg IV q4h prn CIWA > 8.  - Continue thiamine, folate, MVI  - Pt amenable to Inpatient rehab vs. [facility] outpatient addiction treatment as recommended on D/C per Psych.

## 2018-01-20 NOTE — PROGRESS NOTE ADULT - PROBLEM SELECTOR PLAN 1
Known paroxysmal afib with LV thrombus seen on echo, remains NSR  - Continue heparin gtt w/ Coumadin Bridge for increasing LV thrombus. INR Subtherapeutic 1.44. (Previously not on A/C due to Hx of UGI bleed, ETOH use and non compliance). Coumadin 14 mg PO x 1 tonight per Heme Dr. Everett  - patient is not a candidate for lovenox to coumadin bridging per Dr. Levy  - Continue Toprol XL 150mg PO Daily  - ICD interrogated by EP 1/8/18: The patient has not had any ATP or shocks since 12/24/17. The check was otherwise normal. He is V-pacing <1% of the time. Underlying sinus rhythm with paroxysmal Afib. Dr. Ramos aware and has no further recommendations.  - Echo 1/8/18: EF 35%, LVH, apical and mid interventricular septum, apical anterior, apical inferior are all akinetic.  The true apex appears dyskinetic.  There is an apical LV thrombus measuring 2 x 1.7 cm. LV clot increased from 1cm in 9/2017.

## 2018-01-20 NOTE — PROGRESS NOTE ADULT - SUBJECTIVE AND OBJECTIVE BOX
Pt is clinically stable    INR 1.44  Pt now receiving 14 mg coumadin with Heparin Bridging      PT/INR - ( 20 Jan 2018 07:55 )   PT: 16.1 sec;   INR: 1.44          PTT - ( 20 Jan 2018 10:34 )  PTT:62.0 sec    ICU Vital Signs Last 24 Hrs  T(C): 36 (20 Jan 2018 08:46), Max: 36.9 (19 Jan 2018 14:23)  T(F): 96.8 (20 Jan 2018 08:46), Max: 98.4 (19 Jan 2018 14:23)  HR: 75 (20 Jan 2018 09:03) (66 - 77)  BP: 104/68 (20 Jan 2018 09:03) (104/68 - 120/76)  BP(mean): --  ABP: --  ABP(mean): --  RR: 16 (20 Jan 2018 09:03) (16 - 16)  SpO2: 100% (20 Jan 2018 09:03) (98% - 100%)        PAST MEDICAL & SURGICAL HISTORY:  Pacemaker  ETOH abuse  Paroxysmal a-fib  Myocardial infarction involving other coronary artery  Gastritis  Atherosclerosis of coronary artery: Coronary artery disease  Automatic implantable cardiac defibrillator in situ: ICD (implantable cardioverter-defibrillator) in place    MEDICATIONS  (STANDING):  atorvastatin 40 milliGRAM(s) Oral at bedtime  BACItracin   Ointment 1 Application(s) Topical daily  cyanocobalamin Injectable 1000 MICROGram(s) SubCutaneous daily  folic acid 1 milliGRAM(s) Oral daily  heparin  Infusion. 1000 Unit(s)/Hr (10 mL/Hr) IV Continuous <Continuous>  lisinopril 5 milliGRAM(s) Oral daily  magnesium oxide 400 milliGRAM(s) Oral once  metoprolol succinate  milliGRAM(s) Oral daily  multivitamin 1 Tablet(s) Oral daily  sodium chloride 0.9%. 1000 milliLiter(s) (50 mL/Hr) IV Continuous <Continuous>  sodium ferric gluconate complex IVPB 125 milliGRAM(s) IV Intermittent daily  thiamine 100 milliGRAM(s) Oral three times a day    MEDICATIONS  (PRN):  heparin  Injectable 6500 Unit(s) IV Push every 6 hours PRN For aPTT less than 40  heparin  Injectable 3000 Unit(s) IV Push every 6 hours PRN For aPTT between 40 - 57      Lungs clear  Cv s1 s2  Abd soft    Ext stable                          8.4    5.0   )-----------( 209      ( 20 Jan 2018 07:55 )             27.8   01-20    136  |  100  |  15  ----------------------------<  99  4.9   |  27  |  1.31<H>    Ca    9.6      20 Jan 2018 07:55  Mg     1.9     01-20

## 2018-01-20 NOTE — PROGRESS NOTE ADULT - PROBLEM SELECTOR PLAN 8
Anemia likely 2/2 chronic disease (Baseline Hgb 8-9, outlier Hgb 13.3 on 1/6/18), Now remains stable.  - Guaiac negative 1/8/18, denies BPBPR, melena, hematuria, hematemesis.  - Continue to monitor CBC while on Heparin/Coumadin.   - Dr. Everett following, labs consistent with Iron Deficiency Anemia. IV iron initiated. Vitamin B12 and Folate normal. Patient on Vitamin B12 1000 mcg Sub Q Daily    DISPO:  Plan for discharge to inpatient addiction rehab or home with [facility] outpatient addiction treatment when INR therapeutic

## 2018-01-20 NOTE — PROGRESS NOTE ADULT - PROBLEM SELECTOR PLAN 2
- INR persistently subtherapeutic despite Coumadin dosing x 9 days. Appears to not responding normally to Coumadin.  - Will give Coumadin 14 mg PO x 1 tonight. Monitor INR in AM.   - Dr. Everett following. LV clot is absorbing the Coumadin and will likely require longer bridging.  - Hypercoagulable workup sent

## 2018-01-20 NOTE — PROGRESS NOTE ADULT - ASSESSMENT
Cont Anticoagulation  to therapeutic coumadinization   Pacemaker  ETOH abuse  CAD s/p PCI Cardiomyopathy  Paroxysmal a-fib  Myocardial infarction involving other coronary artery  Gastritis  Atherosclerosis of coronary artery: Coronary artery disease  Automatic implantable cardiac defibrillator in situ: ICD (implantable cardioverter-defibrillator) in pl

## 2018-01-20 NOTE — PROGRESS NOTE ADULT - SUBJECTIVE AND OBJECTIVE BOX
Interventional Cardiology PA Adult Progress Note    Subjective Assessment: Patient was seen and examined this AM and was asymptomatic without complaints. Denies CP or SOB.  	  MEDICATIONS:  lisinopril 5 milliGRAM(s) Oral daily  metoprolol succinate  milliGRAM(s) Oral daily  atorvastatin 40 milliGRAM(s) Oral at bedtime  BACItracin   Ointment 1 Application(s) Topical daily  cyanocobalamin Injectable 1000 MICROGram(s) SubCutaneous daily  folic acid 1 milliGRAM(s) Oral daily  heparin  Infusion. 1000 Unit(s)/Hr IV Continuous <Continuous>  multivitamin 1 Tablet(s) Oral daily  sodium ferric gluconate complex IVPB 125 milliGRAM(s) IV Intermittent daily  thiamine 100 milliGRAM(s) Oral three times a day  warfarin 14 milliGRAM(s) Oral once    [PHYSICAL EXAM:  TELEMETRY:  T(C): 36.2 (01-20-18 @ 13:38), Max: 36.6 (01-19-18 @ 22:00)  HR: 70 (01-20-18 @ 14:42) (66 - 77)  BP: 112/71 (01-20-18 @ 14:42) (104/68 - 120/76)  RR: 16 (01-20-18 @ 14:42) (16 - 16)  SpO2: 100% (01-20-18 @ 14:42) (98% - 100%)  Wt(kg): --  I&O's Summary    19 Jan 2018 07:01  -  20 Jan 2018 07:00  --------------------------------------------------------  IN: 1034 mL / OUT: 1250 mL / NET: -216 mL    20 Jan 2018 07:01  -  20 Jan 2018 16:23  --------------------------------------------------------  IN: 880 mL / OUT: 0 mL / NET: 880 mL                                      Appearance: Normal	  HEENT:   Normal oral mucosa, PERRL, EOMI	  Neck: Supple, - JVD; No Carotid Bruit   Cardiovascular: Normal S1 S2, No JVD, No murmurs  Respiratory: Lungs clear to auscultation, No Rales, Rhonchi, Wheezing	  Gastrointestinal:  Soft, Non-tender, + BS	  Skin: No rashes, No ecchymoses, No cyanosis  Extremities: Normal range of motion, No clubbing, cyanosis or edema  Vascular: Peripheral pulses palpable 2+ bilaterally  Neurologic: Non-focal  Psychiatry: A & O x 3, Mood & affect appropriate	    LABS:	 	  CARDIAC MARKERS:                        8.4    5.0   )-----------( 209      ( 20 Jan 2018 07:55 )             27.8     01-20    136  |  100  |  15  ----------------------------<  99  4.9   |  27  |  1.31<H>    Ca    9.6      20 Jan 2018 07:55  Mg     1.9     01-20      PT/INR - ( 20 Jan 2018 07:55 )   PT: 16.1 sec;   INR: 1.44          PTT - ( 20 Jan 2018 10:34 )  PTT:62.0 sec

## 2018-01-20 NOTE — PROGRESS NOTE ADULT - PROBLEM SELECTOR PLAN 7
Remains euvolemic  - Prior Echo 9/2017: revealing EF 35%  - No standing Lasix for now.  Continue Lisinopril 5mg QD

## 2018-01-21 LAB
ANION GAP SERPL CALC-SCNC: 13 MMOL/L — SIGNIFICANT CHANGE UP (ref 5–17)
APTT BLD: 76.5 SEC — HIGH (ref 27.5–37.4)
BUN SERPL-MCNC: 16 MG/DL — SIGNIFICANT CHANGE UP (ref 7–23)
CALCIUM SERPL-MCNC: 9.6 MG/DL — SIGNIFICANT CHANGE UP (ref 8.4–10.5)
CHLORIDE SERPL-SCNC: 100 MMOL/L — SIGNIFICANT CHANGE UP (ref 96–108)
CO2 SERPL-SCNC: 25 MMOL/L — SIGNIFICANT CHANGE UP (ref 22–31)
CREAT SERPL-MCNC: 1.41 MG/DL — HIGH (ref 0.5–1.3)
GLUCOSE SERPL-MCNC: 93 MG/DL — SIGNIFICANT CHANGE UP (ref 70–99)
HCT VFR BLD CALC: 28.6 % — LOW (ref 39–50)
HGB BLD-MCNC: 8.7 G/DL — LOW (ref 13–17)
INR BLD: 1.54 — HIGH (ref 0.88–1.16)
MAGNESIUM SERPL-MCNC: 1.9 MG/DL — SIGNIFICANT CHANGE UP (ref 1.6–2.6)
MCHC RBC-ENTMCNC: 22.3 PG — LOW (ref 27–34)
MCHC RBC-ENTMCNC: 30.4 G/DL — LOW (ref 32–36)
MCV RBC AUTO: 73.1 FL — LOW (ref 80–100)
PLATELET # BLD AUTO: 187 K/UL — SIGNIFICANT CHANGE UP (ref 150–400)
POTASSIUM SERPL-MCNC: 4.4 MMOL/L — SIGNIFICANT CHANGE UP (ref 3.5–5.3)
POTASSIUM SERPL-SCNC: 4.4 MMOL/L — SIGNIFICANT CHANGE UP (ref 3.5–5.3)
PROTHROM AB SERPL-ACNC: 17.2 SEC — HIGH (ref 9.8–12.7)
RBC # BLD: 3.91 M/UL — LOW (ref 4.2–5.8)
RBC # FLD: 23.7 % — HIGH (ref 10.3–16.9)
SODIUM SERPL-SCNC: 138 MMOL/L — SIGNIFICANT CHANGE UP (ref 135–145)
WBC # BLD: 5 K/UL — SIGNIFICANT CHANGE UP (ref 3.8–10.5)
WBC # FLD AUTO: 5 K/UL — SIGNIFICANT CHANGE UP (ref 3.8–10.5)

## 2018-01-21 RX ORDER — WARFARIN SODIUM 2.5 MG/1
14 TABLET ORAL ONCE
Qty: 0 | Refills: 0 | Status: COMPLETED | OUTPATIENT
Start: 2018-01-21 | End: 2018-01-21

## 2018-01-21 RX ORDER — MAGNESIUM OXIDE 400 MG ORAL TABLET 241.3 MG
400 TABLET ORAL ONCE
Qty: 0 | Refills: 0 | Status: COMPLETED | OUTPATIENT
Start: 2018-01-21 | End: 2018-01-21

## 2018-01-21 RX ADMIN — Medication 100 MILLIGRAM(S): at 14:22

## 2018-01-21 RX ADMIN — ATORVASTATIN CALCIUM 40 MILLIGRAM(S): 80 TABLET, FILM COATED ORAL at 21:10

## 2018-01-21 RX ADMIN — Medication 150 MILLIGRAM(S): at 06:41

## 2018-01-21 RX ADMIN — Medication 1 TABLET(S): at 13:00

## 2018-01-21 RX ADMIN — HEPARIN SODIUM 800 UNIT(S)/HR: 5000 INJECTION INTRAVENOUS; SUBCUTANEOUS at 08:14

## 2018-01-21 RX ADMIN — Medication 100 MILLIGRAM(S): at 06:41

## 2018-01-21 RX ADMIN — LISINOPRIL 5 MILLIGRAM(S): 2.5 TABLET ORAL at 06:41

## 2018-01-21 RX ADMIN — MAGNESIUM OXIDE 400 MG ORAL TABLET 400 MILLIGRAM(S): 241.3 TABLET ORAL at 12:03

## 2018-01-21 RX ADMIN — Medication 1 APPLICATION(S): at 13:03

## 2018-01-21 RX ADMIN — PREGABALIN 1000 MICROGRAM(S): 225 CAPSULE ORAL at 13:00

## 2018-01-21 RX ADMIN — Medication 1 MILLIGRAM(S): at 13:00

## 2018-01-21 RX ADMIN — Medication 55 MILLIGRAM(S): at 21:40

## 2018-01-21 RX ADMIN — WARFARIN SODIUM 14 MILLIGRAM(S): 2.5 TABLET ORAL at 21:40

## 2018-01-21 RX ADMIN — Medication 100 MILLIGRAM(S): at 21:10

## 2018-01-21 NOTE — PROGRESS NOTE ADULT - PROBLEM SELECTOR PLAN 1
Known paroxysmal afib with LV thrombus seen on echo, remains NSR  - Continue heparin gtt w/ Coumadin Bridge for increasing LV thrombus. INR Subtherapeutic 1.44. (Previously not on A/C due to Hx of UGI bleed, ETOH use and non compliance). Coumadin 14 mg PO x 1 tonight per Heme Dr. Everett  - patient is not a candidate for lovenox to coumadin bridging per Dr. Levy  - Continue Toprol XL 150mg PO Daily  - ICD interrogated by EP 1/8/18: The patient has not had any ATP or shocks since 12/24/17. The check was otherwise normal. He is V-pacing <1% of the time. Underlying sinus rhythm with paroxysmal Afib. Dr. Ramos aware and has no further recommendations.  - Echo 1/8/18: EF 35%, LVH, apical and mid interventricular septum, apical anterior, apical inferior are all akinetic.  The true apex appears dyskinetic.  There is an apical LV thrombus measuring 2 x 1.7 cm. LV clot increased from 1cm in 9/2017. Known paroxysmal afib with LV thrombus seen on echo, remains NSR  - Continue heparin gtt w/ Coumadin Bridge for increasing LV thrombus. INR Subtherapeutic 1.54. (Previously not on A/C due to Hx of UGI bleed, ETOH use and non compliance). Coumadin 14 mg PO x 1 tonight per Heme Dr. Everett  - patient is not a candidate for lovenox to coumadin bridging per Dr. Levy  - Continue Toprol XL 150mg PO Daily  - ICD interrogated by EP 1/8/18: The patient has not had any ATP or shocks since 12/24/17. The check was otherwise normal. He is V-pacing <1% of the time. Underlying sinus rhythm with paroxysmal Afib. Dr. Ramos aware and has no further recommendations.  - Echo 1/8/18: EF 35%, LVH, apical and mid interventricular septum, apical anterior, apical inferior are all akinetic.  The true apex appears dyskinetic.  There is an apical LV thrombus measuring 2 x 1.7 cm. LV clot increased from 1cm in 9/2017.

## 2018-01-21 NOTE — PROGRESS NOTE ADULT - SUBJECTIVE AND OBJECTIVE BOX
HPI:  62 M former smoker and drug abuser current ETOH abuser (2 pints vodka day) with PMHx CAD s/p MI 2003, most recent PCI at [facility] 7/2016 (JOSHUA to mLAD with residual 50 % OM1), chronic systolic CHF (EF 35% by Echo 9/2017) s/p AICD in 2009 ([employer]) , pafib and LV thrombus (on ASA and Plavix only 2/2 to lower GIB 2016), CKD Stage III (Baseline Cr normal 0.90-1.1), with frequent admissions to [facility] 11/2017 for palpitations in the setting of ETOH use/withdrawal and most recently 12/2017 for ICD firing who presents to [facility] ED 1/6 c/o palpitations that awoke him from sleep this AM. Patient admits to dizziness and nausea this AM but no vomiting or syncope. Patient reports he has been drinking 1-2 pints of vodka for the past few days and has not had an appetite since Sunday. He states he hasn’t really eaten in 3 days and has been drinking water and taking Nutrament twice a day for nutrition. Patient denies C/P, SOB, diaphoresis, syncope, LE edema, PND, orthopnea, fevers, chills. On arrival to ER /60 P 116 RR 18 Temp 98 F O2 sat 98% RA. EKG revealed NSR at 84 bpm with 1 mm ST elevation III, AVF, V1-V3  with TWI V4-V5. Inferior anteroseptal Q waves  (unchanged from prior EKG). Troponin T mildly elevated at 0.03. Labs significant for Cr 1.34, Hyponatremia Na 131, Thrombocytopenia Platelets 129,000, Leukopenia WBC 2.7 AST 58, Blood Alcohol 155.  CXR negative for acute infiltrates. Patient treated with 1 L NS and Ativan 2 mg IV x 1 (for tremors). Patient admitted for further management (06 Jan 2018 17:58)    FAMILY HISTORY:  No pertinent family history in first degree relatives    MEDICATIONS  (STANDING):  atorvastatin 40 milliGRAM(s) Oral at bedtime  BACItracin   Ointment 1 Application(s) Topical daily  cyanocobalamin Injectable 1000 MICROGram(s) SubCutaneous daily  folic acid 1 milliGRAM(s) Oral daily  heparin  Infusion. 1000 Unit(s)/Hr (10 mL/Hr) IV Continuous <Continuous>  lisinopril 5 milliGRAM(s) Oral daily  metoprolol succinate  milliGRAM(s) Oral daily  multivitamin 1 Tablet(s) Oral daily  sodium chloride 0.9%. 1000 milliLiter(s) (50 mL/Hr) IV Continuous <Continuous>  sodium ferric gluconate complex IVPB 125 milliGRAM(s) IV Intermittent daily  thiamine 100 milliGRAM(s) Oral three times a day  warfarin 14 milliGRAM(s) Oral once    MEDICATIONS  (PRN):  heparin  Injectable 6500 Unit(s) IV Push every 6 hours PRN For aPTT less than 40  heparin  Injectable 3000 Unit(s) IV Push every 6 hours PRN For aPTT between 40 - 57    Vital Signs Last 24 Hrs  T(C): 36.8 (21 Jan 2018 17:35), Max: 36.8 (21 Jan 2018 17:35)  T(F): 98.2 (21 Jan 2018 17:35), Max: 98.2 (21 Jan 2018 17:35)  HR: 65 (21 Jan 2018 19:23) (64 - 82)  BP: 109/68 (21 Jan 2018 19:23) (109/68 - 131/84)  BP(mean): --  RR: 16 (21 Jan 2018 19:23) (16 - 16)  SpO2: 99% (21 Jan 2018 19:23) (99% - 100%)    Physical exam:    Overall impression  Lymphadenopathy  Liver  spleen    Labs:  CBC Full  -  ( 21 Jan 2018 07:38 )  WBC Count : 5.0 K/uL  Hemoglobin : 8.7 g/dL  Hematocrit : 28.6 %  Platelet Count - Automated : 187 K/uL  Mean Cell Volume : 73.1 fL  Mean Cell Hemoglobin : 22.3 pg  Mean Cell Hemoglobin Concentration : 30.4 g/dL  Auto Neutrophil # : x  Auto Lymphocyte # : x  Auto Monocyte # : x  Auto Eosinophil # : x  Auto Basophil # : x  Auto Neutrophil % : x  Auto Lymphocyte % : x  Auto Monocyte % : x  Auto Eosinophil % : x  Auto Basophil % : x    01-21    138  |  100  |  16  ----------------------------<  93  4.4   |  25  |  1.41<H>    Ca    9.6      21 Jan 2018 07:38  Mg     1.9     01-21        Radiology:  HEALTH ISSUES - R/O PROBLEM Dx:      Assessmant / Problems  1) Anemia of GI bleeding and chronic diasease improving on iv iron  Continue iv iron  2) Hypercoagulable state ( excess factor Vlll)  difficult to anticoagulate   currently on Coumadine 14 mg/day INR 1.5  Continue 14 mg Coumadine  3) LV clot      Thank you  Liana Everett MD

## 2018-01-21 NOTE — PROGRESS NOTE ADULT - SUBJECTIVE AND OBJECTIVE BOX
With 14 mg po coumadin Pt s  INR is slow to rise   to therapeutic levels       PAST MEDICAL & SURGICAL HISTORY:  Pacemaker  ETOH abuse  Paroxysmal a-fib  Myocardial infarction involving other coronary artery  Gastritis  Atherosclerosis of coronary artery: Coronary artery disease  Automatic implantable cardiac defibrillator in situ: ICD (implantable cardioverter-defibrillator) in place    MEDICATIONS  (STANDING):  atorvastatin 40 milliGRAM(s) Oral at bedtime  BACItracin   Ointment 1 Application(s) Topical daily  cyanocobalamin Injectable 1000 MICROGram(s) SubCutaneous daily  folic acid 1 milliGRAM(s) Oral daily  heparin  Infusion. 1000 Unit(s)/Hr (10 mL/Hr) IV Continuous <Continuous>  lisinopril 5 milliGRAM(s) Oral daily  magnesium oxide 400 milliGRAM(s) Oral once  metoprolol succinate  milliGRAM(s) Oral daily  multivitamin 1 Tablet(s) Oral daily  sodium chloride 0.9%. 1000 milliLiter(s) (50 mL/Hr) IV Continuous <Continuous>  sodium ferric gluconate complex IVPB 125 milliGRAM(s) IV Intermittent daily  thiamine 100 milliGRAM(s) Oral three times a day  warfarin 14 milliGRAM(s) Oral once    ICU Vital Signs Last 24 Hrs  T(C): 36.4 (21 Jan 2018 09:58), Max: 36.6 (21 Jan 2018 07:00)  T(F): 97.5 (21 Jan 2018 09:58), Max: 97.9 (21 Jan 2018 07:00)  HR: 72 (21 Jan 2018 09:00) (64 - 82)  BP: 116/74 (21 Jan 2018 09:00) (103/68 - 131/84)  BP(mean): --  ABP: --  ABP(mean): --  RR: 16 (21 Jan 2018 09:00) (16 - 16)  SpO2: 100% (21 Jan 2018 09:00) (100% - 100%)      Lungs clear  Cv s1 s2  Abd soft  Ext stable                          8.7    5.0   )-----------( 187      ( 21 Jan 2018 07:38 )             28.6   PT/INR - ( 21 Jan 2018 07:38 )   PT: 17.2 sec;   INR: 1.54          PTT - ( 21 Jan 2018 07:38 )  PTT:76.5 sec    01-21    138  |  100  |  16  ----------------------------<  93  4.4   |  25  |  1.41<H>    Ca    9.6      21 Jan 2018 07:38  Mg     1.9     01-21          MEDICATIONS  (PRN):  heparin  Injectable 6500 Unit(s) IV Push every 6 hours PRN For aPTT less than 40  heparin  Injectable 3000 Unit(s) IV Push every 6 hours PRN For aPTT between 40 - 57

## 2018-01-21 NOTE — PROGRESS NOTE ADULT - SUBJECTIVE AND OBJECTIVE BOX
Interventional Cardiology PA Adult Progress Note    Subjective Assessment: Patient seen and examined this AM and asymptomatic without complaints. Denies CP or SOB.  	  MEDICATIONS:  lisinopril 5 milliGRAM(s) Oral daily  metoprolol succinate  milliGRAM(s) Oral daily  atorvastatin 40 milliGRAM(s) Oral at bedtime  BACItracin   Ointment 1 Application(s) Topical daily  cyanocobalamin Injectable 1000 MICROGram(s) SubCutaneous daily  folic acid 1 milliGRAM(s) Oral daily  multivitamin 1 Tablet(s) Oral daily  sodium ferric gluconate complex IVPB 125 milliGRAM(s) IV Intermittent daily  thiamine 100 milliGRAM(s) Oral three times a day  warfarin 14 milliGRAM(s) Oral once    [PHYSICAL EXAM:  TELEMETRY:  T(C): 36.7 (01-21-18 @ 13:21), Max: 36.7 (01-21-18 @ 13:21)  HR: 75 (01-21-18 @ 14:18) (64 - 82)  BP: 125/62 (01-21-18 @ 14:18) (103/68 - 131/84)  RR: 16 (01-21-18 @ 14:18) (16 - 16)  SpO2: 100% (01-21-18 @ 14:18) (100% - 100%)  Wt(kg): --  I&O's Summary    20 Jan 2018 07:01  -  21 Jan 2018 07:00  --------------------------------------------------------  IN: 996 mL / OUT: 1895 mL / NET: -899 mL    21 Jan 2018 07:01  -  21 Jan 2018 17:08  --------------------------------------------------------  IN: 836 mL / OUT: 750 mL / NET: 86 mL                                Appearance: Normal	  HEENT:   Normal oral mucosa, PERRL, EOMI	  Neck: Supple, - JVD; No Carotid Bruit   Cardiovascular: Normal S1 S2, No JVD, No murmurs   Respiratory: Lungs clear to auscultation, No Rales, Rhonchi, Wheezing	  Gastrointestinal:  Soft, Non-tender, + BS	  Skin: No rashes, No ecchymoses, No cyanosis  Extremities: Normal range of motion, No clubbing, cyanosis or edema  Vascular: Peripheral pulses palpable 2+ bilaterally  Neurologic: Non-focal  Psychiatry: A & O x 3, Mood & affect appropriate      LABS:	 	  CARDIAC MARKERS:                          8.7    5.0   )-----------( 187      ( 21 Jan 2018 07:38 )             28.6     01-21    138  |  100  |  16  ----------------------------<  93  4.4   |  25  |  1.41<H>    Ca    9.6      21 Jan 2018 07:38  Mg     1.9     01-21    PT/INR - ( 21 Jan 2018 07:38 )   PT: 17.2 sec;   INR: 1.54          PTT - ( 21 Jan 2018 07:38 )  PTT:76.5 sec

## 2018-01-21 NOTE — PROGRESS NOTE ADULT - PROBLEM SELECTOR PLAN 2
- INR persistently subtherapeutic despite Coumadin dosing x 9 days. Appears to not responding normally to Coumadin.  - Will give Coumadin 14 mg PO x 1 tonight. Monitor INR in AM.   - Dr. Everett following. LV clot is absorbing the Coumadin and will likely require longer bridging.  - Hypercoagulable workup sent - INR persistently subtherapeutic despite Coumadin dosing x 10 days. Appears to not responding normally to Coumadin.  - Will give Coumadin 14 mg PO x 1 tonight. Monitor INR in AM.   - Dr. Everett following. LV clot is absorbing the Coumadin and will likely require longer bridging.  - Hypercoagulable workup sent

## 2018-01-21 NOTE — PROGRESS NOTE ADULT - PROBLEM SELECTOR PLAN 4
No sx of withdrawal, CIWA 0, ( On admit Blood Alcohol level 155 and CIWA 8)  - Psych consulted.  Patient completed Librium taper on 1/13.  Continue Ativan 1 mg IV q4h prn CIWA > 8.  - Continue thiamine, folate, MVI  - Pt amenable to Inpatient rehab vs. [facility] outpatient addiction treatment as recommended on D/C per Psych.

## 2018-01-22 DIAGNOSIS — D64.9 ANEMIA, UNSPECIFIED: ICD-10-CM

## 2018-01-22 DIAGNOSIS — D50.9 IRON DEFICIENCY ANEMIA, UNSPECIFIED: ICD-10-CM

## 2018-01-22 DIAGNOSIS — K62.5 HEMORRHAGE OF ANUS AND RECTUM: ICD-10-CM

## 2018-01-22 DIAGNOSIS — I50.9 HEART FAILURE, UNSPECIFIED: ICD-10-CM

## 2018-01-22 DIAGNOSIS — K63.5 POLYP OF COLON: ICD-10-CM

## 2018-01-22 DIAGNOSIS — R63.8 OTHER SYMPTOMS AND SIGNS CONCERNING FOOD AND FLUID INTAKE: ICD-10-CM

## 2018-01-22 DIAGNOSIS — I50.20 UNSPECIFIED SYSTOLIC (CONGESTIVE) HEART FAILURE: ICD-10-CM

## 2018-01-22 DIAGNOSIS — I51.3 INTRACARDIAC THROMBOSIS, NOT ELSEWHERE CLASSIFIED: ICD-10-CM

## 2018-01-22 LAB
ANION GAP SERPL CALC-SCNC: 14 MMOL/L — SIGNIFICANT CHANGE UP (ref 5–17)
APTT BLD: 89.7 SEC — HIGH (ref 27.5–37.4)
BLD GP AB SCN SERPL QL: NEGATIVE — SIGNIFICANT CHANGE UP
BUN SERPL-MCNC: 18 MG/DL — SIGNIFICANT CHANGE UP (ref 7–23)
CALCIUM SERPL-MCNC: 9.7 MG/DL — SIGNIFICANT CHANGE UP (ref 8.4–10.5)
CHLORIDE SERPL-SCNC: 102 MMOL/L — SIGNIFICANT CHANGE UP (ref 96–108)
CO2 SERPL-SCNC: 23 MMOL/L — SIGNIFICANT CHANGE UP (ref 22–31)
CREAT SERPL-MCNC: 1.35 MG/DL — HIGH (ref 0.5–1.3)
DNA PLOIDY SPEC FC-IMP: SIGNIFICANT CHANGE UP
GLUCOSE SERPL-MCNC: 103 MG/DL — HIGH (ref 70–99)
HCT VFR BLD CALC: 28.4 % — LOW (ref 39–50)
HCT VFR BLD CALC: 29.7 % — LOW (ref 39–50)
HGB BLD-MCNC: 8.7 G/DL — LOW (ref 13–17)
HGB BLD-MCNC: 9 G/DL — LOW (ref 13–17)
INR BLD: 1.81 — HIGH (ref 0.88–1.16)
MAGNESIUM SERPL-MCNC: 2 MG/DL — SIGNIFICANT CHANGE UP (ref 1.6–2.6)
MCHC RBC-ENTMCNC: 22.6 PG — LOW (ref 27–34)
MCHC RBC-ENTMCNC: 22.6 PG — LOW (ref 27–34)
MCHC RBC-ENTMCNC: 30.3 G/DL — LOW (ref 32–36)
MCHC RBC-ENTMCNC: 30.6 G/DL — LOW (ref 32–36)
MCV RBC AUTO: 73.8 FL — LOW (ref 80–100)
MCV RBC AUTO: 74.4 FL — LOW (ref 80–100)
PLATELET # BLD AUTO: 191 K/UL — SIGNIFICANT CHANGE UP (ref 150–400)
PLATELET # BLD AUTO: 193 K/UL — SIGNIFICANT CHANGE UP (ref 150–400)
POTASSIUM SERPL-MCNC: 4.3 MMOL/L — SIGNIFICANT CHANGE UP (ref 3.5–5.3)
POTASSIUM SERPL-SCNC: 4.3 MMOL/L — SIGNIFICANT CHANGE UP (ref 3.5–5.3)
PROTHROM AB SERPL-ACNC: 20.3 SEC — HIGH (ref 9.8–12.7)
PTR INTERPRETATION: SIGNIFICANT CHANGE UP
RBC # BLD: 3.85 M/UL — LOW (ref 4.2–5.8)
RBC # BLD: 3.99 M/UL — LOW (ref 4.2–5.8)
RBC # FLD: 24.1 % — HIGH (ref 10.3–16.9)
RBC # FLD: 24.4 % — HIGH (ref 10.3–16.9)
RH IG SCN BLD-IMP: POSITIVE — SIGNIFICANT CHANGE UP
SODIUM SERPL-SCNC: 139 MMOL/L — SIGNIFICANT CHANGE UP (ref 135–145)
WBC # BLD: 5.1 K/UL — SIGNIFICANT CHANGE UP (ref 3.8–10.5)
WBC # BLD: 5.2 K/UL — SIGNIFICANT CHANGE UP (ref 3.8–10.5)
WBC # FLD AUTO: 5.1 K/UL — SIGNIFICANT CHANGE UP (ref 3.8–10.5)
WBC # FLD AUTO: 5.2 K/UL — SIGNIFICANT CHANGE UP (ref 3.8–10.5)

## 2018-01-22 PROCEDURE — 99233 SBSQ HOSP IP/OBS HIGH 50: CPT

## 2018-01-22 RX ORDER — WARFARIN SODIUM 2.5 MG/1
12 TABLET ORAL ONCE
Qty: 0 | Refills: 0 | Status: DISCONTINUED | OUTPATIENT
Start: 2018-01-22 | End: 2018-01-22

## 2018-01-22 RX ORDER — HYDROCORTISONE 1 %
1 OINTMENT (GRAM) TOPICAL ONCE
Qty: 0 | Refills: 0 | Status: COMPLETED | OUTPATIENT
Start: 2018-01-22 | End: 2018-01-22

## 2018-01-22 RX ADMIN — Medication 100 MILLIGRAM(S): at 13:50

## 2018-01-22 RX ADMIN — Medication 55 MILLIGRAM(S): at 22:43

## 2018-01-22 RX ADMIN — Medication 150 MILLIGRAM(S): at 05:57

## 2018-01-22 RX ADMIN — LISINOPRIL 5 MILLIGRAM(S): 2.5 TABLET ORAL at 05:56

## 2018-01-22 RX ADMIN — HEPARIN SODIUM 800 UNIT(S)/HR: 5000 INJECTION INTRAVENOUS; SUBCUTANEOUS at 06:43

## 2018-01-22 RX ADMIN — PREGABALIN 1000 MICROGRAM(S): 225 CAPSULE ORAL at 11:20

## 2018-01-22 RX ADMIN — Medication 100 MILLIGRAM(S): at 05:57

## 2018-01-22 RX ADMIN — Medication 1 MILLIGRAM(S): at 11:19

## 2018-01-22 RX ADMIN — Medication 1 APPLICATION(S): at 13:49

## 2018-01-22 RX ADMIN — Medication 1 APPLICATION(S): at 10:43

## 2018-01-22 RX ADMIN — Medication 1 TABLET(S): at 11:19

## 2018-01-22 RX ADMIN — ATORVASTATIN CALCIUM 40 MILLIGRAM(S): 80 TABLET, FILM COATED ORAL at 21:37

## 2018-01-22 RX ADMIN — Medication 100 MILLIGRAM(S): at 21:37

## 2018-01-22 NOTE — PROGRESS NOTE ADULT - PROBLEM SELECTOR PLAN 4
Psych following  CIWA currently 1-2. Mild baseline resting arm tremor with hands outstretched.   Patient completed Librium taper on 1/13/18. Ativan PRN.  Continue thiamine, folate, MVI  Pt amenable to Inpatient rehab vs. [facility] outpatient addiction treatment as recommended on D/C per Psych

## 2018-01-22 NOTE — PROGRESS NOTE ADULT - SUBJECTIVE AND OBJECTIVE BOX
HPI:  62 M former smoker and drug abuser current ETOH abuser (2 pints vodka day) with PMHx CAD s/p MI 2003, most recent PCI at [facility] 7/2016 (JOSHUA to mLAD with residual 50 % OM1), chronic systolic CHF (EF 35% by Echo 9/2017) s/p AICD in 2009 ([employer]) , pafib and LV thrombus (on ASA and Plavix only 2/2 to lower GIB 2016), CKD Stage III (Baseline Cr normal 0.90-1.1), with frequent admissions to [facility] 11/2017 for palpitations in the setting of ETOH use/withdrawal and most recently 12/2017 for ICD firing who presents to [facility] ED 1/6 c/o palpitations that awoke him from sleep this AM. Patient admits to dizziness and nausea this AM but no vomiting or syncope. Patient reports he has been drinking 1-2 pints of vodka for the past few days and has not had an appetite since Sunday. He states he hasn’t really eaten in 3 days and has been drinking water and taking Nutrament twice a day for nutrition. Patient denies C/P, SOB, diaphoresis, syncope, LE edema, PND, orthopnea, fevers, chills. On arrival to ER /60 P 116 RR 18 Temp 98 F O2 sat 98% RA. EKG revealed NSR at 84 bpm with 1 mm ST elevation III, AVF, V1-V3  with TWI V4-V5. Inferior anteroseptal Q waves  (unchanged from prior EKG). Troponin T mildly elevated at 0.03. Labs significant for Cr 1.34, Hyponatremia Na 131, Thrombocytopenia Platelets 129,000, Leukopenia WBC 2.7 AST 58, Blood Alcohol 155.  CXR negative for acute infiltrates. Patient treated with 1 L NS and Ativan 2 mg IV x 1 (for tremors). Patient admitted for further management (06 Jan 2018 17:58)    FAMILY HISTORY:  No pertinent family history in first degree relatives    MEDICATIONS  (STANDING):  atorvastatin 40 milliGRAM(s) Oral at bedtime  BACItracin   Ointment 1 Application(s) Topical daily  cyanocobalamin Injectable 1000 MICROGram(s) SubCutaneous daily  folic acid 1 milliGRAM(s) Oral daily  heparin  Infusion. 1000 Unit(s)/Hr (10 mL/Hr) IV Continuous <Continuous>  lisinopril 5 milliGRAM(s) Oral daily  metoprolol succinate  milliGRAM(s) Oral daily  multivitamin 1 Tablet(s) Oral daily  sodium chloride 0.9%. 1000 milliLiter(s) (50 mL/Hr) IV Continuous <Continuous>  sodium ferric gluconate complex IVPB 125 milliGRAM(s) IV Intermittent daily  thiamine 100 milliGRAM(s) Oral three times a day  warfarin 12 milliGRAM(s) Oral once    MEDICATIONS  (PRN):  heparin  Injectable 6500 Unit(s) IV Push every 6 hours PRN For aPTT less than 40  heparin  Injectable 3000 Unit(s) IV Push every 6 hours PRN For aPTT between 40 - 57    Vital Signs Last 24 Hrs  T(C): 36.8 (22 Jan 2018 09:19), Max: 36.8 (21 Jan 2018 17:35)  T(F): 98.3 (22 Jan 2018 09:19), Max: 98.3 (22 Jan 2018 09:19)  HR: 92 (22 Jan 2018 09:52) (64 - 92)  BP: 108/74 (22 Jan 2018 09:52) (99/63 - 125/62)  BP(mean): --  RR: 19 (22 Jan 2018 09:52) (16 - 19)  SpO2: 96% (22 Jan 2018 05:45) (96% - 100%)    Physical exam:    Overall impression  Lymphadenopathy  Liver  spleen    Labs:  CBC Full  -  ( 22 Jan 2018 05:41 )  WBC Count : 5.1 K/uL  Hemoglobin : 8.7 g/dL  Hematocrit : 28.4 %  Platelet Count - Automated : 193 K/uL  Mean Cell Volume : 73.8 fL  Mean Cell Hemoglobin : 22.6 pg  Mean Cell Hemoglobin Concentration : 30.6 g/dL  Auto Neutrophil # : x  Auto Lymphocyte # : x  Auto Monocyte # : x  Auto Eosinophil # : x  Auto Basophil # : x  Auto Neutrophil % : x  Auto Lymphocyte % : x  Auto Monocyte % : x  Auto Eosinophil % : x  Auto Basophil % : x    01-22    139  |  102  |  18  ----------------------------<  103<H>  4.3   |  23  |  1.35<H>    Ca    9.7      22 Jan 2018 05:41  Mg     2.0     01-22        Radiology:  HEALTH ISSUES - R/O PROBLEM Dx:      Assessmant / Problems  1) Hypercoagulable syndrome on Coumadin INR 1.8  in view of patient having bloody stool today would d/c Heparin  and continue Coumadin 12 mg  with target INR 1.8  Will discuss with DR Levy above  Patient to be f/u in DR Levy office for Coumadin tx  2) Iron deficiency  continue iv iron  will d/c on po iron  Plan:    Thank you  Liana Everett MD

## 2018-01-22 NOTE — CHART NOTE - NSCHARTNOTEFT_GEN_A_CORE
Admitting Diagnosis:   Patient is a 62y old  Male who presents with a chief complaint of palpitations.     PAST MEDICAL & SURGICAL HISTORY:  Pacemaker  ETOH abuse  Paroxysmal a-fib  Myocardial infarction involving other coronary artery  Gastritis  Atherosclerosis of coronary artery: Coronary artery disease  Automatic implantable cardiac defibrillator in situ: ICD (implantable cardioverter-defibrillator) in place      Current Nutrition Order:  DASH/TLC, 1.5L fluid restriction      PO Intake: Good (%) [ X  ]  Fair (50-75%) [   ] Poor (<25%) [   ]    GI Issues: No N/V/C/D reported at this time. Last BM     Pain: No pain endorsed    Skin Integrity: Surgical wound intact    Labs:       139  |  102  |  18  ----------------------------<  103<H>  4.3   |  23  |  1.35<H>    Ca    9.7      2018 05:41  Mg     2.0           CAPILLARY BLOOD GLUCOSE    103mg/dL        Medications:  MEDICATIONS  (STANDING):  atorvastatin 40 milliGRAM(s) Oral at bedtime  BACItracin   Ointment 1 Application(s) Topical daily  cyanocobalamin Injectable 1000 MICROGram(s) SubCutaneous daily  folic acid 1 milliGRAM(s) Oral daily  lisinopril 5 milliGRAM(s) Oral daily  metoprolol succinate  milliGRAM(s) Oral daily  multivitamin 1 Tablet(s) Oral daily  sodium chloride 0.9%. 1000 milliLiter(s) (50 mL/Hr) IV Continuous <Continuous>  sodium ferric gluconate complex IVPB 125 milliGRAM(s) IV Intermittent daily  thiamine 100 milliGRAM(s) Oral three times a day    MEDICATIONS  (PRN):      Weight:  Daily     Daily Weight in k.2 (2018 05:54)    Weight Change: Weight stable throughout admission     Estimated energy needs: ABW used for calculations as pt between % of IBW. Needs estimated per age  Calories: 25-30 kcal/kg = 6633-8196 kcal/day  Protein: 0.75-1.0g/kg = 55-73 g protein/day  Fluids: 25-30 mL/kg= 3819-6458 mL/day    Subjective: 63 yo/male admitted with EtOH abuse, palpitations. Course c/b increased size of LV thrombus; per PA/MD notes, waiting for INR to become therapeutic. Seen in room, awake, pleasant. Pt without nutrition complaints at this time; no N/V/C/D. BM . Pt did complain about still being on fluid restriction but receiving IV fluids- will confirm w/PA for need. Na/K/Mg WNL, H/H 8.7/28.4%    Previous Nutrition Diagnosis:   Poor nutrition quality of life RT lack of appetite 2/2 excessive alcohol intake AEB inadequate energy intake x 1week    Active [ X  ]  Resolved [   ]    If resolved, new PES:     Goal: Pt will continue to have 75% or greater PO intake per meal     Recommendations: Encourage PO intake     Education: Provided pt with high iron food handout     Risk Level: High [   ] Moderate [ X  ] Low [   ].

## 2018-01-22 NOTE — PROGRESS NOTE ADULT - PROBLEM SELECTOR PLAN 8
Patient with known afib on Toprol XL for rate control with LV thrombus noted on ECHO and Cath. Patient was started on AC but since DC 2/2 ?LGIB (HH stable). Currently INR 1.8 awaiting colonoscopy  - continue to monitor   - AM ECG - fu results

## 2018-01-22 NOTE — PROGRESS NOTE ADULT - ASSESSMENT
Hx LV thrombus now increased in size  Cardiomyopathy  Pacemaker  ETOH abuse  Paroxysmal a-fib  Myocardial infarction involving other coronary artery  Gastritis  Atherosclerosis of coronary artery: Coronary artery disease  Automatic implantable cardiac defibrillator in situ: ICD (implantable cardioverter-defibrillator) in place    Cont coumadin till therapeutic   range achieved  then D/C heparin and decide on outpt dose   of coumadin with close follow up in my office

## 2018-01-22 NOTE — PROGRESS NOTE ADULT - SUBJECTIVE AND OBJECTIVE BOX
Hospital Course    61 y/o M Ex-Smoker, Current ETOH Abuser, PMHX of CAD s/p prior PCI, Chronic Systolic CHF s/p AICD, Paroxysmal Afib, LV Thrombus, H/o Lower GI Bleed in 2016, CKD stage 3, Chronic Anemia, h/o recent hospital admission in 2017 for inappropriate shock, who was admitted on 18 for palpitations in the setting of ETOH abuse, he was treated for ETOH withdrawal, Psych was consulted. For concern for evolving MI on EKG with negative CE he underwent cardiac catheterization on 18 revealing non-obstructive CAD. He was noted to have a larger LV clot on Echo and was started on Heparin/Coumadin bridge. He was noted today to have BRBPR in the toilet. H/H stable at . Transfer to Medicine due to INR 1.8, awaiting down trend of INR for colonoscopy.     S: Pt seen and examined bedside.  Patient denies C/P, SOB, N/V, dizziness, palpitations, and diaphoresis.  Pt denies fever/chills, dysuria, abdominal pain, diarrhea, and cough    O: Vital Signs Last 24 Hrs  T(C): 36.5 (2018 13:45), Max: 36.8 (2018 09:19)  T(F): 97.7 (2018 13:45), Max: 98.3 (2018 09:19)  HR: 88 (2018 13:51) (64 - 92)  BP: 120/70 (2018 13:51) (99/63 - 120/70)  RR: 17 (2018 13:51) (16 - 19)  SpO2: 96% (2018 05:45) (96% - 100%)    PHYSICAL EXAM:  GEN: NAD  PULM:  CTA B/L  CARD: No JVD B/L, RRR, S1 and S2   ABD: +BS, NT, soft/ND	  EXT: No Edema B/L LE  NEURO: A+Ox3, no focal deficit      LABS:                        9.0    5.2   )-----------( 191      ( 2018 12:57 )             29.7         139  |  102  |  18  ----------------------------<  103<H>  4.3   |  23  |  1.35<H>    Ca    9.7      2018 05:41  Mg     2.0           PT/INR - ( 2018 05:41 )   PT: 20.3 sec;   INR: 1.81          PTT - ( 2018 05:41 )  PTT:89.7 sec     @ 07:01  -   @ 07:00  --------------------------------------------------------  IN: 1624 mL / OUT: 3000 mL / NET: -1376 mL      Daily Weight in k.2 (2018 05:54)    RADIOLOGY & ADDITIONAL TESTS:

## 2018-01-22 NOTE — PROGRESS NOTE ADULT - SUBJECTIVE AND OBJECTIVE BOX
TRANSFER NOTE TELE TO [facility]    HOSPITAL COURSE  62yM with PMHx current ETOH c/b WD, CAD s/p PCI, [id number]% w/ AICD, Afib with LV Thrombus no AC 2/2 LGIB, CKD3, Anemia (Fe Deficiency) with recent admission 12/'17 who presented on 01/06/18 c/p palpitations in setting ETOH w/ concern for evolving MI with negative CE. Pt was treated for ETOH WD. Pt underwent cardiac cath on 1/11/18, demonstrating non-obstruction CAD with sig LV clot on echo. Patient was started on heparin drip with plan for coumadin bridge, but noted to have BRBPR on 1/22. Of note, patient was evaluated by Psych, recommending OP follow up without indications for interventions at this time. Patient's VS have been stable and is being transferred from [address] with plan for EGD for evalutation of GIB.     SUBJECTIVE / INTERVAL HPI: Patient seen and examined at bedside.     VITAL SIGNS:  Vital Signs Last 24 Hrs  T(C): 36.6 (22 Jan 2018 17:18), Max: 36.8 (22 Jan 2018 09:19)  T(F): 97.9 (22 Jan 2018 17:18), Max: 98.3 (22 Jan 2018 09:19)  HR: 75 (22 Jan 2018 19:35) (64 - 92)  BP: 107/70 (22 Jan 2018 19:35) (99/63 - 120/70)  BP(mean): --  RR: 19 (22 Jan 2018 19:35) (16 - 19)  SpO2: 96% (22 Jan 2018 05:45) (96% - 96%)    PHYSICAL EXAM:    General: WDWN  HEENT: NC/AT; PERRL, anicteric sclera; MMM  Neck: supple  Cardiovascular: +S1/S2; RRR  Respiratory: CTA B/L; no W/R/R  Gastrointestinal: soft, NT/ND; +BSx4  Extremities: WWP; no edema, clubbing or cyanosis  Vascular: 2+ radial, DP/PT pulses B/L  Neurological: AAOx3; no focal deficits    MEDICATIONS:  MEDICATIONS  (STANDING):  atorvastatin 40 milliGRAM(s) Oral at bedtime  BACItracin   Ointment 1 Application(s) Topical daily  cyanocobalamin Injectable 1000 MICROGram(s) SubCutaneous daily  folic acid 1 milliGRAM(s) Oral daily  lisinopril 5 milliGRAM(s) Oral daily  metoprolol succinate  milliGRAM(s) Oral daily  multivitamin 1 Tablet(s) Oral daily  sodium chloride 0.9%. 1000 milliLiter(s) (50 mL/Hr) IV Continuous <Continuous>  sodium ferric gluconate complex IVPB 125 milliGRAM(s) IV Intermittent daily  thiamine 100 milliGRAM(s) Oral three times a day    MEDICATIONS  (PRN):      ALLERGIES:  Allergies    Capoten (Short breath; Rash; Hives)  penicillin (Short breath; Rash; Hives)    Intolerances        LABS:                        9.0    5.2   )-----------( 191      ( 22 Jan 2018 12:57 )             29.7     01-22    139  |  102  |  18  ----------------------------<  103<H>  4.3   |  23  |  1.35<H>    Ca    9.7      22 Jan 2018 05:41  Mg     2.0     01-22      PT/INR - ( 22 Jan 2018 05:41 )   PT: 20.3 sec;   INR: 1.81          PTT - ( 22 Jan 2018 05:41 )  PTT:89.7 sec    CAPILLARY BLOOD GLUCOSE          RADIOLOGY & ADDITIONAL TESTS: Reviewed.    ASSESSMENT:    PLAN: TRANSFER NOTE TELE TO [facility]    HOSPITAL COURSE  62yM with PMHx current ETOH c/b WD, CAD s/p PCI, [id number]% w/ AICD, Afib with LV Thrombus no AC 2/2 LGIB, CKD3, Anemia (Fe Deficiency) with recent admission 12/'17 who presented on 01/06/18 c/p palpitations in setting ETOH w/ concern for evolving MI with negative CE. Pt was treated for ETOH WD. Pt underwent cardiac cath on 1/11/18, demonstrating non-obstruction CAD with sig LV clot on echo. Patient was started on heparin drip with plan for coumadin bridge, but noted to have BRBPR on 1/22. Of note, patient was evaluated by Psych, recommending OP follow up without indications for interventions at this time.   Patient's VS have been stable and is being transferred from [address] with plan for EGD for evalutation of GIB.     SUBJECTIVE / INTERVAL HPI: Patient seen and examined at bedside.     VITAL SIGNS:  Vital Signs Last 24 Hrs  T(C): 36.6 (22 Jan 2018 17:18), Max: 36.8 (22 Jan 2018 09:19)  T(F): 97.9 (22 Jan 2018 17:18), Max: 98.3 (22 Jan 2018 09:19)  HR: 75 (22 Jan 2018 19:35) (64 - 92)  BP: 107/70 (22 Jan 2018 19:35) (99/63 - 120/70)  BP(mean): --  RR: 19 (22 Jan 2018 19:35) (16 - 19)  SpO2: 96% (22 Jan 2018 05:45) (96% - 96%)    PHYSICAL EXAM:    General: WDWN  HEENT: NC/AT; PERRL, anicteric sclera; MMM  Neck: supple  Cardiovascular: +S1/S2; RRR  Respiratory: CTA B/L; no W/R/R  Gastrointestinal: soft, NT/ND; +BSx4  Extremities: WWP; no edema, clubbing or cyanosis  Vascular: 2+ radial, DP/PT pulses B/L  Neurological: AAOx3; no focal deficits    MEDICATIONS:  MEDICATIONS  (STANDING):  atorvastatin 40 milliGRAM(s) Oral at bedtime  BACItracin   Ointment 1 Application(s) Topical daily  cyanocobalamin Injectable 1000 MICROGram(s) SubCutaneous daily  folic acid 1 milliGRAM(s) Oral daily  lisinopril 5 milliGRAM(s) Oral daily  metoprolol succinate  milliGRAM(s) Oral daily  multivitamin 1 Tablet(s) Oral daily  sodium chloride 0.9%. 1000 milliLiter(s) (50 mL/Hr) IV Continuous <Continuous>  sodium ferric gluconate complex IVPB 125 milliGRAM(s) IV Intermittent daily  thiamine 100 milliGRAM(s) Oral three times a day    MEDICATIONS  (PRN):      ALLERGIES:  Allergies    Capoten (Short breath; Rash; Hives)  penicillin (Short breath; Rash; Hives)    Intolerances        LABS:                        9.0    5.2   )-----------( 191      ( 22 Jan 2018 12:57 )             29.7     01-22    139  |  102  |  18  ----------------------------<  103<H>  4.3   |  23  |  1.35<H>    Ca    9.7      22 Jan 2018 05:41  Mg     2.0     01-22      PT/INR - ( 22 Jan 2018 05:41 )   PT: 20.3 sec;   INR: 1.81          PTT - ( 22 Jan 2018 05:41 )  PTT:89.7 sec    CAPILLARY BLOOD GLUCOSE          RADIOLOGY & ADDITIONAL TESTS: Reviewed.    ASSESSMENT:    PLAN: TRANSFER NOTE TELE TO [facility]    HOSPITAL COURSE  62yM with PMHx current ETOH c/b WD, CAD s/p PCI, [id number]% w/ AICD, Afib with LV Thrombus no AC 2/2 LGIB, CKD3, Anemia (Fe Deficiency) with recent admission 12/'17 who presented on 01/06/18 c/p palpitations in setting ETOH w/ concern for evolving MI with negative CE. Pt was treated for ETOH WD. Pt underwent cardiac cath on 1/11/18, demonstrating non-obstruction CAD with sig LV clot on echo. Patient was started on heparin drip with plan for coumadin bridge, but noted to have BRBPR on 1/22. Of note, patient was evaluated by Psych, recommending OP follow up without indications for interventions at this time.   Patient's VS have been stable ( T(C): Max: 36.8  HR: 64 - 92 BP: 99/63 - 120/70 RR: 16 - 19 SpO2: 96%) and is being transferred from [address] with plan for EGD for evaluation of GIB.     SUBJECTIVE / INTERVAL HPI: Patient seen and examined at bedside.     VITAL SIGNS:  Vital Signs Last 24 Hrs  T(C): 36.6 (22 Jan 2018 17:18), Max: 36.8 (22 Jan 2018 09:19)  T(F): 97.9 (22 Jan 2018 17:18), Max: 98.3 (22 Jan 2018 09:19)  HR: 75 (22 Jan 2018 19:35) (64 - 92)  BP: 107/70 (22 Jan 2018 19:35) (99/63 - 120/70)  BP(mean): --  RR: 19 (22 Jan 2018 19:35) (16 - 19)  SpO2: 96% (22 Jan 2018 05:45) (96% - 96%)    PHYSICAL EXAM:    General: WDWN  HEENT: NC/AT; PERRL, anicteric sclera; MMM  Neck: supple  Cardiovascular: +S1/S2; RRR  Respiratory: CTA B/L; no W/R/R  Gastrointestinal: soft, NT/ND; +BSx4  Extremities: WWP; no edema, clubbing or cyanosis  Vascular: 2+ radial, DP/PT pulses B/L  Neurological: AAOx3; no focal deficits    MEDICATIONS:  MEDICATIONS  (STANDING):  atorvastatin 40 milliGRAM(s) Oral at bedtime  BACItracin   Ointment 1 Application(s) Topical daily  cyanocobalamin Injectable 1000 MICROGram(s) SubCutaneous daily  folic acid 1 milliGRAM(s) Oral daily  lisinopril 5 milliGRAM(s) Oral daily  metoprolol succinate  milliGRAM(s) Oral daily  multivitamin 1 Tablet(s) Oral daily  sodium chloride 0.9%. 1000 milliLiter(s) (50 mL/Hr) IV Continuous <Continuous>  sodium ferric gluconate complex IVPB 125 milliGRAM(s) IV Intermittent daily  thiamine 100 milliGRAM(s) Oral three times a day    MEDICATIONS  (PRN):      ALLERGIES:  Allergies    Capoten (Short breath; Rash; Hives)  penicillin (Short breath; Rash; Hives)    Intolerances        LABS:                        9.0    5.2   )-----------( 191      ( 22 Jan 2018 12:57 )             29.7     01-22    139  |  102  |  18  ----------------------------<  103<H>  4.3   |  23  |  1.35<H>    Ca    9.7      22 Jan 2018 05:41  Mg     2.0     01-22      PT/INR - ( 22 Jan 2018 05:41 )   PT: 20.3 sec;   INR: 1.81          PTT - ( 22 Jan 2018 05:41 )  PTT:89.7 sec    CAPILLARY BLOOD GLUCOSE          RADIOLOGY & ADDITIONAL TESTS: Reviewed.    ASSESSMENT:    PLAN: TRANSFER NOTE TELE TO [facility]    HOSPITAL COURSE  62yM with PMHx current ETOH c/b WD, CAD s/p PCI, [id number]% w/ AICD, Afib with LV Thrombus no AC 2/2 LGIB, CKD3, Anemia (Fe Deficiency) with recent admission 12/'17 who presented on 01/06/18 c/p palpitations in setting ETOH w/ concern for evolving MI with negative CE. Pt was treated for ETOH WD. Pt underwent cardiac cath on 1/11/18, demonstrating non-obstruction CAD with sig LV clot on echo. Patient was started on heparin drip with plan for coumadin bridge, but noted to have BRBPR on 1/22. Of note, patient was evaluated by Psych, recommending OP follow up without indications for interventions at this time.   Patient's VS have been stable ( T(C): Max: 36.8  HR: 64 - 92 BP: 99/63 - 120/70 RR: 16 - 19 SpO2: 96%) and is being transferred from [address] with plan for EGD for evaluation of GIB.     SUBJECTIVE / INTERVAL HPI: Patient seen and examined at bedside.     VITAL SIGNS:  Vital Signs Last 24 Hrs  T(C): 36.6 (22 Jan 2018 17:18), Max: 36.8 (22 Jan 2018 09:19)  T(F): 97.9 (22 Jan 2018 17:18), Max: 98.3 (22 Jan 2018 09:19)  HR: 75 (22 Jan 2018 19:35) (64 - 92)  BP: 107/70 (22 Jan 2018 19:35) (99/63 - 120/70)  BP(mean): --  RR: 19 (22 Jan 2018 19:35) (16 - 19)  SpO2: 96% (22 Jan 2018 05:45) (96% - 96%)    PHYSICAL EXAM:    General: NAD, patient appears comfortable lying in bed. AOx3  HEENT: NC/AT; PERRL, anicteric sclera; MMM  Neck: supple, - JVD  Cardiovascular: +S1/S2; no murmurs appreciated  Respiratory: CTA B/L; no W/R/R  Gastrointestinal: soft, NT/ND; +BSx4  Extremities: WWP; no edema, clubbing or cyanosis  Vascular: 2+ radial, DP/PT pulses B/L  Neurological: AAOx3; no focal deficits    MEDICATIONS:  MEDICATIONS  (STANDING):  atorvastatin 40 milliGRAM(s) Oral at bedtime  BACItracin   Ointment 1 Application(s) Topical daily  cyanocobalamin Injectable 1000 MICROGram(s) SubCutaneous daily  folic acid 1 milliGRAM(s) Oral daily  lisinopril 5 milliGRAM(s) Oral daily  metoprolol succinate  milliGRAM(s) Oral daily  multivitamin 1 Tablet(s) Oral daily  sodium chloride 0.9%. 1000 milliLiter(s) (50 mL/Hr) IV Continuous <Continuous>  sodium ferric gluconate complex IVPB 125 milliGRAM(s) IV Intermittent daily  thiamine 100 milliGRAM(s) Oral three times a day    MEDICATIONS  (PRN):      ALLERGIES:  Allergies    Capoten (Short breath; Rash; Hives)  penicillin (Short breath; Rash; Hives)    Intolerances        LABS:                        9.0    5.2   )-----------( 191      ( 22 Jan 2018 12:57 )             29.7     01-22    139  |  102  |  18  ----------------------------<  103<H>  4.3   |  23  |  1.35<H>    Ca    9.7      22 Jan 2018 05:41  Mg     2.0     01-22      PT/INR - ( 22 Jan 2018 05:41 )   PT: 20.3 sec;   INR: 1.81          PTT - ( 22 Jan 2018 05:41 )  PTT:89.7 sec    CAPILLARY BLOOD GLUCOSE          RADIOLOGY & ADDITIONAL TESTS: Reviewed.

## 2018-01-22 NOTE — PROGRESS NOTE ADULT - SUBJECTIVE AND OBJECTIVE BOX
Psychiatry Brief Note    Patient seen briefly as he was visiting another patient on [address].  Patient reports he is doing well.  Called HELP clinic to update them on his continued hospitalization.  Spoke to brother regarding plans following discharge.  No acute complaints.    Saba Parker MD   Psychiatry Attending

## 2018-01-22 NOTE — PROGRESS NOTE ADULT - SUBJECTIVE AND OBJECTIVE BOX
No chest [poai  dyspnea  Prothrombin Time, Plasma: 20.3: Effective March 21st, the reference range for PT has changed. sec (01.22.18 @ 05:41)        INR 1.8      PAST MEDICAL & SURGICAL HISTORY:  Pacemaker  ETOH abuse  Paroxysmal a-fib  Myocardial infarction involving other coronary artery  Gastritis  Atherosclerosis of coronary artery: Coronary artery disease  Automatic implantable cardiac defibrillator in situ: ICD (implantable cardioverter-defibrillator) in place    MEDICATIONS  (STANDING):  atorvastatin 40 milliGRAM(s) Oral at bedtime  BACItracin   Ointment 1 Application(s) Topical daily  cyanocobalamin Injectable 1000 MICROGram(s) SubCutaneous daily  folic acid 1 milliGRAM(s) Oral daily  heparin  Infusion. 1000 Unit(s)/Hr (10 mL/Hr) IV Continuous <Continuous>  lisinopril 5 milliGRAM(s) Oral daily  metoprolol succinate  milliGRAM(s) Oral daily  multivitamin 1 Tablet(s) Oral daily  sodium chloride 0.9%. 1000 milliLiter(s) (50 mL/Hr) IV Continuous <Continuous>  sodium ferric gluconate complex IVPB 125 milliGRAM(s) IV Intermittent daily  thiamine 100 milliGRAM(s) Oral three times a day  warfarin 12 milliGRAM(s) Oral once    MEDICATIONS  (PRN):  heparin  Injectable 6500 Unit(s) IV Push every 6 hours PRN For aPTT less than 40  heparin  Injectable 3000 Unit(s) IV Push every 6 hours PRN For aPTT between 40 - 57    ICU Vital Signs Last 24 Hrs  T(C): 36.7 (22 Jan 2018 05:54), Max: 36.8 (21 Jan 2018 17:35)  T(F): 98 (22 Jan 2018 05:54), Max: 98.2 (21 Jan 2018 17:35)  HR: 78 (22 Jan 2018 08:40) (64 - 80)  BP: 99/63 (22 Jan 2018 08:40) (99/63 - 125/62)  BP(mean): --  ABP: --  ABP(mean): --  RR: 17 (22 Jan 2018 08:40) (16 - 18)  SpO2: 96% (22 Jan 2018 05:45) (96% - 100%)      lungs clear  Cv s1 s2  Abd soft  Ext stable                          8.7    5.1   )-----------( 193      ( 22 Jan 2018 05:41 )             28.4   01-22    139  |  102  |  18  ----------------------------<  103<H>  4.3   |  23  |  1.35<H>    Ca    9.7      22 Jan 2018 05:41  Mg     2.0     01-22

## 2018-01-22 NOTE — CONSULT NOTE ADULT - ASSESSMENT
with history of polyps, bleeding and anemia it is recommended to get colonoscopy off AC to be able to do polypectomy.  Bridge with heparin and daily coags and will schedule once feasible

## 2018-01-22 NOTE — PROGRESS NOTE ADULT - PROBLEM SELECTOR PLAN 5
Patient with microcytic anemia (MCV 74) likely 2/2 Fe deficiency vs ETOH abuse. Stable since prior admission and has been stable since admission. s/p IV Fe  - c/w Ferrlecit 125mg over 2h daily   - continue to monitor HH

## 2018-01-22 NOTE — PROGRESS NOTE ADULT - PROBLEM SELECTOR PLAN 2
Patient w LV thrombus as seen on Echo 1/8/18: EF 35%, LVH, apical and mid interventricular septum, apical anterior, apical inferior are all akinetic.  The true apex appears dyskinetic.  There is an apical LV thrombus measuring 2 x 1.7 cm. LV clot increased from 1cm in 9/2017  AM INR 1.8, currently therapeutic  - AC held in the setting LGIB  - continue to monitor for signs of emboli or stroke

## 2018-01-22 NOTE — PROGRESS NOTE ADULT - PROBLEM SELECTOR PLAN 2
Currently in SR. ICD interrogated by EP 1/8/18: The patient has not had any ATP or shocks since 12/24/17. The check was otherwise normal. He is V-pacing <1% of the time. Underlying sinus rhythm with paroxysmal Afib. Dr. Ramos (EP) aware and has no further recommendations.  Continue Toprol XL. Anticoagulation as above

## 2018-01-22 NOTE — PROGRESS NOTE ADULT - PROBLEM SELECTOR PLAN 5
BP was 97/63 this AM and asymptomatic. Repeated values are BP 100s-110s/50s-80s.  BP currently stable and orthostatic VS negative.  Continue currently Toprol XL and Lisinopril at current doses as per Dr. Levy

## 2018-01-22 NOTE — PROGRESS NOTE ADULT - PROBLEM SELECTOR PLAN 3
Patient with non-obstructive CAD as seen on cath 1/11/2018, pRCA 30%. LAD stent patent. Pt with ECG changes on admission for MI, ACS has been ruled out  - continue with Toprol 150mg XL PO daily  - c/w lisinopril 5mg PO daily   - w/w Lipitor 40mg PO daily.

## 2018-01-22 NOTE — PROGRESS NOTE ADULT - PROBLEM SELECTOR PLAN 3
S/p Cardiac Catheterization on 1/11/18: revealing Non-obstructive CAD (30% pRCA), Patent stent in LAD.  Continue Toprol XL, Lipitor. Hold ASA/Plavix per Dr. Levy due to INR 1.8 and pt w/ h/o GI bleeding and BRBPR

## 2018-01-22 NOTE — PROGRESS NOTE ADULT - PROBLEM SELECTOR PLAN 1
Pt with BRBPR on exam this AM, HH stable, at baseline at 8.4-9, VSS stable. Known microcytic iron deficiency Anemia. Was on AC for LV thrombus prior to onset BRBPR. GI (Dr. Canales following) recommending colonoscopy when INR lower (1.81 in AM). per Dr. Canales h/o GI polyp in 2016.   - continue to hold AC in setting questionable LGIB  - HH stable but continue to trend; consider STAT CBC or source of bleed if pt decompensates.   - f/u GI recs; ensure bowel prep before EGD

## 2018-01-22 NOTE — PROGRESS NOTE ADULT - ASSESSMENT
63 y/o M Ex-Smoker, Current ETOH Abuser, PMHX of CAD s/p prior PCI, Chronic Systolic CHF s/p AICD, Paroxysmal Afib, LV Thrombus, H/o Lower GI Bleed in 2016, CKD stage 3, Chronic Anemia, h/o recent hospital admission in 11/2017 for inappropriate shock, who was admitted on 1/6/18 for palpitations in the setting of ETOH abuse, he was treated for ETOH withdrawal, Psych was consulted. For concern for evolving MI on EKG with negative CE he underwent cardiac catheterization on 1/11/18 revealing non-obstructive CAD. He was noted to have a larger LV clot on Echo and was started on Heparin/Coumadin bridge. He was noted today to have BRBPR in the toilet. H/H stable at 9/29. Transfer to Medicine due to INR 1.8, awaiting down trend of INR for colonoscopy.

## 2018-01-22 NOTE — PROGRESS NOTE ADULT - PROBLEM SELECTOR PLAN 7
Patient with Acute DANUTA 1.35 <-- 1.41 on CKD3 (GFR 57). Unclear etiology. s/p IV contrast on 1/11. appears euvolemic   - continue to monitor  - urine lytes.

## 2018-01-22 NOTE — CONSULT NOTE ADULT - SUBJECTIVE AND OBJECTIVE BOX
62 M former smoker and drug abuser current ETOH abuser (2 pints vodka day) with PMHx CAD s/p MI 2003, most recent PCI at [facility] 7/2016 (JOSHUA to mLAD with residual 50 % OM1), chronic systolic CHF (EF 35% by Echo 9/2017) s/p AICD in 2009 ([employer]) , pafib and LV thrombus (on ASA and Plavix only 2/2 to lower GIB 2016), CKD Stage III (Baseline Cr normal 0.90-1.1), with frequent admissions to [facility] 11/2017 for palpitations in the setting of ETOH use/withdrawal and most recently 12/2017 for ICD firing who presents to [facility] ED 1/6 c/o palpitations that awoke him from sleep this AM.  Called because of rectal bleed.  Patient had colonoscopy 2016 which showed 1 cm colon polyp and advised colonoscopy off Plavix 2017 but lost to follow up.  Adviseable to do colonoscopy sson.      REVIEW OF SYSTEMS:  Constitutional: No fever, weight loss or fatigue  ENMT:  No difficulty hearing, tinnitus, vertigo; No sinus or throat pain  Respiratory: No cough, wheezing, chills or hemoptysis  Cardiovascular: No chest pain, palpitations, dizziness or leg swelling  Gastrointestinal: No abdominal or epigastric pain. No nausea, vomiting or hematemesis; No diarrhea or constipation. BRBPR  Skin: No itching, burning, rashes or lesions   Musculoskeletal: No joint pain or swelling; No muscle, back or extremity pain    PAST MEDICAL & SURGICAL HISTORY:  Pacemaker  ETOH abuse  Paroxysmal a-fib  Myocardial infarction involving other coronary artery  Gastritis  Atherosclerosis of coronary artery: Coronary artery disease  Automatic implantable cardiac defibrillator in situ: ICD (implantable cardioverter-defibrillator) in place      FAMILY HISTORY:  No pertinent family history in first degree relatives      SOCIAL HISTORY:  Smoking Status: [ ] Current, [ ] Former, [ ] Never  Pack Years:    MEDICATIONS:  MEDICATIONS  (STANDING):  atorvastatin 40 milliGRAM(s) Oral at bedtime  BACItracin   Ointment 1 Application(s) Topical daily  cyanocobalamin Injectable 1000 MICROGram(s) SubCutaneous daily  folic acid 1 milliGRAM(s) Oral daily  hydrocortisone 1% Cream 1 Application(s) Topical once  lisinopril 5 milliGRAM(s) Oral daily  metoprolol succinate  milliGRAM(s) Oral daily  multivitamin 1 Tablet(s) Oral daily  sodium chloride 0.9%. 1000 milliLiter(s) (50 mL/Hr) IV Continuous <Continuous>  sodium ferric gluconate complex IVPB 125 milliGRAM(s) IV Intermittent daily  thiamine 100 milliGRAM(s) Oral three times a day    MEDICATIONS  (PRN):      Allergies    Capoten (Short breath; Rash; Hives)  penicillin (Short breath; Rash; Hives)    Intolerances        Vital Signs Last 24 Hrs  T(C): 36.8 (22 Jan 2018 09:19), Max: 36.8 (21 Jan 2018 17:35)  T(F): 98.3 (22 Jan 2018 09:19), Max: 98.3 (22 Jan 2018 09:19)  HR: 92 (22 Jan 2018 09:52) (64 - 92)  BP: 108/74 (22 Jan 2018 09:52) (99/63 - 125/62)  BP(mean): --  RR: 19 (22 Jan 2018 09:52) (16 - 19)  SpO2: 96% (22 Jan 2018 05:45) (96% - 100%)    01-21 @ 07:01 - 01-22 @ 07:00  --------------------------------------------------------  IN: 1624 mL / OUT: 3000 mL / NET: -1376 mL    01-22 @ 07:01 - 01-22 @ 12:47  --------------------------------------------------------  IN: 308 mL / OUT: 0 mL / NET: 308 mL          PHYSICAL EXAM:    General: Well developed; well nourished; in no acute distress  HEENT: MMM, conjunctiva and sclera clear  Gastrointestinal: Soft, non-tender non-distended; Normal bowel sounds; No rebound or guarding  Extremities: Normal range of motion, No clubbing, cyanosis or edema  Neurological: Alert and oriented x3  Skin: Warm and dry. No obvious rash      LABS:                        8.7    5.1   )-----------( 193      ( 22 Jan 2018 05:41 )             28.4     01-22    139  |  102  |  18  ----------------------------<  103<H>  4.3   |  23  |  1.35<H>    Ca    9.7      22 Jan 2018 05:41  Mg     2.0     01-22            RADIOLOGY & ADDITIONAL STUDIES:
HPI:  62 M former smoker and drug abuser current ETOH abuser (2 pints vodka day) with PMHx CAD s/p MI 2003, most recent PCI at [facility] 7/2016 (JOSHUA to mLAD with residual 50 % OM1), chronic systolic CHF (EF 35% by Echo 9/2017) s/p AICD in 2009 ([employer]) , pafib and LV thrombus (on ASA and Plavix only 2/2 to lower GIB 2016), CKD Stage III (Baseline Cr normal 0.90-1.1), with frequent admissions to [facility] 11/2017 for palpitations in the setting of ETOH use/withdrawal and most recently 12/2017 for ICD firing who presents to [facility] ED 1/6 c/o palpitations that awoke him from sleep this AM. Patient admits to dizziness and nausea this AM but no vomiting or syncope. Patient reports he has been drinking 1-2 pints of vodka for the past few days and has not had an appetite since Sunday. He states he hasn’t really eaten in 3 days and has been drinking water and taking Nutrament twice a day for nutrition. Patient denies C/P, SOB, diaphoresis, syncope, LE edema, PND, orthopnea, fevers, chills. On arrival to ER /60 P 116 RR 18 Temp 98 F O2 sat 98% RA. EKG revealed NSR at 84 bpm with 1 mm ST elevation III, AVF, V1-V3  with TWI V4-V5. Inferior anteroseptal Q waves  (unchanged from prior EKG). Troponin T mildly elevated at 0.03. Labs significant for Cr 1.34, Hyponatremia Na 131, Thrombocytopenia Platelets 129,000, Leukopenia WBC 2.7 AST 58, Blood Alcohol 155.  CXR negative for acute infiltrates. Patient treated with 1 L NS and Ativan 2 mg IV x 1 (for tremors). Patient admitted for further management (06 Jan 2018 17:58)    FAMILY HISTORY:  No pertinent family history in first degree relatives    MEDICATIONS  (STANDING):  atorvastatin 40 milliGRAM(s) Oral at bedtime  BACItracin   Ointment 1 Application(s) Topical daily  folic acid 1 milliGRAM(s) Oral daily  heparin  Infusion.  Unit(s)/Hr (13 mL/Hr) IV Continuous <Continuous>  lisinopril 5 milliGRAM(s) Oral daily  metoprolol succinate  milliGRAM(s) Oral daily  multivitamin 1 Tablet(s) Oral daily  thiamine 100 milliGRAM(s) Oral three times a day  warfarin 7.5 milliGRAM(s) Oral once    MEDICATIONS  (PRN):  heparin  Injectable 6000 Unit(s) IV Push every 6 hours PRN For aPTT less than 40  heparin  Injectable 3000 Unit(s) IV Push every 6 hours PRN For aPTT between 40 - 57    Vital Signs Last 24 Hrs  T(C): 36.2 (16 Jan 2018 18:00), Max: 36.3 (15 Forrest 2018 21:53)  T(F): 97.2 (16 Jan 2018 18:00), Max: 97.3 (15 Forrest 2018 21:53)  HR: 85 (16 Jan 2018 17:19) (78 - 85)  BP: 120/74 (16 Jan 2018 17:19) (100/60 - 120/74)  BP(mean): --  RR: 16 (16 Jan 2018 17:19) (16 - 18)  SpO2: 100% (16 Jan 2018 17:19) (100% - 100%)PHYSICAL EXAM:    Constitutional:    Neck:    Breasts:    Respiratory:    Cardiovascular:    Gastrointestinal:    Extremities:    Neurological:    Skin:    Lymph Nodes:      Labs:  CBC Full  -  ( 16 Jan 2018 08:42 )  WBC Count : 3.6 K/uL  Hemoglobin : 8.8 g/dL  Hematocrit : 28.6 %  Platelet Count - Automated : 176 K/uL  Mean Cell Volume : 72.6 fL  Mean Cell Hemoglobin : 22.3 pg  Mean Cell Hemoglobin Concentration : 30.8 g/dL  Auto Neutrophil # : x  Auto Lymphocyte # : x  Auto Monocyte # : x  Auto Eosinophil # : x  Auto Basophil # : x  Auto Neutrophil % : x  Auto Lymphocyte % : x  Auto Monocyte % : x  Auto Eosinophil % : x  Auto Basophil % : x    01-16    135  |  99  |  19  ----------------------------<  104<H>  4.4   |  23  |  1.20    Ca    9.7      16 Jan 2018 08:42  Mg     1.8     01-16        Radiology:  HEALTH ISSUES - R/O PROBLEM Dx:    Assessmant:  1) LV apical blood  clot which has grown as of late  2) Patient has h/o GI bleeding 2/2 colonic polyp and Hemorhoids and anticoagulation  3) h/o early MI at age 36y/o and 40 y/o. Also Family h/o early MI  4) Patient needs higher than usual doses of Couamdin 2/2 LV clot absorbing much of the Coumadin  5) iron deficiency anemia 2/2 GI bleeding from colonic polyps and hemorrhoids  Plan:  1) Hypercoagulable w/u sent out  2) increase Coumadin dose until INR 2  3)iv iron while in hospital ordered    Thank you  Liana Everett MD

## 2018-01-22 NOTE — PROGRESS NOTE ADULT - PROBLEM SELECTOR PLAN 4
Patient with [id number]% (1/08) NYHA Class 2. Patient appears euvolemic, consider diuresis if clinically indicated. Currently stable   - c/w ACEi  - daily weights, monitor fluid status

## 2018-01-22 NOTE — PROGRESS NOTE ADULT - ASSESSMENT
62yM with PMHx current ETOH c/b WD, CAD s/p PCI, [id number]% w/ AICD, Afib with LV Thrombus not AC 2/2 LGIB, CKD3, Anemia (Fe Deficiency) with recent admission 12/'17 who presented on 01/06/18 c/p palpitations in setting ETOH a/p r/o ACS. Patient s/p cardiac cath, demonstrating non-obst CAD and significant LV thrombus but unable to be AC 2/2 LGIB, currently awaiting evaluation with colonoscopy 62yM with PMHx current ETOH c/b WD (this admission), CAD s/p PCI, [id number]% w/ AICD, Afib with LV Thrombus not AC 2/2 LGIB, CKD3, Anemia (Fe Deficiency) with recent admission 12/'17 who presented on 01/06/18 c/p palpitations in setting ETOH a/p r/o ACS. Patient s/p cardiac cath, demonstrating non-obst CAD and significant LV thrombus but unable to be AC 2/2 LGIB, currently awaiting evaluation with colonoscopy when INR <1.5

## 2018-01-22 NOTE — PROGRESS NOTE ADULT - PROBLEM SELECTOR PLAN 7
Hgb had remained between 8-9, non-consistent Hgb 13.3 on 1/6/18  Guaiac negative 1/8/18 and today.   Noted to have BRPR in toilet today.   Dr. Canales (GI) consulted and will perform colonoscopy when INR down trends. Dr. Canales informs us that from Colonoscopy in 2016 pt had a 1cm polyp that requires removal.   For Iron Deficiency Anemia continue IV Iron. For B12 deficiency continue B12 injections.       Dispo: Transfer to medicine as per Dr. Levy. Plan for colonoscopy later this week

## 2018-01-23 LAB
ANION GAP SERPL CALC-SCNC: 12 MMOL/L — SIGNIFICANT CHANGE UP (ref 5–17)
APTT BLD: 50.1 SEC — HIGH (ref 27.5–37.4)
BUN SERPL-MCNC: 18 MG/DL — SIGNIFICANT CHANGE UP (ref 7–23)
CALCIUM SERPL-MCNC: 9.7 MG/DL — SIGNIFICANT CHANGE UP (ref 8.4–10.5)
CHLORIDE SERPL-SCNC: 100 MMOL/L — SIGNIFICANT CHANGE UP (ref 96–108)
CO2 SERPL-SCNC: 26 MMOL/L — SIGNIFICANT CHANGE UP (ref 22–31)
CREAT ?TM UR-MCNC: 70 MG/DL — SIGNIFICANT CHANGE UP
CREAT SERPL-MCNC: 1.28 MG/DL — SIGNIFICANT CHANGE UP (ref 0.5–1.3)
GLUCOSE SERPL-MCNC: 90 MG/DL — SIGNIFICANT CHANGE UP (ref 70–99)
HCT VFR BLD CALC: 30 % — LOW (ref 39–50)
HGB BLD-MCNC: 9.2 G/DL — LOW (ref 13–17)
INR BLD: 1.81 — HIGH (ref 0.88–1.16)
MAGNESIUM SERPL-MCNC: 1.9 MG/DL — SIGNIFICANT CHANGE UP (ref 1.6–2.6)
MCHC RBC-ENTMCNC: 22.7 PG — LOW (ref 27–34)
MCHC RBC-ENTMCNC: 30.7 G/DL — LOW (ref 32–36)
MCV RBC AUTO: 74.1 FL — LOW (ref 80–100)
PLATELET # BLD AUTO: 198 K/UL — SIGNIFICANT CHANGE UP (ref 150–400)
POTASSIUM SERPL-MCNC: 4.4 MMOL/L — SIGNIFICANT CHANGE UP (ref 3.5–5.3)
POTASSIUM SERPL-SCNC: 4.4 MMOL/L — SIGNIFICANT CHANGE UP (ref 3.5–5.3)
PROTHROM AB SERPL-ACNC: 20.3 SEC — HIGH (ref 9.8–12.7)
RBC # BLD: 4.05 M/UL — LOW (ref 4.2–5.8)
RBC # FLD: 24.8 % — HIGH (ref 10.3–16.9)
SODIUM SERPL-SCNC: 138 MMOL/L — SIGNIFICANT CHANGE UP (ref 135–145)
SODIUM UR-SCNC: 57 MMOL/L — SIGNIFICANT CHANGE UP
WBC # BLD: 5.4 K/UL — SIGNIFICANT CHANGE UP (ref 3.8–10.5)
WBC # FLD AUTO: 5.4 K/UL — SIGNIFICANT CHANGE UP (ref 3.8–10.5)

## 2018-01-23 PROCEDURE — 93010 ELECTROCARDIOGRAM REPORT: CPT

## 2018-01-23 RX ORDER — MAGNESIUM SULFATE 500 MG/ML
1 VIAL (ML) INJECTION ONCE
Qty: 0 | Refills: 0 | Status: COMPLETED | OUTPATIENT
Start: 2018-01-23 | End: 2018-01-23

## 2018-01-23 RX ADMIN — ATORVASTATIN CALCIUM 40 MILLIGRAM(S): 80 TABLET, FILM COATED ORAL at 22:36

## 2018-01-23 RX ADMIN — PREGABALIN 1000 MICROGRAM(S): 225 CAPSULE ORAL at 14:19

## 2018-01-23 RX ADMIN — Medication 1 MILLIGRAM(S): at 14:18

## 2018-01-23 RX ADMIN — Medication 100 MILLIGRAM(S): at 14:18

## 2018-01-23 RX ADMIN — Medication 100 GRAM(S): at 18:11

## 2018-01-23 RX ADMIN — Medication 150 MILLIGRAM(S): at 06:39

## 2018-01-23 RX ADMIN — Medication 100 MILLIGRAM(S): at 22:36

## 2018-01-23 RX ADMIN — Medication 1 APPLICATION(S): at 14:20

## 2018-01-23 RX ADMIN — Medication 100 MILLIGRAM(S): at 06:39

## 2018-01-23 RX ADMIN — Medication 1 TABLET(S): at 14:17

## 2018-01-23 RX ADMIN — LISINOPRIL 5 MILLIGRAM(S): 2.5 TABLET ORAL at 06:39

## 2018-01-23 NOTE — PROGRESS NOTE ADULT - ASSESSMENT
62yM with PMHx current ETOH c/b WD (this admission), CAD s/p PCI, [id number]% w/ AICD, Afib with LV Thrombus not AC 2/2 LGIB, CKD3, Anemia (Fe Deficiency) with recent admission 12/'17 who presented on 01/06/18 c/p palpitations in setting ETOH a/p r/o ACS. Patient s/p cardiac cath, demonstrating non-obst CAD and significant LV thrombus but unable to be AC 2/2 LGIB, currently awaiting evaluation with colonoscopy when INR <1.5

## 2018-01-23 NOTE — PROGRESS NOTE ADULT - PROBLEM SELECTOR PLAN 1
Pt with BRBPR on exam this AM, HH stable, at baseline at 8.4-9, VSS stable. Known microcytic iron deficiency Anemia. Was on AC for LV thrombus prior to onset BRBPR. GI (Dr. Canales following) recommending colonoscopy when INR lower (1.81 in AM). per Dr. Canales h/o GI polyp in 2016.   - patient currently scheduled for colonoscopy on Thursday  - continue to hold AC in setting questionable LGIB  - HH stable but continue to trend; consider STAT CBC or source of bleed if pt decompensates.   - f/u GI recs; ensure bowel prep before

## 2018-01-23 NOTE — PROGRESS NOTE ADULT - SUBJECTIVE AND OBJECTIVE BOX
Pt started to experience GI Bleeding    Coumadin on hold pending Colonoscopy /EGD    PAST MEDICAL & SURGICAL HISTORY:  Pacemaker  ETOH abuse  Paroxysmal a-fib  Myocardial infarction involving other coronary artery  Gastritis  Atherosclerosis of coronary artery: Coronary artery disease  Automatic implantable cardiac defibrillator in situ: ICD (implantable cardioverter-defibrillator) in place    MEDICATIONS  (STANDING):  atorvastatin 40 milliGRAM(s) Oral at bedtime  BACItracin   Ointment 1 Application(s) Topical daily  cyanocobalamin Injectable 1000 MICROGram(s) SubCutaneous daily  folic acid 1 milliGRAM(s) Oral daily  lisinopril 5 milliGRAM(s) Oral daily  magnesium sulfate  IVPB 1 Gram(s) IV Intermittent once  metoprolol succinate  milliGRAM(s) Oral daily  multivitamin 1 Tablet(s) Oral daily  sodium ferric gluconate complex IVPB 125 milliGRAM(s) IV Intermittent daily  thiamine 100 milliGRAM(s) Oral three times a day    MEDICATIONS  (PRN):      ICU Vital Signs Last 24 Hrs  T(C): 36.7 (23 Jan 2018 09:09), Max: 37.2 (23 Jan 2018 06:03)  T(F): 98.1 (23 Jan 2018 09:09), Max: 98.9 (23 Jan 2018 06:03)  HR: 82 (23 Jan 2018 09:09) (73 - 88)  BP: 118/76 (23 Jan 2018 09:09) (107/70 - 122/77)  BP(mean): --  ABP: --  ABP(mean): --  RR: 16 (23 Jan 2018 09:09) (16 - 19)  SpO2: 100% (23 Jan 2018 09:09) (97% - 100%)      Lungs clear  CV s1 s2  Abd soft  Ext stable                          9.2    5.4   )-----------( 198      ( 23 Jan 2018 06:50 )             30.0   01-23    138  |  100  |  18  ----------------------------<  90  4.4   |  26  |  1.28    Ca    9.7      23 Jan 2018 06:50  Mg     1.9     01-23    PT/INR - ( 23 Jan 2018 06:50 )   PT: 20.3 sec;   INR: 1.81          PTT - ( 23 Jan 2018 06:50 )  PTT:50.1 sec

## 2018-01-23 NOTE — PROGRESS NOTE ADULT - SUBJECTIVE AND OBJECTIVE BOX
Pt seen and examined no bleed today, no BM    REVIEW OF SYSTEMS:  Constitutional: No fever, weight loss or fatigue  Cardiovascular: No chest pain, palpitations, dizziness or leg swelling  Gastrointestinal: No abdominal or epigastric pain. No nausea, vomiting or hematemesis; No diarrhea or constipation. No melena or hematochezia.  Skin: No itching, burning, rashes or lesions       MEDICATIONS:  MEDICATIONS  (STANDING):  atorvastatin 40 milliGRAM(s) Oral at bedtime  BACItracin   Ointment 1 Application(s) Topical daily  cyanocobalamin Injectable 1000 MICROGram(s) SubCutaneous daily  folic acid 1 milliGRAM(s) Oral daily  lisinopril 5 milliGRAM(s) Oral daily  magnesium sulfate  IVPB 1 Gram(s) IV Intermittent once  metoprolol succinate  milliGRAM(s) Oral daily  multivitamin 1 Tablet(s) Oral daily  sodium ferric gluconate complex IVPB 125 milliGRAM(s) IV Intermittent daily  thiamine 100 milliGRAM(s) Oral three times a day    MEDICATIONS  (PRN):      Allergies    Capoten (Short breath; Rash; Hives)  penicillin (Short breath; Rash; Hives)    Intolerances        Vital Signs Last 24 Hrs  T(C): 36.7 (23 Jan 2018 09:09), Max: 37.2 (23 Jan 2018 06:03)  T(F): 98.1 (23 Jan 2018 09:09), Max: 98.9 (23 Jan 2018 06:03)  HR: 82 (23 Jan 2018 09:09) (73 - 88)  BP: 118/76 (23 Jan 2018 09:09) (107/70 - 122/77)  BP(mean): --  RR: 16 (23 Jan 2018 09:09) (16 - 19)  SpO2: 100% (23 Jan 2018 09:09) (97% - 100%)    01-22 @ 07:01  -  01-23 @ 07:00  --------------------------------------------------------  IN: 968 mL / OUT: 2545 mL / NET: -1577 mL        PHYSICAL EXAM:    General: Well developed; well nourished; in no acute distress  HEENT: MMM, conjunctiva and sclera clear  Gastrointestinal: Soft non-tender non-distended; Normal bowel sounds; No hepatosplenomegaly  Skin: Warm and dry. No obvious rash    LABS:      CBC Full  -  ( 23 Jan 2018 06:50 )  WBC Count : 5.4 K/uL  Hemoglobin : 9.2 g/dL  Hematocrit : 30.0 %  Platelet Count - Automated : 198 K/uL  Mean Cell Volume : 74.1 fL  Mean Cell Hemoglobin : 22.7 pg  Mean Cell Hemoglobin Concentration : 30.7 g/dL  Auto Neutrophil # : x  Auto Lymphocyte # : x  Auto Monocyte # : x  Auto Eosinophil # : x  Auto Basophil # : x  Auto Neutrophil % : x  Auto Lymphocyte % : x  Auto Monocyte % : x  Auto Eosinophil % : x  Auto Basophil % : x    01-23    138  |  100  |  18  ----------------------------<  90  4.4   |  26  |  1.28    Ca    9.7      23 Jan 2018 06:50  Mg     1.9     01-23      PT/INR - ( 23 Jan 2018 06:50 )   PT: 20.3 sec;   INR: 1.81          PTT - ( 23 Jan 2018 06:50 )  PTT:50.1 sec                  RADIOLOGY & ADDITIONAL STUDIES (The following images were personally reviewed):

## 2018-01-23 NOTE — PROGRESS NOTE ADULT - SUBJECTIVE AND OBJECTIVE BOX
INTERVAL HPI/OVERNIGHT EVENTS:  No acute overnight events. [facility]    SUBJECTIVE: Patient seen and examined at bedside. Denies acute complaints this AM. No SOB, chest pain, abdominal pain. Eating comfortably in bed.     ROS:  CV: Denies chest pain  Resp: Denies SOB  GI: Denies abdominal pain, constipation, diarrhea, nausea, vomiting  : Denies dysuria  ID: Denies fevers, chills  MSK: Denies joint pain     OBJECTIVE:    VITAL SIGNS:  ICU Vital Signs Last 24 Hrs  T(C): 36.7 (23 Jan 2018 09:09), Max: 37.2 (23 Jan 2018 06:03)  T(F): 98.1 (23 Jan 2018 09:09), Max: 98.9 (23 Jan 2018 06:03)  HR: 82 (23 Jan 2018 09:09) (73 - 82)  BP: 118/76 (23 Jan 2018 09:09) (107/70 - 122/77)  BP(mean): --  ABP: --  ABP(mean): --  RR: 16 (23 Jan 2018 09:09) (16 - 19)  SpO2: 100% (23 Jan 2018 09:09) (97% - 100%)        01-22 @ 07:01  -  01-23 @ 07:00  --------------------------------------------------------  IN: 968 mL / OUT: 2545 mL / NET: -1577 mL        PHYSICAL EXAM:  General: NAD, resting comfortably in bed  HEENT: NC/AT; PERRL, clear conjunctiva, EOMI  Neck: supple, no JVD or LAD  Respiratory: CTAB  Cardiovascular: +S1/S2; irregular rhythm  Abdomen: soft, NT/ND; +BS x4  Extremities: WWP, 2+ peripheral pulses b/l; no LE edema  Skin: normal color and turgor; no rash  Neurological: AOX3    MEDICATIONS:  MEDICATIONS  (STANDING):  atorvastatin 40 milliGRAM(s) Oral at bedtime  BACItracin   Ointment 1 Application(s) Topical daily  cyanocobalamin Injectable 1000 MICROGram(s) SubCutaneous daily  folic acid 1 milliGRAM(s) Oral daily  lisinopril 5 milliGRAM(s) Oral daily  magnesium sulfate  IVPB 1 Gram(s) IV Intermittent once  metoprolol succinate  milliGRAM(s) Oral daily  multivitamin 1 Tablet(s) Oral daily  sodium ferric gluconate complex IVPB 125 milliGRAM(s) IV Intermittent daily  thiamine 100 milliGRAM(s) Oral three times a day    MEDICATIONS  (PRN):      ALLERGIES:  Allergies    Capoten (Short breath; Rash; Hives)  penicillin (Short breath; Rash; Hives)    Intolerances        LABS:                        9.2    5.4   )-----------( 198      ( 23 Jan 2018 06:50 )             30.0     01-23    138  |  100  |  18  ----------------------------<  90  4.4   |  26  |  1.28    Ca    9.7      23 Jan 2018 06:50  Mg     1.9     01-23      PT/INR - ( 23 Jan 2018 06:50 )   PT: 20.3 sec;   INR: 1.81          PTT - ( 23 Jan 2018 06:50 )  PTT:50.1 sec      RADIOLOGY & ADDITIONAL TESTS: Reviewed. HOSPITAL COURSE:  62yM with PMHx current ETOH c/b WD, CAD s/p PCI, cIEsEE99% w/ AICD, Afib with LV Thrombus no AC 2/2 LGIB, CKD3, Anemia (Fe Deficiency) with recent admission 12/'17 who presented on 01/06/18 c/p palpitations in setting ETOH w/ concern for evolving MI with negative CE. Pt was treated for ETOH WD. Pt underwent cardiac cath on 1/11/18, demonstrating non-obstruction CAD with sig LV clot on echo. Patient was started on heparin drip with plan for coumadin bridge, but noted to have BRBPR on 1/22. Of note, patient was evaluated by Psych, recommending OP follow up without indications for interventions at this time.   Patient's VS have been stable ( T(C): Max: 36.8  HR: 64 - 92 BP: 99/63 - 120/70 RR: 16 - 19 SpO2: 96%) and is being transferred from [address] with plan for EGD for evaluation of GIB.   Colonoscopy scheduled for Thursday.      INTERVAL HPI/OVERNIGHT EVENTS:  No acute overnight events. Transferred to [facility].    SUBJECTIVE: Patient seen and examined at bedside. Denies acute complaints this AM. No SOB, chest pain, abdominal pain. Eating comfortably in bed.     ROS:  CV: Denies chest pain  Resp: Denies SOB  GI: Denies abdominal pain, constipation, diarrhea, nausea, vomiting  : Denies dysuria  ID: Denies fevers, chills  MSK: Denies joint pain     OBJECTIVE:    VITAL SIGNS:  ICU Vital Signs Last 24 Hrs  T(C): 36.7 (23 Jan 2018 09:09), Max: 37.2 (23 Jan 2018 06:03)  T(F): 98.1 (23 Jan 2018 09:09), Max: 98.9 (23 Jan 2018 06:03)  HR: 82 (23 Jan 2018 09:09) (73 - 82)  BP: 118/76 (23 Jan 2018 09:09) (107/70 - 122/77)  BP(mean): --  ABP: --  ABP(mean): --  RR: 16 (23 Jan 2018 09:09) (16 - 19)  SpO2: 100% (23 Jan 2018 09:09) (97% - 100%)        01-22 @ 07:01  -  01-23 @ 07:00  --------------------------------------------------------  IN: 968 mL / OUT: 2545 mL / NET: -1577 mL        PHYSICAL EXAM:  General: NAD, resting comfortably in bed  HEENT: NC/AT; PERRL, clear conjunctiva, EOMI  Neck: supple, no JVD or LAD  Respiratory: CTAB  Cardiovascular: +S1/S2; irregular rhythm  Abdomen: soft, NT/ND; +BS x4  Extremities: WWP, 2+ peripheral pulses b/l; no LE edema  Skin: normal color and turgor; no rash  Neurological: AOX3    MEDICATIONS:  MEDICATIONS  (STANDING):  atorvastatin 40 milliGRAM(s) Oral at bedtime  BACItracin   Ointment 1 Application(s) Topical daily  cyanocobalamin Injectable 1000 MICROGram(s) SubCutaneous daily  folic acid 1 milliGRAM(s) Oral daily  lisinopril 5 milliGRAM(s) Oral daily  magnesium sulfate  IVPB 1 Gram(s) IV Intermittent once  metoprolol succinate  milliGRAM(s) Oral daily  multivitamin 1 Tablet(s) Oral daily  sodium ferric gluconate complex IVPB 125 milliGRAM(s) IV Intermittent daily  thiamine 100 milliGRAM(s) Oral three times a day    MEDICATIONS  (PRN):      ALLERGIES:  Allergies    Capoten (Short breath; Rash; Hives)  penicillin (Short breath; Rash; Hives)    Intolerances        LABS:                        9.2    5.4   )-----------( 198      ( 23 Jan 2018 06:50 )             30.0     01-23    138  |  100  |  18  ----------------------------<  90  4.4   |  26  |  1.28    Ca    9.7      23 Jan 2018 06:50  Mg     1.9     01-23      PT/INR - ( 23 Jan 2018 06:50 )   PT: 20.3 sec;   INR: 1.81          PTT - ( 23 Jan 2018 06:50 )  PTT:50.1 sec      RADIOLOGY & ADDITIONAL TESTS: Reviewed.

## 2018-01-23 NOTE — PROGRESS NOTE ADULT - PROBLEM SELECTOR PLAN 4
Patient with [id number]% (1/08) NYHA Class 2. Patient appears euvolemic, consider diuresis if clinically indicated. Currently stable   - c/w ACEi  - daily weights, monitor fluid status

## 2018-01-24 LAB
ANION GAP SERPL CALC-SCNC: 11 MMOL/L — SIGNIFICANT CHANGE UP (ref 5–17)
APTT BLD: 45.3 SEC — HIGH (ref 27.5–37.4)
BUN SERPL-MCNC: 20 MG/DL — SIGNIFICANT CHANGE UP (ref 7–23)
CALCIUM SERPL-MCNC: 9.4 MG/DL — SIGNIFICANT CHANGE UP (ref 8.4–10.5)
CHLORIDE SERPL-SCNC: 98 MMOL/L — SIGNIFICANT CHANGE UP (ref 96–108)
CO2 SERPL-SCNC: 25 MMOL/L — SIGNIFICANT CHANGE UP (ref 22–31)
CREAT SERPL-MCNC: 1.28 MG/DL — SIGNIFICANT CHANGE UP (ref 0.5–1.3)
GLUCOSE SERPL-MCNC: 102 MG/DL — HIGH (ref 70–99)
HCT VFR BLD CALC: 31.6 % — LOW (ref 39–50)
HCT VFR BLD CALC: 32.4 % — LOW (ref 39–50)
HGB BLD-MCNC: 10.1 G/DL — LOW (ref 13–17)
HGB BLD-MCNC: 9.7 G/DL — LOW (ref 13–17)
INR BLD: 1.3 — HIGH (ref 0.88–1.16)
MAGNESIUM SERPL-MCNC: 2.1 MG/DL — SIGNIFICANT CHANGE UP (ref 1.6–2.6)
MCHC RBC-ENTMCNC: 23 PG — LOW (ref 27–34)
MCHC RBC-ENTMCNC: 23.2 PG — LOW (ref 27–34)
MCHC RBC-ENTMCNC: 30.7 G/DL — LOW (ref 32–36)
MCHC RBC-ENTMCNC: 31.2 G/DL — LOW (ref 32–36)
MCV RBC AUTO: 74.5 FL — LOW (ref 80–100)
MCV RBC AUTO: 74.9 FL — LOW (ref 80–100)
OB PNL STL: NEGATIVE — SIGNIFICANT CHANGE UP
PLATELET # BLD AUTO: 176 K/UL — SIGNIFICANT CHANGE UP (ref 150–400)
PLATELET # BLD AUTO: 181 K/UL — SIGNIFICANT CHANGE UP (ref 150–400)
POTASSIUM SERPL-MCNC: 4.6 MMOL/L — SIGNIFICANT CHANGE UP (ref 3.5–5.3)
POTASSIUM SERPL-SCNC: 4.6 MMOL/L — SIGNIFICANT CHANGE UP (ref 3.5–5.3)
PROTHROM AB SERPL-ACNC: 14.5 SEC — HIGH (ref 9.8–12.7)
RBC # BLD: 4.22 M/UL — SIGNIFICANT CHANGE UP (ref 4.2–5.8)
RBC # BLD: 4.35 M/UL — SIGNIFICANT CHANGE UP (ref 4.2–5.8)
RBC # FLD: 25 % — HIGH (ref 10.3–16.9)
RBC # FLD: 25.2 % — HIGH (ref 10.3–16.9)
SODIUM SERPL-SCNC: 134 MMOL/L — LOW (ref 135–145)
WBC # BLD: 6.2 K/UL — SIGNIFICANT CHANGE UP (ref 3.8–10.5)
WBC # BLD: 6.3 K/UL — SIGNIFICANT CHANGE UP (ref 3.8–10.5)
WBC # FLD AUTO: 6.2 K/UL — SIGNIFICANT CHANGE UP (ref 3.8–10.5)
WBC # FLD AUTO: 6.3 K/UL — SIGNIFICANT CHANGE UP (ref 3.8–10.5)

## 2018-01-24 PROCEDURE — 99233 SBSQ HOSP IP/OBS HIGH 50: CPT

## 2018-01-24 RX ORDER — SOD SULF/SODIUM/NAHCO3/KCL/PEG
4000 SOLUTION, RECONSTITUTED, ORAL ORAL ONCE
Qty: 0 | Refills: 0 | Status: COMPLETED | OUTPATIENT
Start: 2018-01-24 | End: 2018-01-24

## 2018-01-24 RX ADMIN — PREGABALIN 1000 MICROGRAM(S): 225 CAPSULE ORAL at 11:29

## 2018-01-24 RX ADMIN — Medication 55 MILLIGRAM(S): at 23:02

## 2018-01-24 RX ADMIN — Medication 4000 MILLILITER(S): at 18:09

## 2018-01-24 RX ADMIN — Medication 100 MILLIGRAM(S): at 11:28

## 2018-01-24 RX ADMIN — LISINOPRIL 5 MILLIGRAM(S): 2.5 TABLET ORAL at 06:43

## 2018-01-24 RX ADMIN — Medication 100 MILLIGRAM(S): at 21:22

## 2018-01-24 RX ADMIN — Medication 100 MILLIGRAM(S): at 06:43

## 2018-01-24 RX ADMIN — Medication 150 MILLIGRAM(S): at 06:43

## 2018-01-24 RX ADMIN — Medication 55 MILLIGRAM(S): at 01:23

## 2018-01-24 RX ADMIN — Medication 1 MILLIGRAM(S): at 11:28

## 2018-01-24 RX ADMIN — ATORVASTATIN CALCIUM 40 MILLIGRAM(S): 80 TABLET, FILM COATED ORAL at 21:22

## 2018-01-24 RX ADMIN — Medication 1 APPLICATION(S): at 11:29

## 2018-01-24 RX ADMIN — Medication 1 TABLET(S): at 11:28

## 2018-01-24 NOTE — PROGRESS NOTE BEHAVIORAL HEALTH - BODY HABITUS
Well nourished

## 2018-01-24 NOTE — PROGRESS NOTE BEHAVIORAL HEALTH - NSBHCHARTREVIEWLAB_PSY_A_CORE FT
8.8    3.6   )-----------( 176      ( 16 Jan 2018 08:42 )             28.6   01-16    135  |  99  |  19  ----------------------------<  104<H>  4.4   |  23  |  1.20    Ca    9.7      16 Jan 2018 08:42  Mg     1.8     01-16
8.1    3.8   )-----------( 143      ( 12 Jan 2018 07:40 )             27.5   01-12    136  |  101  |  17  ----------------------------<  103<H>  3.9   |  23  |  1.23    Ca    9.2      12 Jan 2018 07:39  Mg     2.0     01-12
9.5    5.2   )-----------( 169      ( 09 Jan 2018 07:56 )             30.2   01-09    132<L>  |  96  |  19  ----------------------------<  101<H>  4.9   |  23  |  1.15    Ca    9.6      09 Jan 2018 07:56  Mg     1.8     01-09
8.0    4.0   )-----------( 141      ( 11 Jan 2018 07:00 )             26.6   01-11    134<L>  |  100  |  17  ----------------------------<  102<H>  4.5   |  24  |  1.22    Ca    9.3      11 Jan 2018 07:00  Mg     2.0     01-11    TPro  7.1  /  Alb  3.8  /  TBili  0.4  /  DBili  x   /  AST  23  /  ALT  20  /  AlkPhos  44  01-10
9.7    6.2   )-----------( 176      ( 24 Jan 2018 08:13 )             31.6   01-24    134<L>  |  98  |  20  ----------------------------<  102<H>  4.6   |  25  |  1.28    Ca    9.4      24 Jan 2018 08:12  Mg     2.1     01-24
8.4    4.6   )-----------( 194      ( 18 Jan 2018 07:27 )             28.3   01-18    135  |  99  |  18  ----------------------------<  95  4.4   |  26  |  1.33<H>    Ca    9.5      18 Jan 2018 07:27  Mg     2.1     01-18
8.8    4.3   )-----------( 148      ( 10 Forrest 2018 06:44 )             29.1   01-10    134<L>  |  99  |  20  ----------------------------<  103<H>  4.4   |  23  |  1.28    Ca    9.4      10 Forrest 2018 06:44  Mg     2.0     01-10    TPro  7.1  /  Alb  3.8  /  TBili  0.4  /  DBili  x   /  AST  23  /  ALT  20  /  AlkPhos  44  01-10

## 2018-01-24 NOTE — PROGRESS NOTE BEHAVIORAL HEALTH - NSBHADMITCOUNSEL_PSY_A_CORE
client/family/caregiver education/diagnostic results/impressions and/or recommended studies/risks and benefits of treatment options/importance of adherence to chosen treatment/prognosis/instructions for management, treatment and follow up/risk factor reduction
diagnostic results/impressions and/or recommended studies/risks and benefits of treatment options/prognosis/instructions for management, treatment and follow up/risk factor reduction/importance of adherence to chosen treatment/client/family/caregiver education
risks and benefits of treatment options/instructions for management, treatment and follow up/prognosis/risk factor reduction/client/family/caregiver education/diagnostic results/impressions and/or recommended studies/importance of adherence to chosen treatment
client/family/caregiver education/instructions for management, treatment and follow up/risk factor reduction/risks and benefits of treatment options/prognosis/diagnostic results/impressions and/or recommended studies/importance of adherence to chosen treatment
diagnostic results/impressions and/or recommended studies/client/family/caregiver education/prognosis/instructions for management, treatment and follow up/risk factor reduction/risks and benefits of treatment options/importance of adherence to chosen treatment
importance of adherence to chosen treatment/risks and benefits of treatment options/instructions for management, treatment and follow up/risk factor reduction/prognosis/diagnostic results/impressions and/or recommended studies/client/family/caregiver education
prognosis/risks and benefits of treatment options/diagnostic results/impressions and/or recommended studies/importance of adherence to chosen treatment/instructions for management, treatment and follow up/risk factor reduction/client/family/caregiver education

## 2018-01-24 NOTE — PROGRESS NOTE BEHAVIORAL HEALTH - NSBHCONSULTFOLLOWAFTERCARE_PSY_A_CORE FT
inpatient rehab vs. [facility] outpatient addiction treatment
inpatient rehab vs. [facility] outpatient addiction treatment
Patient prefers to follow up with [facility] outpatient addiction treatment upon discharge; I called [phone]) and confirmed that he can schedule a next-day appointment when he is discharged (which is patient's preference)
inpatient rehab vs. [facility] outpatient addiction treatment
Patient prefers to follow up with [facility] outpatient addiction treatment upon discharge; I called [phone]) and confirmed that he can schedule a next-day appointment when he is discharged (which is patient's preference)
Patient prefers to follow up with [facility] outpatient addiction treatment upon discharge; I called [phone]) and confirmed that he can schedule a next-day appointment when he is discharged (which is patient's preference)
Patient prefers to follow up with [facility] outpatient addiction treatment upon discharge; I called [phone]) and confirmed that he can schedule a next-day appointment when he is discharged (which is patient's preference)

## 2018-01-24 NOTE — PROGRESS NOTE ADULT - SUBJECTIVE AND OBJECTIVE BOX
INTERVAL HPI/OVERNIGHT EVENTS: none.     SUBJECTIVE: Patient seen and examined at bedside. ros neg.     OBJECTIVE:    VITAL SIGNS:  ICU Vital Signs Last 24 Hrs  T(C): 36.6 (24 Jan 2018 05:15), Max: 36.7 (23 Jan 2018 09:09)  T(F): 97.8 (24 Jan 2018 05:15), Max: 98.1 (23 Jan 2018 09:09)  HR: 83 (24 Jan 2018 05:15) (73 - 83)  BP: 101/71 (24 Jan 2018 05:15) (101/71 - 118/76)  BP(mean): --  ABP: --  ABP(mean): --  RR: 18 (24 Jan 2018 05:15) (16 - 18)  SpO2: 97% (24 Jan 2018 05:15) (97% - 100%)        01-23 @ 07:01  -  01-24 @ 07:00  --------------------------------------------------------  IN: 300 mL / OUT: 2400 mL / NET: -2100 mL      CAPILLARY BLOOD GLUCOSE          PHYSICAL EXAM:    General: NAD, resting comfortably in bed  HEENT: NC/AT; PERRL, clear conjunctiva, EOMI  Neck: supple, no JVD or LAD  Respiratory: CTAB  Cardiovascular: +S1/S2; irregular rhythm  Abdomen: soft, NT/ND; +BS x4  Extremities: WWP, 2+ peripheral pulses b/l; no LE edema  Skin: normal color and turgor; no rash  Neurological: AOX3    MEDICATIONS:  MEDICATIONS  (STANDING):  atorvastatin 40 milliGRAM(s) Oral at bedtime  BACItracin   Ointment 1 Application(s) Topical daily  cyanocobalamin Injectable 1000 MICROGram(s) SubCutaneous daily  folic acid 1 milliGRAM(s) Oral daily  lisinopril 5 milliGRAM(s) Oral daily  metoprolol succinate  milliGRAM(s) Oral daily  multivitamin 1 Tablet(s) Oral daily  sodium ferric gluconate complex IVPB 125 milliGRAM(s) IV Intermittent daily  thiamine 100 milliGRAM(s) Oral three times a day    MEDICATIONS  (PRN):      ALLERGIES:  Allergies    Capoten (Short breath; Rash; Hives)  penicillin (Short breath; Rash; Hives)    Intolerances        LABS:                        9.2    5.4   )-----------( 198      ( 23 Jan 2018 06:50 )             30.0     01-23    138  |  100  |  18  ----------------------------<  90  4.4   |  26  |  1.28    Ca    9.7      23 Jan 2018 06:50  Mg     1.9     01-23      PT/INR - ( 23 Jan 2018 06:50 )   PT: 20.3 sec;   INR: 1.81          PTT - ( 23 Jan 2018 06:50 )  PTT:50.1 sec      RADIOLOGY & ADDITIONAL TESTS: Reviewed.

## 2018-01-24 NOTE — PROGRESS NOTE BEHAVIORAL HEALTH - NSBHCONSFOLLOWNEEDS_PSY_A_CORE
no psychiatric contraindications to discharge

## 2018-01-24 NOTE — PROGRESS NOTE BEHAVIORAL HEALTH - NSBHCONSULTMEDS_PSY_A_CORE FT
1. Decrease Librium to 10 mg PO q12h starting tomorrow morning  2. Continue Ativan to 1 mg IV q4h prn CIWA > 8  3. Deferring Remeron 15 mg PO HS  4. Continue thiamine, folate, MVI
1. Decrease Librium to 25 mg PO q8h  2. Continue Ativan to 1 mg IV q4h prn CIWA > 8  3. Deferring Remeron 15 mg PO HS  4. Continue thiamine, folate, MVI
1. Continue thiamine, folate, MVI
1. Decrease Librium to 25 mg PO q12h tonight  2. Continue Ativan to 1 mg IV q4h prn CIWA > 8  3. Deferring Remeron 15 mg PO HS  4. Continue thiamine, folate, MVI
1. Continue thiamine, folate, MVI
1. Continue thiamine, folate, MVI
1. Recommend continuing Librium 10 mg PO q12h with last dose Librium 10 mg PO tomorrow morning and discontinue  2. Continue Ativan to 1 mg IV q4h prn CIWA > 8  3. Deferring Remeron 15 mg PO HS  4. Continue thiamine, folate, MVI

## 2018-01-24 NOTE — PROGRESS NOTE BEHAVIORAL HEALTH - NSBHFUPINTERVALCCFT_PSY_A_CORE
"I feel better"
"I feel hopeful"
"I feel better"
"I'm fine"
"I'm bored"
"I feel better"
"I'm ready to go home"

## 2018-01-24 NOTE — PROGRESS NOTE BEHAVIORAL HEALTH - NS ED BHA MED ROS ENT MOUTH
Called to check on patient after procedure yesterday. She states that she is very sore. She is taking Tylenol, ibuprofen, and oxycodone for the pain and is taking warm tub baths several times a day. She is having normal bowel movements. Reassured her that this should continue to improve over the next week or so. Would like to see her in follow-up in 2 weeks. She confirms appointment date and time. Encouraged her to contact the clinic in the meantime with any questions or concerns.  Patient's questions were answered to her stated satisfaction and she is in agreement with this plan.    JERRICA Hamm, NP-C  Colon and Rectal Surgery  [facility]    
No complaints

## 2018-01-24 NOTE — PROGRESS NOTE ADULT - PROBLEM SELECTOR PLAN 1
Pt with BRBPR on exam this AM, HH stable, at baseline at 8.4-9, VSS stable. Known microcytic iron deficiency Anemia. Was on AC for LV thrombus prior to onset BRBPR. GI (Dr. Canales following) recommending colonoscopy when INR lower (1.81 in AM). per Dr. Canales h/o GI polyp in 2016.   - patient currently scheduled for colonoscopy on Thursday  - continue to hold AC in setting questionable LGIB  - HH stable but continue to trend; consider STAT CBC or source of bleed if pt decompensates.   - f/u GI recs; ensure bowel prep before Pt with BRBPR on exam this AM, HH stable, at baseline at 8.4-9, VSS stable. Known microcytic iron deficiency Anemia. Was on AC for LV thrombus prior to onset BRBPR. GI (Dr. Canales following) recommending colonoscopy when INR lower (<1.5 on 1/24).   - patient currently scheduled for colonoscopy on Thursday  - continue to hold AC in setting questionable LGIB  - HH stable but continue to trend; consider STAT CBC or source of bleed if pt decompensates.   - f/u GI recs; ensure bowel prep before

## 2018-01-24 NOTE — PROGRESS NOTE ADULT - SUBJECTIVE AND OBJECTIVE BOX
Pt seen and examined  No complaints. no bleeding    REVIEW OF SYSTEMS:  Constitutional: No fever, weight loss or fatigue  Cardiovascular: No chest pain, palpitations, dizziness or leg swelling  Gastrointestinal: No abdominal or epigastric pain. No nausea, vomiting or hematemesis; No diarrhea or constipation. No melena or hematochezia.  Skin: No itching, burning, rashes or lesions       MEDICATIONS:  MEDICATIONS  (STANDING):  atorvastatin 40 milliGRAM(s) Oral at bedtime  BACItracin   Ointment 1 Application(s) Topical daily  cyanocobalamin Injectable 1000 MICROGram(s) SubCutaneous daily  folic acid 1 milliGRAM(s) Oral daily  lisinopril 5 milliGRAM(s) Oral daily  metoprolol succinate  milliGRAM(s) Oral daily  multivitamin 1 Tablet(s) Oral daily  polyethylene glycol/electrolyte Solution. 4000 milliLiter(s) Oral once  sodium ferric gluconate complex IVPB 125 milliGRAM(s) IV Intermittent daily  thiamine 100 milliGRAM(s) Oral three times a day    MEDICATIONS  (PRN):      Allergies    Capoten (Short breath; Rash; Hives)  penicillin (Short breath; Rash; Hives)    Intolerances        Vital Signs Last 24 Hrs  T(C): 36.8 (24 Jan 2018 08:50), Max: 36.8 (24 Jan 2018 08:50)  T(F): 98.2 (24 Jan 2018 08:50), Max: 98.2 (24 Jan 2018 08:50)  HR: 71 (24 Jan 2018 08:50) (71 - 83)  BP: 113/72 (24 Jan 2018 08:50) (101/71 - 113/72)  BP(mean): --  RR: 17 (24 Jan 2018 08:50) (17 - 18)  SpO2: 96% (24 Jan 2018 08:50) (96% - 98%)    01-23 @ 07:01  -  01-24 @ 07:00  --------------------------------------------------------  IN: 300 mL / OUT: 2400 mL / NET: -2100 mL        PHYSICAL EXAM:    General: Well developed; well nourished; in no acute distress  HEENT: MMM, conjunctiva and sclera clear  Gastrointestinal: Soft non-tender non-distended; Normal bowel sounds; No hepatosplenomegaly  Skin: Warm and dry. No obvious rash    LABS:      CBC Full  -  ( 24 Jan 2018 08:13 )  WBC Count : 6.2 K/uL  Hemoglobin : 9.7 g/dL  Hematocrit : 31.6 %  Platelet Count - Automated : 176 K/uL  Mean Cell Volume : 74.9 fL  Mean Cell Hemoglobin : 23.0 pg  Mean Cell Hemoglobin Concentration : 30.7 g/dL  Auto Neutrophil # : x  Auto Lymphocyte # : x  Auto Monocyte # : x  Auto Eosinophil # : x  Auto Basophil # : x  Auto Neutrophil % : x  Auto Lymphocyte % : x  Auto Monocyte % : x  Auto Eosinophil % : x  Auto Basophil % : x    01-24    134<L>  |  98  |  20  ----------------------------<  102<H>  4.6   |  25  |  1.28    Ca    9.4      24 Jan 2018 08:12  Mg     2.1     01-24      PT/INR - ( 24 Jan 2018 08:13 )   PT: 14.5 sec;   INR: 1.30          PTT - ( 24 Jan 2018 08:13 )  PTT:45.3 sec                  RADIOLOGY & ADDITIONAL STUDIES (The following images were personally reviewed):

## 2018-01-24 NOTE — PROGRESS NOTE BEHAVIORAL HEALTH - OTHER
Did not assess

## 2018-01-24 NOTE — PROGRESS NOTE BEHAVIORAL HEALTH - PRIMARY DX
Alcohol use disorder, moderate, in controlled environment, dependence

## 2018-01-24 NOTE — PROGRESS NOTE BEHAVIORAL HEALTH - SUMMARY
62M PPH alcohol use disorder, PMH CAD, CHF, CKD, presenting reporting palpitations in setting of recent alcohol binge, with ongoing management of CAD and LV thrombus.  Patient reporting good response of EtOH withdrawal symptoms to Librium 25 mg PO q6h standing.  Would start taper as below.  Deferring Remeron for now.  Patient has outpatient follow up for addiction planned; continue to encourage inpatient rehab.
62M PPH alcohol use disorder, PMH CAD, CHF, CKD, presenting reporting palpitations in setting of recent alcohol binge, with ongoing management of CAD, LV thrombus, and EtOH withdrawal.  Patient denying recent symptoms of alcohol withdrawal.  Would continue taper to Librium 10 mg PO q12h starting tomorrow morning.  Deferring Remeron for now.  Patient has outpatient follow up for addiction planned; continue to encourage inpatient rehab.
62M PPH alcohol use disorder, PMH CAD, CHF, CKD, presenting reporting palpitations in setting of recent alcohol binge, with ongoing management of CAD, LV thrombus, and GIB.  Patient denying recent symptoms of alcohol withdrawal, s/p Librium taper.  Patient has outpatient follow up for addiction planned; is consistent in refusing inpatient rehab.  Continuing to follow for support.
62M PPH alcohol use disorder, PMH CAD, CHF, CKD, presenting reporting palpitations in setting of recent alcohol binge, with ongoing management of CAD, LV thrombus, and EtOH withdrawal.  Patient denying recent symptoms of alcohol withdrawal; Librium taper to 25 mg PO q8h last night.  Would taper to Librium PO q12h tonight.  Deferring Remeron for now.  Patient has outpatient follow up for addiction planned; continue to encourage inpatient rehab.
62M PPH alcohol use disorder, PMH CAD, CHF, CKD, presenting reporting palpitations in setting of recent alcohol binge, with ongoing management of CAD, LV thrombus, and EtOH withdrawal.  Patient denying recent symptoms of alcohol withdrawal, Librium taper completed over weekend.  Awaiting therapeutic INR.  Patient has outpatient follow up for addiction planned; is consistent in refusing inpatient rehab.
62M PPH alcohol use disorder, PMH CAD, CHF, CKD, presenting reporting palpitations in setting of recent alcohol binge, with ongoing management of CAD, LV thrombus, and EtOH withdrawal.  Patient denying recent symptoms of alcohol withdrawal, Librium taper completed over weekend.  Patient has outpatient follow up for addiction planned; is consistent in refusing inpatient rehab.
62M PPH alcohol use disorder, PMH CAD, CHF, CKD, presenting reporting palpitations in setting of recent alcohol binge, with ongoing management of CAD, LV thrombus, and EtOH withdrawal.  Patient denying recent symptoms of alcohol withdrawal.  Would give Librium 10 mg through tomorrow morning and then discontinue.  Deferring Remeron for now; discussed with patient that long term medication for anxiety/depression preferable to benzodiazepine.  Patient has outpatient follow up for addiction planned; is consistent in refusing inpatient rehab.

## 2018-01-24 NOTE — PROGRESS NOTE ADULT - PROBLEM SELECTOR PLAN 4
Patient with [id number]% (1/08) NYHA Class 2. Patient appears euvolemic, consider diuresis if clinically indicated. Currently stable   - c/w ACEi  - daily weights, monitor fluid status

## 2018-01-24 NOTE — PROGRESS NOTE ADULT - SUBJECTIVE AND OBJECTIVE BOX
HPI:  62 M former smoker and drug abuser current ETOH abuser (2 pints vodka day) with PMHx CAD s/p MI 2003, most recent PCI at [facility] 7/2016 (JOSHUA to mLAD with residual 50 % OM1), chronic systolic CHF (EF 35% by Echo 9/2017) s/p AICD in 2009 ([employer]) , pafib and LV thrombus (on ASA and Plavix only 2/2 to lower GIB 2016), CKD Stage III (Baseline Cr normal 0.90-1.1), with frequent admissions to [facility] 11/2017 for palpitations in the setting of ETOH use/withdrawal and most recently 12/2017 for ICD firing who presents to [facility] ED 1/6 c/o palpitations that awoke him from sleep this AM. Patient admits to dizziness and nausea this AM but no vomiting or syncope. Patient reports he has been drinking 1-2 pints of vodka for the past few days and has not had an appetite since Sunday. He states he hasn’t really eaten in 3 days and has been drinking water and taking Nutrament twice a day for nutrition. Patient denies C/P, SOB, diaphoresis, syncope, LE edema, PND, orthopnea, fevers, chills. On arrival to ER /60 P 116 RR 18 Temp 98 F O2 sat 98% RA. EKG revealed NSR at 84 bpm with 1 mm ST elevation III, AVF, V1-V3  with TWI V4-V5. Inferior anteroseptal Q waves  (unchanged from prior EKG). Troponin T mildly elevated at 0.03. Labs significant for Cr 1.34, Hyponatremia Na 131, Thrombocytopenia Platelets 129,000, Leukopenia WBC 2.7 AST 58, Blood Alcohol 155.  CXR negative for acute infiltrates. Patient treated with 1 L NS and Ativan 2 mg IV x 1 (for tremors). Patient admitted for further management (06 Jan 2018 17:58)    FAMILY HISTORY:  No pertinent family history in first degree relatives    MEDICATIONS  (STANDING):  atorvastatin 40 milliGRAM(s) Oral at bedtime  BACItracin   Ointment 1 Application(s) Topical daily  cyanocobalamin Injectable 1000 MICROGram(s) SubCutaneous daily  folic acid 1 milliGRAM(s) Oral daily  lisinopril 5 milliGRAM(s) Oral daily  metoprolol succinate  milliGRAM(s) Oral daily  multivitamin 1 Tablet(s) Oral daily  sodium ferric gluconate complex IVPB 125 milliGRAM(s) IV Intermittent daily  thiamine 100 milliGRAM(s) Oral three times a day    MEDICATIONS  (PRN):    Vital Signs Last 24 Hrs  T(C): 36.8 (24 Jan 2018 08:50), Max: 36.8 (24 Jan 2018 08:50)  T(F): 98.2 (24 Jan 2018 08:50), Max: 98.2 (24 Jan 2018 08:50)  HR: 71 (24 Jan 2018 08:50) (71 - 83)  BP: 113/72 (24 Jan 2018 08:50) (101/71 - 113/72)  BP(mean): --  RR: 17 (24 Jan 2018 08:50) (17 - 18)  SpO2: 96% (24 Jan 2018 08:50) (96% - 98%)    Physical exam:    Overall impression  Lymphadenopathy  Liver  spleen    Labs:  CBC Full  -  ( 24 Jan 2018 08:13 )  WBC Count : 6.2 K/uL  Hemoglobin : 9.7 g/dL  Hematocrit : 31.6 %  Platelet Count - Automated : 176 K/uL  Mean Cell Volume : 74.9 fL  Mean Cell Hemoglobin : 23.0 pg  Mean Cell Hemoglobin Concentration : 30.7 g/dL  Auto Neutrophil # : x  Auto Lymphocyte # : x  Auto Monocyte # : x  Auto Eosinophil # : x  Auto Basophil # : x  Auto Neutrophil % : x  Auto Lymphocyte % : x  Auto Monocyte % : x  Auto Eosinophil % : x  Auto Basophil % : x    01-24    134<L>  |  98  |  20  ----------------------------<  102<H>  4.6   |  25  |  1.28    Ca    9.4      24 Jan 2018 08:12  Mg     2.1     01-24        Radiology:  HEALTH ISSUES - R/O PROBLEM Dx:  GIB (gastrointestinal bleeding): R/O GIB (gastrointestinal bleeding)      Assessmant / Problems  1) in view of INR 1.3 GI w/u can be done today  Will restart anticoagulation tomorrow ( Heparin and Coumadin 14 mg /Day)  2)Iron deficiency anemia continue iv iron  Plan:    Thank you  Liana Everett MD

## 2018-01-24 NOTE — PROGRESS NOTE ADULT - PROBLEM SELECTOR PLAN 2
Patient w LV thrombus as seen on Echo 1/8/18: EF 35%, LVH, apical and mid interventricular septum, apical anterior, apical inferior are all akinetic.  The true apex appears dyskinetic.  There is an apical LV thrombus measuring 2 x 1.7 cm. LV clot increased from 1cm in 9/2017  AM INR 1.8, currently therapeutic  - AC held in the setting LGIB  - continue to monitor for signs of emboli or stroke Patient w LV thrombus as seen on Echo 1/8/18: EF 35%, LVH, apical and mid interventricular septum, apical anterior, apical inferior are all akinetic.  The true apex appears dyskinetic.  There is an apical LV thrombus measuring 2 x 1.7 cm. LV clot increased from 1cm in 9/2017  - AC held in the setting LGIB  - continue to monitor for signs of emboli or stroke

## 2018-01-24 NOTE — PROGRESS NOTE BEHAVIORAL HEALTH - SECONDARY DX1
Current mild episode of major depressive disorder without prior episode

## 2018-01-24 NOTE — PROGRESS NOTE ADULT - ASSESSMENT
62yM with PMHx current ETOH c/b WD (this admission), CAD s/p PCI, [id number]% w/ AICD, Afib with LV Thrombus not AC 2/2 LGIB, CKD3, Anemia (Fe Deficiency) with recent admission 12/'17 who presented on 01/06/18 c/p palpitations in setting ETOH a/p r/o ACS. Patient s/p cardiac cath, demonstrating non-obst CAD and significant LV thrombus but unable to be AC 2/2 LGIB, currently awaiting evaluation with colonoscopy when INR <1.5

## 2018-01-24 NOTE — PROGRESS NOTE BEHAVIORAL HEALTH - NSBHFUPINTERVALHXFT_PSY_A_CORE
Patient seen at bedside.  Reports he is feeling well.  No further symptoms of alcohol withdrawal.  Bored in hospital but cooperative with treatment; understands he will be worked up for GIB.  Discussed again plans for lifestyle following discharge.  Denies acute symptoms of depression or anxiety.

## 2018-01-24 NOTE — PROGRESS NOTE ADULT - SUBJECTIVE AND OBJECTIVE BOX
Pt is awaiting EGD /Colonoscopy in AM    PT/INR - ( 24 Jan 2018 08:13 )   PT: 14.5 sec;   INR: 1.30          PTT - ( 24 Jan 2018 08:13 )  PTT:45.3 sec    PAST MEDICAL & SURGICAL HISTORY:  Pacemaker  ETOH abuse  Paroxysmal a-fib  Myocardial infarction involving other coronary artery  Gastritis  Atherosclerosis of coronary artery: Coronary artery disease  Automatic implantable cardiac defibrillator in situ: ICD (implantable cardioverter-defibrillator) in place    MEDICATIONS  (STANDING):  atorvastatin 40 milliGRAM(s) Oral at bedtime  BACItracin   Ointment 1 Application(s) Topical daily  cyanocobalamin Injectable 1000 MICROGram(s) SubCutaneous daily  folic acid 1 milliGRAM(s) Oral daily  lisinopril 5 milliGRAM(s) Oral daily  metoprolol succinate  milliGRAM(s) Oral daily  multivitamin 1 Tablet(s) Oral daily  polyethylene glycol/electrolyte Solution. 4000 milliLiter(s) Oral once  sodium ferric gluconate complex IVPB 125 milliGRAM(s) IV Intermittent daily  thiamine 100 milliGRAM(s) Oral three times a day    MEDICATIONS  (PRN):    ICU Vital Signs Last 24 Hrs  T(C): 37.4 (24 Jan 2018 15:37), Max: 37.4 (24 Jan 2018 15:37)  T(F): 99.3 (24 Jan 2018 15:37), Max: 99.3 (24 Jan 2018 15:37)  HR: 79 (24 Jan 2018 15:37) (71 - 83)  BP: 116/74 (24 Jan 2018 15:37) (101/71 - 116/74)  BP(mean): --  ABP: --  ABP(mean): --  RR: 18 (24 Jan 2018 15:37) (17 - 18)  SpO2: 100% (24 Jan 2018 15:37) (96% - 100%)        Lungs clear  Cv s1 s2  Abd soft   Ext stable                          9.7    6.2   )-----------( 176      ( 24 Jan 2018 08:13 )             31.6   01-24    134<L>  |  98  |  20  ----------------------------<  102<H>  4.6   |  25  |  1.28    Ca    9.4      24 Jan 2018 08:12  Mg     2.1     01-24

## 2018-01-24 NOTE — PROGRESS NOTE BEHAVIORAL HEALTH - NSBHPTASSESSDT_PSY_A_CORE
08-Jan-2018 14:45
12-Jan-2018 09:30
11-Jan-2018 16:00
23-Jan-2018 14:00
18-Jan-2018 15:00
10-Forrest-2018 09:00
16-Jan-2018 14:15

## 2018-01-24 NOTE — PROGRESS NOTE BEHAVIORAL HEALTH - NSBHCHARTREVIEWVS_PSY_A_CORE FT
Vital Signs Last 24 Hrs  T(C): 35.8 (16 Jan 2018 13:57), Max: 36.4 (15 Forrest 2018 18:02)  T(F): 96.5 (16 Jan 2018 13:57), Max: 97.5 (15 Forrest 2018 18:02)  HR: 80 (16 Jan 2018 12:40) (78 - 84)  BP: 109/73 (16 Jan 2018 12:40) (100/60 - 126/73)  BP(mean): --  RR: 18 (16 Jan 2018 12:40) (16 - 18)  SpO2: 100% (16 Jan 2018 12:40) (100% - 100%)
Vital Signs Last 24 Hrs  T(C): 36.8 (12 Jan 2018 08:45), Max: 36.8 (11 Jan 2018 18:00)  T(F): 98.2 (12 Jan 2018 08:45), Max: 98.3 (11 Jan 2018 18:00)  HR: 85 (12 Jan 2018 11:49) (71 - 85)  BP: 112/64 (12 Jan 2018 11:49) (99/62 - 117/75)  BP(mean): --  RR: 16 (12 Jan 2018 09:20) (16 - 18)  SpO2: 96% (12 Jan 2018 09:20) (96% - 99%)
T(C): 36.7 (09 Jan 2018 09:23), Max: 36.7 (09 Jan 2018 09:23)  T(F): 98.1 (09 Jan 2018 09:23), Max: 98.1 (09 Jan 2018 09:23)  HR: 76 (09 Jan 2018 06:03) (71 - 93)  BP: 102/62 (09 Jan 2018 06:03) (97/58 - 125/90)
Vital Signs Last 24 Hrs  T(C): 36.2 (11 Jan 2018 14:40), Max: 36.3 (10 Forrest 2018 18:07)  T(F): 97.2 (11 Jan 2018 14:40), Max: 97.4 (10 Forrest 2018 18:07)  HR: 82 (11 Jan 2018 12:51) (66 - 96)  BP: 112/71 (11 Jan 2018 12:51) (90/50 - 124/77)  BP(mean): --  RR: 16 (11 Jan 2018 12:51) (16 - 18)  SpO2: 99% (11 Jan 2018 12:51) (99% - 99%)
Vital Signs Last 24 Hrs  T(C): 36.8 (24 Jan 2018 08:50), Max: 36.8 (24 Jan 2018 08:50)  T(F): 98.2 (24 Jan 2018 08:50), Max: 98.2 (24 Jan 2018 08:50)  HR: 71 (24 Jan 2018 08:50) (71 - 83)  BP: 113/72 (24 Jan 2018 08:50) (101/71 - 113/72)  BP(mean): --  RR: 17 (24 Jan 2018 08:50) (17 - 18)  SpO2: 96% (24 Jan 2018 08:50) (96% - 98%)
Vital Signs Last 24 Hrs  T(C): 36.6 (18 Jan 2018 13:43), Max: 37.1 (17 Jan 2018 18:00)  T(F): 97.8 (18 Jan 2018 13:43), Max: 98.7 (17 Jan 2018 18:00)  HR: 72 (18 Jan 2018 11:22) (63 - 108)  BP: 118/72 (18 Jan 2018 11:22) (103/56 - 118/72)  BP(mean): 90 (17 Jan 2018 16:41) (90 - 90)  RR: 17 (18 Jan 2018 11:22) (16 - 19)  SpO2: 98% (18 Jan 2018 06:42) (98% - 100%)
Vital Signs Last 24 Hrs  T(C): 36.4 (10 Forrest 2018 09:12), Max: 36.6 (09 Jan 2018 14:33)  T(F): 97.6 (10 Forrest 2018 09:12), Max: 97.8 (09 Jan 2018 14:33)  HR: 84 (10 Forrest 2018 08:35) (73 - 88)  BP: 111/69 (10 Forrest 2018 08:35) (97/55 - 111/74)  BP(mean): --  RR: 18 (10 Forrest 2018 08:35) (18 - 18)  SpO2: 98% (10 Forrest 2018 08:35) (98% - 99%)

## 2018-01-25 ENCOUNTER — RESULT REVIEW (OUTPATIENT)
Age: 63
End: 2018-01-25

## 2018-01-25 ENCOUNTER — TRANSCRIPTION ENCOUNTER (OUTPATIENT)
Age: 63
End: 2018-01-25

## 2018-01-25 LAB
ANION GAP SERPL CALC-SCNC: 12 MMOL/L — SIGNIFICANT CHANGE UP (ref 5–17)
APTT BLD: 40.7 SEC — HIGH (ref 27.5–37.4)
BUN SERPL-MCNC: 20 MG/DL — SIGNIFICANT CHANGE UP (ref 7–23)
CALCIUM SERPL-MCNC: 9.2 MG/DL — SIGNIFICANT CHANGE UP (ref 8.4–10.5)
CHLORIDE SERPL-SCNC: 95 MMOL/L — LOW (ref 96–108)
CO2 SERPL-SCNC: 26 MMOL/L — SIGNIFICANT CHANGE UP (ref 22–31)
CREAT SERPL-MCNC: 1.24 MG/DL — SIGNIFICANT CHANGE UP (ref 0.5–1.3)
GLUCOSE SERPL-MCNC: 86 MG/DL — SIGNIFICANT CHANGE UP (ref 70–99)
HCT VFR BLD CALC: 31.1 % — LOW (ref 39–50)
HGB BLD-MCNC: 9.5 G/DL — LOW (ref 13–17)
INR BLD: 1.22 — HIGH (ref 0.88–1.16)
MAGNESIUM SERPL-MCNC: 1.9 MG/DL — SIGNIFICANT CHANGE UP (ref 1.6–2.6)
MCHC RBC-ENTMCNC: 23.1 PG — LOW (ref 27–34)
MCHC RBC-ENTMCNC: 30.5 G/DL — LOW (ref 32–36)
MCV RBC AUTO: 75.7 FL — LOW (ref 80–100)
PHOSPHATE SERPL-MCNC: 3.3 MG/DL — SIGNIFICANT CHANGE UP (ref 2.5–4.5)
PLATELET # BLD AUTO: 167 K/UL — SIGNIFICANT CHANGE UP (ref 150–400)
POTASSIUM SERPL-MCNC: 4.2 MMOL/L — SIGNIFICANT CHANGE UP (ref 3.5–5.3)
POTASSIUM SERPL-SCNC: 4.2 MMOL/L — SIGNIFICANT CHANGE UP (ref 3.5–5.3)
PROTHROM AB SERPL-ACNC: 13.6 SEC — HIGH (ref 9.8–12.7)
RBC # BLD: 4.11 M/UL — LOW (ref 4.2–5.8)
RBC # FLD: 25.3 % — HIGH (ref 10.3–16.9)
SODIUM SERPL-SCNC: 133 MMOL/L — LOW (ref 135–145)
WBC # BLD: 5 K/UL — SIGNIFICANT CHANGE UP (ref 3.8–10.5)
WBC # FLD AUTO: 5 K/UL — SIGNIFICANT CHANGE UP (ref 3.8–10.5)

## 2018-01-25 RX ORDER — WARFARIN SODIUM 2.5 MG/1
15 TABLET ORAL ONCE
Qty: 0 | Refills: 0 | Status: COMPLETED | OUTPATIENT
Start: 2018-01-25 | End: 2018-01-25

## 2018-01-25 RX ORDER — THIAMINE MONONITRATE (VIT B1) 100 MG
100 TABLET ORAL DAILY
Qty: 0 | Refills: 0 | Status: DISCONTINUED | OUTPATIENT
Start: 2018-01-25 | End: 2018-01-26

## 2018-01-25 RX ORDER — ENOXAPARIN SODIUM 100 MG/ML
115 INJECTION SUBCUTANEOUS DAILY
Qty: 0 | Refills: 0 | Status: DISCONTINUED | OUTPATIENT
Start: 2018-01-25 | End: 2018-01-26

## 2018-01-25 RX ADMIN — Medication 55 MILLIGRAM(S): at 22:43

## 2018-01-25 RX ADMIN — PREGABALIN 1000 MICROGRAM(S): 225 CAPSULE ORAL at 12:46

## 2018-01-25 RX ADMIN — ENOXAPARIN SODIUM 115 MILLIGRAM(S): 100 INJECTION SUBCUTANEOUS at 15:36

## 2018-01-25 RX ADMIN — Medication 150 MILLIGRAM(S): at 05:38

## 2018-01-25 RX ADMIN — WARFARIN SODIUM 15 MILLIGRAM(S): 2.5 TABLET ORAL at 22:42

## 2018-01-25 RX ADMIN — ATORVASTATIN CALCIUM 40 MILLIGRAM(S): 80 TABLET, FILM COATED ORAL at 22:42

## 2018-01-25 RX ADMIN — Medication 100 MILLIGRAM(S): at 05:38

## 2018-01-25 RX ADMIN — LISINOPRIL 5 MILLIGRAM(S): 2.5 TABLET ORAL at 05:38

## 2018-01-25 RX ADMIN — Medication 1 APPLICATION(S): at 12:46

## 2018-01-25 RX ADMIN — Medication 100 MILLIGRAM(S): at 13:37

## 2018-01-25 RX ADMIN — Medication 1 MILLIGRAM(S): at 12:46

## 2018-01-25 RX ADMIN — Medication 1 TABLET(S): at 12:46

## 2018-01-25 NOTE — DISCHARGE NOTE ADULT - CARE PROVIDER_API CALL
Ck Levy), Medicine  [address] [place] [address]  Phone: [phone]  Fax: [phone]    Liana Everett), Medicine  [address]  [address] [place] [address]  Phone: [phone]  Fax: [phone]    Syed Canales), Medicine  [address] [facility] [address] [address]  Phone: [phone]  Fax: [phone]

## 2018-01-25 NOTE — PROGRESS NOTE ADULT - PROBLEM SELECTOR PLAN 6
- SBP ’s.  - Cont. BB. Lisinopril decreased to 5mg daily.
SBP stable, continue Toprol XL 150mg QD and Lisinopril 5mg daily.
- SBP ’s.  - Cont. BB. Lisinopril decreased to 5mg daily.
- DANUTA on CKD on arrival likely 2/2 dehydration/ ETOH use.  Creatinine stabilized around 1.3 now. Will continue to monitor
- DANUTA on CKD on arrival likely 2/2 dehydration/ ETOH use.  Creatinine stabilized around 1.3 now. Will continue to monitor
- SBP ’s.  - Cont. BB. Lisinopril decreased to 5mg daily.
Cr. 1.3 on admission and currently 1.3
DANUTA on CKD on arrival likely 2/2 dehydration/ ETOH use.  Now remains stable.
Pt with known ETOH, WD on admission. CIWA currently . Psych evaluated, recommended OP follow up.   - c/w thiamine, folic acid, multivitamin
Pt with known ETOH, WD on admission. CIWA currently 0. Psych evaluated, recommended OP follow up.   - c/w thiamine, folic acid, multivitamin
Pt with known ETOH, WD on admission. CIWA currently 0. Psych evaluated, recommended OP follow up.   - c/w thiamine, folic acid, multivitamin
SBP stable, continue Toprol XL 150mg PO Daily and Lisinopril 5mg daily.
SBP stable, continue Toprol XL 150mg QD and Lisinopril 5mg daily.
SBP stable, continue Toprol XL 150mg PO Daily and Lisinopril 5mg daily.
Pt with known ETOH, WD on admission. CIWA currently . Psych evaluated, recommended OP follow up.   - c/w thiamine, folic acid, multivitamin

## 2018-01-25 NOTE — PROGRESS NOTE ADULT - PROBLEM SELECTOR PLAN 7
Patient with Acute DANUTA 1.35 <-- 1.41 on CKD3 (GFR 57). Unclear etiology. s/p IV contrast on 1/11. appears euvolemic   - continue to monitor

## 2018-01-25 NOTE — DISCHARGE NOTE ADULT - MEDICATION SUMMARY - MEDICATIONS TO STOP TAKING
I will STOP taking the medications listed below when I get home from the hospital:    lisinopril 20 mg oral tablet  -- 1 tab(s) by mouth once a day    clopidogrel 75 mg oral tablet  -- 1 tab(s) by mouth once a day    Aspirin Enteric Coated 81 mg oral delayed release tablet  -- 1 tab(s) by mouth once a day    Metoprolol Succinate  mg oral tablet, extended release  -- 1 tab(s) by mouth once a day  -- It is very important that you take or use this exactly as directed.  Do not skip doses or discontinue unless directed by your doctor.  May cause drowsiness.  Alcohol may intensify this effect.  Use care when operating dangerous machinery.  Some non-prescription drugs may aggravate your condition.  Read all labels carefully.  If a warning appears, check with your doctor before taking.  Swallow whole.  Do not crush.  Take with food or milk.  This drug may impair the ability to drive or operate machinery.  Use care until you become familiar with its effects.    Librium  -- PLEASE TAKE LIBRIUM AS PRESCRIBED BY DR. ELLIS

## 2018-01-25 NOTE — PROGRESS NOTE ADULT - PROBLEM SELECTOR PROBLEM 6
Hypertension
Hypertension
ETOH abuse
Hypertension
CKD (chronic kidney disease), stage III
ETOH abuse
Hypertension

## 2018-01-25 NOTE — PROGRESS NOTE ADULT - ASSESSMENT
62yM with PMHx current ETOH c/b WD (this admission), CAD s/p PCI, [id number]% w/ AICD, Afib with LV Thrombus not AC 2/2 LGIB, CKD3, Anemia (Fe Deficiency) with recent admission 12/'17 who presented on 01/06/18 c/p palpitations in setting ETOH a/p r/o ACS. Patient s/p cardiac cath, demonstrating non-obst CAD and significant LV thrombus but unable to be AC 2/2 LGIB, currently awaiting evaluation with colonoscopy when INR <1.5

## 2018-01-25 NOTE — PROGRESS NOTE ADULT - PROBLEM SELECTOR PLAN 10
F: no IVFs  E: Replete PRN  N: DASH TLC  -NPO after midnight   RD following: Recommendations: Encourage PO intake Education: Provided pt with high iron food handout
F: no IVFs  E: Replete PRN  N: DASH TLC  -NPO after midnight Wed  RD following: Recommendations: Encourage PO intake Education: Provided pt with high iron food handout
F: no IVFs  E: Replete PRN  N: DASH TLC  RD following: Recommendations: Encourage PO intake Education: Provided pt with high iron food handout
F: NS@50cc/HR  E: Replete PRN  N: DASH TLC  RD following: Recommendations: Encourage PO intake Education: Provided pt with high iron food handout

## 2018-01-25 NOTE — PROGRESS NOTE ADULT - SUBJECTIVE AND OBJECTIVE BOX
Pt seen and examined colonoscopy today    REVIEW OF SYSTEMS:  Constitutional: No fever, weight loss or fatigue  Cardiovascular: No chest pain, palpitations, dizziness or leg swelling  Gastrointestinal: No abdominal or epigastric pain. No nausea, vomiting or hematemesis; No diarrhea or constipation. No melena or hematochezia.  Skin: No itching, burning, rashes or lesions       MEDICATIONS:  MEDICATIONS  (STANDING):  atorvastatin 40 milliGRAM(s) Oral at bedtime  BACItracin   Ointment 1 Application(s) Topical daily  cyanocobalamin Injectable 1000 MICROGram(s) SubCutaneous daily  folic acid 1 milliGRAM(s) Oral daily  lisinopril 5 milliGRAM(s) Oral daily  metoprolol succinate  milliGRAM(s) Oral daily  multivitamin 1 Tablet(s) Oral daily  sodium ferric gluconate complex IVPB 125 milliGRAM(s) IV Intermittent daily  thiamine 100 milliGRAM(s) Oral three times a day    MEDICATIONS  (PRN):      Allergies    Capoten (Short breath; Rash; Hives)  penicillin (Short breath; Rash; Hives)    Intolerances        Vital Signs Last 24 Hrs  T(C): 37 (25 Jan 2018 09:11), Max: 37.4 (24 Jan 2018 15:37)  T(F): 98.6 (25 Jan 2018 09:11), Max: 99.3 (24 Jan 2018 15:37)  HR: 76 (25 Jan 2018 09:11) (69 - 98)  BP: 109/76 (25 Jan 2018 09:11) (108/71 - 127/73)  BP(mean): --  RR: 18 (25 Jan 2018 09:11) (17 - 19)  SpO2: 98% (25 Jan 2018 09:11) (97% - 100%)    01-24 @ 07:01  -  01-25 @ 07:00  --------------------------------------------------------  IN: 0 mL / OUT: 1350 mL / NET: -1350 mL        PHYSICAL EXAM:    General: Well developed; well nourished; in no acute distress  HEENT: MMM, conjunctiva and sclera clear  Gastrointestinal: Soft non-tender non-distended; Normal bowel sounds; No hepatosplenomegaly  Skin: Warm and dry. No obvious rash    LABS:      CBC Full  -  ( 25 Jan 2018 08:30 )  WBC Count : 5.0 K/uL  Hemoglobin : 9.5 g/dL  Hematocrit : 31.1 %  Platelet Count - Automated : 167 K/uL  Mean Cell Volume : 75.7 fL  Mean Cell Hemoglobin : 23.1 pg  Mean Cell Hemoglobin Concentration : 30.5 g/dL  Auto Neutrophil # : x  Auto Lymphocyte # : x  Auto Monocyte # : x  Auto Eosinophil # : x  Auto Basophil # : x  Auto Neutrophil % : x  Auto Lymphocyte % : x  Auto Monocyte % : x  Auto Eosinophil % : x  Auto Basophil % : x    01-25    133<L>  |  95<L>  |  20  ----------------------------<  86  4.2   |  26  |  1.24    Ca    9.2      25 Jan 2018 08:29  Phos  3.3     01-25  Mg     1.9     01-25      PT/INR - ( 25 Jan 2018 08:30 )   PT: 13.6 sec;   INR: 1.22          PTT - ( 25 Jan 2018 08:30 )  PTT:40.7 sec                  RADIOLOGY & ADDITIONAL STUDIES (The following images were personally reviewed):

## 2018-01-25 NOTE — PROGRESS NOTE ADULT - PROBLEM SELECTOR PLAN 1
Pt with BRBPR on exam this AM, HH stable, at baseline at 8.4-9, VSS stable. Known microcytic iron deficiency Anemia. Was on AC for LV thrombus prior to onset BRBPR. GI (Dr. Canales following) recommending colonoscopy when INR lower (<1.5 on 1/24).   - patient currently scheduled for colonoscopy on 1/25  - continue to hold AC in setting questionable LGIB  - HH stable but continue to trend; consider STAT CBC or source of bleed if pt decompensates.   - f/u GI recs

## 2018-01-25 NOTE — PROGRESS NOTE ADULT - SUBJECTIVE AND OBJECTIVE BOX
Pt going for Colonoscopy today and thereafter resumption of warfarin rx  12mg po daily  with bridging with lovenox     PAST MEDICAL & SURGICAL HISTORY:  Pacemaker  ETOH abuse  Paroxysmal a-fib  Myocardial infarction involving other coronary artery  Gastritis  Atherosclerosis of coronary artery: Coronary artery disease  Automatic implantable cardiac defibrillator in situ: ICD (implantable cardioverter-defibrillator) in place    MEDICATIONS  (STANDING):  atorvastatin 40 milliGRAM(s) Oral at bedtime  BACItracin   Ointment 1 Application(s) Topical daily  cyanocobalamin Injectable 1000 MICROGram(s) SubCutaneous daily  folic acid 1 milliGRAM(s) Oral daily  lisinopril 5 milliGRAM(s) Oral daily  metoprolol succinate  milliGRAM(s) Oral daily  multivitamin 1 Tablet(s) Oral daily  sodium ferric gluconate complex IVPB 125 milliGRAM(s) IV Intermittent daily  thiamine 100 milliGRAM(s) Oral three times a day    MEDICATIONS  (PRN):    ICU Vital Signs Last 24 Hrs  T(C): 37 (25 Jan 2018 09:11), Max: 37.4 (24 Jan 2018 15:37)  T(F): 98.6 (25 Jan 2018 09:11), Max: 99.3 (24 Jan 2018 15:37)  HR: 76 (25 Jan 2018 09:11) (69 - 98)  BP: 109/76 (25 Jan 2018 09:11) (108/71 - 127/73)  BP(mean): --  ABP: --  ABP(mean): --  RR: 18 (25 Jan 2018 09:11) (17 - 19)  SpO2: 98% (25 Jan 2018 09:11) (97% - 100%)      Lungs clear  CV s1 s2  Abd soft  Ext stable                          9.5    5.0   )-----------( 167      ( 25 Jan 2018 08:30 )             31.1   01-25    133<L>  |  95<L>  |  20  ----------------------------<  86  4.2   |  26  |  1.24    Ca    9.2      25 Jan 2018 08:29  Phos  3.3     01-25  Mg     1.9     01-25    PT/INR - ( 25 Jan 2018 08:30 )   PT: 13.6 sec;   INR: 1.22          PTT - ( 25 Jan 2018 08:30 )  PTT:40.7 sec

## 2018-01-25 NOTE — PROGRESS NOTE ADULT - SUBJECTIVE AND OBJECTIVE BOX
HPI:  62 M former smoker and drug abuser current ETOH abuser (2 pints vodka day) with PMHx CAD s/p MI 2003, most recent PCI at [facility] 7/2016 (JOSHUA to mLAD with residual 50 % OM1), chronic systolic CHF (EF 35% by Echo 9/2017) s/p AICD in 2009 ([employer]) , pafib and LV thrombus (on ASA and Plavix only 2/2 to lower GIB 2016), CKD Stage III (Baseline Cr normal 0.90-1.1), with frequent admissions to [facility] 11/2017 for palpitations in the setting of ETOH use/withdrawal and most recently 12/2017 for ICD firing who presents to [facility] ED 1/6 c/o palpitations that awoke him from sleep this AM. Patient admits to dizziness and nausea this AM but no vomiting or syncope. Patient reports he has been drinking 1-2 pints of vodka for the past few days and has not had an appetite since Sunday. He states he hasn’t really eaten in 3 days and has been drinking water and taking Nutrament twice a day for nutrition. Patient denies C/P, SOB, diaphoresis, syncope, LE edema, PND, orthopnea, fevers, chills. On arrival to ER /60 P 116 RR 18 Temp 98 F O2 sat 98% RA. EKG revealed NSR at 84 bpm with 1 mm ST elevation III, AVF, V1-V3  with TWI V4-V5. Inferior anteroseptal Q waves  (unchanged from prior EKG). Troponin T mildly elevated at 0.03. Labs significant for Cr 1.34, Hyponatremia Na 131, Thrombocytopenia Platelets 129,000, Leukopenia WBC 2.7 AST 58, Blood Alcohol 155.  CXR negative for acute infiltrates. Patient treated with 1 L NS and Ativan 2 mg IV x 1 (for tremors). Patient admitted for further management (06 Jan 2018 17:58)    FAMILY HISTORY:  No pertinent family history in first degree relatives    MEDICATIONS  (STANDING):  atorvastatin 40 milliGRAM(s) Oral at bedtime  BACItracin   Ointment 1 Application(s) Topical daily  cyanocobalamin Injectable 1000 MICROGram(s) SubCutaneous daily  folic acid 1 milliGRAM(s) Oral daily  lisinopril 5 milliGRAM(s) Oral daily  metoprolol succinate  milliGRAM(s) Oral daily  multivitamin 1 Tablet(s) Oral daily  sodium ferric gluconate complex IVPB 125 milliGRAM(s) IV Intermittent daily  thiamine 100 milliGRAM(s) Oral three times a day    MEDICATIONS  (PRN):    Vital Signs Last 24 Hrs  T(C): 37 (25 Jan 2018 09:11), Max: 37.4 (24 Jan 2018 15:37)  T(F): 98.6 (25 Jan 2018 09:11), Max: 99.3 (24 Jan 2018 15:37)  HR: 76 (25 Jan 2018 09:11) (69 - 98)  BP: 109/76 (25 Jan 2018 09:11) (108/71 - 127/73)  BP(mean): --  RR: 18 (25 Jan 2018 09:11) (17 - 19)  SpO2: 98% (25 Jan 2018 09:11) (97% - 100%)    Physical exam:    Overall impression  Lymphadenopathy  Liver  spleen    Labs:  CBC Full  -  ( 25 Jan 2018 08:30 )  WBC Count : 5.0 K/uL  Hemoglobin : 9.5 g/dL  Hematocrit : 31.1 %  Platelet Count - Automated : 167 K/uL  Mean Cell Volume : 75.7 fL  Mean Cell Hemoglobin : 23.1 pg  Mean Cell Hemoglobin Concentration : 30.5 g/dL  Auto Neutrophil # : x  Auto Lymphocyte # : x  Auto Monocyte # : x  Auto Eosinophil # : x  Auto Basophil # : x  Auto Neutrophil % : x  Auto Lymphocyte % : x  Auto Monocyte % : x  Auto Eosinophil % : x  Auto Basophil % : x    01-25    133<L>  |  95<L>  |  20  ----------------------------<  86  4.2   |  26  |  1.24    Ca    9.2      25 Jan 2018 08:29  Phos  3.3     01-25  Mg     1.9     01-25        Radiology:  HEALTH ISSUES - R/O PROBLEM Dx:  GIB (gastrointestinal bleeding): R/O GIB (gastrointestinal bleeding)      Assessmant / Problems  1) hypercoagulable state on coumadin , which was d/c in view of need for endoascopy  Post endoscopy restart Coumadin 12 mg plus Lovenox until INR therapeutic  2) GI bleeding  EGD /colonoscopy today  Plan:    Thank you  Liana Everett MD

## 2018-01-25 NOTE — PROGRESS NOTE ADULT - PROBLEM SELECTOR PROBLEM 9
Anemia, chronic disease
Hypertension
Anemia, chronic disease
Hypertension
Anemia, chronic disease

## 2018-01-25 NOTE — DISCHARGE NOTE ADULT - CARE PLAN
Principal Discharge DX:	GIB (gastrointestinal bleeding)  Secondary Diagnosis:	Systolic congestive heart failure with reduced left ventricular function, NYHA class 3  Secondary Diagnosis:	CAD (coronary artery disease)  Secondary Diagnosis:	Alcohol use disorder, moderate, in controlled environment, dependence  Secondary Diagnosis:	Hypertension  Secondary Diagnosis:	Paroxysmal a-fib  Secondary Diagnosis:	Left ventricular apical thrombus without MI Principal Discharge DX:	GIB (gastrointestinal bleeding)  Goal:	You had some bright red blood in your stool  Assessment and plan of treatment:	After repeated examinations the presumed bleeding source were some polyps which were removed. You were re-started on your blood thinners.  Secondary Diagnosis:	Systolic congestive heart failure with reduced left ventricular function, NYHA class 3  Assessment and plan of treatment:	Please continue with the medications as prescribed for your heart failure and follow up with your outpatient physician.  Secondary Diagnosis:	CAD (coronary artery disease)  Assessment and plan of treatment:	You have been started on anticoagulation. Please confirm with your outpatient provider regarding aspirin and plavix, as these were not re-started while hospitalized because of your bleed.  Secondary Diagnosis:	Alcohol use disorder, moderate, in controlled environment, dependence  Assessment and plan of treatment:	Please abstain from further alcohol use as this puts you at greater risk for heart disease and bleeding.  Secondary Diagnosis:	Hypertension  Assessment and plan of treatment:	Continue with your anti-hypertensives as prescribed.  Secondary Diagnosis:	Paroxysmal a-fib  Assessment and plan of treatment:	Continue with the Toprol dosing as prescribed and coumadin for anticoagulation  Secondary Diagnosis:	Left ventricular apical thrombus without MI  Assessment and plan of treatment:	As above.

## 2018-01-25 NOTE — PROGRESS NOTE ADULT - PROBLEM SELECTOR PLAN 4
Patient with [id number]% (1/08) NYHA Class 2. Patient appears euvolemic, consider diuresis if clinically indicated. Currently stable   - c/w ACEi  - daily weights, monitor fluid status

## 2018-01-25 NOTE — PROGRESS NOTE ADULT - PROBLEM SELECTOR PROBLEM 8
Chronic systolic (congestive) heart failure
Chronic systolic (congestive) heart failure
Paroxysmal a-fib
Chronic systolic (congestive) heart failure
Anemia, chronic disease
Chronic systolic (congestive) heart failure
Paroxysmal a-fib
Chronic systolic (congestive) heart failure

## 2018-01-25 NOTE — DISCHARGE NOTE ADULT - PATIENT PORTAL LINK FT
“You can access the [employer] Patient Portal, offered by [facility], by registering with the following website: [email or web address]”

## 2018-01-25 NOTE — DISCHARGE NOTE ADULT - PLAN OF CARE
You had some bright red blood in your stool After repeated examinations the presumed bleeding source were some polyps which were removed. You were re-started on your blood thinners. Please continue with the medications as prescribed for your heart failure and follow up with your outpatient physician. You have been started on anticoagulation. Please confirm with your outpatient provider regarding aspirin and plavix, as these were not re-started while hospitalized because of your bleed. Please abstain from further alcohol use as this puts you at greater risk for heart disease and bleeding. Continue with your anti-hypertensives as prescribed. Continue with the Toprol dosing as prescribed and coumadin for anticoagulation As above.

## 2018-01-25 NOTE — PROGRESS NOTE ADULT - SUBJECTIVE AND OBJECTIVE BOX
INTERVAL HPI/OVERNIGHT EVENTS: none     SUBJECTIVE: Patient seen and examined at bedside. ros neg.     OBJECTIVE:    VITAL SIGNS:  ICU Vital Signs Last 24 Hrs  T(C): 37.1 (25 Jan 2018 05:13), Max: 37.4 (24 Jan 2018 15:37)  T(F): 98.7 (25 Jan 2018 05:13), Max: 99.3 (24 Jan 2018 15:37)  HR: 98 (25 Jan 2018 05:13) (69 - 98)  BP: 108/71 (25 Jan 2018 05:13) (108/71 - 127/73)  BP(mean): --  ABP: --  ABP(mean): --  RR: 18 (25 Jan 2018 05:13) (17 - 19)  SpO2: 97% (25 Jan 2018 05:13) (97% - 100%)        01-24 @ 07:01  -  01-25 @ 07:00  --------------------------------------------------------  IN: 0 mL / OUT: 1350 mL / NET: -1350 mL      CAPILLARY BLOOD GLUCOSE          PHYSICAL EXAM:    General: NAD, resting comfortably in bed  HEENT: NC/AT; PERRL, clear conjunctiva, EOMI  Neck: supple, no JVD or LAD  Respiratory: CTAB  Cardiovascular: +S1/S2; irregular rhythm  Abdomen: soft, NT/ND; +BS x4  Extremities: WWP, 2+ peripheral pulses b/l; no LE edema  Skin: normal color and turgor; no rash  Neurological: AOX3    MEDICATIONS:  MEDICATIONS  (STANDING):  atorvastatin 40 milliGRAM(s) Oral at bedtime  BACItracin   Ointment 1 Application(s) Topical daily  cyanocobalamin Injectable 1000 MICROGram(s) SubCutaneous daily  folic acid 1 milliGRAM(s) Oral daily  lisinopril 5 milliGRAM(s) Oral daily  metoprolol succinate  milliGRAM(s) Oral daily  multivitamin 1 Tablet(s) Oral daily  sodium ferric gluconate complex IVPB 125 milliGRAM(s) IV Intermittent daily  thiamine 100 milliGRAM(s) Oral three times a day    MEDICATIONS  (PRN):      ALLERGIES:  Allergies    Capoten (Short breath; Rash; Hives)  penicillin (Short breath; Rash; Hives)    Intolerances        LABS:                        9.5    5.0   )-----------( 167      ( 25 Jan 2018 08:30 )             31.1     01-25    133<L>  |  95<L>  |  20  ----------------------------<  86  4.2   |  26  |  1.24    Ca    9.2      25 Jan 2018 08:29  Phos  3.3     01-25  Mg     1.9     01-25      PT/INR - ( 25 Jan 2018 08:30 )   PT: 13.6 sec;   INR: 1.22          PTT - ( 25 Jan 2018 08:30 )  PTT:40.7 sec      RADIOLOGY & ADDITIONAL TESTS: Reviewed.

## 2018-01-25 NOTE — DISCHARGE NOTE ADULT - MEDICATION SUMMARY - MEDICATIONS TO TAKE
I will START or STAY ON the medications listed below when I get home from the hospital:    lisinopril 5 mg oral tablet  -- 1 tab(s) by mouth once a day   -- Do not take this drug if you are pregnant.  It is very important that you take or use this exactly as directed.  Do not skip doses or discontinue unless directed by your doctor.  Some non-prescription drugs may aggravate your condition.  Read all labels carefully.  If a warning appears, check with your doctor before taking.    -- Indication: For Hypertension    Jantoven 10 mg oral tablet  -- 12 tab(s) by mouth once a day (at bedtime)   -- Do not take this drug if you are pregnant.  It is very important that you take or use this exactly as directed.  Do not skip doses or discontinue unless directed by your doctor.  Obtain medical advice before taking any non-prescription drugs as some may affect the action of this medication.    -- Indication: For Paroxysmal a-fib    enoxaparin 120 mg/0.8 mL injectable solution  -- 115 milligram(s) subcutaneously once a day   -- It is very important that you take or use this exactly as directed.  Do not skip doses or discontinue unless directed by your doctor.    -- Indication: For PAROXYSMAL  AFIB    atorvastatin 40 mg oral tablet  -- 1 tab(s) by mouth once a day (at bedtime)  -- Avoid grapefruit and grapefruit juice while taking this medication.  Do not take this drug if you are pregnant.  It is very important that you take or use this exactly as directed.  Do not skip doses or discontinue unless directed by your doctor.  Obtain medical advice before taking any non-prescription drugs as some may affect the action of this medication.  Take with food or milk.    -- Indication: For CAD (coronary artery disease)    Toprol-XL 50 mg oral tablet, extended release  -- 3 tab(s) by mouth once a day   -- It is very important that you take or use this exactly as directed.  Do not skip doses or discontinue unless directed by your doctor.  May cause drowsiness.  Alcohol may intensify this effect.  Use care when operating dangerous machinery.  Some non-prescription drugs may aggravate your condition.  Read all labels carefully.  If a warning appears, check with your doctor before taking.  Swallow whole.  Do not crush.  Take with food or milk.  This drug may impair the ability to drive or operate machinery.  Use care until you become familiar with its effects.    -- Indication: For CAD (coronary artery disease)    ferrous sulfate 160 mg (50 mg elemental iron) oral tablet, extended release  -- 1 tab(s) by mouth once a day   -- May discolor urine or feces.  Swallow whole.  Do not crush.    -- Indication: For Nutrition, metabolism, and development symptoms    folic acid 1 mg oral tablet  -- 1 tab(s) by mouth once a day  -- Indication: For Nutrition, metabolism, and development symptoms    Vitamin B1 100 mg oral tablet  -- 1 tab(s) by mouth once a day  -- Indication: For Nutrition, metabolism, and development symptoms I will START or STAY ON the medications listed below when I get home from the hospital:    lisinopril 5 mg oral tablet  -- 1 tab(s) by mouth once a day   -- Do not take this drug if you are pregnant.  It is very important that you take or use this exactly as directed.  Do not skip doses or discontinue unless directed by your doctor.  Some non-prescription drugs may aggravate your condition.  Read all labels carefully.  If a warning appears, check with your doctor before taking.    -- Indication: For Hypertension    enoxaparin 120 mg/0.8 mL injectable solution  -- 115 milligram(s) subcutaneously once a day   -- It is very important that you take or use this exactly as directed.  Do not skip doses or discontinue unless directed by your doctor.    -- Indication: For PAROXYSMAL  AFIB    Jantoven 6 mg oral tablet  -- 2 tab(s) by mouth once a day (at bedtime)   -- Do not take this drug if you are pregnant.  It is very important that you take or use this exactly as directed.  Do not skip doses or discontinue unless directed by your doctor.  Obtain medical advice before taking any non-prescription drugs as some may affect the action of this medication.    -- Indication: For Left ventricular apical thrombus without MI    atorvastatin 40 mg oral tablet  -- 1 tab(s) by mouth once a day (at bedtime)  -- Avoid grapefruit and grapefruit juice while taking this medication.  Do not take this drug if you are pregnant.  It is very important that you take or use this exactly as directed.  Do not skip doses or discontinue unless directed by your doctor.  Obtain medical advice before taking any non-prescription drugs as some may affect the action of this medication.  Take with food or milk.    -- Indication: For CAD (coronary artery disease)    Toprol-XL 50 mg oral tablet, extended release  -- 3 tab(s) by mouth once a day   -- It is very important that you take or use this exactly as directed.  Do not skip doses or discontinue unless directed by your doctor.  May cause drowsiness.  Alcohol may intensify this effect.  Use care when operating dangerous machinery.  Some non-prescription drugs may aggravate your condition.  Read all labels carefully.  If a warning appears, check with your doctor before taking.  Swallow whole.  Do not crush.  Take with food or milk.  This drug may impair the ability to drive or operate machinery.  Use care until you become familiar with its effects.    -- Indication: For CAD (coronary artery disease)    ferrous sulfate 160 mg (50 mg elemental iron) oral tablet, extended release  -- 1 tab(s) by mouth once a day   -- May discolor urine or feces.  Swallow whole.  Do not crush.    -- Indication: For Nutrition, metabolism, and development symptoms    folic acid 1 mg oral tablet  -- 1 tab(s) by mouth once a day  -- Indication: For Nutrition, metabolism, and development symptoms    Vitamin B1 100 mg oral tablet  -- 1 tab(s) by mouth once a day  -- Indication: For Nutrition, metabolism, and development symptoms

## 2018-01-25 NOTE — DISCHARGE NOTE ADULT - ADDITIONAL INSTRUCTIONS
Please follow up with Dr. Levy (cardiology and primary care doctor), Dr. Canales (GI), and Dr. Everett (hematology/oncology) in 2-4 weeks time (all contacts listed below). Please follow up with Dr. Levy (cardiology and primary care doctor), Dr. Canales (GI), and Dr. Everett (hematology/oncology) (all contacts listed below).

## 2018-01-25 NOTE — DISCHARGE NOTE ADULT - CARE PROVIDERS DIRECT ADDRESSES
[email or web address]

## 2018-01-25 NOTE — PROGRESS NOTE ADULT - PROBLEM SELECTOR PLAN 8
Patient with known afib on Toprol XL for rate control with LV thrombus noted on ECHO and Cath. Patient was started on AC but since DC 2/2 ?LGIB (HH stable). Currently INR 1.3 awaiting colonoscopy  - continue to monitor

## 2018-01-25 NOTE — DISCHARGE NOTE ADULT - HOSPITAL COURSE
62yM with PMHx current ETOH c/b WD, CAD s/p PCI, [id number]% w/ AICD, Afib with LV Thrombus no AC 2/2 LGIB, CKD3, Anemia (Fe Deficiency) with recent admission 12/'17 who presented on 01/06/18 c/p palpitations in setting ETOH w/ concern for evolving MI with negative CE. Pt was treated for ETOH WD. Pt underwent cardiac cath on 1/11/18, demonstrating non-obstruction CAD with sig LV clot on echo. Patient was started on heparin drip with plan for coumadin bridge, but noted to have BRBPR on 1/22. Underwent EGD and colonoscopy with ____ findings. 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 Psych evaluation did not recommend any acute intervention at this time for alcohol abuse. 62yM with PMHx current ETOH c/b WD, CAD s/p PCI, [id number]% w/ AICD, Afib with LV Thrombus no AC 2/2 LGIB, CKD3, Anemia (Fe Deficiency) with recent admission 12/'17 who presented on 01/06/18 c/p palpitations in setting ETOH w/ concern for evolving MI with negative CE. Pt was treated for ETOH WD. Pt underwent cardiac cath on 1/11/18, demonstrating non-obstruction CAD with sig LV clot on echo. Patient was started on heparin drip with plan for coumadin bridge, but noted to have BRBPR on 1/22. Underwent EGD and colonoscopy with removal of 2 bleeding polyps. 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 Psych evaluation did not recommend any acute intervention at this time for alcohol abuse. 62yM with PMHx current ETOH c/b WD, CAD s/p PCI, [id number]% w/ AICD, Afib with LV Thrombus no AC 2/2 LGIB, CKD3, Anemia (Fe Deficiency) with recent admission 12/'17 who presented on 01/06/18 c/p palpitations in setting ETOH w/ concern for evolving MI with negative CE. Pt was treated for ETOH WD. Pt underwent cardiac cath on 1/11/18, demonstrating non-obstruction CAD with sig LV clot on echo. Patient was started on heparin drip with plan for coumadin bridge, but noted to have BRBPR on 1/22. Underwent EGD and colonoscopy with removal of 2 bleeding polyps. 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 Psych evaluation did not recommend any acute intervention at this time for alcohol abuse.  Pt underwent colonoscopy by Dr. Canales which

## 2018-01-25 NOTE — DISCHARGE NOTE ADULT - SECONDARY DIAGNOSIS.
Systolic congestive heart failure with reduced left ventricular function, NYHA class 3 CAD (coronary artery disease) Alcohol use disorder, moderate, in controlled environment, dependence Hypertension Paroxysmal a-fib Left ventricular apical thrombus without MI

## 2018-01-25 NOTE — PROGRESS NOTE ADULT - PROBLEM SELECTOR PLAN 2
Patient w LV thrombus as seen on Echo 1/8/18: EF 35%, LVH, apical and mid interventricular septum, apical anterior, apical inferior are all akinetic.  The true apex appears dyskinetic.  There is an apical LV thrombus measuring 2 x 1.7 cm. LV clot increased from 1cm in 9/2017  - AC held in the setting LGIB  - continue to monitor for signs of emboli or stroke

## 2018-01-26 VITALS
TEMPERATURE: 99 F | OXYGEN SATURATION: 99 % | SYSTOLIC BLOOD PRESSURE: 109 MMHG | HEART RATE: 84 BPM | DIASTOLIC BLOOD PRESSURE: 74 MMHG | RESPIRATION RATE: 16 BRPM

## 2018-01-26 LAB
ANION GAP SERPL CALC-SCNC: 11 MMOL/L — SIGNIFICANT CHANGE UP (ref 5–17)
APTT BLD: 44.5 SEC — HIGH (ref 27.5–37.4)
BUN SERPL-MCNC: 19 MG/DL — SIGNIFICANT CHANGE UP (ref 7–23)
CALCIUM SERPL-MCNC: 9.8 MG/DL — SIGNIFICANT CHANGE UP (ref 8.4–10.5)
CHLORIDE SERPL-SCNC: 98 MMOL/L — SIGNIFICANT CHANGE UP (ref 96–108)
CO2 SERPL-SCNC: 27 MMOL/L — SIGNIFICANT CHANGE UP (ref 22–31)
CREAT SERPL-MCNC: 1.26 MG/DL — SIGNIFICANT CHANGE UP (ref 0.5–1.3)
GLUCOSE SERPL-MCNC: 103 MG/DL — HIGH (ref 70–99)
HCT VFR BLD CALC: 32.7 % — LOW (ref 39–50)
HGB BLD-MCNC: 10 G/DL — LOW (ref 13–17)
INR BLD: 1.1 — SIGNIFICANT CHANGE UP (ref 0.88–1.16)
MAGNESIUM SERPL-MCNC: 1.9 MG/DL — SIGNIFICANT CHANGE UP (ref 1.6–2.6)
MCHC RBC-ENTMCNC: 23.1 PG — LOW (ref 27–34)
MCHC RBC-ENTMCNC: 30.6 G/DL — LOW (ref 32–36)
MCV RBC AUTO: 75.5 FL — LOW (ref 80–100)
PHOSPHATE SERPL-MCNC: 3.7 MG/DL — SIGNIFICANT CHANGE UP (ref 2.5–4.5)
PLATELET # BLD AUTO: 187 K/UL — SIGNIFICANT CHANGE UP (ref 150–400)
POTASSIUM SERPL-MCNC: 4.9 MMOL/L — SIGNIFICANT CHANGE UP (ref 3.5–5.3)
POTASSIUM SERPL-SCNC: 4.9 MMOL/L — SIGNIFICANT CHANGE UP (ref 3.5–5.3)
PROTHROM AB SERPL-ACNC: 12.2 SEC — SIGNIFICANT CHANGE UP (ref 9.8–12.7)
RBC # BLD: 4.33 M/UL — SIGNIFICANT CHANGE UP (ref 4.2–5.8)
RBC # FLD: 25.4 % — HIGH (ref 10.3–16.9)
SODIUM SERPL-SCNC: 136 MMOL/L — SIGNIFICANT CHANGE UP (ref 135–145)
SURGICAL PATHOLOGY STUDY: SIGNIFICANT CHANGE UP
WBC # BLD: 6 K/UL — SIGNIFICANT CHANGE UP (ref 3.8–10.5)
WBC # FLD AUTO: 6 K/UL — SIGNIFICANT CHANGE UP (ref 3.8–10.5)

## 2018-01-26 PROCEDURE — 85025 COMPLETE CBC W/AUTO DIFF WBC: CPT

## 2018-01-26 PROCEDURE — 85301 ANTITHROMBIN III ANTIGEN: CPT

## 2018-01-26 PROCEDURE — 84484 ASSAY OF TROPONIN QUANT: CPT

## 2018-01-26 PROCEDURE — 82553 CREATINE MB FRACTION: CPT

## 2018-01-26 PROCEDURE — 82607 VITAMIN B-12: CPT

## 2018-01-26 PROCEDURE — 86901 BLOOD TYPING SEROLOGIC RH(D): CPT

## 2018-01-26 PROCEDURE — 83550 IRON BINDING TEST: CPT

## 2018-01-26 PROCEDURE — 97161 PT EVAL LOW COMPLEX 20 MIN: CPT

## 2018-01-26 PROCEDURE — C1894: CPT

## 2018-01-26 PROCEDURE — 83935 ASSAY OF URINE OSMOLALITY: CPT

## 2018-01-26 PROCEDURE — C1769: CPT

## 2018-01-26 PROCEDURE — 85610 PROTHROMBIN TIME: CPT

## 2018-01-26 PROCEDURE — 85730 THROMBOPLASTIN TIME PARTIAL: CPT

## 2018-01-26 PROCEDURE — C1889: CPT

## 2018-01-26 PROCEDURE — C1887: CPT

## 2018-01-26 PROCEDURE — 86850 RBC ANTIBODY SCREEN: CPT

## 2018-01-26 PROCEDURE — 83090 ASSAY OF HOMOCYSTEINE: CPT

## 2018-01-26 PROCEDURE — 88305 TISSUE EXAM BY PATHOLOGIST: CPT

## 2018-01-26 PROCEDURE — 80053 COMPREHEN METABOLIC PANEL: CPT

## 2018-01-26 PROCEDURE — 85045 AUTOMATED RETICULOCYTE COUNT: CPT

## 2018-01-26 PROCEDURE — 85240 CLOT FACTOR VIII AHG 1 STAGE: CPT

## 2018-01-26 PROCEDURE — 82550 ASSAY OF CK (CPK): CPT

## 2018-01-26 PROCEDURE — 84100 ASSAY OF PHOSPHORUS: CPT

## 2018-01-26 PROCEDURE — 86900 BLOOD TYPING SEROLOGIC ABO: CPT

## 2018-01-26 PROCEDURE — 93970 EXTREMITY STUDY: CPT

## 2018-01-26 PROCEDURE — 85598 HEXAGNAL PHOSPH PLTLT NEUTRL: CPT

## 2018-01-26 PROCEDURE — 84300 ASSAY OF URINE SODIUM: CPT

## 2018-01-26 PROCEDURE — 71046 X-RAY EXAM CHEST 2 VIEWS: CPT

## 2018-01-26 PROCEDURE — 80048 BASIC METABOLIC PNL TOTAL CA: CPT

## 2018-01-26 PROCEDURE — 85027 COMPLETE CBC AUTOMATED: CPT

## 2018-01-26 PROCEDURE — 83735 ASSAY OF MAGNESIUM: CPT

## 2018-01-26 PROCEDURE — 85379 FIBRIN DEGRADATION QUANT: CPT

## 2018-01-26 PROCEDURE — 82728 ASSAY OF FERRITIN: CPT

## 2018-01-26 PROCEDURE — 36415 COLL VENOUS BLD VENIPUNCTURE: CPT

## 2018-01-26 PROCEDURE — C1760: CPT

## 2018-01-26 PROCEDURE — 93005 ELECTROCARDIOGRAM TRACING: CPT

## 2018-01-26 PROCEDURE — 82378 CARCINOEMBRYONIC ANTIGEN: CPT

## 2018-01-26 PROCEDURE — 85303 CLOT INHIBIT PROT C ACTIVITY: CPT

## 2018-01-26 PROCEDURE — 71045 X-RAY EXAM CHEST 1 VIEW: CPT

## 2018-01-26 PROCEDURE — 85300 ANTITHROMBIN III ACTIVITY: CPT

## 2018-01-26 PROCEDURE — 85306 CLOT INHIBIT PROT S FREE: CPT

## 2018-01-26 PROCEDURE — 97116 GAIT TRAINING THERAPY: CPT

## 2018-01-26 PROCEDURE — 83020 HEMOGLOBIN ELECTROPHORESIS: CPT

## 2018-01-26 PROCEDURE — 82570 ASSAY OF URINE CREATININE: CPT

## 2018-01-26 PROCEDURE — 80307 DRUG TEST PRSMV CHEM ANLYZR: CPT

## 2018-01-26 PROCEDURE — 99285 EMERGENCY DEPT VISIT HI MDM: CPT | Mod: 25

## 2018-01-26 PROCEDURE — 82962 GLUCOSE BLOOD TEST: CPT

## 2018-01-26 PROCEDURE — 82746 ASSAY OF FOLIC ACID SERUM: CPT

## 2018-01-26 PROCEDURE — 93306 TTE W/DOPPLER COMPLETE: CPT

## 2018-01-26 RX ORDER — WARFARIN SODIUM 2.5 MG/1
2 TABLET ORAL
Qty: 2 | Refills: 0
Start: 2018-01-26 | End: 2018-01-26

## 2018-01-26 RX ORDER — ASPIRIN/CALCIUM CARB/MAGNESIUM 324 MG
1 TABLET ORAL
Qty: 0 | Refills: 0 | COMMUNITY

## 2018-01-26 RX ORDER — WARFARIN SODIUM 2.5 MG/1
2 TABLET ORAL
Qty: 60 | Refills: 0 | OUTPATIENT
Start: 2018-01-26 | End: 2018-02-24

## 2018-01-26 RX ORDER — LISINOPRIL 2.5 MG/1
1 TABLET ORAL
Qty: 30 | Refills: 0 | OUTPATIENT
Start: 2018-01-26 | End: 2018-02-24

## 2018-01-26 RX ORDER — ENOXAPARIN SODIUM 100 MG/ML
115 INJECTION SUBCUTANEOUS
Qty: 805 | Refills: 0
Start: 2018-01-26 | End: 2018-02-01

## 2018-01-26 RX ORDER — WARFARIN SODIUM 2.5 MG/1
12 TABLET ORAL
Qty: 84 | Refills: 0 | OUTPATIENT
Start: 2018-01-26 | End: 2018-02-01

## 2018-01-26 RX ORDER — WARFARIN SODIUM 2.5 MG/1
1 TABLET ORAL
Qty: 30 | Refills: 0 | OUTPATIENT
Start: 2018-01-26 | End: 2018-02-24

## 2018-01-26 RX ORDER — FOLIC ACID 0.8 MG
1 TABLET ORAL
Qty: 0 | Refills: 0 | COMMUNITY
Start: 2018-01-26

## 2018-01-26 RX ORDER — THIAMINE MONONITRATE (VIT B1) 100 MG
1 TABLET ORAL
Qty: 0 | Refills: 0 | COMMUNITY
Start: 2018-01-26

## 2018-01-26 RX ORDER — METOPROLOL TARTRATE 50 MG
3 TABLET ORAL
Qty: 90 | Refills: 0
Start: 2018-01-26 | End: 2018-02-24

## 2018-01-26 RX ORDER — FERROUS SULFATE 325(65) MG
1 TABLET ORAL
Qty: 30 | Refills: 0
Start: 2018-01-26 | End: 2018-02-24

## 2018-01-26 RX ADMIN — Medication 1 APPLICATION(S): at 12:00

## 2018-01-26 RX ADMIN — Medication 100 MILLIGRAM(S): at 12:00

## 2018-01-26 RX ADMIN — PREGABALIN 1000 MICROGRAM(S): 225 CAPSULE ORAL at 12:00

## 2018-01-26 RX ADMIN — LISINOPRIL 5 MILLIGRAM(S): 2.5 TABLET ORAL at 05:50

## 2018-01-26 RX ADMIN — Medication 1 TABLET(S): at 12:00

## 2018-01-26 RX ADMIN — ENOXAPARIN SODIUM 115 MILLIGRAM(S): 100 INJECTION SUBCUTANEOUS at 12:00

## 2018-01-26 RX ADMIN — Medication 1 MILLIGRAM(S): at 12:00

## 2018-01-26 RX ADMIN — Medication 150 MILLIGRAM(S): at 05:50

## 2018-01-26 NOTE — PROGRESS NOTE ADULT - PROVIDER SPECIALTY LIST ADULT
CCU
CCU
Cardiology
Gastroenterology
Heme/Onc
Internal Medicine
Intervent Cardiology
Intervent Cardiology
Psychiatry
Heme/Onc
Internal Medicine
Cardiology

## 2018-01-26 NOTE — PROGRESS NOTE ADULT - SUBJECTIVE AND OBJECTIVE BOX
Pt seen and examined No bleed , no GI complaints    REVIEW OF SYSTEMS:  Constitutional: No fever, weight loss or fatigue  Cardiovascular: No chest pain, palpitations, dizziness or leg swelling  Gastrointestinal: No abdominal or epigastric pain. No nausea, vomiting or hematemesis; No diarrhea or constipation. No melena or hematochezia.  Skin: No itching, burning, rashes or lesions       MEDICATIONS:  MEDICATIONS  (STANDING):  atorvastatin 40 milliGRAM(s) Oral at bedtime  BACItracin   Ointment 1 Application(s) Topical daily  cyanocobalamin Injectable 1000 MICROGram(s) SubCutaneous daily  enoxaparin Injectable 115 milliGRAM(s) SubCutaneous daily  folic acid 1 milliGRAM(s) Oral daily  lisinopril 5 milliGRAM(s) Oral daily  metoprolol succinate  milliGRAM(s) Oral daily  multivitamin 1 Tablet(s) Oral daily  sodium ferric gluconate complex IVPB 125 milliGRAM(s) IV Intermittent daily  thiamine 100 milliGRAM(s) Oral daily    MEDICATIONS  (PRN):      Allergies    Capoten (Short breath; Rash; Hives)  penicillin (Short breath; Rash; Hives)    Intolerances        Vital Signs Last 24 Hrs  T(C): 37.3 (26 Jan 2018 09:05), Max: 37.3 (26 Jan 2018 09:05)  T(F): 99.1 (26 Jan 2018 09:05), Max: 99.1 (26 Jan 2018 09:05)  HR: 84 (26 Jan 2018 09:05) (66 - 84)  BP: 109/74 (26 Jan 2018 09:05) (106/66 - 117/56)  BP(mean): --  RR: 16 (26 Jan 2018 09:05) (16 - 18)  SpO2: 99% (26 Jan 2018 09:05) (98% - 100%)    01-25 @ 07:01  -  01-26 @ 07:00  --------------------------------------------------------  IN: 0 mL / OUT: 700 mL / NET: -700 mL        PHYSICAL EXAM:    General: Well developed; well nourished; in no acute distress  HEENT: MMM, conjunctiva and sclera clear  Gastrointestinal: Soft non-tender non-distended; Normal bowel sounds; No hepatosplenomegaly  Skin: Warm and dry. No obvious rash    LABS:      CBC Full  -  ( 26 Jan 2018 08:08 )  WBC Count : 6.0 K/uL  Hemoglobin : 10.0 g/dL  Hematocrit : 32.7 %  Platelet Count - Automated : 187 K/uL  Mean Cell Volume : 75.5 fL  Mean Cell Hemoglobin : 23.1 pg  Mean Cell Hemoglobin Concentration : 30.6 g/dL  Auto Neutrophil # : x  Auto Lymphocyte # : x  Auto Monocyte # : x  Auto Eosinophil # : x  Auto Basophil # : x  Auto Neutrophil % : x  Auto Lymphocyte % : x  Auto Monocyte % : x  Auto Eosinophil % : x  Auto Basophil % : x    01-26    136  |  98  |  19  ----------------------------<  103<H>  4.9   |  27  |  1.26    Ca    9.8      26 Jan 2018 08:08  Phos  3.7     01-26  Mg     1.9     01-26      PT/INR - ( 26 Jan 2018 08:08 )   PT: 12.2 sec;   INR: 1.10          PTT - ( 26 Jan 2018 08:08 )  PTT:44.5 sec                  RADIOLOGY & ADDITIONAL STUDIES (The following images were personally reviewed):

## 2018-01-26 NOTE — PROGRESS NOTE ADULT - ASSESSMENT
results of colonoscopy discussed with patient.  Surveillance scope in 5 years unless indicated earlier

## 2018-01-26 NOTE — PROGRESS NOTE ADULT - ASSESSMENT
LV Thrombus  to continue coumadin at 12 mg po OD with Lovenox bridging   INR in my office MONDAY  CAD   ETOH

## 2018-01-26 NOTE — PROGRESS NOTE ADULT - SUBJECTIVE AND OBJECTIVE BOX
Pt is s/p polypectomy  x 2     PAST MEDICAL & SURGICAL HISTORY:  Pacemaker  ETOH abuse  Paroxysmal a-fib  Myocardial infarction involving other coronary artery  Gastritis  Atherosclerosis of coronary artery: Coronary artery disease  Automatic implantable cardiac defibrillator in situ: ICD (implantable cardioverter-defibrillator) in place    MEDICATIONS  (STANDING):  atorvastatin 40 milliGRAM(s) Oral at bedtime  BACItracin   Ointment 1 Application(s) Topical daily  cyanocobalamin Injectable 1000 MICROGram(s) SubCutaneous daily  enoxaparin Injectable 115 milliGRAM(s) SubCutaneous daily  folic acid 1 milliGRAM(s) Oral daily  lisinopril 5 milliGRAM(s) Oral daily  metoprolol succinate  milliGRAM(s) Oral daily  multivitamin 1 Tablet(s) Oral daily  sodium ferric gluconate complex IVPB 125 milliGRAM(s) IV Intermittent daily  thiamine 100 milliGRAM(s) Oral daily    MEDICATIONS  (PRN):    ICU Vital Signs Last 24 Hrs  T(C): 37.3 (26 Jan 2018 09:05), Max: 37.3 (26 Jan 2018 09:05)  T(F): 99.1 (26 Jan 2018 09:05), Max: 99.1 (26 Jan 2018 09:05)  HR: 84 (26 Jan 2018 09:05) (66 - 84)  BP: 109/74 (26 Jan 2018 09:05) (106/66 - 117/56)  BP(mean): --  ABP: --  ABP(mean): --  RR: 16 (26 Jan 2018 09:05) (16 - 18)  SpO2: 99% (26 Jan 2018 09:05) (98% - 100%)        Lungs clear  Cv s1 s2  Abd soft  Ext stable                        10.0   6.0   )-----------( 187      ( 26 Jan 2018 08:08 )             32.7   01-26    136  |  98  |  19  ----------------------------<  103<H>  4.9   |  27  |  1.26    Ca    9.8      26 Jan 2018 08:08  Phos  3.7     01-26  Mg     1.9     01-26    PT/INR - ( 26 Jan 2018 08:08 )   PT: 12.2 sec;   INR: 1.10          PTT - ( 26 Jan 2018 08:08 )  PTT:44.5 sec

## 2018-02-02 DIAGNOSIS — G47.33 OBSTRUCTIVE SLEEP APNEA (ADULT) (PEDIATRIC): ICD-10-CM

## 2018-02-02 DIAGNOSIS — D68.69 OTHER THROMBOPHILIA: ICD-10-CM

## 2018-02-02 DIAGNOSIS — K64.4 RESIDUAL HEMORRHOIDAL SKIN TAGS: ICD-10-CM

## 2018-02-02 DIAGNOSIS — E87.1 HYPO-OSMOLALITY AND HYPONATREMIA: ICD-10-CM

## 2018-02-02 DIAGNOSIS — D50.9 IRON DEFICIENCY ANEMIA, UNSPECIFIED: ICD-10-CM

## 2018-02-02 DIAGNOSIS — R00.2 PALPITATIONS: ICD-10-CM

## 2018-02-02 DIAGNOSIS — F12.21 CANNABIS DEPENDENCE, IN REMISSION: ICD-10-CM

## 2018-02-02 DIAGNOSIS — Z88.8 ALLERGY STATUS TO OTHER DRUGS, MEDICAMENTS AND BIOLOGICAL SUBSTANCES STATUS: ICD-10-CM

## 2018-02-02 DIAGNOSIS — K57.90 DIVERTICULOSIS OF INTESTINE, PART UNSPECIFIED, WITHOUT PERFORATION OR ABSCESS WITHOUT BLEEDING: ICD-10-CM

## 2018-02-02 DIAGNOSIS — I42.9 CARDIOMYOPATHY, UNSPECIFIED: ICD-10-CM

## 2018-02-02 DIAGNOSIS — I13.0 HYPERTENSIVE HEART AND CHRONIC KIDNEY DISEASE WITH HEART FAILURE AND STAGE 1 THROUGH STAGE 4 CHRONIC KIDNEY DISEASE, OR UNSPECIFIED CHRONIC KIDNEY DISEASE: ICD-10-CM

## 2018-02-02 DIAGNOSIS — F41.9 ANXIETY DISORDER, UNSPECIFIED: ICD-10-CM

## 2018-02-02 DIAGNOSIS — N18.3 CHRONIC KIDNEY DISEASE, STAGE 3 (MODERATE): ICD-10-CM

## 2018-02-02 DIAGNOSIS — I50.22 CHRONIC SYSTOLIC (CONGESTIVE) HEART FAILURE: ICD-10-CM

## 2018-02-02 DIAGNOSIS — Z95.810 PRESENCE OF AUTOMATIC (IMPLANTABLE) CARDIAC DEFIBRILLATOR: ICD-10-CM

## 2018-02-02 DIAGNOSIS — Z87.891 PERSONAL HISTORY OF NICOTINE DEPENDENCE: ICD-10-CM

## 2018-02-02 DIAGNOSIS — I25.2 OLD MYOCARDIAL INFARCTION: ICD-10-CM
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
## 2018-02-02 DIAGNOSIS — K63.5 POLYP OF COLON: ICD-10-CM

## 2018-02-02 DIAGNOSIS — N17.9 ACUTE KIDNEY FAILURE, UNSPECIFIED: ICD-10-CM

## 2018-02-02 DIAGNOSIS — Z88.0 ALLERGY STATUS TO PENICILLIN: ICD-10-CM

## 2018-02-02 DIAGNOSIS — E87.5 HYPERKALEMIA: ICD-10-CM

## 2018-02-02 DIAGNOSIS — K29.70 GASTRITIS, UNSPECIFIED, WITHOUT BLEEDING: ICD-10-CM

## 2018-02-02 DIAGNOSIS — I51.3 INTRACARDIAC THROMBOSIS, NOT ELSEWHERE CLASSIFIED: ICD-10-CM

## 2018-02-02 DIAGNOSIS — I48.0 PAROXYSMAL ATRIAL FIBRILLATION: ICD-10-CM

## 2018-02-02 DIAGNOSIS — F14.21 COCAINE DEPENDENCE, IN REMISSION: ICD-10-CM

## 2018-02-02 DIAGNOSIS — F32.0 MAJOR DEPRESSIVE DISORDER, SINGLE EPISODE, MILD: ICD-10-CM

## 2018-02-02 DIAGNOSIS — Z79.82 LONG TERM (CURRENT) USE OF ASPIRIN: ICD-10-CM

## 2018-02-02 DIAGNOSIS — E86.0 DEHYDRATION: ICD-10-CM

## 2018-02-02 DIAGNOSIS — E83.42 HYPOMAGNESEMIA: ICD-10-CM

## 2018-02-02 DIAGNOSIS — D63.8 ANEMIA IN OTHER CHRONIC DISEASES CLASSIFIED ELSEWHERE: ICD-10-CM

## 2018-02-02 DIAGNOSIS — F10.239 ALCOHOL DEPENDENCE WITH WITHDRAWAL, UNSPECIFIED: ICD-10-CM

## 2018-02-02 DIAGNOSIS — K62.5 HEMORRHAGE OF ANUS AND RECTUM: ICD-10-CM

## 2018-03-14 ENCOUNTER — EMERGENCY (EMERGENCY)
Facility: HOSPITAL | Age: 63
LOS: 1 days | Discharge: HOME | End: 2018-03-14
Attending: EMERGENCY MEDICINE | Admitting: EMERGENCY MEDICINE
Payer: MEDICARE

## 2018-03-14 VITALS
TEMPERATURE: 99 F | HEART RATE: 88 BPM | DIASTOLIC BLOOD PRESSURE: 83 MMHG | OXYGEN SATURATION: 97 % | RESPIRATION RATE: 18 BRPM | WEIGHT: 175.93 LBS | SYSTOLIC BLOOD PRESSURE: 124 MMHG

## 2018-03-14 DIAGNOSIS — Z88.8 ALLERGY STATUS TO OTHER DRUGS, MEDICAMENTS AND BIOLOGICAL SUBSTANCES STATUS: ICD-10-CM

## 2018-03-14 DIAGNOSIS — F10.129 ALCOHOL ABUSE WITH INTOXICATION, UNSPECIFIED: ICD-10-CM

## 2018-03-14 DIAGNOSIS — Z88.0 ALLERGY STATUS TO PENICILLIN: ICD-10-CM

## 2018-03-14 DIAGNOSIS — Z79.899 OTHER LONG TERM (CURRENT) DRUG THERAPY: ICD-10-CM

## 2018-03-14 DIAGNOSIS — I25.10 ATHEROSCLEROTIC HEART DISEASE OF NATIVE CORONARY ARTERY WITHOUT ANGINA PECTORIS: ICD-10-CM

## 2018-03-14 DIAGNOSIS — R42 DIZZINESS AND GIDDINESS: ICD-10-CM

## 2018-03-14 LAB
ALBUMIN SERPL ELPH-MCNC: 4.4 G/DL — SIGNIFICANT CHANGE UP (ref 3.3–5)
ALP SERPL-CCNC: 86 U/L — SIGNIFICANT CHANGE UP (ref 40–120)
ALT FLD-CCNC: 33 U/L — SIGNIFICANT CHANGE UP (ref 10–45)
ANION GAP SERPL CALC-SCNC: 19 MMOL/L — HIGH (ref 5–17)
APTT BLD: 55.7 SEC — HIGH (ref 27.5–37.4)
AST SERPL-CCNC: 53 U/L — HIGH (ref 10–40)
BASOPHILS NFR BLD AUTO: 0.8 % — SIGNIFICANT CHANGE UP (ref 0–2)
BILIRUB SERPL-MCNC: 0.3 MG/DL — SIGNIFICANT CHANGE UP (ref 0.2–1.2)
BUN SERPL-MCNC: 14 MG/DL — SIGNIFICANT CHANGE UP (ref 7–23)
CALCIUM SERPL-MCNC: 8.6 MG/DL — SIGNIFICANT CHANGE UP (ref 8.4–10.5)
CHLORIDE SERPL-SCNC: 99 MMOL/L — SIGNIFICANT CHANGE UP (ref 96–108)
CK MB CFR SERPL CALC: 2 NG/ML — SIGNIFICANT CHANGE UP (ref 0–6.7)
CO2 SERPL-SCNC: 23 MMOL/L — SIGNIFICANT CHANGE UP (ref 22–31)
CREAT SERPL-MCNC: 1 MG/DL — SIGNIFICANT CHANGE UP (ref 0.5–1.3)
EOSINOPHIL NFR BLD AUTO: 0.6 % — SIGNIFICANT CHANGE UP (ref 0–6)
GLUCOSE SERPL-MCNC: 91 MG/DL — SIGNIFICANT CHANGE UP (ref 70–99)
HCT VFR BLD CALC: 39.6 % — SIGNIFICANT CHANGE UP (ref 39–50)
HGB BLD-MCNC: 12.9 G/DL — LOW (ref 13–17)
INR BLD: 10.09 — CRITICAL HIGH (ref 0.88–1.16)
LYMPHOCYTES # BLD AUTO: 45.1 % — HIGH (ref 13–44)
MAGNESIUM SERPL-MCNC: 1.8 MG/DL — SIGNIFICANT CHANGE UP (ref 1.6–2.6)
MCHC RBC-ENTMCNC: 24.3 PG — LOW (ref 27–34)
MCHC RBC-ENTMCNC: 32.6 G/DL — SIGNIFICANT CHANGE UP (ref 32–36)
MCV RBC AUTO: 74.7 FL — LOW (ref 80–100)
MONOCYTES NFR BLD AUTO: 5.1 % — SIGNIFICANT CHANGE UP (ref 2–14)
NEUTROPHILS NFR BLD AUTO: 48.4 % — SIGNIFICANT CHANGE UP (ref 43–77)
NT-PROBNP SERPL-SCNC: 120 PG/ML — SIGNIFICANT CHANGE UP (ref 0–300)
PLATELET # BLD AUTO: 274 K/UL — SIGNIFICANT CHANGE UP (ref 150–400)
POTASSIUM SERPL-MCNC: 4 MMOL/L — SIGNIFICANT CHANGE UP (ref 3.5–5.3)
POTASSIUM SERPL-SCNC: 4 MMOL/L — SIGNIFICANT CHANGE UP (ref 3.5–5.3)
PROT SERPL-MCNC: 8.1 G/DL — SIGNIFICANT CHANGE UP (ref 6–8.3)
PROTHROM AB SERPL-ACNC: 117.4 SEC — HIGH (ref 9.8–12.7)
RBC # BLD: 5.3 M/UL — SIGNIFICANT CHANGE UP (ref 4.2–5.8)
RBC # FLD: 20.8 % — HIGH (ref 10.3–16.9)
SODIUM SERPL-SCNC: 141 MMOL/L — SIGNIFICANT CHANGE UP (ref 135–145)
TROPONIN T SERPL-MCNC: <0.01 NG/ML — SIGNIFICANT CHANGE UP (ref 0–0.01)
WBC # BLD: 4.7 K/UL — SIGNIFICANT CHANGE UP (ref 3.8–10.5)
WBC # FLD AUTO: 4.7 K/UL — SIGNIFICANT CHANGE UP (ref 3.8–10.5)

## 2018-03-14 PROCEDURE — 84484 ASSAY OF TROPONIN QUANT: CPT

## 2018-03-14 PROCEDURE — 85730 THROMBOPLASTIN TIME PARTIAL: CPT

## 2018-03-14 PROCEDURE — 83880 ASSAY OF NATRIURETIC PEPTIDE: CPT

## 2018-03-14 PROCEDURE — 82553 CREATINE MB FRACTION: CPT

## 2018-03-14 PROCEDURE — 85025 COMPLETE CBC W/AUTO DIFF WBC: CPT

## 2018-03-14 PROCEDURE — 93010 ELECTROCARDIOGRAM REPORT: CPT

## 2018-03-14 PROCEDURE — 85610 PROTHROMBIN TIME: CPT

## 2018-03-14 PROCEDURE — 99284 EMERGENCY DEPT VISIT MOD MDM: CPT | Mod: 25

## 2018-03-14 PROCEDURE — 80053 COMPREHEN METABOLIC PANEL: CPT

## 2018-03-14 PROCEDURE — 82550 ASSAY OF CK (CPK): CPT

## 2018-03-14 PROCEDURE — 83735 ASSAY OF MAGNESIUM: CPT

## 2018-03-14 PROCEDURE — 93005 ELECTROCARDIOGRAM TRACING: CPT

## 2018-03-14 PROCEDURE — 99285 EMERGENCY DEPT VISIT HI MDM: CPT | Mod: 25

## 2018-03-14 PROCEDURE — 96374 THER/PROPH/DIAG INJ IV PUSH: CPT

## 2018-03-14 PROCEDURE — 36415 COLL VENOUS BLD VENIPUNCTURE: CPT

## 2018-03-14 RX ORDER — ONDANSETRON 8 MG/1
4 TABLET, FILM COATED ORAL ONCE
Qty: 0 | Refills: 0 | Status: COMPLETED | OUTPATIENT
Start: 2018-03-14 | End: 2018-03-14

## 2018-03-14 RX ORDER — SODIUM CHLORIDE 9 MG/ML
1000 INJECTION INTRAMUSCULAR; INTRAVENOUS; SUBCUTANEOUS ONCE
Qty: 0 | Refills: 0 | Status: COMPLETED | OUTPATIENT
Start: 2018-03-14 | End: 2018-03-14

## 2018-03-14 RX ORDER — PHYTONADIONE (VIT K1) 5 MG
5 TABLET ORAL ONCE
Qty: 0 | Refills: 0 | Status: COMPLETED | OUTPATIENT
Start: 2018-03-14 | End: 2018-03-14

## 2018-03-14 RX ADMIN — ONDANSETRON 4 MILLIGRAM(S): 8 TABLET, FILM COATED ORAL at 02:22

## 2018-03-14 RX ADMIN — Medication 5 MILLIGRAM(S): at 03:46

## 2018-03-14 RX ADMIN — SODIUM CHLORIDE 2000 MILLILITER(S): 9 INJECTION INTRAMUSCULAR; INTRAVENOUS; SUBCUTANEOUS at 02:23

## 2018-03-14 NOTE — ED ADULT NURSE NOTE - OBJECTIVE STATEMENT
Pt BIBA from home. Pt states  " I had a pint of vodka and I called 911 because I was feeling a little off tonight and I have a cardiac hx" Upon assessment, Pt presents to ED overall poor hygiene. breathing even and unlabored

## 2018-03-14 NOTE — ED PROVIDER NOTE - OBJECTIVE STATEMENT
62yM with PMHx current ETOH c/b WD, CAD s/p PCI, [id number]% w/ AICD, Afib with LV Thrombus on coumadin 12mg, CKD3, Anemia (Fe Deficiency) with recent admission 01/06/18 c/p palpitations in setting ETOH w/ concern for evolving MI with negative CE, cardiac cath on 1/11/18, demonstrating non-obstr CAD with sig LV clot on echo, heparin drip c/b BRBPR on 1/22 s/p EGD and colonoscopy with removal of 2 bleeding polyps. Of note patient has excess factor VIII, and is difficult to anticoagulate as per prior dc summary. Pt states today he drank too much now feeling nauseous and dizzy. last INR check was 2 weeks ago and low, requesting to recheck again today. Denies CP or palpitations right now, but feels lightheaded. Denies recent head trauma. A&Ox3 during interview without slurred speech.

## 2018-03-14 NOTE — ED ADULT NURSE NOTE - CHPI ED SYMPTOMS NEG
no dizziness/no fever/no syncope/no chills/no back pain/no chest pain/no cough/no diaphoresis/no shortness of breath/no vomiting/no nausea

## 2018-03-14 NOTE — ED PROVIDER NOTE - PHYSICAL EXAMINATION
CONSTITUTIONAL: Well-appearing; well-nourished; in no apparent distress.   HEAD: Normocephalic; atraumatic.   EYES:  conjunctiva and sclera clear  ENT: normal nose; no rhinorrhea; normal pharynx with no erythema or lesions.   NECK: Supple; non-tender;   CARDIOVASCULAR: Normal S1, S2; no murmurs, rubs, or gallops. Regular rate and rhythm. aicd in place in L chest wall, non tender to touch  RESPIRATORY: Breathing easily; breath sounds clear and equal bilaterally; no wheezes, rhonchi, or rales.  GI: Soft; non-distended; non-tender; no palpable organomegaly.   EXT: No cyanosis or edema; N/V intact  SKIN: Normal for age and race; warm; dry; good turgor; no apparent lesions or rash.   NEURO: A & O x 3; face symmetric; grossly unremarkable.   PSYCHOLOGICAL: The patient’s mood and manner are appropriate.

## 2018-03-14 NOTE — ED ADULT TRIAGE NOTE - CHIEF COMPLAINT QUOTE
pt BIBA from home per EMS for ETOH intox. Pt reports generalized weakness fatigue dizziness nausea since 5pm pt with cardiac hx though admits to drinking tonight

## 2018-03-14 NOTE — ED PROVIDER NOTE - MEDICAL DECISION MAKING DETAILS
here w/ complex PMH w/ etoh intoxication, w/ nausea and dizziness. will check labs, hydrate, and give anti emetics. will plan for re-eval and po trial. if labs ok and pt feeling better, to f/u outpatient.

## 2018-03-24 NOTE — PROGRESS NOTE ADULT - SUBJECTIVE AND OBJECTIVE BOX
Patient is known to me s/p Hx of ETOH use in the past   S/p Hx CAD PTCA with stent   Cardiomyopathy   Pt is c/o decrease  appetite , nausea , weakness abdominal distress this past week    PAST MEDICAL & SURGICAL HISTORY:  Pacemaker  ETOH abuse  Paroxysmal a-fib  Myocardial infarction involving other coronary artery  Gastritis  Atherosclerosis of coronary artery: Coronary artery disease  Automatic implantable cardiac defibrillator in situ: ICD (implantable cardioverter-defibrillator) in place    MEDICATIONS  (STANDING):  Plavix 75 mg po OD    MEDICATIONS  (PRN):    ICU Vital Signs Last 24 Hrs  T(C): 36.6 (10 Sep 2017 12:13), Max: 36.6 (10 Sep 2017 11:44)  T(F): 97.9 (10 Sep 2017 12:13), Max: 97.9 (10 Sep 2017 11:44)  HR: 82 (10 Sep 2017 12:13) (82 - 89)  BP: 126/94 (10 Sep 2017 12:13) (126/94 - 128/82)  BP(mean): --  ABP: --  ABP(mean): --  RR: 20 (10 Sep 2017 12:13) (17 - 20)  SpO2: 100% (10 Sep 2017 12:13) (97% - 100%)    lungs decreased breath sounds at bases  CV s1 s2  Abd soft  Ext stable    Echo  2/15/17   EF 35 %   apical Clot noted in LV apex   PPM                           10.6   5.2   )-----------( 237      ( 10 Sep 2017 12:18 )             33.8   09-10    137  |  96  |  17  ----------------------------<  136<H>  4.4   |  25  |  1.00    Ca    9.3      10 Sep 2017 12:18    TPro  8.1  /  Alb  4.7  /  TBili  0.5  /  DBili  x   /  AST  61<H>  /  ALT  35  /  AlkPhos  61  09-10        Sonar of abdomen  2016   Hepatic Steatosis  poor visualization of pancreas Patient is known to me s/p Hx of ETOH use in the past   S/p Hx CAD PTCA with stent   Cardiomyopathy   Pt is c/o decrease  appetite , nausea , weakness abdominal distress this past week    PAST MEDICAL & SURGICAL HISTORY:  Pacemaker  ETOH abuse  Paroxysmal a-fib  Myocardial infarction involving other coronary artery  Gastritis  Atherosclerosis of coronary artery: Coronary artery disease  Automatic implantable cardiac defibrillator in situ: ICD (implantable cardioverter-defibrillator) in place    MEDICATIONS  (STANDING):  Plavix 75 mg po OD    MEDICATIONS  (PRN):    ICU Vital Signs Last 24 Hrs  T(C): 36.6 (10 Sep 2017 12:13), Max: 36.6 (10 Sep 2017 11:44)  T(F): 97.9 (10 Sep 2017 12:13), Max: 97.9 (10 Sep 2017 11:44)  HR: 82 (10 Sep 2017 12:13) (82 - 89)  BP: 126/94 (10 Sep 2017 12:13) (126/94 - 128/82)  BP(mean): --  ABP: --  ABP(mean): --  RR: 20 (10 Sep 2017 12:13) (17 - 20)  SpO2: 100% (10 Sep 2017 12:13) (97% - 100%)    lungs decreased breath sounds at bases  CV s1 s2  Abd soft  Ext stable    Echo  2/15/17   EF 35 %   apical Clot noted in LV apex   PPM     EKG   NSR   poor R V2 -V 4  Hi ST takeoff V2 V3  V 4  Tinv V4 V5     CARDIAC MARKERS ( 10 Sep 2017 12:18 )  x     / <0.01 ng/mL / x     / x     / x                                  10.6   5.2   )-----------( 237      ( 10 Sep 2017 12:18 )             33.8   09-10    137  |  96  |  17  ----------------------------<  136<H>  4.4   |  25  |  1.00    Ca    9.3      10 Sep 2017 12:18    TPro  8.1  /  Alb  4.7  /  TBili  0.5  /  DBili  x   /  AST  61<H>  /  ALT  35  /  AlkPhos  61  09-10        Sonar of abdomen  2016   Hepatic Steatosis  poor visualization of pancreas left upper extremity/right upper extremity/left lower extremity

## 2018-05-10 ENCOUNTER — INPATIENT (INPATIENT)
Facility: HOSPITAL | Age: 63
LOS: 11 days | Discharge: HOME | DRG: 303 | End: 2018-05-22
Attending: INTERNAL MEDICINE | Admitting: INTERNAL MEDICINE
Payer: MEDICARE

## 2018-05-10 VITALS
WEIGHT: 171.74 LBS | RESPIRATION RATE: 18 BRPM | TEMPERATURE: 99 F | DIASTOLIC BLOOD PRESSURE: 91 MMHG | HEART RATE: 60 BPM | SYSTOLIC BLOOD PRESSURE: 138 MMHG | OXYGEN SATURATION: 99 %

## 2018-05-10 LAB
ALBUMIN SERPL ELPH-MCNC: 4.4 G/DL — SIGNIFICANT CHANGE UP (ref 3.3–5)
ALP SERPL-CCNC: 71 U/L — SIGNIFICANT CHANGE UP (ref 40–120)
ALT FLD-CCNC: 47 U/L — HIGH (ref 10–45)
ANION GAP SERPL CALC-SCNC: 13 MMOL/L — SIGNIFICANT CHANGE UP (ref 5–17)
APTT BLD: 25.1 SEC — LOW (ref 27.5–37.4)
AST SERPL-CCNC: 80 U/L — HIGH (ref 10–40)
BASOPHILS NFR BLD AUTO: 0.2 % — SIGNIFICANT CHANGE UP (ref 0–2)
BILIRUB SERPL-MCNC: 1.1 MG/DL — SIGNIFICANT CHANGE UP (ref 0.2–1.2)
BUN SERPL-MCNC: 11 MG/DL — SIGNIFICANT CHANGE UP (ref 7–23)
CALCIUM SERPL-MCNC: 9.4 MG/DL — SIGNIFICANT CHANGE UP (ref 8.4–10.5)
CHLORIDE SERPL-SCNC: 96 MMOL/L — SIGNIFICANT CHANGE UP (ref 96–108)
CO2 SERPL-SCNC: 27 MMOL/L — SIGNIFICANT CHANGE UP (ref 22–31)
CREAT SERPL-MCNC: 0.85 MG/DL — SIGNIFICANT CHANGE UP (ref 0.5–1.3)
EOSINOPHIL NFR BLD AUTO: 0.2 % — SIGNIFICANT CHANGE UP (ref 0–6)
EXTRA SST TUBE: SIGNIFICANT CHANGE UP
GLUCOSE SERPL-MCNC: 106 MG/DL — HIGH (ref 70–99)
HCT VFR BLD CALC: 35.8 % — LOW (ref 39–50)
HGB BLD-MCNC: 11.4 G/DL — LOW (ref 13–17)
INR BLD: 0.99 — SIGNIFICANT CHANGE UP (ref 0.88–1.16)
LYMPHOCYTES # BLD AUTO: 25.4 % — SIGNIFICANT CHANGE UP (ref 13–44)
MCHC RBC-ENTMCNC: 23.6 PG — LOW (ref 27–34)
MCHC RBC-ENTMCNC: 31.8 G/DL — LOW (ref 32–36)
MCV RBC AUTO: 74.1 FL — LOW (ref 80–100)
MONOCYTES NFR BLD AUTO: 5.1 % — SIGNIFICANT CHANGE UP (ref 2–14)
NEUTROPHILS NFR BLD AUTO: 69.1 % — SIGNIFICANT CHANGE UP (ref 43–77)
PLATELET # BLD AUTO: 180 K/UL — SIGNIFICANT CHANGE UP (ref 150–400)
POTASSIUM SERPL-MCNC: 4.2 MMOL/L — SIGNIFICANT CHANGE UP (ref 3.5–5.3)
POTASSIUM SERPL-SCNC: 4.2 MMOL/L — SIGNIFICANT CHANGE UP (ref 3.5–5.3)
PROT SERPL-MCNC: 7.7 G/DL — SIGNIFICANT CHANGE UP (ref 6–8.3)
PROTHROM AB SERPL-ACNC: 11 SEC — SIGNIFICANT CHANGE UP (ref 9.8–12.7)
RBC # BLD: 4.83 M/UL — SIGNIFICANT CHANGE UP (ref 4.2–5.8)
RBC # FLD: 18.1 % — HIGH (ref 10.3–16.9)
SODIUM SERPL-SCNC: 136 MMOL/L — SIGNIFICANT CHANGE UP (ref 135–145)
WBC # BLD: 4.1 K/UL — SIGNIFICANT CHANGE UP (ref 3.8–10.5)
WBC # FLD AUTO: 4.1 K/UL — SIGNIFICANT CHANGE UP (ref 3.8–10.5)

## 2018-05-10 PROCEDURE — 93010 ELECTROCARDIOGRAM REPORT: CPT

## 2018-05-10 PROCEDURE — 99283 EMERGENCY DEPT VISIT LOW MDM: CPT | Mod: 25

## 2018-05-10 RX ORDER — ONDANSETRON 8 MG/1
4 TABLET, FILM COATED ORAL EVERY 4 HOURS
Qty: 0 | Refills: 0 | Status: DISCONTINUED | OUTPATIENT
Start: 2018-05-10 | End: 2018-05-22

## 2018-05-10 RX ORDER — PANTOPRAZOLE SODIUM 20 MG/1
20 TABLET, DELAYED RELEASE ORAL ONCE
Qty: 0 | Refills: 0 | Status: COMPLETED | OUTPATIENT
Start: 2018-05-10 | End: 2018-05-10

## 2018-05-10 RX ORDER — HEPARIN SODIUM 5000 [USP'U]/ML
INJECTION INTRAVENOUS; SUBCUTANEOUS
Qty: 25000 | Refills: 0 | Status: DISCONTINUED | OUTPATIENT
Start: 2018-05-10 | End: 2018-05-12

## 2018-05-10 RX ORDER — FOLIC ACID 0.8 MG
1 TABLET ORAL DAILY
Qty: 0 | Refills: 0 | Status: DISCONTINUED | OUTPATIENT
Start: 2018-05-10 | End: 2018-05-22

## 2018-05-10 RX ORDER — LISINOPRIL 2.5 MG/1
20 TABLET ORAL DAILY
Qty: 0 | Refills: 0 | Status: DISCONTINUED | OUTPATIENT
Start: 2018-05-10 | End: 2018-05-17

## 2018-05-10 RX ORDER — WARFARIN SODIUM 2.5 MG/1
12 TABLET ORAL ONCE
Qty: 0 | Refills: 0 | Status: COMPLETED | OUTPATIENT
Start: 2018-05-10 | End: 2018-05-11

## 2018-05-10 RX ORDER — PANTOPRAZOLE SODIUM 20 MG/1
20 TABLET, DELAYED RELEASE ORAL
Qty: 0 | Refills: 0 | Status: DISCONTINUED | OUTPATIENT
Start: 2018-05-10 | End: 2018-05-10

## 2018-05-10 RX ORDER — PANTOPRAZOLE SODIUM 20 MG/1
20 TABLET, DELAYED RELEASE ORAL
Qty: 0 | Refills: 0 | Status: DISCONTINUED | OUTPATIENT
Start: 2018-05-11 | End: 2018-05-12

## 2018-05-10 RX ORDER — THIAMINE MONONITRATE (VIT B1) 100 MG
100 TABLET ORAL DAILY
Qty: 0 | Refills: 0 | Status: COMPLETED | OUTPATIENT
Start: 2018-05-10 | End: 2018-05-13

## 2018-05-10 RX ORDER — SODIUM CHLORIDE 9 MG/ML
1000 INJECTION INTRAMUSCULAR; INTRAVENOUS; SUBCUTANEOUS ONCE
Qty: 0 | Refills: 0 | Status: COMPLETED | OUTPATIENT
Start: 2018-05-10 | End: 2018-05-10

## 2018-05-10 RX ORDER — ONDANSETRON 8 MG/1
4 TABLET, FILM COATED ORAL ONCE
Qty: 0 | Refills: 0 | Status: COMPLETED | OUTPATIENT
Start: 2018-05-10 | End: 2018-05-10

## 2018-05-10 RX ADMIN — PANTOPRAZOLE SODIUM 20 MILLIGRAM(S): 20 TABLET, DELAYED RELEASE ORAL at 23:26

## 2018-05-10 RX ADMIN — ONDANSETRON 4 MILLIGRAM(S): 8 TABLET, FILM COATED ORAL at 17:36

## 2018-05-10 RX ADMIN — HEPARIN SODIUM 1400 UNIT(S)/HR: 5000 INJECTION INTRAVENOUS; SUBCUTANEOUS at 20:55

## 2018-05-10 RX ADMIN — SODIUM CHLORIDE 2000 MILLILITER(S): 9 INJECTION INTRAMUSCULAR; INTRAVENOUS; SUBCUTANEOUS at 16:31

## 2018-05-10 RX ADMIN — SODIUM CHLORIDE 2000 MILLILITER(S): 9 INJECTION INTRAMUSCULAR; INTRAVENOUS; SUBCUTANEOUS at 17:38

## 2018-05-10 RX ADMIN — PANTOPRAZOLE SODIUM 20 MILLIGRAM(S): 20 TABLET, DELAYED RELEASE ORAL at 17:30

## 2018-05-10 RX ADMIN — Medication 50 MILLIGRAM(S): at 22:31

## 2018-05-10 RX ADMIN — Medication 25 MILLIGRAM(S): at 17:29

## 2018-05-10 NOTE — ED ADULT NURSE NOTE - OBJECTIVE STATEMENT
Pt presents to ED c/o alcohol withdrawal. Pt states "I went on a myers, I was drinking about half a pint a day plus some nips for 5 days, and I wasn't taking my coumadin either. My last drink was last night at 10pm. This morning I woke up and had vomiting and pain so I came." Pt endorses x1 episode vomiting this morning, no blood, also with epigastric pain that has since resolved. On arrival to ED pt only endorses feeling shaky, denies fever, chills, HA, cough, dizziness, lightheadedness, visual changes, numbness/tingling, neck pain, CP, palpitations, SOB, abd pain/nausea at this time, diarrhea difficulty urinating, difficulty passing BM. Pt presents in NAD speaking full sentences ambulatory through triage. EKG preformed in triage. Pt placed on continuous cardiac monitor and pulse ox on arrival to bed 17. Pt noted to have mild tremor.

## 2018-05-10 NOTE — ED PROVIDER NOTE - MEDICAL DECISION MAKING DETAILS
Case d/w Dr. Love and  Dr. mendez, pt's cardiologist, Pt with significant pmh hx including LV thrombus, cad w/ stents, htn, pacemaker non compliant with coumadin, now w/ susbtherapeutic inr,   pt will require repeat echocardiogram and admission for further anticoagulation.  As per Dr. mendez , hold on further Ac at this time.  Pt otherwise hemodynamially stable

## 2018-05-10 NOTE — ED PROVIDER NOTE - OBJECTIVE STATEMENT
63 y/o male, alcoholic, presents to ED after binge drinking for the past 4 days. States he comes to the ED monthly after binge drinking episodes d/t chronic epigastric pain. Was in the ED a month ago, blood clot found in heart and was switched from plavix to coudamin. He has not been taking coumadin for the past 4 days due to alcohol consumption. PMHx of atrial fibrillation, congenital heart disease, pacemaker placed in 2012, myocardial ischemia x3.   Currently, C/O chronic burning epigastric pain and dizziness since waking up this morning. States he has been drinking vodka 0.5-1 pint throughout the day, over the past 4 days. Pt had one month of abstinence from rehab, with current alcohol binge triggered by family complications he refuses to discuss. Last alcoholic drink was last night at 10 pm. Woke up this morning at 6:30 AM with dizziness, one episode of vomiting at 12:00 PM today after drinking seltzer. States he has not eaten food for 2 days, only tolerating water today. Pt has nausea, malaise, insomnia and general weakness. Denies diarrhea, SOB, C/P, fever, bloody stools, heart palpitations, weight loss/gain, change in vision, fainting or blackouts, seizures, tremors.

## 2018-05-10 NOTE — ED PROVIDER NOTE - ATTENDING CONTRIBUTION TO CARE
63yo M htn, etoh abuse, cad w/ pci, pacemaker, here after 5 days of binge drinking, here w/ epigastric pain and nausea/vomiting. on exam slight tremor but no signs of withdrawal, no abdominal tenderness. Stopped his coumadin during his binge drinking. will give zofran, 25 librium, fluid hydrate, and reassess.

## 2018-05-11 DIAGNOSIS — F10.10 ALCOHOL ABUSE, UNCOMPLICATED: ICD-10-CM

## 2018-05-11 DIAGNOSIS — I25.10 ATHEROSCLEROTIC HEART DISEASE OF NATIVE CORONARY ARTERY WITHOUT ANGINA PECTORIS: ICD-10-CM

## 2018-05-11 DIAGNOSIS — I51.3 INTRACARDIAC THROMBOSIS, NOT ELSEWHERE CLASSIFIED: ICD-10-CM

## 2018-05-11 LAB
ALBUMIN SERPL ELPH-MCNC: 3.7 G/DL — SIGNIFICANT CHANGE UP (ref 3.3–5)
ALP SERPL-CCNC: 56 U/L — SIGNIFICANT CHANGE UP (ref 40–120)
ALT FLD-CCNC: 37 U/L — SIGNIFICANT CHANGE UP (ref 10–45)
ANION GAP SERPL CALC-SCNC: 12 MMOL/L — SIGNIFICANT CHANGE UP (ref 5–17)
APPEARANCE UR: CLEAR — SIGNIFICANT CHANGE UP
APTT BLD: 105.6 SEC — HIGH (ref 27.5–37.4)
APTT BLD: 61.1 SEC — HIGH (ref 27.5–37.4)
APTT BLD: 80.8 SEC — HIGH (ref 27.5–37.4)
AST SERPL-CCNC: 54 U/L — HIGH (ref 10–40)
BILIRUB SERPL-MCNC: 1.2 MG/DL — SIGNIFICANT CHANGE UP (ref 0.2–1.2)
BILIRUB UR-MCNC: (no result)
BUN SERPL-MCNC: 10 MG/DL — SIGNIFICANT CHANGE UP (ref 7–23)
CALCIUM SERPL-MCNC: 8.7 MG/DL — SIGNIFICANT CHANGE UP (ref 8.4–10.5)
CHLORIDE SERPL-SCNC: 97 MMOL/L — SIGNIFICANT CHANGE UP (ref 96–108)
CO2 SERPL-SCNC: 25 MMOL/L — SIGNIFICANT CHANGE UP (ref 22–31)
COLOR SPEC: YELLOW — SIGNIFICANT CHANGE UP
CREAT SERPL-MCNC: 0.94 MG/DL — SIGNIFICANT CHANGE UP (ref 0.5–1.3)
DIFF PNL FLD: NEGATIVE — SIGNIFICANT CHANGE UP
ETHANOL SERPL-MCNC: <10 MG/DL — SIGNIFICANT CHANGE UP (ref 0–10)
GLUCOSE SERPL-MCNC: 105 MG/DL — HIGH (ref 70–99)
GLUCOSE UR QL: NEGATIVE — SIGNIFICANT CHANGE UP
HCT VFR BLD CALC: 29.8 % — LOW (ref 39–50)
HCT VFR BLD CALC: 33.9 % — LOW (ref 39–50)
HGB BLD-MCNC: 10.9 G/DL — LOW (ref 13–17)
HGB BLD-MCNC: 9.9 G/DL — LOW (ref 13–17)
INR BLD: 0.99 — SIGNIFICANT CHANGE UP (ref 0.88–1.16)
KETONES UR-MCNC: 15 MG/DL
LEUKOCYTE ESTERASE UR-ACNC: NEGATIVE — SIGNIFICANT CHANGE UP
MAGNESIUM SERPL-MCNC: 1.4 MG/DL — LOW (ref 1.6–2.6)
MAGNESIUM SERPL-MCNC: 1.8 MG/DL — SIGNIFICANT CHANGE UP (ref 1.6–2.6)
MCHC RBC-ENTMCNC: 23.3 PG — LOW (ref 27–34)
MCHC RBC-ENTMCNC: 24 PG — LOW (ref 27–34)
MCHC RBC-ENTMCNC: 32.2 G/DL — SIGNIFICANT CHANGE UP (ref 32–36)
MCHC RBC-ENTMCNC: 33.2 G/DL — SIGNIFICANT CHANGE UP (ref 32–36)
MCV RBC AUTO: 72.3 FL — LOW (ref 80–100)
MCV RBC AUTO: 72.4 FL — LOW (ref 80–100)
NITRITE UR-MCNC: NEGATIVE — SIGNIFICANT CHANGE UP
PH UR: 6 — SIGNIFICANT CHANGE UP (ref 5–8)
PLATELET # BLD AUTO: 100 K/UL — LOW (ref 150–400)
PLATELET # BLD AUTO: 96 K/UL — LOW (ref 150–400)
POTASSIUM SERPL-MCNC: 3.7 MMOL/L — SIGNIFICANT CHANGE UP (ref 3.5–5.3)
POTASSIUM SERPL-SCNC: 3.7 MMOL/L — SIGNIFICANT CHANGE UP (ref 3.5–5.3)
PROT SERPL-MCNC: 6.9 G/DL — SIGNIFICANT CHANGE UP (ref 6–8.3)
PROT UR-MCNC: NEGATIVE MG/DL — SIGNIFICANT CHANGE UP
PROTHROM AB SERPL-ACNC: 11 SEC — SIGNIFICANT CHANGE UP (ref 9.8–12.7)
RBC # BLD: 4.12 M/UL — LOW (ref 4.2–5.8)
RBC # BLD: 4.68 M/UL — SIGNIFICANT CHANGE UP (ref 4.2–5.8)
RBC # FLD: 18.1 % — HIGH (ref 10.3–16.9)
RBC # FLD: 18.1 % — HIGH (ref 10.3–16.9)
SODIUM SERPL-SCNC: 134 MMOL/L — LOW (ref 135–145)
SP GR SPEC: 1.02 — SIGNIFICANT CHANGE UP (ref 1–1.03)
UROBILINOGEN FLD QL: 0.2 E.U./DL — SIGNIFICANT CHANGE UP
WBC # BLD: 3.5 K/UL — LOW (ref 3.8–10.5)
WBC # BLD: 3.7 K/UL — LOW (ref 3.8–10.5)
WBC # FLD AUTO: 3.5 K/UL — LOW (ref 3.8–10.5)
WBC # FLD AUTO: 3.7 K/UL — LOW (ref 3.8–10.5)

## 2018-05-11 PROCEDURE — 93306 TTE W/DOPPLER COMPLETE: CPT | Mod: 26

## 2018-05-11 RX ORDER — METOPROLOL TARTRATE 50 MG
50 TABLET ORAL
Qty: 0 | Refills: 0 | Status: DISCONTINUED | OUTPATIENT
Start: 2018-05-11 | End: 2018-05-22

## 2018-05-11 RX ORDER — WARFARIN SODIUM 2.5 MG/1
12 TABLET ORAL ONCE
Qty: 0 | Refills: 0 | Status: COMPLETED | OUTPATIENT
Start: 2018-05-11 | End: 2018-05-11

## 2018-05-11 RX ORDER — THIAMINE MONONITRATE (VIT B1) 100 MG
1 TABLET ORAL
Qty: 0 | Refills: 0 | COMMUNITY

## 2018-05-11 RX ORDER — ATORVASTATIN CALCIUM 80 MG/1
40 TABLET, FILM COATED ORAL AT BEDTIME
Qty: 0 | Refills: 0 | Status: DISCONTINUED | OUTPATIENT
Start: 2018-05-11 | End: 2018-05-22

## 2018-05-11 RX ORDER — MAGNESIUM SULFATE 500 MG/ML
2 VIAL (ML) INJECTION ONCE
Qty: 0 | Refills: 0 | Status: COMPLETED | OUTPATIENT
Start: 2018-05-11 | End: 2018-05-11

## 2018-05-11 RX ORDER — POTASSIUM CHLORIDE 20 MEQ
40 PACKET (EA) ORAL ONCE
Qty: 0 | Refills: 0 | Status: COMPLETED | OUTPATIENT
Start: 2018-05-11 | End: 2018-05-11

## 2018-05-11 RX ORDER — MAGNESIUM OXIDE 400 MG ORAL TABLET 241.3 MG
400 TABLET ORAL ONCE
Qty: 0 | Refills: 0 | Status: COMPLETED | OUTPATIENT
Start: 2018-05-11 | End: 2018-05-11

## 2018-05-11 RX ORDER — SUCRALFATE 1 G
1 TABLET ORAL
Qty: 0 | Refills: 0 | Status: DISCONTINUED | OUTPATIENT
Start: 2018-05-11 | End: 2018-05-18

## 2018-05-11 RX ADMIN — WARFARIN SODIUM 12 MILLIGRAM(S): 2.5 TABLET ORAL at 23:16

## 2018-05-11 RX ADMIN — Medication 1 TABLET(S): at 11:07

## 2018-05-11 RX ADMIN — HEPARIN SODIUM 1200 UNIT(S)/HR: 5000 INJECTION INTRAVENOUS; SUBCUTANEOUS at 11:08

## 2018-05-11 RX ADMIN — HEPARIN SODIUM 1200 UNIT(S)/HR: 5000 INJECTION INTRAVENOUS; SUBCUTANEOUS at 17:50

## 2018-05-11 RX ADMIN — Medication 1 MILLIGRAM(S): at 11:07

## 2018-05-11 RX ADMIN — Medication 50 GRAM(S): at 04:26

## 2018-05-11 RX ADMIN — HEPARIN SODIUM 1400 UNIT(S)/HR: 5000 INJECTION INTRAVENOUS; SUBCUTANEOUS at 04:02

## 2018-05-11 RX ADMIN — PANTOPRAZOLE SODIUM 20 MILLIGRAM(S): 20 TABLET, DELAYED RELEASE ORAL at 17:12

## 2018-05-11 RX ADMIN — LISINOPRIL 20 MILLIGRAM(S): 2.5 TABLET ORAL at 06:22

## 2018-05-11 RX ADMIN — Medication 50 MILLIGRAM(S): at 17:12

## 2018-05-11 RX ADMIN — ATORVASTATIN CALCIUM 40 MILLIGRAM(S): 80 TABLET, FILM COATED ORAL at 06:22

## 2018-05-11 RX ADMIN — Medication 50 MILLIGRAM(S): at 06:22

## 2018-05-11 RX ADMIN — PANTOPRAZOLE SODIUM 20 MILLIGRAM(S): 20 TABLET, DELAYED RELEASE ORAL at 06:22

## 2018-05-11 RX ADMIN — WARFARIN SODIUM 12 MILLIGRAM(S): 2.5 TABLET ORAL at 01:17

## 2018-05-11 RX ADMIN — Medication 40 MILLIEQUIVALENT(S): at 17:12

## 2018-05-11 RX ADMIN — Medication 100 MILLIGRAM(S): at 11:07

## 2018-05-11 RX ADMIN — MAGNESIUM OXIDE 400 MG ORAL TABLET 400 MILLIGRAM(S): 241.3 TABLET ORAL at 17:12

## 2018-05-11 NOTE — H&P ADULT - PROBLEM SELECTOR PLAN 4
Previously on asa & plavix, now warfarin only given LV thrombus.  No active ischemic complaints.  Continue BB, statin, ACEI.

## 2018-05-11 NOTE — H&P ADULT - PROBLEM SELECTOR PLAN 3
Confirmed on last echo 1/9/18.  Will obtain repeat echo  in am, heparin gtt started, warfarin 12mg po x 1 given (home dose).

## 2018-05-11 NOTE — H&P ADULT - PMH
Atherosclerosis of coronary artery  Coronary artery disease  ETOH abuse    Gastritis    ICD (implantable cardioverter-defibrillator) in place    Myocardial infarction involving other coronary artery    Pacemaker    Paroxysmal a-fib    Thrombus in heart chamber  LV

## 2018-05-11 NOTE — PROGRESS NOTE ADULT - ASSESSMENT
Pt is s/p exacerbation of Alcoholism   s/o Cardiomyopathy CAD PTCA with Stent   LV Thrombus     He is high risk for GI bleed given non compliance with ETOH cessation   hx GI Bleed and LV Thrombus   While he understand s his condition he is unable to restrain his tendency to drink   and resultant non compliance     will repeat ECHO    restart anticoagulation

## 2018-05-11 NOTE — H&P ADULT - NSHPSOCIALHISTORY_GEN_ALL_CORE
+daily Etoh abuse with h/o binge drinking, prior admissions for intoxication and withdrawal  Denies tobacco, illicits

## 2018-05-11 NOTE — PROGRESS NOTE ADULT - PROBLEM SELECTOR PLAN 1
-h/o LV thrombus confirmed with repeat Echo today showing 1.9x.18cm apical thrombus, EF 35%(prior Echo in 1/2018 with 2.0x1.7cm thrombus)  -pt non-compliant with his Coumadin as he binge drinks and doesn't take it  -INR subtherapeutic on arrival to 0.99 s/p Coumadin 12mg 5/10/18 with INR 0.99 today.   -Coumadin 12mg PO tonight and f/u AM INR

## 2018-05-11 NOTE — PROGRESS NOTE ADULT - SUBJECTIVE AND OBJECTIVE BOX
Pt is s/p Esophageal Ca with Mets to pleural space  , effusion   Sepsis    Improving slowly     PAST MEDICAL & SURGICAL HISTORY:  Thrombus in heart chamber: LV  ICD (implantable cardioverter-defibrillator) in place  Pacemaker  ETOH abuse  Paroxysmal a-fib  Myocardial infarction involving other coronary artery  Gastritis  Atherosclerosis of coronary artery: Coronary artery disease  Automatic implantable cardiac defibrillator in situ: ICD (implantable cardioverter-defibrillator) in place    MEDICATIONS  (STANDING):  atorvastatin 40 milliGRAM(s) Oral at bedtime  folic acid 1 milliGRAM(s) Oral daily  heparin  Infusion.  Unit(s)/Hr (14 mL/Hr) IV Continuous <Continuous>  lisinopril 20 milliGRAM(s) Oral daily  metoprolol tartrate 50 milliGRAM(s) Oral two times a day  multivitamin 1 Tablet(s) Oral daily  pantoprazole    Tablet 20 milliGRAM(s) Oral two times a day  thiamine 100 milliGRAM(s) Oral daily    MEDICATIONS  (PRN):  chlordiazePOXIDE 50 milliGRAM(s) Oral every 1 hour PRN Symptom-triggered: each CIWA -Ar score 8 or GREATER  ondansetron Injectable 4 milliGRAM(s) IV Push every 4 hours PRN Nausea and/or Vomiting  sucralfate suspension 1 Gram(s) Oral four times a day PRN gi upset      ICU Vital Signs Last 24 Hrs  T(C): 35.9 (11 May 2018 06:08), Max: 37.3 (10 May 2018 14:56)  T(F): 96.7 (11 May 2018 06:08), Max: 99.2 (10 May 2018 14:56)  HR: 64 (11 May 2018 06:16) (60 - 84)  BP: 123/80 (11 May 2018 06:16) (119/76 - 152/74)  BP(mean): --  ABP: --  ABP(mean): --  RR: 16 (11 May 2018 06:16) (16 - 18)  SpO2: 97% (11 May 2018 06:16) (97% - 99%)      Lungs decreased breath sounds at bases  CV s1 s2  Abd soft  distended  Ext stable                          9.9    3.5   )-----------( 96       ( 11 May 2018 03:30 )             29.8   05-10    136  |  96  |  11  ----------------------------<  106<H>  4.2   |  27  |  0.85    Ca    9.4      10 May 2018 15:39  Mg     1.4     05-11    TPro  7.7  /  Alb  4.4  /  TBili  1.1  /  DBili  x   /  AST  80<H>  /  ALT  47<H>  /  AlkPhos  71  05-10  PT/INR - ( 10 May 2018 15:39 )   PT: 11.0 sec;   INR: 0.99          PTT - ( 11 May 2018 03:30 )  PTT:61.1 sec

## 2018-05-11 NOTE — H&P ADULT - PROBLEM SELECTOR PLAN 1
Last drink approx. 24 hours prior to arrival to floor, +tremulousness , started on CIWA protocol symptom-triggered librium.  Pt Last drink approx. 24 hours prior to arrival to floor, +tremulousness , started on CIWA protocol symptom-triggered librium.  Pt with multiple prior admission for same.  Will continue to monitor and taper librium dosing as tolerated.  Continue folate, MVI, thiamine.

## 2018-05-11 NOTE — H&P ADULT - PROBLEM SELECTOR PLAN 2
Pt c/o epigastric pain/heartburn sensation i/s/o alcohol binge drinking x 1 week.  Pt with h/o multiple admission for same Pt c/o epigastric pain/heartburn sensation i/s/o alcohol binge drinking x 1 week.  Pt with h/o multiple admission for same.  Start pantoprazole 20mg bid, carafate prn.

## 2018-05-11 NOTE — H&P ADULT - NSHPLABSRESULTS_GEN_ALL_CORE
PT/INR - ( 10 May 2018 15:39 )   PT: 11.0 sec;   INR: 0.99          PTT - ( 10 May 2018 15:39 )  PTT:25.1 sec  LIVER FUNCTIONS - ( 10 May 2018 15:39 )  Alb: 4.4 g/dL / Pro: 7.7 g/dL / ALK PHOS: 71 U/L / ALT: 47 U/L / AST: 80 U/L / GGT: x           CBC Full  -  ( 10 May 2018 15:39 )  WBC Count : 4.1 K/uL  Hemoglobin : 11.4 g/dL  Hematocrit : 35.8 %  Platelet Count - Automated : 180 K/uL  Mean Cell Volume : 74.1 fL  Mean Cell Hemoglobin : 23.6 pg  Mean Cell Hemoglobin Concentration : 31.8 g/dL  Auto Neutrophil # : x  Auto Lymphocyte # : x  Auto Monocyte # : x  Auto Eosinophil # : x  Auto Basophil # : x  Auto Neutrophil % : 69.1 %  Auto Lymphocyte % : 25.4 %  Auto Monocyte % : 5.1 %  Auto Eosinophil % : 0.2 %  Auto Basophil % : 0.2 %        10 May 2018 15:39    136    |  96     |  11     ----------------------------<  106    4.2     |  27     |  0.85     Ca    9.4        10 May 2018 15:39    TPro  7.7    /  Alb  4.4    /  TBili  1.1    /  DBili  x      /  AST  80     /  ALT  47     /  AlkPhos  71     10 May 2018 15:39

## 2018-05-11 NOTE — H&P ADULT - PSH
Automatic implantable cardiac defibrillator in situ  ICD (implantable cardioverter-defibrillator) in place
(1) completely immobile

## 2018-05-11 NOTE — H&P ADULT - HISTORY OF PRESENT ILLNESS
This is a 61yo M former smoker and drug abuser current ETOH abuser (2+ pints vodka day) with PMHx CAD s/p MI 2003, most recent PCI /at [facility] 7/2016 (JOSHUA to mLAD with residual 50 % OM1), chronic systolic CHF (EF 35% by Echo) s/p AICD in 2009 ([employer]) , pafib and LV thrombus (on warfarin, previously on ASA and Plavix only 2/2 to lower GIB 2016), CKD Stage III (Baseline Cr normal 0.90-1.1), with frequent admissions to [facility] for ETOH use/withdrawal and 12/2017 for ICD firing. Pt’s last hospitalization at [facility] 1/6-1/26/18 for palpitations and anorexia in setting of alcohol intoxication, workup included colonoscopy without active bleeding, s/p polypectomy x 2, discharged home on warfarin 12mg daily.  He reports a subsequent hospitalization at [facility] sometime after that admission for recurrent alcohol intoxication with withdrawal symptoms, reports abstaining from alcohol from that admission until one week ago when increased family/personal stressors found him starting and then increasing his daily drinking.  Pt is unclear on time frame.    Pt presents today with epigastric pain with an episode of nausea and vomiting after one week of binge drinking.  He also notes that he stopped taking warfarin during this time period and INR on presentation is sub-therapeutic at 0.99.  He admits to last drink at 10pm on 5/9 and bilateral upper extremity tremors. Denies chest pain, SOB, near/syncope, edema, fever, chills.  He was treated with 2L IVF, IV pantoprazole and started on heparin gtt for sub therapeutic INR with known h/o LV thrombus.

## 2018-05-11 NOTE — PROGRESS NOTE ADULT - ASSESSMENT
63yo M h/o CAD/MI/PCI, systolic CHF, ICD, PAFIB, LV thrombus on warfarin and frequent admission for EtOH intoxication/withdrawal presents with one week of binge drinking wherein he stopped warfarin, found with INR 0.9 on admission, now admitted for heparin/Coumadin bridge and repeat echo to evaluate LV thrombus.

## 2018-05-11 NOTE — PROGRESS NOTE ADULT - SUBJECTIVE AND OBJECTIVE BOX
Pt is readmitted for Ethanolism   exacerbation  despite long efforts to  patient to stop drinking    Pt stopped coumadin     ( he requires 12 mg po OD for therapeutic INR )    PAST MEDICAL & SURGICAL HISTORY:  Thrombus in heart chamber: LV  ICD (implantable cardioverter-defibrillator) in place  Pacemaker  ETOH abuse  Paroxysmal a-fib  Myocardial infarction involving other coronary artery  Gastritis  Atherosclerosis of coronary artery: Coronary artery disease  Automatic implantable cardiac defibrillator in situ: ICD (implantable cardioverter-defibrillator) in place    Home Medications:  atorvastatin 40 mg oral tablet: 1 tab(s) orally once a day (11 May 2018 01:06)  folic acid 1 mg oral tablet: 1 tab(s) orally once a day (11 May 2018 01:06)  Librium 10 mg oral capsule: 1 cap(s) orally 4 times a day, As Needed (11 May 2018 01:06)  magnesium oxide 400 mg (240 mg elemental magnesium) oral tablet: 1 tab(s) orally once a day (11 May 2018 01:06)  Metoprolol Tartrate 50 mg oral tablet: 2 tab(s) orally once a day (11 May 2018 01:06)  Vitamin B1 100 mg oral tablet: 1 tab(s) orally once a day (11 May 2018 01:06)    MEDICATIONS  (STANDING):  atorvastatin 40 milliGRAM(s) Oral at bedtime  folic acid 1 milliGRAM(s) Oral daily  heparin  Infusion.  Unit(s)/Hr (14 mL/Hr) IV Continuous <Continuous>  lisinopril 20 milliGRAM(s) Oral daily  metoprolol tartrate 50 milliGRAM(s) Oral two times a day  multivitamin 1 Tablet(s) Oral daily  pantoprazole    Tablet 20 milliGRAM(s) Oral two times a day  thiamine 100 milliGRAM(s) Oral daily    MEDICATIONS  (prn)    chlordiazePOXIDE 50 milliGRAM(s) Oral every 1 hour PRN Symptom-triggered: each CIWA -Ar score 8 or GREATER  ondansetron Injectable 4 milliGRAM(s) IV Push every 4 hours PRN Nausea and/or Vomiting  sucralfate suspension 1 Gram(s) Oral four times a day PRN gi upset                          9.9    3.5   )-----------( 96       ( 11 May 2018 03:30 )             29.8   PT/INR - ( 10 May 2018 15:39 )   PT: 11.0 sec;   INR: 0.99          PTT - ( 11 May 2018 03:30 )  PTT:61.1 sec    05-10    136  |  96  |  11  ----------------------------<  106<H>  4.2   |  27  |  0.85    Ca    9.4      10 May 2018 15:39  Mg     1.4     05-11    TPro  7.7  /  Alb  4.4  /  TBili  1.1  /  DBili  x   /  AST  80<H>  /  ALT  47<H>  /  AlkPhos  71  05-10      Lungs clear  CV s1 s2  Abd soft  Ext stable

## 2018-05-11 NOTE — PROGRESS NOTE ADULT - SUBJECTIVE AND OBJECTIVE BOX
Interventional Cardiology PA Adult Progress Note  CC: I feel fine  Subjective Assessment: pt seen this AM in NAD. Denies any acute complaints.  12 point ROS otherwise negative.  	  MEDICATIONS:  lisinopril 20 milliGRAM(s) Oral daily  metoprolol tartrate 50 milliGRAM(s) Oral two times a day        chlordiazePOXIDE 50 milliGRAM(s) Oral every 1 hour PRN  ondansetron Injectable 4 milliGRAM(s) IV Push every 4 hours PRN    pantoprazole    Tablet 20 milliGRAM(s) Oral two times a day  sucralfate suspension 1 Gram(s) Oral four times a day PRN    atorvastatin 40 milliGRAM(s) Oral at bedtime    folic acid 1 milliGRAM(s) Oral daily  heparin  Infusion.  Unit(s)/Hr IV Continuous <Continuous>  magnesium oxide 400 milliGRAM(s) Oral once  multivitamin 1 Tablet(s) Oral daily  potassium chloride    Tablet ER 40 milliEquivalent(s) Oral once  thiamine 100 milliGRAM(s) Oral daily  warfarin 12 milliGRAM(s) Oral once      	    [PHYSICAL EXAM:  TELEMETRY: SR 60s no acute events  T(C): 36.3 (05-11-18 @ 08:41), Max: 36.6 (05-10-18 @ 22:52)  HR: 67 (05-11-18 @ 13:43) (59 - 84)  BP: 123/71 (05-11-18 @ 13:43) (119/76 - 152/74)  RR: 18 (05-11-18 @ 13:43) (16 - 18)  SpO2: 99% (05-11-18 @ 13:43) (97% - 99%)  Wt(kg): --  I&O's Summary    10 May 2018 07:01  -  11 May 2018 07:00  --------------------------------------------------------  IN: 350 mL / OUT: 375 mL / NET: -25 mL    11 May 2018 07:01  -  11 May 2018 16:20  --------------------------------------------------------  IN: 0 mL / OUT: 0 mL / NET: 0 mL      Height (cm): 177.8 (05-10 @ 22:52)  Duffy:  Central/PICC/Mid Line:                                         Appearance: Normal	  HEENT:   Normal oral mucosa, PERRL, EOMI	  Neck: Supple, - JVD   Cardiovascular: Normal S1 S2, No JVD, No murmurs,   Respiratory: Lungs clear to auscultation   Gastrointestinal:  Soft, Non-tender, + BS	  Skin: No rashes, No ecchymoses, No cyanosis  Extremities: Normal range of motion, No clubbing, cyanosis or edema  Vascular: Peripheral pulses palpable 2+ bilaterally  Neurologic: Non-focal, CIWA 1(slight tremor to touch b/l hands)  Psychiatry: A & O x 3, Mood & affect appropriate      	    ECG:  	  RADIOLOGY:   DIAGNOSTIC TESTING:  [ ] Echocardiogram:  [ ]  Catheterization:  [ ] Stress Test:    [ ] MERLYN  OTHER: 	    LABS:	 	  CARDIAC MARKERS:                                  10.9   3.7   )-----------( 100      ( 11 May 2018 10:58 )             33.9     05-11    134<L>  |  97  |  10  ----------------------------<  105<H>  3.7   |  25  |  0.94    Ca    8.7      11 May 2018 10:58  Mg     1.8     05-11    TPro  6.9  /  Alb  3.7  /  TBili  1.2  /  DBili  x   /  AST  54<H>  /  ALT  37  /  AlkPhos  56  05-11    proBNP:   Lipid Profile:   HgA1c:   TSH:   PT/INR - ( 11 May 2018 10:58 )   PT: 11.0 sec;   INR: 0.99          PTT - ( 11 May 2018 10:18 )  PTT:105.6 sec    DVT ppx: Heparin/Coumadin    Dispo: Heparin/Coumadin bridging with Coumadin 12mg tonight.

## 2018-05-11 NOTE — H&P ADULT - NEGATIVE GENERAL SYMPTOMS
Problem: Goal Outcome Summary  Goal: Goal Outcome Summary  PT Unit 5: PT evaluation complete and treatment initiated. Caregivers educated on safe dependent transfer technique of pt to adhere to abdominal precautions. Pt tolerated sitting up in WC and on floor mat without increase in pain during PT session. Therapist will return tomorrow.  Sofy Gordon, PT, [phone]       no fever/no chills

## 2018-05-11 NOTE — H&P ADULT - ASSESSMENT
61yo M h/o CAD/MI/PCI, systolic CHF, ICD, PAFIB, LV thrombus on warfarin and frequent admission for EtOH intoxication/withdrawal presents with one week of binge drinking wherein he stopped warfarin, found with INR 0.9 on admission, now admitted for heparin/Coumadin bridge and repeat echo to evaluate LV thrombus.

## 2018-05-12 DIAGNOSIS — D69.6 THROMBOCYTOPENIA, UNSPECIFIED: ICD-10-CM

## 2018-05-12 DIAGNOSIS — I47.2 VENTRICULAR TACHYCARDIA: ICD-10-CM

## 2018-05-12 DIAGNOSIS — I25.10 ATHEROSCLEROTIC HEART DISEASE OF NATIVE CORONARY ARTERY WITHOUT ANGINA PECTORIS: ICD-10-CM

## 2018-05-12 DIAGNOSIS — K92.2 GASTROINTESTINAL HEMORRHAGE, UNSPECIFIED: ICD-10-CM

## 2018-05-12 LAB
ANION GAP SERPL CALC-SCNC: 10 MMOL/L — SIGNIFICANT CHANGE UP (ref 5–17)
APTT BLD: 66.8 SEC — HIGH (ref 27.5–37.4)
APTT BLD: 73.5 SEC — HIGH (ref 27.5–37.4)
BUN SERPL-MCNC: 9 MG/DL — SIGNIFICANT CHANGE UP (ref 7–23)
CALCIUM SERPL-MCNC: 8.9 MG/DL — SIGNIFICANT CHANGE UP (ref 8.4–10.5)
CHLORIDE SERPL-SCNC: 97 MMOL/L — SIGNIFICANT CHANGE UP (ref 96–108)
CO2 SERPL-SCNC: 28 MMOL/L — SIGNIFICANT CHANGE UP (ref 22–31)
CREAT SERPL-MCNC: 0.95 MG/DL — SIGNIFICANT CHANGE UP (ref 0.5–1.3)
GLUCOSE SERPL-MCNC: 98 MG/DL — SIGNIFICANT CHANGE UP (ref 70–99)
HCT VFR BLD CALC: 33.4 % — LOW (ref 39–50)
HCT VFR BLD CALC: 35.6 % — LOW (ref 39–50)
HGB BLD-MCNC: 10.7 G/DL — LOW (ref 13–17)
HGB BLD-MCNC: 11.1 G/DL — LOW (ref 13–17)
INR BLD: 1.09 — SIGNIFICANT CHANGE UP (ref 0.88–1.16)
MAGNESIUM SERPL-MCNC: 1.7 MG/DL — SIGNIFICANT CHANGE UP (ref 1.6–2.6)
MCHC RBC-ENTMCNC: 23.5 PG — LOW (ref 27–34)
MCHC RBC-ENTMCNC: 23.7 PG — LOW (ref 27–34)
MCHC RBC-ENTMCNC: 31.2 G/DL — LOW (ref 32–36)
MCHC RBC-ENTMCNC: 32 G/DL — SIGNIFICANT CHANGE UP (ref 32–36)
MCV RBC AUTO: 73.2 FL — LOW (ref 80–100)
MCV RBC AUTO: 76.1 FL — LOW (ref 80–100)
PLATELET # BLD AUTO: 106 K/UL — LOW (ref 150–400)
PLATELET # BLD AUTO: 134 K/UL — LOW (ref 150–400)
POTASSIUM SERPL-MCNC: 4.1 MMOL/L — SIGNIFICANT CHANGE UP (ref 3.5–5.3)
POTASSIUM SERPL-SCNC: 4.1 MMOL/L — SIGNIFICANT CHANGE UP (ref 3.5–5.3)
PROTHROM AB SERPL-ACNC: 12.1 SEC — SIGNIFICANT CHANGE UP (ref 9.8–12.7)
RBC # BLD: 4.56 M/UL — SIGNIFICANT CHANGE UP (ref 4.2–5.8)
RBC # BLD: 4.68 M/UL — SIGNIFICANT CHANGE UP (ref 4.2–5.8)
RBC # FLD: 18.2 % — HIGH (ref 10.3–16.9)
RBC # FLD: 19 % — HIGH (ref 10.3–16.9)
SODIUM SERPL-SCNC: 135 MMOL/L — SIGNIFICANT CHANGE UP (ref 135–145)
WBC # BLD: 3.3 K/UL — LOW (ref 3.8–10.5)
WBC # BLD: 4.6 K/UL — SIGNIFICANT CHANGE UP (ref 3.8–10.5)
WBC # FLD AUTO: 3.3 K/UL — LOW (ref 3.8–10.5)
WBC # FLD AUTO: 4.6 K/UL — SIGNIFICANT CHANGE UP (ref 3.8–10.5)

## 2018-05-12 RX ORDER — WARFARIN SODIUM 2.5 MG/1
12 TABLET ORAL ONCE
Qty: 0 | Refills: 0 | Status: COMPLETED | OUTPATIENT
Start: 2018-05-12 | End: 2018-05-12

## 2018-05-12 RX ORDER — MAGNESIUM SULFATE 500 MG/ML
1 VIAL (ML) INJECTION ONCE
Qty: 0 | Refills: 0 | Status: COMPLETED | OUTPATIENT
Start: 2018-05-12 | End: 2018-05-12

## 2018-05-12 RX ORDER — ARGATROBAN 50 MG/50ML
2 INJECTION, SOLUTION INTRAVENOUS
Qty: 50 | Refills: 0 | Status: DISCONTINUED | OUTPATIENT
Start: 2018-05-12 | End: 2018-05-13

## 2018-05-12 RX ORDER — SUCRALFATE 1 G
1 TABLET ORAL
Qty: 0 | Refills: 0 | Status: DISCONTINUED | OUTPATIENT
Start: 2018-05-12 | End: 2018-05-21

## 2018-05-12 RX ADMIN — HEPARIN SODIUM 1200 UNIT(S)/HR: 5000 INJECTION INTRAVENOUS; SUBCUTANEOUS at 00:05

## 2018-05-12 RX ADMIN — Medication 50 MILLIGRAM(S): at 05:41

## 2018-05-12 RX ADMIN — ATORVASTATIN CALCIUM 40 MILLIGRAM(S): 80 TABLET, FILM COATED ORAL at 05:41

## 2018-05-12 RX ADMIN — Medication 1 MILLIGRAM(S): at 11:37

## 2018-05-12 RX ADMIN — Medication 50 GRAM(S): at 09:33

## 2018-05-12 RX ADMIN — Medication 50 MILLIGRAM(S): at 18:22

## 2018-05-12 RX ADMIN — PANTOPRAZOLE SODIUM 20 MILLIGRAM(S): 20 TABLET, DELAYED RELEASE ORAL at 05:41

## 2018-05-12 RX ADMIN — ARGATROBAN 9.35 MICROGRAM(S)/KG/MIN: 50 INJECTION, SOLUTION INTRAVENOUS at 19:25

## 2018-05-12 RX ADMIN — WARFARIN SODIUM 12 MILLIGRAM(S): 2.5 TABLET ORAL at 22:19

## 2018-05-12 RX ADMIN — Medication 1 GRAM(S): at 18:22

## 2018-05-12 RX ADMIN — LISINOPRIL 20 MILLIGRAM(S): 2.5 TABLET ORAL at 05:41

## 2018-05-12 RX ADMIN — Medication 100 MILLIGRAM(S): at 11:37

## 2018-05-12 RX ADMIN — ARGATROBAN 9.35 MICROGRAM(S)/KG/MIN: 50 INJECTION, SOLUTION INTRAVENOUS at 23:03

## 2018-05-12 RX ADMIN — HEPARIN SODIUM 1200 UNIT(S)/HR: 5000 INJECTION INTRAVENOUS; SUBCUTANEOUS at 07:56

## 2018-05-12 RX ADMIN — Medication 1 TABLET(S): at 11:37

## 2018-05-12 NOTE — PROGRESS NOTE ADULT - SUBJECTIVE AND OBJECTIVE BOX
S: Pt seen and examined bedside.  Patient denies C/P, SOB, N/V, dizziness, palpitations, and diaphoresis.  Pt denies fever/chills, dysuria, abdominal pain, diarrhea, and cough    O: Vital Signs Last 24 Hrs  T(C): 36.4 (12 May 2018 09:07), Max: 36.5 (12 May 2018 05:33)  T(F): 97.5 (12 May 2018 09:07), Max: 97.7 (12 May 2018 05:33)  HR: 71 (12 May 2018 09:44) (67 - 76)  BP: 109/71 (12 May 2018 09:44) (109/71 - 156/87)  RR: 19 (12 May 2018 09:44) (18 - 19)  SpO2: 98% (12 May 2018 05:35) (98% - 100%)    PHYSICAL EXAM:  GEN: NAD  PULM:  CTA B/L  CARD: No JVD B/L, RRR, S1 and S2   ABD: +BS, NT, soft/ND	  EXT: No Edema B/L LE  NEURO: A+Ox3, no focal deficit    LABS:                        10.7   3.3   )-----------( 106      ( 12 May 2018 07:03 )             33.4     05-12    135  |  97  |  9   ----------------------------<  98  4.1   |  28  |  0.95    Ca    8.9      12 May 2018 07:03  Mg     1.7     05-12    TPro  6.9  /  Alb  3.7  /  TBili  1.2  /  DBili  x   /  AST  54<H>  /  ALT  37  /  AlkPhos  56  05-11    PT/INR - ( 12 May 2018 07:03 )   PT: 12.1 sec;   INR: 1.09        PTT - ( 12 May 2018 07:03 )  PTT:73.5 sec    05-11 @ 07:01  -  05-12 @ 07:00  --------------------------------------------------------  IN: 84 mL / OUT: 0 mL / NET: 84 mL, I/Os not completely docuemented    Daily Weight Pending

## 2018-05-12 NOTE — PROGRESS NOTE ADULT - PROBLEM SELECTOR PLAN 3
Platelet count 180 on admission. Platelet count currently decreased to 106, as low as 96 yesterday  Patient on Heparin gtt. HIT AB sent.  Platelet count 120s-200s in 1/2018  Dr. Everett ([facility]) consulted as per Dr. Sheehan

## 2018-05-12 NOTE — PROGRESS NOTE ADULT - ASSESSMENT
1) cad s/p mi and pci  -no cp now  -on bb and statin  -clarify reason patient not on asa - bleeding risk is most likely contraindication    2) chronic systolic and diastolic   -on ace-i  -monitor i/o and daily weights    3) s/p icd - call ep for device check    4) lv thrombus   -ac as toelrated  -serial cbc; monitor for s/s bleeding    5) pafib - tele for now    6) h/o gib - please call Dr. Canales    7) ckd - monitor cr and uop    8) current etoh abuse - monitor for s/s withdrawl; rec Psych eval    9) ppx - ppi and therapeutic ac    10) K>4, Mg>2  d/w PA

## 2018-05-12 NOTE — CONSULT NOTE ADULT - SUBJECTIVE AND OBJECTIVE BOX
HPI:  This is a 63yo M former smoker and drug abuser current ETOH abuser (2+ pints vodka day) with PMHx CAD s/p MI 2003, most recent PCI /at [facility] 7/2016 (JOSHUA to mLAD with residual 50 % OM1), chronic systolic CHF (EF 35% by Echo) s/p AICD in 2009 ([employer]) , pafib and LV thrombus (on warfarin, previously on ASA and Plavix only 2/2 to lower GIB 2016), CKD Stage III (Baseline Cr normal 0.90-1.1), with frequent admissions to [facility] for ETOH use/withdrawal and 12/2017 for ICD firing. Pt’s last hospitalization at [facility] 1/6-1/26/18 for palpitations and anorexia in setting of alcohol intoxication, workup included colonoscopy without active bleeding, s/p polypectomy x 2, discharged home on warfarin 12mg daily.  He reports a subsequent hospitalization at [facility] sometime after that admission for recurrent alcohol intoxication with withdrawal symptoms, reports abstaining from alcohol from that admission until one week ago when increased family/personal stressors found him starting and then increasing his daily drinking.  Pt is unclear on time frame.    Pt presents today with epigastric pain with an episode of nausea and vomiting after one week of binge drinking.  He also notes that he stopped taking warfarin during this time period and INR on presentation is sub-therapeutic at 0.99.  He admits to last drink at 10pm on 5/9 and bilateral upper extremity tremors. Denies chest pain, SOB, near/syncope, edema, fever, chills.  He was treated with 2L IVF, IV pantoprazole and started on heparin gtt for sub therapeutic INR with known h/o LV thrombus. (11 May 2018 00:51)    FAMILY HISTORY:  No pertinent family history in first degree relatives    MEDICATIONS  (STANDING):  atorvastatin 40 milliGRAM(s) Oral at bedtime  folic acid 1 milliGRAM(s) Oral daily  heparin  Infusion.  Unit(s)/Hr (14 mL/Hr) IV Continuous <Continuous>  lisinopril 20 milliGRAM(s) Oral daily  metoprolol tartrate 50 milliGRAM(s) Oral two times a day  multivitamin 1 Tablet(s) Oral daily  sucralfate 1 Gram(s) Oral four times a day  thiamine 100 milliGRAM(s) Oral daily  warfarin 12 milliGRAM(s) Oral once    MEDICATIONS  (PRN):  chlordiazePOXIDE 50 milliGRAM(s) Oral every 1 hour PRN Symptom-triggered: each CIWA -Ar score 8 or GREATER  ondansetron Injectable 4 milliGRAM(s) IV Push every 4 hours PRN Nausea and/or Vomiting  sucralfate suspension 1 Gram(s) Oral four times a day PRN gi upset    Vital Signs Last 24 Hrs  T(C): 35.9 (12 May 2018 13:31), Max: 36.5 (12 May 2018 05:33)  T(F): 96.7 (12 May 2018 13:31), Max: 97.7 (12 May 2018 05:33)  HR: 75 (12 May 2018 13:30) (68 - 76)  BP: 122/80 (12 May 2018 13:30) (109/71 - 156/87)  BP(mean): --  RR: 19 (12 May 2018 13:30) (18 - 19)  SpO2: 98% (12 May 2018 05:35) (98% - 99%)PHYSICAL EXAM:    Constitutional:    Neck:    Breasts:    Respiratory:    Cardiovascular:    Gastrointestinal:    Extremities:    Neurological:    Skin:    Lymph Nodes:      Labs:  CBC Full  -  ( 12 May 2018 07:03 )  WBC Count : 3.3 K/uL  Hemoglobin : 10.7 g/dL  Hematocrit : 33.4 %  Platelet Count - Automated : 106 K/uL  Mean Cell Volume : 73.2 fL  Mean Cell Hemoglobin : 23.5 pg  Mean Cell Hemoglobin Concentration : 32.0 g/dL  Auto Neutrophil # : x  Auto Lymphocyte # : x  Auto Monocyte # : x  Auto Eosinophil # : x  Auto Basophil # : x  Auto Neutrophil % : x  Auto Lymphocyte % : x  Auto Monocyte % : x  Auto Eosinophil % : x  Auto Basophil % : x    05-12    135  |  97  |  9   ----------------------------<  98  4.1   |  28  |  0.95    Ca    8.9      12 May 2018 07:03  Mg     1.7     05-12    TPro  6.9  /  Alb  3.7  /  TBili  1.2  /  DBili  x   /  AST  54<H>  /  ALT  37  /  AlkPhos  56  05-11      Radiology:  HEALTH ISSUES - R/O PROBLEM Dx:  Chronic alcoholic gastritis without hemorrhage: R/O Chronic alcoholic gastritis without hemorrhage    Assessmant:  1) Anemia 2/2 iron deficiency ( GI bleeding on and off), Chronic disease, Thalassemia minor and EYOH abuse  2) Thrombocytopenia 2/2 Pantoprasole and possibly HIT antibody ( HIT antibody pending)  Plan:    Thank you  Liana Everett MD HPI:  This is a 61yo M former smoker and drug abuser current ETOH abuser (2+ pints vodka day) with PMHx CAD s/p MI 2003, most recent PCI /at [facility] 7/2016 (JOSHUA to mLAD with residual 50 % OM1), chronic systolic CHF (EF 35% by Echo) s/p AICD in 2009 ([employer]) , pafib and LV thrombus (on warfarin, previously on ASA and Plavix only 2/2 to lower GIB 2016), CKD Stage III (Baseline Cr normal 0.90-1.1), with frequent admissions to [facility] for ETOH use/withdrawal and 12/2017 for ICD firing. Pt’s last hospitalization at [facility] 1/6-1/26/18 for palpitations and anorexia in setting of alcohol intoxication, workup included colonoscopy without active bleeding, s/p polypectomy x 2, discharged home on warfarin 12mg daily.  He reports a subsequent hospitalization at [facility] sometime after that admission for recurrent alcohol intoxication with withdrawal symptoms, reports abstaining from alcohol from that admission until one week ago when increased family/personal stressors found him starting and then increasing his daily drinking.  Pt is unclear on time frame.    Pt presents today with epigastric pain with an episode of nausea and vomiting after one week of binge drinking.  He also notes that he stopped taking warfarin during this time period and INR on presentation is sub-therapeutic at 0.99.  He admits to last drink at 10pm on 5/9 and bilateral upper extremity tremors. Denies chest pain, SOB, near/syncope, edema, fever, chills.  He was treated with 2L IVF, IV pantoprazole and started on heparin gtt for sub therapeutic INR with known h/o LV thrombus. (11 May 2018 00:51)    FAMILY HISTORY:  No pertinent family history in first degree relatives    MEDICATIONS  (STANDING):  atorvastatin 40 milliGRAM(s) Oral at bedtime  folic acid 1 milliGRAM(s) Oral daily  heparin  Infusion.  Unit(s)/Hr (14 mL/Hr) IV Continuous <Continuous>  lisinopril 20 milliGRAM(s) Oral daily  metoprolol tartrate 50 milliGRAM(s) Oral two times a day  multivitamin 1 Tablet(s) Oral daily  sucralfate 1 Gram(s) Oral four times a day  thiamine 100 milliGRAM(s) Oral daily  warfarin 12 milliGRAM(s) Oral once    MEDICATIONS  (PRN):  chlordiazePOXIDE 50 milliGRAM(s) Oral every 1 hour PRN Symptom-triggered: each CIWA -Ar score 8 or GREATER  ondansetron Injectable 4 milliGRAM(s) IV Push every 4 hours PRN Nausea and/or Vomiting  sucralfate suspension 1 Gram(s) Oral four times a day PRN gi upset    Vital Signs Last 24 Hrs  T(C): 35.9 (12 May 2018 13:31), Max: 36.5 (12 May 2018 05:33)  T(F): 96.7 (12 May 2018 13:31), Max: 97.7 (12 May 2018 05:33)  HR: 75 (12 May 2018 13:30) (68 - 76)  BP: 122/80 (12 May 2018 13:30) (109/71 - 156/87)  BP(mean): --  RR: 19 (12 May 2018 13:30) (18 - 19)  SpO2: 98% (12 May 2018 05:35) (98% - 99%)PHYSICAL EXAM:    Constitutional:    Neck:    Breasts:    Respiratory:    Cardiovascular:    Gastrointestinal:    Extremities:    Neurological:    Skin:    Lymph Nodes:      Labs:  CBC Full  -  ( 12 May 2018 07:03 )  WBC Count : 3.3 K/uL  Hemoglobin : 10.7 g/dL  Hematocrit : 33.4 %  Platelet Count - Automated : 106 K/uL  Mean Cell Volume : 73.2 fL  Mean Cell Hemoglobin : 23.5 pg  Mean Cell Hemoglobin Concentration : 32.0 g/dL  Auto Neutrophil # : x  Auto Lymphocyte # : x  Auto Monocyte # : x  Auto Eosinophil # : x  Auto Basophil # : x  Auto Neutrophil % : x  Auto Lymphocyte % : x  Auto Monocyte % : x  Auto Eosinophil % : x  Auto Basophil % : x    05-12    135  |  97  |  9   ----------------------------<  98  4.1   |  28  |  0.95    Ca    8.9      12 May 2018 07:03  Mg     1.7     05-12    TPro  6.9  /  Alb  3.7  /  TBili  1.2  /  DBili  x   /  AST  54<H>  /  ALT  37  /  AlkPhos  56  05-11      Radiology:  HEALTH ISSUES - R/O PROBLEM Dx:  Chronic alcoholic gastritis without hemorrhage: R/O Chronic alcoholic gastritis without hemorrhage    Assessmant:  1) Anemia 2/2 iron deficiency ( GI bleeding on and off), Chronic disease, Thalassemia minor and EYOH abuse  2) Thrombocytopenia 2/2 Pantoprasole and possibly HIT antibody ( HIT antibody pending)  Since patient came in with platelet count 180k on 5.10.18 ,Same day he was started on Heparin and platelets dropped same day,  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  2)iv iron  3) Folic acid    Thank you  Liana Everett MD HPI:  This is a 61yo M former smoker and drug abuser current ETOH abuser (2+ pints vodka day) with PMHx CAD s/p MI 2003, most recent PCI /at [facility] 7/2016 (JOSHUA to mLAD with residual 50 % OM1), chronic systolic CHF (EF 35% by Echo) s/p AICD in 2009 ([employer]) , pafib and LV thrombus (on warfarin, previously on ASA and Plavix only 2/2 to lower GIB 2016), CKD Stage III (Baseline Cr normal 0.90-1.1), with frequent admissions to [facility] for ETOH use/withdrawal and 12/2017 for ICD firing. Pt’s last hospitalization at [facility] 1/6-1/26/18 for palpitations and anorexia in setting of alcohol intoxication, workup included colonoscopy without active bleeding, s/p polypectomy x 2, discharged home on warfarin 12mg daily.  He reports a subsequent hospitalization at [facility] sometime after that admission for recurrent alcohol intoxication with withdrawal symptoms, reports abstaining from alcohol from that admission until one week ago when increased family/personal stressors found him starting and then increasing his daily drinking.  Pt is unclear on time frame.    Pt presents today with epigastric pain with an episode of nausea and vomiting after one week of binge drinking.  He also notes that he stopped taking warfarin during this time period and INR on presentation is sub-therapeutic at 0.99.  He admits to last drink at 10pm on 5/9 and bilateral upper extremity tremors. Denies chest pain, SOB, near/syncope, edema, fever, chills.  He was treated with 2L IVF, IV pantoprazole and started on heparin gtt for sub therapeutic INR with known h/o LV thrombus. (11 May 2018 00:51)    FAMILY HISTORY:  No pertinent family history in first degree relatives    MEDICATIONS  (STANDING):  atorvastatin 40 milliGRAM(s) Oral at bedtime  folic acid 1 milliGRAM(s) Oral daily  heparin  Infusion.  Unit(s)/Hr (14 mL/Hr) IV Continuous <Continuous>  lisinopril 20 milliGRAM(s) Oral daily  metoprolol tartrate 50 milliGRAM(s) Oral two times a day  multivitamin 1 Tablet(s) Oral daily  sucralfate 1 Gram(s) Oral four times a day  thiamine 100 milliGRAM(s) Oral daily  warfarin 12 milliGRAM(s) Oral once    MEDICATIONS  (PRN):  chlordiazePOXIDE 50 milliGRAM(s) Oral every 1 hour PRN Symptom-triggered: each CIWA -Ar score 8 or GREATER  ondansetron Injectable 4 milliGRAM(s) IV Push every 4 hours PRN Nausea and/or Vomiting  sucralfate suspension 1 Gram(s) Oral four times a day PRN gi upset    Vital Signs Last 24 Hrs  T(C): 35.9 (12 May 2018 13:31), Max: 36.5 (12 May 2018 05:33)  T(F): 96.7 (12 May 2018 13:31), Max: 97.7 (12 May 2018 05:33)  HR: 75 (12 May 2018 13:30) (68 - 76)  BP: 122/80 (12 May 2018 13:30) (109/71 - 156/87)  BP(mean): --  RR: 19 (12 May 2018 13:30) (18 - 19)  SpO2: 98% (12 May 2018 05:35) (98% - 99%)PHYSICAL EXAM:    Constitutional:    Neck:    Breasts:    Respiratory:    Cardiovascular:    Gastrointestinal:    Extremities:    Neurological:    Skin:    Lymph Nodes:      Labs:  CBC Full  -  ( 12 May 2018 07:03 )  WBC Count : 3.3 K/uL  Hemoglobin : 10.7 g/dL  Hematocrit : 33.4 %  Platelet Count - Automated : 106 K/uL  Mean Cell Volume : 73.2 fL  Mean Cell Hemoglobin : 23.5 pg  Mean Cell Hemoglobin Concentration : 32.0 g/dL  Auto Neutrophil # : x  Auto Lymphocyte # : x  Auto Monocyte # : x  Auto Eosinophil # : x  Auto Basophil # : x  Auto Neutrophil % : x  Auto Lymphocyte % : x  Auto Monocyte % : x  Auto Eosinophil % : x  Auto Basophil % : x    05-12    135  |  97  |  9   ----------------------------<  98  4.1   |  28  |  0.95    Ca    8.9      12 May 2018 07:03  Mg     1.7     05-12    TPro  6.9  /  Alb  3.7  /  TBili  1.2  /  DBili  x   /  AST  54<H>  /  ALT  37  /  AlkPhos  56  05-11      Radiology:  HEALTH ISSUES - R/O PROBLEM Dx:  Chronic alcoholic gastritis without hemorrhage: R/O Chronic alcoholic gastritis without hemorrhage    Assessmant:  1) Anemia 2/2 iron deficiency ( GI bleeding on and off), Chronic disease, Thalassemia minor and EYOH abuse  2) Thrombocytopenia 2/2 Pantoprasole and possibly HIT antibody ( HIT antibody pending)  Since patient came in with platelet count 180k on 5.10.18 ,Same day he was started on Heparin and platelets dropped same day,  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  3)iv iron  3) Folic acid  4) D/C Pantoprazole  5) start Carafate    Thank you  Liana Everett MD

## 2018-05-12 NOTE — PROGRESS NOTE ADULT - ASSESSMENT
63 y/o M Ex-Smoker, Drug Abuser, ETOH Abuser, w/ PMHX CAD/MI/PCI, Chronic Systolic CHF EF 35%, s/p ICD, paroxysmal Afib and LV Thromubus, h/o GI bleeding, frequent admissions for ETOH intoxication/withdrawal presents after binge drinking for 1 week, noted to have sub-therapeutic INR (INR 0.9)

## 2018-05-12 NOTE — PROGRESS NOTE ADULT - SUBJECTIVE AND OBJECTIVE BOX
Cardiology for Mildred  CC: Follow-up Cardiology evaluation   Patient is a 62y old  Male who presents with a chief complaint of epigastric pain with n/v i/s/o EtOH binge (11 May 2018 00:51)      HPI:  This is a 63yo M former smoker and drug abuser current ETOH abuser (2+ pints vodka day) with PMHx CAD s/p MI , most recent PCI /at [facility] 2016 (JOSHUA to mLAD with residual 50 % OM1), chronic systolic CHF (EF 35% by Echo) s/p AICD in  ([employer]) , pafib and LV thrombus (on warfarin, previously on ASA and Plavix only  to lower GIB ), CKD Stage III (Baseline Cr normal 0.90-1.1), with frequent admissions to [facility] for ETOH use/withdrawal and 2017 for ICD firing. Pt’s last hospitalization at [facility] -18 for palpitations and anorexia in setting of alcohol intoxication, workup included colonoscopy without active bleeding, s/p polypectomy x 2, discharged home on warfarin 12mg daily.  He reports a subsequent hospitalization at [facility] sometime after that admission for recurrent alcohol intoxication with withdrawal symptoms, reports abstaining from alcohol from that admission until one week ago when increased family/personal stressors found him starting and then increasing his daily drinking.  Pt is unclear on time frame.    Pt presents today with epigastric pain with an episode of nausea and vomiting after one week of binge drinking.  He also notes that he stopped taking warfarin during this time period and INR on presentation is sub-therapeutic at 0.99.  He admits to last drink at 10pm on  and bilateral upper extremity tremors. Denies chest pain, SOB, near/syncope, edema, fever, chills.  He was treated with 2L IVF, IV pantoprazole and started on heparin gtt for sub therapeutic INR with known h/o LV thrombus. (11 May 2018 00:51)      PAST MEDICAL & SURGICAL HISTORY:  Thrombus in heart chamber: LV  ICD (implantable cardioverter-defibrillator) in place  Pacemaker  ETOH abuse  Paroxysmal a-fib  Myocardial infarction involving other coronary artery  Gastritis  Atherosclerosis of coronary artery: Coronary artery disease  Automatic implantable cardiac defibrillator in situ: ICD (implantable cardioverter-defibrillator) in place      HPI:                PREVIOUS DIAGNOSTIC TESTING:      ECHO  FINDINGS:    STRESS  FINDINGS:    CATHETERIZATION  FINDINGS:    MEDICATIONS  (STANDING):  atorvastatin 40 milliGRAM(s) Oral at bedtime  folic acid 1 milliGRAM(s) Oral daily  heparin  Infusion.  Unit(s)/Hr (14 mL/Hr) IV Continuous <Continuous>  lisinopril 20 milliGRAM(s) Oral daily  metoprolol tartrate 50 milliGRAM(s) Oral two times a day  multivitamin 1 Tablet(s) Oral daily  pantoprazole    Tablet 20 milliGRAM(s) Oral two times a day  thiamine 100 milliGRAM(s) Oral daily    MEDICATIONS  (PRN):  chlordiazePOXIDE 50 milliGRAM(s) Oral every 1 hour PRN Symptom-triggered: each CIWA -Ar score 8 or GREATER  ondansetron Injectable 4 milliGRAM(s) IV Push every 4 hours PRN Nausea and/or Vomiting  sucralfate suspension 1 Gram(s) Oral four times a day PRN gi upset      FAMILY HISTORY:  No pertinent family history in first degree relatives      SOCIAL HISTORY:    CIGARETTES:    ALCOHOL:    REVIEW OF SYSTEMS      General:	    Skin/Breast:  	  Ophthalmologic:  	  ENMT:	    Respiratory and Thorax:  	  Cardiovascular:	    Gastrointestinal:	    Genitourinary:	    Musculoskeletal:	    Neurological:	    Psychiatric:	    Hematology/Lymphatics:	    Endocrine:	    Allergic/Immunologic:	    Vital Signs Last 24 Hrs  T(C): 36.4 (12 May 2018 09:07), Max: 36.5 (12 May 2018 05:33)  T(F): 97.5 (12 May 2018 09:07), Max: 97.7 (12 May 2018 05:33)  HR: 71 (12 May 2018 09:44) (67 - 76)  BP: 109/71 (12 May 2018 09:44) (109/71 - 156/87)  BP(mean): --  RR: 19 (12 May 2018 09:44) (18 - 19)  SpO2: 98% (12 May 2018 05:35) (98% - 100%)    PHYSICAL EXAM:      Constitutional:    Eyes:    ENMT:    Neck:    Breasts:    Back:    Respiratory:    Cardiovascular:    Gastrointestinal:    Genitourinary:    Rectal:    Extremities:    Vascular:    Neurological:    Skin:    Lymph Nodes:    Musculoskeletal:    Psychiatric:            INTERPRETATION OF TELEMETRY:    ECG:    I&O's Detail    11 May 2018 07:01  -  12 May 2018 07:00  --------------------------------------------------------  IN:    heparin  Infusion.: 84 mL  Total IN: 84 mL    OUT:  Total OUT: 0 mL    Total NET: 84 mL      12 May 2018 07:01  -  12 May 2018 10:25  --------------------------------------------------------  IN:    Oral Fluid: 360 mL  Total IN: 360 mL    OUT:  Total OUT: 0 mL    Total NET: 360 mL          LABS:                        10.7   3.3   )-----------( 106      ( 12 May 2018 07:03 )             33.4     -12    135  |  97  |  9   ----------------------------<  98  4.1   |  28  |  0.95    Ca    8.9      12 May 2018 07:03  Mg     1.7         TPro  6.9  /  Alb  3.7  /  TBili  1.2  /  DBili  x   /  AST  54<H>  /  ALT  37  /  AlkPhos  56  05-11        PT/INR - ( 12 May 2018 07:03 )   PT: 12.1 sec;   INR: 1.09          PTT - ( 12 May 2018 07:03 )  PTT:73.5 sec  Urinalysis Basic - ( 11 May 2018 06:37 )    Color: Yellow / Appearance: Clear / S.025 / pH: x  Gluc: x / Ketone: 15 mg/dL  / Bili: Small / Urobili: 0.2 E.U./dL   Blood: x / Protein: NEGATIVE mg/dL / Nitrite: NEGATIVE   Leuk Esterase: NEGATIVE / RBC: x / WBC x   Sq Epi: x / Non Sq Epi: x / Bacteria: x      I&O's Summary    11 May 2018 07:01  -  12 May 2018 07:00  --------------------------------------------------------  IN: 84 mL / OUT: 0 mL / NET: 84 mL    12 May 2018 07:01  -  12 May 2018 10:26  --------------------------------------------------------  IN: 360 mL / OUT: 0 mL / NET: 360 mL      BNP  RADIOLOGY & ADDITIONAL STUDIES: Cardiology for Mildred  CC: Follow-up Cardiology evaluation and management of cad s/p mi and pci, systolic chf, s/p aicd, pafib, and LV thrombus    interval - no chest pain; no new complaints    PAST MEDICAL & SURGICAL HISTORY:  Thrombus in heart chamber: LV  ICD (implantable cardioverter-defibrillator) in place  Pacemaker  ETOH abuse  Paroxysmal a-fib  Myocardial infarction involving other coronary artery  Gastritis  Atherosclerosis of coronary artery: Coronary artery disease  Automatic implantable cardiac defibrillator in situ: ICD (implantable cardioverter-defibrillator) in place    MEDICATIONS  (STANDING):  atorvastatin 40 milliGRAM(s) Oral at bedtime  folic acid 1 milliGRAM(s) Oral daily  heparin  Infusion.  Unit(s)/Hr (14 mL/Hr) IV Continuous <Continuous>  lisinopril 20 milliGRAM(s) Oral daily  metoprolol tartrate 50 milliGRAM(s) Oral two times a day  multivitamin 1 Tablet(s) Oral daily  pantoprazole    Tablet 20 milliGRAM(s) Oral two times a day  thiamine 100 milliGRAM(s) Oral daily    MEDICATIONS  (PRN):  chlordiazePOXIDE 50 milliGRAM(s) Oral every 1 hour PRN Symptom-triggered: each CIWA -Ar score 8 or GREATER  ondansetron Injectable 4 milliGRAM(s) IV Push every 4 hours PRN Nausea and/or Vomiting  sucralfate suspension 1 Gram(s) Oral four times a day PRN gi upset    Vital Signs Last 24 Hrs  T(C): 36.4 (12 May 2018 09:07), Max: 36.5 (12 May 2018 05:33)  T(F): 97.5 (12 May 2018 09:07), Max: 97.7 (12 May 2018 05:33)  HR: 71 (12 May 2018 09:44) (67 - 76)  BP: 109/71 (12 May 2018 09:44) (109/71 - 156/87)  BP(mean): --  RR: 19 (12 May 2018 09:44) (18 - 19)  SpO2: 98% (12 May 2018 05:35) (98% - 100%)    PHYSICAL EXAM:  Constitutional: nad; supine    Eyes: anicteric    ENMT: mmm    Neck: no carotid bruit    Respiratory: bilat breath sounds present    Cardiovascular: rr; s1/s2 present; no harsh murmur    Gastrointestinal: soft abd    Extremities: no pitting edema    Vascular: le pulses present    Neurological: conversant    Skin: warm le     Musculoskeletal: moves 4 ext    Psychiatric: no acute agitation    INTERPRETATION OF TELEMETRY: reviewed    EC- ecg reviewed    I&O's Detail    11 May 2018 07:01  -  12 May 2018 07:00  --------------------------------------------------------  IN:    heparin  Infusion.: 84 mL  Total IN: 84 mL    OUT:  Total OUT: 0 mL    Total NET: 84 mL      12 May 2018 07:01  -  12 May 2018 10:25  --------------------------------------------------------  IN:    Oral Fluid: 360 mL  Total IN: 360 mL    OUT:  Total OUT: 0 mL    Total NET: 360 mL          LABS:                        10.7   3.3   )-----------( 106      ( 12 May 2018 07:03 )             33.4     05-12    135  |  97  |  9   ----------------------------<  98  4.1   |  28  |  0.95    Ca    8.9      12 May 2018 07:03  Mg     1.7     05-12    TPro  6.9  /  Alb  3.7  /  TBili  1.2  /  DBili  x   /  AST  54<H>  /  ALT  37  /  AlkPhos  56  05-11        PT/INR - ( 12 May 2018 07:03 )   PT: 12.1 sec;   INR: 1.09          PTT - ( 12 May 2018 07:03 )  PTT:73.5 sec  Urinalysis Basic - ( 11 May 2018 06:37 )    Color: Yellow / Appearance: Clear / S.025 / pH: x  Gluc: x / Ketone: 15 mg/dL  / Bili: Small / Urobili: 0.2 E.U./dL   Blood: x / Protein: NEGATIVE mg/dL / Nitrite: NEGATIVE   Leuk Esterase: NEGATIVE / RBC: x / WBC x   Sq Epi: x / Non Sq Epi: x / Bacteria: x      I&O's Summary    11 May 2018 07:  -  12 May 2018 07:00  --------------------------------------------------------  IN: 84 mL / OUT: 0 mL / NET: 84 mL    12 May 2018 07:  -  12 May 2018 10:26  --------------------------------------------------------  IN: 360 mL / OUT: 0 mL / NET: 360 mL      BNP  RADIOLOGY & ADDITIONAL STUDIES:  < from: TTE Echo w/Cont Complete (18 @ 13:27) >  EXAM:  ECHOCARDIOGRAM W CONTRAST                          PROCEDURE DATE:  2018                        INTERPRETATION:  Patient Height: 175.0 cm  Patient Weight: 78.0 kg  Heart Rate: 67 bpm  Systolic Pressure: 117 mmHg  Diastolic Pressure: 75mmHg  BSA: 1.9 m^2  Interpretation Summary  There is mild concentric left ventricular hypertrophy.Moderate segmental   left   ventricular systolic dysfunction with akinesis of mid to distal   anteroseptum   and all apical segments. There is a large thrombus measuring 1.9 x 1.8 cm   noted in the apex.  The left ventricular ejection fraction is 35%.The   right   ventricle is normal in size and function.A device wire is seen in the   right   ventricle.The left atrial size is normal.The mitral inflowpattern is   consistent with impaired left ventricular relaxation with mildly   elevated left   atrial pressure (8-14mmHg).  Right atrial size is normal.No evidence for   any   hemodynamically significant valvular disease.The pulmonary artery   systolic   pressure is estimated to be 32 mmHg.There is no pericardial   effusion.There has   been no significant interval change when compared to the echo from 18    < end of copied text >

## 2018-05-12 NOTE — PROGRESS NOTE ADULT - PROBLEM SELECTOR PLAN 1
H/o LV thrombus confirmed with repeat Echo 5/11/18 showing 1.9x.1.8cm apical thrombus, EF 35 %  No significant interval change when compared to the echo from 1/8/18  Pt non-compliant Coumadin on arrival. Agrees to be compliant with it now and in the future.  INR sub therapeutic on arrival to 0.99 s/p Coumadin 12mg 5/10/18 and 5/11/18.   Coumadin 12mg PO tonight per Dr. Sheehan and f/u AM INR.   On Heparin gtt as per Dr. Sheehan

## 2018-05-13 DIAGNOSIS — D64.9 ANEMIA, UNSPECIFIED: ICD-10-CM

## 2018-05-13 DIAGNOSIS — Z95.0 PRESENCE OF CARDIAC PACEMAKER: ICD-10-CM

## 2018-05-13 DIAGNOSIS — K21.9 GASTRO-ESOPHAGEAL REFLUX DISEASE WITHOUT ESOPHAGITIS: ICD-10-CM

## 2018-05-13 LAB
ANION GAP SERPL CALC-SCNC: 11 MMOL/L — SIGNIFICANT CHANGE UP (ref 5–17)
APTT BLD: 55 SEC — HIGH (ref 27.5–37.4)
APTT BLD: 60 SEC — HIGH (ref 27.5–37.4)
APTT BLD: 99.6 SEC — HIGH (ref 27.5–37.4)
BUN SERPL-MCNC: 11 MG/DL — SIGNIFICANT CHANGE UP (ref 7–23)
CALCIUM SERPL-MCNC: 9.4 MG/DL — SIGNIFICANT CHANGE UP (ref 8.4–10.5)
CHLORIDE SERPL-SCNC: 99 MMOL/L — SIGNIFICANT CHANGE UP (ref 96–108)
CO2 SERPL-SCNC: 24 MMOL/L — SIGNIFICANT CHANGE UP (ref 22–31)
CREAT SERPL-MCNC: 0.99 MG/DL — SIGNIFICANT CHANGE UP (ref 0.5–1.3)
GLUCOSE SERPL-MCNC: 127 MG/DL — HIGH (ref 70–99)
HCT VFR BLD CALC: 34.4 % — LOW (ref 39–50)
HGB BLD-MCNC: 11.2 G/DL — LOW (ref 13–17)
INR BLD: 1.66 — HIGH (ref 0.88–1.16)
INR BLD: 1.9 — HIGH (ref 0.88–1.16)
MAGNESIUM SERPL-MCNC: 1.8 MG/DL — SIGNIFICANT CHANGE UP (ref 1.6–2.6)
MCHC RBC-ENTMCNC: 24 PG — LOW (ref 27–34)
MCHC RBC-ENTMCNC: 32.6 G/DL — SIGNIFICANT CHANGE UP (ref 32–36)
MCV RBC AUTO: 73.7 FL — LOW (ref 80–100)
PLATELET # BLD AUTO: 109 K/UL — LOW (ref 150–400)
POTASSIUM SERPL-MCNC: 4.5 MMOL/L — SIGNIFICANT CHANGE UP (ref 3.5–5.3)
POTASSIUM SERPL-SCNC: 4.5 MMOL/L — SIGNIFICANT CHANGE UP (ref 3.5–5.3)
PROTHROM AB SERPL-ACNC: 18.6 SEC — HIGH (ref 9.8–12.7)
PROTHROM AB SERPL-ACNC: 21.4 SEC — HIGH (ref 9.8–12.7)
RBC # BLD: 4.67 M/UL — SIGNIFICANT CHANGE UP (ref 4.2–5.8)
RBC # FLD: 18.7 % — HIGH (ref 10.3–16.9)
SODIUM SERPL-SCNC: 134 MMOL/L — LOW (ref 135–145)
WBC # BLD: 3.6 K/UL — LOW (ref 3.8–10.5)
WBC # FLD AUTO: 3.6 K/UL — LOW (ref 3.8–10.5)

## 2018-05-13 PROCEDURE — 93282 PRGRMG EVAL IMPLANTABLE DFB: CPT | Mod: 26

## 2018-05-13 RX ORDER — SODIUM FERRIC GLUCONAT/SUCROSE 62.5MG/5ML
125 AMPUL (ML) INTRAVENOUS ONCE
Qty: 0 | Refills: 0 | Status: COMPLETED | OUTPATIENT
Start: 2018-05-13 | End: 2018-05-13

## 2018-05-13 RX ORDER — WARFARIN SODIUM 2.5 MG/1
6 TABLET ORAL ONCE
Qty: 0 | Refills: 0 | Status: DISCONTINUED | OUTPATIENT
Start: 2018-05-13 | End: 2018-05-13

## 2018-05-13 RX ORDER — ARGATROBAN 50 MG/50ML
2.4 INJECTION, SOLUTION INTRAVENOUS
Qty: 50 | Refills: 0 | Status: DISCONTINUED | OUTPATIENT
Start: 2018-05-13 | End: 2018-05-13

## 2018-05-13 RX ORDER — WARFARIN SODIUM 2.5 MG/1
7 TABLET ORAL ONCE
Qty: 0 | Refills: 0 | Status: COMPLETED | OUTPATIENT
Start: 2018-05-13 | End: 2018-05-13

## 2018-05-13 RX ORDER — ARGATROBAN 50 MG/50ML
1.9 INJECTION, SOLUTION INTRAVENOUS
Qty: 50 | Refills: 0 | Status: DISCONTINUED | OUTPATIENT
Start: 2018-05-13 | End: 2018-05-13

## 2018-05-13 RX ORDER — MAGNESIUM SULFATE 500 MG/ML
1 VIAL (ML) INJECTION ONCE
Qty: 0 | Refills: 0 | Status: COMPLETED | OUTPATIENT
Start: 2018-05-13 | End: 2018-05-13

## 2018-05-13 RX ADMIN — Medication 100 MILLIGRAM(S): at 11:37

## 2018-05-13 RX ADMIN — Medication 1 MILLIGRAM(S): at 11:36

## 2018-05-13 RX ADMIN — ARGATROBAN 8.88 MICROGRAM(S)/KG/MIN: 50 INJECTION, SOLUTION INTRAVENOUS at 18:23

## 2018-05-13 RX ADMIN — Medication 1 TABLET(S): at 11:36

## 2018-05-13 RX ADMIN — Medication 50 MILLIGRAM(S): at 06:25

## 2018-05-13 RX ADMIN — Medication 100 GRAM(S): at 09:23

## 2018-05-13 RX ADMIN — Medication 50 MILLIGRAM(S): at 17:11

## 2018-05-13 RX ADMIN — Medication 1 GRAM(S): at 11:37

## 2018-05-13 RX ADMIN — ATORVASTATIN CALCIUM 40 MILLIGRAM(S): 80 TABLET, FILM COATED ORAL at 06:25

## 2018-05-13 RX ADMIN — Medication 1 GRAM(S): at 06:25

## 2018-05-13 RX ADMIN — Medication 1 GRAM(S): at 18:48

## 2018-05-13 RX ADMIN — ARGATROBAN 11.22 MICROGRAM(S)/KG/MIN: 50 INJECTION, SOLUTION INTRAVENOUS at 06:53

## 2018-05-13 RX ADMIN — Medication 55 MILLIGRAM(S): at 12:55

## 2018-05-13 RX ADMIN — Medication 1 GRAM(S): at 00:21

## 2018-05-13 RX ADMIN — LISINOPRIL 20 MILLIGRAM(S): 2.5 TABLET ORAL at 06:25

## 2018-05-13 NOTE — PROGRESS NOTE ADULT - SUBJECTIVE AND OBJECTIVE BOX
Asked to interrogate this single chamber ICD.  [employer] device, well healed incision.    No recent therapies noted  One Inappropriate device therapy in 12/2017, patient admitted at that time, for atrial fibrillation with a rapid ventricular response.  Battery good, projected longevity is 4.5 years  R waves > 25 mV.  Impedances (pacing and high voltage) normal with stable trends  Pacing threshold stable, at 1.5 volts  Programmed to VVI @ 40 ppm, VT monitor window at 170 bpm. VT therapy begins at >220 bpm and VF therapy at rates >250 bpm.    Normal device function, no recent therapies.

## 2018-05-13 NOTE — PROGRESS NOTE ADULT - PROBLEM SELECTOR PLAN 3
Platelet count 180 on admission. 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  Platelet count 120s-200s in 1/2018  Dr. Everett ([facility]) consulted as per Dr. Sheehan. Heparin Gtt stopped and argatroban gtt started; D/C PPI Platelet count 180 on admission. 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  Platelet count 120s-200s in 1/2018  Dr. Everett ([facility]) consulted as per Dr. Sheehan. Heparin Gtt stopped and argatroban gtt started; D/C PPI    Pt's last INR is 1.9, called Dr. Sheehan to discuss Coumadin dose, he referred to hematology Marguerite Rothman, called Dr. Everett on her cellphone 3 times and left messages, there was no response. Pt's last coumadin dose was 12 mg. INR with argatroban can be falsely elevated, will give 6 mg of Coumadin for now. Please discuss next dosing in am with Dr. Everett or attending. Platelet count 180 on admission. 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  Platelet count 120s-200s in 1/2018  Dr. Everett ([facility]) consulted as per Dr. Sheehan. Heparin Gtt stopped and argatroban gtt started; D/C PPI    Pt's last INR is 1.9, called Dr. Sheehan to discuss Coumadin dose, he referred to hematology Marguerite Rothman, called Dr. Everett on her cellphone 3 timesmessages, there was no response. Pt's last coumadin dose was 12 mg. As per Dr. Everett will stop Argatroban and will give Coumadin 7 mg. Platelet count 180 on admission. 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  Platelet count 120s-200s in 1/2018  Dr. Everett ([facility]) consulted as per Dr. Sheehan. Heparin Gtt stopped and argatroban gtt started, stopped again in pm as per Dr. Everett; D/C PPI    Pt's last INR is 1.9, called Dr. Sheehan to discuss Coumadin dose, he referred to hematology Marguerite Rothman. Pt's last coumadin dose was 12 mg. As per Dr. Everett will stop Argatroban and will give Coumadin 7 mg. Reaccess in am

## 2018-05-13 NOTE — PROGRESS NOTE ADULT - SUBJECTIVE AND OBJECTIVE BOX
Cardiology for Milrded  CC: Follow-up Cardiology evaluation and management of cad s/p mi and pci, systolic chf, s/p aicd, pafib, and LV thrombus    interval - now brbpr; no cp    PAST MEDICAL & SURGICAL HISTORY:  Thrombus in heart chamber: LV  ICD (implantable cardioverter-defibrillator) in place  Pacemaker  ETOH abuse  Paroxysmal a-fib  Myocardial infarction involving other coronary artery  Gastritis  Atherosclerosis of coronary artery: Coronary artery disease  Automatic implantable cardiac defibrillator in situ: ICD (implantable cardioverter-defibrillator) in place    MEDICATIONS  (STANDING):  atorvastatin 40 milliGRAM(s) Oral at bedtime  folic acid 1 milliGRAM(s) Oral daily  lisinopril 20 milliGRAM(s) Oral daily  metoprolol tartrate 50 milliGRAM(s) Oral two times a day  multivitamin 1 Tablet(s) Oral daily  sucralfate 1 Gram(s) Oral four times a day    MEDICATIONS  (PRN):  chlordiazePOXIDE 50 milliGRAM(s) Oral every 1 hour PRN Symptom-triggered: each CIWA -Ar score 8 or GREATER  ondansetron Injectable 4 milliGRAM(s) IV Push every 4 hours PRN Nausea and/or Vomiting  sucralfate suspension 1 Gram(s) Oral four times a day PRN gi upset    Vital Signs Last 24 Hrs  T(C): 36.1 (13 May 2018 22:09), Max: 36.6 (13 May 2018 08:57)  T(F): 96.9 (13 May 2018 22:09), Max: 97.8 (13 May 2018 08:57)  HR: 80 (13 May 2018 17:10) (62 - 80)  BP: 130/84 (13 May 2018 17:10) (117/66 - 130/84)  BP(mean): --  RR: 16 (13 May 2018 17:10) (16 - 16)  SpO2: 99% (13 May 2018 17:10) (97% - 99%)    PHYSICAL EXAM:  Constitutional: nad    Neck: supple    Respiratory: breath sounds present    Cardiovascular: rr; s1/s2 present    Gastrointestinal: soft abd; nd    Extremities: no edema    Vascular: warm ue and le    Neurological: grossly nonfocal    Musculoskeletal: ambulating in room    Psychiatric: appropriate    INTERPRETATION OF TELEMETRY: reviewed      I&O's Detail    12 May 2018 07:01  -  13 May 2018 07:00  --------------------------------------------------------  IN:    argatroban Infusion: 46.5 mL    Oral Fluid: 480 mL  Total IN: 526.5 mL    OUT:  Total OUT: 0 mL    Total NET: 526.5 mL      13 May 2018 07:01  -  13 May 2018 22:38  --------------------------------------------------------  IN:    argatroban Infusion: 33.6 mL    argatroban Infusion: 17.8 mL    IV PiggyBack: 100 mL    Oral Fluid: 480 mL    Solution: 110 mL  Total IN: 741.4 mL    OUT:  Total OUT: 0 mL    Total NET: 741.4 mL    LABS:                                   11.2   3.6   )-----------( 109      ( 13 May 2018 06:13 )             34.4   05-13    134<L>  |  99  |  11  ----------------------------<  127<H>  4.5   |  24  |  0.99    Ca    9.4      13 May 2018 06:16  Mg     1.8     05-13      I&O's Summary    12 May 2018 07:01  -  13 May 2018 07:00  --------------------------------------------------------  IN: 526.5 mL / OUT: 0 mL / NET: 526.5 mL    13 May 2018 07:01  -  13 May 2018 22:39  --------------------------------------------------------  IN: 741.4 mL / OUT: 0 mL / NET: 741.4 mL        BNP  RADIOLOGY & ADDITIONAL STUDIES:  < from: TTE Echo w/Cont Complete (05.11.18 @ 13:27) >  EXAM:  ECHOCARDIOGRAM W CONTRAST                          PROCEDURE DATE:  05/11/2018                        INTERPRETATION:  Patient Height: 175.0 cm  Patient Weight: 78.0 kg  Heart Rate: 67 bpm  Systolic Pressure: 117 mmHg  Diastolic Pressure: 75mmHg  BSA: 1.9 m^2  Interpretation Summary  There is mild concentric left ventricular hypertrophy.Moderate segmental   left   ventricular systolic dysfunction with akinesis of mid to distal   anteroseptum   and all apical segments. There is a large thrombus measuring 1.9 x 1.8 cm   noted in the apex.  The left ventricular ejection fraction is 35%.The   right   ventricle is normal in size and function.A device wire is seen in the   right   ventricle.The left atrial size is normal.The mitral inflowpattern is   consistent with impaired left ventricular relaxation with mildly   elevated left   atrial pressure (8-14mmHg).  Right atrial size is normal.No evidence for   any   hemodynamically significant valvular disease.The pulmonary artery   systolic   pressure is estimated to be 32 mmHg.There is no pericardial   effusion.There has   been no significant interval change when compared to the echo from 1/8/18    < end of copied text >

## 2018-05-13 NOTE — PROVIDER CONTACT NOTE (MEDICATION) - ACTION/TREATMENT ORDERED:
As per ADELAIDA Montejo, spoke with pharmacy and states unsure of correct dosage for titration, will follow up first thing in morning with cardiologist. As per ADELAIDA Montejo, spoke with pharmacy and states unsure of correct dosage for titration, will follow up first thing in morning with cardiologist. Leave dosage at current infusion rate of 9.3 cc/hr.

## 2018-05-13 NOTE — PROGRESS NOTE ADULT - ASSESSMENT
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

## 2018-05-13 NOTE — CONSULT NOTE ADULT - PROBLEM SELECTOR RECOMMENDATION 2
PPI.  (patient admits he does not take it at home but I advised him to use it daily for GI prophylaxis due to being on AC

## 2018-05-13 NOTE — PROGRESS NOTE ADULT - ASSESSMENT
1) cad s/p mi and pci  -continue rx as tolerated for now    2) chronic systolic and diastolic   -monitor     3) s/p icd - please call ep for device check    4) lv thrombus   -now with bleeding - gi following  -inr goal and ac per Hematology (Zaharia)  -holding coumadin tonight and not restarting iv ac per Zaharia    5) pafib - monitor on tele; follow-up ep recs    6) h/o gib - [facility] following    7) ckd - monitor    8) current etoh abuse - call Psychiatry    9) ppx - ppi and therapeutic ac    10) maintain K>4, Mg>2  d/w ADELAIDA Carrion

## 2018-05-13 NOTE — PROGRESS NOTE ADULT - SUBJECTIVE AND OBJECTIVE BOX
Interventional Cardiology PA Adult Progress Note    Subjective Assessment:  Pt seen and examined bedside.  Patient denies C/P, SOB, N/V, dizziness, palpitations, and diaphoresis.  Pt denies fever/chills, dysuria, abdominal pain, diarrhea, and cough.  Pt claims that he does not have any withdrawal symptoms currently.     	  MEDICATIONS:  lisinopril 20 milliGRAM(s) Oral daily  metoprolol tartrate 50 milliGRAM(s) Oral two times a day  chlordiazePOXIDE 50 milliGRAM(s) Oral every 1 hour PRN  ondansetron Injectable 4 milliGRAM(s) IV Push every 4 hours PRN  sucralfate 1 Gram(s) Oral four times a day  sucralfate suspension 1 Gram(s) Oral four times a day PRN  atorvastatin 40 milliGRAM(s) Oral at bedtime  argatroban Infusion 2.4 MICROgram(s)/kG/Min IV Continuous <Continuous>  folic acid 1 milliGRAM(s) Oral daily  multivitamin 1 Tablet(s) Oral daily  thiamine 100 milliGRAM(s) Oral daily      	    [PHYSICAL EXAM:  TELEMETRY:  T(C): 36.6 (05-13-18 @ 08:57), Max: 36.6 (05-13-18 @ 08:57)  HR: 76 (05-13-18 @ 09:35) (62 - 83)  BP: 123/80 (05-13-18 @ 09:35) (118/78 - 134/82)  RR: 16 (05-13-18 @ 09:35) (16 - 19)  SpO2: 99% (05-13-18 @ 09:35) (98% - 99%)  Wt(kg): --  I&O's Summary    12 May 2018 07:01  -  13 May 2018 07:00  --------------------------------------------------------  IN: 526.5 mL / OUT: 0 mL / NET: 526.5 mL    13 May 2018 07:01  -  13 May 2018 11:03  --------------------------------------------------------  IN: 493.6 mL / OUT: 0 mL / NET: 493.6 mL        Duffy:  Central/PICC/Mid Line:                                         Appearance: Normal	  HEENT:   Normal oral mucosa, PERRL, EOMI	  Neck: Supple, - JVD; no Carotid Bruit   Cardiovascular: Normal S1 S2, No JVD, No murmurs,   Respiratory: Lungs clear to auscultation/No Rales, Rhonchi, Wheezing	  Gastrointestinal:  Soft, Non-tender, + BS	  Skin: No rashes, No ecchymoses, No cyanosis  Extremities: Normal range of motion, No clubbing, cyanosis or edema  Vascular: Peripheral pulses palpable 2+ bilaterally  Neurologic: Non-focal  Psychiatry: A & O x 3, Mood & affect appropriate      	    ECG:  	  RADIOLOGY:   DIAGNOSTIC TESTING:  [ ] Echocardiogram:  [ ]  Catheterization:  [ ] Stress Test:    [ ] MERLYN  OTHER: 	    LABS:	 	  CARDIAC MARKERS:                                  11.2   3.6   )-----------( 109      ( 13 May 2018 06:13 )             34.4     05-13    134<L>  |  99  |  11  ----------------------------<  127<H>  4.5   |  24  |  0.99    Ca    9.4      13 May 2018 06:16  Mg     1.8     05-13      proBNP:   Lipid Profile:   HgA1c:   TSH:   PT/INR - ( 13 May 2018 06:14 )   PT: 18.6 sec;   INR: 1.66          PTT - ( 13 May 2018 06:14 )  PTT:60.0 sec    ASSESSMENT/PLAN: 	        DVT ppx:  Dispo:

## 2018-05-13 NOTE — PROVIDER CONTACT NOTE (OTHER) - BACKGROUND
63 y/o M Ex-Smoker, Drug Abuser, ETOH Abuser, w/ PMHX CAD/MI/PCI, Chronic Systolic CHF EF 35%, s/p ICD, paroxysmal Afib and LV Thromubus, h/o GI bleeding; admitted for thrombus in heart chamber.

## 2018-05-13 NOTE — PROGRESS NOTE ADULT - PROBLEM SELECTOR PLAN 1
H/o LV thrombus confirmed with repeat Echo 5/11/18 showing 1.9x.1.8cm apical thrombus, EF 35 %  No significant interval change when compared to the echo from 1/8/18  Pt non-compliant Coumadin on arrival. Agrees to be compliant with it now and in the future.  INR sub therapeutic on arrival, Coumadin daily dosed .   As per Dr. Everett Heparin Gtt stopped and argatroban gtt started H/o LV thrombus confirmed with repeat Echo 5/11/18 showing 1.9x.1.8cm apical thrombus, EF 35 %  No significant interval change when compared to the echo from 1/8/18  Pt non-compliant Coumadin on arrival. Agrees to be compliant with it now and in the future.  INR sub therapeutic on arrival, Coumadin daily dosed .   As per Dr. Everett Heparin Gtt stopped and argatroban gtt started, which was stopped today as per Dr. Everett

## 2018-05-14 LAB
ALBUMIN SERPL ELPH-MCNC: 4.1 G/DL — SIGNIFICANT CHANGE UP (ref 3.3–5)
ALP SERPL-CCNC: 57 U/L — SIGNIFICANT CHANGE UP (ref 40–120)
ALT FLD-CCNC: 37 U/L — SIGNIFICANT CHANGE UP (ref 10–45)
ANION GAP SERPL CALC-SCNC: 11 MMOL/L — SIGNIFICANT CHANGE UP (ref 5–17)
APTT BLD: 33.4 SEC — SIGNIFICANT CHANGE UP (ref 27.5–37.4)
APTT BLD: 65.8 SEC — HIGH (ref 27.5–37.4)
APTT BLD: 68.1 SEC — HIGH (ref 27.5–37.4)
APTT BLD: 70.8 SEC — HIGH (ref 27.5–37.4)
AST SERPL-CCNC: 39 U/L — SIGNIFICANT CHANGE UP (ref 10–40)
BILIRUB SERPL-MCNC: 0.3 MG/DL — SIGNIFICANT CHANGE UP (ref 0.2–1.2)
BUN SERPL-MCNC: 14 MG/DL — SIGNIFICANT CHANGE UP (ref 7–23)
CALCIUM SERPL-MCNC: 9.5 MG/DL — SIGNIFICANT CHANGE UP (ref 8.4–10.5)
CHLORIDE SERPL-SCNC: 98 MMOL/L — SIGNIFICANT CHANGE UP (ref 96–108)
CO2 SERPL-SCNC: 26 MMOL/L — SIGNIFICANT CHANGE UP (ref 22–31)
CREAT SERPL-MCNC: 1.08 MG/DL — SIGNIFICANT CHANGE UP (ref 0.5–1.3)
GLUCOSE SERPL-MCNC: 93 MG/DL — SIGNIFICANT CHANGE UP (ref 70–99)
HCT VFR BLD CALC: 36.2 % — LOW (ref 39–50)
HCT VFR BLD CALC: 36.7 % — LOW (ref 39–50)
HGB BLD-MCNC: 11.5 G/DL — LOW (ref 13–17)
HGB BLD-MCNC: 11.6 G/DL — LOW (ref 13–17)
INR BLD: 1.23 — HIGH (ref 0.88–1.16)
INR BLD: 1.5 — HIGH (ref 0.88–1.16)
INR BLD: 1.56 — HIGH (ref 0.88–1.16)
MAGNESIUM SERPL-MCNC: 1.9 MG/DL — SIGNIFICANT CHANGE UP (ref 1.6–2.6)
MCHC RBC-ENTMCNC: 23.7 PG — LOW (ref 27–34)
MCHC RBC-ENTMCNC: 24.1 PG — LOW (ref 27–34)
MCHC RBC-ENTMCNC: 31.3 G/DL — LOW (ref 32–36)
MCHC RBC-ENTMCNC: 32 G/DL — SIGNIFICANT CHANGE UP (ref 32–36)
MCV RBC AUTO: 75.3 FL — LOW (ref 80–100)
MCV RBC AUTO: 75.5 FL — LOW (ref 80–100)
PF4 HEPARIN CMPLX AB SER-ACNC: SIGNIFICANT CHANGE UP
PF4 HEPARIN CMPLX AB SERPL QL IA: POSITIVE
PLATELET # BLD AUTO: 125 K/UL — LOW (ref 150–400)
PLATELET # BLD AUTO: 138 K/UL — LOW (ref 150–400)
POTASSIUM SERPL-MCNC: 4.6 MMOL/L — SIGNIFICANT CHANGE UP (ref 3.5–5.3)
POTASSIUM SERPL-SCNC: 4.6 MMOL/L — SIGNIFICANT CHANGE UP (ref 3.5–5.3)
PROT SERPL-MCNC: 7 G/DL — SIGNIFICANT CHANGE UP (ref 6–8.3)
PROTHROM AB SERPL-ACNC: 13.7 SEC — HIGH (ref 9.8–12.7)
PROTHROM AB SERPL-ACNC: 16.8 SEC — HIGH (ref 9.8–12.7)
PROTHROM AB SERPL-ACNC: 17.5 SEC — HIGH (ref 9.8–12.7)
RBC # BLD: 4.81 M/UL — SIGNIFICANT CHANGE UP (ref 4.2–5.8)
RBC # BLD: 4.86 M/UL — SIGNIFICANT CHANGE UP (ref 4.2–5.8)
RBC # FLD: 19.7 % — HIGH (ref 10.3–16.9)
RBC # FLD: 20 % — HIGH (ref 10.3–16.9)
SODIUM SERPL-SCNC: 135 MMOL/L — SIGNIFICANT CHANGE UP (ref 135–145)
WBC # BLD: 4.3 K/UL — SIGNIFICANT CHANGE UP (ref 3.8–10.5)
WBC # BLD: 4.5 K/UL — SIGNIFICANT CHANGE UP (ref 3.8–10.5)
WBC # FLD AUTO: 4.3 K/UL — SIGNIFICANT CHANGE UP (ref 3.8–10.5)
WBC # FLD AUTO: 4.5 K/UL — SIGNIFICANT CHANGE UP (ref 3.8–10.5)

## 2018-05-14 PROCEDURE — 99223 1ST HOSP IP/OBS HIGH 75: CPT

## 2018-05-14 RX ORDER — ARGATROBAN 50 MG/50ML
1.9 INJECTION, SOLUTION INTRAVENOUS
Qty: 50 | Refills: 0 | Status: DISCONTINUED | OUTPATIENT
Start: 2018-05-14 | End: 2018-05-14

## 2018-05-14 RX ORDER — WARFARIN SODIUM 2.5 MG/1
7 TABLET ORAL ONCE
Qty: 0 | Refills: 0 | Status: COMPLETED | OUTPATIENT
Start: 2018-05-14 | End: 2018-05-14

## 2018-05-14 RX ORDER — ARGATROBAN 50 MG/50ML
2.3 INJECTION, SOLUTION INTRAVENOUS
Qty: 250 | Refills: 0 | Status: DISCONTINUED | OUTPATIENT
Start: 2018-05-14 | End: 2018-05-14

## 2018-05-14 RX ORDER — ARGATROBAN 50 MG/50ML
2.3 INJECTION, SOLUTION INTRAVENOUS
Qty: 50 | Refills: 0 | Status: DISCONTINUED | OUTPATIENT
Start: 2018-05-14 | End: 2018-05-14

## 2018-05-14 RX ORDER — ARGATROBAN 50 MG/50ML
2.7 INJECTION, SOLUTION INTRAVENOUS
Qty: 50 | Refills: 0 | Status: DISCONTINUED | OUTPATIENT
Start: 2018-05-14 | End: 2018-05-15

## 2018-05-14 RX ADMIN — ATORVASTATIN CALCIUM 40 MILLIGRAM(S): 80 TABLET, FILM COATED ORAL at 06:18

## 2018-05-14 RX ADMIN — LISINOPRIL 20 MILLIGRAM(S): 2.5 TABLET ORAL at 06:20

## 2018-05-14 RX ADMIN — Medication 50 MILLIGRAM(S): at 17:39

## 2018-05-14 RX ADMIN — ARGATROBAN 12.62 MICROGRAM(S)/KG/MIN: 50 INJECTION, SOLUTION INTRAVENOUS at 19:25

## 2018-05-14 RX ADMIN — Medication 1 GRAM(S): at 23:57

## 2018-05-14 RX ADMIN — Medication 1 GRAM(S): at 11:27

## 2018-05-14 RX ADMIN — ARGATROBAN 10.75 MICROGRAM(S)/KG/MIN: 50 INJECTION, SOLUTION INTRAVENOUS at 15:33

## 2018-05-14 RX ADMIN — Medication 1 GRAM(S): at 01:07

## 2018-05-14 RX ADMIN — Medication 1 TABLET(S): at 11:27

## 2018-05-14 RX ADMIN — WARFARIN SODIUM 7 MILLIGRAM(S): 2.5 TABLET ORAL at 22:02

## 2018-05-14 RX ADMIN — Medication 50 MILLIGRAM(S): at 06:18

## 2018-05-14 RX ADMIN — ARGATROBAN 8.88 MICROGRAM(S)/KG/MIN: 50 INJECTION, SOLUTION INTRAVENOUS at 09:53

## 2018-05-14 RX ADMIN — Medication 1 GRAM(S): at 17:39

## 2018-05-14 RX ADMIN — Medication 1 MILLIGRAM(S): at 11:27

## 2018-05-14 RX ADMIN — Medication 1 GRAM(S): at 06:18

## 2018-05-14 NOTE — PROGRESS NOTE ADULT - PROBLEM SELECTOR PLAN 1
H/o LV thrombus confirmed with repeat Echo 5/11/18 showing 1.9x.1.8cm apical thrombus, EF 35 %  No significant interval change when compared to the echo from 1/8/18  Pt non-compliant Coumadin on arrival. Agrees to be compliant with it now and in the future.  INR sub therapeutic on arrival to 0.99 s/p Coumadin 12mg 5/10/18, 5/11/18, 5/12/18.   INR 1.9 yesterday. Goal INR 1.6-1.7 per Dr. Everett  On 5/12/18 due to Thrombocytopenia/HIT AB positive, patient changed to Argatroban on 5/12/18.  Coumadin and Argatroban held overnight due to BRBPR/INR 1.9 per Dr. Sheehan.  INR 1.2 this AM. Coumadin resumed at 7mg today and Argatroban resumed per Dr. Everett/Dr. Sheehan. Dr. Canales aware. No further BRBPR

## 2018-05-14 NOTE — PROGRESS NOTE ADULT - SUBJECTIVE AND OBJECTIVE BOX
Cardiology for Mildred  CC: Follow-up Cardiology evaluation and management of cad s/p mi and pci, systolic chf, s/p aicd, pafib, and LV thrombus    interval - no further brbpr overnight; no cp    PAST MEDICAL & SURGICAL HISTORY:  Thrombus in heart chamber: LV  ICD (implantable cardioverter-defibrillator) in place  Pacemaker  ETOH abuse  Paroxysmal a-fib  Myocardial infarction involving other coronary artery  Gastritis  Atherosclerosis of coronary artery: Coronary artery disease  Automatic implantable cardiac defibrillator in situ: ICD (implantable cardioverter-defibrillator) in place    MEDICATIONS  (STANDING):  argatroban Infusion 1.9 MICROgram(s)/kG/Min (8.881 mL/Hr) IV Continuous <Continuous>  atorvastatin 40 milliGRAM(s) Oral at bedtime  folic acid 1 milliGRAM(s) Oral daily  lisinopril 20 milliGRAM(s) Oral daily  metoprolol tartrate 50 milliGRAM(s) Oral two times a day  multivitamin 1 Tablet(s) Oral daily  sucralfate 1 Gram(s) Oral four times a day  warfarin 7 milliGRAM(s) Oral once    MEDICATIONS  (PRN):  chlordiazePOXIDE 50 milliGRAM(s) Oral every 1 hour PRN Symptom-triggered: each CIWA -Ar score 8 or GREATER  ondansetron Injectable 4 milliGRAM(s) IV Push every 4 hours PRN Nausea and/or Vomiting  sucralfate suspension 1 Gram(s) Oral four times a day PRN gi upset    ICU Vital Signs Last 24 Hrs  T(C): 36.3 (14 May 2018 08:30), Max: 36.4 (14 May 2018 01:13)  T(F): 97.4 (14 May 2018 08:30), Max: 97.5 (14 May 2018 01:13)  HR: 92 (14 May 2018 08:30) (72 - 92)  BP: 12/68 (14 May 2018 08:30) (12/68 - 130/84)  BP(mean): --  ABP: --  ABP(mean): --  RR: 16 (14 May 2018 08:30) (16 - 16)  SpO2: 97% (14 May 2018 08:30) (97% - 99%)    PHYSICAL EXAM:  Constitutional: nad; conversant    Neck: no hjr    Respiratory: clear lung fields    Cardiovascular: rr; s1/s2 present; no rub    Gastrointestinal: soft abd; bowel sounds present    Extremities: no edema    Vascular: le pulses present    Neurological: conversant    Musculoskeletal: moves 4 ext    Psychiatric: appropriate    I&O's Detail    13 May 2018 07:01  -  14 May 2018 07:00  --------------------------------------------------------  IN:    argatroban Infusion: 33.6 mL    argatroban Infusion: 17.8 mL    IV PiggyBack: 100 mL    Oral Fluid: 480 mL    Solution: 110 mL  Total IN: 741.4 mL    OUT:  Total OUT: 0 mL    Total NET: 741.4 mL      14 May 2018 07:01  -  14 May 2018 10:40  --------------------------------------------------------  IN:    Oral Fluid: 180 mL  Total IN: 180 mL    OUT:  Total OUT: 0 mL    Total NET: 180 mL          LABS:                                              11.5   4.3   )-----------( 125      ( 14 May 2018 06:52 )             36.7   05-14    135  |  98  |  14  ----------------------------<  93  4.6   |  26  |  1.08    Ca    9.5      14 May 2018 06:54  Mg     1.9     05-14    TPro  7.0  /  Alb  4.1  /  TBili  0.3  /  DBili  x   /  AST  39  /  ALT  37  /  AlkPhos  57  05-14    I&O's Summary    13 May 2018 07:01  -  14 May 2018 07:00  --------------------------------------------------------  IN: 741.4 mL / OUT: 0 mL / NET: 741.4 mL    14 May 2018 07:01  -  14 May 2018 10:40  --------------------------------------------------------  IN: 180 mL / OUT: 0 mL / NET: 180 mL            RADIOLOGY & ADDITIONAL STUDIES:  < from: TTE Echo w/Cont Complete (05.11.18 @ 13:27) >  EXAM:  ECHOCARDIOGRAM W CONTRAST                          PROCEDURE DATE:  05/11/2018                        INTERPRETATION:  Patient Height: 175.0 cm  Patient Weight: 78.0 kg  Heart Rate: 67 bpm  Systolic Pressure: 117 mmHg  Diastolic Pressure: 75mmHg  BSA: 1.9 m^2  Interpretation Summary  There is mild concentric left ventricular hypertrophy.Moderate segmental   left   ventricular systolic dysfunction with akinesis of mid to distal   anteroseptum   and all apical segments. There is a large thrombus measuring 1.9 x 1.8 cm   noted in the apex.  The left ventricular ejection fraction is 35%.The   right   ventricle is normal in size and function.A device wire is seen in the   right   ventricle.The left atrial size is normal.The mitral inflowpattern is   consistent with impaired left ventricular relaxation with mildly   elevated left   atrial pressure (8-14mmHg).  Right atrial size is normal.No evidence for   any   hemodynamically significant valvular disease.The pulmonary artery   systolic   pressure is estimated to be 32 mmHg.There is no pericardial   effusion.There has   been no significant interval change when compared to the echo from 1/8/18    < end of copied text >

## 2018-05-14 NOTE — PROGRESS NOTE ADULT - SUBJECTIVE AND OBJECTIVE BOX
HPI:  This is a 63yo M former smoker and drug abuser current ETOH abuser (2+ pints vodka day) with PMHx CAD s/p MI 2003, most recent PCI /at [facility] 7/2016 (JOSHUA to mLAD with residual 50 % OM1), chronic systolic CHF (EF 35% by Echo) s/p AICD in 2009 ([employer]) , pafib and LV thrombus (on warfarin, previously on ASA and Plavix only 2/2 to lower GIB 2016), CKD Stage III (Baseline Cr normal 0.90-1.1), with frequent admissions to [facility] for ETOH use/withdrawal and 12/2017 for ICD firing. Pt’s last hospitalization at [facility] 1/6-1/26/18 for palpitations and anorexia in setting of alcohol intoxication, workup included colonoscopy without active bleeding, s/p polypectomy x 2, discharged home on warfarin 12mg daily.  He reports a subsequent hospitalization at [facility] sometime after that admission for recurrent alcohol intoxication with withdrawal symptoms, reports abstaining from alcohol from that admission until one week ago when increased family/personal stressors found him starting and then increasing his daily drinking.  Pt is unclear on time frame.    Pt presents today with epigastric pain with an episode of nausea and vomiting after one week of binge drinking.  He also notes that he stopped taking warfarin during this time period and INR on presentation is sub-therapeutic at 0.99.  He admits to last drink at 10pm on 5/9 and bilateral upper extremity tremors. Denies chest pain, SOB, near/syncope, edema, fever, chills.  He was treated with 2L IVF, IV pantoprazole and started on heparin gtt for sub therapeutic INR with known h/o LV thrombus. (11 May 2018 00:51)    FAMILY HISTORY:  No pertinent family history in first degree relatives    MEDICATIONS  (STANDING):  argatroban Infusion 1.9 MICROgram(s)/kG/Min (8.881 mL/Hr) IV Continuous <Continuous>  atorvastatin 40 milliGRAM(s) Oral at bedtime  folic acid 1 milliGRAM(s) Oral daily  lisinopril 20 milliGRAM(s) Oral daily  metoprolol tartrate 50 milliGRAM(s) Oral two times a day  multivitamin 1 Tablet(s) Oral daily  sucralfate 1 Gram(s) Oral four times a day  warfarin 7 milliGRAM(s) Oral once    MEDICATIONS  (PRN):  chlordiazePOXIDE 50 milliGRAM(s) Oral every 1 hour PRN Symptom-triggered: each CIWA -Ar score 8 or GREATER  ondansetron Injectable 4 milliGRAM(s) IV Push every 4 hours PRN Nausea and/or Vomiting  sucralfate suspension 1 Gram(s) Oral four times a day PRN gi upset    Vital Signs Last 24 Hrs  T(C): 36.3 (14 May 2018 08:30), Max: 36.4 (14 May 2018 01:13)  T(F): 97.4 (14 May 2018 08:30), Max: 97.5 (14 May 2018 01:13)  HR: 92 (14 May 2018 08:30) (72 - 92)  BP: 12/68 (14 May 2018 08:30) (12/68 - 130/84)  BP(mean): --  RR: 16 (14 May 2018 08:30) (16 - 16)  SpO2: 97% (14 May 2018 08:30) (97% - 99%)    Physical exam:    Overall impression  Lymphadenopathy  Liver  spleen    Labs:  CBC Full  -  ( 14 May 2018 06:52 )  WBC Count : 4.3 K/uL  Hemoglobin : 11.5 g/dL  Hematocrit : 36.7 %  Platelet Count - Automated : 125 K/uL  Mean Cell Volume : 75.5 fL  Mean Cell Hemoglobin : 23.7 pg  Mean Cell Hemoglobin Concentration : 31.3 g/dL  Auto Neutrophil # : x  Auto Lymphocyte # : x  Auto Monocyte # : x  Auto Eosinophil # : x  Auto Basophil # : x  Auto Neutrophil % : x  Auto Lymphocyte % : x  Auto Monocyte % : x  Auto Eosinophil % : x  Auto Basophil % : x    05-14    135  |  98  |  14  ----------------------------<  93  4.6   |  26  |  1.08    Ca    9.5      14 May 2018 06:54  Mg     1.9     05-14    TPro  7.0  /  Alb  4.1  /  TBili  0.3  /  DBili  x   /  AST  39  /  ALT  37  /  AlkPhos  57  05-14      Radiology:  HEALTH ISSUES - R/O PROBLEM Dx:  Chronic alcoholic gastritis without hemorrhage: R/O Chronic alcoholic gastritis without hemorrhage      Assessmant / Problems  1)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 7/2016 (JOSHUA to mLAD with residual 50 % OM1), chronic systolic CHF (EF 35% by Echo) s/p AICD in 2009 ([employer]) , pafib and LV thrombus (on warfarin, previously on ASA and Plavix only 2/2 to lower GIB 2016), CKD Stage III (Baseline Cr normal 0.90-1.1), with frequent admissions to [facility] for ETOH use/withdrawal and 12/2017 for ICD firing. Pt’s last hospitalization at [facility] 1/6-1/26/18 for palpitations and anorexia in setting of alcohol intoxication, workup included colonoscopy without active bleeding, s/p polypectomy x 2, discharged home on warfarin 12mg daily.  He reports a subsequent hospitalization at [facility] sometime after that admission for recurrent alcohol intoxication with withdrawal symptoms, reports abstaining from alcohol from that admission until one week ago when increased family/personal stressors found him starting and then increasing his daily drinking.  Pt is unclear on time frame.    Pt presents today with epigastric pain with an episode of nausea and vomiting after one week of binge drinking.  He also notes that he stopped taking warfarin during this time period and INR on presentation is sub-therapeutic at 0.99.  He admits to last drink at 10pm on 5/9 and bilateral upper extremity tremors. Denies chest pain, SOB, near/syncope, edema, fever, chills.  He was treated with 2L IVF, IV pantoprazole and started on heparin gtt for sub therapeutic INR with known h/o LV thrombus. (11 May 2018 00:51)    FAMILY HISTORY:  No pertinent family history in first degree relatives    MEDICATIONS  (STANDING):  argatroban Infusion 1.9 MICROgram(s)/kG/Min (8.881 mL/Hr) IV Continuous <Continuous>  atorvastatin 40 milliGRAM(s) Oral at bedtime  folic acid 1 milliGRAM(s) Oral daily  lisinopril 20 milliGRAM(s) Oral daily  metoprolol tartrate 50 milliGRAM(s) Oral two times a day  multivitamin 1 Tablet(s) Oral daily  sucralfate 1 Gram(s) Oral four times a day  warfarin 7 milliGRAM(s) Oral once    MEDICATIONS  (PRN):  chlordiazePOXIDE 50 milliGRAM(s) Oral every 1 hour PRN Symptom-triggered: each CIWA -Ar score 8 or GREATER  ondansetron Injectable 4 milliGRAM(s) IV Push every 4 hours PRN Nausea and/or Vomiting  sucralfate suspension 1 Gram(s) Oral four times a day PRN gi upset    Vital Signs Last 24 Hrs  T(C): 36.3 (14 May 2018 08:30), Max: 36.4 (14 May 2018 01:13)  T(F): 97.4 (14 May 2018 08:30), Max: 97.5 (14 May 2018 01:13)  HR: 92 (14 May 2018 08:30) (72 - 92)  BP: 12/68 (14 May 2018 08:30) (12/68 - 130/84)  BP(mean): --  RR: 16 (14 May 2018 08:30) (16 - 16)  SpO2: 97% (14 May 2018 08:30) (97% - 99%)    Physical exam:    Overall impression  Lymphadenopathy  Liver  spleen    Labs:  CBC Full  -  ( 14 May 2018 06:52 )  WBC Count : 4.3 K/uL  Hemoglobin : 11.5 g/dL  Hematocrit : 36.7 %  Platelet Count - Automated : 125 K/uL  Mean Cell Volume : 75.5 fL  Mean Cell Hemoglobin : 23.7 pg  Mean Cell Hemoglobin Concentration : 31.3 g/dL  Auto Neutrophil # : x  Auto Lymphocyte # : x  Auto Monocyte # : x  Auto Eosinophil # : x  Auto Basophil # : x  Auto Neutrophil % : x  Auto Lymphocyte % : x  Auto Monocyte % : x  Auto Eosinophil % : x  Auto Basophil % : x    05-14    135  |  98  |  14  ----------------------------<  93  4.6   |  26  |  1.08    Ca    9.5      14 May 2018 06:54  Mg     1.9     05-14    TPro  7.0  /  Alb  4.1  /  TBili  0.3  /  DBili  x   /  AST  39  /  ALT  37  /  AlkPhos  57  05-14      Radiology:  HEALTH ISSUES - R/O PROBLEM Dx:  Chronic alcoholic gastritis without hemorrhage: R/O Chronic alcoholic gastritis without hemorrhage      Assessmant / Problems  1)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

## 2018-05-14 NOTE — PROGRESS NOTE ADULT - ASSESSMENT
61 y/o M Ex-Smoker, Drug Abuser, ETOH Abuser, w/ PMHX CAD/MI/PCI, Chronic Systolic CHF EF 35%, s/p ICD, paroxysmal Afib and LV Thromubus, h/o GI bleeding, frequent admissions for ETOH intoxication/withdrawal presents after binge drinking for 1 week, noted to have sub-therapeutic INR (INR 0.9)

## 2018-05-14 NOTE — PROGRESS NOTE ADULT - SUBJECTIVE AND OBJECTIVE BOX
S: Pt seen and examined bedside.  Pt had BRBPR overnight, resolved. Denies melena  Patient denies C/P, SOB, N/V, dizziness, palpitations, and diaphoresis.  Pt denies fever/chills, dysuria, abdominal pain, diarrhea, and cough.    O: Vital Signs Last 24 Hrs  T(C): 36.3 (14 May 2018 08:30), Max: 36.4 (14 May 2018 01:13)  T(F): 97.4 (14 May 2018 08:30), Max: 97.5 (14 May 2018 01:13)  HR: 81 (14 May 2018 12:03) (72 - 92)  BP: 108/73 (14 May 2018 12:03) (12/68 - 130/84)  RR: 16 (14 May 2018 12:03) (16 - 16)  SpO2: 98% (14 May 2018 12:03) (97% - 99%)    PHYSICAL EXAM:  GEN: NAD  PULM:  CTA B/L  CARD: No JVD B/L, RRR, S1 and S2   ABD: +BS, NT, soft/ND	  EXT: No Edema B/L LE  NEURO: A+Ox3, no focal deficit    LABS:                        11.5   4.3   )-----------( 125      ( 14 May 2018 06:52 )             36.7         135  |  98  |  14  ----------------------------<  93  4.6   |  26  |  1.08    Ca    9.5      14 May 2018 06:54  Mg     1.9     , repleted Magnesium 400mg PO    TPro  7.0  /  Alb  4.1  /  TBili  0.3  /  DBili  x   /  AST  39  /  ALT  37  /  AlkPhos  57      PT/INR - ( 14 May 2018 06:53 )   PT: 13.7 sec;   INR: 1.23       PTT - ( 14 May 2018 06:53 )  PTT:33.4 sec     @ 07:01  -   @ 07:00  --------------------------------------------------------  IN: 741.4 mL / OUT: 0 mL / NET: 741.4 mL     @ 07:01  -   @ 12:17  --------------------------------------------------------  IN: 206.7 mL / OUT: 0 mL / NET: 206.7 mL    Daily Weight in k (14 May 2018 06:15)

## 2018-05-14 NOTE — PROGRESS NOTE ADULT - SUBJECTIVE AND OBJECTIVE BOX
HISTORY OF PRESENT ILLNESS:   63yo M former smoker and drug abuser current ETOH abuser (2+ pints vodka day) with PMHx CAD s/p MI 2003, most recent PCI /at [facility] 7/2016 (JOSHUA to mLAD with residual 50 % OM1), chronic systolic CHF (EF 35% by Echo) s/p AICD in 2009 ([employer]) , pafib and LV thrombus (on warfarin, previously on ASA and Plavix only 2/2 to lower GIB 2016), CKD Stage III (Baseline Cr normal 0.90-1.1), with frequent admissions to [facility] for ETOH use/withdrawal and 12/2017 for ICD firing. Pt’s last hospitalization at [facility] 1/6-1/26/18 for palpitations and anorexia in setting of alcohol intoxication, workup included colonoscopy without active bleeding, s/p polypectomy x 2, discharged home on warfarin 12mg daily.  He reports a subsequent hospitalization at [facility] sometime after that admission for recurrent alcohol intoxication with withdrawal symptoms, reports abstaining from alcohol from that admission until one week ago when increased family/personal stressors found him starting and then increasing his daily drinking.  Pt is unclear on time frame.    Pt presents today with epigastric pain with an episode of nausea and vomiting after one week of binge drinking.  He also notes that he stopped taking warfarin during this time period and INR on presentation is sub-therapeutic at 0.99.  He admits to last drink at 10pm on 5/9 and bilateral upper extremity tremors. Denies chest pain, SOB, near/syncope, edema, fever, chills.  He was treated with 2L IVF, IV pantoprazole and started on heparin gtt for sub therapeutic INR with known h/o LV thrombus. (11 May 2018 00:51)    His [employer] dual chamber ICD was interrogated by Dr. Ramos this weekend. Normal function. No further ICD shocks.    Today, EPS is called to assess his HR as primary team notes that his HR goes up to ~ 140 bpm when the patient walks.  Pt is not aware of palpitations when he walks and denies LH / dizziness / CP.  He is currently feeling fine. He states that his HR used to be fast in the past under similar circumstances (ie after dehydration / binge drinking).         PAST MEDICAL AND SURGICAL HISTORY:  PAST MEDICAL & SURGICAL HISTORY:  Thrombus in heart chamber: LV  ICD (implantable cardioverter-defibrillator) in place  Pacemaker  ETOH abuse  Paroxysmal a-fib  Myocardial infarction involving other coronary artery  Gastritis  Atherosclerosis of coronary artery: Coronary artery disease  Automatic implantable cardiac defibrillator in situ: ICD (implantable cardioverter-defibrillator) in place      FAMILY HISTORY: FAMILY HISTORY:  No pertinent family history in first degree relatives      SOCIAL HISTORY:   Denies ETOH  Denies TOB  Denies illicit drugs    REVIEW OF SYSTEM: REVIEW OF SYSTEMS:    CONSTITUTIONAL: No weakness, fevers or chills  EYES/ENT: No visual changes;  No vertigo or throat pain   NECK: No pain or stiffness  RESPIRATORY: No cough, wheezing, hemoptysis; No shortness of breath  CARDIOVASCULAR: See HPI   GASTROINTESTINAL: Denies n/v/d currently. Denies blood in stool.   GENITOURINARY: No dysuria, frequency or hematuria  MUSCULOSKELETAL: denies joint pain or swelling  NEUROLOGICAL: No numbness or weakness  SKIN: No itching, burning, rashes, or lesions   PSYCH: anxious and depressed sometimes. Denies suicidal ideation.   All other review of systems is negative unless indicated above.    ALLERGY:  Capoten (Short breath; Rash; Hives)  penicillin (Short breath; Rash; Hives)      INPATIENT MEDICATIONS:  argatroban Infusion 2.7 MICROgram(s)/kG/Min IV Continuous <Continuous>  atorvastatin 40 milliGRAM(s) Oral at bedtime  chlordiazePOXIDE 50 milliGRAM(s) Oral every 1 hour PRN  folic acid 1 milliGRAM(s) Oral daily  lisinopril 20 milliGRAM(s) Oral daily  metoprolol tartrate 50 milliGRAM(s) Oral two times a day  multivitamin 1 Tablet(s) Oral daily  ondansetron Injectable 4 milliGRAM(s) IV Push every 4 hours PRN  sucralfate 1 Gram(s) Oral four times a day  sucralfate suspension 1 Gram(s) Oral four times a day PRN  warfarin 7 milliGRAM(s) Oral once      VITAL SIGNS:   T(C): 36.4 (05-14-18 @ 18:07), Max: 36.4 (05-14-18 @ 01:13)  HR: 100 (05-14-18 @ 17:00) (74 - 140)  BP: 119/70 (05-14-18 @ 17:00) (12/68 - 128/69)  RR: 16 (05-14-18 @ 17:00) (16 - 16)  SpO2: 95% (05-14-18 @ 17:00) (95% - 99%)  Wt(kg): --    PHYSICAL EXAM:   Appearance: NAD. Anxious   CVS: S1/S2 normal, RRR, no murmur. left subclavicular ICD site without infection or skin erosion.   Pulm: CTA bilaterally  Abd:  +BS, Soft, NT/ND	  Ext: No LE edema  Neuro: AAO x 3. No focal deficit.     LABS:                        11.5   4.3   )-----------( 125      ( 14 May 2018 06:52 )             36.7     05-14    135  |  98  |  14  ----------------------------<  93  4.6   |  26  |  1.08    Ca    9.5      14 May 2018 06:54  Mg     1.9     05-14    TPro  7.0  /  Alb  4.1  /  TBili  0.3  /  DBili  x   /  AST  39  /  ALT  37  /  AlkPhos  57  05-14    PT/INR - ( 14 May 2018 17:47 )   PT: 17.5 sec;   INR: 1.56          PTT - ( 14 May 2018 17:47 )  PTT:65.8 sec   LIVER FUNCTIONS - ( 14 May 2018 06:54 )  Alb: 4.1 g/dL / Pro: 7.0 g/dL / ALK PHOS: 57 U/L / ALT: 37 U/L / AST: 39 U/L / GGT: x             EKG:     Telemetry: sinus rhythm 70s bpm. Intermittent sinus tachy to 140 bpm (corresponding to ambulation). No afib / AT noted.  No ventricular arrhythmia noted here.     ECHO: < from: TTE Echo w/Cont Complete (05.11.18 @ 13:27) >  There is mild concentric left ventricular hypertrophy.Moderate segmental   left   ventricular systolic dysfunction with akinesis of mid to distal   anteroseptum   and all apical segments. There is a large thrombus measuring 1.9 x 1.8 cm   noted in the apex.  The left ventricular ejection fraction is 35%.The   right   ventricle is normal in size and function.A device wire is seen in the   right   ventricle.The left atrial size is normal.The mitral inflowpattern is   consistent with impaired left ventricular relaxation with mildly   elevated left   atrial pressure (8-14mmHg).  Right atrial size is normal.No evidence for   any   hemodynamically significant valvular disease.The pulmonary artery   systolic   pressure is estimated to be 32 mmHg.There is no pericardial   effusion.There has   been no significant interval change when compared to the echo from 1/8/18    < end of copied text > CONSULT NOTE:     HISTORY OF PRESENT ILLNESS:   63yo M former smoker and drug abuser current ETOH abuser (2+ pints vodk day) with PMHx CAD s/p MI , most recent PCI /at [facility] 2016 (JOSHUA to mLAD with residual 50 % OM1), chronic systolic CHF (EF 35% by Echo) s/p AICD in  ([employer]) , pafib and LV thrombus (on warfarin, previously on ASA and Plavix only  to lower GIB ), CKD Stage III (Baseline Cr normal 0.90-1.1), with frequent admissions to [facility] for ETOH use/withdrawal and 2017 for ICD firing. Pt’s last hospitalization at [facility] -18 for palpitations and anorexia in setting of alcohol intoxication, workup included colonoscopy without active bleeding, s/p polypectomy x 2, discharged home on warfarin 12mg daily.  He reports a subsequent hospitalization at [facility] sometime after that admission for recurrent alcohol intoxication with withdrawal symptoms, reports abstaining from alcohol from that admission until one week ago when increased family/personal stressors found him starting and then increasing his daily drinking.  Pt is unclear on time frame.    Pt presents today with epigastric pain with an episode of nausea and vomiting after one week of binge drinking.  He also notes that he stopped taking warfarin during this time period and INR on presentation is sub-therapeutic at 0.99.  He admits to last drink at 10pm on  and bilateral upper extremity tremors. Denies chest pain, SOB, near/syncope, edema, fever, chills.  He was treated with 2L IVF, IV pantoprazole and started on heparin gtt for sub therapeutic INR with known h/o LV thrombus. (11 May 2018 00:51)    His [employer] dual chamber ICD was interrogated by Dr. Ramos this weekend. Normal function. No further ICD shocks.    Today, EPS is called to assess his HR as primary team notes that his HR goes up to ~ 140 bpm when the patient walks.  Pt is not aware of palpitations when he walks and denies LH / dizziness / CP.  He is currently feeling fine. He states that his HR used to be fast in the past under similar circumstances (ie after dehydration / binge drinking).         PAST MEDICAL AND SURGICAL HISTORY:  PAST MEDICAL & SURGICAL HISTORY:  Thrombus in heart chamber: LV  ICD (implantable cardioverter-defibrillator) in place  Pacemaker  ETOH abuse  Paroxysmal a-fib  Myocardial infarction involving other coronary artery  Gastritis  Atherosclerosis of coronary artery: Coronary artery disease  Automatic implantable cardiac defibrillator in situ: ICD (implantable cardioverter-defibrillator) in place      FAMILY HISTORY: FAMILY HISTORY:  No pertinent family history in first degree relatives      SOCIAL HISTORY:   Denies ETOH  Denies TOB  Denies illicit drugs    REVIEW OF SYSTEM: REVIEW OF SYSTEMS:    CONSTITUTIONAL: No weakness, fevers or chills  EYES/ENT: No visual changes;  No vertigo or throat pain   NECK: No pain or stiffness  RESPIRATORY: No cough, wheezing, hemoptysis; No shortness of breath  CARDIOVASCULAR: See HPI   GASTROINTESTINAL: Denies n/v/d currently. Denies blood in stool.   GENITOURINARY: No dysuria, frequency or hematuria  MUSCULOSKELETAL: denies joint pain or swelling  NEUROLOGICAL: No numbness or weakness  SKIN: No itching, burning, rashes, or lesions   PSYCH: anxious and depressed sometimes. Denies suicidal ideation.   All other review of systems is negative unless indicated above.    ALLERGY:  Capoten (Short breath; Rash; Hives)  penicillin (Short breath; Rash; Hives)      INPATIENT MEDICATIONS:  argatroban Infusion 2.7 MICROgram(s)/kG/Min IV Continuous <Continuous>  atorvastatin 40 milliGRAM(s) Oral at bedtime  chlordiazePOXIDE 50 milliGRAM(s) Oral every 1 hour PRN  folic acid 1 milliGRAM(s) Oral daily  lisinopril 20 milliGRAM(s) Oral daily  metoprolol tartrate 50 milliGRAM(s) Oral two times a day  multivitamin 1 Tablet(s) Oral daily  ondansetron Injectable 4 milliGRAM(s) IV Push every 4 hours PRN  sucralfate 1 Gram(s) Oral four times a day  sucralfate suspension 1 Gram(s) Oral four times a day PRN  warfarin 7 milliGRAM(s) Oral once      VITAL SIGNS:   T(C): 36.4 (18 @ 18:07), Max: 36.4 (18 @ 01:13)  HR: 100 (18 @ 17:00) (74 - 140)  BP: 119/70 (18 @ 17:00) (12/68 - 128/69)  RR: 16 (18 @ 17:00) (16 - 16)  SpO2: 95% (-18 @ 17:00) (95% - 99%)  Wt(kg): --    PHYSICAL EXAM:   Appearance: NAD. Anxious   CVS: S1/S2 normal, RRR, no murmur. left subclavicular ICD site without infection or skin erosion.   Pulm: CTA bilaterally  Abd:  +BS, Soft, NT/ND	  Ext: No LE edema  Neuro: AAO x 3. No focal deficit.     LABS:                        11.5   4.3   )-----------( 125      ( 14 May 2018 06:52 )             36.7         135  |  98  |  14  ----------------------------<  93  4.6   |  26  |  1.08    Ca    9.5      14 May 2018 06:54  Mg     1.9         TPro  7.0  /  Alb  4.1  /  TBili  0.3  /  DBili  x   /  AST  39  /  ALT  37  /  AlkPhos  57      PT/INR - ( 14 May 2018 17:47 )   PT: 17.5 sec;   INR: 1.56          PTT - ( 14 May 2018 17:47 )  PTT:65.8 sec   LIVER FUNCTIONS - ( 14 May 2018 06:54 )  Alb: 4.1 g/dL / Pro: 7.0 g/dL / ALK PHOS: 57 U/L / ALT: 37 U/L / AST: 39 U/L / GGT: x             EK/10/17: nsr 72 bpm. Poor R in precordial chest leads. Normal QRS 88ms. Normal QTc.     Telemetry: sinus rhythm 70s bpm. Intermittent sinus tachy to 140 bpm (corresponding to ambulation). No afib / AT noted.  No ventricular arrhythmia noted here.     ECHO: < from: TTE Echo w/Cont Complete (18 @ 13:27) >  There is mild concentric left ventricular hypertrophy.Moderate segmental   left   ventricular systolic dysfunction with akinesis of mid to distal   anteroseptum   and all apical segments. There is a large thrombus measuring 1.9 x 1.8 cm   noted in the apex.  The left ventricular ejection fraction is 35%.The   right   ventricle is normal in size and function.A device wire is seen in the   right   ventricle.The left atrial size is normal.The mitral inflowpattern is   consistent with impaired left ventricular relaxation with mildly   elevated left   atrial pressure (8-14mmHg).  Right atrial size is normal.No evidence for   any   hemodynamically significant valvular disease.The pulmonary artery   systolic   pressure is estimated to be 32 mmHg.There is no pericardial   effusion.There has   been no significant interval change when compared to the echo from 18    < end of copied text >

## 2018-05-14 NOTE — PROGRESS NOTE ADULT - PROBLEM SELECTOR PLAN 3
Dr. Everett ([facility]) consulted as per Dr. Sheehan.   Platelet count 180 on admission. Platelet count currently decreased to 96 and now 125  HIT AB Positive. Off Heparin on Argatroban   Platelet count 120s-200s in 1/2018  No PPI as per Dr. Everett due to thrombocytopenia

## 2018-05-14 NOTE — PROGRESS NOTE ADULT - ASSESSMENT
63yo M former smoker and drug abuser current ETOH abuser (2+ pints vodka day) with PMHx CAD s/p MI 2003, most recent PCI /at [facility] 7/2016 (JOSHUA to mLAD with residual 50 % OM1), chronic systolic CHF (EF 35% by Echo) s/p AICD in 2009 ([employer]) , pafib and LV thrombus (on warfarin, previously on ASA and Plavix only 2/2 to lower GIB 2016), CKD Stage III (Baseline Cr normal 0.90-1.1), with frequent admissions to [facility] for ETOH use/withdrawal and 12/2017 for ICD firing.  Normal ICD check this admission without further ICD shocks.   - Sinus tachy as noted on telemetry when pt gets up to walk. Suspect this is related to his binge drinking and dehydrated state as this has happened in the past.  Gentle hydration as no signs of clinical heart failure. Again, pt is told to cut back on Etoh / be abstinent from etoh use.  No further EP intervention.

## 2018-05-14 NOTE — PROGRESS NOTE ADULT - ASSESSMENT
1) cad s/p mi and pci  -no cp    2) chronic systolic and diastolic   -continue rx    3) s/p icd - ep for device check    4) lv thrombus   -inr goal per Hematology; agatroban until therapeutic per Hematology  -rectal bleeding per gi (now guiac positive per PA and patient)    5) pafib - monitor    6) h/o gib - per gi    7) ckd - monitor cr and uop    8) current etoh abuse - consult Psychiatry    9) ppx - ppi and therapeutic ac    10)  K>4, Mg>2  d/w PA

## 2018-05-15 DIAGNOSIS — I48.0 PAROXYSMAL ATRIAL FIBRILLATION: ICD-10-CM

## 2018-05-15 LAB
ANION GAP SERPL CALC-SCNC: 12 MMOL/L — SIGNIFICANT CHANGE UP (ref 5–17)
APTT BLD: 65.8 SEC — HIGH (ref 27.5–37.4)
APTT BLD: 70.6 SEC — HIGH (ref 27.5–37.4)
APTT BLD: 70.6 SEC — HIGH (ref 27.5–37.4)
APTT BLD: 83.1 SEC — HIGH (ref 27.5–37.4)
BUN SERPL-MCNC: 17 MG/DL — SIGNIFICANT CHANGE UP (ref 7–23)
CALCIUM SERPL-MCNC: 9.3 MG/DL — SIGNIFICANT CHANGE UP (ref 8.4–10.5)
CHLORIDE SERPL-SCNC: 99 MMOL/L — SIGNIFICANT CHANGE UP (ref 96–108)
CO2 SERPL-SCNC: 24 MMOL/L — SIGNIFICANT CHANGE UP (ref 22–31)
CREAT SERPL-MCNC: 1.07 MG/DL — SIGNIFICANT CHANGE UP (ref 0.5–1.3)
GLUCOSE SERPL-MCNC: 100 MG/DL — HIGH (ref 70–99)
HCT VFR BLD CALC: 36 % — LOW (ref 39–50)
HGB BLD-MCNC: 11.5 G/DL — LOW (ref 13–17)
INR BLD: 1.82 — HIGH (ref 0.88–1.16)
MAGNESIUM SERPL-MCNC: 2.1 MG/DL — SIGNIFICANT CHANGE UP (ref 1.6–2.6)
MCHC RBC-ENTMCNC: 23.9 PG — LOW (ref 27–34)
MCHC RBC-ENTMCNC: 31.9 G/DL — LOW (ref 32–36)
MCV RBC AUTO: 74.7 FL — LOW (ref 80–100)
PLATELET # BLD AUTO: 130 K/UL — LOW (ref 150–400)
POTASSIUM SERPL-MCNC: 4.4 MMOL/L — SIGNIFICANT CHANGE UP (ref 3.5–5.3)
POTASSIUM SERPL-SCNC: 4.4 MMOL/L — SIGNIFICANT CHANGE UP (ref 3.5–5.3)
PROTHROM AB SERPL-ACNC: 20.5 SEC — HIGH (ref 9.8–12.7)
RBC # BLD: 4.82 M/UL — SIGNIFICANT CHANGE UP (ref 4.2–5.8)
RBC # FLD: 19.9 % — HIGH (ref 10.3–16.9)
SODIUM SERPL-SCNC: 135 MMOL/L — SIGNIFICANT CHANGE UP (ref 135–145)
WBC # BLD: 5 K/UL — SIGNIFICANT CHANGE UP (ref 3.8–10.5)
WBC # FLD AUTO: 5 K/UL — SIGNIFICANT CHANGE UP (ref 3.8–10.5)

## 2018-05-15 RX ORDER — WARFARIN SODIUM 2.5 MG/1
6 TABLET ORAL ONCE
Qty: 0 | Refills: 0 | Status: COMPLETED | OUTPATIENT
Start: 2018-05-15 | End: 2018-05-15

## 2018-05-15 RX ORDER — ARGATROBAN 50 MG/50ML
3 INJECTION, SOLUTION INTRAVENOUS
Qty: 50 | Refills: 0 | Status: DISCONTINUED | OUTPATIENT
Start: 2018-05-15 | End: 2018-05-15

## 2018-05-15 RX ORDER — SODIUM FERRIC GLUCONAT/SUCROSE 62.5MG/5ML
125 AMPUL (ML) INTRAVENOUS ONCE
Qty: 0 | Refills: 0 | Status: COMPLETED | OUTPATIENT
Start: 2018-05-15 | End: 2018-05-15

## 2018-05-15 RX ADMIN — Medication 1 GRAM(S): at 22:36

## 2018-05-15 RX ADMIN — LISINOPRIL 20 MILLIGRAM(S): 2.5 TABLET ORAL at 06:18

## 2018-05-15 RX ADMIN — ATORVASTATIN CALCIUM 40 MILLIGRAM(S): 80 TABLET, FILM COATED ORAL at 06:18

## 2018-05-15 RX ADMIN — Medication 50 MILLIGRAM(S): at 17:13

## 2018-05-15 RX ADMIN — Medication 1 TABLET(S): at 11:02

## 2018-05-15 RX ADMIN — Medication 50 MILLIGRAM(S): at 06:18

## 2018-05-15 RX ADMIN — WARFARIN SODIUM 6 MILLIGRAM(S): 2.5 TABLET ORAL at 22:37

## 2018-05-15 RX ADMIN — Medication 1 GRAM(S): at 06:18

## 2018-05-15 RX ADMIN — Medication 1 MILLIGRAM(S): at 11:02

## 2018-05-15 RX ADMIN — ARGATROBAN 14.02 MICROGRAM(S)/KG/MIN: 50 INJECTION, SOLUTION INTRAVENOUS at 03:42

## 2018-05-15 RX ADMIN — Medication 110 MILLIGRAM(S): at 17:13

## 2018-05-15 RX ADMIN — Medication 1 GRAM(S): at 11:03

## 2018-05-15 RX ADMIN — Medication 1 GRAM(S): at 17:13

## 2018-05-15 NOTE — PROGRESS NOTE ADULT - SUBJECTIVE AND OBJECTIVE BOX
Pt seen and examined   no complaints    REVIEW OF SYSTEMS:  Constitutional: No fever, weight loss or fatigue  Cardiovascular: No chest pain, palpitations, dizziness or leg swelling  Gastrointestinal: No abdominal or epigastric pain. No nausea, vomiting or hematemesis; No diarrhea or constipation. No melena or hematochezia.  Skin: No itching, burning, rashes or lesions       MEDICATIONS:  MEDICATIONS  (STANDING):  atorvastatin 40 milliGRAM(s) Oral at bedtime  folic acid 1 milliGRAM(s) Oral daily  lisinopril 20 milliGRAM(s) Oral daily  metoprolol tartrate 50 milliGRAM(s) Oral two times a day  multivitamin 1 Tablet(s) Oral daily  sucralfate 1 Gram(s) Oral four times a day    MEDICATIONS  (PRN):  chlordiazePOXIDE 50 milliGRAM(s) Oral every 1 hour PRN Symptom-triggered: each CIWA -Ar score 8 or GREATER  ondansetron Injectable 4 milliGRAM(s) IV Push every 4 hours PRN Nausea and/or Vomiting  sucralfate suspension 1 Gram(s) Oral four times a day PRN gi upset      Allergies    Capoten (Short breath; Rash; Hives)  penicillin (Short breath; Rash; Hives)    Intolerances        Vital Signs Last 24 Hrs  T(C): 36.5 (15 May 2018 09:14), Max: 36.7 (15 May 2018 05:10)  T(F): 97.7 (15 May 2018 09:14), Max: 98 (15 May 2018 05:10)  HR: 81 (15 May 2018 08:30) (81 - 140)  BP: 114/62 (15 May 2018 08:30) (102/59 - 122/76)  BP(mean): --  RR: 16 (15 May 2018 08:30) (16 - 16)  SpO2: 97% (15 May 2018 08:30) (95% - 99%)    05-14 @ 07:01  -  05-15 @ 07:00  --------------------------------------------------------  IN: 939.9 mL / OUT: 1375 mL / NET: -435.1 mL    05-15 @ 07:01  -  05-15 @ 09:35  --------------------------------------------------------  IN: 240 mL / OUT: 0 mL / NET: 240 mL        PHYSICAL EXAM:    General: Well developed; well nourished; in no acute distress  HEENT: MMM, conjunctiva and sclera clear  Gastrointestinal: Soft non-tender non-distended; Normal bowel sounds; No hepatosplenomegaly  Skin: Warm and dry. No obvious rash    LABS:      CBC Full  -  ( 15 May 2018 06:10 )  WBC Count : 5.0 K/uL  Hemoglobin : 11.5 g/dL  Hematocrit : 36.0 %  Platelet Count - Automated : 130 K/uL  Mean Cell Volume : 74.7 fL  Mean Cell Hemoglobin : 23.9 pg  Mean Cell Hemoglobin Concentration : 31.9 g/dL  Auto Neutrophil # : x  Auto Lymphocyte # : x  Auto Monocyte # : x  Auto Eosinophil # : x  Auto Basophil # : x  Auto Neutrophil % : x  Auto Lymphocyte % : x  Auto Monocyte % : x  Auto Eosinophil % : x  Auto Basophil % : x    05-15    135  |  99  |  17  ----------------------------<  100<H>  4.4   |  24  |  1.07    Ca    9.3      15 May 2018 06:53  Mg     2.1     05-15    TPro  7.0  /  Alb  4.1  /  TBili  0.3  /  DBili  x   /  AST  39  /  ALT  37  /  AlkPhos  57  05-14    PT/INR - ( 15 May 2018 06:10 )   PT: 20.5 sec;   INR: 1.82          PTT - ( 15 May 2018 06:10 )  PTT:70.6 sec                  RADIOLOGY & ADDITIONAL STUDIES (The following images were personally reviewed):

## 2018-05-15 NOTE — DIETITIAN INITIAL EVALUATION ADULT. - PROBLEM SELECTOR PLAN 2
Pt c/o epigastric pain/heartburn sensation i/s/o alcohol binge drinking x 1 week.  Pt with h/o multiple admission for same.  Start pantoprazole 20mg bid, carafate prn.

## 2018-05-15 NOTE — PROGRESS NOTE ADULT - ASSESSMENT
1) cad s/p mi and pci  -continue rx as tolerated    2) chronic systolic and diastolic   -i/o and daily weights    3) s/p icd - appreciate ep input; rec ep follow-up at discharge    4) lv thrombus   -ac plan per Hematology    5) pafib - monitor; ep follow-up at discharge    6) h/o gib - per gi; no further rectal bleeding    7) ckd - monitor    8) current etoh abuse - consult Psychiatry now    9) ppx - ppi and therapeutic ac    10)  K>4, Mg>2  d/w ADELAIDA Carrion

## 2018-05-15 NOTE — DIETITIAN INITIAL EVALUATION ADULT. - NS AS NUTRI INTERV ED CONTENT
Nutrition relationship to health/disease/Recommended modifications/Priority modifications/dash/tlc  diet/healthy lifestyle changes

## 2018-05-15 NOTE — DIETITIAN INITIAL EVALUATION ADULT. - PROBLEM SELECTOR PLAN 1
Last drink approx. 24 hours prior to arrival to floor, +tremulousness , started on CIWA protocol symptom-triggered librium.  Pt with multiple prior admission for same.  Will continue to monitor and taper librium dosing as tolerated.  Continue folate, MVI, thiamine.

## 2018-05-15 NOTE — PROGRESS NOTE ADULT - SUBJECTIVE AND OBJECTIVE BOX
Cardiology for Mildred  CC: Follow-up Cardiology evaluation and management of cad s/p mi and pci, systolic chf, s/p aicd, pafib, and LV thrombus    interval - no new complaints; no cp    PAST MEDICAL & SURGICAL HISTORY:  Thrombus in heart chamber: LV  ICD (implantable cardioverter-defibrillator) in place  Pacemaker  ETOH abuse  Paroxysmal a-fib  Myocardial infarction involving other coronary artery  Gastritis  Atherosclerosis of coronary artery: Coronary artery disease  Automatic implantable cardiac defibrillator in situ: ICD (implantable cardioverter-defibrillator) in place    MEDICATIONS  (STANDING):  atorvastatin 40 milliGRAM(s) Oral at bedtime  folic acid 1 milliGRAM(s) Oral daily  lisinopril 20 milliGRAM(s) Oral daily  metoprolol tartrate 50 milliGRAM(s) Oral two times a day  multivitamin 1 Tablet(s) Oral daily  sucralfate 1 Gram(s) Oral four times a day  warfarin 6 milliGRAM(s) Oral once    MEDICATIONS  (PRN):  chlordiazePOXIDE 50 milliGRAM(s) Oral every 1 hour PRN Symptom-triggered: each CIWA -Ar score 8 or GREATER  ondansetron Injectable 4 milliGRAM(s) IV Push every 4 hours PRN Nausea and/or Vomiting  sucralfate suspension 1 Gram(s) Oral four times a day PRN gi upset    ICU Vital Signs Last 24 Hrs  T(C): 36.7 (15 May 2018 19:16), Max: 36.7 (15 May 2018 05:10)  T(F): 98.1 (15 May 2018 19:16), Max: 98.1 (15 May 2018 19:16)  HR: 84 (15 May 2018 17:12) (81 - 92)  BP: 109/72 (15 May 2018 17:12) (100/64 - 114/62)  BP(mean): --  ABP: --  ABP(mean): --  RR: 16 (15 May 2018 17:12) (16 - 16)  SpO2: 97% (15 May 2018 17:12) (96% - 99%)    PHYSICAL EXAM:  Constitutional: nad; supine with elevated head of bed    Neck: supple    Respiratory: breath sounds clear    Cardiovascular: rr; s1/s2 present    Gastrointestinal: soft abd    Extremities: no edema    Vascular: warm le     Neurological: conversant    Psychiatric: no acute agitation    I&O's Detail    14 May 2018 07:01  -  15 May 2018 07:00  --------------------------------------------------------  IN:    argatroban Infusion: 44.5 mL    argatroban Infusion: 32.1 mL    argatroban Infusion: 10.7 mL    argatroban Infusion: 12.6 mL    Oral Fluid: 840 mL  Total IN: 939.9 mL    OUT:    Voided: 1375 mL  Total OUT: 1375 mL    Total NET: -435.1 mL      15 May 2018 07:01  -  15 May 2018 21:51  --------------------------------------------------------  IN:    Oral Fluid: 660 mL    Solution: 100 mL  Total IN: 760 mL    OUT:    Voided: 900 mL  Total OUT: 900 mL    Total NET: -140 mL    LABS:                                   11.5   5.0   )-----------( 130      ( 15 May 2018 06:10 )             36.0   05-15    135  |  99  |  17  ----------------------------<  100<H>  4.4   |  24  |  1.07    Ca    9.3      15 May 2018 06:53  Mg     2.1     05-15    TPro  7.0  /  Alb  4.1  /  TBili  0.3  /  DBili  x   /  AST  39  /  ALT  37  /  AlkPhos  57  05-14    I&O's Summary    14 May 2018 07:01  -  15 May 2018 07:00  --------------------------------------------------------  IN: 939.9 mL / OUT: 1375 mL / NET: -435.1 mL    15 May 2018 07:01  -  15 May 2018 21:52  --------------------------------------------------------  IN: 760 mL / OUT: 900 mL / NET: -140 mL    RADIOLOGY & ADDITIONAL STUDIES:      < from: TTE Echo w/Cont Complete (05.11.18 @ 13:27) >  EXAM:  ECHOCARDIOGRAM W CONTRAST                          PROCEDURE DATE:  05/11/2018                        INTERPRETATION:  Patient Height: 175.0 cm  Patient Weight: 78.0 kg  Heart Rate: 67 bpm  Systolic Pressure: 117 mmHg  Diastolic Pressure: 75mmHg  BSA: 1.9 m^2  Interpretation Summary  There is mild concentric left ventricular hypertrophy.Moderate segmental   left   ventricular systolic dysfunction with akinesis of mid to distal   anteroseptum   and all apical segments. There is a large thrombus measuring 1.9 x 1.8 cm   noted in the apex.  The left ventricular ejection fraction is 35%.The   right   ventricle is normal in size and function.A device wire is seen in the   right   ventricle.The left atrial size is normal.The mitral inflowpattern is   consistent with impaired left ventricular relaxation with mildly   elevated left   atrial pressure (8-14mmHg).  Right atrial size is normal.No evidence for   any   hemodynamically significant valvular disease.The pulmonary artery   systolic   pressure is estimated to be 32 mmHg.There is no pericardial   effusion.There has   been no significant interval change when compared to the echo from 1/8/18    < end of copied text >

## 2018-05-15 NOTE — PROGRESS NOTE ADULT - PROBLEM SELECTOR PLAN 1
H/o LV thrombus confirmed with repeat Echo 5/11/18 showing 1.9x.1.8cm apical thrombus, EF 35 %  No significant interval change when compared to the echo from 1/8/18  Pt non-compliant Coumadin on arrival. Agrees to be compliant with it now and in the future.  INR sub therapeutic on arrival to 0.99 s/p Coumadin 12mg 5/10/18, 5/11/18, 5/12/18, Coumadin 7mg last night.   Goal INR 1.6-1.7 per Dr. 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  Coumadin and Argatroban held Sunday night due to BRBPR/INR 1.9 per Dr. Sheehan.  INR 1.8 this AM. Argatroban discontinued. No further BRBPR.   Coumadin 6mg tonight as per Dr. Everett

## 2018-05-15 NOTE — DIETITIAN INITIAL EVALUATION ADULT. - ENERGY NEEDS
Ht: 177.8cm, wt (5/10) 77.9kg, IBW: 75.5kg %IBW: 103% BMI: 24.6; ABW utilized for nutritional needs as pt within % of IBW; needs adjusted per age and current status

## 2018-05-15 NOTE — PROGRESS NOTE ADULT - PROBLEM SELECTOR PLAN 3
Dr. Everett ([facility]) consulted as per Dr. Sheehan.   Platelet count 180 on admission. 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 Off Heparin and off Argatroban   Platelet count 120s-200s in 1/2018  No PPI as per Dr. Everett due to thrombocytopenia

## 2018-05-15 NOTE — PROGRESS NOTE ADULT - SUBJECTIVE AND OBJECTIVE BOX
HPI:  This is a 63yo M former smoker and drug abuser current ETOH abuser (2+ pints vodka day) with PMHx CAD s/p MI 2003, most recent PCI /at [facility] 7/2016 (JOSHUA to mLAD with residual 50 % OM1), chronic systolic CHF (EF 35% by Echo) s/p AICD in 2009 ([employer]) , pafib and LV thrombus (on warfarin, previously on ASA and Plavix only 2/2 to lower GIB 2016), CKD Stage III (Baseline Cr normal 0.90-1.1), with frequent admissions to [facility] for ETOH use/withdrawal and 12/2017 for ICD firing. Pt’s last hospitalization at [facility] 1/6-1/26/18 for palpitations and anorexia in setting of alcohol intoxication, workup included colonoscopy without active bleeding, s/p polypectomy x 2, discharged home on warfarin 12mg daily.  He reports a subsequent hospitalization at [facility] sometime after that admission for recurrent alcohol intoxication with withdrawal symptoms, reports abstaining from alcohol from that admission until one week ago when increased family/personal stressors found him starting and then increasing his daily drinking.  Pt is unclear on time frame.    Pt presents today with epigastric pain with an episode of nausea and vomiting after one week of binge drinking.  He also notes that he stopped taking warfarin during this time period and INR on presentation is sub-therapeutic at 0.99.  He admits to last drink at 10pm on 5/9 and bilateral upper extremity tremors. Denies chest pain, SOB, near/syncope, edema, fever, chills.  He was treated with 2L IVF, IV pantoprazole and started on heparin gtt for sub therapeutic INR with known h/o LV thrombus. (11 May 2018 00:51)    FAMILY HISTORY:  No pertinent family history in first degree relatives    MEDICATIONS  (STANDING):  atorvastatin 40 milliGRAM(s) Oral at bedtime  folic acid 1 milliGRAM(s) Oral daily  lisinopril 20 milliGRAM(s) Oral daily  metoprolol tartrate 50 milliGRAM(s) Oral two times a day  multivitamin 1 Tablet(s) Oral daily  sucralfate 1 Gram(s) Oral four times a day    MEDICATIONS  (PRN):  chlordiazePOXIDE 50 milliGRAM(s) Oral every 1 hour PRN Symptom-triggered: each CIWA -Ar score 8 or GREATER  ondansetron Injectable 4 milliGRAM(s) IV Push every 4 hours PRN Nausea and/or Vomiting  sucralfate suspension 1 Gram(s) Oral four times a day PRN gi upset    Vital Signs Last 24 Hrs  T(C): 36.5 (15 May 2018 09:14), Max: 36.7 (15 May 2018 05:10)  T(F): 97.7 (15 May 2018 09:14), Max: 98 (15 May 2018 05:10)  HR: 81 (15 May 2018 08:30) (81 - 140)  BP: 114/62 (15 May 2018 08:30) (102/59 - 122/76)  BP(mean): --  RR: 16 (15 May 2018 08:30) (16 - 16)  SpO2: 97% (15 May 2018 08:30) (95% - 99%)    Physical exam:    Overall impression  Lymphadenopathy  Liver  spleen    Labs:  CBC Full  -  ( 15 May 2018 06:10 )  WBC Count : 5.0 K/uL  Hemoglobin : 11.5 g/dL  Hematocrit : 36.0 %  Platelet Count - Automated : 130 K/uL  Mean Cell Volume : 74.7 fL  Mean Cell Hemoglobin : 23.9 pg  Mean Cell Hemoglobin Concentration : 31.9 g/dL  Auto Neutrophil # : x  Auto Lymphocyte # : x  Auto Monocyte # : x  Auto Eosinophil # : x  Auto Basophil # : x  Auto Neutrophil % : x  Auto Lymphocyte % : x  Auto Monocyte % : x  Auto Eosinophil % : x  Auto Basophil % : x    05-15    135  |  99  |  17  ----------------------------<  100<H>  4.4   |  24  |  1.07    Ca    9.3      15 May 2018 06:53  Mg     2.1     05-15    TPro  7.0  /  Alb  4.1  /  TBili  0.3  /  DBili  x   /  AST  39  /  ALT  37  /  AlkPhos  57  05-14      Radiology:  HEALTH ISSUES - R/O PROBLEM Dx:  Chronic alcoholic gastritis without hemorrhage: R/O Chronic alcoholic gastritis without hemorrhage      Assessmant / Problems  1) intracardiac  lot  INR 1.8  Coumadine 6 mg tonight  2) Patient has hemorrhoidal bleeding if he is constipated and pushes whe defecating  Will order stool softener    Thank you  Liana Everett MD

## 2018-05-15 NOTE — PROGRESS NOTE ADULT - SUBJECTIVE AND OBJECTIVE BOX
S: Pt seen and examined bedside.  Patient denies C/P, SOB, N/V, dizziness, palpitations, and diaphoresis.  Pt denies fever/chills, dysuria, abdominal pain, diarrhea, and cough    O: Vital Signs Last 24 Hrs  T(C): 36.5 (15 May 2018 12:30), Max: 36.7 (15 May 2018 05:10)  T(F): 97.7 (15 May 2018 12:30), Max: 98 (15 May 2018 05:10)  HR: 84 (15 May 2018 12:30) (81 - 100)  BP: 100/64 (15 May 2018 12:30) (100/64 - 121/75)  RR: 16 (15 May 2018 12:30) (16 - 16)  SpO2: 96% (15 May 2018 12:30) (95% - 99%)    PHYSICAL EXAM:  GEN: NAD  PULM:  CTA B/L  CARD: No JVD B/L, RRR, S1 and S2   ABD: +BS, NT, soft/ND	  EXT: No Edema B/L LE  NEURO: A+Ox3, no focal deficit    LABS:                        11.5   5.0   )-----------( 130      ( 15 May 2018 06:10 )             36.0     05-15    135  |  99  |  17  ----------------------------<  100<H>  4.4   |  24  |  1.07    Ca    9.3      15 May 2018 06:53  Mg     2.1     05-15    TPro  7.0  /  Alb  4.1  /  TBili  0.3  /  DBili  x   /  AST  39  /  ALT  37  /  AlkPhos  57      PT/INR - ( 15 May 2018 06:10 )   PT: 20.5 sec;   INR: 1.82       PTT - ( 15 May 2018 06:10 )  PTT:70.6 sec     @ 07:01  -  05-15 @ 07:00  --------------------------------------------------------  IN: 939.9 mL / OUT: 1375 mL / NET: -435.1 mL    Daily Weight in k.9 (15 May 2018 16:06)

## 2018-05-15 NOTE — DIETITIAN INITIAL EVALUATION ADULT. - OTHER INFO
61 y/o M,  frequent admissions for ETOH intoxication/withdrawal presents after binge drinking for 1 week, noted to have sub-therapeutic INR (INR 0.9); Pt reports good apetite, consumes > 75% from meals denies any GI distress; edu on DASH/TLC reviewed; pt is not compliant w/ diet; ware of adverse effects of drinking on nutrient status; takes Vit B1, folic acid, and Mg supplements; skin intact w/ no edema.

## 2018-05-16 DIAGNOSIS — F10.20 ALCOHOL DEPENDENCE, UNCOMPLICATED: ICD-10-CM

## 2018-05-16 LAB
ANION GAP SERPL CALC-SCNC: 13 MMOL/L — SIGNIFICANT CHANGE UP (ref 5–17)
APTT BLD: 35.5 SEC — SIGNIFICANT CHANGE UP (ref 27.5–37.4)
APTT BLD: 81.4 SEC — HIGH (ref 27.5–37.4)
APTT BLD: 82.1 SEC — HIGH (ref 27.5–37.4)
BUN SERPL-MCNC: 19 MG/DL — SIGNIFICANT CHANGE UP (ref 7–23)
CALCIUM SERPL-MCNC: 9.6 MG/DL — SIGNIFICANT CHANGE UP (ref 8.4–10.5)
CHLORIDE SERPL-SCNC: 98 MMOL/L — SIGNIFICANT CHANGE UP (ref 96–108)
CO2 SERPL-SCNC: 23 MMOL/L — SIGNIFICANT CHANGE UP (ref 22–31)
CREAT SERPL-MCNC: 1.11 MG/DL — SIGNIFICANT CHANGE UP (ref 0.5–1.3)
GLUCOSE SERPL-MCNC: 103 MG/DL — HIGH (ref 70–99)
HCT VFR BLD CALC: 36 % — LOW (ref 39–50)
HGB BLD-MCNC: 11.5 G/DL — LOW (ref 13–17)
INR BLD: 1.03 — SIGNIFICANT CHANGE UP (ref 0.88–1.16)
INR BLD: 1.64 — HIGH (ref 0.88–1.16)
MAGNESIUM SERPL-MCNC: 2 MG/DL — SIGNIFICANT CHANGE UP (ref 1.6–2.6)
MCHC RBC-ENTMCNC: 24 PG — LOW (ref 27–34)
MCHC RBC-ENTMCNC: 31.9 G/DL — LOW (ref 32–36)
MCV RBC AUTO: 75.2 FL — LOW (ref 80–100)
PLATELET # BLD AUTO: 161 K/UL — SIGNIFICANT CHANGE UP (ref 150–400)
POTASSIUM SERPL-MCNC: 4.5 MMOL/L — SIGNIFICANT CHANGE UP (ref 3.5–5.3)
POTASSIUM SERPL-SCNC: 4.5 MMOL/L — SIGNIFICANT CHANGE UP (ref 3.5–5.3)
PROTHROM AB SERPL-ACNC: 11.4 SEC — SIGNIFICANT CHANGE UP (ref 9.8–12.7)
PROTHROM AB SERPL-ACNC: 18.4 SEC — HIGH (ref 9.8–12.7)
RBC # BLD: 4.79 M/UL — SIGNIFICANT CHANGE UP (ref 4.2–5.8)
RBC # FLD: 19.9 % — HIGH (ref 10.3–16.9)
SODIUM SERPL-SCNC: 134 MMOL/L — LOW (ref 135–145)
SRA INTERP SER-IMP: SIGNIFICANT CHANGE UP
WBC # BLD: 4 K/UL — SIGNIFICANT CHANGE UP (ref 3.8–10.5)
WBC # FLD AUTO: 4 K/UL — SIGNIFICANT CHANGE UP (ref 3.8–10.5)

## 2018-05-16 PROCEDURE — 99223 1ST HOSP IP/OBS HIGH 75: CPT

## 2018-05-16 RX ORDER — WARFARIN SODIUM 2.5 MG/1
12 TABLET ORAL ONCE
Qty: 0 | Refills: 0 | Status: COMPLETED | OUTPATIENT
Start: 2018-05-16 | End: 2018-05-16

## 2018-05-16 RX ORDER — ARGATROBAN 50 MG/50ML
3 INJECTION, SOLUTION INTRAVENOUS
Qty: 50 | Refills: 0 | Status: DISCONTINUED | OUTPATIENT
Start: 2018-05-16 | End: 2018-05-18

## 2018-05-16 RX ORDER — THIAMINE MONONITRATE (VIT B1) 100 MG
100 TABLET ORAL
Qty: 0 | Refills: 0 | Status: DISCONTINUED | OUTPATIENT
Start: 2018-05-16 | End: 2018-05-22

## 2018-05-16 RX ADMIN — LISINOPRIL 20 MILLIGRAM(S): 2.5 TABLET ORAL at 05:49

## 2018-05-16 RX ADMIN — Medication 100 MILLIGRAM(S): at 21:45

## 2018-05-16 RX ADMIN — Medication 1 MILLIGRAM(S): at 11:38

## 2018-05-16 RX ADMIN — WARFARIN SODIUM 12 MILLIGRAM(S): 2.5 TABLET ORAL at 21:45

## 2018-05-16 RX ADMIN — Medication 50 MILLIGRAM(S): at 05:49

## 2018-05-16 RX ADMIN — Medication 1 GRAM(S): at 05:49

## 2018-05-16 RX ADMIN — Medication 1 TABLET(S): at 11:38

## 2018-05-16 RX ADMIN — ATORVASTATIN CALCIUM 40 MILLIGRAM(S): 80 TABLET, FILM COATED ORAL at 21:45

## 2018-05-16 RX ADMIN — ARGATROBAN 14.02 MICROGRAM(S)/KG/MIN: 50 INJECTION, SOLUTION INTRAVENOUS at 11:46

## 2018-05-16 RX ADMIN — ARGATROBAN 14.02 MICROGRAM(S)/KG/MIN: 50 INJECTION, SOLUTION INTRAVENOUS at 17:59

## 2018-05-16 RX ADMIN — Medication 1 GRAM(S): at 17:45

## 2018-05-16 RX ADMIN — ARGATROBAN 14.02 MICROGRAM(S)/KG/MIN: 50 INJECTION, SOLUTION INTRAVENOUS at 15:37

## 2018-05-16 RX ADMIN — Medication 50 MILLIGRAM(S): at 17:45

## 2018-05-16 RX ADMIN — Medication 1 GRAM(S): at 11:38

## 2018-05-16 RX ADMIN — ARGATROBAN 14.02 MICROGRAM(S)/KG/MIN: 50 INJECTION, SOLUTION INTRAVENOUS at 09:09

## 2018-05-16 NOTE — BEHAVIORAL HEALTH ASSESSMENT NOTE - PROBLEM SELECTOR PLAN 1
Encourage inpatient/outpatient substance rehab.  Coordinate with SW.  Recommend medications, such as naltrexone, to reduce alcohol cravings  Give 500mg IV thiamine TID, folate 1mg.

## 2018-05-16 NOTE — PROGRESS NOTE ADULT - SUBJECTIVE AND OBJECTIVE BOX
TRANSFER TO MEDICINE NOTE    Hospital Course: 63 y/o M Ex-Smoker, Drug Abuser, ETOH Abuser, w/ PMHX CAD/MI/PCI, Chronic Systolic CHF EF 35%, s/p ICD, Paroxysmal Afib and LV Thrombus, h/o GI bleeding, frequent admissions for ETOH intoxication/withdrawal presents after binge drinking for 1 week, noted to have sub-therapeutic INR (INR 0.9), pt developed HIT changed to Argatroban from Heparin gtt, one episode of BRBPR noted this past , GI consulted H/H remained stable, INR remains subtherapeutic, transferred to medicine for Coumadin bridge therapy.      CC: INR Subtherapeutic   S: Pt seen and examined bedside.  Patient denies C/P, SOB, N/V, dizziness, palpitations, and diaphoresis.  Pt denies fever/chills, dysuria, abdominal pain, diarrhea, and cough  12 Point ROS otherwise negative except per subjective and HPI    O: Vital Signs Last 24 Hrs  T(C): 36.6 (16 May 2018 18:02), Max: 36.9 (16 May 2018 00:15)  T(F): 97.8 (16 May 2018 18:02), Max: 98.5 (16 May 2018 00:15)  HR: 98 (16 May 2018 17:47) (69 - 99)  BP: 103/59 (16 May 2018 17:47) (103/59 - 118/71)  RR: 16 (16 May 2018 17:47) (16 - 16)  SpO2: 97% (16 May 2018 08:40) (97% - 98%)    PHYSICAL EXAM:  GEN: NAD  PULM:  CTA B/L  CARD: No JVD B/L, RRR, S1 and S2   ABD: +BS, NT, soft/ND	  EXT: No Edema B/L LE  NEURO: A+Ox3, no focal deficit    LABS:                        11.5   4.0   )-----------( 161      ( 16 May 2018 06:57 )             36.0     05-16    134<L>  |  98  |  19  ----------------------------<  103<H>  4.5   |  23  |  1.11    Ca    9.6      16 May 2018 06:57  Mg     2.0     05-16      PT/INR - ( 16 May 2018 06:57 )   PT: 11.4 sec;   INR: 1.03          PTT - ( 16 May 2018 13:57 )  PTT:81.4 sec    15 @ 07:01  -   @ 07:00  --------------------------------------------------------  IN: 910 mL / OUT: 2330 mL / NET: -1420 mL    Daily Weight in k.5 (16 May 2018 05:52)

## 2018-05-16 NOTE — BEHAVIORAL HEALTH ASSESSMENT NOTE - NSBHREFERDETAILS_PSY_A_CORE_FT
capacity to make decisions, regarding warfarin. Patient had discontinued warfarin, right before ethanol binge

## 2018-05-16 NOTE — BEHAVIORAL HEALTH ASSESSMENT NOTE - ADDITIONAL DETAILS / COMMENTS
Patient exercises poor judgment in continuing to drink, given multiple hospitalization for alcohol detox. Patient has limited insight into his continued abuse of ethanol.

## 2018-05-16 NOTE — BEHAVIORAL HEALTH ASSESSMENT NOTE - DESCRIPTION (FIRST USE, LAST USE, QUANTITY, FREQUENCY, DURATION)
binges, 2 pints of vodka per day, multiple hospitalizations for detoxes, one rehab stint, denies DTs

## 2018-05-16 NOTE — PROGRESS NOTE ADULT - SUBJECTIVE AND OBJECTIVE BOX
Cardiology for Mildred  CC: Follow-up Cardiology evaluation and management of cad s/p mi and pci, systolic chf, s/p aicd, pafib, and LV thrombus    interval - events noted; no acute complaints    PAST MEDICAL & SURGICAL HISTORY:  Thrombus in heart chamber: LV  ICD (implantable cardioverter-defibrillator) in place  Pacemaker  ETOH abuse  Paroxysmal a-fib  Myocardial infarction involving other coronary artery  Gastritis  Atherosclerosis of coronary artery: Coronary artery disease  Automatic implantable cardiac defibrillator in situ: ICD (implantable cardioverter-defibrillator) in place    MEDICATIONS  (STANDING):  argatroban Infusion 3 MICROgram(s)/kG/Min (14.022 mL/Hr) IV Continuous <Continuous>  atorvastatin 40 milliGRAM(s) Oral at bedtime  folic acid 1 milliGRAM(s) Oral daily  lisinopril 20 milliGRAM(s) Oral daily  metoprolol tartrate 50 milliGRAM(s) Oral two times a day  multivitamin 1 Tablet(s) Oral daily  sucralfate 1 Gram(s) Oral four times a day  warfarin 12 milliGRAM(s) Oral once    MEDICATIONS  (PRN):  chlordiazePOXIDE 50 milliGRAM(s) Oral every 1 hour PRN Symptom-triggered: each CIWA -Ar score 8 or GREATER  ondansetron Injectable 4 milliGRAM(s) IV Push every 4 hours PRN Nausea and/or Vomiting  sucralfate suspension 1 Gram(s) Oral four times a day PRN gi upset      Vital Signs Last 24 Hrs  T(C): 36.4 (16 May 2018 13:41), Max: 36.9 (16 May 2018 00:15)  T(F): 97.6 (16 May 2018 13:41), Max: 98.5 (16 May 2018 00:15)  HR: 99 (16 May 2018 14:01) (69 - 99)  BP: 117/73 (16 May 2018 14:01) (97/65 - 118/71)  BP(mean): --  RR: 16 (16 May 2018 14:01) (16 - 16)  SpO2: 97% (16 May 2018 08:40) (97% - 98%)    PHYSICAL EXAM:  Constitutional: nad    Neck: no hjr    Respiratory: lung fields clear    Cardiovascular: rr; s1/s2 present; no rub    Gastrointestinal: soft abd; no guarding    Extremities: no edema    Vascular: dp and pt pulses present    Neurological: conversant    Psychiatric: appropriate    I&O's Detail    15 May 2018 07:01  -  16 May 2018 07:00  --------------------------------------------------------  IN:    Oral Fluid: 810 mL    Solution: 100 mL  Total IN: 910 mL    OUT:    Voided: 2330 mL  Total OUT: 2330 mL    Total NET: -1420 mL      16 May 2018 07:01  -  16 May 2018 17:40  --------------------------------------------------------  IN:    Oral Fluid: 720 mL  Total IN: 720 mL    OUT:  Total OUT: 0 mL    Total NET: 720 mL        LABS:                                     11.5   4.0   )-----------( 161      ( 16 May 2018 06:57 )             36.0   05-16    134<L>  |  98  |  19  ----------------------------<  103<H>  4.5   |  23  |  1.11    Ca    9.6      16 May 2018 06:57  Mg     2.0     05-16      I&O's Summary    15 May 2018 07:01  -  16 May 2018 07:00  --------------------------------------------------------  IN: 910 mL / OUT: 2330 mL / NET: -1420 mL    16 May 2018 07:01  -  16 May 2018 17:43  --------------------------------------------------------  IN: 720 mL / OUT: 0 mL / NET: 720 mL        RADIOLOGY & ADDITIONAL STUDIES:      < from: TTE Echo w/Cont Complete (05.11.18 @ 13:27) >  EXAM:  ECHOCARDIOGRAM W CONTRAST                          PROCEDURE DATE:  05/11/2018                        INTERPRETATION:  Patient Height: 175.0 cm  Patient Weight: 78.0 kg  Heart Rate: 67 bpm  Systolic Pressure: 117 mmHg  Diastolic Pressure: 75mmHg  BSA: 1.9 m^2  Interpretation Summary  There is mild concentric left ventricular hypertrophy.Moderate segmental   left   ventricular systolic dysfunction with akinesis of mid to distal   anteroseptum   and all apical segments. There is a large thrombus measuring 1.9 x 1.8 cm   noted in the apex.  The left ventricular ejection fraction is 35%.The   right   ventricle is normal in size and function.A device wire is seen in the   right   ventricle.The left atrial size is normal.The mitral inflowpattern is   consistent with impaired left ventricular relaxation with mildly   elevated left   atrial pressure (8-14mmHg).  Right atrial size is normal.No evidence for   any   hemodynamically significant valvular disease.The pulmonary artery   systolic   pressure is estimated to be 32 mmHg.There is no pericardial   effusion.There has   been no significant interval change when compared to the echo from 1/8/18    < end of copied text >

## 2018-05-16 NOTE — PROGRESS NOTE ADULT - PROBLEM SELECTOR PLAN 3
Dr. Everett ([facility]) consulted as per Dr. Sheehan.   Platelet count 180 on admission. 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 Off Heparin and off Argatroban   Platelet count 120s-200s in 1/2018  No PPI as per Dr. Everett due to thrombocytopenia

## 2018-05-16 NOTE — BEHAVIORAL HEALTH ASSESSMENT NOTE - RISK ASSESSMENT
Patient will likely continue to abuse alcohol, though he has good supports such as son and Baptism  who will likely help patient maintain sobriety.

## 2018-05-16 NOTE — PROGRESS NOTE ADULT - PROBLEM SELECTOR PLAN 1
H/o LV thrombus confirmed with repeat Echo 5/11/18 showing 1.9x.1.8cm apical thrombus, EF 35 %  No significant interval change when compared to the echo from 1/8/18  Pt non-compliant Coumadin on arrival. Agrees to be compliant with it now and in the future.  INR sub therapeutic on arrival to 0.99 s/p Coumadin 12mg 5/10/18, 5/11/18, 5/12/18, Coumadin 7mg given on 5/14/18, 6mg on 5/15/18 and 12mg tonight per Dr. Everett  INR was 1.8 yesterday and dropped to 1.0 this AM, after patient ate broccoli yesterday.   Goal INR 1.6-1.7 per Dr. 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  Coumadin and Argatroban held Sunday night due to BRBPR/INR 1.9 per Dr. Sheehan.  Argatroban resumed this AM. PTT therapeutic x 2. F/u PTT in AM  No further BRBPR

## 2018-05-16 NOTE — PROGRESS NOTE ADULT - SUBJECTIVE AND OBJECTIVE BOX
HPI:  This is a 61yo M former smoker and drug abuser current ETOH abuser (2+ pints vodka day) with PMHx CAD s/p MI 2003, most recent PCI /at [facility] 7/2016 (JOSHUA to mLAD with residual 50 % OM1), chronic systolic CHF (EF 35% by Echo) s/p AICD in 2009 ([employer]) , pafib and LV thrombus (on warfarin, previously on ASA and Plavix only 2/2 to lower GIB 2016), CKD Stage III (Baseline Cr normal 0.90-1.1), with frequent admissions to [facility] for ETOH use/withdrawal and 12/2017 for ICD firing. Pt’s last hospitalization at [facility] 1/6-1/26/18 for palpitations and anorexia in setting of alcohol intoxication, workup included colonoscopy without active bleeding, s/p polypectomy x 2, discharged home on warfarin 12mg daily.  He reports a subsequent hospitalization at [facility] sometime after that admission for recurrent alcohol intoxication with withdrawal symptoms, reports abstaining from alcohol from that admission until one week ago when increased family/personal stressors found him starting and then increasing his daily drinking.  Pt is unclear on time frame.    Pt presents today with epigastric pain with an episode of nausea and vomiting after one week of binge drinking.  He also notes that he stopped taking warfarin during this time period and INR on presentation is sub-therapeutic at 0.99.  He admits to last drink at 10pm on 5/9 and bilateral upper extremity tremors. Denies chest pain, SOB, near/syncope, edema, fever, chills.  He was treated with 2L IVF, IV pantoprazole and started on heparin gtt for sub therapeutic INR with known h/o LV thrombus. (11 May 2018 00:51)    FAMILY HISTORY:  No pertinent family history in first degree relatives    MEDICATIONS  (STANDING):  argatroban Infusion 3 MICROgram(s)/kG/Min (14.022 mL/Hr) IV Continuous <Continuous>  atorvastatin 40 milliGRAM(s) Oral at bedtime  folic acid 1 milliGRAM(s) Oral daily  lisinopril 20 milliGRAM(s) Oral daily  metoprolol tartrate 50 milliGRAM(s) Oral two times a day  multivitamin 1 Tablet(s) Oral daily  sucralfate 1 Gram(s) Oral four times a day  thiamine 100 milliGRAM(s) Oral <User Schedule>    MEDICATIONS  (PRN):  chlordiazePOXIDE 50 milliGRAM(s) Oral every 1 hour PRN Symptom-triggered: each CIWA -Ar score 8 or GREATER  ondansetron Injectable 4 milliGRAM(s) IV Push every 4 hours PRN Nausea and/or Vomiting  sucralfate suspension 1 Gram(s) Oral four times a day PRN gi upset    Vital Signs Last 24 Hrs  T(C): 36.6 (16 May 2018 18:02), Max: 36.9 (16 May 2018 00:15)  T(F): 97.8 (16 May 2018 18:02), Max: 98.5 (16 May 2018 00:15)  HR: 94 (16 May 2018 20:49) (69 - 99)  BP: 115/78 (16 May 2018 20:49) (93/59 - 117/73)  BP(mean): --  RR: 18 (16 May 2018 20:49) (16 - 18)  SpO2: 97% (16 May 2018 08:40) (97% - 98%)    Physical exam:    Overall impression  Lymphadenopathy  Liver  spleen    Labs:  CBC Full  -  ( 16 May 2018 06:57 )  WBC Count : 4.0 K/uL  Hemoglobin : 11.5 g/dL  Hematocrit : 36.0 %  Platelet Count - Automated : 161 K/uL  Mean Cell Volume : 75.2 fL  Mean Cell Hemoglobin : 24.0 pg  Mean Cell Hemoglobin Concentration : 31.9 g/dL  Auto Neutrophil # : x  Auto Lymphocyte # : x  Auto Monocyte # : x  Auto Eosinophil # : x  Auto Basophil # : x  Auto Neutrophil % : x  Auto Lymphocyte % : x  Auto Monocyte % : x  Auto Eosinophil % : x  Auto Basophil % : x    05-16    134<L>  |  98  |  19  ----------------------------<  103<H>  4.5   |  23  |  1.11    Ca    9.6      16 May 2018 06:57  Mg     2.0     05-16        Radiology:  HEALTH ISSUES - R/O PROBLEM Dx:  Chronic alcoholic gastritis without hemorrhage: R/O Chronic alcoholic gastritis without hemorrhage      Assessmant / Problems  1) Intracardiac clot stable   on Coumadin INR was 1.9   Patient eat brocoli and INR dropped to 1.03  Plan:  Couimadin 12 mg tonight  2) NO gi bleeding    Thank you  Liana Everett MD

## 2018-05-16 NOTE — BEHAVIORAL HEALTH ASSESSMENT NOTE - NSBHCHARTREVIEWVS_PSY_A_CORE FT
T(C): 36.7 (15 May 2018 19:16), Max: 36.7 (15 May 2018 05:10)  T(F): 98.1 (15 May 2018 19:16), Max: 98.1 (15 May 2018 19:16)  HR: 84 (15 May 2018 17:12) (81 - 92)  BP: 109/72 (15 May 2018 17:12) (100/64 - 114/62)    RR: 16 (15 May 2018 17:12) (16 - 16)  SpO2: 97% (15 May 2018 17:12) (96% - 99%)

## 2018-05-16 NOTE — BEHAVIORAL HEALTH ASSESSMENT NOTE - HPI (INCLUDE ILLNESS QUALITY, SEVERITY, DURATION, TIMING, CONTEXT, MODIFYING FACTORS, ASSOCIATED SIGNS AND SYMPTOMS)
61 yo  for 2nd time for 2 years, domiciled with adult son, disabled for chronic medical problems  male with pph of alcohol use disorder severe with multiple alcohol detoxes, last drink 05/09, one substance rehab stay, longest period of sobriety 3 months, denies DTs, seizures, seen by psych multiple times continuously refusing inpatient rehab and pmh of CAD s/p MI 2003, most recent PCI /at [facility] 7/2016 (JOSHUA to mLAD with residual 50 % OM1), chronic systolic CHF (EF 35% by Echo) s/p AICD in 2009 ([employer]) , pafib and LV thrombus (on warfarin, previously on ASA and Plavix only 2/2 to lower GIB 2016), CKD Stage III (Baseline Cr normal 0.90-1.1) BIBS on 05/11 for epigastric pain, consulted by primary for capacity to make decisions, regarding warfarin. Patient had self-discontinued warfarin, as he anticipated binging alcohol and did not want to experience patient-reported side effects of nausea. Apparently, patient was told that he should not drink while being on coumadin. Patient verbalizes that he could develop Writer consulted previous records, which indicate that patient has refused inpatient substance rehab multiple times, citing his 11 year old daughter as reason for not wanting inpatient. Patient does not think that he requires substance rehab at present, as he wants to be "strong," on his own.  His adult son will watch over him. Additionally, he cannot engage in outpatient substance rehab because it is cost-prohibitive. Patient does not want to be part of AA, though he has a mentor Oscar Smart, on whom patient has relied in past. Patient declines medications, such as naltrexone, to reduce alcohol cravings. Patient denies ever being suicidal, homicidal. He denies AH/VH, depressive, manic, anxious, and breezy psychotic symptoms. He is linked up with [facility] physicians to help with his multiple medical comorbidities. Patient is not withdrawing from alcohol. He remains goal-oriented and discharge-focused.

## 2018-05-16 NOTE — BEHAVIORAL HEALTH ASSESSMENT NOTE - SUMMARY
61 yo male with alcohol use disorder severe, who refused inpatient and outpatient substance treatment and multiple medical problems, has capacity to make treatment decisions. Patient understands the importance of taking warfarin and resolves on taking it and consulting with outpatient providers, regarding dosage adjustments. Patient continues to exercise poor judgment, regarding continuing to drink alcohol. As he has in past as indicated in previous psychiatric consult notes, patient continuously makes excuses, regarding refusing inpatient and outpatient rehab. These continuous refusals possibly indicate a willingness to eschew alcohol use. Patient is encouraged to pursue inpatient and outpatient rehab options. Patient does not present acute psychiatric issues at present.

## 2018-05-16 NOTE — BEHAVIORAL HEALTH ASSESSMENT NOTE - NSBHSUICPROTECTFACT_PSY_A_CORE
Responsibility to family and others/Supportive social network or family/High spirituality/Identifies reasons for living/Future oriented

## 2018-05-16 NOTE — PROGRESS NOTE ADULT - ASSESSMENT
1) cad s/p mi and pci  -no cp now    2) chronic systolic and diastolic   -monitor    3) s/p icd - monitor    4) lv thrombus   -inr noted; plan per Hematology    5) pafib - ac as above    6) h/o gib     7) ckd - monitor cr and uop    8) current etoh abuse - Behavioral Health consulted    9) ppx - ppi and therapeutic ac    10)  maintain K>4, Mg>2  transfer to [facility] on Dr. Everett's service  case discussed with Dr. Everett and ADELAIDA Boggs

## 2018-05-16 NOTE — BEHAVIORAL HEALTH ASSESSMENT NOTE - NSBHCHARTREVIEWLAB_PSY_A_CORE FT
11.5   5.0   )-----------( 130      ( 15 May 2018 06:10 )             36.0   05-15    135  |  99  |  17  ----------------------------<  100<H>  4.4   |  24  |  1.07    Ca    9.3      15 May 2018 06:53  Mg     2.1     05-15    TPro  7.0  /  Alb  4.1  /  TBili  0.3  /  DBili  x   /  AST  39  /  ALT  37  /  AlkPhos  57  05-14

## 2018-05-17 DIAGNOSIS — R63.8 OTHER SYMPTOMS AND SIGNS CONCERNING FOOD AND FLUID INTAKE: ICD-10-CM

## 2018-05-17 DIAGNOSIS — E87.5 HYPERKALEMIA: ICD-10-CM

## 2018-05-17 DIAGNOSIS — R79.1 ABNORMAL COAGULATION PROFILE: ICD-10-CM

## 2018-05-17 LAB
ANION GAP SERPL CALC-SCNC: 13 MMOL/L — SIGNIFICANT CHANGE UP (ref 5–17)
ANION GAP SERPL CALC-SCNC: 15 MMOL/L — SIGNIFICANT CHANGE UP (ref 5–17)
APTT BLD: 77.1 SEC — HIGH (ref 27.5–37.4)
BUN SERPL-MCNC: 19 MG/DL — SIGNIFICANT CHANGE UP (ref 7–23)
BUN SERPL-MCNC: 20 MG/DL — SIGNIFICANT CHANGE UP (ref 7–23)
CALCIUM SERPL-MCNC: 10 MG/DL — SIGNIFICANT CHANGE UP (ref 8.4–10.5)
CALCIUM SERPL-MCNC: 9.8 MG/DL — SIGNIFICANT CHANGE UP (ref 8.4–10.5)
CHLORIDE SERPL-SCNC: 93 MMOL/L — LOW (ref 96–108)
CHLORIDE SERPL-SCNC: 98 MMOL/L — SIGNIFICANT CHANGE UP (ref 96–108)
CO2 SERPL-SCNC: 25 MMOL/L — SIGNIFICANT CHANGE UP (ref 22–31)
CO2 SERPL-SCNC: 26 MMOL/L — SIGNIFICANT CHANGE UP (ref 22–31)
CREAT SERPL-MCNC: 1.08 MG/DL — SIGNIFICANT CHANGE UP (ref 0.5–1.3)
CREAT SERPL-MCNC: 1.19 MG/DL — SIGNIFICANT CHANGE UP (ref 0.5–1.3)
GLUCOSE SERPL-MCNC: 100 MG/DL — HIGH (ref 70–99)
GLUCOSE SERPL-MCNC: 106 MG/DL — HIGH (ref 70–99)
HCT VFR BLD CALC: 38.2 % — LOW (ref 39–50)
HGB BLD-MCNC: 12.6 G/DL — LOW (ref 13–17)
INR BLD: 1.69 — HIGH (ref 0.88–1.16)
MAGNESIUM SERPL-MCNC: 2.1 MG/DL — SIGNIFICANT CHANGE UP (ref 1.6–2.6)
MAGNESIUM SERPL-MCNC: 2.4 MG/DL — SIGNIFICANT CHANGE UP (ref 1.6–2.6)
MCHC RBC-ENTMCNC: 24.9 PG — LOW (ref 27–34)
MCHC RBC-ENTMCNC: 33 G/DL — SIGNIFICANT CHANGE UP (ref 32–36)
MCV RBC AUTO: 75.3 FL — LOW (ref 80–100)
PLATELET # BLD AUTO: 184 K/UL — SIGNIFICANT CHANGE UP (ref 150–400)
POTASSIUM SERPL-MCNC: 5.4 MMOL/L — HIGH (ref 3.5–5.3)
POTASSIUM SERPL-MCNC: 5.4 MMOL/L — HIGH (ref 3.5–5.3)
POTASSIUM SERPL-SCNC: 5.4 MMOL/L — HIGH (ref 3.5–5.3)
POTASSIUM SERPL-SCNC: 5.4 MMOL/L — HIGH (ref 3.5–5.3)
PROTHROM AB SERPL-ACNC: 19 SEC — HIGH (ref 9.8–12.7)
RBC # BLD: 5.07 M/UL — SIGNIFICANT CHANGE UP (ref 4.2–5.8)
RBC # FLD: 20.3 % — HIGH (ref 10.3–16.9)
SODIUM SERPL-SCNC: 134 MMOL/L — LOW (ref 135–145)
SODIUM SERPL-SCNC: 136 MMOL/L — SIGNIFICANT CHANGE UP (ref 135–145)
WBC # BLD: 4.7 K/UL — SIGNIFICANT CHANGE UP (ref 3.8–10.5)
WBC # FLD AUTO: 4.7 K/UL — SIGNIFICANT CHANGE UP (ref 3.8–10.5)

## 2018-05-17 PROCEDURE — 93010 ELECTROCARDIOGRAM REPORT: CPT

## 2018-05-17 RX ORDER — WARFARIN SODIUM 2.5 MG/1
12 TABLET ORAL ONCE
Qty: 0 | Refills: 0 | Status: COMPLETED | OUTPATIENT
Start: 2018-05-17 | End: 2018-05-17

## 2018-05-17 RX ADMIN — Medication 1 GRAM(S): at 06:16

## 2018-05-17 RX ADMIN — Medication 100 MILLIGRAM(S): at 06:16

## 2018-05-17 RX ADMIN — ATORVASTATIN CALCIUM 40 MILLIGRAM(S): 80 TABLET, FILM COATED ORAL at 23:11

## 2018-05-17 RX ADMIN — Medication 1 MILLIGRAM(S): at 11:11

## 2018-05-17 RX ADMIN — Medication 1 GRAM(S): at 17:12

## 2018-05-17 RX ADMIN — Medication 50 MILLIGRAM(S): at 06:16

## 2018-05-17 RX ADMIN — WARFARIN SODIUM 12 MILLIGRAM(S): 2.5 TABLET ORAL at 23:11

## 2018-05-17 RX ADMIN — Medication 100 MILLIGRAM(S): at 13:24

## 2018-05-17 RX ADMIN — Medication 1 GRAM(S): at 00:03

## 2018-05-17 RX ADMIN — Medication 1 GRAM(S): at 11:11

## 2018-05-17 RX ADMIN — ARGATROBAN 14.02 MICROGRAM(S)/KG/MIN: 50 INJECTION, SOLUTION INTRAVENOUS at 17:13

## 2018-05-17 RX ADMIN — ARGATROBAN 14.02 MICROGRAM(S)/KG/MIN: 50 INJECTION, SOLUTION INTRAVENOUS at 09:03

## 2018-05-17 RX ADMIN — LISINOPRIL 20 MILLIGRAM(S): 2.5 TABLET ORAL at 11:11

## 2018-05-17 RX ADMIN — Medication 1 TABLET(S): at 11:11

## 2018-05-17 RX ADMIN — Medication 50 MILLIGRAM(S): at 17:12

## 2018-05-17 RX ADMIN — Medication 100 MILLIGRAM(S): at 23:11

## 2018-05-17 NOTE — PROGRESS NOTE ADULT - PROBLEM SELECTOR PLAN 3
-Last drink 5/9  -ETOH level negative  -CIWA protocol with folate/MVI/Thiamine and Librium PRN (score 8 or higher)  -Psych called to follow as per Dr. Sheehan.   -Psych recommended supportive care/family support.  Patient agreeable/wants to quit  Pt recommended for alcohol rehab

## 2018-05-17 NOTE — PROGRESS NOTE ADULT - PROBLEM SELECTOR PLAN 1
H/o LV thrombus confirmed with repeat Echo 5/11/18 showing 1.9x.1.8cm apical thrombus, EF 35 %  No significant interval change when compared to the echo from 1/8/18  Pt non-compliant Coumadin on arrival. Agrees to be compliant with it now and in the future.  INR sub therapeutic on arrival to 0.99 s/p Coumadin 12mg 5/10/18, 5/11/18, 5/12/18, Coumadin 7mg given on 5/14/18, 6mg on 5/15/18 and 12mg on 5/16/18. Coumadin 12 mg tonight per Dr. Everett.   INR was previously therapeutic and dropped to 1.0 after patient ate broccoli   Goal INR 1.7-1.8 per Dr. 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  Coumadin and Argatroban held Sunday night due to BRBPR/INR 1.9 per Dr. Sheehan.  Argatroban resumed this AM. PTT therapeutic x 2. PTT therapeutic this AM.   No further BRBPR

## 2018-05-17 NOTE — PROGRESS NOTE ADULT - SUBJECTIVE AND OBJECTIVE BOX
Interventional Cardiology PA Adult Progress Note    Subjective Assessment: Patient seen and examined at bedside. Patient denies C/P, SOB, palpitations, dizziness, diaphoresis, N/V.      12 Point ROS otherwise negative except per subjective and HPI    Transfer Note reviewed 5/16.   	  MEDICATIONS:  metoprolol tartrate 50 milliGRAM(s) Oral two times a day  chlordiazePOXIDE 50 milliGRAM(s) Oral every 1 hour PRN  ondansetron Injectable 4 milliGRAM(s) IV Push every 4 hours PRN  sucralfate 1 Gram(s) Oral four times a day  sucralfate suspension 1 Gram(s) Oral four times a day PRN  atorvastatin 40 milliGRAM(s) Oral at bedtime  argatroban Infusion 3 MICROgram(s)/kG/Min IV Continuous <Continuous>  folic acid 1 milliGRAM(s) Oral daily  multivitamin 1 Tablet(s) Oral daily  thiamine 100 milliGRAM(s) Oral <User Schedule>  warfarin 12 milliGRAM(s) Oral once      	    [PHYSICAL EXAM:  TELEMETRY:  T(C): 36.5 (05-17-18 @ 14:26), Max: 36.7 (05-17-18 @ 01:48)  HR: 89 (05-17-18 @ 08:30) (87 - 98)  BP: 111/64 (05-17-18 @ 08:30) (93/59 - 118/77)  RR: 16 (05-17-18 @ 08:30) (16 - 18)  SpO2: 98% (05-17-18 @ 08:30) (97% - 98%)  Wt(kg): --  I&O's Summary    16 May 2018 07:01  -  17 May 2018 07:00  --------------------------------------------------------  IN: 900 mL / OUT: 1625 mL / NET: -725 mL    17 May 2018 07:01  -  17 May 2018 15:31  --------------------------------------------------------  IN: 660 mL / OUT: 300 mL / NET: 360 mL        Duffy:  Central/PICC/Mid Line:                                         Appearance: Normal	  HEENT:   Normal oral mucosa, PERRL, EOMI	  Neck: Supple. No JVD.   Cardiovascular: + S1S2. RRR. No murmurs, rubs or gallops.   Respiratory: CTA Bilaterally. No rhonchi, wheezes, rales   Gastrointestinal:  Soft. NT/ND. BS x 4.   Extremities: Normal range of motion, No clubbing, cyanosis or edema BLE   Neurologic: Non-focal        	    ECG:  	  RADIOLOGY:   DIAGNOSTIC TESTING:  [ ] Echocardiogram:  [ ]  Catheterization:  [ ] Stress Test:    [ ] MERLYN  OTHER: 	    LABS:	 	  CARDIAC MARKERS:                                  12.6   4.7   )-----------( 184      ( 17 May 2018 08:04 )             38.2     05-17    134<L>  |  93<L>  |  19  ----------------------------<  100<H>  5.4<H>   |  26  |  1.08    Ca    9.8      17 May 2018 11:52  Mg     2.4     05-17      proBNP:   Lipid Profile:   HgA1c:   TSH:   PT/INR - ( 17 May 2018 08:04 )   PT: 19.0 sec;   INR: 1.69          PTT - ( 17 May 2018 08:04 )  PTT:77.1 sec

## 2018-05-17 NOTE — PROGRESS NOTE ADULT - PROBLEM SELECTOR PLAN 1
Patient with subtherapeutic INR on admission 2/2 to medication non-adherence.  Initially started on heparin gtt as coumadin was resumed, but found to have drop in platelets from 180 to 96 5/11-5/12, switched to argatroban.  s/p Coumadin 12mg 5/10/18, 5/11/18, 5/12/18, Coumadin 7mg given on 5/14/18, 6mg on 5/15/18 and 12mg on 5/16/18.   -Coumadin 12 mg tonight per Dr. Everett.  - Daily PTT and INR (Confirmed with pharmacy) Patient with subtherapeutic INR on admission 2/2 to medication non-adherence.  Initially started on heparin gtt as coumadin was resumed, but found to have drop in platelets from 180 to 96 5/11-5/12, switched to argatroban.  s/p Coumadin 12mg 5/10/18, 5/11/18, 5/12/18, Coumadin 7mg given on 5/14/18, 6mg on 5/15/18 and 12mg on 5/16/18.   -Coumadin 12 mg tonight per Dr. Everett.  - Daily PTT and INR (Confirmed with pharmacy)  - Goal INR: 1.7-1.8  - Goal PTT: 77-99

## 2018-05-17 NOTE — PROGRESS NOTE ADULT - ASSESSMENT
63 y/o M Ex-Smoker, Drug Abuser, ETOH Abuser, w/ PMHX CAD/MI/PCI, Chronic Systolic CHF EF 35%, s/p ICD, paroxysmal Afib and LV Thromubus, h/o GI bleeding, frequent admissions for ETOH intoxication/withdrawal presents after binge drinking for 1 week with c/o n/v and epigastric pain, noted to have sub-therapeutic INR (INR 0.9)

## 2018-05-17 NOTE — PROGRESS NOTE ADULT - SUBJECTIVE AND OBJECTIVE BOX
Cardiology for Mildred  CC: Follow-up Cardiology evaluation and management of cad s/p mi and pci, systolic chf, s/p aicd, pafib, and LV thrombus    interval - no complaints    PAST MEDICAL & SURGICAL HISTORY:  Thrombus in heart chamber: LV  ICD (implantable cardioverter-defibrillator) in place  Pacemaker  ETOH abuse  Paroxysmal a-fib  Myocardial infarction involving other coronary artery  Gastritis  Atherosclerosis of coronary artery: Coronary artery disease  Automatic implantable cardiac defibrillator in situ: ICD (implantable cardioverter-defibrillator) in place    MEDICATIONS  (STANDING):  argatroban Infusion 3 MICROgram(s)/kG/Min (14.022 mL/Hr) IV Continuous <Continuous>  atorvastatin 40 milliGRAM(s) Oral at bedtime  folic acid 1 milliGRAM(s) Oral daily  lisinopril 20 milliGRAM(s) Oral daily  metoprolol tartrate 50 milliGRAM(s) Oral two times a day  multivitamin 1 Tablet(s) Oral daily  sucralfate 1 Gram(s) Oral four times a day  thiamine 100 milliGRAM(s) Oral <User Schedule>    MEDICATIONS  (PRN):  chlordiazePOXIDE 50 milliGRAM(s) Oral every 1 hour PRN Symptom-triggered: each CIWA -Ar score 8 or GREATER  ondansetron Injectable 4 milliGRAM(s) IV Push every 4 hours PRN Nausea and/or Vomiting  sucralfate suspension 1 Gram(s) Oral four times a day PRN gi upset    Vital Signs Last 24 Hrs  T(C): 36 (17 May 2018 05:42), Max: 36.7 (17 May 2018 01:48)  T(F): 96.8 (17 May 2018 05:42), Max: 98 (17 May 2018 01:48)  HR: 89 (17 May 2018 08:30) (87 - 99)  BP: 111/64 (17 May 2018 08:30) (93/59 - 118/77)  BP(mean): --  RR: 16 (17 May 2018 08:30) (16 - 18)  SpO2: 98% (17 May 2018 08:30) (97% - 98%)    PHYSICAL EXAM:  Constitutional: conversant; nad    heent - mmm    Respiratory: breath sounds present    Cardiovascular: rr; s1/s2 present    Gastrointestinal: soft abd; no rebound    Extremities: no edema    Vascular: warm le    Neurological: moves 4 ext    Psychiatric: appropriate    I&O's Detail    16 May 2018 07:01  -  17 May 2018 07:00  --------------------------------------------------------  IN:    Oral Fluid: 900 mL  Total IN: 900 mL    OUT:    Voided: 1625 mL  Total OUT: 1625 mL    Total NET: -725 mL    LABS:                                              12.6   4.7   )-----------( 184      ( 17 May 2018 08:04 )             38.2   05-17    136  |  98  |  20  ----------------------------<  106<H>  5.4<H>   |  25  |  1.19    Ca    10.0      17 May 2018 08:04  Mg     2.1     05-17      I&O's Summary    16 May 2018 07:01  -  17 May 2018 07:00  --------------------------------------------------------  IN: 900 mL / OUT: 1625 mL / NET: -725 mL        RADIOLOGY & ADDITIONAL STUDIES:      < from: TTE Echo w/Cont Complete (05.11.18 @ 13:27) >  EXAM:  ECHOCARDIOGRAM W CONTRAST                          PROCEDURE DATE:  05/11/2018                        INTERPRETATION:  Patient Height: 175.0 cm  Patient Weight: 78.0 kg  Heart Rate: 67 bpm  Systolic Pressure: 117 mmHg  Diastolic Pressure: 75mmHg  BSA: 1.9 m^2  Interpretation Summary  There is mild concentric left ventricular hypertrophy.Moderate segmental   left   ventricular systolic dysfunction with akinesis of mid to distal   anteroseptum   and all apical segments. There is a large thrombus measuring 1.9 x 1.8 cm   noted in the apex.  The left ventricular ejection fraction is 35%.The   right   ventricle is normal in size and function.A device wire is seen in the   right   ventricle.The left atrial size is normal.The mitral inflowpattern is   consistent with impaired left ventricular relaxation with mildly   elevated left   atrial pressure (8-14mmHg).  Right atrial size is normal.No evidence for   any   hemodynamically significant valvular disease.The pulmonary artery   systolic   pressure is estimated to be 32 mmHg.There is no pericardial   effusion.There has   been no significant interval change when compared to the echo from 1/8/18    < end of copied text >

## 2018-05-17 NOTE — PROGRESS NOTE ADULT - SUBJECTIVE AND OBJECTIVE BOX
HPI:  This is a 63yo M former smoker and drug abuser current ETOH abuser (2+ pints vodka day) with PMHx CAD s/p MI 2003, most recent PCI /at [facility] 7/2016 (JOSHUA to mLAD with residual 50 % OM1), chronic systolic CHF (EF 35% by Echo) s/p AICD in 2009 ([employer]) , pafib and LV thrombus (on warfarin, previously on ASA and Plavix only 2/2 to lower GIB 2016), CKD Stage III (Baseline Cr normal 0.90-1.1), with frequent admissions to [facility] for ETOH use/withdrawal and 12/2017 for ICD firing. Pt’s last hospitalization at [facility] 1/6-1/26/18 for palpitations and anorexia in setting of alcohol intoxication, workup included colonoscopy without active bleeding, s/p polypectomy x 2, discharged home on warfarin 12mg daily.  He reports a subsequent hospitalization at [facility] sometime after that admission for recurrent alcohol intoxication with withdrawal symptoms, reports abstaining from alcohol from that admission until one week ago when increased family/personal stressors found him starting and then increasing his daily drinking.  Pt is unclear on time frame.    Pt presents today with epigastric pain with an episode of nausea and vomiting after one week of binge drinking.  He also notes that he stopped taking warfarin during this time period and INR on presentation is sub-therapeutic at 0.99.  He admits to last drink at 10pm on 5/9 and bilateral upper extremity tremors. Denies chest pain, SOB, near/syncope, edema, fever, chills.  He was treated with 2L IVF, IV pantoprazole and started on heparin gtt for sub therapeutic INR with known h/o LV thrombus. (11 May 2018 00:51)    FAMILY HISTORY:  No pertinent family history in first degree relatives    MEDICATIONS  (STANDING):  argatroban Infusion 3 MICROgram(s)/kG/Min (14.022 mL/Hr) IV Continuous <Continuous>  atorvastatin 40 milliGRAM(s) Oral at bedtime  folic acid 1 milliGRAM(s) Oral daily  lisinopril 20 milliGRAM(s) Oral daily  metoprolol tartrate 50 milliGRAM(s) Oral two times a day  multivitamin 1 Tablet(s) Oral daily  sucralfate 1 Gram(s) Oral four times a day  thiamine 100 milliGRAM(s) Oral <User Schedule>  warfarin 12 milliGRAM(s) Oral once    MEDICATIONS  (PRN):  chlordiazePOXIDE 50 milliGRAM(s) Oral every 1 hour PRN Symptom-triggered: each CIWA -Ar score 8 or GREATER  ondansetron Injectable 4 milliGRAM(s) IV Push every 4 hours PRN Nausea and/or Vomiting  sucralfate suspension 1 Gram(s) Oral four times a day PRN gi upset    Vital Signs Last 24 Hrs  T(C): 36.4 (17 May 2018 10:08), Max: 36.7 (17 May 2018 01:48)  T(F): 97.6 (17 May 2018 10:08), Max: 98 (17 May 2018 01:48)  HR: 89 (17 May 2018 08:30) (87 - 99)  BP: 111/64 (17 May 2018 08:30) (93/59 - 118/77)  BP(mean): --  RR: 16 (17 May 2018 08:30) (16 - 18)  SpO2: 98% (17 May 2018 08:30) (97% - 98%)    Physical exam:    Overall impression  Lymphadenopathy  Liver  spleen    Labs:  CBC Full  -  ( 17 May 2018 08:04 )  WBC Count : 4.7 K/uL  Hemoglobin : 12.6 g/dL  Hematocrit : 38.2 %  Platelet Count - Automated : 184 K/uL  Mean Cell Volume : 75.3 fL  Mean Cell Hemoglobin : 24.9 pg  Mean Cell Hemoglobin Concentration : 33.0 g/dL  Auto Neutrophil # : x  Auto Lymphocyte # : x  Auto Monocyte # : x  Auto Eosinophil # : x  Auto Basophil # : x  Auto Neutrophil % : x  Auto Lymphocyte % : x  Auto Monocyte % : x  Auto Eosinophil % : x  Auto Basophil % : x    05-17    136  |  98  |  20  ----------------------------<  106<H>  5.4<H>   |  25  |  1.19    Ca    10.0      17 May 2018 08:04  Mg     2.1     05-17        Radiology:  HEALTH ISSUES - R/O PROBLEM Dx:  Chronic alcoholic gastritis without hemorrhage: R/O Chronic alcoholic gastritis without hemorrhage      Assessmant / Problems  1)Difficult to anticoagulate   INR 1.6  Plan:  Coumadin 12 mg tonight  Thank you  Liana Everett MD

## 2018-05-17 NOTE — PROGRESS NOTE ADULT - ASSESSMENT
1) cad s/p mi and pci  -continue rx    2) chronic systolic and diastolic chf - rx as tolerated    3) s/p icd - plan for ep follow-up as outpatient    4) lv thrombus   -Hematology managing    5) pafib - ac  -paroxysmal tachycardia - per ep; remains on bb; gentle hydration    6) h/o gib - gi consulted earlier this admission    7) ckd - monitor    8) current etoh abuse - follow-up Behavioral [facility] recs    9) ppx - ppi and therapeutic ac    10)  K>4, Mg>2  awaiting transfer to [facility] on Dr. Everett's service

## 2018-05-17 NOTE — PROGRESS NOTE ADULT - PROBLEM SELECTOR PLAN 3
Dr. Everett ([facility]) consulted as per Dr. Sheehan.   Platelet count 180 on admission. Platelet count currently 100   HIT AB Positive. Off Heparin .   Platelet count 120s-200s in 1/2018  No PPI as per Dr. Everett due to thrombocytopenia

## 2018-05-17 NOTE — PROGRESS NOTE ADULT - SUBJECTIVE AND OBJECTIVE BOX
[facility] Transfer Accept Note:  Hospital Course:  62 M former smoker and drug abuser current ETOH abuse (2+ pints vodka/day) with PMHx CAD s/p MI 2003, most recent PCI /at [facility] 7/2016 (JOSHUA to mLAD with residual 50 % OM1), chronic systolic CHF (EF 35% by Echo) s/p AICD in 2009 ([employer]) , pafib and LV thrombus (on warfarin, previously on ASA and Plavix only 2/2 to lower GIB 2016), CKD Stage III (Baseline Cr normal 0.90-1.1), with frequent admissions to [facility] for ETOH use/withdrawal and 12/2017 for ICD firing presented with epigastric pain with an episode of nausea and vomiting after one week of binge drinking. He also notes that he stopped taking warfarin during this time period and INR on presentation is sub-therapeutic at 0.99.  He admits to last drink at 10pm on 5/9 and bilateral upper extremity tremors. Denies chest pain, SOB, near/syncope, edema, fever, chills.  He was initially treated with 2L IVF, IV pantoprazole and started on heparin gtt for sub therapeutic INR with known h/o LV thrombus.  Restarted on coumadin 12mg.  On day 2 of admission Plt noted to drop from 180 to 96 with concern for HIT.  Heparin switched to argatroban on 5/12.  IVANNA Negative and PF4 EIA positive.  Patient with one episode of GRBPR on 5/13 and coumadin was held at that time, but resumed the following day bleed attributed to hemorrhoid.  Pt’s last hospitalization at [facility] 1/6-1/26/18 for palpitations and anorexia in setting of alcohol intoxication, workup included colonoscopy without active bleeding, s/p polypectomy x 2, discharged home on warfarin 12mg daily.  He reports a subsequent hospitalization at [facility] sometime after that admission for recurrent alcohol intoxication with withdrawal symptoms, reports abstaining from alcohol from that admission until one week ago when increased family/personal stressors found him starting and then increasing his daily drinking.  Pt is unclear on time frame.         INTERVAL HPI/OVERNIGHT EVENTS:  Patient was seen and examined at bedside.     VITAL SIGNS:  T(C): 36.5 (05-17-18 @ 14:26), Max: 36.7 (05-17-18 @ 01:48)  T(F): 97.7 (05-17-18 @ 14:26), Max: 98 (05-17-18 @ 01:48)  HR: 96 (05-17-18 @ 17:16) (87 - 96)  BP: 102/74 (05-17-18 @ 17:16) (99/59 - 118/77)  BP(mean): --  RR: 16 (05-17-18 @ 17:16) (16 - 18)  SpO2: 98% (05-17-18 @ 17:16) (97% - 98%)  Wt(kg): --    PHYSICAL EXAM:    Constitutional: WDWN resting comfortably in bed; NAD  Head: NC/AT  Eyes: PERRL, EOMI, clear conjunctiva  ENT: no nasal discharge; uvula midline, no oropharyngeal erythema or exudates; MMM  Neck: supple; no JVD or thyromegaly  Respiratory: CTA B/L; no W/R/R, no retractions  Cardiac: +S1/S2; RRR; no M/R/G; PMI non-displaced  Gastrointestinal: soft, NT/ND; no rebound or guarding; +BSx4  Genitourinary: normal external genitalia  Back: spine midline, no bony tenderness or step-offs; no CVAT B/L  Extremities: WWP, no clubbing or cyanosis; no peripheral edema  Musculoskeletal: NROM x4; no joint swelling, tenderness or erythema  Vascular: 2+ radial, femoral, DP/PT pulses B/L  Dermatologic: skin warm, dry and intact; no rashes, wounds, or scars  Lymphatic: no submandibular or cervical LAD  Neurologic: AAOx3; CNII-XII grossly intact; no focal deficits  Psychiatric: affect and characteristics of appearance, verbalizations, behaviors are appropriate    MEDICATIONS  (STANDING):  argatroban Infusion 3 MICROgram(s)/kG/Min (14.022 mL/Hr) IV Continuous <Continuous>  atorvastatin 40 milliGRAM(s) Oral at bedtime  folic acid 1 milliGRAM(s) Oral daily  metoprolol tartrate 50 milliGRAM(s) Oral two times a day  multivitamin 1 Tablet(s) Oral daily  sucralfate 1 Gram(s) Oral four times a day  thiamine 100 milliGRAM(s) Oral <User Schedule>  warfarin 12 milliGRAM(s) Oral once    MEDICATIONS  (PRN):  ondansetron Injectable 4 milliGRAM(s) IV Push every 4 hours PRN Nausea and/or Vomiting  sucralfate suspension 1 Gram(s) Oral four times a day PRN gi upset      Allergies    Capoten (Short breath; Rash; Hives)  penicillin (Short breath; Rash; Hives)    Intolerances        LABS:                        12.6   4.7   )-----------( 184      ( 17 May 2018 08:04 )             38.2     05-17    134<L>  |  93<L>  |  19  ----------------------------<  100<H>  5.4<H>   |  26  |  1.08    Ca    9.8      17 May 2018 11:52  Mg     2.4     05-17      PT/INR - ( 17 May 2018 08:04 )   PT: 19.0 sec;   INR: 1.69          PTT - ( 17 May 2018 08:04 )  PTT:77.1 sec [facility] Transfer Accept Note:  Hospital Course:  62 M former smoker and drug abuser current ETOH abuse (2+ pints vodka/day) with PMHx CAD s/p MI 2003, most recent PCI /at [facility] 7/2016 (JOSHUA to mLAD with residual 50 % OM1), chronic systolic CHF (EF 35% by Echo) s/p AICD in 2009 ([employer]) , pafib and LV thrombus (on warfarin, previously on ASA and Plavix only 2/2 to lower GIB 2016), CKD Stage III (Baseline Cr normal 0.90-1.1), with frequent admissions to [facility] for ETOH use/withdrawal and 12/2017 for ICD firing presented with epigastric pain with an episode of nausea and vomiting after one week of binge drinking. He also notes that he stopped taking warfarin during this time period and INR on presentation is sub-therapeutic at 0.99.  He admits to last drink at 10pm on 5/9 and bilateral upper extremity tremors. Denies chest pain, SOB, near/syncope, edema, fever, chills.  He was initially treated with 2L IVF, IV pantoprazole and started on heparin gtt for sub therapeutic INR with known h/o LV thrombus.  Restarted on coumadin 12mg.  On day 2 of admission Plt noted to drop from 180 to 96 with concern for HIT.  Heparin switched to argatroban on 5/12.  IVANNA Negative and PF4 EIA positive.  Patient with one episode of GRBPR on 5/13 and coumadin was held at that time, but resumed the following day bleed attributed to hemorrhoid.  Pt’s last hospitalization at [facility] 1/6-1/26/18 for palpitations and anorexia in setting of alcohol intoxication, workup included colonoscopy without active bleeding, s/p polypectomy x 2, discharged home on warfarin 12mg daily.  He reports a subsequent hospitalization at [facility] sometime after that admission for recurrent alcohol intoxication with withdrawal symptoms, reports abstaining from alcohol from that admission until one week ago when increased family/personal stressors found him starting and then increasing his daily drinking.  Pt is unclear on time frame.         INTERVAL HPI/OVERNIGHT EVENTS:  Patient was seen and examined at bedside.     VITAL SIGNS:  T(C): 36.5 (05-17-18 @ 14:26), Max: 36.7 (05-17-18 @ 01:48)  T(F): 97.7 (05-17-18 @ 14:26), Max: 98 (05-17-18 @ 01:48)  HR: 96 (05-17-18 @ 17:16) (87 - 96)  BP: 102/74 (05-17-18 @ 17:16) (99/59 - 118/77)  BP(mean): --  RR: 16 (05-17-18 @ 17:16) (16 - 18)  SpO2: 98% (05-17-18 @ 17:16) (97% - 98%)  Wt(kg): --    PHYSICAL EXAM:    Constitutional: WDWN resting comfortably in bed; NAD  Head: NC/AT  Eyes: PERRL, EOMI, clear conjunctiva  ENT: no nasal discharge; uvula midline, no oropharyngeal erythema or exudates; MMM.  Poor dentition.  Neck: supple; no JVD or thyromegaly  Respiratory: CTA B/L; no W/R/R, no retractions  Cardiac: +S1/S2; RRR; no M/R/G  Gastrointestinal: soft, NT/ND; no rebound or guarding; +BSx4  Back: spine midline, no bony tenderness or step-offs; no CVAT B/L  Extremities: WWP, no clubbing or cyanosis; no peripheral edema  Musculoskeletal: NROM x4; no joint swelling, tenderness or erythema  Vascular: 2+ radial, DP/PT pulses B/L  Dermatologic: skin warm, dry and intact; no rashes, wounds, or scars  Lymphatic: no submandibular or cervical LAD  Neurologic: AAOx3; CNII-XII grossly intact; no focal deficits    MEDICATIONS  (STANDING):  argatroban Infusion 3 MICROgram(s)/kG/Min (14.022 mL/Hr) IV Continuous <Continuous>  atorvastatin 40 milliGRAM(s) Oral at bedtime  folic acid 1 milliGRAM(s) Oral daily  metoprolol tartrate 50 milliGRAM(s) Oral two times a day  multivitamin 1 Tablet(s) Oral daily  sucralfate 1 Gram(s) Oral four times a day  thiamine 100 milliGRAM(s) Oral <User Schedule>  warfarin 12 milliGRAM(s) Oral once    MEDICATIONS  (PRN):  ondansetron Injectable 4 milliGRAM(s) IV Push every 4 hours PRN Nausea and/or Vomiting  sucralfate suspension 1 Gram(s) Oral four times a day PRN gi upset      Allergies    Capoten (Short breath; Rash; Hives)  penicillin (Short breath; Rash; Hives)    Intolerances        LABS:                        12.6   4.7   )-----------( 184      ( 17 May 2018 08:04 )             38.2     05-17    134<L>  |  93<L>  |  19  ----------------------------<  100<H>  5.4<H>   |  26  |  1.08    Ca    9.8      17 May 2018 11:52  Mg     2.4     05-17      PT/INR - ( 17 May 2018 08:04 )   PT: 19.0 sec;   INR: 1.69          PTT - ( 17 May 2018 08:04 )  PTT:77.1 sec

## 2018-05-18 ENCOUNTER — TRANSCRIPTION ENCOUNTER (OUTPATIENT)
Age: 63
End: 2018-05-18

## 2018-05-18 LAB
ANION GAP SERPL CALC-SCNC: 10 MMOL/L — SIGNIFICANT CHANGE UP (ref 5–17)
APTT BLD: 24.1 SEC — LOW (ref 27.5–37.4)
APTT BLD: 82.2 SEC — HIGH (ref 27.5–37.4)
BUN SERPL-MCNC: 23 MG/DL — SIGNIFICANT CHANGE UP (ref 7–23)
CALCIUM SERPL-MCNC: 9.6 MG/DL — SIGNIFICANT CHANGE UP (ref 8.4–10.5)
CHLORIDE SERPL-SCNC: 94 MMOL/L — LOW (ref 96–108)
CO2 SERPL-SCNC: 27 MMOL/L — SIGNIFICANT CHANGE UP (ref 22–31)
CREAT SERPL-MCNC: 1.25 MG/DL — SIGNIFICANT CHANGE UP (ref 0.5–1.3)
GLUCOSE SERPL-MCNC: 98 MG/DL — SIGNIFICANT CHANGE UP (ref 70–99)
HCT VFR BLD CALC: 38.4 % — LOW (ref 39–50)
HGB BLD-MCNC: 12.1 G/DL — LOW (ref 13–17)
INR BLD: 1.2 — HIGH (ref 0.88–1.16)
INR BLD: 1.84 — HIGH (ref 0.88–1.16)
MAGNESIUM SERPL-MCNC: 2.2 MG/DL — SIGNIFICANT CHANGE UP (ref 1.6–2.6)
MCHC RBC-ENTMCNC: 24.2 PG — LOW (ref 27–34)
MCHC RBC-ENTMCNC: 31.5 G/DL — LOW (ref 32–36)
MCV RBC AUTO: 77 FL — LOW (ref 80–100)
PHOSPHATE SERPL-MCNC: 4.7 MG/DL — HIGH (ref 2.5–4.5)
PLATELET # BLD AUTO: 226 K/UL — SIGNIFICANT CHANGE UP (ref 150–400)
POTASSIUM SERPL-MCNC: 5.1 MMOL/L — SIGNIFICANT CHANGE UP (ref 3.5–5.3)
POTASSIUM SERPL-SCNC: 5.1 MMOL/L — SIGNIFICANT CHANGE UP (ref 3.5–5.3)
PROTHROM AB SERPL-ACNC: 13.4 SEC — HIGH (ref 9.8–12.7)
PROTHROM AB SERPL-ACNC: 20.7 SEC — HIGH (ref 9.8–12.7)
RBC # BLD: 4.99 M/UL — SIGNIFICANT CHANGE UP (ref 4.2–5.8)
RBC # FLD: 20.5 % — HIGH (ref 10.3–16.9)
SODIUM SERPL-SCNC: 131 MMOL/L — LOW (ref 135–145)
WBC # BLD: 4.5 K/UL — SIGNIFICANT CHANGE UP (ref 3.8–10.5)
WBC # FLD AUTO: 4.5 K/UL — SIGNIFICANT CHANGE UP (ref 3.8–10.5)

## 2018-05-18 RX ORDER — FOLIC ACID 0.8 MG
1 TABLET ORAL
Qty: 0 | Refills: 0 | COMMUNITY
Start: 2018-05-18

## 2018-05-18 RX ORDER — METOPROLOL TARTRATE 50 MG
2 TABLET ORAL
Qty: 0 | Refills: 0 | COMMUNITY

## 2018-05-18 RX ORDER — SUCRALFATE 1 G
1 TABLET ORAL
Qty: 120 | Refills: 0 | OUTPATIENT
Start: 2018-05-18 | End: 2018-06-16

## 2018-05-18 RX ORDER — ATORVASTATIN CALCIUM 80 MG/1
1 TABLET, FILM COATED ORAL
Qty: 0 | Refills: 0 | COMMUNITY
Start: 2018-05-18

## 2018-05-18 RX ORDER — WARFARIN SODIUM 2.5 MG/1
2 TABLET ORAL
Qty: 0 | Refills: 0 | COMMUNITY
Start: 2018-05-18

## 2018-05-18 RX ORDER — SODIUM CHLORIDE 9 MG/ML
250 INJECTION INTRAMUSCULAR; INTRAVENOUS; SUBCUTANEOUS ONCE
Qty: 0 | Refills: 0 | Status: COMPLETED | OUTPATIENT
Start: 2018-05-18 | End: 2018-05-18

## 2018-05-18 RX ORDER — FONDAPARINUX SODIUM 2.5 MG/.5ML
7.5 INJECTION, SOLUTION SUBCUTANEOUS DAILY
Qty: 0 | Refills: 0 | Status: DISCONTINUED | OUTPATIENT
Start: 2018-05-18 | End: 2018-05-22

## 2018-05-18 RX ORDER — WARFARIN SODIUM 2.5 MG/1
14 TABLET ORAL ONCE
Qty: 0 | Refills: 0 | Status: COMPLETED | OUTPATIENT
Start: 2018-05-18 | End: 2018-05-18

## 2018-05-18 RX ORDER — WARFARIN SODIUM 2.5 MG/1
12 TABLET ORAL ONCE
Qty: 0 | Refills: 0 | Status: DISCONTINUED | OUTPATIENT
Start: 2018-05-18 | End: 2018-05-18

## 2018-05-18 RX ORDER — METOPROLOL TARTRATE 50 MG
1 TABLET ORAL
Qty: 0 | Refills: 0 | COMMUNITY
Start: 2018-05-18

## 2018-05-18 RX ORDER — THIAMINE MONONITRATE (VIT B1) 100 MG
1 TABLET ORAL
Qty: 60 | Refills: 0 | OUTPATIENT
Start: 2018-05-18 | End: 2018-06-16

## 2018-05-18 RX ORDER — MAGNESIUM OXIDE 400 MG ORAL TABLET 241.3 MG
1 TABLET ORAL
Qty: 0 | Refills: 0 | COMMUNITY

## 2018-05-18 RX ADMIN — FONDAPARINUX SODIUM 7.5 MILLIGRAM(S): 2.5 INJECTION, SOLUTION SUBCUTANEOUS at 17:04

## 2018-05-18 RX ADMIN — SODIUM CHLORIDE 500 MILLILITER(S): 9 INJECTION INTRAMUSCULAR; INTRAVENOUS; SUBCUTANEOUS at 14:34

## 2018-05-18 RX ADMIN — Medication 100 MILLIGRAM(S): at 05:48

## 2018-05-18 RX ADMIN — Medication 100 MILLIGRAM(S): at 13:00

## 2018-05-18 RX ADMIN — ARGATROBAN 14.02 MICROGRAM(S)/KG/MIN: 50 INJECTION, SOLUTION INTRAVENOUS at 09:49

## 2018-05-18 RX ADMIN — Medication 100 MILLIGRAM(S): at 22:23

## 2018-05-18 RX ADMIN — Medication 1 TABLET(S): at 12:57

## 2018-05-18 RX ADMIN — ARGATROBAN 14.02 MICROGRAM(S)/KG/MIN: 50 INJECTION, SOLUTION INTRAVENOUS at 04:10

## 2018-05-18 RX ADMIN — ATORVASTATIN CALCIUM 40 MILLIGRAM(S): 80 TABLET, FILM COATED ORAL at 22:23

## 2018-05-18 RX ADMIN — WARFARIN SODIUM 14 MILLIGRAM(S): 2.5 TABLET ORAL at 22:23

## 2018-05-18 RX ADMIN — Medication 50 MILLIGRAM(S): at 17:05

## 2018-05-18 RX ADMIN — Medication 1 GRAM(S): at 12:57

## 2018-05-18 RX ADMIN — ARGATROBAN 14.02 MICROGRAM(S)/KG/MIN: 50 INJECTION, SOLUTION INTRAVENOUS at 01:30

## 2018-05-18 RX ADMIN — Medication 1 MILLIGRAM(S): at 12:57

## 2018-05-18 RX ADMIN — Medication 1 GRAM(S): at 07:22

## 2018-05-18 RX ADMIN — Medication 50 MILLIGRAM(S): at 05:48

## 2018-05-18 RX ADMIN — Medication 1 GRAM(S): at 17:05

## 2018-05-18 NOTE — DISCHARGE NOTE ADULT - MEDICATION SUMMARY - MEDICATIONS TO STOP TAKING
I will STOP taking the medications listed below when I get home from the hospital:    magnesium oxide 400 mg (240 mg elemental magnesium) oral tablet  -- 1 tab(s) by mouth once a day    Librium 10 mg oral capsule  -- 1 cap(s) by mouth 4 times a day, As Needed

## 2018-05-18 NOTE — DISCHARGE NOTE ADULT - MEDICATION SUMMARY - MEDICATIONS TO CHANGE
I will SWITCH the dose or number of times a day I take the medications listed below when I get home from the hospital:    Jantoven 6 mg oral tablet  -- 2 tab(s) by mouth once a day (at bedtime)   -- Do not take this drug if you are pregnant.  It is very important that you take or use this exactly as directed.  Do not skip doses or discontinue unless directed by your doctor.  Obtain medical advice before taking any non-prescription drugs as some may affect the action of this medication.

## 2018-05-18 NOTE — DISCHARGE NOTE ADULT - SECONDARY DIAGNOSIS.
Thrombus in heart chamber Thrombocytopenia Paroxysmal A-fib ETOH abuse Abnormal ECG Myocardial infarction involving other coronary artery

## 2018-05-18 NOTE — PROGRESS NOTE ADULT - SUBJECTIVE AND OBJECTIVE BOX
Cardiology for Mildred  CC: Follow-up Cardiology evaluation and management of cad s/p mi and pci, systolic chf, s/p aicd, pafib, and LV thrombus    interval - no cp;  -per patient, symptoms during prior mi included substernal chest pain and "neck tightness" - patient denied both of these symptoms at anytime during this hospitalization or recently  -note mild dizziness yesterday when standing; resolved when he sat down - no near-syncope, syncope, or fall    PAST MEDICAL & SURGICAL HISTORY:  Thrombus in heart chamber: LV  ICD (implantable cardioverter-defibrillator) in place  Pacemaker  ETOH abuse  Paroxysmal a-fib  Myocardial infarction involving other coronary artery  Gastritis  Atherosclerosis of coronary artery: Coronary artery disease  Automatic implantable cardiac defibrillator in situ: ICD (implantable cardioverter-defibrillator) in place    MEDICATIONS  (STANDING):  argatroban Infusion 3 MICROgram(s)/kG/Min (14.022 mL/Hr) IV Continuous <Continuous>  atorvastatin 40 milliGRAM(s) Oral at bedtime  folic acid 1 milliGRAM(s) Oral daily  metoprolol tartrate 50 milliGRAM(s) Oral two times a day  multivitamin 1 Tablet(s) Oral daily  sucralfate 1 Gram(s) Oral four times a day  thiamine 100 milliGRAM(s) Oral <User Schedule>  warfarin 12 milliGRAM(s) Oral once    MEDICATIONS  (PRN):  ondansetron Injectable 4 milliGRAM(s) IV Push every 4 hours PRN Nausea and/or Vomiting  sucralfate suspension 1 Gram(s) Oral four times a day PRN gi upset      Vital Signs Last 24 Hrs  T(C): 36.8 (18 May 2018 09:04), Max: 37 (18 May 2018 05:21)  T(F): 98.2 (18 May 2018 09:04), Max: 98.6 (18 May 2018 05:21)  HR: 73 (18 May 2018 09:04) (73 - 96)  BP: 126/71 (18 May 2018 09:04) (102/74 - 126/71)  BP(mean): --  RR: 18 (18 May 2018 09:04) (16 - 18)  SpO2: 96% (18 May 2018 09:04) (96% - 99%)    PHYSICAL EXAM:  Constitutional: conversant; supine in bed    heent - mmm    Respiratory: lung fields clear    Cardiovascular: rr; s1/s2 present; no harsh murmur    Gastrointestinal: soft abd; nt    Extremities: no edema    Vascular: le and ue pulses present    Neurological: grossly nonfocal    Psychiatric: appropriate    I&O's Detail    17 May 2018 07:01  -  18 May 2018 07:00  --------------------------------------------------------  IN:    argatroban Infusion: 154 mL    Oral Fluid: 660 mL  Total IN: 814 mL    OUT:    Voided: 825 mL  Total OUT: 825 mL    Total NET: -11 mL          LABS:                                   12.1   4.5   )-----------( 226      ( 18 May 2018 08:02 )             38.4   05-18    131<L>  |  94<L>  |  23  ----------------------------<  98  5.1   |  27  |  1.25    Ca    9.6      18 May 2018 08:02  Phos  4.7     05-18  Mg     2.2     05-18    ECG's in [facility] since 1-2018 reviewed    I&O's Summary    17 May 2018 07:01  -  18 May 2018 07:00  --------------------------------------------------------  IN: 814 mL / OUT: 825 mL / NET: -11 mL          RADIOLOGY & ADDITIONAL STUDIES:      < from: TTE Echo w/Cont Complete (05.11.18 @ 13:27) >  EXAM:  ECHOCARDIOGRAM W CONTRAST                          PROCEDURE DATE:  05/11/2018                        INTERPRETATION:  Patient Height: 175.0 cm  Patient Weight: 78.0 kg  Heart Rate: 67 bpm  Systolic Pressure: 117 mmHg  Diastolic Pressure: 75mmHg  BSA: 1.9 m^2  Interpretation Summary  There is mild concentric left ventricular hypertrophy.Moderate segmental   left   ventricular systolic dysfunction with akinesis of mid to distal   anteroseptum   and all apical segments. There is a large thrombus measuring 1.9 x 1.8 cm   noted in the apex.  The left ventricular ejection fraction is 35%.The   right   ventricle is normal in size and function.A device wire is seen in the   right   ventricle.The left atrial size is normal.The mitral inflowpattern is   consistent with impaired left ventricular relaxation with mildly   elevated left   atrial pressure (8-14mmHg).  Right atrial size is normal.No evidence for   any   hemodynamically significant valvular disease.The pulmonary artery   systolic   pressure is estimated to be 32 mmHg.There is no pericardial   effusion.There has   been no significant interval change when compared to the echo from 1/8/18    < end of copied text >

## 2018-05-18 NOTE — DISCHARGE NOTE ADULT - HOSPITAL COURSE
62 M former smoker and drug abuser current ETOH abuse (2+ pints vodka/day) with PMHx CAD s/p MI 2003, most recent PCI /at [facility] 7/2016 (JOSHUA to mLAD with residual 50 % OM1), chronic systolic CHF (EF 35% by Echo) s/p AICD in 2009 ([employer]) , pafib and LV thrombus (on warfarin, previously on ASA and Plavix only 2/2 to lower GIB 2016), CKD Stage III (Baseline Cr normal 0.90-1.1), with frequent admissions to [facility] for ETOH use/withdrawal and 12/2017 for ICD firing presented with epigastric pain with an episode of nausea and vomiting after one week of binge drinking. He also notes that he stopped taking warfarin during this time period and INR on presentation is sub-therapeutic at 0.99.  He admits to last drink at 10pm on 5/9 and bilateral upper extremity tremors. Denies chest pain, SOB, near/syncope, edema, fever, chills.  He was initially treated with 2L IVF, IV pantoprazole and started on heparin gtt for sub therapeutic INR with known h/o LV thrombus.  Restarted on coumadin 12mg.  On day 2 of admission Plt noted to drop from 180 to 96 with concern for HIT.  Heparin switched to argatroban on 5/12.  IVANNA Negative and PF4 EIA positive.  Patient with one episode of GRBPR on 5/13 and coumadin was held at that time, but resumed the following day bleed attributed to hemorrhoid.  Pt’s last hospitalization at [facility] 1/6-1/26/18 for palpitations and anorexia in setting of alcohol intoxication, workup included colonoscopy without active bleeding, s/p polypectomy x 2, discharged home on warfarin 12mg daily.  He reports a subsequent hospitalization at [facility] sometime after that admission for recurrent alcohol intoxication with withdrawal symptoms, reports abstaining from alcohol from that admission until one week prior to admission when increased family/personal stressors found him starting and then increasing his daily drinking.  Pt is unclear on time frame.  During your hospital stay, patient was noted to have ST-segment elevations in V1-V3 with no elevation in troponin T and no symptoms of chest tightness/pain or symptoms typical of your previous myocardial infarctions.  Previous ECGs were noted to have similar changes. 62 M former smoker and drug abuser current ETOH abuse (2+ pints vodka/day) with PMHx CAD s/p MI 2003, most recent PCI /at [facility] 7/2016 (JOSHUA to mLAD with residual 50 % OM1), chronic systolic CHF (EF 35% by Echo) s/p AICD in 2009 ([employer]) , pafib and LV thrombus (on warfarin, previously on ASA and Plavix only 2/2 to lower GIB 2016), CKD Stage III (Baseline Cr normal 0.90-1.1), with frequent admissions to [facility] for ETOH use/withdrawal and 12/2017 for ICD firing presented with epigastric pain with an episode of nausea and vomiting after one week of binge drinking. He also notes that he stopped taking warfarin during this time period and INR on presentation is sub-therapeutic at 0.99.  He admits to last drink at 10pm on 5/9 and bilateral upper extremity tremors. Denies chest pain, SOB, near/syncope, edema, fever, chills.  He was initially treated with 2L IVF, IV pantoprazole and started on heparin gtt for sub therapeutic INR with known h/o LV thrombus.  Restarted on coumadin 12mg.  On day 2 of admission Plt noted to drop from 180 to 96 with concern for HIT.  Heparin switched to argatroban on 5/12.  IVANNA Negative and PF4 EIA positive.  Patient with one episode of GRBPR on 5/13 and coumadin was held at that time, but resumed the following day bleed attributed to hemorrhoid.  Pt’s last hospitalization at [facility] 1/6-1/26/18 for palpitations and anorexia in setting of alcohol intoxication, workup included colonoscopy without active bleeding, s/p polypectomy x 2, discharged home on warfarin 12mg daily.  He reports a subsequent hospitalization at [facility] sometime after that admission for recurrent alcohol intoxication with withdrawal symptoms, reports abstaining from alcohol from that admission until one week prior to admission when increased family/personal stressors found him starting and then increasing his daily drinking.  Pt is unclear on time frame.  During your hospital stay, patient was noted to have ST-segment elevations in V1-V3 with no elevation in troponin T and no symptoms of chest tightness/pain or symptoms typical of your previous myocardial infarctions.  Previous ECGs were noted to have similar changes.  INR improving and patient was sent home with alternating coumadin dosing of 15mg and 17mg with outpatient follow up with cardiologist on 5/24 for INR check and medication optimization.

## 2018-05-18 NOTE — DISCHARGE NOTE ADULT - PATIENT PORTAL LINK FT
You can access the [facility], offered by [facility], by registering with the following website: [email or web address]

## 2018-05-18 NOTE — DISCHARGE NOTE ADULT - PLAN OF CARE
Achievement of therapeutic INR When you arrived at the hospital you were noted to have a low INR (subtherapeutic).  You were started on anticoagulation which included your coumadin.  Your current dose if 12mg once per day.  Your INR is now therapeutic and you are ready for discharge.  You made an appointment with Dr. Belcher on Monday 5/21 at 12:30pm.  You should have your INR checked at that time.  You should also follow up with your cardiologist Dr. Levy on Monday 5/28 or sooner if you need your INR checked. You have a thrombus in your heart.  During this admission it measured 1.9 x 1.8cm.  It is very important that you continue to take coumadin as directed in order to prevent this thrombus from becoming larger.  Please follow up with your primary care provider routinely in order to have your INR checked and coumadin dose adjusted accordingly.  Please maintain a consistent diet, as large changes in the foods you eat may have an impact on your INR.  You may follow up with Dr. Everett in the future.  Her office contact information is in this document. You were found to have very low platelets during your admission.  This was due to a reaction to heparin.  This is a condition known as heparin induced thrombocytopenia.  You should not have heparin in the future.  Your platelet count on discharge was 226 which is within the normal range. You have a history of paroxsmal atrial fibrillation.  Please continue to take your coumadin as directed.  Continue to take metoprolol 50mg twice a day. You have a history of alcohol abuse.  This has caused you to have anemia.  We are sending you home with folic acid and thiamine which you should continue to take.  Please follow up with your primary care provider for further management and refrain from alcohol. You have abnormal findings on your ECG which are likely due to multiple factors including your history of myocardial infarction, stent, and ICD.  When you had myocardial infarctions (MI) in the past, you described symptoms of mid-epigastric discomfort and "neck tightness."  You did not have any of these symptoms during this admission or any findings of chest pain or chest discomfort. Please follow up with your cardiologist after discharge. You have a history of myocardial infarction.  Your lisinopril was stopped during your hospitalization because your blood pressure was too low.  It may be resumed during your appointment on 5/21. When you arrived at the hospital you were noted to have a low INR (subtherapeutic).  You were started on anticoagulation which included your coumadin.  Your current dose is alternating 15mg every other day and 17mg per day on days in between. We are also sending you home with fondaparinux which you should inject daily until your INR is therapeutic.  You made an appointment with Dr. Belcher on Thursday 5/24.  You should have your INR checked at that time. You were found to have very low platelets during your admission.  This was due to a reaction to heparin.  This is a condition known as heparin induced thrombocytopenia.  You should not have heparin in the future.  Your platelet count on discharge was 230 which is within the normal range. You have a history of myocardial infarction.  Your lisinopril was held during your hospitalization because your blood pressure was too low.  You can resume your medication after discharge as your blood pressure has now improved.  Please follow up with your cardiologist for further blood pressure management. When you arrived at the hospital you were noted to have a low INR (subtherapeutic).  You were started on anticoagulation which included your coumadin.  Your current dose is alternating 15mg every other day and 17mg per day on days in between. We are also sending you home with fondaparinux which you should inject daily until your hematologist states that you no longer need this medication.  You made an appointment with Dr. Belcher on Thursday 5/24.  You should have your INR checked at that time. You have a thrombus in your heart.  During this admission it measured 1.9 x 1.8cm.  It is very important that you continue to take coumadin as directed in order to prevent this thrombus from becoming larger.  Please follow up with your primary care provider routinely in order to have your INR checked and coumadin dose adjusted accordingly.  Please maintain a consistent diet, as large changes in the foods you eat may have an impact on your INR.  You may follow up with Dr. Everett in the future.  Her office contact information is in this document.  Please return to the hospital if you experience sudden onset chest pain, shortness of breath, or symptoms similar to what you experienced when you had a myocardial infarction in the past. You have a history of alcohol abuse.  This has caused you to have anemia.  We are sending you home with folic acid and thiamine which you should continue to take for 2 weeks after discharge.  Please follow up with your primary care provider for further management and refrain from alcohol.

## 2018-05-18 NOTE — DISCHARGE NOTE ADULT - CARE PLAN
Principal Discharge DX:	Subtherapeutic international normalized ratio (INR)  Goal:	Achievement of therapeutic INR  Assessment and plan of treatment:	When you arrived at the hospital you were noted to have a low INR (subtherapeutic).  You were started on anticoagulation which included your coumadin.  Your current dose if 12mg once per day.  Your INR is now therapeutic and you are ready for discharge.  You made an appointment with Dr. Belcher on Monday 5/21 at 12:30pm.  You should have your INR checked at that time.  You should also follow up with your cardiologist Dr. Levy on Monday 5/28 or sooner if you need your INR checked.  Secondary Diagnosis:	Thrombus in heart chamber  Assessment and plan of treatment:	You have a thrombus in your heart.  During this admission it measured 1.9 x 1.8cm.  It is very important that you continue to take coumadin as directed in order to prevent this thrombus from becoming larger.  Please follow up with your primary care provider routinely in order to have your INR checked and coumadin dose adjusted accordingly.  Please maintain a consistent diet, as large changes in the foods you eat may have an impact on your INR.  You may follow up with Dr. Everett in the future.  Her office contact information is in this document.  Secondary Diagnosis:	Thrombocytopenia  Assessment and plan of treatment:	You were found to have very low platelets during your admission.  This was due to a reaction to heparin.  This is a condition known as heparin induced thrombocytopenia.  You should not have heparin in the future.  Your platelet count on discharge was 226 which is within the normal range.  Secondary Diagnosis:	Paroxysmal A-fib  Assessment and plan of treatment:	You have a history of paroxsmal atrial fibrillation.  Please continue to take your coumadin as directed.  Continue to take metoprolol 50mg twice a day.  Secondary Diagnosis:	ETOH abuse  Assessment and plan of treatment:	You have a history of alcohol abuse.  This has caused you to have anemia.  We are sending you home with folic acid and thiamine which you should continue to take.  Please follow up with your primary care provider for further management and refrain from alcohol.  Secondary Diagnosis:	Abnormal ECG  Assessment and plan of treatment:	You have abnormal findings on your ECG which are likely due to multiple factors including your history of myocardial infarction, stent, and ICD.  When you had myocardial infarctions (MI) in the past, you described symptoms of mid-epigastric discomfort and "neck tightness."  You did not have any of these symptoms during this admission or any findings of chest pain or chest discomfort. Please follow up with your cardiologist after discharge.  Secondary Diagnosis:	Myocardial infarction involving other coronary artery  Assessment and plan of treatment:	You have a history of myocardial infarction.  Your lisinopril was stopped during your hospitalization because your blood pressure was too low.  It may be resumed during your appointment on 5/21. Principal Discharge DX:	Subtherapeutic international normalized ratio (INR)  Goal:	Achievement of therapeutic INR  Assessment and plan of treatment:	When you arrived at the hospital you were noted to have a low INR (subtherapeutic).  You were started on anticoagulation which included your coumadin.  Your current dose is alternating 15mg every other day and 17mg per day on days in between. We are also sending you home with fondaparinux which you should inject daily until your INR is therapeutic.  You made an appointment with Dr. Belcher on Thursday 5/24.  You should have your INR checked at that time.  Secondary Diagnosis:	Thrombus in heart chamber  Assessment and plan of treatment:	You have a thrombus in your heart.  During this admission it measured 1.9 x 1.8cm.  It is very important that you continue to take coumadin as directed in order to prevent this thrombus from becoming larger.  Please follow up with your primary care provider routinely in order to have your INR checked and coumadin dose adjusted accordingly.  Please maintain a consistent diet, as large changes in the foods you eat may have an impact on your INR.  You may follow up with Dr. Everett in the future.  Her office contact information is in this document.  Secondary Diagnosis:	Thrombocytopenia  Assessment and plan of treatment:	You were found to have very low platelets during your admission.  This was due to a reaction to heparin.  This is a condition known as heparin induced thrombocytopenia.  You should not have heparin in the future.  Your platelet count on discharge was 230 which is within the normal range.  Secondary Diagnosis:	Paroxysmal A-fib  Assessment and plan of treatment:	You have a history of paroxsmal atrial fibrillation.  Please continue to take your coumadin as directed.  Continue to take metoprolol 50mg twice a day.  Secondary Diagnosis:	ETOH abuse  Assessment and plan of treatment:	You have a history of alcohol abuse.  This has caused you to have anemia.  We are sending you home with folic acid and thiamine which you should continue to take.  Please follow up with your primary care provider for further management and refrain from alcohol.  Secondary Diagnosis:	Abnormal ECG  Assessment and plan of treatment:	You have abnormal findings on your ECG which are likely due to multiple factors including your history of myocardial infarction, stent, and ICD.  When you had myocardial infarctions (MI) in the past, you described symptoms of mid-epigastric discomfort and "neck tightness."  You did not have any of these symptoms during this admission or any findings of chest pain or chest discomfort. Please follow up with your cardiologist after discharge.  Secondary Diagnosis:	Myocardial infarction involving other coronary artery  Assessment and plan of treatment:	You have a history of myocardial infarction.  Your lisinopril was held during your hospitalization because your blood pressure was too low.  You can resume your medication after discharge as your blood pressure has now improved.  Please follow up with your cardiologist for further blood pressure management. Principal Discharge DX:	Subtherapeutic international normalized ratio (INR)  Goal:	Achievement of therapeutic INR  Assessment and plan of treatment:	When you arrived at the hospital you were noted to have a low INR (subtherapeutic).  You were started on anticoagulation which included your coumadin.  Your current dose is alternating 15mg every other day and 17mg per day on days in between. We are also sending you home with fondaparinux which you should inject daily until your hematologist states that you no longer need this medication.  You made an appointment with Dr. Belcher on Thursday 5/24.  You should have your INR checked at that time.  Secondary Diagnosis:	Thrombus in heart chamber  Assessment and plan of treatment:	You have a thrombus in your heart.  During this admission it measured 1.9 x 1.8cm.  It is very important that you continue to take coumadin as directed in order to prevent this thrombus from becoming larger.  Please follow up with your primary care provider routinely in order to have your INR checked and coumadin dose adjusted accordingly.  Please maintain a consistent diet, as large changes in the foods you eat may have an impact on your INR.  You may follow up with Dr. Everett in the future.  Her office contact information is in this document.  Please return to the hospital if you experience sudden onset chest pain, shortness of breath, or symptoms similar to what you experienced when you had a myocardial infarction in the past.  Secondary Diagnosis:	Thrombocytopenia  Assessment and plan of treatment:	You were found to have very low platelets during your admission.  This was due to a reaction to heparin.  This is a condition known as heparin induced thrombocytopenia.  You should not have heparin in the future.  Your platelet count on discharge was 230 which is within the normal range.  Secondary Diagnosis:	Paroxysmal A-fib  Assessment and plan of treatment:	You have a history of paroxsmal atrial fibrillation.  Please continue to take your coumadin as directed.  Continue to take metoprolol 50mg twice a day.  Secondary Diagnosis:	ETOH abuse  Assessment and plan of treatment:	You have a history of alcohol abuse.  This has caused you to have anemia.  We are sending you home with folic acid and thiamine which you should continue to take.  Please follow up with your primary care provider for further management and refrain from alcohol.  Secondary Diagnosis:	Abnormal ECG  Assessment and plan of treatment:	You have abnormal findings on your ECG which are likely due to multiple factors including your history of myocardial infarction, stent, and ICD.  When you had myocardial infarctions (MI) in the past, you described symptoms of mid-epigastric discomfort and "neck tightness."  You did not have any of these symptoms during this admission or any findings of chest pain or chest discomfort. Please follow up with your cardiologist after discharge.  Secondary Diagnosis:	Myocardial infarction involving other coronary artery  Assessment and plan of treatment:	You have a history of myocardial infarction.  Your lisinopril was held during your hospitalization because your blood pressure was too low.  You can resume your medication after discharge as your blood pressure has now improved.  Please follow up with your cardiologist for further blood pressure management. Principal Discharge DX:	Subtherapeutic international normalized ratio (INR)  Goal:	Achievement of therapeutic INR  Assessment and plan of treatment:	When you arrived at the hospital you were noted to have a low INR (subtherapeutic).  You were started on anticoagulation which included your coumadin.  Your current dose is alternating 15mg every other day and 17mg per day on days in between. We are also sending you home with fondaparinux which you should inject daily until your hematologist states that you no longer need this medication.  You made an appointment with Dr. Belcher on Thursday 5/24.  You should have your INR checked at that time.  Secondary Diagnosis:	Thrombus in heart chamber  Assessment and plan of treatment:	You have a thrombus in your heart.  During this admission it measured 1.9 x 1.8cm.  It is very important that you continue to take coumadin as directed in order to prevent this thrombus from becoming larger.  Please follow up with your primary care provider routinely in order to have your INR checked and coumadin dose adjusted accordingly.  Please maintain a consistent diet, as large changes in the foods you eat may have an impact on your INR.  You may follow up with Dr. Everett in the future.  Her office contact information is in this document.  Please return to the hospital if you experience sudden onset chest pain, shortness of breath, or symptoms similar to what you experienced when you had a myocardial infarction in the past.  Secondary Diagnosis:	Thrombocytopenia  Assessment and plan of treatment:	You were found to have very low platelets during your admission.  This was due to a reaction to heparin.  This is a condition known as heparin induced thrombocytopenia.  You should not have heparin in the future.  Your platelet count on discharge was 230 which is within the normal range.  Secondary Diagnosis:	Paroxysmal A-fib  Assessment and plan of treatment:	You have a history of paroxsmal atrial fibrillation.  Please continue to take your coumadin as directed.  Continue to take metoprolol 50mg twice a day.  Secondary Diagnosis:	ETOH abuse  Assessment and plan of treatment:	You have a history of alcohol abuse.  This has caused you to have anemia.  We are sending you home with folic acid and thiamine which you should continue to take for 2 weeks after discharge.  Please follow up with your primary care provider for further management and refrain from alcohol.  Secondary Diagnosis:	Abnormal ECG  Assessment and plan of treatment:	You have abnormal findings on your ECG which are likely due to multiple factors including your history of myocardial infarction, stent, and ICD.  When you had myocardial infarctions (MI) in the past, you described symptoms of mid-epigastric discomfort and "neck tightness."  You did not have any of these symptoms during this admission or any findings of chest pain or chest discomfort. Please follow up with your cardiologist after discharge.  Secondary Diagnosis:	Myocardial infarction involving other coronary artery  Assessment and plan of treatment:	You have a history of myocardial infarction.  Your lisinopril was held during your hospitalization because your blood pressure was too low.  You can resume your medication after discharge as your blood pressure has now improved.  Please follow up with your cardiologist for further blood pressure management.

## 2018-05-18 NOTE — PROGRESS NOTE ADULT - PROBLEM SELECTOR PLAN 1
Patient with subtherapeutic INR on admission 2/2 to medication non-adherence.  Initially started on heparin gtt as coumadin was resumed, but found to have drop in platelets from 180 to 96 5/11-5/12, switched to argatroban and now on fondaparinux.  s/p Coumadin 12mg 5/10/18, 5/11/18, 5/12/18, Coumadin 7mg given on 5/14/18, 6mg on 5/15/18 and 12mg on 5/16/18.   -Coumadin 14 mg tonight per Dr. Everett.  - Daily PTT and INR  - Goal INR: 1.7-1.8

## 2018-05-18 NOTE — PROGRESS NOTE ADULT - PROBLEM SELECTOR PLAN 3
-Last drink 5/9  -ETOH level negative  -CIWA protocol with folate/MVI/Thiamine and Ativan PRN (score 8 or higher)  Patient agreeable/wants to quit  Pt recommended for alcohol rehab

## 2018-05-18 NOTE — DISCHARGE NOTE ADULT - CARE PROVIDER_API CALL
Liana Everett), Medicine  [address]  [address] [place] [address]  Phone: [phone]  Fax: [phone]    True Belcher), Internal Medicine  [address] [address]  Phone: [phone]  Fax: [phone]

## 2018-05-18 NOTE — PROGRESS NOTE ADULT - ASSESSMENT
61 y/o M Ex-Smoker, Drug Abuser, ETOH Abuser, w/ PMHX CAD/MI/PCI, Chronic Systolic CHF EF 35%, s/p ICD, paroxysmal Afib and LV Thromubus, h/o GI bleeding, frequent admissions for ETOH intoxication/withdrawal presents after binge drinking for 1 week with c/o n/v and epigastric pain, noted to have sub-therapeutic INR (INR 0.9)

## 2018-05-18 NOTE — PROGRESS NOTE ADULT - ASSESSMENT
1) cad s/p mi and pci  -no cp; no recurrence of symptoms from prior mi  -abnormal ecg - similar to 1-2018; tte unchanged since 1-2018  -patent lad stent at cath 1-2018; otherwise nonobstructive  -not on asa with h/o gib per Hematology    2) chronic systolic and diastolic chf  -lisinopril currently held with hypotension and mild self-limited dizziness which has now resolved  -Dr. Belcher *covering outpatient for Dr. Levy) to re-evaluate on Monday May 21 and consider re-starting ACE-inhibitor at that time  -Check orthostatic vital signs prior to possible discharge - call me if abnormal - discussed with Housestaff    3) s/p icd - ep follow-up per EP Consult instructions    4) lv thrombus   -Hematology for ac plan    5) pafib - ac  -paroxysmal tachycardia - Dr. Belcher aware and will follow-up as outpatient    6) h/o gib - follow-up with GI    7) ckd - rec outpatient follow-up    8) current etoh abuse - rec etoh cessation - patient aware    9) ppx - ppi and therapeutic ac    10)  K>4, Mg>2  -Cardiology follow-up with Ye on 5-  -Dr. Belcher aware of issues and plan  -Case discussed at bedside with Dr. Celaya (Housestaff)  -case discussed with Dr. Everett

## 2018-05-18 NOTE — DISCHARGE NOTE ADULT - MEDICATION SUMMARY - MEDICATIONS TO TAKE
I will START or STAY ON the medications listed below when I get home from the hospital:    lisinopril 5 mg oral tablet  -- 1 tab(s) by mouth once a day   -- Do not take this drug if you are pregnant.  It is very important that you take or use this exactly as directed.  Do not skip doses or discontinue unless directed by your doctor.  Some non-prescription drugs may aggravate your condition.  Read all labels carefully.  If a warning appears, check with your doctor before taking.    -- Indication: For Hypertension    fondaparinux 7.5 mg/0.6 mL subcutaneous solution  -- 7.5 milligram(s) subcutaneously once a day   -- It is very important that you take or use this exactly as directed.  Do not skip doses or discontinue unless directed by your doctor.    -- Indication: For Thrombus in heart chamber    warfarin 5 mg oral tablet  -- 3 tab(s) by mouth once a day (at bedtime)   -- Do not take this drug if you are pregnant.  It is very important that you take or use this exactly as directed.  Do not skip doses or discontinue unless directed by your doctor.  Obtain medical advice before taking any non-prescription drugs as some may affect the action of this medication.    -- Indication: For Thrombus in heart chamber    warfarin 2 mg oral tablet  -- 1 tab(s) by mouth every other day   -- Do not take this drug if you are pregnant.  It is very important that you take or use this exactly as directed.  Do not skip doses or discontinue unless directed by your doctor.  Obtain medical advice before taking any non-prescription drugs as some may affect the action of this medication.    -- Indication: For Thrombus in heart chamber    atorvastatin 40 mg oral tablet  -- 1 tab(s) by mouth once a day (at bedtime)  -- Indication: For Hyperlipidemia    metoprolol tartrate 50 mg oral tablet  -- 1 tab(s) by mouth 2 times a day  -- Indication: For Hypertension    folic acid 1 mg oral tablet  -- 1 tab(s) by mouth once a day  -- Indication: For Alcohol use disorder    thiamine 100 mg oral tablet  -- 1 tab(s) by mouth once a day x 14 days  x 30 days   -- Indication: For Alcohol use disorder

## 2018-05-18 NOTE — PROGRESS NOTE ADULT - SUBJECTIVE AND OBJECTIVE BOX
HPI:  This is a 63yo M former smoker and drug abuser current ETOH abuser (2+ pints vodka day) with PMHx CAD s/p MI 2003, most recent PCI /at [facility] 7/2016 (JOSHUA to mLAD with residual 50 % OM1), chronic systolic CHF (EF 35% by Echo) s/p AICD in 2009 ([employer]) , pafib and LV thrombus (on warfarin, previously on ASA and Plavix only 2/2 to lower GIB 2016), CKD Stage III (Baseline Cr normal 0.90-1.1), with frequent admissions to [facility] for ETOH use/withdrawal and 12/2017 for ICD firing. Pt’s last hospitalization at [facility] 1/6-1/26/18 for palpitations and anorexia in setting of alcohol intoxication, workup included colonoscopy without active bleeding, s/p polypectomy x 2, discharged home on warfarin 12mg daily.  He reports a subsequent hospitalization at [facility] sometime after that admission for recurrent alcohol intoxication with withdrawal symptoms, reports abstaining from alcohol from that admission until one week ago when increased family/personal stressors found him starting and then increasing his daily drinking.  Pt is unclear on time frame.    Pt presents today with epigastric pain with an episode of nausea and vomiting after one week of binge drinking.  He also notes that he stopped taking warfarin during this time period and INR on presentation is sub-therapeutic at 0.99.  He admits to last drink at 10pm on 5/9 and bilateral upper extremity tremors. Denies chest pain, SOB, near/syncope, edema, fever, chills.  He was treated with 2L IVF, IV pantoprazole and started on heparin gtt for sub therapeutic INR with known h/o LV thrombus. (11 May 2018 00:51)    FAMILY HISTORY:  No pertinent family history in first degree relatives    MEDICATIONS  (STANDING):  atorvastatin 40 milliGRAM(s) Oral at bedtime  folic acid 1 milliGRAM(s) Oral daily  fondaparinux Injectable 7.5 milliGRAM(s) SubCutaneous daily  metoprolol tartrate 50 milliGRAM(s) Oral two times a day  multivitamin 1 Tablet(s) Oral daily  sucralfate 1 Gram(s) Oral four times a day  thiamine 100 milliGRAM(s) Oral <User Schedule>  warfarin 14 milliGRAM(s) Oral once    MEDICATIONS  (PRN):  ondansetron Injectable 4 milliGRAM(s) IV Push every 4 hours PRN Nausea and/or Vomiting    Vital Signs Last 24 Hrs  T(C): 37.2 (18 May 2018 16:00), Max: 37.2 (18 May 2018 16:00)  T(F): 98.9 (18 May 2018 16:00), Max: 98.9 (18 May 2018 16:00)  HR: 95 (18 May 2018 16:00) (73 - 95)  BP: 102/71 (18 May 2018 16:00) (102/71 - 126/71)  BP(mean): --  RR: 18 (18 May 2018 16:00) (16 - 18)  SpO2: 98% (18 May 2018 16:00) (96% - 99%)    Physical exam:    Overall impression  Lymphadenopathy  Liver  spleen    Labs:  CBC Full  -  ( 18 May 2018 08:02 )  WBC Count : 4.5 K/uL  Hemoglobin : 12.1 g/dL  Hematocrit : 38.4 %  Platelet Count - Automated : 226 K/uL  Mean Cell Volume : 77.0 fL  Mean Cell Hemoglobin : 24.2 pg  Mean Cell Hemoglobin Concentration : 31.5 g/dL  Auto Neutrophil # : x  Auto Lymphocyte # : x  Auto Monocyte # : x  Auto Eosinophil # : x  Auto Basophil # : x  Auto Neutrophil % : x  Auto Lymphocyte % : x  Auto Monocyte % : x  Auto Eosinophil % : x  Auto Basophil % : x    05-18    131<L>  |  94<L>  |  23  ----------------------------<  98  5.1   |  27  |  1.25    Ca    9.6      18 May 2018 08:02  Phos  4.7     05-18  Mg     2.2     05-18        Radiology:  HEALTH ISSUES - R/O PROBLEM Dx:  Chronic alcoholic gastritis without hemorrhage: R/O Chronic alcoholic gastritis without hemorrhage      Assessmant / Problems  1) increased dose of Coumadin needed 2/2 to intracardiac clot absorbtion of Coumadin  Plan:  Coumadin 12 mg tonight  Thank you  Liana Everett MD

## 2018-05-19 LAB
ANION GAP SERPL CALC-SCNC: 9 MMOL/L — SIGNIFICANT CHANGE UP (ref 5–17)
APTT BLD: 38.6 SEC — HIGH (ref 27.5–37.4)
BUN SERPL-MCNC: 21 MG/DL — SIGNIFICANT CHANGE UP (ref 7–23)
CALCIUM SERPL-MCNC: 9.8 MG/DL — SIGNIFICANT CHANGE UP (ref 8.4–10.5)
CHLORIDE SERPL-SCNC: 96 MMOL/L — SIGNIFICANT CHANGE UP (ref 96–108)
CO2 SERPL-SCNC: 27 MMOL/L — SIGNIFICANT CHANGE UP (ref 22–31)
CREAT SERPL-MCNC: 1.24 MG/DL — SIGNIFICANT CHANGE UP (ref 0.5–1.3)
GLUCOSE SERPL-MCNC: 108 MG/DL — HIGH (ref 70–99)
HCT VFR BLD CALC: 38.8 % — LOW (ref 39–50)
HGB BLD-MCNC: 12.4 G/DL — LOW (ref 13–17)
INR BLD: 1.17 — HIGH (ref 0.88–1.16)
MAGNESIUM SERPL-MCNC: 2.1 MG/DL — SIGNIFICANT CHANGE UP (ref 1.6–2.6)
MCHC RBC-ENTMCNC: 24.1 PG — LOW (ref 27–34)
MCHC RBC-ENTMCNC: 32 G/DL — SIGNIFICANT CHANGE UP (ref 32–36)
MCV RBC AUTO: 75.5 FL — LOW (ref 80–100)
PLATELET # BLD AUTO: 224 K/UL — SIGNIFICANT CHANGE UP (ref 150–400)
POTASSIUM SERPL-MCNC: 5.3 MMOL/L — SIGNIFICANT CHANGE UP (ref 3.5–5.3)
POTASSIUM SERPL-SCNC: 5.3 MMOL/L — SIGNIFICANT CHANGE UP (ref 3.5–5.3)
PROTHROM AB SERPL-ACNC: 13 SEC — HIGH (ref 9.8–12.7)
RBC # BLD: 5.14 M/UL — SIGNIFICANT CHANGE UP (ref 4.2–5.8)
RBC # FLD: 20.1 % — HIGH (ref 10.3–16.9)
SODIUM SERPL-SCNC: 132 MMOL/L — LOW (ref 135–145)
WBC # BLD: 4.2 K/UL — SIGNIFICANT CHANGE UP (ref 3.8–10.5)
WBC # FLD AUTO: 4.2 K/UL — SIGNIFICANT CHANGE UP (ref 3.8–10.5)

## 2018-05-19 RX ORDER — WARFARIN SODIUM 2.5 MG/1
14 TABLET ORAL ONCE
Qty: 0 | Refills: 0 | Status: DISCONTINUED | OUTPATIENT
Start: 2018-05-19 | End: 2018-05-19

## 2018-05-19 RX ORDER — WARFARIN SODIUM 2.5 MG/1
15 TABLET ORAL ONCE
Qty: 0 | Refills: 0 | Status: COMPLETED | OUTPATIENT
Start: 2018-05-19 | End: 2018-05-19

## 2018-05-19 RX ADMIN — FONDAPARINUX SODIUM 7.5 MILLIGRAM(S): 2.5 INJECTION, SOLUTION SUBCUTANEOUS at 12:05

## 2018-05-19 RX ADMIN — Medication 1 TABLET(S): at 12:05

## 2018-05-19 RX ADMIN — Medication 1 GRAM(S): at 13:25

## 2018-05-19 RX ADMIN — Medication 1 GRAM(S): at 06:33

## 2018-05-19 RX ADMIN — Medication 1 GRAM(S): at 17:39

## 2018-05-19 RX ADMIN — Medication 50 MILLIGRAM(S): at 17:39

## 2018-05-19 RX ADMIN — Medication 100 MILLIGRAM(S): at 06:33

## 2018-05-19 RX ADMIN — Medication 100 MILLIGRAM(S): at 13:25

## 2018-05-19 RX ADMIN — Medication 100 MILLIGRAM(S): at 22:21

## 2018-05-19 RX ADMIN — ATORVASTATIN CALCIUM 40 MILLIGRAM(S): 80 TABLET, FILM COATED ORAL at 22:21

## 2018-05-19 RX ADMIN — Medication 1 MILLIGRAM(S): at 12:05

## 2018-05-19 RX ADMIN — WARFARIN SODIUM 15 MILLIGRAM(S): 2.5 TABLET ORAL at 22:22

## 2018-05-19 RX ADMIN — Medication 50 MILLIGRAM(S): at 06:33

## 2018-05-19 NOTE — PROGRESS NOTE ADULT - SUBJECTIVE AND OBJECTIVE BOX
Cardiology for [facility]  CC: Follow-up Cardiology evaluation and management of cad s/p mi and pci, systolic chf, s/p aicd, pafib, and LV thrombus    interval - events noted; no new complaints; no dizziness    PAST MEDICAL & SURGICAL HISTORY:  Thrombus in heart chamber: LV  ICD (implantable cardioverter-defibrillator) in place  Pacemaker  ETOH abuse  Paroxysmal a-fib  Myocardial infarction involving other coronary artery  Gastritis  Atherosclerosis of coronary artery: Coronary artery disease  Automatic implantable cardiac defibrillator in situ: ICD (implantable cardioverter-defibrillator) in place    MEDICATIONS  (STANDING):  atorvastatin 40 milliGRAM(s) Oral at bedtime  folic acid 1 milliGRAM(s) Oral daily  fondaparinux Injectable 7.5 milliGRAM(s) SubCutaneous daily  metoprolol tartrate 50 milliGRAM(s) Oral two times a day  multivitamin 1 Tablet(s) Oral daily  sucralfate 1 Gram(s) Oral four times a day  thiamine 100 milliGRAM(s) Oral <User Schedule>  warfarin 15 milliGRAM(s) Oral once    MEDICATIONS  (PRN):  ondansetron Injectable 4 milliGRAM(s) IV Push every 4 hours PRN Nausea and/or Vomiting    Vital Signs Last 24 Hrs  T(C): 37.1 (19 May 2018 08:52), Max: 37.2 (18 May 2018 16:00)  T(F): 98.7 (19 May 2018 08:52), Max: 98.9 (18 May 2018 16:00)  HR: 70 (19 May 2018 08:52) (70 - 95)  BP: 115/78 (19 May 2018 08:52) (102/71 - 115/78)  BP(mean): --  RR: 16 (19 May 2018 08:52) (16 - 18)  SpO2: 100% (19 May 2018 08:52) (98% - 100%)    PHYSICAL EXAM:  Constitutional: nad; ambulating in room    heent - anicteric    Respiratory: breath sounds clear    Cardiovascular: rr; s1/s2 present    Gastrointestinal: soft abd; bowel sounds present    Extremities: no edema    Vascular: dp and pt pulses present    Neurological: conversant    Psychiatric: appropriate    I&O's Detail    18 May 2018 07:01  -  19 May 2018 07:00  --------------------------------------------------------  IN:    Sodium Chloride 0.9% IV Bolus: 250 mL  Total IN: 250 mL    OUT:  Total OUT: 0 mL    Total NET: 250 mL          LABS:                                              12.4   4.2   )-----------( 224      ( 19 May 2018 07:52 )             38.8   05-19    132<L>  |  96  |  21  ----------------------------<  108<H>  5.3   |  27  |  1.24    Ca    9.8      19 May 2018 07:52  Phos  4.7     05-18  Mg     2.1     05-19        I&O's Summary    18 May 2018 07:01  -  19 May 2018 07:00  --------------------------------------------------------  IN: 250 mL / OUT: 0 mL / NET: 250 mL        RADIOLOGY & ADDITIONAL STUDIES:      < from: TTE Echo w/Cont Complete (05.11.18 @ 13:27) >  EXAM:  ECHOCARDIOGRAM W CONTRAST                          PROCEDURE DATE:  05/11/2018                        INTERPRETATION:  Patient Height: 175.0 cm  Patient Weight: 78.0 kg  Heart Rate: 67 bpm  Systolic Pressure: 117 mmHg  Diastolic Pressure: 75mmHg  BSA: 1.9 m^2  Interpretation Summary  There is mild concentric left ventricular hypertrophy.Moderate segmental   left   ventricular systolic dysfunction with akinesis of mid to distal   anteroseptum   and all apical segments. There is a large thrombus measuring 1.9 x 1.8 cm   noted in the apex.  The left ventricular ejection fraction is 35%.The   right   ventricle is normal in size and function.A device wire is seen in the   right   ventricle.The left atrial size is normal.The mitral inflowpattern is   consistent with impaired left ventricular relaxation with mildly   elevated left   atrial pressure (8-14mmHg).  Right atrial size is normal.No evidence for   any   hemodynamically significant valvular disease.The pulmonary artery   systolic   pressure is estimated to be 32 mmHg.There is no pericardial   effusion.There has   been no significant interval change when compared to the echo from 1/8/18    < end of copied text >

## 2018-05-19 NOTE — PROGRESS NOTE ADULT - ASSESSMENT
1) cad s/p mi and pci  -rx as tolerated    2) chronic systolic and diastolic chf  -monitor i/o and daily weights  -consider ace-i if bp will tolerate    3) s/p icd - per ep    4) lv thrombus   on ac    5) pafib -  -paroxysmal tachycardia - monitor     6) h/o gib - per GI    7) ckd - monitor    8) current etoh abuse - rec cessation    9) ppx - ppi and therapeutic ac    10)  maintain K>4, Mg>2

## 2018-05-20 LAB
ANION GAP SERPL CALC-SCNC: 11 MMOL/L — SIGNIFICANT CHANGE UP (ref 5–17)
APTT BLD: 37.2 SEC — SIGNIFICANT CHANGE UP (ref 27.5–37.4)
BUN SERPL-MCNC: 24 MG/DL — HIGH (ref 7–23)
CALCIUM SERPL-MCNC: 9.3 MG/DL — SIGNIFICANT CHANGE UP (ref 8.4–10.5)
CHLORIDE SERPL-SCNC: 96 MMOL/L — SIGNIFICANT CHANGE UP (ref 96–108)
CO2 SERPL-SCNC: 28 MMOL/L — SIGNIFICANT CHANGE UP (ref 22–31)
CREAT ?TM UR-MCNC: 155 MG/DL — SIGNIFICANT CHANGE UP
CREAT SERPL-MCNC: 1.33 MG/DL — HIGH (ref 0.5–1.3)
GLUCOSE SERPL-MCNC: 111 MG/DL — HIGH (ref 70–99)
HCT VFR BLD CALC: 35.9 % — LOW (ref 39–50)
HGB BLD-MCNC: 11.4 G/DL — LOW (ref 13–17)
INR BLD: 1.28 — HIGH (ref 0.88–1.16)
MAGNESIUM SERPL-MCNC: 2 MG/DL — SIGNIFICANT CHANGE UP (ref 1.6–2.6)
MCHC RBC-ENTMCNC: 24.4 PG — LOW (ref 27–34)
MCHC RBC-ENTMCNC: 31.8 G/DL — LOW (ref 32–36)
MCV RBC AUTO: 76.9 FL — LOW (ref 80–100)
PLATELET # BLD AUTO: 235 K/UL — SIGNIFICANT CHANGE UP (ref 150–400)
POTASSIUM SERPL-MCNC: 4.7 MMOL/L — SIGNIFICANT CHANGE UP (ref 3.5–5.3)
POTASSIUM SERPL-SCNC: 4.7 MMOL/L — SIGNIFICANT CHANGE UP (ref 3.5–5.3)
PROTHROM AB SERPL-ACNC: 14.3 SEC — HIGH (ref 9.8–12.7)
RBC # BLD: 4.67 M/UL — SIGNIFICANT CHANGE UP (ref 4.2–5.8)
RBC # FLD: 19.6 % — HIGH (ref 10.3–16.9)
SODIUM SERPL-SCNC: 135 MMOL/L — SIGNIFICANT CHANGE UP (ref 135–145)
SODIUM UR-SCNC: 66 MMOL/L — SIGNIFICANT CHANGE UP
WBC # BLD: 4.1 K/UL — SIGNIFICANT CHANGE UP (ref 3.8–10.5)
WBC # FLD AUTO: 4.1 K/UL — SIGNIFICANT CHANGE UP (ref 3.8–10.5)

## 2018-05-20 RX ORDER — WARFARIN SODIUM 2.5 MG/1
15 TABLET ORAL ONCE
Qty: 0 | Refills: 0 | Status: COMPLETED | OUTPATIENT
Start: 2018-05-20 | End: 2018-05-20

## 2018-05-20 RX ADMIN — WARFARIN SODIUM 15 MILLIGRAM(S): 2.5 TABLET ORAL at 22:13

## 2018-05-20 RX ADMIN — Medication 1 GRAM(S): at 07:09

## 2018-05-20 RX ADMIN — Medication 100 MILLIGRAM(S): at 07:08

## 2018-05-20 RX ADMIN — Medication 1 MILLIGRAM(S): at 12:52

## 2018-05-20 RX ADMIN — Medication 100 MILLIGRAM(S): at 15:44

## 2018-05-20 RX ADMIN — Medication 100 MILLIGRAM(S): at 22:13

## 2018-05-20 RX ADMIN — ATORVASTATIN CALCIUM 40 MILLIGRAM(S): 80 TABLET, FILM COATED ORAL at 22:13

## 2018-05-20 RX ADMIN — Medication 1 GRAM(S): at 18:14

## 2018-05-20 RX ADMIN — Medication 50 MILLIGRAM(S): at 18:14

## 2018-05-20 RX ADMIN — Medication 1 TABLET(S): at 12:52

## 2018-05-20 RX ADMIN — Medication 50 MILLIGRAM(S): at 07:08

## 2018-05-20 RX ADMIN — FONDAPARINUX SODIUM 7.5 MILLIGRAM(S): 2.5 INJECTION, SOLUTION SUBCUTANEOUS at 12:52

## 2018-05-20 RX ADMIN — Medication 1 GRAM(S): at 12:52

## 2018-05-20 NOTE — PROGRESS NOTE ADULT - ASSESSMENT
1) cad s/p mi and pci  -np cp    2) chronic systolic and diastolic chf  -monitor    3) s/p icd     4) lv thrombus   -ac per Hematology    5) pafib -  -paroxysmal tachycardia - monitor; no acute symptoms now     6) h/o gib     7) ckd - monitor cr and uop    8) current etoh abuse    9) ppx - ppi and therapeutic ac    10)  K>4, Mg>2

## 2018-05-20 NOTE — PROGRESS NOTE ADULT - PROBLEM SELECTOR PLAN 1
Patient with subtherapeutic INR on admission 2/2 to medication non-adherence.  Initially started on heparin gtt as coumadin was resumed, but found to have drop in platelets from 180 to 96 5/11-5/12, switched to argatroban and now on fondaparinux.  s/p Coumadin 12mg 5/10/18, 5/11/18, 5/12/18, Coumadin 7mg given on 5/14/18, 6mg on 5/15/18 and 12mg on 5/16/18.   -Coumadin 15 mg tonight per Dr. Everett.  - Daily PTT and INR  - Goal INR: 1.7-1.8

## 2018-05-20 NOTE — PROGRESS NOTE ADULT - SUBJECTIVE AND OBJECTIVE BOX
HPI:  This is a 63yo M former smoker and drug abuser current ETOH abuser (2+ pints vodka day) with PMHx CAD s/p MI 2003, most recent PCI /at [facility] 7/2016 (JOSHUA to mLAD with residual 50 % OM1), chronic systolic CHF (EF 35% by Echo) s/p AICD in 2009 ([employer]) , pafib and LV thrombus (on warfarin, previously on ASA and Plavix only 2/2 to lower GIB 2016), CKD Stage III (Baseline Cr normal 0.90-1.1), with frequent admissions to [facility] for ETOH use/withdrawal and 12/2017 for ICD firing. Pt’s last hospitalization at [facility] 1/6-1/26/18 for palpitations and anorexia in setting of alcohol intoxication, workup included colonoscopy without active bleeding, s/p polypectomy x 2, discharged home on warfarin 12mg daily.  He reports a subsequent hospitalization at [facility] sometime after that admission for recurrent alcohol intoxication with withdrawal symptoms, reports abstaining from alcohol from that admission until one week ago when increased family/personal stressors found him starting and then increasing his daily drinking.  Pt is unclear on time frame.    Pt presents today with epigastric pain with an episode of nausea and vomiting after one week of binge drinking.  He also notes that he stopped taking warfarin during this time period and INR on presentation is sub-therapeutic at 0.99.  He admits to last drink at 10pm on 5/9 and bilateral upper extremity tremors. Denies chest pain, SOB, near/syncope, edema, fever, chills.  He was treated with 2L IVF, IV pantoprazole and started on heparin gtt for sub therapeutic INR with known h/o LV thrombus. (11 May 2018 00:51)    FAMILY HISTORY:  No pertinent family history in first degree relatives    MEDICATIONS  (STANDING):  atorvastatin 40 milliGRAM(s) Oral at bedtime  folic acid 1 milliGRAM(s) Oral daily  fondaparinux Injectable 7.5 milliGRAM(s) SubCutaneous daily  metoprolol tartrate 50 milliGRAM(s) Oral two times a day  multivitamin 1 Tablet(s) Oral daily  sucralfate 1 Gram(s) Oral four times a day  thiamine 100 milliGRAM(s) Oral <User Schedule>    MEDICATIONS  (PRN):  ondansetron Injectable 4 milliGRAM(s) IV Push every 4 hours PRN Nausea and/or Vomiting    Vital Signs Last 24 Hrs  T(C): 37.2 (19 May 2018 21:24), Max: 37.2 (19 May 2018 15:31)  T(F): 98.9 (19 May 2018 21:24), Max: 98.9 (19 May 2018 15:31)  HR: 79 (19 May 2018 21:24) (63 - 79)  BP: 122/79 (19 May 2018 21:24) (107/74 - 122/79)  BP(mean): --  RR: 18 (19 May 2018 21:24) (16 - 18)  SpO2: 98% (19 May 2018 21:24) (98% - 100%)    Physical exam:    Overall impression  Lymphadenopathy  Liver  spleen    Labs:  CBC Full  -  ( 19 May 2018 07:52 )  WBC Count : 4.2 K/uL  Hemoglobin : 12.4 g/dL  Hematocrit : 38.8 %  Platelet Count - Automated : 224 K/uL  Mean Cell Volume : 75.5 fL  Mean Cell Hemoglobin : 24.1 pg  Mean Cell Hemoglobin Concentration : 32.0 g/dL  Auto Neutrophil # : x  Auto Lymphocyte # : x  Auto Monocyte # : x  Auto Eosinophil # : x  Auto Basophil # : x  Auto Neutrophil % : x  Auto Lymphocyte % : x  Auto Monocyte % : x  Auto Eosinophil % : x  Auto Basophil % : x    05-19    132<L>  |  96  |  21  ----------------------------<  108<H>  5.3   |  27  |  1.24    Ca    9.8      19 May 2018 07:52  Phos  4.7     05-18  Mg     2.1     05-19        Radiology:  HEALTH ISSUES - R/O PROBLEM Dx:  Chronic alcoholic gastritis without hemorrhage: R/O Chronic alcoholic gastritis without hemorrhage      Assessmant / Problems  1) Difficulty to anticoagulate ( due to intracardiac clot absorbing Coumadin, and possibly dietary   issues with greens)  Plan Coumadin 15 mg tonight)  Plan:        Thank you  Liana Everett MD

## 2018-05-20 NOTE — PROGRESS NOTE ADULT - PROBLEM SELECTOR PLAN 9
F: No IVF  E:  Mg>2.0, 4.0<K<5.3  N: DASH/TLC diet    VTE PPX: Patient on Fondaparinux and coumadin    Dispo: [facility]
F: No IVF  E:  Mg>2.0, 4.0<K<5.3  N: DASH/TLC diet    VTE PPX: Patient on Fondaparinux and coumadin    Dispo: [facility]
F: No IVF  E:  Mg>2.0, 4.0<K<5.3  N: DASH/TLC diet    VTE PPX: Patient on Argatroban and coumadin    Dispo: [facility]

## 2018-05-20 NOTE — PROGRESS NOTE ADULT - SUBJECTIVE AND OBJECTIVE BOX
INTERVAL HPI/OVERNIGHT EVENTS:  Patient was seen and examined at bedside. As per nurse and patient, no o/n events, patient resting comfortably. No complaints at this time. Patient denies: fever, chills, dizziness, weakness, HA, Changes in vision, CP, palpitations, SOB, cough, N/V/D/C, dysuria, changes in bowel movements, LE edema. ROS otherwise negative.    VITAL SIGNS:  T(F): 98 (05-20-18 @ 05:31)  HR: 95 (05-20-18 @ 05:31)  BP: 109/73 (05-20-18 @ 05:31)  RR: 18 (05-20-18 @ 05:31)  SpO2: 100% (05-20-18 @ 05:31)  Wt(kg): --    PHYSICAL EXAM:    Constitutional: WDWN resting comfortably in bed; NAD  Head: NC/AT  Eyes: PERRL, EOMI, clear conjunctiva  ENT: no nasal discharge; uvula midline, no oropharyngeal erythema or exudates; MMM.  Poor dentition.  Neck: supple; no JVD or thyromegaly  Respiratory: CTA B/L; no W/R/R, no retractions  Cardiac: +S1/S2; RRR; no M/R/G  Gastrointestinal: soft, NT/ND; no rebound or guarding; +BSx4  Back: spine midline, no bony tenderness or step-offs; no CVAT B/L  Extremities: WWP, no clubbing or cyanosis; no peripheral edema  Musculoskeletal: NROM x4; no joint swelling, tenderness or erythema  Vascular: 2+ radial, DP/PT pulses B/L  Dermatologic: skin warm, dry and intact; no rashes, wounds, or scars  Lymphatic: no submandibular or cervical LAD  Neurologic: AAOx3; CNII-XII grossly intact; no focal deficits    MEDICATIONS  (STANDING):  atorvastatin 40 milliGRAM(s) Oral at bedtime  folic acid 1 milliGRAM(s) Oral daily  fondaparinux Injectable 7.5 milliGRAM(s) SubCutaneous daily  metoprolol tartrate 50 milliGRAM(s) Oral two times a day  multivitamin 1 Tablet(s) Oral daily  sucralfate 1 Gram(s) Oral four times a day  thiamine 100 milliGRAM(s) Oral <User Schedule>  warfarin 15 milliGRAM(s) Oral once    MEDICATIONS  (PRN):  ondansetron Injectable 4 milliGRAM(s) IV Push every 4 hours PRN Nausea and/or Vomiting      Allergies    Capoten (Short breath; Rash; Hives)  heparin (Other (Mod to Severe))  penicillin (Short breath; Rash; Hives)    Intolerances        LABS:                        11.4   4.1   )-----------( 235      ( 20 May 2018 06:13 )             35.9     05-20    135  |  96  |  24<H>  ----------------------------<  111<H>  4.7   |  28  |  1.33<H>    Ca    9.3      20 May 2018 06:13  Mg     2.0     05-20      PT/INR - ( 20 May 2018 06:13 )   PT: 14.3 sec;   INR: 1.28          PTT - ( 20 May 2018 06:13 )  PTT:37.2 sec      RADIOLOGY & ADDITIONAL TESTS:  Reviewed

## 2018-05-20 NOTE — PROGRESS NOTE ADULT - SUBJECTIVE AND OBJECTIVE BOX
Cardiology for Mildred  CC: Follow-up Cardiology evaluation and management of cad s/p mi and pci, systolic chf, s/p aicd, pafib, and LV thrombus    interval - no complaints    PAST MEDICAL & SURGICAL HISTORY:  Thrombus in heart chamber: LV  ICD (implantable cardioverter-defibrillator) in place  Pacemaker  ETOH abuse  Paroxysmal a-fib  Myocardial infarction involving other coronary artery  Gastritis  Atherosclerosis of coronary artery: Coronary artery disease  Automatic implantable cardiac defibrillator in situ: ICD (implantable cardioverter-defibrillator) in place    MEDICATIONS  (STANDING):  atorvastatin 40 milliGRAM(s) Oral at bedtime  folic acid 1 milliGRAM(s) Oral daily  fondaparinux Injectable 7.5 milliGRAM(s) SubCutaneous daily  metoprolol tartrate 50 milliGRAM(s) Oral two times a day  multivitamin 1 Tablet(s) Oral daily  sucralfate 1 Gram(s) Oral four times a day  thiamine 100 milliGRAM(s) Oral <User Schedule>    MEDICATIONS  (PRN):  ondansetron Injectable 4 milliGRAM(s) IV Push every 4 hours PRN Nausea and/or Vomiting    Vital Signs Last 24 Hrs  T(C): 36.7 (20 May 2018 22:11), Max: 37.4 (20 May 2018 10:26)  T(F): 98.1 (20 May 2018 22:11), Max: 99.4 (20 May 2018 10:26)  HR: 84 (20 May 2018 16:00) (84 - 100)  BP: 120/78 (20 May 2018 22:11) (109/73 - 131/94)  BP(mean): --  RR: 17 (20 May 2018 22:11) (16 - 18)  SpO2: 98% (20 May 2018 22:11) (98% - 100%)    PHYSICAL EXAM:  Constitutional: nad; supine; easily arousable from sleep    heent - mmm    Respiratory: clear lung fields    Cardiovascular: rr; s1/s2 present; no diastolic murmur heard    Gastrointestinal: soft abd    Extremities: no edema    Vascular: warm ue and le     Neurological: moves 4 ext    Psychiatric: appropriate    I&O's Detail    20 May 2018 07:01  -  20 May 2018 23:30  --------------------------------------------------------  IN:    Oral Fluid: 100 mL  Total IN: 100 mL    OUT:  Total OUT: 0 mL    Total NET: 100 mL      LABS:                                   11.4   4.1   )-----------( 235      ( 20 May 2018 06:13 )             35.9   05-20    135  |  96  |  24<H>  ----------------------------<  111<H>  4.7   |  28  |  1.33<H>    Ca    9.3      20 May 2018 06:13  Mg     2.0     05-20    I&O's Summary    20 May 2018 07:01  -  20 May 2018 23:30  --------------------------------------------------------  IN: 100 mL / OUT: 0 mL / NET: 100 mL      RADIOLOGY & ADDITIONAL STUDIES:      < from: TTE Echo w/Cont Complete (05.11.18 @ 13:27) >  EXAM:  ECHOCARDIOGRAM W CONTRAST                          PROCEDURE DATE:  05/11/2018                        INTERPRETATION:  Patient Height: 175.0 cm  Patient Weight: 78.0 kg  Heart Rate: 67 bpm  Systolic Pressure: 117 mmHg  Diastolic Pressure: 75mmHg  BSA: 1.9 m^2  Interpretation Summary  There is mild concentric left ventricular hypertrophy.Moderate segmental   left   ventricular systolic dysfunction with akinesis of mid to distal   anteroseptum   and all apical segments. There is a large thrombus measuring 1.9 x 1.8 cm   noted in the apex.  The left ventricular ejection fraction is 35%.The   right   ventricle is normal in size and function.A device wire is seen in the   right   ventricle.The left atrial size is normal.The mitral inflowpattern is   consistent with impaired left ventricular relaxation with mildly   elevated left   atrial pressure (8-14mmHg).  Right atrial size is normal.No evidence for   any   hemodynamically significant valvular disease.The pulmonary artery   systolic   pressure is estimated to be 32 mmHg.There is no pericardial   effusion.There has   been no significant interval change when compared to the echo from 1/8/18    < end of copied text >

## 2018-05-21 LAB
ANION GAP SERPL CALC-SCNC: 12 MMOL/L — SIGNIFICANT CHANGE UP (ref 5–17)
APTT BLD: 38.3 SEC — HIGH (ref 27.5–37.4)
BUN SERPL-MCNC: 19 MG/DL — SIGNIFICANT CHANGE UP (ref 7–23)
CALCIUM SERPL-MCNC: 9.5 MG/DL — SIGNIFICANT CHANGE UP (ref 8.4–10.5)
CHLORIDE SERPL-SCNC: 100 MMOL/L — SIGNIFICANT CHANGE UP (ref 96–108)
CO2 SERPL-SCNC: 26 MMOL/L — SIGNIFICANT CHANGE UP (ref 22–31)
CREAT SERPL-MCNC: 1.14 MG/DL — SIGNIFICANT CHANGE UP (ref 0.5–1.3)
GLUCOSE SERPL-MCNC: 110 MG/DL — HIGH (ref 70–99)
HCT VFR BLD CALC: 35.9 % — LOW (ref 39–50)
HGB BLD-MCNC: 11.1 G/DL — LOW (ref 13–17)
INR BLD: 1.4 — HIGH (ref 0.88–1.16)
MAGNESIUM SERPL-MCNC: 2.1 MG/DL — SIGNIFICANT CHANGE UP (ref 1.6–2.6)
MCHC RBC-ENTMCNC: 23.9 PG — LOW (ref 27–34)
MCHC RBC-ENTMCNC: 30.9 G/DL — LOW (ref 32–36)
MCV RBC AUTO: 77.4 FL — LOW (ref 80–100)
PHOSPHATE SERPL-MCNC: 3.4 MG/DL — SIGNIFICANT CHANGE UP (ref 2.5–4.5)
PLATELET # BLD AUTO: 227 K/UL — SIGNIFICANT CHANGE UP (ref 150–400)
POTASSIUM SERPL-MCNC: 4.9 MMOL/L — SIGNIFICANT CHANGE UP (ref 3.5–5.3)
POTASSIUM SERPL-SCNC: 4.9 MMOL/L — SIGNIFICANT CHANGE UP (ref 3.5–5.3)
PROTHROM AB SERPL-ACNC: 15.7 SEC — HIGH (ref 9.8–12.7)
RBC # BLD: 4.64 M/UL — SIGNIFICANT CHANGE UP (ref 4.2–5.8)
RBC # FLD: 19.7 % — HIGH (ref 10.3–16.9)
SODIUM SERPL-SCNC: 138 MMOL/L — SIGNIFICANT CHANGE UP (ref 135–145)
WBC # BLD: 3.4 K/UL — LOW (ref 3.8–10.5)
WBC # FLD AUTO: 3.4 K/UL — LOW (ref 3.8–10.5)

## 2018-05-21 RX ORDER — BACITRACIN ZINC 500 UNIT/G
1 OINTMENT IN PACKET (EA) TOPICAL THREE TIMES A DAY
Qty: 0 | Refills: 0 | Status: DISCONTINUED | OUTPATIENT
Start: 2018-05-21 | End: 2018-05-22

## 2018-05-21 RX ORDER — FONDAPARINUX SODIUM 2.5 MG/.5ML
7.5 INJECTION, SOLUTION SUBCUTANEOUS
Qty: 1 | Refills: 0 | OUTPATIENT
Start: 2018-05-21 | End: 2018-05-27

## 2018-05-21 RX ORDER — PANTOPRAZOLE SODIUM 20 MG/1
40 TABLET, DELAYED RELEASE ORAL
Qty: 0 | Refills: 0 | Status: DISCONTINUED | OUTPATIENT
Start: 2018-05-21 | End: 2018-05-22

## 2018-05-21 RX ORDER — WARFARIN SODIUM 2.5 MG/1
17 TABLET ORAL ONCE
Qty: 0 | Refills: 0 | Status: COMPLETED | OUTPATIENT
Start: 2018-05-21 | End: 2018-05-21

## 2018-05-21 RX ADMIN — Medication 50 MILLIGRAM(S): at 06:45

## 2018-05-21 RX ADMIN — ATORVASTATIN CALCIUM 40 MILLIGRAM(S): 80 TABLET, FILM COATED ORAL at 21:43

## 2018-05-21 RX ADMIN — Medication 1 TABLET(S): at 12:58

## 2018-05-21 RX ADMIN — Medication 1 MILLIGRAM(S): at 12:57

## 2018-05-21 RX ADMIN — Medication 100 MILLIGRAM(S): at 21:43

## 2018-05-21 RX ADMIN — Medication 1 GRAM(S): at 06:44

## 2018-05-21 RX ADMIN — WARFARIN SODIUM 17 MILLIGRAM(S): 2.5 TABLET ORAL at 21:44

## 2018-05-21 RX ADMIN — Medication 100 MILLIGRAM(S): at 06:45

## 2018-05-21 RX ADMIN — PANTOPRAZOLE SODIUM 40 MILLIGRAM(S): 20 TABLET, DELAYED RELEASE ORAL at 09:57

## 2018-05-21 RX ADMIN — Medication 100 MILLIGRAM(S): at 12:57

## 2018-05-21 RX ADMIN — Medication 50 MILLIGRAM(S): at 17:53

## 2018-05-21 RX ADMIN — Medication 1 GRAM(S): at 00:01

## 2018-05-21 RX ADMIN — FONDAPARINUX SODIUM 7.5 MILLIGRAM(S): 2.5 INJECTION, SOLUTION SUBCUTANEOUS at 12:58

## 2018-05-21 NOTE — PROGRESS NOTE ADULT - PROBLEM SELECTOR PLAN 6
On Metoprolol, Lisinopril, Lipitor.   Not ASA as per Dr. Levy due to h/o GI bleeding and on Coumadin.  Patient C/P free and HD stable
INR at goal per Dr. Everett, currently 1.0   Normal ICD check this admission, no further ICD shocks.   EP consulted and noted sinus tachycardia as noted on telemetry when pt gets up to walk. Suspect this is related to his binge drinking and dehydrated state as this has happened in the past.  Gentle hydration as no signs of clinical heart failure. Again, pt is told to cut back on ETOH / be abstinent from ETOH use.  No further EP intervention. Oral hydration as per Dr. Sheehan  HR improved, currently 80s.     BP 97/65 overnight. Oral hydration as per Dr. Sheehan. Repeat BP 100s-110s/70s.    Dispo: transfer to medicine, accepted under Dr. Everett
Normal ICD check this admission, no further ICD shocks.   EP consulted and noted sinus tachycardia as noted on telemetry when pt gets up to walk. Suspect this is related to his binge drinking and dehydrated state as this has happened in the past.  Gentle hydration as no signs of clinical heart failure. Again, pt is told to cut back on ETOH / be abstinent from ETOH use.  No further EP intervention. Oral hydration as per Dr. Sheehan  HR improved, currently 80s.     .
H/o 13 beats of VT on tele overnight. Metoprolol was lowered to current dose due to hypotension per patient.   Continue Metoprolol at current dose as discussed with Dr. Sheehan  EP consulted to interrogate device per MD  Mg 1.7, Hypomagnesemia repleted w/ 1g IV    Dispo: Coumadin 12mg tonight until therapeutic INR. F/u HIT AB. GI and Heme Recs  Case discussed w/ Dr. Sheehan
H/o NSVT two days ago on tele  ICD interrogated yesterday w/ normal function by Dr. Ramos   Continue Metoprolol at current dose as discussed with Dr. Sheehan    Dispo: Coumadin 7mg tonight, continue Argatroban until therapeutic INR. F/u GI and Heme Recs, Monitor H/H  and for further BRBPR
INR at goal per Dr. Everett, currently 1.8.   Normal ICD check this admission without further ICD shocks.   EP consulted and noted sinus tachycardia as noted on telemetry when pt gets up to walk. Suspect this is related to his binge drinking and dehydrated state as this has happened in the past.  Gentle hydration as no signs of clinical heart failure. Again, pt is told to cut back on ETOH / be abstinent from ETOH use.  No further EP intervention. Oral hydration as per Dr. Sheehan    Dispo: monitor overnight
NSVT (nonsustained ventricular tachycardia). one episode of 13 beats of VT on tele. Metoprolol was lowered to current dose due to hypotension per patient.   Continue Metoprolol at current dose as discussed with Dr. Sheehan  EP consulted to interrogate device per MD, Dr. Ramos interrogated pacemaker this am.  hypomagnesia, replete Mg accordingly    Dispo: pt is on Argatroban Coumadin bridge, awaiting for therapeutic INR, Dr. Canales, Heme, EP consulted. F/u recs
On Metoprolol, Lisinopril, Lipitor.   Not ASA as per Dr. Levy due to h/o GI bleeding and on Coumadin.  Patient C/P free and HD stable

## 2018-05-21 NOTE — PROGRESS NOTE ADULT - PROBLEM SELECTOR PROBLEM 3
Thrombocytopenia
Thrombocytopenia
ETOH abuse
ETOH abuse
Thrombocytopenia
ETOH abuse
ETOH abuse

## 2018-05-21 NOTE — PROGRESS NOTE ADULT - SUBJECTIVE AND OBJECTIVE BOX
Cardiology for Mildred  CC: Follow-up Cardiology evaluation and management of cad s/p mi and pci, systolic chf, s/p aicd, pafib, and LV thrombus    interval - no complaints    PAST MEDICAL & SURGICAL HISTORY:  Thrombus in heart chamber: LV  ICD (implantable cardioverter-defibrillator) in place  Pacemaker  ETOH abuse  Paroxysmal a-fib  Myocardial infarction involving other coronary artery  Gastritis  Atherosclerosis of coronary artery: Coronary artery disease  Automatic implantable cardiac defibrillator in situ: ICD (implantable cardioverter-defibrillator) in place    MEDICATIONS  (STANDING):  atorvastatin 40 milliGRAM(s) Oral at bedtime  folic acid 1 milliGRAM(s) Oral daily  fondaparinux Injectable 7.5 milliGRAM(s) SubCutaneous daily  metoprolol tartrate 50 milliGRAM(s) Oral two times a day  multivitamin 1 Tablet(s) Oral daily  sucralfate 1 Gram(s) Oral four times a day  thiamine 100 milliGRAM(s) Oral <User Schedule>    MEDICATIONS  (PRN):  ondansetron Injectable 4 milliGRAM(s) IV Push every 4 hours PRN Nausea and/or Vomiting    Vital Signs Last 24 Hrs  T(C): 36.7 (20 May 2018 22:11), Max: 37.4 (20 May 2018 10:26)  T(F): 98.1 (20 May 2018 22:11), Max: 99.4 (20 May 2018 10:26)  HR: 84 (20 May 2018 16:00) (84 - 100)  BP: 120/78 (20 May 2018 22:11) (109/73 - 131/94)  BP(mean): --  RR: 17 (20 May 2018 22:11) (16 - 18)  SpO2: 98% (20 May 2018 22:11) (98% - 100%)    PHYSICAL EXAM:  Constitutional: nad; supine; easily arousable from sleep    heent - mmm    Respiratory: clear lung fields    Cardiovascular: rr; s1/s2 present; no diastolic murmur heard    Gastrointestinal: soft abd    Extremities: no edema    Vascular: warm ue and le     Neurological: moves 4 ext    Psychiatric: appropriate    I&O's Detail    20 May 2018 07:01  -  20 May 2018 23:30  --------------------------------------------------------  IN:    Oral Fluid: 100 mL  Total IN: 100 mL    OUT:  Total OUT: 0 mL    Total NET: 100 mL      LABS:                                   11.4   4.1   )-----------( 235      ( 20 May 2018 06:13 )             35.9   05-20    135  |  96  |  24<H>  ----------------------------<  111<H>  4.7   |  28  |  1.33<H>    Ca    9.3      20 May 2018 06:13  Mg     2.0     05-20    I&O's Summary    20 May 2018 07:01  -  20 May 2018 23:30  --------------------------------------------------------  IN: 100 mL / OUT: 0 mL / NET: 100 mL      RADIOLOGY & ADDITIONAL STUDIES:      < from: TTE Echo w/Cont Complete (05.11.18 @ 13:27) >  EXAM:  ECHOCARDIOGRAM W CONTRAST                          PROCEDURE DATE:  05/11/2018                        INTERPRETATION:  Patient Height: 175.0 cm  Patient Weight: 78.0 kg  Heart Rate: 67 bpm  Systolic Pressure: 117 mmHg  Diastolic Pressure: 75mmHg  BSA: 1.9 m^2  Interpretation Summary  There is mild concentric left ventricular hypertrophy.Moderate segmental   left   ventricular systolic dysfunction with akinesis of mid to distal   anteroseptum   and all apical segments. There is a large thrombus measuring 1.9 x 1.8 cm   noted in the apex.  The left ventricular ejection fraction is 35%.The   right   ventricle is normal in size and function.A device wire is seen in the   right   ventricle.The left atrial size is normal.The mitral inflowpattern is   consistent with impaired left ventricular relaxation with mildly   elevated left   atrial pressure (8-14mmHg).  Right atrial size is normal.No evidence for   any   hemodynamically significant valvular disease.The pulmonary artery   systolic   pressure is estimated to be 32 mmHg.There is no pericardial   effusion.There has   been no significant interval change when compared to the echo from 1/8/18    < end of copied text > Cardiology for Mildred  CC: Follow-up Cardiology evaluation and management of cad s/p mi and pci, systolic chf, s/p aicd, pafib, and LV thrombus    interval - events noted; no cp    PAST MEDICAL & SURGICAL HISTORY:  Thrombus in heart chamber: LV  ICD (implantable cardioverter-defibrillator) in place  Pacemaker  ETOH abuse  Paroxysmal a-fib  Myocardial infarction involving other coronary artery  Gastritis  Atherosclerosis of coronary artery: Coronary artery disease  Automatic implantable cardiac defibrillator in situ: ICD (implantable cardioverter-defibrillator) in place    MEDICATIONS  (STANDING):  atorvastatin 40 milliGRAM(s) Oral at bedtime  BACItracin   Ointment 1 Application(s) Topical three times a day  folic acid 1 milliGRAM(s) Oral daily  fondaparinux Injectable 7.5 milliGRAM(s) SubCutaneous daily  metoprolol tartrate 50 milliGRAM(s) Oral two times a day  multivitamin 1 Tablet(s) Oral daily  pantoprazole    Tablet 40 milliGRAM(s) Oral before breakfast  thiamine 100 milliGRAM(s) Oral <User Schedule>    MEDICATIONS  (PRN):  ondansetron Injectable 4 milliGRAM(s) IV Push every 4 hours PRN Nausea and/or Vomiting    Vital Signs Last 24 Hrs  T(C): 37.1 (21 May 2018 21:46), Max: 37.1 (21 May 2018 21:46)  T(F): 98.7 (21 May 2018 21:46), Max: 98.7 (21 May 2018 21:46)  HR: 72 (21 May 2018 21:46) (66 - 73)  BP: 130/81 (21 May 2018 21:46) (108/71 - 140/92)  BP(mean): --  RR: 17 (21 May 2018 21:46) (17 - 18)  SpO2: 100% (21 May 2018 21:46) (97% - 100%)    PHYSICAL EXAM:  Constitutional: nad    heent - no nasal congestion seen    Respiratory: clear breath sounds    Cardiovascular: rr; s1/s2 present    Gastrointestinal: soft abd; nd    Extremities: no edema    Vascular: le pules present     Neurological: conversant    Psychiatric: appropriate    I&O's Detail    20 May 2018 07:01  -  21 May 2018 07:00  --------------------------------------------------------  IN:    Oral Fluid: 300 mL  Total IN: 300 mL    OUT:  Total OUT: 0 mL    Total NET: 300 mL      21 May 2018 07:01  -  21 May 2018 23:49  --------------------------------------------------------  IN:    Oral Fluid: 200 mL  Total IN: 200 mL    OUT:  Total OUT: 0 mL    Total NET: 200 mL          LABS:                                              11.1   3.4   )-----------( 227      ( 21 May 2018 05:28 )             35.9   05-21    138  |  100  |  19  ----------------------------<  110<H>  4.9   |  26  |  1.14    Ca    9.5      21 May 2018 05:28  Phos  3.4     05-21  Mg     2.1     05-21    Activated Partial Thromboplastin Time in AM (05.21.18 @ 05:28)    Activated Partial Thromboplastin Time: 38.3: The recommended therapeutic heparin range (full dose) is 58-99 seconds.  Recommended therapeutic Argatroban range is 1.5 to 3.0 times the baseline  APTT value, not to exceed 100 seconds. Recommended therapeutic Refludan  range is 1.5 to 2.5 times thebaseline APTT. sec      I&O's Summary    20 May 2018 07:01  -  21 May 2018 07:00  --------------------------------------------------------  IN: 300 mL / OUT: 0 mL / NET: 300 mL    21 May 2018 07:01  -  21 May 2018 23:50  --------------------------------------------------------  IN: 200 mL / OUT: 0 mL / NET: 200 mL      RADIOLOGY & ADDITIONAL STUDIES:      < from: TTE Echo w/Cont Complete (05.11.18 @ 13:27) >  EXAM:  ECHOCARDIOGRAM W CONTRAST                          PROCEDURE DATE:  05/11/2018                        INTERPRETATION:  Patient Height: 175.0 cm  Patient Weight: 78.0 kg  Heart Rate: 67 bpm  Systolic Pressure: 117 mmHg  Diastolic Pressure: 75mmHg  BSA: 1.9 m^2  Interpretation Summary  There is mild concentric left ventricular hypertrophy.Moderate segmental   left   ventricular systolic dysfunction with akinesis of mid to distal   anteroseptum   and all apical segments. There is a large thrombus measuring 1.9 x 1.8 cm   noted in the apex.  The left ventricular ejection fraction is 35%.The   right   ventricle is normal in size and function.A device wire is seen in the   right   ventricle.The left atrial size is normal.The mitral inflowpattern is   consistent with impaired left ventricular relaxation with mildly   elevated left   atrial pressure (8-14mmHg).  Right atrial size is normal.No evidence for   any   hemodynamically significant valvular disease.The pulmonary artery   systolic   pressure is estimated to be 32 mmHg.There is no pericardial   effusion.There has   been no significant interval change when compared to the echo from 1/8/18    < end of copied text >

## 2018-05-21 NOTE — PROGRESS NOTE ADULT - PROBLEM SELECTOR PROBLEM 2
ETOH abuse
Thrombus in heart chamber

## 2018-05-21 NOTE — PROGRESS NOTE ADULT - PROBLEM SELECTOR PROBLEM 6
CAD (coronary artery disease)
Paroxysmal A-fib
Paroxysmal A-fib
CAD (coronary artery disease)
CAD (coronary artery disease)
NSVT (nonsustained ventricular tachycardia)
Paroxysmal A-fib
CAD (coronary artery disease)

## 2018-05-21 NOTE — PROGRESS NOTE ADULT - PROBLEM SELECTOR PLAN 8
K 5.4 no visible hemolysis confirmed by repeat.  No Peaked T waves on EKG  Patient with no history of CKD- Cr 1.08. '  Lisinopril discontinued however received AM dose. Will not treat Hyperkalemia at this time
K 5.4 no visible hemolysis confirmed by repeat.  No Peaked T waves on EKG  Patient with no history of CKD- Cr 1.08. '  Lisinopril discontinued however received AM dose. Will not treat Hyperkalemia at this time
F: No IVF  E:  Mg>2.0, 4.0<K<5.3  N: DASH/TLC diet    VTE PPX: Patient on Fondaparinux and coumadin    Dispo: [facility]
K 5.4 no visible hemolysis confirmed by repeat.  No Peaked T waves on EKG  Patient with no history of CKD- Cr 1.08. '  Lisinopril discontinued however received AM dose. Will not treat Hyperkalemia at this time

## 2018-05-21 NOTE — PROGRESS NOTE ADULT - SUBJECTIVE AND OBJECTIVE BOX
INTERVAL HPI/OVERNIGHT EVENTS:  Patient was seen and examined at bedside. As per nurse and patient, no o/n events, patient resting comfortably. No complaints at this time. Patient denies: fever, chills, dizziness, weakness, HA, Changes in vision, CP, palpitations, SOB, cough, N/V/D/C, dysuria, changes in bowel movements, LE edema. ROS otherwise negative.    VITAL SIGNS:  T(F): 98.4 (05-21-18 @ 08:57)  HR: 66 (05-21-18 @ 08:57)  BP: 129/90 (05-21-18 @ 08:57)  RR: 17 (05-21-18 @ 08:57)  SpO2: 97% (05-21-18 @ 08:57)  Wt(kg): --    PHYSICAL EXAM:    Constitutional: WDWN resting comfortably in bed; NAD  Head: NC/AT  Eyes: PERRL, EOMI, clear conjunctiva  ENT: no nasal discharge; uvula midline, no oropharyngeal erythema or exudates; MMM.  Poor dentition.  Neck: supple; no JVD or thyromegaly  Respiratory: CTA B/L; no W/R/R, no retractions  Cardiac: +S1/S2; RRR; no M/R/G  Gastrointestinal: soft, NT/ND; no rebound or guarding; +BSx4  Back: spine midline, no bony tenderness or step-offs; no CVAT B/L  Extremities: WWP, no clubbing or cyanosis; no peripheral edema  Musculoskeletal: NROM x4; no joint swelling, tenderness or erythema  Vascular: 2+ radial, DP/PT pulses B/L  Dermatologic: skin warm, dry and intact; no rashes, wounds, or scars  Lymphatic: no submandibular or cervical LAD  Neurologic: AAOx3; CNII-XII grossly intact; no focal deficits    MEDICATIONS  (STANDING):  atorvastatin 40 milliGRAM(s) Oral at bedtime  folic acid 1 milliGRAM(s) Oral daily  fondaparinux Injectable 7.5 milliGRAM(s) SubCutaneous daily  metoprolol tartrate 50 milliGRAM(s) Oral two times a day  multivitamin 1 Tablet(s) Oral daily  pantoprazole    Tablet 40 milliGRAM(s) Oral before breakfast  thiamine 100 milliGRAM(s) Oral <User Schedule>  warfarin 17 milliGRAM(s) Oral once    MEDICATIONS  (PRN):  ondansetron Injectable 4 milliGRAM(s) IV Push every 4 hours PRN Nausea and/or Vomiting      Allergies    Capoten (Short breath; Rash; Hives)  heparin (Other (Mod to Severe))  penicillin (Short breath; Rash; Hives)    Intolerances        LABS:                        11.1   3.4   )-----------( 227      ( 21 May 2018 05:28 )             35.9     05-21    138  |  100  |  19  ----------------------------<  110<H>  4.9   |  26  |  1.14    Ca    9.5      21 May 2018 05:28  Phos  3.4     05-21  Mg     2.1     05-21      PT/INR - ( 21 May 2018 05:28 )   PT: 15.7 sec;   INR: 1.40          PTT - ( 21 May 2018 05:28 )  PTT:38.3 sec      RADIOLOGY & ADDITIONAL TESTS:  Reviewed

## 2018-05-21 NOTE — PROGRESS NOTE ADULT - PROBLEM SELECTOR PLAN 7
Normal ICD check this admission, no further ICD shocks.   EP consulted and noted sinus tachycardia as noted on telemetry when pt gets up to walk. Suspect this is related to his binge drinking and dehydrated state as this has happened in the past.  Gentle hydration as no signs of clinical heart failure. Again, pt is told to cut back on ETOH / be abstinent from ETOH use.  No further EP intervention.   - HR improved, currently 80s.
K 5.4 no visible hemolysis confirmed by repeat.  No Peaked T waves on EKG  Patient with no history of CKD- Cr 1.08. '  Lisinopril discontinued however received AM dose. Will not treat Hyperkalemia at this time    Dispo: Transfer to medicine under Dr. Everett. Signout given. Bedboard aware.
Normal ICD check this admission, no further ICD shocks.   EP consulted and noted sinus tachycardia as noted on telemetry when pt gets up to walk. Suspect this is related to his binge drinking and dehydrated state as this has happened in the past.  Gentle hydration as no signs of clinical heart failure. Again, pt is told to cut back on ETOH / be abstinent from ETOH use.  No further EP intervention.   - HR improved, currently 80s.

## 2018-05-21 NOTE — PROGRESS NOTE ADULT - PROBLEM SELECTOR PROBLEM 4
Thrombocytopenia
GI bleed
Thrombocytopenia

## 2018-05-21 NOTE — PROGRESS NOTE ADULT - PROBLEM SELECTOR PLAN 2
Last drink 7 days ago  ETOH level negative  CIWA protocol with folate/MVI/Thiamine and Librium PRN (score 8 or higher)  CIWA score 0 this AM  Psych called to follow as per Dr. Sheehan.   Psych recommended supportive care/family support.  Patient agreeable/wants to quit  Pt recommended for alcohol rehab
Last drink 7 days ago  ETOH level negative  CIWA protocol with folate/MVI/Thiamine and Librium PRN (score 8 or higher)  CIWA score 0 this AM  Psych called to follow as per Dr. Sheehan.   Psych recommended supportive care/family support.  Patient agreeable/wants to quit  Pt recommended for alcohol rehab
-Last drink 2 days ago  -ETOH level negative  -CIWA protocol with folate/MVI/Thiamine and Librium PRN(score 8 or higher)  -CIWA score 1 this AM(slight hand tremor to touch)    Dispo: Heparin/Coumadin bridging with Coumadin 12mg tonight until therapeutic INR
H/o LV thrombus confirmed with repeat Echo 5/11/18 showing 1.9x.1.8cm apical thrombus with EF 35 %, No significant interval change when compared to the echo from 1/8/18.  - See above
H/o LV thrombus confirmed with repeat Echo 5/11/18 showing 1.9x.1.8cm apical thrombus with EF 35 %, No significant interval change when compared to the echo from 1/8/18.  - See above
Last drink 3 days ago  ETOH level negative  CIWA protocol with folate/MVI/Thiamine and Librium PRN (score 8 or higher)  CIWA score 0 this AM  Psych called to follow as per Dr. Sheehan
Last drink 3 days ago  ETOH level negative  CIWA protocol with folate/MVI/Thiamine and Librium PRN (score 8 or higher)  CIWA score 0 this AM, pt does not complain of any withdrawal symptoms   Psych called to follow as per Dr. Sheehan, f/u recs
Last drink 5 days ago  ETOH level negative  CIWA protocol with folate/MVI/Thiamine and Librium PRN (score 8 or higher)  CIWA score 0 this AM  Psych called to follow as per Dr. Sheehan
Last drink 6 days ago  ETOH level negative  CIWA protocol with folate/MVI/Thiamine and Librium PRN (score 8 or higher)  CIWA score 0 this AM  Psych called to follow as per Dr. Sheehan
H/o LV thrombus confirmed with repeat Echo 5/11/18 showing 1.9x.1.8cm apical thrombus with EF 35 %, No significant interval change when compared to the echo from 1/8/18.  - See above
H/o LV thrombus confirmed with repeat Echo 5/11/18 showing 1.9x.1.8cm apical thrombus with EF 35 %, No significant interval change when compared to the echo from 1/8/18.  - See above

## 2018-05-21 NOTE — PROGRESS NOTE ADULT - PROBLEM SELECTOR PROBLEM 1
Thrombus in heart chamber
Thrombus in heart chamber
Subtherapeutic international normalized ratio (INR)
Subtherapeutic international normalized ratio (INR)
Thrombus in heart chamber
Subtherapeutic international normalized ratio (INR)
Subtherapeutic international normalized ratio (INR)

## 2018-05-21 NOTE — PROGRESS NOTE ADULT - ASSESSMENT
1) cad s/p mi and pci  -np cp    2) chronic systolic and diastolic chf  -monitor    3) s/p icd     4) lv thrombus   -ac per Hematology    5) pafib -  -paroxysmal tachycardia - monitor; no acute symptoms now     6) h/o gib     7) ckd - monitor cr and uop    8) current etoh abuse    9) ppx - ppi and therapeutic ac    10)  K>4, Mg>2 1) cad s/p mi and pci  -rx as tolerated    2) chronic systolic and diastolic chf    3) s/p icd - follow-up ep at discharge    4) lv thrombus   -per Hematology    5) pafib -  -paroxysmal tachycardia    6) h/o gib - gi consulted earlier    7) ckd - monitor    8) current etoh abuse    9) ppx - ppi and therapeutic ac    10) maintain K>4, Mg>2

## 2018-05-21 NOTE — PROGRESS NOTE ADULT - PROBLEM SELECTOR PROBLEM 5
GI bleed
CAD (coronary artery disease)
GI bleed

## 2018-05-21 NOTE — PROGRESS NOTE ADULT - PROBLEM SELECTOR PLAN 4
Platelet count 180 on admission.   HIT AB Positive. Off Heparin and now on argatroban  No PPI as per Dr. Everett due to thrombocytopenia
H/H remains stable 11.5/36. BRBPR resolved, none since this past Sunday (occurred after BM)  Denies melena. Guaiac positive (yellow stool) yesterday. Dr. Canales aware   BRBPR 2nd to internal hemorrhoids as per Dr. Canales   Continue to monitor, further evaluation as per Dr. Canales  Continue Sucralfate, Thiamine   No PPI as per Dr. Everett due to thrombocytopenia
Hgb/HCT remains stable 11.5/36. BRBPR resolved, none since this past Sunday (occurred after BM)  Denies melena. Guaiac positive (yellow stool)  Dr. Canales aware   BRBPR 2nd to internal hemorrhoids as per Dr. Canales   Continue to monitor, further evaluation as per Dr. Canales  Continue Sucralfate, Thiamine   No PPI as per Dr. Everett due to thrombocytopenia
Dr. Everett ([facility]) consulted as per Dr. Sheehan.   Platelet count 180 on admission.   HIT AB Positive. Off Heparin and now on argatroban  No PPI as per Dr. Everett due to thrombocytopenia
H/H remains stable 11/36. Pt reports BRBPR 2 after BM. Resolved.  Denies melena. Guaiac positive (yellow stool) yesterday. Dr. Canales aware   BRBPR 2nd to internal hemorrhoids as per Dr. Canales   Continue to monitor, further evaluation as per Dr. Canales  Continue Sucralfate, Thiamine   No PPI as per Dr. Everett due to thrombocytopenia
H/H remains stable 11/36. Pt reports BRBPR overnight after BM. Resolved.  Denies melena. Guaiac positive (yellow stool) today. Dr. Canales aware   BRBPR 2nd to internal hemorrhoids as per Dr. Canales   Continue to monitor, further evaluation as per Dr. Canales  Check H/H BID  Continue Sucralfate, Thiamine   No PPI as per Dr. Everett due to thrombocytopenia
H/H stable 10/33. Pt denies recent melena/BPRBPR.  Dr. Canales consulted as per Dr. Sheehan. Continue Sucralfate, Thiamine, PPI
H/H stable 10/33. Pt denies recent melena/BPRBPR.  Dr. Canales consulted as per Dr. Sheehan. Continue Sucralfate, Thiamine, PPI. (cannot continue PPI due to thrombocytopenia)
Platelet count 180 on admission.   HIT AB Positive. Off Heparin and now on fondaparinux  - PPI resumed as patient with resolved thrombocytopenia which was 2/2 to HIT, not PPI use
Platelet count 180 on admission.   HIT AB Positive. Off Heparin and now on argatroban  No PPI as per Dr. Everett due to thrombocytopenia

## 2018-05-21 NOTE — PROGRESS NOTE ADULT - PROVIDER SPECIALTY LIST ADULT
Cardiology
Electrophysiology
Gastroenterology
Heme/Onc
Internal Medicine
Internal Medicine
Intervent Cardiology
Cardiology
Electrophysiology
Internal Medicine
Internal Medicine

## 2018-05-21 NOTE — PROGRESS NOTE ADULT - PROBLEM SELECTOR PLAN 5
Hgb/HCT remains stable 11.5/36. BRBPR resolved, none since this past Sunday (occurred after BM)  - Denies melena. Guaiac positive (yellow stool)  Dr. Canales aware   - BRBPR 2nd to internal hemorrhoids as per Dr. Canales   - Continue Sucralfate, Thiamine   - No PPI as per Dr. Everett due to thrombocytopenia
On Metoprolol, Lisinopril, Lipitor.   Not ASA as per Dr. Levy due to h/o GI bleeding and on Coumadin.
On Metoprolol, Lisinopril, Lipitor.   Not ASA as per Dr. Levy due to h/o GI bleeding and on Coumadin.  Patient C/P free and HD stable
Hgb/HCT remains stable 11.5/36. BRBPR resolved, none since this past Sunday (occurred after BM)  - Denies melena. Guaiac positive (yellow stool)  Dr. Canales aware   - BRBPR 2nd to internal hemorrhoids as per Dr. Canales   - Continue Pantoprazole, Thiamine
Hgb/HCT remains stable 11.5/36. BRBPR resolved, none since this past Sunday (occurred after BM)  - Denies melena. Guaiac positive (yellow stool)  Dr. Canales aware   - BRBPR 2nd to internal hemorrhoids as per Dr. Canales   - Continue Sucralfate, Thiamine   - No PPI as per Dr. Everett due to thrombocytopenia
On Metoprolol, Lisinopril, Lipitor.   Not ASA as per Dr. Levy due to h/o GI bleeding and on Coumadin
On Metoprolol, Lisinopril, Lipitor. Not ASA as per Dr. Levy due to h/o GI bleeding and on Coumadin.
Hgb/HCT remains stable 11.5/36. BRBPR resolved, none since this past Sunday (occurred after BM)  - Denies melena. Guaiac positive (yellow stool)  Dr. Canales aware   - BRBPR 2nd to internal hemorrhoids as per Dr. Canales   - Continue Sucralfate, Thiamine   - No PPI as per Dr. Everett due to thrombocytopenia

## 2018-05-21 NOTE — PROGRESS NOTE ADULT - PROBLEM SELECTOR PLAN 1
Patient with subtherapeutic INR on admission 2/2 to medication non-adherence.  Initially started on heparin gtt as coumadin was resumed, but found to have drop in platelets from 180 to 96 5/11-5/12, switched to argatroban and now on fondaparinux.  s/p Coumadin 12mg 5/10/18, 5/11/18, 5/12/18, Coumadin 7mg given on 5/14/18, 6mg on 5/15/18 and 12mg on 5/16/18.   - warfarin 17 mg tonight per Dr. Everett.  - Daily PTT and INR  - Goal INR: 1.7-1.8  - D/c Carafate as may be contributing to low INR.  Resumed patient on PPI

## 2018-05-22 VITALS
SYSTOLIC BLOOD PRESSURE: 129 MMHG | DIASTOLIC BLOOD PRESSURE: 87 MMHG | HEART RATE: 70 BPM | RESPIRATION RATE: 18 BRPM | TEMPERATURE: 99 F | OXYGEN SATURATION: 99 %

## 2018-05-22 LAB
ANION GAP SERPL CALC-SCNC: 11 MMOL/L — SIGNIFICANT CHANGE UP (ref 5–17)
BUN SERPL-MCNC: 17 MG/DL — SIGNIFICANT CHANGE UP (ref 7–23)
CALCIUM SERPL-MCNC: 9.5 MG/DL — SIGNIFICANT CHANGE UP (ref 8.4–10.5)
CHLORIDE SERPL-SCNC: 100 MMOL/L — SIGNIFICANT CHANGE UP (ref 96–108)
CO2 SERPL-SCNC: 26 MMOL/L — SIGNIFICANT CHANGE UP (ref 22–31)
CREAT SERPL-MCNC: 1.21 MG/DL — SIGNIFICANT CHANGE UP (ref 0.5–1.3)
GLUCOSE SERPL-MCNC: 100 MG/DL — HIGH (ref 70–99)
HCT VFR BLD CALC: 36.4 % — LOW (ref 39–50)
HGB BLD-MCNC: 11.4 G/DL — LOW (ref 13–17)
INR BLD: 1.63 — HIGH (ref 0.88–1.16)
MAGNESIUM SERPL-MCNC: 1.9 MG/DL — SIGNIFICANT CHANGE UP (ref 1.6–2.6)
MCHC RBC-ENTMCNC: 23.8 PG — LOW (ref 27–34)
MCHC RBC-ENTMCNC: 31.3 G/DL — LOW (ref 32–36)
MCV RBC AUTO: 76 FL — LOW (ref 80–100)
PLATELET # BLD AUTO: 230 K/UL — SIGNIFICANT CHANGE UP (ref 150–400)
POTASSIUM SERPL-MCNC: 4.8 MMOL/L — SIGNIFICANT CHANGE UP (ref 3.5–5.3)
POTASSIUM SERPL-SCNC: 4.8 MMOL/L — SIGNIFICANT CHANGE UP (ref 3.5–5.3)
PROTHROM AB SERPL-ACNC: 18.3 SEC — HIGH (ref 9.8–12.7)
RBC # BLD: 4.79 M/UL — SIGNIFICANT CHANGE UP (ref 4.2–5.8)
RBC # FLD: 19.3 % — HIGH (ref 10.3–16.9)
SODIUM SERPL-SCNC: 137 MMOL/L — SIGNIFICANT CHANGE UP (ref 135–145)
WBC # BLD: 4.1 K/UL — SIGNIFICANT CHANGE UP (ref 3.8–10.5)
WBC # FLD AUTO: 4.1 K/UL — SIGNIFICANT CHANGE UP (ref 3.8–10.5)

## 2018-05-22 PROCEDURE — 81003 URINALYSIS AUTO W/O SCOPE: CPT

## 2018-05-22 PROCEDURE — 99285 EMERGENCY DEPT VISIT HI MDM: CPT | Mod: 25

## 2018-05-22 PROCEDURE — 84300 ASSAY OF URINE SODIUM: CPT

## 2018-05-22 PROCEDURE — C8929: CPT

## 2018-05-22 PROCEDURE — 96375 TX/PRO/DX INJ NEW DRUG ADDON: CPT

## 2018-05-22 PROCEDURE — 84100 ASSAY OF PHOSPHORUS: CPT

## 2018-05-22 PROCEDURE — 85025 COMPLETE CBC W/AUTO DIFF WBC: CPT

## 2018-05-22 PROCEDURE — 93005 ELECTROCARDIOGRAM TRACING: CPT

## 2018-05-22 PROCEDURE — 82570 ASSAY OF URINE CREATININE: CPT

## 2018-05-22 PROCEDURE — 83690 ASSAY OF LIPASE: CPT

## 2018-05-22 PROCEDURE — 36415 COLL VENOUS BLD VENIPUNCTURE: CPT

## 2018-05-22 PROCEDURE — 80307 DRUG TEST PRSMV CHEM ANLYZR: CPT

## 2018-05-22 PROCEDURE — 85730 THROMBOPLASTIN TIME PARTIAL: CPT

## 2018-05-22 PROCEDURE — 85610 PROTHROMBIN TIME: CPT

## 2018-05-22 PROCEDURE — 85027 COMPLETE CBC AUTOMATED: CPT

## 2018-05-22 PROCEDURE — 83735 ASSAY OF MAGNESIUM: CPT

## 2018-05-22 PROCEDURE — 96374 THER/PROPH/DIAG INJ IV PUSH: CPT

## 2018-05-22 PROCEDURE — 80048 BASIC METABOLIC PNL TOTAL CA: CPT

## 2018-05-22 PROCEDURE — 80053 COMPREHEN METABOLIC PANEL: CPT

## 2018-05-22 PROCEDURE — 86022 PLATELET ANTIBODIES: CPT

## 2018-05-22 RX ORDER — WARFARIN SODIUM 2.5 MG/1
3 TABLET ORAL
Qty: 90 | Refills: 0
Start: 2018-05-22 | End: 2018-06-20

## 2018-05-22 RX ORDER — THIAMINE MONONITRATE (VIT B1) 100 MG
1 TABLET ORAL
Qty: 14 | Refills: 0
Start: 2018-05-22 | End: 2018-06-04

## 2018-05-22 RX ORDER — FONDAPARINUX SODIUM 2.5 MG/.5ML
7.5 INJECTION, SOLUTION SUBCUTANEOUS
Qty: 1 | Refills: 0
Start: 2018-05-22 | End: 2018-05-28

## 2018-05-22 RX ORDER — MAGNESIUM SULFATE 500 MG/ML
2 VIAL (ML) INJECTION ONCE
Qty: 0 | Refills: 0 | Status: COMPLETED | OUTPATIENT
Start: 2018-05-22 | End: 2018-05-22

## 2018-05-22 RX ORDER — WARFARIN SODIUM 2.5 MG/1
1 TABLET ORAL
Qty: 15 | Refills: 0
Start: 2018-05-22 | End: 2018-06-20

## 2018-05-22 RX ORDER — LISINOPRIL 2.5 MG/1
1 TABLET ORAL
Qty: 30 | Refills: 0
Start: 2018-05-22 | End: 2018-06-20

## 2018-05-22 RX ORDER — FOLIC ACID 0.8 MG
1 TABLET ORAL
Qty: 14 | Refills: 0
Start: 2018-05-22 | End: 2018-06-04

## 2018-05-22 RX ADMIN — PANTOPRAZOLE SODIUM 40 MILLIGRAM(S): 20 TABLET, DELAYED RELEASE ORAL at 06:40

## 2018-05-22 RX ADMIN — Medication 50 MILLIGRAM(S): at 06:40

## 2018-05-22 RX ADMIN — Medication 100 MILLIGRAM(S): at 13:49

## 2018-05-22 RX ADMIN — Medication 100 MILLIGRAM(S): at 06:40

## 2018-05-22 RX ADMIN — Medication 1 APPLICATION(S): at 06:43

## 2018-05-22 RX ADMIN — Medication 1 TABLET(S): at 12:11

## 2018-05-22 RX ADMIN — Medication 50 MILLIGRAM(S): at 17:28

## 2018-05-22 RX ADMIN — Medication 50 GRAM(S): at 10:42

## 2018-05-22 RX ADMIN — Medication 1 MILLIGRAM(S): at 12:11

## 2018-05-22 RX ADMIN — FONDAPARINUX SODIUM 7.5 MILLIGRAM(S): 2.5 INJECTION, SOLUTION SUBCUTANEOUS at 12:12

## 2018-05-24 DIAGNOSIS — D50.9 IRON DEFICIENCY ANEMIA, UNSPECIFIED: ICD-10-CM

## 2018-05-24 DIAGNOSIS — E83.42 HYPOMAGNESEMIA: ICD-10-CM

## 2018-05-24 DIAGNOSIS — Z91.14 PATIENT'S OTHER NONCOMPLIANCE WITH MEDICATION REGIMEN: ICD-10-CM

## 2018-05-24 DIAGNOSIS — I25.10 ATHEROSCLEROTIC HEART DISEASE OF NATIVE CORONARY ARTERY WITHOUT ANGINA PECTORIS: ICD-10-CM

## 2018-05-24 DIAGNOSIS — N18.3 CHRONIC KIDNEY DISEASE, STAGE 3 (MODERATE): ICD-10-CM

## 2018-05-24 DIAGNOSIS — I25.2 OLD MYOCARDIAL INFARCTION: ICD-10-CM

## 2018-05-24 DIAGNOSIS — F10.20 ALCOHOL DEPENDENCE, UNCOMPLICATED: ICD-10-CM

## 2018-05-24 DIAGNOSIS — Z87.891 PERSONAL HISTORY OF NICOTINE DEPENDENCE: ICD-10-CM

## 2018-05-24 DIAGNOSIS — I51.3 INTRACARDIAC THROMBOSIS, NOT ELSEWHERE CLASSIFIED: ICD-10-CM

## 2018-05-24 DIAGNOSIS — K21.9 GASTRO-ESOPHAGEAL REFLUX DISEASE WITHOUT ESOPHAGITIS: ICD-10-CM

## 2018-05-24 DIAGNOSIS — D69.59 OTHER SECONDARY THROMBOCYTOPENIA: ICD-10-CM

## 2018-05-24 DIAGNOSIS — Z95.5 PRESENCE OF CORONARY ANGIOPLASTY IMPLANT AND GRAFT: ICD-10-CM

## 2018-05-24 DIAGNOSIS — I50.42 CHRONIC COMBINED SYSTOLIC (CONGESTIVE) AND DIASTOLIC (CONGESTIVE) HEART FAILURE: ICD-10-CM

## 2018-05-24 DIAGNOSIS — Z88.0 ALLERGY STATUS TO PENICILLIN: ICD-10-CM

## 2018-05-24 DIAGNOSIS — K64.8 OTHER HEMORRHOIDS: ICD-10-CM

## 2018-05-24 DIAGNOSIS — E87.5 HYPERKALEMIA: ICD-10-CM

## 2018-05-24 DIAGNOSIS — I47.2 VENTRICULAR TACHYCARDIA: ICD-10-CM

## 2018-05-24 DIAGNOSIS — Z95.0 PRESENCE OF CARDIAC PACEMAKER: ICD-10-CM

## 2018-05-24 DIAGNOSIS — I48.0 PAROXYSMAL ATRIAL FIBRILLATION: ICD-10-CM

## 2018-05-24 DIAGNOSIS — R10.13 EPIGASTRIC PAIN: ICD-10-CM

## 2018-06-26 NOTE — ED PROVIDER NOTE - CADM POA URETHRAL CATHETER
Final Anesthesia Post-op Assessment    Patient: Heri England  Procedure(s) Performed: COLONOSCOPY  Anesthesia type: Monitor Anesthesia Care    Vital Last Value   Temperature 36.9 °C (98.5 °F) (06/26/18 1032)   Pulse 58 (06/26/18 1140)   Respiratory Rate 16 (06/26/18 1140)   Non-Invasive   Blood Pressure 146/81 (06/26/18 1140)   Arterial  Blood Pressure     Pulse Oximetry 98 % (06/26/18 1140)     Last 24 I/O:   Intake/Output Summary (Last 24 hours) at 06/26/18 1147  Last data filed at 06/26/18 1107   Gross per 24 hour   Intake              400 ml   Output                0 ml   Net              400 ml       PATIENT LOCATION: Phase II  POST-OP VITAL SIGNS: stable  LEVEL OF CONSCIOUSNESS: participates in exam, answers questions appropriately, alert, awake and oriented  RESPIRATORY STATUS: spontaneous ventilation and unassisted  CARDIOVASCULAR: blood pressure returned to baseline  HYDRATION: euvolemic    PAIN MANAGEMENT: adequately controlled  NAUSEA: None  AIRWAY PATENCY: patent  POST-OP ASSESSMENT: no complications, patient tolerated procedure well with no complications, no evidence of recall and sufficiently recovered from acute administration of anesthesia effects and able to participate in evaluation  COMPLICATIONS: none  Comments: Doing great.  No PONV.  Pain controlled.  Tolerating PO.  Pt has been reassessed and is okay for discharge home.    Mauri Albarran MD  Anesthesia  [phone] (pager)    HANDOFF:  Handoff to receiving nurse was performed and questions were answered       No

## 2018-07-16 ENCOUNTER — INPATIENT (INPATIENT)
Facility: HOSPITAL | Age: 63
LOS: 0 days | Discharge: HOME | DRG: 302 | End: 2018-07-17
Attending: INTERNAL MEDICINE | Admitting: INTERNAL MEDICINE
Payer: MEDICARE

## 2018-07-16 VITALS
RESPIRATION RATE: 18 BRPM | TEMPERATURE: 99 F | HEART RATE: 106 BPM | SYSTOLIC BLOOD PRESSURE: 124 MMHG | DIASTOLIC BLOOD PRESSURE: 90 MMHG | WEIGHT: 160.06 LBS | OXYGEN SATURATION: 97 %

## 2018-07-16 DIAGNOSIS — F10.10 ALCOHOL ABUSE, UNCOMPLICATED: ICD-10-CM

## 2018-07-16 DIAGNOSIS — R63.8 OTHER SYMPTOMS AND SIGNS CONCERNING FOOD AND FLUID INTAKE: ICD-10-CM

## 2018-07-16 DIAGNOSIS — N18.3 CHRONIC KIDNEY DISEASE, STAGE 3 (MODERATE): ICD-10-CM

## 2018-07-16 DIAGNOSIS — I48.0 PAROXYSMAL ATRIAL FIBRILLATION: ICD-10-CM

## 2018-07-16 DIAGNOSIS — I51.3 INTRACARDIAC THROMBOSIS, NOT ELSEWHERE CLASSIFIED: ICD-10-CM

## 2018-07-16 LAB
ALBUMIN SERPL ELPH-MCNC: 4.5 G/DL — SIGNIFICANT CHANGE UP (ref 3.3–5)
ALP SERPL-CCNC: 82 U/L — SIGNIFICANT CHANGE UP (ref 40–120)
ALT FLD-CCNC: 42 U/L — SIGNIFICANT CHANGE UP (ref 10–45)
ANION GAP SERPL CALC-SCNC: 19 MMOL/L — HIGH (ref 5–17)
APTT BLD: 41.2 SEC — HIGH (ref 27.5–37.4)
AST SERPL-CCNC: 49 U/L — HIGH (ref 10–40)
BASOPHILS NFR BLD AUTO: 0.2 % — SIGNIFICANT CHANGE UP (ref 0–2)
BILIRUB SERPL-MCNC: 0.9 MG/DL — SIGNIFICANT CHANGE UP (ref 0.2–1.2)
BLD GP AB SCN SERPL QL: NEGATIVE — SIGNIFICANT CHANGE UP
BUN SERPL-MCNC: 13 MG/DL — SIGNIFICANT CHANGE UP (ref 7–23)
CALCIUM SERPL-MCNC: 9 MG/DL — SIGNIFICANT CHANGE UP (ref 8.4–10.5)
CHLORIDE SERPL-SCNC: 99 MMOL/L — SIGNIFICANT CHANGE UP (ref 96–108)
CK MB CFR SERPL CALC: 2.3 NG/ML — SIGNIFICANT CHANGE UP (ref 0–6.7)
CK MB CFR SERPL CALC: 2.7 NG/ML — SIGNIFICANT CHANGE UP (ref 0–6.7)
CK SERPL-CCNC: 171 U/L — SIGNIFICANT CHANGE UP (ref 30–200)
CK SERPL-CCNC: 193 U/L — SIGNIFICANT CHANGE UP (ref 30–200)
CO2 SERPL-SCNC: 22 MMOL/L — SIGNIFICANT CHANGE UP (ref 22–31)
CREAT SERPL-MCNC: 1.13 MG/DL — SIGNIFICANT CHANGE UP (ref 0.5–1.3)
EOSINOPHIL NFR BLD AUTO: 0.9 % — SIGNIFICANT CHANGE UP (ref 0–6)
ETHANOL SERPL-MCNC: 273 MG/DL — HIGH (ref 0–10)
GLUCOSE BLDC GLUCOMTR-MCNC: 146 MG/DL — HIGH (ref 70–99)
GLUCOSE SERPL-MCNC: 110 MG/DL — HIGH (ref 70–99)
HCT VFR BLD CALC: 41.9 % — SIGNIFICANT CHANGE UP (ref 39–50)
HGB BLD-MCNC: 13.3 G/DL — SIGNIFICANT CHANGE UP (ref 13–17)
INR BLD: 2.48 — HIGH (ref 0.88–1.16)
LYMPHOCYTES # BLD AUTO: 43.7 % — SIGNIFICANT CHANGE UP (ref 13–44)
MCHC RBC-ENTMCNC: 23.5 PG — LOW (ref 27–34)
MCHC RBC-ENTMCNC: 31.7 G/DL — LOW (ref 32–36)
MCV RBC AUTO: 74 FL — LOW (ref 80–100)
MONOCYTES NFR BLD AUTO: 7.6 % — SIGNIFICANT CHANGE UP (ref 2–14)
NEUTROPHILS NFR BLD AUTO: 47.6 % — SIGNIFICANT CHANGE UP (ref 43–77)
PCP SPEC-MCNC: SIGNIFICANT CHANGE UP
PLATELET # BLD AUTO: 200 K/UL — SIGNIFICANT CHANGE UP (ref 150–400)
POTASSIUM SERPL-MCNC: 4.2 MMOL/L — SIGNIFICANT CHANGE UP (ref 3.5–5.3)
POTASSIUM SERPL-SCNC: 4.2 MMOL/L — SIGNIFICANT CHANGE UP (ref 3.5–5.3)
PROT SERPL-MCNC: 8.1 G/DL — SIGNIFICANT CHANGE UP (ref 6–8.3)
PROTHROM AB SERPL-ACNC: 28.1 SEC — HIGH (ref 9.8–12.7)
RBC # BLD: 5.66 M/UL — SIGNIFICANT CHANGE UP (ref 4.2–5.8)
RBC # FLD: 18.3 % — HIGH (ref 10.3–16.9)
RH IG SCN BLD-IMP: POSITIVE — SIGNIFICANT CHANGE UP
SODIUM SERPL-SCNC: 140 MMOL/L — SIGNIFICANT CHANGE UP (ref 135–145)
TROPONIN T SERPL-MCNC: <0.01 NG/ML — SIGNIFICANT CHANGE UP (ref 0–0.01)
TROPONIN T SERPL-MCNC: <0.01 NG/ML — SIGNIFICANT CHANGE UP (ref 0–0.01)
WBC # BLD: 4.4 K/UL — SIGNIFICANT CHANGE UP (ref 3.8–10.5)
WBC # FLD AUTO: 4.4 K/UL — SIGNIFICANT CHANGE UP (ref 3.8–10.5)

## 2018-07-16 PROCEDURE — 99285 EMERGENCY DEPT VISIT HI MDM: CPT | Mod: 25

## 2018-07-16 PROCEDURE — 71045 X-RAY EXAM CHEST 1 VIEW: CPT | Mod: 26

## 2018-07-16 RX ORDER — METOPROLOL TARTRATE 50 MG
50 TABLET ORAL
Qty: 0 | Refills: 0 | Status: DISCONTINUED | OUTPATIENT
Start: 2018-07-16 | End: 2018-07-17

## 2018-07-16 RX ORDER — ASPIRIN/CALCIUM CARB/MAGNESIUM 324 MG
325 TABLET ORAL ONCE
Qty: 0 | Refills: 0 | Status: COMPLETED | OUTPATIENT
Start: 2018-07-16 | End: 2018-07-16

## 2018-07-16 RX ORDER — ATORVASTATIN CALCIUM 80 MG/1
40 TABLET, FILM COATED ORAL AT BEDTIME
Qty: 0 | Refills: 0 | Status: DISCONTINUED | OUTPATIENT
Start: 2018-07-16 | End: 2018-07-17

## 2018-07-16 RX ORDER — GLUCAGON INJECTION, SOLUTION 0.5 MG/.1ML
1 INJECTION, SOLUTION SUBCUTANEOUS ONCE
Qty: 0 | Refills: 0 | Status: DISCONTINUED | OUTPATIENT
Start: 2018-07-16 | End: 2018-07-17

## 2018-07-16 RX ORDER — DEXTROSE 50 % IN WATER 50 %
25 SYRINGE (ML) INTRAVENOUS ONCE
Qty: 0 | Refills: 0 | Status: DISCONTINUED | OUTPATIENT
Start: 2018-07-16 | End: 2018-07-17

## 2018-07-16 RX ORDER — THIAMINE MONONITRATE (VIT B1) 100 MG
100 TABLET ORAL DAILY
Qty: 0 | Refills: 0 | Status: DISCONTINUED | OUTPATIENT
Start: 2018-07-16 | End: 2018-07-17

## 2018-07-16 RX ORDER — INSULIN LISPRO 100/ML
VIAL (ML) SUBCUTANEOUS
Qty: 0 | Refills: 0 | Status: DISCONTINUED | OUTPATIENT
Start: 2018-07-16 | End: 2018-07-17

## 2018-07-16 RX ORDER — DEXTROSE 50 % IN WATER 50 %
15 SYRINGE (ML) INTRAVENOUS ONCE
Qty: 0 | Refills: 0 | Status: DISCONTINUED | OUTPATIENT
Start: 2018-07-16 | End: 2018-07-17

## 2018-07-16 RX ORDER — FOLIC ACID 0.8 MG
1 TABLET ORAL DAILY
Qty: 0 | Refills: 0 | Status: DISCONTINUED | OUTPATIENT
Start: 2018-07-16 | End: 2018-07-17

## 2018-07-16 RX ORDER — DEXTROSE 50 % IN WATER 50 %
12.5 SYRINGE (ML) INTRAVENOUS ONCE
Qty: 0 | Refills: 0 | Status: DISCONTINUED | OUTPATIENT
Start: 2018-07-16 | End: 2018-07-17

## 2018-07-16 RX ORDER — SODIUM CHLORIDE 9 MG/ML
1000 INJECTION, SOLUTION INTRAVENOUS
Qty: 0 | Refills: 0 | Status: DISCONTINUED | OUTPATIENT
Start: 2018-07-16 | End: 2018-07-17

## 2018-07-16 RX ADMIN — Medication 100 MILLIGRAM(S): at 21:26

## 2018-07-16 RX ADMIN — Medication 1 MILLIGRAM(S): at 18:58

## 2018-07-16 RX ADMIN — Medication 325 MILLIGRAM(S): at 14:40

## 2018-07-16 RX ADMIN — Medication 1 TABLET(S): at 18:58

## 2018-07-16 RX ADMIN — ATORVASTATIN CALCIUM 40 MILLIGRAM(S): 80 TABLET, FILM COATED ORAL at 21:26

## 2018-07-16 NOTE — ED PROVIDER NOTE - DIAGNOSTIC INTERPRETATION
ER Physician:  Nazia Director  CHEST XRAY INTERPRETATION: lungs clear, heart shadow normal, bony structures intact , pacer L chest

## 2018-07-16 NOTE — ED PROVIDER NOTE - MEDICAL DECISION MAKING DETAILS
Pt c/o cp this am x several hours now resolved.  EKG unchanged from prior.  Pt intoxicated.  ? acs, less likely etoh gastritis/gerd.  Plan labs, cxr, cardiac monitor, asa, discuss w Dr Levy. Poss tele admit.

## 2018-07-16 NOTE — ED ADULT NURSE NOTE - OBJECTIVE STATEMENT
Patient presents to the ED with chest pain x 4:30 am.  states it is resolved at this time, however it lasted a few hours.  Pain radiated to his back, left arm and was associated with diaphoresis, SOB.  He states that he drank after the pain started and at this time he has no pain.  Patient AA&Ox3, respirations unlabored, non-diaphoretic, slightly pale.  Patient on cardiac monitor, VSS.  placed on 2L NC.

## 2018-07-16 NOTE — H&P ADULT - PROBLEM SELECTOR PLAN 1
Patient presented to the ED with chest pain that lasted from 4AM to 8AM and was relieved by drinking alcohol  -Currently asymptomatic  -trop <0.01, CKMB 2.7,  in the ED  -EKG with Q waves, unchanged from EKG in May  -Monitor on Tele  -Follow repeat trops at 8PM

## 2018-07-16 NOTE — H&P ADULT - NSHPSOCIALHISTORY_GEN_ALL_CORE
Pt is an active drinker  Quit smoking in 1998, 5pack years  Quit drugs in 1981, used hallucinogenics, cocaine, marijuana  Retired from renovation work

## 2018-07-16 NOTE — H&P ADULT - NSHPPHYSICALEXAM_GEN_ALL_CORE
VITAL SIGNS:  T(C): 37 (07-16-18 @ 13:52), Max: 37 (07-16-18 @ 13:52)  T(F): 98.6 (07-16-18 @ 13:52), Max: 98.6 (07-16-18 @ 13:52)  HR: 82 (07-16-18 @ 14:28) (82 - 106)  BP: 137/93 (07-16-18 @ 14:28) (124/90 - 137/93)  BP(mean): --  RR: 18 (07-16-18 @ 14:28) (18 - 18)  SpO2: 99% (07-16-18 @ 14:28) (97% - 99%)  Wt(kg): --    PHYSICAL EXAM:    Constitutional: WDWN resting comfortably in bed; NAD  Head: NC/AT  Eyes: PERRL, EOMI, anicteric sclera  ENT: no nasal discharge; uvula midline, no oropharyngeal erythema or exudates; MMM  Neck: supple; no JVD or thyromegaly  Respiratory: CTA B/L; no W/R/R, no retractions  Cardiac: +S1/S2; RRR; no M/R/G; PMI non-displaced  Gastrointestinal: abdomen soft, NT/ND; no rebound or guarding; +BSx4  Back: spine midline, no bony tenderness or step-offs; no CVAT B/L  Extremities: WWP, no clubbing or cyanosis; no peripheral edema  Musculoskeletal: NROM x4; no joint swelling, tenderness or erythema  Vascular: 2+ radial, femoral, DP/PT pulses B/L  Dermatologic: skin warm, dry and intact; no rashes, wounds, or scars  Lymphatic: no submandibular or cervical LAD  Neurologic: AAOx3; CNII-XII grossly intact; no focal deficits  Psychiatric: affect and characteristics of appearance, verbalizations, behaviors are appropriate VITAL SIGNS:  T(C): 37 (07-16-18 @ 13:52), Max: 37 (07-16-18 @ 13:52)  T(F): 98.6 (07-16-18 @ 13:52), Max: 98.6 (07-16-18 @ 13:52)  HR: 82 (07-16-18 @ 14:28) (82 - 106)  BP: 137/93 (07-16-18 @ 14:28) (124/90 - 137/93)  BP(mean): --  RR: 18 (07-16-18 @ 14:28) (18 - 18)  SpO2: 99% (07-16-18 @ 14:28) (97% - 99%)  Wt(kg): --    PHYSICAL EXAM:    Constitutional: WDWN resting comfortably in bed; NAD  Head: NC/AT  Eyes: EOMI, anicteric sclera  ENT: no nasal discharge; uvula midline, no oropharyngeal erythema or exudates; MMM  Neck: supple; no JVD or thyromegaly  Respiratory: CTA B/L; no W/R/R, no retractions  Cardiac: +S1/S2; RRR; no M/R/G; PMI non-displaced  Gastrointestinal: abdomen soft, NT/ND; no rebound or guarding; +BSx4  Extremities: WWP, no clubbing or cyanosis; no peripheral edema  Musculoskeletal: NROM x4; no joint swelling, tenderness or erythema, no tremors with arms raised  Vascular: 2+ radial, femoral, DP/PT pulses B/L  Dermatologic: skin warm, dry and intact; no rashes, wounds  Neurologic: AAOx3; CNII-XII grossly intact; no focal deficits  Psychiatric: affect and characteristics of appearance, verbalizations, behaviors are appropriate

## 2018-07-16 NOTE — H&P ADULT - PROBLEM SELECTOR PLAN 2
Patient states he has been drinking for 4 days after about 2 months of abstinence from alcohol  -Significant history of hospitalizations for alcohol intoxication and withdrawal  -serum alcohol level: 293  -monitor for symptoms of withdrawal, serial CIWA scores Patient states he has been drinking for 4 days after about 2 months of abstinence from alcohol  -Significant history of hospitalizations for alcohol intoxication and withdrawal  -[facility] for detoxification per Dr. Levy  -serum alcohol level: 293  -monitor for symptoms of withdrawal, serial CIWA scores

## 2018-07-16 NOTE — H&P ADULT - NSHPLABSRESULTS_GEN_ALL_CORE
LABS:              13.3   4.4   )-----------( 200      ( 16 Jul 2018 14:27 )             41.9   07-16  140  |  99  |  13  ----------------------------<  110<H>  4.2   |  22  |  1.13  Ca    9.0      16 Jul 2018 14:27  TPro  8.1  /  Alb  4.5  /  TBili  0.9  /  DBili  x   /  AST  49<H>  /  ALT  42  /  AlkPhos  82  07-16  PT/INR - ( 16 Jul 2018 14:27 )   PT: 28.1 sec;   INR: 2.48     PTT - ( 16 Jul 2018 14:27 )  PTT:41.2 sec    CARDIAC MARKERS ( 16 Jul 2018 14:27 )  x     / <0.01 ng/mL / 193 U/L / x     / 2.7 ng/mL    RADIOLOGY, EKG & ADDITIONAL TESTS: Reviewed. LABS:              13.3   4.4   )-----------( 200      ( 16 Jul 2018 14:27 )             41.9   07-16  140  |  99  |  13  ----------------------------<  110<H>  4.2   |  22  |  1.13  Ca    9.0      16 Jul 2018 14:27  TPro  8.1  /  Alb  4.5  /  TBili  0.9  /  DBili  x   /  AST  49<H>  /  ALT  42  /  AlkPhos  82  07-16  PT/INR - ( 16 Jul 2018 14:27 )   PT: 28.1 sec;   INR: 2.48     PTT - ( 16 Jul 2018 14:27 )  PTT:41.2 sec  CARDIAC MARKERS ( 16 Jul 2018 14:27 )  x     / <0.01 ng/mL / 193 U/L / x     / 2.7 ng/mL    RADIOLOGY, EKG & ADDITIONAL TESTS: Reviewed.

## 2018-07-16 NOTE — ED PROVIDER NOTE - OBJECTIVE STATEMENT
62 M former smoker and drug abuser current ETOH abuse (2+ pints vodka/day) with PMHx CAD s/p MI 2003, most recent PCI /at [facility] 7/2016 (JOSHUA to mLAD with residual 50 % OM1), chronic systolic CHF (EF 35% by Echo) s/p AICD in 2009 ([employer]) , pafib and LV thrombus (on warfarin, previously on ASA and Plavix only 2/2 to lower GIB 2016), CKD Stage III (Baseline Cr normal 0.90-1.1), with frequent admissions to [facility] for ETOH use/withdrawal and 12/2017 62 M former smoker and drug abuser current ETOH abuse (2+ pints vodka/day) with PMHx CAD s/p MI 2003, most recent PCI /at [facility] 7/2016 (JOSHUA to mLAD with residual 50 % OM1), chronic systolic CHF (EF 35% by Echo) s/p AICD in 2009 ([employer]) , pafib and LV thrombus (on warfarin, previously on ASA and Plavix only 2/2 to lower GIB 2016), CKD Stage III (Baseline Cr normal 0.90-1.1), with frequent admissions to [facility] for ETOH use/withdrawal c/o L sided cp x several hours starting ~ 630 a w radiation to back and arm, associated n and sob, no palpitations, diaphoresis.  Sx similar to prior mi sx. No current cp.  + etoh today - several pints.  No cough, uri sx, le pain/swelling.  No fever.

## 2018-07-16 NOTE — H&P ADULT - FAMILY HISTORY
No pertinent family history in first degree relatives Grandparent  Still living? No  Family history of cardiac disorder in maternal grandfather, Age at diagnosis: Age Unknown

## 2018-07-16 NOTE — H&P ADULT - NSHPREVIEWOFSYSTEMS_GEN_ALL_CORE

## 2018-07-16 NOTE — ED ADULT NURSE REASSESSMENT NOTE - NS ED NURSE REASSESS COMMENT FT1
Patient resting comfortably in stretcher.  Continues to deny any chest pain, SOB, distress.  Pending transport.

## 2018-07-16 NOTE — H&P ADULT - PROBLEM SELECTOR PLAN 5
Baseline Cr 0.9-1.1  -Cr today is 1.13 (it was 1.22 when discharged on 5/22/18)  -Monitor for changes in Cr

## 2018-07-16 NOTE — ED PROVIDER NOTE - CONSTITUTIONAL NEGATIVE STATEMENT, MLM
2018    From the office of:   Berry Bradshaw Jr., MD   [facility] - [facility]  [address] [place] [address]  [address]  [place] [address]  Phone: [phone]  Fax: [phone]    In regards to Summer Allen, :  1921    Date of Service:  2018    Chief Complaint: follow up urinary retention    History of Present Illness: The patient is a 89 year old female here for evaluation of urinary retention. She was seen inpatient on 18 after a fall. Escherichia coli UTI with urine retention noted. Being discharged with oral antibiotics. Received IV antibiotics in the hospital. Patient was also noted to have urinary retention. Urology evaluated the patient recommended discontinuing Rebolledo. Encourage p.o. oral fluid intake. Urine retention has improved since patient is ambulatory. It was thought that her retention was most likely due to pain medication. She was started on bethanecol. She was given a voiding trial prior to discharge which she voided 400 cc with a 90 cc residual. Since her discharge she has been voiding well without any complaints.     PFSH:   Past Medical History:   Past Medical History:   Diagnosis Date   • Anxiety    • Urinary tract infection        Family History:  History reviewed. No pertinent family history.    Surgical History:  Past Surgical History:   Procedure Laterality Date   • Appendectomy     • Cardiac pacemaker placement     • Cholecystectomy     • Gallbladder surgery     • Hysterectomy     :    Social History:  Social History     Social History   • Marital status:      Spouse name: N/A   • Number of children: N/A   • Years of education: N/A     Occupational History   • Not on file.     Social History Main Topics   • Smoking status: Former Smoker   • Smokeless tobacco: Never Used   • Alcohol use Yes      Comment: occ.   • Drug use: No   • Sexual activity: Not on file     Other Topics Concern   • Not on file     Social History Narrative   • No  narrative on file       Allergies:  ALLERGIES:  No Known Allergies    Medications:  Current Outpatient Prescriptions   Medication Sig Dispense Refill   • bisacodyl (DULCOLAX) 10 MG suppository Place 10 mg rectally daily as needed for Constipation.     • acetaminophen (TYLENOL) 325 MG tablet Take 2 tablets by mouth 3 times daily. 30 tablet 0   • cyclobenzaprine (FLEXERIL) 5 MG tablet Take 1 tablet by mouth 3 times daily. 30 tablet 0   • lidocaine (LIDOCARE) 4 % patch Place 1 patch onto the skin daily. 15 patch 0   • traMADol (ULTRAM) 50 MG tablet Take 1 tablet by mouth every 6 hours as needed for Pain. 30 tablet 0   • aspirin 81 MG tablet Take 81 mg by mouth daily.     • citalopram (CELEXA) 20 MG tablet Take 20 mg by mouth daily.      • gabapentin (NEURONTIN) 100 MG capsule Take 1 tablet by mouth 2 times daily.      • methylPREDNISolone (MEDROL DOSEPAK) 4 MG tablet follow package directions 21 tablet 0   • omeprazole (PRILOSEC) 20 MG capsule Take 1 capsule by mouth daily. 14 capsule 0     No current facility-administered medications for this visit.          Review of Systems:    Review of Systems:  Constitutional: Patient denies fever, chills or headache.  Eyes: Patient denies blurred vision, double vision, pain, or glaucoma.  Allergic/Immunologic: Patient denies hay fever or drug allergies.  Neurological: Patient denies tremors, dizzy spells, numbness/tingling or seizures.  Endocrine: Patient denies excessive thirst, too hot, too cold, tired/sluggish or thyroid disease.  Gastrointestinal: Patient denies abdominal pain, nausea, vomiting, indigestion/heartburn, diarrhea or constipation.  Cardiovascular: Patient denies chest pain, varicose veins, high blood pressure or heart valve problems.  Integumentary: Patient denies skin rash, boils or persistent itch.  Musculoskeletal: Patient denies joint pain, neck pain, back pain or joint replacement.  Ear/Nose/Throat/Mouth: Patient denies ear infection, sore throat or sinus  problems.  Genitourinary: See symptoms listed in HPI.  Respiratory: Patient denies wheezing, frequent coughing, shortness of breath or chronic obstructive pulmonary disease.  Hematologic/Lymphatic: Patient denies swollen glands or blood clotting problem.  Psychologic: Patient states satisfaction with life and denies depression or suicidal ideations.            Physical Exam:  Constitutional:  Visit Vitals  /82   Pulse 104   Resp 16   Ht 5' 2\" (1.575 m)   Wt 43.1 kg   BMI 17.38 kg/m²     Well developed, well nourished, and afebrile.    ABDOMEN: No masses are palpated. There is no tenderness. Liver and spleen appear to be normal. No abdominal wall herniae noted.   GENITOURINARY:    Kidneys: No CVA tenderness area of left kidney. No CVA tenderness  area of right kidney.   NEUROLOGICAL: Patient appears to be well oriented to time, place, and person.   PSYCHIATRIC: Mood indicates no abnormalities. Doesn't appear to be depressed or agitated.     Nurse present for physical examination.      In Office Testing  Results for orders placed or performed in visit on 06/22/18   POST VOID RES URINE/BL BY US   Result Value    BLDR Scan mLs 33 ml           Assessment and Plan:      Urinary Retention- Bladder scan today indicates patient is emptying well. She is back to her baseline voiding. She is not on any  medications, thus she can follow up here as needed.    I had a brief counseling session with the patient concerning the diagnosis and treatment options.   I performed a brief review of the patient's medical records.     Data review:  X-ray Review  I reviewed the available imaging report(s) and reported the findings, diagnosis, prognosis, and treatment to the patient.                                          no fever and no chills.

## 2018-07-16 NOTE — H&P ADULT - PROBLEM SELECTOR PLAN 4
-on coumadin at home  -NSR since admission  -Obtain AM EKG On 6mg PO BID coumadin at home (pt reports taking both doses together in the AM when he takes it), hasn't taken it in past 4 days  -NSR since admission  -Obtain AM EKG

## 2018-07-16 NOTE — H&P ADULT - ASSESSMENT
Mr. Torrez is a 63yo M with pmhx of alcohol use, CAD with MI, S/p ICD, LV thrombus on coumadin, CHF (May 2018 ECHO with EF of 35%) who presented with chest pain on 7/16 and was admitted for ACS rule out. Mr. Torrez is a 61yo M with pmhx of alcohol use, CAD with MI, S/p ICD, LV thrombus on coumadin, CHF (May 2018 ECHO with EF of 35%) who presented with chest pain and heavy alcohol use on 7/16 and was admitted to [facility] for ACS rule out.

## 2018-07-16 NOTE — H&P ADULT - PROBLEM SELECTOR PLAN 3
LV thrombus on ECHO in May 2018  -Pt takes coumadin 12mg PO daily at home but states he hasn't taken his coumadin in the past 4 days  -INR on admission therapeutic at 2.48 LV thrombus on ECHO in May 2018  -Pt takes coumadin 12mg PO daily at home but states he hasn't taken his coumadin in the past 4 days  -Repeat ECHO tomorrow  -INR on admission therapeutic at 2.48 but may wnl be d/t liver disease LV thrombus on ECHO in May 2018  -Pt takes coumadin 12mg PO daily at home but states he hasn't taken his coumadin in the past 4 days  -Repeat ECHO  -INR on admission therapeutic at 2.48 but may be d/t liver disease

## 2018-07-16 NOTE — ED PROVIDER NOTE - CONSTITUTIONAL, MLM
normal... Well appearing, well nourished, awake, alert, oriented to person, place, time/situation and in no apparent distress. intoxicated

## 2018-07-16 NOTE — H&P ADULT - HISTORY OF PRESENT ILLNESS
Mr. Torrez is a 61yo male with pmhx of alcohol use, CAD with MI (1998 x2, 2003), s/p ICD (2009), LV thrombus on coumadin (previously on ASA and Plavix but switched to coumadin d/t GIB in 2016), CHF (EF 35% in May 2018) and CKD stage 3 (baseline Cr 0.9-1.1) who presented to the ED with chest pain. He states that his chest pain began this morning at 4:30AM. He states that it was tight and focused at the left of his sternum. He states that it eased up through the morning, was relieved by drinking a couple shots of vodka, which he started at 8AM and nothing made the pain worse. He states that the pain was 5/10 at its worst and that he currently has no chest pain. He denies any associated symptoms with the chest pain. He reports that 4 days ago, an upsetting event happened in his neighborhood and he started drinking after not having had a drink for about 2 months. He said in these 4 days, he had about an ounce of vodka a day and he also stopped taking his coumadin. He reports that he did take his prescribed 100mg of Metoprolol this morning. He states that his last drink was between 8AM and 10AM today.    He was recently hospitalized at [facility] in May 2018 for epigastric pain. His hospital course was complicated by heparin-induced thrombocytopenia and BRBPR which was attributed to hemorrhoids. He was d/ryder on Fundaparinox injections x7 days and warfarin 15mg daily + 2mg every other day. Today he reports that he takes 12mg coumadin daily. He also has been hospitalized many times for alcohol intoxication and withdrawal.    In the ED, pt was tachycardic and otherwise vitals were stable (98.6, 124/90, 106, 18, 97% RA). Labs showed Trop <0.01, Anion gap of 19, AST 49 and serum alcohol 273. EKG showed Q waves in V1-3, unchanged from EKGs in May 2018.    Pt denies n/v, tremors, diaphoresis, feeling nervous or agitated, he denies feeling, seeing or hearing things that aren't there and he is orientedx3. He reports a small headache. This gives him a CIWA score of 1-2. He states that he has been experiencing photophobia for 4-6 months. He reports diarrhea for about 2 days. He currently denies n/v, dizziness, weakness, SOB, chest pain, orthopnea, dyspnea on exertion.

## 2018-07-16 NOTE — ED PROVIDER NOTE - PROGRESS NOTE DETAILS
Trop neg, pt resting comfortably. Dr Levy paged to discuss. Pt discussed w Dr Levy who wants to admit pt.

## 2018-07-16 NOTE — H&P ADULT - PROBLEM SELECTOR PLAN 6
ECHO in May 2018 was significant for with EF 35%, mild concentric left ventricular hypertrophy, moderate segmental left ventricular systolic dysfunction with akinesis of mid to distal anteroseptum and all apical segments, large thrombus measuring 1.9 x 1.8 cm in the apex, mildly elevated left atrial pressure (8-14mmHg), no significant interval change when compared to the echo from 1/8/18 ECHO in May 2018 was significant for with EF 35%, mild concentric left ventricular hypertrophy, moderate segmental left ventricular systolic dysfunction with akinesis of mid to distal anteroseptum and all apical segments, large thrombus measuring 1.9 x 1.8 cm in the apex, mildly elevated left atrial pressure (8-14mmHg), no significant interval change when compared to the echo from 1/8/18  -monitor volume status  -repeat ECHO tomorrow

## 2018-07-16 NOTE — PROGRESS NOTE ADULT - SUBJECTIVE AND OBJECTIVE BOX
Pt is readmitted for chest pain associated with increase intake of ETOH    he has been unable to totally cease from ETOH abuse when under stress and has been drinking for 4  days    PAST MEDICAL & SURGICAL HISTORY:  Thrombus in heart chamber: LV  ICD (implantable cardioverter-defibrillator) in place  Pacemaker  ETOH abuse  Paroxysmal a-fib  Myocardial infarction involving other coronary artery  Gastritis  Atherosclerosis of coronary artery: Coronary artery disease  Automatic implantable cardiac defibrillator in situ: ICD (implantable cardioverter-defibrillator) in place    MEDICATIONS  (STANDING):  atorvastatin 40 milliGRAM(s) Oral at bedtime  folic acid 1 milliGRAM(s) Oral daily  multivitamin 1 Tablet(s) Oral daily  thiamine 100 milliGRAM(s) Oral daily    MEDICATIONS  (PRN):    ICU Vital Signs Last 24 Hrs  T(C): 37 (16 Jul 2018 13:52), Max: 37 (16 Jul 2018 13:52)  T(F): 98.6 (16 Jul 2018 13:52), Max: 98.6 (16 Jul 2018 13:52)  HR: 82 (16 Jul 2018 14:28) (82 - 106)  BP: 137/93 (16 Jul 2018 14:28) (124/90 - 137/93)  BP(mean): --  ABP: --  ABP(mean): --  RR: 18 (16 Jul 2018 14:28) (18 - 18)  SpO2: 99% (16 Jul 2018 14:28) (97% - 99%)    Home Medications:  atorvastatin 40 mg oral tablet: 1 tab(s) orally once a day (at bedtime) (18 May 2018 13:40)  metoprolol tartrate 50 mg oral tablet: 1 tab(s) orally 2 times a day (18 May 2018 13:40)  WARFARIN 12 MG PO OD   MEDICATIONS  (STANDING):  atorvastatin 40 milliGRAM(s) Oral at bedtime  folic acid 1 milliGRAM(s) Oral daily  multivitamin 1 Tablet(s) Oral daily  thiamine 100 milliGRAM(s) Oral daily    MEDICATIONS  (PRN):        lungs clear  CV s 1s2  Abd soft  Ext stable                          13.3   4.4   )-----------( 200      ( 16 Jul 2018 14:27 )             41.9   07-16    140  |  99  |  13  ----------------------------<  110<H>  4.2   |  22  |  1.13    Ca    9.0      16 Jul 2018 14:27    TPro  8.1  /  Alb  4.5  /  TBili  0.9  /  DBili  x   /  AST  49<H>  /  ALT  42  /  AlkPhos  82  07-16  CARDIAC MARKERS ( 16 Jul 2018 14:27 )  x     / <0.01 ng/mL / 193 U/L / x     / 2.7 ng/mL    echo  ef   35 %   MODERATE SEGMENTAL lEFT VENTRICULAR SYSTOLIC DYSFUNCTION   LARGE THROMBUS AT APEX    Alcohol, Blood: 155: TOXIC CONCENTRATIONS:  Flushing, Slowing of  Reflexes, Impaired Visual Acuity:     Depression of CNS: > 100  Fatalities Reported:  > 400  These ranges are intended as general guidelines.  Alcohol metabolism can  vary widely among individuals.  This test is approved for clinical and  not for forensic purposes. mg/dL (01.06.18 @ 09:32)    PT/INR - ( 16 Jul 2018 14:27 )   PT: 28.1 sec;   INR: 2.48     THERAPEUTIC ON 12 MG PO WARFARIN OD        PTT - ( 16 Jul 2018 14:27 )  PTT:41.2 sec Pt is readmitted for chest pain associated with increase intake of ETOH    he has been unable to totally cease from ETOH abuse when under stress and has been drinking for 4  days    PAST MEDICAL & SURGICAL HISTORY:  Thrombus in heart chamber: LV  ICD (implantable cardioverter-defibrillator) in place  Pacemaker  ETOH abuse  Paroxysmal a-fib  Myocardial infarction involving other coronary artery  Gastritis  Atherosclerosis of coronary artery: Coronary artery disease  Automatic implantable cardiac defibrillator in situ: ICD (implantable cardioverter-defibrillator) in place    MEDICATIONS  (STANDING):  atorvastatin 40 milliGRAM(s) Oral at bedtime  folic acid 1 milliGRAM(s) Oral daily  multivitamin 1 Tablet(s) Oral daily  thiamine 100 milliGRAM(s) Oral daily    MEDICATIONS  (PRN):    ICU Vital Signs Last 24 Hrs  T(C): 37 (16 Jul 2018 13:52), Max: 37 (16 Jul 2018 13:52)  T(F): 98.6 (16 Jul 2018 13:52), Max: 98.6 (16 Jul 2018 13:52)  HR: 82 (16 Jul 2018 14:28) (82 - 106)  BP: 137/93 (16 Jul 2018 14:28) (124/90 - 137/93)  BP(mean): --  ABP: --  ABP(mean): --  RR: 18 (16 Jul 2018 14:28) (18 - 18)  SpO2: 99% (16 Jul 2018 14:28) (97% - 99%)    Home Medications:  atorvastatin 40 mg oral tablet: 1 tab(s) orally once a day (at bedtime) (18 May 2018 13:40)  metoprolol tartrate 50 mg oral tablet: 1 tab(s) orally 2 times a day (18 May 2018 13:40)  WARFARIN 12 MG PO OD   MEDICATIONS  (STANDING):  atorvastatin 40 milliGRAM(s) Oral at bedtime  folic acid 1 milliGRAM(s) Oral daily  multivitamin 1 Tablet(s) Oral daily  thiamine 100 milliGRAM(s) Oral daily    MEDICATIONS  (PRN):        lungs clear  CV s 1s2  Abd soft  Ext stable                          13.3   4.4   )-----------( 200      ( 16 Jul 2018 14:27 )             41.9   07-16    140  |  99  |  13  ----------------------------<  110<H>  4.2   |  22  |  1.13    Ca    9.0      16 Jul 2018 14:27    TPro  8.1  /  Alb  4.5  /  TBili  0.9  /  DBili  x   /  AST  49<H>  /  ALT  42  /  AlkPhos  82  07-16  CARDIAC MARKERS ( 16 Jul 2018 14:27 )  x     / <0.01 ng/mL / 193 U/L / x     / 2.7 ng/mL    echo  ef   35 %   MODERATE SEGMENTAL lEFT VENTRICULAR SYSTOLIC DYSFUNCTION   LARGE THROMBUS AT APEX    Alcohol, Blood: 155: TOXIC CONCENTRATIONS:  Flushing, Slowing of  Reflexes, Impaired Visual Acuity:     Depression of CNS: > 100  Fatalities Reported:  > 400  These ranges are intended as general guidelines.  Alcohol metabolism can  vary widely among individuals.  This test is approved for clinical and  not for forensic purposes. mg/dL (01.06.18 @ 09:32)    PT/INR - ( 16 Jul 2018 14:27 )   PT: 28.1 sec;   INR: 2.48     THERAPEUTIC ON 12 MG PO WARFARIN OD   HOWEVER PT STATES HE MISSED 4 DAYS ON WARFARIN       INR MAY THEREFOR BE PARTIALLY THERAPEUTIC RELATED TO UNDERLYING LIVER DISEASE       PTT - ( 16 Jul 2018 14:27 )  PTT:41.2 sec

## 2018-07-17 ENCOUNTER — TRANSCRIPTION ENCOUNTER (OUTPATIENT)
Age: 63
End: 2018-07-17

## 2018-07-17 VITALS — WEIGHT: 167.33 LBS

## 2018-07-17 LAB
ANION GAP SERPL CALC-SCNC: 14 MMOL/L — SIGNIFICANT CHANGE UP (ref 5–17)
BUN SERPL-MCNC: 14 MG/DL — SIGNIFICANT CHANGE UP (ref 7–23)
CALCIUM SERPL-MCNC: 9.6 MG/DL — SIGNIFICANT CHANGE UP (ref 8.4–10.5)
CHLORIDE SERPL-SCNC: 94 MMOL/L — LOW (ref 96–108)
CO2 SERPL-SCNC: 27 MMOL/L — SIGNIFICANT CHANGE UP (ref 22–31)
CREAT SERPL-MCNC: 1.03 MG/DL — SIGNIFICANT CHANGE UP (ref 0.5–1.3)
GLUCOSE BLDC GLUCOMTR-MCNC: 109 MG/DL — HIGH (ref 70–99)
GLUCOSE BLDC GLUCOMTR-MCNC: 115 MG/DL — HIGH (ref 70–99)
GLUCOSE BLDC GLUCOMTR-MCNC: 97 MG/DL — SIGNIFICANT CHANGE UP (ref 70–99)
GLUCOSE SERPL-MCNC: 111 MG/DL — HIGH (ref 70–99)
HCT VFR BLD CALC: 41.2 % — SIGNIFICANT CHANGE UP (ref 39–50)
HGB BLD-MCNC: 12.8 G/DL — LOW (ref 13–17)
INR BLD: 2.18 — HIGH (ref 0.88–1.16)
MAGNESIUM SERPL-MCNC: 1.6 MG/DL — SIGNIFICANT CHANGE UP (ref 1.6–2.6)
MCHC RBC-ENTMCNC: 23.3 PG — LOW (ref 27–34)
MCHC RBC-ENTMCNC: 31.1 G/DL — LOW (ref 32–36)
MCV RBC AUTO: 74.9 FL — LOW (ref 80–100)
PHOSPHATE SERPL-MCNC: 3.4 MG/DL — SIGNIFICANT CHANGE UP (ref 2.5–4.5)
PLATELET # BLD AUTO: 201 K/UL — SIGNIFICANT CHANGE UP (ref 150–400)
POTASSIUM SERPL-MCNC: 3.9 MMOL/L — SIGNIFICANT CHANGE UP (ref 3.5–5.3)
POTASSIUM SERPL-SCNC: 3.9 MMOL/L — SIGNIFICANT CHANGE UP (ref 3.5–5.3)
PROTHROM AB SERPL-ACNC: 24.6 SEC — HIGH (ref 9.8–12.7)
RBC # BLD: 5.5 M/UL — SIGNIFICANT CHANGE UP (ref 4.2–5.8)
RBC # FLD: 18.2 % — HIGH (ref 10.3–16.9)
SODIUM SERPL-SCNC: 135 MMOL/L — SIGNIFICANT CHANGE UP (ref 135–145)
TSH SERPL-MCNC: 1.45 UIU/ML — SIGNIFICANT CHANGE UP (ref 0.35–4.94)
WBC # BLD: 4.6 K/UL — SIGNIFICANT CHANGE UP (ref 3.8–10.5)
WBC # FLD AUTO: 4.6 K/UL — SIGNIFICANT CHANGE UP (ref 3.8–10.5)

## 2018-07-17 PROCEDURE — 93306 TTE W/DOPPLER COMPLETE: CPT | Mod: 26

## 2018-07-17 PROCEDURE — A9500: CPT

## 2018-07-17 PROCEDURE — 78452 HT MUSCLE IMAGE SPECT MULT: CPT

## 2018-07-17 PROCEDURE — C8929: CPT

## 2018-07-17 PROCEDURE — 86900 BLOOD TYPING SEROLOGIC ABO: CPT

## 2018-07-17 PROCEDURE — 86901 BLOOD TYPING SEROLOGIC RH(D): CPT

## 2018-07-17 PROCEDURE — 36415 COLL VENOUS BLD VENIPUNCTURE: CPT

## 2018-07-17 PROCEDURE — 78452 HT MUSCLE IMAGE SPECT MULT: CPT | Mod: 26

## 2018-07-17 PROCEDURE — 84100 ASSAY OF PHOSPHORUS: CPT

## 2018-07-17 PROCEDURE — A9505: CPT

## 2018-07-17 PROCEDURE — 80048 BASIC METABOLIC PNL TOTAL CA: CPT

## 2018-07-17 PROCEDURE — 71045 X-RAY EXAM CHEST 1 VIEW: CPT

## 2018-07-17 PROCEDURE — 86850 RBC ANTIBODY SCREEN: CPT

## 2018-07-17 PROCEDURE — 93016 CV STRESS TEST SUPVJ ONLY: CPT

## 2018-07-17 PROCEDURE — 99285 EMERGENCY DEPT VISIT HI MDM: CPT | Mod: 25

## 2018-07-17 PROCEDURE — 82962 GLUCOSE BLOOD TEST: CPT

## 2018-07-17 PROCEDURE — 82553 CREATINE MB FRACTION: CPT

## 2018-07-17 PROCEDURE — 84484 ASSAY OF TROPONIN QUANT: CPT

## 2018-07-17 PROCEDURE — 85027 COMPLETE CBC AUTOMATED: CPT

## 2018-07-17 PROCEDURE — 85025 COMPLETE CBC W/AUTO DIFF WBC: CPT

## 2018-07-17 PROCEDURE — 83735 ASSAY OF MAGNESIUM: CPT

## 2018-07-17 PROCEDURE — 84443 ASSAY THYROID STIM HORMONE: CPT

## 2018-07-17 PROCEDURE — 93018 CV STRESS TEST I&R ONLY: CPT

## 2018-07-17 PROCEDURE — 85730 THROMBOPLASTIN TIME PARTIAL: CPT

## 2018-07-17 PROCEDURE — 93017 CV STRESS TEST TRACING ONLY: CPT

## 2018-07-17 PROCEDURE — 80307 DRUG TEST PRSMV CHEM ANLYZR: CPT

## 2018-07-17 PROCEDURE — 82550 ASSAY OF CK (CPK): CPT

## 2018-07-17 PROCEDURE — 85610 PROTHROMBIN TIME: CPT

## 2018-07-17 PROCEDURE — 80053 COMPREHEN METABOLIC PANEL: CPT

## 2018-07-17 RX ORDER — ASPIRIN/CALCIUM CARB/MAGNESIUM 324 MG
81 TABLET ORAL DAILY
Qty: 0 | Refills: 0 | Status: DISCONTINUED | OUTPATIENT
Start: 2018-07-17 | End: 2018-07-17

## 2018-07-17 RX ORDER — LISINOPRIL 2.5 MG/1
1 TABLET ORAL
Qty: 30 | Refills: 0 | OUTPATIENT
Start: 2018-07-17 | End: 2018-08-15

## 2018-07-17 RX ORDER — METOPROLOL TARTRATE 50 MG
100 TABLET ORAL DAILY
Qty: 0 | Refills: 0 | Status: DISCONTINUED | OUTPATIENT
Start: 2018-07-17 | End: 2018-07-17

## 2018-07-17 RX ORDER — ASPIRIN/CALCIUM CARB/MAGNESIUM 324 MG
1 TABLET ORAL
Qty: 30 | Refills: 0
Start: 2018-07-17 | End: 2018-08-15

## 2018-07-17 RX ORDER — MAGNESIUM SULFATE 500 MG/ML
2 VIAL (ML) INJECTION ONCE
Qty: 0 | Refills: 0 | Status: COMPLETED | OUTPATIENT
Start: 2018-07-17 | End: 2018-07-17

## 2018-07-17 RX ORDER — METOPROLOL TARTRATE 50 MG
1 TABLET ORAL
Qty: 30 | Refills: 0 | OUTPATIENT
Start: 2018-07-17 | End: 2018-08-15

## 2018-07-17 RX ORDER — LISINOPRIL 2.5 MG/1
2.5 TABLET ORAL DAILY
Qty: 0 | Refills: 0 | Status: DISCONTINUED | OUTPATIENT
Start: 2018-07-17 | End: 2018-07-17

## 2018-07-17 RX ORDER — WARFARIN SODIUM 2.5 MG/1
0 TABLET ORAL
Qty: 0 | Refills: 0 | COMMUNITY

## 2018-07-17 RX ADMIN — Medication 1 MILLIGRAM(S): at 11:38

## 2018-07-17 RX ADMIN — Medication 50 GRAM(S): at 11:39

## 2018-07-17 RX ADMIN — Medication 50 MILLIGRAM(S): at 05:39

## 2018-07-17 RX ADMIN — Medication 1 TABLET(S): at 11:38

## 2018-07-17 RX ADMIN — Medication 100 MILLIGRAM(S): at 11:38

## 2018-07-17 RX ADMIN — Medication 50 MILLIGRAM(S): at 17:09

## 2018-07-17 RX ADMIN — LISINOPRIL 2.5 MILLIGRAM(S): 2.5 TABLET ORAL at 17:09

## 2018-07-17 NOTE — PROGRESS NOTE ADULT - PROBLEM SELECTOR PLAN 6
ECHO in May 2018 was significant for with EF 35%, mild concentric left ventricular hypertrophy, moderate segmental left ventricular systolic dysfunction with akinesis of mid to distal anteroseptum and all apical segments, large thrombus measuring 1.9 x 1.8 cm in the apex, mildly elevated left atrial pressure (8-14mmHg), no significant interval change when compared to the echo from 1/8/18  -monitor volume status  -repeat ECHO tomorrow ECHO in May 2018 was significant for with EF 35%, mild concentric left ventricular hypertrophy, moderate segmental left ventricular systolic dysfunction with akinesis of mid to distal anteroseptum and all apical segments, large thrombus measuring 1.9 x 1.8 cm in the apex, mildly elevated left atrial pressure (8-14mmHg), no significant interval change when compared to the echo from 1/8/18  -monitor volume status  -ECHO with difinity today, awaiting results

## 2018-07-17 NOTE — DISCHARGE NOTE ADULT - MEDICATION SUMMARY - MEDICATIONS TO STOP TAKING
I will STOP taking the medications listed below when I get home from the hospital:    metoprolol tartrate 50 mg oral tablet  -- 1 tab(s) by mouth 2 times a day    fondaparinux 7.5 mg/0.6 mL subcutaneous solution  -- 7.5 milligram(s) subcutaneously once a day   -- It is very important that you take or use this exactly as directed.  Do not skip doses or discontinue unless directed by your doctor.    warfarin 5 mg oral tablet  -- 3 tab(s) by mouth once a day (at bedtime)   -- Do not take this drug if you are pregnant.  It is very important that you take or use this exactly as directed.  Do not skip doses or discontinue unless directed by your doctor.  Obtain medical advice before taking any non-prescription drugs as some may affect the action of this medication.    warfarin 2 mg oral tablet  -- 1 tab(s) by mouth every other day   -- Do not take this drug if you are pregnant.  It is very important that you take or use this exactly as directed.  Do not skip doses or discontinue unless directed by your doctor.  Obtain medical advice before taking any non-prescription drugs as some may affect the action of this medication.    lisinopril 5 mg oral tablet  -- 1 tab(s) by mouth once a day   -- Do not take this drug if you are pregnant.  It is very important that you take or use this exactly as directed.  Do not skip doses or discontinue unless directed by your doctor.  Some non-prescription drugs may aggravate your condition.  Read all labels carefully.  If a warning appears, check with your doctor before taking.    Coumadin 6 mg oral tablet  -- orally 2 times a day

## 2018-07-17 NOTE — DISCHARGE NOTE ADULT - CARE PLAN
Principal Discharge DX:	Coronary artery disease  Goal:	Take your medications and follow up with your cardiologist regularly  Secondary Diagnosis:	Acute on chronic systolic congestive heart failure  Secondary Diagnosis:	ETOH abuse  Secondary Diagnosis:	Paroxysmal a-fib Principal Discharge DX:	Coronary artery disease  Goal:	Take your medications and follow up with your cardiologist regularly  Assessment and plan of treatment:	You came into the hospital after having experienced chest pain at home. Your work up showed an EKG with similar findings to your last EKG when you were in the hospital in May. Your cardiac enzymes were not high and a stress test showed that the part of your heart that was blocked from receiving oxygen in May is receiving oxygen now.   You should follow up outpatient with Dr. Levy to continue working up and treating your heart disease.   Please take the following medications: aspirin 81mg by mouth daily, lisinopril 5mg by mouth daily, metoprolol succinate ER 100mg by mouth daily and atorvastatin 40 by mouth daily.  Secondary Diagnosis:	Acute on chronic systolic congestive heart failure  Goal:	Take your medications as directed  Assessment and plan of treatment:	Your heart's ability to pump properly was limited when you were here in May and continues to be just as limited.  Please take your metoprolol succinate ER and Lisinopril as directed.  Limit the amount of salt/sodium you consume in your diet.  Limit your intake of liquids like soda, water, coffee, soup, juice, soda and other fluids to about 1 liter/day.  Secondary Diagnosis:	ETOH abuse  Goal:	Abstain from drinking  Assessment and plan of treatment:	Take folic acid 1mg and a multivitamin by mouth daily as alcohol make certain vitamins less available to your body.  Alcohol use can worsen your general health as well as your heart health. Follow up regularly with your doctors.  Seek out supportive resources to help you to abstain from alcohol  Secondary Diagnosis:	Paroxysmal a-fib  Goal:	Take your medications to avoid having your heart go into an unhealthy rhythm  Assessment and plan of treatment:	Your heart can sometimes go into a rhythm called atrial fibrillation. This rhythm can cause your heart to form clots, like the one you had in your heart, which can be dangerous and spread through your body. The clot that you had in your heart in May has since resolved. You were taking coumadin to reduce the size of that. You no longer have to take coumadin now that the clot is gone. To keep your heart in a normal rhythm, take your metoprolol daily.

## 2018-07-17 NOTE — PROGRESS NOTE ADULT - ASSESSMENT
Mr. Torrez is a 61yo M with pmhx of alcohol use, CAD with MI, S/p ICD, LV thrombus on coumadin, CHF (May 2018 ECHO with EF of 35%) who presented with chest pain and heavy alcohol use on 7/16 and was admitted to [facility] for ACS rule out.

## 2018-07-17 NOTE — PROGRESS NOTE ADULT - SUBJECTIVE AND OBJECTIVE BOX
OVERNIGHT EVENTS:    SUBJECTIVE / INTERVAL HPI: Patient seen and examined at bedside.     VITAL SIGNS:  Vital Signs Last 24 Hrs  T(C): 36.7 (17 Jul 2018 05:08), Max: 37 (16 Jul 2018 13:52)  T(F): 98 (17 Jul 2018 05:08), Max: 98.6 (16 Jul 2018 13:52)  HR: 80 (17 Jul 2018 00:04) (80 - 106)  BP: 122/80 (17 Jul 2018 00:04) (113/75 - 142/82)  BP(mean): 99 (17 Jul 2018 00:04) (93 - 114)  RR: 16 (17 Jul 2018 00:04) (16 - 18)  SpO2: 95% (17 Jul 2018 00:04) (95% - 100%)    PHYSICAL EXAM:    General: WDWN  HEENT: NC/AT; PERRL, anicteric sclera; MMM  Neck: supple  Cardiovascular: +S1/S2; RRR  Respiratory: CTA B/L; no W/R/R  Gastrointestinal: soft, NT/ND; +BSx4  Extremities: WWP; no edema, clubbing or cyanosis  Vascular: 2+ radial, DP/PT pulses B/L  Neurological: AAOx3; no focal deficits    MEDICATIONS:  MEDICATIONS  (STANDING):  atorvastatin 40 milliGRAM(s) Oral at bedtime  dextrose 5%. 1000 milliLiter(s) (50 mL/Hr) IV Continuous <Continuous>  dextrose 50% Injectable 12.5 Gram(s) IV Push once  dextrose 50% Injectable 25 Gram(s) IV Push once  dextrose 50% Injectable 25 Gram(s) IV Push once  folic acid 1 milliGRAM(s) Oral daily  insulin lispro (HumaLOG) corrective regimen sliding scale   SubCutaneous Before meals and at bedtime  metoprolol tartrate 50 milliGRAM(s) Oral two times a day  multivitamin 1 Tablet(s) Oral daily  thiamine 100 milliGRAM(s) Oral daily    MEDICATIONS  (PRN):  dextrose 40% Gel 15 Gram(s) Oral once PRN Blood Glucose LESS THAN 70 milliGRAM(s)/deciliter  glucagon  Injectable 1 milliGRAM(s) IntraMuscular once PRN Glucose LESS THAN 70 milligrams/deciliter      ALLERGIES:  Allergies    Capoten (Short breath; Rash; Hives)  heparin (Other (Mod to Severe))  penicillin (Short breath; Rash; Hives)    Intolerances        LABS:                        13.3   4.4   )-----------( 200      ( 16 Jul 2018 14:27 )             41.9     07-16    140  |  99  |  13  ----------------------------<  110<H>  4.2   |  22  |  1.13    Ca    9.0      16 Jul 2018 14:27    TPro  8.1  /  Alb  4.5  /  TBili  0.9  /  DBili  x   /  AST  49<H>  /  ALT  42  /  AlkPhos  82  07-16    PT/INR - ( 16 Jul 2018 14:27 )   PT: 28.1 sec;   INR: 2.48          PTT - ( 16 Jul 2018 14:27 )  PTT:41.2 sec    CAPILLARY BLOOD GLUCOSE      POCT Blood Glucose.: 146 mg/dL (16 Jul 2018 21:05)      RADIOLOGY & ADDITIONAL TESTS: Reviewed.    ASSESSMENT:    PLAN: OVERNIGHT EVENTS: No acute events overnight. Pt stable.    SUBJECTIVE / INTERVAL HPI: Patient seen and examined at bedside. CIWA score of 4-5 today with mild tremors and mild-moderate headache. Denies chest pain, palpitations, SOB and diarrhea today.    VITAL SIGNS:  Vital Signs Last 24 Hrs  T(C): 36.7 (17 Jul 2018 05:08), Max: 37 (16 Jul 2018 13:52)  T(F): 98 (17 Jul 2018 05:08), Max: 98.6 (16 Jul 2018 13:52)  HR: 80 (17 Jul 2018 00:04) (80 - 106)  BP: 122/80 (17 Jul 2018 00:04) (113/75 - 142/82)  BP(mean): 99 (17 Jul 2018 00:04) (93 - 114)  RR: 16 (17 Jul 2018 00:04) (16 - 18)  SpO2: 95% (17 Jul 2018 00:04) (95% - 100%)    PHYSICAL EXAM:    General: No acute distress  HEENT: NC/AT, anicteric sclera; MMM  Cardiovascular: +S1/S2; RRR  Respiratory: CTA B/L; no W/R/R  Gastrointestinal: soft, NT/ND; +BSx4  Extremities: WWP; no edema, clubbing or cyanosis  Vascular: 2+ radial, DP/PT pulses B/L  Neurological: AAOx3; no focal deficits, mild tremors with arms extended    MEDICATIONS  (STANDING):  atorvastatin 40 milliGRAM(s) Oral at bedtime  dextrose 5%. 1000 milliLiter(s) (50 mL/Hr) IV Continuous <Continuous>  dextrose 50% Injectable 12.5 Gram(s) IV Push once  dextrose 50% Injectable 25 Gram(s) IV Push once  dextrose 50% Injectable 25 Gram(s) IV Push once  folic acid 1 milliGRAM(s) Oral daily  insulin lispro (HumaLOG) corrective regimen sliding scale   SubCutaneous Before meals and at bedtime  metoprolol tartrate 50 milliGRAM(s) Oral two times a day  multivitamin 1 Tablet(s) Oral daily  thiamine 100 milliGRAM(s) Oral daily    MEDICATIONS  (PRN):  dextrose 40% Gel 15 Gram(s) Oral once PRN Blood Glucose LESS THAN 70 milliGRAM(s)/deciliter  glucagon  Injectable 1 milliGRAM(s) IntraMuscular once PRN Glucose LESS THAN 70 milligrams/deciliter      ALLERGIES:  Capoten (Short breath; Rash; Hives)  heparin (Other (Mod to Severe))  penicillin (Short breath; Rash; Hives)      .  LABS:                         12.8   4.6   )-----------( 201      ( 17 Jul 2018 07:21 )             41.2   07-17  135  |  94<L>  |  14  ----------------------------<  111<H>  3.9   |  27  |  1.03  Ca    9.6      17 Jul 2018 07:21  Phos  3.4     07-17  Mg     1.6     07-17  TPro  8.1  /  Alb  4.5  /  TBili  0.9  /  DBili  x   /  AST  49<H>  /  ALT  42  /  AlkPhos  82  07-16  PT/INR - ( 17 Jul 2018 07:21 )   PT: 24.6 sec;   INR: 2.18     PTT - ( 16 Jul 2018 14:27 )  PTT:41.2 sec    CARDIAC MARKERS ( 16 Jul 2018 20:24 )  x     / <0.01 ng/mL / 171 U/L / x     / 2.3 ng/mL  CARDIAC MARKERS ( 16 Jul 2018 14:27 )  x     / <0.01 ng/mL / 193 U/L / x     / 2.7 ng/mL    RADIOLOGY, EKG & ADDITIONAL TESTS: Reviewed.

## 2018-07-17 NOTE — PROGRESS NOTE ADULT - PROBLEM SELECTOR PLAN 1
Patient presented to the ED with chest pain that lasted from 4AM to 8AM and was relieved by drinking alcohol  -Currently asymptomatic  -trop <0.01, CKMB 2.7,  in the ED  -EKG with Q waves, unchanged from EKG in May  -Monitor on Tele  -Follow repeat trops at 8PM Patient presented to the ED with chest pain that lasted from 4AM to 8AM and was relieved by drinking alcohol  -Continues to be chest pain free since admission  -trop <0.01 x2  -EKG with Q waves, unchanged from EKG in May  -Monitor on Tele Patient presented to the ED with chest pain that lasted from 4AM to 8AM and was relieved by drinking alcohol  -Continues to be chest pain free since admission  -trop <0.01 x2  -EKG with Q waves, unchanged from EKG in May  -Previous abnormal stress test in May 2016 showing Severely abnormal myocardial perfusion images consistent with   scarring and mild stress-induced ischemia in the territory of the LAD.   Overall left ventricular systolic function is abnormal with global   hypokinesis  -Repeat stress test today  -Monitor on Tele

## 2018-07-17 NOTE — DISCHARGE NOTE ADULT - MEDICATION SUMMARY - MEDICATIONS TO TAKE
I will START or STAY ON the medications listed below when I get home from the hospital:    aspirin 81 mg oral delayed release tablet  -- 1 tab(s) by mouth once a day  -- Indication: For Coronary Artery Disease    lisinopril 2.5 mg oral tablet  -- 1 tab(s) by mouth once a day  -- Indication: For CHF (congestive heart failure)    atorvastatin 40 mg oral tablet  -- 1 tab(s) by mouth once a day (at bedtime)  -- Indication: For Coronary Artery Disease    Metoprolol Succinate  mg oral tablet, extended release  -- 1 tab(s) by mouth once a day   -- It is very important that you take or use this exactly as directed.  Do not skip doses or discontinue unless directed by your doctor.  May cause drowsiness.  Alcohol may intensify this effect.  Use care when operating dangerous machinery.  Some non-prescription drugs may aggravate your condition.  Read all labels carefully.  If a warning appears, check with your doctor before taking.  Swallow whole.  Do not crush.  Take with food or milk.  This drug may impair the ability to drive or operate machinery.  Use care until you become familiar with its effects.    -- Indication: For CHF (congestive heart failure)    Multiple Vitamins oral tablet  -- 1 tab(s) by mouth once a day  -- Indication: For supplement    folic acid 1 mg oral tablet  -- 1 tab(s) by mouth once a day  -- Indication: For supplement    thiamine 100 mg oral tablet  -- 1 tab(s) by mouth once a day x 14 days  x 30 days   -- Indication: For supplement

## 2018-07-17 NOTE — DISCHARGE NOTE ADULT - PATIENT PORTAL LINK FT
You can access the Digital [facility] Patient Portal, offered by [facility], by registering with the following website: [email or web address]

## 2018-07-17 NOTE — PROGRESS NOTE ADULT - ASSESSMENT
Cardiomyopathy LV Thrombus   consider repeat stress test   Heme consult re INR in face of ethanolism

## 2018-07-17 NOTE — DISCHARGE NOTE ADULT - PLAN OF CARE
Take your medications and follow up with your cardiologist regularly You came into the hospital after having experienced chest pain at home. Your work up showed an EKG with similar findings to your last EKG when you were in the hospital in May. Your cardiac enzymes were not high and a stress test showed that the part of your heart that was blocked from receiving oxygen in May is receiving oxygen now.   You should follow up outpatient with Dr. Levy to continue working up and treating your heart disease.   Please take the following medications: aspirin 81mg by mouth daily, lisinopril 5mg by mouth daily, metoprolol succinate ER 100mg by mouth daily and atorvastatin 40 by mouth daily. Take your medications as directed Your heart's ability to pump properly was limited when you were here in May and continues to be just as limited.  Please take your metoprolol succinate ER and Lisinopril as directed.  Limit the amount of salt/sodium you consume in your diet.  Limit your intake of liquids like soda, water, coffee, soup, juice, soda and other fluids to about 1 liter/day. Abstain from drinking Take folic acid 1mg and a multivitamin by mouth daily as alcohol make certain vitamins less available to your body.  Alcohol use can worsen your general health as well as your heart health. Follow up regularly with your doctors.  Seek out supportive resources to help you to abstain from alcohol Take your medications to avoid having your heart go into an unhealthy rhythm Your heart can sometimes go into a rhythm called atrial fibrillation. This rhythm can cause your heart to form clots, like the one you had in your heart, which can be dangerous and spread through your body. The clot that you had in your heart in May has since resolved. You were taking coumadin to reduce the size of that. You no longer have to take coumadin now that the clot is gone. To keep your heart in a normal rhythm, take your metoprolol daily.

## 2018-07-17 NOTE — DISCHARGE NOTE ADULT - ADDITIONAL INSTRUCTIONS
Please make an appointment with Dr. Levy outpatient within 2 weeks of being discharged from the hospital.

## 2018-07-17 NOTE — DISCHARGE NOTE ADULT - HOSPITAL COURSE
Mr. Torrez is a 61yo male with pmh of alcohol use, CAD with MI (1998 x2, 2003), s/p ICD (2009), previous LV thrombus, CHF and CKD stage 3 (baseline 0.9-1.1) who presented to [facility] on 7/16/18 with chest pain after 3-4 days of heavy drinking. Patient was admitted for ACS syndrome work up. His cardiac enzymes were negative and EKG findings were consistent with those of recent May hospitalization showing Q waves in V1-3. Pt reported having had stopped his coumadin for 4 days which he was on for an LV thrombus. Mr. Torrez is a 63yo male with pmh of alcohol use, CAD with MI (1998 x2, 2003), s/p ICD (2009), previous LV thrombus, CHF and CKD stage 3 (baseline 0.9-1.1) who presented to [facility] on 7/16/18 with chest pain after 3-4 days of heavy drinking. Patient was admitted for ACS syndrome work up. His cardiac enzymes were negative and EKG findings were consistent with those of recent May hospitalization showing Q waves in V1-3. Pt reported having had stopped his coumadin for 4 days which he was on for an LV thrombus. ECHO showed that the LV thrombus has resolved and so coumadin was discontinued. The ECHO also showed an LVEF of 35% which is the same as what it was in May. His stress test shows there is no longer evidence of ischemia in the margins of the scarring from his most recent MI in the area of the LAD as compared to a stress test that was done in March 2016. He will be discharged home on metoprolol, lisiniopil, aspirin Mr. Torrez is a 61yo male with pmh of alcohol use, CAD with MI (1998 x2, 2003), s/p ICD (2009), previous LV thrombus, CHF and CKD stage 3 (baseline 0.9-1.1) who presented to [facility] on 7/16/18 with chest pain after 3-4 days of heavy drinking. Patient was admitted for ACS syndrome work up. His cardiac enzymes were negative and EKG findings were consistent with those of recent May hospitalization showing Q waves in V1-3. Pt reported having had stopped his coumadin for 4 days which he was on for an LV thrombus. ECHO showed that the LV thrombus has resolved and so coumadin was discontinued. The ECHO also showed an LVEF of 35% which is the same as what it was in May. His stress test shows there is no longer evidence of ischemia in the margins of the scarring from his most recent MI in the area of the LAD as compared to a stress test that was done in March 2016. He will be discharged home on metoprolol, lisiniopil, aspirin, atorvastatin, a multivitamin and folic acid. His coumadin was discontinued. He should follow up outpatient with Dr. Levy.

## 2018-07-17 NOTE — DISCHARGE NOTE ADULT - NS AS ACTIVITY OBS
Sex allowed/Walking-Indoors allowed/Stairs allowed/Walking-Outdoors allowed/Bathing allowed/Showering allowed/Driving allowed/Return to Work/School allowed

## 2018-07-17 NOTE — PROGRESS NOTE ADULT - SUBJECTIVE AND OBJECTIVE BOX
Pt is stable with no recurrent chest pain     PAST MEDICAL & SURGICAL HISTORY:  Thrombus in heart chamber: LV  ICD (implantable cardioverter-defibrillator) in place  Pacemaker  ETOH abuse  Paroxysmal a-fib  Myocardial infarction involving other coronary artery  Gastritis  Atherosclerosis of coronary artery: Coronary artery disease  Automatic implantable cardiac defibrillator in situ: ICD (implantable cardioverter-defibrillator) in place      MEDICATIONS  (STANDING):  atorvastatin 40 milliGRAM(s) Oral at bedtime  dextrose 5%. 1000 milliLiter(s) (50 mL/Hr) IV Continuous <Continuous>  dextrose 50% Injectable 12.5 Gram(s) IV Push once  dextrose 50% Injectable 25 Gram(s) IV Push once  dextrose 50% Injectable 25 Gram(s) IV Push once  folic acid 1 milliGRAM(s) Oral daily  insulin lispro (HumaLOG) corrective regimen sliding scale   SubCutaneous Before meals and at bedtime  magnesium sulfate  IVPB 2 Gram(s) IV Intermittent once  metoprolol tartrate 50 milliGRAM(s) Oral two times a day  multivitamin 1 Tablet(s) Oral daily  thiamine 100 milliGRAM(s) Oral daily    MEDICATIONS  (PRN):  dextrose 40% Gel 15 Gram(s) Oral once PRN Blood Glucose LESS THAN 70 milliGRAM(s)/deciliter  glucagon  Injectable 1 milliGRAM(s) IntraMuscular once PRN Glucose LESS THAN 70 milligrams/deciliter    ICU Vital Signs Last 24 Hrs  T(C): 36.7 (17 Jul 2018 05:08), Max: 37 (16 Jul 2018 13:52)  T(F): 98 (17 Jul 2018 05:08), Max: 98.6 (16 Jul 2018 13:52)  HR: 78 (17 Jul 2018 05:22) (78 - 106)  BP: 122/81 (17 Jul 2018 05:22) (113/75 - 142/82)  BP(mean): 96 (17 Jul 2018 05:22) (93 - 114)  ABP: --  ABP(mean): --  RR: 16 (17 Jul 2018 05:22) (16 - 18)  SpO2: 96% (17 Jul 2018 05:22) (95% - 100%)      Lungs clear  CV s 1 s2   Abd soft  Ext stable                          12.8   4.6   )-----------( 201      ( 17 Jul 2018 07:21 )             41.2   07-17    135  |  94<L>  |  14  ----------------------------<  111<H>  3.9   |  27  |  1.03    Ca    9.6      17 Jul 2018 07:21  Phos  3.4     07-17  Mg     1.6     07-17    TPro  8.1  /  Alb  4.5  /  TBili  0.9  /  DBili  x   /  AST  49<H>  /  ALT  42  /  AlkPhos  82  07-16  PT/INR - ( 17 Jul 2018 07:21 )   PT: 24.6 sec;   INR: 2.18          PTT - ( 16 Jul 2018 14:27 )  PTT:41.2 sec    CXR jan 2018  neg   EKG  NSR   old ant lat MI   5/17 18  echo repeat pending    prev echo EF 35 %   May 2018    CARDIAC MARKERS ( 16 Jul 2018 20:24 )  x     / <0.01 ng/mL / 171 U/L / x     / 2.3 ng/mL  CARDIAC MARKERS ( 16 Jul 2018 14:27 )  x     / <0.01 ng/mL / 193 U/L / x     / 2.7 ng/mL

## 2018-07-17 NOTE — PROGRESS NOTE ADULT - PROBLEM SELECTOR PLAN 4
On 6mg PO BID coumadin at home (pt reports taking both doses together in the AM when he takes it), hasn't taken it in past 4 days  -NSR since admission  -Obtain AM EKG On 6mg PO BID coumadin at home (pt reports taking both doses together in the AM when he takes it), hasn't taken it in past 5 days  -NSR since admission  -Obtain AM EKG  -Continuing home metoprolol 50mg BID PO

## 2018-07-17 NOTE — PROGRESS NOTE ADULT - PROBLEM SELECTOR PLAN 2
Patient states he has been drinking for 4 days after about 2 months of abstinence from alcohol  -Significant history of hospitalizations for alcohol intoxication and withdrawal  -[facility] for detoxification per Dr. Levy  -serum alcohol level: 293  -monitor for symptoms of withdrawal, serial CIWA scores Patient states he has been drinking for 4 days after about 2 months of abstinence from alcohol  -Significant history of hospitalizations for alcohol intoxication and withdrawal  -[facility] for detoxification per Dr. Levy  -serum alcohol level: 293 on admission  -monitor for signs of withdrawal, serial CIWA scores  -obtain daily LFTS

## 2018-07-17 NOTE — PROGRESS NOTE ADULT - PROBLEM SELECTOR PLAN 5
Baseline Cr 0.9-1.1  -Cr today is 1.13 (it was 1.22 when discharged on 5/22/18)  -Monitor for changes in Cr Baseline Cr 0.9-1.1  -Cr today is 1.03 (it was 1.22 when discharged on 5/22/18)  -Monitor for changes in Cr

## 2018-07-23 DIAGNOSIS — F10.10 ALCOHOL ABUSE, UNCOMPLICATED: ICD-10-CM

## 2018-07-23 DIAGNOSIS — I25.10 ATHEROSCLEROTIC HEART DISEASE OF NATIVE CORONARY ARTERY WITHOUT ANGINA PECTORIS: ICD-10-CM

## 2018-07-23 DIAGNOSIS — Z88.0 ALLERGY STATUS TO PENICILLIN: ICD-10-CM

## 2018-07-23 DIAGNOSIS — K29.70 GASTRITIS, UNSPECIFIED, WITHOUT BLEEDING: ICD-10-CM

## 2018-07-23 DIAGNOSIS — I25.2 OLD MYOCARDIAL INFARCTION: ICD-10-CM

## 2018-07-23 DIAGNOSIS — I50.23 ACUTE ON CHRONIC SYSTOLIC (CONGESTIVE) HEART FAILURE: ICD-10-CM

## 2018-07-23 DIAGNOSIS — I48.0 PAROXYSMAL ATRIAL FIBRILLATION: ICD-10-CM

## 2018-07-23 DIAGNOSIS — N18.3 CHRONIC KIDNEY DISEASE, STAGE 3 (MODERATE): ICD-10-CM

## 2018-07-23 DIAGNOSIS — Z95.0 PRESENCE OF CARDIAC PACEMAKER: ICD-10-CM

## 2018-07-23 DIAGNOSIS — Z95.810 PRESENCE OF AUTOMATIC (IMPLANTABLE) CARDIAC DEFIBRILLATOR: ICD-10-CM

## 2018-07-25 ENCOUNTER — EMERGENCY (EMERGENCY)
Facility: HOSPITAL | Age: 63
LOS: 1 days | Discharge: HOME | End: 2018-07-25
Attending: EMERGENCY MEDICINE | Admitting: EMERGENCY MEDICINE
Payer: MEDICARE

## 2018-07-25 VITALS
OXYGEN SATURATION: 97 % | HEART RATE: 71 BPM | DIASTOLIC BLOOD PRESSURE: 74 MMHG | RESPIRATION RATE: 17 BRPM | TEMPERATURE: 99 F | SYSTOLIC BLOOD PRESSURE: 108 MMHG

## 2018-07-25 VITALS
DIASTOLIC BLOOD PRESSURE: 88 MMHG | OXYGEN SATURATION: 98 % | TEMPERATURE: 99 F | RESPIRATION RATE: 18 BRPM | SYSTOLIC BLOOD PRESSURE: 128 MMHG | HEART RATE: 80 BPM

## 2018-07-25 LAB
ALBUMIN SERPL ELPH-MCNC: 4.2 G/DL — SIGNIFICANT CHANGE UP (ref 3.3–5)
ALP SERPL-CCNC: 59 U/L — SIGNIFICANT CHANGE UP (ref 40–120)
ALT FLD-CCNC: 22 U/L — SIGNIFICANT CHANGE UP (ref 10–45)
ANION GAP SERPL CALC-SCNC: 18 MMOL/L — HIGH (ref 5–17)
APTT BLD: 26.4 SEC — LOW (ref 27.5–37.4)
AST SERPL-CCNC: 36 U/L — SIGNIFICANT CHANGE UP (ref 10–40)
BASOPHILS NFR BLD AUTO: 0.5 % — SIGNIFICANT CHANGE UP (ref 0–2)
BILIRUB SERPL-MCNC: 0.5 MG/DL — SIGNIFICANT CHANGE UP (ref 0.2–1.2)
BUN SERPL-MCNC: 12 MG/DL — SIGNIFICANT CHANGE UP (ref 7–23)
CALCIUM SERPL-MCNC: 9.4 MG/DL — SIGNIFICANT CHANGE UP (ref 8.4–10.5)
CHLORIDE SERPL-SCNC: 97 MMOL/L — SIGNIFICANT CHANGE UP (ref 96–108)
CK MB CFR SERPL CALC: 1.8 NG/ML — SIGNIFICANT CHANGE UP (ref 0–6.7)
CK SERPL-CCNC: 141 U/L — SIGNIFICANT CHANGE UP (ref 30–200)
CO2 SERPL-SCNC: 22 MMOL/L — SIGNIFICANT CHANGE UP (ref 22–31)
CREAT SERPL-MCNC: 1.03 MG/DL — SIGNIFICANT CHANGE UP (ref 0.5–1.3)
EOSINOPHIL NFR BLD AUTO: 1.1 % — SIGNIFICANT CHANGE UP (ref 0–6)
GLUCOSE SERPL-MCNC: 111 MG/DL — HIGH (ref 70–99)
HCT VFR BLD CALC: 37 % — LOW (ref 39–50)
HGB BLD-MCNC: 12 G/DL — LOW (ref 13–17)
INR BLD: 0.97 — SIGNIFICANT CHANGE UP (ref 0.88–1.16)
LIDOCAIN IGE QN: 77 U/L — HIGH (ref 7–60)
LYMPHOCYTES # BLD AUTO: 34.2 % — SIGNIFICANT CHANGE UP (ref 13–44)
MCHC RBC-ENTMCNC: 24.5 PG — LOW (ref 27–34)
MCHC RBC-ENTMCNC: 32.4 G/DL — SIGNIFICANT CHANGE UP (ref 32–36)
MCV RBC AUTO: 75.7 FL — LOW (ref 80–100)
MONOCYTES NFR BLD AUTO: 7 % — SIGNIFICANT CHANGE UP (ref 2–14)
NEUTROPHILS NFR BLD AUTO: 57.2 % — SIGNIFICANT CHANGE UP (ref 43–77)
NT-PROBNP SERPL-SCNC: 114 PG/ML — SIGNIFICANT CHANGE UP (ref 0–300)
PLATELET # BLD AUTO: 176 K/UL — SIGNIFICANT CHANGE UP (ref 150–400)
POTASSIUM SERPL-MCNC: 4.4 MMOL/L — SIGNIFICANT CHANGE UP (ref 3.5–5.3)
POTASSIUM SERPL-SCNC: 4.4 MMOL/L — SIGNIFICANT CHANGE UP (ref 3.5–5.3)
PROT SERPL-MCNC: 7.1 G/DL — SIGNIFICANT CHANGE UP (ref 6–8.3)
PROTHROM AB SERPL-ACNC: 10.8 SEC — SIGNIFICANT CHANGE UP (ref 9.8–12.7)
RBC # BLD: 4.89 M/UL — SIGNIFICANT CHANGE UP (ref 4.2–5.8)
RBC # FLD: 19.4 % — HIGH (ref 10.3–16.9)
SODIUM SERPL-SCNC: 137 MMOL/L — SIGNIFICANT CHANGE UP (ref 135–145)
TROPONIN T SERPL-MCNC: <0.01 NG/ML — SIGNIFICANT CHANGE UP (ref 0–0.01)
WBC # BLD: 4.4 K/UL — SIGNIFICANT CHANGE UP (ref 3.8–10.5)
WBC # FLD AUTO: 4.4 K/UL — SIGNIFICANT CHANGE UP (ref 3.8–10.5)

## 2018-07-25 PROCEDURE — 93010 ELECTROCARDIOGRAM REPORT: CPT

## 2018-07-25 PROCEDURE — 84484 ASSAY OF TROPONIN QUANT: CPT

## 2018-07-25 PROCEDURE — 85730 THROMBOPLASTIN TIME PARTIAL: CPT

## 2018-07-25 PROCEDURE — 99284 EMERGENCY DEPT VISIT MOD MDM: CPT | Mod: 25

## 2018-07-25 PROCEDURE — 82553 CREATINE MB FRACTION: CPT

## 2018-07-25 PROCEDURE — 70450 CT HEAD/BRAIN W/O DYE: CPT

## 2018-07-25 PROCEDURE — 84100 ASSAY OF PHOSPHORUS: CPT

## 2018-07-25 PROCEDURE — 83690 ASSAY OF LIPASE: CPT

## 2018-07-25 PROCEDURE — 83735 ASSAY OF MAGNESIUM: CPT

## 2018-07-25 PROCEDURE — 36415 COLL VENOUS BLD VENIPUNCTURE: CPT

## 2018-07-25 PROCEDURE — 83880 ASSAY OF NATRIURETIC PEPTIDE: CPT

## 2018-07-25 PROCEDURE — 96374 THER/PROPH/DIAG INJ IV PUSH: CPT

## 2018-07-25 PROCEDURE — 80053 COMPREHEN METABOLIC PANEL: CPT

## 2018-07-25 PROCEDURE — 70450 CT HEAD/BRAIN W/O DYE: CPT | Mod: 26

## 2018-07-25 PROCEDURE — 82550 ASSAY OF CK (CPK): CPT

## 2018-07-25 PROCEDURE — 85610 PROTHROMBIN TIME: CPT

## 2018-07-25 PROCEDURE — 93005 ELECTROCARDIOGRAM TRACING: CPT

## 2018-07-25 PROCEDURE — 85025 COMPLETE CBC W/AUTO DIFF WBC: CPT

## 2018-07-25 RX ORDER — SODIUM CHLORIDE 9 MG/ML
250 INJECTION INTRAMUSCULAR; INTRAVENOUS; SUBCUTANEOUS ONCE
Qty: 0 | Refills: 0 | Status: COMPLETED | OUTPATIENT
Start: 2018-07-25 | End: 2018-07-25

## 2018-07-25 RX ORDER — ACETAMINOPHEN 500 MG
975 TABLET ORAL ONCE
Qty: 0 | Refills: 0 | Status: COMPLETED | OUTPATIENT
Start: 2018-07-25 | End: 2018-07-25

## 2018-07-25 RX ADMIN — Medication 975 MILLIGRAM(S): at 19:56

## 2018-07-25 RX ADMIN — Medication 1 MILLIGRAM(S): at 18:33

## 2018-07-25 RX ADMIN — Medication 50 MILLIGRAM(S): at 18:29

## 2018-07-25 RX ADMIN — SODIUM CHLORIDE 125 MILLILITER(S): 9 INJECTION INTRAMUSCULAR; INTRAVENOUS; SUBCUTANEOUS at 18:33

## 2018-07-25 NOTE — ED PROVIDER NOTE - OBJECTIVE STATEMENT
62 yom pw palpitations.  hx of EtOH binge, CAD, afib.  states had his last drink at 2AM, was in a 2 day binging episode, 2 pints of vodka daily x 2 days, nausea, HA, no vomiting, no sob, no cp.  took his AM meds.  no abd pain.  has had similar withdraws before.  denies coingestion.  denies SI/HI.  pt has been to detox/[facility] in the past.

## 2018-07-25 NOTE — ED PROVIDER NOTE - PHYSICAL EXAMINATION
CON: ao x 3, HENMT: clear oropharynx, soft neck, HEAD: atraumatic, CV: rrr, equal pulses b/l, RESP: cta b/l, GI: +BS, soft, nontender, no rebound, no guarding, SKIN: no rash, MSK: no deformities, NEURO: tremors to hands, tongue fasciculations, ao x 4, strengths intact, conversing appropriately,

## 2018-07-25 NOTE — ED ADULT NURSE NOTE - CHPI ED SYMPTOMS NEG
no nausea/no syncope/no back pain/no chills/no dizziness/no cough/no shortness of breath/no vomiting/no diaphoresis/no fever

## 2018-07-25 NOTE — ED ADULT TRIAGE NOTE - CHIEF COMPLAINT QUOTE
"I've been binge drinking and this morning when I woke up my pressure was high and I had palpitations."

## 2018-07-26 PROBLEM — Z95.810 PRESENCE OF AUTOMATIC (IMPLANTABLE) CARDIAC DEFIBRILLATOR: Chronic | Status: ACTIVE | Noted: 2018-05-11

## 2018-07-29 DIAGNOSIS — Z88.0 ALLERGY STATUS TO PENICILLIN: ICD-10-CM

## 2018-07-29 DIAGNOSIS — Z79.899 OTHER LONG TERM (CURRENT) DRUG THERAPY: ICD-10-CM

## 2018-07-29 DIAGNOSIS — R00.2 PALPITATIONS: ICD-10-CM

## 2018-07-29 DIAGNOSIS — Z88.8 ALLERGY STATUS TO OTHER DRUGS, MEDICAMENTS AND BIOLOGICAL SUBSTANCES STATUS: ICD-10-CM

## 2018-07-29 DIAGNOSIS — Z79.82 LONG TERM (CURRENT) USE OF ASPIRIN: ICD-10-CM

## 2018-07-29 DIAGNOSIS — I25.10 ATHEROSCLEROTIC HEART DISEASE OF NATIVE CORONARY ARTERY WITHOUT ANGINA PECTORIS: ICD-10-CM

## 2018-07-29 DIAGNOSIS — F10.239 ALCOHOL DEPENDENCE WITH WITHDRAWAL, UNSPECIFIED: ICD-10-CM

## 2018-08-07 ENCOUNTER — INPATIENT (INPATIENT)
Facility: HOSPITAL | Age: 63
LOS: 2 days | Discharge: HOME | DRG: 683 | End: 2018-08-10
Attending: INTERNAL MEDICINE | Admitting: INTERNAL MEDICINE
Payer: MEDICARE

## 2018-08-07 VITALS
OXYGEN SATURATION: 98 % | HEART RATE: 115 BPM | DIASTOLIC BLOOD PRESSURE: 54 MMHG | RESPIRATION RATE: 18 BRPM | TEMPERATURE: 98 F | HEIGHT: 70 IN | WEIGHT: 164.91 LBS | SYSTOLIC BLOOD PRESSURE: 76 MMHG

## 2018-08-07 DIAGNOSIS — F10.10 ALCOHOL ABUSE, UNCOMPLICATED: ICD-10-CM

## 2018-08-07 DIAGNOSIS — I48.0 PAROXYSMAL ATRIAL FIBRILLATION: ICD-10-CM

## 2018-08-07 DIAGNOSIS — I51.3 INTRACARDIAC THROMBOSIS, NOT ELSEWHERE CLASSIFIED: ICD-10-CM

## 2018-08-07 DIAGNOSIS — Z95.810 PRESENCE OF AUTOMATIC (IMPLANTABLE) CARDIAC DEFIBRILLATOR: ICD-10-CM

## 2018-08-07 DIAGNOSIS — R07.9 CHEST PAIN, UNSPECIFIED: ICD-10-CM

## 2018-08-07 DIAGNOSIS — N17.9 ACUTE KIDNEY FAILURE, UNSPECIFIED: ICD-10-CM

## 2018-08-07 DIAGNOSIS — R42 DIZZINESS AND GIDDINESS: ICD-10-CM

## 2018-08-07 LAB
ALBUMIN SERPL ELPH-MCNC: 4.5 G/DL — SIGNIFICANT CHANGE UP (ref 3.3–5)
ALP SERPL-CCNC: 58 U/L — SIGNIFICANT CHANGE UP (ref 40–120)
ALT FLD-CCNC: 26 U/L — SIGNIFICANT CHANGE UP (ref 10–45)
AMPHET UR-MCNC: NEGATIVE — SIGNIFICANT CHANGE UP
ANION GAP SERPL CALC-SCNC: 13 MMOL/L — SIGNIFICANT CHANGE UP (ref 5–17)
APPEARANCE UR: CLEAR — SIGNIFICANT CHANGE UP
APTT BLD: 27.6 SEC — SIGNIFICANT CHANGE UP (ref 27.5–37.4)
AST SERPL-CCNC: 39 U/L — SIGNIFICANT CHANGE UP (ref 10–40)
BARBITURATES UR SCN-MCNC: NEGATIVE — SIGNIFICANT CHANGE UP
BASE EXCESS BLDV CALC-SCNC: 0.2 MMOL/L — SIGNIFICANT CHANGE UP
BASOPHILS NFR BLD AUTO: 0.3 % — SIGNIFICANT CHANGE UP (ref 0–2)
BENZODIAZ UR-MCNC: POSITIVE
BILIRUB SERPL-MCNC: 0.9 MG/DL — SIGNIFICANT CHANGE UP (ref 0.2–1.2)
BILIRUB UR-MCNC: NEGATIVE — SIGNIFICANT CHANGE UP
BLD GP AB SCN SERPL QL: NEGATIVE — SIGNIFICANT CHANGE UP
BUN SERPL-MCNC: 24 MG/DL — HIGH (ref 7–23)
CA-I SERPL-SCNC: 1.16 MMOL/L — SIGNIFICANT CHANGE UP (ref 1.12–1.3)
CALCIUM SERPL-MCNC: 10.1 MG/DL — SIGNIFICANT CHANGE UP (ref 8.4–10.5)
CALCIUM UR-MCNC: 2.1 MG/DL — SIGNIFICANT CHANGE UP
CHLORIDE SERPL-SCNC: 99 MMOL/L — SIGNIFICANT CHANGE UP (ref 96–108)
CHLORIDE UR-SCNC: 58 MMOL/L — SIGNIFICANT CHANGE UP
CK MB CFR SERPL CALC: 2.6 NG/ML — SIGNIFICANT CHANGE UP (ref 0–6.7)
CK MB CFR SERPL CALC: 2.8 NG/ML — SIGNIFICANT CHANGE UP (ref 0–6.7)
CK MB CFR SERPL CALC: 3.1 NG/ML — SIGNIFICANT CHANGE UP (ref 0–6.7)
CK SERPL-CCNC: 102 U/L — SIGNIFICANT CHANGE UP (ref 30–200)
CK SERPL-CCNC: 105 U/L — SIGNIFICANT CHANGE UP (ref 30–200)
CK SERPL-CCNC: 153 U/L — SIGNIFICANT CHANGE UP (ref 30–200)
CO2 SERPL-SCNC: 23 MMOL/L — SIGNIFICANT CHANGE UP (ref 22–31)
COCAINE METAB.OTHER UR-MCNC: NEGATIVE — SIGNIFICANT CHANGE UP
COLOR SPEC: YELLOW — SIGNIFICANT CHANGE UP
CREAT ?TM UR-MCNC: 223 MG/DL — SIGNIFICANT CHANGE UP
CREAT SERPL-MCNC: 2.54 MG/DL — HIGH (ref 0.5–1.3)
DIFF PNL FLD: NEGATIVE — SIGNIFICANT CHANGE UP
EOSINOPHIL NFR BLD AUTO: 0.9 % — SIGNIFICANT CHANGE UP (ref 0–6)
ETHANOL SERPL-MCNC: <10 MG/DL — SIGNIFICANT CHANGE UP (ref 0–10)
GAS PNL BLDV: 133 MMOL/L — LOW (ref 138–146)
GAS PNL BLDV: SIGNIFICANT CHANGE UP
GAS PNL BLDV: SIGNIFICANT CHANGE UP
GLUCOSE SERPL-MCNC: 92 MG/DL — SIGNIFICANT CHANGE UP (ref 70–99)
GLUCOSE UR QL: NEGATIVE — SIGNIFICANT CHANGE UP
HCO3 BLDV-SCNC: 25 MMOL/L — SIGNIFICANT CHANGE UP (ref 20–27)
HCT VFR BLD CALC: 37 % — LOW (ref 39–50)
HGB BLD-MCNC: 12.2 G/DL — LOW (ref 13–17)
INR BLD: 1 — SIGNIFICANT CHANGE UP (ref 0.88–1.16)
KETONES UR-MCNC: NEGATIVE — SIGNIFICANT CHANGE UP
LACTATE SERPL-SCNC: 0.6 MMOL/L — SIGNIFICANT CHANGE UP (ref 0.5–2)
LEUKOCYTE ESTERASE UR-ACNC: NEGATIVE — SIGNIFICANT CHANGE UP
LYMPHOCYTES # BLD AUTO: 27 % — SIGNIFICANT CHANGE UP (ref 13–44)
MCHC RBC-ENTMCNC: 25.4 PG — LOW (ref 27–34)
MCHC RBC-ENTMCNC: 33 G/DL — SIGNIFICANT CHANGE UP (ref 32–36)
MCV RBC AUTO: 77.1 FL — LOW (ref 80–100)
METHADONE UR-MCNC: NEGATIVE — SIGNIFICANT CHANGE UP
MONOCYTES NFR BLD AUTO: 8 % — SIGNIFICANT CHANGE UP (ref 2–14)
NEUTROPHILS NFR BLD AUTO: 63.8 % — SIGNIFICANT CHANGE UP (ref 43–77)
NITRITE UR-MCNC: NEGATIVE — SIGNIFICANT CHANGE UP
OPIATES UR-MCNC: NEGATIVE — SIGNIFICANT CHANGE UP
OSMOLALITY UR: 482 MOSMOL/KG — SIGNIFICANT CHANGE UP (ref 100–650)
PCO2 BLDV: 41 MMHG — SIGNIFICANT CHANGE UP (ref 41–51)
PCP SPEC-MCNC: SIGNIFICANT CHANGE UP
PCP UR-MCNC: NEGATIVE — SIGNIFICANT CHANGE UP
PH BLDV: 7.4 — SIGNIFICANT CHANGE UP (ref 7.32–7.43)
PH UR: 5.5 — SIGNIFICANT CHANGE UP (ref 5–8)
PHOSPHATE 24H UR-MCNC: 34.5 MG/DL — SIGNIFICANT CHANGE UP
PLATELET # BLD AUTO: 207 K/UL — SIGNIFICANT CHANGE UP (ref 150–400)
PO2 BLDV: 36 MMHG — SIGNIFICANT CHANGE UP
POTASSIUM BLDV-SCNC: 3.9 MMOL/L — SIGNIFICANT CHANGE UP (ref 3.5–4.9)
POTASSIUM SERPL-MCNC: 4.4 MMOL/L — SIGNIFICANT CHANGE UP (ref 3.5–5.3)
POTASSIUM SERPL-SCNC: 4.4 MMOL/L — SIGNIFICANT CHANGE UP (ref 3.5–5.3)
POTASSIUM UR-SCNC: 57 MMOL/L — SIGNIFICANT CHANGE UP
PROT ?TM UR-MCNC: 13 MG/DL — HIGH (ref 0–12)
PROT SERPL-MCNC: 7.8 G/DL — SIGNIFICANT CHANGE UP (ref 6–8.3)
PROT UR-MCNC: NEGATIVE MG/DL — SIGNIFICANT CHANGE UP
PROTHROM AB SERPL-ACNC: 11.1 SEC — SIGNIFICANT CHANGE UP (ref 9.8–12.7)
RBC # BLD: 4.8 M/UL — SIGNIFICANT CHANGE UP (ref 4.2–5.8)
RBC # FLD: 19.8 % — HIGH (ref 10.3–16.9)
RH IG SCN BLD-IMP: POSITIVE — SIGNIFICANT CHANGE UP
SAO2 % BLDV: 49 % — SIGNIFICANT CHANGE UP
SODIUM SERPL-SCNC: 135 MMOL/L — SIGNIFICANT CHANGE UP (ref 135–145)
SODIUM UR-SCNC: 62 MMOL/L — SIGNIFICANT CHANGE UP
SP GR SPEC: 1.02 — SIGNIFICANT CHANGE UP (ref 1–1.03)
THC UR QL: NEGATIVE — SIGNIFICANT CHANGE UP
TROPONIN T SERPL-MCNC: <0.01 NG/ML — SIGNIFICANT CHANGE UP (ref 0–0.01)
UROBILINOGEN FLD QL: 0.2 E.U./DL — SIGNIFICANT CHANGE UP
UUN UR-MCNC: 619 MG/DL — SIGNIFICANT CHANGE UP
WBC # BLD: 6.4 K/UL — SIGNIFICANT CHANGE UP (ref 3.8–10.5)
WBC # FLD AUTO: 6.4 K/UL — SIGNIFICANT CHANGE UP (ref 3.8–10.5)

## 2018-08-07 PROCEDURE — 93010 ELECTROCARDIOGRAM REPORT: CPT

## 2018-08-07 PROCEDURE — 99291 CRITICAL CARE FIRST HOUR: CPT

## 2018-08-07 PROCEDURE — 71045 X-RAY EXAM CHEST 1 VIEW: CPT | Mod: 26

## 2018-08-07 RX ORDER — ATORVASTATIN CALCIUM 80 MG/1
40 TABLET, FILM COATED ORAL AT BEDTIME
Qty: 0 | Refills: 0 | Status: DISCONTINUED | OUTPATIENT
Start: 2018-08-07 | End: 2018-08-10

## 2018-08-07 RX ORDER — TICAGRELOR 90 MG/1
180 TABLET ORAL ONCE
Qty: 0 | Refills: 0 | Status: COMPLETED | OUTPATIENT
Start: 2018-08-07 | End: 2018-08-07

## 2018-08-07 RX ORDER — PANTOPRAZOLE SODIUM 20 MG/1
40 TABLET, DELAYED RELEASE ORAL
Qty: 0 | Refills: 0 | Status: DISCONTINUED | OUTPATIENT
Start: 2018-08-07 | End: 2018-08-10

## 2018-08-07 RX ORDER — THIAMINE MONONITRATE (VIT B1) 100 MG
100 TABLET ORAL DAILY
Qty: 0 | Refills: 0 | Status: DISCONTINUED | OUTPATIENT
Start: 2018-08-08 | End: 2018-08-10

## 2018-08-07 RX ORDER — SODIUM CHLORIDE 9 MG/ML
1000 INJECTION, SOLUTION INTRAVENOUS
Qty: 0 | Refills: 0 | Status: DISCONTINUED | OUTPATIENT
Start: 2018-08-07 | End: 2018-08-08

## 2018-08-07 RX ORDER — SODIUM CHLORIDE 9 MG/ML
1000 INJECTION INTRAMUSCULAR; INTRAVENOUS; SUBCUTANEOUS ONCE
Qty: 0 | Refills: 0 | Status: COMPLETED | OUTPATIENT
Start: 2018-08-07 | End: 2018-08-07

## 2018-08-07 RX ORDER — ASPIRIN/CALCIUM CARB/MAGNESIUM 324 MG
325 TABLET ORAL ONCE
Qty: 0 | Refills: 0 | Status: COMPLETED | OUTPATIENT
Start: 2018-08-07 | End: 2018-08-07

## 2018-08-07 RX ORDER — ASPIRIN/CALCIUM CARB/MAGNESIUM 324 MG
81 TABLET ORAL DAILY
Qty: 0 | Refills: 0 | Status: DISCONTINUED | OUTPATIENT
Start: 2018-08-08 | End: 2018-08-10

## 2018-08-07 RX ORDER — METOPROLOL TARTRATE 50 MG
100 TABLET ORAL DAILY
Qty: 0 | Refills: 0 | Status: DISCONTINUED | OUTPATIENT
Start: 2018-08-07 | End: 2018-08-10

## 2018-08-07 RX ORDER — FOLIC ACID 0.8 MG
1 TABLET ORAL DAILY
Qty: 0 | Refills: 0 | Status: DISCONTINUED | OUTPATIENT
Start: 2018-08-07 | End: 2018-08-07

## 2018-08-07 RX ORDER — FOLIC ACID 0.8 MG
1 TABLET ORAL DAILY
Qty: 0 | Refills: 0 | Status: DISCONTINUED | OUTPATIENT
Start: 2018-08-08 | End: 2018-08-10

## 2018-08-07 RX ORDER — THIAMINE MONONITRATE (VIT B1) 100 MG
100 TABLET ORAL DAILY
Qty: 0 | Refills: 0 | Status: DISCONTINUED | OUTPATIENT
Start: 2018-08-07 | End: 2018-08-07

## 2018-08-07 RX ADMIN — Medication 25 MILLIGRAM(S): at 21:23

## 2018-08-07 RX ADMIN — SODIUM CHLORIDE 125 MILLILITER(S): 9 INJECTION, SOLUTION INTRAVENOUS at 16:10

## 2018-08-07 RX ADMIN — TICAGRELOR 180 MILLIGRAM(S): 90 TABLET ORAL at 14:55

## 2018-08-07 RX ADMIN — SODIUM CHLORIDE 1000 MILLILITER(S): 9 INJECTION INTRAMUSCULAR; INTRAVENOUS; SUBCUTANEOUS at 15:32

## 2018-08-07 RX ADMIN — ATORVASTATIN CALCIUM 40 MILLIGRAM(S): 80 TABLET, FILM COATED ORAL at 21:23

## 2018-08-07 RX ADMIN — Medication 325 MILLIGRAM(S): at 14:55

## 2018-08-07 RX ADMIN — Medication 25 MILLIGRAM(S): at 15:46

## 2018-08-07 RX ADMIN — SODIUM CHLORIDE 1000 MILLILITER(S): 9 INJECTION INTRAMUSCULAR; INTRAVENOUS; SUBCUTANEOUS at 15:00

## 2018-08-07 RX ADMIN — Medication 1 MILLIGRAM(S): at 15:46

## 2018-08-07 RX ADMIN — SODIUM CHLORIDE 1000 MILLILITER(S): 9 INJECTION INTRAMUSCULAR; INTRAVENOUS; SUBCUTANEOUS at 14:45

## 2018-08-07 NOTE — H&P ADULT - ASSESSMENT
62 yr old M former smoker and drug abuser, ETOH abuser, with PMHx of HTN, stage III CKD, pAfib, hx of LV thrombus (resolved by Echo 7/17/18), MI in 1996/1998/2003, CAD with prior PCI, chronic systolic CHF s/p [employer] ICD in 2009, frequent admissions to [facility] for ETOH use/withdrawal, presents to [facility] ED 8/7/18 with dizziness and episode of nonexertional chest discomfort, admitted to [facility] for cardiac telemetry, serial enzymes and management of DANUTA on CKD.

## 2018-08-07 NOTE — H&P ADULT - FAMILY HISTORY
Grandparent  Still living? No  Family history of cardiac disorder in maternal grandfather, Age at diagnosis: Age Unknown

## 2018-08-07 NOTE — ED ADULT NURSE NOTE - OBJECTIVE STATEMENT
Pt presents to ED reporting dizziness worsening today, insomnia x5 days, "burning" in the center of the chest, and discomfort between shoulder blades. Has been detoxing from alcohol at home, last took Librium 25mg at 1130am today. Hypotensive in triage, aox3, appears anxious. Has pacemaker, defibrillator, and hx MI and previous stent placement. EKG done in triage, with noted ST changes, upgraded to Dr Rordiguez. IV access obtained, labs drawn and sent, medications administered as ordered. BP WNL at this time. Cardiac monitoring ongoing.

## 2018-08-07 NOTE — H&P ADULT - PROBLEM SELECTOR PLAN 2
-- currently asymptomatic, BP stable 110s/70, HR 90s. Will continue to monitor on telemetry. -- currently asymptomatic, likely in setting of hypotension/hypovolemia. BP now stable after receiving IVFs. BP 110s/70, HR 90s. Will continue to monitor on telemetry.

## 2018-08-07 NOTE — ED PROVIDER NOTE - OBJECTIVE STATEMENT
63 yo male with hx of freq ED visits for etoh abuse/ alcohol with drawal- on Librium x 5 days- hx afib PPM CAD 1 stent last cath 8 months ago- hx LV  thrombus- off coumadin x 1 month based on last echo  no thrombus- now with 5 day hx of vague chest discomfort pain between shoulder blades sob dizziness  also unable to sleep slight tremor  no N/V  - no back or flank pain- no focal weakness or gait instability  last drink was 5 days ago no leg swelling or calf tenderness

## 2018-08-07 NOTE — PROGRESS NOTE ADULT - SUBJECTIVE AND OBJECTIVE BOX
Pt is s/p Hx ETOH   c/o insomnia   loss of appetite  dizzyness      PAST MEDICAL & SURGICAL HISTORY:  Thrombus in heart chamber: LV  ICD (implantable cardioverter-defibrillator) in place  Pacemaker  ETOH abuse  Paroxysmal a-fib  Myocardial infarction involving other coronary artery  Gastritis  Atherosclerosis of coronary artery: Coronary artery disease  Automatic implantable cardiac defibrillator in situ: ICD (implantable cardioverter-defibrillator) in place    MEDICATIONS  (STANDING):  atorvastatin 40 milliGRAM(s) Oral at bedtime  chlordiazePOXIDE 25 milliGRAM(s) Oral every 6 hours  chlordiazePOXIDE   Oral   metoprolol succinate  milliGRAM(s) Oral daily  multivitamin/thiamine/folic acid in sodium chloride 0.9% 1000 milliLiter(s) (125 mL/Hr) IV Continuous <Continuous>    MEDICATIONS  (PRN):  LORazepam     Tablet 2 milliGRAM(s) Oral every 2 hours PRN CIWA-Ar score increase by 2 points and a total score of 7 or less    ICU Vital Signs Last 24 Hrs  T(C): 36.9 (07 Aug 2018 14:34), Max: 36.9 (07 Aug 2018 14:34)  T(F): 98.4 (07 Aug 2018 14:34), Max: 98.4 (07 Aug 2018 14:34)  HR: 79 (07 Aug 2018 16:10) (79 - 115)  BP: 103/68 (07 Aug 2018 16:10) (76/54 - 114/69)  BP(mean): --  ABP: --  ABP(mean): --  RR: 17 (07 Aug 2018 16:10) (17 - 18)  SpO2: 99% (07 Aug 2018 16:10) (98% - 100%)      lungs clear  CV s1 s2  Abd soft   Ext stable                          12.2   6.4   )-----------( 207      ( 07 Aug 2018 15:06 )             37.0   08-07    135  |  99  |  24<H>  ----------------------------<  92  4.4   |  23  |  2.54<H>    Ca    10.1      07 Aug 2018 15:06    TPro  7.8  /  Alb  4.5  /  TBili  0.9  /  DBili  x   /  AST  39  /  ALT  26  /  AlkPhos  58  08-07  PT/INR - ( 07 Aug 2018 15:06 )   PT: 11.1 sec;   INR: 1.00          PTT - ( 07 Aug 2018 15:06 )  PTT:27.6 secCARDIAC MARKERS ( 07 Aug 2018 15:06 )  x     / <0.01 ng/mL / 153 U/L / x     / 3.1 ng/mL    CARDIAC MARKERS ( 07 Aug 2018 15:06 )  x     / <0.01 ng/mL / 153 U/L / x     / 3.1 ng/mL      stress thallium   July 2018   severely abnormal myocardial perfusion c/w  extensive scarring in LAD region   Global Hypokinesis   Echo July 2018     EF 35-40 %

## 2018-08-07 NOTE — H&P ADULT - PROBLEM SELECTOR PLAN 5
-- followed by Dr. Ramos, as per patient had an ICD interrogation few weeks ago, device functioning well.

## 2018-08-07 NOTE — ED ADULT NURSE REASSESSMENT NOTE - NS ED NURSE REASSESS COMMENT FT1
Cath lab code called for pt at 1455 by Dr Rodriguez based on MELVIN in V2-V5, pt evaluated by cardiology fellow, determined no cath at that time, pt BP remains WNL, continuous cardiac monitoring ongoing, will monitor closely.

## 2018-08-07 NOTE — H&P ADULT - NSHPSOCIALHISTORY_GEN_ALL_CORE
Tobacco: Quit 1996, previously 1/2 ppd x 15 yrs  ETOH: 2pints vodka daily x 25 yrs (has not had drink in ~1 week)  Illicit drugs: denies

## 2018-08-07 NOTE — H&P ADULT - PROBLEM SELECTOR PLAN 1
currently chest pain free, EKG unchanged from prior, cardiac enzymes negative x 1 set. F/U cardiac enzymes at 8PM and 5AM.    --Echo bedside 8/7 revealed EF:40% with no effusion, mild diffuse hypokinesis consistent with LV aneurysm.  --Prior diagnostic cath 1/8/18 [facility] revealed patent LAD stent.  --Recent NST 7/17/18 revealed verely abnormal myocardial perfusion images consistent with extensive scarring in the territory of the LAD. Overall left ventricular systolic function is abnormal with global hypokinesis. No evidence of ischemia, improved compared to prior stress test 3/2016.    -- Symptoms atypical in nature, will continue medical management with ASA/BB/Statin. currently chest pain free, EKG unchanged from prior, cardiac enzymes negative x 1 set. F/U cardiac enzymes at 8PM and 5AM.    --Echo bedside 8/7 revealed EF:40% with no effusion, mild diffuse hypokinesis consistent with LV aneurysm.  --Prior diagnostic cath 1/8/18 @[facility] revealed patent LAD stent.  --Recent NST 7/17/18 revealed severely abnormal myocardial perfusion images consistent with extensive scarring in the territory of the LAD. Overall left ventricular systolic function is abnormal with global hypokinesis. No evidence of ischemia, improved compared to prior stress test 3/2016.    -- Symptoms atypical in nature, will continue medical management with ASA/BB/Statin.

## 2018-08-07 NOTE — ED PROVIDER NOTE - CRITICAL CARE PROVIDED
documentation/additional history taking/interpretation of diagnostic studies/consultation with other physicians/conducted a detailed discussion of DNR status/direct patient care (not related to procedure)

## 2018-08-07 NOTE — H&P ADULT - HISTORY OF PRESENT ILLNESS
62 yr old M former smoker and drug abuser, current ETOH abuser (2+ pints vodka day) with PMHx of HTN, stage III CKD (baseline Cr ~1.2), pAfib, hx of LV thrombus (previously on Warfarin, no evidence of thrombus by Echo 7/17/18), MI in 2003, CAD with multiple PCIs, chronic systolic CHF s/p [employer] ICD in 2009, frequent admissions to [facility] for ETOH use/withdrawal, most recent admission to [facility] (7/16-7/17) with chest pain R/O ACS (NST on 7/17/18 revealed severely abnormal myocardial perfusion images consistent with extensive scarring in the territory of the LAD. Overall left ventricular systolic function is abnormal with global hypokinesis. No evidence of ischemia, improved compared to prior stress test 3/2016)    Patient now returns to [facility] ED 8/7/18 c/o intermittent chest pain and dizziness x 5 days. Patient describes it midsternal chest discomfort with radiation to shoulder blades, associated symptoms including dizziness and SOB. Patient states he tried to detox at home and has not had a drink in 5 days. Patient further admits to a slight tremor and insomnia.    Patient denies any palpitations, syncope, PND, orthopnea or LE edema.    In ED, BP: 76/54, HR:115, RR: 18, Temp: 98.4F, O2 sat: 98% RA.   EKG revealed Sinus tach@109BPM with slight ST changes in V2-V5 (unchanged from prior EKG). Cardiac enzymes negative x 1 set. Stat Bedside Echo revealed EF:40% with no effusion, mild diffuse hypokinesis consistent with LV aneurysm. CXR unremarkable. Labs also significant for BUN/Cr 24/2.54.    Patient treated with ASA 325mg PO x 1 dose and Brilinta 180mg PO x 1 dose, evaluated by interventionalist Dr. Alvarez and not recommended for cardiac catheterization.   Patient further given Ativan 1mg PO x 1 dose, Librium 25mg PO x 1 dose, 2 liters of IVFs and Multivitamin 10ml/Thiamine 100mg/Folic aid 1 mg in 1 liter NS@125cc/hr.  Patient currently stable and admitted to [facility] for cardiac telemetry, serial cardiac enzymes and management of DANUTA of CKD. 62 yr old M former smoker and drug abuser, current ETOH abuser (2+ pints vodka day) with PMHx of HTN, stage III CKD (baseline Cr ~1.2), pAfib, hx of LV thrombus (previously on Warfarin, no evidence of thrombus by Echo 7/17/18), MI in 1996/1998/2003, CAD with prior PCI (LAD stent- patent by diagnostic cath@[facility] 1/2018), chronic systolic CHF s/p [employer] ICD in 2009 (last interrogated 6/2018- functioning well as per patient), frequent admissions to [facility] for ETOH use/withdrawal, most recent admission to [facility] (7/16-7/17) with chest pain R/O ACS (NST on 7/17/18 revealed severely abnormal myocardial perfusion images consistent with extensive scarring in the territory of the LAD. Overall left ventricular systolic function is abnormal with global hypokinesis. No evidence of ischemia, improved compared to prior stress test 3/2016)    Patient now returns to [facility] ED 8/7/18 c/o intermittent dizziness and near syncope ~11AM. Patient states he was in a humid environment remodeling at bathroom and shortly after removing the tub he started feeling diaphoretic, SOB, dizzy and had palpitations. Patient states he felt as though he was going to pass out so he sat down in front of a fan. Patient further admits to an episode of mild midsternal chest discomfort 5/10 in severity with radiation to shoulder blades, lasting ~15 minutes and self resolving. Patient states he tried to detox at home and has not had a drink in 5-6 days. Patient further admits to a slight tremor, insomnia and poor appetite for the past week. Patient denies recent change in ET, PND, orthopnea or LE edema. In ED, BP: 76/54, HR:115, RR: 18, Temp: 98.4F, O2 sat: 98% RA. EKG revealed Sinus tach@109BPM with slight ST changes in V2-V5 (unchanged from prior EKG). Cardiac enzymes negative x 1 set. Stat Bedside Echo revealed EF:40% with no effusion, mild diffuse hypokinesis consistent with LV aneurysm. CXR unremarkable. Labs also significant for BUN/Cr 24/2.54.    Patient treated with ASA 325mg PO x 1 dose and Brilinta 180mg PO x 1 dose, evaluated by interventionalist Dr. Alvarez and not recommended for cardiac catheterization. Patient further given Ativan 1mg PO x 1 dose, Librium 25mg PO x 1 dose, 2 liters of IVFs and Multivitamin 10ml/Thiamine 100mg/Folic aid 1 mg in 1 liter NS@125cc/hr.  Patient currently stable and admitted to [facility] for cardiac telemetry, serial cardiac enzymes and management of DANUTA of CKD.    CATH Hx:  @[facility] 1/11/18: normal LM, patent LAD stent, luminal irregularities of LCx, 30% pRCA lesion.

## 2018-08-07 NOTE — H&P ADULT - NEGATIVE CARDIOVASCULAR SYMPTOMS
no palpitations/no orthopnea/no paroxysmal nocturnal dyspnea/no peripheral edema no orthopnea/no paroxysmal nocturnal dyspnea/no peripheral edema

## 2018-08-07 NOTE — ED ADULT NURSE NOTE - NSIMPLEMENTINTERV_GEN_ALL_ED
Implemented All Fall Risk Interventions:  [place] to call system. Call bell, personal items and telephone within reach. Instruct patient to call for assistance. Room bathroom lighting operational. Non-slip footwear when patient is off stretcher. Physically safe environment: no spills, clutter or unnecessary equipment. Stretcher in lowest position, wheels locked, appropriate side rails in place. Provide visual cue, wrist band, yellow gown, etc. Monitor gait and stability. Monitor for mental status changes and reorient to person, place, and time. Review medications for side effects contributing to fall risk. Reinforce activity limits and safety measures with patient and family.

## 2018-08-07 NOTE — ED ADULT TRIAGE NOTE - CHIEF COMPLAINT QUOTE
Pt CO Tremors, Dizziness. CP and Insomnia x 5 days.  Pt states "I've detoxed before but this time I've been trying to detox at home and for the past 5 days I haven't gotten any sleep."  EKG obtained in triage.

## 2018-08-07 NOTE — H&P ADULT - PROBLEM SELECTOR PLAN 7
-- in withdrawal, received Librium 25mg PO x 1 dose and Ativan 1mg PO x 1 dose in ED. Will continue Librium taper and Ativan PRN.    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 -- in withdrawal, received Librium 25mg PO x 1 dose and Ativan 1mg PO x 1 dose in ED. Will continue Librium taper and Ativan PRN.  -- patient currently depressed with multiple stressors, requesting psych evaluation. Psych service consulted, F/U recs.     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

## 2018-08-07 NOTE — ED PROVIDER NOTE - PROGRESS NOTE DETAILS
cath lab code called based on MELVIN  V2-V5--  attg  dr vidal see no acute need for cath -- echo bedside EF  405  no effusion--exam mild hypokinesis diffuse  cw  LV aneurysm   dw  dr mendez  will admit for Serial CE  carsds monitoring also will rx for possible etoh WD  no IV heparin for now --  INR is wnl 0 vanessa aware

## 2018-08-07 NOTE — H&P ADULT - PROBLEM SELECTOR PLAN 6
DANUTA on stage III CKD- baseline Cr 1.2, BUN/Cr currently 24/2.54 on admission, s/p hydration in ED. Will hold ACE and F/U bladder scan. DANUTA on stage III CKD- baseline Cr 1.2, BUN/Cr currently 24/2.54 on admission, s/p hydration in ED. Will hold ACE. F/U bladder scan, UA, Urine electrolytes. Will consult renal Dr. Valente in AM.

## 2018-08-08 DIAGNOSIS — F10.21 ALCOHOL DEPENDENCE, IN REMISSION: ICD-10-CM

## 2018-08-08 LAB
ANION GAP SERPL CALC-SCNC: 8 MMOL/L — SIGNIFICANT CHANGE UP (ref 5–17)
BUN SERPL-MCNC: 19 MG/DL — SIGNIFICANT CHANGE UP (ref 7–23)
CALCIUM SERPL-MCNC: 8.9 MG/DL — SIGNIFICANT CHANGE UP (ref 8.4–10.5)
CHLORIDE SERPL-SCNC: 106 MMOL/L — SIGNIFICANT CHANGE UP (ref 96–108)
CHOLEST SERPL-MCNC: 158 MG/DL — SIGNIFICANT CHANGE UP (ref 10–199)
CK MB CFR SERPL CALC: 2.4 NG/ML — SIGNIFICANT CHANGE UP (ref 0–6.7)
CK SERPL-CCNC: 96 U/L — SIGNIFICANT CHANGE UP (ref 30–200)
CO2 SERPL-SCNC: 24 MMOL/L — SIGNIFICANT CHANGE UP (ref 22–31)
CREAT SERPL-MCNC: 1.51 MG/DL — HIGH (ref 0.5–1.3)
GLUCOSE SERPL-MCNC: 105 MG/DL — HIGH (ref 70–99)
HBA1C BLD-MCNC: 5 % — SIGNIFICANT CHANGE UP (ref 4–5.6)
HCT VFR BLD CALC: 32.6 % — LOW (ref 39–50)
HCT VFR BLD CALC: 33.6 % — LOW (ref 39–50)
HDLC SERPL-MCNC: 54 MG/DL — SIGNIFICANT CHANGE UP (ref 40–125)
HGB BLD-MCNC: 10.2 G/DL — LOW (ref 13–17)
HGB BLD-MCNC: 10.5 G/DL — LOW (ref 13–17)
LIPID PNL WITH DIRECT LDL SERPL: 77 MG/DL — SIGNIFICANT CHANGE UP
MAGNESIUM SERPL-MCNC: 1.6 MG/DL — SIGNIFICANT CHANGE UP (ref 1.6–2.6)
MCHC RBC-ENTMCNC: 25.2 PG — LOW (ref 27–34)
MCHC RBC-ENTMCNC: 25.4 PG — LOW (ref 27–34)
MCHC RBC-ENTMCNC: 31.3 G/DL — LOW (ref 32–36)
MCHC RBC-ENTMCNC: 31.3 G/DL — LOW (ref 32–36)
MCV RBC AUTO: 80.7 FL — SIGNIFICANT CHANGE UP (ref 80–100)
MCV RBC AUTO: 81.2 FL — SIGNIFICANT CHANGE UP (ref 80–100)
PLATELET # BLD AUTO: 117 K/UL — LOW (ref 150–400)
PLATELET # BLD AUTO: 122 K/UL — LOW (ref 150–400)
POTASSIUM SERPL-MCNC: 4.3 MMOL/L — SIGNIFICANT CHANGE UP (ref 3.5–5.3)
POTASSIUM SERPL-SCNC: 4.3 MMOL/L — SIGNIFICANT CHANGE UP (ref 3.5–5.3)
RBC # BLD: 4.04 M/UL — LOW (ref 4.2–5.8)
RBC # BLD: 4.14 M/UL — LOW (ref 4.2–5.8)
RBC # FLD: 19.9 % — HIGH (ref 10.3–16.9)
RBC # FLD: 20 % — HIGH (ref 10.3–16.9)
SODIUM SERPL-SCNC: 138 MMOL/L — SIGNIFICANT CHANGE UP (ref 135–145)
TOTAL CHOLESTEROL/HDL RATIO MEASUREMENT: 2.9 RATIO — LOW (ref 3.4–9.6)
TRIGL SERPL-MCNC: 136 MG/DL — SIGNIFICANT CHANGE UP (ref 10–149)
TROPONIN T SERPL-MCNC: <0.01 NG/ML — SIGNIFICANT CHANGE UP (ref 0–0.01)
TSH SERPL-MCNC: 0.61 UIU/ML — SIGNIFICANT CHANGE UP (ref 0.35–4.94)
WBC # BLD: 3.4 K/UL — LOW (ref 3.8–10.5)
WBC # BLD: 4.6 K/UL — SIGNIFICANT CHANGE UP (ref 3.8–10.5)
WBC # FLD AUTO: 3.4 K/UL — LOW (ref 3.8–10.5)
WBC # FLD AUTO: 4.6 K/UL — SIGNIFICANT CHANGE UP (ref 3.8–10.5)

## 2018-08-08 PROCEDURE — 99222 1ST HOSP IP/OBS MODERATE 55: CPT

## 2018-08-08 PROCEDURE — 70450 CT HEAD/BRAIN W/O DYE: CPT | Mod: 26

## 2018-08-08 PROCEDURE — 93306 TTE W/DOPPLER COMPLETE: CPT | Mod: 26

## 2018-08-08 PROCEDURE — 76700 US EXAM ABDOM COMPLETE: CPT | Mod: 26

## 2018-08-08 PROCEDURE — 93880 EXTRACRANIAL BILAT STUDY: CPT | Mod: 26

## 2018-08-08 PROCEDURE — 99223 1ST HOSP IP/OBS HIGH 75: CPT | Mod: GC

## 2018-08-08 RX ORDER — MAGNESIUM SULFATE 500 MG/ML
2 VIAL (ML) INJECTION ONCE
Qty: 0 | Refills: 0 | Status: COMPLETED | OUTPATIENT
Start: 2018-08-08 | End: 2018-08-08

## 2018-08-08 RX ORDER — APIXABAN 2.5 MG/1
2.5 TABLET, FILM COATED ORAL EVERY 12 HOURS
Qty: 0 | Refills: 0 | Status: DISCONTINUED | OUTPATIENT
Start: 2018-08-08 | End: 2018-08-10

## 2018-08-08 RX ADMIN — Medication 1 TABLET(S): at 11:36

## 2018-08-08 RX ADMIN — Medication 25 MILLIGRAM(S): at 11:36

## 2018-08-08 RX ADMIN — Medication 25 MILLIGRAM(S): at 05:22

## 2018-08-08 RX ADMIN — ATORVASTATIN CALCIUM 40 MILLIGRAM(S): 80 TABLET, FILM COATED ORAL at 21:38

## 2018-08-08 RX ADMIN — Medication 81 MILLIGRAM(S): at 11:36

## 2018-08-08 RX ADMIN — Medication 100 MILLIGRAM(S): at 05:22

## 2018-08-08 RX ADMIN — PANTOPRAZOLE SODIUM 40 MILLIGRAM(S): 20 TABLET, DELAYED RELEASE ORAL at 05:22

## 2018-08-08 RX ADMIN — APIXABAN 2.5 MILLIGRAM(S): 2.5 TABLET, FILM COATED ORAL at 18:58

## 2018-08-08 RX ADMIN — Medication 1 MILLIGRAM(S): at 11:36

## 2018-08-08 RX ADMIN — Medication 25 MILLIGRAM(S): at 17:09

## 2018-08-08 RX ADMIN — Medication 50 GRAM(S): at 17:10

## 2018-08-08 RX ADMIN — Medication 100 MILLIGRAM(S): at 11:36

## 2018-08-08 NOTE — PROGRESS NOTE ADULT - PROBLEM SELECTOR PLAN 6
DANUTA on stage III CKD- baseline Cr 1.2, BUN/Cr currently 24/2.54 on admission, s/p hydration in ED. Will hold ACE. F/U bladder scan, UA, Urine electrolytes. Will consult renal Dr. Valente in AM. DANUTA on stage III CKD (baseline Cr 1.2), Dr. Valente consulted  - BUN/Cr 24/2.54 on admission, s/p hydration in ED. Cr today 1.51.   - Will continue to hold home lisinopril 5 mg PO QD.   - PVR 68 cc, UA normal, Urine sodium 62, creatinine 223, FeNa 0.3% (likely pre-renal), f/u repeat urine lytes  - f/u renal US  - Continue to monitor

## 2018-08-08 NOTE — PROGRESS NOTE ADULT - PROBLEM SELECTOR PLAN 4
-- resolved by Echo 7/17/18, off of anticoagulation for ~3 weeks. -- currently in SR, continue BB. Will discuss anticoagulation with Dr. Levy. Currently in NSR 60s-80s  - c/w Toprol  mg PO QD, ASA 81 mg PO QD  - Plan to start Eliquis 2.5 mg BID as d/w Dr. Levy and Dr. Graham

## 2018-08-08 NOTE — PROGRESS NOTE ADULT - PROBLEM SELECTOR PLAN 1
currently chest pain free, EKG unchanged from prior, cardiac enzymes negative x 1 set. F/U cardiac enzymes at 8PM and 5AM.    --Echo bedside 8/7 revealed EF:40% with no effusion, mild diffuse hypokinesis consistent with LV aneurysm.  --Prior diagnostic cath 1/8/18 @[facility] revealed patent LAD stent.  --Recent NST 7/17/18 revealed severely abnormal myocardial perfusion images consistent with extensive scarring in the territory of the LAD. Overall left ventricular systolic function is abnormal with global hypokinesis. No evidence of ischemia, improved compared to prior stress test 3/2016.    -- Symptoms atypical in nature, will continue medical management with ASA/BB/Statin. Currently chest pain free, EKG unchanged from prior,  - CE negative x3  - Echo bedside 8/7 revealed EF:40% with no effusion, mild diffuse hypokinesis consistent with LV aneurysm.  - ECHO with Definity 8/8 revealed mild concentric LVH,  apical septal wall akinesis, apical akinesis, other walls mildly hypokinetic, LVEF 35-40%, defibrillator lead in right heart, LV apical thrombus again seen, clot diameter  1.5cm, mild TR.   --Prior diagnostic cath 1/8/18 @[facility] revealed patent LAD stent.  --Recent NST 7/17/18 revealed severely abnormal myocardial perfusion images consistent with extensive scarring in the territory of the LAD, overall LV systolic function is abnormal with global hypokinesis, no evidence of ischemia, improved compared to prior stress test 3/2016.  - No plan for additional ischemic w/u during this admit as per Dr. Levy.   - c/w ASA/BB/Statin with plan to start Eliquis 2.5 mg BID as d/w Dr. Levy Currently chest pain free, EKG unchanged from prior,  - CE negative x3  - Utox +benzos  - Echo bedside 8/7 revealed EF:40% with no effusion, mild diffuse hypokinesis consistent with LV aneurysm.  - ECHO with Definity 8/8 revealed mild concentric LVH,  apical septal wall akinesis, apical akinesis, other walls mildly hypokinetic, LVEF 35-40%, defibrillator lead in right heart, LV apical thrombus again seen, clot diameter  1.5cm, mild TR.   --Prior diagnostic cath 1/8/18 @[facility] revealed patent LAD stent.  --Recent NST 7/17/18 revealed severely abnormal myocardial perfusion images consistent with extensive scarring in the territory of the LAD, overall LV systolic function is abnormal with global hypokinesis, no evidence of ischemia, improved compared to prior stress test 3/2016.  - No plan for additional ischemic w/u during this admit as per Dr. Levy.   - c/w ASA 81 mg PO QD, Toprol  mg QD, lipitor 40 mg QD with plan to start Eliquis 2.5 mg BID as d/w Dr. Levy

## 2018-08-08 NOTE — BEHAVIORAL HEALTH ASSESSMENT NOTE - BEHAVIOR
Cooperative no loss of consciousness, no gait abnormality, no headache, no sensory deficits, and no weakness.

## 2018-08-08 NOTE — BEHAVIORAL HEALTH ASSESSMENT NOTE - NSBHCHARTREVIEWLAB_PSY_A_CORE FT
10.5   4.6   )-----------( 122      ( 08 Aug 2018 10:56 )             33.6   08-08    138  |  106  |  19  ----------------------------<  105<H>  4.3   |  24  |  1.51<H>    Ca    8.9      08 Aug 2018 06:55  Mg     1.6     08-08    TPro  7.8  /  Alb  4.5  /  TBili  0.9  /  DBili  x   /  AST  39  /  ALT  26  /  AlkPhos  58  08-07    utox positive for benzos

## 2018-08-08 NOTE — PROVIDER CONTACT NOTE (MEDICATION) - SITUATION
Pt ordered for continuous multivitamin bag at 125mL/hr. Bag has completed and pharmacy will not provide another stating pt should only receive 1 bag/day.

## 2018-08-08 NOTE — BEHAVIORAL HEALTH ASSESSMENT NOTE - DESCRIPTION (FIRST USE, LAST USE, QUANTITY, FREQUENCY, DURATION)
Reports long term heavy drinking, with worsening symptoms of withdrawal and need for detox last 2 years. Differing account of recent use.  Last drink 1 week ago. Reports quit 1996

## 2018-08-08 NOTE — PROGRESS NOTE ADULT - PROBLEM SELECTOR PLAN 2
-- currently asymptomatic, likely in setting of hypotension/hypovolemia. BP now stable after receiving IVFs. BP 110s/70, HR 90s. Will continue to monitor on telemetry. Recurrence of LV thrombus  - ECHO with Definity 8/8 revealed LV apical thrombus again seen, clot diameter  1.5cm.  - Resolved by Echo 7/17/18, off of anticoagulation for ~3 weeks.  - As d/w Dr. Levy, patient with high fall risk 2/2 history of heavy ETOH abuse and with history of GI bleed. Patient previously admitted for 2 weeks for heparin gtt to coumadin bridge. Dr. Levy does not want coumadin and would like to initiate Eliquis 2.5 mg BID. Recurrence of LV thrombus  - ECHO with Definity 8/8 revealed LV apical thrombus again seen, clot diameter  1.5cm.  - Resolved by Echo 7/17/18, off of anticoagulation for ~3 weeks.  - As d/w Dr. Levy, patient with high fall risk 2/2 history of heavy ETOH abuse and with history of GI bleed. Patient previously admitted for 2 weeks for argatroban gtt to coumadin bridge. Dr. Levy does not want coumadin and would like to initiate Eliquis 2.5 mg BID.  - Patient well known to Dr. Graham who was consulted who is also on board with Eliquis and will see patient in AM. f/u

## 2018-08-08 NOTE — PROGRESS NOTE ADULT - PROBLEM SELECTOR PLAN 3
-- currently in SR, continue BB. Will discuss anticoagulation with Dr. Levy. -- currently asymptomatic, likely in setting of hypotension/hypovolemia. BP now stable after receiving IVFs. BP 110s/70, HR 90s. Will continue to monitor on telemetry. Pt reports 1 episode of lightheadedness this AM.   - BP 76/54 in ED, repleted with 2L NS in ED and given banana bag with improvement in SBP to 100s-120s.  - Dizziness likely in setting of hypotension/hypovolemia but Dr. Singer consulted due to episode of unsteadiness  - Will continue to monitor on telemetry. Pt reports 1 episode of lightheadedness this AM.   - BP 76/54 in ED, repleted with 2L NS in ED and given banana bag with improvement in SBP to 100s-120s.  - Dizziness likely in setting of hypotension/hypovolemia but Dr. Singer consulted due to episode of unsteadiness  - CT normal. f/u carotid doppler, vitamin B12, folate  - TSH WNL.   - Will continue to monitor on telemetry.

## 2018-08-08 NOTE — CONSULT NOTE ADULT - ASSESSMENT
61 yo man with DANUTA- possible CKD  admitted with CP- no overt cardiac event  needs renal sonogram, post void measurement of bladder volume  monitor strict I/o  will re assess in am  reviewed with cardio team

## 2018-08-08 NOTE — PROGRESS NOTE ADULT - PROBLEM SELECTOR PLAN 7
-- in withdrawal, received Librium 25mg PO x 1 dose and Ativan 1mg PO x 1 dose in ED. Will continue Librium taper and Ativan PRN.  -- patient currently depressed with multiple stressors, requesting psych evaluation. Psych service consulted, F/U recs.     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 Last drink 6 days ago as per pt report. CIWA score 2.   - s/p banana bag  - Will continue Librium taper and Ativan PRN.  - Psych consulted who reported no psychiatric contraindications to discharge  - Information provided for [facility]  - Pt declined Remeron  - c/w thiamine, MVI, folic acid    Depression  - s/p psych consult who provided information for [facility]     DVT PPx:  Eliquis 2.5 mg BID    GI PPx: PPI    Dispo:  f/u results of renal US, carotid dopplers, neuro/heme/renal CS    Case d/w Dr. Levy

## 2018-08-08 NOTE — PROGRESS NOTE ADULT - ASSESSMENT
62 yr old M former smoker and drug abuser, ETOH abuser, with PMHx of HTN, stage III CKD, pAfib, hx of LV thrombus (resolved by Echo 7/17/18), MI in 1996/1998/2003, CAD with prior PCI, chronic systolic CHF s/p [employer] ICD in 2009, frequent admissions to [facility] for ETOH use/withdrawal, presents to [facility] ED 8/7/18 with dizziness and episode of nonexertional chest discomfort, admitted to [facility] for cardiac telemetry, serial enzymes and management of DANUTA on CKD. 62 yr old M former smoker and drug abuser, ETOH abuser, with PMHx of HTN, stage III CKD, pAfib, hx of LV thrombus (resolved by Echo 7/17/18), MI in 1996/1998/2003, CAD with prior PCI, chronic systolic CHF s/p [employer] ICD in 2009, frequent admissions to [facility] for ETOH use/withdrawal, presents to [facility] ED 8/7/18 with intermittent episodes of lightheadedness x 6 days in light of poor PO intake, 1 episode of feeling "unbalanced" and episode of nonexertional chest discomfort, cath lab activated for slight EKG changes in V2-V5 but this was determined to be unchanged from previous EKG, ruled out ACS with no plan for further cardiac w/u during this admit,  found to have DANUTA on CKD and reoccurrence of LV thrombus.

## 2018-08-08 NOTE — PROGRESS NOTE ADULT - PROBLEM SELECTOR PLAN 5
-- followed by Dr. Ramos, as per patient had an ICD interrogation few weeks ago, device functioning well. - Followed by Dr. Ramos, as per patient had an ICD interrogation few weeks ago, device functioning well.

## 2018-08-08 NOTE — PROGRESS NOTE ADULT - SUBJECTIVE AND OBJECTIVE BOX
Pt still feels dizzy but improved form yesterday    PAST MEDICAL & SURGICAL HISTORY:  Thrombus in heart chamber: LV  ICD (implantable cardioverter-defibrillator) in place  Pacemaker  ETOH abuse  Paroxysmal a-fib  Myocardial infarction involving other coronary artery  Gastritis  Atherosclerosis of coronary artery: Coronary artery disease  Automatic implantable cardiac defibrillator in situ: ICD (implantable cardioverter-defibrillator) in place    MEDICATIONS  (STANDING):  aspirin enteric coated 81 milliGRAM(s) Oral daily  atorvastatin 40 milliGRAM(s) Oral at bedtime  chlordiazePOXIDE 25 milliGRAM(s) Oral every 6 hours  chlordiazePOXIDE   Oral   folic acid 1 milliGRAM(s) Oral daily  metoprolol succinate  milliGRAM(s) Oral daily  multivitamin 1 Tablet(s) Oral daily  multivitamin/thiamine/folic acid in sodium chloride 0.9% 1000 milliLiter(s) (125 mL/Hr) IV Continuous <Continuous>  pantoprazole    Tablet 40 milliGRAM(s) Oral before breakfast  thiamine 100 milliGRAM(s) Oral daily    MEDICATIONS  (PRN):  LORazepam     Tablet 2 milliGRAM(s) Oral every 2 hours PRN CIWA-Ar score increase by 2 points and a total score of 7 or less    ICU Vital Signs Last 24 Hrs  T(C): 36.3 (08 Aug 2018 05:23), Max: 37.1 (07 Aug 2018 22:27)  T(F): 97.3 (08 Aug 2018 05:23), Max: 98.7 (07 Aug 2018 22:27)  HR: 78 (08 Aug 2018 08:33) (78 - 115)  BP: 125/81 (08 Aug 2018 08:33) (76/54 - 125/81)  BP(mean): --  ABP: --  ABP(mean): --  RR: 18 (08 Aug 2018 08:33) (17 - 18)  SpO2: 98% (08 Aug 2018 08:33) (98% - 100%)      Lungs clear  CV s1 s2  Abd soft  Etx stable                          10.2   3.4   )-----------( 117      ( 08 Aug 2018 06:55 )             32.6   08-08    138  |  106  |  19  ----------------------------<  105<H>  4.3   |  24  |  1.51<H>    Ca    8.9      08 Aug 2018 06:55    TPro  7.8  /  Alb  4.5  /  TBili  0.9  /  DBili  x   /  AST  39  /  ALT  26  /  AlkPhos  58  08-07    previosu stress test   Old scarring in LAD region     Renal status better with hydration    CT head July 2018   Negative       EF ECHO   35-40 %  July 2018  CARDIAC MARKERS ( 08 Aug 2018 06:55 )  x     / <0.01 ng/mL / 96 U/L / x     / 2.4 ng/mL  CARDIAC MARKERS ( 07 Aug 2018 22:47 )  x     / <0.01 ng/mL / 102 U/L / x     / 2.6 ng/mL  CARDIAC MARKERS ( 07 Aug 2018 20:57 )  x     / <0.01 ng/mL / 105 U/L / x     / 2.8 ng/mL  CARDIAC MARKERS ( 07 Aug 2018 15:06 )  x     / <0.01 ng/mL / 153 U/L / x     / 3.1 ng/mL

## 2018-08-08 NOTE — BEHAVIORAL HEALTH ASSESSMENT NOTE - NSBHADMITCOUNSEL_PSY_A_CORE
prognosis/diagnostic results/impressions and/or recommended studies/importance of adherence to chosen treatment/risks and benefits of treatment options/instructions for management, treatment and follow up/risk factor reduction/client/family/caregiver education

## 2018-08-08 NOTE — PROGRESS NOTE ADULT - SUBJECTIVE AND OBJECTIVE BOX
Interventional Cardiology PA Adult Progress Note    CC: dizziness upon admit    Subjective Assessment:  Patient seen and examined at bedside.  Reports Poor PO intake x 6 days 2/2 feeling of depression and lightheadedness intermittent over that time. Reports 1 episode of "feeling unbalanced" lasting few seconds before resolving which prompted him to come to the ED.  Patient endorses episode of lightheadedness this AM but denies feeling of "unbalance" this AM. Patient endorses ongoing feeling of anxiety and agitation for months which has prompted him to drink. Patient denies alcohol intake x 6 days as well. Denies CP, SOB, palpitations, N/V, abdominal pain, tremors, AH/VH/SI/HI, headache, clouding of sensorium, diaphoresis.   	  MEDICATIONS:  metoprolol succinate  milliGRAM(s) Oral daily  chlordiazePOXIDE 25 milliGRAM(s) Oral every 6 hours  chlordiazePOXIDE   Oral   LORazepam     Tablet 2 milliGRAM(s) Oral every 2 hours PRN  pantoprazole    Tablet 40 milliGRAM(s) Oral before breakfast  atorvastatin 40 milliGRAM(s) Oral at bedtime  aspirin enteric coated 81 milliGRAM(s) Oral daily  folic acid 1 milliGRAM(s) Oral daily  magnesium sulfate  IVPB 2 Gram(s) IV Intermittent once  multivitamin 1 Tablet(s) Oral daily  multivitamin/thiamine/folic acid in sodium chloride 0.9% 1000 milliLiter(s) IV Continuous <Continuous>  thiamine 100 milliGRAM(s) Oral daily    [PHYSICAL EXAM:  TELEMETRY:  T(C): 36.2 (08-08-18 @ 13:33), Max: 37.1 (08-07-18 @ 22:27)  HR: 68 (08-08-18 @ 11:38) (68 - 85)  BP: 120/80 (08-08-18 @ 11:38) (106/77 - 125/81)  RR: 16 (08-08-18 @ 11:38) (16 - 18)  SpO2: 99% (08-08-18 @ 11:38) (98% - 99%)  Wt(kg): --  I&O's Summary    07 Aug 2018 07:01  -  08 Aug 2018 07:00  --------------------------------------------------------  IN: 625 mL / OUT: 1050 mL / NET: -425 mL    08 Aug 2018 07:01  -  08 Aug 2018 16:24  --------------------------------------------------------  IN: 895 mL / OUT: 0 mL / NET: 895 mL    Appearance: WD/WN anxious appearing male laying in hospital bed 	  HEENT:   Normal oral mucosa, EOMI	  Neck: Supple, - JVD; Carotid Bruit   Cardiovascular: Normal S1 S2, No JVD, No murmurs,   Respiratory: Lungs clear to auscultation, no Rales, Rhonchi, Wheezing B/L  Gastrointestinal:  Soft, Non-tender, + BS x4  Skin: No rashes, No ecchymoses, No cyanosis  Extremities: Normal range of motion, No clubbing, cyanosis or edema  Vascular: Peripheral pulses palpable 2+ bilaterally  Neurologic: Non-focal  Psychiatry: A & O x 3, Mood & affect appropriate                   10.5   4.6   )-----------( 122      ( 08 Aug 2018 10:56 )             33.6     08-08    138  |  106  |  19  ----------------------------<  105<H>  4.3   |  24  |  1.51<H>    Ca    8.9      08 Aug 2018 06:55  Mg     1.6     08-08    TPro  7.8  /  Alb  4.5  /  TBili  0.9  /  DBili  x   /  AST  39  /  ALT  26  /  AlkPhos  58  08-07  HgA1c: Hemoglobin A1C, Whole Blood: 5.0 % (08-08 @ 06:55)  TSH: Thyroid Stimulating Hormone, Serum: 0.614 uIU/mL (08-08 @ 06:55)  PT/INR - ( 07 Aug 2018 15:06 )   PT: 11.1 sec;   INR: 1.00     PTT - ( 07 Aug 2018 15:06 )  PTT:27.6 sec Interventional Cardiology PA Adult Progress Note    CC: dizziness upon admit    Subjective Assessment:  Patient seen and examined at bedside.  Reports Poor PO intake x 6 days 2/2 feeling of depression and intermittent episodes of lightheadedness when over-exerting himself over that time. Reports 1 episode of "feeling unbalanced" lasting few seconds before resolving which prompted him to come to the ED.  Patient endorses episode of lightheadedness this AM but denies feeling of "unbalance" this AM. Patient endorses ongoing feeling of anxiety and agitation for months which has prompted him to drink. Patient denies alcohol intake x 6 days as well. Denies CP, SOB, palpitations, N/V, abdominal pain, tremors, AH/VH/SI/HI, headache, clouding of sensorium, diaphoresis.   	  MEDICATIONS:  metoprolol succinate  milliGRAM(s) Oral daily  chlordiazePOXIDE 25 milliGRAM(s) Oral every 6 hours  chlordiazePOXIDE   Oral   LORazepam     Tablet 2 milliGRAM(s) Oral every 2 hours PRN  pantoprazole    Tablet 40 milliGRAM(s) Oral before breakfast  atorvastatin 40 milliGRAM(s) Oral at bedtime  aspirin enteric coated 81 milliGRAM(s) Oral daily  folic acid 1 milliGRAM(s) Oral daily  magnesium sulfate  IVPB 2 Gram(s) IV Intermittent once  multivitamin 1 Tablet(s) Oral daily  multivitamin/thiamine/folic acid in sodium chloride 0.9% 1000 milliLiter(s) IV Continuous <Continuous>  thiamine 100 milliGRAM(s) Oral daily    [PHYSICAL EXAM:  TELEMETRY:  T(C): 36.2 (08-08-18 @ 13:33), Max: 37.1 (08-07-18 @ 22:27)  HR: 68 (08-08-18 @ 11:38) (68 - 85)  BP: 120/80 (08-08-18 @ 11:38) (106/77 - 125/81)  RR: 16 (08-08-18 @ 11:38) (16 - 18)  SpO2: 99% (08-08-18 @ 11:38) (98% - 99%)  Wt(kg): --  I&O's Summary    07 Aug 2018 07:01  -  08 Aug 2018 07:00  --------------------------------------------------------  IN: 625 mL / OUT: 1050 mL / NET: -425 mL    08 Aug 2018 07:01  -  08 Aug 2018 16:24  --------------------------------------------------------  IN: 895 mL / OUT: 0 mL / NET: 895 mL    Appearance: WD/WN anxious appearing male laying in hospital bed 	  HEENT:   Normal oral mucosa, EOMI	  Neck: Supple, - JVD; Carotid Bruit   Cardiovascular: Normal S1 S2, No JVD, No murmurs,   Respiratory: Lungs clear to auscultation, no Rales, Rhonchi, Wheezing B/L  Gastrointestinal:  Soft, Non-tender, + BS x4  Skin: No rashes, No ecchymoses, No cyanosis  Extremities: Normal range of motion, No clubbing, cyanosis or edema  Vascular: Peripheral pulses palpable 2+ bilaterally  Neurologic: Non-focal  Psychiatry: A & O x 3, Mood & affect appropriate                   10.5   4.6   )-----------( 122      ( 08 Aug 2018 10:56 )             33.6     08-08    138  |  106  |  19  ----------------------------<  105<H>  4.3   |  24  |  1.51<H>    Ca    8.9      08 Aug 2018 06:55  Mg     1.6     08-08    TPro  7.8  /  Alb  4.5  /  TBili  0.9  /  DBili  x   /  AST  39  /  ALT  26  /  AlkPhos  58  08-07  HgA1c: Hemoglobin A1C, Whole Blood: 5.0 % (08-08 @ 06:55)  TSH: Thyroid Stimulating Hormone, Serum: 0.614 uIU/mL (08-08 @ 06:55)  PT/INR - ( 07 Aug 2018 15:06 )   PT: 11.1 sec;   INR: 1.00     PTT - ( 07 Aug 2018 15:06 )  PTT:27.6 sec Interventional Cardiology PA Adult Progress Note    CC: dizziness upon admit    Subjective Assessment:  Patient seen and examined at bedside.  Reports Poor PO intake x 6 days 2/2 feeling of depression and intermittent episodes of lightheadedness when over-exerting himself over that time. Reports 1 episode of "feeling unbalanced" lasting few seconds before resolving which prompted him to come to the ED.  Patient endorses episode of lightheadedness this AM but denies feeling of "unbalance" this AM. Patient endorses ongoing feeling of anxiety and agitation for months which has prompted him to drink. Patient denies alcohol intake x 6 days as well. Denies CP, SOB, palpitations, N/V, abdominal pain, tremors, AH/VH/SI/HI, headache, clouding of sensorium, diaphoresis.   	  MEDICATIONS:  metoprolol succinate  milliGRAM(s) Oral daily  chlordiazePOXIDE 25 milliGRAM(s) Oral every 6 hours  chlordiazePOXIDE   Oral   LORazepam     Tablet 2 milliGRAM(s) Oral every 2 hours PRN  pantoprazole    Tablet 40 milliGRAM(s) Oral before breakfast  atorvastatin 40 milliGRAM(s) Oral at bedtime  aspirin enteric coated 81 milliGRAM(s) Oral daily  folic acid 1 milliGRAM(s) Oral daily  magnesium sulfate  IVPB 2 Gram(s) IV Intermittent once  multivitamin 1 Tablet(s) Oral daily  multivitamin/thiamine/folic acid in sodium chloride 0.9% 1000 milliLiter(s) IV Continuous <Continuous>  thiamine 100 milliGRAM(s) Oral daily    [PHYSICAL EXAM:  TELEMETRY:  T(C): 36.2 (08-08-18 @ 13:33), Max: 37.1 (08-07-18 @ 22:27)  HR: 68 (08-08-18 @ 11:38) (68 - 85)  BP: 120/80 (08-08-18 @ 11:38) (106/77 - 125/81)  RR: 16 (08-08-18 @ 11:38) (16 - 18)  SpO2: 99% (08-08-18 @ 11:38) (98% - 99%)  Wt(kg): --  I&O's Summary    07 Aug 2018 07:01  -  08 Aug 2018 07:00  --------------------------------------------------------  IN: 625 mL / OUT: 1050 mL / NET: -425 mL    08 Aug 2018 07:01  -  08 Aug 2018 16:24  --------------------------------------------------------  IN: 895 mL / OUT: 0 mL / NET: 895 mL    Appearance: WD/WN anxious appearing male laying in hospital bed 	  HEENT:   Normal oral mucosa, EOMI	  Neck: Supple, - JVD; Carotid Bruit   Cardiovascular: Normal S1 S2, No JVD, No murmurs,   Respiratory: Lungs clear to auscultation, no Rales, Rhonchi, Wheezing B/L  Gastrointestinal:  Soft, Non-tender, + BS x4  Skin: No rashes, No ecchymoses, No cyanosis  Extremities: Normal range of motion, No clubbing, cyanosis or edema. No tremor noted .  Vascular: Peripheral pulses palpable 2+ bilaterally  Neurologic: Non-focal  Psychiatry: A & O x 3, Mood & affect appropriate                   10.5   4.6   )-----------( 122      ( 08 Aug 2018 10:56 )             33.6     08-08    138  |  106  |  19  ----------------------------<  105<H>  4.3   |  24  |  1.51<H>    Ca    8.9      08 Aug 2018 06:55  Mg     1.6     08-08    TPro  7.8  /  Alb  4.5  /  TBili  0.9  /  DBili  x   /  AST  39  /  ALT  26  /  AlkPhos  58  08-07  HgA1c: Hemoglobin A1C, Whole Blood: 5.0 % (08-08 @ 06:55)  TSH: Thyroid Stimulating Hormone, Serum: 0.614 uIU/mL (08-08 @ 06:55)  PT/INR - ( 07 Aug 2018 15:06 )   PT: 11.1 sec;   INR: 1.00     PTT - ( 07 Aug 2018 15:06 )  PTT:27.6 sec

## 2018-08-08 NOTE — PHYSICAL THERAPY INITIAL EVALUATION ADULT - ADDITIONAL COMMENTS
Pt lives in elevator building, however at times elevator breaks with 4 floors to walk. Pt has no falls in past year, however 3 the year prior with head injury. Pt has son and daughter in-law living with him at the moment. No prior use of DME.

## 2018-08-08 NOTE — BEHAVIORAL HEALTH ASSESSMENT NOTE - SUMMARY
62M PPH EtOH use disorder, PMH significant cardiac history, presenting for episode of dizziness.  Patient minimizing EtOH use and giving conflicting accounts of recent intake.  Refuses Remeron for sleep/anxiety/depression although accepting Librium as outpatient.  Accepts outpatient referrals. 62M PPH EtOH use disorder, PMH significant cardiac history, presenting for episode of dizziness.  Patient minimizing EtOH use and giving conflicting accounts of recent level of intake.  Refuses Remeron for sleep/anxiety/depression although accepting Librium as outpatient.  Accepts outpatient referrals for psychiatry follow up as below.

## 2018-08-08 NOTE — PROVIDER CONTACT NOTE (MEDICATION) - BACKGROUND
Pt admitted for dizziness/near syncope. Pt has PMH of current Etoh abuse, A-Fib (PPM/ICD), drug abuse, cath.

## 2018-08-08 NOTE — PHYSICAL THERAPY INITIAL EVALUATION ADULT - PERTINENT HX OF CURRENT PROBLEM, REHAB EVAL
62 year old male presenting with c/o intermittent dizziness and near syncope ~11AM. Patient states he was in a humid environment remodeling at bathroom and shortly after removing the tub he started feeling diaphoretic, SOB, dizzy and had palpitations.

## 2018-08-08 NOTE — BEHAVIORAL HEALTH ASSESSMENT NOTE - HPI (INCLUDE ILLNESS QUALITY, SEVERITY, DURATION, TIMING, CONTEXT, MODIFYING FACTORS, ASSOCIATED SIGNS AND SYMPTOMS)
62M PPH EtOH use disorder, PMH CAD, multiple MIs, previous LV thrombus, CKD, presenting reporting episode of dizziness, admitted to cardiology.  Psychiatry consulted as patient reporting depressed mood.  Patient known to me from January 2018 admission; was discharged with plan to follow up at [facility] outpatient psychiatry/substance use program.    Patient seen at bedside.  Reports stress at home related to his son and son's family currently living with him. 62M PPH EtOH use disorder, multiple admissions for withdrawal, PMH CAD, multiple MIs, previous LV thrombus, CKD, presenting reporting episode of dizziness, admitted to cardiology.  Psychiatry consulted as patient reporting depressed mood.  Patient known to me from January 2018 admission; was discharged with plan to follow up at [facility] outpatient psychiatry/substance use program.    Patient seen at bedside.  Reports stress at home related to his son and son's family currently living with him.  Patient reports he discontinued alcohol use a week and a half ago and started Librium x5 days. Began drinking again about 3 months ago- he had been sober for 3 months prior to this episode of drinking.  Patient with differing accounts of alcohol intake to me and medical student- tells student he was drinking 2 pints a day prior to stopping a week ago but tells me he has had only "1 or 2 airplane bottles" in the last month. Patient has not been seeing an outpatient psychiatrist and has not been to a rehab facility since 1993 and has had cycles of alcoholism and sobriety each lasting for a few months since then. Denied any other substance use since 1981.    Patient endorsed a depressed and anxious mood. Complained of decreased sleep and insomnia for the last 3 years since his most recent divorce. No changes in appetite or concentration. Denied anhedonia, changes in energy level, feelings of guilt and suicidality. Enjoys doing activities such as cooking and martial arts. Denied history of manic episodes. Denied HI and A/VH. Denied currently tactile hallucinations. AAOx3. 62M PPH EtOH use disorder, multiple admissions for withdrawal, PMH CAD, multiple MIs, previous LV thrombus, CKD, presenting reporting episode of dizziness, admitted to cardiology.  Psychiatry consulted as patient reporting depressed mood.  Patient known to me from January 2018 admission; was discharged with plan to follow up at [facility] outpatient psychiatry/substance use program.    Patient seen at bedside.  Reports stress at home related to his son and son's family currently living with him.  Patient reports he discontinued alcohol use a week and a half ago and started Librium x5 days. Began drinking again about 3 months ago- he had been sober for 3 months prior to this episode of drinking.  Patient with differing accounts of alcohol intake to me and medical student- tells student he was drinking 2 pints a day prior to stopping a week ago but tells me he has had only "1 or 2 airplane bottles" in the last month and that Librium was prescribed for sleep. Patient has not been seeing an outpatient psychiatrist and has not been to a rehab facility since 1993 and has had cycles of alcoholism and sobriety each lasting for a few months since then. Denied any other substance use since 1981.  Did not follow up at [facility] program upon discharge in January because insurance not accepted.  Did not go to [facility] rehab as suggested on more recent admission because doesn't feel comfortable leaving his son's family in his apartment.    Patient endorsed a depressed and anxious mood. Complained of decreased sleep and insomnia for the last 3 years since his most recent divorce. No changes in appetite or concentration. Denied anhedonia, changes in energy level, feelings of guilt and suicidality. Enjoys doing activities such as cooking and martial arts. Denied history of manic episodes. Denied HI and A/VH. Denied currently tactile hallucinations. AAOx3.

## 2018-08-08 NOTE — BEHAVIORAL HEALTH ASSESSMENT NOTE - NSBHCHARTREVIEWVS_PSY_A_CORE FT
Vital Signs Last 24 Hrs  T(C): 36.4 (08 Aug 2018 09:32), Max: 37.1 (07 Aug 2018 22:27)  T(F): 97.5 (08 Aug 2018 09:32), Max: 98.7 (07 Aug 2018 22:27)  HR: 68 (08 Aug 2018 11:38) (68 - 115)  BP: 120/80 (08 Aug 2018 11:38) (76/54 - 125/81)  BP(mean): --  RR: 16 (08 Aug 2018 11:38) (16 - 18)  SpO2: 99% (08 Aug 2018 11:38) (98% - 100%)

## 2018-08-09 ENCOUNTER — TRANSCRIPTION ENCOUNTER (OUTPATIENT)
Age: 63
End: 2018-08-09

## 2018-08-09 LAB
ANION GAP SERPL CALC-SCNC: 12 MMOL/L — SIGNIFICANT CHANGE UP (ref 5–17)
APTT BLD: 28.3 SEC — SIGNIFICANT CHANGE UP (ref 27.5–37.4)
BUN SERPL-MCNC: 15 MG/DL — SIGNIFICANT CHANGE UP (ref 7–23)
CALCIUM SERPL-MCNC: 9.3 MG/DL — SIGNIFICANT CHANGE UP (ref 8.4–10.5)
CHLORIDE SERPL-SCNC: 101 MMOL/L — SIGNIFICANT CHANGE UP (ref 96–108)
CO2 SERPL-SCNC: 24 MMOL/L — SIGNIFICANT CHANGE UP (ref 22–31)
CREAT SERPL-MCNC: 1.19 MG/DL — SIGNIFICANT CHANGE UP (ref 0.5–1.3)
FOLATE SERPL-MCNC: >20 NG/ML — SIGNIFICANT CHANGE UP
GLUCOSE SERPL-MCNC: 101 MG/DL — HIGH (ref 70–99)
HCT VFR BLD CALC: 33.9 % — LOW (ref 39–50)
HCT VFR BLD CALC: 34.8 % — LOW (ref 39–50)
HGB BLD-MCNC: 10.9 G/DL — LOW (ref 13–17)
HGB BLD-MCNC: 11 G/DL — LOW (ref 13–17)
INR BLD: 0.95 — SIGNIFICANT CHANGE UP (ref 0.88–1.16)
MAGNESIUM SERPL-MCNC: 2 MG/DL — SIGNIFICANT CHANGE UP (ref 1.6–2.6)
MAGNESIUM UR-MCNC: <1 MG/DL — SIGNIFICANT CHANGE UP
MCHC RBC-ENTMCNC: 25.2 PG — LOW (ref 27–34)
MCHC RBC-ENTMCNC: 25.6 PG — LOW (ref 27–34)
MCHC RBC-ENTMCNC: 31.6 G/DL — LOW (ref 32–36)
MCHC RBC-ENTMCNC: 32.2 G/DL — SIGNIFICANT CHANGE UP (ref 32–36)
MCV RBC AUTO: 79.6 FL — LOW (ref 80–100)
MCV RBC AUTO: 79.6 FL — LOW (ref 80–100)
PLATELET # BLD AUTO: 125 K/UL — LOW (ref 150–400)
PLATELET # BLD AUTO: 136 K/UL — LOW (ref 150–400)
POTASSIUM SERPL-MCNC: 4.8 MMOL/L — SIGNIFICANT CHANGE UP (ref 3.5–5.3)
POTASSIUM SERPL-SCNC: 4.8 MMOL/L — SIGNIFICANT CHANGE UP (ref 3.5–5.3)
PROTHROM AB SERPL-ACNC: 10.5 SEC — SIGNIFICANT CHANGE UP (ref 9.8–12.7)
RBC # BLD: 4.26 M/UL — SIGNIFICANT CHANGE UP (ref 4.2–5.8)
RBC # BLD: 4.37 M/UL — SIGNIFICANT CHANGE UP (ref 4.2–5.8)
RBC # FLD: 19.9 % — HIGH (ref 10.3–16.9)
RBC # FLD: 20 % — HIGH (ref 10.3–16.9)
SODIUM SERPL-SCNC: 137 MMOL/L — SIGNIFICANT CHANGE UP (ref 135–145)
VIT B12 SERPL-MCNC: 510 PG/ML — SIGNIFICANT CHANGE UP (ref 232–1245)
WBC # BLD: 3.1 K/UL — LOW (ref 3.8–10.5)
WBC # BLD: 3.5 K/UL — LOW (ref 3.8–10.5)
WBC # FLD AUTO: 3.1 K/UL — LOW (ref 3.8–10.5)
WBC # FLD AUTO: 3.5 K/UL — LOW (ref 3.8–10.5)

## 2018-08-09 RX ADMIN — Medication 100 MILLIGRAM(S): at 10:50

## 2018-08-09 RX ADMIN — Medication 1 TABLET(S): at 10:50

## 2018-08-09 RX ADMIN — APIXABAN 2.5 MILLIGRAM(S): 2.5 TABLET, FILM COATED ORAL at 17:22

## 2018-08-09 RX ADMIN — APIXABAN 2.5 MILLIGRAM(S): 2.5 TABLET, FILM COATED ORAL at 06:37

## 2018-08-09 RX ADMIN — PANTOPRAZOLE SODIUM 40 MILLIGRAM(S): 20 TABLET, DELAYED RELEASE ORAL at 06:37

## 2018-08-09 RX ADMIN — Medication 1 MILLIGRAM(S): at 10:50

## 2018-08-09 RX ADMIN — ATORVASTATIN CALCIUM 40 MILLIGRAM(S): 80 TABLET, FILM COATED ORAL at 21:45

## 2018-08-09 RX ADMIN — Medication 100 MILLIGRAM(S): at 06:37

## 2018-08-09 RX ADMIN — Medication 25 MILLIGRAM(S): at 15:36

## 2018-08-09 RX ADMIN — Medication 25 MILLIGRAM(S): at 07:59

## 2018-08-09 RX ADMIN — Medication 25 MILLIGRAM(S): at 00:07

## 2018-08-09 RX ADMIN — Medication 81 MILLIGRAM(S): at 10:50

## 2018-08-09 NOTE — PROGRESS NOTE ADULT - ASSESSMENT
CAD LV thrombus    HX Gi Bleed   Ethanolism   thrombocytopenia   leukopenia     cont Eliquis 2.5 mg po BID   and current meds     D/C plan

## 2018-08-09 NOTE — PROGRESS NOTE ADULT - PROBLEM SELECTOR PLAN 4
Currently in NSR 60s-80s  - c/w Toprol  mg PO QD, ASA 81 mg PO QD  - Plan to start Eliquis 2.5 mg BID as d/w Dr. Levy and Dr. Everett

## 2018-08-09 NOTE — DISCHARGE NOTE ADULT - CARE PROVIDER_API CALL
Ck Levy), Medicine  [address] [place] [address]  Phone: [phone]  Fax: [phone] Ck Levy), Medicine  [address] [place] [id number]  Phone: [phone]  Fax: [phone]    True Belcher), Internal Medicine  [address] [place], [place] [address]  Phone: [phone]  Fax: [phone]

## 2018-08-09 NOTE — PROGRESS NOTE ADULT - PROBLEM SELECTOR PROBLEM 5
ICD (implantable cardioverter-defibrillator) in place
ICD (implantable cardioverter-defibrillator) in place

## 2018-08-09 NOTE — DISCHARGE NOTE ADULT - HOSPITAL COURSE
62 yr old M former smoker and drug abuser, current ETOH abuser (2+ pints vodka day) with PMHx of HTN, stage III CKD (baseline Cr ~1.2), pAfib, hx of LV thrombus (previously on Warfarin, no evidence of thrombus by Echo 7/17/18), MI in 1996/1998/2003, CAD with prior PCI (LAD stent- patent by diagnostic cath@[facility] 1/2018), chronic systolic CHF s/p [employer] ICD in 2009 (last interrogated 6/2018- functioning well as per patient), frequent admissions to [facility] for ETOH use/withdrawal, most recent admission to [facility] (7/16-7/17) with chest pain R/O ACS (NST on 7/17/18 revealed severely abnormal myocardial perfusion images consistent with extensive scarring in the territory of the LAD. Overall left ventricular systolic function is abnormal with global hypokinesis. No evidence of ischemia, improved compared to prior stress test 3/2016) 62 yr old M former smoker and drug abuser, current ETOH abuser (2+ pints vodka day) with PMHx of HTN, stage III CKD (baseline Cr ~1.2), pAfib, hx of LV thrombus (previously on Warfarin, no evidence of thrombus by Echo 7/17/18), MI in 1996/1998/2003, CAD with prior PCI (LAD stent- patent by diagnostic cath@[facility] 1/2018), chronic systolic CHF s/p [employer] ICD in 2009 (last interrogated 6/2018- functioning well as per patient), frequent admissions to [facility] for ETOH use/withdrawal, most recent admission to [facility] (7/16-7/17) with chest pain R/O ACS (NST on 7/17/18 revealed severely abnormal myocardial perfusion images consistent with extensive scarring in the territory of the LAD. Overall left ventricular systolic function is abnormal with global hypokinesis. No evidence of ischemia, improved compared to prior stress test 3/2016). Pt had 3 negative troponins. Urine tox was postive for benzodiazepines. Echo bedside 8/7 revealed EF:40% with no effusion, mild diffuse hypokinesis consistent with LV aneurysm. ECHO with Definity 8/8 revealed mild concentric LVH,  apical septal wall akinesis, apical akinesis, other walls mildly hypokinetic, LVEF 35-40%, defibrillator lead in right heart, LV apical thrombus again seen, clot diameter  1.5cm, mild TR.  Prior diagnostic cath 1/8/18 @[facility] revealed patent LAD stent. Recent NST 7/17/18 revealed severely abnormal myocardial perfusion images consistent with extensive scarring in the territory of the LAD, overall LV systolic function is abnormal with global hypokinesis, no evidence of ischemia, improved compared to prior stress test 3/2016. No plan for additional ischemic w/u during this admit as per Dr. Levy. C/w ASA 81 mg PO QD, Toprol  mg QD, lipitor 40 mg QD with plan to start Eliquis 2.5 mg BID as d/w Dr. Levy. Pt found to have recurrenct of LV thrombus on ECHO with Definity 8/8/18 revealed LV apical thrombus again seen, clot diameter  1.5cm. Clot had resolved in the past by Echo 7/17/18, off of anticoagulation for ~3 weeks. As d/w Dr. Levy, patient with high fall risk 2/2 history of heavy ETOH abuse and with history of GI bleed. Patient previously admitted for 2 weeks for argatroban gtt to coumadin bridge. Dr. Levy does not want coumadin and would like to initiate Eliquis 2.5 mg BID.  Patient well known to Dr. Everett who was consulted who is also on board with Eliquis 2.5mg BID. Pt endorsed dizziness and was hypotensive to 76/54 in ED. Pt repleted with NS 2L and banana bag with improvement of SBP to 100s-120s. CT normal. Carotid duplex no hemodynamically significant stenosis. vitamin B12, folate ordered. TSH WNL. Carotid duplex shows no significant stenosis. Pt has history of paroxysmal afib. Pt currently in NSR 60s-80s. C/w Toprol  mg PO QD, ASA 81 mg PO QD. Plan to start Eliquis 2.5 mg BID as d/w Dr. Levy and Dr. Everett. Pt's ICD was interrogated in 6/2018 with Dr Ramos and was functioning well. Pt had DANUTA on admission. Dr Valente consulted. Pt's Lisinopril 5mg daily was held on admission and pt was hydrated 2L NS in ED. Cr 2.54 on admission and downtrended to 1.19 on 8/9/18. Pt will continue to hold Lisinopril until he sees Dr Levy in the office next week.  PVR 68 cc, UA normal, Urine sodium 62, creatinine 223, FeNa 0.3% (likely pre-renal). Abdominal US showed the right kidney is 10.2 cm in length, and the left kidney is 10.9 cm in length. A 1.4 cm cortical cyst in the upper pole the left kidney is noted. There is also a junctional cortical defect versus a subcentimeter angiomyolipoma in the lower pole of the left kidney. There are no shadowing renal stones or hydronephrosis on either side. Renal cortical thickness and echotexture is preserved bilaterally. For pt's ETOH abuse. Pt had last drink 6 days prior to admission. Pt was put on a Librium taper during admission and had no signs of withdrawal for 2 days prior to discharge. Pt's last dose of Librium was on 8/10/18 prior to discharge. Pt will follow up with Dr Lvey. Psychiatry consulted and reported no contraindications to discharge. Pt given information for the [facility]. Pt declined Remeron. Pt to continue with Thiamine, folic acid and a multivitamin. Pt stable for discharge as per Dr Levy. PA attempted to make an appointment with Dr. Levy for follow up but the answering service for Dr Levy could not make an appointment for him. Pt informed to call Dr Levy on Tuesday to make a follow up appointment for the week of the 8/13/18-8/17/18. 62 yr old M former smoker and drug abuser, current ETOH abuser (2+ pints vodka day) with PMHx of HTN, stage III CKD (baseline Cr ~1.2), pAfib, hx of LV thrombus (previously on Warfarin, no evidence of thrombus by Echo 7/17/18), MI in 1996/1998/2003, CAD with prior PCI (LAD stent- patent by diagnostic [email or web address] 1/2018), chronic systolic CHF s/p [employer] ICD in 2009 (last interrogated 6/2018- functioning well as per patient), frequent admissions to [facility] for ETOH use/withdrawal, most recent admission to [facility] (7/16-7/17) with chest pain R/O ACS (NST on 7/17/18 revealed severely abnormal myocardial perfusion images consistent with extensive scarring in the territory of the LAD. Overall left ventricular systolic function is abnormal with global hypokinesis. No evidence of ischemia, improved compared to prior stress test 3/2016). Pt had 3 negative troponins. Urine tox was postive for benzodiazepines. Echo bedside 8/7 revealed EF:40% with no effusion, mild diffuse hypokinesis consistent with LV aneurysm. ECHO with Definity 8/8 revealed mild concentric LVH,  apical septal wall akinesis, apical akinesis, other walls mildly hypokinetic, LVEF 35-40%, defibrillator lead in right heart, LV apical thrombus again seen, clot diameter  1.5cm, mild TR.  Prior diagnostic cath 1/8/18 @[facility] revealed patent LAD stent. Recent NST 7/17/18 revealed severely abnormal myocardial perfusion images consistent with extensive scarring in the territory of the LAD, overall LV systolic function is abnormal with global hypokinesis, no evidence of ischemia, improved compared to prior stress test 3/2016. No plan for additional ischemic w/u during this admit as per Dr. Levy. C/w ASA 81 mg PO QD, Toprol  mg QD, lipitor 40 mg QD with plan to start Eliquis 2.5 mg BID as d/w Dr. Levy. Pt found to have recurrenct of LV thrombus on ECHO with Definity 8/8/18 revealed LV apical thrombus again seen, clot diameter  1.5cm. Clot had resolved in the past by Echo 7/17/18, off of anticoagulation for ~3 weeks. As d/w Dr. Levy, patient with high fall risk 2/2 history of heavy ETOH abuse and with history of GI bleed. Patient previously admitted for 2 weeks for argatroban gtt to coumadin bridge. Dr. Levy does not want coumadin and would like to initiate Eliquis 2.5 mg BID.  Patient well known to Dr. Everett who was consulted who is also on board with Eliquis 2.5mg BID. Pt endorsed dizziness and was hypotensive to 76/54 in ED. Pt repleted with NS 2L and banana bag with improvement of SBP to 100s-120s. CT normal. Carotid duplex no hemodynamically significant stenosis. vitamin B12, folate ordered. TSH WNL. Carotid duplex shows no significant stenosis. Pt has history of paroxysmal afib. Pt currently in NSR 60s-80s. C/w Toprol  mg PO QD, ASA 81 mg PO QD. Plan to start Eliquis 2.5 mg BID as d/w Dr. Levy and Dr. Everett. Pt's ICD was interrogated in 6/2018 with Dr Ramos and was functioning well. Pt had DANUTA on admission. Dr Valente consulted. Pt's Lisinopril 5mg daily was held on admission and pt was hydrated 2L NS in ED. Cr 2.54 on admission and downtrended to 1.19 on 8/9/18. Pt will continue to hold Lisinopril until he sees Dr Levy in the office next week.  PVR 68 cc, UA normal, Urine sodium 62, creatinine 223, FeNa 0.3% (likely pre-renal). Abdominal US showed the right kidney is 10.2 cm in length, and the left kidney is 10.9 cm in length. A 1.4 cm cortical cyst in the upper pole the left kidney is noted. There is also a junctional cortical defect versus a subcentimeter angiomyolipoma in the lower pole of the left kidney. There are no shadowing renal stones or hydronephrosis on either side. Renal cortical thickness and echotexture is preserved bilaterally. For pt's ETOH abuse. Pt had last drink 6 days prior to admission. Pt was put on a Librium taper during admission and had no signs of withdrawal for 2 days prior to discharge. Pt states he doesn't need a librium prescription because he has medication at home that he uses for insomnia. Pt will follow up with Dr Levy. Psychiatry consulted and reported no contraindications to discharge. Pt given information for the [facility]. Pt declined Remeron. Pt to continue with Thiamine, folic acid and a multivitamin. Pt stable for discharge as per Dr Levy. PA attempted to make an appointment with Dr. Levy for follow up but the answering service for Dr Levy could not make an appointment for him. Pt informed to call Dr Levy on Tuesday to make a follow up appointment for the week of the 8/13/18-8/17/18. 62 yr old M former smoker and drug abuser, current ETOH abuser (2+ pints vodka day) with PMHx of HTN, stage III CKD (baseline Cr ~1.2), pAfib, hx of LV thrombus (previously on Warfarin, no evidence of thrombus by Echo 7/17/18), MI in 1996/1998/2003, CAD with prior PCI (LAD stent- patent by diagnostic cath@[facility] 1/2018), chronic systolic CHF s/p [employer] ICD in 2009 (last interrogated 6/2018- functioning well as per patient), frequent admissions to [facility] for ETOH use/withdrawal, most recent admission to [facility] (7/16-7/17) with chest pain R/O ACS (NST on 7/17/18 revealed severely abnormal myocardial perfusion images consistent with extensive scarring in the territory of the LAD. Overall left ventricular systolic function is abnormal with global hypokinesis. No evidence of ischemia, improved compared to prior stress test 3/2016). Pt had 3 negative troponins. Urine tox was postive for benzodiazepines. Echo bedside 8/7 revealed EF:40% with no effusion, mild diffuse hypokinesis consistent with LV aneurysm. ECHO with Definity 8/8 revealed mild concentric LVH,  apical septal wall akinesis, apical akinesis, other walls mildly hypokinetic, LVEF 35-40%, defibrillator lead in right heart, LV apical thrombus again seen, clot diameter  1.5cm, mild TR.  Prior diagnostic cath 1/8/18 @[facility] revealed patent LAD stent. Recent NST 7/17/18 revealed severely abnormal myocardial perfusion images consistent with extensive scarring in the territory of the LAD, overall LV systolic function is abnormal with global hypokinesis, no evidence of ischemia, improved compared to prior stress test 3/2016. No plan for additional ischemic w/u during this admit as per Dr. Levy. C/w ASA 81 mg PO QD, Toprol  mg QD, lipitor 40 mg QD with plan to start Eliquis 2.5 mg BID as d/w Dr. Levy. Pt found to have recurrenct of LV thrombus on ECHO with Definity 8/8/18 revealed LV apical thrombus again seen, clot diameter  1.5cm. Clot had resolved in the past by Echo 7/17/18, off of anticoagulation for ~3 weeks. As d/w Dr. Levy, patient with high fall risk 2/2 history of heavy ETOH abuse and with history of GI bleed. Patient previously admitted for 2 weeks for argatroban gtt to coumadin bridge. Dr. Levy does not want coumadin and would like to initiate Eliquis 2.5 mg BID.  Patient well known to Dr. Everett who was consulted who is also on board with Eliquis 2.5mg BID. Pt endorsed dizziness and was hypotensive to 76/54 in ED. Pt repleted with NS 2L and banana bag with improvement of SBP to 100s-120s. CT normal. Carotid duplex no hemodynamically significant stenosis. vitamin B12, folate ordered. TSH WNL. Carotid duplex shows no significant stenosis. Pt has history of paroxysmal afib. Pt currently in NSR 60s-80s. C/w Toprol  mg PO QD, ASA 81 mg PO QD. Plan to start Eliquis 2.5 mg BID as d/w Dr. Levy and Dr. Everett. Pt's ICD was interrogated in 6/2018 with Dr Ramos and was functioning well. Pt had DANUTA on admission. Dr Valente consulted. Pt's Lisinopril 5mg daily was held on admission and pt was hydrated 2L NS in ED. Cr 2.54 on admission and downtrended to 1.19 on 8/9/18. Pt will continue to hold Lisinopril until he sees Dr Levy in the office next week.  PVR 68 cc, UA normal, Urine sodium 62, creatinine 223, FeNa 0.3% (likely pre-renal). Abdominal US showed the right kidney is 10.2 cm in length, and the left kidney is 10.9 cm in length. A 1.4 cm cortical cyst in the upper pole the left kidney is noted. There is also a junctional cortical defect versus a subcentimeter angiomyolipoma in the lower pole of the left kidney. There are no shadowing renal stones or hydronephrosis on either side. Renal cortical thickness and echotexture is preserved bilaterally. For pt's ETOH abuse. Pt had last drink 6 days prior to admission. Pt was put on a Librium taper during admission and had no signs of withdrawal for 2 days prior to discharge. Pt states he doesn't need a librium prescription because he has medication at home that he uses for insomnia. Pt will follow up with Dr Levy. Psychiatry consulted and reported no contraindications to discharge. Pt given information for the [facility]. Pt declined Remeron. Pt to continue with Thiamine, folic acid and a multivitamin. PA attempted to make an appointment with Dr. Levy for follow up but the answering service for Dr Levy could not make an appointment for him. Pt informed to call Dr Levy on Tuesday to make a follow up appointment for the week of the 8/13/18-8/17/18. Dr Belcher prescribed Librium 25mg BID x 10 pills. Pt is to take Librium 25mg BID on Saturday 8/11/18 and daily on Sunday 8/12/18 daily until he sees Dr Belcher on Monday 8/13/18 at 130pm. Pt stable for discharge as per Dr Garcia.

## 2018-08-09 NOTE — PROGRESS NOTE ADULT - SUBJECTIVE AND OBJECTIVE BOX
Interventional Cardiology PA Adult Progress Note    CC: Dizziness and failure to thrive  Subjective Assessment:    Currently slightly dizzy    12 point ROS otherwise negative except as stated in HPI and subjective.    MEDICATIONS:  metoprolol succinate  milliGRAM(s) Oral daily  chlordiazePOXIDE   Oral   LORazepam     Tablet 2 milliGRAM(s) Oral every 2 hours PRN  pantoprazole    Tablet 40 milliGRAM(s) Oral before breakfast  atorvastatin 40 milliGRAM(s) Oral at bedtime  apixaban 2.5 milliGRAM(s) Oral every 12 hours  aspirin enteric coated 81 milliGRAM(s) Oral daily  folic acid 1 milliGRAM(s) Oral daily  multivitamin 1 Tablet(s) Oral daily  thiamine 100 milliGRAM(s) Oral daily      PHYSICAL EXAM:   TELEMETRY: no events  T(C): 36.1 (08-09-18 @ 12:40), Max: 36.1 (08-09-18 @ 08:18)  HR: 80 (08-09-18 @ 17:25) (68 - 85)  BP: 111/75 (08-09-18 @ 17:25) (99/72 - 117/71)  RR: 16 (08-09-18 @ 17:25) (16 - 17)  SpO2: 100% (08-09-18 @ 17:25) (97% - 100%)  Wt(kg): --  I&O's Summary    08 Aug 2018 07:01  -  09 Aug 2018 07:00  --------------------------------------------------------  IN: 1075 mL / OUT: 900 mL / NET: 175 mL    09 Aug 2018 07:01  -  09 Aug 2018 19:13  --------------------------------------------------------  IN: 1365 mL / OUT: 800 mL / NET: 565 mL        Duffy:  Central/PICC/Mid Line:                                         Appearance: Normal	  HEENT:   Normal oral mucosa, PERRL, EOMI	  Neck: Supple, + JVD/ - JVD; Carotid Bruit   Cardiovascular: Normal S1 S2, No JVD, No murmurs,   Respiratory: Lungs clear to auscultation/Decreased Breath Sounds/No Rales, Rhonchi, Wheezing	  Gastrointestinal:  Soft, Non-tender, + BS	  Skin: No rashes, No ecchymoses, No cyanosis  Extremities: Normal range of motion, No clubbing, cyanosis or edema  Vascular: Peripheral pulses palpable 2+ bilaterally  Neurologic: Non-focal  Psychiatry: A & O x 3, Mood & affect appropriate      	    ECG:  	  RADIOLOGY:   DIAGNOSTIC TESTING:  [ ] Echocardiogram:  [ ]  Catheterization:  [ ] Stress Test:    [ ] MERLYN  OTHER: 	    LABS:	 	  CARDIAC MARKERS:                                  10.9   3.5   )-----------( 136      ( 09 Aug 2018 10:58 )             33.9     08-09    137  |  101  |  15  ----------------------------<  101<H>  4.8   |  24  |  1.19    Ca    9.3      09 Aug 2018 06:36  Mg     2.0     08-09      proBNP:   Lipid Profile:   HgA1c:   TSH:   PT/INR - ( 09 Aug 2018 06:36 )   PT: 10.5 sec;   INR: 0.95          PTT - ( 09 Aug 2018 06:36 )  PTT:28.3 sec    ASSESSMENT/PLAN:

## 2018-08-09 NOTE — PROGRESS NOTE ADULT - SUBJECTIVE AND OBJECTIVE BOX
Pt had neuro workup with Dr Enmanuel diego     PAST MEDICAL & SURGICAL HISTORY:  Thrombus in heart chamber: LV  ICD (implantable cardioverter-defibrillator) in place  Pacemaker  ETOH abuse  Paroxysmal a-fib  Myocardial infarction involving other coronary artery  Gastritis  Atherosclerosis of coronary artery: Coronary artery disease  Automatic implantable cardiac defibrillator in situ: ICD (implantable cardioverter-defibrillator) in place    MEDICATIONS  (STANDING):  apixaban 2.5 milliGRAM(s) Oral every 12 hours  aspirin enteric coated 81 milliGRAM(s) Oral daily  atorvastatin 40 milliGRAM(s) Oral at bedtime  chlordiazePOXIDE   Oral   chlordiazePOXIDE 25 milliGRAM(s) Oral every 8 hours  folic acid 1 milliGRAM(s) Oral daily  metoprolol succinate  milliGRAM(s) Oral daily  multivitamin 1 Tablet(s) Oral daily  pantoprazole    Tablet 40 milliGRAM(s) Oral before breakfast  thiamine 100 milliGRAM(s) Oral daily    MEDICATIONS  (PRN):  LORazepam     Tablet 2 milliGRAM(s) Oral every 2 hours PRN CIWA-Ar score increase by 2 points and a total score of 7 or less    ICU Vital Signs Last 24 Hrs  T(C): 36.1 (09 Aug 2018 08:18), Max: 36.2 (08 Aug 2018 13:33)  T(F): 96.9 (09 Aug 2018 08:18), Max: 97.1 (08 Aug 2018 13:33)  HR: 68 (09 Aug 2018 05:02) (68 - 85)  BP: 110/77 (09 Aug 2018 05:02) (99/72 - 137/87)  BP(mean): --  ABP: --  ABP(mean): --  RR: 17 (09 Aug 2018 05:02) (16 - 18)  SpO2: 99% (09 Aug 2018 05:02) (97% - 100%)      lungs clear  CV  s1 s2  Abd soft  Ext stable                          11.0   3.1   )-----------( 125      ( 09 Aug 2018 06:36 )             34.8   08-09    137  |  101  |  15  ----------------------------<  101<H>    renal function is improved   4.8   |  24  |  1.19    Ca    9.3      09 Aug 2018 06:36  Mg     2.0     08-09    TPro  7.8  /  Alb  4.5  /  TBili  0.9  /  DBili  x   /  AST  39  /  ALT  26  /  AlkPhos  58  08-07  PT/INR - ( 09 Aug 2018 06:36 )   PT: 10.5 sec;   INR: 0.95          PTT - ( 09 Aug 2018 06:36 )  PTT:28.3 sec    CXR neg      Head CT negative   Echo  New thrombus seem  LV apical thrombus   1.5 cm   EF 35-40 %  Carotid  No stenosis   Abd sonar  Hepatic Steatosis

## 2018-08-09 NOTE — CONSULT NOTE ADULT - SUBJECTIVE AND OBJECTIVE BOX
HPI:  62 yr old M former smoker and drug abuser, current ETOH abuser (2+ pints vodka day) with PMHx of HTN, stage III CKD (baseline Cr ~1.2), pAfib, hx of LV thrombus (previously on Warfarin, no evidence of thrombus by Echo 18), MI in //, CAD with prior PCI (LAD stent- patent by diagnostic [email or web address] 2018), chronic systolic CHF s/p [place] [employer] ICD in  (last interrogated 2018- functioning well as per patient), frequent admissions to [facility] for ETOH use/withdrawal, most recent admission to [facility] (-) with chest pain R/O ACS (NST on 18 revealed severely abnormal myocardial perfusion images consistent with extensive scarring in the territory of the LAD. Overall left ventricular systolic function is abnormal with global hypokinesis. No evidence of ischemia, improved compared to prior stress test 3/2016)    Patient now returns to [facility] ED 18 c/o intermittent dizziness and near syncope ~11AM. Patient states he was in a humid environment remodeling at bathroom and shortly after removing the tub he started feeling diaphoretic, SOB, dizzy and had palpitations. Patient states he felt as though he was going to pass out so he sat down in front of a fan. Patient further admits to an episode of mild midsternal chest discomfort 5/10 in severity with radiation to shoulder blades, lasting ~15 minutes and self resolving. Patient states he tried to detox at home and has not had a drink in 5-6 days. Patient further admits to a slight tremor, insomnia and poor appetite for the past week. Patient denies recent change in ET, PND, orthopnea or LE edema. In ED, BP: 76/54, HR:115, RR: 18, Temp: 98.4F, O2 sat: 98% RA. EKG revealed Sinus tach@109BPM with slight ST changes in V2-V5 (unchanged from prior EKG). Cardiac enzymes negative x 1 set. Stat Bedside Echo revealed EF:40% with no effusion, mild diffuse hypokinesis consistent with LV aneurysm. CXR unremarkable. Labs also significant for BUN/Cr 24/2.54.    Patient treated with ASA 325mg PO x 1 dose and Brilinta 180mg PO x 1 dose, evaluated by interventionalist Dr. Alvarez and not recommended for cardiac catheterization. Patient further given Ativan 1mg PO x 1 dose, Librium 25mg PO x 1 dose, 2 liters of IVFs and Multivitamin 10ml/Thiamine 100mg/Folic aid 1 mg in 1 liter NS@125cc/hr.  Patient currently stable and admitted to [facility] for cardiac telemetry, serial cardiac enzymes and management of DANUTA of CKD.    CATH Hx:  @[facility] 18: normal LM, patent LAD stent, luminal irregularities of LCx, 30% pRCA lesion. (07 Aug 2018 17:48)    FAMILY HISTORY:  Family history of cardiac disorder in maternal grandfather (Grandparent):  of MI at 43yo    MEDICATIONS  (STANDING):  apixaban 2.5 milliGRAM(s) Oral every 12 hours  aspirin enteric coated 81 milliGRAM(s) Oral daily  atorvastatin 40 milliGRAM(s) Oral at bedtime  chlordiazePOXIDE   Oral   chlordiazePOXIDE 25 milliGRAM(s) Oral every 8 hours  folic acid 1 milliGRAM(s) Oral daily  metoprolol succinate  milliGRAM(s) Oral daily  multivitamin 1 Tablet(s) Oral daily  pantoprazole    Tablet 40 milliGRAM(s) Oral before breakfast  thiamine 100 milliGRAM(s) Oral daily    MEDICATIONS  (PRN):  LORazepam     Tablet 2 milliGRAM(s) Oral every 2 hours PRN CIWA-Ar score increase by 2 points and a total score of 7 or less    Vital Signs Last 24 Hrs  T(C): 36.1 (09 Aug 2018 12:40), Max: 36.2 (08 Aug 2018 13:33)  T(F): 97 (09 Aug 2018 12:40), Max: 97.1 (08 Aug 2018 13:33)  HR: 79 (09 Aug 2018 10:37) (68 - 85)  BP: 101/69 (09 Aug 2018 10:37) (99/72 - 137/87)  BP(mean): --  RR: 16 (09 Aug 2018 10:37) (16 - 18)  SpO2: 100% (09 Aug 2018 10:37) (97% - 100%)PHYSICAL EXAM:    Constitutional:    Neck:    Breasts:    Respiratory:    Cardiovascular:    Gastrointestinal:    Extremities:    Neurological:    Skin:    Lymph Nodes:      Labs:  CBC Full  -  ( 09 Aug 2018 10:58 )  WBC Count : 3.5 K/uL  Hemoglobin : 10.9 g/dL  Hematocrit : 33.9 %  Platelet Count - Automated : 136 K/uL  Mean Cell Volume : 79.6 fL  Mean Cell Hemoglobin : 25.6 pg  Mean Cell Hemoglobin Concentration : 32.2 g/dL  Auto Neutrophil # : x  Auto Lymphocyte # : x  Auto Monocyte # : x  Auto Eosinophil # : x  Auto Basophil # : x  Auto Neutrophil % : x  Auto Lymphocyte % : x  Auto Monocyte % : x  Auto Eosinophil % : x  Auto Basophil % : x        137  |  101  |  15  ----------------------------<  101<H>  4.8   |  24  |  1.19    Ca    9.3      09 Aug 2018 06:36  Mg     2.0         TPro  7.8  /  Alb  4.5  /  TBili  0.9  /  DBili  x   /  AST  39  /  ALT  26  /  AlkPhos  58  -      Radiology:  HEALTH ISSUES - R/O PROBLEM Dx:    Assessmant:  Patient with reccurent valvular thrombus was d/c on po ASA and Plavix, only took ASA   and redeveloped valvular thrombus.  Patient is an ETOH user/abuser, misses his meds many times.  Plan: in view of this patient's particulars would adviese:  Eliquis 2.5 mg bid  ASA 81 mg od  Above discused with house staff and patient  Plan:    Thank you  Liana Everett MD
Patient is a 62y old  Male who presents with a chief complaint of dizziness and episode of chest pain (07 Aug 2018 17:48)        HPI:  62 yr old M former smoker and drug abuser, current ETOH abuser (2+ pints vodka day) with PMHx of HTN, stage III CKD (baseline Cr ~1.2), pAfib, hx of LV thrombus (previously on Warfarin, no evidence of thrombus by Echo 18), MI in //, CAD with prior PCI (LAD stent- patent by diagnostic [email or web address] 2018), chronic systolic CHF s/p [place] [employer] ICD in  (last interrogated 2018- functioning well as per patient), frequent admissions to [facility] for ETOH use/withdrawal, most recent admission to [facility] (-) with chest pain R/O ACS (NST on 18 revealed severely abnormal myocardial perfusion images consistent with extensive scarring in the territory of the LAD. Overall left ventricular systolic function is abnormal with global hypokinesis. No evidence of ischemia, improved compared to prior stress test 3/2016)    Patient now returns to [facility] ED 18 c/o intermittent dizziness and near syncope ~11AM. Patient states he was in a humid environment remodeling at bathroom and shortly after removing the tub he started feeling diaphoretic, SOB, dizzy and had palpitations. Patient states he felt as though he was going to pass out so he sat down in front of a fan. Patient further admits to an episode of mild midsternal chest discomfort 5/10 in severity with radiation to shoulder blades, lasting ~15 minutes and self resolving. Patient states he tried to detox at home and has not had a drink in 5-6 days. Patient further admits to a slight tremor, insomnia and poor appetite for the past week. Patient denies recent change in ET, PND, orthopnea or LE edema. In ED, BP: 76/54, HR:115, RR: 18, Temp: 98.4F, O2 sat: 98% RA. EKG revealed Sinus tach@109BPM with slight ST changes in V2-V5 (unchanged from prior EKG). Cardiac enzymes negative x 1 set. Stat Bedside Echo revealed EF:40% with no effusion, mild diffuse hypokinesis consistent with LV aneurysm. CXR unremarkable. Labs also significant for BUN/Cr 24/2.54.    Patient treated with ASA 325mg PO x 1 dose and Brilinta 180mg PO x 1 dose, evaluated by interventionalist Dr. Alvarez and not recommended for cardiac catheterization. Patient further given Ativan 1mg PO x 1 dose, Librium 25mg PO x 1 dose, 2 liters of IVFs and Multivitamin 10ml/Thiamine 100mg/Folic aid 1 mg in 1 liter NS@125cc/hr.  Patient currently stable and admitted to [facility] for cardiac telemetry, serial cardiac enzymes and management of DANUTA of CKD.     No new headaches or dizziness - no further neuro episodes since admission    CATH Hx:  @[facility] 18: normal LM, patent LAD stent, luminal irregularities of LCx, 30% pRCA lesion. (07 Aug 2018 17:48)      Allergies  Capoten (Short breath; Rash; Hives)  heparin (Other (Mod to Severe))  penicillin (Short breath; Rash; Hives)      Health Issues  CHEST PAINDIZZINESS  H/o or current diagnosis of HF- Contraindication to ACEI/ARBs  H/o or current diagnosis of HF- no contraindication to ACEI/ARBs  H/o or current diagnosis of HF- ACEI/ARB contraindication unknown  H/o or current diagnosis of HF- no contraindication to ACEI/ARBs  H/o or current diagnosis of HF- ACEI/ARB contraindication unknown  H/o or current diagnosis of HF- no contraindication to ACEI/ARBs  H/o or current diagnosis of HF- ACEI/ARB contraindication unknown  H/o or current diagnosis of HF- no contraindication to ACEI/ARBs  H/o or current diagnosis of HF- ACEI/ARB contraindication unknown  H/o or current diagnosis of HF- no contraindication to ACEI/ARBs  H/o or current diagnosis of HF- ACEI/ARB contraindication unknown  H/o or current diagnosis of HF- no contraindication to ACEI/ARBs  H/o or current diagnosis of HF- ACEI/ARB contraindication unknown  H/o or current diagnosis of HF- no contraindication to ACEI/ARBs  H/o or current diagnosis of HF- ACEI/ARB contraindication unknown  H/o or current diagnosis of HF- no contraindication to ACEI/ARBs  H/o or current diagnosis of HF- ACEI/ARB contraindication unknown  H/o or current diagnosis of HF- no contraindication to ACEI/ARBs  H/o or current diagnosis of HF- ACEI/ARB contraindication unknown  H/o or current diagnosis of HF- no contraindication to ACEI/ARBs  H/o or current diagnosis of HF- ACEI/ARB contraindication unknown  H/o or current diagnosis of HF- Contraindication to ACEI/ARBs  H/o or current diagnosis of HF- no contraindication to ACEI/ARBs  H/o or current diagnosis of HF- ACEI/ARB contraindication unknown  H/o or current diagnosis of HF- no contraindication to ACEI/ARBs  H/o or current diagnosis of HF- ACEI/ARB contraindication unknown  H/o or current diagnosis of HF- Contraindication to ACEI/ARBs  H/o or current diagnosis of HF- ACEI/ARB contraindication unknown  H/o or current diagnosis of HF- Contraindication to ACEI/ARBs  H/o or current diagnosis of HF- ACEI/ARB contraindication unknown  Family history of cardiac disorder in maternal grandfather (Grandparent)  No pertinent family history in first degree relatives  Handoff  MEWS Score  Thrombus in heart chamber  ICD (implantable cardioverter-defibrillator) in place  Pacemaker  ETOH abuse  Paroxysmal a-fib  Myocardial infarction involving other coronary artery  Gastritis  Essential hypertension  Other specified cardiac dysrhythmias  Atherosclerosis of coronary artery  Pacemaker  Chest pain  Alcohol use disorder, severe, in early remission  ETOH abuse  DANUTA (acute kidney injury)  ICD (implantable cardioverter-defibrillator) in place  Thrombus in heart chamber  Paroxysmal a-fib  Dizziness  Chest pain  History of surgery to heart and great vessels, presenting hazards to health  Automatic implantable cardiac defibrillator in situ  CHEST PAIN  12  Dizziness        FAMILY HISTORY:  Family history of cardiac disorder in maternal grandfather (Grandparent):  of MI at 41yo      MEDICATIONS  (STANDING):  apixaban 2.5 milliGRAM(s) Oral every 12 hours  aspirin enteric coated 81 milliGRAM(s) Oral daily  atorvastatin 40 milliGRAM(s) Oral at bedtime  chlordiazePOXIDE   Oral   chlordiazePOXIDE 25 milliGRAM(s) Oral every 8 hours  folic acid 1 milliGRAM(s) Oral daily  metoprolol succinate  milliGRAM(s) Oral daily  multivitamin 1 Tablet(s) Oral daily  pantoprazole    Tablet 40 milliGRAM(s) Oral before breakfast  thiamine 100 milliGRAM(s) Oral daily    MEDICATIONS  (PRN):  LORazepam     Tablet 2 milliGRAM(s) Oral every 2 hours PRN CIWA-Ar score increase by 2 points and a total score of 7 or less      PAST MEDICAL & SURGICAL HISTORY:  Thrombus in heart chamber: LV  ICD (implantable cardioverter-defibrillator) in place  Pacemaker  ETOH abuse  Paroxysmal a-fib  Myocardial infarction involving other coronary artery  Gastritis  Atherosclerosis of coronary artery: Coronary artery disease  Automatic implantable cardiac defibrillator in situ: ICD (implantable cardioverter-defibrillator) in place      Labs                          11.0   3.1   )-----------( 125      ( 09 Aug 2018 06:36 )             34.8     08-    137  |  101  |  15  ----------------------------<  101<H>  4.8   |  24  |  1.19    Ca    9.3      09 Aug 2018 06:36  Mg     2.0     -    TPro  7.8  /  Alb  4.5  /  TBili  0.9  /  DBili  x   /  AST  39  /  ALT  26  /  AlkPhos  58  08-07      Radiology:    Physical Exam    MENTAL STATUS  -Level of Consciousness- awake    Orientation- person, place time  Language- aphasia/ dysarthria- nl  Memory- recent and remote- nl      Cranial Nerve 1- 12  Pupils- equal and reactive  Eye movements- full  Facial - no asymmetry   Lower CN-nl    Gait and Station-n/a    MOTOR  Upper-nl  Lower-no foot drop    Reflexes- decreased    Sensation- no sensory level    Cerebellar- no tremors    vascular - no bruits    Assessment- Dizziness - no acute neuro deficits    Plan as per Cardiology
Patient is a 62y man admitted with new onset chest pain; found to have new elevation of creatinine to 2.54mg/dL-   Given fluids overnight with drop in creat to 1.51; u/a bland  Denies prior h/o kidney disease; No FHx if kidney disease, though son was diagnosed with HTN (now12 yo)- no secondary causes found.  Denies flank pain, dysuria, hematuria or frothy urine  No NSAID or PPI use  Denies DM or HTN  PAST MEDICAL & SURGICAL HISTORY:  Thrombus in heart chamber: LV  ICD (implantable cardioverter-defibrillator) in place  Pacemaker  ETOH abuse  Paroxysmal a-fib  Myocardial infarction involving other coronary artery  Gastritis  Atherosclerosis of coronary artery: Coronary artery disease  Automatic implantable cardiac defibrillator in situ: ICD (implantable cardioverter-defibrillator) in place      MEDICATIONS  (STANDING):  aspirin enteric coated 81 milliGRAM(s) Oral daily  atorvastatin 40 milliGRAM(s) Oral at bedtime  chlordiazePOXIDE 25 milliGRAM(s) Oral every 6 hours  chlordiazePOXIDE   Oral   folic acid 1 milliGRAM(s) Oral daily  magnesium sulfate  IVPB 2 Gram(s) IV Intermittent once  metoprolol succinate  milliGRAM(s) Oral daily  multivitamin 1 Tablet(s) Oral daily  multivitamin/thiamine/folic acid in sodium chloride 0.9% 1000 milliLiter(s) (125 mL/Hr) IV Continuous <Continuous>  pantoprazole    Tablet 40 milliGRAM(s) Oral before breakfast  thiamine 100 milliGRAM(s) Oral daily    MEDICATIONS  (PRN):  LORazepam     Tablet 2 milliGRAM(s) Oral every 2 hours PRN CIWA-Ar score increase by 2 points and a total score of 7 or less      Allergies    Capoten (Short breath; Rash; Hives)  heparin (Other (Mod to Severe))  penicillin (Short breath; Rash; Hives)            SOCIAL HISTORY: N smoke Etoh abuse    FAMILY HISTORY:  Family history of cardiac disorder in maternal grandfather (Grandparent):  of MI at 41yo    ROS: non-contributory other than HPI    T(C): , Max: 37.1 (18 @ 22:27)  T(F): , Max: 98.7 (18 @ 22:27)  HR: 68 (18 @ 11:38)  BP: 120/80 (18 @ 11:38)  BP(mean): --  RR: 16 (18 @ 11:38)  SpO2: 99% (18 @ 11:38)  Wt(kg): --     @ 07:01  -   @ 07:00  --------------------------------------------------------  IN:    multivitamin/thiamine/folic acid in sodium chloride 0.9%: 625 mL  Total IN: 625 mL    OUT:    Voided: 1050 mL  Total OUT: 1050 mL    Total NET: -425 mL       @ 07:01  -   @ 15:55  --------------------------------------------------------  IN:    Oral Fluid: 895 mL  Total IN: 895 mL    OUT:  Total OUT: 0 mL    Total NET: 895 mL            PHYSICAL EXAM:  Constitutional: Well appearing.  No acute distress  ENMT: Moist mucous membrane.  No cyanosis.  Neck: Supple. No JVD.  Back: No CVA tenderness  Respiratory: Clear to auscultation   Cardiovascular: S1, S2.  Regular rate and rhythm.    Gastrointestinal: soft, non-tender, non-distended, normal bowel sounds  Extremities: Warm.  No lower extremity edema.    Neurological: No focal deficits.  Skin: Warm. Dry.    Lymph Nodes:  No cervical lymph nodes.    Psychiatric: Normal affect.        LABS:                        10.5   4.6   )-----------( 122      ( 08 Aug 2018 10:56 )             33.6         138  |  106  |  19  ----------------------------<  105<H>  4.3   |  24  |  1.51<H>    Ca    8.9      08 Aug 2018 06:55  Mg     1.6         TPro  7.8  /  Alb  4.5  /  TBili  0.9  /  DBili  x   /  AST  39  /  ALT  26  /  AlkPhos  58  08-07    Cholesterol, Serum: 158 mg/dL ( @ 06:55)    PT/INR - ( 07 Aug 2018 15:06 )   PT: 11.1 sec;   INR: 1.00          PTT - ( 07 Aug 2018 15:06 )  PTT:27.6 sec  Urinalysis Basic - ( 07 Aug 2018 21:44 )    Color: Yellow / Appearance: Clear / S.020 / pH: x  Gluc: x / Ketone: NEGATIVE  / Bili: Negative / Urobili: 0.2 E.U./dL   Blood: x / Protein: NEGATIVE mg/dL / Nitrite: NEGATIVE   Leuk Esterase: NEGATIVE / RBC: x / WBC x   Sq Epi: x / Non Sq Epi: x / Bacteria: x      Calcium, Random Urine: 2.1 mg/dL ( @ 21:51)  Osmolality, Random Urine: 482 mosmol/kg ( @ 21:51)  Chloride, Random Urine: 58 mmol/L ( @ 21:50)  Creatinine, Random Urine: 223 mg/dL ( @ 21:50)  Sodium, Random Urine: 62 mmol/L ( @ 21:50)  Potassium, Random Urine: 57 mmol/L ( @ 21:50)        RADIOLOGY & ADDITIONAL STUDIES:

## 2018-08-09 NOTE — DISCHARGE NOTE ADULT - PLAN OF CARE
You have a blood clot in your heart. START taking ELIQUIS (APIXABAN) 2.5mg twice a day to help break up the clot Follow up with Dr Levy in 1 week. Continue taking METOPROLOL SUCCINATE ER 100mg daily to keep your heart rate in a normal rhythm. You had dizziness and had a neurology workup which was all normal. Your CT head, ultrasound of your carotid arteries, and blood pressures have been stable. You were on a librium taper during your hospitalization for your alcohol withdrawal. Finish your librium taper at home. Your kidney function was very low when you were admitted to the hospital. We did not give you your LISINOPRIL and we gave you IV fluid and your kidney function normalized. DO NOT TAKE your Lisinopril until your kidney function has remained normal on labs that will be drawn at Dr Levy's office. You were dehydrated when you came to the hospital and we gave you IV fluids to help your fluid balance become more normalized. Now it's important you don't drink more than 1-2 liters of fluid a day. You do not need to take Librium anymore. Dr Belcher is prescribing Librium 25mg BID for 10 pills. On SATURDAY 8/11/18 take Librium 25mg TWICE a day. On SUNDAY 8/12/18 take Librium 25mg once daily until you see Dr Belcher. See Dr Belcher on Monday 8/13/18 at 130pm.

## 2018-08-09 NOTE — DISCHARGE NOTE ADULT - MEDICATION SUMMARY - MEDICATIONS TO TAKE
I will START or STAY ON the medications listed below when I get home from the hospital:    aspirin 81 mg oral delayed release tablet  -- 1 tab(s) by mouth once a day  -- Indication: For Heart health    apixaban 2.5 mg oral tablet  -- 1 tab(s) by mouth every 12 hours  -- Indication: For Blood clot in your heart    atorvastatin 40 mg oral tablet  -- 1 tab(s) by mouth once a day (at bedtime)  -- Indication: For Cholesterol control    Metoprolol Succinate  mg oral tablet, extended release  -- 1 tab(s) by mouth once a day   -- It is very important that you take or use this exactly as directed.  Do not skip doses or discontinue unless directed by your doctor.  May cause drowsiness.  Alcohol may intensify this effect.  Use care when operating dangerous machinery.  Some non-prescription drugs may aggravate your condition.  Read all labels carefully.  If a warning appears, check with your doctor before taking.  Swallow whole.  Do not crush.  Take with food or milk.  This drug may impair the ability to drive or operate machinery.  Use care until you become familiar with its effects.    -- Indication: For Blood pressure control    Multiple Vitamins oral tablet  -- 1 tab(s) by mouth once a day  -- Indication: For Vitamin    folic acid 1 mg oral tablet  -- 1 tab(s) by mouth once a day  -- Indication: For Vitamin    thiamine 100 mg oral tablet  -- 1 tab(s) by mouth once a day x 14 days  x 30 days   -- Indication: For Vitamin

## 2018-08-09 NOTE — PROGRESS NOTE ADULT - PROBLEM SELECTOR PLAN 5
- Followed by Dr. Ramos, as per patient had an ICD interrogation few weeks ago, device functioning well.

## 2018-08-09 NOTE — PROGRESS NOTE ADULT - PROBLEM SELECTOR PLAN 2
Recurrence of LV thrombus  - ECHO with Definity 8/8 revealed LV apical thrombus again seen, clot diameter  1.5cm.  - Resolved by Echo 7/17/18, off of anticoagulation for ~3 weeks.  - As d/w Dr. Levy, patient with high fall risk 2/2 history of heavy ETOH abuse and with history of GI bleed. Patient previously admitted for 2 weeks for argatroban gtt to coumadin bridge. Dr. Levy does not want coumadin and would like to initiate Eliquis 2.5 mg BID.  - Patient well known to Dr. Graham who was consulted who is also on board with Eliquis and will see patient in AM. f/u

## 2018-08-09 NOTE — PROGRESS NOTE ADULT - PROBLEM SELECTOR PLAN 3
Pt reports 1 episode of lightheadedness this AM.   - BP 76/54 in ED, repleted with 2L NS in ED and given banana bag with improvement in SBP to 100s-120s.  - Dizziness likely in setting of hypotension/hypovolemia but Dr. Singer consulted due to episode of unsteadiness  - CT normal. Carotid duplex no hemodynamically significant stenosis. vitamin B12, folate ordered  - TSH WNL.   - Will continue to monitor on telemetry.

## 2018-08-09 NOTE — DISCHARGE NOTE ADULT - CARE PLAN
Principal Discharge DX:	Thrombus in heart chamber  Goal:	You have a blood clot in your heart. START taking ELIQUIS (APIXABAN) 2.5mg twice a day to help break up the clot  Assessment and plan of treatment:	Follow up with Dr Levy in 1 week.  Secondary Diagnosis:	Paroxysmal a-fib  Goal:	Continue taking METOPROLOL SUCCINATE ER 100mg daily to keep your heart rate in a normal rhythm.  Secondary Diagnosis:	Dizziness  Goal:	You had dizziness and had a neurology workup which was all normal. Your CT head, ultrasound of your carotid arteries, and blood pressures have been stable.  Secondary Diagnosis:	Alcohol use disorder, severe, in early remission  Goal:	You were on a librium taper during your hospitalization for your alcohol withdrawal.  Assessment and plan of treatment:	Finish your librium taper at home.  Secondary Diagnosis:	DANUTA (acute kidney injury)  Goal:	Your kidney function was very low when you were admitted to the hospital. We did not give you your LISINOPRIL and we gave you IV fluid and your kidney function normalized.  Assessment and plan of treatment:	DO NOT TAKE your Lisinopril until your kidney function has remained normal on labs that will be drawn at Dr Levy's office.  Secondary Diagnosis:	Chronic systolic CHF (congestive heart failure)  Goal:	You were dehydrated when you came to the hospital and we gave you IV fluids to help your fluid balance become more normalized. Now it's important you don't drink more than 1-2 liters of fluid a day. Principal Discharge DX:	Thrombus in heart chamber  Goal:	You have a blood clot in your heart. START taking ELIQUIS (APIXABAN) 2.5mg twice a day to help break up the clot  Assessment and plan of treatment:	Follow up with Dr Levy in 1 week.  Secondary Diagnosis:	Paroxysmal a-fib  Goal:	Continue taking METOPROLOL SUCCINATE ER 100mg daily to keep your heart rate in a normal rhythm.  Secondary Diagnosis:	Dizziness  Goal:	You had dizziness and had a neurology workup which was all normal. Your CT head, ultrasound of your carotid arteries, and blood pressures have been stable.  Secondary Diagnosis:	Alcohol use disorder, severe, in early remission  Goal:	You were on a librium taper during your hospitalization for your alcohol withdrawal.  Assessment and plan of treatment:	You do not need to take Librium anymore.  Secondary Diagnosis:	DANUTA (acute kidney injury)  Goal:	Your kidney function was very low when you were admitted to the hospital. We did not give you your LISINOPRIL and we gave you IV fluid and your kidney function normalized.  Assessment and plan of treatment:	DO NOT TAKE your Lisinopril until your kidney function has remained normal on labs that will be drawn at Dr Levy's office.  Secondary Diagnosis:	Chronic systolic CHF (congestive heart failure)  Goal:	You were dehydrated when you came to the hospital and we gave you IV fluids to help your fluid balance become more normalized. Now it's important you don't drink more than 1-2 liters of fluid a day. Principal Discharge DX:	Thrombus in heart chamber  Goal:	You have a blood clot in your heart. START taking ELIQUIS (APIXABAN) 2.5mg twice a day to help break up the clot  Assessment and plan of treatment:	Follow up with Dr Levy in 1 week.  Secondary Diagnosis:	Paroxysmal a-fib  Goal:	Continue taking METOPROLOL SUCCINATE ER 100mg daily to keep your heart rate in a normal rhythm.  Secondary Diagnosis:	Dizziness  Goal:	You had dizziness and had a neurology workup which was all normal. Your CT head, ultrasound of your carotid arteries, and blood pressures have been stable.  Secondary Diagnosis:	Alcohol use disorder, severe, in early remission  Goal:	You were on a librium taper during your hospitalization for your alcohol withdrawal.  Assessment and plan of treatment:	Dr Belcher is prescribing Librium 25mg BID for 10 pills. On SATURDAY 8/11/18 take Librium 25mg TWICE a day. On SUNDAY 8/12/18 take Librium 25mg once daily until you see Dr Belcher. See Dr Belcher on Monday 8/13/18 at 130pm.  Secondary Diagnosis:	DANUTA (acute kidney injury)  Goal:	Your kidney function was very low when you were admitted to the hospital. We did not give you your LISINOPRIL and we gave you IV fluid and your kidney function normalized.  Assessment and plan of treatment:	DO NOT TAKE your Lisinopril until your kidney function has remained normal on labs that will be drawn at Dr Levy's office.  Secondary Diagnosis:	Chronic systolic CHF (congestive heart failure)  Goal:	You were dehydrated when you came to the hospital and we gave you IV fluids to help your fluid balance become more normalized. Now it's important you don't drink more than 1-2 liters of fluid a day.

## 2018-08-09 NOTE — DISCHARGE NOTE ADULT - PATIENT PORTAL LINK FT
You can access the [facility] Portal, offered by [facility], by registering with the following website: [email or web address]

## 2018-08-09 NOTE — PROGRESS NOTE ADULT - ASSESSMENT
62 yr old M former smoker and drug abuser, ETOH abuser, with PMHx of HTN, stage III CKD, pAfib, hx of LV thrombus (resolved by Echo 7/17/18), MI in 1996/1998/2003, CAD with prior PCI, chronic systolic CHF s/p [employer] ICD in 2009, frequent admissions to [facility] for ETOH use/withdrawal, presents to [facility] ED 8/7/18 with intermittent episodes of lightheadedness x 6 days in light of poor PO intake, 1 episode of feeling "unbalanced" and episode of nonexertional chest discomfort, cath lab activated for slight EKG changes in V2-V5 but this was determined to be unchanged from previous EKG, ruled out ACS with no plan for further cardiac w/u during this admit,  found to have DANUTA on CKD and reoccurrence of LV thrombus.

## 2018-08-09 NOTE — PROGRESS NOTE ADULT - PROBLEM SELECTOR PLAN 7
Last drink 6 days ago as per pt report. CIWA score 2.   - s/p banana bag  - Will continue Librium taper and Ativan PRN.  - Psych consulted who reported no psychiatric contraindications to discharge  - Information provided for [facility]  - Pt declined Remeron  - c/w thiamine, MVI, folic acid    Depression  - s/p psych consult who provided information for [facility]     DVT PPx:  Eliquis 2.5 mg BID    GI PPx: PPI    Dispo:  f/u results of renal US, carotid dopplers, neuro/heme/renal CS and complete Librium taper    Case d/w Dr. Levy

## 2018-08-09 NOTE — DISCHARGE NOTE ADULT - CARE PROVIDERS DIRECT ADDRESSES
[email or web address]

## 2018-08-09 NOTE — PROGRESS NOTE ADULT - PROBLEM SELECTOR PLAN 1
Currently chest pain free, EKG unchanged from prior,  - CE negative x3  - Utox +benzos  - Echo bedside 8/7 revealed EF:40% with no effusion, mild diffuse hypokinesis consistent with LV aneurysm.  - ECHO with Definity 8/8 revealed mild concentric LVH,  apical septal wall akinesis, apical akinesis, other walls mildly hypokinetic, LVEF 35-40%, defibrillator lead in right heart, LV apical thrombus again seen, clot diameter  1.5cm, mild TR.   --Prior diagnostic cath 1/8/18 @[facility] revealed patent LAD stent.  --Recent NST 7/17/18 revealed severely abnormal myocardial perfusion images consistent with extensive scarring in the territory of the LAD, overall LV systolic function is abnormal with global hypokinesis, no evidence of ischemia, improved compared to prior stress test 3/2016.  - No plan for additional ischemic w/u during this admit as per Dr. Levy.   - c/w ASA 81 mg PO QD, Toprol  mg QD, lipitor 40 mg QD with plan to start Eliquis 2.5 mg BID as d/w Dr. Levy and Dr Everett.

## 2018-08-09 NOTE — PROGRESS NOTE ADULT - PROBLEM SELECTOR PLAN 6
DANUTA on stage III CKD (baseline Cr 1.2), Dr. Valente consulted  - BUN/Cr 24/2.54 on admission, s/p hydration in ED. Cr today 1.51.   - Will continue to hold home lisinopril 5 mg PO QD.   - PVR 68 cc, UA normal, Urine sodium 62, creatinine 223, FeNa 0.3% (likely pre-renal), f/u repeat urine lytes  - f/u renal US  - Continue to monitor

## 2018-08-09 NOTE — DISCHARGE NOTE ADULT - SECONDARY DIAGNOSIS.
Paroxysmal a-fib Dizziness Alcohol use disorder, severe, in early remission DANUTA (acute kidney injury) Chronic systolic CHF (congestive heart failure)

## 2018-08-10 VITALS — TEMPERATURE: 97 F

## 2018-08-10 PROCEDURE — 83036 HEMOGLOBIN GLYCOSYLATED A1C: CPT

## 2018-08-10 PROCEDURE — 84484 ASSAY OF TROPONIN QUANT: CPT

## 2018-08-10 PROCEDURE — 82553 CREATINE MB FRACTION: CPT

## 2018-08-10 PROCEDURE — 81003 URINALYSIS AUTO W/O SCOPE: CPT

## 2018-08-10 PROCEDURE — 96361 HYDRATE IV INFUSION ADD-ON: CPT

## 2018-08-10 PROCEDURE — 71045 X-RAY EXAM CHEST 1 VIEW: CPT

## 2018-08-10 PROCEDURE — 84300 ASSAY OF URINE SODIUM: CPT

## 2018-08-10 PROCEDURE — 84540 ASSAY OF URINE/UREA-N: CPT

## 2018-08-10 PROCEDURE — 84156 ASSAY OF PROTEIN URINE: CPT

## 2018-08-10 PROCEDURE — 99232 SBSQ HOSP IP/OBS MODERATE 35: CPT | Mod: GC

## 2018-08-10 PROCEDURE — 86850 RBC ANTIBODY SCREEN: CPT

## 2018-08-10 PROCEDURE — 80307 DRUG TEST PRSMV CHEM ANLYZR: CPT

## 2018-08-10 PROCEDURE — 82330 ASSAY OF CALCIUM: CPT

## 2018-08-10 PROCEDURE — 86901 BLOOD TYPING SEROLOGIC RH(D): CPT

## 2018-08-10 PROCEDURE — 84133 ASSAY OF URINE POTASSIUM: CPT

## 2018-08-10 PROCEDURE — 70450 CT HEAD/BRAIN W/O DYE: CPT

## 2018-08-10 PROCEDURE — 87040 BLOOD CULTURE FOR BACTERIA: CPT

## 2018-08-10 PROCEDURE — 82436 ASSAY OF URINE CHLORIDE: CPT

## 2018-08-10 PROCEDURE — 96374 THER/PROPH/DIAG INJ IV PUSH: CPT

## 2018-08-10 PROCEDURE — 82340 ASSAY OF CALCIUM IN URINE: CPT

## 2018-08-10 PROCEDURE — 80048 BASIC METABOLIC PNL TOTAL CA: CPT

## 2018-08-10 PROCEDURE — 85730 THROMBOPLASTIN TIME PARTIAL: CPT

## 2018-08-10 PROCEDURE — 82570 ASSAY OF URINE CREATININE: CPT

## 2018-08-10 PROCEDURE — 86900 BLOOD TYPING SEROLOGIC ABO: CPT

## 2018-08-10 PROCEDURE — 82962 GLUCOSE BLOOD TEST: CPT

## 2018-08-10 PROCEDURE — 99239 HOSP IP/OBS DSCHRG MGMT >30: CPT

## 2018-08-10 PROCEDURE — 97116 GAIT TRAINING THERAPY: CPT

## 2018-08-10 PROCEDURE — 84443 ASSAY THYROID STIM HORMONE: CPT

## 2018-08-10 PROCEDURE — 82550 ASSAY OF CK (CPK): CPT

## 2018-08-10 PROCEDURE — C8929: CPT

## 2018-08-10 PROCEDURE — 93880 EXTRACRANIAL BILAT STUDY: CPT

## 2018-08-10 PROCEDURE — 83935 ASSAY OF URINE OSMOLALITY: CPT

## 2018-08-10 PROCEDURE — 85610 PROTHROMBIN TIME: CPT

## 2018-08-10 PROCEDURE — 83735 ASSAY OF MAGNESIUM: CPT

## 2018-08-10 PROCEDURE — 80061 LIPID PANEL: CPT

## 2018-08-10 PROCEDURE — 36415 COLL VENOUS BLD VENIPUNCTURE: CPT

## 2018-08-10 PROCEDURE — 83605 ASSAY OF LACTIC ACID: CPT

## 2018-08-10 PROCEDURE — 97161 PT EVAL LOW COMPLEX 20 MIN: CPT

## 2018-08-10 PROCEDURE — 85025 COMPLETE CBC W/AUTO DIFF WBC: CPT

## 2018-08-10 PROCEDURE — 85027 COMPLETE CBC AUTOMATED: CPT

## 2018-08-10 PROCEDURE — 82746 ASSAY OF FOLIC ACID SERUM: CPT

## 2018-08-10 PROCEDURE — 82607 VITAMIN B-12: CPT

## 2018-08-10 PROCEDURE — 80053 COMPREHEN METABOLIC PANEL: CPT

## 2018-08-10 PROCEDURE — 84105 ASSAY OF URINE PHOSPHORUS: CPT

## 2018-08-10 PROCEDURE — 82803 BLOOD GASES ANY COMBINATION: CPT

## 2018-08-10 PROCEDURE — 99285 EMERGENCY DEPT VISIT HI MDM: CPT | Mod: 25

## 2018-08-10 PROCEDURE — 93005 ELECTROCARDIOGRAM TRACING: CPT

## 2018-08-10 PROCEDURE — 84132 ASSAY OF SERUM POTASSIUM: CPT

## 2018-08-10 PROCEDURE — 76700 US EXAM ABDOM COMPLETE: CPT

## 2018-08-10 PROCEDURE — 84295 ASSAY OF SERUM SODIUM: CPT

## 2018-08-10 RX ORDER — APIXABAN 2.5 MG/1
1 TABLET, FILM COATED ORAL
Qty: 60 | Refills: 3 | OUTPATIENT
Start: 2018-08-10 | End: 2018-12-07

## 2018-08-10 RX ORDER — ATORVASTATIN CALCIUM 80 MG/1
1 TABLET, FILM COATED ORAL
Qty: 30 | Refills: 3 | OUTPATIENT
Start: 2018-08-10 | End: 2018-12-07

## 2018-08-10 RX ORDER — METOPROLOL TARTRATE 50 MG
1 TABLET ORAL
Qty: 30 | Refills: 3 | OUTPATIENT
Start: 2018-08-10 | End: 2018-12-07

## 2018-08-10 RX ORDER — LISINOPRIL 2.5 MG/1
1 TABLET ORAL
Qty: 0 | Refills: 0 | COMMUNITY

## 2018-08-10 RX ADMIN — Medication 100 MILLIGRAM(S): at 05:00

## 2018-08-10 RX ADMIN — APIXABAN 2.5 MILLIGRAM(S): 2.5 TABLET, FILM COATED ORAL at 05:00

## 2018-08-10 RX ADMIN — Medication 25 MILLIGRAM(S): at 04:56

## 2018-08-10 RX ADMIN — Medication 1 MILLIGRAM(S): at 11:03

## 2018-08-10 RX ADMIN — Medication 25 MILLIGRAM(S): at 13:52

## 2018-08-10 RX ADMIN — PANTOPRAZOLE SODIUM 40 MILLIGRAM(S): 20 TABLET, DELAYED RELEASE ORAL at 05:00

## 2018-08-10 RX ADMIN — Medication 81 MILLIGRAM(S): at 11:03

## 2018-08-10 RX ADMIN — Medication 100 MILLIGRAM(S): at 11:03

## 2018-08-10 RX ADMIN — Medication 1 TABLET(S): at 11:03

## 2018-08-10 NOTE — PROGRESS NOTE ADULT - SUBJECTIVE AND OBJECTIVE BOX
Patient seen and examined at bedside.   reports normal urine flow/output  now on apixaban     T(C): , Max: 36.1 (08-09-18 @ 12:40)  T(F): , Max: 97 (08-09-18 @ 12:40)  HR: 74 (08-10-18 @ 04:56)  BP: 120/80 (08-10-18 @ 04:56)  BP(mean): --  RR: 16 (08-10-18 @ 04:56)  SpO2: 99% (08-10-18 @ 04:56)  Wt(kg): --    08-09 @ 07:01  -  08-10 @ 07:00  --------------------------------------------------------  IN:    Oral Fluid: 1605 mL  Total IN: 1605 mL    OUT:    Voided: 1425 mL  Total OUT: 1425 mL    Total NET: 180 mL            apixaban 2.5 every 12 hours  aspirin enteric coated 81 daily  atorvastatin 40 at bedtime  chlordiazePOXIDE    chlordiazePOXIDE 25 every 12 hours  chlordiazePOXIDE 25 once  folic acid 1 daily  metoprolol succinate  daily  multivitamin 1 daily  pantoprazole    Tablet 40 before breakfast  thiamine 100 daily    Allergies    Capoten (Short breath; Rash; Hives)  heparin (Other (Mod to Severe))  penicillin (Short breath; Rash; Hives)        PHYSICAL EXAM:  Constitutional:  No acute distress  Back: No CVA tenderness  Respiratory: Clear to auscultation   Cardiovascular: S1, S2.  Regular rate and rhythm.    Gastrointestinal: soft, non-tender  Vasc/Extremities:  No lower extremity edema.    Neurological: No focal deficits.  Skin: Warm. Dry.    Psychiatric: Normal affect.        LABS:                        10.9   3.5   )-----------( 136      ( 09 Aug 2018 10:58 )             33.9     08-09    137  |  101  |  15  ----------------------------<  101<H>  4.8   |  24  |  1.19    Ca    9.3      09 Aug 2018 06:36  Mg     2.0     08-09        PT/INR - ( 09 Aug 2018 06:36 )   PT: 10.5 sec;   INR: 0.95          PTT - ( 09 Aug 2018 06:36 )  PTT:28.3 sec

## 2018-08-10 NOTE — PROGRESS NOTE ADULT - ASSESSMENT
61 yo man with DANUTA- possible CKD  admitted with CP- found to have LV thrombus- on apaxiban  latest creat 1.19-improving  BP, volume status and prior electrolytes acceptable-  repeat chem pending this AM-  nephrologically stable this AM

## 2018-08-10 NOTE — PROGRESS NOTE ADULT - SUBJECTIVE AND OBJECTIVE BOX
Neurology Follow up note    Name  LAINA SHIPMAN    HPI:  62 yr old M former smoker and drug abuser, current ETOH abuser (2+ pints vodka day) with PMHx of HTN, stage III CKD (baseline Cr ~1.2), pAfib, hx of LV thrombus (previously on Warfarin, no evidence of thrombus by Echo 7/17/18), MI in 1996/1998/2003, CAD with prior PCI (LAD stent- patent by diagnostic cath@[facility] 1/2018), chronic systolic CHF s/p [employer] ICD in 2009 (last interrogated 6/2018- functioning well as per patient), frequent admissions to [facility] for ETOH use/withdrawal, most recent admission to [facility] (7/16-7/17) with chest pain R/O ACS (NST on 7/17/18 revealed severely abnormal myocardial perfusion images consistent with extensive scarring in the territory of the LAD. Overall left ventricular systolic function is abnormal with global hypokinesis. No evidence of ischemia, improved compared to prior stress test 3/2016)    Patient now returns to [facility] ED 8/7/18 c/o intermittent dizziness and near syncope ~11AM. Patient states he was in a humid environment remodeling at bathroom and shortly after removing the tub he started feeling diaphoretic, SOB, dizzy and had palpitations. Patient states he felt as though he was going to pass out so he sat down in front of a fan. Patient further admits to an episode of mild midsternal chest discomfort 5/10 in severity with radiation to shoulder blades, lasting ~15 minutes and self resolving. Patient states he tried to detox at home and has not had a drink in 5-6 days. Patient further admits to a slight tremor, insomnia and poor appetite for the past week. Patient denies recent change in ET, PND, orthopnea or LE edema. In ED, BP: 76/54, HR:115, RR: 18, Temp: 98.4F, O2 sat: 98% RA. EKG revealed Sinus tach@109BPM with slight ST changes in V2-V5 (unchanged from prior EKG). Cardiac enzymes negative x 1 set. Stat Bedside Echo revealed EF:40% with no effusion, mild diffuse hypokinesis consistent with LV aneurysm. CXR unremarkable. Labs also significant for BUN/Cr 24/2.54.    Patient treated with ASA 325mg PO x 1 dose and Brilinta 180mg PO x 1 dose, evaluated by interventionalist Dr. Alvarez and not recommended for cardiac catheterization. Patient further given Ativan 1mg PO x 1 dose, Librium 25mg PO x 1 dose, 2 liters of IVFs and Multivitamin 10ml/Thiamine 100mg/Folic aid 1 mg in 1 liter NS@125cc/hr.  Patient currently stable and admitted to [facility] for cardiac telemetry, serial cardiac enzymes and management of DANUTA of CKD.    CATH Hx:  @[facility] 1/11/18: normal LM, patent LAD stent, luminal irregularities of LCx, 30% pRCA lesion. (07 Aug 2018 17:48)      Interval History - no further syncopal episodes - no headaches         REVIEW OF SYSTEMS    Vital Signs Last 24 Hrs  T(C): 35.9 (10 Aug 2018 05:41), Max: 36.1 (09 Aug 2018 12:40)  T(F): 96.6 (10 Aug 2018 05:41), Max: 97 (09 Aug 2018 12:40)  HR: 74 (10 Aug 2018 04:56) (73 - 82)  BP: 120/80 (10 Aug 2018 04:56) (101/69 - 120/80)  BP(mean): --  RR: 16 (10 Aug 2018 04:56) (16 - 16)  SpO2: 99% (10 Aug 2018 04:56) (99% - 100%)    Physical Exam-     Mental Status- awake and alert    Cranial Nerves- full EOM    Gait and station-n/a    Motor- no foot drop    Reflexes- decreased    Sensation- no sensory level    Coordination- no tremors    Vascular - no bruits    Medications  apixaban 2.5 milliGRAM(s) Oral every 12 hours  aspirin enteric coated 81 milliGRAM(s) Oral daily  atorvastatin 40 milliGRAM(s) Oral at bedtime  chlordiazePOXIDE   Oral   chlordiazePOXIDE 25 milliGRAM(s) Oral every 12 hours  chlordiazePOXIDE 25 milliGRAM(s) Oral once  folic acid 1 milliGRAM(s) Oral daily  LORazepam     Tablet 2 milliGRAM(s) Oral every 2 hours PRN  metoprolol succinate  milliGRAM(s) Oral daily  multivitamin 1 Tablet(s) Oral daily  pantoprazole    Tablet 40 milliGRAM(s) Oral before breakfast  thiamine 100 milliGRAM(s) Oral daily      Lab      Radiology    Assessment- Syncope    Plan as per DR Levy

## 2018-08-12 LAB
CULTURE RESULTS: SIGNIFICANT CHANGE UP
CULTURE RESULTS: SIGNIFICANT CHANGE UP
SPECIMEN SOURCE: SIGNIFICANT CHANGE UP
SPECIMEN SOURCE: SIGNIFICANT CHANGE UP

## 2018-08-15 DIAGNOSIS — Z95.810 PRESENCE OF AUTOMATIC (IMPLANTABLE) CARDIAC DEFIBRILLATOR: ICD-10-CM
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
## 2018-08-15 DIAGNOSIS — I51.3 INTRACARDIAC THROMBOSIS, NOT ELSEWHERE CLASSIFIED: ICD-10-CM

## 2018-08-15 DIAGNOSIS — Z87.891 PERSONAL HISTORY OF NICOTINE DEPENDENCE: ICD-10-CM

## 2018-08-15 DIAGNOSIS — I48.0 PAROXYSMAL ATRIAL FIBRILLATION: ICD-10-CM

## 2018-08-15 DIAGNOSIS — K29.70 GASTRITIS, UNSPECIFIED, WITHOUT BLEEDING: ICD-10-CM

## 2018-08-15 DIAGNOSIS — N18.3 CHRONIC KIDNEY DISEASE, STAGE 3 (MODERATE): ICD-10-CM

## 2018-08-15 DIAGNOSIS — I13.0 HYPERTENSIVE HEART AND CHRONIC KIDNEY DISEASE WITH HEART FAILURE AND STAGE 1 THROUGH STAGE 4 CHRONIC KIDNEY DISEASE, OR UNSPECIFIED CHRONIC KIDNEY DISEASE: ICD-10-CM

## 2018-08-15 DIAGNOSIS — I25.10 ATHEROSCLEROTIC HEART DISEASE OF NATIVE CORONARY ARTERY WITHOUT ANGINA PECTORIS: ICD-10-CM

## 2018-08-15 DIAGNOSIS — N17.9 ACUTE KIDNEY FAILURE, UNSPECIFIED: ICD-10-CM

## 2018-08-15 DIAGNOSIS — I42.9 CARDIOMYOPATHY, UNSPECIFIED: ICD-10-CM

## 2018-08-15 DIAGNOSIS — I25.2 OLD MYOCARDIAL INFARCTION: ICD-10-CM

## 2018-08-15 DIAGNOSIS — F41.8 OTHER SPECIFIED ANXIETY DISORDERS: ICD-10-CM

## 2018-08-15 DIAGNOSIS — F10.239 ALCOHOL DEPENDENCE WITH WITHDRAWAL, UNSPECIFIED: ICD-10-CM

## 2018-08-15 DIAGNOSIS — I50.22 CHRONIC SYSTOLIC (CONGESTIVE) HEART FAILURE: ICD-10-CM

## 2018-08-15 DIAGNOSIS — F10.21 ALCOHOL DEPENDENCE, IN REMISSION: ICD-10-CM

## 2018-08-15 DIAGNOSIS — D69.6 THROMBOCYTOPENIA, UNSPECIFIED: ICD-10-CM

## 2018-08-16 NOTE — DISCHARGE NOTE ADULT - MONITOR YOUR WEIGHT DAILY. CALL YOUR DOCTOR FOR A WEIGHT GAIN OF 2-3 LBS. IN 24 HRS OR 3 LBS. OR MORE IN 1 WEEK, INCREASED SHORTNESS OF BREATH, TIREDNESS OR WEAKNESS, OR INCREASED SWELLING OF YOUR FEET AND LEGS
Statement Selected
Arthritis    Asthma    Back ache    [place] esophagus    Chronic pain    Diabetes    DM (diabetes mellitus)    Fibromyalgia    Foot ulcer  Left Foot  Osteopenia    Shoulder joint stiffness, left    Stomach ulcer

## 2018-09-06 ENCOUNTER — EMERGENCY (EMERGENCY)
Facility: HOSPITAL | Age: 63
LOS: 1 days | Discharge: HOME | End: 2018-09-06
Attending: EMERGENCY MEDICINE | Admitting: EMERGENCY MEDICINE
Payer: MEDICARE

## 2018-09-06 VITALS
HEART RATE: 108 BPM | OXYGEN SATURATION: 99 % | TEMPERATURE: 99 F | RESPIRATION RATE: 18 BRPM | SYSTOLIC BLOOD PRESSURE: 121 MMHG | DIASTOLIC BLOOD PRESSURE: 66 MMHG | WEIGHT: 160.06 LBS

## 2018-09-06 VITALS
OXYGEN SATURATION: 98 % | HEART RATE: 88 BPM | SYSTOLIC BLOOD PRESSURE: 135 MMHG | RESPIRATION RATE: 18 BRPM | DIASTOLIC BLOOD PRESSURE: 74 MMHG

## 2018-09-06 LAB
ALBUMIN SERPL ELPH-MCNC: 4.7 G/DL — SIGNIFICANT CHANGE UP (ref 3.3–5)
ALP SERPL-CCNC: 60 U/L — SIGNIFICANT CHANGE UP (ref 40–120)
ALT FLD-CCNC: 19 U/L — SIGNIFICANT CHANGE UP (ref 10–45)
ANION GAP SERPL CALC-SCNC: 18 MMOL/L — HIGH (ref 5–17)
ANION GAP SERPL CALC-SCNC: 22 MMOL/L — HIGH (ref 5–17)
APTT BLD: 27.1 SEC — LOW (ref 27.5–37.4)
AST SERPL-CCNC: 38 U/L — SIGNIFICANT CHANGE UP (ref 10–40)
BASOPHILS NFR BLD AUTO: 0.5 % — SIGNIFICANT CHANGE UP (ref 0–2)
BILIRUB SERPL-MCNC: 0.6 MG/DL — SIGNIFICANT CHANGE UP (ref 0.2–1.2)
BUN SERPL-MCNC: 12 MG/DL — SIGNIFICANT CHANGE UP (ref 7–23)
BUN SERPL-MCNC: 14 MG/DL — SIGNIFICANT CHANGE UP (ref 7–23)
CALCIUM SERPL-MCNC: 10 MG/DL — SIGNIFICANT CHANGE UP (ref 8.4–10.5)
CALCIUM SERPL-MCNC: 9.1 MG/DL — SIGNIFICANT CHANGE UP (ref 8.4–10.5)
CHLORIDE SERPL-SCNC: 96 MMOL/L — SIGNIFICANT CHANGE UP (ref 96–108)
CHLORIDE SERPL-SCNC: 98 MMOL/L — SIGNIFICANT CHANGE UP (ref 96–108)
CK MB CFR SERPL CALC: 3.3 NG/ML — SIGNIFICANT CHANGE UP (ref 0–6.7)
CK SERPL-CCNC: 227 U/L — HIGH (ref 30–200)
CO2 SERPL-SCNC: 23 MMOL/L — SIGNIFICANT CHANGE UP (ref 22–31)
CO2 SERPL-SCNC: 24 MMOL/L — SIGNIFICANT CHANGE UP (ref 22–31)
CREAT SERPL-MCNC: 0.92 MG/DL — SIGNIFICANT CHANGE UP (ref 0.5–1.3)
CREAT SERPL-MCNC: 1.01 MG/DL — SIGNIFICANT CHANGE UP (ref 0.5–1.3)
ETHANOL SERPL-MCNC: 318 MG/DL — HIGH (ref 0–10)
GLUCOSE SERPL-MCNC: 124 MG/DL — HIGH (ref 70–99)
GLUCOSE SERPL-MCNC: 95 MG/DL — SIGNIFICANT CHANGE UP (ref 70–99)
HCT VFR BLD CALC: 37.7 % — LOW (ref 39–50)
HGB BLD-MCNC: 12.5 G/DL — LOW (ref 13–17)
INR BLD: 0.89 — SIGNIFICANT CHANGE UP (ref 0.88–1.16)
LYMPHOCYTES # BLD AUTO: 43 % — SIGNIFICANT CHANGE UP (ref 13–44)
MCHC RBC-ENTMCNC: 25.2 PG — LOW (ref 27–34)
MCHC RBC-ENTMCNC: 33.2 G/DL — SIGNIFICANT CHANGE UP (ref 32–36)
MCV RBC AUTO: 76 FL — LOW (ref 80–100)
MONOCYTES NFR BLD AUTO: 8.4 % — SIGNIFICANT CHANGE UP (ref 2–14)
NEUTROPHILS NFR BLD AUTO: 48.1 % — SIGNIFICANT CHANGE UP (ref 43–77)
PLATELET # BLD AUTO: 237 K/UL — SIGNIFICANT CHANGE UP (ref 150–400)
POTASSIUM SERPL-MCNC: 4 MMOL/L — SIGNIFICANT CHANGE UP (ref 3.5–5.3)
POTASSIUM SERPL-MCNC: 4.2 MMOL/L — SIGNIFICANT CHANGE UP (ref 3.5–5.3)
POTASSIUM SERPL-SCNC: 4 MMOL/L — SIGNIFICANT CHANGE UP (ref 3.5–5.3)
POTASSIUM SERPL-SCNC: 4.2 MMOL/L — SIGNIFICANT CHANGE UP (ref 3.5–5.3)
PROT SERPL-MCNC: 7.7 G/DL — SIGNIFICANT CHANGE UP (ref 6–8.3)
PROTHROM AB SERPL-ACNC: 9.8 SEC — SIGNIFICANT CHANGE UP (ref 9.8–12.7)
RBC # BLD: 4.96 M/UL — SIGNIFICANT CHANGE UP (ref 4.2–5.8)
RBC # FLD: 19.6 % — HIGH (ref 10.3–16.9)
SODIUM SERPL-SCNC: 140 MMOL/L — SIGNIFICANT CHANGE UP (ref 135–145)
SODIUM SERPL-SCNC: 141 MMOL/L — SIGNIFICANT CHANGE UP (ref 135–145)
TROPONIN T SERPL-MCNC: <0.01 NG/ML — SIGNIFICANT CHANGE UP (ref 0–0.01)
WBC # BLD: 3.8 K/UL — SIGNIFICANT CHANGE UP (ref 3.8–10.5)
WBC # FLD AUTO: 3.8 K/UL — SIGNIFICANT CHANGE UP (ref 3.8–10.5)

## 2018-09-06 PROCEDURE — 99285 EMERGENCY DEPT VISIT HI MDM: CPT

## 2018-09-06 PROCEDURE — 71045 X-RAY EXAM CHEST 1 VIEW: CPT | Mod: 26

## 2018-09-06 RX ORDER — SODIUM CHLORIDE 9 MG/ML
3 INJECTION INTRAMUSCULAR; INTRAVENOUS; SUBCUTANEOUS ONCE
Qty: 0 | Refills: 0 | Status: COMPLETED | OUTPATIENT
Start: 2018-09-06 | End: 2018-09-06

## 2018-09-06 RX ORDER — SODIUM CHLORIDE 9 MG/ML
500 INJECTION INTRAMUSCULAR; INTRAVENOUS; SUBCUTANEOUS ONCE
Qty: 0 | Refills: 0 | Status: COMPLETED | OUTPATIENT
Start: 2018-09-06 | End: 2018-09-06

## 2018-09-06 RX ADMIN — SODIUM CHLORIDE 500 MILLILITER(S): 9 INJECTION INTRAMUSCULAR; INTRAVENOUS; SUBCUTANEOUS at 22:09

## 2018-09-06 RX ADMIN — Medication 25 MILLIGRAM(S): at 22:09

## 2018-09-06 RX ADMIN — SODIUM CHLORIDE 3 MILLILITER(S): 9 INJECTION INTRAMUSCULAR; INTRAVENOUS; SUBCUTANEOUS at 20:16

## 2018-09-06 RX ADMIN — SODIUM CHLORIDE 500 MILLILITER(S): 9 INJECTION INTRAMUSCULAR; INTRAVENOUS; SUBCUTANEOUS at 23:24

## 2018-09-06 RX ADMIN — Medication 1 MILLIGRAM(S): at 23:23

## 2018-09-06 NOTE — ED PROVIDER NOTE - PROGRESS NOTE DETAILS
D/w Dr Levy - if trop neg, d/c home w/ Rx for Librium. He will see pt tomorrow for follow up and give further librium Rpt trop 0. AG improved s/p fluids. Pt fed and given water to drink. Pt given librium / ativan to prevent w/d. CIWA 2. Pt stable for discharge. will d/c Pt's pharmacy closed, however pt has Librium at home. Will give another 25 mg now, since only 25 given before, to hold pt over Rpt trop 0. AG improved s/p fluids. Pt fed and given water to drink. Pt given librium / ativan to prevent w/d. CIWA 2. Pt stable for discharge. Pt w/ clear speech and steady gait. will d/c Rpt trop 0. AG improved s/p IV fluids. Additionally, Pt fed and given water to drink. Pt given librium / ativan to prevent w/d. Minimally tremulous w/o other findings on exam. CIWA 2. Pt stable for discharge. Pt w/ clear speech and steady gait, clinically sober, ambulates in the ED w/o difficulty. will d/c Pt's pharmacy closed, however pt has Librium at home. Will give another 25 mg now, since only 25 given before, and pt will continue his LIbirum at home, as well as see Dr Levy tomorrow.

## 2018-09-06 NOTE — ED ADULT TRIAGE NOTE - CHIEF COMPLAINT QUOTE
pt c/o chest pain, SOB since this afternoon. Pt reports feeling dizzy and has been drinking vodka every day for the past week because  " he is feeling depressed" denies SI, visual or auditory hallucinations. ekg in progress.

## 2018-09-06 NOTE — ED ADULT NURSE NOTE - NSIMPLEMENTINTERV_GEN_ALL_ED
Implemented All Fall Risk Interventions:  [place] to call system. Call bell, personal items and telephone within reach. Instruct patient to call for assistance. Room bathroom lighting operational. Non-slip footwear when patient is off stretcher. Physically safe environment: no spills, clutter or unnecessary equipment. Stretcher in lowest position, wheels locked, appropriate side rails in place. Provide visual cue, wrist band, yellow gown, etc. Monitor gait and stability. Monitor for mental status changes and reorient to person, place, and time. Review medications for side effects contributing to fall risk. Reinforce activity limits and safety measures with patient and family.

## 2018-09-06 NOTE — ED PROVIDER NOTE - PHYSICAL EXAMINATION
Constitutional: Well appearing, well nourished, awake, alert, oriented to person, place, time/situation and in no apparent distress. alcohol on breath  Head atraumatic, normocephalic. No signs of trauma  ENMT: Airway patent. Normal MM  Eyes: Clear bilaterally, PERRL, EOMI  Cardiac: Normal rate, regular rhythm.  Heart sounds S1, S2.  No murmurs, rubs or gallops.  Respiratory: Breaths sounds equal and clear b/l. No increased WOB, tachypnea, hypoxia, or accessory mm use. Pt speaks in full sentences.   Gastrointestinal: Abd soft, NT, ND, NABS. No guarding, rebound, or rigidity. No pulsatile abdominal masses. No organomegaly appreciated. No CVAT   Musculoskeletal: Range of motion is not limited. FROM all joints x 4 ext w/o dec ROM, pain, or signs of trauma. No LE edema or calf ttp  Neuro: Alert and oriented x 3, face symmetric. Strength 5/5 x 4 ext and symmetric, nml gross motor movement, nml gait. No focal deficits noted.   Skin: Skin normal color for race, warm, dry and intact. No evidence of rash.  Psych: Alert and oriented to person, place, time/situation. normal mood and affect. does not appear a risk to self or others

## 2018-09-06 NOTE — ED PROVIDER NOTE - DIAGNOSTIC INTERPRETATION
CHEST XRAY INTERPRETATION: lungs clear, heart shadow normal, bony structures intact, AICD/ PPM present

## 2018-09-06 NOTE — ED ADULT NURSE NOTE - OBJECTIVE STATEMENT
received pt in bed 20. pt states "I have been drinking too much and I almost passed out today." pt admits to drinking every day for the past 7 days. last drink was this afternoon. pt c.o cp with sob, nausea and L arm pain. pt placed on cm, irregular hr. respirations even and unlabored. ekg completed. awaiting md boykin.

## 2018-09-06 NOTE — ED PROVIDER NOTE - MEDICAL DECISION MAKING DETAILS
Pt p/w CP throughout day, while drinking, improved s/p cessation of drinking. Abnormal but unchanged EKG. Suspect alcohol induced gastritis, rather than cardiac chest pain, however will monitor in ED, serial EKGs, troponins, and CXR. D/w cardiologist Dr Levy. Observe for sobriety, and hydrate given anticipate dehydration and probable mild AKA. Monitor for, and treat to prevent w/d. DIspo pending w/u and clinical status

## 2018-09-07 PROCEDURE — 71045 X-RAY EXAM CHEST 1 VIEW: CPT

## 2018-09-07 PROCEDURE — 85730 THROMBOPLASTIN TIME PARTIAL: CPT

## 2018-09-07 PROCEDURE — 96374 THER/PROPH/DIAG INJ IV PUSH: CPT

## 2018-09-07 PROCEDURE — 85025 COMPLETE CBC W/AUTO DIFF WBC: CPT

## 2018-09-07 PROCEDURE — 80048 BASIC METABOLIC PNL TOTAL CA: CPT

## 2018-09-07 PROCEDURE — 96361 HYDRATE IV INFUSION ADD-ON: CPT

## 2018-09-07 PROCEDURE — 82550 ASSAY OF CK (CPK): CPT

## 2018-09-07 PROCEDURE — 36415 COLL VENOUS BLD VENIPUNCTURE: CPT

## 2018-09-07 PROCEDURE — 99284 EMERGENCY DEPT VISIT MOD MDM: CPT | Mod: 25

## 2018-09-07 PROCEDURE — 80307 DRUG TEST PRSMV CHEM ANLYZR: CPT

## 2018-09-07 PROCEDURE — 84484 ASSAY OF TROPONIN QUANT: CPT

## 2018-09-07 PROCEDURE — 82553 CREATINE MB FRACTION: CPT

## 2018-09-07 PROCEDURE — 80053 COMPREHEN METABOLIC PANEL: CPT

## 2018-09-07 PROCEDURE — 85610 PROTHROMBIN TIME: CPT

## 2018-09-07 RX ADMIN — Medication 25 MILLIGRAM(S): at 00:25

## 2018-09-10 DIAGNOSIS — F10.10 ALCOHOL ABUSE, UNCOMPLICATED: ICD-10-CM

## 2018-09-10 DIAGNOSIS — Z88.8 ALLERGY STATUS TO OTHER DRUGS, MEDICAMENTS AND BIOLOGICAL SUBSTANCES: ICD-10-CM

## 2018-09-10 DIAGNOSIS — Z88.0 ALLERGY STATUS TO PENICILLIN: ICD-10-CM

## 2018-09-10 DIAGNOSIS — I25.10 ATHEROSCLEROTIC HEART DISEASE OF NATIVE CORONARY ARTERY WITHOUT ANGINA PECTORIS: ICD-10-CM

## 2018-09-10 DIAGNOSIS — Z79.899 OTHER LONG TERM (CURRENT) DRUG THERAPY: ICD-10-CM

## 2018-09-10 DIAGNOSIS — Z79.82 LONG TERM (CURRENT) USE OF ASPIRIN: ICD-10-CM

## 2018-09-10 DIAGNOSIS — R07.9 CHEST PAIN, UNSPECIFIED: ICD-10-CM

## 2018-10-14 ENCOUNTER — INPATIENT (INPATIENT)
Facility: HOSPITAL | Age: 63
LOS: 1 days | Discharge: HOME | DRG: 897 | End: 2018-10-16
Attending: INTERNAL MEDICINE | Admitting: INTERNAL MEDICINE
Payer: MEDICARE

## 2018-10-14 VITALS
DIASTOLIC BLOOD PRESSURE: 77 MMHG | SYSTOLIC BLOOD PRESSURE: 109 MMHG | RESPIRATION RATE: 18 BRPM | TEMPERATURE: 98 F | HEART RATE: 115 BPM | WEIGHT: 164.91 LBS | OXYGEN SATURATION: 99 %

## 2018-10-14 DIAGNOSIS — I25.10 ATHEROSCLEROTIC HEART DISEASE OF NATIVE CORONARY ARTERY WITHOUT ANGINA PECTORIS: ICD-10-CM

## 2018-10-14 DIAGNOSIS — Z29.9 ENCOUNTER FOR PROPHYLACTIC MEASURES, UNSPECIFIED: ICD-10-CM

## 2018-10-14 DIAGNOSIS — N18.3 CHRONIC KIDNEY DISEASE, STAGE 3 (MODERATE): ICD-10-CM

## 2018-10-14 DIAGNOSIS — F10.239 ALCOHOL DEPENDENCE WITH WITHDRAWAL, UNSPECIFIED: ICD-10-CM

## 2018-10-14 DIAGNOSIS — I50.20 UNSPECIFIED SYSTOLIC (CONGESTIVE) HEART FAILURE: ICD-10-CM

## 2018-10-14 DIAGNOSIS — I48.91 UNSPECIFIED ATRIAL FIBRILLATION: ICD-10-CM

## 2018-10-14 DIAGNOSIS — R07.89 OTHER CHEST PAIN: ICD-10-CM

## 2018-10-14 DIAGNOSIS — I51.3 INTRACARDIAC THROMBOSIS, NOT ELSEWHERE CLASSIFIED: ICD-10-CM

## 2018-10-14 DIAGNOSIS — Z91.89 OTHER SPECIFIED PERSONAL RISK FACTORS, NOT ELSEWHERE CLASSIFIED: ICD-10-CM

## 2018-10-14 DIAGNOSIS — I10 ESSENTIAL (PRIMARY) HYPERTENSION: ICD-10-CM

## 2018-10-14 LAB
ALBUMIN SERPL ELPH-MCNC: 4.7 G/DL — SIGNIFICANT CHANGE UP (ref 3.3–5)
ALP SERPL-CCNC: 88 U/L — SIGNIFICANT CHANGE UP (ref 40–120)
ALT FLD-CCNC: 39 U/L — SIGNIFICANT CHANGE UP (ref 10–45)
ANION GAP SERPL CALC-SCNC: 18 MMOL/L — HIGH (ref 5–17)
ANION GAP SERPL CALC-SCNC: 25 MMOL/L — HIGH (ref 5–17)
APTT BLD: 29.5 SEC — SIGNIFICANT CHANGE UP (ref 27.5–37.4)
AST SERPL-CCNC: 67 U/L — HIGH (ref 10–40)
BASOPHILS NFR BLD AUTO: 0.4 % — SIGNIFICANT CHANGE UP (ref 0–2)
BILIRUB SERPL-MCNC: 1 MG/DL — SIGNIFICANT CHANGE UP (ref 0.2–1.2)
BUN SERPL-MCNC: 15 MG/DL — SIGNIFICANT CHANGE UP (ref 7–23)
BUN SERPL-MCNC: 17 MG/DL — SIGNIFICANT CHANGE UP (ref 7–23)
CALCIUM SERPL-MCNC: 8.6 MG/DL — SIGNIFICANT CHANGE UP (ref 8.4–10.5)
CALCIUM SERPL-MCNC: 9.7 MG/DL — SIGNIFICANT CHANGE UP (ref 8.4–10.5)
CHLORIDE SERPL-SCNC: 91 MMOL/L — LOW (ref 96–108)
CHLORIDE SERPL-SCNC: 95 MMOL/L — LOW (ref 96–108)
CK MB CFR SERPL CALC: 1.5 NG/ML — SIGNIFICANT CHANGE UP (ref 0–6.7)
CK MB CFR SERPL CALC: 1.5 NG/ML — SIGNIFICANT CHANGE UP (ref 0–6.7)
CK SERPL-CCNC: 128 U/L — SIGNIFICANT CHANGE UP (ref 30–200)
CK SERPL-CCNC: 132 U/L — SIGNIFICANT CHANGE UP (ref 30–200)
CO2 SERPL-SCNC: 20 MMOL/L — LOW (ref 22–31)
CO2 SERPL-SCNC: 23 MMOL/L — SIGNIFICANT CHANGE UP (ref 22–31)
CREAT SERPL-MCNC: 0.94 MG/DL — SIGNIFICANT CHANGE UP (ref 0.5–1.3)
CREAT SERPL-MCNC: 1.14 MG/DL — SIGNIFICANT CHANGE UP (ref 0.5–1.3)
EOSINOPHIL NFR BLD AUTO: 0.2 % — SIGNIFICANT CHANGE UP (ref 0–6)
ETHANOL SERPL-MCNC: 113 MG/DL — HIGH (ref 0–10)
GLUCOSE SERPL-MCNC: 148 MG/DL — HIGH (ref 70–99)
GLUCOSE SERPL-MCNC: 84 MG/DL — SIGNIFICANT CHANGE UP (ref 70–99)
HCT VFR BLD CALC: 39.7 % — SIGNIFICANT CHANGE UP (ref 39–50)
HGB BLD-MCNC: 13 G/DL — SIGNIFICANT CHANGE UP (ref 13–17)
INR BLD: 1.06 — SIGNIFICANT CHANGE UP (ref 0.88–1.16)
LIDOCAIN IGE QN: 70 U/L — HIGH (ref 7–60)
LYMPHOCYTES # BLD AUTO: 17.9 % — SIGNIFICANT CHANGE UP (ref 13–44)
MCHC RBC-ENTMCNC: 25.2 PG — LOW (ref 27–34)
MCHC RBC-ENTMCNC: 32.7 G/DL — SIGNIFICANT CHANGE UP (ref 32–36)
MCV RBC AUTO: 76.9 FL — LOW (ref 80–100)
MONOCYTES NFR BLD AUTO: 7 % — SIGNIFICANT CHANGE UP (ref 2–14)
NEUTROPHILS NFR BLD AUTO: 74.5 % — SIGNIFICANT CHANGE UP (ref 43–77)
PHOSPHATE SERPL-MCNC: 2.7 MG/DL — SIGNIFICANT CHANGE UP (ref 2.5–4.5)
PLATELET # BLD AUTO: 189 K/UL — SIGNIFICANT CHANGE UP (ref 150–400)
POTASSIUM SERPL-MCNC: 4.2 MMOL/L — SIGNIFICANT CHANGE UP (ref 3.5–5.3)
POTASSIUM SERPL-MCNC: 5.2 MMOL/L — SIGNIFICANT CHANGE UP (ref 3.5–5.3)
POTASSIUM SERPL-SCNC: 4.2 MMOL/L — SIGNIFICANT CHANGE UP (ref 3.5–5.3)
POTASSIUM SERPL-SCNC: 5.2 MMOL/L — SIGNIFICANT CHANGE UP (ref 3.5–5.3)
PROT SERPL-MCNC: 8.3 G/DL — SIGNIFICANT CHANGE UP (ref 6–8.3)
PROTHROM AB SERPL-ACNC: 11.8 SEC — SIGNIFICANT CHANGE UP (ref 9.8–12.7)
RBC # BLD: 5.16 M/UL — SIGNIFICANT CHANGE UP (ref 4.2–5.8)
RBC # FLD: 18.7 % — HIGH (ref 10.3–16.9)
SODIUM SERPL-SCNC: 136 MMOL/L — SIGNIFICANT CHANGE UP (ref 135–145)
SODIUM SERPL-SCNC: 136 MMOL/L — SIGNIFICANT CHANGE UP (ref 135–145)
TROPONIN T SERPL-MCNC: <0.01 NG/ML — SIGNIFICANT CHANGE UP (ref 0–0.01)
TROPONIN T SERPL-MCNC: <0.01 NG/ML — SIGNIFICANT CHANGE UP (ref 0–0.01)
WBC # BLD: 4.6 K/UL — SIGNIFICANT CHANGE UP (ref 3.8–10.5)
WBC # FLD AUTO: 4.6 K/UL — SIGNIFICANT CHANGE UP (ref 3.8–10.5)

## 2018-10-14 PROCEDURE — 71045 X-RAY EXAM CHEST 1 VIEW: CPT | Mod: 26

## 2018-10-14 PROCEDURE — 93010 ELECTROCARDIOGRAM REPORT: CPT

## 2018-10-14 PROCEDURE — 99285 EMERGENCY DEPT VISIT HI MDM: CPT | Mod: 25

## 2018-10-14 RX ORDER — METOPROLOL TARTRATE 50 MG
100 TABLET ORAL DAILY
Qty: 0 | Refills: 0 | Status: DISCONTINUED | OUTPATIENT
Start: 2018-10-14 | End: 2018-10-16

## 2018-10-14 RX ORDER — FOLIC ACID 0.8 MG
1 TABLET ORAL DAILY
Qty: 0 | Refills: 0 | Status: DISCONTINUED | OUTPATIENT
Start: 2018-10-14 | End: 2018-10-14

## 2018-10-14 RX ORDER — SODIUM CHLORIDE 9 MG/ML
1000 INJECTION INTRAMUSCULAR; INTRAVENOUS; SUBCUTANEOUS ONCE
Qty: 0 | Refills: 0 | Status: COMPLETED | OUTPATIENT
Start: 2018-10-14 | End: 2018-10-14

## 2018-10-14 RX ORDER — FAMOTIDINE 10 MG/ML
20 INJECTION INTRAVENOUS ONCE
Qty: 0 | Refills: 0 | Status: COMPLETED | OUTPATIENT
Start: 2018-10-14 | End: 2018-10-14

## 2018-10-14 RX ORDER — SODIUM CHLORIDE 9 MG/ML
1000 INJECTION, SOLUTION INTRAVENOUS
Qty: 0 | Refills: 0 | Status: DISCONTINUED | OUTPATIENT
Start: 2018-10-14 | End: 2018-10-15

## 2018-10-14 RX ORDER — ATORVASTATIN CALCIUM 80 MG/1
40 TABLET, FILM COATED ORAL AT BEDTIME
Qty: 0 | Refills: 0 | Status: DISCONTINUED | OUTPATIENT
Start: 2018-10-14 | End: 2018-10-16

## 2018-10-14 RX ORDER — FAMOTIDINE 10 MG/ML
20 INJECTION INTRAVENOUS ONCE
Qty: 0 | Refills: 0 | Status: DISCONTINUED | OUTPATIENT
Start: 2018-10-14 | End: 2018-10-14

## 2018-10-14 RX ORDER — FOLIC ACID 0.8 MG
1 TABLET ORAL DAILY
Qty: 0 | Refills: 0 | Status: DISCONTINUED | OUTPATIENT
Start: 2018-10-15 | End: 2018-10-16

## 2018-10-14 RX ORDER — APIXABAN 2.5 MG/1
5 TABLET, FILM COATED ORAL EVERY 12 HOURS
Qty: 0 | Refills: 0 | Status: DISCONTINUED | OUTPATIENT
Start: 2018-10-14 | End: 2018-10-16

## 2018-10-14 RX ORDER — LIDOCAINE 4 G/100G
10 CREAM TOPICAL ONCE
Qty: 0 | Refills: 0 | Status: COMPLETED | OUTPATIENT
Start: 2018-10-14 | End: 2018-10-14

## 2018-10-14 RX ADMIN — Medication 25 MILLIGRAM(S): at 22:26

## 2018-10-14 RX ADMIN — SODIUM CHLORIDE 1000 MILLILITER(S): 9 INJECTION INTRAMUSCULAR; INTRAVENOUS; SUBCUTANEOUS at 14:36

## 2018-10-14 RX ADMIN — Medication 2 MILLIGRAM(S): at 14:36

## 2018-10-14 RX ADMIN — SODIUM CHLORIDE 100 MILLILITER(S): 9 INJECTION, SOLUTION INTRAVENOUS at 20:42

## 2018-10-14 RX ADMIN — Medication 30 MILLILITER(S): at 23:19

## 2018-10-14 RX ADMIN — LIDOCAINE 10 MILLILITER(S): 4 CREAM TOPICAL at 14:48

## 2018-10-14 RX ADMIN — SODIUM CHLORIDE 1000 MILLILITER(S): 9 INJECTION INTRAMUSCULAR; INTRAVENOUS; SUBCUTANEOUS at 15:46

## 2018-10-14 RX ADMIN — SODIUM CHLORIDE 1000 MILLILITER(S): 9 INJECTION INTRAMUSCULAR; INTRAVENOUS; SUBCUTANEOUS at 15:45

## 2018-10-14 RX ADMIN — Medication 25 MILLIGRAM(S): at 15:50

## 2018-10-14 RX ADMIN — SODIUM CHLORIDE 1000 MILLILITER(S): 9 INJECTION INTRAMUSCULAR; INTRAVENOUS; SUBCUTANEOUS at 18:17

## 2018-10-14 RX ADMIN — APIXABAN 5 MILLIGRAM(S): 2.5 TABLET, FILM COATED ORAL at 22:26

## 2018-10-14 RX ADMIN — FAMOTIDINE 20 MILLIGRAM(S): 10 INJECTION INTRAVENOUS at 14:48

## 2018-10-14 RX ADMIN — Medication 30 MILLILITER(S): at 14:49

## 2018-10-14 NOTE — H&P ADULT - PROBLEM SELECTOR PLAN 2
- Cardiac enzymes negative x2, however patient with strong history of cardiac disease  - Low threshold for telemetry, monitor EKG in AM to assess for interval changes  - F/u lipase  - Consider trial of Protonix 40mg qd given alcohol abuse history and epigastric pain if patient continues to have pain

## 2018-10-14 NOTE — ED PROVIDER NOTE - OBJECTIVE STATEMENT
63 yo male w PMH of MI, afib, gastritis, alcohol abuse, ICD / difib / pace, cardiac thrombus, Patient reports drinking again for last 2 days and awoke this morning with new onset of CP since morning, CP with dizziness, blurry vision/ SOB, lightheadedness, palpitations and tremors, constant, stabbing, midsternal since morning, no exacerbating or relieving factors, 4/10,  pt states he was sober for 22 days, non smoker, denies use of illicit drugs, one week ago,  episode of blood in stool 2 wks ago w no recurrence 63 yo male w PMH of MI, afib, gastritis, alcohol abuse, ICD / difib / pace, cardiac thrombus, Patient reports drinking again for last 2 days and awoke this morning with new onset of CP since morning, CP with dizziness, blurry vision/ SOB, lightheadedness, palpitations and tremors, constant, stabbing, midsternal since morning, no exacerbating or relieving factors, radiates to his back, has had same exact pain several times over , aways when he has gone back to drinking and had CT's at those times and was told they were normal, 4/10,  pt states he had been sober for 22 days, non smoker, denies use of illicit drugs, one week ago,  episode of blood in stool 2 wks ago w no recurrence

## 2018-10-14 NOTE — H&P ADULT - PROBLEM SELECTOR PLAN 1
- CIWA protocol initiated  - Patient initially with SINA 113, mild tachycardia which resolved with fluids and ativan in ED  - Resume Librium 25mg q8h  - If CIWA >8, ativan PRN 2mg q2h  - CIWA currently 5, night team to check twice overnight  - Banana bag and daily folate

## 2018-10-14 NOTE — ED PROVIDER NOTE - CONSTITUTIONAL, MLM
normal... Well appearing, well nourished, awake, alert, oriented to person, place, time/situation and in no apparent distress. Well appearing, well nourished, awake, alert, oriented to person, place, time/situation, + TREMORS

## 2018-10-14 NOTE — H&P ADULT - NSHPLABSRESULTS_GEN_ALL_CORE
.  LABS:                         13.0   4.6   )-----------( 189      ( 14 Oct 2018 13:36 )             39.7     10-14    136  |  95<L>  |  15  ----------------------------<  84  5.2   |  23  |  0.94    Ca    8.6      14 Oct 2018 18:08  Mg     2.0     10-14    TPro  8.3  /  Alb  4.7  /  TBili  1.0  /  DBili  x   /  AST  67<H>  /  ALT  39  /  AlkPhos  88  10-14    PT/INR - ( 14 Oct 2018 13:36 )   PT: 11.8 sec;   INR: 1.06          PTT - ( 14 Oct 2018 13:36 )  PTT:29.5 sec    CARDIAC MARKERS ( 14 Oct 2018 18:08 )  x     / <0.01 ng/mL / 128 U/L / x     / 1.5 ng/mL  CARDIAC MARKERS ( 14 Oct 2018 13:36 )  x     / <0.01 ng/mL / 132 U/L / x     / 1.5 ng/mL            RADIOLOGY, EKG & ADDITIONAL TESTS: Reviewed.

## 2018-10-14 NOTE — ED PROVIDER NOTE - MEDICAL DECISION MAKING DETAILS
63 yo with ho of alcohol abuse, ACS/MI and gastritis / ICD defib, now with alcohol relapse and new presyncopal sensation with atypical CP.  Pt actively in withdrawal / tachy and tremorous, D/Dx, consider ACS, consider gastritis, consider GI bleed / PUD, consider PE, known cardiac thrombus, consider dysrthmia and pace / malfunction. ,consider withdrawal,  consider dissection,  ,   PLAN , EKG, cxry , Labs / cardiac enxymes, hydration, alcohol level, will give benzo's for etoh withdrawal , will CT to evaluate for PE , banana bag, evaluate for metabolic abnormalities. . 63 yo with ho of alcohol abuse, ACS/MI and gastritis / ICD defib, now with alcohol relapse and new presyncopal sensation with atypical CP.  Pt actively in withdrawal / tachy and tremorous, D/Dx, HIGHEST suspicion, for alcohol gastritis as cause of pain w dehydration due to alcohol abuse as cause lightheadness, consider ACS , known cardiac thrombus, consider dysrthmia and pace / malfunction, will interrogate. ,consider withdrawal,  doubt dissection as CT w this same pain in past. ,  ,   PLAN , EKG, cxry , Labs / cardiac enxymes, hydration, alcohol level, will give benzo's for etoh withdrawal , will CT to evaluate for PE , banana bag, evaluate for metabolic abnormalities. .

## 2018-10-14 NOTE — H&P ADULT - PROBLEM SELECTOR PLAN 10
1) PCP Dr Levy  Contacted on Admission: Yes  2) Date of Contact with PCP: 10/14  3) PCP Contacted at Discharge: (Y/N)  4) Summary of Handoff Given to PCP:   5) Post-Discharge Appointment Date and Location:

## 2018-10-14 NOTE — H&P ADULT - ASSESSMENT
62 year old M with PMH EtOH abuse, HTN, CKD3, AFib, LV thrombus (previously on Warfarin, reports that thrombus has recurred), CAD s/p MI 1996/1998/2003, PCI (2018), HFrEF (35% in Aug 2018) s/p [place] Sci ICD 2009, frequently admitted to [facility] for EtOH abuse, admitted with alcohol withdrawal and atypical chest pain

## 2018-10-14 NOTE — ED ADULT NURSE NOTE - OBJECTIVE STATEMENT
PT came to ED complaining of midsternal chest pain with palpitations, SOB.  Pt has cardiac history, pacemaker, previous MI, afib. Pt reports chest pain started this morning. Pt also reports recent alcohol consumption after 20+ days sobriety. Pt has slight tremulous activity in hands, reports anxiety. PT speaking in complete, clear sentences.

## 2018-10-14 NOTE — H&P ADULT - NSHPREVIEWOFSYSTEMS_GEN_ALL_CORE
REVIEW OF SYSTEMS:    CONSTITUTIONAL: No weakness, fevers or chills  EYES/ENT: No visual changes;  No vertigo or throat pain   NECK: No pain or stiffness  RESPIRATORY: Cough productive of clear phlegm, no wheezing, hemoptysis; No shortness of breath at present  CARDIOVASCULAR: Epigastric pain, burning, radiating to sternum, associated with intermittent palpitations  GASTROINTESTINAL: No abdominal pain, does admit to epigastric pain. No nausea, vomiting, or hematemesis; 1x diarrhea, no constipation. No melena or hematochezia.  GENITOURINARY: No dysuria, frequency or hematuria  NEUROLOGICAL: No numbness or weakness  SKIN: mild erythema at nose  All other review of systems is negative unless indicated above.

## 2018-10-14 NOTE — ED PROVIDER NOTE - ENMT, MLM
Airway patent, Nasal mucosa clear. Mouth with normal mucosa. Throat has no vesicles, no oropharyngeal exudates and uvula is midline. Airway patent, Nasal mucosa clear. Mouth with DRY mucosa. Throat has no vesicles, no oropharyngeal exudates and uvula is midline.

## 2018-10-14 NOTE — ED ADULT TRIAGE NOTE - CHIEF COMPLAINT QUOTE
" I have chest pain, dizziness, shortness of breath and palpitations since yesterday, I have also been drinking for two days".

## 2018-10-14 NOTE — H&P ADULT - PROBLEM SELECTOR PLAN 8
- C/w metoprolol 100mg qd  - Monitor bp  - Consider alcohol withdrawal possible cause if patient becomes hypertensive/tachycardic

## 2018-10-14 NOTE — H&P ADULT - PROBLEM SELECTOR PLAN 5
- C/w metoprolol 100mg qd  - Strict I/O, daily weight  - Monitor respiratory status  - Hyperkalemic; no ACE/ARB (additionally, patient with CKD3)

## 2018-10-14 NOTE — H&P ADULT - HISTORY OF PRESENT ILLNESS
Patient is 62 year old M with PMH EtOH abuse, HTN, CKD3, AFib, LV thrombus (previously on Warfarin, reports that thrombus has recurred), CAD s/p MI 1996/1998/2003, PCI (2018), HFrEF (35% in Aug 2018) s/p [place] Sci ICD 2009, frequently admitted to [facility] for EtOH abuse, presents with alcohol withdrawal and chest pain. Patient states that he has been sober for 22 days but relapsed over the last 3 days, reports that this morning he awoke with epigastric pain that was burning and radiates upward toward the sternum; it is worse with lifting weight. He also reports sore throat and production of clear phlegm, as well as 1 day of nonbloody diarrhea. On arrival to ED T36.9, P115, bp 109/77, RR 18 sat 99% RA. CBC with microcytosis, initial AG 25 downtrended to 18 s/p 2L NS. Blood alcohol level 113. EKG with no acute ST/T changes from prior EKGs; troponins and CK/CKMB negative x2. CXR no acute infiltrates. Received 25mg librium x1 and 2mg ativan with improvement in HR. Patient is 62 year old M with PMH EtOH abuse, HTN, CKD3, AFib, LV thrombus (previously on Warfarin, reports that thrombus has recurred), CAD s/p MI 1996/1998/2003, PCI (2018), HFrEF (35% in Aug 2018) s/p [place] Sci ICD 2009, frequently admitted to [facility] for EtOH abuse, presents with alcohol withdrawal and chest pain. Patient states that he has been sober for 22 days but relapsed over the last 3 days, reports that this morning he awoke with epigastric pain that was burning and radiates upward toward the sternum; it is worse with lifting weight. He also reports sore throat and production of clear phlegm, as well as 1 day of nonbloody diarrhea. On arrival to ED T36.9, P115, bp 109/77, RR 18 sat 99% RA. CBC with microcytosis, initial AG 25 downtrended to 18 s/p 2L NS. Blood alcohol level 113. EKG with no acute ST/T changes from prior EKGs; EKG stress test in July WNL. Troponins and CK/CKMB negative x2. CXR no acute infiltrates. Received 25mg librium x1 and 2mg ativan with improvement in HR. Patient is 62 year old M with PMH EtOH abuse, HTN, CKD3, AFib, LV thrombus (previously on Warfarin, reports that thrombus has recurred), CAD s/p MI 1996/1998/2003, PCI (2018), HFrEF (35% in Aug 2018) s/p [place] Sci ICD 2009, frequently admitted to [facility] for EtOH abuse, presents with alcohol withdrawal and chest pain. Patient states that he has been sober for 22 days but relapsed over the last 3 days, reports that this morning he awoke with epigastric pain that was burning and radiates upward toward the sternum; it is worse with lifting weight due to abdominal strain. He also reports sore throat and production of clear phlegm, as well as 1 day of nonbloody diarrhea. On arrival to ED T36.9, P115, bp 109/77, RR 18 sat 99% RA. CBC with microcytosis, initial AG 25 downtrended to 18 s/p 2L NS. Blood alcohol level 113. EKG with no acute ST/T changes from prior EKGs; EKG stress test in July WNL. Troponins and CK/CKMB negative x2. CXR no acute infiltrates. Received 25mg librium x1 and 2mg ativan with improvement in HR.

## 2018-10-14 NOTE — ED ADULT NURSE NOTE - NSIMPLEMENTINTERV_GEN_ALL_ED
Implemented All Universal Safety Interventions:  [place] to call system. Call bell, personal items and telephone within reach. Instruct patient to call for assistance. Room bathroom lighting operational. Non-slip footwear when patient is off stretcher. Physically safe environment: no spills, clutter or unnecessary equipment. Stretcher in lowest position, wheels locked, appropriate side rails in place.

## 2018-10-14 NOTE — ED ADULT NURSE NOTE - CHPI ED NUR SYMPTOMS NEG
no fever/no congestion/no syncope/no chills/no back pain/no shortness of breath/no vomiting/no diaphoresis

## 2018-10-14 NOTE — H&P ADULT - PROBLEM SELECTOR PLAN 7
- Renally dose medication; renal function appears improved at this visit and patient may have better renal function than initially described  - Monitor creatinine

## 2018-10-14 NOTE — H&P ADULT - NSHPPHYSICALEXAM_GEN_ALL_CORE
.  VITAL SIGNS:  T(C): 37.2 (10-14-18 @ 18:20), Max: 37.2 (10-14-18 @ 18:20)  T(F): 99 (10-14-18 @ 18:20), Max: 99 (10-14-18 @ 18:20)  HR: 86 (10-14-18 @ 18:20) (86 - 115)  BP: 151/89 (10-14-18 @ 18:20) (109/77 - 151/89)  BP(mean): --  RR: 20 (10-14-18 @ 18:20) (18 - 20)  SpO2: 99% (10-14-18 @ 18:20) (99% - 99%)  Wt(kg): --    PHYSICAL EXAM:    Constitutional: WDWN M resting comfortably in bed; NAD  Head: NC/AT  Eyes: PERRL, EOMI, anicteric sclera  ENT: no nasal discharge, MMM  Neck: supple; no JVD appreciated  Respiratory: CTA B/L; no W/R/R, no increased work of breathing  Cardiac: +S1/S2; regular rate  Gastrointestinal: soft, NT/ND; no rebound or guarding; +BSx4  Extremities: WWP, no cyanosis; no peripheral edema  Musculoskeletal: NROM x4; no joint swelling, tenderness or erythema  Vascular: 2+ radial, DP pulses B/L  Dermatologic: skin warm, dry and intact  Neurologic: Alert and oriented, no focal deficits appreciated  Psychiatric: affect and characteristics of appearance, verbalizations, behaviors are appropriate    CIWA 5

## 2018-10-15 ENCOUNTER — TRANSCRIPTION ENCOUNTER (OUTPATIENT)
Age: 63
End: 2018-10-15

## 2018-10-15 LAB
ALBUMIN SERPL ELPH-MCNC: 4 G/DL — SIGNIFICANT CHANGE UP (ref 3.3–5)
ALP SERPL-CCNC: 64 U/L — SIGNIFICANT CHANGE UP (ref 40–120)
ALT FLD-CCNC: 26 U/L — SIGNIFICANT CHANGE UP (ref 10–45)
ANION GAP SERPL CALC-SCNC: 11 MMOL/L — SIGNIFICANT CHANGE UP (ref 5–17)
AST SERPL-CCNC: 37 U/L — SIGNIFICANT CHANGE UP (ref 10–40)
BILIRUB SERPL-MCNC: 1.6 MG/DL — HIGH (ref 0.2–1.2)
BUN SERPL-MCNC: 15 MG/DL — SIGNIFICANT CHANGE UP (ref 7–23)
CALCIUM SERPL-MCNC: 8.8 MG/DL — SIGNIFICANT CHANGE UP (ref 8.4–10.5)
CHLORIDE SERPL-SCNC: 97 MMOL/L — SIGNIFICANT CHANGE UP (ref 96–108)
CO2 SERPL-SCNC: 28 MMOL/L — SIGNIFICANT CHANGE UP (ref 22–31)
CREAT SERPL-MCNC: 1 MG/DL — SIGNIFICANT CHANGE UP (ref 0.5–1.3)
GLUCOSE SERPL-MCNC: 100 MG/DL — HIGH (ref 70–99)
HCT VFR BLD CALC: 31.9 % — LOW (ref 39–50)
HCT VFR BLD CALC: 34.5 % — LOW (ref 39–50)
HGB BLD-MCNC: 10 G/DL — LOW (ref 13–17)
HGB BLD-MCNC: 11 G/DL — LOW (ref 13–17)
MAGNESIUM SERPL-MCNC: 1.9 MG/DL — SIGNIFICANT CHANGE UP (ref 1.6–2.6)
MCHC RBC-ENTMCNC: 24.3 PG — LOW (ref 27–34)
MCHC RBC-ENTMCNC: 24.7 PG — LOW (ref 27–34)
MCHC RBC-ENTMCNC: 31.3 G/DL — LOW (ref 32–36)
MCHC RBC-ENTMCNC: 31.9 G/DL — LOW (ref 32–36)
MCV RBC AUTO: 77.4 FL — LOW (ref 80–100)
MCV RBC AUTO: 77.6 FL — LOW (ref 80–100)
PHOSPHATE SERPL-MCNC: 2.2 MG/DL — LOW (ref 2.5–4.5)
PLATELET # BLD AUTO: 152 K/UL — SIGNIFICANT CHANGE UP (ref 150–400)
PLATELET # BLD AUTO: 158 K/UL — SIGNIFICANT CHANGE UP (ref 150–400)
POTASSIUM SERPL-MCNC: 4.1 MMOL/L — SIGNIFICANT CHANGE UP (ref 3.5–5.3)
POTASSIUM SERPL-SCNC: 4.1 MMOL/L — SIGNIFICANT CHANGE UP (ref 3.5–5.3)
PROT SERPL-MCNC: 6.5 G/DL — SIGNIFICANT CHANGE UP (ref 6–8.3)
RBC # BLD: 4.11 M/UL — LOW (ref 4.2–5.8)
RBC # BLD: 4.46 M/UL — SIGNIFICANT CHANGE UP (ref 4.2–5.8)
RBC # FLD: 18 % — HIGH (ref 10.3–16.9)
RBC # FLD: 18.3 % — HIGH (ref 10.3–16.9)
SODIUM SERPL-SCNC: 136 MMOL/L — SIGNIFICANT CHANGE UP (ref 135–145)
WBC # BLD: 2.5 K/UL — LOW (ref 3.8–10.5)
WBC # BLD: 3 K/UL — LOW (ref 3.8–10.5)
WBC # FLD AUTO: 2.5 K/UL — LOW (ref 3.8–10.5)
WBC # FLD AUTO: 3 K/UL — LOW (ref 3.8–10.5)

## 2018-10-15 PROCEDURE — 93306 TTE W/DOPPLER COMPLETE: CPT | Mod: 26

## 2018-10-15 RX ORDER — CALCIUM CARBONATE 500(1250)
2 TABLET ORAL ONCE
Qty: 0 | Refills: 0 | Status: COMPLETED | OUTPATIENT
Start: 2018-10-15 | End: 2018-10-15

## 2018-10-15 RX ORDER — THIAMINE MONONITRATE (VIT B1) 100 MG
100 TABLET ORAL DAILY
Qty: 0 | Refills: 0 | Status: DISCONTINUED | OUTPATIENT
Start: 2018-10-15 | End: 2018-10-16

## 2018-10-15 RX ORDER — PANTOPRAZOLE SODIUM 20 MG/1
40 TABLET, DELAYED RELEASE ORAL
Qty: 0 | Refills: 0 | Status: DISCONTINUED | OUTPATIENT
Start: 2018-10-15 | End: 2018-10-16

## 2018-10-15 RX ADMIN — Medication 2 TABLET(S): at 15:58

## 2018-10-15 RX ADMIN — Medication 25 MILLIGRAM(S): at 22:55

## 2018-10-15 RX ADMIN — Medication 100 MILLIGRAM(S): at 06:57

## 2018-10-15 RX ADMIN — Medication 25 MILLIGRAM(S): at 12:19

## 2018-10-15 RX ADMIN — Medication 1 MILLIGRAM(S): at 12:18

## 2018-10-15 RX ADMIN — Medication 30 MILLILITER(S): at 03:22

## 2018-10-15 RX ADMIN — PANTOPRAZOLE SODIUM 40 MILLIGRAM(S): 20 TABLET, DELAYED RELEASE ORAL at 12:17

## 2018-10-15 RX ADMIN — Medication 1 TABLET(S): at 17:41

## 2018-10-15 RX ADMIN — APIXABAN 5 MILLIGRAM(S): 2.5 TABLET, FILM COATED ORAL at 17:41

## 2018-10-15 RX ADMIN — APIXABAN 5 MILLIGRAM(S): 2.5 TABLET, FILM COATED ORAL at 06:57

## 2018-10-15 RX ADMIN — ATORVASTATIN CALCIUM 40 MILLIGRAM(S): 80 TABLET, FILM COATED ORAL at 06:57

## 2018-10-15 RX ADMIN — Medication 100 MILLIGRAM(S): at 17:41

## 2018-10-15 NOTE — DISCHARGE NOTE ADULT - PATIENT PORTAL LINK FT
You can access the [facility], offered by [facility], by registering with the following website: [email or web address]

## 2018-10-15 NOTE — PATIENT PROFILE ADULT - NSPROHMCARDIOMGMTSTRAT_GEN_A_NUR
Date of Service: 01/29/2018    HISTORY OF PRESENT ILLNESS:  This is a pleasant woman who is coming today to recheck on her recent lab test results and checking on her hypothyroidism and checking on her dyslipidemia, hypertension.  She has been doing well.  She is obese, body mass index 44.  She tries to walk on a regular basis.  She is walking with a cane on the hallways every 2 hours, that helps her to move around better.  The last time she had chronic cough advised to start Singular 10 mg at night.  She takes it irregularly.  She has been otherwise not coughing much and she is doing well.    REVIEW OF SYSTEMS:  Review of systems as above.  CONSTITUTIONAL:  No weight changes.  No fever.  No weakness.  No fatigue.  No changes in appetite.  No night sweats.  HEENT:  No head trauma.  No pain.  No changes in vision.  No eye discharge, redness, or pain.  No nasal discharge.  No epistaxis.  No congestion.  No soreness in the mouth cavity.  No tongue soreness.  No dry mouth.  No sore throat.  No problems to speak or swallow.  CARDIOVASCULAR:  No chest pains.  No palpitations.  No shortness of breath.  No PND.  No orthopnea.  No peripheral edema.  No dizziness.  No fainting.  No claudication.  RESPIRATORY:  No shortness of breath.  No cough.  No phlegm production.  No hemoptysis.  No wheezing.  GASTROINTESTINAL:  No nausea, vomiting, diarrhea or constipation.  No hematochezia or melena.  No hematemesis.  No dysphagia.  GENITOURINARY:  No urgency or frequency on urination.  No pyuria.  No hematuria.  No incontinence.  MUSCULOSKELETAL:  No joint pains.  No swelling.  No stiffness in the joints.  No traumas to the joints.  No falls.  SKIN:  Normal integument.  No rashes.  No lesions.  No excessive skin dryness.  No color changes.  No new or changed moles.  No excessive hair loss.  No excessive hair growth.  No changes of the nails.  NEUROLOGIC:  No weakness or numbness of the extremities or any side of the body.  No changes  in coordination.  No headaches.  No vertigo.  No seizures.  No memory loss.  No falls.  No problems with balance.  No muscular atrophy.  PSYCHIATRIC:  No mood changes.  No depression or anxiety.  No emotional lability. No suicidal ideations.  No hallucinations.  No problems with insomnia.  ENDOCRINE:  No heat or cold intolerance.  No excessive thirst.  No excessive urination.  No changes in appetite.  No changes in skin color or hair growth.  HEMATOPOIETIC AND LYMPHATIC:  No easy bruising.  No abnormal bleeding.  No excessive fatigue.  No dyspnea.  No tender nodules on palpation or any noticeable nodules.  No focal swelling noted.  ACCIDENT HISTORY:  There are no falls.  No trauma.  No violence.  No abuse.    PHYSICAL EXAMINATION:  GENERAL:  Ambulates slowly with a cane.  She needs some help to get on and off examination table.    HEENT:  Conjunctivae not injected.  PERRLA, EOMI.  No nystagmus.  Ears:  Normal tympanic membranes with no fluid, no erythema of the TMs.  No sinus point tenderness on percussion.  Nasal mucosa:  Normal.  Oral mucosa:  Pink with no lesions.  Tongue is midline.  Uvula is midline.  No exudates or erythema over the tonsils.  NECK:  Supple with no JVD, lymphadenopathy or thyromegaly.  Normal pulses over carotids.  No masses.  CHEST:  Clear to auscultation bilaterally.  No wheezing, rhonchi, or crackles.  Heart rate is regular with normal S1, S2.  No gallop, murmur, or rub.  No thrills.  ABDOMEN:  Soft, benign.  No masses on palpation.  No hepatosplenomegaly.  No tenderness.  No guarding, rebound, or rigidity.  Bowel sounds present and normal in all 4 quadrants.  No abdominal bruits. Positive for major truncal obesity.   EXTREMITIES:  No clubbing, cyanosis, or edema.  Peripheral pulses palpable, 2+ and equal bilaterally.  SKIN:  Warm and dry.  NEUROLOGIC:  The patient is alert and oriented x3.  Cranial nerves 2-12 are normal.  Motor, cerebellar and sensory exam are grossly normal.    LABORATORY  DATA:  Labs discussed and reviewed with the patient from 12/21/2017, normal except albumin 3.3, BUN was 22 and her TSH was from 2.1 in September, up to 4.0.    ASSESSMENT AND PLAN:  1.  For hypothyroidism, we are going to increase the dose of Levothyroxine to 88 mcg daily from 75 to bring the TSH closer to 1-2.  2.  For her chronic cough and allergic rhinitis,  continue with Singular 10 mg in the evening regularly.  3.  For her acid reflux, on Prilosec 20 mg daily, continue.  4.  Flonase she takes it once in awhile.  5.  Obesity, already ambulating, trying to work on the weight loss.  Call me with any questions or concerns and see me back in 4 months.      Dictated By: Jeri Morales MD  Signing Provider: MD ELVI Bailon/MARIA M ([id number])  DD: 01/29/2018 10:11:31 TD: 01/30/2018 06:20:43    Copy Sent To:    routine screening/medication therapy

## 2018-10-15 NOTE — PROGRESS NOTE ADULT - PROBLEM SELECTOR PLAN 2
- Cardiac enzymes negative x2, however patient with strong history of cardiac disease  - Low threshold for telemetry, monitor EKG in AM to assess for interval changes  - F/u lipase  - Protonix 40mg QD

## 2018-10-15 NOTE — DISCHARGE NOTE ADULT - MEDICATION SUMMARY - MEDICATIONS TO TAKE
I will START or STAY ON the medications listed below when I get home from the hospital:    apixaban 5 mg oral tablet  -- 1 tab(s) by mouth 2 times a day   -- Check with your doctor before becoming pregnant.  It is very important that you take or use this exactly as directed.  Do not skip doses or discontinue unless directed by your doctor.  Obtain medical advice before taking any non-prescription drugs as some may affect the action of this medication.    -- Indication: For Atrial fibrillation, unspecified type    atorvastatin 40 mg oral tablet  -- 1 tab(s) by mouth once a day (at bedtime)  -- Indication: For Coronary artery disease    Metoprolol Succinate  mg oral tablet, extended release  -- 1 tab(s) by mouth once a day   -- It is very important that you take or use this exactly as directed.  Do not skip doses or discontinue unless directed by your doctor.  May cause drowsiness.  Alcohol may intensify this effect.  Use care when operating dangerous machinery.  Some non-prescription drugs may aggravate your condition.  Read all labels carefully.  If a warning appears, check with your doctor before taking.  Swallow whole.  Do not crush.  Take with food or milk.  This drug may impair the ability to drive or operate machinery.  Use care until you become familiar with its effects.    -- Indication: For Hypertension

## 2018-10-15 NOTE — PROGRESS NOTE ADULT - SUBJECTIVE AND OBJECTIVE BOX
OVERNIGHT EVENTS: ÓSCAR    SUBJECTIVE: Pt seen and examined at night. Pt complaining of burning, heartburn-like pain radiating from the epigastric area caudally through the throat. Pt expressing concern over the thrombus. Pt refused librium in the AM as he says he tries not to take it when it is not necessary and thinks it may be contributing to the heartburn. Pt also mildly tremulous in b/l hands, though he claims the left hand is always tremulous. Pt with slight headache. Had one watery BM last night. Otherwise denies fever, palpitations, SOB, n/v.     Vital Signs Last 12 Hrs  T(F): 98.3 (10-15-18 @ 09:28), Max: 98.8 (10-15-18 @ 00:43)  HR: 70 (10-15-18 @ 09:28) (70 - 73)  BP: 131/82 (10-15-18 @ 09:28) (129/84 - 135/84)  BP(mean): --  RR: 18 (10-15-18 @ 09:28) (18 - 19)  SpO2: 96% (10-15-18 @ 09:28) (96% - 96%)  I&O's Summary    PHYSICAL EXAM:  Constitutional: NAD, comfortable in bed.  HEENT: NC/AT, MMM  Neck: Supple, no JVD  Respiratory: Normal rate, rhythm, depth, effort. CTAB. No w/r/r.   Cardiovascular: RRR, normal S1 and S2, no m/r/g.   Gastrointestinal: +BS, soft NTND, no guarding or rebound tenderness, no palpable masses   Extremities: wwp; no cyanosis, clubbing or edema.   Vascular: Pulses equal and strong throughout.   Neurological: AAOx3, no CN deficits, strength and sensation intact throughout.   Skin: No gross skin abnormalities or rashes      LABS:                        10.0   2.5   )-----------( 158      ( 15 Oct 2018 07:11 )             31.9     10-15    136  |  97  |  15  ----------------------------<  100<H>  4.1   |  28  |  1.00    Ca    8.8      15 Oct 2018 07:11  Phos  2.2     10-15  Mg     1.9     10-15    TPro  6.5  /  Alb  4.0  /  TBili  1.6<H>  /  DBili  x   /  AST  37  /  ALT  26  /  AlkPhos  64  10-15    PT/INR - ( 14 Oct 2018 13:36 )   PT: 11.8 sec;   INR: 1.06          PTT - ( 14 Oct 2018 13:36 )  PTT:29.5 sec      RADIOLOGY & ADDITIONAL TESTS:    MEDICATIONS  (STANDING):  apixaban 5 milliGRAM(s) Oral every 12 hours  atorvastatin 40 milliGRAM(s) Oral at bedtime  chlordiazePOXIDE 25 milliGRAM(s) Oral every 8 hours  folic acid 1 milliGRAM(s) Oral daily  metoprolol succinate  milliGRAM(s) Oral daily  multivitamin/thiamine/folic acid in sodium chloride 0.9% 1000 milliLiter(s) (100 mL/Hr) IV Continuous <Continuous>    MEDICATIONS  (PRN):  aluminum hydroxide/magnesium hydroxide/simethicone Suspension 30 milliLiter(s) Oral every 4 hours PRN Dyspepsia

## 2018-10-15 NOTE — PROGRESS NOTE ADULT - SUBJECTIVE AND OBJECTIVE BOX
Re admission of [facility] for recurrent Alcoholism , despite years aof advice and counseling against continuation of Ethanolism    PAST MEDICAL & SURGICAL HISTORY:  Thrombus in heart chamber: LV  ICD (implantable cardioverter-defibrillator) in place  Pacemaker  ETOH abuse  Paroxysmal a-fib  Myocardial infarction involving other coronary artery  Gastritis  Atherosclerosis of coronary artery: Coronary artery disease  Automatic implantable cardiac defibrillator in situ: ICD (implantable cardioverter-defibrillator) in place    MEDICATIONS  (STANDING):  apixaban 5 milliGRAM(s) Oral every 12 hours  atorvastatin 40 milliGRAM(s) Oral at bedtime  chlordiazePOXIDE 25 milliGRAM(s) Oral every 8 hours  folic acid 1 milliGRAM(s) Oral daily  metoprolol succinate  milliGRAM(s) Oral daily  multivitamin/thiamine/folic acid in sodium chloride 0.9% 1000 milliLiter(s) (100 mL/Hr) IV Continuous <Continuous>    MEDICATIONS  (PRN):  aluminum hydroxide/magnesium hydroxide/simethicone Suspension 30 milliLiter(s) Oral every 4 hours PRN Dyspepsia    ICU Vital Signs Last 24 Hrs  T(C): 36.8 (15 Oct 2018 09:28), Max: 37.2 (14 Oct 2018 18:20)  T(F): 98.3 (15 Oct 2018 09:28), Max: 99 (14 Oct 2018 18:20)  HR: 70 (15 Oct 2018 09:28) (69 - 115)  BP: 131/82 (15 Oct 2018 09:28) (109/77 - 151/89)  BP(mean): --  ABP: --  ABP(mean): --  RR: 18 (15 Oct 2018 09:28) (16 - 20)  SpO2: 96% (15 Oct 2018 09:28) (93% - 99%)    Lungs clear  CV s1 s2  Abd soft  Ext stable                          10.0   2.5   )-----------( 158      ( 15 Oct 2018 07:11 )             31.9   10-15    136  |  97  |  15  ----------------------------<  100<H>  4.1   |  28  |  1.00    Ca    8.8      15 Oct 2018 07:11  Phos  2.2     10-15  Mg     1.9     10-15    TPro  6.5  /  Alb  4.0  /  TBili  1.6<H>  /  DBili  x   /  AST  37  /  ALT  26  /  AlkPhos  64  10-15      Repeat Echo pending  PT has EF 35 %  and LV thrombus on Anticoagulation RX     S?P CAD  Stent Placement

## 2018-10-15 NOTE — DISCHARGE NOTE ADULT - HOSPITAL COURSE
62 year old M with PMH EtOH abuse, HTN, CKD3, AFib, LV thrombus (previously on Warfarin, reports that thrombus has recurred), CAD s/p MI 1996/1998/2003, PCI (2018), HFrEF (35% in Aug 2018) s/p [place] Sci ICD 2009, frequently admitted to [facility] for EtOH abuse, admitted with alcohol withdrawal and atypical chest pain.    In ED, EKG showed no changes from prior EKG, troponin was negative x2.     Pt underwent a repeat echo which showed an EF of 35% and an unchanged LV thrombus.     Pt's CIWA was 5 at time of admission, pt was initially placed on librium 25mg q8, though this was eventually tapered down.     Pt was kept on, and discharged with eliquis 5mg BID and instructions to follow up with Dr. Levy.

## 2018-10-15 NOTE — DISCHARGE NOTE ADULT - PLAN OF CARE
Management of medical condition You were admitted to the hospital with chest pain. We did a number of tests and blood work to determine the cause of the pain, but did not find one. In the process, however, we ruled out dangerous conditions. Please continue to follow with Dr. Levy, and seek medical attention if you experience any sudden chest pain or discomfort During your hospitalization, we monitored you for signs of alcohol withdrawal and gave you a medication called librium to prevent withdrawal symptoms. You did not require any further intervention. Please use alcohol in a moderate and responsible manner, or seek help if you feel you are unable to do so. Be careful when mixing alcohol with other medications. You have previously been diagnosed with a thrombus in your heart, likely due to your atrial fibrillation, which makes people more susceptible to developing thrombi. You have previously been on coumadin for the thrombus. You are currently on eliquis. Please continue to take your eliquis as prescribed, and follow up with Dr. Levy You have an abnormal heart rhythm called atrial fibrillation. With this condition, your heart’s two upper chambers quiver rather than squeeze the blood out in a normal pattern. This leads to an irregular and sometimes rapid heartbeat. Some people will develop associated symptoms such as a flip-flopping heartbeat, chest pain, lightheadedness, or shortness of breath. Other people may have no symptoms at all. Atrial fibrillation is serious because it affects the heart’s ability to fill with blood as it should. Blood clots may form. This increases the risk for stroke. This complication can be prevented if you use blood thinning medications. Please take your _____________ as prescribed.     Limit your intake of coffee, tea, cola, and other beverages with caffeine. Talk with your doctor about whether you should eliminate caffeine. Avoid over-the-counter medicines that have caffeine in them. Never take stimulants such as amphetamines or cocaine. These drugs can speed up your heart rate and trigger atrial fibrillation.    Call your healthcare provider right away if you have any of the following:  Weakness  Dizziness  Fainting  Fatigue  Shortness of breath  Chest pain with increased activity  A change in the usual regularity of your heartbeat, or an unusually fast heartbeat You have previously been diagnosed with a thrombus in your heart, likely due to your atrial fibrillation, which makes people more susceptible to developing thrombi. You underwent an echocardiogram to measure the thrombus and the general condition of your heart. You have previously been on coumadin for the thrombus. You are currently on eliquis. Please continue to take your eliquis as prescribed, and follow up with Dr. Levy

## 2018-10-15 NOTE — PATIENT PROFILE ADULT - NSPROEDALEARNPREF_GEN_A_NUR
pictorial/verbal instruction/video/written material/group instruction/individual instruction/skill demonstration/audio/computer/internet

## 2018-10-15 NOTE — DISCHARGE NOTE ADULT - SECONDARY DIAGNOSIS.
Alcohol withdrawal syndrome without complication Thrombus in heart chamber Atrial fibrillation, unspecified type

## 2018-10-15 NOTE — DISCHARGE NOTE ADULT - CARE PLAN
Principal Discharge DX:	Atypical chest pain  Goal:	Management of medical condition  Assessment and plan of treatment:	You were admitted to the hospital with chest pain. We did a number of tests and blood work to determine the cause of the pain, but did not find one. In the process, however, we ruled out dangerous conditions. Please continue to follow with Dr. Levy, and seek medical attention if you experience any sudden chest pain or discomfort  Secondary Diagnosis:	Alcohol withdrawal syndrome without complication  Goal:	Management of medical condition  Assessment and plan of treatment:	During your hospitalization, we monitored you for signs of alcohol withdrawal and gave you a medication called librium to prevent withdrawal symptoms. You did not require any further intervention. Please use alcohol in a moderate and responsible manner, or seek help if you feel you are unable to do so. Be careful when mixing alcohol with other medications.  Secondary Diagnosis:	Thrombus in heart chamber  Goal:	Management of medical condition  Assessment and plan of treatment:	You have previously been diagnosed with a thrombus in your heart, likely due to your atrial fibrillation, which makes people more susceptible to developing thrombi. You have previously been on coumadin for the thrombus. You are currently on eliquis. Please continue to take your eliquis as prescribed, and follow up with Dr. Levy  Secondary Diagnosis:	Atrial fibrillation, unspecified type  Goal:	Management of medical condition  Assessment and plan of treatment:	You have an abnormal heart rhythm called atrial fibrillation. With this condition, your heart’s two upper chambers quiver rather than squeeze the blood out in a normal pattern. This leads to an irregular and sometimes rapid heartbeat. Some people will develop associated symptoms such as a flip-flopping heartbeat, chest pain, lightheadedness, or shortness of breath. Other people may have no symptoms at all. Atrial fibrillation is serious because it affects the heart’s ability to fill with blood as it should. Blood clots may form. This increases the risk for stroke. This complication can be prevented if you use blood thinning medications. Please take your _____________ as prescribed.     Limit your intake of coffee, tea, cola, and other beverages with caffeine. Talk with your doctor about whether you should eliminate caffeine. Avoid over-the-counter medicines that have caffeine in them. Never take stimulants such as amphetamines or cocaine. These drugs can speed up your heart rate and trigger atrial fibrillation.    Call your healthcare provider right away if you have any of the following:  Weakness  Dizziness  Fainting  Fatigue  Shortness of breath  Chest pain with increased activity  A change in the usual regularity of your heartbeat, or an unusually fast heartbeat Principal Discharge DX:	Atypical chest pain  Goal:	Management of medical condition  Assessment and plan of treatment:	You were admitted to the hospital with chest pain. We did a number of tests and blood work to determine the cause of the pain, but did not find one. In the process, however, we ruled out dangerous conditions. Please continue to follow with Dr. Levy, and seek medical attention if you experience any sudden chest pain or discomfort  Secondary Diagnosis:	Alcohol withdrawal syndrome without complication  Goal:	Management of medical condition  Assessment and plan of treatment:	During your hospitalization, we monitored you for signs of alcohol withdrawal and gave you a medication called librium to prevent withdrawal symptoms. You did not require any further intervention. Please use alcohol in a moderate and responsible manner, or seek help if you feel you are unable to do so. Be careful when mixing alcohol with other medications.  Secondary Diagnosis:	Thrombus in heart chamber  Goal:	Management of medical condition  Assessment and plan of treatment:	You have previously been diagnosed with a thrombus in your heart, likely due to your atrial fibrillation, which makes people more susceptible to developing thrombi. You underwent an echocardiogram to measure the thrombus and the general condition of your heart. You have previously been on coumadin for the thrombus. You are currently on eliquis. Please continue to take your eliquis as prescribed, and follow up with Dr. Levy  Secondary Diagnosis:	Atrial fibrillation, unspecified type  Goal:	Management of medical condition  Assessment and plan of treatment:	You have an abnormal heart rhythm called atrial fibrillation. With this condition, your heart’s two upper chambers quiver rather than squeeze the blood out in a normal pattern. This leads to an irregular and sometimes rapid heartbeat. Some people will develop associated symptoms such as a flip-flopping heartbeat, chest pain, lightheadedness, or shortness of breath. Other people may have no symptoms at all. Atrial fibrillation is serious because it affects the heart’s ability to fill with blood as it should. Blood clots may form. This increases the risk for stroke. This complication can be prevented if you use blood thinning medications. Please take your _____________ as prescribed.     Limit your intake of coffee, tea, cola, and other beverages with caffeine. Talk with your doctor about whether you should eliminate caffeine. Avoid over-the-counter medicines that have caffeine in them. Never take stimulants such as amphetamines or cocaine. These drugs can speed up your heart rate and trigger atrial fibrillation.    Call your healthcare provider right away if you have any of the following:  Weakness  Dizziness  Fainting  Fatigue  Shortness of breath  Chest pain with increased activity  A change in the usual regularity of your heartbeat, or an unusually fast heartbeat

## 2018-10-15 NOTE — PROGRESS NOTE ADULT - ASSESSMENT
62 year old M with PMH EtOH abuse, HTN, CKD3, AFib, LV thrombus (previously on Warfarin, reports that thrombus has recurred), CAD s/p MI 1996/1998/2003, PCI (2018), HFrEF (35% in Aug 2018) s/p [place] [facility] ICD 2009, frequently admitted to H for EtOH abuse, admitted with alcohol withdrawal and atypical chest pain

## 2018-10-16 VITALS
RESPIRATION RATE: 18 BRPM | DIASTOLIC BLOOD PRESSURE: 89 MMHG | SYSTOLIC BLOOD PRESSURE: 127 MMHG | TEMPERATURE: 98 F | HEART RATE: 78 BPM | OXYGEN SATURATION: 100 %

## 2018-10-16 LAB
ANION GAP SERPL CALC-SCNC: 12 MMOL/L — SIGNIFICANT CHANGE UP (ref 5–17)
BUN SERPL-MCNC: 10 MG/DL — SIGNIFICANT CHANGE UP (ref 7–23)
CALCIUM SERPL-MCNC: 9.6 MG/DL — SIGNIFICANT CHANGE UP (ref 8.4–10.5)
CHLORIDE SERPL-SCNC: 98 MMOL/L — SIGNIFICANT CHANGE UP (ref 96–108)
CO2 SERPL-SCNC: 26 MMOL/L — SIGNIFICANT CHANGE UP (ref 22–31)
CREAT SERPL-MCNC: 1.18 MG/DL — SIGNIFICANT CHANGE UP (ref 0.5–1.3)
GLUCOSE SERPL-MCNC: 107 MG/DL — HIGH (ref 70–99)
HCT VFR BLD CALC: 36 % — LOW (ref 39–50)
HGB BLD-MCNC: 11.5 G/DL — LOW (ref 13–17)
MCHC RBC-ENTMCNC: 24.6 PG — LOW (ref 27–34)
MCHC RBC-ENTMCNC: 31.9 G/DL — LOW (ref 32–36)
MCV RBC AUTO: 76.9 FL — LOW (ref 80–100)
PLATELET # BLD AUTO: 163 K/UL — SIGNIFICANT CHANGE UP (ref 150–400)
POTASSIUM SERPL-MCNC: 4.4 MMOL/L — SIGNIFICANT CHANGE UP (ref 3.5–5.3)
POTASSIUM SERPL-SCNC: 4.4 MMOL/L — SIGNIFICANT CHANGE UP (ref 3.5–5.3)
RBC # BLD: 4.68 M/UL — SIGNIFICANT CHANGE UP (ref 4.2–5.8)
RBC # FLD: 18.1 % — HIGH (ref 10.3–16.9)
SODIUM SERPL-SCNC: 136 MMOL/L — SIGNIFICANT CHANGE UP (ref 135–145)
WBC # BLD: 4.1 K/UL — SIGNIFICANT CHANGE UP (ref 3.8–10.5)
WBC # FLD AUTO: 4.1 K/UL — SIGNIFICANT CHANGE UP (ref 3.8–10.5)

## 2018-10-16 PROCEDURE — 93010 ELECTROCARDIOGRAM REPORT: CPT

## 2018-10-16 RX ORDER — APIXABAN 2.5 MG/1
1 TABLET, FILM COATED ORAL
Qty: 60 | Refills: 0
Start: 2018-10-16

## 2018-10-16 RX ADMIN — Medication 1 MILLIGRAM(S): at 11:33

## 2018-10-16 RX ADMIN — Medication 100 MILLIGRAM(S): at 11:33

## 2018-10-16 RX ADMIN — Medication 25 MILLIGRAM(S): at 11:02

## 2018-10-16 RX ADMIN — APIXABAN 5 MILLIGRAM(S): 2.5 TABLET, FILM COATED ORAL at 06:23

## 2018-10-16 RX ADMIN — Medication 1 TABLET(S): at 11:33

## 2018-10-16 RX ADMIN — Medication 100 MILLIGRAM(S): at 06:23

## 2018-10-16 RX ADMIN — PANTOPRAZOLE SODIUM 40 MILLIGRAM(S): 20 TABLET, DELAYED RELEASE ORAL at 06:23

## 2018-10-16 RX ADMIN — ATORVASTATIN CALCIUM 40 MILLIGRAM(S): 80 TABLET, FILM COATED ORAL at 06:23

## 2018-10-16 RX ADMIN — APIXABAN 5 MILLIGRAM(S): 2.5 TABLET, FILM COATED ORAL at 17:24

## 2018-10-16 NOTE — PROGRESS NOTE ADULT - REASON FOR ADMISSION
Alcohol withdrawal, atypical chest pain

## 2018-10-16 NOTE — PROGRESS NOTE ADULT - SUBJECTIVE AND OBJECTIVE BOX
Pt is less shaky on  Librium   c/o delirium like feeling at times    PAST MEDICAL & SURGICAL HISTORY:  Thrombus in heart chamber: LV  ICD (implantable cardioverter-defibrillator) in place  Pacemaker  ETOH abuse  Paroxysmal a-fib  Myocardial infarction involving other coronary artery  Gastritis  Atherosclerosis of coronary artery: Coronary artery disease  Automatic implantable cardiac defibrillator in situ: ICD (implantable cardioverter-defibrillator) in place\    MEDICATIONS  (STANDING):  apixaban 5 milliGRAM(s) Oral every 12 hours  atorvastatin 40 milliGRAM(s) Oral at bedtime  chlordiazePOXIDE 25 milliGRAM(s) Oral every 12 hours  folic acid 1 milliGRAM(s) Oral daily  metoprolol succinate  milliGRAM(s) Oral daily  multivitamin 1 Tablet(s) Oral daily  pantoprazole    Tablet 40 milliGRAM(s) Oral before breakfast  thiamine 100 milliGRAM(s) Oral daily    MEDICATIONS  (PRN):  aluminum hydroxide/magnesium hydroxide/simethicone Suspension 30 milliLiter(s) Oral every 4 hours PRN Dyspepsia      ICU Vital Signs Last 24 Hrs  T(C): 36.8 (16 Oct 2018 05:31), Max: 37.1 (15 Oct 2018 22:49)  T(F): 98.3 (16 Oct 2018 05:31), Max: 98.8 (15 Oct 2018 22:49)  HR: 86 (16 Oct 2018 05:31) (70 - 91)  BP: 120/87 (16 Oct 2018 05:31) (120/87 - 131/82)  BP(mean): --  ABP: --  ABP(mean): --  RR: 18 (16 Oct 2018 05:31) (17 - 18)  SpO2: 99% (16 Oct 2018 05:31) (96% - 99%)      Lungs clear  Cv s1 s2   Abd soft  ext stable                          11.5   4.1   )-----------( 163      ( 16 Oct 2018 06:05 )             36.0   10-16    136  |  98  |  10  ----------------------------<  107<H>  4.4   |  26  |  1.18    Ca    9.6      16 Oct 2018 06:05  Phos  2.2     10-15  Mg     1.9     10-15    TPro  6.5  /  Alb  4.0  /  TBili  1.6<H>  /  DBili  x   /  AST  37  /  ALT  26  /  AlkPhos  64  10-15  PT/INR - ( 14 Oct 2018 13:36 )   PT: 11.8 sec;   INR: 1.06          PTT - ( 14 Oct 2018 13:36 )  PTT:29.5 sec    Echo:   Hypokinesis   mid and apical septum    LV EF 35 %    LV apical thrombus     2.0 cm  larger than 1.5 cm  previously noted in Aug    EKG   NST   old inf Infarct   shukri lateral infarct     Lactate, Blood (08.07.18 @ 16:11)    Lactate, Blood: 0.6 mmoL/L

## 2018-10-16 NOTE — PROGRESS NOTE ADULT - ASSESSMENT
S/P Ethanolism  cont meds for withdrawl   s/p Cardio myopathy   Ischemic Heart Disease    Previous stent placement     cont Med Rx  and anticoagulation

## 2018-10-19 DIAGNOSIS — I13.0 HYPERTENSIVE HEART AND CHRONIC KIDNEY DISEASE WITH HEART FAILURE AND STAGE 1 THROUGH STAGE 4 CHRONIC KIDNEY DISEASE, OR UNSPECIFIED CHRONIC KIDNEY DISEASE: ICD-10-CM

## 2018-10-19 DIAGNOSIS — F10.239 ALCOHOL DEPENDENCE WITH WITHDRAWAL, UNSPECIFIED: ICD-10-CM

## 2018-10-19 DIAGNOSIS — I25.2 OLD MYOCARDIAL INFARCTION: ICD-10-CM

## 2018-10-19 DIAGNOSIS — N18.3 CHRONIC KIDNEY DISEASE, STAGE 3 (MODERATE): ICD-10-CM

## 2018-10-19 DIAGNOSIS — K29.70 GASTRITIS, UNSPECIFIED, WITHOUT BLEEDING: ICD-10-CM

## 2018-10-19 DIAGNOSIS — R07.9 CHEST PAIN, UNSPECIFIED: ICD-10-CM

## 2018-10-19 DIAGNOSIS — Z87.891 PERSONAL HISTORY OF NICOTINE DEPENDENCE: ICD-10-CM

## 2018-10-19 DIAGNOSIS — E87.5 HYPERKALEMIA: ICD-10-CM

## 2018-10-19 DIAGNOSIS — I25.10 ATHEROSCLEROTIC HEART DISEASE OF NATIVE CORONARY ARTERY WITHOUT ANGINA PECTORIS: ICD-10-CM

## 2018-10-19 DIAGNOSIS — I50.22 CHRONIC SYSTOLIC (CONGESTIVE) HEART FAILURE: ICD-10-CM

## 2018-10-19 DIAGNOSIS — I51.3 INTRACARDIAC THROMBOSIS, NOT ELSEWHERE CLASSIFIED: ICD-10-CM

## 2018-10-19 DIAGNOSIS — Z95.0 PRESENCE OF CARDIAC PACEMAKER: ICD-10-CM

## 2018-10-19 DIAGNOSIS — I48.0 PAROXYSMAL ATRIAL FIBRILLATION: ICD-10-CM

## 2018-11-03 ENCOUNTER — INPATIENT (INPATIENT)
Facility: HOSPITAL | Age: 63
LOS: 3 days | Discharge: HOME | DRG: 897 | End: 2018-11-07
Attending: INTERNAL MEDICINE | Admitting: INTERNAL MEDICINE
Payer: MEDICARE

## 2018-11-03 VITALS
WEIGHT: 160.06 LBS | TEMPERATURE: 99 F | RESPIRATION RATE: 18 BRPM | HEART RATE: 126 BPM | SYSTOLIC BLOOD PRESSURE: 129 MMHG | OXYGEN SATURATION: 96 % | DIASTOLIC BLOOD PRESSURE: 89 MMHG

## 2018-11-03 DIAGNOSIS — I50.22 CHRONIC SYSTOLIC (CONGESTIVE) HEART FAILURE: ICD-10-CM

## 2018-11-03 DIAGNOSIS — Z91.89 OTHER SPECIFIED PERSONAL RISK FACTORS, NOT ELSEWHERE CLASSIFIED: ICD-10-CM

## 2018-11-03 DIAGNOSIS — R07.9 CHEST PAIN, UNSPECIFIED: ICD-10-CM

## 2018-11-03 DIAGNOSIS — K29.60 OTHER GASTRITIS WITHOUT BLEEDING: ICD-10-CM

## 2018-11-03 DIAGNOSIS — E78.5 HYPERLIPIDEMIA, UNSPECIFIED: ICD-10-CM

## 2018-11-03 DIAGNOSIS — F10.230 ALCOHOL DEPENDENCE WITH WITHDRAWAL, UNCOMPLICATED: ICD-10-CM

## 2018-11-03 DIAGNOSIS — I25.10 ATHEROSCLEROTIC HEART DISEASE OF NATIVE CORONARY ARTERY WITHOUT ANGINA PECTORIS: ICD-10-CM

## 2018-11-03 DIAGNOSIS — I51.3 INTRACARDIAC THROMBOSIS, NOT ELSEWHERE CLASSIFIED: ICD-10-CM

## 2018-11-03 DIAGNOSIS — R63.8 OTHER SYMPTOMS AND SIGNS CONCERNING FOOD AND FLUID INTAKE: ICD-10-CM

## 2018-11-03 DIAGNOSIS — I48.0 PAROXYSMAL ATRIAL FIBRILLATION: ICD-10-CM

## 2018-11-03 LAB
ALBUMIN SERPL ELPH-MCNC: 4.8 G/DL — SIGNIFICANT CHANGE UP (ref 3.3–5)
ALP SERPL-CCNC: 68 U/L — SIGNIFICANT CHANGE UP (ref 40–120)
ALT FLD-CCNC: 77 U/L — HIGH (ref 10–45)
ANION GAP SERPL CALC-SCNC: 26 MMOL/L — HIGH (ref 5–17)
AST SERPL-CCNC: 120 U/L — HIGH (ref 10–40)
BASOPHILS NFR BLD AUTO: 1.3 % — SIGNIFICANT CHANGE UP (ref 0–2)
BILIRUB SERPL-MCNC: 0.8 MG/DL — SIGNIFICANT CHANGE UP (ref 0.2–1.2)
BUN SERPL-MCNC: 19 MG/DL — SIGNIFICANT CHANGE UP (ref 7–23)
CALCIUM SERPL-MCNC: 9.8 MG/DL — SIGNIFICANT CHANGE UP (ref 8.4–10.5)
CHLORIDE SERPL-SCNC: 91 MMOL/L — LOW (ref 96–108)
CO2 SERPL-SCNC: 20 MMOL/L — LOW (ref 22–31)
CREAT SERPL-MCNC: 1.01 MG/DL — SIGNIFICANT CHANGE UP (ref 0.5–1.3)
EOSINOPHIL NFR BLD AUTO: 0 % — SIGNIFICANT CHANGE UP (ref 0–6)
ETHANOL SERPL-MCNC: 160 MG/DL — HIGH (ref 0–10)
GLUCOSE SERPL-MCNC: 99 MG/DL — SIGNIFICANT CHANGE UP (ref 70–99)
HCT VFR BLD CALC: 36.3 % — LOW (ref 39–50)
HGB BLD-MCNC: 11.6 G/DL — LOW (ref 13–17)
LYMPHOCYTES # BLD AUTO: 36.3 % — SIGNIFICANT CHANGE UP (ref 13–44)
MAGNESIUM SERPL-MCNC: 1.6 MG/DL — SIGNIFICANT CHANGE UP (ref 1.6–2.6)
MCHC RBC-ENTMCNC: 24.6 PG — LOW (ref 27–34)
MCHC RBC-ENTMCNC: 32 G/DL — SIGNIFICANT CHANGE UP (ref 32–36)
MCV RBC AUTO: 77.1 FL — LOW (ref 80–100)
MONOCYTES NFR BLD AUTO: 9.2 % — SIGNIFICANT CHANGE UP (ref 2–14)
NEUTROPHILS NFR BLD AUTO: 53.2 % — SIGNIFICANT CHANGE UP (ref 43–77)
PHOSPHATE SERPL-MCNC: 3.8 MG/DL — SIGNIFICANT CHANGE UP (ref 2.5–4.5)
PLATELET # BLD AUTO: 151 K/UL — SIGNIFICANT CHANGE UP (ref 150–400)
POTASSIUM SERPL-MCNC: 4.5 MMOL/L — SIGNIFICANT CHANGE UP (ref 3.5–5.3)
POTASSIUM SERPL-SCNC: 4.5 MMOL/L — SIGNIFICANT CHANGE UP (ref 3.5–5.3)
PROT SERPL-MCNC: 7.9 G/DL — SIGNIFICANT CHANGE UP (ref 6–8.3)
RBC # BLD: 4.71 M/UL — SIGNIFICANT CHANGE UP (ref 4.2–5.8)
RBC # FLD: 19.8 % — HIGH (ref 10.3–16.9)
SODIUM SERPL-SCNC: 137 MMOL/L — SIGNIFICANT CHANGE UP (ref 135–145)
TROPONIN T SERPL-MCNC: <0.01 NG/ML — SIGNIFICANT CHANGE UP (ref 0–0.01)
WBC # BLD: 3 K/UL — LOW (ref 3.8–10.5)
WBC # FLD AUTO: 3 K/UL — LOW (ref 3.8–10.5)

## 2018-11-03 PROCEDURE — 99285 EMERGENCY DEPT VISIT HI MDM: CPT | Mod: 25

## 2018-11-03 PROCEDURE — 93010 ELECTROCARDIOGRAM REPORT: CPT

## 2018-11-03 PROCEDURE — 70450 CT HEAD/BRAIN W/O DYE: CPT | Mod: 26

## 2018-11-03 RX ORDER — FOLIC ACID 0.8 MG
1 TABLET ORAL DAILY
Qty: 0 | Refills: 0 | Status: DISCONTINUED | OUTPATIENT
Start: 2018-11-03 | End: 2018-11-03

## 2018-11-03 RX ORDER — SODIUM CHLORIDE 9 MG/ML
1000 INJECTION, SOLUTION INTRAVENOUS
Qty: 0 | Refills: 0 | Status: DISCONTINUED | OUTPATIENT
Start: 2018-11-03 | End: 2018-11-04

## 2018-11-03 RX ORDER — MAGNESIUM SULFATE 500 MG/ML
1 VIAL (ML) INJECTION ONCE
Qty: 0 | Refills: 0 | Status: COMPLETED | OUTPATIENT
Start: 2018-11-03 | End: 2018-11-03

## 2018-11-03 RX ORDER — APIXABAN 2.5 MG/1
5 TABLET, FILM COATED ORAL EVERY 12 HOURS
Qty: 0 | Refills: 0 | Status: DISCONTINUED | OUTPATIENT
Start: 2018-11-03 | End: 2018-11-04

## 2018-11-03 RX ORDER — ATORVASTATIN CALCIUM 80 MG/1
40 TABLET, FILM COATED ORAL AT BEDTIME
Qty: 0 | Refills: 0 | Status: DISCONTINUED | OUTPATIENT
Start: 2018-11-03 | End: 2018-11-07

## 2018-11-03 RX ORDER — METOPROLOL TARTRATE 50 MG
100 TABLET ORAL DAILY
Qty: 0 | Refills: 0 | Status: DISCONTINUED | OUTPATIENT
Start: 2018-11-03 | End: 2018-11-07

## 2018-11-03 RX ORDER — SODIUM CHLORIDE 9 MG/ML
1000 INJECTION INTRAMUSCULAR; INTRAVENOUS; SUBCUTANEOUS ONCE
Qty: 0 | Refills: 0 | Status: COMPLETED | OUTPATIENT
Start: 2018-11-03 | End: 2018-11-03

## 2018-11-03 RX ORDER — PANTOPRAZOLE SODIUM 20 MG/1
40 TABLET, DELAYED RELEASE ORAL
Qty: 0 | Refills: 0 | Status: DISCONTINUED | OUTPATIENT
Start: 2018-11-03 | End: 2018-11-07

## 2018-11-03 RX ADMIN — SODIUM CHLORIDE 1000 MILLILITER(S): 9 INJECTION INTRAMUSCULAR; INTRAVENOUS; SUBCUTANEOUS at 09:02

## 2018-11-03 RX ADMIN — Medication 1 MILLIGRAM(S): at 12:57

## 2018-11-03 RX ADMIN — SODIUM CHLORIDE 1000 MILLILITER(S): 9 INJECTION INTRAMUSCULAR; INTRAVENOUS; SUBCUTANEOUS at 09:03

## 2018-11-03 RX ADMIN — Medication 1 MILLIGRAM(S): at 08:29

## 2018-11-03 RX ADMIN — Medication 1 MILLIGRAM(S): at 10:58

## 2018-11-03 RX ADMIN — Medication 100 GRAM(S): at 16:16

## 2018-11-03 RX ADMIN — Medication 50 MILLIGRAM(S): at 16:16

## 2018-11-03 RX ADMIN — SODIUM CHLORIDE 2000 MILLILITER(S): 9 INJECTION INTRAMUSCULAR; INTRAVENOUS; SUBCUTANEOUS at 08:32

## 2018-11-03 RX ADMIN — Medication 100 MILLIGRAM(S): at 16:16

## 2018-11-03 RX ADMIN — SODIUM CHLORIDE 100 MILLILITER(S): 9 INJECTION, SOLUTION INTRAVENOUS at 22:12

## 2018-11-03 RX ADMIN — Medication 50 MILLIGRAM(S): at 08:57

## 2018-11-03 RX ADMIN — SODIUM CHLORIDE 2000 MILLILITER(S): 9 INJECTION INTRAMUSCULAR; INTRAVENOUS; SUBCUTANEOUS at 08:33

## 2018-11-03 RX ADMIN — ATORVASTATIN CALCIUM 40 MILLIGRAM(S): 80 TABLET, FILM COATED ORAL at 22:12

## 2018-11-03 RX ADMIN — APIXABAN 5 MILLIGRAM(S): 2.5 TABLET, FILM COATED ORAL at 17:31

## 2018-11-03 RX ADMIN — Medication 62.5 MILLIMOLE(S): at 16:17

## 2018-11-03 NOTE — H&P ADULT - ASSESSMENT
63 year old male with history of paroxysmal A-fib (s/p AICD, apixaban), CAD s/p PCI, LV thrombus, gastritis, alcohol abuse with frequent admissions, presenting with tremors and generalized fatigue 2/2 to alcohol withdrawal.

## 2018-11-03 NOTE — ED ADULT TRIAGE NOTE - CHIEF COMPLAINT QUOTE
c/o shaking, "alcohol withdrawal", last drink was all day Fri 'til Sat am, states he fell 3 times, on Eliquis c/o shaking, "alcohol withdrawal", last drink was all day Fri 'til Sat am, states he fell 3 times, on Eliquis, pt has ICD/pacemaker since 2013

## 2018-11-03 NOTE — ED PROVIDER NOTE - PHYSICAL EXAMINATION
CONSTITUTIONAL: +tremors, no signs of head trauma   HEAD: Normocephalic; atraumatic.   EYES: PERRL; EOM intact; conjunctiva and sclera clear  ENT: normal nose; no rhinorrhea; normal pharynx with no erythema or lesions. +tongue fasciculations   NECK: Supple; non-tender; no LAD  CARDIOVASCULAR: Normal S1, S2; no murmurs, rubs, or gallops. tachy   RESPIRATORY: Breathing easily; breath sounds clear and equal bilaterally; no wheezes, rhonchi, or rales.  GI: Soft; non-distended; non-tender; no palpable organomegaly.   MSK: FROM at all extremities, normal tone   EXT: No cyanosis or edema; N/V intact  SKIN: Normal for age and race; warm; dry; good turgor; no apparent lesions or rash.   NEURO: A & O x 3; face symmetric; +tremors   PSYCHOLOGICAL: The patient’s mood and manner are appropriate.

## 2018-11-03 NOTE — ED ADULT NURSE NOTE - NSIMPLEMENTINTERV_GEN_ALL_ED
Implemented All Universal Safety Interventions:  [place] to call system. Call bell, personal items and telephone within reach. Instruct patient to call for assistance. Room bathroom lighting operational. Non-slip footwear when patient is off stretcher. Physically safe environment: no spills, clutter or unnecessary equipment. Stretcher in lowest position, wheels locked, appropriate side rails in place.

## 2018-11-03 NOTE — H&P ADULT - PROBLEM SELECTOR PLAN 5
Continue home atorvastatin 40 mg bedtime In sinus tachycardia on admission      Continue home apixaban 5 mg BID  Continue home metoprolol 100 mg daily -In sinus tachycardia on admission  -CHADS-VASc 3  -s/p ICD  -Continue home apixaban 5 mg BID  -Continue home metoprolol 100 mg daily Continue atorvastatin 40 mg bedtime

## 2018-11-03 NOTE — ED PROVIDER NOTE - MEDICAL DECISION MAKING DETAILS
62 yo M with pAF s/p AICD, CAD s/p PCI, LV thrombus on Eliquis, gastritis, alcoholism c/o feeling shaky since 5am this morning. Pt states he has been drinking all day yesterday and into the morning. Pt reports falling 3 times yesterday but does not know why he fell. Denies head trauma. No cp. Pt tachycardiac. +tremors with tongue fasciculations. +etoh withdrawal

## 2018-11-03 NOTE — H&P ADULT - NSHPSOCIALHISTORY_GEN_ALL_CORE
Drinks 1-1.5 pints of Vodka 4-5 days per week. Last week morning of 11/3/18 at 6 AM.  Denies illicit drug use.  Former smoker. Quit in 1996. Smoked 1/2 PPD of cigarettes starting at age 20.    Retired. Lives by self.

## 2018-11-03 NOTE — H&P ADULT - PROBLEM SELECTOR PLAN 7
F: banana bag @ 100 mL/hr  E: monitor and replace PRN  N: DASH diet    Code: Full  DVT ppx: apixaban  GI ppx: pantoprazole  Dispo: admit to [facility] 1) PCP Contacted on Admission: (Y) --> Name & Phone #: Dr. Ck Levy  2) Date of Contact with PCP:  3) PCP Contacted at Discharge: (Y/N, N/A)  4) Summary of Handoff Given to PCP:   5) Post-Discharge Appointment Date and Location: -In sinus tachycardia on admission  -CHADS-VASc 3  -s/p ICD  -Continue home apixaban 5 mg BID  -Continue home metoprolol 100 mg daily

## 2018-11-03 NOTE — ED PROVIDER NOTE - OBJECTIVE STATEMENT
62 yo M with pAF s/p AICD, CAD s/p PCI, LV thrombus on Eliquis, gastritis, alcoholism c/o feeling shaky since 5am this morning. Pt states he has been drinking all day yesterday and into the morning. Pt reports falling 3 times yesterday but does not know why he fell. Denies head trauma. Pt reports feeling anxious and disoriented this morning. Reports hx of etoh withdrawal in past (no seizure history). Pt reports chronic sob with activity. Denies cp, fever, chills, ha, n/v.

## 2018-11-03 NOTE — H&P ADULT - PROBLEM SELECTOR PLAN 8
1) PCP Contacted on Admission: (Y) --> Name & Phone #: Dr. Ck Levy  2) Date of Contact with PCP:  3) PCP Contacted at Discharge: (Y/N, N/A)  4) Summary of Handoff Given to PCP:   5) Post-Discharge Appointment Date and Location: F: banana bag @ 100 mL/hr  E: monitor and replace PRN  N: DASH diet    Code: Full (has ICD in place)   DVT ppx: apixaban  GI ppx: pantoprazole  Dispo: admit to [facility]

## 2018-11-03 NOTE — H&P ADULT - NSHPLABSRESULTS_GEN_ALL_CORE
CBC Full  -  ( 03 Nov 2018 08:20 )  WBC Count : 3.0 K/uL  Hemoglobin : 11.6 g/dL  Hematocrit : 36.3 %  Platelet Count - Automated : 151 K/uL  Mean Cell Volume : 77.1 fL  Mean Cell Hemoglobin : 24.6 pg  Mean Cell Hemoglobin Concentration : 32.0 g/dL  Auto Neutrophil # : x  Auto Lymphocyte # : x  Auto Monocyte # : x  Auto Eosinophil # : x  Auto Basophil # : x  Auto Neutrophil % : 53.2 %  Auto Lymphocyte % : 36.3 %  Auto Monocyte % : 9.2 %  Auto Eosinophil % : 0.0 %  Auto Basophil % : 1.3 %    11-03    137  |  91<L>  |  19  ----------------------------<  99  4.5   |  20<L>  |  1.01    Ca    9.8      03 Nov 2018 08:20    TPro  7.9  /  Alb  4.8  /  TBili  0.8  /  DBili  x   /  AST  120<H>  /  ALT  77<H>  /  AlkPhos  68  11-03    CT head: No acute intracranial hemorrhage or calvarial fracture.     EKG: CBC Full  -  ( 03 Nov 2018 08:20 )  WBC Count : 3.0 K/uL  Hemoglobin : 11.6 g/dL  Hematocrit : 36.3 %  Platelet Count - Automated : 151 K/uL  Mean Cell Volume : 77.1 fL  Mean Cell Hemoglobin : 24.6 pg  Mean Cell Hemoglobin Concentration : 32.0 g/dL  Auto Neutrophil # : x  Auto Lymphocyte # : x  Auto Monocyte # : x  Auto Eosinophil # : x  Auto Basophil # : x  Auto Neutrophil % : 53.2 %  Auto Lymphocyte % : 36.3 %  Auto Monocyte % : 9.2 %  Auto Eosinophil % : 0.0 %  Auto Basophil % : 1.3 %    11-03    137  |  91<L>  |  19  ----------------------------<  99  4.5   |  20<L>  |  1.01    Ca    9.8      03 Nov 2018 08:20    TPro  7.9  /  Alb  4.8  /  TBili  0.8  /  DBili  x   /  AST  120<H>  /  ALT  77<H>  /  AlkPhos  68  11-03    CT head: No acute intracranial hemorrhage or calvarial fracture.     EKG: sinus tachycardia, anteroseptic infarct (no change from prior EKG)

## 2018-11-03 NOTE — ED ADULT NURSE NOTE - CHIEF COMPLAINT QUOTE
c/o shaking, "alcohol withdrawal", last drink was all day Fri 'til Sat am, states he fell 3 times, on Eliquis

## 2018-11-03 NOTE — ED ADULT NURSE NOTE - OBJECTIVE STATEMENT
64 y/o male reporting withdrawl s/s. PT reports drinking etoh everyday, and is active w/drawl at this time. Pt reports chest palpitations and tremors. Pt is observed to have bilateral hand tremors. Pt  speaks full sentences, MAEx4, has unlabored breathing. Abd soft nt nd. Skin dry warm.

## 2018-11-03 NOTE — ED PROVIDER NOTE - ATTENDING CONTRIBUTION TO CARE
pt seen and examined by me, key points of case david SON.  62 yo male co not feeling well, feeling tremors today.  normally drinks 2 pints/day, was drinking more than normal recently.  on exam, tremulous upper ext.  intial bp elevated.  given multiple doses of benzo but still does not feel better.  vitals improved.  will admit for alcohol withdrawal.

## 2018-11-03 NOTE — H&P ADULT - PROBLEM SELECTOR PROBLEM 5
Hyperlipidemia, unspecified hyperlipidemia type Paroxysmal a-fib Coronary artery disease involving native coronary artery of native heart, angina presence unspecified

## 2018-11-03 NOTE — H&P ADULT - PROBLEM SELECTOR PLAN 2
Chest pain atypical  Troponin negative x1 in ED  EKG 11/3 - sinus tachycardia, anteroseptic infarct (no change from prior EKG)  Patient has ICD in place  -Continue to monitor  -Re-start home apixaban, atorvastatin, metoprolol  -Repeat troponin Chest pain atypical. Likely 2/2 due to alcohol intake. Low suspicion for cardiac etiology.  Troponin negative x1 in ED  EKG 11/3 - sinus tachycardia, anteroseptic infarct (no change from prior EKG)  Patient has ICD in place  -PPI  -Re-start home apixaban, atorvastatin, metoprolol  -Check troponin

## 2018-11-03 NOTE — H&P ADULT - PROBLEM SELECTOR PLAN 6
Continue home apixaban 5 mg BID  Continue home metoprolol 100 mg daily F: banana bag @ 100 mL/hr  E: monitor and replace PRN  N: DASH diet    Code: Full (has ICD in place)   DVT ppx: apixaban  GI ppx: pantoprazole  Dispo: admit to [facility] EF 35% per echo on 10/15/18.  s/p ICD  Currently compensated.    Continue with home metoprolol succinated 100 mg daily

## 2018-11-03 NOTE — H&P ADULT - PROBLEM SELECTOR PLAN 3
Echo on 10/15/18 showing EF 35%, LV apical thrombus 2.0 cm in diameter     Continue home apixaban 5 mg BID Echo on 10/15/18 showing EF 35%, LV apical thrombus 2.0 cm in diameter   -Continue home apixaban 5 mg BID

## 2018-11-03 NOTE — H&P ADULT - PROBLEM SELECTOR PLAN 1
CIWA of 8 on admission (for moderate tremors and anxiety)  Last drink at 6 AM on 11/3/18. Drank 1.5 pinks of Vodka over past day.  Longstanding history of alcohol abuse and withdrawal with multiple admissions to [facility].  -Chlordiazepoxide 50 mg PO Q8H  -CIWA monitoring Q4H   -Lorazepam 2 mg Q2H IV PRN for CIWA > or equal to 9  -Banana bag @ 100 mL/hr  -Monitor electrolytes and replace PRN  -Encourage cessation and offer rehab on discharge CIWA of 8 on admission (for moderate tremors, nausea, and anxiety)  Last drink at 6 AM on 11/3/18. Drank 1.5 pinks of Vodka over past day.  Longstanding history of alcohol abuse and withdrawal with multiple admissions to [facility].  -Chlordiazepoxide 50 mg PO Q8H  -CIWA monitoring Q4H   -Lorazepam 2 mg Q2H IV PRN for CIWA > or equal to 9  -Banana bag @ 100 mL/hr  -Monitor electrolytes and replace PRN  -Encourage cessation and offer rehab on discharge

## 2018-11-03 NOTE — H&P ADULT - NSHPPHYSICALEXAM_GEN_ALL_CORE
GENERAL: Mildly anxious. Male sitting in bed. Appears stated age. Mildly disheveled.   HEENT:  Atraumatic, Normocephalic,  extraocular eye movements intact, PERRLA, mild bilateral conjunctival injection, oropharynx clear without exudates or erythema  NECK: Supple, No JVD, no LAD  CHEST: no gross deformities   LUNG: Clear to auscultation bilaterally; No wheezing, rales, rhonchi, or stridor  HEART: fast rate and regular rhythm; S1 and S2 audible; No murmurs, rubs, or gallops  ABDOMEN: Soft, Nontender, Nondistended; Bowel sounds present in all four quadrants; no hepatosplenomegaly  EXTREMITIES:  2+ Peripheral Pulses bilaterally, No clubbing, cyanosis, or edema  NEUROLOGY: awake, alert, oriented x3; bilateral arm tremor, most noticeable when outstretched. no focal deficits   SKIN: No rashes or lesions GENERAL: Mildly anxious. Male sitting in bed. Appears stated age. Mildly disheveled.   HEENT:  Atraumatic, Normocephalic,  extraocular eye movements intact, PERRLA, mild bilateral conjunctival injection, oropharynx clear without exudates or erythema  NECK: Supple, No JVD, no LAD  CHEST: no gross deformities; ICD in place in left chest   LUNG: Clear to auscultation bilaterally; No wheezing, rales, rhonchi, or stridor  HEART: fast rate and regular rhythm; S1 and S2 audible; No murmurs, rubs, or gallops  ABDOMEN: Soft, Nontender, Nondistended; Bowel sounds present in all four quadrants; no hepatosplenomegaly  EXTREMITIES:  2+ Peripheral Pulses bilaterally, No clubbing, cyanosis, or edema  NEUROLOGY: awake, alert, oriented x3; bilateral arm tremor, most noticeable when outstretched. no focal deficits   SKIN: No rashes or lesions

## 2018-11-03 NOTE — H&P ADULT - HISTORY OF PRESENT ILLNESS
63 year old male with history of paroxysmal A-fib (s/p AICD, apixaban), CAD s/p PCI, LV thrombus, gastritis, alcohol abuse with frequent admissions, presenting with tremors and generalized fatigue. Patient reports he has been drinking 1.5 pints of Vodka between yesterday and finished at 6 AM this morning. Patient says he usually drinks 1 pint of Vodka 4-5 days per week, but has been drinking more       In ED, initial vital signs were T 99.2 F, P 126, /85, R 18, SpO2 96% on RA. Alcohol level 160. Per ED assessment. CIWA was 9. Given Librium 50 mg x 1, and Lorazepam 1 mg IVP x 3, 1L IV NS boluses x 2. 63 year old male with history of paroxysmal A-fib (s/p AICD, apixaban), CAD s/p PCI, LV thrombus, gastritis, alcohol abuse with frequent admissions, presenting with tremors and generalized fatigue. Patient reports he has been drinking 1.5 pints of Vodka between yesterday and finished at 6 AM this morning. Patient says he usually drinks 1 pint of Vodka 4-5 days per week, but has been drinking more due to personal stress. This morning, he noticed worsening tremors in upper extremities, difficulty with ambulation due to worsening gait and balance, and per wearable vitals monitoring device noticed he was tachycardia to 110s and with elevated BP. Reports associated constant, mild mid-epigastric chest pressure that is non-radiating and started this morning, and not worsened with activity. Reports nausea, but no vomiting. Denies visual, auditory, or tactile hallucinations. Denies illicit drug use. Denies CARRERO. Denies falling and hitting head.       In ED, initial vital signs were T 99.2 F, P 126, /85, R 18, SpO2 96% on RA. Alcohol level 160. Per ED assessment. CIWA was 9. Given Librium 50 mg x 1, and Lorazepam 1 mg IVP x 3, 1L IV NS boluses x 2. 63 year old male with history of paroxysmal A-fib (s/p AICD, apixaban), CAD s/p PCI, LV thrombus, gastritis, alcohol abuse with frequent admissions, presenting with tremors and generalized fatigue. Patient reports he has been drinking 1.5 pints of Vodka between yesterday and finished at 6 AM this morning. Patient says he usually drinks 1 pint of Vodka 4-5 days per week, but has been drinking more due to personal stress. This morning, he noticed worsening tremors in upper extremities, difficulty with ambulation due to worsening gait and balance, and per wearable vitals monitoring device noticed he was tachycardia to 110s and with elevated BP. Reports associated constant, mild mid-epigastric chest pressure that is non-radiating and started this morning, and not worsened with activity. Reports nausea, but no vomiting. Denies visual, auditory, or tactile hallucinations. Denies illicit drug use. Denies CARRERO. Denies falling and hitting head. Feels thirsty this morning.      In ED, initial vital signs were T 99.2 F, P 126, /85, R 18, SpO2 96% on RA. Alcohol level 160. Per ED assessment. CIWA was 9. Given Librium 50 mg x 1, and Lorazepam 1 mg IVP x 3, 1L IV NS boluses x 2.

## 2018-11-03 NOTE — H&P ADULT - PROBLEM SELECTOR PROBLEM 7
Nutrition, metabolism, and development symptoms Transition of care performed with sharing of clinical summary Paroxysmal a-fib

## 2018-11-03 NOTE — H&P ADULT - PROBLEM SELECTOR PLAN 9
1) PCP Contacted on Admission: (Y) --> Name & Phone #: Dr. Ck Levy  2) Date of Contact with PCP:  3) PCP Contacted at Discharge: (Y/N, N/A)  4) Summary of Handoff Given to PCP:   5) Post-Discharge Appointment Date and Location:

## 2018-11-04 LAB
ALBUMIN SERPL ELPH-MCNC: 3.6 G/DL — SIGNIFICANT CHANGE UP (ref 3.3–5)
ALP SERPL-CCNC: 54 U/L — SIGNIFICANT CHANGE UP (ref 40–120)
ALT FLD-CCNC: 48 U/L — HIGH (ref 10–45)
ANION GAP SERPL CALC-SCNC: 16 MMOL/L — SIGNIFICANT CHANGE UP (ref 5–17)
AST SERPL-CCNC: 70 U/L — HIGH (ref 10–40)
BASOPHILS NFR BLD AUTO: 0.3 % — SIGNIFICANT CHANGE UP (ref 0–2)
BILIRUB SERPL-MCNC: 1.2 MG/DL — SIGNIFICANT CHANGE UP (ref 0.2–1.2)
BUN SERPL-MCNC: 15 MG/DL — SIGNIFICANT CHANGE UP (ref 7–23)
CALCIUM SERPL-MCNC: 8.8 MG/DL — SIGNIFICANT CHANGE UP (ref 8.4–10.5)
CHLORIDE SERPL-SCNC: 93 MMOL/L — LOW (ref 96–108)
CO2 SERPL-SCNC: 24 MMOL/L — SIGNIFICANT CHANGE UP (ref 22–31)
CREAT SERPL-MCNC: 0.88 MG/DL — SIGNIFICANT CHANGE UP (ref 0.5–1.3)
EOSINOPHIL NFR BLD AUTO: 1.5 % — SIGNIFICANT CHANGE UP (ref 0–6)
GLUCOSE SERPL-MCNC: 95 MG/DL — SIGNIFICANT CHANGE UP (ref 70–99)
HCT VFR BLD CALC: 32.2 % — LOW (ref 39–50)
HGB BLD-MCNC: 10.3 G/DL — LOW (ref 13–17)
LYMPHOCYTES # BLD AUTO: 32.3 % — SIGNIFICANT CHANGE UP (ref 13–44)
MAGNESIUM SERPL-MCNC: 1.6 MG/DL — SIGNIFICANT CHANGE UP (ref 1.6–2.6)
MCHC RBC-ENTMCNC: 24.9 PG — LOW (ref 27–34)
MCHC RBC-ENTMCNC: 32 G/DL — SIGNIFICANT CHANGE UP (ref 32–36)
MCV RBC AUTO: 78 FL — LOW (ref 80–100)
MONOCYTES NFR BLD AUTO: 7.4 % — SIGNIFICANT CHANGE UP (ref 2–14)
NEUTROPHILS NFR BLD AUTO: 58.5 % — SIGNIFICANT CHANGE UP (ref 43–77)
PHOSPHATE SERPL-MCNC: 2.2 MG/DL — LOW (ref 2.5–4.5)
PLATELET # BLD AUTO: 118 K/UL — LOW (ref 150–400)
POTASSIUM SERPL-MCNC: 4.2 MMOL/L — SIGNIFICANT CHANGE UP (ref 3.5–5.3)
POTASSIUM SERPL-SCNC: 4.2 MMOL/L — SIGNIFICANT CHANGE UP (ref 3.5–5.3)
PROT SERPL-MCNC: 6.1 G/DL — SIGNIFICANT CHANGE UP (ref 6–8.3)
RBC # BLD: 4.13 M/UL — LOW (ref 4.2–5.8)
RBC # FLD: 20 % — HIGH (ref 10.3–16.9)
SODIUM SERPL-SCNC: 133 MMOL/L — LOW (ref 135–145)
WBC # BLD: 3.2 K/UL — LOW (ref 3.8–10.5)
WBC # FLD AUTO: 3.2 K/UL — LOW (ref 3.8–10.5)

## 2018-11-04 RX ORDER — MAGNESIUM SULFATE 500 MG/ML
1 VIAL (ML) INJECTION ONCE
Qty: 0 | Refills: 0 | Status: COMPLETED | OUTPATIENT
Start: 2018-11-04 | End: 2018-11-04

## 2018-11-04 RX ORDER — THIAMINE MONONITRATE (VIT B1) 100 MG
100 TABLET ORAL DAILY
Qty: 0 | Refills: 0 | Status: DISCONTINUED | OUTPATIENT
Start: 2018-11-04 | End: 2018-11-04

## 2018-11-04 RX ORDER — FOLIC ACID 0.8 MG
1 TABLET ORAL DAILY
Qty: 0 | Refills: 0 | Status: DISCONTINUED | OUTPATIENT
Start: 2018-11-04 | End: 2018-11-04

## 2018-11-04 RX ORDER — ACETAMINOPHEN 500 MG
650 TABLET ORAL ONCE
Qty: 0 | Refills: 0 | Status: COMPLETED | OUTPATIENT
Start: 2018-11-04 | End: 2018-11-04

## 2018-11-04 RX ORDER — FOLIC ACID 0.8 MG
1 TABLET ORAL DAILY
Qty: 0 | Refills: 0 | Status: DISCONTINUED | OUTPATIENT
Start: 2018-11-04 | End: 2018-11-07

## 2018-11-04 RX ORDER — LANOLIN ALCOHOL/MO/W.PET/CERES
3 CREAM (GRAM) TOPICAL AT BEDTIME
Qty: 0 | Refills: 0 | Status: DISCONTINUED | OUTPATIENT
Start: 2018-11-04 | End: 2018-11-07

## 2018-11-04 RX ORDER — THIAMINE MONONITRATE (VIT B1) 100 MG
100 TABLET ORAL DAILY
Qty: 0 | Refills: 0 | Status: DISCONTINUED | OUTPATIENT
Start: 2018-11-04 | End: 2018-11-07

## 2018-11-04 RX ADMIN — Medication 50 MILLIGRAM(S): at 07:53

## 2018-11-04 RX ADMIN — Medication 100 MILLIGRAM(S): at 22:04

## 2018-11-04 RX ADMIN — Medication 50 MILLIGRAM(S): at 15:31

## 2018-11-04 RX ADMIN — Medication 1 MILLIGRAM(S): at 22:04

## 2018-11-04 RX ADMIN — Medication 62.5 MILLIMOLE(S): at 11:18

## 2018-11-04 RX ADMIN — APIXABAN 5 MILLIGRAM(S): 2.5 TABLET, FILM COATED ORAL at 06:57

## 2018-11-04 RX ADMIN — Medication 3 MILLIGRAM(S): at 02:45

## 2018-11-04 RX ADMIN — Medication 650 MILLIGRAM(S): at 11:27

## 2018-11-04 RX ADMIN — Medication 3 MILLIGRAM(S): at 22:04

## 2018-11-04 RX ADMIN — Medication 100 GRAM(S): at 10:14

## 2018-11-04 RX ADMIN — PANTOPRAZOLE SODIUM 40 MILLIGRAM(S): 20 TABLET, DELAYED RELEASE ORAL at 06:57

## 2018-11-04 RX ADMIN — ATORVASTATIN CALCIUM 40 MILLIGRAM(S): 80 TABLET, FILM COATED ORAL at 22:04

## 2018-11-04 RX ADMIN — Medication 100 MILLIGRAM(S): at 07:53

## 2018-11-04 RX ADMIN — Medication 50 MILLIGRAM(S): at 00:34

## 2018-11-04 RX ADMIN — Medication 650 MILLIGRAM(S): at 10:57

## 2018-11-04 NOTE — PROGRESS NOTE ADULT - PROBLEM SELECTOR PLAN 1
CIWA of 8 on admission (for moderate tremors, nausea, and anxiety)  Last drink at 6 AM on 11/3/18. Drank 1.5 pinks of Vodka over past day.  Longstanding history of alcohol abuse and withdrawal with multiple admissions to [facility].  -Chlordiazepoxide 50 mg PO Q8H  -CIWA monitoring Q4H   -Lorazepam 2 mg Q2H IV PRN for CIWA > or equal to 9  -Banana bag @ 100 mL/hr  -Monitor electrolytes and replace PRN  -Encourage cessation and offer rehab on discharge CIWA of 8 on admission (for moderate tremors, nausea, and anxiety)  Last drink at 6 AM on 11/3/18. Drank 1.5 pinks of Vodka over past day.  Longstanding history of alcohol abuse and withdrawal with multiple admissions to [facility].  -Chlordiazepoxide 50 mg PO Q8H  -CIWA monitoring Q4H   -Lorazepam 2 mg Q2H IV PRN for CIWA > or equal to 9  -Banana bag @ 100 mL/hr  -Monitor electrolytes and replace PRN  -Pt informed of consequences of alcohol use. Rehab suggested to patient and he seems amenable.

## 2018-11-04 NOTE — PROGRESS NOTE ADULT - PROBLEM SELECTOR PLAN 6
EF 35% per echo on 10/15/18.  s/p ICD  Currently compensated.    Continue with home metoprolol succinated 100 mg daily

## 2018-11-04 NOTE — PROGRESS NOTE ADULT - SUBJECTIVE AND OBJECTIVE BOX
Pt readmitted for ETOH withdrawl    Pt is unable to totally cease from drinking Alcohol    Pt fell   three times in recent days prompting admission    PAST MEDICAL & SURGICAL HISTORY:  Thrombus in heart chamber: LV  ICD (implantable cardioverter-defibrillator) in place  Pacemaker  ETOH abuse  Paroxysmal a-fib  Myocardial infarction involving other coronary artery  Gastritis  Atherosclerosis of coronary artery: Coronary artery disease  Automatic implantable cardiac defibrillator in situ: ICD (implantable cardioverter-defibrillator) in place    MEDICATIONS  (STANDING):  apixaban 5 milliGRAM(s) Oral every 12 hours  atorvastatin 40 milliGRAM(s) Oral at bedtime  chlordiazePOXIDE 50 milliGRAM(s) Oral every 8 hours  melatonin 3 milliGRAM(s) Oral at bedtime  metoprolol succinate  milliGRAM(s) Oral daily  multivitamin/thiamine/folic acid in sodium chloride 0.9% 1000 milliLiter(s) (100 mL/Hr) IV Continuous <Continuous>  pantoprazole    Tablet 40 milliGRAM(s) Oral before breakfast    MEDICATIONS  (PRN):  LORazepam   Injectable 2 milliGRAM(s) IV Push every 2 hours PRN CIWA > or equal to 9    ICU Vital Signs Last 24 Hrs  T(C): 37 (04 Nov 2018 09:05), Max: 37 (04 Nov 2018 09:05)  T(F): 98.6 (04 Nov 2018 09:05), Max: 98.6 (04 Nov 2018 09:05)  HR: 77 (04 Nov 2018 09:05) (66 - 92)  BP: 121/88 (04 Nov 2018 09:05) (109/78 - 148/79)  BP(mean): --  ABP: --  ABP(mean): --  RR: 18 (04 Nov 2018 09:05) (18 - 20)  SpO2: 99% (04 Nov 2018 09:05) (97% - 100%)      Lungs clear  last CXR 10 14      clear lungs    AICD    last EKG   NSR   old IWMI   Ant Lat MI     CV s1 s2    echo    EF 35 %   LV thrombus apical region   abd soft  ext stable    CT Head   No intracranial Hemorrhage                        10.3   3.2   )-----------( 118      ( 04 Nov 2018 08:01 )             32.2   11-04    133<L>  |  93<L>  |  15  ----------------------------<  95  4.2   |  24  |  0.88    Ca    8.8      04 Nov 2018 08:01  Phos  2.2     11-04  Mg     1.6     11-04    TPro  6.1  /  Alb  3.6  /  TBili  1.2  /  DBili  x   /  AST  70<H>  /  ALT  48<H>  /  AlkPhos  54  11-04  CARDIAC MARKERS ( 03 Nov 2018 16:26 )  x     / <0.01 ng/mL / x     / x     / x Pt readmitted for ETOH withdrawl    Pt is unable to totally cease from drinking Alcohol    Pt fell   three times in recent days prompting admission    PAST MEDICAL & SURGICAL HISTORY:  Thrombus in heart chamber: LV  ICD (implantable cardioverter-defibrillator) in place  Pacemaker  ETOH abuse  Paroxysmal a-fib  Myocardial infarction involving other coronary artery  Gastritis  Atherosclerosis of coronary artery: Coronary artery disease  Automatic implantable cardiac defibrillator in situ: ICD (implantable cardioverter-defibrillator) in place    MEDICATIONS  (STANDING):  apixaban 5 milliGRAM(s) Oral every 12 hours  atorvastatin 40 milliGRAM(s) Oral at bedtime  chlordiazePOXIDE 50 milliGRAM(s) Oral every 8 hours  melatonin 3 milliGRAM(s) Oral at bedtime  metoprolol succinate  milliGRAM(s) Oral daily  multivitamin/thiamine/folic acid in sodium chloride 0.9% 1000 milliLiter(s) (100 mL/Hr) IV Continuous <Continuous>  pantoprazole    Tablet 40 milliGRAM(s) Oral before breakfast    MEDICATIONS  (PRN):  LORazepam   Injectable 2 milliGRAM(s) IV Push every 2 hours PRN CIWA > or equal to 9    ICU Vital Signs Last 24 Hrs  T(C): 37 (04 Nov 2018 09:05), Max: 37 (04 Nov 2018 09:05)  T(F): 98.6 (04 Nov 2018 09:05), Max: 98.6 (04 Nov 2018 09:05)  HR: 77 (04 Nov 2018 09:05) (66 - 92)  BP: 121/88 (04 Nov 2018 09:05) (109/78 - 148/79)  BP(mean): --  ABP: --  ABP(mean): --  RR: 18 (04 Nov 2018 09:05) (18 - 20)  SpO2: 99% (04 Nov 2018 09:05) (97% - 100%)      Lungs clear  last CXR 10 14      clear lungs    AICD    last EKG   NSR   old IWMI   Ant Lat MI     CV s1 s2    echo    EF 35 %   LV thrombus apical region   abd soft  ext stable    CT Head   No intracranial Hemorrhage                        10.3   3.2   )-----------( 118      ( 04 Nov 2018 08:01 )             32.2   11-04    133<L>  |  93<L>  |  15  ----------------------------<  95  4.2   |  24  |  0.88    Ca    8.8      04 Nov 2018 08:01  Phos  2.2     11-04  Mg     1.6     11-04    TPro  6.1  /  Alb  3.6  /  TBili  1.2  /  DBili  x   /  AST  70<H>  /  ALT  48<H>  /  AlkPhos  54  11-04  CARDIAC MARKERS ( 03 Nov 2018 16:26 )  x     / <0.01 ng/mL / x     / x     / x        Alcohol, Blood (11.03.18 @ 08:20)    Alcohol, Blood: 160: TOXIC CONCENTRATIONS:  Flushing, Slowing of  Reflexes, Impaired Visual Acuity:     Depression of CNS: > 100  Fatalities Reported:  > 400  These ranges are intended as general guidelines.  Alcohol metabolism can  vary widely among individuals.  This test is approved for clinical and  not for forensic purposes. mg/dL    Alcohol, Blood: 113: TOXIC CONCENTRATIONS:  Flushing, Slowing of  Reflexes, Impaired Visual Acuity:     Depression of CNS: > 100  Fatalities Reported:  > 400  These ranges are intended as general guidelines.  Alcohol metabolism can  vary widely among individuals.  This test is approved for clinical and  not for forensic purposes. mg/dL (10.14.18 @ 14:38)

## 2018-11-04 NOTE — PROGRESS NOTE ADULT - PROBLEM SELECTOR PLAN 8
F: banana bag @ 100 mL/hr  E: monitor and replace PRN  N: DASH diet    Code: Full (has ICD in place)   DVT ppx: apixaban  GI ppx: pantoprazole  Dispo: admit to F

## 2018-11-04 NOTE — PROGRESS NOTE ADULT - SUBJECTIVE AND OBJECTIVE BOX
OVERNIGHT EVENTS:    SUBJECTIVE / INTERVAL HPI: Patient seen and examined at bedside.     VITAL SIGNS:  Vital Signs Last 24 Hrs  T(C): 36.9 (04 Nov 2018 04:22), Max: 36.9 (03 Nov 2018 14:16)  T(F): 98.4 (04 Nov 2018 04:22), Max: 98.5 (03 Nov 2018 14:16)  HR: 68 (04 Nov 2018 04:22) (68 - 92)  BP: 127/80 (04 Nov 2018 04:22) (109/78 - 148/79)  BP(mean): --  RR: 18 (04 Nov 2018 04:22) (18 - 20)  SpO2: 98% (04 Nov 2018 04:22) (97% - 100%)    PHYSICAL EXAM:    General: NAD  HEENT: NCAT; PERRL, anicteric sclera  Neck: supple, trachea midline  Cardiovascular: S1, S2 normal; RRR, no M/G/R  Respiratory: CTABL; no W/R/R  Gastrointestinal: soft, nontender, nondistended. bowel sounds present.  Skin: no ulcerations or visible rashes appreciated  Extremities: WWP; no edema, clubbing or cyanosis  Vascular: 2+ radial, DP/PT pulses B/L  Neurological: AAOx3; no focal deficits    MEDICATIONS:  MEDICATIONS  (STANDING):  apixaban 5 milliGRAM(s) Oral every 12 hours  atorvastatin 40 milliGRAM(s) Oral at bedtime  chlordiazePOXIDE 50 milliGRAM(s) Oral every 8 hours  melatonin 3 milliGRAM(s) Oral at bedtime  metoprolol succinate  milliGRAM(s) Oral daily  multivitamin/thiamine/folic acid in sodium chloride 0.9% 1000 milliLiter(s) (100 mL/Hr) IV Continuous <Continuous>  pantoprazole    Tablet 40 milliGRAM(s) Oral before breakfast    MEDICATIONS  (PRN):  LORazepam   Injectable 2 milliGRAM(s) IV Push every 2 hours PRN CIWA > or equal to 9      ALLERGIES:  Allergies    Capoten (Short breath; Rash; Hives)  heparin (Other (Mod to Severe))  penicillin (Short breath; Rash; Hives)    Intolerances        LABS:                        11.6   3.0   )-----------( 151      ( 03 Nov 2018 08:20 )             36.3     11-03    137  |  91<L>  |  19  ----------------------------<  99  4.5   |  20<L>  |  1.01    Ca    9.8      03 Nov 2018 08:20  Phos  3.8     11-03  Mg     1.6     11-03    TPro  7.9  /  Alb  4.8  /  TBili  0.8  /  DBili  x   /  AST  120<H>  /  ALT  77<H>  /  AlkPhos  68  11-03        CAPILLARY BLOOD GLUCOSE          RADIOLOGY & ADDITIONAL TESTS: Reviewed. OVERNIGHT EVENTS: Had 1 episode of BRBPR. Rectal exam + little blood. hemodynamically stable. Pt states that he has history of hemorrhoids and has BRBPR often. He has a GI doctor and is scheduled for hemorrhoid procedure.  CIWA 5 overnight.     SUBJECTIVE / INTERVAL HPI: Patient seen and examined at bedside. Pt states he feels better. Denies headache, n/v, abdominal pain. He is anxious and feels dizzy when he walks to the bathroom. He has constipation. Denies CP, SOB, dysuria.     VITAL SIGNS:  Vital Signs Last 24 Hrs  T(C): 36.9 (04 Nov 2018 04:22), Max: 36.9 (03 Nov 2018 14:16)  T(F): 98.4 (04 Nov 2018 04:22), Max: 98.5 (03 Nov 2018 14:16)  HR: 68 (04 Nov 2018 04:22) (68 - 92)  BP: 127/80 (04 Nov 2018 04:22) (109/78 - 148/79)  BP(mean): --  RR: 18 (04 Nov 2018 04:22) (18 - 20)  SpO2: 98% (04 Nov 2018 04:22) (97% - 100%)    PHYSICAL EXAM:  GENERAL: Mildly anxious. Male sitting in bed. Appears stated age. Mildly disheveled.   HEENT:  Atraumatic, Normocephalic,  extraocular eye movements intact, PERRLA, mild bilateral conjunctival injection, oropharynx clear without exudates or erythema, +tongue fasiculations  NECK: Supple, No JVD, no LAD  CHEST: no gross deformities; ICD in place in left chest   LUNG: Clear to auscultation bilaterally; No wheezing, rales, rhonchi, or stridor  HEART: normal RR, S1 and S2 audible; No murmurs, rubs, or gallops  ABDOMEN: Soft, Nontender, Nondistended; Bowel sounds present in all four quadrants; no hepatosplenomegaly  EXTREMITIES:  2+ Peripheral Pulses bilaterally, No clubbing, cyanosis, or edema  NEUROLOGY: awake, alert, oriented x3; b/l arm tremor L>R, most noticeable when outstretched. no focal deficits   SKIN: No rashes or lesions      MEDICATIONS:  MEDICATIONS  (STANDING):  apixaban 5 milliGRAM(s) Oral every 12 hours  atorvastatin 40 milliGRAM(s) Oral at bedtime  chlordiazePOXIDE 50 milliGRAM(s) Oral every 8 hours  melatonin 3 milliGRAM(s) Oral at bedtime  metoprolol succinate  milliGRAM(s) Oral daily  multivitamin/thiamine/folic acid in sodium chloride 0.9% 1000 milliLiter(s) (100 mL/Hr) IV Continuous <Continuous>  pantoprazole    Tablet 40 milliGRAM(s) Oral before breakfast    MEDICATIONS  (PRN):  LORazepam   Injectable 2 milliGRAM(s) IV Push every 2 hours PRN CIWA > or equal to 9      ALLERGIES:  Allergies    Capoten (Short breath; Rash; Hives)  heparin (Other (Mod to Severe))  penicillin (Short breath; Rash; Hives)    Intolerances        LABS:                        11.6   3.0   )-----------( 151      ( 03 Nov 2018 08:20 )             36.3     11-03    137  |  91<L>  |  19  ----------------------------<  99  4.5   |  20<L>  |  1.01    Ca    9.8      03 Nov 2018 08:20  Phos  3.8     11-03  Mg     1.6     11-03    TPro  7.9  /  Alb  4.8  /  TBili  0.8  /  DBili  x   /  AST  120<H>  /  ALT  77<H>  /  AlkPhos  68  11-03        CAPILLARY BLOOD GLUCOSE          RADIOLOGY & ADDITIONAL TESTS: Reviewed.

## 2018-11-04 NOTE — PROGRESS NOTE ADULT - PROBLEM SELECTOR PLAN 2
Chest pain atypical. Likely 2/2 due to alcohol intake. Low suspicion for cardiac etiology.  Troponin negative x1 in ED  EKG 11/3 - sinus tachycardia, anteroseptic infarct (no change from prior EKG)  Patient has ICD in place  -PPI  -Re-start home apixaban, atorvastatin, metoprolol  -Check troponin Chest pain atypical. Likely 2/2 due to alcohol intake. Low suspicion for cardiac etiology.  Troponin negative x1 in ED  EKG 11/3 - sinus tachycardia, anteroseptic infarct (no change from prior EKG)  Patient has ICD in place  -PPI  -c/w home apixaban, atorvastatin, metoprolol  -no complaints of CP today  -trop <0.01

## 2018-11-04 NOTE — PROGRESS NOTE ADULT - PROBLEM SELECTOR PLAN 3
Echo on 10/15/18 showing EF 35%, LV apical thrombus 2.0 cm in diameter   -Continue home apixaban 5 mg BID

## 2018-11-05 LAB
ALBUMIN SERPL ELPH-MCNC: 3.9 G/DL — SIGNIFICANT CHANGE UP (ref 3.3–5)
ALP SERPL-CCNC: 56 U/L — SIGNIFICANT CHANGE UP (ref 40–120)
ALT FLD-CCNC: 43 U/L — SIGNIFICANT CHANGE UP (ref 10–45)
AMYLASE P1 CFR SERPL: 71 U/L — SIGNIFICANT CHANGE UP (ref 25–125)
ANION GAP SERPL CALC-SCNC: 14 MMOL/L — SIGNIFICANT CHANGE UP (ref 5–17)
AST SERPL-CCNC: 55 U/L — HIGH (ref 10–40)
BILIRUB DIRECT SERPL-MCNC: <0.2 MG/DL — SIGNIFICANT CHANGE UP (ref 0–0.2)
BILIRUB INDIRECT FLD-MCNC: >0.5 MG/DL — SIGNIFICANT CHANGE UP (ref 0.2–1)
BILIRUB SERPL-MCNC: 0.7 MG/DL — SIGNIFICANT CHANGE UP (ref 0.2–1.2)
BLD GP AB SCN SERPL QL: NEGATIVE — SIGNIFICANT CHANGE UP
BUN SERPL-MCNC: 12 MG/DL — SIGNIFICANT CHANGE UP (ref 7–23)
CALCIUM SERPL-MCNC: 9.3 MG/DL — SIGNIFICANT CHANGE UP (ref 8.4–10.5)
CHLORIDE SERPL-SCNC: 95 MMOL/L — LOW (ref 96–108)
CO2 SERPL-SCNC: 23 MMOL/L — SIGNIFICANT CHANGE UP (ref 22–31)
CREAT SERPL-MCNC: 0.87 MG/DL — SIGNIFICANT CHANGE UP (ref 0.5–1.3)
FERRITIN SERPL-MCNC: 119 NG/ML — SIGNIFICANT CHANGE UP (ref 30–400)
GLUCOSE SERPL-MCNC: 96 MG/DL — SIGNIFICANT CHANGE UP (ref 70–99)
HAV IGM SER-ACNC: SIGNIFICANT CHANGE UP
HBV CORE IGM SER-ACNC: SIGNIFICANT CHANGE UP
HBV SURFACE AG SER-ACNC: SIGNIFICANT CHANGE UP
HCT VFR BLD CALC: 34.2 % — LOW (ref 39–50)
HCT VFR BLD CALC: 34.5 % — LOW (ref 39–50)
HCV AB S/CO SERPL IA: 0.05 S/CO — SIGNIFICANT CHANGE UP
HCV AB SERPL-IMP: SIGNIFICANT CHANGE UP
HGB BLD-MCNC: 11 G/DL — LOW (ref 13–17)
HGB BLD-MCNC: 11.1 G/DL — LOW (ref 13–17)
IRON SATN MFR SERPL: 12 % — LOW (ref 16–55)
IRON SATN MFR SERPL: 32 UG/DL — LOW (ref 45–165)
LIDOCAIN IGE QN: 211 U/L — HIGH (ref 7–60)
MAGNESIUM SERPL-MCNC: 1.8 MG/DL — SIGNIFICANT CHANGE UP (ref 1.6–2.6)
MCHC RBC-ENTMCNC: 24.7 PG — LOW (ref 27–34)
MCHC RBC-ENTMCNC: 24.7 PG — LOW (ref 27–34)
MCHC RBC-ENTMCNC: 32.2 G/DL — SIGNIFICANT CHANGE UP (ref 32–36)
MCHC RBC-ENTMCNC: 32.2 G/DL — SIGNIFICANT CHANGE UP (ref 32–36)
MCV RBC AUTO: 76.8 FL — LOW (ref 80–100)
MCV RBC AUTO: 76.9 FL — LOW (ref 80–100)
PHOSPHATE SERPL-MCNC: 3 MG/DL — SIGNIFICANT CHANGE UP (ref 2.5–4.5)
PLATELET # BLD AUTO: 108 K/UL — LOW (ref 150–400)
PLATELET # BLD AUTO: 78 K/UL — LOW (ref 150–400)
POTASSIUM SERPL-MCNC: 4 MMOL/L — SIGNIFICANT CHANGE UP (ref 3.5–5.3)
POTASSIUM SERPL-SCNC: 4 MMOL/L — SIGNIFICANT CHANGE UP (ref 3.5–5.3)
PROT SERPL-MCNC: 6.5 G/DL — SIGNIFICANT CHANGE UP (ref 6–8.3)
RBC # BLD: 4.45 M/UL — SIGNIFICANT CHANGE UP (ref 4.2–5.8)
RBC # BLD: 4.49 M/UL — SIGNIFICANT CHANGE UP (ref 4.2–5.8)
RBC # FLD: 19.6 % — HIGH (ref 10.3–16.9)
RBC # FLD: 19.9 % — HIGH (ref 10.3–16.9)
RH IG SCN BLD-IMP: POSITIVE — SIGNIFICANT CHANGE UP
SODIUM SERPL-SCNC: 132 MMOL/L — LOW (ref 135–145)
TIBC SERPL-MCNC: 265 UG/DL — SIGNIFICANT CHANGE UP (ref 220–430)
UIBC SERPL-MCNC: 233 UG/DL — SIGNIFICANT CHANGE UP (ref 110–370)
WBC # BLD: 3.3 K/UL — LOW (ref 3.8–10.5)
WBC # BLD: 4.3 K/UL — SIGNIFICANT CHANGE UP (ref 3.8–10.5)
WBC # FLD AUTO: 3.3 K/UL — LOW (ref 3.8–10.5)
WBC # FLD AUTO: 4.3 K/UL — SIGNIFICANT CHANGE UP (ref 3.8–10.5)

## 2018-11-05 RX ORDER — MAGNESIUM SULFATE 500 MG/ML
1 VIAL (ML) INJECTION ONCE
Qty: 0 | Refills: 0 | Status: COMPLETED | OUTPATIENT
Start: 2018-11-05 | End: 2018-11-05

## 2018-11-05 RX ORDER — APIXABAN 2.5 MG/1
5 TABLET, FILM COATED ORAL EVERY 12 HOURS
Qty: 0 | Refills: 0 | Status: DISCONTINUED | OUTPATIENT
Start: 2018-11-05 | End: 2018-11-05

## 2018-11-05 RX ORDER — FAMOTIDINE 10 MG/ML
20 INJECTION INTRAVENOUS DAILY
Qty: 0 | Refills: 0 | Status: DISCONTINUED | OUTPATIENT
Start: 2018-11-05 | End: 2018-11-05

## 2018-11-05 RX ADMIN — Medication 2 MILLIGRAM(S): at 16:09

## 2018-11-05 RX ADMIN — Medication 1 MILLIGRAM(S): at 11:13

## 2018-11-05 RX ADMIN — APIXABAN 5 MILLIGRAM(S): 2.5 TABLET, FILM COATED ORAL at 11:14

## 2018-11-05 RX ADMIN — ATORVASTATIN CALCIUM 40 MILLIGRAM(S): 80 TABLET, FILM COATED ORAL at 22:09

## 2018-11-05 RX ADMIN — Medication 50 MILLIGRAM(S): at 00:36

## 2018-11-05 RX ADMIN — Medication 50 MILLIGRAM(S): at 14:05

## 2018-11-05 RX ADMIN — Medication 100 MILLIGRAM(S): at 06:51

## 2018-11-05 RX ADMIN — PANTOPRAZOLE SODIUM 40 MILLIGRAM(S): 20 TABLET, DELAYED RELEASE ORAL at 06:51

## 2018-11-05 RX ADMIN — Medication 100 MILLIGRAM(S): at 11:14

## 2018-11-05 RX ADMIN — Medication 100 GRAM(S): at 08:51

## 2018-11-05 RX ADMIN — Medication 50 MILLIGRAM(S): at 07:39

## 2018-11-05 RX ADMIN — Medication 3 MILLIGRAM(S): at 22:09

## 2018-11-05 NOTE — PROGRESS NOTE ADULT - PROBLEM SELECTOR PLAN 6
EF 35% per echo on 10/15/18.  s/p ICD  Currently compensated.    -Continue with home metoprolol succinate 100 mg daily EF 35% per echo on 10/15/18. s/p ICD  -Currently compensated.  -Continue with home metoprolol succinate 100 mg daily  -caution w IVF

## 2018-11-05 NOTE — PROGRESS NOTE ADULT - SUBJECTIVE AND OBJECTIVE BOX
Pt is very unsteady  of gait  and feeling epigastric and substernal chest discomfort      PAST MEDICAL & SURGICAL HISTORY:  Thrombus in heart chamber: LV  ICD (implantable cardioverter-defibrillator) in place  Pacemaker  ETOH abuse  Paroxysmal a-fib  Myocardial infarction involving other coronary artery  Gastritis  Atherosclerosis of coronary artery: Coronary artery disease  Automatic implantable cardiac defibrillator in situ: ICD (implantable cardioverter-defibrillator) in place    MEDICATIONS  (STANDING):  apixaban 5 milliGRAM(s) Oral every 12 hours  atorvastatin 40 milliGRAM(s) Oral at bedtime  chlordiazePOXIDE 50 milliGRAM(s) Oral every 8 hours  folic acid 1 milliGRAM(s) Oral daily  magnesium sulfate  IVPB 1 Gram(s) IV Intermittent once  melatonin 3 milliGRAM(s) Oral at bedtime  metoprolol succinate  milliGRAM(s) Oral daily  pantoprazole    Tablet 40 milliGRAM(s) Oral before breakfast  thiamine 100 milliGRAM(s) Oral daily    MEDICATIONS  (PRN):  LORazepam   Injectable 2 milliGRAM(s) IV Push every 2 hours PRN CIWA > or equal to 9    ICU Vital Signs Last 24 Hrs  T(C): 36.5 (05 Nov 2018 04:58), Max: 37 (04 Nov 2018 09:05)  T(F): 97.7 (05 Nov 2018 04:58), Max: 98.6 (04 Nov 2018 09:05)  HR: 89 (05 Nov 2018 04:58) (70 - 89)  BP: 111/74 (05 Nov 2018 04:58) (111/74 - 123/79)  BP(mean): --  ABP: --  ABP(mean): --  RR: 16 (05 Nov 2018 04:58) (16 - 18)  SpO2: 98% (05 Nov 2018 04:58) (98% - 100%)    Lungs clear     CV s1 s2  abd soft  Ext stable                          11.0   3.3   )-----------( 108      ( 05 Nov 2018 06:50 )             34.2   11-05    132<L>  |  95<L>  |  12  ----------------------------<  96  4.0   |  23  |  0.87    Ca    9.3      05 Nov 2018 06:50  Phos  3.0     11-05  Mg     1.8     11-05    TPro  6.1  /  Alb  3.6  /  TBili  1.2  /  DBili  x   /  AST  70<H>  /  ALT  48<H>  /  AlkPhos  54  11-04  CARDIAC MARKERS ( 03 Nov 2018 16:26 )  x     / <0.01 ng/mL / x     / x     / x

## 2018-11-05 NOTE — PROGRESS NOTE ADULT - PROBLEM SELECTOR PLAN 8
F: banana bag @ 100 mL/hr  E: monitor and replace PRN  N: DASH diet    Code: Full (has ICD in place)   DVT ppx: apixaban  GI ppx: pantoprazole  Dispo: admit to F F: No IVF  E: monitor and replace PRN  N: DASH diet    Code: Full (has ICD in place)   DVT ppx: apixaban  GI ppx: pantoprazole  Dispo: admit to F

## 2018-11-05 NOTE — PROGRESS NOTE ADULT - PROBLEM SELECTOR PLAN 3
Echo on 10/15/18 showing EF 35%, LV apical thrombus 2.0 cm in diameter   -Continue home apixaban 5 mg BID Echo on 10/15/18 showing EF 35%, LV apical thrombus 2.0 cm in diameter   -home apixaban 5mg po bid stopped 11/4 evening due to bleeding hemorrhoids. restarted this AM 11/5 as no complaints of bleeding hemorrhoid and Hb stable.

## 2018-11-05 NOTE — PROGRESS NOTE ADULT - SUBJECTIVE AND OBJECTIVE BOX
OVERNIGHT EVENTS: ÓSCAR    SUBJECTIVE / INTERVAL HPI: Patient seen and examined at bedside. Pt says he feels very weak and that he fell onto his bed after leaving the bathroom. He also is complaining of burning in his chest from heart burn. He has no n/v. He didnt have a BM over night.     VITAL SIGNS:  Vital Signs Last 24 Hrs  T(C): 36.8 (05 Nov 2018 09:13), Max: 36.8 (05 Nov 2018 09:13)  T(F): 98.3 (05 Nov 2018 09:13), Max: 98.3 (05 Nov 2018 09:13)  HR: 77 (05 Nov 2018 09:13) (70 - 89)  BP: 124/84 (05 Nov 2018 09:13) (111/74 - 124/84)  BP(mean): --  RR: 20 (05 Nov 2018 09:13) (16 - 20)  SpO2: 98% (05 Nov 2018 09:13) (98% - 100%)    PHYSICAL EXAM:    General: anxious man restless in bed  HEENT: NCAT; PERRL, anicteric sclera  Neck: supple, trachea midline  Cardiovascular: S1, S2 normal; RRR, no M/G/R  Respiratory: CTABL; no W/R/R  Gastrointestinal: soft, nontender, nondistended. bowel sounds present.  Skin: no ulcerations or visible rashes appreciated  Extremities: WWP; no edema, clubbing or cyanosis  Vascular: 2+ radial, DP/PT pulses B/L  Neurological: AAOx3; +tongue fasciculations, +arm tremor when arms outstretched.     MEDICATIONS:  MEDICATIONS  (STANDING):  apixaban 5 milliGRAM(s) Oral every 12 hours  atorvastatin 40 milliGRAM(s) Oral at bedtime  chlordiazePOXIDE 50 milliGRAM(s) Oral every 8 hours  folic acid 1 milliGRAM(s) Oral daily  melatonin 3 milliGRAM(s) Oral at bedtime  metoprolol succinate  milliGRAM(s) Oral daily  pantoprazole    Tablet 40 milliGRAM(s) Oral before breakfast  thiamine 100 milliGRAM(s) Oral daily    MEDICATIONS  (PRN):  LORazepam   Injectable 2 milliGRAM(s) IV Push every 2 hours PRN CIWA > or equal to 9      ALLERGIES:  Allergies    Capoten (Short breath; Rash; Hives)  heparin (Other (Mod to Severe))  penicillin (Short breath; Rash; Hives)    Intolerances        LABS:                        11.0   3.3   )-----------( 108      ( 05 Nov 2018 06:50 )             34.2     11-05    132<L>  |  95<L>  |  12  ----------------------------<  96  4.0   |  23  |  0.87    Ca    9.3      05 Nov 2018 06:50  Phos  3.0     11-05  Mg     1.8     11-05    TPro  6.5  /  Alb  3.9  /  TBili  0.7  /  DBili  <0.2  /  AST  55<H>  /  ALT  43  /  AlkPhos  56  11-05        CAPILLARY BLOOD GLUCOSE          RADIOLOGY & ADDITIONAL TESTS: Reviewed. OVERNIGHT EVENTS: ÓSCAR    SUBJECTIVE / INTERVAL HPI: Patient seen and examined at bedside. Pt says he feels very weak and that he fell onto his bed after leaving the bathroom. He also is complaining of burning in his chest from heart burn. He has no n/v. He didnt have a BM over night.     VITAL SIGNS:  Vital Signs Last 24 Hrs  T(C): 36.8 (05 Nov 2018 09:13), Max: 36.8 (05 Nov 2018 09:13)  T(F): 98.3 (05 Nov 2018 09:13), Max: 98.3 (05 Nov 2018 09:13)  HR: 77 (05 Nov 2018 09:13) (70 - 89)  BP: 124/84 (05 Nov 2018 09:13) (111/74 - 124/84)  BP(mean): --  RR: 20 (05 Nov 2018 09:13) (16 - 20)  SpO2: 98% (05 Nov 2018 09:13) (98% - 100%)    PHYSICAL EXAM:    General: anxious man restless in bed  HEENT: NCAT; PERRL, anicteric sclera  Neck: supple, trachea midline  Cardiovascular: S1, S2 normal; RRR, no M/G/R  Respiratory: CTABL; no W/R/R  Gastrointestinal: soft, nontender, nondistended. bowel sounds present.  Skin: no ulcerations or visible rashes appreciated  Extremities: WWP; no edema, clubbing or cyanosis  Vascular: 2+ radial, DP/PT pulses B/L  Neurological: AAOx3; +tongue fasciculations, +arm tremor when arms outstretched. CIWA 7    MEDICATIONS:  MEDICATIONS  (STANDING):  apixaban 5 milliGRAM(s) Oral every 12 hours  atorvastatin 40 milliGRAM(s) Oral at bedtime  chlordiazePOXIDE 50 milliGRAM(s) Oral every 8 hours  folic acid 1 milliGRAM(s) Oral daily  melatonin 3 milliGRAM(s) Oral at bedtime  metoprolol succinate  milliGRAM(s) Oral daily  pantoprazole    Tablet 40 milliGRAM(s) Oral before breakfast  thiamine 100 milliGRAM(s) Oral daily    MEDICATIONS  (PRN):  LORazepam   Injectable 2 milliGRAM(s) IV Push every 2 hours PRN CIWA > or equal to 9      ALLERGIES:  Allergies    Capoten (Short breath; Rash; Hives)  heparin (Other (Mod to Severe))  penicillin (Short breath; Rash; Hives)    Intolerances        LABS:                        11.0   3.3   )-----------( 108      ( 05 Nov 2018 06:50 )             34.2     11-05    132<L>  |  95<L>  |  12  ----------------------------<  96  4.0   |  23  |  0.87    Ca    9.3      05 Nov 2018 06:50  Phos  3.0     11-05  Mg     1.8     11-05    TPro  6.5  /  Alb  3.9  /  TBili  0.7  /  DBili  <0.2  /  AST  55<H>  /  ALT  43  /  AlkPhos  56  11-05        CAPILLARY BLOOD GLUCOSE          RADIOLOGY & ADDITIONAL TESTS: Reviewed.

## 2018-11-05 NOTE — PROGRESS NOTE ADULT - PROBLEM SELECTOR PLAN 9
1) PCP Contacted on Admission: (Y) --> Name & Phone #: Dr. Ck Levy  2) Date of Contact with PCP:  3) PCP Contacted at Discharge: (Y/N, N/A)  4) Summary of Handoff Given to PCP:   5) Post-Discharge Appointment Date and Location: 1) PCP Contacted on Admission: (Y) --> Name & Phone #: Dr. Ck Levy  2) Date of Contact with PCP: on admission  3) PCP Contacted at Discharge: (Y/N, N/A)  4) Summary of Handoff Given to PCP:   5) Post-Discharge Appointment Date and Location:

## 2018-11-05 NOTE — PROGRESS NOTE ADULT - PROBLEM SELECTOR PLAN 1
CIWA of 8 on admission (for moderate tremors, nausea, and anxiety)  Last drink at 6 AM on 11/3/18. Drank 1.5 pinks of Vodka over past day.  Longstanding history of alcohol abuse and withdrawal with multiple admissions to [facility].  -Chlordiazepoxide 50 mg PO Q8H  -CIWA monitoring Q4H   -Lorazepam 2 mg Q2H IV PRN for CIWA > or equal to 9  -s/p 1 banana bag. now on thiamine and folic qd  -Monitor electrolytes and replace PRN  -Pt informed of consequences of alcohol use. Rehab suggested to patient and he seems amenable.  -CIWA 7 this AM 11/5 CIWA of 8 on admission (for moderate tremors, nausea, and anxiety)  Last drink at 6 AM on 11/3/18. Drank 1.5 pinks of Vodka over past day.  Longstanding history of alcohol abuse and withdrawal with multiple admissions to [facility].  -librium 50 mg PO Q8H. goal to taper today to q12h  -CIWA monitoring Q4H   -Lorazepam 2 mg Q2H IV PRN for CIWA > or equal to 9  -s/p 1 banana bag. now on thiamine and folic qd  -Monitor electrolytes and replace PRN  -Pt informed of consequences of alcohol use. Rehab suggested to patient and he seems amenable.  -CIWA 7 this AM 11/5

## 2018-11-05 NOTE — PROGRESS NOTE ADULT - PROBLEM SELECTOR PLAN 2
Chest pain atypical. Likely 2/2 due to alcohol intake. Low suspicion for cardiac etiology.  Troponin negative x1 in ED  EKG 11/3 - sinus tachycardia, anteroseptic infarct (no change from prior EKG)  Patient has ICD in place  -c/w home apixaban, atorvastatin, metoprolol  -trop <0.01  -complaining of burning chest pain-will increase PPI Chest pain atypical. Likely 2/2 due to alcohol intake. Low suspicion for cardiac etiology.  Troponin negative x1 in ED  EKG 11/3 - sinus tachycardia, anteroseptic infarct (no change from prior EKG)  Patient has ICD in place  -c/w home apixaban, atorvastatin, metoprolol  -trop <0.01  -complaining of burning chest pain likely 2/2 GERD. start maalox.

## 2018-11-06 LAB
ANION GAP SERPL CALC-SCNC: 15 MMOL/L — SIGNIFICANT CHANGE UP (ref 5–17)
BUN SERPL-MCNC: 12 MG/DL — SIGNIFICANT CHANGE UP (ref 7–23)
CALCIUM SERPL-MCNC: 9.2 MG/DL — SIGNIFICANT CHANGE UP (ref 8.4–10.5)
CHLORIDE SERPL-SCNC: 100 MMOL/L — SIGNIFICANT CHANGE UP (ref 96–108)
CO2 SERPL-SCNC: 22 MMOL/L — SIGNIFICANT CHANGE UP (ref 22–31)
CREAT SERPL-MCNC: 0.96 MG/DL — SIGNIFICANT CHANGE UP (ref 0.5–1.3)
GLUCOSE SERPL-MCNC: 106 MG/DL — HIGH (ref 70–99)
HCT VFR BLD CALC: 33.6 % — LOW (ref 39–50)
HGB BLD-MCNC: 10.9 G/DL — LOW (ref 13–17)
MAGNESIUM SERPL-MCNC: 1.8 MG/DL — SIGNIFICANT CHANGE UP (ref 1.6–2.6)
MCHC RBC-ENTMCNC: 25.2 PG — LOW (ref 27–34)
MCHC RBC-ENTMCNC: 32.4 G/DL — SIGNIFICANT CHANGE UP (ref 32–36)
MCV RBC AUTO: 77.6 FL — LOW (ref 80–100)
PHOSPHATE SERPL-MCNC: 3.1 MG/DL — SIGNIFICANT CHANGE UP (ref 2.5–4.5)
PLATELET # BLD AUTO: 99 K/UL — LOW (ref 150–400)
POTASSIUM SERPL-MCNC: 4.1 MMOL/L — SIGNIFICANT CHANGE UP (ref 3.5–5.3)
POTASSIUM SERPL-SCNC: 4.1 MMOL/L — SIGNIFICANT CHANGE UP (ref 3.5–5.3)
RBC # BLD: 4.33 M/UL — SIGNIFICANT CHANGE UP (ref 4.2–5.8)
RBC # FLD: 19.9 % — HIGH (ref 10.3–16.9)
SODIUM SERPL-SCNC: 137 MMOL/L — SIGNIFICANT CHANGE UP (ref 135–145)
WBC # BLD: 3.1 K/UL — LOW (ref 3.8–10.5)
WBC # FLD AUTO: 3.1 K/UL — LOW (ref 3.8–10.5)

## 2018-11-06 PROCEDURE — 76700 US EXAM ABDOM COMPLETE: CPT | Mod: 26

## 2018-11-06 RX ORDER — MAGNESIUM SULFATE 500 MG/ML
1 VIAL (ML) INJECTION ONCE
Qty: 0 | Refills: 0 | Status: COMPLETED | OUTPATIENT
Start: 2018-11-06 | End: 2018-11-06

## 2018-11-06 RX ORDER — APIXABAN 2.5 MG/1
2.5 TABLET, FILM COATED ORAL EVERY 12 HOURS
Qty: 0 | Refills: 0 | Status: DISCONTINUED | OUTPATIENT
Start: 2018-11-06 | End: 2018-11-07

## 2018-11-06 RX ADMIN — Medication 50 MILLIGRAM(S): at 19:02

## 2018-11-06 RX ADMIN — Medication 100 GRAM(S): at 11:49

## 2018-11-06 RX ADMIN — ATORVASTATIN CALCIUM 40 MILLIGRAM(S): 80 TABLET, FILM COATED ORAL at 23:01

## 2018-11-06 RX ADMIN — Medication 1 MILLIGRAM(S): at 11:49

## 2018-11-06 RX ADMIN — PANTOPRAZOLE SODIUM 40 MILLIGRAM(S): 20 TABLET, DELAYED RELEASE ORAL at 07:20

## 2018-11-06 RX ADMIN — APIXABAN 2.5 MILLIGRAM(S): 2.5 TABLET, FILM COATED ORAL at 19:02

## 2018-11-06 RX ADMIN — Medication 3 MILLIGRAM(S): at 23:01

## 2018-11-06 RX ADMIN — Medication 50 MILLIGRAM(S): at 07:20

## 2018-11-06 RX ADMIN — Medication 100 MILLIGRAM(S): at 11:49

## 2018-11-06 RX ADMIN — Medication 50 MILLIGRAM(S): at 00:01

## 2018-11-06 RX ADMIN — Medication 100 MILLIGRAM(S): at 07:21

## 2018-11-06 NOTE — PROGRESS NOTE ADULT - SUBJECTIVE AND OBJECTIVE BOX
Pt is s/p   ETOH withdrawl      He continue to experience episodes of falling     PAST MEDICAL & SURGICAL HISTORY:  Thrombus in heart chamber: LV  ICD (implantable cardioverter-defibrillator) in place  Pacemaker  ETOH abuse  Paroxysmal a-fib  Myocardial infarction involving other coronary artery  Gastritis  Atherosclerosis of coronary artery: Coronary artery disease  Automatic implantable cardiac defibrillator in situ: ICD (implantable cardioverter-defibrillator) in place        ICU Vital Signs Last 24 Hrs  T(C): 36.8 (06 Nov 2018 10:30), Max: 36.8 (05 Nov 2018 16:27)  T(F): 98.3 (06 Nov 2018 10:30), Max: 98.3 (05 Nov 2018 16:27)  HR: 90 (06 Nov 2018 10:30) (75 - 102)  BP: 111/78 (06 Nov 2018 10:30) (102/71 - 121/76)  BP(mean): --  ABP: --  ABP(mean): --  RR: 17 (06 Nov 2018 10:30) (17 - 18)  SpO2: 100% (06 Nov 2018 10:30) (98% - 100%)    MEDICATIONS  (STANDING):  atorvastatin 40 milliGRAM(s) Oral at bedtime  chlordiazePOXIDE 50 milliGRAM(s) Oral every 12 hours  folic acid 1 milliGRAM(s) Oral daily  magnesium sulfate  IVPB 1 Gram(s) IV Intermittent once  melatonin 3 milliGRAM(s) Oral at bedtime  metoprolol succinate  milliGRAM(s) Oral daily  pantoprazole    Tablet 40 milliGRAM(s) Oral before breakfast  thiamine 100 milliGRAM(s) Oral daily    MEDICATIONS  (PRN):  aluminum hydroxide/magnesium hydroxide/simethicone Suspension 30 milliLiter(s) Oral every 4 hours PRN Dyspepsia  LORazepam   Injectable 2 milliGRAM(s) IV Push every 2 hours PRN CIWA > or equal to 9      Lungs clear   CV s1 s2  Abd soft  EXT  stable                            10.9   3.1   )-----------( 99       ( 06 Nov 2018 05:44 )             33.6     11-06    137  |  100  |  12  ----------------------------<  106<H>  4.1   |  22  |  0.96    Ca    9.2      06 Nov 2018 05:44  Phos  3.1     11-06  Mg     1.8     11-06    TPro  6.5  /  Alb  3.9  /  TBili  0.7  /  DBili  <0.2  /  AST  55<H>  /  ALT  43  /  AlkPhos  56  11-05

## 2018-11-06 NOTE — PROGRESS NOTE ADULT - PROBLEM SELECTOR PLAN 6
EF 35% per echo on 10/15/18. s/p ICD  -Currently compensated.  -Continue with home metoprolol succinate 100 mg daily  -caution w IVF

## 2018-11-06 NOTE — PHYSICAL THERAPY INITIAL EVALUATION ADULT - ADDITIONAL COMMENTS
pt lives w/ son in elevator access apt building w/ no stairs to enter. States that prior to this admission he was not using a DME for ambulation, but he has had a few falls that have occurred randomly when he would get a dizzy spell. States that when he leaves the hospital he plans on going to a 30 day rehab program

## 2018-11-06 NOTE — PROGRESS NOTE ADULT - PROBLEM SELECTOR PROBLEM 1
Alcohol withdrawal syndrome without complication

## 2018-11-06 NOTE — PROGRESS NOTE ADULT - PROBLEM SELECTOR PLAN 4
-Continue home atorvastatin 40 mg bedtime

## 2018-11-06 NOTE — PROGRESS NOTE ADULT - PROBLEM SELECTOR PLAN 8
F: No IVF  E: monitor and replace PRN  N: DASH diet    Code: Full (has ICD in place)   DVT ppx: apixaban  GI ppx: pantoprazole  Dispo: admit to [facility]

## 2018-11-06 NOTE — PROGRESS NOTE ADULT - SUBJECTIVE AND OBJECTIVE BOX
OVERNIGHT EVENTS: ciwa 3 o/n    SUBJECTIVE / INTERVAL HPI: Patient seen and examined at bedside. Pt states he feels less weak today. He is still anxious. No chest burning today. Denies f/c, CP, abd pain, n/v, d/c, dysuria.     VITAL SIGNS:  Vital Signs Last 24 Hrs  T(C): 36.4 (06 Nov 2018 16:39), Max: 36.8 (06 Nov 2018 10:30)  T(F): 97.6 (06 Nov 2018 16:39), Max: 98.3 (06 Nov 2018 10:30)  HR: 73 (06 Nov 2018 16:39) (73 - 102)  BP: 115/81 (06 Nov 2018 16:39) (102/71 - 121/76)  BP(mean): --  RR: 18 (06 Nov 2018 16:39) (17 - 18)  SpO2: 100% (06 Nov 2018 16:39) (98% - 100%)    PHYSICAL EXAM:    General: anxious man lying comfortably in bed  HEENT: NCAT; PERRL, anicteric sclera  Neck: supple, trachea midline  Cardiovascular: S1, S2 normal; RRR, no M/G/R  Respiratory: CTABL; no W/R/R  Gastrointestinal: soft, nontender, nondistended. bowel sounds present.  Skin: no ulcerations or visible rashes appreciated  Extremities: WWP; no edema, clubbing or cyanosis  Vascular: 2+ radial, DP/PT pulses B/L  Neurological: AAOx3; b/l arm tremor L>R, no tongue fasiculations    MEDICATIONS:  MEDICATIONS  (STANDING):  apixaban 2.5 milliGRAM(s) Oral every 12 hours  atorvastatin 40 milliGRAM(s) Oral at bedtime  chlordiazePOXIDE 50 milliGRAM(s) Oral every 12 hours  folic acid 1 milliGRAM(s) Oral daily  melatonin 3 milliGRAM(s) Oral at bedtime  metoprolol succinate  milliGRAM(s) Oral daily  pantoprazole    Tablet 40 milliGRAM(s) Oral before breakfast  thiamine 100 milliGRAM(s) Oral daily    MEDICATIONS  (PRN):  aluminum hydroxide/magnesium hydroxide/simethicone Suspension 30 milliLiter(s) Oral every 4 hours PRN Dyspepsia  LORazepam   Injectable 2 milliGRAM(s) IV Push every 2 hours PRN CIWA > or equal to 9      ALLERGIES:  Allergies    Capoten (Short breath; Rash; Hives)  heparin (Other (Mod to Severe))  penicillin (Short breath; Rash; Hives)    Intolerances        LABS:                        10.9   3.1   )-----------( 99       ( 06 Nov 2018 05:44 )             33.6     11-06    137  |  100  |  12  ----------------------------<  106<H>  4.1   |  22  |  0.96    Ca    9.2      06 Nov 2018 05:44  Phos  3.1     11-06  Mg     1.8     11-06    TPro  6.5  /  Alb  3.9  /  TBili  0.7  /  DBili  <0.2  /  AST  55<H>  /  ALT  43  /  AlkPhos  56  11-05        CAPILLARY BLOOD GLUCOSE          RADIOLOGY & ADDITIONAL TESTS: Reviewed.

## 2018-11-06 NOTE — PROGRESS NOTE ADULT - PROBLEM SELECTOR PROBLEM 5
Coronary artery disease involving native coronary artery of native heart, angina presence unspecified

## 2018-11-06 NOTE — PHYSICAL THERAPY INITIAL EVALUATION ADULT - CRITERIA FOR SKILLED THERAPEUTIC INTERVENTIONS
impairments found/therapy frequency/rehab potential/risk reduction/prevention/anticipated discharge recommendation/functional limitations in following categories

## 2018-11-06 NOTE — PROGRESS NOTE ADULT - PROBLEM SELECTOR PLAN 2
Chest pain atypical. Likely 2/2 due to alcohol intake. Low suspicion for cardiac etiology.  Troponin negative x1 in ED  EKG 11/3 - sinus tachycardia, anteroseptic infarct (no change from prior EKG)  Patient has ICD in place  -c/w home atorvastatin, metoprolol  -apixaban decreased to 2.5mg PO bid because of hemorrhoidal bleeding yesterday  -trop <0.01  -complaining of burning chest pain likely 2/2 GERD. start maalox.

## 2018-11-06 NOTE — PHYSICAL THERAPY INITIAL EVALUATION ADULT - IMPAIRMENTS FOUND, PT EVAL
fine motor/gait, locomotion, and balance/gross motor/muscle strength/posture/aerobic capacity/endurance

## 2018-11-06 NOTE — PROGRESS NOTE ADULT - PROBLEM SELECTOR PLAN 1
CIWA of 8 on admission (for moderate tremors, nausea, and anxiety)  Last drink at 6 AM on 11/3/18. Drank 1.5 pinks of Vodka over past day.  Longstanding history of alcohol abuse and withdrawal with multiple admissions to [facility].  -librium 50 mg PO Q8H. goal to taper today to q12h  -CIWA monitoring Q4H   -Lorazepam 2 mg Q2H IV PRN for CIWA > or equal to 9  -s/p 1 banana bag. now on thiamine and folic qd  -Monitor electrolytes and replace PRN  -Pt informed of consequences of alcohol use. Rehab suggested to patient and he seems amenable.  -CIWA 9 11/5. s/p 2mg IV ativan per protocol. ciwa 1 today

## 2018-11-06 NOTE — PROGRESS NOTE ADULT - PROBLEM SELECTOR PLAN 5
Continue atorvastatin 40 mg bedtime

## 2018-11-06 NOTE — PROGRESS NOTE ADULT - PROBLEM SELECTOR PLAN 9
1) PCP Contacted on Admission: (Y) --> Name & Phone #: Dr. Ck Levy  2) Date of Contact with PCP: on admission 11/3  3) PCP Contacted at Discharge: (N/A)  4) Summary of Handoff Given to PCP:   5) Post-Discharge Appointment Date and Location:

## 2018-11-06 NOTE — PROGRESS NOTE ADULT - PROBLEM SELECTOR PLAN 7
-In sinus tachycardia on admission  -CHADS-VASc 3  -s/p ICD  -Continue home apixaban 5 mg BID  -Continue home metoprolol 100 mg daily

## 2018-11-06 NOTE — PHYSICAL THERAPY INITIAL EVALUATION ADULT - GAIT DEVIATIONS NOTED, PT EVAL
decreased step length/decreased weight-shifting ability/increased time in double stance/decreased renetta

## 2018-11-07 ENCOUNTER — TRANSCRIPTION ENCOUNTER (OUTPATIENT)
Age: 63
End: 2018-11-07

## 2018-11-07 VITALS
RESPIRATION RATE: 18 BRPM | SYSTOLIC BLOOD PRESSURE: 120 MMHG | OXYGEN SATURATION: 99 % | TEMPERATURE: 97 F | HEART RATE: 81 BPM | DIASTOLIC BLOOD PRESSURE: 75 MMHG

## 2018-11-07 LAB
ANION GAP SERPL CALC-SCNC: 14 MMOL/L — SIGNIFICANT CHANGE UP (ref 5–17)
BLD GP AB SCN SERPL QL: NEGATIVE — SIGNIFICANT CHANGE UP
BUN SERPL-MCNC: 14 MG/DL — SIGNIFICANT CHANGE UP (ref 7–23)
CALCIUM SERPL-MCNC: 9.1 MG/DL — SIGNIFICANT CHANGE UP (ref 8.4–10.5)
CHLORIDE SERPL-SCNC: 100 MMOL/L — SIGNIFICANT CHANGE UP (ref 96–108)
CO2 SERPL-SCNC: 24 MMOL/L — SIGNIFICANT CHANGE UP (ref 22–31)
CREAT SERPL-MCNC: 1.05 MG/DL — SIGNIFICANT CHANGE UP (ref 0.5–1.3)
D DIMER BLD IA.RAPID-MCNC: 202 NG/ML DDU — SIGNIFICANT CHANGE UP
FERRITIN SERPL-MCNC: 66 NG/ML — SIGNIFICANT CHANGE UP (ref 30–400)
GLUCOSE SERPL-MCNC: 90 MG/DL — SIGNIFICANT CHANGE UP (ref 70–99)
HCT VFR BLD CALC: 33.5 % — LOW (ref 39–50)
HGB BLD-MCNC: 10.5 G/DL — LOW (ref 13–17)
IRON SATN MFR SERPL: 25 UG/DL — LOW (ref 45–165)
IRON SATN MFR SERPL: 9 % — LOW (ref 16–55)
MAGNESIUM SERPL-MCNC: 1.9 MG/DL — SIGNIFICANT CHANGE UP (ref 1.6–2.6)
MCHC RBC-ENTMCNC: 24.8 PG — LOW (ref 27–34)
MCHC RBC-ENTMCNC: 31.3 G/DL — LOW (ref 32–36)
MCV RBC AUTO: 79.2 FL — LOW (ref 80–100)
PLATELET # BLD AUTO: 92 K/UL — LOW (ref 150–400)
POTASSIUM SERPL-MCNC: 4.9 MMOL/L — SIGNIFICANT CHANGE UP (ref 3.5–5.3)
POTASSIUM SERPL-SCNC: 4.9 MMOL/L — SIGNIFICANT CHANGE UP (ref 3.5–5.3)
RBC # BLD: 4.23 M/UL — SIGNIFICANT CHANGE UP (ref 4.2–5.8)
RBC # FLD: 20.9 % — HIGH (ref 10.3–16.9)
RH IG SCN BLD-IMP: POSITIVE — SIGNIFICANT CHANGE UP
SODIUM SERPL-SCNC: 138 MMOL/L — SIGNIFICANT CHANGE UP (ref 135–145)
TIBC SERPL-MCNC: 290 UG/DL — SIGNIFICANT CHANGE UP (ref 220–430)
UIBC SERPL-MCNC: 265 UG/DL — SIGNIFICANT CHANGE UP (ref 110–370)
WBC # BLD: 3.2 K/UL — LOW (ref 3.8–10.5)
WBC # FLD AUTO: 3.2 K/UL — LOW (ref 3.8–10.5)

## 2018-11-07 PROCEDURE — 82728 ASSAY OF FERRITIN: CPT

## 2018-11-07 PROCEDURE — 83690 ASSAY OF LIPASE: CPT

## 2018-11-07 PROCEDURE — 96376 TX/PRO/DX INJ SAME DRUG ADON: CPT

## 2018-11-07 PROCEDURE — 85027 COMPLETE CBC AUTOMATED: CPT

## 2018-11-07 PROCEDURE — 84100 ASSAY OF PHOSPHORUS: CPT

## 2018-11-07 PROCEDURE — 80053 COMPREHEN METABOLIC PANEL: CPT

## 2018-11-07 PROCEDURE — 86900 BLOOD TYPING SEROLOGIC ABO: CPT

## 2018-11-07 PROCEDURE — 99285 EMERGENCY DEPT VISIT HI MDM: CPT | Mod: 25

## 2018-11-07 PROCEDURE — 93005 ELECTROCARDIOGRAM TRACING: CPT

## 2018-11-07 PROCEDURE — 85379 FIBRIN DEGRADATION QUANT: CPT

## 2018-11-07 PROCEDURE — 86850 RBC ANTIBODY SCREEN: CPT

## 2018-11-07 PROCEDURE — 36415 COLL VENOUS BLD VENIPUNCTURE: CPT

## 2018-11-07 PROCEDURE — 80048 BASIC METABOLIC PNL TOTAL CA: CPT

## 2018-11-07 PROCEDURE — 76700 US EXAM ABDOM COMPLETE: CPT

## 2018-11-07 PROCEDURE — 84484 ASSAY OF TROPONIN QUANT: CPT

## 2018-11-07 PROCEDURE — 96374 THER/PROPH/DIAG INJ IV PUSH: CPT

## 2018-11-07 PROCEDURE — 70450 CT HEAD/BRAIN W/O DYE: CPT

## 2018-11-07 PROCEDURE — 97162 PT EVAL MOD COMPLEX 30 MIN: CPT

## 2018-11-07 PROCEDURE — 80307 DRUG TEST PRSMV CHEM ANLYZR: CPT

## 2018-11-07 PROCEDURE — 83020 HEMOGLOBIN ELECTROPHORESIS: CPT | Mod: 26

## 2018-11-07 PROCEDURE — 80074 ACUTE HEPATITIS PANEL: CPT

## 2018-11-07 PROCEDURE — 83540 ASSAY OF IRON: CPT

## 2018-11-07 PROCEDURE — 83550 IRON BINDING TEST: CPT

## 2018-11-07 PROCEDURE — 86901 BLOOD TYPING SEROLOGIC RH(D): CPT

## 2018-11-07 PROCEDURE — 80076 HEPATIC FUNCTION PANEL: CPT

## 2018-11-07 PROCEDURE — 85025 COMPLETE CBC W/AUTO DIFF WBC: CPT

## 2018-11-07 PROCEDURE — 83735 ASSAY OF MAGNESIUM: CPT

## 2018-11-07 PROCEDURE — 82150 ASSAY OF AMYLASE: CPT

## 2018-11-07 RX ORDER — FOLIC ACID 0.8 MG
1 TABLET ORAL
Qty: 30 | Refills: 0
Start: 2018-11-07 | End: 2018-12-06

## 2018-11-07 RX ORDER — FOLIC ACID 0.8 MG
1 TABLET ORAL
Qty: 0 | Refills: 0 | COMMUNITY
Start: 2018-11-07

## 2018-11-07 RX ORDER — LISINOPRIL 2.5 MG/1
2.5 TABLET ORAL DAILY
Qty: 0 | Refills: 0 | Status: DISCONTINUED | OUTPATIENT
Start: 2018-11-07 | End: 2018-11-07

## 2018-11-07 RX ORDER — THIAMINE MONONITRATE (VIT B1) 100 MG
1 TABLET ORAL
Qty: 0 | Refills: 0 | COMMUNITY
Start: 2018-11-07

## 2018-11-07 RX ORDER — ATORVASTATIN CALCIUM 80 MG/1
1 TABLET, FILM COATED ORAL
Qty: 30 | Refills: 3
Start: 2018-11-07 | End: 2019-03-06

## 2018-11-07 RX ORDER — THIAMINE MONONITRATE (VIT B1) 100 MG
1 TABLET ORAL
Qty: 30 | Refills: 0
Start: 2018-11-07 | End: 2018-12-06

## 2018-11-07 RX ORDER — METOPROLOL TARTRATE 50 MG
1 TABLET ORAL
Qty: 30 | Refills: 3
Start: 2018-11-07 | End: 2019-03-06

## 2018-11-07 RX ORDER — LISINOPRIL 2.5 MG/1
1 TABLET ORAL
Qty: 30 | Refills: 0 | OUTPATIENT
Start: 2018-11-07 | End: 2018-12-06

## 2018-11-07 RX ADMIN — Medication 1 MILLIGRAM(S): at 11:58

## 2018-11-07 RX ADMIN — Medication 100 MILLIGRAM(S): at 06:38

## 2018-11-07 RX ADMIN — PANTOPRAZOLE SODIUM 40 MILLIGRAM(S): 20 TABLET, DELAYED RELEASE ORAL at 06:38

## 2018-11-07 RX ADMIN — Medication 50 MILLIGRAM(S): at 06:38

## 2018-11-07 RX ADMIN — APIXABAN 2.5 MILLIGRAM(S): 2.5 TABLET, FILM COATED ORAL at 06:38

## 2018-11-07 RX ADMIN — Medication 100 MILLIGRAM(S): at 11:58

## 2018-11-07 NOTE — DISCHARGE NOTE ADULT - CARE PLAN
Principal Discharge DX:	Alcohol withdrawal  Secondary Diagnosis:	Thrombus in heart chamber  Secondary Diagnosis:	Paroxysmal a-fib  Secondary Diagnosis:	Chronic systolic heart failure  Secondary Diagnosis:	Coronary artery disease involving native coronary artery of native heart, angina presence unspecified  Secondary Diagnosis:	Hyperlipidemia, unspecified hyperlipidemia type  Secondary Diagnosis:	Gastritis Principal Discharge DX:	Alcohol withdrawal  Goal:	Please go to [facility] detox program. Please call [phone] every day so you can be admitted to the inpatient program.  Assessment and plan of treatment:	You came to the hospital because you felt weak and had tremors. You were treated for alcohol withdrawal. It is very important that you stop drinking. Alcohol has many dangerous complications such as high blood pressure, liver failure, liver cancer, and even death. Please go to [facility] detox program. Please call [phone] every day so you can be admitted to the inpatient program  Secondary Diagnosis:	Thrombus in heart chamber  Assessment and plan of treatment:	You have a known history of thrombus in your heart. Please continue to take the apixaban  Secondary Diagnosis:	Paroxysmal a-fib  Secondary Diagnosis:	Chronic systolic heart failure  Secondary Diagnosis:	Coronary artery disease involving native coronary artery of native heart, angina presence unspecified  Secondary Diagnosis:	Hyperlipidemia, unspecified hyperlipidemia type  Secondary Diagnosis:	Gastritis Principal Discharge DX:	Alcohol withdrawal  Goal:	Please go to [facility] detox program. Please call [phone] every day so you can be admitted to the inpatient program.  Assessment and plan of treatment:	You came to the hospital because you felt weak and had tremors. You were treated for alcohol withdrawal. It is very important that you stop drinking. Alcohol has many dangerous complications such as high blood pressure, liver failure, liver cancer, and even death. Please go to [facility] detox program. Please call [phone] every day so you can be admitted to the inpatient program  Secondary Diagnosis:	Thrombus in heart chamber  Assessment and plan of treatment:	You have a known history of thrombus in your heart. Please continue to take the apixaban 5mg one tablet two times a day. If you have any bright red blood per rectum please lower the dose to 2.5mg one tablet two time a day and call Dr. Levy immediately.  Secondary Diagnosis:	Paroxysmal a-fib  Secondary Diagnosis:	Chronic systolic heart failure  Secondary Diagnosis:	Coronary artery disease involving native coronary artery of native heart, angina presence unspecified  Secondary Diagnosis:	Hyperlipidemia, unspecified hyperlipidemia type  Secondary Diagnosis:	Hemorrhoid Principal Discharge DX:	Alcohol withdrawal  Goal:	Please go to [facility] detox program. Please call [phone] every day so you can be admitted to the inpatient program.  Assessment and plan of treatment:	You came to the hospital because you felt weak and had tremors. You were treated for alcohol withdrawal. It is very important that you stop drinking. Alcohol has many dangerous complications such as high blood pressure, liver failure, liver cancer, and even death. Please go to [facility] detox program. Please call [phone] every day so you can be admitted to the inpatient program  Secondary Diagnosis:	Thrombus in heart chamber  Goal:	Please go to your appointment with Dr. Levy on 11/28 at 11:45am.  Assessment and plan of treatment:	You have a known history of thrombus in your heart. Please continue to take the apixaban 5mg one tablet two times a day. If you have any bright red blood per rectum please lower the dose to 2.5mg one tablet two time a day and call Dr. Levy immediately.  Secondary Diagnosis:	Paroxysmal a-fib  Goal:	Please go to your appointment with Dr. Levy on 11/28 at 11:45am.  Assessment and plan of treatment:	You have a history of atrial fibrillation. Please continue to take your home medications.  Secondary Diagnosis:	Chronic systolic heart failure  Goal:	Please go to your appointment with Dr. Levy on 11/28 at 11:45am.  Assessment and plan of treatment:	You have a history of heart failure. Please continue to take your home medications.  Secondary Diagnosis:	Coronary artery disease involving native coronary artery of native heart, angina presence unspecified  Goal:	Please go to your appointment with Dr. Levy on 11/28 at 11:45am.  Assessment and plan of treatment:	You have a history of heart disease. Please continue to take your home medications.  Secondary Diagnosis:	Hemorrhoid  Goal:	Please call your insurance company to find a colorectal surgeon who takes your insurance.  Assessment and plan of treatment:	You have a history of hemorrhoids. Your bright red blood when you have a bowel movement are likely due to the hemorrhoids. The colorectal surgeons at [facility] do not take your insurance unfortunately. Please call your insurance company to find a colorectal surgeon who takes your insurance.

## 2018-11-07 NOTE — DISCHARGE NOTE ADULT - MEDICATION SUMMARY - MEDICATIONS TO TAKE
I will START or STAY ON the medications listed below when I get home from the hospital:    apixaban 5 mg oral tablet  -- 1 tab(s) by mouth 2 times a day   -- Check with your doctor before becoming pregnant.  It is very important that you take or use this exactly as directed.  Do not skip doses or discontinue unless directed by your doctor.  Obtain medical advice before taking any non-prescription drugs as some may affect the action of this medication.    -- Indication: For Thrombus in heart chamber    atorvastatin 40 mg oral tablet  -- 1 tab(s) by mouth once a day (at bedtime)  -- Indication: For Hyperlipidemia, unspecified hyperlipidemia type    Metoprolol Succinate  mg oral tablet, extended release  -- 1 tab(s) by mouth once a day   -- It is very important that you take or use this exactly as directed.  Do not skip doses or discontinue unless directed by your doctor.  May cause drowsiness.  Alcohol may intensify this effect.  Use care when operating dangerous machinery.  Some non-prescription drugs may aggravate your condition.  Read all labels carefully.  If a warning appears, check with your doctor before taking.  Swallow whole.  Do not crush.  Take with food or milk.  This drug may impair the ability to drive or operate machinery.  Use care until you become familiar with its effects.    -- Indication: For Chronic systolic heart failure    folic acid 1 mg oral tablet  -- 1 tab(s) by mouth once a day  -- Indication: For Alcoholism    thiamine 100 mg oral tablet  -- 1 tab(s) by mouth once a day  -- Indication: For Alcoholism I will START or STAY ON the medications listed below when I get home from the hospital:    apixaban 5 mg oral tablet  -- 1 tab(s) by mouth 2 times a day   -- Check with your doctor before becoming pregnant.  It is very important that you take or use this exactly as directed.  Do not skip doses or discontinue unless directed by your doctor.  Obtain medical advice before taking any non-prescription drugs as some may affect the action of this medication.    -- Indication: For Thrombus in heart chamber    atorvastatin 40 mg oral tablet  -- 1 tab(s) by mouth once a day (at bedtime)  -- Indication: For Heart disease    Metoprolol Succinate  mg oral tablet, extended release  -- 1 tab(s) by mouth once a day   -- It is very important that you take or use this exactly as directed.  Do not skip doses or discontinue unless directed by your doctor.  May cause drowsiness.  Alcohol may intensify this effect.  Use care when operating dangerous machinery.  Some non-prescription drugs may aggravate your condition.  Read all labels carefully.  If a warning appears, check with your doctor before taking.  Swallow whole.  Do not crush.  Take with food or milk.  This drug may impair the ability to drive or operate machinery.  Use care until you become familiar with its effects.    -- Indication: For Heart failure    folic acid 1 mg oral tablet  -- 1 tab(s) by mouth once a day  -- Indication: For Alcoholism    thiamine 100 mg oral tablet  -- 1 tab(s) by mouth once a day  -- Indication: For Alcoholism

## 2018-11-07 NOTE — DISCHARGE NOTE ADULT - ADDITIONAL INSTRUCTIONS
Please go to [facility] detox program. Please call [phone] every day so you can be admitted to the inpatient program. Please go to [facility] detox program. Please call [phone] every day so you can be admitted to the inpatient program.  Please go to your appointment with Dr. Levy on 11/28 at 11:45am.

## 2018-11-07 NOTE — CONSULT NOTE ADULT - SUBJECTIVE AND OBJECTIVE BOX
HPI:  63 year old male with history of paroxysmal A-fib (s/p AICD, apixaban), CAD s/p PCI, LV thrombus, gastritis, alcohol abuse with frequent admissions, presenting with tremors and generalized fatigue. Patient reports he has been drinking 1.5 pints of Vodka between yesterday and finished at 6 AM this morning. Patient says he usually drinks 1 pint of Vodka 4-5 days per week, but has been drinking more due to personal stress. This morning, he noticed worsening tremors in upper extremities, difficulty with ambulation due to worsening gait and balance, and per wearable vitals monitoring device noticed he was tachycardia to 110s and with elevated BP. Reports associated constant, mild mid-epigastric chest pressure that is non-radiating and started this morning, and not worsened with activity. Reports nausea, but no vomiting. Denies visual, auditory, or tactile hallucinations. Denies illicit drug use. Denies CARRERO. Denies falling and hitting head. Feels thirsty this morning.      In ED, initial vital signs were T 99.2 F, P 126, /85, R 18, SpO2 96% on RA. Alcohol level 160. Per ED assessment. CIWA was 9. Given Librium 50 mg x 1, and Lorazepam 1 mg IVP x 3, 1L IV NS boluses x 2. (2018 12:52)    FAMILY HISTORY:  Family history of cardiac disorder in maternal grandfather (Grandparent):  of MI at 41yo    MEDICATIONS  (STANDING):  apixaban 2.5 milliGRAM(s) Oral every 12 hours  atorvastatin 40 milliGRAM(s) Oral at bedtime  folic acid 1 milliGRAM(s) Oral daily  melatonin 3 milliGRAM(s) Oral at bedtime  metoprolol succinate  milliGRAM(s) Oral daily  pantoprazole    Tablet 40 milliGRAM(s) Oral before breakfast  thiamine 100 milliGRAM(s) Oral daily    MEDICATIONS  (PRN):  aluminum hydroxide/magnesium hydroxide/simethicone Suspension 30 milliLiter(s) Oral every 4 hours PRN Dyspepsia  LORazepam   Injectable 2 milliGRAM(s) IV Push every 2 hours PRN CIWA > or equal to 9    Vital Signs Last 24 Hrs  T(C): 36.3 (2018 09:45), Max: 36.7 (2018 05:02)  T(F): 97.3 (2018 09:45), Max: 98.1 (2018 05:02)  HR: 81 (2018 09:45) (73 - 81)  BP: 98/67 (2018 09:45) (98/67 - 115/81)  BP(mean): --  RR: 18 (2018 09:45) (18 - 18)  SpO2: 99% (2018 09:45) (98% - 100%)PHYSICAL EXAM:    Constitutional:    Neck:    Breasts:    Respiratory:    Cardiovascular:    Gastrointestinal:    Extremities:    Neurological:    Skin:    Lymph Nodes:      Labs:  CBC Full  -  ( 2018 06:04 )  WBC Count : 3.2 K/uL  Hemoglobin : 10.5 g/dL  Hematocrit : 33.5 %  Platelet Count - Automated : 92 K/uL  Mean Cell Volume : 79.2 fL  Mean Cell Hemoglobin : 24.8 pg  Mean Cell Hemoglobin Concentration : 31.3 g/dL  Auto Neutrophil # : x  Auto Lymphocyte # : x  Auto Monocyte # : x  Auto Eosinophil # : x  Auto Basophil # : x  Auto Neutrophil % : x  Auto Lymphocyte % : x  Auto Monocyte % : x  Auto Eosinophil % : x  Auto Basophil % : x    11-07    138  |  100  |  14  ----------------------------<  90  4.9   |  24  |  1.05    Ca    9.1      2018 06:04  Phos  3.1     11-06  Mg     1.9     11-07        Radiology:  HEALTH ISSUES - R/O PROBLEM Dx:    Assessmant:  1) Patient with h/o ventricular clot , ETOH abuse , noncomplient with Ac  Had decreased Antithrombin lll but it was after iv Heparin so repeat was ordered this admission  Elevated factor Vlll    2) H/O GI bleeding and iron deficiency anemia  Admitting Hg was 13   Plan:  Eliquis 2.5 mg bid  Antithrombin lll level  D dimer  Thank you  Liana Everett MD HPI:  63 year old male with history of paroxysmal A-fib (s/p AICD, apixaban), CAD s/p PCI, LV thrombus, gastritis, alcohol abuse with frequent admissions, presenting with tremors and generalized fatigue. Patient reports he has been drinking 1.5 pints of Vodka between yesterday and finished at 6 AM this morning. Patient says he usually drinks 1 pint of Vodka 4-5 days per week, but has been drinking more due to personal stress. This morning, he noticed worsening tremors in upper extremities, difficulty with ambulation due to worsening gait and balance, and per wearable vitals monitoring device noticed he was tachycardia to 110s and with elevated BP. Reports associated constant, mild mid-epigastric chest pressure that is non-radiating and started this morning, and not worsened with activity. Reports nausea, but no vomiting. Denies visual, auditory, or tactile hallucinations. Denies illicit drug use. Denies CARRERO. Denies falling and hitting head. Feels thirsty this morning.      In ED, initial vital signs were T 99.2 F, P 126, /85, R 18, SpO2 96% on RA. Alcohol level 160. Per ED assessment. CIWA was 9. Given Librium 50 mg x 1, and Lorazepam 1 mg IVP x 3, 1L IV NS boluses x 2. (2018 12:52)    FAMILY HISTORY:  Family history of cardiac disorder in maternal grandfather (Grandparent):  of MI at 41yo    MEDICATIONS  (STANDING):  apixaban 2.5 milliGRAM(s) Oral every 12 hours  atorvastatin 40 milliGRAM(s) Oral at bedtime  folic acid 1 milliGRAM(s) Oral daily  melatonin 3 milliGRAM(s) Oral at bedtime  metoprolol succinate  milliGRAM(s) Oral daily  pantoprazole    Tablet 40 milliGRAM(s) Oral before breakfast  thiamine 100 milliGRAM(s) Oral daily    MEDICATIONS  (PRN):  aluminum hydroxide/magnesium hydroxide/simethicone Suspension 30 milliLiter(s) Oral every 4 hours PRN Dyspepsia  LORazepam   Injectable 2 milliGRAM(s) IV Push every 2 hours PRN CIWA > or equal to 9    Vital Signs Last 24 Hrs  T(C): 36.3 (2018 09:45), Max: 36.7 (2018 05:02)  T(F): 97.3 (2018 09:45), Max: 98.1 (2018 05:02)  HR: 81 (2018 09:45) (73 - 81)  BP: 98/67 (2018 09:45) (98/67 - 115/81)  BP(mean): --  RR: 18 (2018 09:45) (18 - 18)  SpO2: 99% (2018 09:45) (98% - 100%)PHYSICAL EXAM:    Constitutional:    Neck:    Breasts:    Respiratory:    Cardiovascular:    Gastrointestinal:    Extremities:    Neurological:    Skin:    Lymph Nodes:      Labs:  CBC Full  -  ( 2018 06:04 )  WBC Count : 3.2 K/uL  Hemoglobin : 10.5 g/dL  Hematocrit : 33.5 %  Platelet Count - Automated : 92 K/uL  Mean Cell Volume : 79.2 fL  Mean Cell Hemoglobin : 24.8 pg  Mean Cell Hemoglobin Concentration : 31.3 g/dL  Auto Neutrophil # : x  Auto Lymphocyte # : x  Auto Monocyte # : x  Auto Eosinophil # : x  Auto Basophil # : x  Auto Neutrophil % : x  Auto Lymphocyte % : x  Auto Monocyte % : x  Auto Eosinophil % : x  Auto Basophil % : x    11-07    138  |  100  |  14  ----------------------------<  90  4.9   |  24  |  1.05    Ca    9.1      2018 06:04  Phos  3.1     11-06  Mg     1.9     11-07        Radiology:  HEALTH ISSUES - R/O PROBLEM Dx:    Assessmant:  1) Patient with h/o ventricular clot , ETOH abuse , noncomplient with Ac  Had decreased Antithrombin lll but it was after iv Heparin so repeat was ordered this admission  Elevated factor Vlll    2) H/O GI bleeding and iron deficiency anemia  Admitting Hg was 13 after hydration 10.5 microcytic    Plan:  Eliquis 2.5 mg bid  Antithrombin lll level  D dimer  iron , tibc, ferritin  Hemoglobin electrophoresis  Thank you  Liana Everett MD

## 2018-11-07 NOTE — DISCHARGE NOTE ADULT - HOSPITAL COURSE
63M w PMH of paroxysmal A-fib (s/p AICD, apixaban), CAD s/p PCI, LV thrombus, gastritis, alcohol abuse with frequent admissions, presenting with tremors and generalized fatigue. Patient reports he has been drinking 1.5 pints of Vodka between yesterday and finished at 6 AM this morning.  In ED, initial vital signs were T 99.2 F, P 126, /85, R 18, SpO2 96% on RA. Alcohol level 160. Per ED assessment. CIWA was 9. Given Librium 50 mg x 1, and Lorazepam 1 mg IVP x 3, 1L IV NS boluses x 2. Admitted to medicine for alcohol withdrawal. 63M w PMH of paroxysmal A-fib (s/p AICD, apixaban), CAD s/p PCI, LV thrombus, gastritis, alcohol abuse with frequent admissions, presenting with tremors and generalized fatigue. Patient reports he has been drinking 1.5 pints of Vodka between yesterday and finished at 6 AM this morning.In ED, initial vital signs were T 99.2 F, P 126, /85, R 18, SpO2 96% on RA. Alcohol level 160. Per ED assessment. CIWA was 9. Given Librium 50 mg x 1, and Lorazepam 1 mg IVP x 3, 1L IV NS boluses x 2. Admitted to medicine for alcohol withdrawal. Patient's alcohol withdrawal managed with librium taper and ativan prn. Alcohol withdrawal resolved and pt no longer requiring librium and ativan. Pt had a few episodes of BRBPR 2/2 hemorrhoids. He was hemodynamically stable and had no drop in hemoglobin. Patient was referred to two colorectal surgeons for management of hemorrhoids but neither of them accept his insurance. Patient will have to call his insurance provider for a colorectal surgeon. Pt's eliquist was titrated down to 2.5mg PO bid because of the BRBPR and then increased back to home 5 mg PO bid for discharge. Patient's hematologist Dr. Everett made aware that patient in hospital and recs were followed. Patient will follow up with Dr. Levy as an outpatient. Patient informed about various alcohol rehab centers and he will go to [facility] for inpatient detox.

## 2018-11-07 NOTE — CONSULT NOTE ADULT - ASSESSMENT
63 year old male with history of paroxysmal A-fib (s/p AICD, apixaban), CAD s/p PCI, LV thrombus, gastritis, alcohol abuse with frequent admissions, presenting with tremors and generalized fatigue 2/2 to alcohol withdrawal.     Problem/Plan - 1:  ·  Problem: Alcohol withdrawal syndrome without complication.  Plan: CIWA of 8 on admission (for moderate tremors, nausea, and anxiety)  Last drink at 6 AM on 11/3/18. Drank 1.5 pinks of Vodka over past day.  Longstanding history of alcohol abuse and withdrawal with multiple admissions to [facility].  -librium 50 mg PO Q8H. goal to taper today to q12h  -CIWA monitoring Q4H   -Lorazepam 2 mg Q2H IV PRN for CIWA > or equal to 9  -s/p 1 banana bag. now on thiamine and folic qd  -Monitor electrolytes and replace PRN  -Pt informed of consequences of alcohol use. Rehab suggested to patient and he seems amenable.  -CIWA 9 11/5. s/p 2mg IV ativan per protocol. ciwa 1 today.     Problem/Plan - 2:  ·  Problem: Chest pain, unspecified type.  Plan: Chest pain atypical. Likely 2/2 due to alcohol intake. Low suspicion for cardiac etiology.  Troponin negative x1 in ED  EKG 11/3 - sinus tachycardia, anteroseptic infarct (no change from prior EKG)  Patient has ICD in place  -c/w home atorvastatin, metoprolol  -apixaban decreased to 2.5mg PO bid because of hemorrhoidal bleeding yesterday  -trop <0.01  -complaining of burning chest pain likely 2/2 GERD. start maalox.     Problem/Plan - 3:  ·  Problem: Thrombus in heart chamber.  Plan: Echo on 10/15/18 showing EF 35%, LV apical thrombus 2.0 cm in diameter   -home apixaban 5mg po bid stopped 11/4 evening due to bleeding hemorrhoids. restarted AM 11/5 as no complaints of bleeding hemorrhoid and Hb stable and then stopped PM 11/5 as had another episode of bleeding hemorrhoid, Hb stable, vitals stable  -no hemorrhoidal bleeding today so restart apixaban at lower dose 2.5mg Po bid  -cannot use warfarin as had bleeding on it in past.

## 2018-11-07 NOTE — PROGRESS NOTE ADULT - SUBJECTIVE AND OBJECTIVE BOX
Pt is feeling better and appears to be committed to going into rehab program on in Patient basis     PAST MEDICAL & SURGICAL HISTORY:  Thrombus in heart chamber: LV  ICD (implantable cardioverter-defibrillator) in place  Pacemaker  ETOH abuse  Paroxysmal a-fib  Myocardial infarction involving other coronary artery  Gastritis  Atherosclerosis of coronary artery: Coronary artery disease  Automatic implantable cardiac defibrillator in situ: ICD (implantable cardioverter-defibrillator) in place        MEDICATIONS  (STANDING):  apixaban 2.5 milliGRAM(s) Oral every 12 hours  atorvastatin 40 milliGRAM(s) Oral at bedtime  folic acid 1 milliGRAM(s) Oral daily  melatonin 3 milliGRAM(s) Oral at bedtime  metoprolol succinate  milliGRAM(s) Oral daily  pantoprazole    Tablet 40 milliGRAM(s) Oral before breakfast  thiamine 100 milliGRAM(s) Oral daily    MEDICATIONS  (PRN):  aluminum hydroxide/magnesium hydroxide/simethicone Suspension 30 milliLiter(s) Oral every 4 hours PRN Dyspepsia  LORazepam   Injectable 2 milliGRAM(s) IV Push every 2 hours PRN CIWA > or equal to 9    ICU Vital Signs Last 24 Hrs  T(C): 36.7 (07 Nov 2018 05:02), Max: 36.8 (06 Nov 2018 10:30)  T(F): 98.1 (07 Nov 2018 05:02), Max: 98.3 (06 Nov 2018 10:30)  HR: 81 (07 Nov 2018 05:02) (73 - 90)  BP: 103/64 (07 Nov 2018 05:02) (103/64 - 115/81)  BP(mean): --  ABP: --  ABP(mean): --  RR: 18 (07 Nov 2018 05:02) (17 - 18)  SpO2: 98% (07 Nov 2018 05:02) (98% - 100%)      Lungs clear  Cv s1 s2  abd soft   Ext  stable                          10.5   3.2   )-----------( 92       ( 07 Nov 2018 06:04 )             33.5   11-07    138  |  100  |  14  ----------------------------<  90  4.9   |  24  |  1.05    Ca    9.1      07 Nov 2018 06:04  Phos  3.1     11-06  Mg     1.9     11-07    CT head  No acute hemorrhage or fracture

## 2018-11-07 NOTE — DISCHARGE NOTE ADULT - PLAN OF CARE
Please go to [facility] detox program. Please call [phone] every day so you can be admitted to the inpatient program. You came to the hospital because you felt weak and had tremors. You were treated for alcohol withdrawal. It is very important that you stop drinking. Alcohol has many dangerous complications such as high blood pressure, liver failure, liver cancer, and even death. Please go to [facility] detox program. Please call [phone] every day so you can be admitted to the inpatient program You have a known history of thrombus in your heart. Please continue to take the apixaban You have a known history of thrombus in your heart. Please continue to take the apixaban 5mg one tablet two times a day. If you have any bright red blood per rectum please lower the dose to 2.5mg one tablet two time a day and call Dr. Levy immediately. Please go to your appointment with Dr. Levy on 11/28 at 11:45am. You have a history of atrial fibrillation. Please continue to take your home medications. You have a history of heart failure. Please continue to take your home medications. You have a history of heart disease. Please continue to take your home medications. Please call your insurance company to find a colorectal surgeon who takes your insurance. You have a history of hemorrhoids. Your bright red blood when you have a bowel movement are likely due to the hemorrhoids. The colorectal surgeons at [facility] do not take your insurance unfortunately. Please call your insurance company to find a colorectal surgeon who takes your insurance.

## 2018-11-07 NOTE — PROGRESS NOTE ADULT - REASON FOR ADMISSION
alcohol withdrawal

## 2018-11-07 NOTE — PROGRESS NOTE ADULT - ASSESSMENT
ETHANOLISM     cont plan for Medical withdrawl program
63 year old male with history of paroxysmal A-fib (s/p AICD, apixaban), CAD s/p PCI, LV thrombus, gastritis, alcohol abuse with frequent admissions, presenting with tremors and generalized fatigue 2/2 to alcohol withdrawal.
63 year old male with history of paroxysmal A-fib (s/p AICD, apixaban), CAD s/p PCI, LV thrombus, gastritis, alcohol abuse with frequent admissions, presenting with tremors and generalized fatigue 2/2 to alcohol withdrawal.
Alcoholism     Cont Librium   Pt needs to go to [facility] Patient Rehab for ETOH    to stop the cycle of hospital readmissions for ETOH withdrawl     CAD  Cardiomyopathy LV thrombus   cont cardiac meds     Risk of usage of  Eliquis in face of persistent use of ETOH and Falling was discussed with Patient
Alcoholism    ETOH Withdrawal   Cardiomyopathy  LV Thrombus   CAD     cont med rx     L Mildred TRISTAN    for d/c plan
CAD  LV thrombus   Cardiomyopathy   GERD    ETOH    elevated LFT     for continued withdrawl  Rx     Pt should be evaluated for transfer to Detox  Inpatient Alcohol Program  He had been in contact with [facility] Shayy Levy
63 year old male with history of paroxysmal A-fib (s/p AICD, apixaban), CAD s/p PCI, LV thrombus, gastritis, alcohol abuse with frequent admissions, presenting with tremors and generalized fatigue 2/2 to alcohol withdrawal.

## 2018-11-07 NOTE — DISCHARGE NOTE ADULT - PATIENT PORTAL LINK FT
You can access the [facility] Patient Portal, offered by [facility], by registering with the following website: [email or web address]

## 2018-11-07 NOTE — CONSULT NOTE ADULT - SUBJECTIVE AND OBJECTIVE BOX
Patient is a 63y old  Male who presents with a chief complaint of alcohol withdrawal (2018 10:48)       HPI:  63 year old male with history of paroxysmal A-fib (s/p AICD, apixaban), CAD s/p PCI, LV thrombus, gastritis, alcohol abuse with frequent admissions, presenting with tremors and generalized fatigue. Patient reports he has been drinking 1.5 pints of Vodka between yesterday and finished at 6 AM this morning. Patient says he usually drinks 1 pint of Vodka 4-5 days per week, but has been drinking more due to personal stress. This morning, he noticed worsening tremors in upper extremities, difficulty with ambulation due to worsening gait and balance, and per wearable vitals monitoring device noticed he was tachycardia to 110s and with elevated BP. Reports associated constant, mild mid-epigastric chest pressure that is non-radiating and started this morning, and not worsened with activity. Reports nausea, but no vomiting. Denies visual, auditory, or tactile hallucinations. Denies illicit drug use. Denies CARRERO. Denies falling and hitting head. Feels thirsty this morning.      In ED, initial vital signs were T 99.2 F, P 126, /85, R 18, SpO2 96% on RA. Alcohol level 160. Per ED assessment. CIWA was 9. Given Librium 50 mg x 1, and Lorazepam 1 mg IVP x 3, 1L IV NS boluses x 2. (2018 12:52)      PAST MEDICAL & SURGICAL HISTORY:  Thrombus in heart chamber: LV  ICD (implantable cardioverter-defibrillator) in place  Pacemaker  ETOH abuse  Paroxysmal a-fib  Myocardial infarction involving other coronary artery  Gastritis  Atherosclerosis of coronary artery: Coronary artery disease  Automatic implantable cardiac defibrillator in situ: ICD (implantable cardioverter-defibrillator) in place      MEDICATIONS  (STANDING):  apixaban 2.5 milliGRAM(s) Oral every 12 hours  atorvastatin 40 milliGRAM(s) Oral at bedtime  folic acid 1 milliGRAM(s) Oral daily  melatonin 3 milliGRAM(s) Oral at bedtime  metoprolol succinate  milliGRAM(s) Oral daily  pantoprazole    Tablet 40 milliGRAM(s) Oral before breakfast  thiamine 100 milliGRAM(s) Oral daily    MEDICATIONS  (PRN):  aluminum hydroxide/magnesium hydroxide/simethicone Suspension 30 milliLiter(s) Oral every 4 hours PRN Dyspepsia  LORazepam   Injectable 2 milliGRAM(s) IV Push every 2 hours PRN CIWA > or equal to 9      Social History: lives with his son in an elevator accessible apartment building, no home care services    Functional Level Prior to Admission: ADL independent, walks without assistive devices    FAMILY HISTORY:  Family history of cardiac disorder in maternal grandfather (Grandparent):  of MI at 41yo      CBC Full  -  ( 2018 06:04 )  WBC Count : 3.2 K/uL  Hemoglobin : 10.5 g/dL  Hematocrit : 33.5 %  Platelet Count - Automated : 92 K/uL  Mean Cell Volume : 79.2 fL  Mean Cell Hemoglobin : 24.8 pg  Mean Cell Hemoglobin Concentration : 31.3 g/dL  Auto Neutrophil # : x  Auto Lymphocyte # : x  Auto Monocyte # : x  Auto Eosinophil # : x  Auto Basophil # : x  Auto Neutrophil % : x  Auto Lymphocyte % : x  Auto Monocyte % : x  Auto Eosinophil % : x  Auto Basophil % : x      1107    138  |  100  |  14  ----------------------------<  90  4.9   |  24  |  1.05    Ca    9.1      2018 06:04  Phos  3.1     11-06  Mg     1.9     11-07              Radiology:    < from: CT Head No Cont (18 @ 09:02) >  EXAM:  CT BRAIN                          PROCEDURE DATE:  2018          INTERPRETATION:  Nitza BAIG MD, have reviewed the images and the   report and agree with the findings.    INDICATIONS: Fall on Eliquis.    TECHNIQUE:  Serial axial images were obtained from the skull base to the   vertex without the use of intravenous contrast. Coronal and sagittal   reconstructions were created and reviewed.    COMPARISON EXAMINATION: Head CT from 2018.    FINDINGS:    VENTRICLES AND SULCI: Stable in size and configuration; no evidence of   hydrocephalus.  INTRA-AXIAL: No intracranial mass, acute hemorrhage, or midline shift is   present. No evidence of acute transcortical infarction.  EXTRA-AXIAL: No extra-axial fluid collection is present.   VISUALIZED SINUSES: No air-fluid levels are identified.   VISUALIZED MASTOIDS:  Clear.  CALVARIUM:  No fracture.  MISCELLANEOUS:  None.    IMPRESSION:    No acute intracranial hemorrhage or calvarial fracture.          Vital Signs Last 24 Hrs  T(C): 36.3 (2018 09:45), Max: 36.7 (2018 05:02)  T(F): 97.3 (2018 09:45), Max: 98.1 (2018 05:02)  HR: 81 (2018 09:45) (73 - 81)  BP: 98/67 (2018 09:45) (98/67 - 115/81)  BP(mean): --  RR: 18 (2018 09:45) (18 - 18)  SpO2: 99% (2018 09:45) (98% - 100%)    REVIEW OF SYSTEMS:    CONSTITUTIONAL: generalized weakness  EYES: No eye pain, visual disturbances, or discharge  ENMT:  No difficulty hearing, tinnitus, vertigo; No sinus or throat pain  NECK: No pain or stiffness  BREASTS: No pain, masses, or nipple discharge  RESPIRATORY: No cough, wheezing, chills or hemoptysis; No shortness of breath  CARDIOVASCULAR: No chest pain, palpitations, dizziness, or leg swelling  GASTROINTESTINAL: No abdominal or epigastric pain. No nausea, vomiting, or hematemesis; No diarrhea or constipation. No melena or hematochezia.  GENITOURINARY: No dysuria, frequency, hematuria, or incontinence  NEUROLOGICAL: No headaches, memory loss, loss of strength, numbness, or tremors  SKIN: No itching, burning, rashes, or lesions   LYMPH NODES: No enlarged glands  ENDOCRINE: No heat or cold intolerance; No hair loss  MUSCULOSKELETAL: No joint pain or swelling; No muscle, back, or extremity pain  PSYCHIATRIC: No depression, anxiety, mood swings, or difficulty sleeping  HEME/LYMPH: No easy bruising, or bleeding gums  ALLERGY AND IMMUNOLOGIC: No hives or eczema  VASCULAR: no swelling, erythema      Physical Exam: WDWN 64 yo  gentleman sitting up in bed, c/o weakness    Head: normocephalic, atraumatic    Eyes: PERRLA, EOMI, no nystagmus, sclera anicteric    ENT: nasal discharge, uvula midline, no oropharyngeal erythema/exudate    Neck: supple, negative JVD, negative carotid bruits, no thyromegaly    Chest: CTA bilaterally, neg wheeze, rhonchi, rales, crackles, egophany    Cardiovascular: regular rate and rhythm, neg murmurs/rubs/gallops    Abdomen: soft, non distended, non tender, negative rebound/guarding, normal bowel sounds, neg hepatosplenomegaly    Extremities: WWP, neg cyanosis/clubbing/edema, negative calf tenderness to palpation, negative Kee's sign    :     Neurologic Exam:    Alert and oriented to person, place, date/year, speech fluent w/o dysarthria, recent and remote memory intact, repetition intact, comprehension intact,     Cranial Nerves:     II:                       pupils equal, round and reactive to light, visual fields intact   III/ IV/VI:            extraocular movements intact, neg nystagmus, ptosis  V:                       facial sensation intact, V1-3 normal  VII:                     face symmetric, no droop, normal eye closure and smile  VIII:                    hearing intact to finger rub bilaterally  IX/ X:                 soft palate rise symmetrical  XI:                      head turning, shoulder shrug normal  XII:                     tongue midline    Motor Exam:    Upper Extremities:              Right:    5/5 /intrinsics                                                          5/5 deltoid                                                          5/5 biceps                                                          5/5 triceps                                                          5/5 wrist extensors-flexors                                                          negative pronator drift                                                Left:      5/5 /intrinsics                                                          5/5 deltoid                                                          5/5 biceps                                                          5/5 triceps                                                          5/5 wrist extensors/flexors                                                          negative pronator drift      Lower Extremities:             Right:    5/5 hip flexors/adductors/abductors                                                           5/5 quadriceps/hamstrings                                                           5/5 dorsiflexors/plantar flexors/invertors-evertors                                               Left:        5/5 hip flexors/adductors/abductors                                                            5/5 quadriceps/hamstrings                                                            5/5 dorsiflexors/plantar flexors/invertors-evertors    Sensory:              intact to LT/PP in all UE/LE dermatomes    DTR:                   = biceps/     triceps/     brachioradialis                            = patella/   medial hamstring/    ankle                            neg clonus                            neg Babinski                            neg Hoffmans    Finger to Nose:  wnl    Heel to Shin:       wnl    Rapid Alternating movements:   wnl    Joint Position Sense:  intact    Romberg:  not tested    Tandem Walking:  not tested    Gait:  not tested        PM&R Impression:    1) deconditioned  2) no focal weakness  3) Etoh withdrawal      Recommendations:    1) Physical therapy focusing on therapeutic exercises, bed mobility/transfer out of bed evaluation, progressive ambulation with assistive devices.    2) Anticipated Disposition Plan/Recs: wishes to go to inpatient Etoh rehab

## 2018-11-07 NOTE — DISCHARGE NOTE ADULT - CARE PROVIDER_API CALL
Ck Levy), Medicine  [address] [place] [address]  Phone: [phone]  Fax: [phone]    Liana Everett), Medicine  [address] [place] [id number]  Phone: [phone]  Fax: [phone]

## 2018-11-08 LAB
AT III ACT/NOR PPP CHRO: 104 % — SIGNIFICANT CHANGE UP (ref 85–135)
FOLATE SERPL-MCNC: 19.1 NG/ML — SIGNIFICANT CHANGE UP
VIT B12 SERPL-MCNC: 653 PG/ML — SIGNIFICANT CHANGE UP (ref 232–1245)

## 2018-11-09 LAB
HEMOGLOBIN INTERPRETATION: SIGNIFICANT CHANGE UP
HGB A MFR BLD: 98 % — SIGNIFICANT CHANGE UP (ref 95.8–98)
HGB A2 MFR BLD: 2 % — SIGNIFICANT CHANGE UP (ref 2–3.2)

## 2018-11-11 PROCEDURE — 80048 BASIC METABOLIC PNL TOTAL CA: CPT

## 2018-11-11 PROCEDURE — 85730 THROMBOPLASTIN TIME PARTIAL: CPT

## 2018-11-11 PROCEDURE — 99285 EMERGENCY DEPT VISIT HI MDM: CPT | Mod: 25

## 2018-11-11 PROCEDURE — 85027 COMPLETE CBC AUTOMATED: CPT

## 2018-11-11 PROCEDURE — 80053 COMPREHEN METABOLIC PANEL: CPT

## 2018-11-11 PROCEDURE — 82553 CREATINE MB FRACTION: CPT

## 2018-11-11 PROCEDURE — 82550 ASSAY OF CK (CPK): CPT

## 2018-11-11 PROCEDURE — 93005 ELECTROCARDIOGRAM TRACING: CPT

## 2018-11-11 PROCEDURE — 36415 COLL VENOUS BLD VENIPUNCTURE: CPT

## 2018-11-11 PROCEDURE — 96375 TX/PRO/DX INJ NEW DRUG ADDON: CPT

## 2018-11-11 PROCEDURE — 93306 TTE W/DOPPLER COMPLETE: CPT

## 2018-11-11 PROCEDURE — 85610 PROTHROMBIN TIME: CPT

## 2018-11-11 PROCEDURE — 84100 ASSAY OF PHOSPHORUS: CPT

## 2018-11-11 PROCEDURE — 80307 DRUG TEST PRSMV CHEM ANLYZR: CPT

## 2018-11-11 PROCEDURE — 83690 ASSAY OF LIPASE: CPT

## 2018-11-11 PROCEDURE — 71045 X-RAY EXAM CHEST 1 VIEW: CPT

## 2018-11-11 PROCEDURE — 84484 ASSAY OF TROPONIN QUANT: CPT

## 2018-11-11 PROCEDURE — 83735 ASSAY OF MAGNESIUM: CPT

## 2018-11-11 PROCEDURE — 96361 HYDRATE IV INFUSION ADD-ON: CPT

## 2018-11-11 PROCEDURE — 85025 COMPLETE CBC W/AUTO DIFF WBC: CPT

## 2018-11-12 DIAGNOSIS — I48.0 PAROXYSMAL ATRIAL FIBRILLATION: ICD-10-CM

## 2018-11-12 DIAGNOSIS — K29.70 GASTRITIS, UNSPECIFIED, WITHOUT BLEEDING: ICD-10-CM

## 2018-11-12 DIAGNOSIS — Z95.810 PRESENCE OF AUTOMATIC (IMPLANTABLE) CARDIAC DEFIBRILLATOR: ICD-10-CM

## 2018-11-12 DIAGNOSIS — Z91.19 PATIENT'S NONCOMPLIANCE WITH OTHER MEDICAL TREATMENT AND REGIMEN: ICD-10-CM

## 2018-11-12 DIAGNOSIS — I42.9 CARDIOMYOPATHY, UNSPECIFIED: ICD-10-CM

## 2018-11-12 DIAGNOSIS — Z79.01 LONG TERM (CURRENT) USE OF ANTICOAGULANTS: ICD-10-CM

## 2018-11-12 DIAGNOSIS — K62.5 HEMORRHAGE OF ANUS AND RECTUM: ICD-10-CM

## 2018-11-12 DIAGNOSIS — I50.22 CHRONIC SYSTOLIC (CONGESTIVE) HEART FAILURE: ICD-10-CM

## 2018-11-12 DIAGNOSIS — K64.9 UNSPECIFIED HEMORRHOIDS: ICD-10-CM

## 2018-11-12 DIAGNOSIS — I25.10 ATHEROSCLEROTIC HEART DISEASE OF NATIVE CORONARY ARTERY WITHOUT ANGINA PECTORIS: ICD-10-CM

## 2018-11-12 DIAGNOSIS — F10.239 ALCOHOL DEPENDENCE WITH WITHDRAWAL, UNSPECIFIED: ICD-10-CM

## 2018-11-12 DIAGNOSIS — I51.3 INTRACARDIAC THROMBOSIS, NOT ELSEWHERE CLASSIFIED: ICD-10-CM

## 2018-12-06 NOTE — ED PROVIDER NOTE - QRS
Patient Instructions by Bucky Hughes CMA at 10/23/17 04:40 PM     Author:  Bucky Hughes CMA Service:  (none) Author Type:  Certified Medical Assistant     Filed:  10/23/17 04:44 PM Encounter Date:  10/23/2017 Status:  Addendum     :  Bucky Hughes CMA (Certified Medical Assistant)                [employer].        Revision History        User Key Date/Time User Provider Type Action    > [N/A] 10/23/17  04:44 PM Bucky Hughes CMA Certified Medical Assistant Addend     [N/A] 10/23/17 04:43 PM Bucky Hughes CMA Certified Medical Assistant Sign             [id number]

## 2018-12-21 NOTE — PATIENT PROFILE ADULT. - NSALCOHOLWITHDRAWAL_GEN_A_CORE_SD
Continue metformin  Start trulicity injectable (or other covered medication)  For blood pressure: you are taking lisinopril 20 mg + HCTZ 12.5 mg. We will start combination pill lisinopril-HCTZ 20-12.5. You will take 2 of these daily - lisinopril 40 + HCTZ 25 mg  Blood pressure check in 2 weeks  mychart or call with blood sugar readings in 3-4 weeks  Follow up in 3 months    Diabetic educator  Sleep clinic evaluation    Patient Education     Dulaglutide Solution for injection  What is this medicine?  DULAGLUTIDE (DOO la GLOO tide) is used to improve blood sugar control in adults with type 2 diabetes. This medicine may be used with other oral diabetes medicines.  This medicine may be used for other purposes; ask your health care provider or pharmacist if you have questions.  What should I tell my health care provider before I take this medicine?  They need to know if you have any of these conditions:    endocrine tumors (MEN 2) or if someone in your family had these tumors    history of pancreatitis    kidney disease    liver disease    stomach problems    thyroid cancer or if someone in your family had thyroid cancer    an unusual or allergic reaction to dulaglutide, other medicines, foods, dyes, or preservatives    pregnant or trying to get pregnant    breast-feeding  How should I use this medicine?  This medicine is for injection under the skin of your upper leg (thigh), stomach area, or upper arm. It is usually given once every week (every 7 days). You will be taught how to prepare and give this medicine. Use exactly as directed. Take your medicine at regular intervals. Do not take it more often than directed.  If you use this medicine with insulin, you should inject this medicine and the insulin separately. Do not mix them together. Do not give the injections right next to each other. Change (rotate) injection sites with each injection.  It is important that you put your used needles and syringes in a special  sharps container. Do not put them in a trash can. If you do not have a sharps container, call your pharmacist or healthcare provider to get one.  A special MedGuide will be given to you by the pharmacist with each prescription and refill. Be sure to read this information carefully each time.  Talk to your pediatrician regarding the use of this medicine in children. Special care may be needed.  Overdosage: If you think you've taken too much of this medicine contact a poison control center or emergency room at once.  NOTE: This medicine is only for you. Do not share this medicine with others.  What if I miss a dose?  If you miss a dose, take it as soon as you can within 3 days after the missed dose. Then take your next dose at your regular weekly time. If it has been longer than 3 days after the missed dose, do not take the missed dose. Take the next dose at your regular time. Do not take double or extra doses. If you have questions about a missed dose, contact your health care provider for advice.  What may interact with this medicine?  Do not take this medicine with any of the following medications:    gatifloxacin  Many medications may cause changes in blood sugar, these include:    alcohol containing beverages    aspirin and aspirin-like drugs    chloramphenicol    chromium    diuretics    female hormones, such as estrogens or progestins, birth control pills    heart medicines    isoniazid    male hormones or anabolic steroids    medications for weight loss    medicines for allergies, asthma, cold, or cough    medicines for mental problems    medicines called MAO inhibitors - Nardil, Parnate, Marplan, Eldepryl    niacin    NSAIDS, such as ibuprofen    pentamidine    phenytoin    probenecid    quinolone antibiotics such as ciprofloxacin, levofloxacin, ofloxacin    some herbal dietary supplements    steroid medicines such as prednisone or cortisone    thyroid hormones  Some medications can hide the warning symptoms  of low blood sugar (hypoglycemia). You may need to monitor your blood sugar more closely if you are taking one of these medications. These include:    beta-blockers, often used for high blood pressure or heart problems (examples include atenolol, metoprolol, propranolol)    clonidine    guanethidine    reserpine  This list may not describe all possible interactions. Give your health care provider a list of all the medicines, herbs, non-prescription drugs, or dietary supplements you use. Also tell them if you smoke, drink alcohol, or use illegal drugs. Some items may interact with your medicine.  What should I watch for while using this medicine?  Visit your doctor or health care professional for regular checks on your progress.  A test called the HbA1C (A1C) will be monitored. This is a simple blood test. It measures your blood sugar control over the last 2 to 3 months. You will receive this test every 3 to 6 months.  Learn how to check your blood sugar. Learn the symptoms of low and high blood sugar and how to manage them.  Always carry a quick-source of sugar with you in case you have symptoms of low blood sugar. Examples include hard sugar candy or glucose tablets. Make sure others know that you can choke if you eat or drink when you develop serious symptoms of low blood sugar, such as seizures or unconsciousness. They must get medical help at once.  Tell your doctor or health care professional if you have high blood sugar. You might need to change the dose of your medicine. If you are sick or exercising more than usual, you might need to change the dose of your medicine.  Do not skip meals. Ask your doctor or health care professional if you should avoid alcohol. Many nonprescription cough and cold products contain sugar or alcohol. These can affect blood sugar.  Wear a medical ID bracelet or chain, and carry a card that describes your disease and details of your medicine and dosage times.  What side effects may  I notice from receiving this medicine?  Side effects that you should report to your doctor or health care professional as soon as possible:    allergic reactions like skin rash, itching or hives, swelling of the face, lips, or tongue    breathing problems    signs and symptoms of low blood sugar such as feeling anxious, confusion, dizziness, increased hunger, unusually weak or tired, sweating, shakiness, cold, irritable, headache, blurred vision, fast heartbeat, loss of consciousness    unusual stomach upset or pain    vomiting  Side effects that usually do not require medical attention (Report these to your doctor or health care professional if they continue or are bothersome.):diarrhea    heartburn    loss of appetite    nausea    pain, redness, or irritation at site where injected  This list may not describe all possible side effects. Call your doctor for medical advice about side effects. You may report side effects to FDA at 7-836-FDA-1221.  Where should I keep my medicine?  Keep out of the reach of children.  Store this medicine in a refrigerator between 2 and 8 degrees C (36 and 46 degrees F). Do not freeze or use if the medicine has been frozen. Protect from light and excessive heat. Each single-dose pen or prefilled syringe can be kept at room temperature, not to exceed 30 degrees C (86 degrees F) for a total of 14 days, if needed. Store in the carton until use. Throw away any unused medicine after the expiration date.  NOTE: This sheet is a summary. It may not cover all possible information. If you have questions about this medicine, talk to your doctor, pharmacist, or health care provider.  NOTE:This sheet is a summary. It may not cover all possible information. If you have questions about this medicine, talk to your doctor, pharmacist, or health care provider. Copyright  2016 Gold Standard            tremor(s)

## 2019-01-11 ENCOUNTER — EMERGENCY (EMERGENCY)
Facility: HOSPITAL | Age: 64
LOS: 1 days | Discharge: HOME | End: 2019-01-11
Attending: EMERGENCY MEDICINE | Admitting: EMERGENCY MEDICINE
Payer: MEDICARE

## 2019-01-11 VITALS
SYSTOLIC BLOOD PRESSURE: 122 MMHG | DIASTOLIC BLOOD PRESSURE: 77 MMHG | RESPIRATION RATE: 16 BRPM | HEART RATE: 76 BPM | OXYGEN SATURATION: 100 % | WEIGHT: 166.89 LBS | TEMPERATURE: 99 F

## 2019-01-11 VITALS
RESPIRATION RATE: 16 BRPM | SYSTOLIC BLOOD PRESSURE: 129 MMHG | HEART RATE: 79 BPM | OXYGEN SATURATION: 98 % | TEMPERATURE: 100 F | DIASTOLIC BLOOD PRESSURE: 79 MMHG

## 2019-01-11 DIAGNOSIS — E86.0 DEHYDRATION: ICD-10-CM

## 2019-01-11 DIAGNOSIS — Z88.8 ALLERGY STATUS TO OTHER DRUGS, MEDICAMENTS AND BIOLOGICAL SUBSTANCES: ICD-10-CM

## 2019-01-11 DIAGNOSIS — Z88.0 ALLERGY STATUS TO PENICILLIN: ICD-10-CM

## 2019-01-11 DIAGNOSIS — Z79.899 OTHER LONG TERM (CURRENT) DRUG THERAPY: ICD-10-CM

## 2019-01-11 DIAGNOSIS — R10.13 EPIGASTRIC PAIN: ICD-10-CM

## 2019-01-11 LAB
ANION GAP SERPL CALC-SCNC: 14 MMOL/L — SIGNIFICANT CHANGE UP (ref 5–17)
BUN SERPL-MCNC: 14 MG/DL — SIGNIFICANT CHANGE UP (ref 7–23)
CALCIUM SERPL-MCNC: 8.7 MG/DL — SIGNIFICANT CHANGE UP (ref 8.4–10.5)
CHLORIDE SERPL-SCNC: 99 MMOL/L — SIGNIFICANT CHANGE UP (ref 96–108)
CO2 SERPL-SCNC: 23 MMOL/L — SIGNIFICANT CHANGE UP (ref 22–31)
CREAT SERPL-MCNC: 1.02 MG/DL — SIGNIFICANT CHANGE UP (ref 0.5–1.3)
EXTRA BLUE TOP TUBE: SIGNIFICANT CHANGE UP
EXTRA SST TUBE: SIGNIFICANT CHANGE UP
GLUCOSE SERPL-MCNC: 110 MG/DL — HIGH (ref 70–99)
HCT VFR BLD CALC: 33.9 % — LOW (ref 39–50)
HGB BLD-MCNC: 10.5 G/DL — LOW (ref 13–17)
LACTATE SERPL-SCNC: 0.9 MMOL/L — SIGNIFICANT CHANGE UP (ref 0.5–2)
LYMPHOCYTES # BLD AUTO: 4 % — LOW (ref 13–44)
MCHC RBC-ENTMCNC: 22.5 PG — LOW (ref 27–34)
MCHC RBC-ENTMCNC: 31 G/DL — LOW (ref 32–36)
MCV RBC AUTO: 72.6 FL — LOW (ref 80–100)
MONOCYTES NFR BLD AUTO: 5 % — SIGNIFICANT CHANGE UP (ref 2–14)
NEUTROPHILS NFR BLD AUTO: 91 % — HIGH (ref 43–77)
PLATELET # BLD AUTO: 173 K/UL — SIGNIFICANT CHANGE UP (ref 150–400)
POTASSIUM SERPL-MCNC: 4.4 MMOL/L — SIGNIFICANT CHANGE UP (ref 3.5–5.3)
POTASSIUM SERPL-SCNC: 4.4 MMOL/L — SIGNIFICANT CHANGE UP (ref 3.5–5.3)
RBC # BLD: 4.67 M/UL — SIGNIFICANT CHANGE UP (ref 4.2–5.8)
RBC # FLD: 18.2 % — HIGH (ref 10.3–16.9)
SODIUM SERPL-SCNC: 136 MMOL/L — SIGNIFICANT CHANGE UP (ref 135–145)
WBC # BLD: 6.1 K/UL — SIGNIFICANT CHANGE UP (ref 3.8–10.5)
WBC # FLD AUTO: 6.1 K/UL — SIGNIFICANT CHANGE UP (ref 3.8–10.5)

## 2019-01-11 PROCEDURE — 80048 BASIC METABOLIC PNL TOTAL CA: CPT

## 2019-01-11 PROCEDURE — 85025 COMPLETE CBC W/AUTO DIFF WBC: CPT

## 2019-01-11 PROCEDURE — 36415 COLL VENOUS BLD VENIPUNCTURE: CPT

## 2019-01-11 PROCEDURE — 99284 EMERGENCY DEPT VISIT MOD MDM: CPT

## 2019-01-11 PROCEDURE — 96361 HYDRATE IV INFUSION ADD-ON: CPT

## 2019-01-11 PROCEDURE — 99284 EMERGENCY DEPT VISIT MOD MDM: CPT | Mod: 25

## 2019-01-11 PROCEDURE — 83605 ASSAY OF LACTIC ACID: CPT

## 2019-01-11 PROCEDURE — 96374 THER/PROPH/DIAG INJ IV PUSH: CPT

## 2019-01-11 RX ORDER — ONDANSETRON 8 MG/1
4 TABLET, FILM COATED ORAL ONCE
Qty: 0 | Refills: 0 | Status: COMPLETED | OUTPATIENT
Start: 2019-01-11 | End: 2019-01-11

## 2019-01-11 RX ORDER — SODIUM CHLORIDE 9 MG/ML
1000 INJECTION INTRAMUSCULAR; INTRAVENOUS; SUBCUTANEOUS ONCE
Qty: 0 | Refills: 0 | Status: COMPLETED | OUTPATIENT
Start: 2019-01-11 | End: 2019-01-11

## 2019-01-11 RX ADMIN — ONDANSETRON 4 MILLIGRAM(S): 8 TABLET, FILM COATED ORAL at 15:56

## 2019-01-11 RX ADMIN — SODIUM CHLORIDE 2000 MILLILITER(S): 9 INJECTION INTRAMUSCULAR; INTRAVENOUS; SUBCUTANEOUS at 15:56

## 2019-01-11 RX ADMIN — SODIUM CHLORIDE 1000 MILLILITER(S): 9 INJECTION INTRAMUSCULAR; INTRAVENOUS; SUBCUTANEOUS at 15:51

## 2019-01-11 RX ADMIN — SODIUM CHLORIDE 2000 MILLILITER(S): 9 INJECTION INTRAMUSCULAR; INTRAVENOUS; SUBCUTANEOUS at 15:18

## 2019-01-11 NOTE — ED ADULT NURSE NOTE - NSIMPLEMENTINTERV_GEN_ALL_ED
Implemented All Universal Safety Interventions:  [place] to call system. Call bell, personal items and telephone within reach. Instruct patient to call for assistance. Room bathroom lighting operational. Non-slip footwear when patient is off stretcher. Physically safe environment: no spills, clutter or unnecessary equipment. Stretcher in lowest position, wheels locked, appropriate side rails in place.

## 2019-01-11 NOTE — PROGRESS NOTE ADULT - ASSESSMENT
CAD  Cardiomyopathy   AICD placement   LV thrombus    r/o Influenza   Gastrointestinal virus     L Mildred TRISTAN

## 2019-01-11 NOTE — ED ADULT NURSE NOTE - OBJECTIVE STATEMENT
Pt w/ PMH of HTN and CAD presents to ED today c/o 6/10 epigastric pain associated w/ chills, N/V and multiple episodes of diarrhea x3 days.  Pt states several members of his family are sick w/ similar symptoms, but denies official diagnosis.  Pt elicits poor PO intake but states he is able to tolerate PO fluids.  Pt denies dizziness, cough, fever, body aches or syncope.  Pt is pending eval by [facility].  Pt ECG in triage shows no new changes.

## 2019-01-11 NOTE — ED PROVIDER NOTE - OBJECTIVE STATEMENT
64yo M w/ PMH of prior alcoholism currently sober, HTN and CAD presents to ED today c/o 6/10 epigastric pain associated w/ chills, N/V and multiple episodes of diarrhea x3 days.  Pt states several members of his family are sick w/ similar symptoms, and he caught from them.  Pt elicits poor PO intake but states he is able to tolerate PO fluids.  Pt denies dizziness, cough, fever, body aches or syncope.  pcp is dr mendez. denies any other complaints, has not tried any medications for this.

## 2019-01-11 NOTE — PROGRESS NOTE ADULT - SUBJECTIVE AND OBJECTIVE BOX
Pt is s/p ethanolism  now much improved with cessation of alcoholism after recent rehab as an in patient   Pt is c/o of feeling unwell . possible flu exposure   with GI upset     PAST MEDICAL & SURGICAL HISTORY:  Thrombus in heart chamber: LV  ICD (implantable cardioverter-defibrillator) in place  Pacemaker  ETOH abuse  Paroxysmal a-fib  Myocardial infarction involving other coronary artery  Gastritis  Atherosclerosis of coronary artery: Coronary artery disease  Automatic implantable cardiac defibrillator in situ: ICD (implantable cardioverter-defibrillator) in place      MEDICATIONS  (STANDING):    Home Medications: eliquis 5 mg po BID for LV Thrombus       MEDICATIONS  (PRN):    ICU Vital Signs Last 24 Hrs  T(C): 37.2 (11 Jan 2019 15:20), Max: 37.2 (11 Jan 2019 14:06)  T(F): 98.9 (11 Jan 2019 15:20), Max: 99 (11 Jan 2019 14:06)  HR: 76 (11 Jan 2019 15:20) (76 - 76)  BP: 103/64 (11 Jan 2019 15:20) (103/64 - 122/77)  BP(mean): --  ABP: --  ABP(mean): --  RR: 18 (11 Jan 2019 15:20) (16 - 18)  SpO2: 99% (11 Jan 2019 15:20) (99% - 100%)      Lungs clear  decreased breath sounds at bases  CV s1 s2  abd soft  Ext stable                          10.5   6.1   )-----------( 173      ( 11 Jan 2019 15:18 )             33.9      LABS PENDING   Acute Hepatitis Panel (11.05.18 @ 10:40)    Hepatitis C Virus Interpretation: Nonreact: Hepatitis C AB  S/CO Ratio                        Interpretation  < 1.0                                     Non-Reactive  1.0 - 4.9                           Weakly-Reactive  > 5.0                                 Reactive  Non-Reactive: A person witha non-reactive HCV antibody result is  considered uninfected.  No further action is needed unless recent  infection is suspected.  In these cases, consider repeat testing later to  detect seroconversion..  Weakly-Reactive: HCV antibody test is abnormal, HCV RNA Qualitative test  will follow.  Reactive: HCV antibody test is abnormal, HCV RNA Qualitative test will  follow.  Note: HCV antibody testing is performed on the [employer]  system.    Hepatitis C Virus S/CO Ratio: 0.05 S/CO    CXR  Oct 2018 neg

## 2019-01-11 NOTE — ED PROVIDER NOTE - MEDICAL DECISION MAKING DETAILS
here w/ n/v/d with multiple family members sick with same. likely viral gastro. DC home in NAD with strict return precautions given after IV fluids and po trial

## 2019-01-11 NOTE — ED PROVIDER NOTE - NSFOLLOWUPINSTRUCTIONS_ED_ALL_ED_FT
Viral Gastroenteritis, Adult  ImageViral gastroenteritis is also known as the stomach flu. This condition is caused by various viruses. These viruses can be passed from person to person very easily (are very contagious). This condition may affect your stomach, small intestine, and large intestine. It can cause sudden watery diarrhea, fever, and vomiting.    Diarrhea and vomiting can make you feel weak and cause you to become dehydrated. You may not be able to keep fluids down. Dehydration can make you tired and thirsty, cause you to have a dry mouth, and decrease how often you urinate. Older adults and people with other diseases or a weak immune system are at higher risk for dehydration.    It is important to replace the fluids that you lose from diarrhea and vomiting. If you become severely dehydrated, you may need to get fluids through an IV tube.    What are the causes?  Gastroenteritis is caused by various viruses, including rotavirus and norovirus. Norovirus is the most common cause in adults.    You can get sick by eating food, drinking water, or touching a surface contaminated with one of these viruses. You can also get sick from sharing utensils or other personal items with an infected person.    What increases the risk?  This condition is more likely to develop in people:    Who have a weak defense system (immune system).  Who live with one or more children who are younger than 2 years old.  Who live in a nursing home.  Who go on cruise ships.    What are the signs or symptoms?  Symptoms of this condition start suddenly 1–2 days after exposure to a virus. Symptoms may last a few days or as long as a week. The most common symptoms are watery diarrhea and vomiting. Other symptoms include:    Fever.  Headache.  Fatigue.  Pain in the abdomen.  Chills.  Weakness.  Nausea.  Muscle aches.  Loss of appetite.    How is this diagnosed?  This condition is diagnosed with a medical history and physical exam. You may also have a stool test to check for viruses or other infections.    How is this treated?  This condition typically goes away on its own. The focus of treatment is to restore lost fluids (rehydration). Your health care provider may recommend that you take an oral rehydration solution (ORS) to replace important salts and minerals (electrolytes) in your body. Severe cases of this condition may require giving fluids through an IV tube.    Treatment may also include medicine to help with your symptoms.    Follow these instructions at home:  Follow instructions from your health care provider about how to care for yourself at home.    Eating and drinking     Follow these recommendations as told by your health care provider:    Take an ORS. This is a drink that is sold at pharmacies and retail stores.  Drink clear fluids in small amounts as you are able. Clear fluids include water, ice chips, diluted fruit juice, and low-calorie sports drinks.  Eat bland, easy-to-digest foods in small amounts as you are able. These foods include bananas, applesauce, rice, lean meats, toast, and crackers.  Avoid fluids that contain a lot of sugar or caffeine, such as energy drinks, sports drinks, and soda.  Avoid alcohol.  Avoid spicy or fatty foods.    General instructions     Image   Drink enough fluid to keep your urine clear or pale yellow.  Wash your hands often. If soap and water are not available, use hand .  Make sure that all people in your household wash their hands well and often.  Take over-the-counter and prescription medicines only as told by your health care provider.  Rest at home while you recover.  Watch your condition for any changes.  Take a warm bath to relieve any burning or pain from frequent diarrhea episodes.  Keep all follow-up visits as told by your health care provider. This is important.  Contact a health care provider if:  You cannot keep fluids down.  Your symptoms get worse.  You have new symptoms.  You feel light-headed or dizzy.  You have muscle cramps.  Get help right away if:  You have chest pain.  You feel extremely weak or you faint.  You see blood in your vomit.  Your vomit looks like coffee grounds.  You have bloody or black stools or stools that look like tar.  You have a severe headache, a stiff neck, or both.  You have a rash.  You have severe pain, cramping, or bloating in your abdomen.  You have trouble breathing or you are breathing very quickly.  Your heart is beating very quickly.  Your skin feels cold and clammy.  You feel confused.  You have pain when you urinate.  You have signs of dehydration, such as:    Dark urine, very little urine, or no urine.  Cracked lips.  Dry mouth.  Sunken eyes.  Sleepiness.  Weakness.

## 2019-01-11 NOTE — ED ADULT NURSE NOTE - OTHER COMPLAINTS
pt reports n/v/d x2 days with fevers, and lightheadedness. reports family have similar s/s. reports hx cardiac stents and chf

## 2019-03-11 NOTE — H&P ADULT - CARDIOVASCULAR
Post-Care Instructions: I reviewed with the patient in detail post-care instructions. Patient is to wear sunprotection, and avoid picking at any of the treated lesions. Pt may apply Vaseline to crusted or scabbing areas. Consent: The patient's consent was obtained including but not limited to risks of crusting, scabbing, blistering, scarring, darker or lighter pigmentary change, recurrence, incomplete removal and infection. Number Of Freeze-Thaw Cycles: 3 freeze-thaw cycles details… Duration Of Freeze Thaw-Cycle (Seconds): 2 Render Post-Care Instructions In Note?: yes Detail Level: Detailed detailed exam

## 2019-03-21 NOTE — DISCHARGE NOTE ADULT - PROVIDER TOKENS
Left msg on vm to call and leave msg whether or not wants Steroids and pharmacy TOKEN:'[id number]',TOKEN:'[id number]',TOKEN:'[id number]'

## 2019-03-25 NOTE — ED ADULT NURSE NOTE - CAS DISCH CONDITION
Stable Composite Graft Text: The defect edges were debeveled with a #15 scalpel blade.  Given the location of the defect, shape of the defect, the proximity to free margins and the fact the defect was full thickness a composite graft was deemed most appropriate.  The defect was outline and then transferred to the donor site.  A full thickness graft was then excised from the donor site. The graft was then placed in the primary defect, oriented appropriately and then sutured into place.  The secondary defect was then repaired using a primary closure.

## 2019-05-07 NOTE — ED PROVIDER NOTE - DISCUSSED CLINICAL AND RADIOLOGICAL FINDINGS WITH, MDM
BRIEF OPERATIVE NOTE Date of Procedure: 5/6/2019 Preoperative Diagnosis: SPINAL STENOSIS, LUMBAR REGION, ACQUIRED SPONDYLOLISTHESIS, SCIATICA LEFT SIDE, LOW BACK PAIN Postoperative Diagnosis: SPINAL STENOSIS, LUMBAR REGION, ACQUIRED SPONDYLOLISTHESIS, SCIATICA LEFT SIDE, LOW BACK PAIN Procedure(s): STAGED CASE PAT 1, L2-5 LATERAL FUSION WITH LEFT ILIAC CREST BONE MARROW GRAFT Surgeon(s) and Role: Grant Coates MD - Primary Surgical Assistant: Juli Ochoa Surgical Staff: 
Circ-1: Tuan Salinas RN 
Circ-Relief: Elsi Hogan Radiology Technician: Celi Chery Scrub Tech-1: Brook Paez Scrub Tech-Relief: Neisha Hernandez Surg Asst-1: Smith Pereira Float Staff: Kiran Engel; Malena De Los Santos RN; Savannah Stone Event Time In Time Out Incision Start 1822 Incision Close Anesthesia: General  
Estimated Blood Loss: 300cc Specimens: * No specimens in log * Findings: stenosis Complications: none Implants:  
Implant Name Type Inv. Item Serial No.  Lot No. LRB No. Used [employer] VIA 10cc form moldable   [id number] [employer] [id number] N/A 1 Implanted GRAFT SPNG DMB CANC [id number] -- NEVOS - [id number]  GRAFT SPNG DMB CANC [id number] -- NEVOS [id number] [employer] [id number] N/A 1 Implanted GRAFT SPNG DMB CANC [id number] -- NEVOS - [id number]  GRAFT SPNG DMB CANC [id number] -- NEVOS [id number] [employer] [id number] N/A 1 Implanted Conform Cube 17mm  Q-Pack   [id number] [employer] 0011 N/A 1 Implanted Conform Cube 17mm Q-Pack   [id number] [employer] 0011 N/A 1 Implanted Conform Cube 17mm  Q-Pack   [id number] [employer] 0011 N/A 1 Implanted CONFORM CUBE 17MM Q-PACK Bone  [id number] [employer] 0011 N/A 1 Implanted patient

## 2019-05-15 NOTE — H&P ADULT - PROBLEM SELECTOR PROBLEM 4
"Social Work Progress Note      May 15, 2019    This writer received multiple emails from Viktoriyaelieser Flores, Rashmi's mother, regarding getting into a hotel for clinic visits.  This writer pulled in Maribel Tristan to support Viktoriya in making reservations.  In the past Maribel has reminded Viktoriya to be proactive in getting her requests in early due to multiple bookings of the donated hotel room.   Viktoriya reported that an email from last week, \"didn't go through\" and is requesting a room for tomorrow 5/16.      Viktoriya also requesting [employer] financial support and writer reminded mom that they will require a bill to reimburse and parent provided writer with a hotel room confirmation rather than a bill.      Assessment   Viktoriya requires strong boundaries in navigating support as she often attempts to polarize or triangulate medical team.      Shelbi PRESTON, [facility] [phone] pager      " Need for prophylactic measure Chronic systolic congestive heart failure

## 2019-05-17 ENCOUNTER — EMERGENCY (EMERGENCY)
Facility: HOSPITAL | Age: 64
LOS: 1 days | Discharge: HOME | End: 2019-05-17
Admitting: EMERGENCY MEDICINE
Payer: MEDICARE

## 2019-05-17 VITALS
TEMPERATURE: 98 F | DIASTOLIC BLOOD PRESSURE: 85 MMHG | RESPIRATION RATE: 17 BRPM | HEIGHT: 68 IN | HEART RATE: 85 BPM | WEIGHT: 156.97 LBS | OXYGEN SATURATION: 98 % | SYSTOLIC BLOOD PRESSURE: 127 MMHG

## 2019-05-17 PROCEDURE — 90715 TDAP VACCINE 7 YRS/> IM: CPT

## 2019-05-17 PROCEDURE — 99284 EMERGENCY DEPT VISIT MOD MDM: CPT

## 2019-05-17 PROCEDURE — 70450 CT HEAD/BRAIN W/O DYE: CPT | Mod: 26

## 2019-05-17 PROCEDURE — 99284 EMERGENCY DEPT VISIT MOD MDM: CPT | Mod: 25

## 2019-05-17 PROCEDURE — 90471 IMMUNIZATION ADMIN: CPT

## 2019-05-17 PROCEDURE — 70450 CT HEAD/BRAIN W/O DYE: CPT

## 2019-05-17 RX ORDER — TETANUS TOXOID, REDUCED DIPHTHERIA TOXOID AND ACELLULAR PERTUSSIS VACCINE, ADSORBED 5; 2.5; 8; 8; 2.5 [IU]/.5ML; [IU]/.5ML; UG/.5ML; UG/.5ML; UG/.5ML
0.5 SUSPENSION INTRAMUSCULAR ONCE
Refills: 0 | Status: COMPLETED | OUTPATIENT
Start: 2019-05-17 | End: 2019-05-17

## 2019-05-17 RX ADMIN — TETANUS TOXOID, REDUCED DIPHTHERIA TOXOID AND ACELLULAR PERTUSSIS VACCINE, ADSORBED 0.5 MILLILITER(S): 5; 2.5; 8; 8; 2.5 SUSPENSION INTRAMUSCULAR at 17:22
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
## 2019-05-17 NOTE — ED PROVIDER NOTE - NSFOLLOWUPINSTRUCTIONS_ED_ALL_ED_FT
Head Injury    WHAT YOU NEED TO KNOW:    A head injury is most often caused by a blow to the head. This may occur from a fall, bicycle injury, sports injury, being struck in the head, or a motor vehicle accident.     DISCHARGE INSTRUCTIONS:    Call 911 or have someone else call for any of the following:     You cannot be woken.      You have a seizure.      You stop responding to others or you faint.      You have blurry or double vision.      Your speech becomes slurred or confused.      You have arm or leg weakness, loss of feeling, or new problems with coordination.      Your pupils are larger than usual or one pupil is a different size than the other.       You have blood or clear fluid coming out of your ears or nose.    Return to the emergency department if:     You have repeated or forceful vomiting.      You feel confused.      Your headache gets worse or becomes severe.      You or someone caring for you notices that you are harder to wake than usual.    Contact your healthcare provider if:     Your symptoms last longer than 6 weeks after the injury.      You have questions or concerns about your condition or care.    Medicines:     Acetaminophen decreases pain. Acetaminophen is available without a doctor's order. Ask how much to take and how often to take it. Follow directions. Acetaminophen can cause liver damage if not taken correctly.      Take your medicine as directed. Contact your healthcare provider if you think your medicine is not helping or if you have side effects. Tell him or her if you are allergic to any medicine. Keep a list of the medicines, vitamins, and herbs you take. Include the amounts, and when and why you take them. Bring the list or the pill bottles to follow-up visits. Carry your medicine list with you in case of an emergency.    Self-care:     Rest or do quiet activities for 24 to 48 hours. Limit your time watching TV, using the computer, or doing tasks that require a lot of thinking. Slowly return to your normal activities as directed. Do not play sports or do activities that may cause you to get hit in the head. Ask your healthcare provider when you can return to sports.       Apply ice on your head for 15 to 20 minutes every hour or as directed. Use an ice pack, or put crushed ice in a plastic bag. Cover it with a towel before you apply it to your skin. Ice helps prevent tissue damage and decreases swelling and pain.       Have someone stay with you for 24 hours or as directed. This person can monitor you for complications and call 911. When you are awake the person should ask you a few questions to see if you are thinking clearly. An example would be to ask your name or your address.     Prevent another head injury:     Wear a helmet that fits properly. Do this when you play sports, or ride a bike, scooter, or skateboard. Helmets help decrease your risk of a serious head injury. Talk to your healthcare provider about other ways you can protect yourself if you play sports.      Wear your seat belt every time you are in a car. This helps to decrease your risk for a head injury if you are in a car accident.     Follow up with your healthcare provider as directed: Write down your questions so you remember to ask them during your visits.

## 2019-05-17 NOTE — ED PROVIDER NOTE - OBJECTIVE STATEMENT
62 y/o m hx afib on eliquis presents stating a hammer fell off of a shelf today, hit left side of face under his eye.  Pt has abrasion under left eye, mild headache.  Pt also reports having difficulty concentrating for the past hour after event, concerned because he takes eliquis.  Denies LOC, visual changes, eye pain, weakness, all other ROS negative.

## 2019-05-17 NOTE — ED PROVIDER NOTE - CLINICAL SUMMARY MEDICAL DECISION MAKING FREE TEXT BOX
62 y/o m hx afib on eliquis presents after hammer fell on head today with left facial abrasion, mild headache; no LOC, no neuro deficits, CT head negative, tetanus updated.  Will d/c, recommend pmd f/u

## 2019-05-17 NOTE — ED ADULT TRIAGE NOTE - CHIEF COMPLAINT QUOTE
laceration to left cheek, left eye bruising and swelling after a hammer fell on his face from 2 ft above his head while he was trying to hang a shelf at home.  Patient is on eliquis for afib.  Bleeding controlled.  Denies vision changes.  Unknown tetanus

## 2019-05-17 NOTE — ED ADULT NURSE NOTE - NSIMPLEMENTINTERV_GEN_ALL_ED
Implemented All Universal Safety Interventions:  [place] to call system. Call bell, personal items and telephone within reach. Instruct patient to call for assistance. Room bathroom lighting operational. Non-slip footwear when patient is off stretcher. Physically safe environment: no spills, clutter or unnecessary equipment. Stretcher in lowest position, wheels locked, appropriate side rails in place.
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
## 2019-05-21 DIAGNOSIS — S09.90XA UNSPECIFIED INJURY OF HEAD, INITIAL ENCOUNTER: ICD-10-CM

## 2019-05-21 DIAGNOSIS — Z23 ENCOUNTER FOR IMMUNIZATION: ICD-10-CM
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
## 2019-05-21 DIAGNOSIS — Z88.0 ALLERGY STATUS TO PENICILLIN: ICD-10-CM

## 2019-05-21 DIAGNOSIS — Z88.8 ALLERGY STATUS TO OTHER DRUGS, MEDICAMENTS AND BIOLOGICAL SUBSTANCES: ICD-10-CM

## 2019-05-21 DIAGNOSIS — R51 HEADACHE: ICD-10-CM

## 2019-05-21 DIAGNOSIS — Z79.899 OTHER LONG TERM (CURRENT) DRUG THERAPY: ICD-10-CM

## 2019-05-21 DIAGNOSIS — F17.200 NICOTINE DEPENDENCE, UNSPECIFIED, UNCOMPLICATED: ICD-10-CM

## 2019-05-28 NOTE — PROGRESS NOTE ADULT - PROBLEM SELECTOR PLAN 3
115 LV thrombus on ECHO in May 2018  -Pt takes coumadin 12mg PO daily at home but states he hasn't taken his coumadin in the past 4 days  -Repeat ECHO  -INR on admission therapeutic at 2.48 but may be d/t liver disease LV thrombus on ECHO in May 2018  -Pt takes coumadin 12mg PO daily at home but states he hasn't taken his coumadin in the past 5 days  -Repeat ECHO  -INR today remains therapeutic at 2.18 from 2.48 on admission but may be falsely elevated d/t liver disease  -Consult with Dr. Everett for recs on initiating coumadin dosing tonight

## 2019-07-07 ENCOUNTER — EMERGENCY (EMERGENCY)
Facility: HOSPITAL | Age: 64
LOS: 1 days | Discharge: HOME | End: 2019-07-07
Attending: EMERGENCY MEDICINE | Admitting: EMERGENCY MEDICINE
Payer: MEDICARE

## 2019-07-07 VITALS
OXYGEN SATURATION: 99 % | HEART RATE: 96 BPM | WEIGHT: 175.05 LBS | SYSTOLIC BLOOD PRESSURE: 126 MMHG | RESPIRATION RATE: 18 BRPM | TEMPERATURE: 98 F | HEIGHT: 70 IN | DIASTOLIC BLOOD PRESSURE: 87 MMHG

## 2019-07-07 VITALS
RESPIRATION RATE: 17 BRPM | TEMPERATURE: 98 F | SYSTOLIC BLOOD PRESSURE: 138 MMHG | DIASTOLIC BLOOD PRESSURE: 81 MMHG | HEART RATE: 78 BPM | OXYGEN SATURATION: 98 %

## 2019-07-07 LAB
ALBUMIN SERPL ELPH-MCNC: 4.5 G/DL — SIGNIFICANT CHANGE UP (ref 3.3–5)
ALP SERPL-CCNC: 78 U/L — SIGNIFICANT CHANGE UP (ref 40–120)
ALT FLD-CCNC: 48 U/L — HIGH (ref 10–45)
ANION GAP SERPL CALC-SCNC: 18 MMOL/L — HIGH (ref 5–17)
ANISOCYTOSIS BLD QL: SLIGHT — SIGNIFICANT CHANGE UP
APTT BLD: 30.6 SEC — SIGNIFICANT CHANGE UP (ref 27.5–36.3)
AST SERPL-CCNC: 67 U/L — HIGH (ref 10–40)
BASOPHILS # BLD AUTO: 0.02 K/UL — SIGNIFICANT CHANGE UP (ref 0–0.2)
BASOPHILS NFR BLD AUTO: 0.4 % — SIGNIFICANT CHANGE UP (ref 0–2)
BILIRUB SERPL-MCNC: 0.9 MG/DL — SIGNIFICANT CHANGE UP (ref 0.2–1.2)
BUN SERPL-MCNC: 14 MG/DL — SIGNIFICANT CHANGE UP (ref 7–23)
CALCIUM SERPL-MCNC: 9.4 MG/DL — SIGNIFICANT CHANGE UP (ref 8.4–10.5)
CHLORIDE SERPL-SCNC: 96 MMOL/L — SIGNIFICANT CHANGE UP (ref 96–108)
CO2 SERPL-SCNC: 22 MMOL/L — SIGNIFICANT CHANGE UP (ref 22–31)
CREAT SERPL-MCNC: 0.92 MG/DL — SIGNIFICANT CHANGE UP (ref 0.5–1.3)
DACRYOCYTES BLD QL SMEAR: SLIGHT — SIGNIFICANT CHANGE UP
EOSINOPHIL # BLD AUTO: 0 K/UL — SIGNIFICANT CHANGE UP (ref 0–0.5)
EOSINOPHIL NFR BLD AUTO: 0 % — SIGNIFICANT CHANGE UP (ref 0–6)
GLUCOSE SERPL-MCNC: 203 MG/DL — HIGH (ref 70–99)
HCT VFR BLD CALC: 40.8 % — SIGNIFICANT CHANGE UP (ref 39–50)
HGB BLD-MCNC: 12.6 G/DL — LOW (ref 13–17)
HYPOCHROMIA BLD QL: SLIGHT — SIGNIFICANT CHANGE UP
IMM GRANULOCYTES NFR BLD AUTO: 0.2 % — SIGNIFICANT CHANGE UP (ref 0–1.5)
INR BLD: 1.17 — HIGH (ref 0.88–1.16)
LYMPHOCYTES # BLD AUTO: 0.89 K/UL — LOW (ref 1–3.3)
LYMPHOCYTES # BLD AUTO: 18 % — SIGNIFICANT CHANGE UP (ref 13–44)
MACROCYTES BLD QL: SLIGHT — SIGNIFICANT CHANGE UP
MANUAL SMEAR VERIFICATION: SIGNIFICANT CHANGE UP
MCHC RBC-ENTMCNC: 22 PG — LOW (ref 27–34)
MCHC RBC-ENTMCNC: 30.9 GM/DL — LOW (ref 32–36)
MCV RBC AUTO: 71.1 FL — LOW (ref 80–100)
MICROCYTES BLD QL: SLIGHT — SIGNIFICANT CHANGE UP
MONOCYTES # BLD AUTO: 0.33 K/UL — SIGNIFICANT CHANGE UP (ref 0–0.9)
MONOCYTES NFR BLD AUTO: 6.7 % — SIGNIFICANT CHANGE UP (ref 2–14)
NEUTROPHILS # BLD AUTO: 3.69 K/UL — SIGNIFICANT CHANGE UP (ref 1.8–7.4)
NEUTROPHILS NFR BLD AUTO: 74.7 % — SIGNIFICANT CHANGE UP (ref 43–77)
NRBC # BLD: 0 /100 WBCS — SIGNIFICANT CHANGE UP (ref 0–0)
OVALOCYTES BLD QL SMEAR: SIGNIFICANT CHANGE UP
PLAT MORPH BLD: ABNORMAL
PLATELET # BLD AUTO: 243 K/UL — SIGNIFICANT CHANGE UP (ref 150–400)
PLATELET COUNT - ESTIMATE: NORMAL — SIGNIFICANT CHANGE UP
POIKILOCYTOSIS BLD QL AUTO: SIGNIFICANT CHANGE UP
POLYCHROMASIA BLD QL SMEAR: SLIGHT — SIGNIFICANT CHANGE UP
POTASSIUM SERPL-MCNC: 4.4 MMOL/L — SIGNIFICANT CHANGE UP (ref 3.5–5.3)
POTASSIUM SERPL-SCNC: 4.4 MMOL/L — SIGNIFICANT CHANGE UP (ref 3.5–5.3)
PROT SERPL-MCNC: 7.8 G/DL — SIGNIFICANT CHANGE UP (ref 6–8.3)
PROTHROM AB SERPL-ACNC: 13.3 SEC — HIGH (ref 10–12.9)
RBC # BLD: 5.74 M/UL — SIGNIFICANT CHANGE UP (ref 4.2–5.8)
RBC # FLD: 21.3 % — HIGH (ref 10.3–14.5)
RBC BLD AUTO: ABNORMAL
SODIUM SERPL-SCNC: 136 MMOL/L — SIGNIFICANT CHANGE UP (ref 135–145)
SPHEROCYTES BLD QL SMEAR: SLIGHT — SIGNIFICANT CHANGE UP
TROPONIN T SERPL-MCNC: <0.01 NG/ML — SIGNIFICANT CHANGE UP (ref 0–0.01)
TROPONIN T SERPL-MCNC: <0.01 NG/ML — SIGNIFICANT CHANGE UP (ref 0–0.01)
WBC # BLD: 4.94 K/UL — SIGNIFICANT CHANGE UP (ref 3.8–10.5)
WBC # FLD AUTO: 4.94 K/UL — SIGNIFICANT CHANGE UP (ref 3.8–10.5)

## 2019-07-07 PROCEDURE — 93010 ELECTROCARDIOGRAM REPORT: CPT

## 2019-07-07 PROCEDURE — 84484 ASSAY OF TROPONIN QUANT: CPT

## 2019-07-07 PROCEDURE — 96375 TX/PRO/DX INJ NEW DRUG ADDON: CPT

## 2019-07-07 PROCEDURE — 99285 EMERGENCY DEPT VISIT HI MDM: CPT

## 2019-07-07 PROCEDURE — 71046 X-RAY EXAM CHEST 2 VIEWS: CPT | Mod: 26

## 2019-07-07 PROCEDURE — 85610 PROTHROMBIN TIME: CPT

## 2019-07-07 PROCEDURE — 71046 X-RAY EXAM CHEST 2 VIEWS: CPT

## 2019-07-07 PROCEDURE — 93005 ELECTROCARDIOGRAM TRACING: CPT

## 2019-07-07 PROCEDURE — 96374 THER/PROPH/DIAG INJ IV PUSH: CPT

## 2019-07-07 PROCEDURE — 83690 ASSAY OF LIPASE: CPT

## 2019-07-07 PROCEDURE — 80053 COMPREHEN METABOLIC PANEL: CPT

## 2019-07-07 PROCEDURE — 85730 THROMBOPLASTIN TIME PARTIAL: CPT

## 2019-07-07 PROCEDURE — 36415 COLL VENOUS BLD VENIPUNCTURE: CPT

## 2019-07-07 PROCEDURE — 99284 EMERGENCY DEPT VISIT MOD MDM: CPT | Mod: 25

## 2019-07-07 PROCEDURE — 85025 COMPLETE CBC W/AUTO DIFF WBC: CPT

## 2019-07-07 PROCEDURE — 82962 GLUCOSE BLOOD TEST: CPT

## 2019-07-07 RX ORDER — SODIUM CHLORIDE 9 MG/ML
1000 INJECTION INTRAMUSCULAR; INTRAVENOUS; SUBCUTANEOUS ONCE
Refills: 0 | Status: COMPLETED | OUTPATIENT
Start: 2019-07-07 | End: 2019-07-07

## 2019-07-07 RX ORDER — ONDANSETRON 8 MG/1
4 TABLET, FILM COATED ORAL ONCE
Refills: 0 | Status: COMPLETED | OUTPATIENT
Start: 2019-07-07 | End: 2019-07-07

## 2019-07-07 RX ORDER — LIDOCAINE 4 G/100G
10 CREAM TOPICAL ONCE
Refills: 0 | Status: COMPLETED | OUTPATIENT
Start: 2019-07-07 | End: 2019-07-07

## 2019-07-07 RX ORDER — OMEPRAZOLE 10 MG/1
1 CAPSULE, DELAYED RELEASE ORAL
Qty: 30 | Refills: 0
Start: 2019-07-07 | End: 2019-08-05

## 2019-07-07 RX ORDER — FAMOTIDINE 10 MG/ML
20 INJECTION INTRAVENOUS ONCE
Refills: 0 | Status: COMPLETED | OUTPATIENT
Start: 2019-07-07 | End: 2019-07-07

## 2019-07-07 RX ORDER — METOCLOPRAMIDE HCL 10 MG
10 TABLET ORAL ONCE
Refills: 0 | Status: COMPLETED | OUTPATIENT
Start: 2019-07-07 | End: 2019-07-07

## 2019-07-07 RX ADMIN — Medication 30 MILLILITER(S): at 14:18

## 2019-07-07 RX ADMIN — LIDOCAINE 10 MILLILITER(S): 4 CREAM TOPICAL at 14:19

## 2019-07-07 RX ADMIN — SODIUM CHLORIDE 1000 MILLILITER(S): 9 INJECTION INTRAMUSCULAR; INTRAVENOUS; SUBCUTANEOUS at 14:28

## 2019-07-07 RX ADMIN — SODIUM CHLORIDE 1000 MILLILITER(S): 9 INJECTION INTRAMUSCULAR; INTRAVENOUS; SUBCUTANEOUS at 14:29

## 2019-07-07 RX ADMIN — FAMOTIDINE 20 MILLIGRAM(S): 10 INJECTION INTRAVENOUS at 14:18

## 2019-07-07 RX ADMIN — SODIUM CHLORIDE 2000 MILLILITER(S): 9 INJECTION INTRAMUSCULAR; INTRAVENOUS; SUBCUTANEOUS at 14:24

## 2019-07-07 RX ADMIN — ONDANSETRON 4 MILLIGRAM(S): 8 TABLET, FILM COATED ORAL at 14:19

## 2019-07-07 RX ADMIN — Medication 10 MILLIGRAM(S): at 16:03

## 2019-07-07 NOTE — ED PROVIDER NOTE - NSFOLLOWUPINSTRUCTIONS_ED_ALL_ED_FT
Gastritis  Alcohol Abuse    Take omeprazole once a day.  Add tums/maalox/mylanta etc as directed for additional relief.  Avoid spicy and/or acidic foods (citrus, tomatoes, etc), alcohol, caffeine, and NSAID medications (ibuprofen, aleve, advil, motrin, etc).  Follow up with your pmd and a gastroenterologist.  Return for increased pain, vomiting or diarrhea, fever, black/bloody stools, any other concerns.     Start with clear liquids (water, juice, soda, jello, soup).  Advance to bananas, rice, apple sauce, toast (BRAT) if tolerating clears.  Advance to full diet once tolerating BRAT diet.     Follow up with Dr Levy.     Consider AA or other support groups, rehab and/or detox if needed for help with your recovery.     Gastritis    Gastritis is soreness and swelling (inflammation) of the lining of the stomach. Gastritis can develop as a sudden onset (acute) or long-term (chronic) condition. If gastritis is not treated, it can lead to stomach bleeding and ulcers. Causes include viral and bacterial infections, excessive alcohol consumption, tobacco use, or certain medications. Symptoms include nausea, vomiting, or abdominal pain or burning especially after eating. Avoid foods or drinks that make your symptoms worse such as caffeine, chocolate, spicy foods, acidic foods, or alcohol.    SEEK IMMEDIATE MEDICAL CARE IF YOU HAVE ANY OF THE FOLLOWING SYMPTOMS: black or bloody stools, blood or coffee-ground-colored vomitus, worsening abdominal pain, fever, or inability to keep fluids down.     Alcohol Abuse    Alcohol intoxication occurs when the amount of alcohol that a person has consumed impairs his or her ability to mentally and physically function. Chronic alcohol consumption can also lead to a variety of health issues including neurological disease, stomach disease, heart disease, liver disease, etc. Do not drive after drinking alcohol. Drinking enough alcohol to end up in an Emergency Room suggests you may have an alcohol abuse problem. Seek help at a drug addiction center.    SEEK IMMEDIATE MEDICAL CARE IF YOU HAVE ANY OF THE FOLLOWING SYMPTOMS: seizures, vomiting blood, blood in your stool, lightheadedness/dizziness, or becoming shaky to tremulous when you stop drinking.

## 2019-07-07 NOTE — ED ADULT NURSE NOTE - OBJECTIVE STATEMENT
62 y/o M a7ox4 walked in from front triage c/o chest discomfort. Pt states " I was 8 mos sober and had a relapse over the past 4 days." admits to drinking a pint of alcohol a day, denies drug use. + midsternal non-radiating chest discomfort since 10 am this morning, denies radiation. + dizziness, no LOC, no falls. Speaking in appropriate sentences, BS clear bilaterally. placed on CCM, NSR. EKG complete on triage. had 10x angioplasty  followed by MD Levy ICD last fired 1 year ago. on Metroprolol, Eliquis, atorvastatin.

## 2019-07-07 NOTE — ED PROVIDER NOTE - PROGRESS NOTE DETAILS
Pt feeling improved, tolerating po's w/o n/v, 2nd trop neg.  Plan dc to fu Dr Levy, return precautions reviewed.  MICHELLE boykin pt - pt declined their services.

## 2019-07-07 NOTE — ED PROVIDER NOTE - CLINICAL SUMMARY MEDICAL DECISION MAKING FREE TEXT BOX
Pt c/o feeling unwell s/p etoh binge x 3-4 d w/o po's.  Pt c/o epigastric pain radiating towards chest w ttp epigastric - suspect etoh gastritis > cardiac etiology of sx, also dehydration.  EKG unchanged from prior.  Pain > 4 h (onset at 5a, constant) w nl trop - no sig concern for cardiac.  Will try gi meds, ivf, reassess.

## 2019-07-07 NOTE — ED PROVIDER NOTE - OBJECTIVE STATEMENT
63 year old male h/o paroxysmal A-fib (s/p AICD, apixaban), CAD s/p mi and PCI, LV thrombus, gastritis, dm, htn, alcohol abuse 63 year old male h/o paroxysmal A-fib (s/p AICD, apixaban), CAD s/p mi and PCI, LV thrombus, gastritis, dm, htn, alcohol abuse c/o sober x 8 mo w relapse and binge drinking x 3-4 d w/o po's other than etoh.  Today, pt woke at 5a and felt gen weakness, epigastric pain radiating some to ss area and back w/ n w/o v and sob.  No palpitations, change w exertion.  No po's today to eval change w po's. Pt felt worse as day progressed.  + cough w sputum but states he cannot get it up to see the color, no uri sx.  No meds other than his nl meds.  Pt reports diarrhea (soft stool) 1/d x 3-4 d, + bright red blood on tissue when wiping w h/o hemorrhoids and gi bleed.  Pt stopped his eliquis until this am, no bleeding today.  No fever.  Pt w similar sx 2/2 pud and cad.

## 2019-07-07 NOTE — ED ADULT TRIAGE NOTE - CHIEF COMPLAINT QUOTE
has not eaten in 4 days - has been drinking -- binge over 3 days - last drink yesterday - associated chest discomfort and feeling "faint"

## 2019-07-11 DIAGNOSIS — M25.552 PAIN IN LEFT HIP: ICD-10-CM

## 2019-07-11 DIAGNOSIS — K29.20 ALCOHOLIC GASTRITIS WITHOUT BLEEDING: ICD-10-CM

## 2019-07-11 DIAGNOSIS — E78.5 HYPERLIPIDEMIA, UNSPECIFIED: ICD-10-CM

## 2019-07-11 DIAGNOSIS — I10 ESSENTIAL (PRIMARY) HYPERTENSION: ICD-10-CM

## 2019-07-11 DIAGNOSIS — R07.89 OTHER CHEST PAIN: ICD-10-CM
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
## 2019-07-11 DIAGNOSIS — Z79.899 OTHER LONG TERM (CURRENT) DRUG THERAPY: ICD-10-CM
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
## 2019-08-10 ENCOUNTER — INPATIENT (INPATIENT)
Facility: HOSPITAL | Age: 64
LOS: 3 days | Discharge: NURSING, REHAB OR LONG-TERM CARE | DRG: 897 | End: 2019-08-14
Attending: INTERNAL MEDICINE | Admitting: INTERNAL MEDICINE
Payer: MEDICARE

## 2019-08-10 VITALS
DIASTOLIC BLOOD PRESSURE: 77 MMHG | OXYGEN SATURATION: 99 % | WEIGHT: 154.98 LBS | TEMPERATURE: 98 F | HEART RATE: 148 BPM | RESPIRATION RATE: 22 BRPM | SYSTOLIC BLOOD PRESSURE: 114 MMHG

## 2019-08-10 DIAGNOSIS — Z29.9 ENCOUNTER FOR PROPHYLACTIC MEASURES, UNSPECIFIED: ICD-10-CM

## 2019-08-10 DIAGNOSIS — Z02.9 ENCOUNTER FOR ADMINISTRATIVE EXAMINATIONS, UNSPECIFIED: ICD-10-CM

## 2019-08-10 DIAGNOSIS — I50.22 CHRONIC SYSTOLIC (CONGESTIVE) HEART FAILURE: ICD-10-CM

## 2019-08-10 DIAGNOSIS — I48.0 PAROXYSMAL ATRIAL FIBRILLATION: ICD-10-CM

## 2019-08-10 DIAGNOSIS — F10.10 ALCOHOL ABUSE, UNCOMPLICATED: ICD-10-CM

## 2019-08-10 DIAGNOSIS — I51.3 INTRACARDIAC THROMBOSIS, NOT ELSEWHERE CLASSIFIED: ICD-10-CM

## 2019-08-10 DIAGNOSIS — I25.10 ATHEROSCLEROTIC HEART DISEASE OF NATIVE CORONARY ARTERY WITHOUT ANGINA PECTORIS: ICD-10-CM

## 2019-08-10 LAB
ALBUMIN SERPL ELPH-MCNC: 4.1 G/DL — SIGNIFICANT CHANGE UP (ref 3.3–5)
ALBUMIN SERPL ELPH-MCNC: 4.7 G/DL — SIGNIFICANT CHANGE UP (ref 3.3–5)
ALP SERPL-CCNC: 77 U/L — SIGNIFICANT CHANGE UP (ref 40–120)
ALP SERPL-CCNC: 82 U/L — SIGNIFICANT CHANGE UP (ref 40–120)
ALT FLD-CCNC: 37 U/L — SIGNIFICANT CHANGE UP (ref 10–45)
ALT FLD-CCNC: SIGNIFICANT CHANGE UP U/L (ref 10–45)
ANION GAP SERPL CALC-SCNC: 13 MMOL/L — SIGNIFICANT CHANGE UP (ref 5–17)
ANION GAP SERPL CALC-SCNC: 23 MMOL/L — HIGH (ref 5–17)
ANISOCYTOSIS BLD QL: SLIGHT — SIGNIFICANT CHANGE UP
APTT BLD: 22.5 SEC — LOW (ref 27.5–36.3)
AST SERPL-CCNC: 61 U/L — HIGH (ref 10–40)
AST SERPL-CCNC: SIGNIFICANT CHANGE UP U/L (ref 10–40)
BASOPHILS # BLD AUTO: 0.07 K/UL — SIGNIFICANT CHANGE UP (ref 0–0.2)
BASOPHILS NFR BLD AUTO: 0.9 % — SIGNIFICANT CHANGE UP (ref 0–2)
BILIRUB SERPL-MCNC: 0.6 MG/DL — SIGNIFICANT CHANGE UP (ref 0.2–1.2)
BILIRUB SERPL-MCNC: 0.7 MG/DL — SIGNIFICANT CHANGE UP (ref 0.2–1.2)
BUN SERPL-MCNC: 10 MG/DL — SIGNIFICANT CHANGE UP (ref 7–23)
BUN SERPL-MCNC: 10 MG/DL — SIGNIFICANT CHANGE UP (ref 7–23)
CALCIUM SERPL-MCNC: 8.8 MG/DL — SIGNIFICANT CHANGE UP (ref 8.4–10.5)
CALCIUM SERPL-MCNC: 8.8 MG/DL — SIGNIFICANT CHANGE UP (ref 8.4–10.5)
CHLORIDE SERPL-SCNC: 101 MMOL/L — SIGNIFICANT CHANGE UP (ref 96–108)
CHLORIDE SERPL-SCNC: 93 MMOL/L — LOW (ref 96–108)
CO2 SERPL-SCNC: 19 MMOL/L — LOW (ref 22–31)
CO2 SERPL-SCNC: 24 MMOL/L — SIGNIFICANT CHANGE UP (ref 22–31)
CREAT SERPL-MCNC: 0.93 MG/DL — SIGNIFICANT CHANGE UP (ref 0.5–1.3)
CREAT SERPL-MCNC: 0.97 MG/DL — SIGNIFICANT CHANGE UP (ref 0.5–1.3)
DACRYOCYTES BLD QL SMEAR: SLIGHT — SIGNIFICANT CHANGE UP
ELLIPTOCYTES BLD QL SMEAR: SLIGHT — SIGNIFICANT CHANGE UP
EOSINOPHIL # BLD AUTO: 0.07 K/UL — SIGNIFICANT CHANGE UP (ref 0–0.5)
EOSINOPHIL NFR BLD AUTO: 0.9 % — SIGNIFICANT CHANGE UP (ref 0–6)
ETHANOL SERPL-MCNC: 308 MG/DL — HIGH (ref 0–10)
GIANT PLATELETS BLD QL SMEAR: PRESENT — SIGNIFICANT CHANGE UP
GLUCOSE SERPL-MCNC: 100 MG/DL — HIGH (ref 70–99)
GLUCOSE SERPL-MCNC: 113 MG/DL — HIGH (ref 70–99)
HCT VFR BLD CALC: 42.9 % — SIGNIFICANT CHANGE UP (ref 39–50)
HGB BLD-MCNC: 13.5 G/DL — SIGNIFICANT CHANGE UP (ref 13–17)
INR BLD: 0.94 — SIGNIFICANT CHANGE UP (ref 0.88–1.16)
LYMPHOCYTES # BLD AUTO: 3.49 K/UL — HIGH (ref 1–3.3)
LYMPHOCYTES # BLD AUTO: 47.8 % — HIGH (ref 13–44)
MANUAL SMEAR VERIFICATION: SIGNIFICANT CHANGE UP
MCHC RBC-ENTMCNC: 23.3 PG — LOW (ref 27–34)
MCHC RBC-ENTMCNC: 31.5 GM/DL — LOW (ref 32–36)
MCV RBC AUTO: 74.1 FL — LOW (ref 80–100)
MICROCYTES BLD QL: SLIGHT — SIGNIFICANT CHANGE UP
MONOCYTES # BLD AUTO: 0.19 K/UL — SIGNIFICANT CHANGE UP (ref 0–0.9)
MONOCYTES NFR BLD AUTO: 2.6 % — SIGNIFICANT CHANGE UP (ref 2–14)
NEUTROPHILS # BLD AUTO: 3.49 K/UL — SIGNIFICANT CHANGE UP (ref 1.8–7.4)
NEUTROPHILS NFR BLD AUTO: 47.8 % — SIGNIFICANT CHANGE UP (ref 43–77)
NRBC # BLD: 1 /100 — HIGH (ref 0–0)
NRBC # BLD: SIGNIFICANT CHANGE UP /100 WBCS (ref 0–0)
OVALOCYTES BLD QL SMEAR: SLIGHT — SIGNIFICANT CHANGE UP
PLAT MORPH BLD: ABNORMAL
PLATELET # BLD AUTO: 252 K/UL — SIGNIFICANT CHANGE UP (ref 150–400)
PLATELET COUNT - ESTIMATE: NORMAL — SIGNIFICANT CHANGE UP
POIKILOCYTOSIS BLD QL AUTO: SIGNIFICANT CHANGE UP
POLYCHROMASIA BLD QL SMEAR: SLIGHT — SIGNIFICANT CHANGE UP
POTASSIUM SERPL-MCNC: 4.7 MMOL/L — SIGNIFICANT CHANGE UP (ref 3.5–5.3)
POTASSIUM SERPL-MCNC: SIGNIFICANT CHANGE UP MMOL/L (ref 3.5–5.3)
POTASSIUM SERPL-SCNC: 4.7 MMOL/L — SIGNIFICANT CHANGE UP (ref 3.5–5.3)
POTASSIUM SERPL-SCNC: SIGNIFICANT CHANGE UP MMOL/L (ref 3.5–5.3)
PROT SERPL-MCNC: 7.6 G/DL — SIGNIFICANT CHANGE UP (ref 6–8.3)
PROT SERPL-MCNC: 8.6 G/DL — HIGH (ref 6–8.3)
PROTHROM AB SERPL-ACNC: 10.6 SEC — SIGNIFICANT CHANGE UP (ref 10–12.9)
RBC # BLD: 5.79 M/UL — SIGNIFICANT CHANGE UP (ref 4.2–5.8)
RBC # FLD: 21.3 % — HIGH (ref 10.3–14.5)
RBC BLD AUTO: ABNORMAL
SMUDGE CELLS # BLD: PRESENT — SIGNIFICANT CHANGE UP
SODIUM SERPL-SCNC: 135 MMOL/L — SIGNIFICANT CHANGE UP (ref 135–145)
SODIUM SERPL-SCNC: 138 MMOL/L — SIGNIFICANT CHANGE UP (ref 135–145)
WBC # BLD: 7.31 K/UL — SIGNIFICANT CHANGE UP (ref 3.8–10.5)
WBC # FLD AUTO: 7.31 K/UL — SIGNIFICANT CHANGE UP (ref 3.8–10.5)

## 2019-08-10 PROCEDURE — 93010 ELECTROCARDIOGRAM REPORT: CPT

## 2019-08-10 PROCEDURE — 99285 EMERGENCY DEPT VISIT HI MDM: CPT

## 2019-08-10 RX ORDER — MULTIVIT-MIN/FERROUS GLUCONATE 9 MG/15 ML
1 LIQUID (ML) ORAL DAILY
Refills: 0 | Status: DISCONTINUED | OUTPATIENT
Start: 2019-08-10 | End: 2019-08-14

## 2019-08-10 RX ORDER — PANTOPRAZOLE SODIUM 20 MG/1
40 TABLET, DELAYED RELEASE ORAL
Refills: 0 | Status: DISCONTINUED | OUTPATIENT
Start: 2019-08-10 | End: 2019-08-12

## 2019-08-10 RX ORDER — SODIUM CHLORIDE 9 MG/ML
1000 INJECTION, SOLUTION INTRAVENOUS
Refills: 0 | Status: DISCONTINUED | OUTPATIENT
Start: 2019-08-10 | End: 2019-08-10

## 2019-08-10 RX ORDER — SODIUM CHLORIDE 9 MG/ML
1000 INJECTION INTRAMUSCULAR; INTRAVENOUS; SUBCUTANEOUS ONCE
Refills: 0 | Status: DISCONTINUED | OUTPATIENT
Start: 2019-08-10 | End: 2019-08-10

## 2019-08-10 RX ORDER — ASCORBIC ACID 60 MG
500 TABLET,CHEWABLE ORAL DAILY
Refills: 0 | Status: DISCONTINUED | OUTPATIENT
Start: 2019-08-10 | End: 2019-08-14

## 2019-08-10 RX ORDER — APIXABAN 2.5 MG/1
5 TABLET, FILM COATED ORAL EVERY 12 HOURS
Refills: 0 | Status: DISCONTINUED | OUTPATIENT
Start: 2019-08-10 | End: 2019-08-14

## 2019-08-10 RX ORDER — FOLIC ACID 0.8 MG
1 TABLET ORAL DAILY
Refills: 0 | Status: DISCONTINUED | OUTPATIENT
Start: 2019-08-10 | End: 2019-08-14

## 2019-08-10 RX ORDER — THIAMINE MONONITRATE (VIT B1) 100 MG
100 TABLET ORAL DAILY
Refills: 0 | Status: DISCONTINUED | OUTPATIENT
Start: 2019-08-10 | End: 2019-08-14

## 2019-08-10 RX ORDER — SODIUM CHLORIDE 9 MG/ML
500 INJECTION INTRAMUSCULAR; INTRAVENOUS; SUBCUTANEOUS ONCE
Refills: 0 | Status: COMPLETED | OUTPATIENT
Start: 2019-08-10 | End: 2019-08-10

## 2019-08-10 RX ORDER — METOPROLOL TARTRATE 50 MG
100 TABLET ORAL DAILY
Refills: 0 | Status: DISCONTINUED | OUTPATIENT
Start: 2019-08-10 | End: 2019-08-14

## 2019-08-10 RX ADMIN — Medication 100 MILLIGRAM(S): at 22:56

## 2019-08-10 RX ADMIN — Medication 1 TABLET(S): at 22:56

## 2019-08-10 RX ADMIN — SODIUM CHLORIDE 125 MILLILITER(S): 9 INJECTION, SOLUTION INTRAVENOUS at 14:12

## 2019-08-10 RX ADMIN — APIXABAN 5 MILLIGRAM(S): 2.5 TABLET, FILM COATED ORAL at 22:56

## 2019-08-10 RX ADMIN — Medication 1 MILLIGRAM(S): at 23:57

## 2019-08-10 RX ADMIN — SODIUM CHLORIDE 500 MILLILITER(S): 9 INJECTION INTRAMUSCULAR; INTRAVENOUS; SUBCUTANEOUS at 18:49

## 2019-08-10 RX ADMIN — Medication 1 MILLIGRAM(S): at 17:54

## 2019-08-10 RX ADMIN — Medication 1 MILLIGRAM(S): at 13:44

## 2019-08-10 RX ADMIN — Medication 50 MILLIGRAM(S): at 13:45

## 2019-08-10 RX ADMIN — SODIUM CHLORIDE 500 MILLILITER(S): 9 INJECTION INTRAMUSCULAR; INTRAVENOUS; SUBCUTANEOUS at 13:52

## 2019-08-10 RX ADMIN — Medication 500 MILLIGRAM(S): at 22:56

## 2019-08-10 RX ADMIN — Medication 1 MILLIGRAM(S): at 22:56

## 2019-08-10 NOTE — H&P ADULT - NSHPLABSRESULTS_GEN_ALL_CORE
< from: Echocardiogram (10.15.18 @ 08:59) >    PROCEDURE DATE:  10/15/2018          INTERPRETATION:  Patient Height: 177.8 cm  Patient Weight: 76.7 kg  Systolic Pressure: 130 mmHg  Diastolic Pressure: 70 mmHg  BSA: 1.9 m^2  InterpretationSummary  Normal left ventricular size and wall thickness.Hypokinesis of the mid to   apical septum and all apical septumsThe left ventricular ejection   fraction is   estimated to be 35%Probably normal right ventricular size and   function.The   leftatrial size is normal.There is mild aortic valve thickening.No   hemodynamically significant valvular aortic stenosis.There is trace   mitral   regurgitation.There is trace to mild tricuspid regurgitation.The   pulmonary   artery systolic pressure isestimated to be 22 mmHg.There is trace   pulmonic   regurgitation.The inferior vena cava is normal in size (<2.1 cm) with   normal   inspiratory collapse (>50%) consistent with normal right atrial pressure.   There is no pericardial effusion.Again noted in an LV apical thrombus   measuring 2.0 cm in diameter.    < end of copied text >

## 2019-08-10 NOTE — H&P ADULT - ASSESSMENT
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

## 2019-08-10 NOTE — H&P ADULT - PROBLEM SELECTOR PLAN 3
In SR with Sinus Tach.  -Resume Metoprolol XL 100mg Daily and Eliquis 5mg BID.  -Keep K > 4 and Mg > 2    -Consider EP to interrogate ICD ([employer]).

## 2019-08-10 NOTE — H&P ADULT - PROBLEM SELECTOR PLAN 4
-Currently euvolemic  -Restart Metoprolol XL 100mg Daily. Monitor BP and d/w Dr Levy re: consider ARB (pt with Capoten allergy but unclear why pt not on ARB)

## 2019-08-10 NOTE — ED ADULT TRIAGE NOTE - CHIEF COMPLAINT QUOTE
been drinking too much and is anxious and hyperventilating and crying ; feels like he is going to faint  denies SI/HI --

## 2019-08-10 NOTE — H&P ADULT - NSICDXPASTSURGICALHX_GEN_ALL_CORE_FT
PAST SURGICAL HISTORY:  Automatic implantable cardiac defibrillator in situ ICD (implantable cardioverter-defibrillator) in place

## 2019-08-10 NOTE — H&P ADULT - NSICDXFAMILYHX_GEN_ALL_CORE_FT
FAMILY HISTORY:  Grandparent  Still living? No  Family history of cardiac disorder in maternal grandfather, Age at diagnosis: Age Unknown

## 2019-08-10 NOTE — H&P ADULT - NSHPSOCIALHISTORY_GEN_ALL_CORE
Former Smoker (cigarettes) 1/2 ppd x 6 years, quit 1998    EtoH abuser with multiple prior admits, sober since November now drinking 1-2 pints Vodka daily since Sunday 8/5/19    Lives alone, apartment in elevator building, (son currently staying with him); not working- on disability

## 2019-08-10 NOTE — H&P ADULT - NSICDXPASTMEDICALHX_GEN_ALL_CORE_FT
PAST MEDICAL HISTORY:  Atherosclerosis of coronary artery Coronary artery disease    ETOH abuse     Gastritis     ICD (implantable cardioverter-defibrillator) in place     Myocardial infarction involving other coronary artery     Pacemaker     Paroxysmal a-fib     Thrombus in heart chamber LV

## 2019-08-10 NOTE — H&P ADULT - PROBLEM SELECTOR PLAN 6
GI: Start Pantoprazole  F: S/P IVF in ER  E: Monitor lytes  N: Dash Diet 1500cc fluid restrict for CHF  DVT: on Eliquis

## 2019-08-10 NOTE — H&P ADULT - HISTORY OF PRESENT ILLNESS
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 Last drink was at 11am prior to coming to ER.  Pt reports having being sober since 11/2018 but has been binge drinking since last Sunday.  Reports having two drinks Sunday at daughter’s birthday celebration, had black out episode where he doesn’t recollect events (did not pass out), and started binging 1-2pints vodka daily since Monday (endorsed he wanted to see if blackout would happen again since it never happened before after 2 drinks & then he subsequently went on a binge).  He endorses not eating over past 6 days and admits to drinking 2 pints Vodka this am 8am-11am prior to coming to ER.  He endorses burning GI symptoms and intermittent chronic hemorrhoid bleeding as well as not taking any of his medications since Monday.  He denies any chest pain, ICD firing, abdominal pain, SOB, orthopnea/pnd, fevers/chills, syncope, le edema, CARRERO, h/o withdrawal seizures, visual or auditory hallucinations.  Follows with Dr Ramos (EP) for his ICD but cannot recollect last time he saw him (>1yr ago).    In ER, pt presented with tachypnea, RR 30, and Sinus tachycardia HR in 140's, with nonrebreather, speaking in short sentences, anxious, body tremors, ethanol smell noted. denies hallucinations, Etoh level was 308. Placed was given Ativan 1 mg x2 and Librium 50 mg given. HR went to 104, and resp to 20, O2 95% on RA, pt is calm. Hands tremors only when arms extended.  Pt is now admitted to [facility] for telemetry monitoring, repeat ECHO to assess for Thrombus, and Atoh withdrawal management.  On arrival to [facility], pt satting well on RA, is calm but mildly anxious with tremors & mild nausea, CIWA score = 6. None

## 2019-08-10 NOTE — H&P ADULT - PROBLEM SELECTOR PLAN 2
ECHO 10/2018 shows 2cm thrombus.  Has not taken Eliquis x 6 days.  -Repeat ECHO ordered to reassess for Thrombus.   -Restart Eliquis and monitor for any signs of bleeding.

## 2019-08-10 NOTE — ED ADULT NURSE NOTE - CHPI ED NUR SYMPTOMS NEG
no cough/no chest pain/no edema/no fever/no body aches/no hemoptysis/no diaphoresis/no headache/no chills

## 2019-08-10 NOTE — ED PROVIDER NOTE - CLINICAL SUMMARY MEDICAL DECISION MAKING FREE TEXT BOX
64 y/o with CAD and ETOH abuse presents with anxiety and tremors. Most likely in withdrawal. Will give gentle hydration, Librium, Ativan, and will re-evaluate. 62 y/o with CAD and ETOH abuse presents with anxiety and tremors. Most likely in withdrawal. Will give gentle hydration, Librium, Ativan, and will re-evaluate.  Pt continues to have symptoms of tachycardia and not feeling well.  Case discussed with Mildred who states pt has been sober for 8 months.  Pt has had drinking binge for 6 days and most likely now requires admission for withdrawal.

## 2019-08-10 NOTE — H&P ADULT - PROBLEM SELECTOR PLAN 1
recent EtoH binge x 1week, EtoH level 38 on admit. Monitor for signs and sx withdrawal. Current CIWA score = 6 (mild nausea, mild anxiety, moderate UE tremors). s/p Libirum, Ativa in ER.  -S/P IVF with Vitamins given in ER.    -Start MVI, Thiamine, Folic Acid  - consult for [facility]  -DASH Diet as tolerated  -PPI  -+ Transaminitis likely 2/2 EtoH abuse this week - trend LFTs

## 2019-08-10 NOTE — ED ADULT NURSE NOTE - NSIMPLEMENTINTERV_GEN_ALL_ED
Implemented All Fall with Harm Risk Interventions:  [place] to call system. Call bell, personal items and telephone within reach. Instruct patient to call for assistance. Room bathroom lighting operational. Non-slip footwear when patient is off stretcher. Physically safe environment: no spills, clutter or unnecessary equipment. Stretcher in lowest position, wheels locked, appropriate side rails in place. Provide visual cue, wrist band, yellow gown, etc. Monitor gait and stability. Monitor for mental status changes and reorient to person, place, and time. Review medications for side effects contributing to fall risk. Reinforce activity limits and safety measures with patient and family. Provide visual clues: red socks.

## 2019-08-10 NOTE — ED PROVIDER NOTE - CONSTITUTIONAL, MLM
normal... Well appearing, well nourished, awake, alert, oriented to person, place, time/situation. Anxious appearing.

## 2019-08-10 NOTE — ED PROVIDER NOTE - OBJECTIVE STATEMENT
62 y/o Male with a PMHx of ETOH abuse, paroxsymal AFib on Eliquis, CAD s/p 1 stent, AICD, HLD, presents with tremors, anxiousness, and nausea x several hours. Last drink was at 9am, states he has been binge drinking for the last 3-4 days. Denies abdominal pain, CP, and SOB. No h/o withdrawal seizures

## 2019-08-11 LAB
ALBUMIN SERPL ELPH-MCNC: 3.7 G/DL — SIGNIFICANT CHANGE UP (ref 3.3–5)
ALP SERPL-CCNC: 65 U/L — SIGNIFICANT CHANGE UP (ref 40–120)
ALT FLD-CCNC: 28 U/L — SIGNIFICANT CHANGE UP (ref 10–45)
ANION GAP SERPL CALC-SCNC: 11 MMOL/L — SIGNIFICANT CHANGE UP (ref 5–17)
APTT BLD: 31.1 SEC — SIGNIFICANT CHANGE UP (ref 27.5–36.3)
AST SERPL-CCNC: 43 U/L — HIGH (ref 10–40)
BASOPHILS # BLD AUTO: 0.07 K/UL — SIGNIFICANT CHANGE UP (ref 0–0.2)
BASOPHILS NFR BLD AUTO: 1.7 % — SIGNIFICANT CHANGE UP (ref 0–2)
BILIRUB SERPL-MCNC: 1 MG/DL — SIGNIFICANT CHANGE UP (ref 0.2–1.2)
BUN SERPL-MCNC: 12 MG/DL — SIGNIFICANT CHANGE UP (ref 7–23)
CALCIUM SERPL-MCNC: 9 MG/DL — SIGNIFICANT CHANGE UP (ref 8.4–10.5)
CHLORIDE SERPL-SCNC: 100 MMOL/L — SIGNIFICANT CHANGE UP (ref 96–108)
CHOLEST SERPL-MCNC: 173 MG/DL — SIGNIFICANT CHANGE UP (ref 10–199)
CO2 SERPL-SCNC: 25 MMOL/L — SIGNIFICANT CHANGE UP (ref 22–31)
CREAT SERPL-MCNC: 0.92 MG/DL — SIGNIFICANT CHANGE UP (ref 0.5–1.3)
D DIMER BLD IA.RAPID-MCNC: 743 NG/ML DDU — HIGH
EOSINOPHIL # BLD AUTO: 0 K/UL — SIGNIFICANT CHANGE UP (ref 0–0.5)
EOSINOPHIL NFR BLD AUTO: 0 % — SIGNIFICANT CHANGE UP (ref 0–6)
ETHANOL SERPL-MCNC: <10 MG/DL — SIGNIFICANT CHANGE UP (ref 0–10)
GLUCOSE SERPL-MCNC: 98 MG/DL — SIGNIFICANT CHANGE UP (ref 70–99)
HCT VFR BLD CALC: 34.1 % — LOW (ref 39–50)
HCT VFR BLD CALC: 36.2 % — LOW (ref 39–50)
HCT VFR BLD CALC: 38.9 % — LOW (ref 39–50)
HDLC SERPL-MCNC: 89 MG/DL — SIGNIFICANT CHANGE UP
HGB BLD-MCNC: 10.8 G/DL — LOW (ref 13–17)
HGB BLD-MCNC: 11.2 G/DL — LOW (ref 13–17)
HGB BLD-MCNC: 11.9 G/DL — LOW (ref 13–17)
INR BLD: 1.1 — SIGNIFICANT CHANGE UP (ref 0.88–1.16)
LIPID PNL WITH DIRECT LDL SERPL: 77 MG/DL — SIGNIFICANT CHANGE UP
LYMPHOCYTES # BLD AUTO: 1.05 K/UL — SIGNIFICANT CHANGE UP (ref 1–3.3)
LYMPHOCYTES # BLD AUTO: 27 % — SIGNIFICANT CHANGE UP (ref 13–44)
MAGNESIUM SERPL-MCNC: 1.6 MG/DL — SIGNIFICANT CHANGE UP (ref 1.6–2.6)
MCHC RBC-ENTMCNC: 23.6 PG — LOW (ref 27–34)
MCHC RBC-ENTMCNC: 23.8 PG — LOW (ref 27–34)
MCHC RBC-ENTMCNC: 23.8 PG — LOW (ref 27–34)
MCHC RBC-ENTMCNC: 30.6 GM/DL — LOW (ref 32–36)
MCHC RBC-ENTMCNC: 30.9 GM/DL — LOW (ref 32–36)
MCHC RBC-ENTMCNC: 31.7 GM/DL — LOW (ref 32–36)
MCV RBC AUTO: 75.1 FL — LOW (ref 80–100)
MCV RBC AUTO: 76.2 FL — LOW (ref 80–100)
MCV RBC AUTO: 77.8 FL — LOW (ref 80–100)
MONOCYTES # BLD AUTO: 0.03 K/UL — SIGNIFICANT CHANGE UP (ref 0–0.9)
MONOCYTES NFR BLD AUTO: 0.9 % — LOW (ref 2–14)
NEUTROPHILS # BLD AUTO: 2.73 K/UL — SIGNIFICANT CHANGE UP (ref 1.8–7.4)
NEUTROPHILS NFR BLD AUTO: 70.4 % — SIGNIFICANT CHANGE UP (ref 43–77)
NRBC # BLD: 0 /100 WBCS — SIGNIFICANT CHANGE UP (ref 0–0)
NRBC # BLD: 0 /100 WBCS — SIGNIFICANT CHANGE UP (ref 0–0)
PHOSPHATE SERPL-MCNC: 3.5 MG/DL — SIGNIFICANT CHANGE UP (ref 2.5–4.5)
PLATELET # BLD AUTO: 119 K/UL — LOW (ref 150–400)
PLATELET # BLD AUTO: 120 K/UL — LOW (ref 150–400)
PLATELET # BLD AUTO: 140 K/UL — LOW (ref 150–400)
POTASSIUM SERPL-MCNC: 4.1 MMOL/L — SIGNIFICANT CHANGE UP (ref 3.5–5.3)
POTASSIUM SERPL-SCNC: 4.1 MMOL/L — SIGNIFICANT CHANGE UP (ref 3.5–5.3)
PROT SERPL-MCNC: 6.4 G/DL — SIGNIFICANT CHANGE UP (ref 6–8.3)
PROTHROM AB SERPL-ACNC: 12.5 SEC — SIGNIFICANT CHANGE UP (ref 10–12.9)
RBC # BLD: 4.54 M/UL — SIGNIFICANT CHANGE UP (ref 4.2–5.8)
RBC # BLD: 4.75 M/UL — SIGNIFICANT CHANGE UP (ref 4.2–5.8)
RBC # BLD: 5 M/UL — SIGNIFICANT CHANGE UP (ref 4.2–5.8)
RBC # FLD: 20.1 % — HIGH (ref 10.3–14.5)
RBC # FLD: 20.4 % — HIGH (ref 10.3–14.5)
RBC # FLD: 20.5 % — HIGH (ref 10.3–14.5)
SODIUM SERPL-SCNC: 136 MMOL/L — SIGNIFICANT CHANGE UP (ref 135–145)
TOTAL CHOLESTEROL/HDL RATIO MEASUREMENT: 1.9 RATIO — LOW (ref 3.4–9.6)
TRIGL SERPL-MCNC: 35 MG/DL — SIGNIFICANT CHANGE UP (ref 10–149)
TROPONIN T SERPL-MCNC: <0.01 NG/ML — SIGNIFICANT CHANGE UP (ref 0–0.01)
WBC # BLD: 3.88 K/UL — SIGNIFICANT CHANGE UP (ref 3.8–10.5)
WBC # BLD: 3.89 K/UL — SIGNIFICANT CHANGE UP (ref 3.8–10.5)
WBC # BLD: 3.92 K/UL — SIGNIFICANT CHANGE UP (ref 3.8–10.5)
WBC # FLD AUTO: 3.88 K/UL — SIGNIFICANT CHANGE UP (ref 3.8–10.5)
WBC # FLD AUTO: 3.89 K/UL — SIGNIFICANT CHANGE UP (ref 3.8–10.5)
WBC # FLD AUTO: 3.92 K/UL — SIGNIFICANT CHANGE UP (ref 3.8–10.5)

## 2019-08-11 PROCEDURE — 71275 CT ANGIOGRAPHY CHEST: CPT | Mod: 26

## 2019-08-11 PROCEDURE — 93970 EXTREMITY STUDY: CPT | Mod: 26

## 2019-08-11 RX ORDER — SODIUM CHLORIDE 9 MG/ML
500 INJECTION INTRAMUSCULAR; INTRAVENOUS; SUBCUTANEOUS
Refills: 0 | Status: DISCONTINUED | OUTPATIENT
Start: 2019-08-11 | End: 2019-08-11

## 2019-08-11 RX ORDER — MAGNESIUM SULFATE 500 MG/ML
2 VIAL (ML) INJECTION ONCE
Refills: 0 | Status: COMPLETED | OUTPATIENT
Start: 2019-08-11 | End: 2019-08-11

## 2019-08-11 RX ADMIN — Medication 50 MILLIGRAM(S): at 02:17

## 2019-08-11 RX ADMIN — Medication 1 MILLIGRAM(S): at 12:04

## 2019-08-11 RX ADMIN — Medication 50 GRAM(S): at 15:58

## 2019-08-11 RX ADMIN — Medication 50 MILLIGRAM(S): at 17:57

## 2019-08-11 RX ADMIN — PANTOPRAZOLE SODIUM 40 MILLIGRAM(S): 20 TABLET, DELAYED RELEASE ORAL at 05:53

## 2019-08-11 RX ADMIN — Medication 1 MILLIGRAM(S): at 01:21

## 2019-08-11 RX ADMIN — Medication 50 MILLIGRAM(S): at 12:04

## 2019-08-11 RX ADMIN — APIXABAN 5 MILLIGRAM(S): 2.5 TABLET, FILM COATED ORAL at 17:57

## 2019-08-11 RX ADMIN — Medication 100 MILLIGRAM(S): at 12:04

## 2019-08-11 RX ADMIN — Medication 100 MILLIGRAM(S): at 05:53

## 2019-08-11 RX ADMIN — APIXABAN 5 MILLIGRAM(S): 2.5 TABLET, FILM COATED ORAL at 05:53

## 2019-08-11 RX ADMIN — SODIUM CHLORIDE 75 MILLILITER(S): 9 INJECTION INTRAMUSCULAR; INTRAVENOUS; SUBCUTANEOUS at 01:47

## 2019-08-11 RX ADMIN — Medication 1 TABLET(S): at 12:04

## 2019-08-11 RX ADMIN — Medication 500 MILLIGRAM(S): at 12:04

## 2019-08-11 RX ADMIN — Medication 50 MILLIGRAM(S): at 07:18

## 2019-08-11 NOTE — PROGRESS NOTE ADULT - ASSESSMENT
Cardiomyopathy   prev EF 35 %   Hx LV Thrombus  CAD     Alcoholism with withdrawl Cardiomyopathy   prev EF 35 %   Hx LV Thrombus  CAD     r/o PE  for Chest CTA   doppler of legs     Alcoholism with withdrawl

## 2019-08-11 NOTE — PROGRESS NOTE ADULT - PROBLEM SELECTOR PLAN 3
Currently SR with HRs 70s-100s  - Consult EP in AM to interrogate device  - Resume Metoprolol XL 100mg Daily and Eliquis 5mg BID  - Keep K > 4 and Mg > 2  - MG 1.6 this AM, repleted with Mg 2g IV x 1

## 2019-08-11 NOTE — CHART NOTE - NSCHARTNOTEFT_GEN_A_CORE
[facility] PA Event Note:    Pt noted to have worsening tremors, sweating, increased anxiety.  Repeat CIWA = 14.  /80 Was given Ativan 1mg PO earlier, 1mg IV at 1am; Will start Librium taper for withdrawal sx.  Follow CIWA protocol per Order set.  Will notify Dr Levy.

## 2019-08-11 NOTE — PROGRESS NOTE ADULT - PROBLEM SELECTOR PLAN 2
ECHO 10/2018 shows 2cm thrombus. Has not taken Eliquis x 6 days  - Repeat ECHO ordered to reassess for Thrombus  - Restart Eliquis and monitor for any signs of bleeding  - History of HIT->NO HEPARIN  - D-Dimer 743, ordered LE Duplex and CTA Chest PE protocol to r/o DVT/PE, f/u results    #Anemia  Hgb 13.5 on admission->10.8 this AM, repeat 11.2 at 10AM (likely dilutional as global downtrend in CBC. Repeat CBC ordered for 6PM  - Per Dr. Levy, consult Dr. Canales in AM as pt has h/o hemorrhoidal bleeding  - Pt denies s/s of bleeding  - FOBT ordered, f/u results

## 2019-08-11 NOTE — PROGRESS NOTE ADULT - SUBJECTIVE AND OBJECTIVE BOX
Readmission for patient with hx of Ethanolism who has been  abstaining for Alcohol since Nov 2018   until now     admission for ETOH withdrawl     PAST MEDICAL & SURGICAL HISTORY:  Thrombus in heart chamber: LV  ICD (implantable cardioverter-defibrillator) in place  Pacemaker  ETOH abuse  Paroxysmal a-fib  Myocardial infarction involving other coronary artery  Gastritis  Atherosclerosis of coronary artery: Coronary artery disease  Automatic implantable cardiac defibrillator in situ: ICD (implantable cardioverter-defibrillator) in place      MEDICATIONS  (STANDING):  apixaban 5 milliGRAM(s) Oral every 12 hours  ascorbic acid 500 milliGRAM(s) Oral daily  chlordiazePOXIDE   Oral   chlordiazePOXIDE 50 milliGRAM(s) Oral every 6 hours  folic acid 1 milliGRAM(s) Oral daily  metoprolol succinate  milliGRAM(s) Oral daily  multivitamin/minerals 1 Tablet(s) Oral daily  pantoprazole    Tablet 40 milliGRAM(s) Oral before breakfast  sodium chloride 0.9%. 500 milliLiter(s) (75 mL/Hr) IV Continuous <Continuous>  thiamine 100 milliGRAM(s) Oral daily    MEDICATIONS  (PRN):    ICU Vital Signs Last 24 Hrs  T(C): 36.8 (11 Aug 2019 08:50), Max: 37.2 (10 Aug 2019 18:50)  T(F): 98.2 (11 Aug 2019 08:50), Max: 98.9 (10 Aug 2019 18:50)  HR: 100 (11 Aug 2019 08:53) (75 - 148)  BP: 126/81 (11 Aug 2019 08:53) (114/77 - 145/78)  BP(mean): 98 (10 Aug 2019 22:24) (98 - 98)  ABP: --  ABP(mean): --  RR: 17 (11 Aug 2019 08:53) (16 - 22)  SpO2: 97% (11 Aug 2019 08:53) (94% - 99%)      Lungs clear  CV s1s2     abd soft  Ext stable                          11.2   3.89  )-----------( 140      ( 11 Aug 2019 10:31 )             36.2   08-11    136  |  100  |  12  ----------------------------<  98  4.1   |  25  |  0.92    Ca    9.0      11 Aug 2019 06:19  Phos  3.5     08-11  Mg     1.6     08-11    TPro  6.4  /  Alb  3.7  /  TBili  1.0  /  DBili  x   /  AST  43<H>  /  ALT  28  /  AlkPhos  65  08-11      Alcohol, Blood (08.11.19 @ 06:19)    Alcohol, Blood: <10: TOXIC CONCENTRATIONS:  Flushing, Slowing of  Reflexes, Impaired Visual Acuity:     Depression of CNS: > 100  Fatalities Reported:  > 400  These ranges are intended as general guidelines.  Alcohol metabolism can  vary widely among individuals.  This test is approved for clinical and  not for forensic purposes. mg/dL                              11.2   3.89  )-----------( 140      ( 11 Aug 2019 10:31 )             36.2   08-11    136  |  100  |  12  ----------------------------<  98  4.1   |  25  |  0.92    Ca    9.0      11 Aug 2019 06:19  Phos  3.5     08-11  Mg     1.6     08-11    TPro  6.4  /  Alb  3.7  /  TBili  1.0  /  DBili  x   /  AST  43<H>  /  ALT  28  /  AlkPhos  65  08-11      last CXR  & 2019  Neg     repeat echo pending Readmission for patient with hx of Ethanolism who has been  abstaining for Alcohol since Nov 2018   until now     admission for ETOH withdrawl     PAST MEDICAL & SURGICAL HISTORY:  Thrombus in heart chamber: LV  ICD (implantable cardioverter-defibrillator) in place  Pacemaker  ETOH abuse  Paroxysmal a-fib  Myocardial infarction involving other coronary artery  Gastritis  Atherosclerosis of coronary artery: Coronary artery disease  Automatic implantable cardiac defibrillator in situ: ICD (implantable cardioverter-defibrillator) in place      MEDICATIONS  (STANDING):  apixaban 5 milliGRAM(s) Oral every 12 hours  ascorbic acid 500 milliGRAM(s) Oral daily  chlordiazePOXIDE   Oral   chlordiazePOXIDE 50 milliGRAM(s) Oral every 6 hours  folic acid 1 milliGRAM(s) Oral daily  metoprolol succinate  milliGRAM(s) Oral daily  multivitamin/minerals 1 Tablet(s) Oral daily  pantoprazole    Tablet 40 milliGRAM(s) Oral before breakfast  sodium chloride 0.9%. 500 milliLiter(s) (75 mL/Hr) IV Continuous <Continuous>  thiamine 100 milliGRAM(s) Oral daily    MEDICATIONS  (PRN):    ICU Vital Signs Last 24 Hrs  T(C): 36.8 (11 Aug 2019 08:50), Max: 37.2 (10 Aug 2019 18:50)  T(F): 98.2 (11 Aug 2019 08:50), Max: 98.9 (10 Aug 2019 18:50)  HR: 100 (11 Aug 2019 08:53) (75 - 148)  BP: 126/81 (11 Aug 2019 08:53) (114/77 - 145/78)  BP(mean): 98 (10 Aug 2019 22:24) (98 - 98)  ABP: --  ABP(mean): --  RR: 17 (11 Aug 2019 08:53) (16 - 22)  SpO2: 97% (11 Aug 2019 08:53) (94% - 99%)      Lungs clear  CV s1s2     abd soft  Ext stable                          11.2   3.89  )-----------( 140      ( 11 Aug 2019 10:31 )             36.2   08-11    136  |  100  |  12  ----------------------------<  98  4.1   |  25  |  0.92    Ca    9.0      11 Aug 2019 06:19  Phos  3.5     08-11  Mg     1.6     08-11    TPro  6.4  /  Alb  3.7  /  TBili  1.0  /  DBili  x   /  AST  43<H>  /  ALT  28  /  AlkPhos  65  08-11    D-Dimer Assay, Quantitative (08.11.19 @ 06:19)    D-Dimer Assay, Quantitative: 743: The negative cutoff limit for DVT or PE is 230 D-DU ng/mL. This test  should be used as an aid in diagnosis and not be used to exclude deep  vein thrombosis or pulmonary embolism. ng/mL DDU      Alcohol, Blood (08.11.19 @ 06:19)    Alcohol, Blood: <10: TOXIC CONCENTRATIONS:  Flushing, Slowing of  Reflexes, Impaired Visual Acuity:     Depression of CNS: > 100  Fatalities Reported:  > 400  These ranges are intended as general guidelines.  Alcohol metabolism can  vary widely among individuals.  This test is approved for clinical and  not for forensic purposes. mg/dL                              11.2   3.89  )-----------( 140      ( 11 Aug 2019 10:31 )             36.2   08-11    136  |  100  |  12  ----------------------------<  98  4.1   |  25  |  0.92    Ca    9.0      11 Aug 2019 06:19  Phos  3.5     08-11  Mg     1.6     08-11    TPro  6.4  /  Alb  3.7  /  TBili  1.0  /  DBili  x   /  AST  43<H>  /  ALT  28  /  AlkPhos  65  08-11      last CXR  & 2019  Neg     repeat echo pending

## 2019-08-11 NOTE — PROGRESS NOTE ADULT - ASSESSMENT
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

## 2019-08-11 NOTE — PROGRESS NOTE ADULT - PROBLEM SELECTOR PLAN 6
SW consult to evaluate for possible Detox placement inpt vs referral to outpt center    DVT ppx: Eliquis  Dispo: Pending clinical progression, monitor for ETOH withdrawal, f/u Echo, CTA Chest PE protocol, and LE duplex

## 2019-08-11 NOTE — PROGRESS NOTE ADULT - SUBJECTIVE AND OBJECTIVE BOX
Interventional Cardiology PA Adult Progress Note    C.C.: ETOH Abuse    Subjective Assessment: Pt seen and examined at bedside this AM. Pt states his withdrawal symptoms have improved since last night (CIWA score 7 on exam). He denies HA, dizziness, syncope, CP, palpitations, SOB, anxiety, agitation, restlessness, or any other symptoms at this time.       ROS Negative except as per Subjective and HPI  	  MEDICATIONS:  metoprolol succinate  milliGRAM(s) Oral daily  chlordiazePOXIDE   Oral   chlordiazePOXIDE 50 milliGRAM(s) Oral every 6 hours  pantoprazole    Tablet 40 milliGRAM(s) Oral before breakfast  apixaban 5 milliGRAM(s) Oral every 12 hours  ascorbic acid 500 milliGRAM(s) Oral daily  folic acid 1 milliGRAM(s) Oral daily  magnesium sulfate  IVPB 2 Gram(s) IV Intermittent once  multivitamin/minerals 1 Tablet(s) Oral daily  sodium chloride 0.9%. 500 milliLiter(s) IV Continuous <Continuous>  thiamine 100 milliGRAM(s) Oral daily      PHYSICAL EXAM:  TELEMETRY:  T(C): 36.8 (08-11-19 @ 08:50), Max: 37.2 (08-10-19 @ 18:50)  HR: 100 (08-11-19 @ 08:53) (75 - 120)  BP: 126/81 (08-11-19 @ 08:53) (118/88 - 145/78)  RR: 17 (08-11-19 @ 08:53) (16 - 20)  SpO2: 97% (08-11-19 @ 08:53) (94% - 99%)  Wt(kg): --  I&O's Summary    11 Aug 2019 07:01  -  11 Aug 2019 13:32  --------------------------------------------------------  IN: 360 mL / OUT: 0 mL / NET: 360 mL      Appearance: NAD, moderate tremulousness with pt's arms extended, mildly anxious  HEENT: Normal oral mucosa, PERRL, EOMI	  Neck: Supple, - JVD; no carotid bruit   Cardiovascular: Normal S1 S2, no murmurs, ICD left anterior chest wall  Respiratory: Lungs clear to auscultation/No Rales, Rhonchi, Wheezing	  Gastrointestinal:  Soft, Non-tender, + BS	  Skin: No rashes, No ecchymoses, No cyanosis  Extremities: Normal range of motion, No clubbing, cyanosis or edema  Vascular: Peripheral pulses palpable 2+ bilaterally  Neurologic: Non-focal  Psychiatry: A & O x 3, Mood & affect appropriate                          11.2   3.89  )-----------( 140      ( 11 Aug 2019 10:31 )             36.2     08-11    136  |  100  |  12  ----------------------------<  98  4.1   |  25  |  0.92    Ca    9.0      11 Aug 2019 06:19  Phos  3.5     08-11  Mg     1.6     08-11    TPro  6.4  /  Alb  3.7  /  TBili  1.0  /  DBili  x   /  AST  43<H>  /  ALT  28  /  AlkPhos  65  08-11    proBNP: Serum Pro-Brain Natriuretic Peptide: 56 pg/mL (08-10 @ 13:44)    PT/INR - ( 11 Aug 2019 06:19 )   PT: 12.5 sec;   INR: 1.10          PTT - ( 11 Aug 2019 06:19 )  PTT:31.1 sec

## 2019-08-11 NOTE — PROGRESS NOTE ADULT - PROBLEM SELECTOR PLAN 4
- Currently euvolemic  - Continue Metoprolol XL 100mg QD  - Pt w/ h/o Captopril allergy, no other ACE/ARB at this time per Dr. Levy  - Previous Echo on 10/15/18: EF 35%, hypokinesis of the mid to apical septum and all apical septums, trace MR, trace-mild TR. Repeat Echo ordered, f/u results

## 2019-08-12 DIAGNOSIS — D64.9 ANEMIA, UNSPECIFIED: ICD-10-CM

## 2019-08-12 DIAGNOSIS — D69.6 THROMBOCYTOPENIA, UNSPECIFIED: ICD-10-CM

## 2019-08-12 LAB
ALBUMIN SERPL ELPH-MCNC: 3.6 G/DL — SIGNIFICANT CHANGE UP (ref 3.3–5)
ALP SERPL-CCNC: 63 U/L — SIGNIFICANT CHANGE UP (ref 40–120)
ALT FLD-CCNC: 23 U/L — SIGNIFICANT CHANGE UP (ref 10–45)
ANION GAP SERPL CALC-SCNC: 10 MMOL/L — SIGNIFICANT CHANGE UP (ref 5–17)
APTT BLD: 31.8 SEC — SIGNIFICANT CHANGE UP (ref 27.5–36.3)
AST SERPL-CCNC: 33 U/L — SIGNIFICANT CHANGE UP (ref 10–40)
BASOPHILS # BLD AUTO: 0.02 K/UL — SIGNIFICANT CHANGE UP (ref 0–0.2)
BASOPHILS NFR BLD AUTO: 0.6 % — SIGNIFICANT CHANGE UP (ref 0–2)
BILIRUB SERPL-MCNC: 0.5 MG/DL — SIGNIFICANT CHANGE UP (ref 0.2–1.2)
BUN SERPL-MCNC: 13 MG/DL — SIGNIFICANT CHANGE UP (ref 7–23)
CALCIUM SERPL-MCNC: 9 MG/DL — SIGNIFICANT CHANGE UP (ref 8.4–10.5)
CHLORIDE SERPL-SCNC: 101 MMOL/L — SIGNIFICANT CHANGE UP (ref 96–108)
CO2 SERPL-SCNC: 27 MMOL/L — SIGNIFICANT CHANGE UP (ref 22–31)
CREAT SERPL-MCNC: 1.06 MG/DL — SIGNIFICANT CHANGE UP (ref 0.5–1.3)
EOSINOPHIL # BLD AUTO: 0.15 K/UL — SIGNIFICANT CHANGE UP (ref 0–0.5)
EOSINOPHIL NFR BLD AUTO: 4.6 % — SIGNIFICANT CHANGE UP (ref 0–6)
GLUCOSE SERPL-MCNC: 95 MG/DL — SIGNIFICANT CHANGE UP (ref 70–99)
HAPTOGLOB SERPL-MCNC: 112 MG/DL — SIGNIFICANT CHANGE UP (ref 34–200)
HCT VFR BLD CALC: 36.1 % — LOW (ref 39–50)
HGB BLD-MCNC: 11.1 G/DL — LOW (ref 13–17)
IMM GRANULOCYTES NFR BLD AUTO: 0.6 % — SIGNIFICANT CHANGE UP (ref 0–1.5)
INR BLD: 1.09 — SIGNIFICANT CHANGE UP (ref 0.88–1.16)
LDH SERPL L TO P-CCNC: 223 U/L — SIGNIFICANT CHANGE UP (ref 50–242)
LYMPHOCYTES # BLD AUTO: 1.05 K/UL — SIGNIFICANT CHANGE UP (ref 1–3.3)
LYMPHOCYTES # BLD AUTO: 32.5 % — SIGNIFICANT CHANGE UP (ref 13–44)
MAGNESIUM SERPL-MCNC: 1.9 MG/DL — SIGNIFICANT CHANGE UP (ref 1.6–2.6)
MCHC RBC-ENTMCNC: 23.6 PG — LOW (ref 27–34)
MCHC RBC-ENTMCNC: 30.7 GM/DL — LOW (ref 32–36)
MCV RBC AUTO: 76.8 FL — LOW (ref 80–100)
MONOCYTES # BLD AUTO: 0.24 K/UL — SIGNIFICANT CHANGE UP (ref 0–0.9)
MONOCYTES NFR BLD AUTO: 7.4 % — SIGNIFICANT CHANGE UP (ref 2–14)
NEUTROPHILS # BLD AUTO: 1.75 K/UL — LOW (ref 1.8–7.4)
NEUTROPHILS NFR BLD AUTO: 54.3 % — SIGNIFICANT CHANGE UP (ref 43–77)
NRBC # BLD: 0 /100 WBCS — SIGNIFICANT CHANGE UP (ref 0–0)
OB PNL STL: POSITIVE
PLATELET # BLD AUTO: 94 K/UL — LOW (ref 150–400)
POTASSIUM SERPL-MCNC: 4.3 MMOL/L — SIGNIFICANT CHANGE UP (ref 3.5–5.3)
POTASSIUM SERPL-SCNC: 4.3 MMOL/L — SIGNIFICANT CHANGE UP (ref 3.5–5.3)
PROT SERPL-MCNC: 6.5 G/DL — SIGNIFICANT CHANGE UP (ref 6–8.3)
PROTHROM AB SERPL-ACNC: 12.4 SEC — SIGNIFICANT CHANGE UP (ref 10–12.9)
RBC # BLD: 4.7 M/UL — SIGNIFICANT CHANGE UP (ref 4.2–5.8)
RBC # FLD: 20.2 % — HIGH (ref 10.3–14.5)
RETICS #: 44.9 K/UL — SIGNIFICANT CHANGE UP (ref 25–125)
RETICS/RBC NFR: 0.9 % — SIGNIFICANT CHANGE UP (ref 0.5–2.5)
SODIUM SERPL-SCNC: 138 MMOL/L — SIGNIFICANT CHANGE UP (ref 135–145)
WBC # BLD: 3.23 K/UL — LOW (ref 3.8–10.5)
WBC # FLD AUTO: 3.23 K/UL — LOW (ref 3.8–10.5)

## 2019-08-12 PROCEDURE — 93282 PRGRMG EVAL IMPLANTABLE DFB: CPT | Mod: 26

## 2019-08-12 PROCEDURE — 93306 TTE W/DOPPLER COMPLETE: CPT | Mod: 26

## 2019-08-12 RX ORDER — MAGNESIUM OXIDE 400 MG ORAL TABLET 241.3 MG
400 TABLET ORAL ONCE
Refills: 0 | Status: COMPLETED | OUTPATIENT
Start: 2019-08-12 | End: 2019-08-12

## 2019-08-12 RX ORDER — FAMOTIDINE 10 MG/ML
20 INJECTION INTRAVENOUS DAILY
Refills: 0 | Status: DISCONTINUED | OUTPATIENT
Start: 2019-08-12 | End: 2019-08-14

## 2019-08-12 RX ADMIN — Medication 50 MILLIGRAM(S): at 13:38

## 2019-08-12 RX ADMIN — FAMOTIDINE 20 MILLIGRAM(S): 10 INJECTION INTRAVENOUS at 17:22

## 2019-08-12 RX ADMIN — Medication 1 MILLIGRAM(S): at 11:44

## 2019-08-12 RX ADMIN — Medication 50 MILLIGRAM(S): at 22:17

## 2019-08-12 RX ADMIN — Medication 500 MILLIGRAM(S): at 11:44

## 2019-08-12 RX ADMIN — APIXABAN 5 MILLIGRAM(S): 2.5 TABLET, FILM COATED ORAL at 05:36

## 2019-08-12 RX ADMIN — Medication 1 TABLET(S): at 11:44

## 2019-08-12 RX ADMIN — Medication 50 MILLIGRAM(S): at 05:37

## 2019-08-12 RX ADMIN — PANTOPRAZOLE SODIUM 40 MILLIGRAM(S): 20 TABLET, DELAYED RELEASE ORAL at 05:37

## 2019-08-12 RX ADMIN — APIXABAN 5 MILLIGRAM(S): 2.5 TABLET, FILM COATED ORAL at 17:22

## 2019-08-12 RX ADMIN — Medication 100 MILLIGRAM(S): at 11:44

## 2019-08-12 RX ADMIN — Medication 100 MILLIGRAM(S): at 05:37

## 2019-08-12 RX ADMIN — MAGNESIUM OXIDE 400 MG ORAL TABLET 400 MILLIGRAM(S): 241.3 TABLET ORAL at 11:44

## 2019-08-12 NOTE — PROGRESS NOTE ADULT - SUBJECTIVE AND OBJECTIVE BOX
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 Patient CIWA was 6 on admission and current score is 1 (for slight tremor). Patient is currently on Librium taper. Patient also had transamnitis (likely 2/2 EToH abuse), which has now resolved. Patient with history of 2cm LV apical thrombus by Echo 10/2018. Repeat echo 08/12/19 revealed no thrombus and of EF 35-40%. Patient has history of paroxysmal Afib, and is currently in sinus rhythm. ICD also in place and integration on 8/12/19 revealed Normal functioning device. Patient had elevated D-Dimer 743 on admission; CTA PE protocol (8/11/2019) negative for PE, LE Duplex (8/11/2019): negative. Patient is currently stable and ready for stepdown to Dr. Levy’s service for hematology work up due to thrombocytopenia (history of HIT), and GI work up for anemia (history of internal hemmrhoids).    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 Patient has now been cleared for transfer to regional floor under Dr. Levy's service for further hematology and GI work up.        Problem/Plan - 1:  ·  Problem: ETOH abuse.  Plan: -Recent EtoH binge x 1 week, EtoH level 308 on admit, repeat ETOH level 8/11/19: WNL, s/p Librium, Ativan, IVF w/ vitamins in ED  -CIWA score 1 this AM (with slight tremor)  -Continue MVI, Thiamine, Folic Acid, and Librium taper  -  consult for ETOH Detox center referral (went to  inpatient Detox last admit, would not be able to transition directly to rehab from hospital but can be provided with resources prior to d/c)  - DASH Diet as tolerated and PPI  - Transaminitis resolved likely 2/2 EtoH abuse this week. Will continue to trend LFTs daily.      Problem/Plan - 2:  ·  Problem: Thrombus in heart chamber.  Plan: -Hx of 2cm LV apical thrombus by Echo 10/2018. Had not taken Eliquis x 6 days prior to admission   -Repeat Echo (8/12/2019): No LV thrombus noted.   - Eliquis 5 mg orally BID resumed on admission.    - History of HIT->NO HEPARIN  - D-Dimer 743, CTA PE protocol (8/11/2019) negative for PE, LE Duplex (8/11/2019): negative for DVT.      Problem/Plan - 3:  ·  Problem: Paroxysmal a-fib.  Plan: - Currently SR with HRs 70s-100s. 2 Runs of NSVT this AM (asymptomatic)   - ICD interrogation (8/12/19): Normal functioning device.  Battery will need to be changed within the next year, pt would like to follow up with Dr. Ramos and understands the importance of regular follow up.  - Continue Metoprolol XL 100mg daily and Eliquis 5mg BID  - Keep K > 4 and Mg > 2.      Problem/Plan - 4:  ·  Problem: Chronic systolic congestive heart failure.  Plan: - Currently euvolemic  - Continue Metoprolol XL 100mg QD  - Pt w/ h/o Captopril allergy, no other ACE/ARB at this time per Dr. Levy  -  Echo (8/12/2019):  Apical septum and true apex appear akinetic (was present on -Echo 8/8/2018). Hypokinesis of remaining apical segments and mid septum. EF 35-40%, AV mildly thickened, trace valvular AR, MV mildly thickened, Trace MR, trace TR, trace AL. No obvious thrombus.      Problem/Plan - 5:  ·  Problem: Coronary artery disease involving native coronary artery of native heart without angina pectoris.  Plan: -H/o CAD and prior PCI, currently CP free and HD stable. Trop negative x 1 and without acute ischemic changes in EKG  - Prior NST on 7/17/18: Extensive  scarring in the territory of the LAD. LV global hypoknieis, EKG stress test is negative. In comparison to 3/2016 no longer evidence of ischemia at the margins of the scarring  -No plan for ischemic w/u at this time  - Hold Atorvastatin 40mg QHS 2/2 Transaminitis on admission. Trend LFTs. Resume tomorrow if LFTs remain normal.   -No ASA at this time. If Dr. Canales okay with ASA, may start 81 mg daily.      Problem/Plan - 6:  Problem: Anemia. Plan: -History of anemia looking back at [facility] values. Also with history of internal hemorrhoids that flare with drinking.   -Hgb 13.5 on admission->patient with downtrend to 11.1/36.1 today  -Patient reports he has a history of internal hemorrhoids that flare when he drinks.  Pt reports two days prior he noted some blood in the toilet water, but stool was normal. He states this is what happens when his internal hemorrhoids flare after drinking. He is refusing guaiac at this time stating this happens every time after a drinking binge.   -Dr. Canales well known to patient and consulted; will see patient tomorrow.   -To continue Eliquis 5 mg orally BID for now per Dr. Levy.   -Will monitor closely.  -Stool occult ordered per Heme team.     Problem/Plan - 7:  ·  Problem: Thrombocytopenia.  Plan: -Hematology Service consulted today  -Platelets with downtrend to 94K today (was 252K on admission)  -Hx of thrombocytopenia during past admissions. Also with history of HIT.   -Heme lab values ordered; follow up.   -PPI switched to Pepcid 20 mg daily.   -Recommend sending occult blood (pt refused guaiac).   -GI (Dr. Canales) to see patient in AM.   -Will discuss Abdominal US with Dr. Reddy.      Problem/Plan - 8:  ·  Problem: Discharge planning issues.  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 Patient CIWA was 6 on admission and current score is 3 (for slight tremor and mild anxious). Patient is currently on Librium taper. Patient also had transamnitis (likely 2/2 EToH abuse), which has now resolved. Patient with history of 2cm LV apical thrombus by Echo 10/2018. Repeat echo 08/12/19 revealed no thrombus and of EF 35-40%. Patient has history of paroxysmal Afib, and is currently in sinus rhythm. ICD also in place and integration on 8/12/19 revealed Normal functioning device. Patient had elevated D-Dimer 743 on admission; CTA PE protocol (8/11/2019) negative for PE, LE Duplex (8/11/2019): negative. Patient currently denies CP, SOB, fever, chills, nausea, vomiting, HA, melena, hematochezia, diarrhea/ constipation, palpitations. Patient is  stable and ready for stepdown to Dr. Levy’s service for hematology work up due to thrombocytopenia (history of HIT), and GI work up for anemia (history of internal hemorrhoids)     T(C): 36.9 (08-12-19 @ 21:40), Max: 36.9 (08-12-19 @ 00:18)  HR: 75 (08-12-19 @ 21:40) (75 - 92)  BP: 128/93 (08-12-19 @ 21:40) (112/81 - 142/72)  RR: 18 (08-12-19 @ 21:40) (18 - 18)  SpO2: 100% (08-12-19 @ 21:40) (97% - 100%)  Wt(kg): --Vital Signs Last 24 Hrs  T(C): 36.9 (12 Aug 2019 21:40), Max: 36.9 (12 Aug 2019 00:18)  T(F): 98.5 (12 Aug 2019 21:40), Max: 98.5 (12 Aug 2019 21:40)  HR: 75 (12 Aug 2019 21:40) (75 - 92)  BP: 128/93 (12 Aug 2019 21:40) (112/81 - 142/72)  BP(mean): --  RR: 18 (12 Aug 2019 21:40) (18 - 18)  SpO2: 100% (12 Aug 2019 21:40) (97% - 100%)    PHYSICAL EXAM:  GENERAL: NAD  HEAD:  Atraumatic, Normocephalic  EYES: EOMI, PERRLA, conjunctiva and sclera clear  ENMT: No tonsillar erythema, poor dentition, No lesions  NECK: Supple, No JVD, Normal thyroid  NERVOUS SYSTEM:  Alert & Oriented X3, Good concentration  CHEST/LUNG: Clear to percussion bilaterally; No rales, rhonchi, wheezing, or rubs  HEART: Regular rate and rhythm; No murmurs, rubs, or gallops  ABDOMEN: Soft, Nontender, Nondistended; Bowel sounds present  EXTREMITIES:  2+ Peripheral Pulses, No clubbing, cyanosis, or edema  LYMPH: No lymphadenopathy noted  SKIN: No rashes or lesions        LABS:                        11.1   3.23  )-----------( 94       ( 12 Aug 2019 07:33 )             36.1     08-12    138  |  101  |  13  ----------------------------<  95  4.3   |  27  |  1.06    Ca    9.0      12 Aug 2019 07:33  Phos  3.5     08-11  Mg     1.9     08-12    TPro  6.5  /  Alb  3.6  /  TBili  0.5  /  DBili  x   /  AST  33  /  ALT  23  /  AlkPhos  63  08-12    PT/INR - ( 12 Aug 2019 07:33 )   PT: 12.4 sec;   INR: 1.09          PTT - ( 12 Aug 2019 07:33 )  PTT:31.8 sec    CAPILLARY BLOOD GLUCOSE

## 2019-08-12 NOTE — PROGRESS NOTE ADULT - SUBJECTIVE AND OBJECTIVE BOX
Interventional Cardiology PA Adult Progress Note    CC: ETOH intoxication     Subjective Assessment: Pt seen and examined at bedside this morning. Patient reports he did not sleep well overnight and was having bad dreams. He denies chest pain, palpitations, dizziness, fever, chills, N/V, diarrhea, abdominal pain, diaphoresis, anxiety.    12 point review of systems otherwise negative except per subjective.   	  MEDICATIONS:  metoprolol succinate  milliGRAM(s) Oral daily        chlordiazePOXIDE   Oral   chlordiazePOXIDE 50 milliGRAM(s) Oral every 8 hours    pantoprazole    Tablet 40 milliGRAM(s) Oral before breakfast      apixaban 5 milliGRAM(s) Oral every 12 hours  ascorbic acid 500 milliGRAM(s) Oral daily  folic acid 1 milliGRAM(s) Oral daily  magnesium oxide 400 milliGRAM(s) Oral once  multivitamin/minerals 1 Tablet(s) Oral daily  thiamine 100 milliGRAM(s) Oral daily      	    [PHYSICAL EXAM:  TELEMETRY: Sinus Ryhthm-Sinus Tachycardia, 4 beats of NSVT, 6 beats of NSVT this AM  T(C): 36.3 (08-12-19 @ 09:32), Max: 36.9 (08-12-19 @ 00:18)  HR: 85 (08-12-19 @ 09:00) (80 - 87)  BP: 112/81 (08-12-19 @ 09:00) (112/70 - 142/72)  RR: 18 (08-12-19 @ 09:00) (17 - 18)  SpO2: 99% (08-12-19 @ 09:00) (97% - 99%)  Wt(kg): --  I&O's Summary    11 Aug 2019 07:01  -  12 Aug 2019 07:00  --------------------------------------------------------  IN: 957 mL / OUT: 0 mL / NET: 957 mL      Height (cm): 177.8 (08-12 @ 00:24)    Gen: NAD, resting comfortable sitting upright this morning eating breakfast. Appears relaxed.   Neck: No JVD b/l  Cardiac: +S1, S2, RRR  Pulm: CTA b/l  Abdomen: BS present, soft, NT, ND  Extremities: warm to touch, no edema b/l  Neuro: A+Ox3, noted to have slight resting tremor in upper extremities     	   LABS:	 	                                     11.1   3.23  )-----------( 94       ( 12 Aug 2019 07:33 )             36.1     08-12    138  |  101  |  13  ----------------------------<  95  4.3   |  27  |  1.06    Ca    9.0      12 Aug 2019 07:33  Phos  3.5     08-11  Mg     1.9     08-12    TPro  6.5  /  Alb  3.6  /  TBili  0.5  /  DBili  x   /  AST  33  /  ALT  23  /  AlkPhos  63  08-12       PT/INR - ( 12 Aug 2019 07:33 )   PT: 12.4 sec;   INR: 1.09          PTT - ( 12 Aug 2019 07:33 )  PTT:31.8 sec Interventional Cardiology PA Adult Progress Note      Hospital Course:   64 y/o Male 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     Patient’s current CIWA score 1 (slight tremor) and transaminitis resolved likely 2/2 EtoH abuse this week. Patient is currently on Librium taper. Patient with history of 2cm LV apical thrombus by Echo 10/2018 with repeat Echo 8/12/2019 revealing no thrombus. Patient to continue Eliquis 5 mg orally BID per Dr. Levy. In terms of pAF, currently in Sinus Rhythm and to continue Toprol  mg daily and NOAC therapy. ICD interrogation (8/12/19) revealed Normal functioning device.  Battery will need to be changed within the next year, pt would like to follow up with Dr. Ramos and understands the importance of regular follow up. Patient had elevated D-Dimer 743 on admission; CTA PE protocol (8/11/2019) negative for PE, LE Duplex (8/11/2019): negative for DVT. Patient with history of chronic systolic CHF, remains euvolemic on exam. Echo (8/12/2019) revealed apical septum and true apex appear akinetic (was present on Echo 8/8/2018). Hypokinesis of remaining apical segments and mid septum. EF 35-40%, AV mildly thickened, trace valvular AR, MV mildly thickened, Trace MR, trace TR, trace MS. No obvious thrombus. In terms of known CAD, patient remains chest pain free and to continue medical management. Patient’s lab significant for thrombocytopenia with drop in platelet count to 94K this morning prompting Hematology consultation. Patient recommended for Abdominal US which has been ordered and hematology blood work ordered. In addition, patient with history of HIT. Patient with a history of internal hemorrhoids as well and is well known to Dr. Canales and will see patient in AM. Patient has been deemed stable for step down to regional floor under Dr. Levy’s service at this time for further hematology and GI work up.    CC: ETOH intoxication     Subjective Assessment: Pt seen and examined at bedside this morning. Patient reports he did not sleep well overnight and was having bad dreams. He denies chest pain, palpitations, dizziness, fever, chills, N/V, diarrhea, abdominal pain, diaphoresis, anxiety.    12 point review of systems otherwise negative except per subjective.   	  MEDICATIONS:  metoprolol succinate  milliGRAM(s) Oral daily        chlordiazePOXIDE   Oral   chlordiazePOXIDE 50 milliGRAM(s) Oral every 8 hours    pantoprazole    Tablet 40 milliGRAM(s) Oral before breakfast      apixaban 5 milliGRAM(s) Oral every 12 hours  ascorbic acid 500 milliGRAM(s) Oral daily  folic acid 1 milliGRAM(s) Oral daily  magnesium oxide 400 milliGRAM(s) Oral once  multivitamin/minerals 1 Tablet(s) Oral daily  thiamine 100 milliGRAM(s) Oral daily      	    [PHYSICAL EXAM:  TELEMETRY: Sinus Ryhthm-Sinus Tachycardia, 4 beats of NSVT, 6 beats of NSVT this AM  T(C): 36.3 (08-12-19 @ 09:32), Max: 36.9 (08-12-19 @ 00:18)  HR: 85 (08-12-19 @ 09:00) (80 - 87)  BP: 112/81 (08-12-19 @ 09:00) (112/70 - 142/72)  RR: 18 (08-12-19 @ 09:00) (17 - 18)  SpO2: 99% (08-12-19 @ 09:00) (97% - 99%)  Wt(kg): --  I&O's Summary    11 Aug 2019 07:01  -  12 Aug 2019 07:00  --------------------------------------------------------  IN: 957 mL / OUT: 0 mL / NET: 957 mL      Height (cm): 177.8 (08-12 @ 00:24)    Gen: NAD, resting comfortable sitting upright this morning eating breakfast. Appears relaxed.   Neck: No JVD b/l  Cardiac: +S1, S2, RRR  Pulm: CTA b/l  Abdomen: BS present, soft, NT, ND  Extremities: warm to touch, no edema b/l  Neuro: A+Ox3, noted to have slight resting tremor in upper extremities     	   LABS:	 	                                     11.1   3.23  )-----------( 94       ( 12 Aug 2019 07:33 )             36.1     08-12    138  |  101  |  13  ----------------------------<  95  4.3   |  27  |  1.06    Ca    9.0      12 Aug 2019 07:33  Phos  3.5     08-11  Mg     1.9     08-12    TPro  6.5  /  Alb  3.6  /  TBili  0.5  /  DBili  x   /  AST  33  /  ALT  23  /  AlkPhos  63  08-12       PT/INR - ( 12 Aug 2019 07:33 )   PT: 12.4 sec;   INR: 1.09          PTT - ( 12 Aug 2019 07:33 )  PTT:31.8 sec

## 2019-08-12 NOTE — PROGRESS NOTE ADULT - PROBLEM SELECTOR PLAN 4
- Currently euvolemic  - Continue Metoprolol XL 100mg QD  - Pt w/ h/o Captopril allergy, no other ACE/ARB at this time per Dr. Levy  - Previous Echo on 10/15/18: EF 35%, hypokinesis of the mid to apical septum and all apical septums, trace MR, trace-mild TR. Repeat Echo ordered. - Currently euvolemic  - Continue Metoprolol XL 100mg QD  - Pt w/ h/o Captopril allergy, no other ACE/ARB at this time per Dr. Levy  -  Echo (8/12/2019):  Apical septum and true apex appear akinetic (was present on -Echo 8/8/2018). Hypokinesis of remaining apical segments and mid septum. EF 35-40%, AV mildly thickened, trace valvular AR, MV mildly thickened, Trace MR, trace TR, trace RI. No obvious thrombus.

## 2019-08-12 NOTE — PROGRESS NOTE ADULT - SUBJECTIVE AND OBJECTIVE BOX
Pt looks better today   , less shaky     PAST MEDICAL & SURGICAL HISTORY:  Thrombus in heart chamber: LV  ICD (implantable cardioverter-defibrillator) in place  Pacemaker  ETOH abuse  Paroxysmal a-fib  Myocardial infarction involvi    MEDICATIONS  (STANDING):  apixaban 5 milliGRAM(s) Oral every 12 hours  ascorbic acid 500 milliGRAM(s) Oral daily  chlordiazePOXIDE   Oral   chlordiazePOXIDE 50 milliGRAM(s) Oral every 8 hours  folic acid 1 milliGRAM(s) Oral daily  magnesium oxide 400 milliGRAM(s) Oral once  metoprolol succinate  milliGRAM(s) Oral daily  multivitamin/minerals 1 Tablet(s) Oral daily  pantoprazole    Tablet 40 milliGRAM(s) Oral before breakfast  thiamine 100 milliGRAM(s) Oral daily  ng other coronary artery  Gastritis  Atherosclerosis of coronary artery: Coronary artery disease  Automatic implantable cardiac defibrillator in situ: ICD (implantable cardioverter-defibrillator) in place      ICU Vital Signs Last 24 Hrs  T(C): 36.3 (12 Aug 2019 05:51), Max: 36.9 (12 Aug 2019 00:18)  T(F): 97.4 (12 Aug 2019 05:51), Max: 98.4 (12 Aug 2019 00:18)  HR: 86 (12 Aug 2019 05:35) (80 - 87)  BP: 142/72 (12 Aug 2019 05:35) (112/70 - 142/72)  BP(mean): --  ABP: --  ABP(mean): --  RR: 18 (12 Aug 2019 05:35) (17 - 18)  SpO2: 98% (12 Aug 2019 05:35) (97% - 99%)        Lungs  clear  CV s1s2    abd soft  Ext stable                          11.1   3.23  )-----------( 94       ( 12 Aug 2019 07:33 )             36.1   08-12    138  |  101  |  13  ----------------------------<  95  4.3   |  27  |  1.06    Ca    9.0      12 Aug 2019 07:33  Phos  3.5     08-11  Mg     1.9     08-12    TPro  6.5  /  Alb  3.6  /  TBili  0.5  /  DBili  x   /  AST  33  /  ALT  23  /  AlkPhos  63  08-12      PT/INR - ( 12 Aug 2019 07:33 )   PT: 12.4 sec;   INR: 1.09          PTT - ( 12 Aug 2019 07:33 )  PTT:31.8 sec

## 2019-08-12 NOTE — CONSULT NOTE ADULT - SUBJECTIVE AND OBJECTIVE BOX
Hematology Oncology Consult Note (Dr. 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 Pt reports having being sober since 2018 but has been binge drinking since last .  Reports having two drinks  at daughter’s birthday celebration, had black out episode where he doesn’t recollect events (did not pass out), and started binging 1-2pints vodka daily since Monday (endorsed he wanted to see if blackout would happen again since it never happened before after 2 drinks & then he subsequently went on a binge).  He endorses burning GI symptoms and intermittent chronic hemorrhoid bleeding as well as not taking any of his medications since Monday.    Hands tremors only when arms extended.  Pt is now admitted to [facility] for telemetry monitoring, repeat ECHO to assess for Thrombus, and Atoh withdrawal management.       PAST MEDICAL & SURGICAL HISTORY:  Thrombus in heart chamber: LV  ICD (implantable cardioverter-defibrillator) in place  Pacemaker  ETOH abuse  Paroxysmal a-fib  Myocardial infarction involving other coronary artery  Gastritis  Atherosclerosis of coronary artery: Coronary artery disease  Automatic implantable cardiac defibrillator in situ: ICD (implantable cardioverter-defibrillator) in place      Allergies:  Capoten (Short breath; Rash; Hives)  heparin (Other (Mod to Severe))  penicillin (Short breath; Rash; Hives)      Medications:  apixaban 5 milliGRAM(s) Oral every 12 hours  MEDICATIONS  (STANDING):  apixaban 5 milliGRAM(s) Oral every 12 hours  ascorbic acid 500 milliGRAM(s) Oral daily  chlordiazePOXIDE   Oral   chlordiazePOXIDE 50 milliGRAM(s) Oral every 8 hours  folic acid 1 milliGRAM(s) Oral daily  metoprolol succinate  milliGRAM(s) Oral daily  multivitamin/minerals 1 Tablet(s) Oral daily  pantoprazole    Tablet 40 milliGRAM(s) Oral before breakfast  thiamine 100 milliGRAM(s) Oral daily    MEDICATIONS  (PRN):        Social History:  Former Smoker (cigarettes) 1/2 ppd x 6 years, quit     	EtoH abuser with multiple prior admits, sober since November now drinking 1-2 pints Vodka daily since 21    	Lives alone, apartment in elevator building, (son currently staying with him); not working- on disability    FAMILY HISTORY:  Family history of cardiac disorder in maternal grandfather (Grandparent):  of MI at 41yo      PHYSICAL EXAM:    T(F): 97.4 (19 @ 09:32), Max: 98.4 (19 @ 00:18)  HR: 85 (19 @ 09:00) (80 - 87)  BP: 112/81 (19 @ 09:00) (112/70 - 142/72)  RR: 18 (19 @ 09:00) (17 - 18)  SpO2: 99% (19 @ 09:00) (97% - 99%)  Wt(kg): --    Daily Height in cm: 177.8 (12 Aug 2019 00:24)    Daily Weight in k.7 (12 Aug 2019 05:51)    Gen: well developed, well nourished  HEENT: normocephalic/atraumatic,  no scleral icterus,   Neck: supple  Cardiovascular: +S1S2  Respiratory: clear air entry b/l  Gastrointestinal: BS+, soft, NT/ND  Extremities: no clubbing/cyanosis, no edema  Neurological: awake, alert, mild tremors on CIWA       Labs:                          11.1   3.23  )-----------( 94       ( 12 Aug 2019 07:33 )             36.1     CBC Full  -  ( 12 Aug 2019 07:33 )  WBC Count : 3.23 K/uL  RBC Count : 4.70 M/uL  Hemoglobin : 11.1 g/dL  Hematocrit : 36.1 %  Platelet Count - Automated : 94 K/uL  Mean Cell Volume : 76.8 fl  Mean Cell Hemoglobin : 23.6 pg  Mean Cell Hemoglobin Concentration : 30.7 gm/dL  Auto Neutrophil # : 1.75 K/uL  Auto Lymphocyte # : 1.05 K/uL  Auto Monocyte # : 0.24 K/uL  Auto Eosinophil # : 0.15 K/uL  Auto Basophil # : 0.02 K/uL  Auto Neutrophil % : 54.3 %  Auto Lymphocyte % : 32.5 %  Auto Monocyte % : 7.4 %  Auto Eosinophil % : 4.6 %  Auto Basophil % : 0.6 %    PT/INR - ( 12 Aug 2019 07:33 )   PT: 12.4 sec;   INR: 1.09          PTT - ( 12 Aug 2019 07:33 )  PTT:31.8 sec        138  |  101  |  13  ----------------------------<  95  4.3   |  27  |  1.06    Ca    9.0      12 Aug 2019 07:33  Phos  3.5       Mg     1.9         TPro  6.5  /  Alb  3.6  /  TBili  0.5  /  DBili  x   /  AST  33  /  ALT  23  /  AlkPhos  63   CT Chest Angio   FINDINGS: No pulmonary embolism is seen.     The thoracic aorta is normal in appearance. The heart is normal in size.   No pericardial effusion is seen. There is a single-lead left anterior   chest wall ICD with tip in the right ventricle.    No mediastinal, hilar or axillary lymphadenopathy is seen.    No pleural effusions are seen. No pulmonary abnormalities are evident.      Limited evaluation of the upper abdomen demonstrates no abnormality.     Evaluation of the osseous structures demonstrates mild degenerative   changes of the spine.      IMPRESSION:   No pulmonary embolism.         LE U/S     IMPRESSION:  No deep vein thrombosis seen.

## 2019-08-12 NOTE — PROGRESS NOTE ADULT - PROBLEM SELECTOR PLAN 4
-Hx of 2cm LV apical thrombus by Echo 10/2018. Had not taken Eliquis x 6 days prior to admission   -Repeat Echo (8/12/2019): No LV thrombus noted.   - Eliquis 5 mg orally BID resumed on admission.    - History of HIT->NO HEPARIN  - D-Dimer 743, CTA PE protocol (8/11/2019) negative for PE, LE Duplex (8/11/2019): negative for DVT.

## 2019-08-12 NOTE — PROGRESS NOTE ADULT - SUBJECTIVE AND OBJECTIVE BOX
Electrophysiology Device Interrogation       Indication: cardiomyopathy    Device model: 	[place] [employer] 			                            Functioning Mode: 	VVI 40	    Underlying Rhythm:  NSR    Pacemaker dependency: No pacing    Battery status: 9 months    Interrogating parameters:   					RV			  Sense:                                                     19.8         Threshold:                                               1.5V@0.4ms                                                                     Pacing Impedance:                                    710                                                                      Shock Impedance:        115                                                                                                             Events/Alert:  short bursts of AT in June/July    Parameter change: 	 none    Assessment:  Normla functioning device.  He is aware that the battery will need to be changed within the next year.  He would like to follow up with Dr. Ramos and understands the importance of regular follow up.

## 2019-08-12 NOTE — PROGRESS NOTE ADULT - PROBLEM SELECTOR PLAN 3
- Currently SR with HRs 70s-100s. 2 Runs of NSVT this AM (asymptomatic)   - EP team to interrogate device today: follow up results.   - Continue Metoprolol XL 100mg daily and Eliquis 5mg BID  - Keep K > 4 and Mg > 2 - Currently SR with HRs 70s-100s. 2 Runs of NSVT this AM (asymptomatic)   - ICD interrogation (8/12/19): Normal functioning device.  Battery will need to be changed within the next year, pt would like to follow up with Dr. Ramos and understands the importance of regular follow up.  - Continue Metoprolol XL 100mg daily and Eliquis 5mg BID  - Keep K > 4 and Mg > 2

## 2019-08-12 NOTE — PROGRESS NOTE ADULT - PROBLEM SELECTOR PLAN 7
-H/o CAD and prior PCI, currently CP free and HD stable. Trop negative x 1 and without acute ischemic changes in EKG  - Prior NST on 7/17/18: Extensive  scarring in the territory of the LAD. LV global hypoknieis, EKG stress test is negative. In comparison to 3/2016 no longer evidence of ischemia at the margins of the scarring  -No plan for ischemic w/u at this time  - Hold Atorvastatin 40mg QHS 2/2 Transaminitis on admission. Trend LFTs. Resume tomorrow if LFTs remain normal.   -No ASA at this time. If Dr. Parker cantu with ASA, may start 81 mg daily.

## 2019-08-12 NOTE — PROGRESS NOTE ADULT - PROBLEM SELECTOR PLAN 6
-Hgb 13.5 on admission->patient with downtrend to 11.1/36.1 today  -Patient with hx of external hemorrhoid bleeding. Per RN and patient he had some blood in BM yesterday. Hemodynamically stable this morning.   -Dr. Canales well known to patient and consulted; will see patient tomorrow.   -Per Dr. Levy, no stool occult needed at this time or guaiac as pt with known history of hemorrhoids. To continue Eliquis 5 mg orally BID for now per Dr. Levy.   -Will monitor closely. -History of anemia looking back at [place] values.  -Hgb 13.5 on admission->patient with downtrend to 11.1/36.1 today  -Patient with hx of external hemorrhoid bleeding. Per RN and patient he had some blood in BM yesterday. Hemodynamically stable this morning.   -Dr. Canales well known to patient and consulted; will see patient tomorrow.   -Per Dr. Levy, no stool occult needed at this time or guaiac as pt with known history of hemorrhoids. To continue Eliquis 5 mg orally BID for now per Dr. Levy.   -Will monitor closely. -History of anemia looking back at [place] values.  -Hgb 13.5 on admission->patient with downtrend to 11.1/36.1 today  -Patient reports he has a history of internal hemorrhoids that flare when he drinks.  Pt reports two days prior he noted some blood in the toilet water, but stool was normal. He states this is what happens when his internal hemorrhoids flare after drinking. He is refusing guaiac at this time.   -Dr. Canalse well known to patient and consulted; will see patient tomorrow.   -Per Dr. Levy, no stool occult needed at this time given known history of hemorrhoids. To continue Eliquis 5 mg orally BID for now per Dr. Levy.   -Will monitor closely. -History of anemia looking back at [facility] values. Also with history of internal hemorrhoids that flare with drinking.   -Hgb 13.5 on admission->patient with downtrend to 11.1/36.1 today  -Patient reports he has a history of internal hemorrhoids that flare when he drinks.  Pt reports two days prior he noted some blood in the toilet water, but stool was normal. He states this is what happens when his internal hemorrhoids flare after drinking. He is refusing guaiac at this time stating this happens every time after a drinking binge.   -Dr. Canales well known to patient and consulted; will see patient tomorrow.   -To continue Eliquis 5 mg orally BID for now per Dr. Levy.   -Will monitor closely.  -Stool occult ordered per Heme team

## 2019-08-12 NOTE — CONSULT NOTE ADULT - ASSESSMENT
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       #Thrombocytopenia  #Microcytic Anemia   #LV Thrombus     -peripheral smear to be reviewed   -obtain B12/Folate levels, continue w/ supplementation   -clinically no signs of hemolysis, normal Tbili, will add on hapto/ldh   -patient admits to hemorrhoidal brbpr, send occult blood, GI/Proctology eval, may be correlating to drop in plt count   -send iron panel given hx of GIB  -pt non toxic appearing, afebrile w/o coagulopathy, no signs of DIC   -c/w antacid, consider switching PPI to carafate or H2, otherwise no clear offending drugs, no hep exposure  -hepatitis panel, hiv testing   -consider abd ultrasound for liver, spleen eval, no thrombosis seen in CT PE or LE doppler   -c/w AC per cardiology regarding LV thrombus   -on dvt ppx      to d/w Dr Andres 64 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       #Thrombocytopenia  #Microcytic Anemia   #LV Thrombus     -peripheral smear reviewed, adequate platelet count, few large platelets ( may be component of turnover), normal appearing lymphocytes, no hypersegmented neutrophils, no schistocytes present, few spherocytes   -obtain B12/Folate levels, continue w/ supplementation   -clinically no signs of hemolysis, normal Tbili, will add on hapto/ldh   -patient admits to hemorrhoidal brbpr, send occult blood, GI/Proctology eval, may be correlating to drop in plt count   -send iron panel given hx of GIB  -pt non toxic appearing, afebrile w/o coagulopathy, no signs of DIC   -c/w antacid, consider switching PPI to carafate or H2, otherwise no clear offending drugs, no hep exposure  -hepatitis panel, hiv testing   -consider abd ultrasound for liver, spleen eval, no thrombosis seen in CT PE or LE doppler   -c/w AC per cardiology regarding LV thrombus   -on dvt ppx      to d/w Dr Andres

## 2019-08-12 NOTE — PROGRESS NOTE ADULT - PROBLEM SELECTOR PLAN 7
-Hematology Service consulted today  -Platelets with downtrend to 94K today (was 252K on admission)  -Hx of thrombocytopenia during past admissions. Also with history of HIT.   -Follow up additional recommendations. -Hematology Service consulted today  -Platelets with downtrend to 94K today (was 252K on admission)  -Hx of thrombocytopenia during past admissions. Also with history of HIT.   -Heme lab values ordered; follow up.   -PPI switched to Pepcid 20 mg daily.   -Recommend sending occult blood (pt refused guaiac).   -GI (Dr. Canales) to see patient in AM.   -Will discuss Abdominal US with Dr. Reddy.

## 2019-08-12 NOTE — PROGRESS NOTE ADULT - PROBLEM SELECTOR PLAN 2
-Hematology Service consulted today  -Platelets with downtrend to 94K today (was 252K on admission)  -Hx of thrombocytopenia during past admissions. Also with history of HIT.   -Heme lab values ordered; follow up.   -PPI switched to Pepcid 20 mg daily.   -Recommend sending occult blood (pt refused guaiac).   -GI (Dr. Canales) to see patient in AM.

## 2019-08-12 NOTE — PROGRESS NOTE ADULT - PROBLEM SELECTOR PLAN 2
-ECHO 10/2018: 2cm LV apical thrombus. Had not taken Eliquis x 6 days prior to admission   - Repeat ECHO ordered to reassess for Thrombus  - Eliquis 5 mg orally BID resumed on admission.    - History of HIT->NO HEPARIN  - D-Dimer 743, CTA PE protocol (8/11/2019) negative for PE, LE Duplex (8/11/2019): negative for DVT. -ECHO 10/2018: 2cm LV apical thrombus. Had not taken Eliquis x 6 days prior to admission   -Repeat Echo (8/12/2019): No LV thrombus noted.   - Eliquis 5 mg orally BID resumed on admission.    - History of HIT->NO HEPARIN  - D-Dimer 743, CTA PE protocol (8/11/2019) negative for PE, LE Duplex (8/11/2019): negative for DVT. -Hx of 2cm LV apical thrombus by Echo 10/2018. Had not taken Eliquis x 6 days prior to admission   -Repeat Echo (8/12/2019): No LV thrombus noted.   - Eliquis 5 mg orally BID resumed on admission.    - History of HIT->NO HEPARIN  - D-Dimer 743, CTA PE protocol (8/11/2019) negative for PE, LE Duplex (8/11/2019): negative for DVT.

## 2019-08-12 NOTE — PROGRESS NOTE ADULT - PROBLEM SELECTOR PLAN 8
-SW team evaluate for possible Detox placement inpt vs referral to outpt center    DVT ppx: Eliquis    Dispo:  Dr. Canales to see patient in AM. F/U Heme Labs. Transfer to Medicine under Dr. Levy's service (MILENA aware)  Case reviewed with Dr. Levy this AM.

## 2019-08-12 NOTE — PROGRESS NOTE ADULT - ASSESSMENT
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 Patient has now been cleared for transfer to regional floor under Dr. Levy's service for further hematology and GI work up.

## 2019-08-12 NOTE — PROGRESS NOTE ADULT - ASSESSMENT
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

## 2019-08-12 NOTE — PROGRESS NOTE ADULT - PROBLEM SELECTOR PLAN 8
-SW team evaluate for possible Detox placement inpt vs referral to outpt center    DVT ppx: Eliquis    Dispo: F/U Echo, Device interrogation, Heme recs.     Case reviewed with Dr. Levy this AM. -SW team evaluate for possible Detox placement inpt vs referral to outpt center    DVT ppx: Eliquis    Dispo: F/U Echo.     Case reviewed with Dr. Levy this AM. -SW team evaluate for possible Detox placement inpt vs referral to outpt center    DVT ppx: Eliquis    Dispo:  Dr. Canales to see patient in AM. F/U Heme Labs.     Case reviewed with Dr. Levy this AM. -SW team evaluate for possible Detox placement inpt vs referral to outpt center    DVT ppx: Eliquis    Dispo:  Dr. Canales to see patient in AM. F/U Heme Labs. Transfer to Medicine under Dr. Levy's service (MILENA aware)  Case reviewed with Dr. Levy this AM.

## 2019-08-12 NOTE — PROGRESS NOTE ADULT - PROBLEM SELECTOR PLAN 3
-history of internal hemorrhoids that flare with drinking.   -Hgb 13.5 on admission->patient with downtrend to 11.1/36.1 today  -Patient reports yesterday on 5 uris he noted some blood in the toilet water, but stool was normal.   -He states he has had these bouts of bleeding from hemmrhoids off an on  -He is refusing guaiac at this time stating this happens every time after a drinking binge.   -Dr. Canales well known to patient and consulted; will see patient tomorrow.   -To continue Eliquis 5 mg orally BID for now per Dr. Levy.   -Will monitor closely.  -Stool occult ordered per Heme team

## 2019-08-13 ENCOUNTER — TRANSCRIPTION ENCOUNTER (OUTPATIENT)
Age: 64
End: 2019-08-13

## 2019-08-13 DIAGNOSIS — F10.239 ALCOHOL DEPENDENCE WITH WITHDRAWAL, UNSPECIFIED: ICD-10-CM

## 2019-08-13 LAB
ALBUMIN SERPL ELPH-MCNC: 3.9 G/DL — SIGNIFICANT CHANGE UP (ref 3.3–5)
ALP SERPL-CCNC: 61 U/L — SIGNIFICANT CHANGE UP (ref 40–120)
ALT FLD-CCNC: 22 U/L — SIGNIFICANT CHANGE UP (ref 10–45)
ANION GAP SERPL CALC-SCNC: 11 MMOL/L — SIGNIFICANT CHANGE UP (ref 5–17)
AST SERPL-CCNC: 29 U/L — SIGNIFICANT CHANGE UP (ref 10–40)
BILIRUB SERPL-MCNC: 0.5 MG/DL — SIGNIFICANT CHANGE UP (ref 0.2–1.2)
BUN SERPL-MCNC: 14 MG/DL — SIGNIFICANT CHANGE UP (ref 7–23)
CALCIUM SERPL-MCNC: 8.9 MG/DL — SIGNIFICANT CHANGE UP (ref 8.4–10.5)
CHLORIDE SERPL-SCNC: 102 MMOL/L — SIGNIFICANT CHANGE UP (ref 96–108)
CO2 SERPL-SCNC: 24 MMOL/L — SIGNIFICANT CHANGE UP (ref 22–31)
CREAT SERPL-MCNC: 1.09 MG/DL — SIGNIFICANT CHANGE UP (ref 0.5–1.3)
FERRITIN SERPL-MCNC: 77 NG/ML — SIGNIFICANT CHANGE UP (ref 30–400)
FOLATE SERPL-MCNC: 12.8 NG/ML — SIGNIFICANT CHANGE UP
GLUCOSE SERPL-MCNC: 96 MG/DL — SIGNIFICANT CHANGE UP (ref 70–99)
HCT VFR BLD CALC: 37.4 % — LOW (ref 39–50)
HGB BLD-MCNC: 11.3 G/DL — LOW (ref 13–17)
IRON SATN MFR SERPL: 13 % — LOW (ref 16–55)
IRON SATN MFR SERPL: 40 UG/DL — LOW (ref 45–165)
MAGNESIUM SERPL-MCNC: 1.8 MG/DL — SIGNIFICANT CHANGE UP (ref 1.6–2.6)
MCHC RBC-ENTMCNC: 23.4 PG — LOW (ref 27–34)
MCHC RBC-ENTMCNC: 30.2 GM/DL — LOW (ref 32–36)
MCV RBC AUTO: 77.6 FL — LOW (ref 80–100)
NRBC # BLD: 0 /100 WBCS — SIGNIFICANT CHANGE UP (ref 0–0)
PLATELET # BLD AUTO: 98 K/UL — LOW (ref 150–400)
POTASSIUM SERPL-MCNC: 3.9 MMOL/L — SIGNIFICANT CHANGE UP (ref 3.5–5.3)
POTASSIUM SERPL-SCNC: 3.9 MMOL/L — SIGNIFICANT CHANGE UP (ref 3.5–5.3)
PROT SERPL-MCNC: 6.9 G/DL — SIGNIFICANT CHANGE UP (ref 6–8.3)
RBC # BLD: 4.82 M/UL — SIGNIFICANT CHANGE UP (ref 4.2–5.8)
RBC # FLD: 20.1 % — HIGH (ref 10.3–14.5)
SODIUM SERPL-SCNC: 137 MMOL/L — SIGNIFICANT CHANGE UP (ref 135–145)
TIBC SERPL-MCNC: 314 UG/DL — SIGNIFICANT CHANGE UP (ref 220–430)
UIBC SERPL-MCNC: 274 UG/DL — SIGNIFICANT CHANGE UP (ref 110–370)
VIT B12 SERPL-MCNC: 381 PG/ML — SIGNIFICANT CHANGE UP (ref 232–1245)
WBC # BLD: 4.02 K/UL — SIGNIFICANT CHANGE UP (ref 3.8–10.5)
WBC # FLD AUTO: 4.02 K/UL — SIGNIFICANT CHANGE UP (ref 3.8–10.5)

## 2019-08-13 PROCEDURE — 76700 US EXAM ABDOM COMPLETE: CPT | Mod: 26

## 2019-08-13 RX ORDER — FERROUS SULFATE 325(65) MG
325 TABLET ORAL DAILY
Refills: 0 | Status: DISCONTINUED | OUTPATIENT
Start: 2019-08-13 | End: 2019-08-14

## 2019-08-13 RX ORDER — ATORVASTATIN CALCIUM 80 MG/1
40 TABLET, FILM COATED ORAL AT BEDTIME
Refills: 0 | Status: DISCONTINUED | OUTPATIENT
Start: 2019-08-13 | End: 2019-08-14

## 2019-08-13 RX ADMIN — FAMOTIDINE 20 MILLIGRAM(S): 10 INJECTION INTRAVENOUS at 11:39

## 2019-08-13 RX ADMIN — Medication 100 MILLIGRAM(S): at 11:39

## 2019-08-13 RX ADMIN — Medication 500 MILLIGRAM(S): at 11:39

## 2019-08-13 RX ADMIN — Medication 1 MILLIGRAM(S): at 11:38

## 2019-08-13 RX ADMIN — Medication 50 MILLIGRAM(S): at 19:29

## 2019-08-13 RX ADMIN — APIXABAN 5 MILLIGRAM(S): 2.5 TABLET, FILM COATED ORAL at 07:18

## 2019-08-13 RX ADMIN — Medication 1 TABLET(S): at 11:39

## 2019-08-13 RX ADMIN — APIXABAN 5 MILLIGRAM(S): 2.5 TABLET, FILM COATED ORAL at 19:29

## 2019-08-13 RX ADMIN — ATORVASTATIN CALCIUM 40 MILLIGRAM(S): 80 TABLET, FILM COATED ORAL at 21:16

## 2019-08-13 NOTE — PROGRESS NOTE ADULT - PROBLEM SELECTOR PLAN 3
Patient has history of EtOH abuse with recent binge  - Labs reveal mild transaminitis, suspected 2/2 alcohol abuse  - Thrombocytopenia also suspected 2/2 alcohol abuse  - Pt reports he wishes to return to sobriety, had 7 days of heavy consumption following a reported 9 months of sobriety

## 2019-08-13 NOTE — PROGRESS NOTE ADULT - PROBLEM SELECTOR PROBLEM 7
Chronic systolic congestive heart failure
Thrombocytopenia
Coronary artery disease involving native coronary artery of native heart without angina pectoris

## 2019-08-13 NOTE — PROGRESS NOTE ADULT - SUBJECTIVE AND OBJECTIVE BOX
OVERNIGHT EVENTS: No events overnight    SUBJECTIVE / INTERVAL HPI: Patient seen and examined at bedside. Reports mild headache. Denies auditory, visual or tactile hallucinations. Reports his last drink was 4 days ago, and occurred after a week of heavy drinking following 9 months of sobriety.     VITAL SIGNS:  Vital Signs Last 24 Hrs  T(C): 37 (13 Aug 2019 05:33), Max: 37 (13 Aug 2019 05:33)  T(F): 98.6 (13 Aug 2019 05:33), Max: 98.6 (13 Aug 2019 05:33)  HR: 78 (13 Aug 2019 07:00) (75 - 86)  BP: 104/66 (13 Aug 2019 07:00) (97/69 - 137/87)  RR: 18 (13 Aug 2019 05:33) (18 - 18)  SpO2: 98% (13 Aug 2019 05:33) (98% - 100%)    PHYSICAL EXAM:    General: WDWN  HEENT: NC/AT; PERRL, anicteric sclera; MMM  Neck: supple, no thyromegally, no JVD  Cardiovascular: +S1/S2; RRR at time of exam  Respiratory: CTA B/L; no W/R/R  Gastrointestinal: soft, NT/ND; +BSx4  Extremities: WWP; no edema, clubbing or cyanosis  Vascular: 2+ radial, DP/PT pulses B/L  Neurological: AAOx3; mild tremor noted with hands extended    MEDICATIONS:  MEDICATIONS  (STANDING):  apixaban 5 milliGRAM(s) Oral every 12 hours  ascorbic acid 500 milliGRAM(s) Oral daily  chlordiazePOXIDE   Oral   chlordiazePOXIDE 50 milliGRAM(s) Oral every 12 hours  famotidine    Tablet 20 milliGRAM(s) Oral daily  folic acid 1 milliGRAM(s) Oral daily  metoprolol succinate  milliGRAM(s) Oral daily  multivitamin/minerals 1 Tablet(s) Oral daily  thiamine 100 milliGRAM(s) Oral daily    MEDICATIONS  (PRN):      ALLERGIES:  Allergies    Capoten (Short breath; Rash; Hives)  heparin (Other (Mod to Severe))  penicillin (Short breath; Rash; Hives)    Intolerances    LABS:                        11.3   4.02  )-----------( 98       ( 13 Aug 2019 07:33 )             37.4     137  |  102  |  14  ----------------------------<  96  3.9   |  24  |  1.09    Ca    8.9      13 Aug 2019 07:33  Mg     1.8     08-13    TPro  6.9  /  Alb  3.9  /  TBili  0.5  /  DBili  x   /  AST  29  /  ALT  22  /  AlkPhos  61  08-13  PT/INR - ( 12 Aug 2019 07:33 )   PT: 12.4 sec;   INR: 1.09       PTT - ( 12 Aug 2019 07:33 )  PTT:31.8 sec    RADIOLOGY & ADDITIONAL TESTS: Reviewed.

## 2019-08-13 NOTE — PROGRESS NOTE ADULT - PROBLEM SELECTOR PLAN 4
Hx of 2 cm LV apical thrombus on echocardiogram on 10/2018  - Repeat echo on 8/12 with patient on eliquis 5 mg PO BID revealed no clot  - Continue with current management

## 2019-08-13 NOTE — DISCHARGE NOTE PROVIDER - CARE PROVIDERS DIRECT ADDRESSES
[email or web address]

## 2019-08-13 NOTE — DISCHARGE NOTE PROVIDER - NSDCFUADDAPPT_GEN_ALL_CORE_FT
Please follow up with Dr. Levy within 1-2 weeks of discharge. Please follow up with Dr. Levy within 1-2 weeks of discharge.    If bleeding fro your hemrhoids occurs, please follow up with your Dr. Canales, your gastroenterologist, as an outpatient.

## 2019-08-13 NOTE — PROGRESS NOTE ADULT - PROBLEM SELECTOR PLAN 1
Patient admitted to ED following reported 7 day binge of drinking after 9 months of sobriety. Reports 4 days since last drink. He reports he wishes to remain sober at this time.  - CIWA today 5, 2 for headache, 2 for tremmor, 1 point for nausea  - Pt currently on librium taiper, discontinuing tomorrow  - No seizures noted during admission
-Recent EtoH binge x 1 week, EtoH level 308 on admit, repeat ETOH level 8/11/19: WNL, s/p Librium, Ativan, IVF w/ vitamins in ED  -CIWA score 1 this AM (with slight tremor)  -Continue MVI, Thiamine, Folic Acid, and Librium taper  -  consult for ETOH Detox center referral (went to  inpatient Detox last admit, would not be able to transition directly to rehab from hospital but can be provided with resources prior to d/c)  - DASH Diet as tolerated and PPI  - Transaminitis resolved likely 2/2 EtoH abuse this week. Will continue to trend LFTs daily.
-Recent EtoH binge x 1 week, EtoH level 308 on admit, repeat ETOH level 8/11/19: WNL, s/p Librium, Ativan, IVF w/ vitamins in ED  -CIWA score 3 this evening (tremor and mild anxiety)  -Continue MVI, Thiamine, Folic Acid, and Librium taper  -  consult for ETOH Detox center referral (went to  inpatient Detox last admit, would not be able to transition directly to rehab from hospital but can be provided with resources prior to d/c)  - DASH Diet as tolerated and PPI  - Transaminitis resolved likely 2/2 EtoH abuse this week. Will continue to trend LFTs daily.
Recent EtoH binge x 1 week, EtoH level 308 on admit, WNL this AM, s/p Librium, Ativan, IVF w/ vitamins in ED  - Monitor for signs and sx withdrawal. Pt noted to have worsening tremors, sweating, increased anxiety overnight w/ CIWA score 14->given Ativan 1mg IV x 1 and started on Librium taper for withdrawal sx. Current CIWA score 7 (mild-moderate anxiety, moderate UE tremors, and uncertain about date)  - Continue MVI, Thiamine, Folic Acid, and Librium taper  -  consult for EtoH Detox center referral (went to  inpat Detox last admit)  - DASH Diet as tolerated and PPI  - Transaminitis likely 2/2 EtoH abuse this week - trend LFTs

## 2019-08-13 NOTE — DISCHARGE NOTE PROVIDER - CARE PROVIDER_API CALL
Ck Levy)  Medicine  [address] [place] [address]  Phone: [phone]  Fax: [phone]  Follow Up Time:     Syed Canales)  Medicine  [address], [address]  [place] [address]  Phone: [phone]  Fax: [phone]  Follow Up Time:

## 2019-08-13 NOTE — PROGRESS NOTE ADULT - ASSESSMENT
Pt is a 64 y/o M with a PMH of HIT, EtOH abuse, paroxysmal afib, LV apical thrombus, CAD, AICD and HLD who presented to the ED with EtOH intoxication and tachycardia. 4 days since last drink.

## 2019-08-13 NOTE — PROGRESS NOTE ADULT - SUBJECTIVE AND OBJECTIVE BOX
Heme/Onc Progress Note (Dr. Dr. Andres)      Interval History: Pt seen and examined. Resting admits to feeling tired, no fevers or chills noted. Occult blood stool positive.      Capoten (Short breath; Rash; Hives)  heparin (Other (Mod to Severe))  penicillin (Short breath; Rash; Hives)    Allergies    Capoten (Short breath; Rash; Hives)  heparin (Other (Mod to Severe))  penicillin (Short breath; Rash; Hives)    Intolerances        Medications:  MEDICATIONS  (STANDING):  apixaban 5 milliGRAM(s) Oral every 12 hours  ascorbic acid 500 milliGRAM(s) Oral daily  chlordiazePOXIDE   Oral   chlordiazePOXIDE 50 milliGRAM(s) Oral every 12 hours  famotidine    Tablet 20 milliGRAM(s) Oral daily  folic acid 1 milliGRAM(s) Oral daily  metoprolol succinate  milliGRAM(s) Oral daily  multivitamin/minerals 1 Tablet(s) Oral daily  thiamine 100 milliGRAM(s) Oral daily    MEDICATIONS  (PRN):    apixaban 5 milliGRAM(s) Oral every 12 hours    PHYSICAL EXAM:    T(F): 98.6 (08-13-19 @ 05:33), Max: 98.6 (08-13-19 @ 05:33)  HR: 78 (08-13-19 @ 07:00) (75 - 92)  BP: 104/66 (08-13-19 @ 07:00) (97/69 - 137/87)  RR: 18 (08-13-19 @ 05:33) (18 - 18)  SpO2: 98% (08-13-19 @ 05:33) (98% - 100%)  Wt(kg): --    Daily     Daily     Gen: well developed, well nourished  HEENT: normocephalic/atraumatic,  no scleral icterus,   Neck: supple  Cardiovascular: +S1S2  Respiratory: clear air entry b/l  Gastrointestinal: BS+, soft, NT/ND  Extremities: no clubbing/cyanosis, no edema  Neurological: awake    Labs:                          11.3   4.02  )-----------( 98       ( 13 Aug 2019 07:33 )             37.4     CBC Full  -  ( 13 Aug 2019 07:33 )  WBC Count : 4.02 K/uL  RBC Count : 4.82 M/uL  Hemoglobin : 11.3 g/dL  Hematocrit : 37.4 %  Platelet Count - Automated : 98 K/uL  Mean Cell Volume : 77.6 fl  Mean Cell Hemoglobin : 23.4 pg  Mean Cell Hemoglobin Concentration : 30.2 gm/dL  Auto Neutrophil # : x  Auto Lymphocyte # : x  Auto Monocyte # : x  Auto Eosinophil # : x  Auto Basophil # : x  Auto Neutrophil % : x  Auto Lymphocyte % : x  Auto Monocyte % : x  Auto Eosinophil % : x  Auto Basophil % : x    PT/INR - ( 12 Aug 2019 07:33 )   PT: 12.4 sec;   INR: 1.09          PTT - ( 12 Aug 2019 07:33 )  PTT:31.8 sec    08-13    137  |  102  |  14  ----------------------------<  96  3.9   |  24  |  1.09    Ca    8.9      13 Aug 2019 07:33  Mg     1.8     08-13    TPro  6.9  /  Alb  3.9  /  TBili  0.5  /  DBili  x   /  AST  29  /  ALT  22  /  AlkPhos  61  08-13        8/11 CT Chest Angio   FINDINGS: No pulmonary embolism is seen.     The thoracic aorta is normal in appearance. The heart is normal in size.   No pericardial effusion is seen. There is a single-lead left anterior   chest wall ICD with tip in the right ventricle.    No mediastinal, hilar or axillary lymphadenopathy is seen.    No pleural effusions are seen. No pulmonary abnormalities are evident.      Limited evaluation of the upper abdomen demonstrates no abnormality.     Evaluation of the osseous structures demonstrates mild degenerative   changes of the spine.      IMPRESSION:   No pulmonary embolism.       8/11  LE U/S     IMPRESSION:  No deep vein thrombosis seen.      8/12 TTE      1. Normal LV size and wall thickness. The apical septum and true apex   appear akinetic. Hypokinesis of remaining apical segments and mid septum.   The estimated LV ejection fraction is 35-40%.   2. Probably normal right ventricular size and systolic function. A   device lead is noted in the right heart.   3. The aortic valve is mildly thickened. There is trace valvular aortic   regurgitation.   4. The mitral valve is mildly thickened. There is trace mitral   regurgitation.   5. There is trace tricuspid regurgitation. Pulmonary artery systolic   pressure (estimated using the tricuspid regurgitant gradient and an   estimate of right atrial pressure) is 25.5 mmHg.   6. There is trace pulmonic regurgitation.   7. The IVC is not well visualized.   8. No pericardial effusion.   9. Following contrast administration, no obvious thrombus identified.-

## 2019-08-13 NOTE — PROGRESS NOTE ADULT - PROBLEM SELECTOR PROBLEM 4
Chronic systolic congestive heart failure
Thrombus in heart chamber
Chronic systolic congestive heart failure
Thrombus in heart chamber

## 2019-08-13 NOTE — PROGRESS NOTE ADULT - PROBLEM SELECTOR PROBLEM 5
Coronary artery disease involving native coronary artery of native heart without angina pectoris
Anemia
Coronary artery disease involving native coronary artery of native heart without angina pectoris
Paroxysmal a-fib

## 2019-08-13 NOTE — PROGRESS NOTE ADULT - PROBLEM SELECTOR PLAN 2
Pt has a history of paroxysmal atrial fibrilation  - NSR at time of admission  - HR of regular rate and rhythm at time of exam  - C/w AC w. eliquis 5 mg BID

## 2019-08-13 NOTE — DISCHARGE NOTE PROVIDER - NSDCCPCAREPLAN_GEN_ALL_CORE_FT
PRINCIPAL DISCHARGE DIAGNOSIS  Diagnosis: Alcohol withdrawal  Assessment and Plan of Treatment: You presented with symptoms of alcohol withdrawal. You were started on a medication called Librium to help with your withdrawal symptoms. Over the course of your stay your symptoms improved and you were tapered off the medication. You are not medically stable for discharge. PRINCIPAL DISCHARGE DIAGNOSIS  Diagnosis: Alcohol withdrawal  Assessment and Plan of Treatment: You presented with symptoms of alcohol withdrawal. You were started on a medication called Librium to help with your withdrawal symptoms. Over the course of your stay your symptoms improved and you were tapered off the medication and are currently safe to be discharged for continued treatment at home.

## 2019-08-13 NOTE — PROGRESS NOTE ADULT - PROBLEM SELECTOR PLAN 5
H/o CAD and prior PCI, currently CP free and HD stable. Trop negative x 1 and without acute ischemic changes in EKG  - NST on 7/17/18: Severely abnormal myocardial perfusion images c/w extensive   scarring in the territory of the LAD. Overall left ventricular systolic function is abnormal with global hypokinesis. The EKG stress test is negative. In comparison to the previous study of March 7, 2016 there is no longer evidence of ischemia at the margins of the scarring  - No plan for ischemic w/u at this time  - Hold Atorvastatin 40mg QHS 2/2 Transaminitis. Trend LFTs and resume once normalized.  - Per Dr. Levy, consult Dr. Canales in AM prior to starting ASA
Labs reveal mild anemia  - Macrocytic anemia with h/h of 11.1 today  - Hx of internal hemorrhoids, last colonoscopy < 5 yrs. ago, FOBT positive on stool guiac  - No s/s of major blood loss  - No sign of iron deficency  - F/u results of folate and B12 levels, continue with supplementation   - GI consulted, appreciate recomendations
-H/o CAD and prior PCI, currently CP free and HD stable. Trop negative x 1 and without acute ischemic changes in EKG  - Prior NST on 7/17/18: Extensive  scarring in the territory of the LAD. LV global hypoknieis, EKG stress test is negative. In comparison to 3/2016 no longer evidence of ischemia at the margins of the scarring  -No plan for ischemic w/u at this time  - Hold Atorvastatin 40mg QHS 2/2 Transaminitis on admission. Trend LFTs. Resume tomorrow if LFTs remain normal.   -No ASA at this time. If Dr. Parker cantu with ASA, may start 81 mg daily.
- Currently SR with HRs 70s-100s. 2 Runs of NSVT this AM (asymptomatic)   - ICD interrogation (8/12/19): Normal functioning device.  Battery will need to be changed within the next year, pt would like to follow up with Dr. Ramos and understands the importance of regular follow up.  - Continue Metoprolol XL 100mg daily and Eliquis 5mg BID  - Keep K > 4 and Mg > 2

## 2019-08-13 NOTE — PROGRESS NOTE ADULT - PROBLEM SELECTOR PLAN 6
Platelets reduced on labs  - Possibly 2/2 alcohol consumption  - Appreciate heme/onc recommendations  - Will plan for abdominal U/S as per recommendations

## 2019-08-13 NOTE — DISCHARGE NOTE PROVIDER - HOSPITAL COURSE
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    Presented with tachycardia and altered mental status, found to have ethanol intoxication    Problem List/Main Diagnoses (system-based):     EtOH abuse: Patient reports 7 days of heavy alcohol consumption following 9 months of reported sobriety. CIWA found to be 6, patient started on librium taper. Transaminitis, and thrombocytopenia believed to be secondary to alcohol abuse, and improved with cessation.        Anemia: Patient has longstanding anemia, and was FOBT positive. Reported history of internal hemorrhoids, and encouraged to follow up with his gastroenterologist as an outpatient.         Cardiac: Patient had a history of LV thrombus on eliquis, CAD s/p PCI 1x, and AICD implantation. Echocardiogram was performed which revealed an EF of 35% and no evidence of a left ventricle apical thrombus.        Inpatient treatment course: Patient was admitted through the ED for EtOH intoxication. He was found to have a CIWA of 6 on admission and was started on a librium taper. Patient noted to have mild transaminitis and thrombocytopenia. A repeat echo was performed which revealed no thrombus in the ventricles and an EF of 35 to 40 percent. Patient additionally has a history of paroxysmal atrial fibrilation, but was noted to be in normal sinus rhythm. ICD was interrogated which revealed normal functioning device. The patient also had an elevated D dimer on admission of 743, CTA PE protocol was conducted which revealed no PE. A LE duplex was conducted which revealed no DVT. The patient was then stepped down from the cardiology unit to regional medical floor for further evaluation of his thrombocytopenia and anemia. Heme/onc was consulted and reported that they believed the etiology of abnormal findings was secondary to alcohol consumption. GI was consulted for anemia.     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    Presented with tachycardia and altered mental status, found to have ethanol intoxication        EtOH abuse: Patient reports 7 days of heavy alcohol consumption following 9 months of reported sobriety. CIWA found to be 6, patient started on librium taper. Transaminitis, and thrombocytopenia believed to be secondary to alcohol abuse, and improved with cessation.        Anemia: Patient has longstanding anemia, and was FOBT positive. Reported history of internal hemorrhoids. Patient to follow up with Gastroenterology Dr. Canales as an outpatient.         Cardiac: Patient had a history of LV thrombus on eliquis, CAD s/p PCI 1x, and AICD implantation. Echocardiogram was performed which revealed an EF of 35% and no evidence of a left ventricle apical thrombus.        Inpatient treatment course: Patient was admitted through the ED for EtOH intoxication. He was found to have a CIWA of 6 on admission and was started on a librium taper. Patient noted to have mild transaminitis and thrombocytopenia. A repeat echo was performed which revealed no thrombus in the ventricles and an EF of 35 to 40 percent. Patient additionally has a history of paroxysmal atrial fibrilation, but was noted to be in normal sinus rhythm. ICD was interrogated which revealed normal functioning device. The patient also had an elevated D dimer on admission of 743, CTA PE protocol was conducted which revealed no PE. A LE duplex was conducted which revealed no DVT. The patient was then stepped down from the cardiology unit to regional medical floor for further evaluation of his thrombocytopenia and anemia. Heme/onc was consulted and reported that they believed the etiology of abnormal findings was secondary to alcohol consumption. GI was consulted for anemia.

## 2019-08-13 NOTE — PROGRESS NOTE ADULT - PROBLEM SELECTOR PLAN 7
H/o CHF with EF < 35% noted on echocardiogram on 8/12  - Appreciate cardiology recommendations  - No acute recommendations or changes in medications indicated at this time

## 2019-08-13 NOTE — PROGRESS NOTE ADULT - ASSESSMENT
62 y/o Male with HIT Allergy (IVANNA neg from May2018 however) and PMHx ETOH abuse (admitted for withdrawal 11/2018 and DC’d to [facility] inpt detox), paroxysmal AFib on Eliquis, LV apical thrombus (Echo 10/2018, on Eliquis), known CAD s/p 1 stent, AICD ([employer]), HLD, presents to ED (8/10/2019) with EtoH intoxication and tachycardia, noted to have hemorrhoidal bleeding, and thrombocytopenia.       #Thrombocytopenia - stable  #Microcytic Anemia   #Hx of LV Thrombus     -peripheral smear reviewed, adequate platelet count, few large platelets ( may be component of turnover), normal appearing lymphocytes, no hypersegmented neutrophils, no schistocytes present, few spherocytes   -awaiting B12/Folate levels, continue w/ supplementation   -clinically no signs of hemolysis, normal Tbili, will add on hapto/ldh   -patient admits to hemorrhoidal brbpr, +occult blood, GI eval, bleeding may be correlating to drop in plt count as it worsened during Etoh binge   -iron panel noted, ferritin 77, no signs of significant iron deficiency   -pt non toxic appearing, afebrile w/o coagulopathy, no signs of DIC   -c/w antacid H2 blocker, otherwise no clear offending drugs, no hep exposure  -hepatitis panel, hiv testing   -consider abd ultrasound for liver, spleen eval, no thrombosis seen in CT PE or LE doppler   -c/w AC per cardiology regarding LV thrombus   -on dvt ppx      to d/w Dr Andres

## 2019-08-13 NOTE — PROGRESS NOTE ADULT - SUBJECTIVE AND OBJECTIVE BOX
Pt is improved  re  ETOH  exacerbation      PAST MEDICAL & SURGICAL HISTORY:  Thrombus in heart chamber: LV  ICD (implantable cardioverter-defibrillator) in place  Pacemaker  ETOH abuse  Paroxysmal a-fib  Myocardial infarction involving other coronary artery  Gastritis  Atherosclerosis of coronary artery: Coronary artery disease  Automatic implantable cardiac defibrillator in situ: ICD (implantable cardioverter-defibrillator) in place      MEDICATIONS  (STANDING):  apixaban 5 milliGRAM(s) Oral every 12 hours  ascorbic acid 500 milliGRAM(s) Oral daily  atorvastatin 40 milliGRAM(s) Oral at bedtime  chlordiazePOXIDE   Oral   chlordiazePOXIDE 50 milliGRAM(s) Oral every 12 hours  famotidine    Tablet 20 milliGRAM(s) Oral daily  ferrous    sulfate 325 milliGRAM(s) Oral daily  folic acid 1 milliGRAM(s) Oral daily  metoprolol succinate  milliGRAM(s) Oral daily  multivitamin/minerals 1 Tablet(s) Oral daily  thiamine 100 milliGRAM(s) Oral daily    MEDICATIONS  (PRN):      ICU Vital Signs Last 24 Hrs  T(C): 36.2 (13 Aug 2019 14:10), Max: 37 (13 Aug 2019 05:33)  T(F): 97.1 (13 Aug 2019 14:10), Max: 98.6 (13 Aug 2019 05:33)  HR: 84 (13 Aug 2019 14:10) (75 - 86)  BP: 114/80 (13 Aug 2019 14:10) (97/69 - 137/87)  BP(mean): --  ABP: --  ABP(mean): --  RR: 17 (13 Aug 2019 14:10) (17 - 18)  SpO2: 100% (13 Aug 2019 14:10) (98% - 100%)      Lungs clear  CV s1  s2   abd soft  Ext stable                          11.3   4.02  )-----------( 98       ( 13 Aug 2019 07:33 )             37.4   08-13    137  |  102  |  14  ----------------------------<  96  3.9   |  24  |  1.09    Ca    8.9      13 Aug 2019 07:33  Mg     1.8     08-13    TPro  6.9  /  Alb  3.9  /  TBili  0.5  /  DBili  x   /  AST  29  /  ALT  22  /  AlkPhos  61  08-13  PT/INR - ( 12 Aug 2019 07:33 )   PT: 12.4 sec;   INR: 1.09          PTT - ( 12 Aug 2019 07:33 )  PTT:31.8 sec    echo  EF  35 -40 %   akinesis   apical septum and apex   hypokinesis   apical segments and mid septum  No thrombus    Nl RVF

## 2019-08-14 ENCOUNTER — TRANSCRIPTION ENCOUNTER (OUTPATIENT)
Age: 64
End: 2019-08-14

## 2019-08-14 VITALS
RESPIRATION RATE: 17 BRPM | DIASTOLIC BLOOD PRESSURE: 88 MMHG | TEMPERATURE: 98 F | HEART RATE: 77 BPM | OXYGEN SATURATION: 98 % | SYSTOLIC BLOOD PRESSURE: 135 MMHG

## 2019-08-14 LAB
ANION GAP SERPL CALC-SCNC: 10 MMOL/L — SIGNIFICANT CHANGE UP (ref 5–17)
BUN SERPL-MCNC: 17 MG/DL — SIGNIFICANT CHANGE UP (ref 7–23)
CALCIUM SERPL-MCNC: 9.1 MG/DL — SIGNIFICANT CHANGE UP (ref 8.4–10.5)
CHLORIDE SERPL-SCNC: 101 MMOL/L — SIGNIFICANT CHANGE UP (ref 96–108)
CO2 SERPL-SCNC: 25 MMOL/L — SIGNIFICANT CHANGE UP (ref 22–31)
CREAT SERPL-MCNC: 1.18 MG/DL — SIGNIFICANT CHANGE UP (ref 0.5–1.3)
GLUCOSE SERPL-MCNC: 90 MG/DL — SIGNIFICANT CHANGE UP (ref 70–99)
HCT VFR BLD CALC: 39.1 % — SIGNIFICANT CHANGE UP (ref 39–50)
HGB BLD-MCNC: 11.8 G/DL — LOW (ref 13–17)
MAGNESIUM SERPL-MCNC: 1.8 MG/DL — SIGNIFICANT CHANGE UP (ref 1.6–2.6)
MCHC RBC-ENTMCNC: 23.9 PG — LOW (ref 27–34)
MCHC RBC-ENTMCNC: 30.2 GM/DL — LOW (ref 32–36)
MCV RBC AUTO: 79.1 FL — LOW (ref 80–100)
NRBC # BLD: 0 /100 WBCS — SIGNIFICANT CHANGE UP (ref 0–0)
PLATELET # BLD AUTO: 96 K/UL — LOW (ref 150–400)
POTASSIUM SERPL-MCNC: 4.5 MMOL/L — SIGNIFICANT CHANGE UP (ref 3.5–5.3)
POTASSIUM SERPL-SCNC: 4.5 MMOL/L — SIGNIFICANT CHANGE UP (ref 3.5–5.3)
RBC # BLD: 4.94 M/UL — SIGNIFICANT CHANGE UP (ref 4.2–5.8)
RBC # FLD: 20.5 % — HIGH (ref 10.3–14.5)
SODIUM SERPL-SCNC: 136 MMOL/L — SIGNIFICANT CHANGE UP (ref 135–145)
WBC # BLD: 3.24 K/UL — LOW (ref 3.8–10.5)
WBC # FLD AUTO: 3.24 K/UL — LOW (ref 3.8–10.5)

## 2019-08-14 PROCEDURE — 83880 ASSAY OF NATRIURETIC PEPTIDE: CPT

## 2019-08-14 PROCEDURE — 83540 ASSAY OF IRON: CPT

## 2019-08-14 PROCEDURE — 85025 COMPLETE CBC W/AUTO DIFF WBC: CPT

## 2019-08-14 PROCEDURE — 80061 LIPID PANEL: CPT

## 2019-08-14 PROCEDURE — 82962 GLUCOSE BLOOD TEST: CPT

## 2019-08-14 PROCEDURE — 99285 EMERGENCY DEPT VISIT HI MDM: CPT | Mod: 25

## 2019-08-14 PROCEDURE — 82728 ASSAY OF FERRITIN: CPT

## 2019-08-14 PROCEDURE — 76700 US EXAM ABDOM COMPLETE: CPT

## 2019-08-14 PROCEDURE — 85379 FIBRIN DEGRADATION QUANT: CPT

## 2019-08-14 PROCEDURE — 96374 THER/PROPH/DIAG INJ IV PUSH: CPT

## 2019-08-14 PROCEDURE — C8929: CPT

## 2019-08-14 PROCEDURE — 82746 ASSAY OF FOLIC ACID SERUM: CPT

## 2019-08-14 PROCEDURE — 96375 TX/PRO/DX INJ NEW DRUG ADDON: CPT

## 2019-08-14 PROCEDURE — 80307 DRUG TEST PRSMV CHEM ANLYZR: CPT

## 2019-08-14 PROCEDURE — 85045 AUTOMATED RETICULOCYTE COUNT: CPT

## 2019-08-14 PROCEDURE — 96376 TX/PRO/DX INJ SAME DRUG ADON: CPT

## 2019-08-14 PROCEDURE — 85730 THROMBOPLASTIN TIME PARTIAL: CPT

## 2019-08-14 PROCEDURE — 93005 ELECTROCARDIOGRAM TRACING: CPT

## 2019-08-14 PROCEDURE — 83615 LACTATE (LD) (LDH) ENZYME: CPT

## 2019-08-14 PROCEDURE — 80048 BASIC METABOLIC PNL TOTAL CA: CPT

## 2019-08-14 PROCEDURE — 84484 ASSAY OF TROPONIN QUANT: CPT

## 2019-08-14 PROCEDURE — 36415 COLL VENOUS BLD VENIPUNCTURE: CPT

## 2019-08-14 PROCEDURE — 85027 COMPLETE CBC AUTOMATED: CPT

## 2019-08-14 PROCEDURE — 80053 COMPREHEN METABOLIC PANEL: CPT

## 2019-08-14 PROCEDURE — 84100 ASSAY OF PHOSPHORUS: CPT

## 2019-08-14 PROCEDURE — 82607 VITAMIN B-12: CPT

## 2019-08-14 PROCEDURE — 71275 CT ANGIOGRAPHY CHEST: CPT

## 2019-08-14 PROCEDURE — 93970 EXTREMITY STUDY: CPT

## 2019-08-14 PROCEDURE — 83735 ASSAY OF MAGNESIUM: CPT

## 2019-08-14 PROCEDURE — 85610 PROTHROMBIN TIME: CPT

## 2019-08-14 PROCEDURE — 83010 ASSAY OF HAPTOGLOBIN QUANT: CPT

## 2019-08-14 PROCEDURE — 83550 IRON BINDING TEST: CPT

## 2019-08-14 RX ADMIN — FAMOTIDINE 20 MILLIGRAM(S): 10 INJECTION INTRAVENOUS at 11:13

## 2019-08-14 RX ADMIN — Medication 1 MILLIGRAM(S): at 11:13

## 2019-08-14 RX ADMIN — Medication 1 TABLET(S): at 11:13

## 2019-08-14 RX ADMIN — APIXABAN 5 MILLIGRAM(S): 2.5 TABLET, FILM COATED ORAL at 05:35

## 2019-08-14 RX ADMIN — Medication 500 MILLIGRAM(S): at 11:13

## 2019-08-14 RX ADMIN — Medication 100 MILLIGRAM(S): at 11:13

## 2019-08-14 RX ADMIN — Medication 325 MILLIGRAM(S): at 11:13

## 2019-08-14 RX ADMIN — Medication 50 MILLIGRAM(S): at 05:34

## 2019-08-14 RX ADMIN — Medication 100 MILLIGRAM(S): at 05:34

## 2019-08-14 NOTE — CONSULT NOTE ADULT - ASSESSMENT
Alcohol (vodka) abuse s/p withdrawal  hemorrhoids recurrent bleed  stable CBC  patient counselled   he was sober x 9 months  if recurrent rectasl bleed possibly colo rectal consult in future

## 2019-08-14 NOTE — PROGRESS NOTE ADULT - PROVIDER SPECIALTY LIST ADULT
Cardiology
Electrophysiology
Heme/Onc
Internal Medicine
Cardiology
Cardiology
Internal Medicine

## 2019-08-14 NOTE — DISCHARGE NOTE NURSING/CASE MANAGEMENT/SOCIAL WORK - NSDCFUADDAPPT_GEN_ALL_CORE_FT
Please follow up with Dr. Levy within 1-2 weeks of discharge.    If bleeding fro your hemrhoids occurs, please follow up with your Dr. Canales, your gastroenterologist, as an outpatient.

## 2019-08-14 NOTE — DISCHARGE NOTE NURSING/CASE MANAGEMENT/SOCIAL WORK - NSDCDPATPORTLINK_GEN_ALL_CORE
You can access the [facility] Patient Portal, offered by [facility], by registering with the following website: [email or web address]

## 2019-08-14 NOTE — CONSULT NOTE ADULT - SUBJECTIVE AND OBJECTIVE BOX
64 y/o Male known to me  with HIT Allergy and PMHx ETOH abuse (admitted for withdrawal 2018 and DC’d to [facility] inpt detox), paroxsymal AFib & LV thrombus on Eliquis, CAD s/p 1 stent, AICD, HLD, chronic systolic CHF EF 35% 10/2018 (s/p AICD, apixaban), gastritis, presents to ER with HR racing, tremors, anxiousness, and nausea x several hours after drinking too much. Last drink was at 11am prior to coming to ER.  Pt reports having being sober since 2018 but has been binge drinking since last . Has been having recurrent rectal bleed.  Has had colonoscopy and endoscopy = hemorrhoids,  C/o intermittent bleed      REVIEW OF SYSTEMS:  Constitutional: No fever, weight loss or fatigue  ENMT:  No difficulty hearing, tinnitus, vertigo; No sinus or throat pain  Respiratory: No cough, wheezing, chills or hemoptysis  Cardiovascular: No chest pain, palpitations, dizziness or leg swelling  Gastrointestinal: No abdominal or epigastric pain. No nausea, vomiting or hematemesis; No diarrhea or constipation. No melena or hematochezia.  Skin: No itching, burning, rashes or lesions   Musculoskeletal: No joint pain or swelling; No muscle, back or extremity pain    PAST MEDICAL & SURGICAL HISTORY:  Thrombus in heart chamber: LV  ICD (implantable cardioverter-defibrillator) in place  Pacemaker  ETOH abuse  Paroxysmal a-fib  Myocardial infarction involving other coronary artery  Gastritis  Atherosclerosis of coronary artery: Coronary artery disease  Automatic implantable cardiac defibrillator in situ: ICD (implantable cardioverter-defibrillator) in place      FAMILY HISTORY:  Family history of cardiac disorder in maternal grandfather (Grandparent):  of MI at 43yo      SOCIAL HISTORY:  Smoking Status: [ ] Current, [ ] Former, [ ] Never  Pack Years:    MEDICATIONS:  MEDICATIONS  (STANDING):  apixaban 5 milliGRAM(s) Oral every 12 hours  ascorbic acid 500 milliGRAM(s) Oral daily  atorvastatin 40 milliGRAM(s) Oral at bedtime  famotidine    Tablet 20 milliGRAM(s) Oral daily  ferrous    sulfate 325 milliGRAM(s) Oral daily  folic acid 1 milliGRAM(s) Oral daily  metoprolol succinate  milliGRAM(s) Oral daily  multivitamin/minerals 1 Tablet(s) Oral daily  thiamine 100 milliGRAM(s) Oral daily    MEDICATIONS  (PRN):      Allergies    Capoten (Short breath; Rash; Hives)  heparin (Other (Mod to Severe))  penicillin (Short breath; Rash; Hives)    Intolerances        Vital Signs Last 24 Hrs  T(C): 36.7 (14 Aug 2019 05:32), Max: 36.9 (13 Aug 2019 21:12)  T(F): 98 (14 Aug 2019 05:32), Max: 98.4 (13 Aug 2019 21:12)  HR: 81 (14 Aug 2019 05:32) (81 - 84)  BP: 106/73 (14 Aug 2019 05:32) (106/73 - 118/81)  BP(mean): --  RR: 17 (14 Aug 2019 05:32) (17 - 17)  SpO2: 100% (14 Aug 2019 05:32) (99% - 100%)        PHYSICAL EXAM:    General: Well developed; well nourished; in no acute distress  HEENT: MMM, conjunctiva and sclera clear  Gastrointestinal: Soft, non-tender non-distended; Normal bowel sounds; No rebound or guarding  Extremities: Normal range of motion, No clubbing, cyanosis or edema  Neurological: Alert and oriented x3  Skin: Warm and dry. No obvious rash      LABS:                        11.8   3.24  )-----------( 96       ( 14 Aug 2019 05:47 )             39.1         136  |  101  |  17  ----------------------------<  90  4.5   |  25  |  1.18    Ca    9.1      14 Aug 2019 05:47  Mg     1.8         TPro  6.9  /  Alb  3.9  /  TBili  0.5  /  DBili  x   /  AST  29  /  ALT  22  /  AlkPhos  61            RADIOLOGY & ADDITIONAL STUDIES:

## 2019-08-14 NOTE — PROGRESS NOTE ADULT - REASON FOR ADMISSION
EtoH Abuse

## 2019-08-14 NOTE — PROGRESS NOTE ADULT - ASSESSMENT
Ethanolism    thrombocytopenia   Cardiomyopathy  CHF   s/p LV Thrombus     cont current RX     Psyc Consult   Soc service consult     COnt RX      GI follow up with Dr Canales

## 2019-08-14 NOTE — PROGRESS NOTE ADULT - SUBJECTIVE AND OBJECTIVE BOX
Blood per rectum noted    Pt is gaining strength post ETOH   exacerbation       PAST MEDICAL & SURGICAL HISTORY:  Thrombus in heart chamber: LV  ICD (implantable cardioverter-defibrillator) in place  Pacemaker  ETOH abuse  Paroxysmal a-fib  Myocardial infarction involving other coronary artery  Gastritis  Atherosclerosis of coronary artery: Coronary artery disease  Automatic implantable cardiac defibrillator in situ: ICD (implantable cardioverter-defibrillator) in place      MEDICATIONS  (STANDING):  apixaban 5 milliGRAM(s) Oral every 12 hours  ascorbic acid 500 milliGRAM(s) Oral daily  atorvastatin 40 milliGRAM(s) Oral at bedtime  famotidine    Tablet 20 milliGRAM(s) Oral daily  ferrous    sulfate 325 milliGRAM(s) Oral daily  folic acid 1 milliGRAM(s) Oral daily  metoprolol succinate  milliGRAM(s) Oral daily  multivitamin/minerals 1 Tablet(s) Oral daily  thiamine 100 milliGRAM(s) Oral daily    MEDICATIONS  (PRN):      ICU Vital Signs Last 24 Hrs  T(C): 36.7 (14 Aug 2019 05:32), Max: 36.9 (13 Aug 2019 21:12)  T(F): 98 (14 Aug 2019 05:32), Max: 98.4 (13 Aug 2019 21:12)  HR: 81 (14 Aug 2019 05:32) (81 - 84)  BP: 106/73 (14 Aug 2019 05:32) (106/73 - 118/81)  BP(mean): --  ABP: --  ABP(mean): --  RR: 17 (14 Aug 2019 05:32) (17 - 17)  SpO2: 100% (14 Aug 2019 05:32) (99% - 100%)      Lungs clear  CV  s1s 2    abd soft      ext stable                          11.8   3.24  )-----------( 96       ( 14 Aug 2019 05:47 )             39.1   08-14    136  |  101  |  17  ----------------------------<  90  4.5   |  25  |  1.18    Ca    9.1      14 Aug 2019 05:47  Mg     1.8     08-14    TPro  6.9  /  Alb  3.9  /  TBili  0.5  /  DBili  x   /  AST  29  /  ALT  22  /  AlkPhos  61  08-13      ECHO stable

## 2019-08-19 DIAGNOSIS — I51.3 INTRACARDIAC THROMBOSIS, NOT ELSEWHERE CLASSIFIED: ICD-10-CM

## 2019-08-19 DIAGNOSIS — I25.10 ATHEROSCLEROTIC HEART DISEASE OF NATIVE CORONARY ARTERY WITHOUT ANGINA PECTORIS: ICD-10-CM

## 2019-08-19 DIAGNOSIS — F10.229 ALCOHOL DEPENDENCE WITH INTOXICATION, UNSPECIFIED: ICD-10-CM

## 2019-08-19 DIAGNOSIS — K29.70 GASTRITIS, UNSPECIFIED, WITHOUT BLEEDING: ICD-10-CM

## 2019-08-19 DIAGNOSIS — D69.59 OTHER SECONDARY THROMBOCYTOPENIA: ICD-10-CM

## 2019-08-19 DIAGNOSIS — I42.9 CARDIOMYOPATHY, UNSPECIFIED: ICD-10-CM

## 2019-08-19 DIAGNOSIS — Z79.01 LONG TERM (CURRENT) USE OF ANTICOAGULANTS: ICD-10-CM

## 2019-08-19 DIAGNOSIS — I48.0 PAROXYSMAL ATRIAL FIBRILLATION: ICD-10-CM

## 2019-08-19 DIAGNOSIS — F10.239 ALCOHOL DEPENDENCE WITH WITHDRAWAL, UNSPECIFIED: ICD-10-CM

## 2019-08-19 DIAGNOSIS — D64.9 ANEMIA, UNSPECIFIED: ICD-10-CM

## 2019-08-19 DIAGNOSIS — Z95.810 PRESENCE OF AUTOMATIC (IMPLANTABLE) CARDIAC DEFIBRILLATOR: ICD-10-CM

## 2019-08-19 DIAGNOSIS — I50.22 CHRONIC SYSTOLIC (CONGESTIVE) HEART FAILURE: ICD-10-CM

## 2019-08-19 DIAGNOSIS — Z87.891 PERSONAL HISTORY OF NICOTINE DEPENDENCE: ICD-10-CM

## 2019-08-19 DIAGNOSIS — K64.8 OTHER HEMORRHOIDS: ICD-10-CM

## 2019-08-19 DIAGNOSIS — E78.5 HYPERLIPIDEMIA, UNSPECIFIED: ICD-10-CM

## 2019-08-26 ENCOUNTER — INPATIENT (INPATIENT)
Facility: HOSPITAL | Age: 64
LOS: 6 days | Discharge: HOME | DRG: 897 | End: 2019-09-02
Attending: INTERNAL MEDICINE | Admitting: INTERNAL MEDICINE
Payer: MEDICARE

## 2019-08-26 VITALS
DIASTOLIC BLOOD PRESSURE: 94 MMHG | RESPIRATION RATE: 18 BRPM | TEMPERATURE: 99 F | SYSTOLIC BLOOD PRESSURE: 136 MMHG | HEART RATE: 144 BPM | OXYGEN SATURATION: 95 % | WEIGHT: 160.06 LBS | HEIGHT: 70 IN

## 2019-08-26 DIAGNOSIS — I48.0 PAROXYSMAL ATRIAL FIBRILLATION: ICD-10-CM

## 2019-08-26 DIAGNOSIS — F10.230 ALCOHOL DEPENDENCE WITH WITHDRAWAL, UNCOMPLICATED: ICD-10-CM

## 2019-08-26 DIAGNOSIS — I50.22 CHRONIC SYSTOLIC (CONGESTIVE) HEART FAILURE: ICD-10-CM

## 2019-08-26 DIAGNOSIS — I25.10 ATHEROSCLEROTIC HEART DISEASE OF NATIVE CORONARY ARTERY WITHOUT ANGINA PECTORIS: ICD-10-CM

## 2019-08-26 DIAGNOSIS — F10.20 ALCOHOL DEPENDENCE, UNCOMPLICATED: ICD-10-CM

## 2019-08-26 DIAGNOSIS — K64.9 UNSPECIFIED HEMORRHOIDS: ICD-10-CM

## 2019-08-26 LAB
ALBUMIN SERPL ELPH-MCNC: 4.6 G/DL — SIGNIFICANT CHANGE UP (ref 3.3–5)
ALP SERPL-CCNC: 85 U/L — SIGNIFICANT CHANGE UP (ref 40–120)
ALT FLD-CCNC: 51 U/L — HIGH (ref 10–45)
ANION GAP SERPL CALC-SCNC: 18 MMOL/L — HIGH (ref 5–17)
ANION GAP SERPL CALC-SCNC: 22 MMOL/L — HIGH (ref 5–17)
APPEARANCE UR: CLEAR — SIGNIFICANT CHANGE UP
APTT BLD: 29.1 SEC — SIGNIFICANT CHANGE UP (ref 27.5–36.3)
AST SERPL-CCNC: 75 U/L — HIGH (ref 10–40)
BASOPHILS # BLD AUTO: 0.04 K/UL — SIGNIFICANT CHANGE UP (ref 0–0.2)
BASOPHILS NFR BLD AUTO: 1 % — SIGNIFICANT CHANGE UP (ref 0–2)
BILIRUB SERPL-MCNC: 0.7 MG/DL — SIGNIFICANT CHANGE UP (ref 0.2–1.2)
BILIRUB UR-MCNC: ABNORMAL
BUN SERPL-MCNC: 12 MG/DL — SIGNIFICANT CHANGE UP (ref 7–23)
BUN SERPL-MCNC: 12 MG/DL — SIGNIFICANT CHANGE UP (ref 7–23)
CALCIUM SERPL-MCNC: 8.7 MG/DL — SIGNIFICANT CHANGE UP (ref 8.4–10.5)
CALCIUM SERPL-MCNC: 8.8 MG/DL — SIGNIFICANT CHANGE UP (ref 8.4–10.5)
CHLORIDE SERPL-SCNC: 96 MMOL/L — SIGNIFICANT CHANGE UP (ref 96–108)
CHLORIDE SERPL-SCNC: 97 MMOL/L — SIGNIFICANT CHANGE UP (ref 96–108)
CK SERPL-CCNC: 452 U/L — HIGH (ref 30–200)
CO2 SERPL-SCNC: 22 MMOL/L — SIGNIFICANT CHANGE UP (ref 22–31)
CO2 SERPL-SCNC: 23 MMOL/L — SIGNIFICANT CHANGE UP (ref 22–31)
COLOR SPEC: YELLOW — SIGNIFICANT CHANGE UP
CREAT SERPL-MCNC: 0.92 MG/DL — SIGNIFICANT CHANGE UP (ref 0.5–1.3)
CREAT SERPL-MCNC: 0.95 MG/DL — SIGNIFICANT CHANGE UP (ref 0.5–1.3)
DIFF PNL FLD: ABNORMAL
EOSINOPHIL # BLD AUTO: 0.02 K/UL — SIGNIFICANT CHANGE UP (ref 0–0.5)
EOSINOPHIL NFR BLD AUTO: 0.5 % — SIGNIFICANT CHANGE UP (ref 0–6)
ETHANOL SERPL-MCNC: 179 MG/DL — HIGH (ref 0–10)
GLUCOSE SERPL-MCNC: 100 MG/DL — HIGH (ref 70–99)
GLUCOSE SERPL-MCNC: 121 MG/DL — HIGH (ref 70–99)
GLUCOSE UR QL: NEGATIVE — SIGNIFICANT CHANGE UP
HCT VFR BLD CALC: 40.2 % — SIGNIFICANT CHANGE UP (ref 39–50)
HGB BLD-MCNC: 12.9 G/DL — LOW (ref 13–17)
IMM GRANULOCYTES NFR BLD AUTO: 0.3 % — SIGNIFICANT CHANGE UP (ref 0–1.5)
INR BLD: 0.98 — SIGNIFICANT CHANGE UP (ref 0.88–1.16)
KETONES UR-MCNC: 40 MG/DL
LEUKOCYTE ESTERASE UR-ACNC: NEGATIVE — SIGNIFICANT CHANGE UP
LIDOCAIN IGE QN: 64 U/L — HIGH (ref 7–60)
LYMPHOCYTES # BLD AUTO: 1.16 K/UL — SIGNIFICANT CHANGE UP (ref 1–3.3)
LYMPHOCYTES # BLD AUTO: 29.1 % — SIGNIFICANT CHANGE UP (ref 13–44)
MCHC RBC-ENTMCNC: 24.3 PG — LOW (ref 27–34)
MCHC RBC-ENTMCNC: 32.1 GM/DL — SIGNIFICANT CHANGE UP (ref 32–36)
MCV RBC AUTO: 75.8 FL — LOW (ref 80–100)
MONOCYTES # BLD AUTO: 0.26 K/UL — SIGNIFICANT CHANGE UP (ref 0–0.9)
MONOCYTES NFR BLD AUTO: 6.5 % — SIGNIFICANT CHANGE UP (ref 2–14)
NEUTROPHILS # BLD AUTO: 2.5 K/UL — SIGNIFICANT CHANGE UP (ref 1.8–7.4)
NEUTROPHILS NFR BLD AUTO: 62.6 % — SIGNIFICANT CHANGE UP (ref 43–77)
NITRITE UR-MCNC: NEGATIVE — SIGNIFICANT CHANGE UP
NRBC # BLD: 0 /100 WBCS — SIGNIFICANT CHANGE UP (ref 0–0)
NT-PROBNP SERPL-SCNC: 101 PG/ML — SIGNIFICANT CHANGE UP (ref 0–300)
PH UR: 6 — SIGNIFICANT CHANGE UP (ref 5–8)
PLATELET # BLD AUTO: 232 K/UL — SIGNIFICANT CHANGE UP (ref 150–400)
POTASSIUM SERPL-MCNC: 4 MMOL/L — SIGNIFICANT CHANGE UP (ref 3.5–5.3)
POTASSIUM SERPL-MCNC: 4.1 MMOL/L — SIGNIFICANT CHANGE UP (ref 3.5–5.3)
POTASSIUM SERPL-SCNC: 4 MMOL/L — SIGNIFICANT CHANGE UP (ref 3.5–5.3)
POTASSIUM SERPL-SCNC: 4.1 MMOL/L — SIGNIFICANT CHANGE UP (ref 3.5–5.3)
PROT SERPL-MCNC: 8.1 G/DL — SIGNIFICANT CHANGE UP (ref 6–8.3)
PROT UR-MCNC: 100 MG/DL
PROTHROM AB SERPL-ACNC: 11.1 SEC — SIGNIFICANT CHANGE UP (ref 10–12.9)
RBC # BLD: 5.3 M/UL — SIGNIFICANT CHANGE UP (ref 4.2–5.8)
RBC # FLD: 21.2 % — HIGH (ref 10.3–14.5)
SODIUM SERPL-SCNC: 137 MMOL/L — SIGNIFICANT CHANGE UP (ref 135–145)
SODIUM SERPL-SCNC: 141 MMOL/L — SIGNIFICANT CHANGE UP (ref 135–145)
SP GR SPEC: >=1.03 — SIGNIFICANT CHANGE UP (ref 1–1.03)
TROPONIN T SERPL-MCNC: <0.01 NG/ML — SIGNIFICANT CHANGE UP (ref 0–0.01)
TROPONIN T SERPL-MCNC: <0.01 NG/ML — SIGNIFICANT CHANGE UP (ref 0–0.01)
UROBILINOGEN FLD QL: 0.2 E.U./DL — SIGNIFICANT CHANGE UP
WBC # BLD: 3.99 K/UL — SIGNIFICANT CHANGE UP (ref 3.8–10.5)
WBC # FLD AUTO: 3.99 K/UL — SIGNIFICANT CHANGE UP (ref 3.8–10.5)

## 2019-08-26 PROCEDURE — 71045 X-RAY EXAM CHEST 1 VIEW: CPT | Mod: 26

## 2019-08-26 PROCEDURE — 70450 CT HEAD/BRAIN W/O DYE: CPT | Mod: 26

## 2019-08-26 PROCEDURE — 93010 ELECTROCARDIOGRAM REPORT: CPT

## 2019-08-26 PROCEDURE — 99285 EMERGENCY DEPT VISIT HI MDM: CPT | Mod: 25

## 2019-08-26 PROCEDURE — 99223 1ST HOSP IP/OBS HIGH 75: CPT

## 2019-08-26 RX ORDER — POLYETHYLENE GLYCOL 3350 17 G/17G
17 POWDER, FOR SOLUTION ORAL DAILY
Refills: 0 | Status: DISCONTINUED | OUTPATIENT
Start: 2019-08-26 | End: 2019-08-27

## 2019-08-26 RX ORDER — APIXABAN 2.5 MG/1
5 TABLET, FILM COATED ORAL EVERY 12 HOURS
Refills: 0 | Status: DISCONTINUED | OUTPATIENT
Start: 2019-08-26 | End: 2019-09-02

## 2019-08-26 RX ORDER — SENNA PLUS 8.6 MG/1
2 TABLET ORAL AT BEDTIME
Refills: 0 | Status: DISCONTINUED | OUTPATIENT
Start: 2019-08-26 | End: 2019-08-27

## 2019-08-26 RX ORDER — ONDANSETRON 8 MG/1
4 TABLET, FILM COATED ORAL ONCE
Refills: 0 | Status: COMPLETED | OUTPATIENT
Start: 2019-08-26 | End: 2019-08-26

## 2019-08-26 RX ORDER — SODIUM CHLORIDE 9 MG/ML
500 INJECTION INTRAMUSCULAR; INTRAVENOUS; SUBCUTANEOUS ONCE
Refills: 0 | Status: COMPLETED | OUTPATIENT
Start: 2019-08-26 | End: 2019-08-26

## 2019-08-26 RX ORDER — THIAMINE MONONITRATE (VIT B1) 100 MG
100 TABLET ORAL DAILY
Refills: 0 | Status: DISCONTINUED | OUTPATIENT
Start: 2019-08-26 | End: 2019-09-02

## 2019-08-26 RX ORDER — METOPROLOL TARTRATE 50 MG
100 TABLET ORAL DAILY
Refills: 0 | Status: DISCONTINUED | OUTPATIENT
Start: 2019-08-26 | End: 2019-09-02

## 2019-08-26 RX ORDER — FOLIC ACID 0.8 MG
1 TABLET ORAL DAILY
Refills: 0 | Status: DISCONTINUED | OUTPATIENT
Start: 2019-08-26 | End: 2019-09-02

## 2019-08-26 RX ORDER — FAMOTIDINE 10 MG/ML
20 INJECTION INTRAVENOUS ONCE
Refills: 0 | Status: COMPLETED | OUTPATIENT
Start: 2019-08-26 | End: 2019-08-26

## 2019-08-26 RX ORDER — SODIUM CHLORIDE 9 MG/ML
1000 INJECTION, SOLUTION INTRAVENOUS
Refills: 0 | Status: DISCONTINUED | OUTPATIENT
Start: 2019-08-26 | End: 2019-08-26

## 2019-08-26 RX ADMIN — SODIUM CHLORIDE 500 MILLILITER(S): 9 INJECTION INTRAMUSCULAR; INTRAVENOUS; SUBCUTANEOUS at 07:40

## 2019-08-26 RX ADMIN — SODIUM CHLORIDE 500 MILLILITER(S): 9 INJECTION INTRAMUSCULAR; INTRAVENOUS; SUBCUTANEOUS at 06:20

## 2019-08-26 RX ADMIN — Medication 100 MILLIGRAM(S): at 13:57

## 2019-08-26 RX ADMIN — Medication 1 MILLIGRAM(S): at 12:56

## 2019-08-26 RX ADMIN — Medication 1 MILLIGRAM(S): at 05:44

## 2019-08-26 RX ADMIN — ONDANSETRON 4 MILLIGRAM(S): 8 TABLET, FILM COATED ORAL at 06:17

## 2019-08-26 RX ADMIN — Medication 50 MILLIGRAM(S): at 12:56

## 2019-08-26 RX ADMIN — POLYETHYLENE GLYCOL 3350 17 GRAM(S): 17 POWDER, FOR SOLUTION ORAL at 12:56

## 2019-08-26 RX ADMIN — APIXABAN 5 MILLIGRAM(S): 2.5 TABLET, FILM COATED ORAL at 18:01

## 2019-08-26 RX ADMIN — Medication 100 MILLIGRAM(S): at 12:56

## 2019-08-26 RX ADMIN — SODIUM CHLORIDE 100 MILLILITER(S): 9 INJECTION, SOLUTION INTRAVENOUS at 07:27

## 2019-08-26 RX ADMIN — Medication 2 MILLIGRAM(S): at 21:23

## 2019-08-26 RX ADMIN — Medication 50 MILLIGRAM(S): at 18:01

## 2019-08-26 RX ADMIN — Medication 1 TABLET(S): at 12:56

## 2019-08-26 RX ADMIN — Medication 50 MILLIGRAM(S): at 05:44

## 2019-08-26 RX ADMIN — FAMOTIDINE 20 MILLIGRAM(S): 10 INJECTION INTRAVENOUS at 06:17

## 2019-08-26 NOTE — H&P ADULT - PROBLEM SELECTOR PLAN 4
Currently euvolemic  -last echo 8/12/2019 with EF 35-40%, no thrombus identified  -continue home metoprolol  -hx captopril allergy- no ACEI/ARB at this time Currently euvolemic, BNP normal, CXR clear  -last echo 8/12/2019 with EF 35-40%  -continue home metoprolol  -hx captopril allergy- no ACEI/ARB at this time Currently euvolemic, BNP normal, CXR clear  -last echo 8/12/2019 with EF 35-40%  - AICD last interrogated on 8/12- no events, needs to change battery within 1 year  -continue home metoprolol  -hx captopril allergy- no ACEI/ARB at this time Currently euvolemic, BNP normal, CXR clear  -last echo 8/12/2019 with EF 35-40%  - AICD last interrogated on 8/12- no events, needs to change battery within 1 year  -continue home metoprolol  -hx captopril allergy- no ACEI/ARB at this time  - daily weights

## 2019-08-26 NOTE — ED PROVIDER NOTE - PHYSICAL EXAMINATION
GEN: Unkempt, chronically ill-appearing, awake, alert, oriented to person, place, time/situation, appears tremulous and anxious.   ENT: Airway patent, Nasal mucosa clear. Mouth with somewhat dry mucosa.  EYES: Clear bilaterally. perrl eomi  RESPIRATORY: Breathing comfortably with normal RR. No w/c/r  CARDIAC: Regular rate and rhythm, borderine Sinus tachycardia  ABDOMEN: Soft, nontender, +bowel sounds, no rebound, rigidity, or guarding.  MSK: Range of motion is not limited, no deformities noted.  NEURO: Alert and oriented x 3. Cn 2-12 intact. Strength 5/5 and sensation intact in all 4 extremities. +tremors at rest noted.  SKIN: Skin normal color for race, warm, dry and intact. rash/scattered abrasions to scalp noted, no hematoma, no skull fracture palpated.   PSYCH: Alert and oriented to person, place, time/situation. anxious mood and affect. no apparent risk to self or others.

## 2019-08-26 NOTE — BEHAVIORAL HEALTH ASSESSMENT NOTE - AXIS III
HIT, paroxsymal AFib (hx of LV thrombus, on Eliquis, most recent echo 8/2019 without evidence of thrombus), HLD, CAD s/p PCI, HFrEF (35-40% 8/12/2019) s/p AICD

## 2019-08-26 NOTE — ED ADULT TRIAGE NOTE - ARRIVAL INFO ADDITIONAL COMMENTS
c/o "alcohol abuse" "I haven't eaten in six days" c/o nausea. states "I tripped and fell yesterday in to the tub - i'm on eliquis"

## 2019-08-26 NOTE — BEHAVIORAL HEALTH ASSESSMENT NOTE - NSBHSUICPROTECTFACT_PSY_A_CORE
Identifies reasons for living/Supportive social network or family/Future oriented/Responsibility to family and others/Positive therapeutic relationships good air movement/breath sounds equal/clear to auscultation bilaterally/respirations non-labored/airway patent

## 2019-08-26 NOTE — H&P ADULT - NSHPLABSRESULTS_GEN_ALL_CORE
Echo w/contrast 8/12/19  1. Normal LV size and wall thickness. The apical septum and true apex appear akinetic. Hypokinesis of remaining apical segments and mid septum. The estimated LV ejection fraction is 35-40%.   2. Probably normal right ventricular size and systolic function. A device lead is noted in the right heart.   3. The aortic valve is mildly thickened. There is trace valvular aortic regurgitation.   4. The mitral valve is mildly thickened. There is trace mitral regurgitation.   5. There is trace tricuspid regurgitation. Pulmonary artery systolic pressure (estimated using the tricuspid regurgitant gradient and an estimate of right atrial pressure) is 25.5 mmHg.   6. There is trace pulmonic regurgitation.   7. The IVC is not well visualized.   8. No pericardial effusion.   9. Following contrast administration, no obvious thrombus identified. 12.9   3.99  )-----------( 232      ( 26 Aug 2019 05:38 )             40.2       08-26    141  |  97  |  12  ----------------------------<  100<H>  4.1   |  22  |  0.95    Ca    8.8      26 Aug 2019 05:38    TPro  8.1  /  Alb  4.6  /  TBili  0.7  /  DBili  x   /  AST  75<H>  /  ALT  51<H>  /  AlkPhos  85  08-26      PT/INR - ( 26 Aug 2019 05:38 )   PT: 11.1 sec;   INR: 0.98          PTT - ( 26 Aug 2019 05:38 )  PTT:29.1 sec    CARDIAC MARKERS ( 26 Aug 2019 05:38 )  x     / <0.01 ng/mL / 452 U/L / x     / x            Urinalysis Basic - ( 26 Aug 2019 09:25 )    Color: Yellow / Appearance: Clear / SG: >=1.030 / pH: x  Gluc: x / Ketone: 40 mg/dL  / Bili: Small / Urobili: 0.2 E.U./dL   Blood: x / Protein: 100 mg/dL / Nitrite: NEGATIVE   Leuk Esterase: NEGATIVE / RBC: < 5 /HPF / WBC < 5 /HPF   Sq Epi: x / Non Sq Epi: x / Bacteria: Present /HPF    Radiology:   Head CT 8/26- No acute intracranial hemorrhage or calvarial fracture.    CXR 8/26- Heart is normal in size in this patient with single lead left-sided   transvenous AICD. There is no evidence of pulmonary vascular congestion,   focal infiltrate, mass, pleural fluid, or pneumothorax, and there has   otherwise been no significant interval change.    Prior pertinent radiology:   Echo w/contrast 8/12/19  1. Normal LV size and wall thickness. The apical septum and true apex appear akinetic. Hypokinesis of remaining apical segments and mid septum. The estimated LV ejection fraction is 35-40%.   2. Probably normal right ventricular size and systolic function. A device lead is noted in the right heart.   3. The aortic valve is mildly thickened. There is trace valvular aortic regurgitation.   4. The mitral valve is mildly thickened. There is trace mitral regurgitation.   5. There is trace tricuspid regurgitation. Pulmonary artery systolic pressure (estimated using the tricuspid regurgitant gradient and an estimate of right atrial pressure) is 25.5 mmHg.   6. There is trace pulmonic regurgitation.   7. The IVC is not well visualized.   8. No pericardial effusion.   9. Following contrast administration, no obvious thrombus identified.    EKG 8/26 05:23- NSR 98bpm, QW/MELVIN 1mm V1-V4, TWI V4-V5

## 2019-08-26 NOTE — ED PROVIDER NOTE - DIAGNOSTIC INTERPRETATION
ER Physician: Ciara Hoffman   CHEST XRAY INTERPRETATION: lungs clear, heart shadow normal, bony structures intact

## 2019-08-26 NOTE — BEHAVIORAL HEALTH ASSESSMENT NOTE - PROBLEM SELECTOR PLAN 1
Give Ativan 2 mg IV now given acute withdrawal.   Continue Librium 50 mg q 6 hrs. Do not taper if pt is still showing signs of withdrawal in the next 24 hrs.   Ativan 2 mg q 4 hrs prn withdrawal symptoms.   SW consult to assist with referrals. Pt expresses interest in going back to  inpatient rehab if his insurance will cover the services.

## 2019-08-26 NOTE — SBIRT NOTE ADULT - NSSBIRTALCPASSREFTXDET_GEN_A_CORE
SW discussed rehab; Patient reports he plans to enter rehab at [facility], though will not enter directly from discharge, will have to "Take care of things."

## 2019-08-26 NOTE — H&P ADULT - NSHPSOCIALHISTORY_GEN_ALL_CORE
ETOH- 1-2 pints vodka daily  Tobacco- former smoker (quit about 20 years ago, 3 pack-years)  Illicits- denies

## 2019-08-26 NOTE — BEHAVIORAL HEALTH ASSESSMENT NOTE - NSBHSOCIALHXDETAILSFT_PSY_A_CORE
Went to  on full scholarship.   twice.  Three children.   Has 13 year old daughter whom he sees regularly. Reports having a caring relationship with her.  On disability for cardiac disease.  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 Wants to get back to martial arts.   His son moved into his apt which pt reports to be stressful.

## 2019-08-26 NOTE — ED ADULT NURSE REASSESSMENT NOTE - NS ED NURSE REASSESS COMMENT FT1
Report received from [facility]. PT resting in bed. A&OX4. VSS. Respirations even and unlabored. Noted tremors to bilateral hands. Pt reports feeling anxious, denies pain. Waiting on inpatient bed.

## 2019-08-26 NOTE — ED PROVIDER NOTE - CLINICAL SUMMARY MEDICAL DECISION MAKING FREE TEXT BOX
63M with extensive PMHx above who p/w palpitations, chest burning, retching and tremors after 6 day etoh binge, not compliant with his meds during that time either. initially EKG c/f STEMI however reviewed by cards who states no STEMI, appears similar to previous and cath code canceled. CIWA 16. Pt treated for nausea and withdrawal with zofran, pepcid, ativan, librium, and banana bag, with withhold aggressive fluid resuscitation given h/o CHF with EF ~30s. Dispo pending work up.

## 2019-08-26 NOTE — H&P ADULT - PROBLEM SELECTOR PLAN 3
Hx atrial thrombus (not identified on most recent echo). Currently sinus tach 110s  -continue home metoprolol succ 100mg daily  - continue home Eliquis 5mg BID

## 2019-08-26 NOTE — H&P ADULT - PROBLEM SELECTOR PLAN 5
No bleeding currently, hemoglobin stable. Patient reports constipation x4 days  -Bowel regimen    DVT PPX- eliquis  Dispo- pending resolution of ETOH withdrawal. SW for inpatient/outpatient ETOH rehab. No bleeding currently, hemoglobin stable. Patient reports constipation x4 days  -Bowel regimen    DVT PPX- eliquis  Dispo- pending resolution of ETOH withdrawal. SW for inpatient/outpatient ETOH rehab

## 2019-08-26 NOTE — H&P ADULT - ASSESSMENT
62 y/o male with hx of HIT, PMHx ETOH abuse, paroxsymal AFib (hx of LV thrombus, on Eliquis), HLD, CAD s/p PCI, HFrEF (35-40% 8/12/2019) s/p AICD, hemorrhoids, gastritis, recent admission 8/10-8/14/19 for alcohol withdrawal, presents with alcohol withdrawal and s/p fall with head trauma, admitted to [facility] for management of alcohol withdrawal and telemetry monitoring. 64 y/o male with hx of HIT, PMHx ETOH abuse, paroxsymal AFib (hx of LV thrombus, on Eliquis, most recent echo 8/2019 without evidence of thrombus), HLD, CAD s/p PCI, HFrEF (35-40% 8/12/2019) s/p AICD (last interrogation 8/12/19- WNL), hemorrhoids, gastritis, recent admission 8/10-8/14/19 for alcohol withdrawal, presents with alcohol withdrawal and s/p fall with head trauma, admitted to [facility] for management of alcohol withdrawal and telemetry monitoring. 64 y/o male with hx of HIT, PMHx ETOH abuse, paroxsymal AFib (hx of LV thrombus, on Eliquis, most recent echo 8/2019 without evidence of thrombus), HLD, CAD s/p PCI, HFrEF (35-40% 8/12/2019) s/p AICD (last interrogation 8/12/19- WNL), hemorrhoids, gastritis, recent admission 8/10-8/14/19 for alcohol withdrawal, presents with alcohol withdrawal and s/p fall with head trauma (head CT negative), admitted to [facility] for management of alcohol withdrawal and telemetry monitoring.

## 2019-08-26 NOTE — BEHAVIORAL HEALTH ASSESSMENT NOTE - DETAILS
Reported history of DTs~ 2 years ago on prior evaluations. Today denies hx/o DT's Father and brother with alcoholism "I feel terrible" tremors.

## 2019-08-26 NOTE — H&P ADULT - PROBLEM SELECTOR PLAN 2
Currently without anginal symptoms. EKG with QW/MELVIN V1-V4, but consistent with prior so emergent cath cancelled  -trop negative x1  - , likely 2/2 dehydration- continue to trend  -last NST- 7/17/18: Severely abnormal myocardial perfusion images c/w extensive   scarring in the territory of the LAD. Overall left ventricular systolic function is abnormal with global hypokinesis.  -Holding Lipitor 2/2 transaminitis- continue to trend Currently without anginal symptoms. EKG with QW/MELVIN V1-V4, but consistent with prior so emergent cath cancelled  -trop negative x1, repeat now  - , likely 2/2 dehydration- continue to trend  -last NST- 7/17/18: Severely abnormal myocardial perfusion images c/w extensive   scarring in the territory of the LAD. Overall left ventricular systolic function is abnormal with global hypokinesis.  -Holding Lipitor 2/2 transaminitis- continue to trend

## 2019-08-26 NOTE — BEHAVIORAL HEALTH ASSESSMENT NOTE - SUMMARY
Patient is a 64 y/o male with hx of HIT, PMHx ETOH abuse, paroxsymal AFib (hx of LV thrombus, on Eliquis), HLD, CAD s/p PCI, HFrEF, s/p AICD (last interrogation 8/12/19- WNL), hemorrhoids, gastritis, recent admission 8/10-8/14/19 for alcohol withdrawal, presented on 8/26 with alcohol intoxication and s/p fall with head trauma.   Currently pt is showing signs of withdrawal (tremors and tongue fasciculations, and tachycardia). Pt reports hx/o blackouts and head injury. Pt expresses motivation to go into inpatient alcohol rehab once medically stable.

## 2019-08-26 NOTE — H&P ADULT - PROBLEM SELECTOR PLAN 1
EtoH level 179 and CIWA 16 on admission, s/p Librium 50mg, Ativan 1mg IV, banana bag 100cc/hr. CIWA now 7 (mild anxiety, moderate tremors)  - Transaminitis likely 2/2 EtoH abuse, trend LFTs  - Anion gap 22 (bicarb low normal 22)- likely 2/2 alcohol/dehydration  -initiate librium taper- 50mg q6 for now  - Ativan 1mg IV PRN for CIWA >8   - cautious IV fluids due to HFrEF  - Continue MVI, Thiamine, Folic Acid  -  consult for alcohol cessation program  - consider psych consult for additional support EtoH level 179 and CIWA 16 on admission, s/p Librium 50mg, Ativan 1mg IV, banana bag 100cc/hr. CIWA now 7 (mild anxiety, moderate tremors)  - Transaminitis likely 2/2 EtoH abuse, trend LFTs  - Anion gap 22 (bicarb low normal 22)- likely 2/2 alcohol/dehydration  -initiate librium taper- 50mg q6 for now  - Ativan PRN for CIWA >8  - cautious IV fluids due to HFrEF  - Continue MVI, Thiamine, Folic Acid  -  consult for alcohol cessation program  - consider psych consult for additional support EtoH level 179 and CIWA 16 on admission, s/p Librium 50mg, Ativan 1mg IV, banana bag 100cc/hr. CIWA now 7 (mild anxiety, moderate tremors)  - Transaminitis likely 2/2 EtoH abuse, trend LFTs  - Anion gap 22 (bicarb low normal 22)- likely 2/2 ketoacidosis i/s/o chronic alcohol abuse (+ketones in urine)- repeat BMP this afternoon  -initiate librium taper- 50mg q6 for now  - Ativan PRN for CIWA >8  - cautious IV fluids due to HFrEF  - Continue MVI, Thiamine, Folic Acid  - SW consult for alcohol cessation program  - f/u psych consult for additional support EtoH level 179 and CIWA 16 on admission, s/p Librium 50mg, Ativan 1mg IV, banana bag 100cc/hr. CIWA now 7 (mild anxiety, moderate tremors)  - Transaminitis likely 2/2 EtoH abuse, trend LFTs  - Anion gap 22 (bicarb low normal 22)- likely 2/2 ketoacidosis i/s/o chronic alcohol abuse (+ketones in urine)- repeat BMP this afternoon  -initiate librium taper- 50mg q6 for now  - Ativan PRN for CIWA >8  - cautious IV fluids due to HFrEF  - Continue MVI, Thiamine, Folic Acid  -  consult for alcohol cessation program- this is patient's 2nd admission for ETOH in 1 month after approx 8 months of sobriety s/p [facility] inpatient detox  - f/u psych consult for additional support EtoH level 179 and CIWA 16 on admission, s/p Librium 50mg, Ativan 1mg IV, banana bag 100cc/hr. CIWA now 7 (mild anxiety, moderate tremors)  - Transaminitis likely 2/2 EtoH abuse, trend LFTs  - Anion gap 22 (bicarb low normal 22)- likely 2/2 ketoacidosis i/s/o chronic alcohol abuse (+ketones in urine)- repeat BMP this afternoon  -initiate librium taper- 50mg q6 for now  - Ativan PRN for CIWA >8  - cautious IV fluids due to HFrEF  - Continue MVI, Thiamine, Folic Acid  - SW consult for alcohol cessation program- this is patient's 2nd admission for ETOH in 1 month after approx 8 months of sobriety s/p [facility] inpatient detox  - psych recs appreciated- librium taper with ativan 1-2mg PRN for CIWA, consider starting disulfiram upon discharge, SW to assist in finding outpatient rehab programs covered by pt's insurance  - of note, pt has hx of concussions and is therefore high risk for developing DTs

## 2019-08-26 NOTE — BEHAVIORAL HEALTH ASSESSMENT NOTE - NSBHCHARTREVIEWVS_PSY_A_CORE FT
Vital Signs Last 24 Hrs  T(C): 36.4 (26 Aug 2019 09:43), Max: 37.2 (26 Aug 2019 05:10)  T(F): 97.6 (26 Aug 2019 09:43), Max: 98.9 (26 Aug 2019 05:10)  HR: 116 (26 Aug 2019 13:55) (96 - 144)  BP: 119/77 (26 Aug 2019 13:55) (119/77 - 148/103)  BP(mean): 107 (26 Aug 2019 09:43) (107 - 107)  RR: 19 (26 Aug 2019 13:55) (16 - 21)  SpO2: 95% (26 Aug 2019 13:55) (95% - 98%)

## 2019-08-26 NOTE — CHART NOTE - NSCHARTNOTEFT_GEN_A_CORE
Called by ED to evaluate for possible STEMI  63M with EtOH abuse (never intubated and denies seizures), HIT, pAF on eliquis, LV apical thrombus (resolved on last echo), CAD s/p PCI, CHF with EF 35% s/p AICD, HLD presents with EtOH intoxication and head trauma. Pt has been binging for last 6 days and has not taken meds for about 3 days. Patient had some retching prior to arrival. Pt came to ED because he fell and hit his head on toilet. Patient complains of some palpitations. Patient denies CP, SOB, abdominal pain, LE edema. Called by ED to evaluate for possible STEMI  63M with EtOH abuse (never intubated and denies seizures), HIT, pAF on eliquis, LV apical thrombus (resolved on last echo), CAD s/p PCI, CHF with EF 35% s/p AICD, HLD presents with EtOH intoxication and head trauma. Pt has been binging for last 6 days and has not taken meds for about 3 days. Patient had some retching prior to arrival. Pt came to ED because he fell and hit his head on toilet. Patient complains of some palpitations. Patient denies CP, SOB, abdominal pain, LE edema.  EKG showed NSR with QW in V1-4 and MELVIN about 1mm in V1-4 which is consistent with old EKGs.     VS: Afebrile /94, , RR16 SpO2 95% RA  General: Tremulous and slightly anxious. Able to speak in full sentences. A&Ox3.   Resp: CTA B/L.  CVS: Tachycardic. S1S2 present. No m/r/g.   Ab: Soft. NTND.   Ext: Warm. 2+ DPPT B/L. No edema.     Labs reviewed: Trop negative x1.     Discussed case with Dr. Sepulveda and Dr. Hoffman. EKG is consistent with old findings. Does not appear acute, along with no anginal symptoms. Patient will not go for catheterization.   Pt will likely need admission for EtOH withdrawal.   -Dr. Hoffman to discuss case with Dr. Levy and ICU consult.

## 2019-08-26 NOTE — BEHAVIORAL HEALTH ASSESSMENT NOTE - TREATMENT
pt had admission to  inpatient rehab unit one year ago. Was sober for 8 months, relapsed several weeks ago

## 2019-08-26 NOTE — H&P ADULT - HISTORY OF PRESENT ILLNESS
Complete note to follow  64 y/o Male with HIT Allergy and PMHx ETOH abuse, paroxsymal AFib (hx of LV thrombus, on Eliquis), CAD s/p 1 stent, HLD, HFrEF (35-40%) s/p AICD, gastritis, recent admission 8/10-8/14/19 for alcohol withdrawal, presented on 8/26 with alcohol intoxication and s/p fall with head trauma.   Pt has been binging for last 6 days and has not taken meds for about 3 days. Patient had some retching prior to arrival. Pt came to ED because he fell and hit his head on toilet. Patient complains of some palpitations.  He denies any chest pain, ICD firing, abdominal pain, SOB, orthopnea/pnd, fevers/chills, syncope, le edema, CARRERO, h/o withdrawal seizures, visual or auditory hallucination  In the ED, CIWA 16, tachycardic to 110s, EKG with QW in V1-4 and 1mm MELVIN in V1-4 (consistent with prior EKGs), trop negative, , ETOH level 179, mild transaminitis, AG 22. Patient received Librium 50mg PO x1 and Ativan 1mg IV x1, Zofran, and Pepcid.   Admitted to [facility] under Dr. Levy for management of alcohol withdrawal in this patient with significant cardiac comorbidities. Complete note to follow  62 y/o male with hx of HIT, PMHx ETOH abuse, paroxsymal AFib (hx of LV thrombus, on Eliquis), CAD s/p 1 stent, HLD, HFrEF (35-40% 8/12/2019) s/p AICD, gastritis, recent admission 8/10-8/14/19 for alcohol withdrawal, presented on 8/26 with alcohol intoxication and s/p fall with head trauma. Patient reports drinking yesterday and while intoxicated he fell and hit the back of his head on a toilet, but did not feel he needed to seek medical attention. He then developed tremors and palpitations consistent with prior episodes of alcohol withdrawal and drank a beer around 10pm to try to alleviate symptoms but they did not improve, so he presented to ER. Patient endorses daily alcohol use (about 1 pint of vodka) since being discharged from [facility] on 8/14/19. Over the last 5-7 days, he has been drinking more heavily, up to 2 pints of vodka daily, citing stressors involving his living situation (his son's family has been living in his apartment, which is crowded and messy, as well as feeling like he has nothing to do). Reports not taking his medications for the past 3 days. He endorses slight headache and mild anxiety, denies sweats, agitation, auditory/visual/tactile disturbances. He denies any chest pain, SOB, syncope, orthopnea/PND, LE edema, CARRERO, ICD firing. No fevers/chills, nausea, vomiting, diarrhea, or bloody/black stools. No h/o withdrawal seizures or DTs.   In the ED, CIWA 16, tachycardic to 110s, EKG with QW in V1-4 and 1mm MELVIN in V1-4 (consistent with prior EKGs so cath lab activation cancelled), trop negative, , ETOH level 179, mild transaminitis, AG 22. Head CT negative. Patient received Librium 50mg PO x1 and Ativan 1mg IV x1, Zofran, and Pepcid.   Admitted to [facility] under Dr. Levy for management of alcohol withdrawal in this patient with significant cardiac comorbidities. 62 y/o male with hx of HIT, PMHx ETOH abuse, paroxsymal AFib (hx of LV thrombus, on Eliquis), CAD s/p 1 stent, HLD, HFrEF (35-40% 8/12/2019) s/p AICD, gastritis, recent admission 8/10-8/14/19 for alcohol withdrawal, presented on 8/26 with alcohol intoxication and s/p fall with head trauma. Patient reports drinking yesterday and while intoxicated he fell and hit the back of his head on a toilet, but did not feel he needed to seek medical attention. He then developed tremors and palpitations consistent with prior episodes of alcohol withdrawal and drank a beer around 10pm to try to alleviate symptoms but they did not improve, so he presented to ER. Patient endorses daily alcohol use (about 1 pint of vodka) since being discharged from [facility] on 8/14/19. Over the last 5-7 days, he has been drinking more heavily, up to 2 pints of vodka daily, citing stressors involving his living situation (his son's family has been living in his apartment, which is crowded and messy, as well as feeling like he has nothing to do). Reports not taking his medications for the past 3 days. He endorses slight headache and mild anxiety, denies sweats, agitation, auditory/visual/tactile disturbances. States he has been constipated x4 days. He denies any chest pain, SOB, syncope, orthopnea/PND, LE edema, CARRERO, ICD firing. No fevers/chills, nausea, vomiting, diarrhea, or bloody/black stools. No h/o withdrawal seizures or DTs.   In the ED, CIWA 16, tachycardic to 110s, EKG with QW in V1-4 and 1mm MELVIN in V1-4 (consistent with prior EKGs so cath lab activation cancelled), trop negative, , ETOH level 179, mild transaminitis, AG 22. Head CT negative. Patient received Librium 50mg PO x1 and Ativan 1mg IV x1, Zofran, and Pepcid.   Admitted to [facility] under Dr. Levy for management of alcohol withdrawal in this patient with significant cardiac comorbidities. 62 y/o male with hx of HIT, PMHx ETOH abuse, paroxsymal AFib (hx of LV thrombus, on Eliquis), HLD, CAD s/p PCI, HFrEF (35-40% 8/12/2019) s/p AICD, hemorrhoids, gastritis, recent admission 8/10-8/14/19 for alcohol withdrawal, presented on 8/26 with alcohol intoxication and s/p fall with head trauma. Patient reports drinking yesterday and while intoxicated he fell and hit the back of his head on a toilet, but did not feel he needed to seek medical attention. He then developed tremors and palpitations consistent with prior episodes of alcohol withdrawal and drank a beer around 10pm to try to alleviate symptoms but they did not improve, so he presented to ER. Patient endorses daily alcohol use (about 1 pint of vodka) since being discharged from [facility] on 8/14/19. Over the last 5-7 days, he has been drinking more heavily, up to 2 pints of vodka daily, citing stressors involving his living situation (his son's family has been living in his apartment, which is crowded and messy, as well as feeling like he has nothing to do). Reports not taking his medications for the past 3 days. He endorses slight headache and mild anxiety, denies sweats, agitation, auditory/visual/tactile disturbances. States he has been constipated x4 days. He denies any chest pain, SOB, syncope, orthopnea/PND, LE edema, CARRERO, ICD firing. No fevers/chills, nausea, vomiting, diarrhea, or bloody/black stools. No h/o withdrawal seizures or DTs.   In the ED, CIWA 16, tachycardic to 110s, EKG with QW in V1-4 and 1mm MELVIN in V1-4 (consistent with prior EKGs so cath lab activation cancelled), trop negative, , ETOH level 179, mild transaminitis, AG 22. Head CT negative. Patient received Librium 50mg PO x1 and Ativan 1mg IV x1, Zofran, and Pepcid.   Admitted to [facility] under Dr. Levy for management of alcohol withdrawal in this patient with significant cardiac comorbidities. 62 y/o male with hx of HIT, PMHx ETOH abuse, paroxsymal AFib (hx of LV thrombus, on Eliquis, most recent echo 8/2019 without evidence of thrombus), HLD, CAD s/p PCI, HFrEF (35-40% 8/12/2019) s/p AICD (last interrogation 8/12/19- WNL), hemorrhoids, gastritis, recent admission 8/10-8/14/19 for alcohol withdrawal, presented on 8/26 with alcohol intoxication and s/p fall with head trauma. Patient reports drinking yesterday and while intoxicated he fell and hit the back of his head on a toilet, but did not feel he needed to seek medical attention. He then developed tremors and palpitations consistent with prior episodes of alcohol withdrawal and drank a beer around 10pm to try to alleviate symptoms but they did not improve, so he presented to ER. Patient endorses daily alcohol use (about 1 pint of vodka) since being discharged from [facility] on 8/14/19. Over the last 5-7 days, he has been drinking more heavily, up to 2 pints of vodka daily, citing stressors involving his living situation (his son's family has been living in his apartment, which is crowded and messy, as well as feeling like he has nothing to do). Reports not taking his medications for the past 3 days. He endorses slight headache and mild anxiety, denies sweats, agitation, auditory/visual/tactile disturbances. States he has been constipated x4 days. He denies any chest pain, SOB, syncope, orthopnea/PND, LE edema, CARRERO, ICD firing. No fevers/chills, nausea, vomiting, diarrhea, or bloody/black stools. No h/o withdrawal seizures or DTs.   In the ED, CIWA 16, tachycardic to 110s, EKG with QW in V1-4 and 1mm MELVIN in V1-4 (consistent with prior EKGs so cath lab activation cancelled), trop negative, , ETOH level 179, mild transaminitis, AG 22. Head CT negative. Patient received Librium 50mg PO x1 and Ativan 1mg IV x1, Zofran, Pepcid, and 500cc bolus.   Admitted to [facility] under Dr. Levy for management of alcohol withdrawal in this patient with significant cardiac comorbidities.

## 2019-08-26 NOTE — ED PROVIDER NOTE - CARE PLAN
Principal Discharge DX:	Alcohol withdrawal Principal Discharge DX:	Alcohol withdrawal  Secondary Diagnosis:	Chest pain

## 2019-08-26 NOTE — PROGRESS NOTE ADULT - SUBJECTIVE AND OBJECTIVE BOX
2nd  relapse of Ethanolism  in recent  weeks   after an 8 month period of being free of Alcohol    Pt readmitted for alcohol  use  and shakiness       PAST MEDICAL & SURGICAL HISTORY:  Thrombus in heart chamber: LV  ICD (implantable cardioverter-defibrillator) in place  Pacemaker  ETOH abuse  Paroxysmal a-fib  Myocardial infarction involving other coronary artery  Gastritis  Atherosclerosis of coronary artery: Coronary artery disease  Automatic implantable cardiac defibrillator in situ: ICD (implantable cardioverter-defibrillator) in place      MEDICATIONS  (STANDING):  multivitamin/thiamine/folic acid in sodium chloride 0.9% 1000 milliLiter(s) (100 mL/Hr) IV Continuous <Continuous>  eliquis  5 mg po CIARA   s/p LV Thrombus    MEDICATIONS  (PRN):      ICU Vital Signs Last 24 Hrs  T(C): 36.4 (26 Aug 2019 07:03), Max: 37.2 (26 Aug 2019 05:10)  T(F): 97.6 (26 Aug 2019 07:03), Max: 98.9 (26 Aug 2019 05:10)  HR: 96 (26 Aug 2019 08:50) (96 - 144)  BP: 135/94 (26 Aug 2019 08:50) (128/83 - 148/103)  BP(mean): --  ABP: --  ABP(mean): --  RR: 18 (26 Aug 2019 08:50) (16 - 18)  SpO2: 98% (26 Aug 2019 08:50) (95% - 98%)    lungs clear  Cv  s1 s 2    abd soft  ext stable                            12.9   3.99  )-----------( 232      ( 26 Aug 2019 05:38 )             40.2   08-26    141  |  97  |  12  ----------------------------<  100<H>  4.1   |  22  |  0.95    Ca    8.8      26 Aug 2019 05:38    TPro  8.1  /  Alb  4.6  /  TBili  0.7  /  DBili  x   /  AST  75<H>  /  ALT  51<H>  /  AlkPhos  85  08-26      PT/INR - ( 26 Aug 2019 05:38 )   PT: 11.1 sec;   INR: 0.98          PTT - ( 26 Aug 2019 05:38 )  PTT:29.1 sec    CT A  Chest 8 11 19   No PE   CXR  7 9 19   no acute process     Alcohol, Blood (08.26.19 @ 05:38)    Alcohol, Blood: 179: TOXIC CONCENTRATIONS:  Flushing, Slowing of  Reflexes, Impaired Visual Acuity:     Depression of CNS: > 100  Fatalities Reported:  > 400  These ranges are intended as general guidelines.  Alcohol metabolism can  vary widely among individuals.  This test is approved for clinical and  not for forensic purposes. mg/dL

## 2019-08-26 NOTE — H&P ADULT - NSICDXPASTMEDICALHX_GEN_ALL_CORE_FT
PAST MEDICAL HISTORY:  Atherosclerosis of coronary artery Coronary artery disease    ETOH abuse     Gastritis     ICD (implantable cardioverter-defibrillator) in place     Myocardial infarction involving other coronary artery     Pacemaker     Paroxysmal a-fib     Thrombus in heart chamber LV PAST MEDICAL HISTORY:  Atherosclerosis of coronary artery Coronary artery disease    ETOH abuse     Gastritis     Hemorrhoid     ICD (implantable cardioverter-defibrillator) in place     Myocardial infarction involving other coronary artery     Pacemaker     Paroxysmal a-fib     Thrombus in heart chamber LV

## 2019-08-26 NOTE — BEHAVIORAL HEALTH ASSESSMENT NOTE - HPI (INCLUDE ILLNESS QUALITY, SEVERITY, DURATION, TIMING, CONTEXT, MODIFYING FACTORS, ASSOCIATED SIGNS AND SYMPTOMS)
Patient is a 62 y/o male with hx of HIT, PMHx ETOH abuse, paroxsymal AFib (hx of LV thrombus, on Eliquis, most recent echo 8/2019 without evidence of thrombus), HLD, CAD s/p PCI, HFrEF (35-40% 8/12/2019) s/p AICD (last interrogation 8/12/19- WNL), hemorrhoids, gastritis, recent admission 8/10-8/14/19 for alcohol withdrawal, presented on 8/26 with alcohol intoxication and s/p fall with head trauma. Per the ED admission note: patient reports drinking yesterday and while intoxicated he fell and hit the back of his head on a toilet, but did not feel he needed to seek medical attention. He then developed tremors and palpitations consistent with prior episodes of alcohol withdrawal and drank a beer around 10pm to try to alleviate symptoms but they did not improve, so he presented to ER.   On this evaluation Patient endorses daily alcohol use (about 1 pint of vodka) since being discharged from [facility] on 8/14/19. Over the last 5-7 days, he has been drinking more heavily, up to 2 pints of vodka daily, citing stressors involving his living situation (his son's family has recently moved in to his apartment inciting increased stress). Approximately 8 months ago the patient completed a 28 day inpatient rehab course at [facility] for EtOH abuse. He was sober from the time of his discharge up until his recent binge drinking episode that resulted in his hospitalization of 8/10/19. Presently, he endorses feelings of disgust and guilt about relapse. Patient reports recent anhedonia and a long history of easily disturbed sleep to which he attributes to his training in martial arts. No active suicidal or homicidal ideation. No h/o withdrawal seizures or DTs. Pt expresses motivation to go into inpatient rehab program at  as he found it to be helpful in the past. Of note, pt did was not attending any outpatient programs or AA since his discharge from the inpatient rehab. Pt stated that his insurance did not cover the outpatient services.   Pt was provided with information about Antabuse. He expressed some ambivalence about it but agreed to look into the medications. We will provide educational materials and will continue motivational interview.

## 2019-08-26 NOTE — ED PROVIDER NOTE - OBJECTIVE STATEMENT
63M with past medical history of alcohol abuse, HIT allergy, paroxysmal afib (on eliquis), LV apical thrombus, CAD s/p PCI, AICD, EF 35%, HLD who p/w etoh intox and head trauma 63M with past medical history of alcohol abuse, HIT allergy, paroxysmal afib (on eliquis), LV apical thrombus (not present on echo at last admission), CAD s/p PCI, AICD, EF 35%, HLD who p/w etoh intox and head trauma. Pt states he has been binging for 6 days, last drank "a beer" last night, c/o substernal burning after some retching as well, no blood in vomits. Pt also states he fell int he tub hitting the top of his head bc he felt dizzy. No SOb, no f/c. PMD is Dr. Levy.

## 2019-08-26 NOTE — BEHAVIORAL HEALTH ASSESSMENT NOTE - DESCRIPTION (FIRST USE, LAST USE, QUANTITY, FREQUENCY, DURATION)
Reports long term heavy drinking for over 40 years, with worsening symptoms of withdrawal one day prior to admission.. Hx/o head trauma and blackouts. Reports quit 1996

## 2019-08-26 NOTE — ED ADULT NURSE NOTE - NSFALLRSKPASTHIST_ED_ALL_ED
Subjective   Patient ID: Renae Bolton is a 46 y.o. female   Follow-up and Pain of the Left Ankle and Follow-up and Pain of the Right Ankle  In today stating that the her left ankle is about the same.  She states she's basically just living with it and thinks it's about as good as the scan again.  She says it doesn't hurt only outside at all and majority of her pain is all across the front of her ankle.  She says she's also begin having back pain and that may bother her worse now.  She comes in wearing compression socks.  History of Present Illness    Pain Score: 1  Pain Location: Ankle  Pain Orientation: Right, Left        Pain Frequency: Intermittent           Aggravating Factors: Walking, Standing        Pain Intervention(s): Rest          Review of Systems   Constitutional: Negative for fever.   HENT: Negative for voice change.    Eyes: Negative for visual disturbance.   Respiratory: Negative for shortness of breath.    Cardiovascular: Negative for chest pain.   Gastrointestinal: Negative for abdominal distention and abdominal pain.   Genitourinary: Negative for dysuria.   Musculoskeletal: Positive for arthralgias. Negative for gait problem and joint swelling.   Skin: Negative for rash.   Neurological: Negative for speech difficulty.   Hematological: Does not bruise/bleed easily.   Psychiatric/Behavioral: Negative for confusion.       Past Medical History:   Diagnosis Date   • Arrhythmia    • Depression    • Genital HSV    • GERD (gastroesophageal reflux disease)    • Migraine         Past Surgical History:   Procedure Laterality Date   • ANTERIOR AND POSTERIOR VAGINAL REPAIR     • BREAST LUMPECTOMY Left    • CHOLECYSTECTOMY     • CYSTOSCOPY      DMSO   • HYSTERECTOMY     • WISDOM TOOTH EXTRACTION         Allergies   Allergen Reactions   • Penicillins Shortness Of Breath and Palpitations   • Amoxicillin    • Demerol [Meperidine]    • Latex    • Morphine And Related        Family History   Problem Relation  Age of Onset   • Diabetes Father    • Breast cancer Sister    • Hypertension Maternal Grandmother    • Alzheimer's disease Maternal Grandmother    • Hyperlipidemia Other    • Gout Other    • Migraines Other        Social History     Social History   • Marital status:      Spouse name: N/A   • Number of children: N/A   • Years of education: N/A     Occupational History   • Registered Nurse Psychiatric     Social History Main Topics   • Smoking status: Never Smoker   • Smokeless tobacco: Never Used   • Alcohol use No   • Drug use: No   • Sexual activity: Yes     Partners: Male     Other Topics Concern   • Not on file     Social History Narrative        All the above social hx, family hx, surgical history, allergies, ros & HPI reviewed.  Objective   Physical Exam   Constitutional: She is oriented to person, place, and time. She appears well-developed and well-nourished.   HENT:   Head: Normocephalic and atraumatic.   Eyes: EOM are normal. Pupils are equal, round, and reactive to light.   Neck: Normal range of motion.   Cardiovascular: Normal rate.    Pulmonary/Chest: Effort normal.   Abdominal: Soft. Bowel sounds are normal.   Musculoskeletal: Normal range of motion.   Neurological: She is alert and oriented to person, place, and time. She has normal reflexes.   Skin: Skin is warm.   Psychiatric: She has a normal mood and affect. Her behavior is normal. Judgment and thought content normal.     Ortho Exam  Ortho Exam  Lower extremity exam:  Vascular: Pulses are palpable, pedal hair noted, minimal edema noted  Neuro: Sensation intact  Derm: Normal turgor temperature.  MSK: She still has slightly increased inversion of the left ankle.  Slightly positive anterior drawer with slight pucker.  She's minimally tender in the anterior lateral ankle gutter but primarily over the anterior ankle just deep to the extensor hallucis longus and extensor digitorum longus tendons.  She has no significant pain along the peroneal  tendons    Assessment/Plan left anterior ankle pain, peroneal tendon tear of the left side  Independent Review of Radiographic Studies:      Laboratory and Other Studies:     Medical Decision Making:        Medium Joint Arthrocentesis  Date/Time: 8/29/2017 8:25 AM  Consent given by: patient  Site marked: site marked  Timeout: Immediately prior to procedure a time out was called to verify the correct patient, procedure, equipment, support staff and site/side marked as required   Supporting Documentation  Indications: pain and joint swelling   Procedure Details  Location: ankle - L ankle  Needle size: 25 G  Approach: anterolateral  Medications administered: 1 mL lidocaine 1 %; 1 mL bupivacaine (PF) 0.5 % (Decadron 10mg/ml 1ml NDC:0840-9027-67 Lot: [id number] EXP: 02/01/2019)  Patient tolerance: patient tolerated the procedure well with no immediate complications        [] No procedures were performed in office today.    Renae was seen today for follow-up, pain, follow-up and pain.    Diagnoses and all orders for this visit:    Arthritis of ankle  -     Medium Joint Arthrocentesis    Acute left ankle pain    Peroneal tendinitis, left        Recommendations/Plan:  After examining her we discussed her options and she elected to try the injection.  I explained this will not only potential help with pain relief but also be diagnostic in determining her area of pain.  Based off the results of the injection and may be worth it to consider arthroscopic debridement of the ankle.  I'll see her back in about a month.  She is to keep a mental note that the ankle feels after the injection.  Continue with bracing.  Continue Rice as needed.  Continue compression socks.    Return in about 4 weeks (around 9/26/2017).  Patient agreeable to call or return sooner for any concerns.        no

## 2019-08-26 NOTE — CHART NOTE - NSCHARTNOTEFT_GEN_A_CORE
Repeat Anion Gap improved 18. F/u in AM. Psychiatry MD requested Ativan 2mg IV x 1 and continue Ativan 2mg IV q2 hrs PRN. Monitor CIWA. High risk for withdrawal.

## 2019-08-26 NOTE — ED PROVIDER NOTE - PROGRESS NOTE DETAILS
cards fellow was down to see the patient and d/w Dr. Sepulveda, they do not feel the EKG is c/w STEMI and when compared to previous EKGs appears similar. Cath lab code was cancelled. Pt reassessed, he is feeling better, less tremulous/anxious, VS improved. Labs noted, AG slightly elevated likely due to alcohol and dehydration, 1st trop negative, will continue to monitor and check 2nd troponin. Mildred paged awaiting call back. If patient continues to feel better and 2nd trop neg, may consider DC if Dr. Levy agrees and can f/u with patient. case dw Dr Levy and he is requesting pt be admitted under his service

## 2019-08-26 NOTE — H&P ADULT - NSICDXFAMILYHX_GEN_ALL_CORE_FT
FAMILY HISTORY:  Grandparent  Still living? No  Family history of cardiac disorder in maternal grandfather, Age at diagnosis: Age Unknown FAMILY HISTORY:  FH: myocardial infarction, maternal grandfather, age 42  FH: type 2 diabetes, father

## 2019-08-27 DIAGNOSIS — Z91.89 OTHER SPECIFIED PERSONAL RISK FACTORS, NOT ELSEWHERE CLASSIFIED: ICD-10-CM

## 2019-08-27 DIAGNOSIS — R63.8 OTHER SYMPTOMS AND SIGNS CONCERNING FOOD AND FLUID INTAKE: ICD-10-CM

## 2019-08-27 LAB
ALBUMIN SERPL ELPH-MCNC: 4.2 G/DL — SIGNIFICANT CHANGE UP (ref 3.3–5)
ALP SERPL-CCNC: 76 U/L — SIGNIFICANT CHANGE UP (ref 40–120)
ALT FLD-CCNC: 49 U/L — HIGH (ref 10–45)
ANION GAP SERPL CALC-SCNC: 11 MMOL/L — SIGNIFICANT CHANGE UP (ref 5–17)
AST SERPL-CCNC: 74 U/L — HIGH (ref 10–40)
BILIRUB DIRECT SERPL-MCNC: 0.2 MG/DL — SIGNIFICANT CHANGE UP (ref 0–0.2)
BILIRUB INDIRECT FLD-MCNC: 0.8 MG/DL — SIGNIFICANT CHANGE UP (ref 0.2–1)
BILIRUB SERPL-MCNC: 1 MG/DL — SIGNIFICANT CHANGE UP (ref 0.2–1.2)
BUN SERPL-MCNC: 14 MG/DL — SIGNIFICANT CHANGE UP (ref 7–23)
CALCIUM SERPL-MCNC: 9.3 MG/DL — SIGNIFICANT CHANGE UP (ref 8.4–10.5)
CHLORIDE SERPL-SCNC: 98 MMOL/L — SIGNIFICANT CHANGE UP (ref 96–108)
CK SERPL-CCNC: 238 U/L — HIGH (ref 30–200)
CO2 SERPL-SCNC: 28 MMOL/L — SIGNIFICANT CHANGE UP (ref 22–31)
CREAT SERPL-MCNC: 1.18 MG/DL — SIGNIFICANT CHANGE UP (ref 0.5–1.3)
GLUCOSE BLDC GLUCOMTR-MCNC: 108 MG/DL — HIGH (ref 70–99)
GLUCOSE SERPL-MCNC: 100 MG/DL — HIGH (ref 70–99)
HCT VFR BLD CALC: 38.2 % — LOW (ref 39–50)
HGB BLD-MCNC: 11.7 G/DL — LOW (ref 13–17)
MAGNESIUM SERPL-MCNC: 1.8 MG/DL — SIGNIFICANT CHANGE UP (ref 1.6–2.6)
MCHC RBC-ENTMCNC: 24.1 PG — LOW (ref 27–34)
MCHC RBC-ENTMCNC: 30.6 GM/DL — LOW (ref 32–36)
MCV RBC AUTO: 78.6 FL — LOW (ref 80–100)
NRBC # BLD: 0 /100 WBCS — SIGNIFICANT CHANGE UP (ref 0–0)
PLATELET # BLD AUTO: 137 K/UL — LOW (ref 150–400)
POTASSIUM SERPL-MCNC: 4.8 MMOL/L — SIGNIFICANT CHANGE UP (ref 3.5–5.3)
POTASSIUM SERPL-SCNC: 4.8 MMOL/L — SIGNIFICANT CHANGE UP (ref 3.5–5.3)
PROT SERPL-MCNC: 7.4 G/DL — SIGNIFICANT CHANGE UP (ref 6–8.3)
RBC # BLD: 4.86 M/UL — SIGNIFICANT CHANGE UP (ref 4.2–5.8)
RBC # FLD: 20.4 % — HIGH (ref 10.3–14.5)
SODIUM SERPL-SCNC: 137 MMOL/L — SIGNIFICANT CHANGE UP (ref 135–145)
WBC # BLD: 3.2 K/UL — LOW (ref 3.8–10.5)
WBC # FLD AUTO: 3.2 K/UL — LOW (ref 3.8–10.5)

## 2019-08-27 PROCEDURE — 99233 SBSQ HOSP IP/OBS HIGH 50: CPT

## 2019-08-27 RX ADMIN — Medication 2 MILLIGRAM(S): at 14:35

## 2019-08-27 RX ADMIN — Medication 1 MILLIGRAM(S): at 13:35

## 2019-08-27 RX ADMIN — Medication 50 MILLIGRAM(S): at 00:29

## 2019-08-27 RX ADMIN — Medication 50 MILLIGRAM(S): at 17:02

## 2019-08-27 RX ADMIN — Medication 100 MILLIGRAM(S): at 13:35

## 2019-08-27 RX ADMIN — Medication 1 TABLET(S): at 13:35

## 2019-08-27 RX ADMIN — Medication 50 MILLIGRAM(S): at 13:35

## 2019-08-27 RX ADMIN — Medication 1 MILLIGRAM(S): at 09:35

## 2019-08-27 RX ADMIN — APIXABAN 5 MILLIGRAM(S): 2.5 TABLET, FILM COATED ORAL at 17:02

## 2019-08-27 RX ADMIN — Medication 50 MILLIGRAM(S): at 06:56

## 2019-08-27 RX ADMIN — Medication 100 MILLIGRAM(S): at 06:57

## 2019-08-27 RX ADMIN — APIXABAN 5 MILLIGRAM(S): 2.5 TABLET, FILM COATED ORAL at 07:22

## 2019-08-27 NOTE — DIETITIAN INITIAL EVALUATION ADULT. - PERTINENT MEDS FT
MEDICATIONS  (STANDING):  apixaban 5 milliGRAM(s) Oral every 12 hours  chlordiazePOXIDE 50 milliGRAM(s) Oral every 8 hours  chlordiazePOXIDE   Oral   folic acid 1 milliGRAM(s) Oral daily  metoprolol succinate  milliGRAM(s) Oral daily  multivitamin 1 Tablet(s) Oral daily  thiamine 100 milliGRAM(s) Oral daily    MEDICATIONS  (PRN):  LORazepam   Injectable 2 milliGRAM(s) IV Push every 4 hours PRN CIWA-Ar score 8 or greater

## 2019-08-27 NOTE — PROGRESS NOTE ADULT - SUBJECTIVE AND OBJECTIVE BOX
Pt is still  shaky from ETOH  withdrawal       PAST MEDICAL & SURGICAL HISTORY:  Hemorrhoid  Thrombus in heart chamber: LV  ICD (implantable cardioverter-defibrillator) in place  Pacemaker  ETOH abuse  Paroxysmal a-fib  Myocardial infarction involving other coronary artery  Gastritis  Atherosclerosis of coronary artery: Coronary artery disease  Automatic implantable cardiac defibrillator in situ: ICD (implantable cardioverter-defibrillator) in place    MEDICATIONS  (STANDING):  apixaban 5 milliGRAM(s) Oral every 12 hours  chlordiazePOXIDE 50 milliGRAM(s) Oral every 8 hours  chlordiazePOXIDE   Oral   folic acid 1 milliGRAM(s) Oral daily  metoprolol succinate  milliGRAM(s) Oral daily  multivitamin 1 Tablet(s) Oral daily  thiamine 100 milliGRAM(s) Oral daily    MEDICATIONS  (PRN):  LORazepam   Injectable 2 milliGRAM(s) IV Push every 4 hours PRN CIWA-Ar score 8 or greater      ICU Vital Signs Last 24 Hrs  T(C): 36.6 (27 Aug 2019 09:25), Max: 37 (26 Aug 2019 14:52)  T(F): 97.8 (27 Aug 2019 09:25), Max: 98.6 (26 Aug 2019 14:52)  HR: 85 (27 Aug 2019 08:23) (77 - 116)  BP: 133/82 (27 Aug 2019 08:23) (116/82 - 133/82)  BP(mean): --  ABP: --  ABP(mean): --  RR: 16 (27 Aug 2019 08:23) (16 - 19)  SpO2: 97% (27 Aug 2019 08:23) (95% - 97%)      Lungs clear   Cv  s1  s2     abd soft  ext stable                          11.7   3.20  )-----------( 137      ( 27 Aug 2019 06:22 )             38.2   08-27    137  |  98  |  14  ----------------------------<  100<H>  4.8   |  28  |  1.18    Ca    9.3      27 Aug 2019 06:22  Mg     1.8     08-27    TPro  8.1  /  Alb  4.6  /  TBili  0.7  /  DBili  x   /  AST  75<H>  /  ALT  51<H>  /  AlkPhos  85  08-26      echo   EF  35-40 %

## 2019-08-27 NOTE — PROGRESS NOTE ADULT - SUBJECTIVE AND OBJECTIVE BOX
PGY-1 TRANSFER NOTE - 5U TO 4U    HOSPITAL COURSE  Mr. Torrez is a 63-year-old male with a past medical history of HIT, pAfib on Eliquis, HLD, CAD status post PCI, HFrEF (35-40%) status post AICD, hemorrhoids, gastritis, and ethanol abuse who was admitted for alcohol withdrawl yesterday, 8/26. He was recently discharged from [facility] for alcohol withdrawal on 8/14/19. He drinks 1 pint of alcohol a day and over the last 7 days has had 2 pints a day. His last drink was at 10PM on 8/25/19. He has received 250mg of Librium and 5mg of Ativan since 12PM on 8/26/19 and it is currently 4PM 8/27/19. He has been deemed stable for transfer to the [facility] Dr. Levy. CIWA score currently 6 at 4PM on 8/27/19    SUBJECTIVE: Patient seen and examined at the bedside. He says he's worried about his current situation and thinks his blood pressures are too high.    Vital Signs Last 12 Hrs  T(F): 97.8 (08-27-19 @ 14:15), Max: 98 (08-27-19 @ 06:20)  HR: 85 (08-27-19 @ 14:29) (85 - 96)  BP: 129/81 (08-27-19 @ 14:29) (122/84 - 133/82)  BP(mean): --  RR: 16 (08-27-19 @ 14:29) (16 - 17)  SpO2: 97% (08-27-19 @ 14:29) (96% - 97%)  I&O's Summary    26 Aug 2019 07:01  -  27 Aug 2019 07:00  --------------------------------------------------------  IN: 400 mL / OUT: 0 mL / NET: 400 mL    27 Aug 2019 07:01  -  27 Aug 2019 16:18  --------------------------------------------------------  IN: 360 mL / OUT: 0 mL / NET: 360 mL        PHYSICAL EXAM:  General: Resting comfortably in bed, appears mildly anxious  HEENT: NCAT, PERRL, EOMI, no conjunctival pallor or scleral icterus, MMM  Neck: Supple, no JVD  Respiratory: Clear to auscultation bilaterally with no wheezes, rales, or rhonchi appreciated  Cardiovascular: RRR, normal S1 and S2, no murmurs, rubs, or gallops appreciated  Vascular: 2+ radial and DP pulses  Abdomen: Soft, NT/ND. Bowel sounds present in all four quadrants with no guarding, rebound tenderness, palpable masses noted  Extremities: Warm and well perfused. No clubbing, cyanosis, or edema noted  Skin: No gross skin abnormalities or rashes noted  Neuro: AAOx3 with no cranial nerve deficits. Strength and sensation intact throughout.  CIWA: Moderate headache 3, Tremor 2, Anxiety 1    LABS:                        11.7   3.20  )-----------( 137      ( 27 Aug 2019 06:22 )             38.2     08-27    137  |  98  |  14  ----------------------------<  100<H>  4.8   |  28  |  1.18    Ca    9.3      27 Aug 2019 06:22  Mg     1.8     08-27    TPro  7.4  /  Alb  4.2  /  TBili  1.0  /  DBili  0.2  /  AST  74<H>  /  ALT  49<H>  /  AlkPhos  76  08-27    PT/INR - ( 26 Aug 2019 05:38 )   PT: 11.1 sec;   INR: 0.98          PTT - ( 26 Aug 2019 05:38 )  PTT:29.1 sec  Urinalysis Basic - ( 26 Aug 2019 09:25 )    Color: Yellow / Appearance: Clear / SG: >=1.030 / pH: x  Gluc: x / Ketone: 40 mg/dL  / Bili: Small / Urobili: 0.2 E.U./dL   Blood: x / Protein: 100 mg/dL / Nitrite: NEGATIVE   Leuk Esterase: NEGATIVE / RBC: < 5 /HPF / WBC < 5 /HPF   Sq Epi: x / Non Sq Epi: x / Bacteria: Present /HPF        RADIOLOGY & ADDITIONAL TESTS: Reviewed.    MEDICATIONS  (STANDING):  apixaban 5 milliGRAM(s) Oral every 12 hours  chlordiazePOXIDE 50 milliGRAM(s) Oral <User Schedule>  folic acid 1 milliGRAM(s) Oral daily  metoprolol succinate  milliGRAM(s) Oral daily  multivitamin 1 Tablet(s) Oral daily  thiamine 100 milliGRAM(s) Oral daily    MEDICATIONS  (PRN):  LORazepam   Injectable 2 milliGRAM(s) IV Push every 4 hours PRN CIWA-Ar score 8 or greater      Allergies    Capoten (Short breath; Rash; Hives)  heparin (Other (Mod to Severe))  penicillin (Short breath; Rash; Hives)    Intolerances

## 2019-08-27 NOTE — DIETITIAN INITIAL EVALUATION ADULT. - ADD RECOMMEND
- MD consider liberalizing diet to low sodium.  - Add decaf coffee to all meals.  - Continue micronutrient supplementation for ETOH withdrawal.  - Bowel regimen as needed.  - Obtain weight q 2-3 days for weekly trending.  - Nutrition education: Importance of balanced intake; limiting sodium.  ETOH intake equates to empty calories and often ends up substituting positive nutrient intake.

## 2019-08-27 NOTE — DIETITIAN INITIAL EVALUATION ADULT. - PROBLEM SELECTOR PLAN 2
Currently without anginal symptoms. EKG with QW/MELVIN V1-V4, but consistent with prior so emergent cath cancelled  -trop negative x1, repeat now  - , likely 2/2 dehydration- continue to trend  -last NST- 7/17/18: Severely abnormal myocardial perfusion images c/w extensive   scarring in the territory of the LAD. Overall left ventricular systolic function is abnormal with global hypokinesis.  -Holding Lipitor 2/2 transaminitis- continue to trend

## 2019-08-27 NOTE — DIETITIAN INITIAL EVALUATION ADULT. - OTHER INFO
Mr. Torrez is a 63 y.o male admitted for ETOH withdrawal and s/p fall with head trauma.   CT head neg.  Cardiology following; rec to continue meds for cardiomyopathy.  PMH sig for ETOH abuse, A.Fib on eliquis, HLD, CAD s/p PCI, EF of 35-40% s/p AICD, gastritis.  Most recent admission from 8/10-8/14 for etoh withdrawal.    Pt sitting upright in bed.  Questioning need for DASH/TLC diet.  Indicates he wasn't eating for 6 days prior to admission given ETOH intake.  No chewing/swallowing problems.  Does not follow any specific diet/meal plan.  Pt requesting decaf coffee with all meals and wondering why he can't have cheeseburger.  Provided nutrition education for current diet.  No GI complaints.  NKFA.  No pressure injuries noted.    Based on poor Po intake d/t ETOH abuse, conducted NFPE.  Pt with mild fat losses to orbital regions.  No muscle mass losses noted.  Pt meets criteria for mild malnutrition.  Nutrition status classified at moderate risk.

## 2019-08-27 NOTE — PROGRESS NOTE ADULT - PROBLEM SELECTOR PLAN 5
No bleeding currently, hemoglobin stable. Patient reports constipation x4 days but had loose BM on 8/26/19 PM  -Bowel regimen discontinued.    DVT PPX- eliquis  Dispo- step down to Medicine under Dr Levy. Discharge pending resolution of ETOH withdrawal. SW for inpatient/outpatient ETOH rehab

## 2019-08-27 NOTE — CHART NOTE - NSCHARTNOTEFT_GEN_A_CORE
Malnutrition Alert:            Upon Nutritional Assessment by the Registered Dietitian your patient was determined to meet criteria / has evidence of the following diagnosis/diagnoses:          [ x ]  Mild Protein Calorie Malnutrition        [ ]  Moderate Protein Calorie Malnutrition        [ ] Severe Protein Calorie Malnutrition        [ ] Unspecified Protein Calorie Malnutrition        [ ] Underweight / BMI <19        [ ] Morbid Obesity / BMI > 40      Findings as based on:  •  Comprehensive nutrition assessment and consultation  •  Calorie counts (nutrient intake analysis)  •  Food acceptance and intake status from observations by staff  •  Follow up  •  Patient education  •  Intervention secondary to interdisciplinary rounds  •   concerns      Treatment:    The following diet has been recommended:  -  MD consider liberalizing diet to low sodium.  - Add decaf coffee to all meals.  - Continue micronutrient supplementation for ETOH withdrawal.  - Bowel regimen as needed.  - Obtain weight q 2-3 days for weekly trending.      PROVIDER Section:   By signing this assessment you are acknowledging and agree with the diagnosis/diagnoses assigned by the Registered Dietitian    Comments:

## 2019-08-27 NOTE — DIETITIAN INITIAL EVALUATION ADULT. - PROBLEM SELECTOR PLAN 4
Currently euvolemic, BNP normal, CXR clear  -last echo 8/12/2019 with EF 35-40%  - AICD last interrogated on 8/12- no events, needs to change battery within 1 year  -continue home metoprolol  -hx captopril allergy- no ACEI/ARB at this time  - daily weights

## 2019-08-27 NOTE — PROGRESS NOTE ADULT - SUBJECTIVE AND OBJECTIVE BOX
Interventional Cardiology PA Adult Progress Note    CC: tremors and palpitations  Subjective Assessment:    Mild tremors and intermittent palpitations    ROS otherwise negative except as stated in HPI and subjective	  MEDICATIONS:  metoprolol succinate  milliGRAM(s) Oral daily  chlordiazePOXIDE 50 milliGRAM(s) Oral every 8 hours  chlordiazePOXIDE   Oral   LORazepam   Injectable 2 milliGRAM(s) IV Push every 4 hours PRN  apixaban 5 milliGRAM(s) Oral every 12 hours  folic acid 1 milliGRAM(s) Oral daily  multivitamin 1 Tablet(s) Oral daily  thiamine 100 milliGRAM(s) Oral daily    PHYSICAL EXAM:  TELEMETRY: intermittent sinus tachycardia to 120s when pt withdrawing from ETOH  T(C): 36.6 (08-27-19 @ 09:25), Max: 37 (08-26-19 @ 14:52)  HR: 86 (08-27-19 @ 13:32) (77 - 97)  BP: 122/85 (08-27-19 @ 13:32) (116/82 - 133/82)  RR: 17 (08-27-19 @ 13:32) (16 - 18)  SpO2: 96% (08-27-19 @ 13:32) (95% - 97%)  Wt(kg): --  I&O's Summary    26 Aug 2019 07:01  -  27 Aug 2019 07:00  --------------------------------------------------------  IN: 400 mL / OUT: 0 mL / NET: 400 mL    27 Aug 2019 07:01  -  27 Aug 2019 13:55  --------------------------------------------------------  IN: 180 mL / OUT: 0 mL / NET: 180 mL        Duffy: none  Central/PICC/Mid Line:  none                                       Appearance: Normal	  HEENT:   Normal oral mucosa, PERRL, EOMI	  Neck: Supple, - JVD   Cardiovascular: Normal S1 S2, No JVD, No murmurs   Respiratory: Lungs clear to auscultation b/l, No Rales, Rhonchi, Wheezing	  Gastrointestinal:  Soft, Non-tender, + BS	  Skin: No rashes, No ecchymoses, No cyanosis  Extremities: Normal range of motion, No clubbing, cyanosis or edema  Vascular: Peripheral pulses palpable 2+ bilaterally  Neurologic: Non-focal  Psychiatry: A & O x 3, Mood & affect appropriate    LABS:                        11.7   3.20  )-----------( 137      ( 27 Aug 2019 06:22 )             38.2     08-27    137  |  98  |  14  ----------------------------<  100<H>  4.8   |  28  |  1.18    Ca    9.3      27 Aug 2019 06:22  Mg     1.8     08-27    TPro  8.1  /  Alb  4.6  /  TBili  0.7  /  DBili  x   /  AST  75<H>  /  ALT  51<H>  /  AlkPhos  85  08-26  PT/INR - ( 26 Aug 2019 05:38 )   PT: 11.1 sec;   INR: 0.98          PTT - ( 26 Aug 2019 05:38 )  PTT:29.1 sec    ASSESSMENT/PLAN:

## 2019-08-27 NOTE — PROGRESS NOTE ADULT - PROBLEM SELECTOR PLAN 5
No bleeding currently, hemoglobin stable. Patient reports constipation x4 days but had loose BM on 8/26/19 PM  -Bowel regimen discontinued.    DVT PPX- eliquis  Dispo- step down to Medicine under Dr Levy. Discharge pending resolution of ETOH withdrawal. SW for inpatient/outpatient ETOH rehab Not currently bleeding. H/H stable so far. Had BM on 8/26/19 that was loose.    No bowel regimen at this time

## 2019-08-27 NOTE — PROGRESS NOTE ADULT - PROBLEM SELECTOR PLAN 2
Currently without anginal symptoms. EKG with QW/MELVIN V1-V4, but consistent with prior so emergent cath cancelled  -trop negative x2  - , likely 2/2 dehydration- continue to trend  -last NST- 7/17/18: Severely abnormal myocardial perfusion images c/w extensive   scarring in the territory of the LAD. Overall left ventricular systolic function is abnormal with global hypokinesis.  -Holding Lipitor 2/2 transaminitis- continue to trend

## 2019-08-27 NOTE — PROGRESS NOTE ADULT - PROBLEM SELECTOR PLAN 4
Currently euvolemic, BNP normal, CXR clear  -last echo 8/12/2019 with EF 35-40%  - AICD last interrogated on 8/12/19- no events, needs to change battery within 1 year  -continue home metoprolol  -hx captopril allergy- no ACEI/ARB at this time  - daily weights

## 2019-08-27 NOTE — DIETITIAN INITIAL EVALUATION ADULT. - ENERGY NEEDS
Current BW: 72.6kg; Ht: 177.8cm; BMI: 23; IBW: 75.45kg; %IBW: 100.7%  Current BW used for calculations as data between % IBW.  Nutrient needs based on [facility] standards of care for adults.

## 2019-08-27 NOTE — PROGRESS NOTE ADULT - PROBLEM SELECTOR PLAN 2
Currently without anginal symptoms. EKG with QW/MELVIN V1-V4, but consistent with prior so emergent cath cancelled  -trop negative x2  - , likely 2/2 dehydration- continue to trend  -last NST- 7/17/18: Severely abnormal myocardial perfusion images c/w extensive   scarring in the territory of the LAD. Overall left ventricular systolic function is abnormal with global hypokinesis.  -Holding Lipitor 2/2 transaminitis- continue to trend No anginal symptoms at this time. EKG in ED showed Q wave and ST elevation in V1-V4 but unchanged from prior. Troponin negative.    -Trend CK  -Continue holding Lipitor due to transaminitis

## 2019-08-27 NOTE — DIETITIAN INITIAL EVALUATION ADULT. - PERTINENT LABORATORY DATA
08-27: Nothing nutritionally significant to note.  137  |  98  |  14  ----------------------------<  100<H>  4.8   |  28  |  1.18  Ca    9.3      27 Aug 2019 06:22  Mg     1.8     08-27  TPro  8.1  /  Alb  4.6  /  TBili  0.7  /  DBili  x   /  AST  75<H>  /  ALT  51<H>  /  AlkPhos  85  08-26

## 2019-08-27 NOTE — PROGRESS NOTE ADULT - PROBLEM SELECTOR PLAN 3
Hx atrial thrombus (not identified on most recent echo). Normally in NSR 70bpm but increases to sinus tach 110s-120s when in withdrawal  - continue home metoprolol succinate 100mg daily  - continue home Eliquis 5mg BID

## 2019-08-27 NOTE — DIETITIAN INITIAL EVALUATION ADULT. - PROBLEM SELECTOR PLAN 5
No bleeding currently, hemoglobin stable. Patient reports constipation x4 days  -Bowel regimen    DVT PPX- eliquis  Dispo- pending resolution of ETOH withdrawal. SW for inpatient/outpatient ETOH rehab

## 2019-08-27 NOTE — PROGRESS NOTE ADULT - PROBLEM SELECTOR PLAN 3
Hx atrial thrombus (not identified on most recent echo). Normally in NSR 70bpm but increases to sinus tach 110s-120s when in withdrawal  - continue home metoprolol succinate 100mg daily  - continue home Eliquis 5mg BID Atrial thrombus history, not identified on most recent echo.    -Continue home Metoprolol 100mg QD  -Continue home Eliquis 5mg BID

## 2019-08-27 NOTE — PROGRESS NOTE ADULT - PROBLEM SELECTOR PLAN 4
Currently euvolemic, BNP normal, CXR clear  -last echo 8/12/2019 with EF 35-40%  - AICD last interrogated on 8/12/19- no events, needs to change battery within 1 year  -continue home metoprolol  -hx captopril allergy- no ACEI/ARB at this time  - daily weights Euvolemic, BNP normal, CXR clear. Last ECHO 8/12/2019 with EF of 35-40%. AICD last interrogated 8/12/19, no events, needs battery change within a year.    -Continue home Metoprolol  -No ACE-I/ARB due to Captopril allergy  -Daily weights

## 2019-08-27 NOTE — DIETITIAN INITIAL EVALUATION ADULT. - PROBLEM SELECTOR PLAN 1
EtoH level 179 and CIWA 16 on admission, s/p Librium 50mg, Ativan 1mg IV, banana bag 100cc/hr. CIWA now 7 (mild anxiety, moderate tremors)  - Transaminitis likely 2/2 EtoH abuse, trend LFTs  - Anion gap 22 (bicarb low normal 22)- likely 2/2 ketoacidosis i/s/o chronic alcohol abuse (+ketones in urine)- repeat BMP this afternoon  -initiate librium taper- 50mg q6 for now  - Ativan PRN for CIWA >8  - cautious IV fluids due to HFrEF  - Continue MVI, Thiamine, Folic Acid  - SW consult for alcohol cessation program- this is patient's 2nd admission for ETOH in 1 month after approx 8 months of sobriety s/p [facility] inpatient detox  - psych recs appreciated- librium taper with ativan 1-2mg PRN for CIWA, consider starting disulfiram upon discharge, SW to assist in finding outpatient rehab programs covered by pt's insurance  - of note, pt has hx of concussions and is therefore high risk for developing DTs

## 2019-08-28 LAB
ALBUMIN SERPL ELPH-MCNC: 4.4 G/DL — SIGNIFICANT CHANGE UP (ref 3.3–5)
ALP SERPL-CCNC: 76 U/L — SIGNIFICANT CHANGE UP (ref 40–120)
ALT FLD-CCNC: 61 U/L — HIGH (ref 10–45)
ANION GAP SERPL CALC-SCNC: 15 MMOL/L — SIGNIFICANT CHANGE UP (ref 5–17)
AST SERPL-CCNC: 95 U/L — HIGH (ref 10–40)
BILIRUB SERPL-MCNC: 1 MG/DL — SIGNIFICANT CHANGE UP (ref 0.2–1.2)
BUN SERPL-MCNC: 12 MG/DL — SIGNIFICANT CHANGE UP (ref 7–23)
CALCIUM SERPL-MCNC: 9.6 MG/DL — SIGNIFICANT CHANGE UP (ref 8.4–10.5)
CHLORIDE SERPL-SCNC: 100 MMOL/L — SIGNIFICANT CHANGE UP (ref 96–108)
CK SERPL-CCNC: 157 U/L — SIGNIFICANT CHANGE UP (ref 30–200)
CO2 SERPL-SCNC: 25 MMOL/L — SIGNIFICANT CHANGE UP (ref 22–31)
CREAT SERPL-MCNC: 1.04 MG/DL — SIGNIFICANT CHANGE UP (ref 0.5–1.3)
GLUCOSE SERPL-MCNC: 111 MG/DL — HIGH (ref 70–99)
HCT VFR BLD CALC: 41 % — SIGNIFICANT CHANGE UP (ref 39–50)
HGB BLD-MCNC: 12.5 G/DL — LOW (ref 13–17)
MAGNESIUM SERPL-MCNC: 1.5 MG/DL — LOW (ref 1.6–2.6)
MCHC RBC-ENTMCNC: 24.1 PG — LOW (ref 27–34)
MCHC RBC-ENTMCNC: 30.5 GM/DL — LOW (ref 32–36)
MCV RBC AUTO: 79 FL — LOW (ref 80–100)
PLATELET # BLD AUTO: 142 K/UL — LOW (ref 150–400)
POTASSIUM SERPL-MCNC: 4.2 MMOL/L — SIGNIFICANT CHANGE UP (ref 3.5–5.3)
POTASSIUM SERPL-SCNC: 4.2 MMOL/L — SIGNIFICANT CHANGE UP (ref 3.5–5.3)
PROT SERPL-MCNC: 7.8 G/DL — SIGNIFICANT CHANGE UP (ref 6–8.3)
RBC # BLD: 5.19 M/UL — SIGNIFICANT CHANGE UP (ref 4.2–5.8)
RBC # FLD: 20.4 % — HIGH (ref 10.3–14.5)
SODIUM SERPL-SCNC: 140 MMOL/L — SIGNIFICANT CHANGE UP (ref 135–145)
WBC # BLD: 4.05 K/UL — SIGNIFICANT CHANGE UP (ref 3.8–10.5)
WBC # FLD AUTO: 4.05 K/UL — SIGNIFICANT CHANGE UP (ref 3.8–10.5)

## 2019-08-28 PROCEDURE — 99233 SBSQ HOSP IP/OBS HIGH 50: CPT

## 2019-08-28 RX ORDER — MAGNESIUM SULFATE 500 MG/ML
2 VIAL (ML) INJECTION ONCE
Refills: 0 | Status: COMPLETED | OUTPATIENT
Start: 2019-08-28 | End: 2019-08-28

## 2019-08-28 RX ADMIN — Medication 50 MILLIGRAM(S): at 05:27

## 2019-08-28 RX ADMIN — Medication 50 MILLIGRAM(S): at 18:12

## 2019-08-28 RX ADMIN — Medication 100 GRAM(S): at 10:31

## 2019-08-28 RX ADMIN — Medication 1 MILLIGRAM(S): at 11:26

## 2019-08-28 RX ADMIN — APIXABAN 5 MILLIGRAM(S): 2.5 TABLET, FILM COATED ORAL at 18:11

## 2019-08-28 RX ADMIN — Medication 100 MILLIGRAM(S): at 05:27

## 2019-08-28 RX ADMIN — APIXABAN 5 MILLIGRAM(S): 2.5 TABLET, FILM COATED ORAL at 05:27

## 2019-08-28 RX ADMIN — Medication 1 TABLET(S): at 11:26

## 2019-08-28 RX ADMIN — Medication 100 MILLIGRAM(S): at 11:26

## 2019-08-28 RX ADMIN — Medication 50 MILLIGRAM(S): at 00:46

## 2019-08-28 RX ADMIN — Medication 50 MILLIGRAM(S): at 10:31

## 2019-08-28 NOTE — PROGRESS NOTE ADULT - PROBLEM SELECTOR PLAN 5
Not currently bleeding. H/H stable so far. Had BM on 8/26/19 that was loose.    No bowel regimen at this time

## 2019-08-28 NOTE — PROGRESS NOTE ADULT - PROBLEM SELECTOR PLAN 4
Euvolemic, BNP normal, CXR clear. Last ECHO 8/12/2019 with EF of 35-40%. AICD last interrogated 8/12/19, no events, needs battery change within a year.    -Continue home Metoprolol  -No ACE-I/ARB due to Captopril allergy  -Daily weights

## 2019-08-28 NOTE — PROGRESS NOTE ADULT - PROBLEM SELECTOR PLAN 2
No anginal symptoms at this time. EKG in ED showed Q wave and ST elevation in V1-V4 but unchanged from prior. Troponin negative.    -Trend CK 8/28  -Continue holding Lipitor due to transaminitis

## 2019-08-28 NOTE — PROGRESS NOTE BEHAVIORAL HEALTH - NSBHFUPINTERVALCCFT_PSY_A_CORE
Pt received Librium 200 mg and Ativan 3 mg total over the past 24 hrs.
Librium/Ativan Requirements (24 hrs):  Pt received over the past 24 hrs:  Librium – 50 mg (Aug 27 13:00); 50 mg (Aug 27 17:00); 50 mg (Aug 28 0:00); 50 mg (Aug 28 05:00); 50 mg (Aug 28 10:00)  Ativan – 2 mg (Aug 27 14:00)

## 2019-08-28 NOTE — PROGRESS NOTE BEHAVIORAL HEALTH - PROBLEM SELECTOR PLAN 1
Give Ativan 1 mg q 4 hrs prn withdrawal  Continue Librium 50 mg q 6 hrs. Do not taper given pt still exhibiting withdrawal symptoms.   Start Antabuse 250 mg qday on the day of discharge. Pt was counseled about risks and benefits of Antabuse.   SW consult to assist with referrals. Pt expresses interest in going back to  inpatient rehab if his insurance will cover the services.
Give Ativan 1 mg q 4 hrs prn withdrawal  Librium 50 mg q 8 hrs. Do not taper given pt still exhibiting withdrawal symptoms.   Encourage pt to consider Antabuse 250 mg qday on the day of discharge. Pt was counseled about risks and benefits of Antabuse.   SW consult to assist with referrals. Pt expresses interest in going back to  inpatient rehab if his insurance will cover the services.

## 2019-08-28 NOTE — PROGRESS NOTE BEHAVIORAL HEALTH - AXIS III
HIT, paroxsymal AFib (hx of LV thrombus, on Eliquis, most recent echo 8/2019 without evidence of thrombus), HLD, CAD s/p PCI, HFrEF (35-40% 8/12/2019) s/p AICD
HIT, paroxsymal AFib (hx of LV thrombus, on Eliquis, most recent echo 8/2019 without evidence of thrombus), HLD, CAD s/p PCI, HFrEF (35-40% 8/12/2019) s/p AICD

## 2019-08-28 NOTE — PROGRESS NOTE ADULT - SUBJECTIVE AND OBJECTIVE BOX
Pt is still shaky   but improving     PAST MEDICAL & SURGICAL HISTORY:  Hemorrhoid  Thrombus in heart chamber: LV  ICD (implantable cardioverter-defibrillator) in place  Pacemaker  ETOH abuse  Paroxysmal a-fib  Myocardial infarction involving other coronary artery  Gastritis  Atherosclerosis of coronary artery: Coronary artery disease  Automatic implantable cardiac defibrillator in situ: ICD (implantable cardioverter-defibrillator) in place      MEDICATIONS  (STANDING):  apixaban 5 milliGRAM(s) Oral every 12 hours  chlordiazePOXIDE 50 milliGRAM(s) Oral <User Schedule>  folic acid 1 milliGRAM(s) Oral daily  metoprolol succinate  milliGRAM(s) Oral daily  multivitamin 1 Tablet(s) Oral daily  thiamine 100 milliGRAM(s) Oral daily    MEDICATIONS  (PRN):  LORazepam   Injectable 2 milliGRAM(s) IV Push every 4 hours PRN CIWA-Ar score 8 or greater      ICU Vital Signs Last 24 Hrs  T(C): 36.4 (28 Aug 2019 07:57), Max: 36.6 (27 Aug 2019 09:25)  T(F): 97.6 (28 Aug 2019 07:57), Max: 97.8 (27 Aug 2019 09:25)  HR: 83 (28 Aug 2019 08:33) (77 - 102)  BP: 121/67 (28 Aug 2019 08:33) (96/66 - 129/81)  BP(mean): --  ABP: --  ABP(mean): --  RR: 18 (28 Aug 2019 08:33) (16 - 18)  SpO2: 97% (28 Aug 2019 08:33) (95% - 98%)      Lungs clear  CV   s1  s2     abd soft    Ext stable  CXR    no CHF     echo    35 - 40 %

## 2019-08-28 NOTE — PROGRESS NOTE BEHAVIORAL HEALTH - NSBHCHARTREVIEWVS_PSY_A_CORE FT
Vital Signs Last 24 Hrs  T(C): 36.6 (27 Aug 2019 14:15), Max: 37 (26 Aug 2019 14:52)  T(F): 97.8 (27 Aug 2019 14:15), Max: 98.6 (26 Aug 2019 14:52)  HR: 86 (27 Aug 2019 13:32) (77 - 97)  BP: 122/85 (27 Aug 2019 13:32) (116/82 - 133/82)  BP(mean): --  RR: 17 (27 Aug 2019 13:32) (16 - 18)  SpO2: 96% (27 Aug 2019 13:32) (95% - 97%)
Vital Signs Last 24 Hrs  T(C): 36.8 (28 Aug 2019 13:36), Max: 36.8 (28 Aug 2019 13:36)  T(F): 98.2 (28 Aug 2019 13:36), Max: 98.2 (28 Aug 2019 13:36)  HR: 76 (28 Aug 2019 13:15) (76 - 102)  BP: 112/63 (28 Aug 2019 13:15) (96/66 - 121/67)  BP(mean): --  RR: 18 (28 Aug 2019 13:15) (16 - 18)  SpO2: 98% (28 Aug 2019 13:15) (95% - 98%)

## 2019-08-28 NOTE — PROGRESS NOTE BEHAVIORAL HEALTH - NSBHADMITCOUNSEL_PSY_A_CORE
risks and benefits of treatment options/prognosis/instructions for management, treatment and follow up/risk factor reduction/client/family/caregiver education/diagnostic results/impressions and/or recommended studies/importance of adherence to chosen treatment
diagnostic results/impressions and/or recommended studies/client/family/caregiver education/prognosis/instructions for management, treatment and follow up/risk factor reduction/risks and benefits of treatment options/importance of adherence to chosen treatment

## 2019-08-28 NOTE — PROGRESS NOTE BEHAVIORAL HEALTH - SUMMARY
Patient is a 62 y/o male with hx of HIT, PMHx ETOH abuse, paroxsymal AFib (hx of LV thrombus, on Eliquis), HLD, CAD s/p PCI, HFrEF, s/p AICD (last interrogation 8/12/19- WNL), hemorrhoids, gastritis, recent admission 8/10-8/14/19 for alcohol withdrawal, presented on 8/26 with alcohol intoxication and s/p fall with head trauma.   Currently pt continues to exhibit withdrawal symptoms (tremors and tongue fasciculations, and tachycardia). Given his benzo requirements over the pst 24 hrs would not taper Librium as of now. Continue Librium 50 mg q 6 hrs and Ativan 1 mg q 4 hrs prn. CIWA.   Please provide pt with Antabuse prescription before discharge and give first dose prior to discharge.
Patient is a 64 y/o male with hx of HIT, PMHx ETOH abuse, paroxsymal AFib (hx of LV thrombus, on Eliquis), HLD, CAD s/p PCI, HFrEF, s/p AICD (last interrogation 8/12/19- WNL), hemorrhoids, gastritis, recent admission 8/10-8/14/19 for alcohol withdrawal, presented on 8/26 with alcohol intoxication and s/p fall with head trauma.   Currently pt with improvement in withdrawal symptoms.

## 2019-08-28 NOTE — PROGRESS NOTE BEHAVIORAL HEALTH - NSBHFUPINTERVALHXFT_PSY_A_CORE
Pt continues to look anxious. UE tremors noted on exam. He is expressing interest in starting Antabuse prior to discharge. Continues to state that he plans to contact [facility] inpatient rehab once he is discharged. Expresses motivation to remain sober.
Today the pt continues to demonstrate improvement of withdrawal symptoms; bilateral UE tremors and tongue fasciculations are diminished but still present. Patient endorses feelings of anxiety, he has multiple social stressors at home involving his son/family situation. He continues to express a desire to enter inpatient alcohol abuse rehab care and to get sober. He continues to have interest in Antabuse, but now thinks he should wait until he starts inpatient rehab care to begin course

## 2019-08-28 NOTE — PROGRESS NOTE ADULT - PROBLEM SELECTOR PLAN 3
Atrial thrombus history, not identified on most recent echo.    -Continue home Metoprolol 100mg QD  -Continue home Eliquis 5mg BID

## 2019-08-28 NOTE — PROGRESS NOTE ADULT - SUBJECTIVE AND OBJECTIVE BOX
OVERNIGHT EVENTS: No acute events overnight.    SUBJECTIVE: Patient seen and examined at the bedside. He is worried about his tremor not going away yet. No N/V/D/F/HA/CP/SOB.    Vital Signs Last 12 Hrs  T(F): 97.6 (08-28-19 @ 07:57), Max: 97.8 (08-28-19 @ 06:18)  HR: 83 (08-28-19 @ 08:33) (78 - 83)  BP: 121/67 (08-28-19 @ 08:33) (106/60 - 121/67)  BP(mean): --  RR: 18 (08-28-19 @ 08:33) (16 - 18)  SpO2: 97% (08-28-19 @ 08:33) (97% - 98%)  I&O's Summary    27 Aug 2019 07:01  -  28 Aug 2019 07:00  --------------------------------------------------------  IN: 360 mL / OUT: 0 mL / NET: 360 mL    28 Aug 2019 07:01  -  28 Aug 2019 10:59  --------------------------------------------------------  IN: 180 mL / OUT: 0 mL / NET: 180 mL        PHYSICAL EXAM:  General: Mildly anxious but resting in bed comfortably  HEENT: NCAT, PERRL, EOMI, no conjunctival pallor or scleral icterus, MMM  Neck: Supple, no JVD  Respiratory: Clear to auscultation bilaterally with no wheezes, rales, or rhonchi appreciated  Cardiovascular: RRR, normal S1 and S2, no murmurs, rubs, or gallops appreciated  Vascular: 2+ radial and DP pulses  Abdomen: Soft, NT/ND. Bowel sounds present in all four quadrants with no guarding, rebound tenderness, palpable masses noted  Extremities: Warm and well perfused. No clubbing, cyanosis, or edema noted  Skin: No gross skin abnormalities or rashes noted  Neuro: AAOx3. Strength and sensation intact throughout.  CIWA: 4 for moderate tremor, 1 for mild anxiety TOTAL = 5     LABS:                        12.5   4.05  )-----------( 142      ( 28 Aug 2019 08:35 )             41.0     08-28    140  |  100  |  12  ----------------------------<  111<H>  4.2   |  25  |  1.04    Ca    9.6      28 Aug 2019 08:46  Mg     1.5     08-28    TPro  7.8  /  Alb  4.4  /  TBili  1.0  /  DBili  x   /  AST  95<H>  /  ALT  61<H>  /  AlkPhos  76  08-28          RADIOLOGY & ADDITIONAL TESTS: Reviewed.    MEDICATIONS  (STANDING):  apixaban 5 milliGRAM(s) Oral every 12 hours  chlordiazePOXIDE 50 milliGRAM(s) Oral <User Schedule>  folic acid 1 milliGRAM(s) Oral daily  metoprolol succinate  milliGRAM(s) Oral daily  multivitamin 1 Tablet(s) Oral daily  thiamine 100 milliGRAM(s) Oral daily    MEDICATIONS  (PRN):  LORazepam   Injectable 2 milliGRAM(s) IV Push every 4 hours PRN CIWA-Ar score 8 or greater      Allergies    Capoten (Short breath; Rash; Hives)  heparin (Other (Mod to Severe))  penicillin (Short breath; Rash; Hives)    Intolerances

## 2019-08-29 LAB
ALBUMIN SERPL ELPH-MCNC: 4.3 G/DL — SIGNIFICANT CHANGE UP (ref 3.3–5)
ALP SERPL-CCNC: 71 U/L — SIGNIFICANT CHANGE UP (ref 40–120)
ALT FLD-CCNC: 56 U/L — HIGH (ref 10–45)
ANION GAP SERPL CALC-SCNC: 11 MMOL/L — SIGNIFICANT CHANGE UP (ref 5–17)
AST SERPL-CCNC: 72 U/L — HIGH (ref 10–40)
BASOPHILS # BLD AUTO: 0.01 K/UL — SIGNIFICANT CHANGE UP (ref 0–0.2)
BASOPHILS NFR BLD AUTO: 0.3 % — SIGNIFICANT CHANGE UP (ref 0–2)
BILIRUB DIRECT SERPL-MCNC: <0.2 MG/DL — SIGNIFICANT CHANGE UP (ref 0–0.2)
BILIRUB INDIRECT FLD-MCNC: >0.4 MG/DL — SIGNIFICANT CHANGE UP (ref 0.2–1)
BILIRUB SERPL-MCNC: 0.6 MG/DL — SIGNIFICANT CHANGE UP (ref 0.2–1.2)
BUN SERPL-MCNC: 13 MG/DL — SIGNIFICANT CHANGE UP (ref 7–23)
CALCIUM SERPL-MCNC: 9.7 MG/DL — SIGNIFICANT CHANGE UP (ref 8.4–10.5)
CHLORIDE SERPL-SCNC: 100 MMOL/L — SIGNIFICANT CHANGE UP (ref 96–108)
CO2 SERPL-SCNC: 25 MMOL/L — SIGNIFICANT CHANGE UP (ref 22–31)
CREAT SERPL-MCNC: 1.04 MG/DL — SIGNIFICANT CHANGE UP (ref 0.5–1.3)
EOSINOPHIL # BLD AUTO: 0.16 K/UL — SIGNIFICANT CHANGE UP (ref 0–0.5)
EOSINOPHIL NFR BLD AUTO: 4.3 % — SIGNIFICANT CHANGE UP (ref 0–6)
GLUCOSE SERPL-MCNC: 86 MG/DL — SIGNIFICANT CHANGE UP (ref 70–99)
HCT VFR BLD CALC: 40.9 % — SIGNIFICANT CHANGE UP (ref 39–50)
HGB BLD-MCNC: 12.6 G/DL — LOW (ref 13–17)
IMM GRANULOCYTES NFR BLD AUTO: 0.3 % — SIGNIFICANT CHANGE UP (ref 0–1.5)
LYMPHOCYTES # BLD AUTO: 1.15 K/UL — SIGNIFICANT CHANGE UP (ref 1–3.3)
LYMPHOCYTES # BLD AUTO: 31.1 % — SIGNIFICANT CHANGE UP (ref 13–44)
MAGNESIUM SERPL-MCNC: 1.8 MG/DL — SIGNIFICANT CHANGE UP (ref 1.6–2.6)
MCHC RBC-ENTMCNC: 24.6 PG — LOW (ref 27–34)
MCHC RBC-ENTMCNC: 30.8 GM/DL — LOW (ref 32–36)
MCV RBC AUTO: 79.7 FL — LOW (ref 80–100)
MONOCYTES # BLD AUTO: 0.3 K/UL — SIGNIFICANT CHANGE UP (ref 0–0.9)
MONOCYTES NFR BLD AUTO: 8.1 % — SIGNIFICANT CHANGE UP (ref 2–14)
NEUTROPHILS # BLD AUTO: 2.07 K/UL — SIGNIFICANT CHANGE UP (ref 1.8–7.4)
NEUTROPHILS NFR BLD AUTO: 55.9 % — SIGNIFICANT CHANGE UP (ref 43–77)
NRBC # BLD: 0 /100 WBCS — SIGNIFICANT CHANGE UP (ref 0–0)
PLATELET # BLD AUTO: 121 K/UL — LOW (ref 150–400)
POTASSIUM SERPL-MCNC: 4.2 MMOL/L — SIGNIFICANT CHANGE UP (ref 3.5–5.3)
POTASSIUM SERPL-SCNC: 4.2 MMOL/L — SIGNIFICANT CHANGE UP (ref 3.5–5.3)
PROT SERPL-MCNC: 7.7 G/DL — SIGNIFICANT CHANGE UP (ref 6–8.3)
RBC # BLD: 5.13 M/UL — SIGNIFICANT CHANGE UP (ref 4.2–5.8)
RBC # FLD: 20.5 % — HIGH (ref 10.3–14.5)
SODIUM SERPL-SCNC: 136 MMOL/L — SIGNIFICANT CHANGE UP (ref 135–145)
WBC # BLD: 3.7 K/UL — LOW (ref 3.8–10.5)
WBC # FLD AUTO: 3.7 K/UL — LOW (ref 3.8–10.5)

## 2019-08-29 RX ORDER — MAGNESIUM SULFATE 500 MG/ML
1 VIAL (ML) INJECTION ONCE
Refills: 0 | Status: COMPLETED | OUTPATIENT
Start: 2019-08-29 | End: 2019-08-29

## 2019-08-29 RX ADMIN — APIXABAN 5 MILLIGRAM(S): 2.5 TABLET, FILM COATED ORAL at 17:42

## 2019-08-29 RX ADMIN — Medication 50 MILLIGRAM(S): at 01:13

## 2019-08-29 RX ADMIN — Medication 100 GRAM(S): at 09:10

## 2019-08-29 RX ADMIN — Medication 100 MILLIGRAM(S): at 11:47

## 2019-08-29 RX ADMIN — Medication 50 MILLIGRAM(S): at 09:11

## 2019-08-29 RX ADMIN — APIXABAN 5 MILLIGRAM(S): 2.5 TABLET, FILM COATED ORAL at 06:20

## 2019-08-29 RX ADMIN — Medication 1 MILLIGRAM(S): at 11:47

## 2019-08-29 RX ADMIN — Medication 50 MILLIGRAM(S): at 17:42

## 2019-08-29 RX ADMIN — Medication 1 TABLET(S): at 11:47

## 2019-08-29 NOTE — PROGRESS NOTE ADULT - PROBLEM SELECTOR PLAN 2
No anginal symptoms at this time. EKG in ED showed Q wave and ST elevation in V1-V4 but unchanged from prior. Troponin negative.    -Continue holding Lipitor due to transaminitis

## 2019-08-29 NOTE — PROGRESS NOTE ADULT - SUBJECTIVE AND OBJECTIVE BOX
OVERNIGHT EVENTS: No acute events overnight.    SUBJECTIVE: Patient seen and examined at the bedside. He says the Librium is making him sleep too much. He has no other complaints at this time. No N/V/D/F/HA/CP/SOB.    Vital Signs Last 12 Hrs  T(F): 97 (08-29-19 @ 05:30), Max: 97.3 (08-28-19 @ 22:38)  HR: 80 (08-29-19 @ 05:15) (75 - 80)  BP: 108/70 (08-29-19 @ 05:15) (107/70 - 110/72)  BP(mean): --  RR: 16 (08-29-19 @ 05:15) (16 - 18)  SpO2: 99% (08-29-19 @ 05:15) (98% - 100%)  I&O's Summary    28 Aug 2019 07:01  -  29 Aug 2019 07:00  --------------------------------------------------------  IN: 500 mL / OUT: 0 mL / NET: 500 mL        PHYSICAL EXAM:  General: In no acute distress, resting comfortably in bed  HEENT: NCAT, PERRL, no conjunctival pallor or scleral icterus, MMM  Neck: Supple, no JVD  Respiratory: Clear to auscultation bilaterally with no wheezes, rales, or rhonchi appreciated  Cardiovascular: RRR, normal S1 and S2, no murmurs, rubs, or gallops appreciated  Vascular: 2+ radial and DP pulses  Abdomen: Soft, NT/ND. Bowel sounds present in all four quadrants with no guarding, rebound tenderness, palpable masses noted  Extremities: Warm and well perfused. No clubbing, cyanosis, or edema noted. Mild tremor of the left hand when outstretched.  Skin: No gross skin abnormalities or rashes noted  Neuro: AAOx3. Strength and sensation intact throughout.  CIWA: Mild tremor: 2, Mild anxiety:1, Total = 3 @ 7:30AM    LABS:                        12.6   3.70  )-----------( 121      ( 29 Aug 2019 06:16 )             40.9     08-29    136  |  100  |  13  ----------------------------<  86  4.2   |  25  |  1.04    Ca    9.7      29 Aug 2019 06:16  Mg     1.8     08-29    TPro  7.8  /  Alb  4.4  /  TBili  1.0  /  DBili  x   /  AST  95<H>  /  ALT  61<H>  /  AlkPhos  76  08-28          RADIOLOGY & ADDITIONAL TESTS: Reviewed.    MEDICATIONS  (STANDING):  apixaban 5 milliGRAM(s) Oral every 12 hours  chlordiazePOXIDE 50 milliGRAM(s) Oral every 8 hours  folic acid 1 milliGRAM(s) Oral daily  magnesium sulfate  IVPB 1 Gram(s) IV Intermittent once  metoprolol succinate  milliGRAM(s) Oral daily  multivitamin 1 Tablet(s) Oral daily  thiamine 100 milliGRAM(s) Oral daily    MEDICATIONS  (PRN):  LORazepam   Injectable 1 milliGRAM(s) IV Push every 4 hours PRN CIWA-Ar score 8 or greater      Allergies    Capoten (Short breath; Rash; Hives)  heparin (Other (Mod to Severe))  penicillin (Short breath; Rash; Hives)    Intolerances

## 2019-08-29 NOTE — PROGRESS NOTE ADULT - SUBJECTIVE AND OBJECTIVE BOX
Pt is improving slowly  re alcohol withdrawal       PAST MEDICAL & SURGICAL HISTORY:  Hemorrhoid  Thrombus in heart chamber: LV  ICD (implantable cardioverter-defibrillator) in place  Pacemaker  ETOH abuse  Paroxysmal a-fib  Myocardial infarction involving other coronary artery  Gastritis  Atherosclerosis of coronary artery: Coronary artery disease  Automatic implantable cardiac defibrillator in situ: ICD (implantable cardioverter-defibrillator) in place    Home Medications:  B Complex 50 oral tablet, extended release: 1 tab(s) orally once a day (26 Aug 2019 12:08)  Vitamin C 500 mg oral tablet: 1 tab(s) orally once a day (26 Aug 2019 12:08)      MEDICATIONS  (STANDING):  apixaban 5 milliGRAM(s) Oral every 12 hours  chlordiazePOXIDE 50 milliGRAM(s) Oral every 8 hours  folic acid 1 milliGRAM(s) Oral daily  magnesium sulfate  IVPB 1 Gram(s) IV Intermittent once  metoprolol succinate  milliGRAM(s) Oral daily  multivitamin 1 Tablet(s) Oral daily  thiamine 100 milliGRAM(s) Oral daily    MEDICATIONS  (PRN):  LORazepam   Injectable 1 milliGRAM(s) IV Push every 4 hours PRN CIWA-Ar score 8 or greater    ICU Vital Signs Last 24 Hrs  T(C): 36.1 (29 Aug 2019 05:30), Max: 36.8 (28 Aug 2019 13:36)  T(F): 97 (29 Aug 2019 05:30), Max: 98.2 (28 Aug 2019 13:36)  HR: 80 (29 Aug 2019 05:15) (75 - 91)  BP: 108/70 (29 Aug 2019 05:15) (107/70 - 123/71)  BP(mean): --  ABP: --  ABP(mean): --  RR: 16 (29 Aug 2019 05:15) (16 - 18)  SpO2: 99% (29 Aug 2019 05:15) (98% - 100%)        Lungs clear   CV   s1s2     abd soft  ext stable   CXR  no pulm vascular congestion   AICD   stable     EPS Dr Ramos  communicated that AICD has 8 month of life duration   left and will be followed                          12.6   3.70  )-----------( 121      ( 29 Aug 2019 06:16 )             40.9   08-29    136  |  100  |  13  ----------------------------<  86  4.2   |  25  |  1.04    Ca    9.7      29 Aug 2019 06:16  Mg     1.8     08-29    TPro  7.8  /  Alb  4.4  /  TBili  1.0  /  DBili  x   /  AST  95<H>  /  ALT  61<H>  /  AlkPhos  76  08-28  ECHO   EF  35 -40 %   normal RVF   trace MR TR  No clot seen

## 2019-08-29 NOTE — ED ADULT TRIAGE NOTE - NS ED TRIAGE AVPU SCALE
Alert-The patient is alert, awake and responds to voice. The patient is oriented to time, place, and person. The triage nurse is able to obtain subjective information.
Breath sounds clear and equal bilaterally.

## 2019-08-29 NOTE — PROGRESS NOTE ADULT - PROBLEM SELECTOR PLAN 5
Not currently bleeding. H/H stable so far.    No bowel regimen at this time as he is having regular bowel movements

## 2019-08-30 LAB
ALBUMIN SERPL ELPH-MCNC: 4.1 G/DL — SIGNIFICANT CHANGE UP (ref 3.3–5)
ALP SERPL-CCNC: 62 U/L — SIGNIFICANT CHANGE UP (ref 40–120)
ALT FLD-CCNC: 43 U/L — SIGNIFICANT CHANGE UP (ref 10–45)
ANION GAP SERPL CALC-SCNC: 9 MMOL/L — SIGNIFICANT CHANGE UP (ref 5–17)
AST SERPL-CCNC: 41 U/L — HIGH (ref 10–40)
BASOPHILS # BLD AUTO: 0.02 K/UL — SIGNIFICANT CHANGE UP (ref 0–0.2)
BASOPHILS NFR BLD AUTO: 0.5 % — SIGNIFICANT CHANGE UP (ref 0–2)
BILIRUB SERPL-MCNC: 0.4 MG/DL — SIGNIFICANT CHANGE UP (ref 0.2–1.2)
BUN SERPL-MCNC: 13 MG/DL — SIGNIFICANT CHANGE UP (ref 7–23)
CALCIUM SERPL-MCNC: 9.4 MG/DL — SIGNIFICANT CHANGE UP (ref 8.4–10.5)
CHLORIDE SERPL-SCNC: 101 MMOL/L — SIGNIFICANT CHANGE UP (ref 96–108)
CO2 SERPL-SCNC: 27 MMOL/L — SIGNIFICANT CHANGE UP (ref 22–31)
CREAT SERPL-MCNC: 1.15 MG/DL — SIGNIFICANT CHANGE UP (ref 0.5–1.3)
EOSINOPHIL # BLD AUTO: 0.11 K/UL — SIGNIFICANT CHANGE UP (ref 0–0.5)
EOSINOPHIL NFR BLD AUTO: 2.7 % — SIGNIFICANT CHANGE UP (ref 0–6)
GLUCOSE SERPL-MCNC: 92 MG/DL — SIGNIFICANT CHANGE UP (ref 70–99)
HCT VFR BLD CALC: 37.3 % — LOW (ref 39–50)
HGB BLD-MCNC: 11.2 G/DL — LOW (ref 13–17)
IMM GRANULOCYTES NFR BLD AUTO: 0.5 % — SIGNIFICANT CHANGE UP (ref 0–1.5)
LYMPHOCYTES # BLD AUTO: 1.28 K/UL — SIGNIFICANT CHANGE UP (ref 1–3.3)
LYMPHOCYTES # BLD AUTO: 31.8 % — SIGNIFICANT CHANGE UP (ref 13–44)
MAGNESIUM SERPL-MCNC: 1.7 MG/DL — SIGNIFICANT CHANGE UP (ref 1.6–2.6)
MCHC RBC-ENTMCNC: 24.1 PG — LOW (ref 27–34)
MCHC RBC-ENTMCNC: 30 GM/DL — LOW (ref 32–36)
MCV RBC AUTO: 80.4 FL — SIGNIFICANT CHANGE UP (ref 80–100)
MONOCYTES # BLD AUTO: 0.49 K/UL — SIGNIFICANT CHANGE UP (ref 0–0.9)
MONOCYTES NFR BLD AUTO: 12.2 % — SIGNIFICANT CHANGE UP (ref 2–14)
NEUTROPHILS # BLD AUTO: 2.11 K/UL — SIGNIFICANT CHANGE UP (ref 1.8–7.4)
NEUTROPHILS NFR BLD AUTO: 52.3 % — SIGNIFICANT CHANGE UP (ref 43–77)
NRBC # BLD: 0 /100 WBCS — SIGNIFICANT CHANGE UP (ref 0–0)
PLATELET # BLD AUTO: 121 K/UL — LOW (ref 150–400)
POTASSIUM SERPL-MCNC: 4.6 MMOL/L — SIGNIFICANT CHANGE UP (ref 3.5–5.3)
POTASSIUM SERPL-SCNC: 4.6 MMOL/L — SIGNIFICANT CHANGE UP (ref 3.5–5.3)
PROT SERPL-MCNC: 7 G/DL — SIGNIFICANT CHANGE UP (ref 6–8.3)
RBC # BLD: 4.64 M/UL — SIGNIFICANT CHANGE UP (ref 4.2–5.8)
RBC # FLD: 20.4 % — HIGH (ref 10.3–14.5)
SODIUM SERPL-SCNC: 137 MMOL/L — SIGNIFICANT CHANGE UP (ref 135–145)
WBC # BLD: 4.03 K/UL — SIGNIFICANT CHANGE UP (ref 3.8–10.5)
WBC # FLD AUTO: 4.03 K/UL — SIGNIFICANT CHANGE UP (ref 3.8–10.5)

## 2019-08-30 RX ORDER — MAGNESIUM SULFATE 500 MG/ML
2 VIAL (ML) INJECTION ONCE
Refills: 0 | Status: COMPLETED | OUTPATIENT
Start: 2019-08-30 | End: 2019-08-30

## 2019-08-30 RX ADMIN — Medication 1 MILLIGRAM(S): at 11:25

## 2019-08-30 RX ADMIN — Medication 100 MILLIGRAM(S): at 05:31

## 2019-08-30 RX ADMIN — APIXABAN 5 MILLIGRAM(S): 2.5 TABLET, FILM COATED ORAL at 17:17

## 2019-08-30 RX ADMIN — Medication 100 MILLIGRAM(S): at 11:25

## 2019-08-30 RX ADMIN — Medication 1 TABLET(S): at 11:25

## 2019-08-30 RX ADMIN — Medication 50 GRAM(S): at 11:49

## 2019-08-30 RX ADMIN — Medication 50 MILLIGRAM(S): at 09:08

## 2019-08-30 RX ADMIN — APIXABAN 5 MILLIGRAM(S): 2.5 TABLET, FILM COATED ORAL at 05:31

## 2019-08-30 RX ADMIN — Medication 50 MILLIGRAM(S): at 02:05

## 2019-08-30 RX ADMIN — Medication 50 MILLIGRAM(S): at 20:48

## 2019-08-30 NOTE — PROGRESS NOTE ADULT - SUBJECTIVE AND OBJECTIVE BOX
OVERNIGHT EVENTS: No acute events overnight.    SUBJECTIVE: Patient seen and examined at the bedside. Patient says he is starting to feel ready to go. No acute complaints. No N/V/D/CP/SOB    Vital Signs Last 12 Hrs  T(F): 97.9 (08-30-19 @ 05:09), Max: 98 (08-29-19 @ 21:54)  HR: 82 (08-30-19 @ 05:30) (70 - 82)  BP: 118/78 (08-30-19 @ 05:30) (102/67 - 118/78)  BP(mean): --  RR: 16 (08-30-19 @ 05:30) (15 - 16)  SpO2: 100% (08-30-19 @ 05:30) (100% - 100%)  I&O's Summary    29 Aug 2019 07:01  -  30 Aug 2019 07:00  --------------------------------------------------------  IN: 560 mL / OUT: 0 mL / NET: 560 mL        PHYSICAL EXAM:  General: In no acute distress, sitting on the side of the bed  HEENT: NCAT, PERRL, EOMI, no conjunctival pallor or scleral icterus, MMM  Neck: Supple, no JVD  Respiratory: Clear to auscultation bilaterally with no wheezes, rales, or rhonchi appreciated  Cardiovascular: RRR, normal S1 and S2, no murmurs, rubs, or gallops appreciated  Vascular: 2+ radial and DP pulses  Abdomen: Soft, NT/ND. Bowel sounds present in all four quadrants with no guarding, rebound tenderness, or palpable masses  Extremities: Warm and well perfused. No clubbing, cyanosis, or edema noted  Skin: No gross skin abnormalities or rashes noted  Neuro: AAOx3. Strength and sensation intact throughout.  CIWA: 1 point anxiety, 2 points tremor. Total = 3    LABS:                        12.6   3.70  )-----------( 121      ( 29 Aug 2019 06:16 )             40.9     08-29    136  |  100  |  13  ----------------------------<  86  4.2   |  25  |  1.04    Ca    9.7      29 Aug 2019 06:16  Mg     1.8     08-29    TPro  7.7  /  Alb  4.3  /  TBili  0.6  /  DBili  <0.2  /  AST  72<H>  /  ALT  56<H>  /  AlkPhos  71  08-29          RADIOLOGY & ADDITIONAL TESTS: Reviewed.    MEDICATIONS  (STANDING):  apixaban 5 milliGRAM(s) Oral every 12 hours  chlordiazePOXIDE 50 milliGRAM(s) Oral every 8 hours  folic acid 1 milliGRAM(s) Oral daily  metoprolol succinate  milliGRAM(s) Oral daily  multivitamin 1 Tablet(s) Oral daily  thiamine 100 milliGRAM(s) Oral daily    MEDICATIONS  (PRN):  LORazepam   Injectable 1 milliGRAM(s) IV Push every 4 hours PRN CIWA-Ar score 8 or greater      Allergies    Capoten (Short breath; Rash; Hives)  heparin (Other (Mod to Severe))  penicillin (Short breath; Rash; Hives)    Intolerances

## 2019-08-30 NOTE — PROGRESS NOTE ADULT - SUBJECTIVE AND OBJECTIVE BOX
Pt    is improved but  still   shaky and unsteady     PAST MEDICAL & SURGICAL HISTORY:  Hemorrhoid  Thrombus in heart chamber: LV  ICD (implantable cardioverter-defibrillator) in place  Pacemaker  ETOH abuse  Paroxysmal a-fib  Myocardial infarction involving other coronary artery  Gastritis  Atherosclerosis of coronary artery: Coronary artery disease  Automatic implantable cardiac defibrillator in situ: ICD (implantable cardioverter-defibrillator) in place    Home Medications:  B Complex 50 oral tablet, extended release: 1 tab(s) orally once a day (26 Aug 2019 12:08)  Vitamin C 500 mg oral tablet: 1 tab(s) orally once a day (26 Aug 2019 12:08)      MEDICATIONS  (STANDING):  apixaban 5 milliGRAM(s) Oral every 12 hours  chlordiazePOXIDE 50 milliGRAM(s) Oral every 8 hours  folic acid 1 milliGRAM(s) Oral daily  metoprolol succinate  milliGRAM(s) Oral daily  multivitamin 1 Tablet(s) Oral daily  thiamine 100 milliGRAM(s) Oral daily    MEDICATIONS  (PRN):  LORazepam   Injectable 1 milliGRAM(s) IV Push every 4 hours PRN CIWA-Ar score 8 or greater    ICU Vital Signs Last 24 Hrs  T(C): 36.6 (30 Aug 2019 05:09), Max: 37 (29 Aug 2019 09:09)  T(F): 97.9 (30 Aug 2019 05:09), Max: 98.6 (29 Aug 2019 09:09)  HR: 82 (30 Aug 2019 05:30) (70 - 91)  BP: 118/78 (30 Aug 2019 05:30) (102/67 - 130/87)  BP(mean): --  ABP: --  ABP(mean): --  RR: 16 (30 Aug 2019 05:30) (15 - 16)  SpO2: 100% (30 Aug 2019 05:30) (99% - 100%)      Lungs clear  Cv  s1s2     abd soft  ext  stable                            12.6   3.70  )-----------( 121      ( 29 Aug 2019 06:16 )             40.9       08-29    136  |  100  |  13  ----------------------------<  86  4.2   |  25  |  1.04    Ca    9.7      29 Aug 2019 06:16  Mg     1.8     08-29    TPro  7.7  /  Alb  4.3  /  TBili  0.6  /  DBili  <0.2  /  AST  72<H>  /  ALT  56<H>  /  AlkPhos  71  08-29      CARDIAC MARKERS ( 28 Aug 2019 08:46 )  x     / x     / 157 U/L / x     / x      CXR   AICD   no CHF     echo   EF   35-40 %   Normal RVF   trace MR TR AR   No thrombus

## 2019-08-31 LAB
ALBUMIN SERPL ELPH-MCNC: 4.1 G/DL — SIGNIFICANT CHANGE UP (ref 3.3–5)
ALP SERPL-CCNC: 65 U/L — SIGNIFICANT CHANGE UP (ref 40–120)
ALT FLD-CCNC: 43 U/L — SIGNIFICANT CHANGE UP (ref 10–45)
ANION GAP SERPL CALC-SCNC: 9 MMOL/L — SIGNIFICANT CHANGE UP (ref 5–17)
AST SERPL-CCNC: 39 U/L — SIGNIFICANT CHANGE UP (ref 10–40)
BASOPHILS # BLD AUTO: 0.02 K/UL — SIGNIFICANT CHANGE UP (ref 0–0.2)
BASOPHILS NFR BLD AUTO: 0.5 % — SIGNIFICANT CHANGE UP (ref 0–2)
BILIRUB SERPL-MCNC: 0.5 MG/DL — SIGNIFICANT CHANGE UP (ref 0.2–1.2)
BUN SERPL-MCNC: 10 MG/DL — SIGNIFICANT CHANGE UP (ref 7–23)
CALCIUM SERPL-MCNC: 9.6 MG/DL — SIGNIFICANT CHANGE UP (ref 8.4–10.5)
CHLORIDE SERPL-SCNC: 101 MMOL/L — SIGNIFICANT CHANGE UP (ref 96–108)
CO2 SERPL-SCNC: 26 MMOL/L — SIGNIFICANT CHANGE UP (ref 22–31)
CREAT SERPL-MCNC: 1.07 MG/DL — SIGNIFICANT CHANGE UP (ref 0.5–1.3)
EOSINOPHIL # BLD AUTO: 0.11 K/UL — SIGNIFICANT CHANGE UP (ref 0–0.5)
EOSINOPHIL NFR BLD AUTO: 2.9 % — SIGNIFICANT CHANGE UP (ref 0–6)
GLUCOSE SERPL-MCNC: 100 MG/DL — HIGH (ref 70–99)
HCT VFR BLD CALC: 39.5 % — SIGNIFICANT CHANGE UP (ref 39–50)
HGB BLD-MCNC: 12.2 G/DL — LOW (ref 13–17)
IMM GRANULOCYTES NFR BLD AUTO: 0.3 % — SIGNIFICANT CHANGE UP (ref 0–1.5)
LYMPHOCYTES # BLD AUTO: 1.29 K/UL — SIGNIFICANT CHANGE UP (ref 1–3.3)
LYMPHOCYTES # BLD AUTO: 33.5 % — SIGNIFICANT CHANGE UP (ref 13–44)
MAGNESIUM SERPL-MCNC: 1.8 MG/DL — SIGNIFICANT CHANGE UP (ref 1.6–2.6)
MCHC RBC-ENTMCNC: 24.5 PG — LOW (ref 27–34)
MCHC RBC-ENTMCNC: 30.9 GM/DL — LOW (ref 32–36)
MCV RBC AUTO: 79.5 FL — LOW (ref 80–100)
MONOCYTES # BLD AUTO: 0.48 K/UL — SIGNIFICANT CHANGE UP (ref 0–0.9)
MONOCYTES NFR BLD AUTO: 12.5 % — SIGNIFICANT CHANGE UP (ref 2–14)
NEUTROPHILS # BLD AUTO: 1.94 K/UL — SIGNIFICANT CHANGE UP (ref 1.8–7.4)
NEUTROPHILS NFR BLD AUTO: 50.3 % — SIGNIFICANT CHANGE UP (ref 43–77)
NRBC # BLD: 0 /100 WBCS — SIGNIFICANT CHANGE UP (ref 0–0)
PLATELET # BLD AUTO: 109 K/UL — LOW (ref 150–400)
POTASSIUM SERPL-MCNC: 4.7 MMOL/L — SIGNIFICANT CHANGE UP (ref 3.5–5.3)
POTASSIUM SERPL-SCNC: 4.7 MMOL/L — SIGNIFICANT CHANGE UP (ref 3.5–5.3)
PROT SERPL-MCNC: 7.5 G/DL — SIGNIFICANT CHANGE UP (ref 6–8.3)
RBC # BLD: 4.97 M/UL — SIGNIFICANT CHANGE UP (ref 4.2–5.8)
RBC # FLD: 20.9 % — HIGH (ref 10.3–14.5)
SODIUM SERPL-SCNC: 136 MMOL/L — SIGNIFICANT CHANGE UP (ref 135–145)
WBC # BLD: 3.85 K/UL — SIGNIFICANT CHANGE UP (ref 3.8–10.5)
WBC # FLD AUTO: 3.85 K/UL — SIGNIFICANT CHANGE UP (ref 3.8–10.5)

## 2019-08-31 RX ORDER — MAGNESIUM SULFATE 500 MG/ML
1 VIAL (ML) INJECTION ONCE
Refills: 0 | Status: COMPLETED | OUTPATIENT
Start: 2019-08-31 | End: 2019-09-01

## 2019-08-31 RX ADMIN — Medication 50 MILLIGRAM(S): at 08:56

## 2019-08-31 RX ADMIN — Medication 100 MILLIGRAM(S): at 18:26

## 2019-08-31 RX ADMIN — APIXABAN 5 MILLIGRAM(S): 2.5 TABLET, FILM COATED ORAL at 06:04

## 2019-08-31 RX ADMIN — Medication 1 TABLET(S): at 18:27

## 2019-08-31 RX ADMIN — Medication 1 MILLIGRAM(S): at 18:27

## 2019-08-31 RX ADMIN — Medication 100 MILLIGRAM(S): at 06:04

## 2019-08-31 RX ADMIN — Medication 25 MILLIGRAM(S): at 20:59

## 2019-08-31 RX ADMIN — APIXABAN 5 MILLIGRAM(S): 2.5 TABLET, FILM COATED ORAL at 18:27

## 2019-08-31 NOTE — PROGRESS NOTE ADULT - ATTENDING COMMENTS
Pt seen and examined at bedside.  Case d/w nurse.  Pt had some LE weakness with ambulation this am, but it has improved as the day has progressed. Also there was some concern for tachycardia with ambulation this am.  Denies dizziness at this time.  Pt reports good po intake of meals today.  Reassess patients VS's with ambulation in am.

## 2019-08-31 NOTE — PROGRESS NOTE ADULT - PROBLEM SELECTOR PLAN 4
Euvolemic, BNP normal, CXR clear. Last ECHO 8/12/2019 with EF of 35-40%. AICD last interrogated 8/12/19, no events, needs battery change within a year.    -Continue home Metoprolol  -No ACE-I/ARB due to Captopril allergy  -Continue with daily weights

## 2019-08-31 NOTE — PROGRESS NOTE ADULT - SUBJECTIVE AND OBJECTIVE BOX
OVERNIGHT EVENTS: No acute events overnight.    SUBJECTIVE: Patient seen and examined at the bedside. He says he slept only a few hours despite the Librium being moved to a nighttime dose. He says he thinks Librium makes him unsteady. No N/V/D/CP/SOB    Vital Signs Last 12 Hrs  T(F): 98.7 (08-31-19 @ 09:04), Max: 98.7 (08-31-19 @ 09:04)  HR: 101 (08-31-19 @ 08:56) (66 - 101)  BP: 131/86 (08-31-19 @ 08:56) (109/69 - 131/86)  BP(mean): --  RR: 18 (08-31-19 @ 08:56) (18 - 18)  SpO2: 96% (08-31-19 @ 08:56) (96% - 97%)  I&O's Summary    30 Aug 2019 07:01  -  31 Aug 2019 07:00  --------------------------------------------------------  IN: 470 mL / OUT: 0 mL / NET: 470 mL    31 Aug 2019 07:01  -  31 Aug 2019 12:12  --------------------------------------------------------  IN: 478 mL / OUT: 0 mL / NET: 478 mL        PHYSICAL EXAM:  General: In no acute distress, resting comfortably in bed  HEENT: NCAT, PERRL, EOMI, no conjunctival pallor or scleral icterus, MMM  Neck: Supple, no JVD  Respiratory: Clear to auscultation bilaterally with no wheezes, rales, or rhonchi appreciated  Cardiovascular: RRR, normal S1 and S2, no murmurs, rubs, or gallops appreciated  Vascular: 2+ radial and DP pulses  Abdomen: Soft, NT/ND. Bowel sounds present in all four quadrants with no guarding, rebound tenderness, or palpable masses  Extremities: Warm and well perfused. No clubbing, cyanosis, or edema noted. Mild tremor noted when arms outstretched  Skin: No gross skin abnormalities or rashes noted  Neuro: AAOx3  Strength and sensation intact throughout.  CIWA: 4 for moderate tremor and 1 for anxiety. Total = 5    LABS:                        12.2   3.85  )-----------( 109      ( 31 Aug 2019 07:59 )             39.5     08-31    136  |  101  |  10  ----------------------------<  100<H>  4.7   |  26  |  1.07    Ca    9.6      31 Aug 2019 07:59  Mg     1.8     08-31    TPro  7.5  /  Alb  4.1  /  TBili  0.5  /  DBili  x   /  AST  39  /  ALT  43  /  AlkPhos  65  08-31          RADIOLOGY & ADDITIONAL TESTS: Reviewed.    MEDICATIONS  (STANDING):  apixaban 5 milliGRAM(s) Oral every 12 hours  folic acid 1 milliGRAM(s) Oral daily  magnesium sulfate  IVPB 1 Gram(s) IV Intermittent once  metoprolol succinate  milliGRAM(s) Oral daily  multivitamin 1 Tablet(s) Oral daily  thiamine 100 milliGRAM(s) Oral daily    MEDICATIONS  (PRN):  LORazepam   Injectable 1 milliGRAM(s) IV Push every 4 hours PRN CIWA-Ar score 8 or greater      Allergies    Capoten (Short breath; Rash; Hives)  heparin (Other (Mod to Severe))  penicillin (Short breath; Rash; Hives)    Intolerances

## 2019-09-01 LAB
ALBUMIN SERPL ELPH-MCNC: 4.1 G/DL — SIGNIFICANT CHANGE UP (ref 3.3–5)
ALP SERPL-CCNC: 65 U/L — SIGNIFICANT CHANGE UP (ref 40–120)
ALT FLD-CCNC: 38 U/L — SIGNIFICANT CHANGE UP (ref 10–45)
ANION GAP SERPL CALC-SCNC: 9 MMOL/L — SIGNIFICANT CHANGE UP (ref 5–17)
AST SERPL-CCNC: 40 U/L — SIGNIFICANT CHANGE UP (ref 10–40)
BASOPHILS # BLD AUTO: 0.02 K/UL — SIGNIFICANT CHANGE UP (ref 0–0.2)
BASOPHILS NFR BLD AUTO: 0.6 % — SIGNIFICANT CHANGE UP (ref 0–2)
BILIRUB SERPL-MCNC: 0.4 MG/DL — SIGNIFICANT CHANGE UP (ref 0.2–1.2)
BUN SERPL-MCNC: 11 MG/DL — SIGNIFICANT CHANGE UP (ref 7–23)
CALCIUM SERPL-MCNC: 9.4 MG/DL — SIGNIFICANT CHANGE UP (ref 8.4–10.5)
CHLORIDE SERPL-SCNC: 100 MMOL/L — SIGNIFICANT CHANGE UP (ref 96–108)
CO2 SERPL-SCNC: 26 MMOL/L — SIGNIFICANT CHANGE UP (ref 22–31)
CREAT SERPL-MCNC: 1.02 MG/DL — SIGNIFICANT CHANGE UP (ref 0.5–1.3)
EOSINOPHIL # BLD AUTO: 0.08 K/UL — SIGNIFICANT CHANGE UP (ref 0–0.5)
EOSINOPHIL NFR BLD AUTO: 2.4 % — SIGNIFICANT CHANGE UP (ref 0–6)
GLUCOSE SERPL-MCNC: 101 MG/DL — HIGH (ref 70–99)
HCT VFR BLD CALC: 41 % — SIGNIFICANT CHANGE UP (ref 39–50)
HGB BLD-MCNC: 12.5 G/DL — LOW (ref 13–17)
IMM GRANULOCYTES NFR BLD AUTO: 0.3 % — SIGNIFICANT CHANGE UP (ref 0–1.5)
LYMPHOCYTES # BLD AUTO: 1.16 K/UL — SIGNIFICANT CHANGE UP (ref 1–3.3)
LYMPHOCYTES # BLD AUTO: 34.7 % — SIGNIFICANT CHANGE UP (ref 13–44)
MAGNESIUM SERPL-MCNC: 1.9 MG/DL — SIGNIFICANT CHANGE UP (ref 1.6–2.6)
MCHC RBC-ENTMCNC: 24.3 PG — LOW (ref 27–34)
MCHC RBC-ENTMCNC: 30.5 GM/DL — LOW (ref 32–36)
MCV RBC AUTO: 79.6 FL — LOW (ref 80–100)
MONOCYTES # BLD AUTO: 0.44 K/UL — SIGNIFICANT CHANGE UP (ref 0–0.9)
MONOCYTES NFR BLD AUTO: 13.2 % — SIGNIFICANT CHANGE UP (ref 2–14)
NEUTROPHILS # BLD AUTO: 1.63 K/UL — LOW (ref 1.8–7.4)
NEUTROPHILS NFR BLD AUTO: 48.8 % — SIGNIFICANT CHANGE UP (ref 43–77)
NRBC # BLD: 0 /100 WBCS — SIGNIFICANT CHANGE UP (ref 0–0)
PLATELET # BLD AUTO: 130 K/UL — LOW (ref 150–400)
POTASSIUM SERPL-MCNC: 4.4 MMOL/L — SIGNIFICANT CHANGE UP (ref 3.5–5.3)
POTASSIUM SERPL-SCNC: 4.4 MMOL/L — SIGNIFICANT CHANGE UP (ref 3.5–5.3)
PROT SERPL-MCNC: 7.5 G/DL — SIGNIFICANT CHANGE UP (ref 6–8.3)
RBC # BLD: 5.15 M/UL — SIGNIFICANT CHANGE UP (ref 4.2–5.8)
RBC # FLD: 20.8 % — HIGH (ref 10.3–14.5)
SODIUM SERPL-SCNC: 135 MMOL/L — SIGNIFICANT CHANGE UP (ref 135–145)
WBC # BLD: 3.34 K/UL — LOW (ref 3.8–10.5)
WBC # FLD AUTO: 3.34 K/UL — LOW (ref 3.8–10.5)

## 2019-09-01 RX ADMIN — Medication 100 MILLIGRAM(S): at 10:15

## 2019-09-01 RX ADMIN — Medication 25 MILLIGRAM(S): at 09:32

## 2019-09-01 RX ADMIN — Medication 1 TABLET(S): at 10:15

## 2019-09-01 RX ADMIN — APIXABAN 5 MILLIGRAM(S): 2.5 TABLET, FILM COATED ORAL at 17:54

## 2019-09-01 RX ADMIN — APIXABAN 5 MILLIGRAM(S): 2.5 TABLET, FILM COATED ORAL at 06:42

## 2019-09-01 RX ADMIN — Medication 25 MILLIGRAM(S): at 17:54

## 2019-09-01 RX ADMIN — Medication 1 MILLIGRAM(S): at 10:15

## 2019-09-01 RX ADMIN — Medication 100 GRAM(S): at 06:42

## 2019-09-01 RX ADMIN — Medication 100 MILLIGRAM(S): at 06:42

## 2019-09-02 ENCOUNTER — TRANSCRIPTION ENCOUNTER (OUTPATIENT)
Age: 64
End: 2019-09-02

## 2019-09-02 VITALS — TEMPERATURE: 97 F

## 2019-09-02 LAB
ALBUMIN SERPL ELPH-MCNC: 4.1 G/DL — SIGNIFICANT CHANGE UP (ref 3.3–5)
ALP SERPL-CCNC: 57 U/L — SIGNIFICANT CHANGE UP (ref 40–120)
ALT FLD-CCNC: 30 U/L — SIGNIFICANT CHANGE UP (ref 10–45)
ANION GAP SERPL CALC-SCNC: 7 MMOL/L — SIGNIFICANT CHANGE UP (ref 5–17)
ANION GAP SERPL CALC-SCNC: 9 MMOL/L — SIGNIFICANT CHANGE UP (ref 5–17)
AST SERPL-CCNC: 26 U/L — SIGNIFICANT CHANGE UP (ref 10–40)
BILIRUB SERPL-MCNC: 0.3 MG/DL — SIGNIFICANT CHANGE UP (ref 0.2–1.2)
BUN SERPL-MCNC: 14 MG/DL — SIGNIFICANT CHANGE UP (ref 7–23)
BUN SERPL-MCNC: 15 MG/DL — SIGNIFICANT CHANGE UP (ref 7–23)
CALCIUM SERPL-MCNC: 9.1 MG/DL — SIGNIFICANT CHANGE UP (ref 8.4–10.5)
CALCIUM SERPL-MCNC: 9.4 MG/DL — SIGNIFICANT CHANGE UP (ref 8.4–10.5)
CHLORIDE SERPL-SCNC: 100 MMOL/L — SIGNIFICANT CHANGE UP (ref 96–108)
CHLORIDE SERPL-SCNC: 101 MMOL/L — SIGNIFICANT CHANGE UP (ref 96–108)
CO2 SERPL-SCNC: 25 MMOL/L — SIGNIFICANT CHANGE UP (ref 22–31)
CO2 SERPL-SCNC: 26 MMOL/L — SIGNIFICANT CHANGE UP (ref 22–31)
CREAT SERPL-MCNC: 1.06 MG/DL — SIGNIFICANT CHANGE UP (ref 0.5–1.3)
CREAT SERPL-MCNC: 1.24 MG/DL — SIGNIFICANT CHANGE UP (ref 0.5–1.3)
GLUCOSE SERPL-MCNC: 102 MG/DL — HIGH (ref 70–99)
GLUCOSE SERPL-MCNC: 96 MG/DL — SIGNIFICANT CHANGE UP (ref 70–99)
HCT VFR BLD CALC: 37.5 % — LOW (ref 39–50)
HGB BLD-MCNC: 11.8 G/DL — LOW (ref 13–17)
MAGNESIUM SERPL-MCNC: 1.7 MG/DL — SIGNIFICANT CHANGE UP (ref 1.6–2.6)
MCHC RBC-ENTMCNC: 25.1 PG — LOW (ref 27–34)
MCHC RBC-ENTMCNC: 31.5 GM/DL — LOW (ref 32–36)
MCV RBC AUTO: 79.6 FL — LOW (ref 80–100)
NRBC # BLD: 0 /100 WBCS — SIGNIFICANT CHANGE UP (ref 0–0)
PHOSPHATE SERPL-MCNC: 3.5 MG/DL — SIGNIFICANT CHANGE UP (ref 2.5–4.5)
PLATELET # BLD AUTO: 141 K/UL — LOW (ref 150–400)
POTASSIUM SERPL-MCNC: 4.6 MMOL/L — SIGNIFICANT CHANGE UP (ref 3.5–5.3)
POTASSIUM SERPL-MCNC: 5.5 MMOL/L — HIGH (ref 3.5–5.3)
POTASSIUM SERPL-SCNC: 4.6 MMOL/L — SIGNIFICANT CHANGE UP (ref 3.5–5.3)
POTASSIUM SERPL-SCNC: 5.5 MMOL/L — HIGH (ref 3.5–5.3)
PROT SERPL-MCNC: 7.1 G/DL — SIGNIFICANT CHANGE UP (ref 6–8.3)
RBC # BLD: 4.71 M/UL — SIGNIFICANT CHANGE UP (ref 4.2–5.8)
RBC # FLD: 20.3 % — HIGH (ref 10.3–14.5)
SODIUM SERPL-SCNC: 133 MMOL/L — LOW (ref 135–145)
SODIUM SERPL-SCNC: 135 MMOL/L — SIGNIFICANT CHANGE UP (ref 135–145)
WBC # BLD: 3.93 K/UL — SIGNIFICANT CHANGE UP (ref 3.8–10.5)
WBC # FLD AUTO: 3.93 K/UL — SIGNIFICANT CHANGE UP (ref 3.8–10.5)

## 2019-09-02 PROCEDURE — 82962 GLUCOSE BLOOD TEST: CPT

## 2019-09-02 PROCEDURE — 85730 THROMBOPLASTIN TIME PARTIAL: CPT

## 2019-09-02 PROCEDURE — 96361 HYDRATE IV INFUSION ADD-ON: CPT

## 2019-09-02 PROCEDURE — 85610 PROTHROMBIN TIME: CPT

## 2019-09-02 PROCEDURE — 83735 ASSAY OF MAGNESIUM: CPT

## 2019-09-02 PROCEDURE — 96375 TX/PRO/DX INJ NEW DRUG ADDON: CPT

## 2019-09-02 PROCEDURE — 84484 ASSAY OF TROPONIN QUANT: CPT

## 2019-09-02 PROCEDURE — 84100 ASSAY OF PHOSPHORUS: CPT

## 2019-09-02 PROCEDURE — 85027 COMPLETE CBC AUTOMATED: CPT

## 2019-09-02 PROCEDURE — 99285 EMERGENCY DEPT VISIT HI MDM: CPT | Mod: 25

## 2019-09-02 PROCEDURE — 82550 ASSAY OF CK (CPK): CPT

## 2019-09-02 PROCEDURE — 93005 ELECTROCARDIOGRAM TRACING: CPT

## 2019-09-02 PROCEDURE — 83690 ASSAY OF LIPASE: CPT

## 2019-09-02 PROCEDURE — 70450 CT HEAD/BRAIN W/O DYE: CPT

## 2019-09-02 PROCEDURE — 81001 URINALYSIS AUTO W/SCOPE: CPT

## 2019-09-02 PROCEDURE — 83880 ASSAY OF NATRIURETIC PEPTIDE: CPT

## 2019-09-02 PROCEDURE — 80076 HEPATIC FUNCTION PANEL: CPT

## 2019-09-02 PROCEDURE — 71045 X-RAY EXAM CHEST 1 VIEW: CPT

## 2019-09-02 PROCEDURE — 80307 DRUG TEST PRSMV CHEM ANLYZR: CPT

## 2019-09-02 PROCEDURE — 80053 COMPREHEN METABOLIC PANEL: CPT

## 2019-09-02 PROCEDURE — 85025 COMPLETE CBC W/AUTO DIFF WBC: CPT

## 2019-09-02 PROCEDURE — 80048 BASIC METABOLIC PNL TOTAL CA: CPT

## 2019-09-02 PROCEDURE — 96374 THER/PROPH/DIAG INJ IV PUSH: CPT

## 2019-09-02 PROCEDURE — 36415 COLL VENOUS BLD VENIPUNCTURE: CPT

## 2019-09-02 RX ORDER — MAGNESIUM SULFATE 500 MG/ML
2 VIAL (ML) INJECTION ONCE
Refills: 0 | Status: COMPLETED | OUTPATIENT
Start: 2019-09-02 | End: 2019-09-02

## 2019-09-02 RX ADMIN — Medication 1 MILLIGRAM(S): at 09:43

## 2019-09-02 RX ADMIN — APIXABAN 5 MILLIGRAM(S): 2.5 TABLET, FILM COATED ORAL at 06:56

## 2019-09-02 RX ADMIN — Medication 100 MILLIGRAM(S): at 09:43

## 2019-09-02 RX ADMIN — Medication 25 MILLIGRAM(S): at 06:58

## 2019-09-02 RX ADMIN — Medication 50 GRAM(S): at 09:43

## 2019-09-02 RX ADMIN — Medication 1 TABLET(S): at 09:43

## 2019-09-02 RX ADMIN — Medication 100 MILLIGRAM(S): at 06:56

## 2019-09-02 NOTE — PROGRESS NOTE ADULT - PROBLEM SELECTOR PROBLEM 3
Paroxysmal a-fib

## 2019-09-02 NOTE — PROGRESS NOTE ADULT - PROBLEM SELECTOR PROBLEM 1
Alcohol withdrawal syndrome without complication

## 2019-09-02 NOTE — PROGRESS NOTE ADULT - PROBLEM SELECTOR PLAN 1
His last drink was at 10PM on 8/25/19. He has received 250mg of Librium and 5mg of Ativan since 12PM on 8/26/19 and it is currently 4PM 8/27/19. Due to concussion history, patient is high risk for DTs.    -Continue Librium 50mg Q6H  -Continue Ativan 2mg IV Q4H for CIWA >8  -Trend LFTs  -Continue MVI, Thiamine, Folic Acid  -Follow up social work for alcohol cessation program  -Caution with IVF due to CHF history
EtoH level 179 and CIWA 16 on admission, s/p Librium 50mg, Ativan 1mg IV, banana bag 100cc/hr. CIWA 7 on 8/27 AM and given Ativan 1mg IV x 1  - Continue 50mg q6 for now as per Dr Brito  - Ativan 2mg IV every 4 hours PRN for CIWA >8  - Transaminitis likely 2/2 EtoH abuse, trend LFTs  - Anion gap 22 (bicarb low normal 22)- likely 2/2 ketoacidosis i/s/o chronic alcohol abuse (+ketones in urine)- improved to 11 on 8/27 AM  - cautious IV fluids due to chronic systolic CHF (not currently in exacerbation)- Continue MVI, Thiamine, Folic Acid  - SW consult for alcohol cessation program- this is patient's 2nd admission for ETOH in 1 month after approx 8 months of sobriety s/p BI inpatient detox  - psych recs appreciated- continue Librium 50 Q6H with Ativan 1-2mg IV PRN for CIWA. Plan to start Antabuse (Disulfiram) 250mg daily upon discharge. Goal is to start it once pt's Librium taper is complete and pt is anticipated being discharged from the hospital.   - SW to assist in finding outpatient rehab programs covered by pt's insurance  - of note, pt has hx of concussions and is therefore high risk for developing DTs
His last drink was at 10PM on 8/25/19. Due to concussion history, patient is high risk for DTs.    -Continue Librium 50mg Q6H  -Continue Ativan 2mg IV Q4H for CIWA >8  -Trend LFTs daily  -Continue MVI, Thiamine, Folic Acid  -Follow up social work for alcohol cessation program  -Caution with IVF due to CHF history
His last drink was at 10PM on 8/25/19. Due to concussion history, patient is high risk for DTs. Librium tapered to Q8H last few days, tapered to Q12 8/30 PM.    -continue with Librium 25mg Q12H and monitor for withdrawal symptoms  -Continue Ativan 1mg IV Q4H for CIWA >8  -Trend LFTs daily  -Continue MVI, Thiamine, Folic Acid  -Follow up social work for alcohol cessation program  -Caution with IVF due to CHF history
His last drink was at 10PM on 8/25/19. Due to concussion history, patient is high risk for DTs. Librium tapered to Q8H last few days, tapered to Q12 8/30 PM.    -continue with Librium Q12H and monitor for withdrawal symptoms  -Continue Ativan 1mg IV Q4H for CIWA >8  -Trend LFTs daily  -Continue MVI, Thiamine, Folic Acid  -Follow up social work for alcohol cessation program  -Caution with IVF due to CHF history
His last drink was at 10PM on 8/25/19. Due to concussion history, patient is high risk for DTs.    -Continue Librium 50mg Q6H per recommendations, taper as advised 8/30  -Continue Ativan 1mg IV Q4H for CIWA >8  -Trend LFTs daily  -Continue MVI, Thiamine, Folic Acid  -Follow up social work for alcohol cessation program  -Caution with IVF due to CHF history
His last drink was at 10PM on 8/25/19. Due to concussion history, patient is high risk for DTs.    -Continue Librium 50mg Q6H per recommendations  -Changed Ativan 2mg IV Q4H for CIWA >8 to 1mg Q4H  -Trend LFTs daily  -Continue MVI, Thiamine, Folic Acid  -Follow up social work for alcohol cessation program  -Caution with IVF due to CHF history

## 2019-09-02 NOTE — PROGRESS NOTE ADULT - PROBLEM SELECTOR PLAN 7
1) PCP Contacted on Admission: (Y/N) --> Name & Phone #: Ck Levy  2) Date of Contact with PCP: 8/25/19  3) PCP Contacted at Discharge: (Y/N)  4) Summary of Handoff Given to PCP:   5) Post-Discharge Appointment Date and Location:

## 2019-09-02 NOTE — DISCHARGE NOTE PROVIDER - NSDCCPTREATMENT_GEN_ALL_CORE_FT
PRINCIPAL PROCEDURE  Procedure: CT head wo con  Findings and Treatment: Impression: No acute intracranial hemorrhage or calvarial fracture

## 2019-09-02 NOTE — PROGRESS NOTE ADULT - PROBLEM SELECTOR PLAN 6
F: None  E: Replete as needed  N: DASH/TLC    Code: FULL  Dispo: [facility]
F: None  E: Replete as needed  N: DASH/TLC    Code: FULL  Dispo: [facility]
F: None  E: Replete as needed  N: DASH/TLC    Code: FULL  Dispo: [facility]
F: None  E: Replete as needed  N: DASH/TLC    Code: FULL  Dispo: [facility]
F: None  E: Replete as needed  N: DASH/TLC    Code: FULL  Dispo: [facility]
F: None  E: Replete as needed  N: DASH/TLC    Code: FULL  Dispo: [facility]

## 2019-09-02 NOTE — DISCHARGE NOTE PROVIDER - NSDCCONDITION_GEN_ALL_CORE
Stable
I have reviewed and confirmed nurses' notes for patient's medications, allergies, medical history, and surgical history.

## 2019-09-02 NOTE — PROGRESS NOTE ADULT - ATTENDING COMMENTS
case reviewed Pt seen while walking halls.  Denies dizziness or palpitations. HR stable/regular.  Still with some hand tremors, but likely his baseline.  Chart reviewed at length.  VS's stable. Na and K normalized and Creatinine decreased.  Pt appears ready for discharge at this time.

## 2019-09-02 NOTE — PROGRESS NOTE ADULT - REASON FOR ADMISSION
alcohol withdrawal

## 2019-09-02 NOTE — DISCHARGE NOTE PROVIDER - NSDCFUADDAPPT_GEN_ALL_CORE_FT
Please call Dr. Levy to schedule a follow-up appointment from this hospitalization and to discuss when you can restart your Lipitor.

## 2019-09-02 NOTE — PROGRESS NOTE ADULT - PROBLEM SELECTOR PROBLEM 4
Chronic systolic heart failure
The patient is a 26y Male complaining of chest pain.

## 2019-09-02 NOTE — DISCHARGE NOTE NURSING/CASE MANAGEMENT/SOCIAL WORK - PATIENT PORTAL LINK FT
You can access the FollowMyHealth Patient Portal offered by [facility] by registering at the following website: [email or web address]. By joining [facility]’s FollowMyHealth portal, you will also be able to view your health information using other applications (apps) compatible with our system.

## 2019-09-02 NOTE — PROGRESS NOTE ADULT - PROBLEM SELECTOR PROBLEM 7
Patient states she is taking Phentermine for weight loss.  She feels well, she has approximately 12 pills left and wants to know what she does next?  Does she wean off of them, stop them or continue a low dose?      
Transition of care performed with sharing of clinical summary

## 2019-09-02 NOTE — PROGRESS NOTE ADULT - PROBLEM SELECTOR PROBLEM 2
Atherosclerosis of coronary artery

## 2019-09-02 NOTE — DISCHARGE NOTE PROVIDER - NSDCCPCAREPLAN_GEN_ALL_CORE_FT
PRINCIPAL DISCHARGE DIAGNOSIS  Diagnosis: Alcohol withdrawal  Assessment and Plan of Treatment: You came to the emergency department and were acutely intoxicated with alcohol. You also had hit your head and got a CT scan that didn't show any bleeds or fractures. You were admitted for alcohol withdrawal and were on Librium for the last 7 days. You are not exhibiting signs of withdrawal at this time and are being sent home to follow up with Dr. Levy.  Should you develop severe headaches, tremors, or hallucinations, you may return to the emergency department.      SECONDARY DISCHARGE DIAGNOSES  Diagnosis: Transaminitis  Assessment and Plan of Treatment: Some of your liver enzymes were elevated when you came to the hospital. It could be due to the alcohol or due to your Lipitor.   Do not take your Lipitor until you see Dr. Levy PRINCIPAL DISCHARGE DIAGNOSIS  Diagnosis: Alcohol withdrawal  Assessment and Plan of Treatment: You came to the emergency department and were acutely intoxicated with alcohol. You also had hit your head and got a CT scan that didn't show any bleeds or fractures. You were admitted for alcohol withdrawal and were on Librium for the last 7 days. You are not exhibiting signs of withdrawal at this time and are being sent home to follow up with Dr. Levy.  Should you develop severe headaches, tremors, or hallucinations, you may return to the emergency department.  Continuing to drink alcohol is detrimental to your health. You are in the process of being referred to [facility] for their rehab program but you say you need to tend to matters at home first.  You may also contact Alcoholics Anonymous. One phone number you can dial is [phone].      SECONDARY DISCHARGE DIAGNOSES  Diagnosis: Transaminitis  Assessment and Plan of Treatment: Some of your liver enzymes were elevated when you came to the hospital. It could be due to the alcohol or due to your Lipitor.   Do not take your Lipitor until you see Dr. Levy

## 2019-09-02 NOTE — DISCHARGE NOTE PROVIDER - HOSPITAL COURSE
Mr. Torrez is a 63-year-old male with a past medical history of HIT, paroxysmal atrial fibrillation on Eliquis, hyperlipidemia, coronary artery disease, HFrEF, hemorrhoids, gastritis, and alcohol abuse who presented acutely intoxicated with alcohol. He was found to have a need for alcohol withdrawal management and was admitted.        Problem List/Main Diagnoses (system-based):     1. Alcohol withdrawal - Multivitamin, Thiamine, Folic Acid, Ativan for CIWA >8, Librium starting at 50mg Q6 and tapered.    2. CAD - Lipitor held in the setting of transaminitis    3. Paroxysmal Atrial Fibrillation - Continued home Eliquis    4. HFrEF - Continued home Metoprolol    5. Hemorrhoids - No intervention during inpatient treatment course        Inpatient treatment course: Multivitamin, Thiamine, Folic Acid, Ativan for CIWA >8, Librium starting at 50mg Q6 and tapered.    New medications: None    Labs to be followed outpatient: None    Exam to be followed outpatient: None

## 2019-09-02 NOTE — PROGRESS NOTE ADULT - ASSESSMENT
Mr. Torrez is a 63-year-old male with a past medical history of HIT, pAfib on Eliquis, HLD, CAD status post PCI, HFrEF (35-40%) status post AICD, hemorrhoids, gastritis, and ethanol abuse who was admitted for alcohol withdrawl yesterday, 8/26. Last drink 10PM on 8/25/19.
62 y/o male with hx of HIT, PMHx ETOH abuse, paroxsymal AFib (hx of LV thrombus, on Eliquis, most recent echo 8/2019 without evidence of thrombus), HLD, CAD s/p PCI, HFrEF (35-40% 8/12/2019) s/p AICD (last interrogation 8/12/19- WNL), hemorrhoids, gastritis, recent admission 8/10-8/14/19 for alcohol withdrawal, presents with alcohol withdrawal and s/p fall with head trauma (head CT negative), admitted to [facility] for management of alcohol withdrawal and telemetry monitoring. Dr Wendy Brito from psychiatry consulted for help with medication management. Pt was initially on Librium 50mg Q6H with plan to taper on 8/27 but due to pt's continued breakthrough withdrawal symptoms pt to be continued on Librium 50mg every 6 hours with Ativan 2mg IV every 4 hours for breakthrough withdrawal symptoms if CIWA is greater than 8. Pt will be started on Antabuse (Disulfiram) 250mg daily upon discharge. Goal is to start it once pt's Librium taper is complete and pt is anticipated being discharged from the hospital. Pt stable for step down to Medicine as per Dr Levy.
Cardiomyopathy  CAD      ETOH  disorder  improved   withdrawal symptoms   tremors  are diminished  Pt tolerates Librium but feels tired with ativan     Social / Family situation in Pts  home is unstable according to   Patient     Pt need s to go back to In patient / out patient Alcohol rehab     LEOPOLDO Levy
ETOH withdrawal      CAD  Cardiomyopathy     AICD      cont current regimen
Ethanolism    Cont withdrawl program       Pt was in ETOH REHAB  at [facility]    will need ot go back for another inpatient period of care     LEOPOLDO Levy
Mr. Torrez is a 63-year-old male with a past medical history of HIT, pAfib on Eliquis, HLD, CAD status post PCI, HFrEF (35-40%) status post AICD, hemorrhoids, gastritis, and ethanol abuse who was admitted for alcohol withdrawl yesterday, 8/26. Last drink 10PM on 8/25/19.
cardiomyopathy  cont meds  anticoagulation   Ethanolism   withdrawal    cont rx       Discharge when pt feels steady       LEOPOLDO Levy
cont  ETOH  withdrawal  regimen    Cardiac meds  re cardiomyopathy     Cont RX      L Mildred
Mr. Torrez is a 63-year-old male with a past medical history of HIT, pAfib on Eliquis, HLD, CAD status post PCI, HFrEF (35-40%) status post AICD, hemorrhoids, gastritis, and ethanol abuse who was admitted for alcohol withdrawl yesterday, 8/26. Last drink 10PM on 8/25/19.
Mr. Torrez is a 63-year-old male with a past medical history of HIT, pAfib on Eliquis, HLD, CAD status post PCI, HFrEF (35-40%) status post AICD, hemorrhoids, gastritis, and ethanol abuse who was admitted for alcohol withdrawl yesterday, 8/26. Last drink 10PM on 8/25/19.

## 2019-09-02 NOTE — PROGRESS NOTE ADULT - SUBJECTIVE AND OBJECTIVE BOX
OVERNIGHT EVENTS: No acute events overnight.    SUBJECTIVE: Patient seen and examined at the bedside. He says he feels more steady and is packed and ready to go home today. No N/V/D/CP/SOB.    Vital Signs Last 12 Hrs  T(F): 97.9 (09-02-19 @ 09:54), Max: 98.5 (09-02-19 @ 00:14)  HR: 82 (09-02-19 @ 09:32) (67 - 82)  BP: 122/73 (09-02-19 @ 09:32) (101/59 - 122/73)  BP(mean): 80 (09-02-19 @ 04:20) (80 - 80)  RR: 20 (09-02-19 @ 09:32) (17 - 20)  SpO2: 100% (09-02-19 @ 09:32) (96% - 100%)  I&O's Summary    01 Sep 2019 07:01  -  02 Sep 2019 07:00  --------------------------------------------------------  IN: 390 mL / OUT: 0 mL / NET: 390 mL    02 Sep 2019 07:01  -  02 Sep 2019 10:49  --------------------------------------------------------  IN: 180 mL / OUT: 0 mL / NET: 180 mL        PHYSICAL EXAM:  General: In no acute distress, resting comfortably in bed  HEENT: NCAT, PERRL, EOMI, no conjunctival pallor or scleral icterus, MMM  Neck: Supple, no JVD  Respiratory: Clear to auscultation bilaterally with no wheezes, rales, or rhonchi appreciated  Cardiovascular: RRR, normal S1 and S2, no murmurs, rubs, or gallops appreciated  Vascular: 2+ radial and DP pulses  Abdomen: Soft, NT/ND. Bowel sounds present in all four quadrants with no guarding, rebound tenderness, or palpable masses  Extremities: Warm and well perfused. No clubbing, cyanosis, or edema noted  Skin: No gross skin abnormalities or rashes noted  Neuro: AAOx3 with no cranial nerve deficits. Strength and sensation intact throughout.    LABS:                        11.8   3.93  )-----------( 141      ( 02 Sep 2019 05:52 )             37.5     09-02    133<L>  |  100  |  15  ----------------------------<  102<H>  5.5<H>   |  26  |  1.24    Ca    9.4      02 Sep 2019 05:52  Phos  3.5     09-02  Mg     1.7     09-02    TPro  7.1  /  Alb  4.1  /  TBili  0.3  /  DBili  x   /  AST  26  /  ALT  30  /  AlkPhos  57  09-02          RADIOLOGY & ADDITIONAL TESTS: Reviewed.    MEDICATIONS  (STANDING):  apixaban 5 milliGRAM(s) Oral every 12 hours  chlordiazePOXIDE 25 milliGRAM(s) Oral two times a day  folic acid 1 milliGRAM(s) Oral daily  metoprolol succinate  milliGRAM(s) Oral daily  multivitamin 1 Tablet(s) Oral daily  thiamine 100 milliGRAM(s) Oral daily    MEDICATIONS  (PRN):  LORazepam   Injectable 1 milliGRAM(s) IV Push every 4 hours PRN CIWA-Ar score 8 or greater      Allergies    Capoten (Short breath; Rash; Hives)  heparin (Other (Mod to Severe))  penicillin (Short breath; Rash; Hives)    Intolerances OVERNIGHT EVENTS: No acute events overnight.    SUBJECTIVE: Patient seen and examined at the bedside. He says he feels more steady and is packed and ready to go home today. No N/V/D/CP/SOB.    Vital Signs Last 12 Hrs  T(F): 97.9 (09-02-19 @ 09:54), Max: 98.5 (09-02-19 @ 00:14)  HR: 82 (09-02-19 @ 09:32) (67 - 82)  BP: 122/73 (09-02-19 @ 09:32) (101/59 - 122/73)  BP(mean): 80 (09-02-19 @ 04:20) (80 - 80)  RR: 20 (09-02-19 @ 09:32) (17 - 20)  SpO2: 100% (09-02-19 @ 09:32) (96% - 100%)  I&O's Summary    01 Sep 2019 07:01  -  02 Sep 2019 07:00  --------------------------------------------------------  IN: 390 mL / OUT: 0 mL / NET: 390 mL    02 Sep 2019 07:01  -  02 Sep 2019 10:49  --------------------------------------------------------  IN: 180 mL / OUT: 0 mL / NET: 180 mL        PHYSICAL EXAM:  General: In no acute distress, resting comfortably in bed  HEENT: NCAT, PERRL, EOMI, no conjunctival pallor or scleral icterus, MMM  Neck: Supple, no JVD  Respiratory: Clear to auscultation bilaterally with no wheezes, rales, or rhonchi appreciated  Cardiovascular: RRR, normal S1 and S2, no murmurs, rubs, or gallops appreciated  Vascular: 2+ radial and DP pulses  Abdomen: Soft, NT/ND. Bowel sounds present in all four quadrants with no guarding, rebound tenderness, or palpable masses  Extremities: Warm and well perfused. No clubbing, cyanosis, or edema noted  Skin: No gross skin abnormalities or rashes noted  Neuro: AAOx3 with no cranial nerve deficits. Strength and sensation intact throughout.  CIWA: Tremor 2 points, Anxiety 1 point. Total = 3    LABS:                        11.8   3.93  )-----------( 141      ( 02 Sep 2019 05:52 )             37.5     09-02    133<L>  |  100  |  15  ----------------------------<  102<H>  5.5<H>   |  26  |  1.24    Ca    9.4      02 Sep 2019 05:52  Phos  3.5     09-02  Mg     1.7     09-02    TPro  7.1  /  Alb  4.1  /  TBili  0.3  /  DBili  x   /  AST  26  /  ALT  30  /  AlkPhos  57  09-02          RADIOLOGY & ADDITIONAL TESTS: Reviewed.    MEDICATIONS  (STANDING):  apixaban 5 milliGRAM(s) Oral every 12 hours  chlordiazePOXIDE 25 milliGRAM(s) Oral two times a day  folic acid 1 milliGRAM(s) Oral daily  metoprolol succinate  milliGRAM(s) Oral daily  multivitamin 1 Tablet(s) Oral daily  thiamine 100 milliGRAM(s) Oral daily    MEDICATIONS  (PRN):  LORazepam   Injectable 1 milliGRAM(s) IV Push every 4 hours PRN CIWA-Ar score 8 or greater      Allergies    Capoten (Short breath; Rash; Hives)  heparin (Other (Mod to Severe))  penicillin (Short breath; Rash; Hives)    Intolerances

## 2019-09-03 NOTE — ED ADULT NURSE NOTE - NEURO BEHAVIOR
calm Tetracycline Pregnancy And Lactation Text: This medication is Pregnancy Category D and not consider safe during pregnancy. It is also excreted in breast milk.

## 2019-09-05 DIAGNOSIS — I50.22 CHRONIC SYSTOLIC (CONGESTIVE) HEART FAILURE: ICD-10-CM

## 2019-09-05 DIAGNOSIS — I25.10 ATHEROSCLEROTIC HEART DISEASE OF NATIVE CORONARY ARTERY WITHOUT ANGINA PECTORIS: ICD-10-CM

## 2019-09-05 DIAGNOSIS — F10.220 ALCOHOL DEPENDENCE WITH INTOXICATION, UNCOMPLICATED: ICD-10-CM

## 2019-09-05 DIAGNOSIS — F10.230 ALCOHOL DEPENDENCE WITH WITHDRAWAL, UNCOMPLICATED: ICD-10-CM

## 2019-09-05 DIAGNOSIS — R74.0 NONSPECIFIC ELEVATION OF LEVELS OF TRANSAMINASE AND LACTIC ACID DEHYDROGENASE [LDH]: ICD-10-CM

## 2019-09-05 DIAGNOSIS — Z98.61 CORONARY ANGIOPLASTY STATUS: ICD-10-CM

## 2019-09-05 DIAGNOSIS — K64.9 UNSPECIFIED HEMORRHOIDS: ICD-10-CM
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
## 2019-09-05 DIAGNOSIS — E78.5 HYPERLIPIDEMIA, UNSPECIFIED: ICD-10-CM

## 2019-09-05 DIAGNOSIS — Z82.49 FAMILY HISTORY OF ISCHEMIC HEART DISEASE AND OTHER DISEASES OF THE CIRCULATORY SYSTEM: ICD-10-CM

## 2019-09-05 DIAGNOSIS — F10.239 ALCOHOL DEPENDENCE WITH WITHDRAWAL, UNSPECIFIED: ICD-10-CM

## 2019-09-05 DIAGNOSIS — Z88.0 ALLERGY STATUS TO PENICILLIN: ICD-10-CM

## 2019-09-05 DIAGNOSIS — Z88.8 ALLERGY STATUS TO OTHER DRUGS, MEDICAMENTS AND BIOLOGICAL SUBSTANCES STATUS: ICD-10-CM

## 2019-09-05 DIAGNOSIS — Z87.891 PERSONAL HISTORY OF NICOTINE DEPENDENCE: ICD-10-CM

## 2019-09-05 DIAGNOSIS — E44.1 MILD PROTEIN-CALORIE MALNUTRITION: ICD-10-CM

## 2019-09-05 DIAGNOSIS — I48.0 PAROXYSMAL ATRIAL FIBRILLATION: ICD-10-CM

## 2019-09-05 DIAGNOSIS — Z88.1 ALLERGY STATUS TO OTHER ANTIBIOTIC AGENTS STATUS: ICD-10-CM

## 2019-09-05 DIAGNOSIS — Z95.810 PRESENCE OF AUTOMATIC (IMPLANTABLE) CARDIAC DEFIBRILLATOR: ICD-10-CM

## 2019-09-05 DIAGNOSIS — Z79.01 LONG TERM (CURRENT) USE OF ANTICOAGULANTS: ICD-10-CM

## 2019-09-05 DIAGNOSIS — I25.2 OLD MYOCARDIAL INFARCTION: ICD-10-CM

## 2019-09-05 DIAGNOSIS — Z86.718 PERSONAL HISTORY OF OTHER VENOUS THROMBOSIS AND EMBOLISM: ICD-10-CM

## 2019-09-05 DIAGNOSIS — I42.9 CARDIOMYOPATHY, UNSPECIFIED: ICD-10-CM

## 2019-09-11 ENCOUNTER — INPATIENT (INPATIENT)
Facility: HOSPITAL | Age: 64
LOS: 4 days | Discharge: HOME | DRG: 897 | End: 2019-09-16
Attending: INTERNAL MEDICINE | Admitting: INTERNAL MEDICINE
Payer: MEDICARE

## 2019-09-11 VITALS
TEMPERATURE: 98 F | SYSTOLIC BLOOD PRESSURE: 220 MMHG | OXYGEN SATURATION: 97 % | HEART RATE: 101 BPM | DIASTOLIC BLOOD PRESSURE: 184 MMHG | WEIGHT: 167.99 LBS | HEIGHT: 70 IN | RESPIRATION RATE: 16 BRPM

## 2019-09-11 DIAGNOSIS — F10.230 ALCOHOL DEPENDENCE WITH WITHDRAWAL, UNCOMPLICATED: ICD-10-CM

## 2019-09-11 DIAGNOSIS — Z29.9 ENCOUNTER FOR PROPHYLACTIC MEASURES, UNSPECIFIED: ICD-10-CM

## 2019-09-11 DIAGNOSIS — I10 ESSENTIAL (PRIMARY) HYPERTENSION: ICD-10-CM

## 2019-09-11 DIAGNOSIS — F32.9 MAJOR DEPRESSIVE DISORDER, SINGLE EPISODE, UNSPECIFIED: ICD-10-CM

## 2019-09-11 DIAGNOSIS — K29.70 GASTRITIS, UNSPECIFIED, WITHOUT BLEEDING: ICD-10-CM

## 2019-09-11 DIAGNOSIS — I48.91 UNSPECIFIED ATRIAL FIBRILLATION: ICD-10-CM

## 2019-09-11 DIAGNOSIS — I50.20 UNSPECIFIED SYSTOLIC (CONGESTIVE) HEART FAILURE: ICD-10-CM

## 2019-09-11 DIAGNOSIS — E87.2 ACIDOSIS: ICD-10-CM

## 2019-09-11 DIAGNOSIS — Z91.89 OTHER SPECIFIED PERSONAL RISK FACTORS, NOT ELSEWHERE CLASSIFIED: ICD-10-CM

## 2019-09-11 LAB
ALBUMIN SERPL ELPH-MCNC: 4.6 G/DL — SIGNIFICANT CHANGE UP (ref 3.3–5)
ALP SERPL-CCNC: 71 U/L — SIGNIFICANT CHANGE UP (ref 40–120)
ALT FLD-CCNC: SIGNIFICANT CHANGE UP U/L (ref 10–45)
ANION GAP SERPL CALC-SCNC: 21 MMOL/L — HIGH (ref 5–17)
AST SERPL-CCNC: SIGNIFICANT CHANGE UP U/L (ref 10–40)
BILIRUB SERPL-MCNC: 0.7 MG/DL — SIGNIFICANT CHANGE UP (ref 0.2–1.2)
BUN SERPL-MCNC: 10 MG/DL — SIGNIFICANT CHANGE UP (ref 7–23)
CALCIUM SERPL-MCNC: 9 MG/DL — SIGNIFICANT CHANGE UP (ref 8.4–10.5)
CHLORIDE SERPL-SCNC: 93 MMOL/L — LOW (ref 96–108)
CO2 SERPL-SCNC: 22 MMOL/L — SIGNIFICANT CHANGE UP (ref 22–31)
CREAT SERPL-MCNC: 0.82 MG/DL — SIGNIFICANT CHANGE UP (ref 0.5–1.3)
GLUCOSE SERPL-MCNC: 124 MG/DL — HIGH (ref 70–99)
LACTATE SERPL-SCNC: 3.5 MMOL/L — HIGH (ref 0.5–2)
MAGNESIUM SERPL-MCNC: 1.8 MG/DL — SIGNIFICANT CHANGE UP (ref 1.6–2.6)
PHOSPHATE SERPL-MCNC: 2.5 MG/DL — SIGNIFICANT CHANGE UP (ref 2.5–4.5)
POTASSIUM SERPL-MCNC: SIGNIFICANT CHANGE UP MMOL/L (ref 3.5–5.3)
POTASSIUM SERPL-SCNC: SIGNIFICANT CHANGE UP MMOL/L (ref 3.5–5.3)
PROT SERPL-MCNC: 8.2 G/DL — SIGNIFICANT CHANGE UP (ref 6–8.3)
SODIUM SERPL-SCNC: 136 MMOL/L — SIGNIFICANT CHANGE UP (ref 135–145)

## 2019-09-11 PROCEDURE — 99285 EMERGENCY DEPT VISIT HI MDM: CPT

## 2019-09-11 PROCEDURE — 71045 X-RAY EXAM CHEST 1 VIEW: CPT | Mod: 26

## 2019-09-11 RX ORDER — SODIUM CHLORIDE 9 MG/ML
1000 INJECTION INTRAMUSCULAR; INTRAVENOUS; SUBCUTANEOUS ONCE
Refills: 0 | Status: COMPLETED | OUTPATIENT
Start: 2019-09-11 | End: 2019-09-11

## 2019-09-11 RX ORDER — FOLIC ACID 0.8 MG
1 TABLET ORAL DAILY
Refills: 0 | Status: DISCONTINUED | OUTPATIENT
Start: 2019-09-12 | End: 2019-09-16

## 2019-09-11 RX ORDER — METOPROLOL TARTRATE 50 MG
100 TABLET ORAL ONCE
Refills: 0 | Status: COMPLETED | OUTPATIENT
Start: 2019-09-11 | End: 2019-09-11

## 2019-09-11 RX ORDER — THIAMINE MONONITRATE (VIT B1) 100 MG
100 TABLET ORAL DAILY
Refills: 0 | Status: DISCONTINUED | OUTPATIENT
Start: 2019-09-12 | End: 2019-09-16

## 2019-09-11 RX ORDER — APIXABAN 2.5 MG/1
5 TABLET, FILM COATED ORAL ONCE
Refills: 0 | Status: COMPLETED | OUTPATIENT
Start: 2019-09-11 | End: 2019-09-11

## 2019-09-11 RX ORDER — PANTOPRAZOLE SODIUM 20 MG/1
40 TABLET, DELAYED RELEASE ORAL
Refills: 0 | Status: DISCONTINUED | OUTPATIENT
Start: 2019-09-11 | End: 2019-09-16

## 2019-09-11 RX ORDER — METOPROLOL TARTRATE 50 MG
100 TABLET ORAL EVERY 24 HOURS
Refills: 0 | Status: DISCONTINUED | OUTPATIENT
Start: 2019-09-12 | End: 2019-09-16

## 2019-09-11 RX ORDER — POLYETHYLENE GLYCOL 3350 17 G/17G
17 POWDER, FOR SOLUTION ORAL DAILY
Refills: 0 | Status: DISCONTINUED | OUTPATIENT
Start: 2019-09-11 | End: 2019-09-16

## 2019-09-11 RX ORDER — SODIUM CHLORIDE 9 MG/ML
1000 INJECTION, SOLUTION INTRAVENOUS
Refills: 0 | Status: COMPLETED | OUTPATIENT
Start: 2019-09-11 | End: 2019-09-11

## 2019-09-11 RX ORDER — APIXABAN 2.5 MG/1
5 TABLET, FILM COATED ORAL EVERY 12 HOURS
Refills: 0 | Status: DISCONTINUED | OUTPATIENT
Start: 2019-09-12 | End: 2019-09-16

## 2019-09-11 RX ORDER — ASCORBIC ACID 60 MG
500 TABLET,CHEWABLE ORAL DAILY
Refills: 0 | Status: DISCONTINUED | OUTPATIENT
Start: 2019-09-12 | End: 2019-09-16

## 2019-09-11 RX ADMIN — Medication 1 MILLIGRAM(S): at 18:07

## 2019-09-11 RX ADMIN — SODIUM CHLORIDE 500 MILLILITER(S): 9 INJECTION INTRAMUSCULAR; INTRAVENOUS; SUBCUTANEOUS at 20:35

## 2019-09-11 RX ADMIN — Medication 62.5 MILLIMOLE(S): at 22:50

## 2019-09-11 RX ADMIN — SODIUM CHLORIDE 1000 MILLILITER(S): 9 INJECTION, SOLUTION INTRAVENOUS at 22:51

## 2019-09-11 RX ADMIN — Medication 100 MILLIGRAM(S): at 18:09

## 2019-09-11 RX ADMIN — APIXABAN 5 MILLIGRAM(S): 2.5 TABLET, FILM COATED ORAL at 18:07

## 2019-09-11 RX ADMIN — SODIUM CHLORIDE 500 MILLILITER(S): 9 INJECTION, SOLUTION INTRAVENOUS at 18:08

## 2019-09-11 RX ADMIN — Medication 50 MILLIGRAM(S): at 18:07

## 2019-09-11 NOTE — ED PROVIDER NOTE - CLINICAL SUMMARY MEDICAL DECISION MAKING FREE TEXT BOX
presents intoxicated but with tachycardia/htn/ slight tremor concerning for early withdrawal.  non compliant with meds including eliquis and metoprolol. seen by Dr. Levy in the ED who would like to readmit for further treatment.  given ivf/ mvi/thiamie/folate, ativan 1mg iv followed by librium 50mg po with normalization of vs.  metoprolol also given and restarted eliquis.

## 2019-09-11 NOTE — H&P ADULT - NSHPPHYSICALEXAM_GEN_ALL_CORE
.  VITAL SIGNS:  T(C): 36.7 (09-11-19 @ 21:42), Max: 36.8 (09-11-19 @ 18:09)  T(F): 98.1 (09-11-19 @ 21:42), Max: 98.2 (09-11-19 @ 18:09)  HR: 75 (09-11-19 @ 21:42) (73 - 101)  BP: 119/76 (09-11-19 @ 21:42) (119/76 - 220/184)  BP(mean): --  RR: 19 (09-11-19 @ 21:42) (16 - 19)  SpO2: 95% (09-11-19 @ 21:42) (95% - 97%)  Wt(kg): --    PHYSICAL EXAM:    Constitutional: WDWN resting comfortably in bed; NAD  Head: NC/AT  Eyes: PERRL, EOMI, anicteric sclera  ENT: no nasal discharge; uvula midline, no oropharyngeal erythema or exudates; MMM  Neck: supple; no JVD or thyromegaly  Respiratory: CTA B/L; no W/R/R, no retractions  Cardiac: +S1/S2; RRR; no M/R/G; PMI non-displaced, device in place  Gastrointestinal: soft, NT/ND; no rebound or guarding; +BSx4  Genitourinary: normal external genitalia  Back: spine midline, no bony tenderness or step-offs; no CVAT B/L  Extremities: WWP, no clubbing or cyanosis; no peripheral edema  Musculoskeletal: NROM x4; no joint swelling, tenderness or erythema  Vascular: 2+ radial, femoral, DP/PT pulses B/L  Dermatologic: shallow excoriations over head 2/2 pruritis and pt scratching, dry skin   Lymphatic: no submandibular or cervical LAD  Neurologic: AAOx3; CNII-XII grossly intact; no focal deficits  Psychiatric: affect and characteristics of appearance, verbalizations, behaviors are appropriate  CIWA: fine tremor with arms outstretched

## 2019-09-11 NOTE — PROGRESS NOTE ADULT - SUBJECTIVE AND OBJECTIVE BOX
Pt is unfortunate in that he cannot control his Alcoholism  He was recently discharge after a hospitalization for   Alcohom withdrawal  only to return today after recurrent drinking of Alcohol    PAST MEDICAL & SURGICAL HISTORY:  Hemorrhoid  Thrombus in heart chamber: LV  ICD (implantable cardioverter-defibrillator) in place  Pacemaker  ETOH abuse  Paroxysmal a-fib  Myocardial infarction involving other coronary artery  Gastritis  Atherosclerosis of coronary artery: Coronary artery disease  Automatic implantable cardiac defibrillator in situ: ICD (implantable cardioverter-defibrillator) in place    ICU Vital Signs Last 24 Hrs  T(C): 36.4 (11 Sep 2019 16:19), Max: 36.4 (11 Sep 2019 16:19)  T(F): 97.6 (11 Sep 2019 16:19), Max: 97.6 (11 Sep 2019 16:19)  HR: 101 (11 Sep 2019 16:19) (101 - 101)  BP: 220/184 (11 Sep 2019 16:19) (220/184 - 220/184)  BP(mean): --  ABP: --  ABP(mean): --  RR: 16 (11 Sep 2019 16:19) (16 - 16)  SpO2: 97% (11 Sep 2019 16:19) (97% - 97%)    MEDICATIONS  (STANDING):    MEDICATIONS  (PRN):      Home Medications:  B Complex 50 oral tablet, extended release: 1 tab(s) orally once a day (26 Aug 2019 12:08)  Vitamin C 500 mg oral tablet: 1 tab(s) orally once a day (26 Aug 2019 12:08)  Vital Signs Last 24 Hrs  T(C): 36.4 (11 Sep 2019 16:19), Max: 36.4 (11 Sep 2019 16:19)  T(F): 97.6 (11 Sep 2019 16:19), Max: 97.6 (11 Sep 2019 16:19)  HR: 101 (11 Sep 2019 16:19) (101 - 101)  BP: 220/184 (11 Sep 2019 16:19) (220/184 - 220/184)  BP(mean): --  RR: 16 (11 Sep 2019 16:19) (16 - 16)  SpO2: 97% (11 Sep 2019 16:19) (97% - 97%)  Pt is shaky and obviously  in Alcohol intoxicated state      Lungs decreased breath sounds at bases    CV  s1s2     abd soft    ext  tremulous     Lab Pending

## 2019-09-11 NOTE — H&P ADULT - PROBLEM SELECTOR PLAN 1
Pt presents after using 2-3pints of vodka daily with fall onto back. Last drink AM of 9/11, Pt presents after using 2-3pints of vodka daily with fall onto back. Last drink AM of 9/11, would expect severe features up to 9/14. Pt presents after using 2-3pints of vodka daily with fall onto back. Last drink AM of 9/11, would expect severe features up to 9/14  -C/w Librium 50q8hr and Ativan 2mg IVP qhr PRN   -CIWA Protocol   -C/w thiamine, multivitamin, FA supplementation

## 2019-09-11 NOTE — SBIRT NOTE ADULT - NSSBIRTDRGNOACTINTDET_GEN_A_CORE
Patient's son will bring him to [facility] rehab when he finishes detox at [facility].

## 2019-09-11 NOTE — ED PROVIDER NOTE - NEUROLOGICAL, MLM
Alert and oriented, no focal deficits, no motor or sensory deficits.  mild tremor with arms extended.  speech slightly slurred

## 2019-09-11 NOTE — H&P ADULT - PROBLEM SELECTOR PLAN 8
Offered Psych services, Pt refused. No SI/HI  -Continue to support Pt emotionally and offer AA, psych servieces

## 2019-09-11 NOTE — H&P ADULT - NSHPLABSRESULTS_GEN_ALL_CORE
.  LABS:                         13.3   5.79  )-----------( 220      ( 11 Sep 2019 16:52 )             41.6     09-11    136  |  93<L>  |  10  ----------------------------<  124<H>  see note   |  22  |  0.82    Ca    9.0      11 Sep 2019 18:28  Phos  2.5     09-11  Mg     1.8     09-11    TPro  8.2  /  Alb  4.6  /  TBili  0.7  /  DBili  x   /  AST  see note  /  ALT  see note  /  AlkPhos  71  09-11    PT/INR - ( 11 Sep 2019 16:52 )   PT: 11.1 sec;   INR: 0.98          PTT - ( 11 Sep 2019 16:52 )  PTT:28.6 sec    CARDIAC MARKERS ( 11 Sep 2019 16:52 )  x     / <0.01 ng/mL / 1084 U/L / x     / 9.8 ng/mL            RADIOLOGY, EKG & ADDITIONAL TESTS: Reviewed. .  LABS:                         13.3   5.79  )-----------( 220      ( 11 Sep 2019 16:52 )             41.6     09-11    136  |  93<L>  |  10  ----------------------------<  124<H>  see note   |  22  |  0.82    Ca    9.0      11 Sep 2019 18:28  Phos  2.5     09-11  Mg     1.8     09-11    TPro  8.2  /  Alb  4.6  /  TBili  0.7  /  DBili  x   /  AST  see note  /  ALT  see note  /  AlkPhos  71  09-11    PT/INR - ( 11 Sep 2019 16:52 )   PT: 11.1 sec;   INR: 0.98          PTT - ( 11 Sep 2019 16:52 )  PTT:28.6 sec    CARDIAC MARKERS ( 11 Sep 2019 16:52 )  x     / <0.01 ng/mL / 1084 U/L / x     / 9.8 ng/mL        < from: TTE Echo w/Cont Complete (08.12.19 @ 14:07) >    CONCLUSIONS:     1. Normal LV size and wall thickness. The apical septum and true apex   appear akinetic. Hypokinesis of remaining apical segments and mid septum.   The estimated LV ejection fraction is 35-40%.   2. Probably normal right ventricular size and systolic function. A   device lead is noted in the right heart.   3. The aortic valve is mildly thickened. There is trace valvular aortic   regurgitation.   4. The mitral valve is mildly thickened. There is trace mitral   regurgitation.   5. There is trace tricuspid regurgitation. Pulmonary artery systolic   pressure (estimated using the tricuspid regurgitant gradient and an   estimate of right atrial pressure) is 25.5 mmHg.   6. There is trace pulmonic regurgitation.   7.The IVC is not well visualized.   8. No pericardial effusion.   9. Following contrast administration, no obvious thrombus identified.    < end of copied text >        RADIOLOGY, EKG & ADDITIONAL TESTS: Reviewed.

## 2019-09-11 NOTE — H&P ADULT - PROBLEM SELECTOR PLAN 3
None needed
EF 35-40% 8/12/2019. apical septum and true apex appear akinetic. Hypokinesis of remaining apical segments and mid septum. AICD in place. No functional limitations, euvolemic on exam, unlikely in exacernation   -C/w Toprol 100mg qd

## 2019-09-11 NOTE — ED PROVIDER NOTE - CONSTITUTIONAL, MLM
normal... flushed/sweaty appearing, well nourished, awake, alert, oriented to person, place, time/situation and in no apparent distress.

## 2019-09-11 NOTE — H&P ADULT - PROBLEM SELECTOR PLAN 4
CAD s/p PCI   -Holding statin in setting of mild transaminitis   -Holding ASA due to fall risk on Eliquis and ASA

## 2019-09-11 NOTE — H&P ADULT - ASSESSMENT
Admitted to [facility] under Dr. Levy for management of alcohol withdrawal in this patient with significant cardiac comorbidities. 62 y/o male with hx of ETOH abuse with recurrent admissions for detox without severe features, HIT, paroxsymal AFib on Eliquis, hx of LV thrombus, CAD s/p PCI, HFrEF (35-40%) s/p AICD presents for ETOH detox.  Admitted to [facility]  for management of alcohol withdrawal in this patient with significant cardiac comorbidities.

## 2019-09-11 NOTE — PROGRESS NOTE ADULT - ASSESSMENT
Alcoholism     Cardiomyopathy   Hx  LV Thrombus   CAD  s/p MI  PTCA with stent     Plan for alcohol withdrawal  protocol      Monitored Bed       L Mildred TRISTAN

## 2019-09-11 NOTE — H&P ADULT - PROBLEM SELECTOR PLAN 2
Current NSR. EKG no ischemic changes. Trop neg x1. No CP complaints. Noncompliant with meds  C/w Eliquis 5mg BID and Toprol 100mg qd

## 2019-09-11 NOTE — H&P ADULT - PROBLEM SELECTOR PLAN 5
AG 21 on admission with EtOH 336. After 1L NS and banana bag lactate 3.5. BHB WNL. AG now resolved  -Trend CMP

## 2019-09-11 NOTE — H&P ADULT - PROBLEM SELECTOR PLAN 10
1) PCP Contacted on Admission: (Y/N) --> Name & Phone #: Dr Butts  2) Date of Contact with PCP: 9/12  3) PCP Contacted at Discharge: (Y/N, N/A)  4) Summary of Handoff Given to PCP:   5) Post-Discharge Appointment Date and Location:

## 2019-09-11 NOTE — ED PROVIDER NOTE - OBJECTIVE STATEMENT
here with alcohol intoxication, weakness.  Says he was discharged about a week ago from the hospital and started drinking heavily again.  Estimates 2-3 pints of vodka/ day.  Last drink about an hour prior to arrival.  Denies trauma, denies chest pain, denies fever/chills.  Notes he has not taken any of his normal medicines for the past 5 days.  Family denies change in mental status.

## 2019-09-11 NOTE — ED ADULT NURSE NOTE - OBJECTIVE STATEMENT
states he drinks about 1-2 pints of alcohol today and has chest pain today. states he has a defibrillator. as per son, pt has cardiac stents. denies any sob, cough, fever/chills. no tongue fasciculations or tremors noted. pt is speaking in clear, complete sentences. pt is a.o.x3.

## 2019-09-11 NOTE — H&P ADULT - HISTORY OF PRESENT ILLNESS
64 y/o male with hx of ETOH abuse withdrawal seizures or DTs, HIT, paroxsymal AFib on Eliquis, hx of LV thrombus, CAD s/p PCI, HFrEF (35-40%) s/p AICD presents for ETOH detox. Pt was admited twice in August for detox, discharged on 9/2. Pt states started drinking heavily 2-3 pints of vodka daily, last drink AM of admission. Decided to bring himself in for detox because he was "feeling badly and had enough". Fell backward onto his back this AM without head trauma/LOC. At the time of interview after ED stabilization, CIWA 3 (+tremors). Endorses decreased po intake, chronic constipation. Denies fevers, chills, recent illness, seizure-like activity, HA, URI symptoms, cough, CP/pressure, vomiting, n/d, dark stools, dysuria. States drinks due to depression and past traumas, not interested in psychiatric medications. Denies SI/HI.     T 97.6-98.2, HR . -124, -87, RR 17, SpO2 98% on RA  EKG  no ST changes  Labs: Hb 13.3m MCV 77.5  ED interventions: Librium 50, Ativan 1mg IVP, started on home Toprol  and Eliquis, 1L NS and banana bag 62 y/o male with hx of ETOH abuse withdrawal seizures or DTs, HIT, paroxsymal AFib on Eliquis, hx of LV thrombus, CAD s/p PCI, HFrEF (35-40%) s/p AICD presents for ETOH detox. Pt was admited twice in August for detox, discharged on 9/2. Pt states started drinking heavily 2-3 pints of vodka daily, last drink AM of admission. Decided to bring himself in for detox because he was "feeling badly and had enough". Fell backward onto his back this AM without head trauma/LOC. At the time of interview after ED stabilization, CIWA 3 (+tremors). Endorses decreased po intake, chronic constipation. Denies fevers, chills, recent illness, seizure-like activity, HA, URI symptoms, cough, CP/pressure, vomiting, n/d, dark stools, dysuria. States drinks due to depression and past traumas, not interested in psychiatric medications. Denies SI/HI.     T 97.6-98.2, HR . -124, -87, RR 17, SpO2 98% on RA  EKG  no ST changes  Labs: Hb 13.3m MCV 77.5, AG 21, ETOH 336   ED interventions: Librium 50, Ativan 1mg IVP, started on home Toprol  and Eliquis, 1L NS and banana bag 62 y/o male with hx of ETOH abuse no withdrawal seizures or DTs, HIT, paroxsymal AFib on Eliquis, hx of LV thrombus, CAD s/p PCI, HFrEF (35-40%) s/p AICD presents for ETOH detox. Pt was admited twice in August for detox, discharged on 9/2. Pt states started drinking heavily 2-3 pints of vodka daily, last drink AM of admission. Decided to bring himself in for detox because he was "feeling badly and had enough". Fell backward onto his back this AM without head trauma/LOC. Had not taken any meds for a week. At the time of interview after ED stabilization, CIWA 3 (+tremors). Endorses decreased po intake, chronic constipation. Denies fevers, chills, recent illness, seizure-like activity, HA, URI symptoms, cough, CP/pressure, vomiting, n/d, dark stools, dysuria. States drinks due to depression and past traumas, not interested in psychiatric medications. Denies SI/HI.     T 97.6-98.2, HR . -124, -87, RR 17, SpO2 98% on RA  EKG  no ST changes  Labs: Hb 13.3m MCV 77.5, AG 21, ETOH 336   ED interventions: Librium 50, Ativan 1mg IVP, started on home Toprol  and Eliquis, 1L NS and banana bag

## 2019-09-11 NOTE — ED ADULT TRIAGE NOTE - OTHER COMPLAINTS
patient states "I abuse alcohol, I was having pain in my chest." reports last drink this AM, drinks >1 pint vodka daily

## 2019-09-11 NOTE — SBIRT NOTE ADULT - NSSBIRTALCACTIVEREFTXDET_GEN_A_CORE
Patient has been drinking 5 days straight 2 pints daily Vodka;  Patient is staying due to withdrawal then the plan is that his son will bring him to [facility].

## 2019-09-11 NOTE — ED ADULT NURSE NOTE - INTERVENTIONS DEFINITIONS
Physically safe environment: no spills, clutter or unnecessary equipment/[place] to call system/Instruct patient to call for assistance/Provide visual cue, wrist band, yellow gown, etc./Non-slip footwear when patient is off stretcher/Stretcher in lowest position, wheels locked, appropriate side rails in place

## 2019-09-11 NOTE — H&P ADULT - NSICDXFAMILYHX_GEN_ALL_CORE_FT
FAMILY HISTORY:  FH: myocardial infarction, maternal grandfather, age 42  FH: type 2 diabetes, father

## 2019-09-11 NOTE — H&P ADULT - NSICDXPASTMEDICALHX_GEN_ALL_CORE_FT
PAST MEDICAL HISTORY:  Atherosclerosis of coronary artery Coronary artery disease    Atrial fibrillation     ETOH abuse     Gastritis     Hemorrhoid     ICD (implantable cardioverter-defibrillator) in place     Left ventricular thrombus     Myocardial infarction involving other coronary artery     Pacemaker     Paroxysmal a-fib     Thrombus in heart chamber History of LV thrombus on Eliquis that was no longer seen on f/u Echo 8/12/2019 PAST MEDICAL HISTORY:  Atherosclerosis of coronary artery Coronary artery disease    Atrial fibrillation     ETOH abuse     Gastritis     Hemorrhoid     Heparin-induced thrombocytopenia Antibody positive    ICD (implantable cardioverter-defibrillator) in place     Left ventricular thrombus     Myocardial infarction involving other coronary artery     Pacemaker     Paroxysmal a-fib     Thrombus in heart chamber History of LV thrombus on Eliquis that was no longer seen on f/u Echo 8/12/2019

## 2019-09-11 NOTE — H&P ADULT - NSHPSOCIALHISTORY_GEN_ALL_CORE
ETOH- 1-3  pints vodka daily, Tobacco- former smoker (quit about 20 years ago, 3 pack-years), Illicits- denies. Lives with family

## 2019-09-11 NOTE — ED PROVIDER NOTE - PMH
Atherosclerosis of coronary artery  Coronary artery disease  ETOH abuse    Gastritis    Hemorrhoid    ICD (implantable cardioverter-defibrillator) in place    Myocardial infarction involving other coronary artery    Pacemaker    Paroxysmal a-fib    Thrombus in heart chamber  LV

## 2019-09-12 DIAGNOSIS — I25.10 ATHEROSCLEROTIC HEART DISEASE OF NATIVE CORONARY ARTERY WITHOUT ANGINA PECTORIS: ICD-10-CM

## 2019-09-12 DIAGNOSIS — I48.0 PAROXYSMAL ATRIAL FIBRILLATION: ICD-10-CM

## 2019-09-12 PROBLEM — K64.9 UNSPECIFIED HEMORRHOIDS: Chronic | Status: ACTIVE | Noted: 2019-08-26

## 2019-09-12 LAB
ALBUMIN SERPL ELPH-MCNC: 4.1 G/DL — SIGNIFICANT CHANGE UP (ref 3.3–5)
ALBUMIN SERPL ELPH-MCNC: 4.3 G/DL — SIGNIFICANT CHANGE UP (ref 3.3–5)
ALP SERPL-CCNC: 64 U/L — SIGNIFICANT CHANGE UP (ref 40–120)
ALP SERPL-CCNC: 68 U/L — SIGNIFICANT CHANGE UP (ref 40–120)
ALT FLD-CCNC: 61 U/L — HIGH (ref 10–45)
ALT FLD-CCNC: 64 U/L — HIGH (ref 10–45)
ANION GAP SERPL CALC-SCNC: 15 MMOL/L — SIGNIFICANT CHANGE UP (ref 5–17)
ANION GAP SERPL CALC-SCNC: 16 MMOL/L — SIGNIFICANT CHANGE UP (ref 5–17)
AST SERPL-CCNC: 105 U/L — HIGH (ref 10–40)
AST SERPL-CCNC: 109 U/L — HIGH (ref 10–40)
B-OH-BUTYR SERPL-SCNC: 0.4 MMOL/L — SIGNIFICANT CHANGE UP
BILIRUB SERPL-MCNC: 0.7 MG/DL — SIGNIFICANT CHANGE UP (ref 0.2–1.2)
BILIRUB SERPL-MCNC: 1.1 MG/DL — SIGNIFICANT CHANGE UP (ref 0.2–1.2)
BUN SERPL-MCNC: 10 MG/DL — SIGNIFICANT CHANGE UP (ref 7–23)
BUN SERPL-MCNC: 10 MG/DL — SIGNIFICANT CHANGE UP (ref 7–23)
CALCIUM SERPL-MCNC: 8.9 MG/DL — SIGNIFICANT CHANGE UP (ref 8.4–10.5)
CALCIUM SERPL-MCNC: 9 MG/DL — SIGNIFICANT CHANGE UP (ref 8.4–10.5)
CHLORIDE SERPL-SCNC: 95 MMOL/L — LOW (ref 96–108)
CHLORIDE SERPL-SCNC: 95 MMOL/L — LOW (ref 96–108)
CO2 SERPL-SCNC: 27 MMOL/L — SIGNIFICANT CHANGE UP (ref 22–31)
CO2 SERPL-SCNC: 28 MMOL/L — SIGNIFICANT CHANGE UP (ref 22–31)
CREAT SERPL-MCNC: 0.87 MG/DL — SIGNIFICANT CHANGE UP (ref 0.5–1.3)
CREAT SERPL-MCNC: 0.99 MG/DL — SIGNIFICANT CHANGE UP (ref 0.5–1.3)
GLUCOSE SERPL-MCNC: 81 MG/DL — SIGNIFICANT CHANGE UP (ref 70–99)
GLUCOSE SERPL-MCNC: 93 MG/DL — SIGNIFICANT CHANGE UP (ref 70–99)
HCT VFR BLD CALC: 36 % — LOW (ref 39–50)
HGB BLD-MCNC: 11.6 G/DL — LOW (ref 13–17)
LACTATE SERPL-SCNC: 2.5 MMOL/L — HIGH (ref 0.5–2)
MAGNESIUM SERPL-MCNC: 1.7 MG/DL — SIGNIFICANT CHANGE UP (ref 1.6–2.6)
MAGNESIUM SERPL-MCNC: 1.8 MG/DL — SIGNIFICANT CHANGE UP (ref 1.6–2.6)
MCHC RBC-ENTMCNC: 24.7 PG — LOW (ref 27–34)
MCHC RBC-ENTMCNC: 32.2 GM/DL — SIGNIFICANT CHANGE UP (ref 32–36)
MCV RBC AUTO: 76.6 FL — LOW (ref 80–100)
NRBC # BLD: 0 /100 WBCS — SIGNIFICANT CHANGE UP (ref 0–0)
PCP SPEC-MCNC: SIGNIFICANT CHANGE UP
PHOSPHATE SERPL-MCNC: 3.4 MG/DL — SIGNIFICANT CHANGE UP (ref 2.5–4.5)
PHOSPHATE SERPL-MCNC: 3.5 MG/DL — SIGNIFICANT CHANGE UP (ref 2.5–4.5)
PLATELET # BLD AUTO: 156 K/UL — SIGNIFICANT CHANGE UP (ref 150–400)
POTASSIUM SERPL-MCNC: 3.6 MMOL/L — SIGNIFICANT CHANGE UP (ref 3.5–5.3)
POTASSIUM SERPL-MCNC: 3.9 MMOL/L — SIGNIFICANT CHANGE UP (ref 3.5–5.3)
POTASSIUM SERPL-SCNC: 3.6 MMOL/L — SIGNIFICANT CHANGE UP (ref 3.5–5.3)
POTASSIUM SERPL-SCNC: 3.9 MMOL/L — SIGNIFICANT CHANGE UP (ref 3.5–5.3)
PROT SERPL-MCNC: 6.8 G/DL — SIGNIFICANT CHANGE UP (ref 6–8.3)
PROT SERPL-MCNC: 7.4 G/DL — SIGNIFICANT CHANGE UP (ref 6–8.3)
RBC # BLD: 4.7 M/UL — SIGNIFICANT CHANGE UP (ref 4.2–5.8)
RBC # FLD: 19.9 % — HIGH (ref 10.3–14.5)
SODIUM SERPL-SCNC: 138 MMOL/L — SIGNIFICANT CHANGE UP (ref 135–145)
SODIUM SERPL-SCNC: 138 MMOL/L — SIGNIFICANT CHANGE UP (ref 135–145)
WBC # BLD: 3.79 K/UL — LOW (ref 3.8–10.5)
WBC # FLD AUTO: 3.79 K/UL — LOW (ref 3.8–10.5)

## 2019-09-12 RX ORDER — POTASSIUM CHLORIDE 20 MEQ
40 PACKET (EA) ORAL ONCE
Refills: 0 | Status: COMPLETED | OUTPATIENT
Start: 2019-09-12 | End: 2019-09-12

## 2019-09-12 RX ORDER — MAGNESIUM SULFATE 500 MG/ML
1 VIAL (ML) INJECTION ONCE
Refills: 0 | Status: COMPLETED | OUTPATIENT
Start: 2019-09-12 | End: 2019-09-12

## 2019-09-12 RX ADMIN — APIXABAN 5 MILLIGRAM(S): 2.5 TABLET, FILM COATED ORAL at 06:33

## 2019-09-12 RX ADMIN — Medication 50 MILLIGRAM(S): at 18:14

## 2019-09-12 RX ADMIN — Medication 1 TABLET(S): at 10:38

## 2019-09-12 RX ADMIN — Medication 50 MILLIGRAM(S): at 10:31

## 2019-09-12 RX ADMIN — Medication 1 MILLIGRAM(S): at 10:35

## 2019-09-12 RX ADMIN — Medication 100 MILLIGRAM(S): at 10:33

## 2019-09-12 RX ADMIN — Medication 100 MILLIGRAM(S): at 10:35

## 2019-09-12 RX ADMIN — Medication 100 GRAM(S): at 10:28

## 2019-09-12 RX ADMIN — Medication 50 MILLIGRAM(S): at 02:02

## 2019-09-12 RX ADMIN — Medication 40 MILLIEQUIVALENT(S): at 10:29

## 2019-09-12 RX ADMIN — Medication 500 MILLIGRAM(S): at 10:35

## 2019-09-12 RX ADMIN — APIXABAN 5 MILLIGRAM(S): 2.5 TABLET, FILM COATED ORAL at 18:14

## 2019-09-12 RX ADMIN — PANTOPRAZOLE SODIUM 40 MILLIGRAM(S): 20 TABLET, DELAYED RELEASE ORAL at 06:33

## 2019-09-12 NOTE — PROGRESS NOTE ADULT - SUBJECTIVE AND OBJECTIVE BOX
CARDIOLOGY NP TRANSFER OFF SERVICE/ PROGRESS NOTE    HOSPITAL COURSE:  64 y/o male with hx of ETOH abuse no withdrawal seizures or DTs, HIT, paroxsymal AFib on Eliquis, hx of LV thrombus, CAD s/p PCI, HFrEF (35-40%) s/p AICD presents for ETOH detox. Pt was admitted twice in August for detox, discharged on 9/2. After discharge pt reverted to drinking heavily 1-2 pints of vodka daily, last drink on 9/11 in AM and have not been taking any of his medications. Reports fell backward onto his back in the AM without head trauma/LOC. In ED was hypertensive 220/184s initially and improved w/ medications. EKG w/ sinus tach 110s w/o acute ST changes. Labs s/f mild transaminitis, AG 21, ETOH 336, lactate 3.5. He received a banana bag and 1L NS and AG and lactate improved. He was admitted to [facility] for monitoring of ETOH withdrawal, initial CIWA 3 on admission, started on Librium 50mg q8h and Ativan PRN and resumed home meds. He does not require further cardiac telemetry monitoring and is awaiting step down to regional medical floors for further management.      Subjective: Pt seen and examined at bedside. Reports feeling tremulous, nauseous, and mildly anxious. Denies chest pain, sob, lightheadedness, dizziness, palpitations, fever, chills.  Remainder ROS otherwise negative.    Overnight Events: started on Librium for ETOH withdrawal    TELEMETRY: SR 70-80s    EKG: NSR, incomplete LBBB, J point elevation. V2-V4, Qwaves laterally (unchanged).      VITAL SIGNS:  T(C): 37.1 (09-12-19 @ 09:28), Max: 37.1 (09-12-19 @ 09:28)  HR: 82 (09-12-19 @ 11:01) (73 - 101)  BP: 114/80 (09-12-19 @ 11:01) (100/70 - 220/184)  RR: 18 (09-12-19 @ 11:01) (15 - 20)  SpO2: 98% (09-12-19 @ 11:01) (95% - 98%)  Wt(kg): --    I&O's Summary    11 Sep 2019 07:01  -  12 Sep 2019 07:00  --------------------------------------------------------  IN: 0 mL / OUT: 350 mL / NET: -350 mL    12 Sep 2019 07:01  -  12 Sep 2019 11:43  --------------------------------------------------------  IN: 180 mL / OUT: 0 mL / NET: 180 mL          PHYSICAL EXAM:    General: A/ox 3, No acute Distress  Neck: Supple, NO JVD  Cardiac: S1 S2, No M/R/G  Pulmonary: CTAB, Breathing unlabored, No Rhonchi/Rales/Wheezing  Abdomen: Soft, Non -tender, +BS x 4 quads  Extremities: No Rashes, No edema  Neuro: A/o x 3, No focal deficits          LABS:                          11.6   3.79  )-----------( 156      ( 12 Sep 2019 06:48 )             36.0                              09-12    138  |  95<L>  |  10  ----------------------------<  81  3.6   |  27  |  0.99    Ca    8.9      12 Sep 2019 06:48  Phos  3.5     09-12  Mg     1.7     09-12    TPro  6.8  /  Alb  4.1  /  TBili  1.1  /  DBili  x   /  AST  105<H>  /  ALT  61<H>  /  AlkPhos  64  09-12    LIVER FUNCTIONS - ( 12 Sep 2019 06:48 )  Alb: 4.1 g/dL / Pro: 6.8 g/dL / ALK PHOS: 64 U/L / ALT: 61 U/L / AST: 105 U/L / GGT: x                                 PT/INR - ( 11 Sep 2019 16:52 )   PT: 11.1 sec;   INR: 0.98          PTT - ( 11 Sep 2019 16:52 )  PTT:28.6 sec  CAPILLARY BLOOD GLUCOSE        CARDIAC MARKERS ( 11 Sep 2019 16:52 )  x     / <0.01 ng/mL / 1084 U/L / x     / 9.8 ng/mL          Allergies:  Capoten (Short breath; Rash; Hives)  heparin (Other (Mod to Severe))  penicillin (Short breath; Rash; Hives)    MEDICATIONS  (STANDING):  apixaban 5 milliGRAM(s) Oral every 12 hours  ascorbic acid 500 milliGRAM(s) Oral daily  chlordiazePOXIDE 50 milliGRAM(s) Oral every 8 hours  folic acid 1 milliGRAM(s) Oral daily  metoprolol succinate  milliGRAM(s) Oral every 24 hours  multivitamin 1 Tablet(s) Oral daily  pantoprazole    Tablet 40 milliGRAM(s) Oral before breakfast  polyethylene glycol 3350 17 Gram(s) Oral daily  thiamine 100 milliGRAM(s) Oral daily    MEDICATIONS  (PRN):  LORazepam   Injectable 2 milliGRAM(s) IV Push every 4 hours PRN CIWA>8        DIAGNOSTIC TESTS:

## 2019-09-12 NOTE — PROGRESS NOTE ADULT - PROBLEM SELECTOR PLAN 10
1) PCP Contacted on Admission: (Y/N) --> Name & Phone #: Dr Levy  2) Date of Contact with PCP: 9/12  3) PCP Contacted at Discharge: (Y/N, N/A)  4) Summary of Handoff Given to PCP:   5) Post-Discharge Appointment Date and Location:

## 2019-09-12 NOTE — PROGRESS NOTE ADULT - PROBLEM SELECTOR PLAN 9
F: 2L in ED   E: replete to goal   N: DASH/TLC/CC  DVT: Eliquis   Code: FULL  Dispo [facility] F: 2L in ED   E: replete to goal   N: DASH/TLC/CC  DVT: Eliquis   Code: FULL  Dispo [facility]. transfer to [facility]  -PT evaluation pending

## 2019-09-12 NOTE — PROGRESS NOTE ADULT - PROBLEM SELECTOR PLAN 8
Offered Psych services, Pt refused. No SI/HI  -Continue to support pt emotionally and offer AA, psych services

## 2019-09-12 NOTE — PROGRESS NOTE ADULT - PROBLEM SELECTOR PLAN 5
AG 21 on admission with EtOH 336. After 1L NS and banana bag lactate 3.5. BHB WNL. AG now resolved, repeat lactate trended down to 2.3.  -Trend CMP

## 2019-09-12 NOTE — PROGRESS NOTE ADULT - PROBLEM SELECTOR PLAN 3
EF 35-40% 8/12/2019. apical septum and true apex appear akinetic. Hypokinesis of remaining apical segments and mid septum. AICD in place. No functional limitations, euvolemic on exam, unlikely in exacerbation   -C/w Toprol 100mg qd

## 2019-09-12 NOTE — PROGRESS NOTE ADULT - SUBJECTIVE AND OBJECTIVE BOX
Less shaky this am   but fatigued    alert and oriented     PAST MEDICAL & SURGICAL HISTORY:  Heparin-induced thrombocytopenia: Antibody positive  Left ventricular thrombus  Atrial fibrillation  Hemorrhoid  Thrombus in heart chamber: History of LV thrombus on Eliquis that was no longer seen on f/u Echo 8/12/2019  ICD (implantable cardioverter-defibrillator) in place  Pacemaker  ETOH abuse  Paroxysmal a-fib  Myocardial infarction involving other coronary artery  Gastritis  Atherosclerosis of coronary artery: Coronary artery disease  Automatic implantable cardiac defibrillator in situ: ICD (implantable cardioverter-defibrillator) in place      MEDICATIONS  (STANDING):  apixaban 5 milliGRAM(s) Oral every 12 hours  ascorbic acid 500 milliGRAM(s) Oral daily  chlordiazePOXIDE 50 milliGRAM(s) Oral every 8 hours  folic acid 1 milliGRAM(s) Oral daily  magnesium sulfate  IVPB 1 Gram(s) IV Intermittent once  metoprolol succinate  milliGRAM(s) Oral every 24 hours  multivitamin 1 Tablet(s) Oral daily  pantoprazole    Tablet 40 milliGRAM(s) Oral before breakfast  polyethylene glycol 3350 17 Gram(s) Oral daily  potassium chloride    Tablet ER 40 milliEquivalent(s) Oral once  thiamine 100 milliGRAM(s) Oral daily      ICU Vital Signs Last 24 Hrs  T(C): 37.1 (12 Sep 2019 09:28), Max: 37.1 (12 Sep 2019 09:28)  T(F): 98.7 (12 Sep 2019 09:28), Max: 98.7 (12 Sep 2019 09:28)  HR: 88 (12 Sep 2019 08:52) (73 - 101)  BP: 100/70 (12 Sep 2019 08:52) (100/70 - 220/184)  BP(mean): 84 (12 Sep 2019 06:01) (84 - 93)  ABP: --  ABP(mean): --  RR: 17 (12 Sep 2019 08:52) (15 - 20)  SpO2: 95% (12 Sep 2019 08:52) (95% - 98%)      Lungs clear  CV  s1s2     abd soft     ext stable                          11.6   3.79  )-----------( 156      ( 12 Sep 2019 06:48 )             36.0   09-12    138  |  95<L>  |  10  ----------------------------<  81  3.6   |  27  |  0.99    Ca    8.9      12 Sep 2019 06:48  Phos  3.5     09-12  Mg     1.7     09-12    TPro  6.8  /  Alb  4.1  /  TBili  1.1  /  DBili  x   /  AST  105<H>  /  ALT  61<H>  /  AlkPhos  64  09-12    CARDIAC MARKERS ( 11 Sep 2019 16:52 )  x     / <0.01 ng/mL / 1084 U/L / x     / 9.8 ng/mL    PT/INR - ( 11 Sep 2019 16:52 )   PT: 11.1 sec;   INR: 0.98          PTT - ( 11 Sep 2019 16:52 )  PTT:28.6 sec    Alcohol, Blood (09.11.19 @ 16:52)    Alcohol, Blood: 336: TOXIC CONCENTRATIONS:  Flushing, Slowing of  Reflexes, Impaired Visual Acuity:     Depression of CNS: > 100  Fatalities Reported:  > 400  These ranges are intended as general guidelines.  Alcohol metabolism can  vary widely among individuals.  This test is approved for clinical and  not for forensic purposes. mg/dL

## 2019-09-12 NOTE — PROGRESS NOTE ADULT - ASSESSMENT
62 y/o male with hx of ETOH abuse with recurrent admissions for ETOH detox without severe features, HIT, paroxsymal AFib on Eliquis, hx of LV thrombus, CAD s/p PCI, HFrEF (35-40%) s/p AICD presents for ETOH detox.  Admitted to [address] for management of alcohol withdrawal in this patient with significant cardiac comorbidities, now awaiting transfer to regional medicine for further management.

## 2019-09-13 LAB
ALBUMIN SERPL ELPH-MCNC: 3.7 G/DL — SIGNIFICANT CHANGE UP (ref 3.3–5)
ALP SERPL-CCNC: 60 U/L — SIGNIFICANT CHANGE UP (ref 40–120)
ALT FLD-CCNC: 51 U/L — HIGH (ref 10–45)
ANION GAP SERPL CALC-SCNC: 11 MMOL/L — SIGNIFICANT CHANGE UP (ref 5–17)
AST SERPL-CCNC: 76 U/L — HIGH (ref 10–40)
BILIRUB SERPL-MCNC: 1.1 MG/DL — SIGNIFICANT CHANGE UP (ref 0.2–1.2)
BUN SERPL-MCNC: 11 MG/DL — SIGNIFICANT CHANGE UP (ref 7–23)
CALCIUM SERPL-MCNC: 9.1 MG/DL — SIGNIFICANT CHANGE UP (ref 8.4–10.5)
CHLORIDE SERPL-SCNC: 97 MMOL/L — SIGNIFICANT CHANGE UP (ref 96–108)
CO2 SERPL-SCNC: 27 MMOL/L — SIGNIFICANT CHANGE UP (ref 22–31)
CREAT SERPL-MCNC: 1.04 MG/DL — SIGNIFICANT CHANGE UP (ref 0.5–1.3)
GLUCOSE SERPL-MCNC: 97 MG/DL — SIGNIFICANT CHANGE UP (ref 70–99)
HCT VFR BLD CALC: 36.5 % — LOW (ref 39–50)
HGB BLD-MCNC: 11.6 G/DL — LOW (ref 13–17)
MAGNESIUM SERPL-MCNC: 1.7 MG/DL — SIGNIFICANT CHANGE UP (ref 1.6–2.6)
MCHC RBC-ENTMCNC: 24.6 PG — LOW (ref 27–34)
MCHC RBC-ENTMCNC: 31.8 GM/DL — LOW (ref 32–36)
MCV RBC AUTO: 77.5 FL — LOW (ref 80–100)
NRBC # BLD: 0 /100 WBCS — SIGNIFICANT CHANGE UP (ref 0–0)
PLATELET # BLD AUTO: 122 K/UL — LOW (ref 150–400)
POTASSIUM SERPL-MCNC: 3.5 MMOL/L — SIGNIFICANT CHANGE UP (ref 3.5–5.3)
POTASSIUM SERPL-SCNC: 3.5 MMOL/L — SIGNIFICANT CHANGE UP (ref 3.5–5.3)
PROT SERPL-MCNC: 6.6 G/DL — SIGNIFICANT CHANGE UP (ref 6–8.3)
RBC # BLD: 4.71 M/UL — SIGNIFICANT CHANGE UP (ref 4.2–5.8)
RBC # FLD: 19.6 % — HIGH (ref 10.3–14.5)
SODIUM SERPL-SCNC: 135 MMOL/L — SIGNIFICANT CHANGE UP (ref 135–145)
WBC # BLD: 3.44 K/UL — LOW (ref 3.8–10.5)
WBC # FLD AUTO: 3.44 K/UL — LOW (ref 3.8–10.5)

## 2019-09-13 RX ORDER — MAGNESIUM SULFATE 500 MG/ML
1 VIAL (ML) INJECTION ONCE
Refills: 0 | Status: COMPLETED | OUTPATIENT
Start: 2019-09-13 | End: 2019-09-13

## 2019-09-13 RX ORDER — POTASSIUM CHLORIDE 20 MEQ
40 PACKET (EA) ORAL ONCE
Refills: 0 | Status: COMPLETED | OUTPATIENT
Start: 2019-09-13 | End: 2019-09-13

## 2019-09-13 RX ADMIN — Medication 100 GRAM(S): at 07:35

## 2019-09-13 RX ADMIN — Medication 25 MILLIGRAM(S): at 23:37

## 2019-09-13 RX ADMIN — Medication 500 MILLIGRAM(S): at 10:24

## 2019-09-13 RX ADMIN — Medication 40 MILLIEQUIVALENT(S): at 07:35

## 2019-09-13 RX ADMIN — Medication 50 MILLIGRAM(S): at 01:02

## 2019-09-13 RX ADMIN — Medication 25 MILLIGRAM(S): at 18:56

## 2019-09-13 RX ADMIN — APIXABAN 5 MILLIGRAM(S): 2.5 TABLET, FILM COATED ORAL at 18:56

## 2019-09-13 RX ADMIN — Medication 1 MILLIGRAM(S): at 10:24

## 2019-09-13 RX ADMIN — APIXABAN 5 MILLIGRAM(S): 2.5 TABLET, FILM COATED ORAL at 06:51

## 2019-09-13 RX ADMIN — PANTOPRAZOLE SODIUM 40 MILLIGRAM(S): 20 TABLET, DELAYED RELEASE ORAL at 06:51

## 2019-09-13 RX ADMIN — Medication 25 MILLIGRAM(S): at 10:24

## 2019-09-13 RX ADMIN — Medication 1 TABLET(S): at 10:24

## 2019-09-13 RX ADMIN — Medication 100 MILLIGRAM(S): at 06:51

## 2019-09-13 RX ADMIN — Medication 100 MILLIGRAM(S): at 10:24

## 2019-09-13 NOTE — PROGRESS NOTE ADULT - ASSESSMENT
64 y/o male with hx of ETOH abuse with recurrent admissions for ETOH detox without severe features, HIT, paroxsymal AFib on Eliquis, hx of LV thrombus, CAD s/p PCI, HFrEF (35-40%) s/p AICD presents for ETOH detox.  Admitted to [address] for management of alcohol withdrawal in this patient with significant cardiac comorbidities, now awaiting transfer to regional medicine for further management.

## 2019-09-13 NOTE — PROGRESS NOTE ADULT - SUBJECTIVE AND OBJECTIVE BOX
Improving ETOH  withdrawal   on medical therapy     No cardiac complications     PAST MEDICAL & SURGICAL HISTORY:  Heparin-induced thrombocytopenia: Antibody positive  Left ventricular thrombus  Atrial fibrillation  Hemorrhoid  Thrombus in heart chamber: History of LV thrombus on Eliquis that was no longer seen on f/u Echo 8/12/2019  ICD (implantable cardioverter-defibrillator) in place  Pacemaker  ETOH abuse  Paroxysmal a-fib  Myocardial infarction involving other coronary artery  Gastritis  Atherosclerosis of coronary artery: Coronary artery disease  Automatic implantable cardiac defibrillator in situ: ICD (implantable cardioverter-defibrillator) in place      MEDICATIONS  (STANDING):  apixaban 5 milliGRAM(s) Oral every 12 hours  ascorbic acid 500 milliGRAM(s) Oral daily  chlordiazePOXIDE 25 milliGRAM(s) Oral every 6 hours  folic acid 1 milliGRAM(s) Oral daily  metoprolol succinate  milliGRAM(s) Oral every 24 hours  multivitamin 1 Tablet(s) Oral daily  pantoprazole    Tablet 40 milliGRAM(s) Oral before breakfast  polyethylene glycol 3350 17 Gram(s) Oral daily  thiamine 100 milliGRAM(s) Oral daily    MEDICATIONS  (PRN):  LORazepam   Injectable 2 milliGRAM(s) IV Push every 4 hours PRN CIWA>8    ICU Vital Signs Last 24 Hrs  T(C): 36.9 (13 Sep 2019 10:00), Max: 37.5 (12 Sep 2019 14:15)  T(F): 98.5 (13 Sep 2019 10:00), Max: 99.5 (12 Sep 2019 14:15)  HR: 84 (13 Sep 2019 09:19) (80 - 98)  BP: 117/82 (13 Sep 2019 09:19) (113/78 - 122/85)  BP(mean): 93 (13 Sep 2019 09:19) (92 - 97)  ABP: --  ABP(mean): --  RR: 18 (13 Sep 2019 09:19) (15 - 20)  SpO2: 97% (13 Sep 2019 09:19) (96% - 97%)      Lungs clear  CV   s1 s2     abd soft  Ext stable                          11.6   3.44  )-----------( 122      ( 13 Sep 2019 06:52 )             36.5   09-13    135  |  97  |  11  ----------------------------<  97  3.5   |  27  |  1.04    Ca    9.1      13 Sep 2019 06:52  Phos  3.5     09-12  Mg     1.7     09-13    TPro  6.6  /  Alb  3.7  /  TBili  1.1  /  DBili  x   /  AST  76<H>  /  ALT  51<H>  /  AlkPhos  60  09-13

## 2019-09-13 NOTE — PROGRESS NOTE ADULT - PROBLEM SELECTOR PLAN 9
F: s/p 2L in ED   E: replete to goal K>4, Mg>2  N: DASH/TLC/CC  DVT: Eliquis   Code: FULL  Dispo [facility]. transfer to [facility]  -PT evaluation w/ no needs for skilled PT

## 2019-09-13 NOTE — PROGRESS NOTE ADULT - PROBLEM SELECTOR PLAN 3
EF 35-40% 8/12/2019. apical septum and true apex appear akinetic. Hypokinesis of remaining apical segments and mid septum. AICD in place. No functional limitations, euvolemic on exam, unlikely in exacerbation   -C/w Toprol 100mg qd Chronic. EF 35-40% 8/12/2019, apical septum and true apex appear akinetic. Hypokinesis of remaining apical segments and mid septum. AICD in place. No functional limitations, euvolemic on exam, not acute in exacerbation.  -C/w Toprol 100mg qd

## 2019-09-13 NOTE — PHYSICAL THERAPY INITIAL EVALUATION ADULT - PERTINENT HX OF CURRENT PROBLEM, REHAB EVAL
62 y/o male with hx of ETOH abuse with recurrent admissions for ETOH detox without severe features, HIT, paroxsymal AFib on Eliquis, hx of LV thrombus, CAD s/p PCI, HFrEF (35-40%) s/p AICD presents for ETOH detox.  Admitted to [address] for management of alcohol withdrawal in this patient with significant cardiac comorbidities, now awaiting transfer to regional medicine for further management.

## 2019-09-13 NOTE — PROGRESS NOTE ADULT - SUBJECTIVE AND OBJECTIVE BOX
CARDIOLOGY NP TRANSFER OFF SERVICE/ PROGRESS NOTE    HOSPITAL COURSE:  62 y/o male with hx of ETOH abuse no withdrawal seizures or DTs, HIT, paroxsymal AFib on Eliquis, hx of LV thrombus, CAD s/p PCI, HFrEF (35-40%) s/p AICD presents for ETOH detox. Pt was admitted twice in August for detox, discharged on 9/2. After discharge pt reverted to drinking heavily 1-2 pints of vodka daily, last drink on 9/11 in AM and have not been taking any of his medications. Reports fell backward onto his back in the AM without head trauma/LOC. In ED was hypertensive 220/184s initially and improved w/ medications. EKG w/ sinus tach 110s w/o acute ST changes. Labs s/f mild transaminitis, AG 21, ETOH 336, lactate 3.5. He received a banana bag and 1L NS and AG and lactate improved. He was admitted to [facility] for monitoring of ETOH withdrawal, initial CIWA 3 on admission, started on Librium 50mg q8h being tapered, and Ativan PRN and resumed home meds. He does not require further cardiac telemetry monitoring and is awaiting step down to regional medical floors for further management.    Subjective: Pt seen and examined at bedside. Reports feeling a little better, but still c/o tremors, mildly anxious, and facial itchiness. Denies chest pain, sob, lightheadedness, dizziness, palpitations.  Remainder ROS otherwise negative.    Overnight Events: No ativan required over last 2 days.    TELEMETRY: none - awaiting regional bed         VITAL SIGNS:  T(C): 36.9 (09-13-19 @ 10:00), Max: 37.5 (09-12-19 @ 14:15)  HR: 84 (09-13-19 @ 09:19) (80 - 98)  BP: 117/82 (09-13-19 @ 09:19) (113/78 - 122/85)  RR: 18 (09-13-19 @ 09:19) (15 - 20)  SpO2: 97% (09-13-19 @ 09:19) (96% - 97%)  Wt(kg): --    I&O's Summary    12 Sep 2019 07:01  -  13 Sep 2019 07:00  --------------------------------------------------------  IN: 420 mL / OUT: 0 mL / NET: 420 mL    13 Sep 2019 07:01  -  13 Sep 2019 11:14  --------------------------------------------------------  IN: 300 mL / OUT: 300 mL / NET: 0 mL          PHYSICAL EXAM:    General: A/ox 3, No acute Distress  Neck: Supple, NO JVD  Cardiac: S1 S2, No M/R/G  Pulmonary: CTAB, Breathing unlabored, No Rhonchi/Rales/Wheezing  Abdomen: Soft, Non -tender, +BS x 4 quads  Extremities: No Rashes, No edema  Neuro: A/o x 3, No focal deficits          LABS:                          11.6   3.44  )-----------( 122      ( 13 Sep 2019 06:52 )             36.5                              09-13    135  |  97  |  11  ----------------------------<  97  3.5   |  27  |  1.04    Ca    9.1      13 Sep 2019 06:52  Phos  3.5     09-12  Mg     1.7     09-13    TPro  6.6  /  Alb  3.7  /  TBili  1.1  /  DBili  x   /  AST  76<H>  /  ALT  51<H>  /  AlkPhos  60  09-13    LIVER FUNCTIONS - ( 13 Sep 2019 06:52 )  Alb: 3.7 g/dL / Pro: 6.6 g/dL / ALK PHOS: 60 U/L / ALT: 51 U/L / AST: 76 U/L / GGT: x         PT/INR - ( 11 Sep 2019 16:52 )   PT: 11.1 sec;   INR: 0.98          PTT - ( 11 Sep 2019 16:52 )  PTT:28.6 sec  CAPILLARY BLOOD GLUCOSE        CARDIAC MARKERS ( 11 Sep 2019 16:52 )  x     / <0.01 ng/mL / 1084 U/L / x     / 9.8 ng/mL          Allergies:  Capoten (Short breath; Rash; Hives)  heparin (Other (Mod to Severe))  penicillin (Short breath; Rash; Hives)    MEDICATIONS  (STANDING):  apixaban 5 milliGRAM(s) Oral every 12 hours  ascorbic acid 500 milliGRAM(s) Oral daily  chlordiazePOXIDE 25 milliGRAM(s) Oral every 6 hours  folic acid 1 milliGRAM(s) Oral daily  metoprolol succinate  milliGRAM(s) Oral every 24 hours  multivitamin 1 Tablet(s) Oral daily  pantoprazole    Tablet 40 milliGRAM(s) Oral before breakfast  polyethylene glycol 3350 17 Gram(s) Oral daily  thiamine 100 milliGRAM(s) Oral daily    MEDICATIONS  (PRN):  LORazepam   Injectable 2 milliGRAM(s) IV Push every 4 hours PRN CIWA>8        DIAGNOSTIC TESTS:

## 2019-09-13 NOTE — PROGRESS NOTE ADULT - ASSESSMENT
ETOH  withdrawal   CAD  Cardiomyopathy   s/p LV Thrombus     Pt to see Dr Ramos as outpt next week for AICD evalaution   For Alcohol  Detox program     LEOPOLDO Levy MD

## 2019-09-13 NOTE — PHYSICAL THERAPY INITIAL EVALUATION ADULT - GENERAL OBSERVATIONS, REHAB EVAL
Patient received supine in bed with + heplock IV, off tele, room air. Patient in no apparent distress.

## 2019-09-13 NOTE — PHYSICAL THERAPY INITIAL EVALUATION ADULT - ADDITIONAL COMMENTS
Patient reports that he lives alone, elevator building. Independent community ambulator at baseline.

## 2019-09-14 LAB
ALBUMIN SERPL ELPH-MCNC: 3.8 G/DL — SIGNIFICANT CHANGE UP (ref 3.3–5)
ALP SERPL-CCNC: 58 U/L — SIGNIFICANT CHANGE UP (ref 40–120)
ALT FLD-CCNC: 48 U/L — HIGH (ref 10–45)
ANION GAP SERPL CALC-SCNC: 9 MMOL/L — SIGNIFICANT CHANGE UP (ref 5–17)
AST SERPL-CCNC: 65 U/L — HIGH (ref 10–40)
BILIRUB SERPL-MCNC: 0.7 MG/DL — SIGNIFICANT CHANGE UP (ref 0.2–1.2)
BUN SERPL-MCNC: 13 MG/DL — SIGNIFICANT CHANGE UP (ref 7–23)
CALCIUM SERPL-MCNC: 9.1 MG/DL — SIGNIFICANT CHANGE UP (ref 8.4–10.5)
CHLORIDE SERPL-SCNC: 97 MMOL/L — SIGNIFICANT CHANGE UP (ref 96–108)
CO2 SERPL-SCNC: 27 MMOL/L — SIGNIFICANT CHANGE UP (ref 22–31)
CREAT SERPL-MCNC: 1.03 MG/DL — SIGNIFICANT CHANGE UP (ref 0.5–1.3)
GLUCOSE SERPL-MCNC: 93 MG/DL — SIGNIFICANT CHANGE UP (ref 70–99)
HCT VFR BLD CALC: 36.4 % — LOW (ref 39–50)
HGB BLD-MCNC: 11.2 G/DL — LOW (ref 13–17)
MAGNESIUM SERPL-MCNC: 1.8 MG/DL — SIGNIFICANT CHANGE UP (ref 1.6–2.6)
MCHC RBC-ENTMCNC: 24.8 PG — LOW (ref 27–34)
MCHC RBC-ENTMCNC: 30.8 GM/DL — LOW (ref 32–36)
MCV RBC AUTO: 80.5 FL — SIGNIFICANT CHANGE UP (ref 80–100)
NRBC # BLD: 0 /100 WBCS — SIGNIFICANT CHANGE UP (ref 0–0)
PLATELET # BLD AUTO: 92 K/UL — LOW (ref 150–400)
POTASSIUM SERPL-MCNC: 4.2 MMOL/L — SIGNIFICANT CHANGE UP (ref 3.5–5.3)
POTASSIUM SERPL-SCNC: 4.2 MMOL/L — SIGNIFICANT CHANGE UP (ref 3.5–5.3)
PROT SERPL-MCNC: 6.6 G/DL — SIGNIFICANT CHANGE UP (ref 6–8.3)
RBC # BLD: 4.52 M/UL — SIGNIFICANT CHANGE UP (ref 4.2–5.8)
RBC # FLD: 19.3 % — HIGH (ref 10.3–14.5)
SODIUM SERPL-SCNC: 133 MMOL/L — LOW (ref 135–145)
WBC # BLD: 3.09 K/UL — LOW (ref 3.8–10.5)
WBC # FLD AUTO: 3.09 K/UL — LOW (ref 3.8–10.5)

## 2019-09-14 RX ADMIN — Medication 1 MILLIGRAM(S): at 11:40

## 2019-09-14 RX ADMIN — Medication 25 MILLIGRAM(S): at 23:33

## 2019-09-14 RX ADMIN — Medication 25 MILLIGRAM(S): at 11:40

## 2019-09-14 RX ADMIN — Medication 25 MILLIGRAM(S): at 06:16

## 2019-09-14 RX ADMIN — Medication 100 MILLIGRAM(S): at 11:39

## 2019-09-14 RX ADMIN — Medication 500 MILLIGRAM(S): at 11:40

## 2019-09-14 RX ADMIN — Medication 1 TABLET(S): at 11:40

## 2019-09-14 RX ADMIN — PANTOPRAZOLE SODIUM 40 MILLIGRAM(S): 20 TABLET, DELAYED RELEASE ORAL at 06:16

## 2019-09-14 RX ADMIN — Medication 25 MILLIGRAM(S): at 18:03

## 2019-09-14 RX ADMIN — APIXABAN 5 MILLIGRAM(S): 2.5 TABLET, FILM COATED ORAL at 06:16

## 2019-09-14 RX ADMIN — Medication 100 MILLIGRAM(S): at 06:16

## 2019-09-14 RX ADMIN — APIXABAN 5 MILLIGRAM(S): 2.5 TABLET, FILM COATED ORAL at 18:03

## 2019-09-14 NOTE — PROGRESS NOTE ADULT - PROBLEM SELECTOR PLAN 7
DISPLAY PLAN FREE TEXT
Hypertensive 200s on arrival, now normotensive  -restart home Toprol 100
Hypertensive 200s on arrival, now normotensive.  -restarted home Toprol 100 qd

## 2019-09-14 NOTE — PROGRESS NOTE ADULT - PROBLEM SELECTOR PLAN 3
Chronic. EF 35-40% 8/12/2019, apical septum and true apex appear akinetic. Hypokinesis of remaining apical segments and mid septum. AICD in place. No functional limitations, euvolemic on exam, not acute in exacerbation.  -C/w Toprol 100mg qd  - No additional actions required at this time

## 2019-09-14 NOTE — PROGRESS NOTE ADULT - ASSESSMENT
62 y/o male with hx of ETOH abuse with recurrent admissions for ETOH detox without severe features, HIT, paroxsymal AFib on Eliquis, hx of LV thrombus, CAD s/p PCI, HFrEF (35-40%) s/p AICD presents for ETOH detox.  Admitted to [address] for management of alcohol withdrawal in this patient with significant cardiac comorbidities, now awaiting transfer to regional medicine for further management. 62 y/o male with hx of ETOH abuse with recurrent admissions for ETOH detox without severe features, HIT, paroxsymal AFib on Eliquis, hx of LV thrombus, CAD s/p PCI, HFrEF (35-40%) s/p AICD presents for ETOH detox.  Admitted to tele 5[facility] for management of alcohol withdrawal in this patient with significant cardiac comorbidities, now on regional medicine floor for further management.

## 2019-09-14 NOTE — PROGRESS NOTE ADULT - PROBLEM SELECTOR PLAN 4
CAD s/p PCI   -Holding statin in setting of mild transaminitis   -Holding ASA as patient is fall risk and is on Eliquis

## 2019-09-14 NOTE — PROGRESS NOTE ADULT - PROBLEM SELECTOR PLAN 1
Pt presents after using 1-2 pints of vodka daily with fell onto back w/o head injury or LOC. Last drink AM of 9/11, would expect severe features up to 9/14.  -Taper down to Librium 25mg q6h, and Ativan 2mg IVP qhr PRN   -CIWA Protocol   -C/w thiamine, multivitamin, FA supplementation
Pt presents after using 1-2 pints of vodka daily with fell onto back w/o head injury or LOC. Last drink AM of 9/11, would expect severe features up to 9/14.  - Currently on Librium 25mg q6h, and Ativan 2mg IVP qhr PRN which has not been required. Plan to further reduce librium dose today given patient is 72 hours since last drink.  - CIWA 4 (2 for tremor, 1 for nausea, 1 for headache)  -C/w thiamine, multivitamin, FA supplementation
Pt presents after using 1-2 pints of vodka daily with fell onto back w/o head injury or LOC. Last drink AM of 9/11, would expect severe features up to 9/14.  -C/w Librium 50q8hr and Ativan 2mg IVP qhr PRN   -CIWA Protocol   -C/w thiamine, multivitamin, FA supplementation
Pt presents after using 1-2 pints of vodka daily with fell onto back w/o head injury or LOC. Last drink AM of 9/11, would expect severe features up to 9/14.  -Tapered down to Librium 25mg q6h, and Ativan 2mg IVP qhr PRN   -CIWA Protocol - last CIWA 3  -C/w thiamine, multivitamin, FA supplementation

## 2019-09-14 NOTE — PROGRESS NOTE ADULT - PROBLEM SELECTOR PROBLEM 1
Alcohol withdrawal, uncomplicated

## 2019-09-14 NOTE — PROGRESS NOTE ADULT - SUBJECTIVE AND OBJECTIVE BOX
OVERNIGHT EVENTS: No acute events overnight    SUBJECTIVE / INTERVAL HPI: Patient seen and examined at bedside. Reports mild tremors and headache, reports symptoms similar to previous alcohol withdrawal episodes. As per patient, last drink on 9/11 at noon (approx. 72 hrs ago)     VITAL SIGNS:  Vital Signs Last 24 Hrs  T(C): 36.8 (14 Sep 2019 06:00), Max: 37 (13 Sep 2019 18:08)  T(F): 98.2 (14 Sep 2019 06:00), Max: 98.6 (13 Sep 2019 18:08)  HR: 75 (14 Sep 2019 06:00) (75 - 88)  BP: 115/76 (14 Sep 2019 06:00) (103/61 - 120/76)  BP(mean): 88 (13 Sep 2019 18:59) (88 - 93)  RR: 18 (14 Sep 2019 06:00) (16 - 18)  SpO2: 100% (14 Sep 2019 06:00) (97% - 100%)    PHYSICAL EXAM:    General: WDWN  HEENT: NC/AT; PERRL, anicteric sclera; MMM  Neck: supple  Cardiovascular: +S1/S2; RRR  Respiratory: CTA B/L; no W/R/R  Gastrointestinal: soft, NT/ND; +BSx4  Extremities: WWP; no edema, clubbing or cyanosis  Vascular: 2+ radial, DP/PT pulses B/L  Neurological: AAOx3; mild tremor noted with hands extended    MEDICATIONS:  MEDICATIONS  (STANDING):  apixaban 5 milliGRAM(s) Oral every 12 hours  ascorbic acid 500 milliGRAM(s) Oral daily  chlordiazePOXIDE 25 milliGRAM(s) Oral every 6 hours  folic acid 1 milliGRAM(s) Oral daily  metoprolol succinate  milliGRAM(s) Oral every 24 hours  multivitamin 1 Tablet(s) Oral daily  pantoprazole    Tablet 40 milliGRAM(s) Oral before breakfast  polyethylene glycol 3350 17 Gram(s) Oral daily  thiamine 100 milliGRAM(s) Oral daily    MEDICATIONS  (PRN):  LORazepam   Injectable 2 milliGRAM(s) IV Push every 4 hours PRN CIWA>8      ALLERGIES:  Allergies    Capoten (Short breath; Rash; Hives)  heparin (Other (Mod to Severe))  penicillin (Short breath; Rash; Hives)    Intolerances        LABS:                        11.2   3.09  )-----------( 92       ( 14 Sep 2019 07:09 )             36.4     09-14    133<L>  |  97  |  13  ----------------------------<  93  4.2   |  27  |  1.03    Ca    9.1      14 Sep 2019 07:09  Mg     1.8     09-14    TPro  6.6  /  Alb  3.8  /  TBili  0.7  /  DBili  x   /  AST  65<H>  /  ALT  48<H>  /  AlkPhos  58  09-14    RADIOLOGY & ADDITIONAL TESTS: Reviewed.

## 2019-09-14 NOTE — PROGRESS NOTE ADULT - PROBLEM SELECTOR PLAN 9
F: s/p 2L in ED   E: replete to goal K>4, Mg>2  N: DASH/TLC/CC  DVT: Eliquis   Code: FULL  Dispo [facility]  -PT evaluation w/ no needs for skilled PT

## 2019-09-14 NOTE — PROGRESS NOTE ADULT - PROBLEM SELECTOR PLAN 3
Chronic. EF 35-40% 8/12/2019, apical septum and true apex appear akinetic. Hypokinesis of remaining apical segments and mid septum. AICD in place. No functional limitations, euvolemic on exam, not acute in exacerbation.  -C/w Toprol 100mg qd

## 2019-09-14 NOTE — PROGRESS NOTE ADULT - PROBLEM SELECTOR PROBLEM 3
Heart failure with reduced ejection fraction, NYHA class I

## 2019-09-14 NOTE — PROGRESS NOTE ADULT - ASSESSMENT
62 y/o male with hx of ETOH abuse with recurrent admissions for ETOH detox without severe features, HIT, paroxsymal AFib on Eliquis, hx of LV thrombus, CAD s/p PCI, HFrEF (35-40%) s/p AICD presents for ETOH detox.  Admitted to tele 5[facility] for management of alcohol withdrawal in this patient with significant cardiac comorbidities, now on regional medicine floor for further management.

## 2019-09-14 NOTE — PROGRESS NOTE ADULT - PROBLEM SELECTOR PLAN 2
Current NSR. EKG no ischemic changes. Trop neg x1. No CP or palpitations complaints. Noncompliant with meds.  -C/w Eliquis 5mg BID and Toprol 100mg qd
NSR at time of last EKG, HR had RRR on physical exam  - Given history, will continue eliquis 5mg BID and Toprol 100mg qd  - Patient noncompliant with medication as outpatient, will provide education to patient regarding importance of medication compliance

## 2019-09-14 NOTE — PROGRESS NOTE ADULT - PROBLEM SELECTOR PLAN 5
AG 21 on admission with EtOH 336. After 1L NS and banana bag lactate 3.5. BHB WNL. AG now resolved, repeat lactate trended down to 2.5.   -Trend CMP  -AG now 11

## 2019-09-14 NOTE — PROGRESS NOTE ADULT - SUBJECTIVE AND OBJECTIVE BOX
Pt continues to improve  s/p withdrawal of ETOH intoxication     ambulating w/o falling     PAST MEDICAL & SURGICAL HISTORY:  Heparin-induced thrombocytopenia: Antibody positive  Left ventricular thrombus  Atrial fibrillation  Hemorrhoid  Thrombus in heart chamber: History of LV thrombus on Eliquis that was no longer seen on f/u Echo 8/12/2019  ICD (implantable cardioverter-defibrillator) in place  Pacemaker  ETOH abuse  Paroxysmal a-fib  Myocardial infarction involving other coronary artery  Gastritis  Atherosclerosis of coronary artery: Coronary artery disease  Automatic implantable cardiac defibrillator in situ: ICD (implantable cardioverter-defibrillator) in place    MEDICATIONS  (STANDING):  apixaban 5 milliGRAM(s) Oral every 12 hours  ascorbic acid 500 milliGRAM(s) Oral daily  chlordiazePOXIDE 25 milliGRAM(s) Oral every 6 hours  folic acid 1 milliGRAM(s) Oral daily  metoprolol succinate  milliGRAM(s) Oral every 24 hours  multivitamin 1 Tablet(s) Oral daily  pantoprazole    Tablet 40 milliGRAM(s) Oral before breakfast  polyethylene glycol 3350 17 Gram(s) Oral daily  thiamine 100 milliGRAM(s) Oral daily    MEDICATIONS  (PRN):  LORazepam   Injectable 2 milliGRAM(s) IV Push every 4 hours PRN CIWA>8    ICU Vital Signs Last 24 Hrs  T(C): 36.8 (14 Sep 2019 06:00), Max: 37 (13 Sep 2019 18:08)  T(F): 98.2 (14 Sep 2019 06:00), Max: 98.6 (13 Sep 2019 18:08)  HR: 75 (14 Sep 2019 06:00) (75 - 88)  BP: 115/76 (14 Sep 2019 06:00) (103/61 - 120/76)  BP(mean): 88 (13 Sep 2019 18:59) (88 - 88)  ABP: --  ABP(mean): --  RR: 18 (14 Sep 2019 06:00) (16 - 18)  SpO2: 100% (14 Sep 2019 06:00) (97% - 100%)        Lungs clear   Cv s1s2   abd soft    ext stabel                          11.2   3.09  )-----------( 92       ( 14 Sep 2019 07:09 )             36.4   09-14    133<L>  |  97  |  13  ----------------------------<  93  4.2   |  27  |  1.03    Ca    9.1      14 Sep 2019 07:09  Mg     1.8     09-14    TPro  6.6  /  Alb  3.8  /  TBili  0.7  /  DBili  x   /  AST  65<H>  /  ALT  48<H>  /  AlkPhos  58  09-14      cont RX

## 2019-09-14 NOTE — PROGRESS NOTE ADULT - SUBJECTIVE AND OBJECTIVE BOX
CARDIOLOGY NP TRANSFER OFF SERVICE/ PROGRESS NOTE    HOSPITAL COURSE:  62 y/o male with hx of ETOH abuse no withdrawal seizures or DTs, HIT, paroxsymal AFib on Eliquis, hx of LV thrombus, CAD s/p PCI, HFrEF (35-40%) s/p AICD presents for ETOH detox. Pt was admitted twice in August for detox, discharged on . After discharge pt reverted to drinking heavily 1-2 pints of vodka daily, last drink on  in AM and have not been taking any of his medications. Reports fell backward onto his back in the AM without head trauma/LOC. In ED was hypertensive 220/184s initially and improved w/ medications. EKG w/ sinus tach 110s w/o acute ST changes. Labs s/f mild transaminitis, AG 21, ETOH 336, lactate 3.5. He received a banana bag and 1L NS and AG and lactate improved. He was admitted to [facility] for monitoring of ETOH withdrawal, initial CIWA 3 on admission, started on Librium 50mg q8h being tapered, now librium 25 mg q 6 hrs and Ativan PRN and resumed home meds. He does not require further cardiac telemetry monitoring and has been stepped down to regional medical floors for further management.    Subjective: Pt seen and examined at bedside. Reports feeling a little better, but still c/o tremors. Denies chest pain, sob, lightheadedness, dizziness, palpitations.  Remainder ROS otherwise negative.    Overnight Events: No ativan required over last 2 days    VITAL SIGNS:  T(F): 97.8 (19 @ 21:03)  HR: 75 (19 @ 21:03)  BP: 103/61 (19 @ 21:03)  RR: 18 (19 @ 21:03)  SpO2: 100% (19 @ 21:03)      PHYSICAL EXAM:    General: A/ox 3, No acute Distress  Neck: Supple, NO JVD  Cardiac: S1 S2, No M/R/G  Pulmonary: CTAB, Breathing unlabored, No Rhonchi/Rales/Wheezing  Abdomen: Soft, Non -tender, +BS x 4 quads  Extremities: No Rashes, No edema  Neuro: A/o x 3, No focal deficits, tremulous. Unclear baseline.         LABS:  .  LABS:                         11.6   3.44  )-----------( 122      ( 13 Sep 2019 06:52 )             36.5         135  |  97  |  11  ----------------------------<  97  3.5   |  27  |  1.04    Ca    9.1      13 Sep 2019 06:52  Phos  3.5       Mg     1.7         TPro  6.6  /  Alb  3.7  /  TBili  1.1  /  DBili  x   /  AST  76<H>  /  ALT  51<H>  /  AlkPhos  60        Urinalysis Basic - ( 12 Sep 2019 00:35 )    Color: Yellow / Appearance: Clear / S.025 / pH: x  Gluc: x / Ketone: 15 mg/dL  / Bili: Negative / Urobili: 0.2 E.U./dL   Blood: x / Protein: 30 mg/dL / Nitrite: NEGATIVE   Leuk Esterase: NEGATIVE / RBC: < 5 /HPF / WBC < 5 /HPF   Sq Epi: x / Non Sq Epi: 5-10 /HPF / Bacteria: Present /HPF        RADIOLOGY, EKG & ADDITIONAL TESTS: Reviewed.     Allergies:  Capoten (Short breath; Rash; Hives)  heparin (Other (Mod to Severe))  penicillin (Short breath; Rash; Hives)    MEDICATIONS  (STANDING):  apixaban 5 milliGRAM(s) Oral every 12 hours  ascorbic acid 500 milliGRAM(s) Oral daily  chlordiazePOXIDE 25 milliGRAM(s) Oral every 6 hours  folic acid 1 milliGRAM(s) Oral daily  metoprolol succinate  milliGRAM(s) Oral every 24 hours  multivitamin 1 Tablet(s) Oral daily  pantoprazole    Tablet 40 milliGRAM(s) Oral before breakfast  polyethylene glycol 3350 17 Gram(s) Oral daily  thiamine 100 milliGRAM(s) Oral daily    MEDICATIONS  (PRN):  LORazepam   Injectable 2 milliGRAM(s) IV Push every 4 hours PRN CIWA>8      DIAGNOSTIC TESTS: 7 [facility] Transfer Acceptance Note    HOSPITAL COURSE:  62 y/o male with hx of ETOH abuse no withdrawal seizures or DTs, HIT, paroxsymal AFib on Eliquis, hx of LV thrombus, CAD s/p PCI, HFrEF (35-40%) s/p AICD presents for ETOH detox. Pt was admitted twice in August for detox, discharged on . After discharge pt reverted to drinking heavily 1-2 pints of vodka daily, last drink on  in AM and have not been taking any of his medications. Reports fell backward onto his back in the AM without head trauma/LOC. In ED was hypertensive 220/184s initially and improved w/ medications. EKG w/ sinus tach 110s w/o acute ST changes. Labs s/f mild transaminitis, AG 21, ETOH 336, lactate 3.5. He received a banana bag and 1L NS and AG and lactate improved. He was admitted to [facility] for monitoring of ETOH withdrawal, initial CIWA 3 on admission, started on Librium 50mg q8h being tapered, now librium 25 mg q 6 hrs and Ativan PRN and resumed home meds. He does not require further cardiac telemetry monitoring and has been stepped down to regional medical floors for further management.    Subjective: Pt seen and examined at bedside. Reports feeling a little better, but still c/o tremors. Denies chest pain, sob, lightheadedness, dizziness, palpitations.  Remainder ROS otherwise negative.    Overnight Events: No ativan required over last 2 days    VITAL SIGNS:  T(F): 97.8 (19 @ 21:03)  HR: 75 (19 @ 21:03)  BP: 103/61 (19 @ 21:03)  RR: 18 (19 @ 21:03)  SpO2: 100% (19 @ 21:03)      PHYSICAL EXAM:    General: A/ox 3, No acute Distress  Neck: Supple, NO JVD  Cardiac: S1 S2, No M/R/G  Pulmonary: CTAB, Breathing unlabored, No Rhonchi/Rales/Wheezing  Abdomen: Soft, Non -tender, +BS x 4 quads  Extremities: No Rashes, No edema  Neuro: A/o x 3, No focal deficits, tremulous. Unclear baseline.         LABS:  .  LABS:                         11.6   3.44  )-----------( 122      ( 13 Sep 2019 06:52 )             36.5         135  |  97  |  11  ----------------------------<  97  3.5   |  27  |  1.04    Ca    9.1      13 Sep 2019 06:52  Phos  3.5       Mg     1.7         TPro  6.6  /  Alb  3.7  /  TBili  1.1  /  DBili  x   /  AST  76<H>  /  ALT  51<H>  /  AlkPhos  60        Urinalysis Basic - ( 12 Sep 2019 00:35 )    Color: Yellow / Appearance: Clear / S.025 / pH: x  Gluc: x / Ketone: 15 mg/dL  / Bili: Negative / Urobili: 0.2 E.U./dL   Blood: x / Protein: 30 mg/dL / Nitrite: NEGATIVE   Leuk Esterase: NEGATIVE / RBC: < 5 /HPF / WBC < 5 /HPF   Sq Epi: x / Non Sq Epi: 5-10 /HPF / Bacteria: Present /HPF        RADIOLOGY, EKG & ADDITIONAL TESTS: Reviewed.     Allergies:  Capoten (Short breath; Rash; Hives)  heparin (Other (Mod to Severe))  penicillin (Short breath; Rash; Hives)    MEDICATIONS  (STANDING):  apixaban 5 milliGRAM(s) Oral every 12 hours  ascorbic acid 500 milliGRAM(s) Oral daily  chlordiazePOXIDE 25 milliGRAM(s) Oral every 6 hours  folic acid 1 milliGRAM(s) Oral daily  metoprolol succinate  milliGRAM(s) Oral every 24 hours  multivitamin 1 Tablet(s) Oral daily  pantoprazole    Tablet 40 milliGRAM(s) Oral before breakfast  polyethylene glycol 3350 17 Gram(s) Oral daily  thiamine 100 milliGRAM(s) Oral daily    MEDICATIONS  (PRN):  LORazepam   Injectable 2 milliGRAM(s) IV Push every 4 hours PRN CIWA>8      DIAGNOSTIC TESTS:

## 2019-09-15 LAB
ANION GAP SERPL CALC-SCNC: 10 MMOL/L — SIGNIFICANT CHANGE UP (ref 5–17)
BASOPHILS # BLD AUTO: 0.02 K/UL — SIGNIFICANT CHANGE UP (ref 0–0.2)
BASOPHILS NFR BLD AUTO: 0.5 % — SIGNIFICANT CHANGE UP (ref 0–2)
BUN SERPL-MCNC: 12 MG/DL — SIGNIFICANT CHANGE UP (ref 7–23)
CALCIUM SERPL-MCNC: 9.3 MG/DL — SIGNIFICANT CHANGE UP (ref 8.4–10.5)
CHLORIDE SERPL-SCNC: 101 MMOL/L — SIGNIFICANT CHANGE UP (ref 96–108)
CO2 SERPL-SCNC: 25 MMOL/L — SIGNIFICANT CHANGE UP (ref 22–31)
CREAT SERPL-MCNC: 1.07 MG/DL — SIGNIFICANT CHANGE UP (ref 0.5–1.3)
EOSINOPHIL # BLD AUTO: 0.17 K/UL — SIGNIFICANT CHANGE UP (ref 0–0.5)
EOSINOPHIL NFR BLD AUTO: 4.5 % — SIGNIFICANT CHANGE UP (ref 0–6)
GLUCOSE SERPL-MCNC: 94 MG/DL — SIGNIFICANT CHANGE UP (ref 70–99)
HCT VFR BLD CALC: 36.6 % — LOW (ref 39–50)
HGB BLD-MCNC: 11.4 G/DL — LOW (ref 13–17)
IMM GRANULOCYTES NFR BLD AUTO: 0.3 % — SIGNIFICANT CHANGE UP (ref 0–1.5)
LYMPHOCYTES # BLD AUTO: 1.38 K/UL — SIGNIFICANT CHANGE UP (ref 1–3.3)
LYMPHOCYTES # BLD AUTO: 36.1 % — SIGNIFICANT CHANGE UP (ref 13–44)
MAGNESIUM SERPL-MCNC: 1.5 MG/DL — LOW (ref 1.6–2.6)
MCHC RBC-ENTMCNC: 25.2 PG — LOW (ref 27–34)
MCHC RBC-ENTMCNC: 31.1 GM/DL — LOW (ref 32–36)
MCV RBC AUTO: 80.8 FL — SIGNIFICANT CHANGE UP (ref 80–100)
MONOCYTES # BLD AUTO: 0.36 K/UL — SIGNIFICANT CHANGE UP (ref 0–0.9)
MONOCYTES NFR BLD AUTO: 9.4 % — SIGNIFICANT CHANGE UP (ref 2–14)
NEUTROPHILS # BLD AUTO: 1.88 K/UL — SIGNIFICANT CHANGE UP (ref 1.8–7.4)
NEUTROPHILS NFR BLD AUTO: 49.2 % — SIGNIFICANT CHANGE UP (ref 43–77)
NRBC # BLD: 0 /100 WBCS — SIGNIFICANT CHANGE UP (ref 0–0)
PLATELET # BLD AUTO: 102 K/UL — LOW (ref 150–400)
POTASSIUM SERPL-MCNC: 3.9 MMOL/L — SIGNIFICANT CHANGE UP (ref 3.5–5.3)
POTASSIUM SERPL-SCNC: 3.9 MMOL/L — SIGNIFICANT CHANGE UP (ref 3.5–5.3)
RBC # BLD: 4.53 M/UL — SIGNIFICANT CHANGE UP (ref 4.2–5.8)
RBC # FLD: 19.6 % — HIGH (ref 10.3–14.5)
SODIUM SERPL-SCNC: 136 MMOL/L — SIGNIFICANT CHANGE UP (ref 135–145)
WBC # BLD: 3.82 K/UL — SIGNIFICANT CHANGE UP (ref 3.8–10.5)
WBC # FLD AUTO: 3.82 K/UL — SIGNIFICANT CHANGE UP (ref 3.8–10.5)

## 2019-09-15 RX ORDER — LANOLIN ALCOHOL/MO/W.PET/CERES
5 CREAM (GRAM) TOPICAL ONCE
Refills: 0 | Status: COMPLETED | OUTPATIENT
Start: 2019-09-15 | End: 2019-09-15

## 2019-09-15 RX ORDER — MAGNESIUM SULFATE 500 MG/ML
1 VIAL (ML) INJECTION ONCE
Refills: 0 | Status: COMPLETED | OUTPATIENT
Start: 2019-09-15 | End: 2019-09-15

## 2019-09-15 RX ADMIN — Medication 25 MILLIGRAM(S): at 07:08

## 2019-09-15 RX ADMIN — Medication 10 MILLIGRAM(S): at 13:46

## 2019-09-15 RX ADMIN — Medication 1 TABLET(S): at 11:45

## 2019-09-15 RX ADMIN — Medication 1 MILLIGRAM(S): at 11:46

## 2019-09-15 RX ADMIN — Medication 100 GRAM(S): at 09:55

## 2019-09-15 RX ADMIN — PANTOPRAZOLE SODIUM 40 MILLIGRAM(S): 20 TABLET, DELAYED RELEASE ORAL at 07:08

## 2019-09-15 RX ADMIN — Medication 10 MILLIGRAM(S): at 18:08

## 2019-09-15 RX ADMIN — APIXABAN 5 MILLIGRAM(S): 2.5 TABLET, FILM COATED ORAL at 18:08

## 2019-09-15 RX ADMIN — Medication 500 MILLIGRAM(S): at 11:46

## 2019-09-15 RX ADMIN — APIXABAN 5 MILLIGRAM(S): 2.5 TABLET, FILM COATED ORAL at 07:08

## 2019-09-15 RX ADMIN — Medication 5 MILLIGRAM(S): at 02:48

## 2019-09-15 RX ADMIN — Medication 100 MILLIGRAM(S): at 11:45

## 2019-09-15 RX ADMIN — Medication 100 MILLIGRAM(S): at 07:08

## 2019-09-15 NOTE — PROGRESS NOTE ADULT - SUBJECTIVE AND OBJECTIVE BOX
Pt overly tired secondary to Librium   s/p ETOH Withdrawal       PAST MEDICAL & SURGICAL HISTORY:  Heparin-induced thrombocytopenia: Antibody positive  Left ventricular thrombus  Atrial fibrillation  Hemorrhoid  Thrombus in heart chamber: History of LV thrombus on Eliquis that was no longer seen on f/u Echo 8/12/2019  ICD (implantable cardioverter-defibrillator) in place  Pacemaker  ETOH abuse  Paroxysmal a-fib  Myocardial infarction involving other coronary artery  Gastritis  Atherosclerosis of coronary artery: Coronary artery disease  Automatic implantable cardiac defibrillator in situ: ICD (implantable cardioverter-defibrillator) in place    MEDICATIONS  (STANDING):  apixaban 5 milliGRAM(s) Oral every 12 hours  ascorbic acid 500 milliGRAM(s) Oral daily  chlordiazePOXIDE 25 milliGRAM(s) Oral every 6 hours  folic acid 1 milliGRAM(s) Oral daily  metoprolol succinate  milliGRAM(s) Oral every 24 hours  multivitamin 1 Tablet(s) Oral daily  pantoprazole    Tablet 40 milliGRAM(s) Oral before breakfast  polyethylene glycol 3350 17 Gram(s) Oral daily  thiamine 100 milliGRAM(s) Oral daily    MEDICATIONS  (PRN):  LORazepam   Injectable 2 milliGRAM(s) IV Push every 4 hours PRN CIWA>8      ICU Vital Signs Last 24 Hrs  T(C): 36.6 (15 Sep 2019 05:28), Max: 36.8 (14 Sep 2019 21:00)  T(F): 97.9 (15 Sep 2019 05:28), Max: 98.3 (14 Sep 2019 21:00)  HR: 84 (15 Sep 2019 07:05) (73 - 86)  BP: 100/68 (15 Sep 2019 07:05) (99/65 - 114/79)  BP(mean): --  ABP: --  ABP(mean): --  RR: 18 (15 Sep 2019 05:28) (18 - 19)  SpO2: 99% (15 Sep 2019 05:28) (99% - 100%)      Lungs clear  CV  s1s2   abd soft  ext stable

## 2019-09-16 ENCOUNTER — TRANSCRIPTION ENCOUNTER (OUTPATIENT)
Age: 64
End: 2019-09-16

## 2019-09-16 VITALS
HEART RATE: 75 BPM | OXYGEN SATURATION: 99 % | DIASTOLIC BLOOD PRESSURE: 65 MMHG | RESPIRATION RATE: 18 BRPM | SYSTOLIC BLOOD PRESSURE: 103 MMHG | TEMPERATURE: 98 F

## 2019-09-16 LAB
ANION GAP SERPL CALC-SCNC: 8 MMOL/L — SIGNIFICANT CHANGE UP (ref 5–17)
BUN SERPL-MCNC: 12 MG/DL — SIGNIFICANT CHANGE UP (ref 7–23)
CALCIUM SERPL-MCNC: 9.1 MG/DL — SIGNIFICANT CHANGE UP (ref 8.4–10.5)
CHLORIDE SERPL-SCNC: 103 MMOL/L — SIGNIFICANT CHANGE UP (ref 96–108)
CO2 SERPL-SCNC: 26 MMOL/L — SIGNIFICANT CHANGE UP (ref 22–31)
CREAT SERPL-MCNC: 1.12 MG/DL — SIGNIFICANT CHANGE UP (ref 0.5–1.3)
GLUCOSE SERPL-MCNC: 101 MG/DL — HIGH (ref 70–99)
HCT VFR BLD CALC: 35.6 % — LOW (ref 39–50)
HGB BLD-MCNC: 11.1 G/DL — LOW (ref 13–17)
MAGNESIUM SERPL-MCNC: 1.8 MG/DL — SIGNIFICANT CHANGE UP (ref 1.6–2.6)
MCHC RBC-ENTMCNC: 25.1 PG — LOW (ref 27–34)
MCHC RBC-ENTMCNC: 31.2 GM/DL — LOW (ref 32–36)
MCV RBC AUTO: 80.5 FL — SIGNIFICANT CHANGE UP (ref 80–100)
NRBC # BLD: 0 /100 WBCS — SIGNIFICANT CHANGE UP (ref 0–0)
PLATELET # BLD AUTO: 104 K/UL — LOW (ref 150–400)
POTASSIUM SERPL-MCNC: 4.2 MMOL/L — SIGNIFICANT CHANGE UP (ref 3.5–5.3)
POTASSIUM SERPL-SCNC: 4.2 MMOL/L — SIGNIFICANT CHANGE UP (ref 3.5–5.3)
RBC # BLD: 4.42 M/UL — SIGNIFICANT CHANGE UP (ref 4.2–5.8)
RBC # FLD: 19.9 % — HIGH (ref 10.3–14.5)
SODIUM SERPL-SCNC: 137 MMOL/L — SIGNIFICANT CHANGE UP (ref 135–145)
WBC # BLD: 3.61 K/UL — LOW (ref 3.8–10.5)
WBC # FLD AUTO: 3.61 K/UL — LOW (ref 3.8–10.5)

## 2019-09-16 PROCEDURE — 99223 1ST HOSP IP/OBS HIGH 75: CPT

## 2019-09-16 RX ADMIN — PANTOPRAZOLE SODIUM 40 MILLIGRAM(S): 20 TABLET, DELAYED RELEASE ORAL at 06:15

## 2019-09-16 RX ADMIN — Medication 10 MILLIGRAM(S): at 13:22

## 2019-09-16 RX ADMIN — APIXABAN 5 MILLIGRAM(S): 2.5 TABLET, FILM COATED ORAL at 06:15

## 2019-09-16 RX ADMIN — Medication 100 MILLIGRAM(S): at 06:15

## 2019-09-16 RX ADMIN — Medication 100 MILLIGRAM(S): at 13:21

## 2019-09-16 RX ADMIN — Medication 500 MILLIGRAM(S): at 13:22

## 2019-09-16 RX ADMIN — Medication 1 TABLET(S): at 13:22

## 2019-09-16 RX ADMIN — Medication 1 MILLIGRAM(S): at 13:21

## 2019-09-16 RX ADMIN — Medication 10 MILLIGRAM(S): at 06:19

## 2019-09-16 RX ADMIN — Medication 10 MILLIGRAM(S): at 01:19

## 2019-09-16 NOTE — BEHAVIORAL HEALTH ASSESSMENT NOTE - NSBHADMITCOUNSEL_PSY_A_CORE
prognosis/diagnostic results/impressions and/or recommended studies/risks and benefits of treatment options/importance of adherence to chosen treatment/instructions for management, treatment and follow up/risk factor reduction/client/family/caregiver education

## 2019-09-16 NOTE — DISCHARGE NOTE PROVIDER - HOSPITAL COURSE
Patient is 62 y/o M with past medical history of DTs, HIT, Afib, LV thrombus, CAD s/p PCI, HFrEF (35-40%) s/p AICD placement    Presented with alcohol intoxication and was admitted for alcohol withdrawal    Problem List/Main Diagnoses (system-based):     1) Alcohol withdrawal - Patient last drank on 9/11 after starting a binge drinking episode of 2-3 pints per day beginning on 9/2. The patient was admitted to the CCU given his significant cardiac comorbidities, and was started on librium which was gradually reduced over the next 4 days. On day of discharge, the patients CIWA score was 1, however this is likely due to a resting tremor and not an indication of continued alcohol withdrawal symptoms.        2) Afib - Patient has paroxysmal atrial fibrillation and is noncompliant with medication as an outpatient. Pt started on prescribed home medication, apixaban 5 mg BID and educated on the importance of taking his medication on prescribed.         Labs to be followed outpatient: None    Exam to be followed outpatient: None

## 2019-09-16 NOTE — BEHAVIORAL HEALTH ASSESSMENT NOTE - PROBLEM SELECTOR PLAN 1
Pt refuses a trial of Antabuse.   Referrals to outpatient substance abuse treatment program [facility] provided.

## 2019-09-16 NOTE — PROGRESS NOTE ADULT - REASON FOR ADMISSION
ETOH withdraw

## 2019-09-16 NOTE — DISCHARGE NOTE PROVIDER - CARE PROVIDER_API CALL
Ck Levy)  Medicine  [address] [place] [address]  Phone: [phone]  Fax: [phone]  Follow Up Time: 2 weeks

## 2019-09-16 NOTE — PROGRESS NOTE ADULT - SUBJECTIVE AND OBJECTIVE BOX
slow improvement s/p alcohol withdrawal     PAST MEDICAL & SURGICAL HISTORY:  Heparin-induced thrombocytopenia: Antibody positive  Left ventricular thrombus  Atrial fibrillation  Hemorrhoid  Thrombus in heart chamber: History of LV thrombus on Eliquis that was no longer seen on f/u Echo 8/12/2019  ICD (implantable cardioverter-defibrillator) in place  Pacemaker  ETOH abuse  Paroxysmal a-fib  Myocardial infarction involving other coronary artery  Gastritis  Atherosclerosis of coronary artery: Coronary artery disease  Automatic implantable cardiac defibrillator in situ: ICD (implantable cardioverter-defibrillator) in place    MEDICATIONS  (STANDING):  apixaban 5 milliGRAM(s) Oral every 12 hours  ascorbic acid 500 milliGRAM(s) Oral daily  chlordiazePOXIDE 10 milliGRAM(s) Oral every 6 hours  folic acid 1 milliGRAM(s) Oral daily  metoprolol succinate  milliGRAM(s) Oral every 24 hours  multivitamin 1 Tablet(s) Oral daily  pantoprazole    Tablet 40 milliGRAM(s) Oral before breakfast  polyethylene glycol 3350 17 Gram(s) Oral daily  thiamine 100 milliGRAM(s) Oral daily    MEDICATIONS  (PRN):  LORazepam   Injectable 2 milliGRAM(s) IV Push every 4 hours PRN CIWA>8    ICU Vital Signs Last 24 Hrs  T(C): 36.7 (16 Sep 2019 05:48), Max: 37 (15 Sep 2019 21:29)  T(F): 98.1 (16 Sep 2019 05:48), Max: 98.6 (15 Sep 2019 21:29)  HR: 75 (16 Sep 2019 05:48) (73 - 82)  BP: 103/65 (16 Sep 2019 05:48) (103/65 - 127/79)  BP(mean): --  ABP: --  ABP(mean): --  RR: 18 (16 Sep 2019 05:48) (18 - 18)  SpO2: 99% (16 Sep 2019 05:48) (96% - 100%)        Lungs decreased breath sounds at  bases  CV   s1 s2     abd soft  Ext stable                            11.1   3.61  )-----------( 104      ( 16 Sep 2019 05:44 )             35.6   09-16    137  |  103  |  12  ----------------------------<  101<H>  4.2   |  26  |  1.12    Ca    9.1      16 Sep 2019 05:44  Mg     1.8     09-16

## 2019-09-16 NOTE — PROGRESS NOTE ADULT - PROVIDER SPECIALTY LIST ADULT
Cardiology
Internal Medicine
Internal Medicine
Cardiology
Cardiology

## 2019-09-16 NOTE — DISCHARGE NOTE NURSING/CASE MANAGEMENT/SOCIAL WORK - PATIENT PORTAL LINK FT
You can access the FollowMyHealth Patient Portal offered by [facility] by registering at the following website: [email or web address]. By joining [employer]’s FollowMyHealth portal, you will also be able to view your health information using other applications (apps) compatible with our system.

## 2019-09-16 NOTE — BEHAVIORAL HEALTH ASSESSMENT NOTE - SUMMARY
64 y/o male with hx of ETOH use disorder, no withdrawal seizures or DTs, HIT, paroxsymal AFib on Eliquis, hx of LV thrombus, CAD s/p PCI, HFrEF (35-40%) s/p AICD, admitted for ETOH detox.  Pt expresses motivation for outpatient treatment, declining inpatient rehab referrals.   No evidence of acute SI. Pt with chronic depressive symptoms, not interested in a trail of SSRI.   Pt was referred to [facility] for further substance treatment.

## 2019-09-16 NOTE — PROGRESS NOTE ADULT - ASSESSMENT
cont cardiac  meds   CAD Cardiomyopathy     cont  etoh withdrawal   on reduced Librium  dosing Universal Safety Interventions

## 2019-09-16 NOTE — DISCHARGE NOTE PROVIDER - NSDCCPCAREPLAN_GEN_ALL_CORE_FT
PRINCIPAL DISCHARGE DIAGNOSIS  Diagnosis: Alcohol intoxication  Assessment and Plan of Treatment: You were intoxicated on ethanol at time of admission, which may have caused many of your symptoms. Due to the heavy use of alcohol over the past 2 weeks, you were admitted and started on a medication called librium to control the withdrawal symptoms. This medication was gradually reduced and was then stopped prior to your discharge. Please avoid alcohol in the future as continued use may worsen your cardiac function, or may lead to serious liver damage.      SECONDARY DISCHARGE DIAGNOSES  Diagnosis: Hypertension  Assessment and Plan of Treatment:

## 2019-09-16 NOTE — BEHAVIORAL HEALTH ASSESSMENT NOTE - HPI (INCLUDE ILLNESS QUALITY, SEVERITY, DURATION, TIMING, CONTEXT, MODIFYING FACTORS, ASSOCIATED SIGNS AND SYMPTOMS)
62 y/o male with hx of ETOH use disorder, no withdrawal seizures or DTs, HIT, paroxsymal AFib on Eliquis, hx of LV thrombus, CAD s/p PCI, HFrEF (35-40%) s/p AICD presents for ETOH detox. Pt was admitted twice in August for detox, discharged on 9/2. Pt states started drinking heavily 2-3 pints of vodka daily, last drink AM of admission. Decided to bring himself in for detox because he was "feeling badly and had enough". Fell backward onto his back this AM without head trauma/LOC. Had not taken any meds for a week. At the time of interview after ED stabilization, CIWA 3 (+tremors).  Currently pt expresses motivation to engage in therapy. Refuses inpatient rehab at this time. Pt refuses a trail of SSRI or Antabuse. However he is interested in engaging in outpatient treatment. Referrals to [facility] provided.   No acute SI. Pt continues to endorse chronic psychosocial stressors.

## 2019-09-16 NOTE — BEHAVIORAL HEALTH ASSESSMENT NOTE - NSBHCHARTREVIEWVS_PSY_A_CORE FT
Vital Signs Last 24 Hrs  T(C): 36.7 (16 Sep 2019 05:48), Max: 37 (15 Sep 2019 21:29)  T(F): 98.1 (16 Sep 2019 05:48), Max: 98.6 (15 Sep 2019 21:29)  HR: 75 (16 Sep 2019 05:48) (73 - 82)  BP: 103/65 (16 Sep 2019 05:48) (103/65 - 127/79)  BP(mean): --  RR: 18 (16 Sep 2019 05:48) (18 - 18)  SpO2: 99% (16 Sep 2019 05:48) (96% - 100%)

## 2019-09-16 NOTE — BEHAVIORAL HEALTH ASSESSMENT NOTE - DESCRIPTION (FIRST USE, LAST USE, QUANTITY, FREQUENCY, DURATION)
Reports quit 1996 Reports long term heavy drinking for over 40 years, with worsening symptoms of withdrawal one day prior to admission.. Hx/o head trauma and blackouts.

## 2019-09-16 NOTE — BEHAVIORAL HEALTH ASSESSMENT NOTE - DETAILS
Reported history of DTs~ 2 years ago on prior evaluations. Today denies hx/o DT's Father and brother with alcoholism tremors.

## 2019-09-16 NOTE — DISCHARGE NOTE PROVIDER - CARE PROVIDERS DIRECT ADDRESSES
[email or web address]

## 2019-09-16 NOTE — DISCHARGE NOTE NURSING/CASE MANAGEMENT/SOCIAL WORK - NSSBIRTALCACTIVEREFTXDET_GEN_A_CORE
Patient has been drinking 5 days straight 2 pints daily Vodka;  Patient is staying due to withdrawal then the plan is that his son will bring him to [facility].

## 2019-09-18 DIAGNOSIS — E87.2 ACIDOSIS: ICD-10-CM

## 2019-09-18 DIAGNOSIS — D75.82 HEPARIN INDUCED THROMBOCYTOPENIA (HIT): ICD-10-CM

## 2019-09-18 DIAGNOSIS — R41.82 ALTERED MENTAL STATUS, UNSPECIFIED: ICD-10-CM

## 2019-09-18 DIAGNOSIS — I25.2 OLD MYOCARDIAL INFARCTION: ICD-10-CM

## 2019-09-18 DIAGNOSIS — K29.70 GASTRITIS, UNSPECIFIED, WITHOUT BLEEDING: ICD-10-CM

## 2019-09-18 DIAGNOSIS — I50.22 CHRONIC SYSTOLIC (CONGESTIVE) HEART FAILURE: ICD-10-CM

## 2019-09-18 DIAGNOSIS — I48.0 PAROXYSMAL ATRIAL FIBRILLATION: ICD-10-CM

## 2019-09-18 DIAGNOSIS — F32.9 MAJOR DEPRESSIVE DISORDER, SINGLE EPISODE, UNSPECIFIED: ICD-10-CM

## 2019-09-18 DIAGNOSIS — F10.239 ALCOHOL DEPENDENCE WITH WITHDRAWAL, UNSPECIFIED: ICD-10-CM

## 2019-09-18 DIAGNOSIS — K64.9 UNSPECIFIED HEMORRHOIDS: ICD-10-CM

## 2019-09-18 DIAGNOSIS — I11.0 HYPERTENSIVE HEART DISEASE WITH HEART FAILURE: ICD-10-CM

## 2019-09-18 DIAGNOSIS — Z88.0 ALLERGY STATUS TO PENICILLIN: ICD-10-CM

## 2019-09-18 DIAGNOSIS — I25.10 ATHEROSCLEROTIC HEART DISEASE OF NATIVE CORONARY ARTERY WITHOUT ANGINA PECTORIS: ICD-10-CM

## 2019-09-18 DIAGNOSIS — I42.9 CARDIOMYOPATHY, UNSPECIFIED: ICD-10-CM

## 2019-09-18 DIAGNOSIS — Z95.810 PRESENCE OF AUTOMATIC (IMPLANTABLE) CARDIAC DEFIBRILLATOR: ICD-10-CM
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
## 2019-09-18 DIAGNOSIS — Z98.61 CORONARY ANGIOPLASTY STATUS: ICD-10-CM

## 2019-09-18 DIAGNOSIS — I51.3 INTRACARDIAC THROMBOSIS, NOT ELSEWHERE CLASSIFIED: ICD-10-CM

## 2019-09-29 ENCOUNTER — INPATIENT (INPATIENT)
Facility: HOSPITAL | Age: 64
LOS: 3 days | Discharge: HOME | DRG: 897 | End: 2019-10-03
Attending: INTERNAL MEDICINE | Admitting: INTERNAL MEDICINE
Payer: MEDICARE

## 2019-09-29 VITALS
SYSTOLIC BLOOD PRESSURE: 133 MMHG | WEIGHT: 160.06 LBS | RESPIRATION RATE: 22 BRPM | TEMPERATURE: 99 F | HEART RATE: 138 BPM | DIASTOLIC BLOOD PRESSURE: 87 MMHG | OXYGEN SATURATION: 95 %

## 2019-09-29 DIAGNOSIS — Z95.810 PRESENCE OF AUTOMATIC (IMPLANTABLE) CARDIAC DEFIBRILLATOR: ICD-10-CM

## 2019-09-29 DIAGNOSIS — Z91.89 OTHER SPECIFIED PERSONAL RISK FACTORS, NOT ELSEWHERE CLASSIFIED: ICD-10-CM

## 2019-09-29 DIAGNOSIS — I10 ESSENTIAL (PRIMARY) HYPERTENSION: ICD-10-CM

## 2019-09-29 DIAGNOSIS — I50.22 CHRONIC SYSTOLIC (CONGESTIVE) HEART FAILURE: ICD-10-CM

## 2019-09-29 DIAGNOSIS — I48.0 PAROXYSMAL ATRIAL FIBRILLATION: ICD-10-CM

## 2019-09-29 DIAGNOSIS — E83.42 HYPOMAGNESEMIA: ICD-10-CM

## 2019-09-29 DIAGNOSIS — K29.70 GASTRITIS, UNSPECIFIED, WITHOUT BLEEDING: ICD-10-CM

## 2019-09-29 DIAGNOSIS — R63.8 OTHER SYMPTOMS AND SIGNS CONCERNING FOOD AND FLUID INTAKE: ICD-10-CM

## 2019-09-29 DIAGNOSIS — F10.230 ALCOHOL DEPENDENCE WITH WITHDRAWAL, UNCOMPLICATED: ICD-10-CM

## 2019-09-29 DIAGNOSIS — I51.3 INTRACARDIAC THROMBOSIS, NOT ELSEWHERE CLASSIFIED: ICD-10-CM

## 2019-09-29 PROBLEM — I48.91 UNSPECIFIED ATRIAL FIBRILLATION: Chronic | Status: ACTIVE | Noted: 2019-09-11

## 2019-09-29 PROBLEM — D75.82 HEPARIN INDUCED THROMBOCYTOPENIA (HIT): Chronic | Status: ACTIVE | Noted: 2019-09-11

## 2019-09-29 LAB
ALBUMIN SERPL ELPH-MCNC: 3.9 G/DL — SIGNIFICANT CHANGE UP (ref 3.3–5)
ALBUMIN SERPL ELPH-MCNC: 4.5 G/DL — SIGNIFICANT CHANGE UP (ref 3.3–5)
ALP SERPL-CCNC: 68 U/L — SIGNIFICANT CHANGE UP (ref 40–120)
ALP SERPL-CCNC: 82 U/L — SIGNIFICANT CHANGE UP (ref 40–120)
ALT FLD-CCNC: 101 U/L — HIGH (ref 10–45)
ALT FLD-CCNC: 79 U/L — HIGH (ref 10–45)
ANION GAP SERPL CALC-SCNC: 19 MMOL/L — HIGH (ref 5–17)
APTT BLD: 27.6 SEC — SIGNIFICANT CHANGE UP (ref 27.5–36.3)
AST SERPL-CCNC: 110 U/L — HIGH (ref 10–40)
AST SERPL-CCNC: 154 U/L — HIGH (ref 10–40)
BASOPHILS # BLD AUTO: 0.06 K/UL — SIGNIFICANT CHANGE UP (ref 0–0.2)
BASOPHILS NFR BLD AUTO: 1.1 % — SIGNIFICANT CHANGE UP (ref 0–2)
BILIRUB DIRECT SERPL-MCNC: 0.2 MG/DL — SIGNIFICANT CHANGE UP (ref 0–0.2)
BILIRUB INDIRECT FLD-MCNC: 0.8 MG/DL — SIGNIFICANT CHANGE UP (ref 0.2–1)
BILIRUB SERPL-MCNC: 0.8 MG/DL — SIGNIFICANT CHANGE UP (ref 0.2–1.2)
BILIRUB SERPL-MCNC: 1 MG/DL — SIGNIFICANT CHANGE UP (ref 0.2–1.2)
BUN SERPL-MCNC: 9 MG/DL — SIGNIFICANT CHANGE UP (ref 7–23)
CALCIUM SERPL-MCNC: 9.4 MG/DL — SIGNIFICANT CHANGE UP (ref 8.4–10.5)
CHLORIDE SERPL-SCNC: 93 MMOL/L — LOW (ref 96–108)
CO2 SERPL-SCNC: 27 MMOL/L — SIGNIFICANT CHANGE UP (ref 22–31)
CREAT SERPL-MCNC: 1.01 MG/DL — SIGNIFICANT CHANGE UP (ref 0.5–1.3)
EOSINOPHIL # BLD AUTO: 0.05 K/UL — SIGNIFICANT CHANGE UP (ref 0–0.5)
EOSINOPHIL NFR BLD AUTO: 0.9 % — SIGNIFICANT CHANGE UP (ref 0–6)
ETHANOL SERPL-MCNC: 229 MG/DL — HIGH (ref 0–10)
GLUCOSE SERPL-MCNC: 164 MG/DL — HIGH (ref 70–99)
HCT VFR BLD CALC: 41.4 % — SIGNIFICANT CHANGE UP (ref 39–50)
HGB BLD-MCNC: 13.2 G/DL — SIGNIFICANT CHANGE UP (ref 13–17)
IMM GRANULOCYTES NFR BLD AUTO: 0.2 % — SIGNIFICANT CHANGE UP (ref 0–1.5)
INR BLD: 0.97 — SIGNIFICANT CHANGE UP (ref 0.88–1.16)
LYMPHOCYTES # BLD AUTO: 1.76 K/UL — SIGNIFICANT CHANGE UP (ref 1–3.3)
LYMPHOCYTES # BLD AUTO: 31.4 % — SIGNIFICANT CHANGE UP (ref 13–44)
MCHC RBC-ENTMCNC: 25.1 PG — LOW (ref 27–34)
MCHC RBC-ENTMCNC: 31.9 GM/DL — LOW (ref 32–36)
MCV RBC AUTO: 78.7 FL — LOW (ref 80–100)
MONOCYTES # BLD AUTO: 0.28 K/UL — SIGNIFICANT CHANGE UP (ref 0–0.9)
MONOCYTES NFR BLD AUTO: 5 % — SIGNIFICANT CHANGE UP (ref 2–14)
NEUTROPHILS # BLD AUTO: 3.44 K/UL — SIGNIFICANT CHANGE UP (ref 1.8–7.4)
NEUTROPHILS NFR BLD AUTO: 61.4 % — SIGNIFICANT CHANGE UP (ref 43–77)
NRBC # BLD: 0 /100 WBCS — SIGNIFICANT CHANGE UP (ref 0–0)
PLATELET # BLD AUTO: 217 K/UL — SIGNIFICANT CHANGE UP (ref 150–400)
POTASSIUM SERPL-MCNC: 3.6 MMOL/L — SIGNIFICANT CHANGE UP (ref 3.5–5.3)
POTASSIUM SERPL-SCNC: 3.6 MMOL/L — SIGNIFICANT CHANGE UP (ref 3.5–5.3)
PROT SERPL-MCNC: 6.8 G/DL — SIGNIFICANT CHANGE UP (ref 6–8.3)
PROT SERPL-MCNC: 8 G/DL — SIGNIFICANT CHANGE UP (ref 6–8.3)
PROTHROM AB SERPL-ACNC: 10.9 SEC — SIGNIFICANT CHANGE UP (ref 10–12.9)
RBC # BLD: 5.26 M/UL — SIGNIFICANT CHANGE UP (ref 4.2–5.8)
RBC # FLD: 19.7 % — HIGH (ref 10.3–14.5)
SODIUM SERPL-SCNC: 139 MMOL/L — SIGNIFICANT CHANGE UP (ref 135–145)
TROPONIN T SERPL-MCNC: <0.01 NG/ML — SIGNIFICANT CHANGE UP (ref 0–0.01)
WBC # BLD: 5.6 K/UL — SIGNIFICANT CHANGE UP (ref 3.8–10.5)
WBC # FLD AUTO: 5.6 K/UL — SIGNIFICANT CHANGE UP (ref 3.8–10.5)

## 2019-09-29 PROCEDURE — 99291 CRITICAL CARE FIRST HOUR: CPT

## 2019-09-29 PROCEDURE — 93010 ELECTROCARDIOGRAM REPORT: CPT

## 2019-09-29 RX ORDER — MAGNESIUM SULFATE 500 MG/ML
2 VIAL (ML) INJECTION ONCE
Refills: 0 | Status: COMPLETED | OUTPATIENT
Start: 2019-09-29 | End: 2019-09-29

## 2019-09-29 RX ORDER — METOPROLOL TARTRATE 50 MG
100 TABLET ORAL DAILY
Refills: 0 | Status: DISCONTINUED | OUTPATIENT
Start: 2019-09-29 | End: 2019-10-03

## 2019-09-29 RX ORDER — SODIUM CHLORIDE 9 MG/ML
1000 INJECTION INTRAMUSCULAR; INTRAVENOUS; SUBCUTANEOUS ONCE
Refills: 0 | Status: COMPLETED | OUTPATIENT
Start: 2019-09-29 | End: 2019-09-29

## 2019-09-29 RX ORDER — FOLIC ACID 0.8 MG
1 TABLET ORAL DAILY
Refills: 0 | Status: DISCONTINUED | OUTPATIENT
Start: 2019-09-30 | End: 2019-10-03

## 2019-09-29 RX ORDER — ASCORBIC ACID 60 MG
500 TABLET,CHEWABLE ORAL DAILY
Refills: 0 | Status: DISCONTINUED | OUTPATIENT
Start: 2019-09-29 | End: 2019-10-03

## 2019-09-29 RX ORDER — SODIUM CHLORIDE 9 MG/ML
1000 INJECTION, SOLUTION INTRAVENOUS
Refills: 0 | Status: DISCONTINUED | OUTPATIENT
Start: 2019-09-29 | End: 2019-09-30

## 2019-09-29 RX ORDER — APIXABAN 2.5 MG/1
5 TABLET, FILM COATED ORAL EVERY 12 HOURS
Refills: 0 | Status: DISCONTINUED | OUTPATIENT
Start: 2019-09-29 | End: 2019-10-03

## 2019-09-29 RX ORDER — POTASSIUM CHLORIDE 20 MEQ
40 PACKET (EA) ORAL ONCE
Refills: 0 | Status: COMPLETED | OUTPATIENT
Start: 2019-09-29 | End: 2019-09-29

## 2019-09-29 RX ADMIN — Medication 25 MILLIGRAM(S): at 18:25

## 2019-09-29 RX ADMIN — Medication 100 MILLIGRAM(S): at 18:43

## 2019-09-29 RX ADMIN — SODIUM CHLORIDE 1000 MILLILITER(S): 9 INJECTION, SOLUTION INTRAVENOUS at 13:23

## 2019-09-29 RX ADMIN — Medication 40 MILLIEQUIVALENT(S): at 18:26

## 2019-09-29 RX ADMIN — APIXABAN 5 MILLIGRAM(S): 2.5 TABLET, FILM COATED ORAL at 18:43

## 2019-09-29 RX ADMIN — Medication 25 MILLIGRAM(S): at 22:12

## 2019-09-29 RX ADMIN — Medication 50 GRAM(S): at 15:48

## 2019-09-29 RX ADMIN — SODIUM CHLORIDE 1000 MILLILITER(S): 9 INJECTION INTRAMUSCULAR; INTRAVENOUS; SUBCUTANEOUS at 12:15

## 2019-09-29 RX ADMIN — SODIUM CHLORIDE 1000 MILLILITER(S): 9 INJECTION, SOLUTION INTRAVENOUS at 12:24

## 2019-09-29 RX ADMIN — Medication 4 MILLIGRAM(S): at 12:15

## 2019-09-29 RX ADMIN — Medication 1 MILLIGRAM(S): at 15:48

## 2019-09-29 NOTE — ED PROVIDER NOTE - CRITICAL CARE PROVIDED
interpretation of diagnostic studies/additional history taking/documentation/consultation with other physicians/direct patient care (not related to procedure)/consult w/ pt's family directly relating to pts condition

## 2019-09-29 NOTE — ED ADULT NURSE NOTE - NSIMPLEMENTINTERV_GEN_ALL_ED
Implemented All Universal Safety Interventions:  [place] to call system. Call bell, personal items and telephone within reach. Instruct patient to call for assistance. Room bathroom lighting operational. Non-slip footwear when patient is off stretcher. Physically safe environment: no spills, clutter or unnecessary equipment. Stretcher in lowest position, wheels locked, appropriate side rails in place.

## 2019-09-29 NOTE — H&P ADULT - PROBLEM SELECTOR PLAN 3
Patient with history of LV thrombus on anticoagulation, thrombus reoccurred when patient discontinued anticoagulation. Per patient intermittently compliant with eliquis and has not taken it at all within past 3-5 days.   - Home medication of eliquis 5mg BID restarted inpatient

## 2019-09-29 NOTE — ED PROVIDER NOTE - PHYSICAL EXAMINATION
CONSTITUTIONAL: Well-appearing; well-nourished; in no apparent distress.   HEAD: Normocephalic; atraumatic.   EYES:  conjunctiva and sclera clear  ENT: normal nose; no rhinorrhea; normal pharynx with no erythema or lesions.   NECK: Supple; non-tender;   CARDIOVASCULAR: tachycardic  RESPIRATORY: Breathing easily; breath sounds clear and equal bilaterally; no wheezes, rhonchi, or rales.  GI: Soft; non-distended; non-tender; no palpable organomegaly.   EXT: No cyanosis or edema; N/V intact  SKIN: Normal for age and race; warm; dry; good turgor; no apparent lesions or rash.   NEURO: A & O x 3; face symmetric; tongue fasciculations, +hand tremors  PSYCHOLOGICAL: The patient’s mood and manner are appropriate.

## 2019-09-29 NOTE — H&P ADULT - HISTORY OF PRESENT ILLNESS
**Incomplete Note** 62 y/o male with medical history of HIT,  ETOH abuse, paroxsymal AFib (hx of LV thrombus, on Eliquis, most recent echo 8/2019 without evidence of thrombus), HLD, CAD s/p PCI, HFrEF (35-40% 8/12/2019) s/p AICD (last interrogation 8/12/19- WNL), hemorrhoids, gastritis, recent admission 8/29/19 for alcohol withdrawal presenting for alcohol withdrawal. Patient's last drink was yesterday evening, uncertain of exact time, he decided to quit because he was tired of his constant lifestyle. Patient took 50mg of librium from home prescription, a couple of hours after last drink, that made him feel worse and is the reason he presented to the ED. Patient usually drinks 1 pint of vodka everyday for the past 7-8 years. For the past 4-5 days patient endorses decreased PO intake, in the setting of increased drinking consumption. Patient endorsed increased tremors at home, denies nausea, vomiting, abdominal pain, diarrhea or constipation. Patient denies headache, SOB or chest pain, recent falls, LOC, hallucinations or anxiety. Patient has no history of alcoholic seizures. PCP reports increased alcohol consumption and admissions this summer. Patient attempted rehab voluntarily one year ago and remained sober for 8 months. Indicates living condition of son and multiple grandchildren exacerbated his drinking and cause him to relapse. Patient reports non-compliance with eliquis and other home medications in setting of alcohol use.     In ED: Vitals: T: Afebrile |

## 2019-09-29 NOTE — H&P ADULT - NSHPSOCIALHISTORY_GEN_ALL_CORE
Lives with family (son, grandchildren).  Drinks 1 pint of vodka/night for 8 years.  Former smoker (unable to quantify, quit in 1998).  , not sexually active since. Heterosexual.

## 2019-09-29 NOTE — H&P ADULT - NSHPPHYSICALEXAM_GEN_ALL_CORE
.  VITAL SIGNS:  T(C): 37.1 (09-29-19 @ 11:50), Max: 37.1 (09-29-19 @ 11:50)  T(F): 98.7 (09-29-19 @ 11:50), Max: 98.7 (09-29-19 @ 11:50)  HR: 90 (09-29-19 @ 16:58) (85 - 138)  BP: 126/71 (09-29-19 @ 16:58) (122/83 - 135/81)  BP(mean): --  RR: 20 (09-29-19 @ 16:58) (17 - 22)  SpO2: 94% (09-29-19 @ 16:58) (89% - 95%)  Wt(kg): --    PHYSICAL EXAM:    Constitutional: WDWN resting comfortably in bed; NAD  Head: NC/AT  Eyes: PERRL, EOMI, clear conjunctiva  ENT: no nasal discharge; uvula midline, no oropharyngeal erythema or exudates; MMM  Neck: supple; no JVD or thyromegaly  Respiratory: CTA B/L; no W/R/R, no retractions  Cardiac: +S1/S2; RRR; no M/R/G; PMI non-displaced  Gastrointestinal: soft, NT/ND; no rebound or guarding; +BSx4  Genitourinary: normal external genitalia  Back: spine midline, no bony tenderness or step-offs; no CVAT B/L  Extremities: WWP, no clubbing or cyanosis; no peripheral edema  Musculoskeletal: NROM x4; no joint swelling, tenderness or erythema  Vascular: 2+ radial, femoral, DP/PT pulses B/L  Dermatologic: skin warm, dry and intact; no rashes, wounds, or scars  Lymphatic: no submandibular or cervical LAD  Neurologic: AAOx3; CNII-XII grossly intact; no focal deficits  Psychiatric: affect and characteristics of appearance, verbalizations, behaviors are appropriate .  VITAL SIGNS:  T(C): 37.1 (09-29-19 @ 11:50), Max: 37.1 (09-29-19 @ 11:50)  T(F): 98.7 (09-29-19 @ 11:50), Max: 98.7 (09-29-19 @ 11:50)  HR: 90 (09-29-19 @ 16:58) (85 - 138)  BP: 126/71 (09-29-19 @ 16:58) (122/83 - 135/81)  BP(mean): --  RR: 20 (09-29-19 @ 16:58) (17 - 22)  SpO2: 94% (09-29-19 @ 16:58) (89% - 95%)  Wt(kg): --    PHYSICAL EXAM:  Constitutional: WDWN resting comfortably in bed; NAD  HEENT: NC/AT; PERRL, EOMI, clear conjunctiva; MMM; tongue fasiculations noted   Neck: supple; no JVD; no cervical or submandibular lympahdenpathy  Respiratory: CTA B/L; no W/R/R appreciated  Cardiac: +S1/S2; RRR; no M/R/G appreciated   Gastrointestinal: soft, NT/ND; no rebound or guarding; +BSx4  Extremities: WWP, no clubbing or cyanosis; no peripheral edema  Musculoskeletal: NROM x4; no joint swelling, tenderness or erythema  Vascular: 2+ radial, femoral, DP/PT pulses B/L  Dermatologic: skin warm, dry and intact; no rashes, wounds, or scars  Neurologic: AAOx4; CNII-XII grossly intact; no focal deficits  Psychiatric: affect and characteristics of appearance, verbalizations, behaviors are appropriate    CIWA Score: Total 8  N/V: 0  Tremor: 6  Paroxysmal Sweats: 1  Anxiety: 0  Agitation: 1  Tactile Disturbance: 0  Visual Disturbance: 0  Auditory Disturbance: 0  Headache: 1  Orientation: 0   Total: 8    Intervention:

## 2019-09-29 NOTE — ED PROVIDER NOTE - CARE PLAN
Principal Discharge DX:	Alcohol withdrawal syndrome without complication Principal Discharge DX:	Alcohol withdrawal syndrome without complication  Secondary Diagnosis:	Alcoholic ketoacidosis

## 2019-09-29 NOTE — ED ADULT NURSE NOTE - PMH
Atherosclerosis of coronary artery  Coronary artery disease  Atrial fibrillation    ETOH abuse    Gastritis    Hemorrhoid    Heparin-induced thrombocytopenia  Antibody positive  ICD (implantable cardioverter-defibrillator) in place    Left ventricular thrombus    Myocardial infarction involving other coronary artery    Pacemaker    Paroxysmal a-fib    Thrombus in heart chamber  History of LV thrombus on Eliquis that was no longer seen on f/u Echo 8/12/2019

## 2019-09-29 NOTE — H&P ADULT - NSICDXPASTMEDICALHX_GEN_ALL_CORE_FT
PAST MEDICAL HISTORY:  Atherosclerosis of coronary artery Coronary artery disease    Atrial fibrillation     Gastritis     Hemorrhoid     Heparin-induced thrombocytopenia Antibody positive    ICD (implantable cardioverter-defibrillator) in place     Left ventricular thrombus     Myocardial infarction involving other coronary artery

## 2019-09-29 NOTE — H&P ADULT - PROBLEM SELECTOR PLAN 10
1) PCP Contacted on Admission: (Y/N) --> Name & Phone #:  2) Date of Contact with PCP:  3) PCP Contacted at Discharge: (Y/N)  4) Summary of Handoff Given to PCP:   5) Post-Discharge Appointment Date and Location: 1) PCP Contacted on Admission: (Y/N) --> Name & Phone #: Dr. Reddy   2) Date of Contact with PCP: 9/29/19  3) PCP Contacted at Discharge: (Y/N)  4) Summary of Handoff Given to PCP: Yes   5) Post-Discharge Appointment Date and Location:

## 2019-09-29 NOTE — ED ADULT NURSE NOTE - OBJECTIVE STATEMENT
Patient is a 62yo M, arrived to ED via walk-in, AAOx3, tachycardic, tremulous, complaining of alcohol withdrawal, chest pain and nausea.  Patient states he took Librium 50mg one hour PTA.  Patient denies any vomiting, diarrhea, fevers, chills, cough or any other complaints at this time.  PIV placed, labs drawn and sent, EKG done, placed on cardiac monitor.

## 2019-09-29 NOTE — H&P ADULT - PROBLEM SELECTOR PLAN 7
Patient present with Mg of ___. In setting of alcoholism and Patient present with Mg of ___.   - repleted with 2mg  - replete PRN

## 2019-09-29 NOTE — H&P ADULT - PROBLEM SELECTOR PLAN 8
Patient with history of gastritis. Denies GERD symptoms. No current or recent history of melena or hematochezia. Gastritis likely secondary to alcohol abuse.   -Pantoprazole 40mg qd initiated for prophylaxis

## 2019-09-29 NOTE — ED PROVIDER NOTE - CLINICAL SUMMARY MEDICAL DECISION MAKING FREE TEXT BOX
here w/ alcohol withdrawal necessitating IV benzos. observed in the ED,  required IV benzos again, will need admission. given iv fluid bolus and MVI bag.

## 2019-09-29 NOTE — H&P ADULT - ASSESSMENT
64 y/o male with hx of HIT, PMHx ETOH abuse, paroxsymal AFib (hx of LV thrombus, on Eliquis, most recent echo 8/2019 without evidence of thrombus), HLD, CAD s/p PCI, HFrEF (35-40% 8/12/2019) s/p AICD (last interrogation 8/12/19- WNL), hemorrhoids, gastritis and multiple previous admissions for alcohol withdrawal, presents with alcohol withdrawal, last drink less than 24 hours ago. Admitted to [facility].

## 2019-09-29 NOTE — H&P ADULT - PROBLEM SELECTOR PLAN 1
Patient with history of alcohol abuse and multiple hospitalizations for alcohol withdrawal. Last drink less than 24 hours ago. CIWA of 8 on admission. No hallucinations. Tongue fasciculations present. Patient endorses desire for rehabilitation and sobriety. Patient s/p librium 50 in ED x5 total.  - Libirum 25 q 4 hours, per Dr. Levy

## 2019-09-29 NOTE — ED PROVIDER NOTE - OBJECTIVE STATEMENT
64 y/o M with past medical history of DTs, HIT, Afib, LV thrombus on eliquis, CAD s/p PCI, HFrEF (35-40%) s/p AICD placement here w/ complaint of alcohol withdrawal. denies hx of seizures/dts, but states has needed to be admitted for this in the past. last drink last night. hasn't eaten in 4 days as too nauseous. denies abdominal pain. denies hx of pancreatitis. upgraded as pt actively shaking and HR in 130-40s. pt took 50mg of librium prior to coming but it did not help. follows w/ dr mendez.

## 2019-09-30 LAB
ANION GAP SERPL CALC-SCNC: 9 MMOL/L — SIGNIFICANT CHANGE UP (ref 5–17)
BASOPHILS # BLD AUTO: 0.04 K/UL — SIGNIFICANT CHANGE UP (ref 0–0.2)
BASOPHILS NFR BLD AUTO: 1.1 % — SIGNIFICANT CHANGE UP (ref 0–2)
BUN SERPL-MCNC: 8 MG/DL — SIGNIFICANT CHANGE UP (ref 7–23)
CALCIUM SERPL-MCNC: 9.1 MG/DL — SIGNIFICANT CHANGE UP (ref 8.4–10.5)
CHLORIDE SERPL-SCNC: 98 MMOL/L — SIGNIFICANT CHANGE UP (ref 96–108)
CHOLEST SERPL-MCNC: 176 MG/DL — SIGNIFICANT CHANGE UP (ref 10–199)
CO2 SERPL-SCNC: 28 MMOL/L — SIGNIFICANT CHANGE UP (ref 22–31)
CREAT SERPL-MCNC: 0.93 MG/DL — SIGNIFICANT CHANGE UP (ref 0.5–1.3)
EOSINOPHIL # BLD AUTO: 0.05 K/UL — SIGNIFICANT CHANGE UP (ref 0–0.5)
EOSINOPHIL NFR BLD AUTO: 1.4 % — SIGNIFICANT CHANGE UP (ref 0–6)
GLUCOSE SERPL-MCNC: 93 MG/DL — SIGNIFICANT CHANGE UP (ref 70–99)
HCT VFR BLD CALC: 35.5 % — LOW (ref 39–50)
HDLC SERPL-MCNC: 80 MG/DL — SIGNIFICANT CHANGE UP
HGB BLD-MCNC: 11 G/DL — LOW (ref 13–17)
IMM GRANULOCYTES NFR BLD AUTO: 0.3 % — SIGNIFICANT CHANGE UP (ref 0–1.5)
LIPID PNL WITH DIRECT LDL SERPL: 85 MG/DL — SIGNIFICANT CHANGE UP
LYMPHOCYTES # BLD AUTO: 1.25 K/UL — SIGNIFICANT CHANGE UP (ref 1–3.3)
LYMPHOCYTES # BLD AUTO: 35.9 % — SIGNIFICANT CHANGE UP (ref 13–44)
MAGNESIUM SERPL-MCNC: 1.7 MG/DL — SIGNIFICANT CHANGE UP (ref 1.6–2.6)
MCHC RBC-ENTMCNC: 24.9 PG — LOW (ref 27–34)
MCHC RBC-ENTMCNC: 31 GM/DL — LOW (ref 32–36)
MCV RBC AUTO: 80.5 FL — SIGNIFICANT CHANGE UP (ref 80–100)
MONOCYTES # BLD AUTO: 0.19 K/UL — SIGNIFICANT CHANGE UP (ref 0–0.9)
MONOCYTES NFR BLD AUTO: 5.5 % — SIGNIFICANT CHANGE UP (ref 2–14)
NEUTROPHILS # BLD AUTO: 1.94 K/UL — SIGNIFICANT CHANGE UP (ref 1.8–7.4)
NEUTROPHILS NFR BLD AUTO: 55.8 % — SIGNIFICANT CHANGE UP (ref 43–77)
NRBC # BLD: 0 /100 WBCS — SIGNIFICANT CHANGE UP (ref 0–0)
PHOSPHATE SERPL-MCNC: 2.8 MG/DL — SIGNIFICANT CHANGE UP (ref 2.5–4.5)
PLATELET # BLD AUTO: 111 K/UL — LOW (ref 150–400)
POTASSIUM SERPL-MCNC: 4 MMOL/L — SIGNIFICANT CHANGE UP (ref 3.5–5.3)
POTASSIUM SERPL-SCNC: 4 MMOL/L — SIGNIFICANT CHANGE UP (ref 3.5–5.3)
RBC # BLD: 4.41 M/UL — SIGNIFICANT CHANGE UP (ref 4.2–5.8)
RBC # FLD: 19.3 % — HIGH (ref 10.3–14.5)
SODIUM SERPL-SCNC: 135 MMOL/L — SIGNIFICANT CHANGE UP (ref 135–145)
TOTAL CHOLESTEROL/HDL RATIO MEASUREMENT: 2.2 RATIO — LOW (ref 3.4–9.6)
TRIGL SERPL-MCNC: 53 MG/DL — SIGNIFICANT CHANGE UP (ref 10–149)
WBC # BLD: 3.48 K/UL — LOW (ref 3.8–10.5)
WBC # FLD AUTO: 3.48 K/UL — LOW (ref 3.8–10.5)

## 2019-09-30 RX ORDER — THIAMINE MONONITRATE (VIT B1) 100 MG
100 TABLET ORAL DAILY
Refills: 0 | Status: DISCONTINUED | OUTPATIENT
Start: 2019-09-30 | End: 2019-10-03

## 2019-09-30 RX ORDER — MAGNESIUM SULFATE 500 MG/ML
2 VIAL (ML) INJECTION ONCE
Refills: 0 | Status: COMPLETED | OUTPATIENT
Start: 2019-09-30 | End: 2019-09-30

## 2019-09-30 RX ORDER — PANTOPRAZOLE SODIUM 20 MG/1
40 TABLET, DELAYED RELEASE ORAL
Refills: 0 | Status: DISCONTINUED | OUTPATIENT
Start: 2019-09-30 | End: 2019-10-03

## 2019-09-30 RX ADMIN — Medication 25 MILLIGRAM(S): at 02:01

## 2019-09-30 RX ADMIN — Medication 2 MILLIGRAM(S): at 07:45

## 2019-09-30 RX ADMIN — Medication 50 MILLIGRAM(S): at 22:48

## 2019-09-30 RX ADMIN — Medication 50 GRAM(S): at 11:12

## 2019-09-30 RX ADMIN — Medication 1 MILLIGRAM(S): at 11:10

## 2019-09-30 RX ADMIN — Medication 1 TABLET(S): at 11:10

## 2019-09-30 RX ADMIN — Medication 2 MILLIGRAM(S): at 01:31

## 2019-09-30 RX ADMIN — Medication 100 MILLIGRAM(S): at 11:10

## 2019-09-30 RX ADMIN — Medication 25 MILLIGRAM(S): at 10:03

## 2019-09-30 RX ADMIN — Medication 500 MILLIGRAM(S): at 11:11

## 2019-09-30 RX ADMIN — APIXABAN 5 MILLIGRAM(S): 2.5 TABLET, FILM COATED ORAL at 18:33

## 2019-09-30 RX ADMIN — Medication 25 MILLIGRAM(S): at 06:34

## 2019-09-30 RX ADMIN — Medication 100 MILLIGRAM(S): at 06:35

## 2019-09-30 RX ADMIN — APIXABAN 5 MILLIGRAM(S): 2.5 TABLET, FILM COATED ORAL at 06:35

## 2019-09-30 RX ADMIN — Medication 50 MILLIGRAM(S): at 14:16

## 2019-09-30 NOTE — PHYSICAL THERAPY INITIAL EVALUATION ADULT - ADDITIONAL COMMENTS
Pt reports living with his son but wants to attend an alcohol rehab. Pt does not have any stairs to enter his home and does not use any AD. Pt denied any fall history with exception of when he drinks.

## 2019-09-30 NOTE — PROGRESS NOTE ADULT - ASSESSMENT
64 y/o male with hx of HIT, PMHx ETOH abuse, paroxsymal AFib (hx of LV thrombus, on Eliquis, most recent echo 8/2019 without evidence of thrombus), HLD, CAD s/p PCI, HFrEF (35-40% 8/12/2019) s/p AICD (last interrogation 8/12/19- WNL), hemorrhoids, gastritis and multiple previous admissions for alcohol withdrawal, presents with alcohol withdrawal, last drink less than 24 hours ago. Admitted to [facility].

## 2019-09-30 NOTE — CONSULT NOTE ADULT - ASSESSMENT
per Internal Medicine    64 y/o male with hx of HIT, PMHx ETOH abuse, paroxsymal AFib (hx of LV thrombus, on Eliquis, most recent echo 8/2019 without evidence of thrombus), HLD, CAD s/p PCI, HFrEF (35-40% 8/12/2019) s/p AICD (last interrogation 8/12/19- WNL), hemorrhoids, gastritis and multiple previous admissions for alcohol withdrawal, presents with alcohol withdrawal, last drink less than 24 hours ago. Admitted to [facility].        Problem/Plan - 1:  ·  Problem: Alcohol withdrawal syndrome without complication.  Plan: Patient with history of alcohol abuse and multiple hospitalizations for alcohol withdrawal. Last drink less than 24 hours ago. CIWA of 8 on admission. No hallucinations. Tongue fasciculations present. Patient endorses desire for rehabilitation and sobriety. Patient s/p librium 50 in ED x5 total.  - Libirum 25 q 4 hours, per Dr. Levy  - Ativan 2g q3PRN (required 1 overnight, given 1 dose this AM)  - CIWA protocol, most recently 9-10.     Problem/Plan - 2:  ·  Problem: Chronic systolic heart failure.  Plan: Patient with known history of CHF. Last echo 8/12/2019 with EF 35-40%. AICD last interrogated on 8/12- no events, with plan to change battery on 10/4.  - given risk benefit analysis of masking symptoms in alcohol withdrawal vs heart failure exacerbation, will continue home toprol XL  - strict Is and Os in setting of CHF   - hx captopril allergy- no ACEI/ARB at this time  - daily weights.     Problem/Plan - 3:  ·  Problem: Left ventricular thrombus.  Plan: Patient with history of LV thrombus on anticoagulation, thrombus reoccurred when patient discontinued anticoagulation. Per patient intermittently compliant with eliquis and has not taken it at all within past 3-5 days.   - C/W eliquis 5mg BID.     Problem/Plan - 4:  ·  Problem: ICD (implantable cardioverter-defibrillator) in place.  Plan: Patient with ICD, scheduled for upgrade on oct 4. Interrogated 8/2019, WNL.     Problem/Plan - 5:  ·  Problem: Paroxysmal a-fib.  Plan: - continue home metoprolol succ 100mg daily  - continue home Eliquis 5mg BID.     Problem/Plan - 6:  Problem: Hypertension, unspecified type. Plan: -continue home metoprolol succ 100mg daily.    Problem/Plan - 7:  ·  Problem: Hypomagnesemia.  Plan: - replete PRN.     Problem/Plan - 8:  ·  Problem: Gastritis.  Plan: Patient with history of gastritis. Denies GERD symptoms. No current or recent history of melena or hematochezia. Gastritis likely secondary to alcohol abuse.  -Patient complaining of some heart burn today  -Will start protonix 40mg qday.     Problem/Plan - 9:  ·  Problem: Nutrition, metabolism, and development symptoms.  Plan: F: tolerating PO, no IVF  E: replete K<4, Mg<2  N: Dash/TLC    VTE Prophylaxis: Eliquis  C: Full Code  D: RMF.

## 2019-09-30 NOTE — SBIRT NOTE ADULT - NSSBIRTALCPASSREFTXDET_GEN_A_CORE
Patient reported drinking 1 pint of vodka daily. Patient stated that he has been drinking heavily for the past 5 years due to depression and family stressors. Patient was provided information about healthy guidelines and potential negative consequences associated with level of risk. Patient completed inpatient rehab at [facility] one year ago. Patient had 8 months of sobriety but recently relapsed.  offered patient resources. Patient is accepting of list of inpatient and outpatient facilities. Patient stated that he wanted to follow up with rehab on his own because of an upcoming surgery.

## 2019-09-30 NOTE — PHYSICAL THERAPY INITIAL EVALUATION ADULT - GENERAL OBSERVATIONS, REHAB EVAL
Pt found in semi-supine, NAD, consents to initial evaluation. Pt exhibits involuntary UE shaking, however, pt expressed this is normal when he goes through alcohol withdrawal

## 2019-09-30 NOTE — CONSULT NOTE ADULT - SUBJECTIVE AND OBJECTIVE BOX
Patient is a 63y old  Male who presents with a chief complaint of alcohol withdrawal (30 Sep 2019 10:29)       HPI:  62 y/o male with medical history of HIT,  ETOH abuse, paroxsymal AFib (hx of LV thrombus, on Eliquis, most recent echo 8/2019 without evidence of thrombus), HLD, CAD s/p PCI, HFrEF (35-40% 8/12/2019) s/p AICD (last interrogation 8/12/19- WNL), hemorrhoids, gastritis, recent admission 8/29/19 for alcohol withdrawal presenting for alcohol withdrawal. Patient's last drink was yesterday evening, uncertain of exact time, he decided to quit because he was tired of his constant lifestyle. Patient took 50mg of librium from home prescription, a couple of hours after last drink, that made him feel worse and is the reason he presented to the ED. Patient usually drinks 1 pint of vodka everyday for the past 7-8 years. For the past 4-5 days patient endorses decreased PO intake, in the setting of increased drinking consumption. Patient endorsed increased tremors at home, denies nausea, vomiting, abdominal pain, diarrhea or constipation. Patient denies headache, SOB or chest pain, recent falls, LOC, hallucinations or anxiety. Patient has no history of alcoholic seizures. PCP reports increased alcohol consumption and admissions this summer. Patient attempted rehab voluntarily one year ago and remained sober for 8 months. Indicates living condition of son and multiple grandchildren exacerbated his drinking and cause him to relapse. Patient reports non-compliance with eliquis and other home medications in setting of alcohol use.     In ED: Vitals: T: Afebrile | (29 Sep 2019 17:10)      PAST MEDICAL & SURGICAL HISTORY:  Heparin-induced thrombocytopenia: Antibody positive  Left ventricular thrombus  Atrial fibrillation  Hemorrhoid  ICD (implantable cardioverter-defibrillator) in place  Myocardial infarction involving other coronary artery  Gastritis  Atherosclerosis of coronary artery: Coronary artery disease  Automatic implantable cardiac defibrillator in situ: ICD (implantable cardioverter-defibrillator) in place      MEDICATIONS  (STANDING):  apixaban 5 milliGRAM(s) Oral every 12 hours  ascorbic acid 500 milliGRAM(s) Oral daily  chlordiazePOXIDE 50 milliGRAM(s) Oral every 8 hours  folic acid 1 milliGRAM(s) Oral daily  metoprolol succinate  milliGRAM(s) Oral daily  multivitamin 1 Tablet(s) Oral daily  pantoprazole    Tablet 40 milliGRAM(s) Oral before breakfast  thiamine 100 milliGRAM(s) Oral daily    MEDICATIONS  (PRN):  LORazepam   Injectable 2 milliGRAM(s) IV Push every 4 hours PRN CIWA>8      Social History:           -  Occupation: X           -  Home Living Status: lives with his son and grandchildren in an apartment with no stairs           -  Prior Home Care Services: X    Functional Level Prior to Admission:           - ADL's:  independent           - ambulatory status: walked without assistive devices    FAMILY HISTORY:  FH: type 2 diabetes: father  FH: myocardial infarction: maternal grandfather, age 42      CBC Full  -  ( 30 Sep 2019 07:06 )  WBC Count : 3.48 K/uL  RBC Count : 4.41 M/uL  Hemoglobin : 11.0 g/dL  Hematocrit : 35.5 %  Platelet Count - Automated : 111 K/uL  Mean Cell Volume : 80.5 fl  Mean Cell Hemoglobin : 24.9 pg  Mean Cell Hemoglobin Concentration : 31.0 gm/dL  Auto Neutrophil # : 1.94 K/uL  Auto Lymphocyte # : 1.25 K/uL  Auto Monocyte # : 0.19 K/uL  Auto Eosinophil # : 0.05 K/uL  Auto Basophil # : 0.04 K/uL  Auto Neutrophil % : 55.8 %  Auto Lymphocyte % : 35.9 %  Auto Monocyte % : 5.5 %  Auto Eosinophil % : 1.4 %  Auto Basophil % : 1.1 %      09-30    135  |  98  |  8   ----------------------------<  93  4.0   |  28  |  0.93    Ca    9.1      30 Sep 2019 07:06  Phos  2.8     09-30  Mg     1.7     09-30    TPro  6.8  /  Alb  3.9  /  TBili  1.0  /  DBili  0.2  /  AST  110<H>  /  ALT  79<H>  /  AlkPhos  68  09-29            Radiology:              Vital Signs Last 24 Hrs  T(C): 36.9 (30 Sep 2019 09:23), Max: 37.3 (30 Sep 2019 00:11)  T(F): 98.4 (30 Sep 2019 09:23), Max: 99.2 (30 Sep 2019 00:11)  HR: 77 (30 Sep 2019 09:23) (77 - 104)  BP: 127/89 (30 Sep 2019 09:23) (116/71 - 138/81)  BP(mean): --  RR: 18 (30 Sep 2019 09:23) (17 - 20)  SpO2: 98% (30 Sep 2019 09:23) (94% - 98%)    REVIEW OF SYSTEMS:    CONSTITUTIONAL:  fatigue  EYES: No eye pain, visual disturbances, or discharge  ENMT:  No difficulty hearing, tinnitus, vertigo; No sinus or throat pain  NECK: No pain or stiffness  BREASTS: No pain, masses, or nipple discharge  RESPIRATORY: No cough, wheezing, chills or hemoptysis; No shortness of breath  CARDIOVASCULAR: No chest pain, palpitations, dizziness, or leg swelling  GASTROINTESTINAL: No abdominal or epigastric pain. No nausea, vomiting, or hematemesis; No diarrhea or constipation. No melena or hematochezia.  GENITOURINARY: No dysuria, frequency, hematuria, or incontinence  NEUROLOGICAL: No headaches, memory loss, loss of strength, numbness, or tremors  SKIN: No itching, burning, rashes, or lesions   LYMPH NODES: No enlarged glands  ENDOCRINE: No heat or cold intolerance; No hair loss  MUSCULOSKELETAL: No joint pain or swelling; No muscle, back, or extremity pain  PSYCHIATRIC: No depression, anxiety, mood swings, or difficulty sleeping  HEME/LYMPH: No easy bruising, or bleeding gums  ALLERGY AND IMMUNOLOGIC: No hives or eczema  VASCULAR: no swelling, erythema      Physical Exam: WDWN 62 yo  gentleman lying in semi Folwer's position, w/o complaints    Head: normocephalic, atraumatic    Eyes: PERRLA, EOMI, no nystagmus, sclera anicteric    ENT: nasal discharge, uvula midline, no oropharyngeal erythema/exudate    Neck: supple, negative JVD, negative carotid bruits, no thyromegaly    Chest: CTA bilaterally, neg wheeze/ rhonchi/ rales/ crackles/ egophany    Cardiovascular: regular rate and rhythm, neg murmurs/rubs/gallops    Abdomen: soft, non distended, non tender, negative rebound/guarding, normal bowel sounds, neg hepatosplenomegaly    Extremities: WWP, neg cyanosis/clubbing/edema, negative calf tenderness to palpation, negative Kee's sign    :     Neurologic Exam:    Alert and oriented to person, place, date/year, speech fluent w/o dysarthria, recent and remote memory intact, repetition intact, comprehension intact    Cranial Nerves:     II:                       pupils equal, round and reactive to light, visual fields intact   III/ IV/VI:             extraocular movements intact, neg nystagmus, neg ptosis  V:                       facial sensation intact, V1-3 normal  VII:                     face symmetric, no droop, normal eye closure and smile  VIII:                    hearing intact to finger rub bilaterally  IX/ X:                 soft palate rise symmetrical  XI:                      head turning, shoulder shrug normal  XII:                     tongue midline    Motor Exam:    Upper Extremities:     RIght:     5/5   /intrinsics               5/5  wrist flexors/extensors               5/5  biceps/triceps               5/5  deltoid               negative drift    Left :     5/5  /intrinsics               5/5  wrist flexors/extensors               5/5 biceps/triceps               5/5 deltoid               negative drift    Lower Extremities:                 Right:      5/5  DF/PF/ evertors/ invertors                5/5  Quad/ hamstrings                5/5  hip flexors/adductors/abductors                 Left:       5/5  DF/PF/ evertors/ invertors                5/5  Quad/ hamstrings                5/5  hip flexors/adductors/abductors                       Sensory:    intact to LT/PP in all UE/LE dermatomes    DTR:            = biceps/     triceps/     brachioradialis                      = patella/   medial hamstring/ankle                      neg clonus                      neg Babinski                        Finger to Nose:  wnl    Heel to Shin:  wnl    Rapid Alternating movements:  wnl    Joint Position Sense:  intact    Romberg:  not tested    Tandem Walking:  not tested    Gait:  not tested        PM&R Impression:    1) deconditioned  2) Etoh withdrawal        Recommendations:    1) Physical therapy focusing on therapeutic exercises, bed mobility/transfer out of bed evaluation, progressive ambulation with assistive devices prn.    2) Anticipated Disposition Plan/Recs:  d/c home with no post discharge rehab needs

## 2019-09-30 NOTE — PHYSICAL THERAPY INITIAL EVALUATION ADULT - PERTINENT HX OF CURRENT PROBLEM, REHAB EVAL
64 y/o male with medical history of HIT,  ETOH abuse, paroxsymal AFib (hx of LV thrombus, on Eliquis, most recent echo 8/2019 without evidence of thrombus), HLD, CAD s/p PCI, HFrEF (35-40% 8/12/2019) s/p AICD (last interrogation 8/12/19- WNL), hemorrhoids, gastritis, recent admission 8/29/19 for alcohol withdrawal presenting for alcohol withdrawal.

## 2019-10-01 LAB
ANION GAP SERPL CALC-SCNC: 8 MMOL/L — SIGNIFICANT CHANGE UP (ref 5–17)
BUN SERPL-MCNC: 9 MG/DL — SIGNIFICANT CHANGE UP (ref 7–23)
CALCIUM SERPL-MCNC: 9 MG/DL — SIGNIFICANT CHANGE UP (ref 8.4–10.5)
CHLORIDE SERPL-SCNC: 100 MMOL/L — SIGNIFICANT CHANGE UP (ref 96–108)
CO2 SERPL-SCNC: 28 MMOL/L — SIGNIFICANT CHANGE UP (ref 22–31)
CREAT SERPL-MCNC: 0.94 MG/DL — SIGNIFICANT CHANGE UP (ref 0.5–1.3)
GLUCOSE SERPL-MCNC: 92 MG/DL — SIGNIFICANT CHANGE UP (ref 70–99)
HCT VFR BLD CALC: 36.7 % — LOW (ref 39–50)
HGB BLD-MCNC: 11.5 G/DL — LOW (ref 13–17)
MAGNESIUM SERPL-MCNC: 1.7 MG/DL — SIGNIFICANT CHANGE UP (ref 1.6–2.6)
MCHC RBC-ENTMCNC: 25.1 PG — LOW (ref 27–34)
MCHC RBC-ENTMCNC: 31.3 GM/DL — LOW (ref 32–36)
MCV RBC AUTO: 80 FL — SIGNIFICANT CHANGE UP (ref 80–100)
NRBC # BLD: 0 /100 WBCS — SIGNIFICANT CHANGE UP (ref 0–0)
PHOSPHATE SERPL-MCNC: 3.3 MG/DL — SIGNIFICANT CHANGE UP (ref 2.5–4.5)
PLATELET # BLD AUTO: 89 K/UL — LOW (ref 150–400)
POTASSIUM SERPL-MCNC: 4.5 MMOL/L — SIGNIFICANT CHANGE UP (ref 3.5–5.3)
POTASSIUM SERPL-SCNC: 4.5 MMOL/L — SIGNIFICANT CHANGE UP (ref 3.5–5.3)
RBC # BLD: 4.59 M/UL — SIGNIFICANT CHANGE UP (ref 4.2–5.8)
RBC # FLD: 18.8 % — HIGH (ref 10.3–14.5)
SODIUM SERPL-SCNC: 136 MMOL/L — SIGNIFICANT CHANGE UP (ref 135–145)
WBC # BLD: 3.42 K/UL — LOW (ref 3.8–10.5)
WBC # FLD AUTO: 3.42 K/UL — LOW (ref 3.8–10.5)

## 2019-10-01 RX ORDER — MAGNESIUM SULFATE 500 MG/ML
2 VIAL (ML) INJECTION ONCE
Refills: 0 | Status: COMPLETED | OUTPATIENT
Start: 2019-10-01 | End: 2019-10-01

## 2019-10-01 RX ADMIN — Medication 100 MILLIGRAM(S): at 11:15

## 2019-10-01 RX ADMIN — Medication 1 TABLET(S): at 11:15

## 2019-10-01 RX ADMIN — Medication 50 GRAM(S): at 08:28

## 2019-10-01 RX ADMIN — PANTOPRAZOLE SODIUM 40 MILLIGRAM(S): 20 TABLET, DELAYED RELEASE ORAL at 06:31

## 2019-10-01 RX ADMIN — Medication 50 MILLIGRAM(S): at 18:32

## 2019-10-01 RX ADMIN — Medication 1 MILLIGRAM(S): at 11:15

## 2019-10-01 RX ADMIN — Medication 100 MILLIGRAM(S): at 06:31

## 2019-10-01 RX ADMIN — APIXABAN 5 MILLIGRAM(S): 2.5 TABLET, FILM COATED ORAL at 06:31

## 2019-10-01 RX ADMIN — APIXABAN 5 MILLIGRAM(S): 2.5 TABLET, FILM COATED ORAL at 18:32

## 2019-10-01 RX ADMIN — Medication 50 MILLIGRAM(S): at 06:31

## 2019-10-01 RX ADMIN — Medication 500 MILLIGRAM(S): at 11:15

## 2019-10-01 NOTE — PROGRESS NOTE ADULT - ASSESSMENT
per Internal Medicine    64 y/o male with hx of HIT, PMHx ETOH abuse, paroxsymal AFib (hx of LV thrombus, on Eliquis, most recent echo 8/2019 without evidence of thrombus), HLD, CAD s/p PCI, HFrEF (35-40% 8/12/2019) s/p AICD (last interrogation 8/12/19- WNL), hemorrhoids, gastritis and multiple previous admissions for alcohol withdrawal, presents with alcohol withdrawal, last drink less than 24 hours ago. Admitted to [facility].      Problem/Plan - 1:  ·  Problem: Alcohol withdrawal syndrome without complication.  Plan: Patient with history of alcohol abuse and multiple hospitalizations for alcohol withdrawal. Last drink less than 24 hours ago. CIWA of 8 on admission. No hallucinations. Tongue fasciculations present. Patient endorses desire for rehabilitation and sobriety. Patient s/p librium 50 in ED x5 total.  - Currently on Librium 50mg q8H, will transition to 25 q8H  - Ativan 2g q3PRN (did not require overnight)  - CIWA protocol, most recently 4-5.     Problem/Plan - 2:  ·  Problem: Chronic systolic heart failure.  Plan: Patient with known history of CHF. Last echo 8/12/2019 with EF 35-40%. AICD last interrogated on 8/12- no events, with plan to change battery on 10/4.  - given risk benefit analysis of masking symptoms in alcohol withdrawal vs heart failure exacerbation, will continue home toprol XL  - strict Is and Os in setting of CHF   - hx captopril allergy- no ACEI/ARB at this time  - daily weights.     Problem/Plan - 3:  ·  Problem: Left ventricular thrombus.  Plan: Patient with history of LV thrombus on anticoagulation, thrombus reoccurred when patient discontinued anticoagulation. Per patient intermittently compliant with eliquis and has not taken it at all within past 3-5 days.   - C/W eliquis 5mg BID.     Problem/Plan - 4:  ·  Problem: ICD (implantable cardioverter-defibrillator) in place.  Plan: Patient with ICD, scheduled for upgrade on oct 4. Interrogated 8/2019, WNL.     Problem/Plan - 5:  ·  Problem: Paroxysmal a-fib.  Plan: - continue home metoprolol succ 100mg daily  - continue home Eliquis 5mg BID.     Problem/Plan - 6:  Problem: Hypertension, unspecified type. Plan: -continue home metoprolol succ 100mg daily.    Problem/Plan - 7:  ·  Problem: Hypomagnesemia.  Plan: - replete PRN    Hypophos  -replete prn.     Problem/Plan - 8:  ·  Problem: Gastritis.  Plan: Patient with history of gastritis. Denies GERD symptoms. No current or recent history of melena or hematochezia. Gastritis likely secondary to alcohol abuse.  -C/W Protonix 40 once daily.     Problem/Plan - 9:  ·  Problem: Nutrition, metabolism, and development symptoms.  Plan: F: tolerating PO, no IVF  E: replete K<4, Mg<2  N: Dash/TLC    VTE Prophylaxis: Eliquis  C: Full Code

## 2019-10-01 NOTE — PROGRESS NOTE ADULT - ASSESSMENT
62 y/o male with hx of HIT, PMHx ETOH abuse, paroxsymal AFib (hx of LV thrombus, on Eliquis, most recent echo 8/2019 without evidence of thrombus), HLD, CAD s/p PCI, HFrEF (35-40% 8/12/2019) s/p AICD (last interrogation 8/12/19- WNL), hemorrhoids, gastritis and multiple previous admissions for alcohol withdrawal, presents with alcohol withdrawal, last drink less than 24 hours ago. Admitted to [facility].

## 2019-10-02 ENCOUNTER — TRANSCRIPTION ENCOUNTER (OUTPATIENT)
Age: 64
End: 2019-10-02

## 2019-10-02 PROCEDURE — 93010 ELECTROCARDIOGRAM REPORT: CPT

## 2019-10-02 RX ORDER — SODIUM CHLORIDE 9 MG/ML
500 INJECTION INTRAMUSCULAR; INTRAVENOUS; SUBCUTANEOUS
Refills: 0 | Status: DISCONTINUED | OUTPATIENT
Start: 2019-10-02 | End: 2019-10-03

## 2019-10-02 RX ORDER — THIAMINE MONONITRATE (VIT B1) 100 MG
1 TABLET ORAL
Qty: 0 | Refills: 0 | DISCHARGE
Start: 2019-10-02

## 2019-10-02 RX ADMIN — Medication 100 MILLIGRAM(S): at 05:18

## 2019-10-02 RX ADMIN — Medication 500 MILLIGRAM(S): at 11:17

## 2019-10-02 RX ADMIN — Medication 1 MILLIGRAM(S): at 11:17

## 2019-10-02 RX ADMIN — APIXABAN 5 MILLIGRAM(S): 2.5 TABLET, FILM COATED ORAL at 05:18

## 2019-10-02 RX ADMIN — APIXABAN 5 MILLIGRAM(S): 2.5 TABLET, FILM COATED ORAL at 17:25

## 2019-10-02 RX ADMIN — Medication 50 MILLIGRAM(S): at 05:18

## 2019-10-02 RX ADMIN — Medication 1 TABLET(S): at 11:17

## 2019-10-02 RX ADMIN — Medication 100 MILLIGRAM(S): at 11:16

## 2019-10-02 RX ADMIN — Medication 25 MILLIGRAM(S): at 17:26

## 2019-10-02 RX ADMIN — PANTOPRAZOLE SODIUM 40 MILLIGRAM(S): 20 TABLET, DELAYED RELEASE ORAL at 06:30

## 2019-10-02 RX ADMIN — SODIUM CHLORIDE 50 MILLILITER(S): 9 INJECTION INTRAMUSCULAR; INTRAVENOUS; SUBCUTANEOUS at 12:22

## 2019-10-02 NOTE — DISCHARGE NOTE PROVIDER - HOSPITAL COURSE
64 y/o male with hx of HIT, PMHx ETOH abuse, paroxsymal AFib (hx of LV thrombus, on Eliquis, most recent echo 8/2019 without evidence of thrombus), HLD, CAD s/p PCI, HFrEF (35-40% 8/12/2019) s/p AICD (last interrogation 8/12/19- WNL), hemorrhoids, gastritis and multiple previous admissions for alcohol withdrawal, presents with alcohol withdrawal, last drink less than 24 hours ago. Admitted to [facility].         Problem List/Main Diagnoses (system-based):         1) Alcohol withdrawal - treated with librium taper, required a couple pushes of Ativan, CIWA improved, patient discharged        2) HFrEF s/p AICD - patient euvolemic on admission did not require dialysis, set to undergo AICD interrogation on 10/4 at outside hospital        3) atrial fibrillation - no active issues, on Toprol XL and Eliquis here        Inpatient treatment course: Patient presented 12 hours after last drink in alcohol withdrawal. Here recently several times for same, reports 1 pint of vodka/day for the past 7-8 years. Patient was treated with Librium taper and prn Ativan with improvement of symptoms. No seizures or requirement of intubation during the admission. Improved clinically and was discharged home, with strong motivation to stop drinking alcohol. Patient was medically optimized, stable and ready for discharge. Plan of care and return precautions were discussed with the patient who verbally stated understanding.         New medications: Librium 25mg x2 doses        Labs to be followed outpatient: none        Exam to be followed outpatient: none 62 y/o male with hx of HIT, PMHx ETOH abuse, paroxsymal AFib (hx of LV thrombus, on Eliquis, most recent echo 8/2019 without evidence of thrombus), HLD, CAD s/p PCI, HFrEF (35-40% 8/12/2019) s/p AICD (last interrogation 8/12/19- WNL), hemorrhoids, gastritis and multiple previous admissions for alcohol withdrawal, presents with alcohol withdrawal, last drink less than 24 hours ago. Admitted to [facility].         Problem List/Main Diagnoses (system-based):         1) Alcohol withdrawal - treated with librium taper, required a couple pushes of Ativan, CIWA improved, patient discharged        2) HFrEF s/p AICD - patient euvolemic on admission did not require dialysis, set to undergo AICD interrogation on 10/4 at outside hospital        3) atrial fibrillation - no active issues, on Toprol XL and Eliquis here        Inpatient treatment course: Patient presented 12 hours after last drink in alcohol withdrawal. Here recently several times for same, reports 1 pint of vodka/day for the past 7-8 years. Patient was treated with Librium taper and prn Ativan with improvement of symptoms. No seizures or requirement of intubation during the admission. Improved clinically and was discharged home, with strong motivation to stop drinking alcohol. Patient was medically optimized, stable and ready for discharge. Plan of care and return precautions were discussed with the patient who verbally stated understanding.         New medications: Librium 10mg x4 doses        Labs to be followed outpatient: none        Exam to be followed outpatient: none 62 y/o male with hx of HIT, PMHx ETOH abuse, paroxsymal AFib (hx of LV thrombus, on Eliquis, most recent echo 8/2019 without evidence of thrombus), HLD, CAD s/p PCI, HFrEF (35-40% 8/12/2019) s/p AICD (last interrogation 8/12/19- WNL), hemorrhoids, gastritis and multiple previous admissions for alcohol withdrawal, presents with alcohol withdrawal, last drink less than 24 hours ago. Admitted to [facility].         Problem List/Main Diagnoses (system-based):         1) Alcohol withdrawal - treated with librium taper, required a couple pushes of Ativan, CIWA improved, patient discharged        2) HFrEF s/p AICD - patient euvolemic on admission did not require dialysis, set to undergo AICD interrogation on 10/4 at outside hospital        3) atrial fibrillation - no active issues, on Toprol XL and Eliquis here        Inpatient treatment course: Patient presented 12 hours after last drink in alcohol withdrawal. Here recently several times for same, reports 1 pint of vodka/day for the past 7-8 years. Patient was treated with Librium taper and prn Ativan with improvement of symptoms. No seizures or requirement of intubation during the admission. Improved clinically and was discharged home, with strong motivation to stop drinking alcohol. Patient was medically optimized, stable and ready for discharge. Plan of care and return precautions were discussed with the patient who verbally stated understanding.         New medications: Librium 25mg x4 doses        Labs to be followed outpatient: none        Exam to be followed outpatient: none

## 2019-10-02 NOTE — PROGRESS NOTE ADULT - ASSESSMENT
For  D c on librium      ETOH  Therapy pending     For  AICD device battery replacement  when scheduled For  D c on librium    10  po QID   when  stable    current tachycardia    will get  EKG     ETOH  Therapy pending     For  AICD device battery replacement  when scheduled

## 2019-10-02 NOTE — PROGRESS NOTE ADULT - PROBLEM SELECTOR PROBLEM 4
ICD (implantable cardioverter-defibrillator) in place

## 2019-10-02 NOTE — PROGRESS NOTE ADULT - PROBLEM SELECTOR PLAN 6
-continue home metoprolol succ 100mg daily

## 2019-10-02 NOTE — PROGRESS NOTE ADULT - PROBLEM SELECTOR PLAN 2
Patient with known history of CHF. Last echo 8/12/2019 with EF 35-40%. AICD last interrogated on 8/12- no events, with plan to change battery on 10/4.  - given risk benefit analysis of masking symptoms in alcohol withdrawal vs heart failure exacerbation, will continue home toprol XL  - strict Is and Os in setting of CHF   - hx captopril allergy- no ACEI/ARB at this time  - daily weights.

## 2019-10-02 NOTE — PROGRESS NOTE ADULT - PROBLEM SELECTOR PLAN 3
Patient with history of LV thrombus on anticoagulation, thrombus reoccurred when patient discontinued anticoagulation. Per patient intermittently compliant with eliquis and has not taken it at all within past 3-5 days.   - C/W eliquis 5mg BID

## 2019-10-02 NOTE — PROGRESS NOTE ADULT - PROBLEM SELECTOR PLAN 4
Patient with ICD, scheduled for upgrade on oct 4. Interrogated 8/2019, NICOLETTE

## 2019-10-02 NOTE — PROGRESS NOTE ADULT - PROBLEM SELECTOR PLAN 10
1) PCP Contacted on Admission: (Y/N) --> Name & Phone #: Dr. Reddy   2) Date of Contact with PCP: 9/29/19  3) PCP Contacted at Discharge: (Y/N)  4) Summary of Handoff Given to PCP: Yes   5) Post-Discharge Appointment Date and Location:

## 2019-10-02 NOTE — DISCHARGE NOTE PROVIDER - NSDCCPCAREPLAN_GEN_ALL_CORE_FT
PRINCIPAL DISCHARGE DIAGNOSIS  Diagnosis: Alcohol withdrawal syndrome without complication  Assessment and Plan of Treatment: You experienced alcohol withdrawal during this admission. Withdrawal can happe within the first 12 hours of your last drink and it usually starts with tremors, agitation, nausea. You were treated with benozodiazepines and improved clinically. Please follow up with your PCP.      SECONDARY DISCHARGE DIAGNOSES  Diagnosis: Hypertension, unspecified type  Assessment and Plan of Treatment: Hypertension is the medical term for high blood pressure. Blood pressure refers to the pressure that blood applies to the inner walls of the arteries. Arteries carry blood from the heart to other organs and parts of the body. Untreated high blood pressure increases the strain on the heart and arteries, eventually causing organ damage. High blood pressure increases the risk of heart failure, heart attack (myocardial infarction), stroke, and kidney failure. High blood pressure does not usually cause any symptoms. Treatment of hypertension usually begins with lifestyle changes. Making these lifestyle changes involves little or no risk. Recommended changes often include reducing the amount of salt in your diet, losing weight if you are overweight or obese, avoiding drinking too much alcohol, stopping smoking and exercising at least 30 minutes per day most days of the week. If you are prescribed medication for your hypertension it is important to take these as prescribed to prevent the possible complications of uncontrolled hypertension.

## 2019-10-02 NOTE — DISCHARGE NOTE NURSING/CASE MANAGEMENT/SOCIAL WORK - PATIENT PORTAL LINK FT
You can access the FollowMyHealth Patient Portal offered by [facility] by registering at the following website: [email or web address]. By joining [facility]’s FollowMyHealth portal, you will also be able to view your health information using other applications (apps) compatible with our system.

## 2019-10-02 NOTE — PROGRESS NOTE ADULT - PROBLEM SELECTOR PLAN 1
Patient with history of alcohol abuse and multiple hospitalizations for alcohol withdrawal. Last drink less than 24 hours ago. CIWA of 8 on admission. No hallucinations. Tongue fasciculations present. Patient endorses desire for rehabilitation and sobriety. Patient s/p librium 50 in ED x5 total.  - Tapered to Librium 25mg q12H  - Discontinued prn Ativan  - CIWA protocol, most recently 3-4
Patient with history of alcohol abuse and multiple hospitalizations for alcohol withdrawal. Last drink less than 24 hours ago. CIWA of 8 on admission. No hallucinations. Tongue fasciculations present. Patient endorses desire for rehabilitation and sobriety. Patient s/p librium 50 in ED x5 total.  - Libirum 25 q 4 hours, per Dr. Levy  - Ativan 2g q3PRN (required 1 overnight, given 1 dose this AM)  - CIWA protocol, most recently 9-10
Patient with history of alcohol abuse and multiple hospitalizations for alcohol withdrawal. Last drink less than 24 hours ago. CIWA of 8 on admission. No hallucinations. Tongue fasciculations present. Patient endorses desire for rehabilitation and sobriety. Patient s/p librium 50 in ED x5 total.  - Currently on Librium 50mg q8H, will transition to 25 q8H  - Ativan 2g q3PRN (did not require overnight)  - CIWA protocol, most recently 4-5

## 2019-10-02 NOTE — PROGRESS NOTE ADULT - ASSESSMENT
per Internal Medicine    64 y/o male with hx of HIT, PMHx ETOH abuse, paroxsymal AFib (hx of LV thrombus, on Eliquis, most recent echo 8/2019 without evidence of thrombus), HLD, CAD s/p PCI, HFrEF (35-40% 8/12/2019) s/p AICD (last interrogation 8/12/19- WNL), hemorrhoids, gastritis and multiple previous admissions for alcohol withdrawal, presents with alcohol withdrawal, last drink less than 24 hours ago. Admitted to [facility].      Problem/Plan - 1:  ·  Problem: Alcohol withdrawal syndrome without complication.  Plan: Patient with history of alcohol abuse and multiple hospitalizations for alcohol withdrawal. Last drink less than 24 hours ago. CIWA of 8 on admission. No hallucinations. Tongue fasciculations present. Patient endorses desire for rehabilitation and sobriety. Patient s/p librium 50 in ED x5 total.  - Tapered to Librium 25mg q12H  - Discontinued prn Ativan  - CIWA protocol, most recently 3-4.     Problem/Plan - 2:  ·  Problem: Chronic systolic heart failure.  Plan: Patient with known history of CHF. Last echo 8/12/2019 with EF 35-40%. AICD last interrogated on 8/12- no events, with plan to change battery on 10/4.  - given risk benefit analysis of masking symptoms in alcohol withdrawal vs heart failure exacerbation, will continue home toprol XL  - strict Is and Os in setting of CHF   - hx captopril allergy- no ACEI/ARB at this time  - daily weights.     Problem/Plan - 3:  ·  Problem: Left ventricular thrombus.  Plan: Patient with history of LV thrombus on anticoagulation, thrombus reoccurred when patient discontinued anticoagulation. Per patient intermittently compliant with eliquis and has not taken it at all within past 3-5 days.   - C/W eliquis 5mg BID.     Problem/Plan - 4:  ·  Problem: ICD (implantable cardioverter-defibrillator) in place.  Plan: Patient with ICD, scheduled for upgrade on oct 4. Interrogated 8/2019, WNL.     Problem/Plan - 5:  ·  Problem: Paroxysmal a-fib.  Plan: - continue home metoprolol succ 100mg daily  - continue home Eliquis 5mg BID.     Problem/Plan - 6:  Problem: Hypertension, unspecified type. Plan: -continue home metoprolol succ 100mg daily.    Problem/Plan - 7:  ·  Problem: Hypomagnesemia.  Plan: - replete PRN    Hypophos  -replete prn.     Problem/Plan - 8:  ·  Problem: Gastritis.  Plan: Patient with history of gastritis. Denies GERD symptoms. No current or recent history of melena or hematochezia. Gastritis likely secondary to alcohol abuse.  -C/W Protonix 40 once daily.     Problem/Plan - 9:  ·  Problem: Nutrition, metabolism, and development symptoms.  Plan: F: giving 500cc NS @50cc  E: replete K<4, Mg<2  N: Dash/TLC    VTE Prophylaxis: Eliquis  C: Full Code  D: RMF.

## 2019-10-02 NOTE — PROGRESS NOTE ADULT - PROBLEM SELECTOR PLAN 8
Patient with history of gastritis. Denies GERD symptoms. No current or recent history of melena or hematochezia. Gastritis likely secondary to alcohol abuse.  -C/W Protonix 40 once daily
Patient with history of gastritis. Denies GERD symptoms. No current or recent history of melena or hematochezia. Gastritis likely secondary to alcohol abuse.  -Patient complaining of some heart burn today  -Will start protonix 40mg qday
Patient with history of gastritis. Denies GERD symptoms. No current or recent history of melena or hematochezia. Gastritis likely secondary to alcohol abuse.  -C/W Protonix 40 once daily

## 2019-10-02 NOTE — PROGRESS NOTE ADULT - ASSESSMENT
62 y/o male with hx of HIT, PMHx ETOH abuse, paroxsymal AFib (hx of LV thrombus, on Eliquis, most recent echo 8/2019 without evidence of thrombus), HLD, CAD s/p PCI, HFrEF (35-40% 8/12/2019) s/p AICD (last interrogation 8/12/19- WNL), hemorrhoids, gastritis and multiple previous admissions for alcohol withdrawal, presents with alcohol withdrawal, last drink less than 24 hours ago. Admitted to [facility].

## 2019-10-02 NOTE — PROGRESS NOTE ADULT - PROBLEM SELECTOR PLAN 9
F: tolerating PO, no IVF  E: replete K<4, Mg<2  N: Dash/TLC    VTE Prophylaxis: Eliquis  C: Full Code  D: F
F: giving 500cc NS @50cc  E: replete K<4, Mg<2  N: Dash/TLC    VTE Prophylaxis: Eliquis  C: Full Code  D: DENIZ
F: tolerating PO, no IVF  E: replete K<4, Mg<2  N: Dash/TLC    VTE Prophylaxis: Eliquis  C: Full Code  D: F

## 2019-10-02 NOTE — DISCHARGE NOTE PROVIDER - NSDCFUADDAPPT_GEN_ALL_CORE_FT
Please follow up with your PCP Dr. Levy in 1-2 weeks. Call number as above to schedule an appointment.

## 2019-10-02 NOTE — PROGRESS NOTE ADULT - PROBLEM SELECTOR PLAN 5
- continue home metoprolol succ 100mg daily  - continue home Eliquis 5mg BID

## 2019-10-03 VITALS
OXYGEN SATURATION: 100 % | HEART RATE: 70 BPM | TEMPERATURE: 98 F | RESPIRATION RATE: 18 BRPM | DIASTOLIC BLOOD PRESSURE: 65 MMHG | SYSTOLIC BLOOD PRESSURE: 103 MMHG

## 2019-10-03 LAB
ANION GAP SERPL CALC-SCNC: 10 MMOL/L — SIGNIFICANT CHANGE UP (ref 5–17)
BASOPHILS # BLD AUTO: 0.02 K/UL — SIGNIFICANT CHANGE UP (ref 0–0.2)
BASOPHILS NFR BLD AUTO: 0.5 % — SIGNIFICANT CHANGE UP (ref 0–2)
BUN SERPL-MCNC: 14 MG/DL — SIGNIFICANT CHANGE UP (ref 7–23)
CALCIUM SERPL-MCNC: 9.2 MG/DL — SIGNIFICANT CHANGE UP (ref 8.4–10.5)
CHLORIDE SERPL-SCNC: 100 MMOL/L — SIGNIFICANT CHANGE UP (ref 96–108)
CO2 SERPL-SCNC: 25 MMOL/L — SIGNIFICANT CHANGE UP (ref 22–31)
CREAT SERPL-MCNC: 1.08 MG/DL — SIGNIFICANT CHANGE UP (ref 0.5–1.3)
EOSINOPHIL # BLD AUTO: 0.16 K/UL — SIGNIFICANT CHANGE UP (ref 0–0.5)
EOSINOPHIL NFR BLD AUTO: 4.2 % — SIGNIFICANT CHANGE UP (ref 0–6)
GLUCOSE SERPL-MCNC: 90 MG/DL — SIGNIFICANT CHANGE UP (ref 70–99)
HCT VFR BLD CALC: 35.5 % — LOW (ref 39–50)
HGB BLD-MCNC: 11 G/DL — LOW (ref 13–17)
IMM GRANULOCYTES NFR BLD AUTO: 0.3 % — SIGNIFICANT CHANGE UP (ref 0–1.5)
LYMPHOCYTES # BLD AUTO: 1.3 K/UL — SIGNIFICANT CHANGE UP (ref 1–3.3)
LYMPHOCYTES # BLD AUTO: 34.1 % — SIGNIFICANT CHANGE UP (ref 13–44)
MAGNESIUM SERPL-MCNC: 1.8 MG/DL — SIGNIFICANT CHANGE UP (ref 1.6–2.6)
MCHC RBC-ENTMCNC: 25.1 PG — LOW (ref 27–34)
MCHC RBC-ENTMCNC: 31 GM/DL — LOW (ref 32–36)
MCV RBC AUTO: 81.1 FL — SIGNIFICANT CHANGE UP (ref 80–100)
MONOCYTES # BLD AUTO: 0.35 K/UL — SIGNIFICANT CHANGE UP (ref 0–0.9)
MONOCYTES NFR BLD AUTO: 9.2 % — SIGNIFICANT CHANGE UP (ref 2–14)
NEUTROPHILS # BLD AUTO: 1.97 K/UL — SIGNIFICANT CHANGE UP (ref 1.8–7.4)
NEUTROPHILS NFR BLD AUTO: 51.7 % — SIGNIFICANT CHANGE UP (ref 43–77)
NRBC # BLD: 0 /100 WBCS — SIGNIFICANT CHANGE UP (ref 0–0)
PHOSPHATE SERPL-MCNC: 3.7 MG/DL — SIGNIFICANT CHANGE UP (ref 2.5–4.5)
PLATELET # BLD AUTO: 90 K/UL — LOW (ref 150–400)
POTASSIUM SERPL-MCNC: 4.2 MMOL/L — SIGNIFICANT CHANGE UP (ref 3.5–5.3)
POTASSIUM SERPL-SCNC: 4.2 MMOL/L — SIGNIFICANT CHANGE UP (ref 3.5–5.3)
RBC # BLD: 4.38 M/UL — SIGNIFICANT CHANGE UP (ref 4.2–5.8)
RBC # FLD: 19.3 % — HIGH (ref 10.3–14.5)
SODIUM SERPL-SCNC: 135 MMOL/L — SIGNIFICANT CHANGE UP (ref 135–145)
WBC # BLD: 3.81 K/UL — SIGNIFICANT CHANGE UP (ref 3.8–10.5)
WBC # FLD AUTO: 3.81 K/UL — SIGNIFICANT CHANGE UP (ref 3.8–10.5)

## 2019-10-03 RX ORDER — MAGNESIUM SULFATE 500 MG/ML
2 VIAL (ML) INJECTION ONCE
Refills: 0 | Status: COMPLETED | OUTPATIENT
Start: 2019-10-03 | End: 2019-10-03

## 2019-10-03 RX ADMIN — Medication 50 GRAM(S): at 08:55

## 2019-10-03 RX ADMIN — APIXABAN 5 MILLIGRAM(S): 2.5 TABLET, FILM COATED ORAL at 06:10

## 2019-10-03 RX ADMIN — Medication 500 MILLIGRAM(S): at 08:54

## 2019-10-03 RX ADMIN — Medication 100 MILLIGRAM(S): at 08:54

## 2019-10-03 RX ADMIN — Medication 1 TABLET(S): at 08:54

## 2019-10-03 RX ADMIN — Medication 100 MILLIGRAM(S): at 06:10

## 2019-10-03 RX ADMIN — Medication 1 MILLIGRAM(S): at 08:54

## 2019-10-03 RX ADMIN — PANTOPRAZOLE SODIUM 40 MILLIGRAM(S): 20 TABLET, DELAYED RELEASE ORAL at 06:10

## 2019-10-03 RX ADMIN — Medication 25 MILLIGRAM(S): at 06:10

## 2019-10-03 NOTE — PROGRESS NOTE ADULT - REASON FOR ADMISSION
alcohol withdrawal

## 2019-10-03 NOTE — PROGRESS NOTE ADULT - PROVIDER SPECIALTY LIST ADULT
Cardiology
Internal Medicine
Internal Medicine
Rehab Medicine
Rehab Medicine
Cardiology
Internal Medicine

## 2019-10-03 NOTE — PROGRESS NOTE ADULT - SUBJECTIVE AND OBJECTIVE BOX
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     PAST MEDICAL & SURGICAL HISTORY:  Heparin-induced thrombocytopenia: Antibody positive  Left ventricular thrombus  Atrial fibrillation  Hemorrhoid  ICD (implantable cardioverter-defibrillator) in place  Myocardial infarction involving other coronary artery  Gastritis  Atherosclerosis of coronary artery: Coronary artery disease  Automatic implantable cardiac defibrillator in situ: ICD (implantable cardioverter-defibrillator) in place    MEDICATIONS  (STANDING):  apixaban 5 milliGRAM(s) Oral every 12 hours  ascorbic acid 500 milliGRAM(s) Oral daily  chlordiazePOXIDE 25 milliGRAM(s) Oral every 4 hours  folic acid 1 milliGRAM(s) Oral daily  metoprolol succinate  milliGRAM(s) Oral daily  multivitamin 1 Tablet(s) Oral daily  thiamine 100 milliGRAM(s) Oral daily    ICU Vital Signs Last 24 Hrs  T(C): 36.9 (30 Sep 2019 09:23), Max: 37.3 (30 Sep 2019 00:11)  T(F): 98.4 (30 Sep 2019 09:23), Max: 99.2 (30 Sep 2019 00:11)  HR: 77 (30 Sep 2019 09:23) (77 - 138)  BP: 127/89 (30 Sep 2019 09:23) (116/71 - 138/81)  BP(mean): --  ABP: --  ABP(mean): --  RR: 18 (30 Sep 2019 09:23) (17 - 22)  SpO2: 98% (30 Sep 2019 09:23) (89% - 98%)      MEDICATIONS  (PRN):  LORazepam   Injectable 2 milliGRAM(s) IV Push every 4 hours PRN CIWA>8    Lungs clear  CV   s1 s2    ab dsoft   Ext stable                          11.0   3.48  )-----------( 111      ( 30 Sep 2019 07:06 )             35.5   09-30    135  |  98  |  8   ----------------------------<  93  4.0   |  28  |  0.93    Ca    9.1      30 Sep 2019 07:06  Phos  2.8     09-30  Mg     1.7     09-30    TPro  6.8  /  Alb  3.9  /  TBili  1.0  /  DBili  0.2  /  AST  110<H>  /  ALT  79<H>  /  AlkPhos  68  09-29  Alcohol, Blood (09.29.19 @ 12:18)    Alcohol, Blood: 229: TOXIC CONCENTRATIONS:  Flushing, Slowing of  Reflexes, Impaired Visual Acuity:     Depression of CNS: > 100  Fatalities Reported:  > 400  These ranges are intended as general guidelines.  Alcohol metabolism can  vary widely among individuals.  This test is approved for clinical and  not for forensic purposes. mg/dL
Physical Medicine and Rehabilitation Progress Note:    Patient is a 63y old  Male who presents with a chief complaint of alcohol withdrawal (01 Oct 2019 12:10)      HPI:  62 y/o male with medical history of HIT,  ETOH abuse, paroxsymal AFib (hx of LV thrombus, on Eliquis, most recent echo 8/2019 without evidence of thrombus), HLD, CAD s/p PCI, HFrEF (35-40% 8/12/2019) s/p AICD (last interrogation 8/12/19- WNL), hemorrhoids, gastritis, recent admission 8/29/19 for alcohol withdrawal presenting for alcohol withdrawal. Patient's last drink was yesterday evening, uncertain of exact time, he decided to quit because he was tired of his constant lifestyle. Patient took 50mg of librium from home prescription, a couple of hours after last drink, that made him feel worse and is the reason he presented to the ED. Patient usually drinks 1 pint of vodka everyday for the past 7-8 years. For the past 4-5 days patient endorses decreased PO intake, in the setting of increased drinking consumption. Patient endorsed increased tremors at home, denies nausea, vomiting, abdominal pain, diarrhea or constipation. Patient denies headache, SOB or chest pain, recent falls, LOC, hallucinations or anxiety. Patient has no history of alcoholic seizures. PCP reports increased alcohol consumption and admissions this summer. Patient attempted rehab voluntarily one year ago and remained sober for 8 months. Indicates living condition of son and multiple grandchildren exacerbated his drinking and cause him to relapse. Patient reports non-compliance with eliquis and other home medications in setting of alcohol use.     In ED: Vitals: T: Afebrile | (29 Sep 2019 17:10)                            11.5   3.42  )-----------( 89       ( 01 Oct 2019 05:53 )             36.7       10-01    136  |  100  |  9   ----------------------------<  92  4.5   |  28  |  0.94    Ca    9.0      01 Oct 2019 05:53  Phos  3.3     10-01  Mg     1.7     10-01    TPro  6.8  /  Alb  3.9  /  TBili  1.0  /  DBili  0.2  /  AST  110<H>  /  ALT  79<H>  /  AlkPhos  68  09-29    Vital Signs Last 24 Hrs  T(C): 36.6 (01 Oct 2019 16:15), Max: 36.8 (30 Sep 2019 21:41)  T(F): 97.9 (01 Oct 2019 16:15), Max: 98.3 (30 Sep 2019 21:41)  HR: 86 (01 Oct 2019 16:15) (80 - 86)  BP: 129/87 (01 Oct 2019 16:15) (115/87 - 129/87)  BP(mean): --  RR: 17 (01 Oct 2019 16:15) (16 - 18)  SpO2: 97% (01 Oct 2019 16:15) (97% - 100%)    MEDICATIONS  (STANDING):  apixaban 5 milliGRAM(s) Oral every 12 hours  ascorbic acid 500 milliGRAM(s) Oral daily  chlordiazePOXIDE 50 milliGRAM(s) Oral every 12 hours  folic acid 1 milliGRAM(s) Oral daily  metoprolol succinate  milliGRAM(s) Oral daily  multivitamin 1 Tablet(s) Oral daily  pantoprazole    Tablet 40 milliGRAM(s) Oral before breakfast  thiamine 100 milliGRAM(s) Oral daily    MEDICATIONS  (PRN):    Currently Undergoing Physical Therapy at bedside.    Functional Status Assessment:    Previous Level of Function:     · Additional Comments	Pt reports living with his son but wants to attend an alcohol rehab. Pt does not have any stairs to enter his home and does not use any AD. Pt denied any fall history with exception of when he drinks.	    Cognitive Status Examination:   · Orientation	oriented to person, place, time and situation	  · Level of Consciousness	alert	  · Follows Commands and Answers Questions	100% of the time	  · Personal Safety and Judgment	intact	    Range of Motion Exam:   · Active Range of Motion Examination	bilateral  lower extremity Active ROM was WFL (within functional limits); bilateral upper extremity Active ROM was WFL (within functional limits)	    MMT Hip:     · Hip Flexion	4+ = good plus  4+ = good plus 	    MMT Knee:     · Knee Extension	4+ = good plus  4+ = good plus 	    MMT Ankle:     · Ankle Dorsiflexion	4+ = good plus  4+ = good plus 	  · Ankle Plantarflexion	4+ = good plus  4+ = good plus 	    Bed Mobility: Rolling/Turning:     · Level of Howard	independent	    Bed Mobility: Scooting/Bridging:     · Level of Howard	independent	    Bed Mobility: Sit to Supine:     · Level of Howard	independent	    Bed Mobility: Supine to Sit:     · Level of Howard	independent	    Transfer: Sit to Stand:     · Level of Howard	supervision	  · Physical Assist/Nonphysical Assist	1 person assist	  · Assistive Device	none	    Transfer: Stand to Sit:     · Level of Howard	supervision	  · Assistive Device	none	    Sit/Stand Transfer Safety Analysis:     · Transfer Safety Concerns Noted	losing balance; decreased weight-shifting ability	  · Impairments Contributing to Impaired Transfers	impaired postural control; impaired balance	    Gait Skills:     · Level of Howard	supervision; contact guard	  · Physical Assist/Nonphysical Assist	1 person assist; verbal cues	  · Assistive Device	No AD and straight cane. Pt expressed feeling more unsteady with use of a cane	  · Gait Distance	60 feet without AD. 60 feet with SC	    Gait Analysis:     · Gait Pattern Used	2-point gait 	  · Gait Deviations Noted	decreased weight-shifting ability; dec step width	  · Impairments Contributing to Gait Deviations	impaired balance; impaired postural control	    Balance Skills Assessment:     · Sitting Balance: Static	fair plus 	  · Sitting Balance: Dynamic	fair plus 	  · Sit-to-Stand Balance	fair balance 	  · Standing Balance: Static	fair balance 	  · Standing Balance: Dynamic	fair minus 	  · Identified Impairments Contributing to Balance Disturbance	impaired postural control	    Clinical Impressions:   · Criteria for Skilled Therapeutic Interventions	impairments found; functional limitations in following categories; risk reduction/prevention	  · Impairments Found (describe specific impairments)	gait, locomotion, and balance	  · Functional Limitations in Following Categories (describe specific limitations)	community/leisure; home management	  · Risk Reduction/Prevention (Describe Specific Areas of risk reduction/prevention)	risk factors	  · Risk Areas	fall	  · Rehab Potential	good, to achieve stated therapy goals	  · Therapy Frequency	2-3x/week	  · Anticipated Equipment Needs at Discharge	Continue to asses mechanics with straight cane	        PM&R Impression: as above    Disposition Plan Recommendations: d/c home with no post discharge rehab needs
Physical Medicine and Rehabilitation Progress Note:    Patient is a 63y old  Male who presents with a chief complaint of alcohol withdrawal (02 Oct 2019 13:49)      HPI:  62 y/o male with medical history of HIT,  ETOH abuse, paroxsymal AFib (hx of LV thrombus, on Eliquis, most recent echo 8/2019 without evidence of thrombus), HLD, CAD s/p PCI, HFrEF (35-40% 8/12/2019) s/p AICD (last interrogation 8/12/19- WNL), hemorrhoids, gastritis, recent admission 8/29/19 for alcohol withdrawal presenting for alcohol withdrawal. Patient's last drink was yesterday evening, uncertain of exact time, he decided to quit because he was tired of his constant lifestyle. Patient took 50mg of librium from home prescription, a couple of hours after last drink, that made him feel worse and is the reason he presented to the ED. Patient usually drinks 1 pint of vodka everyday for the past 7-8 years. For the past 4-5 days patient endorses decreased PO intake, in the setting of increased drinking consumption. Patient endorsed increased tremors at home, denies nausea, vomiting, abdominal pain, diarrhea or constipation. Patient denies headache, SOB or chest pain, recent falls, LOC, hallucinations or anxiety. Patient has no history of alcoholic seizures. PCP reports increased alcohol consumption and admissions this summer. Patient attempted rehab voluntarily one year ago and remained sober for 8 months. Indicates living condition of son and multiple grandchildren exacerbated his drinking and cause him to relapse. Patient reports non-compliance with eliquis and other home medications in setting of alcohol use.     In ED: Vitals: T: Afebrile | (29 Sep 2019 17:10)                            11.5   3.42  )-----------( 89       ( 01 Oct 2019 05:53 )             36.7       10-01    136  |  100  |  9   ----------------------------<  92  4.5   |  28  |  0.94    Ca    9.0      01 Oct 2019 05:53  Phos  3.3     10-01  Mg     1.7     10-01      Vital Signs Last 24 Hrs  T(C): 36.6 (02 Oct 2019 09:40), Max: 36.6 (01 Oct 2019 16:15)  T(F): 97.9 (02 Oct 2019 09:40), Max: 97.9 (01 Oct 2019 16:15)  HR: 81 (02 Oct 2019 09:40) (68 - 86)  BP: 113/80 (02 Oct 2019 09:40) (113/80 - 129/87)  BP(mean): --  RR: 18 (02 Oct 2019 09:40) (17 - 18)  SpO2: 100% (02 Oct 2019 09:40) (97% - 100%)    MEDICATIONS  (STANDING):  apixaban 5 milliGRAM(s) Oral every 12 hours  ascorbic acid 500 milliGRAM(s) Oral daily  chlordiazePOXIDE 25 milliGRAM(s) Oral every 12 hours  folic acid 1 milliGRAM(s) Oral daily  metoprolol succinate  milliGRAM(s) Oral daily  multivitamin 1 Tablet(s) Oral daily  pantoprazole    Tablet 40 milliGRAM(s) Oral before breakfast  sodium chloride 0.9%. 500 milliLiter(s) (50 mL/Hr) IV Continuous <Continuous>  thiamine 100 milliGRAM(s) Oral daily    MEDICATIONS  (PRN):    Currently Undergoing Physical Therapy at bedside.    Functional Status Assessment:    Therapeutic Interventions      Bed Mobility  Bed Mobility Training Sit-to-Supine: independent  Bed Mobility Training Supine-to-Sit: independent    Sit-Stand Transfer Training  Transfer Training Sit-to-Stand Transfer: independent  Transfer Training Stand-to-Sit Transfer: independent    Gait Training  Gait Training: independent;  weight-bearing as tolerated   no Assistive Device ;  250 feet  Gait Analysis: swing-through gait   mild tremors noted, but improved with mobility  Gait Number of Times:: x 1    Stair Training  Physical Assist/Nonphysical Assist: independent  Weight-Bearing Restrictions: weight-bearing as tolerated  Assistive Device: right rail up;  left rail down  Number of Stairs: 22           PM&R Impression: as above    Disposition Plan Recommendations: d/c home with no post discharge rehab needs
Pt is improved   re ETOH withdrawal     PAST MEDICAL & SURGICAL HISTORY:  Heparin-induced thrombocytopenia: Antibody positive  Left ventricular thrombus  Atrial fibrillation  Hemorrhoid  ICD (implantable cardioverter-defibrillator) in place  Myocardial infarction involving other coronary artery  Gastritis  Atherosclerosis of coronary artery: Coronary artery disease  Automatic implantable cardiac defibrillator in situ: ICD (implantable cardioverter-defibrillator) in place      MEDICATIONS  (STANDING):  apixaban 5 milliGRAM(s) Oral every 12 hours  ascorbic acid 500 milliGRAM(s) Oral daily  chlordiazePOXIDE 25 milliGRAM(s) Oral every 12 hours  folic acid 1 milliGRAM(s) Oral daily  metoprolol succinate  milliGRAM(s) Oral daily  multivitamin 1 Tablet(s) Oral daily  pantoprazole    Tablet 40 milliGRAM(s) Oral before breakfast  thiamine 100 milliGRAM(s) Oral daily    MEDICATIONS  (PRN):      ICU Vital Signs Last 24 Hrs  T(C): 36.6 (02 Oct 2019 05:05), Max: 36.6 (01 Oct 2019 16:15)  T(F): 97.8 (02 Oct 2019 05:05), Max: 97.9 (01 Oct 2019 16:15)  HR: 80 (02 Oct 2019 05:05) (68 - 86)  BP: 114/72 (02 Oct 2019 05:05) (114/72 - 129/87)  BP(mean): --  ABP: --  ABP(mean): --  RR: 17 (02 Oct 2019 05:05) (17 - 17)  SpO2: 100% (02 Oct 2019 05:05) (97% - 100%)        Lungs clear     Cv  s1 s2     abd soft  Ext stable                          11.5   3.42  )-----------( 89       ( 01 Oct 2019 05:53 )             36.7   10-01    136  |  100  |  9   ----------------------------<  92  4.5   |  28  |  0.94    Ca    9.0      01 Oct 2019 05:53  Phos  3.3     10-01  Mg     1.7     10-01
SUBJECTIVE / INTERVAL HPI: Patient seen and examined at bedside. Patient complaining of tremors and shakes, saying that he has lost important documents. Doesn't want to be discharged.     VITAL SIGNS:  Vital Signs Last 24 Hrs  T(C): 36.6 (02 Oct 2019 09:40), Max: 36.6 (01 Oct 2019 16:15)  T(F): 97.9 (02 Oct 2019 09:40), Max: 97.9 (01 Oct 2019 16:15)  HR: 81 (02 Oct 2019 09:40) (68 - 86)  BP: 113/80 (02 Oct 2019 09:40) (113/80 - 129/87)  BP(mean): --  RR: 18 (02 Oct 2019 09:40) (17 - 18)  SpO2: 100% (02 Oct 2019 09:40) (97% - 100%)    PHYSICAL EXAM:    Constitutional: WDWN resting comfortably in bed; NAD, mild tremor  	HEENT: NC/AT; PERRL, EOMI, clear conjunctiva; MMM  	Neck: supple; no JVD; no cervical or submandibular lympahdenpathy  	Respiratory: CTA B/L; no W/R/R appreciated  	Cardiac: +S1/S2; RRR; no M/R/G appreciated   	Gastrointestinal: soft, NT/ND; no rebound or guarding; +BSx4  	Extremities: WWP, no clubbing or cyanosis; no peripheral edema  	Musculoskeletal: NROM x4; no joint swelling, tenderness or erythema  	Vascular: 2+ radial, femoral, DP/PT pulses B/L  	Dermatologic: skin warm, dry and intact; no rashes, wounds, or scars  	Neurologic: AAOx4; CNII-XII grossly intact; no focal deficits  	Psychiatric: affect and characteristics of appearance, verbalizations, behaviors are appropriate    	CIWA Score: Total 3  	N/V: 0  	Tremor: 2  	Paroxysmal Sweats: 0  	Anxiety: 0  	Agitation: 1  	Tactile Disturbance: 0  	Visual Disturbance: 0  	Auditory Disturbance: 0  	Headache: 0  	Orientation: 0   	Total: 3    MEDICATIONS:  MEDICATIONS  (STANDING):  apixaban 5 milliGRAM(s) Oral every 12 hours  ascorbic acid 500 milliGRAM(s) Oral daily  chlordiazePOXIDE 25 milliGRAM(s) Oral every 12 hours  folic acid 1 milliGRAM(s) Oral daily  metoprolol succinate  milliGRAM(s) Oral daily  multivitamin 1 Tablet(s) Oral daily  pantoprazole    Tablet 40 milliGRAM(s) Oral before breakfast  sodium chloride 0.9%. 500 milliLiter(s) (50 mL/Hr) IV Continuous <Continuous>  thiamine 100 milliGRAM(s) Oral daily    MEDICATIONS  (PRN):      ALLERGIES:  Allergies    Capoten (Short breath; Rash; Hives)  heparin (Other (Mod to Severe))  penicillin (Short breath; Rash; Hives)    Intolerances        LABS:                        11.5   3.42  )-----------( 89       ( 01 Oct 2019 05:53 )             36.7     10-01    136  |  100  |  9   ----------------------------<  92  4.5   |  28  |  0.94    Ca    9.0      01 Oct 2019 05:53  Phos  3.3     10-01  Mg     1.7     10-01          CAPILLARY BLOOD GLUCOSE          RADIOLOGY & ADDITIONAL TESTS: Reviewed.    ASSESSMENT:    PLAN:
clinically  improved      less tremulous     PAST MEDICAL & SURGICAL HISTORY:  Heparin-induced thrombocytopenia: Antibody positive  Left ventricular thrombus  Atrial fibrillation  Hemorrhoid  ICD (implantable cardioverter-defibrillator) in place  Myocardial infarction involving other coronary artery  Gastritis  Atherosclerosis of coronary artery: Coronary artery disease  Automatic implantable cardiac defibrillator in situ: ICD (implantable cardioverter-defibrillator) in place    MEDICATIONS  (STANDING):  apixaban 5 milliGRAM(s) Oral every 12 hours  ascorbic acid 500 milliGRAM(s) Oral daily  chlordiazePOXIDE 25 milliGRAM(s) Oral every 12 hours  folic acid 1 milliGRAM(s) Oral daily  metoprolol succinate  milliGRAM(s) Oral daily  multivitamin 1 Tablet(s) Oral daily  pantoprazole    Tablet 40 milliGRAM(s) Oral before breakfast  sodium chloride 0.9%. 500 milliLiter(s) (50 mL/Hr) IV Continuous <Continuous>  thiamine 100 milliGRAM(s) Oral daily    MEDICATIONS  (PRN):    ICU Vital Signs Last 24 Hrs  T(C): 36.6 (03 Oct 2019 08:52), Max: 36.8 (02 Oct 2019 21:36)  T(F): 97.9 (03 Oct 2019 08:52), Max: 98.3 (02 Oct 2019 21:36)  HR: 70 (03 Oct 2019 08:52) (68 - 83)  BP: 103/65 (03 Oct 2019 08:52) (98/65 - 124/74)  BP(mean): --  ABP: --  ABP(mean): --  RR: 18 (03 Oct 2019 08:52) (16 - 18)  SpO2: 100% (03 Oct 2019 08:52) (99% - 100%)      Lungs clear  Cv   s1 s2     abd soft  Ext stable                          11.0   3.81  )-----------( 90       ( 03 Oct 2019 07:24 )             35.5   10-03    135  |  100  |  14  ----------------------------<  90  4.2   |  25  |  1.08    Ca    9.2      03 Oct 2019 07:25  Phos  3.7     10-03  Mg     1.8     10-03
still  experiencing  tremors related to ETOH withdrawal     Pt is ambulatory     PAST MEDICAL & SURGICAL HISTORY:  Heparin-induced thrombocytopenia: Antibody positive  Left ventricular thrombus  Atrial fibrillation  Hemorrhoid  ICD (implantable cardioverter-defibrillator) in place  Myocardial infarction involving other coronary artery  Gastritis  Atherosclerosis of coronary artery: Coronary artery disease  Automatic implantable cardiac defibrillator in situ: ICD (implantable cardioverter-defibrillator) in place      MEDICATIONS  (STANDING):  apixaban 5 milliGRAM(s) Oral every 12 hours  ascorbic acid 500 milliGRAM(s) Oral daily  chlordiazePOXIDE 25 milliGRAM(s) Oral every 8 hours  folic acid 1 milliGRAM(s) Oral daily  metoprolol succinate  milliGRAM(s) Oral daily  multivitamin 1 Tablet(s) Oral daily  pantoprazole    Tablet 40 milliGRAM(s) Oral before breakfast  thiamine 100 milliGRAM(s) Oral daily    MEDICATIONS  (PRN):    ICU Vital Signs Last 24 Hrs  T(C): 36.7 (01 Oct 2019 08:25), Max: 36.8 (30 Sep 2019 21:41)  T(F): 98 (01 Oct 2019 08:25), Max: 98.3 (30 Sep 2019 21:41)  HR: 80 (01 Oct 2019 08:25) (80 - 82)  BP: 115/87 (01 Oct 2019 08:25) (115/73 - 123/90)  BP(mean): --  ABP: --  ABP(mean): --  RR: 16 (01 Oct 2019 08:25) (16 - 18)  SpO2: 99% (01 Oct 2019 08:25) (99% - 100%)      Lungs clear  CV  s1s2     abd soft    ext stable                            11.5   3.42  )-----------( 89       ( 01 Oct 2019 05:53 )             36.7   10-01    136  |  100  |  9   ----------------------------<  92  4.5   |  28  |  0.94    Ca    9.0      01 Oct 2019 05:53  Phos  3.3     10-01  Mg     1.7     10-01    TPro  6.8  /  Alb  3.9  /  TBili  1.0  /  DBili  0.2  /  AST  110<H>  /  ALT  79<H>  /  AlkPhos  68  09-29
SUBJECTIVE / INTERVAL HPI: Patient seen and examined at bedside. Patient still tremulous and agitated. CIWA 9-10. Denies any seizures, any hallucinations. No chest pain, no SOB, no fevers.     VITAL SIGNS:  Vital Signs Last 24 Hrs  T(C): 36.9 (30 Sep 2019 09:23), Max: 37.3 (30 Sep 2019 00:11)  T(F): 98.4 (30 Sep 2019 09:23), Max: 99.2 (30 Sep 2019 00:11)  HR: 77 (30 Sep 2019 09:23) (77 - 138)  BP: 127/89 (30 Sep 2019 09:23) (116/71 - 138/81)  BP(mean): --  RR: 18 (30 Sep 2019 09:23) (17 - 22)  SpO2: 98% (30 Sep 2019 09:23) (89% - 98%)    PHYSICAL EXAM:  Constitutional: WDWN resting comfortably in bed; NAD  	HEENT: NC/AT; PERRL, EOMI, clear conjunctiva; MMM; tongue fasiculations noted   	Neck: supple; no JVD; no cervical or submandibular lympahdenpathy  	Respiratory: CTA B/L; no W/R/R appreciated  	Cardiac: +S1/S2; RRR; no M/R/G appreciated   	Gastrointestinal: soft, NT/ND; no rebound or guarding; +BSx4  	Extremities: WWP, no clubbing or cyanosis; no peripheral edema  	Musculoskeletal: NROM x4; no joint swelling, tenderness or erythema  	Vascular: 2+ radial, femoral, DP/PT pulses B/L  	Dermatologic: skin warm, dry and intact; no rashes, wounds, or scars  	Neurologic: AAOx4; CNII-XII grossly intact; no focal deficits  	Psychiatric: affect and characteristics of appearance, verbalizations, behaviors are appropriate    	CIWA Score: Total 9  	N/V: 0  	Tremor: 6  	Paroxysmal Sweats: 2  	Anxiety: 0  	Agitation: 1  	Tactile Disturbance: 0  	Visual Disturbance: 0  	Auditory Disturbance: 0  	Headache: 1  	Orientation: 0   	Total: 9    MEDICATIONS:  MEDICATIONS  (STANDING):  apixaban 5 milliGRAM(s) Oral every 12 hours  ascorbic acid 500 milliGRAM(s) Oral daily  chlordiazePOXIDE 25 milliGRAM(s) Oral every 4 hours  folic acid 1 milliGRAM(s) Oral daily  magnesium sulfate  IVPB 2 Gram(s) IV Intermittent once  metoprolol succinate  milliGRAM(s) Oral daily  multivitamin 1 Tablet(s) Oral daily  thiamine 100 milliGRAM(s) Oral daily    MEDICATIONS  (PRN):  LORazepam   Injectable 2 milliGRAM(s) IV Push every 4 hours PRN CIWA>8      ALLERGIES:  Allergies    Capoten (Short breath; Rash; Hives)  heparin (Other (Mod to Severe))  penicillin (Short breath; Rash; Hives)    Intolerances        LABS:                        11.0   3.48  )-----------( 111      ( 30 Sep 2019 07:06 )             35.5     09-30    135  |  98  |  8   ----------------------------<  93  4.0   |  28  |  0.93    Ca    9.1      30 Sep 2019 07:06  Phos  2.8     09-30  Mg     1.7     09-30    TPro  6.8  /  Alb  3.9  /  TBili  1.0  /  DBili  0.2  /  AST  110<H>  /  ALT  79<H>  /  AlkPhos  68  09-29    PT/INR - ( 29 Sep 2019 12:18 )   PT: 10.9 sec;   INR: 0.97          PTT - ( 29 Sep 2019 12:18 )  PTT:27.6 sec    CAPILLARY BLOOD GLUCOSE      POCT Blood Glucose.: 144 mg/dL (29 Sep 2019 11:53)      RADIOLOGY & ADDITIONAL TESTS: Reviewed.    ASSESSMENT:    PLAN:
SUBJECTIVE / INTERVAL HPI: Patient seen and examined at bedside. Patient not abiding by bed alarms. Just received his librium and seems better, CIWA 4-5. Denies any acute complaints, no seizures, no hallucinations.     VITAL SIGNS:  Vital Signs Last 24 Hrs  T(C): 36.7 (01 Oct 2019 08:25), Max: 36.8 (30 Sep 2019 21:41)  T(F): 98 (01 Oct 2019 08:25), Max: 98.3 (30 Sep 2019 21:41)  HR: 80 (01 Oct 2019 08:25) (80 - 82)  BP: 115/87 (01 Oct 2019 08:25) (115/73 - 123/90)  BP(mean): --  RR: 16 (01 Oct 2019 08:25) (16 - 18)  SpO2: 99% (01 Oct 2019 08:25) (99% - 100%)    PHYSICAL EXAM:    Constitutional: WDWN resting comfortably in bed; NAD, mild tremor  	HEENT: NC/AT; PERRL, EOMI, clear conjunctiva; MMM  	Neck: supple; no JVD; no cervical or submandibular lympahdenpathy  	Respiratory: CTA B/L; no W/R/R appreciated  	Cardiac: +S1/S2; RRR; no M/R/G appreciated   	Gastrointestinal: soft, NT/ND; no rebound or guarding; +BSx4  	Extremities: WWP, no clubbing or cyanosis; no peripheral edema  	Musculoskeletal: NROM x4; no joint swelling, tenderness or erythema  	Vascular: 2+ radial, femoral, DP/PT pulses B/L  	Dermatologic: skin warm, dry and intact; no rashes, wounds, or scars  	Neurologic: AAOx4; CNII-XII grossly intact; no focal deficits  	Psychiatric: affect and characteristics of appearance, verbalizations, behaviors are appropriate    	CIWA Score: Total 5  	N/V: 0  	Tremor: 3  	Paroxysmal Sweats: 1  	Anxiety: 0  	Agitation: 1  	Tactile Disturbance: 0  	Visual Disturbance: 0  	Auditory Disturbance: 0  	Headache: 0  	Orientation: 0   	Total: 5    MEDICATIONS:  MEDICATIONS  (STANDING):  apixaban 5 milliGRAM(s) Oral every 12 hours  ascorbic acid 500 milliGRAM(s) Oral daily  chlordiazePOXIDE 50 milliGRAM(s) Oral every 8 hours  folic acid 1 milliGRAM(s) Oral daily  metoprolol succinate  milliGRAM(s) Oral daily  multivitamin 1 Tablet(s) Oral daily  pantoprazole    Tablet 40 milliGRAM(s) Oral before breakfast  thiamine 100 milliGRAM(s) Oral daily    MEDICATIONS  (PRN):      ALLERGIES:  Allergies    Capoten (Short breath; Rash; Hives)  heparin (Other (Mod to Severe))  penicillin (Short breath; Rash; Hives)    Intolerances        LABS:                        11.5   3.42  )-----------( 89       ( 01 Oct 2019 05:53 )             36.7     10-01    136  |  100  |  9   ----------------------------<  92  4.5   |  28  |  0.94    Ca    9.0      01 Oct 2019 05:53  Phos  3.3     10-01  Mg     1.7     10-01    TPro  6.8  /  Alb  3.9  /  TBili  1.0  /  DBili  0.2  /  AST  110<H>  /  ALT  79<H>  /  AlkPhos  68  09-29    PT/INR - ( 29 Sep 2019 12:18 )   PT: 10.9 sec;   INR: 0.97          PTT - ( 29 Sep 2019 12:18 )  PTT:27.6 sec    CAPILLARY BLOOD GLUCOSE      POCT Blood Glucose.: 144 mg/dL (29 Sep 2019 11:53)      RADIOLOGY & ADDITIONAL TESTS: Reviewed.    ASSESSMENT:    PLAN:

## 2019-10-03 NOTE — PROGRESS NOTE ADULT - ASSESSMENT
Alcoholism      alcoholic withdrawal  improved    CAD CARDIOMYOPATHY     s/p PTCA with Stents  LV Thrombus  for discharge
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
## 2019-10-06 DIAGNOSIS — Z95.810 PRESENCE OF AUTOMATIC (IMPLANTABLE) CARDIAC DEFIBRILLATOR: ICD-10-CM

## 2019-10-06 DIAGNOSIS — F10.239 ALCOHOL DEPENDENCE WITH WITHDRAWAL, UNSPECIFIED: ICD-10-CM

## 2019-10-06 DIAGNOSIS — Z88.0 ALLERGY STATUS TO PENICILLIN: ICD-10-CM

## 2019-10-06 DIAGNOSIS — I50.22 CHRONIC SYSTOLIC (CONGESTIVE) HEART FAILURE: ICD-10-CM

## 2019-10-06 DIAGNOSIS — E83.42 HYPOMAGNESEMIA: ICD-10-CM

## 2019-10-06 DIAGNOSIS — I25.2 OLD MYOCARDIAL INFARCTION: ICD-10-CM

## 2019-10-06 DIAGNOSIS — Z87.891 PERSONAL HISTORY OF NICOTINE DEPENDENCE: ICD-10-CM

## 2019-10-06 DIAGNOSIS — Z98.61 CORONARY ANGIOPLASTY STATUS: ICD-10-CM

## 2019-10-06 DIAGNOSIS — I48.0 PAROXYSMAL ATRIAL FIBRILLATION: ICD-10-CM

## 2019-10-06 DIAGNOSIS — Z79.01 LONG TERM (CURRENT) USE OF ANTICOAGULANTS: ICD-10-CM

## 2019-10-06 DIAGNOSIS — K29.70 GASTRITIS, UNSPECIFIED, WITHOUT BLEEDING: ICD-10-CM

## 2019-10-06 DIAGNOSIS — Z95.5 PRESENCE OF CORONARY ANGIOPLASTY IMPLANT AND GRAFT: ICD-10-CM

## 2019-10-06 DIAGNOSIS — I25.10 ATHEROSCLEROTIC HEART DISEASE OF NATIVE CORONARY ARTERY WITHOUT ANGINA PECTORIS: ICD-10-CM

## 2019-10-08 NOTE — ED PROVIDER NOTE - OBJECTIVE STATEMENT
Pt. picked up from work for weakness and near syncope. Pt. states she didn't eat yet today and has been feeling congested over the last two days currently taking dayquil/
Pt w/ PMHx pAF s/p AICD, CAD s/p PCI, LV thrombus on Eliquis, gastritis, alcoholism p/w CP. Pt reports CP onset 9am this morning. The pain is SSCP and non radiating. The pain is described as aching, burning, and constant until 5 pm, when it resolved on its own. Pt has been CP-free since then, 0/10 on exam. No associated SOB, diaphoresis, lightheadedness, or vomiting. Pt reports concomitant nausea. Pt admits he relapsing into drinking over the past week, drinking daily. Pt also admits his pain relieved when he stopped drinking. Pt states he is under stress w/ his son living in his home, but denies SI, HI, AH / VH. Pt reports gen weakness s/p drinking.

## 2019-10-13 PROCEDURE — 80307 DRUG TEST PRSMV CHEM ANLYZR: CPT

## 2019-10-13 PROCEDURE — 81001 URINALYSIS AUTO W/SCOPE: CPT

## 2019-10-13 PROCEDURE — 84100 ASSAY OF PHOSPHORUS: CPT

## 2019-10-13 PROCEDURE — 83735 ASSAY OF MAGNESIUM: CPT

## 2019-10-13 PROCEDURE — 85610 PROTHROMBIN TIME: CPT

## 2019-10-13 PROCEDURE — 96376 TX/PRO/DX INJ SAME DRUG ADON: CPT

## 2019-10-13 PROCEDURE — 97161 PT EVAL LOW COMPLEX 20 MIN: CPT

## 2019-10-13 PROCEDURE — 96365 THER/PROPH/DIAG IV INF INIT: CPT

## 2019-10-13 PROCEDURE — 84484 ASSAY OF TROPONIN QUANT: CPT

## 2019-10-13 PROCEDURE — 85027 COMPLETE CBC AUTOMATED: CPT

## 2019-10-13 PROCEDURE — 97530 THERAPEUTIC ACTIVITIES: CPT

## 2019-10-13 PROCEDURE — 82962 GLUCOSE BLOOD TEST: CPT

## 2019-10-13 PROCEDURE — 82550 ASSAY OF CK (CPK): CPT

## 2019-10-13 PROCEDURE — 85025 COMPLETE CBC W/AUTO DIFF WBC: CPT

## 2019-10-13 PROCEDURE — 80061 LIPID PANEL: CPT

## 2019-10-13 PROCEDURE — 82010 KETONE BODYS QUAN: CPT

## 2019-10-13 PROCEDURE — 80048 BASIC METABOLIC PNL TOTAL CA: CPT

## 2019-10-13 PROCEDURE — 80076 HEPATIC FUNCTION PANEL: CPT

## 2019-10-13 PROCEDURE — 80053 COMPREHEN METABOLIC PANEL: CPT

## 2019-10-13 PROCEDURE — 36415 COLL VENOUS BLD VENIPUNCTURE: CPT

## 2019-10-13 PROCEDURE — 96375 TX/PRO/DX INJ NEW DRUG ADDON: CPT

## 2019-10-13 PROCEDURE — 97116 GAIT TRAINING THERAPY: CPT

## 2019-10-13 PROCEDURE — 93005 ELECTROCARDIOGRAM TRACING: CPT

## 2019-10-13 PROCEDURE — 99291 CRITICAL CARE FIRST HOUR: CPT | Mod: 25

## 2019-10-13 PROCEDURE — 99285 EMERGENCY DEPT VISIT HI MDM: CPT | Mod: 25

## 2019-10-13 PROCEDURE — 85730 THROMBOPLASTIN TIME PARTIAL: CPT

## 2019-10-13 PROCEDURE — 82553 CREATINE MB FRACTION: CPT

## 2019-10-13 PROCEDURE — 71045 X-RAY EXAM CHEST 1 VIEW: CPT

## 2019-10-13 PROCEDURE — 83605 ASSAY OF LACTIC ACID: CPT

## 2019-10-13 PROCEDURE — 96374 THER/PROPH/DIAG INJ IV PUSH: CPT

## 2019-10-22 NOTE — PATIENT PROFILE ADULT. - NS PRO PT REFERRAL QUES 2 YN
subcutaneous emphysema/wheezes/diminished breath sounds, L/diminished breath sounds, R/respirations labored
no

## 2020-01-01 NOTE — ED PROVIDER NOTE - SECONDARY DIAGNOSIS.
Infant on RA trial at 0715, no signs of distress this shift. No vera, desaturations, or apnea events noted. VSS, voiding and stooling. Infant bottle feeding Q 2-3 hours, donor breast milk or EBM. Mother and father each present at bedside for a feeding. Antibiotics given and PIV flushed. Transferred to postpartum unit with MOB. Report called to receiving RN.    Alcoholic ketoacidosis

## 2020-01-12 ENCOUNTER — INPATIENT (INPATIENT)
Facility: HOSPITAL | Age: 65
LOS: 4 days | Discharge: HOME | DRG: 897 | End: 2020-01-17
Attending: INTERNAL MEDICINE | Admitting: INTERNAL MEDICINE
Payer: MEDICARE

## 2020-01-12 VITALS
DIASTOLIC BLOOD PRESSURE: 84 MMHG | TEMPERATURE: 99 F | RESPIRATION RATE: 22 BRPM | HEART RATE: 96 BPM | OXYGEN SATURATION: 97 % | SYSTOLIC BLOOD PRESSURE: 130 MMHG

## 2020-01-12 DIAGNOSIS — R63.8 OTHER SYMPTOMS AND SIGNS CONCERNING FOOD AND FLUID INTAKE: ICD-10-CM

## 2020-01-12 DIAGNOSIS — I50.22 CHRONIC SYSTOLIC (CONGESTIVE) HEART FAILURE: ICD-10-CM

## 2020-01-12 DIAGNOSIS — E78.5 HYPERLIPIDEMIA, UNSPECIFIED: ICD-10-CM

## 2020-01-12 DIAGNOSIS — I48.91 UNSPECIFIED ATRIAL FIBRILLATION: ICD-10-CM

## 2020-01-12 DIAGNOSIS — F10.239 ALCOHOL DEPENDENCE WITH WITHDRAWAL, UNSPECIFIED: ICD-10-CM

## 2020-01-12 DIAGNOSIS — Z91.89 OTHER SPECIFIED PERSONAL RISK FACTORS, NOT ELSEWHERE CLASSIFIED: ICD-10-CM

## 2020-01-12 DIAGNOSIS — K29.70 GASTRITIS, UNSPECIFIED, WITHOUT BLEEDING: ICD-10-CM

## 2020-01-12 DIAGNOSIS — I25.10 ATHEROSCLEROTIC HEART DISEASE OF NATIVE CORONARY ARTERY WITHOUT ANGINA PECTORIS: ICD-10-CM

## 2020-01-12 LAB
ALBUMIN SERPL ELPH-MCNC: 4 G/DL — SIGNIFICANT CHANGE UP (ref 3.3–5)
ALBUMIN SERPL ELPH-MCNC: 4.8 G/DL — SIGNIFICANT CHANGE UP (ref 3.3–5)
ALP SERPL-CCNC: 66 U/L — SIGNIFICANT CHANGE UP (ref 40–120)
ALP SERPL-CCNC: 83 U/L — SIGNIFICANT CHANGE UP (ref 40–120)
ALT FLD-CCNC: 15 U/L — SIGNIFICANT CHANGE UP (ref 10–45)
ALT FLD-CCNC: 16 U/L — SIGNIFICANT CHANGE UP (ref 10–45)
ANION GAP SERPL CALC-SCNC: 21 MMOL/L — HIGH (ref 5–17)
APTT BLD: 29.9 SEC — SIGNIFICANT CHANGE UP (ref 27.5–36.3)
AST SERPL-CCNC: 31 U/L — SIGNIFICANT CHANGE UP (ref 10–40)
AST SERPL-CCNC: 37 U/L — SIGNIFICANT CHANGE UP (ref 10–40)
BASOPHILS # BLD AUTO: 0.05 K/UL — SIGNIFICANT CHANGE UP (ref 0–0.2)
BASOPHILS NFR BLD AUTO: 0.9 % — SIGNIFICANT CHANGE UP (ref 0–2)
BILIRUB DIRECT SERPL-MCNC: 0.2 MG/DL — SIGNIFICANT CHANGE UP (ref 0–0.2)
BILIRUB INDIRECT FLD-MCNC: 0.7 MG/DL — SIGNIFICANT CHANGE UP (ref 0.2–1)
BILIRUB SERPL-MCNC: 0.9 MG/DL — SIGNIFICANT CHANGE UP (ref 0.2–1.2)
BILIRUB SERPL-MCNC: 1 MG/DL — SIGNIFICANT CHANGE UP (ref 0.2–1.2)
BUN SERPL-MCNC: 8 MG/DL — SIGNIFICANT CHANGE UP (ref 7–23)
CALCIUM SERPL-MCNC: 9.4 MG/DL — SIGNIFICANT CHANGE UP (ref 8.4–10.5)
CHLORIDE SERPL-SCNC: 91 MMOL/L — LOW (ref 96–108)
CHOLEST SERPL-MCNC: 239 MG/DL — HIGH (ref 10–199)
CO2 SERPL-SCNC: 25 MMOL/L — SIGNIFICANT CHANGE UP (ref 22–31)
CREAT SERPL-MCNC: 0.88 MG/DL — SIGNIFICANT CHANGE UP (ref 0.5–1.3)
EOSINOPHIL # BLD AUTO: 0.07 K/UL — SIGNIFICANT CHANGE UP (ref 0–0.5)
EOSINOPHIL NFR BLD AUTO: 1.2 % — SIGNIFICANT CHANGE UP (ref 0–6)
ETHANOL SERPL-MCNC: 185 MG/DL — HIGH (ref 0–10)
GLUCOSE SERPL-MCNC: 110 MG/DL — HIGH (ref 70–99)
HBA1C BLD-MCNC: 5.3 % — SIGNIFICANT CHANGE UP (ref 4–5.6)
HCT VFR BLD CALC: 42.3 % — SIGNIFICANT CHANGE UP (ref 39–50)
HDLC SERPL-MCNC: 87 MG/DL — SIGNIFICANT CHANGE UP
HGB BLD-MCNC: 13.8 G/DL — SIGNIFICANT CHANGE UP (ref 13–17)
IMM GRANULOCYTES NFR BLD AUTO: 0.2 % — SIGNIFICANT CHANGE UP (ref 0–1.5)
INR BLD: 0.97 — SIGNIFICANT CHANGE UP (ref 0.88–1.16)
LIPID PNL WITH DIRECT LDL SERPL: 134 MG/DL — HIGH
LYMPHOCYTES # BLD AUTO: 2.68 K/UL — SIGNIFICANT CHANGE UP (ref 1–3.3)
LYMPHOCYTES # BLD AUTO: 46.4 % — HIGH (ref 13–44)
MAGNESIUM SERPL-MCNC: 1.7 MG/DL — SIGNIFICANT CHANGE UP (ref 1.6–2.6)
MCHC RBC-ENTMCNC: 25.7 PG — LOW (ref 27–34)
MCHC RBC-ENTMCNC: 32.6 GM/DL — SIGNIFICANT CHANGE UP (ref 32–36)
MCV RBC AUTO: 78.6 FL — LOW (ref 80–100)
MONOCYTES # BLD AUTO: 0.41 K/UL — SIGNIFICANT CHANGE UP (ref 0–0.9)
MONOCYTES NFR BLD AUTO: 7.1 % — SIGNIFICANT CHANGE UP (ref 2–14)
NEUTROPHILS # BLD AUTO: 2.56 K/UL — SIGNIFICANT CHANGE UP (ref 1.8–7.4)
NEUTROPHILS NFR BLD AUTO: 44.2 % — SIGNIFICANT CHANGE UP (ref 43–77)
NRBC # BLD: 0 /100 WBCS — SIGNIFICANT CHANGE UP (ref 0–0)
PHOSPHATE SERPL-MCNC: 3.5 MG/DL — SIGNIFICANT CHANGE UP (ref 2.5–4.5)
PLATELET # BLD AUTO: 172 K/UL — SIGNIFICANT CHANGE UP (ref 150–400)
POTASSIUM SERPL-MCNC: 4.1 MMOL/L — SIGNIFICANT CHANGE UP (ref 3.5–5.3)
POTASSIUM SERPL-SCNC: 4.1 MMOL/L — SIGNIFICANT CHANGE UP (ref 3.5–5.3)
PROT SERPL-MCNC: 7.2 G/DL — SIGNIFICANT CHANGE UP (ref 6–8.3)
PROT SERPL-MCNC: 8.6 G/DL — HIGH (ref 6–8.3)
PROTHROM AB SERPL-ACNC: 11 SEC — SIGNIFICANT CHANGE UP (ref 10–12.9)
RBC # BLD: 5.38 M/UL — SIGNIFICANT CHANGE UP (ref 4.2–5.8)
RBC # FLD: 19.6 % — HIGH (ref 10.3–14.5)
SODIUM SERPL-SCNC: 137 MMOL/L — SIGNIFICANT CHANGE UP (ref 135–145)
TOTAL CHOLESTEROL/HDL RATIO MEASUREMENT: 2.7 RATIO — LOW (ref 3.4–9.6)
TRIGL SERPL-MCNC: 91 MG/DL — SIGNIFICANT CHANGE UP (ref 10–149)
TROPONIN T SERPL-MCNC: <0.01 NG/ML — SIGNIFICANT CHANGE UP (ref 0–0.01)
TSH SERPL-MCNC: 1.52 UIU/ML — SIGNIFICANT CHANGE UP (ref 0.35–4.94)
WBC # BLD: 5.78 K/UL — SIGNIFICANT CHANGE UP (ref 3.8–10.5)
WBC # FLD AUTO: 5.78 K/UL — SIGNIFICANT CHANGE UP (ref 3.8–10.5)

## 2020-01-12 PROCEDURE — 71045 X-RAY EXAM CHEST 1 VIEW: CPT | Mod: 26

## 2020-01-12 PROCEDURE — 99285 EMERGENCY DEPT VISIT HI MDM: CPT

## 2020-01-12 RX ORDER — SODIUM CHLORIDE 9 MG/ML
1000 INJECTION INTRAMUSCULAR; INTRAVENOUS; SUBCUTANEOUS
Refills: 0 | Status: DISCONTINUED | OUTPATIENT
Start: 2020-01-12 | End: 2020-01-13

## 2020-01-12 RX ORDER — FOLIC ACID 0.8 MG
1 TABLET ORAL DAILY
Refills: 0 | Status: DISCONTINUED | OUTPATIENT
Start: 2020-01-12 | End: 2020-01-17

## 2020-01-12 RX ORDER — PANTOPRAZOLE SODIUM 20 MG/1
40 TABLET, DELAYED RELEASE ORAL
Refills: 0 | Status: DISCONTINUED | OUTPATIENT
Start: 2020-01-12 | End: 2020-01-17

## 2020-01-12 RX ORDER — METOPROLOL TARTRATE 50 MG
100 TABLET ORAL DAILY
Refills: 0 | Status: DISCONTINUED | OUTPATIENT
Start: 2020-01-13 | End: 2020-01-17

## 2020-01-12 RX ORDER — THIAMINE MONONITRATE (VIT B1) 100 MG
100 TABLET ORAL DAILY
Refills: 0 | Status: DISCONTINUED | OUTPATIENT
Start: 2020-01-12 | End: 2020-01-17

## 2020-01-12 RX ORDER — ATORVASTATIN CALCIUM 80 MG/1
40 TABLET, FILM COATED ORAL AT BEDTIME
Refills: 0 | Status: DISCONTINUED | OUTPATIENT
Start: 2020-01-12 | End: 2020-01-17

## 2020-01-12 RX ORDER — MAGNESIUM SULFATE 500 MG/ML
1 VIAL (ML) INJECTION ONCE
Refills: 0 | Status: COMPLETED | OUTPATIENT
Start: 2020-01-12 | End: 2020-01-12

## 2020-01-12 RX ORDER — SODIUM CHLORIDE 9 MG/ML
1000 INJECTION INTRAMUSCULAR; INTRAVENOUS; SUBCUTANEOUS ONCE
Refills: 0 | Status: COMPLETED | OUTPATIENT
Start: 2020-01-12 | End: 2020-01-12

## 2020-01-12 RX ORDER — APIXABAN 2.5 MG/1
5 TABLET, FILM COATED ORAL
Refills: 0 | Status: DISCONTINUED | OUTPATIENT
Start: 2020-01-12 | End: 2020-01-17

## 2020-01-12 RX ADMIN — Medication 2 MILLIGRAM(S): at 15:35

## 2020-01-12 RX ADMIN — Medication 50 MILLIGRAM(S): at 18:38

## 2020-01-12 RX ADMIN — SODIUM CHLORIDE 2000 MILLILITER(S): 9 INJECTION INTRAMUSCULAR; INTRAVENOUS; SUBCUTANEOUS at 09:36

## 2020-01-12 RX ADMIN — Medication 1 MILLIGRAM(S): at 12:27

## 2020-01-12 RX ADMIN — Medication 100 GRAM(S): at 15:34

## 2020-01-12 RX ADMIN — Medication 50 MILLIGRAM(S): at 10:08

## 2020-01-12 RX ADMIN — Medication 1 TABLET(S): at 15:34

## 2020-01-12 RX ADMIN — Medication 2 MILLIGRAM(S): at 09:36

## 2020-01-12 RX ADMIN — Medication 100 MILLIGRAM(S): at 12:27

## 2020-01-12 RX ADMIN — APIXABAN 5 MILLIGRAM(S): 2.5 TABLET, FILM COATED ORAL at 17:27

## 2020-01-12 RX ADMIN — ATORVASTATIN CALCIUM 40 MILLIGRAM(S): 80 TABLET, FILM COATED ORAL at 23:47

## 2020-01-12 RX ADMIN — SODIUM CHLORIDE 80 MILLILITER(S): 9 INJECTION INTRAMUSCULAR; INTRAVENOUS; SUBCUTANEOUS at 19:17

## 2020-01-12 NOTE — ED ADULT TRIAGE NOTE - CHIEF COMPLAINT QUOTE
Patient BIBA for alcohol withdrawal , patient c/o shaking a lot  , chest pain / discomfort and epigastric pain and nausea since 4am this am . Been drinking vodka more than a pint a day for 5 consecutive days  . History of afib , MI and alcohol abuse . Patient BIBA for alcohol withdrawal , patient c/o shaking a lot  , chest pain / discomfort and epigastric pain and nausea since 4am this am . Been drinking vodka more than a pint a day for 5 consecutive days  . History of afib , MI and alcohol abuse . Denies any sob ., Patient BIBA for alcohol withdrawal , patient c/o shaking a lot  , chest pain / discomfort and epigastric pain and nausea since 4am this am . Been drinking vodka more than a pint a day for 5 consecutive days  . History of afib , MI and alcohol abuse . Denies any sob nor vomiting .

## 2020-01-12 NOTE — ED PROVIDER NOTE - CLINICAL SUMMARY MEDICAL DECISION MAKING FREE TEXT BOX
alcohol withdrawal sx, CIWA 15 alcohol withdrawal sx, CIWA 15, given ativan 2mg, librium 50mg w improvement CIWA ~8, disc with Dr. Levy, will admit for further symptom control.

## 2020-01-12 NOTE — H&P ADULT - HISTORY OF PRESENT ILLNESS
62 y/o male with medical history of HIT,  ETOH abuse, paroxsymal AFib (hx of LV thrombus, on Eliquis, most recent echo 8/2019 without evidence of thrombus), HLD, CAD s/p PCI, HFrEF (35-40% 8/12/2019) s/p AICD (last interrogation 8/12/19- WNL), hemorrhoids, gastritis, recent admission 8/29/19 for alcohol withdrawal presenting for alcohol withdrawal. Last drink reported to be around 4 am earlier today.     ED VS: T 98.1-99.2 F; HR ; -130/84; RR 16-22; Sp02 97% RA  ED Course:  CBC notable for microcytosis (78.6%), AG 21, EtOH 185, s/p 1 L NS, CIWA 15 upon arrival to the ED for tremors, nausea/vomiting, headaches, anxiety, agitation. s/p Librium 50 mg PO x1, Ativan 2 mg IVP x1, with improvement in CIWA to 8. EKG.. 64 y/o male former smoker with PMHx of HIT,  ETOH abuse, paroxsymal AFib (hx of LV thrombus, on Eliquis, most recent echo 8/2019 without evidence of thrombus), HLD, CAD s/p PCI, HFrEF (35-40% 8/12/2019) s/p AICD (last interrogation 8/12/19- WNL), hemorrhoids, gastritis, recent admission 8/29/19 for alcohol withdrawal presenting for alcohol withdrawal. Last drink reported to be around 4 am earlier today. Patient states for the last 4 days he has been drinking a little over a pint of vodka daily. Patient states he normally drinks about a pint of vodka however has been drinking daily this time around. Reports drinking for approximately 41 years. Reports that this recent myers was secondary to stress at home with his children. Patient states that today, he had worsening tremors and nausea which prompted him to proceed to the ED for further evaluation. ROS positive for nausea, mild non-focal headache, anxiety, tremors, constipation. ROS otherwise negative for fevers, chills, NS, chest pain, shortness of breath, vomiting, diarrhea, abdominal pain, changes in urinary habits. Remainder of ROS negative.     ED VS: T 98.1-99.2 F; HR ; -130/84; RR 16-22; Sp02 97% RA  ED Course:  CBC notable for microcytosis (78.6%), AG 21, EtOH 185, s/p 1 L NS, CIWA 15 upon arrival to the ED for tremors, nausea/vomiting, headaches, anxiety, agitation. s/p Librium 50 mg PO x1, Ativan 2 mg IVP x1, with improvement in CIWA to 8. EKG.. 62 y/o male former smoker with PMHx of HIT,  ETOH abuse (no history of DTs), paroxsymal AFib (hx of LV thrombus, on Eliquis, most recent echo 8/2019 without evidence of thrombus), HLD, CAD s/p PCI, HFrEF (35-40% 8/12/2019) s/p AICD (last interrogation 8/12/19- WNL), hemorrhoids, gastritis, recent admission 8/29/19 for alcohol withdrawal presenting for alcohol withdrawal. Last drink reported to be around 4 am earlier today. Patient states for the last 4 days he has been drinking a little over a pint of vodka daily. Patient states he normally drinks about a pint of vodka however has been drinking daily this time around. Reports drinking for approximately 41 years. Reports that this recent myers was secondary to stress at home with his children. Patient states that today, he had worsening tremors and nausea which prompted him to proceed to the ED for further evaluation. ROS positive for nausea, mild non-focal headache, anxiety, tremors, constipation. ROS otherwise negative for fevers, chills, NS, chest pain, shortness of breath, vomiting, diarrhea, abdominal pain, changes in urinary habits. Remainder of ROS negative.     ED VS: T 98.1-99.2 F; HR ; -130/84; RR 16-22; Sp02 97% RA  ED Course:  CBC notable for microcytosis (78.6%), AG 21, EtOH 185, s/p 1 L NS, CIWA 15 upon arrival to the ED for tremors, nausea/vomiting, headaches, anxiety, agitation. s/p Librium 50 mg PO x1, Ativan 2 mg IVP x1, with improvement in CIWA to 8. EKG with Q waves throughout anteroseptal leads with early repolarization in V1-3.

## 2020-01-12 NOTE — H&P ADULT - NSHPPHYSICALEXAM_GEN_ALL_CORE
.  VITAL SIGNS:  T(C): 36.7 (01-12-20 @ 12:07), Max: 37.3 (01-12-20 @ 09:16)  T(F): 98.1 (01-12-20 @ 12:07), Max: 99.2 (01-12-20 @ 09:16)  HR: 105 (01-12-20 @ 12:07) (96 - 105)  BP: 118/84 (01-12-20 @ 12:07) (118/84 - 130/84)  BP(mean): --  RR: 16 (01-12-20 @ 12:07) (16 - 22)  SpO2: 97% (01-12-20 @ 12:07) (97% - 97%)  Wt(kg): --    PHYSICAL EXAM:    Constitutional: WDWN resting comfortably in bed; NAD  Head: NC/AT  Eyes: PERRL, EOMI, anicteric sclera  ENT: no nasal discharge; uvula midline, no oropharyngeal erythema or exudates; MMM  Neck: supple; no JVD or thyromegaly  Respiratory: CTA B/L; no W/R/R, no retractions  Cardiac: +S1/S2; RRR; no M/R/G; PMI non-displaced  Gastrointestinal: abdomen soft, NT/ND; no rebound or guarding; +BSx4  Genitourinary: normal external genitalia  Back: spine midline, no bony tenderness or step-offs; no CVAT B/L  Extremities: WWP, no clubbing or cyanosis; no peripheral edema  Musculoskeletal: NROM x4; no joint swelling, tenderness or erythema  Vascular: 2+ radial, femoral, DP/PT pulses B/L  Dermatologic: skin warm, dry and intact; no rashes, wounds, or scars  Lymphatic: no submandibular or cervical LAD  Neurologic: AAOx3; CNII-XII grossly intact; no focal deficits  Psychiatric: affect and characteristics of appearance, verbalizations, behaviors are appropriate .  VITAL SIGNS:  T(C): 36.7 (01-12-20 @ 12:07), Max: 37.3 (01-12-20 @ 09:16)  T(F): 98.1 (01-12-20 @ 12:07), Max: 99.2 (01-12-20 @ 09:16)  HR: 105 (01-12-20 @ 12:07) (96 - 105)  BP: 118/84 (01-12-20 @ 12:07) (118/84 - 130/84)  BP(mean): --  RR: 16 (01-12-20 @ 12:07) (16 - 22)  SpO2: 97% (01-12-20 @ 12:07) (97% - 97%)  Wt(kg): --    PHYSICAL EXAM:    Constitutional: WDWN male, resting comfortably in bed; NAD  Head: NC/AT  Eyes: PERRL, EOMI, anicteric sclera  ENT: no nasal discharge; uvula midline, no oropharyngeal erythema or exudates; dry oral mucosa, mild tongue fasciculations  Neck: supple; no JVD or thyromegaly  Respiratory: CTA B/L; no W/R/R, no retractions  Cardiac: +S1/S2; RRR; no M/R/G; PMI non-displaced  Gastrointestinal: abdomen soft, NT/ND; no rebound or guarding; +BSx4  Genitourinary: normal external genitalia  Back: spine midline, no bony tenderness or step-offs; no CVAT B/L  Extremities: WWP, no clubbing or cyanosis; no peripheral edema  Musculoskeletal: NROM x4; no joint swelling, tenderness or erythema  Vascular: 2+ radial, femoral, DP/PT pulses B/L  Dermatologic: skin warm, dry and intact; no rashes, wounds, or scars, patches of dry skin on face  Lymphatic: no submandibular or cervical LAD  Neurologic: AAOx3; CNII-XII grossly intact; no focal deficits, tremors of b/l UE with extension  Psychiatric: affect and characteristics of appearance, verbalizations, behaviors are appropriate

## 2020-01-12 NOTE — H&P ADULT - NSICDXFAMILYHX_GEN_ALL_CORE_FT
FAMILY HISTORY:  FH: alcoholism, Father, brother  FH: myocardial infarction, maternal grandfather, age 42  FH: type 2 diabetes, father

## 2020-01-12 NOTE — ED ADULT NURSE NOTE - CHIEF COMPLAINT QUOTE
Patient BIBA for alcohol withdrawal , patient c/o shaking a lot  , chest pain / discomfort and epigastric pain and nausea since 4am this am . Been drinking vodka more than a pint a day for 5 consecutive days  . History of afib , MI and alcohol abuse . Denies any sob nor vomiting .

## 2020-01-12 NOTE — H&P ADULT - PROBLEM SELECTOR PLAN 4
Patient with history of gastritis, likely 2/2 chronic EtOH use, no endorsing any symptoms at this time  -Protonix 40 mg PO daily while inpatient History of HFrEF (EF 35-40% in Aug 2019), s/p AICD, on Toprol  mg daily, Lipitor 40 mg qHS, no ACE-i/ARB given history of allergy c/w angioedema  -no ACE-i/ARB given history of allergy c/w angioedema  -c/w Toprol  mg daily   -no ASA 81 mg daily given history of GIB  -c/w Lipitor 40 mg qHS  -Repeat ECHO while inpatient to re-assess EF and LV thrombus

## 2020-01-12 NOTE — H&P ADULT - NSHPSOCIALHISTORY_GEN_ALL_CORE
Former smoker (<1 ppd x approximately 19 years), admits to former illicit drug use many years ago however does not specify agent, currently drinks about 1 pink of vodka daily x 41 years

## 2020-01-12 NOTE — ED ADULT NURSE NOTE - OBJECTIVE STATEMENT
Pt presents to ED complaining of alcohol withdrawal. Pt noted to be tremulous, tachycardiac, and anxious. Pt reports drinking 1 liter of vodka a day. Last drink was at 0400 this morning. + hx of alcohol withdrawals in the past, denies hx of seizures. a&ox4. Finger stick done, pt placed on continuous cardiac monitoring. Pt reports sob, placed on 2L NC. Hx of afib, 3 MI's, stent placement and ICD.

## 2020-01-12 NOTE — H&P ADULT - PROBLEM SELECTOR PLAN 1
Long standing history of EtOH abuse, no prior history of DTs, EtOH level 185 upon arrival to the ED, pt endorsing drinking >1 pint vodka daily for the last 4 days, last drink 4 am 1/12. CIWA 15 upon arrival to ED, s/p Librium 50 mg PO x1 and Ativan 2 mg IVP with improvement in CIWA to 8.   -CIWA high risk protocol  -Ativan 2 mg IVP PRN for CIWA >8  -Folic Acid, MVI, Thiamine daily  -dysphagia screening  -fall precautions  -PT consult

## 2020-01-12 NOTE — H&P ADULT - ASSESSMENT
64 y/o male former smoker with PMHx of HIT,  ETOH abuse, paroxsymal AFib (hx of LV thrombus, on Eliquis, most recent echo 8/2019 without evidence of thrombus), HLD, CAD s/p PCI, HFrEF (35-40% 8/12/2019) s/p AICD (last interrogation 8/12/19- WNL), hemorrhoids, gastritis, with multiple prior admissions for EtOH withdrawal, now presenting acutely intoxicated with EtOH level 185, elevated CIWA 15, admitted for EtOH withdrawal.

## 2020-01-12 NOTE — ED ADULT NURSE NOTE - PMH
Atherosclerosis of coronary artery  Coronary artery disease  Atrial fibrillation    Gastritis    Hemorrhoid    Heparin-induced thrombocytopenia  Antibody positive  ICD (implantable cardioverter-defibrillator) in place    Left ventricular thrombus    Myocardial infarction involving other coronary artery

## 2020-01-12 NOTE — H&P ADULT - PROBLEM SELECTOR PLAN 5
History of CAD with MI in 1996, not currently on ASA as with history of prior GIB  -no ASA given history of GIB  -c/w Lipitor 40 mg qHS  -c/w home medication, Toprol  mg daily

## 2020-01-12 NOTE — H&P ADULT - NSHPLABSRESULTS_GEN_ALL_CORE
.  LABS:                         13.8   5.78  )-----------( 172      ( 12 Jan 2020 09:37 )             42.3     01-12    137  |  91<L>  |  8   ----------------------------<  110<H>  4.1   |  25  |  0.88    Ca    9.4      12 Jan 2020 09:37  Mg     1.7     01-12    TPro  8.6<H>  /  Alb  4.8  /  TBili  1.0  /  DBili  x   /  AST  37  /  ALT  16  /  AlkPhos  83  01-12    PT/INR - ( 12 Jan 2020 09:37 )   PT: 11.0 sec;   INR: 0.97          PTT - ( 12 Jan 2020 09:37 )  PTT:29.9 sec              RADIOLOGY, EKG & ADDITIONAL TESTS: Reviewed.

## 2020-01-12 NOTE — H&P ADULT - NSICDXPASTMEDICALHX_GEN_ALL_CORE_FT
PAST MEDICAL HISTORY:  Atherosclerosis of coronary artery Coronary artery disease    Atrial fibrillation     ETOH abuse     Gastritis     Hemorrhoid     Heparin-induced thrombocytopenia Antibody positive    Hyperlipidemia     ICD (implantable cardioverter-defibrillator) in place     Left ventricular thrombus     Myocardial infarction involving other coronary artery

## 2020-01-12 NOTE — H&P ADULT - PROBLEM SELECTOR PLAN 6
F:   E: Replete electrolytes PRN, Goal: K>4, Mg>2  N: DASH/TLC pending dysphagia screening    DVT ppx: no Heparin given history of HIT, SCDs only  CODE: FULL  Dispo: Admit to [facility] Patient with history of gastritis, likely 2/2 chronic EtOH use, no endorsing any symptoms at this time  -Protonix 40 mg PO daily while inpatient

## 2020-01-12 NOTE — H&P ADULT - PROBLEM SELECTOR PLAN 7
1) PCP Contacted on Admission: (Y/N) --> Name & Phone #: Dr Levy  2) Date of Contact with PCP:  3) PCP Contacted at Discharge: (Y/N)  4) Summary of Handoff Given to PCP:   5) Post-Discharge Appointment Date and Location: F: s/p 1 L NS in the ED, c/w NS @ 80 cc/hr x 6 hrs  E: Replete electrolytes PRN, Goal: K>4, Mg>2  N: DASH/TLC pending dysphagia screening    DVT ppx: no Heparin given history of HIT, SCDs only  CODE: FULL  Dispo: Admit to [facility]

## 2020-01-12 NOTE — ED ADULT NURSE NOTE - CHPI ED NUR SYMPTOMS NEG
no disorientation/no abdominal distension/no vomiting/no confusion/no abdominal pain/no fever/no chills/no weakness

## 2020-01-12 NOTE — H&P ADULT - PROBLEM SELECTOR PLAN 3
History of Afib, CHADSVASc 4, HAS-BLED 3 (high risk for major bleeding), on Eliquis 5 mg BID and Toprol  mg daily, EKG with Q waves throughout anteroseptal leads with early repolarization in V1-3. Rate controlled  -c/w Eliquis 5 mg BID  -c/w Toprol  mg daily    #LV thrombus  Prior ECHO demonstrating LV thrombus, recurrent, will require life-long AC   -c/w home med Eliquis 5 mg BID  -Repeat ECHO while inpatient to re-assess LV thrombus

## 2020-01-12 NOTE — ED PROVIDER NOTE - OBJECTIVE STATEMENT
HIT, PMHx ETOH abuse, paroxsymal AFib (hx of LV thrombus, on Eliquis, most recent echo 8/2019 without evidence of thrombus), HLD, CAD s/p PCI, HFrEF (35-40% 8/12/2019) s/p AICD (last interrogation 8/12/19- WNL), hemorrhoids, gastritis w cc of tremors. last drink at 4am today, states drinks one pint a day. CIWA 15 on arrival. states feeling depressed, hence etoh use but no si/hi. no drug use, sob, chest pain, palpitations. states has epigastric pain assoc with symptoms.

## 2020-01-12 NOTE — ED PROVIDER NOTE - PHYSICAL EXAMINATION
CONSTITUTIONAL: Well appearing, well nourished, awake, alert and in no apparent distress.  HEENT: Head is atraumatic. Eyes clear bilaterally, normal EOMI. Airway patent.  CARDIAC: tachy, regular Heart sounds S1, S2.   RESPIRATORY: Breath sounds clear and equal bilaterally. no tachypnea, respiratory distress.   GASTROINTESTINAL: Abdomen soft, non-tender, no guarding, distension.  MUSCULOSKELETAL: Spine appears normal, no midline spinal tenderness, range of motion is not limited, no muscle or joint tenderness. no bony tenderness. no JVD, peripheral edema. +tremors  NEUROLOGICAL: Alert and oriented, no focal deficits, no motor or sensory deficits.  SKIN: Skin normal color for race, warm, dry and intact. No evidence of rash.  PSYCHIATRIC: Alert and oriented to person, place, time/situation. normal mood and affect. no apparent risk to self or others.

## 2020-01-13 LAB
ANION GAP SERPL CALC-SCNC: 14 MMOL/L — SIGNIFICANT CHANGE UP (ref 5–17)
BUN SERPL-MCNC: 9 MG/DL — SIGNIFICANT CHANGE UP (ref 7–23)
CALCIUM SERPL-MCNC: 9.1 MG/DL — SIGNIFICANT CHANGE UP (ref 8.4–10.5)
CHLORIDE SERPL-SCNC: 98 MMOL/L — SIGNIFICANT CHANGE UP (ref 96–108)
CO2 SERPL-SCNC: 22 MMOL/L — SIGNIFICANT CHANGE UP (ref 22–31)
CREAT SERPL-MCNC: 0.86 MG/DL — SIGNIFICANT CHANGE UP (ref 0.5–1.3)
GLUCOSE SERPL-MCNC: 99 MG/DL — SIGNIFICANT CHANGE UP (ref 70–99)
HCT VFR BLD CALC: 36.1 % — LOW (ref 39–50)
HGB BLD-MCNC: 11.9 G/DL — LOW (ref 13–17)
MAGNESIUM SERPL-MCNC: 1.8 MG/DL — SIGNIFICANT CHANGE UP (ref 1.6–2.6)
MCHC RBC-ENTMCNC: 26 PG — LOW (ref 27–34)
MCHC RBC-ENTMCNC: 33 GM/DL — SIGNIFICANT CHANGE UP (ref 32–36)
MCV RBC AUTO: 78.8 FL — LOW (ref 80–100)
NRBC # BLD: 0 /100 WBCS — SIGNIFICANT CHANGE UP (ref 0–0)
PLATELET # BLD AUTO: 110 K/UL — LOW (ref 150–400)
POTASSIUM SERPL-MCNC: 4 MMOL/L — SIGNIFICANT CHANGE UP (ref 3.5–5.3)
POTASSIUM SERPL-SCNC: 4 MMOL/L — SIGNIFICANT CHANGE UP (ref 3.5–5.3)
RBC # BLD: 4.58 M/UL — SIGNIFICANT CHANGE UP (ref 4.2–5.8)
RBC # FLD: 19.1 % — HIGH (ref 10.3–14.5)
SODIUM SERPL-SCNC: 134 MMOL/L — LOW (ref 135–145)
WBC # BLD: 3.74 K/UL — LOW (ref 3.8–10.5)
WBC # FLD AUTO: 3.74 K/UL — LOW (ref 3.8–10.5)

## 2020-01-13 PROCEDURE — 93306 TTE W/DOPPLER COMPLETE: CPT | Mod: 26

## 2020-01-13 RX ORDER — MAGNESIUM SULFATE 500 MG/ML
2 VIAL (ML) INJECTION ONCE
Refills: 0 | Status: COMPLETED | OUTPATIENT
Start: 2020-01-13 | End: 2020-01-13

## 2020-01-13 RX ADMIN — Medication 100 MILLIGRAM(S): at 11:15

## 2020-01-13 RX ADMIN — Medication 50 MILLIGRAM(S): at 11:14

## 2020-01-13 RX ADMIN — Medication 100 MILLIGRAM(S): at 07:16

## 2020-01-13 RX ADMIN — Medication 2 MILLIGRAM(S): at 06:03

## 2020-01-13 RX ADMIN — Medication 50 MILLIGRAM(S): at 01:46

## 2020-01-13 RX ADMIN — Medication 1 TABLET(S): at 11:15

## 2020-01-13 RX ADMIN — Medication 1 MILLIGRAM(S): at 11:15

## 2020-01-13 RX ADMIN — Medication 50 GRAM(S): at 17:23

## 2020-01-13 RX ADMIN — PANTOPRAZOLE SODIUM 40 MILLIGRAM(S): 20 TABLET, DELAYED RELEASE ORAL at 06:03

## 2020-01-13 RX ADMIN — ATORVASTATIN CALCIUM 40 MILLIGRAM(S): 80 TABLET, FILM COATED ORAL at 22:56

## 2020-01-13 RX ADMIN — Medication 50 MILLIGRAM(S): at 22:56

## 2020-01-13 RX ADMIN — APIXABAN 5 MILLIGRAM(S): 2.5 TABLET, FILM COATED ORAL at 06:02

## 2020-01-13 RX ADMIN — APIXABAN 5 MILLIGRAM(S): 2.5 TABLET, FILM COATED ORAL at 17:23

## 2020-01-13 NOTE — PHYSICAL THERAPY INITIAL EVALUATION ADULT - ADDITIONAL COMMENTS
pt states that he lives alone in an elevator access apt building w/ no stairs to enter. Denies use of DME for ambulation. Denies hx of recent falls. States that he was independent in all ADLs prior to this admission

## 2020-01-13 NOTE — PROGRESS NOTE ADULT - SUBJECTIVE AND OBJECTIVE BOX
OVERNIGHT EVENTS: NAEO    SUBJECTIVE / INTERVAL HPI: Patient seen and examined at bedside. States tremors are improved and overall feels better. Denies any current HA, photosensitivity, nausea, auditory/visual/tactile hallucinations. +anxiety (states is chronic).    VITAL SIGNS:  Vital Signs Last 24 Hrs  T(C): 36.9 (13 Jan 2020 08:41), Max: 37.3 (12 Jan 2020 09:16)  T(F): 98.4 (13 Jan 2020 08:41), Max: 99.2 (12 Jan 2020 09:16)  HR: 89 (13 Jan 2020 05:26) (80 - 116)  BP: 143/81 (13 Jan 2020 05:26) (118/84 - 143/81)  BP(mean): 105 (13 Jan 2020 05:26) (97 - 106)  RR: 18 (13 Jan 2020 05:26) (14 - 22)  SpO2: 97% (13 Jan 2020 05:26) (96% - 98%)    PHYSICAL EXAM:    General: middle aged male on RA in NAD  HEENT: NC/AT; anicteric sclera; MMM  Neck: supple  Cardiovascular: +S1/S2; RRR  Respiratory: CTA B/L; no W/R/R  Gastrointestinal: soft, NT/ND; +BSx4  Extremities: WWP; no edema, clubbing or cyanosis  Vascular: 2+ radial, DP/PT pulses B/L  Neurological: alert awake oriented; no focal deficits; +tremors b/l L>R    MEDICATIONS:  MEDICATIONS  (STANDING):  apixaban 5 milliGRAM(s) Oral two times a day  atorvastatin 40 milliGRAM(s) Oral at bedtime  chlordiazePOXIDE 50 milliGRAM(s) Oral every 12 hours  folic acid 1 milliGRAM(s) Oral daily  metoprolol succinate  milliGRAM(s) Oral daily  multivitamin 1 Tablet(s) Oral daily  pantoprazole    Tablet 40 milliGRAM(s) Oral before breakfast  sodium chloride 0.9%. 1000 milliLiter(s) (80 mL/Hr) IV Continuous <Continuous>  thiamine 100 milliGRAM(s) Oral daily    MEDICATIONS  (PRN):  LORazepam   Injectable 2 milliGRAM(s) IV Push every 2 hours PRN Symptom-triggered: each CIWA -Ar score 8 or GREATER      ALLERGIES:  Allergies    Capoten (Short breath; Rash; Hives)  heparin (Other (Mod to Severe))  penicillin (Short breath; Rash; Hives)    Intolerances        LABS:                        11.9   3.74  )-----------( 110      ( 13 Jan 2020 06:38 )             36.1     01-13    134<L>  |  98  |  9   ----------------------------<  99  4.0   |  22  |  0.86    Ca    9.1      13 Jan 2020 06:38  Phos  3.5     01-12  Mg     1.8     01-13    TPro  7.2  /  Alb  4.0  /  TBili  0.9  /  DBili  0.2  /  AST  31  /  ALT  15  /  AlkPhos  66  01-12    PT/INR - ( 12 Jan 2020 09:37 )   PT: 11.0 sec;   INR: 0.97          PTT - ( 12 Jan 2020 09:37 )  PTT:29.9 sec    CAPILLARY BLOOD GLUCOSE      POCT Blood Glucose.: 103 mg/dL (12 Jan 2020 09:20)      RADIOLOGY & ADDITIONAL TESTS: Reviewed.

## 2020-01-13 NOTE — PHYSICAL THERAPY INITIAL EVALUATION ADULT - PERTINENT HX OF CURRENT PROBLEM, REHAB EVAL
62 y/o male former smoker PMH HIT,  ETOH abuse, paroxsymal AFib (hx of LV thrombus, on Eliquis, most recent echo 8/2019 without evidence of thrombus), HLD, CAD s/p PCI, HFrEF (35-40% 8/12/2019) s/p AICD (last interrogation 8/12/19- WNL), hemorrhoids, gastritis, with multiple prior admissions for EtOH withdrawal, presenting acutely intoxicated with EtOH level 185, admitted for EtOH withdrawal.

## 2020-01-13 NOTE — PHYSICAL THERAPY INITIAL EVALUATION ADULT - IMPAIRMENTS FOUND, PT EVAL
aerobic capacity/endurance/fine motor/integumentary integrity/muscle strength/posture/gait, locomotion, and balance/gross motor/joint integrity and mobility

## 2020-01-13 NOTE — PHYSICAL THERAPY INITIAL EVALUATION ADULT - CRITERIA FOR SKILLED THERAPEUTIC INTERVENTIONS
rehab potential/impairments found/risk reduction/prevention/therapy frequency/anticipated discharge recommendation/functional limitations in following categories

## 2020-01-13 NOTE — PROGRESS NOTE ADULT - PROBLEM SELECTOR PLAN 7
F: s/p 1 L NS in the ED; PO intake now  E: Replete electrolytes PRN, Goal: K>4, Mg>2  N: DASH/TLC    DVT ppx: no Heparin given history of HIT, SCDs only  CODE: FULL  Dispo: [facility]

## 2020-01-13 NOTE — PHYSICAL THERAPY INITIAL EVALUATION ADULT - GAIT DEVIATIONS NOTED, PT EVAL
decreased step length/decreased weight-shifting ability/decreased renetta/increased time in double stance

## 2020-01-13 NOTE — PROGRESS NOTE ADULT - SUBJECTIVE AND OBJECTIVE BOX
Readmission for this patient  with long HX  of ETOH Use  with shakes  tachycardia     HX  CAD Cardiomyopathy  LV Thrombus         PAST MEDICAL & SURGICAL HISTORY:  ETOH abuse  Hyperlipidemia  Heparin-induced thrombocytopenia: Antibody positive  Left ventricular thrombus  Atrial fibrillation  Hemorrhoid  ICD (implantable cardioverter-defibrillator) in place  Myocardial infarction involving other coronary artery  Gastritis  Atherosclerosis of coronary artery: Coronary artery disease  Automatic implantable cardiac defibrillator in situ: ICD (implantable cardioverter-defibrillator) in place      Home Medications:  B Complex 50 oral tablet, extended release: 1 tab(s) orally once a day (11 Sep 2019 20:46)  Lipitor 40 mg oral tablet: 1 tab(s) orally once a day (at bedtime) (12 Jan 2020 15:08)  thiamine 100 mg oral tablet: 1 tab(s) orally once a day (02 Oct 2019 07:10)  Vitamin C 500 mg oral tablet: 1 tab(s) orally once a day (11 Sep 2019 20:46)      ICU Vital Signs Last 24 Hrs  T(C): 36.7 (13 Jan 2020 04:55), Max: 37.3 (12 Jan 2020 09:16)  T(F): 98.1 (13 Jan 2020 04:55), Max: 99.2 (12 Jan 2020 09:16)  HR: 89 (13 Jan 2020 05:26) (80 - 116)  BP: 143/81 (13 Jan 2020 05:26) (118/84 - 143/81)  BP(mean): 105 (13 Jan 2020 05:26) (97 - 106)  ABP: --  ABP(mean): --  RR: 18 (13 Jan 2020 05:26) (14 - 22)  SpO2: 97% (13 Jan 2020 05:26) (96% - 98%)      MEDICATIONS  (STANDING):  apixaban 5 milliGRAM(s) Oral two times a day  atorvastatin 40 milliGRAM(s) Oral at bedtime  chlordiazePOXIDE 50 milliGRAM(s) Oral every 8 hours  folic acid 1 milliGRAM(s) Oral daily  metoprolol succinate  milliGRAM(s) Oral daily  multivitamin 1 Tablet(s) Oral daily  pantoprazole    Tablet 40 milliGRAM(s) Oral before breakfast  sodium chloride 0.9%. 1000 milliLiter(s) (80 mL/Hr) IV Continuous <Continuous>  thiamine 100 milliGRAM(s) Oral daily    MEDICATIONS  (PRN):  LORazepam   Injectable 2 milliGRAM(s) IV Push every 2 hours PRN Symptom-triggered: each CIWA -Ar score 8 or GREATER      Lungs clear  CV  s1s2     abd soft  Ext  stable                          11.9   3.74  )-----------( 110      ( 13 Jan 2020 06:38 )             36.1   01-13    134<L>  |  98  |  9   ----------------------------<  99  4.0   |  22  |  0.86    Ca    9.1      13 Jan 2020 06:38  Phos  3.5     01-12  Mg     1.8     01-13    TPro  7.2  /  Alb  4.0  /  TBili  0.9  /  DBili  0.2  /  AST  31  /  ALT  15  /  AlkPhos  66  01-12      PT/INR - ( 12 Jan 2020 09:37 )   PT: 11.0 sec;   INR: 0.97          PTT - ( 12 Jan 2020 09:37 )  PTT:29.9 sec    CARDIAC MARKERS ( 12 Jan 2020 09:37 )  x     / <0.01 ng/mL / x     / x     / x            CXR  neg

## 2020-01-13 NOTE — PHYSICAL THERAPY INITIAL EVALUATION ADULT - PLANNED THERAPY INTERVENTIONS, PT EVAL
motor coordination training/postural re-education/balance training/gait training/strengthening/transfer training

## 2020-01-13 NOTE — PROGRESS NOTE ADULT - PROBLEM SELECTOR PLAN 8
1) PCP Contacted on Admission: (Y/N) --> Name & Phone #: Dr Levy  2) Date of Contact with PCP:  3) PCP Contacted at Discharge: (Y/N)  4) Summary of Handoff Given to PCP:   5) Post-Discharge Appointment Date and Location:

## 2020-01-14 DIAGNOSIS — I51.3 INTRACARDIAC THROMBOSIS, NOT ELSEWHERE CLASSIFIED: ICD-10-CM

## 2020-01-14 LAB
ANION GAP SERPL CALC-SCNC: 14 MMOL/L — SIGNIFICANT CHANGE UP (ref 5–17)
BUN SERPL-MCNC: 11 MG/DL — SIGNIFICANT CHANGE UP (ref 7–23)
CALCIUM SERPL-MCNC: 9.3 MG/DL — SIGNIFICANT CHANGE UP (ref 8.4–10.5)
CHLORIDE SERPL-SCNC: 100 MMOL/L — SIGNIFICANT CHANGE UP (ref 96–108)
CO2 SERPL-SCNC: 21 MMOL/L — LOW (ref 22–31)
CREAT SERPL-MCNC: 0.92 MG/DL — SIGNIFICANT CHANGE UP (ref 0.5–1.3)
GLUCOSE SERPL-MCNC: 108 MG/DL — HIGH (ref 70–99)
HCT VFR BLD CALC: 40.9 % — SIGNIFICANT CHANGE UP (ref 39–50)
HGB BLD-MCNC: 12.8 G/DL — LOW (ref 13–17)
MAGNESIUM SERPL-MCNC: 1.8 MG/DL — SIGNIFICANT CHANGE UP (ref 1.6–2.6)
MCHC RBC-ENTMCNC: 25.5 PG — LOW (ref 27–34)
MCHC RBC-ENTMCNC: 31.3 GM/DL — LOW (ref 32–36)
MCV RBC AUTO: 81.5 FL — SIGNIFICANT CHANGE UP (ref 80–100)
NRBC # BLD: 0 /100 WBCS — SIGNIFICANT CHANGE UP (ref 0–0)
PLATELET # BLD AUTO: 106 K/UL — LOW (ref 150–400)
POTASSIUM SERPL-MCNC: 3.9 MMOL/L — SIGNIFICANT CHANGE UP (ref 3.5–5.3)
POTASSIUM SERPL-SCNC: 3.9 MMOL/L — SIGNIFICANT CHANGE UP (ref 3.5–5.3)
RBC # BLD: 5.02 M/UL — SIGNIFICANT CHANGE UP (ref 4.2–5.8)
RBC # FLD: 18.6 % — HIGH (ref 10.3–14.5)
SODIUM SERPL-SCNC: 135 MMOL/L — SIGNIFICANT CHANGE UP (ref 135–145)
WBC # BLD: 4.12 K/UL — SIGNIFICANT CHANGE UP (ref 3.8–10.5)
WBC # FLD AUTO: 4.12 K/UL — SIGNIFICANT CHANGE UP (ref 3.8–10.5)

## 2020-01-14 RX ADMIN — ATORVASTATIN CALCIUM 40 MILLIGRAM(S): 80 TABLET, FILM COATED ORAL at 21:13

## 2020-01-14 RX ADMIN — PANTOPRAZOLE SODIUM 40 MILLIGRAM(S): 20 TABLET, DELAYED RELEASE ORAL at 06:15

## 2020-01-14 RX ADMIN — APIXABAN 5 MILLIGRAM(S): 2.5 TABLET, FILM COATED ORAL at 06:15

## 2020-01-14 RX ADMIN — Medication 1 MILLIGRAM(S): at 12:16

## 2020-01-14 RX ADMIN — Medication 100 MILLIGRAM(S): at 12:17

## 2020-01-14 RX ADMIN — Medication 25 MILLIGRAM(S): at 21:13

## 2020-01-14 RX ADMIN — Medication 1 TABLET(S): at 12:16

## 2020-01-14 RX ADMIN — Medication 100 MILLIGRAM(S): at 06:15

## 2020-01-14 RX ADMIN — Medication 50 MILLIGRAM(S): at 10:32

## 2020-01-14 RX ADMIN — APIXABAN 5 MILLIGRAM(S): 2.5 TABLET, FILM COATED ORAL at 17:26

## 2020-01-14 NOTE — PROGRESS NOTE ADULT - PROBLEM SELECTOR PLAN 8
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

## 2020-01-14 NOTE — PROGRESS NOTE ADULT - PROBLEM SELECTOR PLAN 7
Hx of GIB and gastritis, likely 2/2 chronic EtOH use, denies bloody output- no melena, no BRBPR  -Protonix 40 mg PO daily while inpatient

## 2020-01-14 NOTE — PROGRESS NOTE ADULT - SUBJECTIVE AND OBJECTIVE BOX
PROGRESS NOTE    CC:  Overnight Events:  Interval History:   ROS:    OBJECTIVE  Vitals:  T(C): 36.6 (01-14-20 @ 09:18), Max: 36.7 (01-14-20 @ 05:54)  HR: 86 (01-14-20 @ 05:29) (79 - 100)  BP: 117/90 (01-14-20 @ 05:29) (117/90 - 130/89)  RR: 18 (01-14-20 @ 05:29) (18 - 18)  SpO2: 98% (01-14-20 @ 05:29) (96% - 98%)  Wt(kg): --    I/O:  I&O's Summary    13 Jan 2020 07:01  -  14 Jan 2020 07:00  --------------------------------------------------------  IN: 860 mL / OUT: 840 mL / NET: 20 mL        PHYSICAL EXAM:  Appearance: NAD. Speaking in full sentences.   HEENT: PERRL. No pallor noted.  Conjunctiva clear b/l. Moist oral mucosa.  Cardiovascular: RRR with no murmurs.  Respiratory: Lungs CTAB.   Gastrointestinal:  Soft, nontender. Non-distended. Non-rigid.	  Extremities: No edema b/l. No erythema b/l. LE WWP b/l.  Vascular: DP present.  Neurologic:  Alert and awake. Moving all extremities. Following commands. Making eye contact.  	  LABS:                        12.8   4.12  )-----------( 106      ( 14 Jan 2020 06:37 )             40.9     01-14    135  |  100  |  11  ----------------------------<  108<H>  3.9   |  21<L>  |  0.92    Ca    9.3      14 Jan 2020 06:37  Phos  3.5     01-12  Mg     1.8     01-14    TPro  7.2  /  Alb  4.0  /  TBili  0.9  /  DBili  0.2  /  AST  31  /  ALT  15  /  AlkPhos  66  01-12    PT/INR - ( 12 Jan 2020 09:37 )   PT: 11.0 sec;   INR: 0.97          PTT - ( 12 Jan 2020 09:37 )  PTT:29.9 sec      RADIOLOGY & ADDITIONAL TESTS:  Reviewed .    MEDICATIONS  (STANDING):  apixaban 5 milliGRAM(s) Oral two times a day  atorvastatin 40 milliGRAM(s) Oral at bedtime  chlordiazePOXIDE 50 milliGRAM(s) Oral every 12 hours  folic acid 1 milliGRAM(s) Oral daily  metoprolol succinate  milliGRAM(s) Oral daily  multivitamin 1 Tablet(s) Oral daily  pantoprazole    Tablet 40 milliGRAM(s) Oral before breakfast  thiamine 100 milliGRAM(s) Oral daily    MEDICATIONS  (PRN):  LORazepam   Injectable 2 milliGRAM(s) IV Push every 2 hours PRN Symptom-triggered: each CIWA -Ar score 8 or GREATER      ASSESSMENT:    PLAN: PROGRESS NOTE    CC: tremors, nausea- ETOH intoxication/withdrawal  Overnight Events: Tachycardia up to 160 while walking around room  Interval History: CIWA 0-1, mild anxiety, no tongue fasiculations or tremors noted. no tactile or auditory hallucinations. Denies headaches, nausea, vomiting. Oriented x3.    ROS: As above.     OBJECTIVE  Vitals:  T(C): 36.6 (01-14-20 @ 09:18), Max: 36.7 (01-14-20 @ 05:54)  HR: 86 (01-14-20 @ 05:29) (79 - 100)  BP: 117/90 (01-14-20 @ 05:29) (117/90 - 130/89)  RR: 18 (01-14-20 @ 05:29) (18 - 18)  SpO2: 98% (01-14-20 @ 05:29) (96% - 98%)  Wt(kg): --    I/O:  I&O's Summary    13 Jan 2020 07:01  -  14 Jan 2020 07:00  --------------------------------------------------------  IN: 860 mL / OUT: 840 mL / NET: 20 mL        PHYSICAL EXAM:  Appearance: NAD. Speaking in full sentences.   HEENT: Anicteric sclera, Conjunctiva clear b/l. Moist oral mucosa. No tongue fasiculations  Cardiovascular: RRR, S1, S2  Respiratory: Lungs CTAB.   Gastrointestinal:  Soft, nontender. Non-distended. 	  Extremities: No edema b/l. No erythema b/l. LE WWP b/l.  Neurologic:  Alert and awake. Moving all extremities. Following commands. Making eye contact.  	  LABS:                        12.8   4.12  )-----------( 106      ( 14 Jan 2020 06:37 )             40.9     01-14    135  |  100  |  11  ----------------------------<  108<H>  3.9   |  21<L>  |  0.92    Ca    9.3      14 Jan 2020 06:37  Phos  3.5     01-12  Mg     1.8     01-14    TPro  7.2  /  Alb  4.0  /  TBili  0.9  /  DBili  0.2  /  AST  31  /  ALT  15  /  AlkPhos  66  01-12    PT/INR - ( 12 Jan 2020 09:37 )   PT: 11.0 sec;   INR: 0.97          PTT - ( 12 Jan 2020 09:37 )  PTT:29.9 sec      RADIOLOGY & ADDITIONAL TESTS:  Reviewed .    ECHOCARDIOGRAM:  Regional wall motion abnormalities consistent with ischemic heart disease. Left ventricular systolic function is severely reduced with a calculated ejection fraction of 30-35% with severe regional wall motion abnormalities. Akinesis of the mid to anteroseptum, mid to apical inferoseptum, all apical segments and true apex. No left ventricular thrombus noted.   2. The right ventricle is normal in size. Right ventricular systolic function is normal. A device lead is noted in the right heart.   3. There is trace mitral regurgitation.   4. There is trace tricuspid regurgitation.   5. There is no echocardiographic evidence of pulmonary hypertension.   6. No pericardial effusion is seen.       MEDICATIONS  (STANDING):  apixaban 5 milliGRAM(s) Oral two times a day  atorvastatin 40 milliGRAM(s) Oral at bedtime  chlordiazePOXIDE 50 milliGRAM(s) Oral every 12 hours  folic acid 1 milliGRAM(s) Oral daily  metoprolol succinate  milliGRAM(s) Oral daily  multivitamin 1 Tablet(s) Oral daily  pantoprazole    Tablet 40 milliGRAM(s) Oral before breakfast  thiamine 100 milliGRAM(s) Oral daily    MEDICATIONS  (PRN):  LORazepam   Injectable 2 milliGRAM(s) IV Push every 2 hours PRN Symptom-triggered: each CIWA -Ar score 8 or GREATER PROGRESS/TRANSFER 5URIS to [facility] NOTE    Hospital Course  63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 Last drink on day of admission (1/12/20). Patient reports 1 pint of vodka/daily- straight over last 10 days. Patient admitted to [facility] in setting of tachycardia, significant cardiac disease and ETOH withdrawal. Patient started on librium 50 TID with need for 1x ativan for CIWA 15. Librium was downtitrated to BID with improvement in CIWA to 0-1 today for anxiety. Repeat echocardiogram demonstrated that there is no LV thrombus present, EF 30-35% with akinesis of mid to anteroseptum, mid to apical inferoseptum, all apical segments and true apex. Given no need for further cardiac work up and HR has been controlled to 90s on Toprol, patient deemed stable for further management on [facility].     CC: tremors, nausea- ETOH intoxication/withdrawal  Overnight Events: Tachycardia up to 160 while walking around room  Interval History: CIWA 0-1, mild anxiety, no tongue fasiculations or tremors noted. no tactile or auditory hallucinations. Denies headaches, nausea, vomiting. Oriented x3.    ROS: As above.     OBJECTIVE  Vitals:  T(C): 36.6 (01-14-20 @ 09:18), Max: 36.7 (01-14-20 @ 05:54)  HR: 86 (01-14-20 @ 05:29) (79 - 100)  BP: 117/90 (01-14-20 @ 05:29) (117/90 - 130/89)  RR: 18 (01-14-20 @ 05:29) (18 - 18)  SpO2: 98% (01-14-20 @ 05:29) (96% - 98%)  Wt(kg): --    I/O:  I&O's Summary    13 Jan 2020 07:01  -  14 Jan 2020 07:00  --------------------------------------------------------  IN: 860 mL / OUT: 840 mL / NET: 20 mL        PHYSICAL EXAM:  Appearance: NAD. Speaking in full sentences.   HEENT: Anicteric sclera, Conjunctiva clear b/l. Moist oral mucosa. No tongue fasiculations  Cardiovascular: RRR, S1, S2  Respiratory: Lungs CTAB.   Gastrointestinal:  Soft, nontender. Non-distended. 	  Extremities: No edema b/l. No erythema b/l. LE WWP b/l.  Neurologic:  Alert and awake. Moving all extremities. Following commands. Making eye contact.  	  LABS:                        12.8   4.12  )-----------( 106      ( 14 Jan 2020 06:37 )             40.9     01-14    135  |  100  |  11  ----------------------------<  108<H>  3.9   |  21<L>  |  0.92    Ca    9.3      14 Jan 2020 06:37  Phos  3.5     01-12  Mg     1.8     01-14    TPro  7.2  /  Alb  4.0  /  TBili  0.9  /  DBili  0.2  /  AST  31  /  ALT  15  /  AlkPhos  66  01-12    PT/INR - ( 12 Jan 2020 09:37 )   PT: 11.0 sec;   INR: 0.97          PTT - ( 12 Jan 2020 09:37 )  PTT:29.9 sec      RADIOLOGY & ADDITIONAL TESTS:  Reviewed .    ECHOCARDIOGRAM:  Regional wall motion abnormalities consistent with ischemic heart disease. Left ventricular systolic function is severely reduced with a calculated ejection fraction of 30-35% with severe regional wall motion abnormalities. Akinesis of the mid to anteroseptum, mid to apical inferoseptum, all apical segments and true apex. No left ventricular thrombus noted.   2. The right ventricle is normal in size. Right ventricular systolic function is normal. A device lead is noted in the right heart.   3. There is trace mitral regurgitation.   4. There is trace tricuspid regurgitation.   5. There is no echocardiographic evidence of pulmonary hypertension.   6. No pericardial effusion is seen.       MEDICATIONS  (STANDING):  apixaban 5 milliGRAM(s) Oral two times a day  atorvastatin 40 milliGRAM(s) Oral at bedtime  chlordiazePOXIDE 50 milliGRAM(s) Oral every 12 hours  folic acid 1 milliGRAM(s) Oral daily  metoprolol succinate  milliGRAM(s) Oral daily  multivitamin 1 Tablet(s) Oral daily  pantoprazole    Tablet 40 milliGRAM(s) Oral before breakfast  thiamine 100 milliGRAM(s) Oral daily    MEDICATIONS  (PRN):  LORazepam   Injectable 2 milliGRAM(s) IV Push every 2 hours PRN Symptom-triggered: each CIWA -Ar score 8 or GREATER

## 2020-01-14 NOTE — PROGRESS NOTE ADULT - PROBLEM SELECTOR PLAN 7
Patient with history of gastritis, likely 2/2 chronic EtOH use, no endorsing any symptoms at this time  -Protonix 40 mg PO daily while inpatient Hx of GIB and gastritis, likely 2/2 chronic EtOH use, denies bloody output- no melena, no BRBPR  -Protonix 40 mg PO daily while inpatient

## 2020-01-14 NOTE — PROGRESS NOTE ADULT - SUBJECTIVE AND OBJECTIVE BOX
Pt still tremulous     with periods hypertension     PAST MEDICAL & SURGICAL HISTORY:  ETOH abuse  Hyperlipidemia  Heparin-induced thrombocytopenia: Antibody positive  Left ventricular thrombus  Atrial fibrillation  Hemorrhoid  ICD (implantable cardioverter-defibrillator) in place  Myocardial infarction involving other coronary artery  Gastritis  Atherosclerosis of coronary artery: Coronary artery disease  Automatic implantable cardiac defibrillator in situ: ICD (implantable cardioverter-defibrillator) in place      MEDICATIONS  (STANDING):  apixaban 5 milliGRAM(s) Oral two times a day  atorvastatin 40 milliGRAM(s) Oral at bedtime  chlordiazePOXIDE 50 milliGRAM(s) Oral every 12 hours  folic acid 1 milliGRAM(s) Oral daily  metoprolol succinate  milliGRAM(s) Oral daily  multivitamin 1 Tablet(s) Oral daily  pantoprazole    Tablet 40 milliGRAM(s) Oral before breakfast  thiamine 100 milliGRAM(s) Oral daily    MEDICATIONS  (PRN):  LORazepam   Injectable 2 milliGRAM(s) IV Push every 2 hours PRN Symptom-triggered: each CIWA -Ar score 8 or GREATER      ICU Vital Signs Last 24 Hrs  T(C): 36.6 (14 Jan 2020 09:18), Max: 36.7 (14 Jan 2020 05:54)  T(F): 97.8 (14 Jan 2020 09:18), Max: 98 (14 Jan 2020 05:54)  HR: 86 (14 Jan 2020 05:29) (79 - 100)  BP: 117/90 (14 Jan 2020 05:29) (117/90 - 130/89)  BP(mean): 101 (14 Jan 2020 05:29) (77 - 106)  ABP: --  ABP(mean): --  RR: 18 (14 Jan 2020 05:29) (18 - 18)  SpO2: 98% (14 Jan 2020 05:29) (96% - 98%)      Lungs clear  CV  s1s2     abd soft  Ext stable    CXR neg                            12.8   4.12  )-----------( 106      ( 14 Jan 2020 06:37 )             40.9   01-14    135  |  100  |  11  ----------------------------<  108<H>  3.9   |  21<L>  |  0.92    Ca    9.3      14 Jan 2020 06:37  Phos  3.5     01-12  Mg     1.8     01-14    TPro  7.2  /  Alb  4.0  /  TBili  0.9  /  DBili  0.2  /  AST  31  /  ALT  15  /  AlkPhos  66  01-12    echo    EF  03 -35 %  severe regional wall  motion abnormalities   akinesis  mud to shukri septum   mid to apical inferior septum   all apical segments and apex

## 2020-01-14 NOTE — PROGRESS NOTE ADULT - PROBLEM SELECTOR PLAN 8
F: PO Intake  E: Replete electrolytes PRN, Goal: K>4, Mg>2  N: DASH/TLC    DVT ppx: no Heparin given history of HIT, SCDs only  CODE: FULL  Dispo: 5uris F: PO Intake  E: Replete electrolytes PRN, Goal: K>4, Mg>2  N: DASH/TLC    DVT ppx: Eliquis, NO HEP in setting of HIT  CODE: FULL  Dispo: 5uris pending transfer to [facility]

## 2020-01-14 NOTE — PROGRESS NOTE ADULT - SUBJECTIVE AND OBJECTIVE BOX
OVERNIGHT EVENTS:    SUBJECTIVE / INTERVAL HPI: Patient seen and examined at bedside.     VITAL SIGNS:  Vital Signs Last 24 Hrs  T(C): 36.7 (14 Jan 2020 22:23), Max: 36.7 (14 Jan 2020 05:54)  T(F): 98.1 (14 Jan 2020 22:23), Max: 98.1 (14 Jan 2020 22:23)  HR: 95 (14 Jan 2020 20:11) (82 - 105)  BP: 122/81 (14 Jan 2020 20:11) (98/69 - 128/87)  BP(mean): 101 (14 Jan 2020 05:29) (77 - 101)  RR: 16 (14 Jan 2020 20:11) (16 - 18)  SpO2: 97% (14 Jan 2020 20:11) (95% - 98%)    PHYSICAL EXAM:    General: WDWN  HEENT: NC/AT; PERRL, anicteric sclera; MMM  Neck: supple  Cardiovascular: +S1/S2; RRR  Respiratory: CTA B/L; no W/R/R  Gastrointestinal: soft, NT/ND; +BSx4  Extremities: WWP; no edema, clubbing or cyanosis  Vascular: 2+ radial, DP/PT pulses B/L  Neurological: AAOx3; no focal deficits    MEDICATIONS:  MEDICATIONS  (STANDING):  apixaban 5 milliGRAM(s) Oral two times a day  atorvastatin 40 milliGRAM(s) Oral at bedtime  chlordiazePOXIDE 25 milliGRAM(s) Oral every 12 hours  folic acid 1 milliGRAM(s) Oral daily  metoprolol succinate  milliGRAM(s) Oral daily  multivitamin 1 Tablet(s) Oral daily  pantoprazole    Tablet 40 milliGRAM(s) Oral before breakfast  thiamine 100 milliGRAM(s) Oral daily    MEDICATIONS  (PRN):  LORazepam   Injectable 2 milliGRAM(s) IV Push every 2 hours PRN Symptom-triggered: each CIWA -Ar score 8 or GREATER      ALLERGIES:  Allergies    Capoten (Short breath; Rash; Hives)  heparin (Other (Mod to Severe))  penicillin (Short breath; Rash; Hives)    Intolerances        LABS:                        12.8   4.12  )-----------( 106      ( 14 Jan 2020 06:37 )             40.9     01-14    135  |  100  |  11  ----------------------------<  108<H>  3.9   |  21<L>  |  0.92    Ca    9.3      14 Jan 2020 06:37  Mg     1.8     01-14          CAPILLARY BLOOD GLUCOSE          RADIOLOGY & ADDITIONAL TESTS: Reviewed. 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 Last drink on day of admission (1/12/20). Patient reports 1 pint of vodka/daily- straight over last 10 days. Patient admitted to [facility] in setting of tachycardia, significant cardiac disease and ETOH withdrawal. Patient started on librium 50 TID with need for 1x ativan for CIWA 15. Librium was downtitrated to BID with improvement in CIWA to 0-1 today for anxiety. Repeat echocardiogram demonstrated that there is no LV thrombus present, EF 30-35% with akinesis of mid to anteroseptum, mid to apical inferoseptum, all apical segments and true apex. Given no need for further cardiac work up and HR has been controlled to 90s on Toprol, patient deemed stable for further management on [facility].     SUBJECTIVE / INTERVAL HPI: Patient seen and examined at bedside. Pt reports he feels improved and states he only has a slight "shake" in his hands. Denies headache, N/V, anxiety, auditory, visual or tactile hallucinations, diaphoresis.     VITAL SIGNS:  Vital Signs Last 24 Hrs  T(C): 36.7 (14 Jan 2020 22:23), Max: 36.7 (14 Jan 2020 05:54)  T(F): 98.1 (14 Jan 2020 22:23), Max: 98.1 (14 Jan 2020 22:23)  HR: 95 (14 Jan 2020 20:11) (82 - 105)  BP: 122/81 (14 Jan 2020 20:11) (98/69 - 128/87)  BP(mean): 101 (14 Jan 2020 05:29) (77 - 101)  RR: 16 (14 Jan 2020 20:11) (16 - 18)  SpO2: 97% (14 Jan 2020 20:11) (95% - 98%)    PHYSICAL EXAM:    General: WDWN  HEENT: NC/AT; PERRL, anicteric sclera; MMM  Neck: supple  Cardiovascular: +S1/S2; RRR  Respiratory: CTA B/L; no W/R/R  Gastrointestinal: soft, NT/ND; +BSx4  Extremities: WWP; no edema, clubbing or cyanosis  Vascular: 2+ radial, DP pulses B/L  Neurological: AAOx3; no focal deficits, mild tremor, no tongue fasciculations     MEDICATIONS:  MEDICATIONS  (STANDING):  apixaban 5 milliGRAM(s) Oral two times a day  atorvastatin 40 milliGRAM(s) Oral at bedtime  chlordiazePOXIDE 25 milliGRAM(s) Oral every 12 hours  folic acid 1 milliGRAM(s) Oral daily  metoprolol succinate  milliGRAM(s) Oral daily  multivitamin 1 Tablet(s) Oral daily  pantoprazole    Tablet 40 milliGRAM(s) Oral before breakfast  thiamine 100 milliGRAM(s) Oral daily    MEDICATIONS  (PRN):  LORazepam   Injectable 2 milliGRAM(s) IV Push every 2 hours PRN Symptom-triggered: each CIWA -Ar score 8 or GREATER      ALLERGIES:  Allergies    Capoten (Short breath; Rash; Hives)  heparin (Other (Mod to Severe))  penicillin (Short breath; Rash; Hives)    Intolerances        LABS:                        12.8   4.12  )-----------( 106      ( 14 Jan 2020 06:37 )             40.9     01-14    135  |  100  |  11  ----------------------------<  108<H>  3.9   |  21<L>  |  0.92    Ca    9.3      14 Jan 2020 06:37  Mg     1.8     01-14          CAPILLARY BLOOD GLUCOSE          RADIOLOGY & ADDITIONAL TESTS: Reviewed.

## 2020-01-15 LAB
ANION GAP SERPL CALC-SCNC: 11 MMOL/L — SIGNIFICANT CHANGE UP (ref 5–17)
BUN SERPL-MCNC: 15 MG/DL — SIGNIFICANT CHANGE UP (ref 7–23)
CALCIUM SERPL-MCNC: 9.2 MG/DL — SIGNIFICANT CHANGE UP (ref 8.4–10.5)
CHLORIDE SERPL-SCNC: 100 MMOL/L — SIGNIFICANT CHANGE UP (ref 96–108)
CO2 SERPL-SCNC: 25 MMOL/L — SIGNIFICANT CHANGE UP (ref 22–31)
CREAT SERPL-MCNC: 1.01 MG/DL — SIGNIFICANT CHANGE UP (ref 0.5–1.3)
GLUCOSE SERPL-MCNC: 111 MG/DL — HIGH (ref 70–99)
HCT VFR BLD CALC: 39.2 % — SIGNIFICANT CHANGE UP (ref 39–50)
HGB BLD-MCNC: 12.3 G/DL — LOW (ref 13–17)
MAGNESIUM SERPL-MCNC: 1.8 MG/DL — SIGNIFICANT CHANGE UP (ref 1.6–2.6)
MCHC RBC-ENTMCNC: 25.7 PG — LOW (ref 27–34)
MCHC RBC-ENTMCNC: 31.4 GM/DL — LOW (ref 32–36)
MCV RBC AUTO: 81.8 FL — SIGNIFICANT CHANGE UP (ref 80–100)
NRBC # BLD: 0 /100 WBCS — SIGNIFICANT CHANGE UP (ref 0–0)
PHOSPHATE SERPL-MCNC: 3.8 MG/DL — SIGNIFICANT CHANGE UP (ref 2.5–4.5)
PLATELET # BLD AUTO: 84 K/UL — LOW (ref 150–400)
POTASSIUM SERPL-MCNC: 4.1 MMOL/L — SIGNIFICANT CHANGE UP (ref 3.5–5.3)
POTASSIUM SERPL-SCNC: 4.1 MMOL/L — SIGNIFICANT CHANGE UP (ref 3.5–5.3)
RBC # BLD: 4.79 M/UL — SIGNIFICANT CHANGE UP (ref 4.2–5.8)
RBC # FLD: 18.6 % — HIGH (ref 10.3–14.5)
SODIUM SERPL-SCNC: 136 MMOL/L — SIGNIFICANT CHANGE UP (ref 135–145)
WBC # BLD: 3.77 K/UL — LOW (ref 3.8–10.5)
WBC # FLD AUTO: 3.77 K/UL — LOW (ref 3.8–10.5)

## 2020-01-15 RX ADMIN — APIXABAN 5 MILLIGRAM(S): 2.5 TABLET, FILM COATED ORAL at 05:56

## 2020-01-15 RX ADMIN — Medication 1 TABLET(S): at 11:15

## 2020-01-15 RX ADMIN — Medication 25 MILLIGRAM(S): at 11:06

## 2020-01-15 RX ADMIN — PANTOPRAZOLE SODIUM 40 MILLIGRAM(S): 20 TABLET, DELAYED RELEASE ORAL at 05:56

## 2020-01-15 RX ADMIN — Medication 100 MILLIGRAM(S): at 11:15

## 2020-01-15 RX ADMIN — Medication 1 MILLIGRAM(S): at 11:15

## 2020-01-15 RX ADMIN — Medication 25 MILLIGRAM(S): at 22:51

## 2020-01-15 RX ADMIN — ATORVASTATIN CALCIUM 40 MILLIGRAM(S): 80 TABLET, FILM COATED ORAL at 22:51

## 2020-01-15 RX ADMIN — APIXABAN 5 MILLIGRAM(S): 2.5 TABLET, FILM COATED ORAL at 17:09

## 2020-01-15 RX ADMIN — Medication 100 MILLIGRAM(S): at 05:56

## 2020-01-15 NOTE — DIETITIAN INITIAL EVALUATION ADULT. - PHYSICAL APPEARANCE
other (specify)/suspected nutrient deficit but not malnourished based on appearance and weights NFPE not indicated

## 2020-01-15 NOTE — DIETITIAN INITIAL EVALUATION ADULT. - ENERGY NEEDS
Ht:5ft 10inches,IBW:166lbs +/-10%,103% of IBW.BMI:24.6.Adjusted for increased nutrient and fluid needs due to ETOH abuse

## 2020-01-15 NOTE — DIETITIAN INITIAL EVALUATION ADULT. - PROBLEM SELECTOR PLAN 4
History of HFrEF (EF 35-40% in Aug 2019), s/p AICD, on Toprol  mg daily, Lipitor 40 mg qHS, no ACE-i/ARB given history of allergy c/w angioedema  -no ACE-i/ARB given history of allergy c/w angioedema  -c/w Toprol  mg daily   -no ASA 81 mg daily given history of GIB  -c/w Lipitor 40 mg qHS  -Repeat ECHO while inpatient to re-assess EF and LV thrombus

## 2020-01-15 NOTE — PROGRESS NOTE ADULT - PROBLEM SELECTOR PLAN 7
History of GIB and gastritis likely from alcohol use. No signs of active bleeding.    -Continue Protonix 40mg

## 2020-01-15 NOTE — PROGRESS NOTE ADULT - SUBJECTIVE AND OBJECTIVE BOX
P)t  remains  shaky  tremulous     PAST MEDICAL & SURGICAL HISTORY:  ETOH abuse  Hyperlipidemia  Heparin-induced thrombocytopenia: Antibody positive  Left ventricular thrombus  Atrial fibrillation  Hemorrhoid  ICD (implantable cardioverter-defibrillator) in place  Myocardial infarction involving other coronary artery  Gastritis  Atherosclerosis of coronary artery: Coronary artery disease  Automatic implantable cardiac defibrillator in situ: ICD (implantable cardioverter-defibrillator) in place      MEDICATIONS  (STANDING):  apixaban 5 milliGRAM(s) Oral two times a day  atorvastatin 40 milliGRAM(s) Oral at bedtime  chlordiazePOXIDE 25 milliGRAM(s) Oral every 12 hours  folic acid 1 milliGRAM(s) Oral daily  metoprolol succinate  milliGRAM(s) Oral daily  multivitamin 1 Tablet(s) Oral daily  pantoprazole    Tablet 40 milliGRAM(s) Oral before breakfast  thiamine 100 milliGRAM(s) Oral daily    MEDICATIONS  (PRN):  LORazepam   Injectable 2 milliGRAM(s) IV Push every 2 hours PRN Symptom-triggered: each CIWA -Ar score 8 or GREATER    ICU Vital Signs Last 24 Hrs  T(C): 37.1 (15 Forrest 2020 05:42), Max: 37.1 (15 Forrest 2020 05:42)  T(F): 98.7 (15 Forrest 2020 05:42), Max: 98.7 (15 Forrest 2020 05:42)  HR: 93 (15 Forrest 2020 05:42) (81 - 95)  BP: 112/76 (15 Forrest 2020 05:42) (98/69 - 129/84)  BP(mean): --  ABP: --  ABP(mean): --  RR: 18 (15 Forrest 2020 05:42) (16 - 18)  SpO2: 97% (15 Forrest 2020 05:42) (95% - 99%)      Lungs clear  CV   s1s2   tachycardic     abd soft  Ext stable                           12.3   3.77  )-----------( 84       ( 15 Forrest 2020 05:33 )             39.2   01-15    136  |  100  |  15  ----------------------------<  111<H>  4.1   |  25  |  1.01    Ca    9.2      15 Forrest 2020 05:33  Phos  3.8     01-15  Mg     1.8     01-15        CXR   neg     ECHO      EF 30-35 %   severely reduced LVF

## 2020-01-15 NOTE — DIETITIAN INITIAL EVALUATION ADULT. - PROBLEM SELECTOR PLAN 6
Patient with history of gastritis, likely 2/2 chronic EtOH use, no endorsing any symptoms at this time  -Protonix 40 mg PO daily while inpatient

## 2020-01-15 NOTE — DIETITIAN INITIAL EVALUATION ADULT. - OTHER INFO
65 y/o known male with extensive history of ETOH abuse, HTN, Afib, CHF, CAD, gastritis admitted with ETOH withdrawal. No N/V/D/C or pain. Eating adequate amounts .No skin breakdown. Suspected inconsistent nutrient dense diet due to ETOH abuse .Weights stable since October 2018.Does not follow any specific restrictions in diet. Resides alone.Has been educated numerous times on heart healthy diet.

## 2020-01-15 NOTE — DIETITIAN INITIAL EVALUATION ADULT. - PROBLEM SELECTOR PLAN 7
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

## 2020-01-15 NOTE — PROGRESS NOTE ADULT - SUBJECTIVE AND OBJECTIVE BOX
OVERNIGHT EVENTS:    SUBJECTIVE: Patient seen and examined at the bedside.     Vital Signs Last 12 Hrs  T(F): 98.7 (01-15-20 @ 05:42), Max: 98.7 (01-15-20 @ 05:42)  HR: 93 (01-15-20 @ 05:42) (81 - 95)  BP: 112/76 (01-15-20 @ 05:42) (112/76 - 129/84)  BP(mean): --  RR: 18 (01-15-20 @ 05:42) (16 - 18)  SpO2: 97% (01-15-20 @ 05:42) (97% - 99%)  I&O's Summary    13 Jan 2020 07:01  -  14 Jan 2020 07:00  --------------------------------------------------------  IN: 860 mL / OUT: 840 mL / NET: 20 mL        PHYSICAL EXAM:  General: In no acute distress, resting comfortably in bed  HEENT: NCAT, PERRL, EOMI, no conjunctival pallor or scleral icterus, MMM  Neck: Supple, no JVD  Respiratory: Clear to auscultation bilaterally with no wheezes, rales, or rhonchi appreciated  Cardiovascular: RRR, normal S1 and S2, no murmurs, rubs, or gallops appreciated  Vascular: 2+ radial and DP pulses  Abdomen: Soft, NT/ND. Bowel sounds present in all four quadrants with no guarding, rebound tenderness, or palpable masses  Extremities: Warm and well perfused. No clubbing, cyanosis, or edema noted  Skin: No gross skin abnormalities or rashes noted  Neuro: AAOx3 with no cranial nerve deficits. Strength and sensation intact throughout.    LABS:                        12.3   3.77  )-----------( 84       ( 15 Forrest 2020 05:33 )             39.2     01-15    136  |  100  |  15  ----------------------------<  111<H>  4.1   |  25  |  1.01    Ca    9.2      15 Forrest 2020 05:33  Phos  3.8     01-15  Mg     1.8     01-15            RADIOLOGY & ADDITIONAL TESTS: Reviewed.    MEDICATIONS  (STANDING):  apixaban 5 milliGRAM(s) Oral two times a day  atorvastatin 40 milliGRAM(s) Oral at bedtime  chlordiazePOXIDE 25 milliGRAM(s) Oral every 12 hours  folic acid 1 milliGRAM(s) Oral daily  metoprolol succinate  milliGRAM(s) Oral daily  multivitamin 1 Tablet(s) Oral daily  pantoprazole    Tablet 40 milliGRAM(s) Oral before breakfast  thiamine 100 milliGRAM(s) Oral daily    MEDICATIONS  (PRN):  LORazepam   Injectable 2 milliGRAM(s) IV Push every 2 hours PRN Symptom-triggered: each CIWA -Ar score 8 or GREATER      Allergies    Capoten (Short breath; Rash; Hives)  heparin (Other (Mod to Severe))  penicillin (Short breath; Rash; Hives)    Intolerances OVERNIGHT EVENTS: No acute events overnight.    SUBJECTIVE: Patient seen and examined at the bedside. He says he's still a little shaky but more steady than before. He is concerned about his cholesterol.    Vital Signs Last 12 Hrs  T(F): 98.7 (01-15-20 @ 05:42), Max: 98.7 (01-15-20 @ 05:42)  HR: 93 (01-15-20 @ 05:42) (81 - 95)  BP: 112/76 (01-15-20 @ 05:42) (112/76 - 129/84)  BP(mean): --  RR: 18 (01-15-20 @ 05:42) (16 - 18)  SpO2: 97% (01-15-20 @ 05:42) (97% - 99%)  I&O's Summary    13 Jan 2020 07:01  -  14 Jan 2020 07:00  --------------------------------------------------------  IN: 860 mL / OUT: 840 mL / NET: 20 mL        PHYSICAL EXAM:  General: In no acute distress, resting comfortably in bed  HEENT: NCAT, PERRL, EOMI, no conjunctival pallor or scleral icterus, MMM  Neck: Supple, no JVD  Respiratory: Clear to auscultation bilaterally  Cardiovascular: RRR, normal S1 and S2  Vascular: 2+ radial and DP pulses  Abdomen: Soft, NT/ND. Bowel sounds present in all four quadrants  Extremities: Warm and well perfused. No clubbing, cyanosis, or edema noted  Skin: No gross skin abnormalities or rashes noted  Neuro: AAOx3 with no cranial nerve deficits. Strength and sensation intact throughout.  CIWA: 3 for tremor    LABS:                        12.3   3.77  )-----------( 84       ( 15 Forrest 2020 05:33 )             39.2     01-15    136  |  100  |  15  ----------------------------<  111<H>  4.1   |  25  |  1.01    Ca    9.2      15 Forrest 2020 05:33  Phos  3.8     01-15  Mg     1.8     01-15            RADIOLOGY & ADDITIONAL TESTS: Reviewed.    MEDICATIONS  (STANDING):  apixaban 5 milliGRAM(s) Oral two times a day  atorvastatin 40 milliGRAM(s) Oral at bedtime  chlordiazePOXIDE 25 milliGRAM(s) Oral every 12 hours  folic acid 1 milliGRAM(s) Oral daily  metoprolol succinate  milliGRAM(s) Oral daily  multivitamin 1 Tablet(s) Oral daily  pantoprazole    Tablet 40 milliGRAM(s) Oral before breakfast  thiamine 100 milliGRAM(s) Oral daily    MEDICATIONS  (PRN):  LORazepam   Injectable 2 milliGRAM(s) IV Push every 2 hours PRN Symptom-triggered: each CIWA -Ar score 8 or GREATER      Allergies    Capoten (Short breath; Rash; Hives)  heparin (Other (Mod to Severe))  penicillin (Short breath; Rash; Hives)    Intolerances

## 2020-01-15 NOTE — PROGRESS NOTE ADULT - PROBLEM SELECTOR PLAN 8
F: None  E: Replete PRN  N: DASH/TLC  DVT ppx: Eliquis, NO HEP in setting of HIT  CODE: FULL  Dispo: [facility]

## 2020-01-15 NOTE — DIETITIAN INITIAL EVALUATION ADULT. - DIET TYPE
oral ?fluid restriction addition due to CHF history/DASH/TLC (sodium and cholesterol restricted diet)

## 2020-01-16 ENCOUNTER — TRANSCRIPTION ENCOUNTER (OUTPATIENT)
Age: 65
End: 2020-01-16

## 2020-01-16 LAB
ALBUMIN SERPL ELPH-MCNC: 4.1 G/DL — SIGNIFICANT CHANGE UP (ref 3.3–5)
ALP SERPL-CCNC: 63 U/L — SIGNIFICANT CHANGE UP (ref 40–120)
ALT FLD-CCNC: 19 U/L — SIGNIFICANT CHANGE UP (ref 10–45)
ANION GAP SERPL CALC-SCNC: 11 MMOL/L — SIGNIFICANT CHANGE UP (ref 5–17)
AST SERPL-CCNC: 24 U/L — SIGNIFICANT CHANGE UP (ref 10–40)
BASOPHILS # BLD AUTO: 0.02 K/UL — SIGNIFICANT CHANGE UP (ref 0–0.2)
BASOPHILS NFR BLD AUTO: 0.5 % — SIGNIFICANT CHANGE UP (ref 0–2)
BILIRUB SERPL-MCNC: 0.3 MG/DL — SIGNIFICANT CHANGE UP (ref 0.2–1.2)
BUN SERPL-MCNC: 14 MG/DL — SIGNIFICANT CHANGE UP (ref 7–23)
CALCIUM SERPL-MCNC: 9.2 MG/DL — SIGNIFICANT CHANGE UP (ref 8.4–10.5)
CHLORIDE SERPL-SCNC: 105 MMOL/L — SIGNIFICANT CHANGE UP (ref 96–108)
CO2 SERPL-SCNC: 24 MMOL/L — SIGNIFICANT CHANGE UP (ref 22–31)
CREAT SERPL-MCNC: 1.04 MG/DL — SIGNIFICANT CHANGE UP (ref 0.5–1.3)
EOSINOPHIL # BLD AUTO: 0.25 K/UL — SIGNIFICANT CHANGE UP (ref 0–0.5)
EOSINOPHIL NFR BLD AUTO: 5.7 % — SIGNIFICANT CHANGE UP (ref 0–6)
GLUCOSE SERPL-MCNC: 96 MG/DL — SIGNIFICANT CHANGE UP (ref 70–99)
HCT VFR BLD CALC: 40 % — SIGNIFICANT CHANGE UP (ref 39–50)
HGB BLD-MCNC: 12.5 G/DL — LOW (ref 13–17)
IMM GRANULOCYTES NFR BLD AUTO: 0.5 % — SIGNIFICANT CHANGE UP (ref 0–1.5)
LYMPHOCYTES # BLD AUTO: 1.59 K/UL — SIGNIFICANT CHANGE UP (ref 1–3.3)
LYMPHOCYTES # BLD AUTO: 36 % — SIGNIFICANT CHANGE UP (ref 13–44)
MAGNESIUM SERPL-MCNC: 1.6 MG/DL — SIGNIFICANT CHANGE UP (ref 1.6–2.6)
MCHC RBC-ENTMCNC: 25.8 PG — LOW (ref 27–34)
MCHC RBC-ENTMCNC: 31.3 GM/DL — LOW (ref 32–36)
MCV RBC AUTO: 82.5 FL — SIGNIFICANT CHANGE UP (ref 80–100)
MONOCYTES # BLD AUTO: 0.52 K/UL — SIGNIFICANT CHANGE UP (ref 0–0.9)
MONOCYTES NFR BLD AUTO: 11.8 % — SIGNIFICANT CHANGE UP (ref 2–14)
NEUTROPHILS # BLD AUTO: 2.02 K/UL — SIGNIFICANT CHANGE UP (ref 1.8–7.4)
NEUTROPHILS NFR BLD AUTO: 45.5 % — SIGNIFICANT CHANGE UP (ref 43–77)
NRBC # BLD: 0 /100 WBCS — SIGNIFICANT CHANGE UP (ref 0–0)
PLATELET # BLD AUTO: 92 K/UL — LOW (ref 150–400)
POTASSIUM SERPL-MCNC: 4.6 MMOL/L — SIGNIFICANT CHANGE UP (ref 3.5–5.3)
POTASSIUM SERPL-SCNC: 4.6 MMOL/L — SIGNIFICANT CHANGE UP (ref 3.5–5.3)
PROT SERPL-MCNC: 7.2 G/DL — SIGNIFICANT CHANGE UP (ref 6–8.3)
RBC # BLD: 4.85 M/UL — SIGNIFICANT CHANGE UP (ref 4.2–5.8)
RBC # FLD: 19.3 % — HIGH (ref 10.3–14.5)
SODIUM SERPL-SCNC: 140 MMOL/L — SIGNIFICANT CHANGE UP (ref 135–145)
WBC # BLD: 4.42 K/UL — SIGNIFICANT CHANGE UP (ref 3.8–10.5)
WBC # FLD AUTO: 4.42 K/UL — SIGNIFICANT CHANGE UP (ref 3.8–10.5)

## 2020-01-16 PROCEDURE — 99223 1ST HOSP IP/OBS HIGH 75: CPT

## 2020-01-16 RX ORDER — MAGNESIUM SULFATE 500 MG/ML
4 VIAL (ML) INJECTION ONCE
Refills: 0 | Status: COMPLETED | OUTPATIENT
Start: 2020-01-16 | End: 2020-01-16

## 2020-01-16 RX ADMIN — Medication 100 GRAM(S): at 07:09

## 2020-01-16 RX ADMIN — ATORVASTATIN CALCIUM 40 MILLIGRAM(S): 80 TABLET, FILM COATED ORAL at 21:45

## 2020-01-16 RX ADMIN — APIXABAN 5 MILLIGRAM(S): 2.5 TABLET, FILM COATED ORAL at 17:18

## 2020-01-16 RX ADMIN — Medication 1 TABLET(S): at 11:23

## 2020-01-16 RX ADMIN — APIXABAN 5 MILLIGRAM(S): 2.5 TABLET, FILM COATED ORAL at 06:26

## 2020-01-16 RX ADMIN — Medication 100 MILLIGRAM(S): at 11:23

## 2020-01-16 RX ADMIN — Medication 1 MILLIGRAM(S): at 11:23

## 2020-01-16 RX ADMIN — Medication 100 MILLIGRAM(S): at 06:26

## 2020-01-16 RX ADMIN — Medication 25 MILLIGRAM(S): at 17:18

## 2020-01-16 RX ADMIN — PANTOPRAZOLE SODIUM 40 MILLIGRAM(S): 20 TABLET, DELAYED RELEASE ORAL at 06:26

## 2020-01-16 RX ADMIN — Medication 25 MILLIGRAM(S): at 11:23

## 2020-01-16 NOTE — BEHAVIORAL HEALTH ASSESSMENT NOTE - NSBHSOCIALHXDETAILSFT_PSY_A_CORE
As per chart, patient went to  on full scholarship.   twice.  Three children.   Has 13 year old daughter whom he sees regularly. Reports having a caring relationship with her.  On disability for cardiac disease.  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 Wants to get back to martial arts.   His son moved into his apt which pt reports to be stressful.

## 2020-01-16 NOTE — DISCHARGE NOTE PROVIDER - NSDCCPCAREPLAN_GEN_ALL_CORE_FT
PRINCIPAL DISCHARGE DIAGNOSIS  Diagnosis: Alcohol withdrawal  Assessment and Plan of Treatment: You came to the emergency department after drinking alcohol and you were starting to exhibit signs of alcohol withdrawal. You were admitted to the hospital and started on a Librium taper. You were monitored durnig your hospital stay for improvement in your symptoms and you are now stable for discharge. Continued alcohol use is detrimental to your health and we recommend that you continue seeking outpatient treatment for sobriety as you have in the past.

## 2020-01-16 NOTE — BEHAVIORAL HEALTH ASSESSMENT NOTE - CASE SUMMARY
Pt seen; chart reviewed; discussed with team. Agree with Dr. Tineo's evaluation and recommendations.  Pt known to me from prior evaluations. His presentation, irritability, resistance to both follow up tx and medication echo his prior evaluations.  Unfortunately, pt with limited insight and judgment regarding negative impact of his ongoing EtOH abuse on both his physical and psychological well-being, but he does not meet criteria for involuntary inpt psych admit.   Pt to be provided with referral for outpt f/u, should he decide to consider supportive tx and possible rx.

## 2020-01-16 NOTE — BEHAVIORAL HEALTH ASSESSMENT NOTE - NSBHCHARTREVIEWVS_PSY_A_CORE FT
Vital Signs Last 24 Hrs  T(C): 36.4 (16 Jan 2020 13:31), Max: 37.1 (16 Jan 2020 05:10)  T(F): 97.6 (16 Jan 2020 13:31), Max: 98.8 (16 Jan 2020 05:10)  HR: 75 (16 Jan 2020 13:31) (75 - 86)  BP: 128/88 (16 Jan 2020 13:31) (128/88 - 146/87)  BP(mean): --  RR: 17 (16 Jan 2020 13:31) (17 - 18)  SpO2: 100% (16 Jan 2020 13:31) (98% - 100%)

## 2020-01-16 NOTE — BEHAVIORAL HEALTH ASSESSMENT NOTE - NSBHCONSULTFOLLOWAFTERCARE_PSY_A_CORE FT
Please provide following information in discharge paperwork:  [facility]  [address], [place] [address]  [phone]

## 2020-01-16 NOTE — BEHAVIORAL HEALTH ASSESSMENT NOTE - NSBHCHARTREVIEWLAB_PSY_A_CORE FT
12.5   4.42  )-----------( 92       ( 16 Jan 2020 05:41 )             40.0   01-16    140  |  105  |  14  ----------------------------<  96  4.6   |  24  |  1.04    Ca    9.2      16 Jan 2020 05:41  Phos  3.8     01-15  Mg     1.6     01-16    TPro  7.2  /  Alb  4.1  /  TBili  0.3  /  DBili  x   /  AST  24  /  ALT  19  /  AlkPhos  63  01-16

## 2020-01-16 NOTE — BEHAVIORAL HEALTH ASSESSMENT NOTE - HPI (INCLUDE ILLNESS QUALITY, SEVERITY, DURATION, TIMING, CONTEXT, MODIFYING FACTORS, ASSOCIATED SIGNS AND SYMPTOMS)
Patient is a 64 year old , domiciled,  male on disability for cardiac illness with hx of ETOH use disorder, no withdrawal seizures or DTs, no pphx (no inpatient psych admissions, no SA/SIB hx) extensive pmhx including HIT, paroxsymal AFib, hx of LV thrombus, CAD s/p PCI, HFrEF (35-40%) s/p AICD is admitted to the hospital for ETOH detox is known to Dr. Montalvo/CL team as patient has been seen for consults multiple times. Upon approach patient is seen standing by the nursing station requesting a piece of paper to write down his symptoms "so I don't forget them." Patient is calm, cooperative, agrees to assessment but appears irritable. Patient reports that he still feels uncomfortable given withdrawal symptoms of tremors but feels "a lot better than before." Patient reports that he has been drinking for 41 years but its has become more severe over the past 5 years after he  his ex wife of 10 years for cheating on him. Patient reports that he binges on alcohol every month or so. Most recently patient was drinking for about 10 days, on the most recent day of use he drank 1 pint and 4 "airplane bottles" of alcohol. Patient reports that he has been sleeping poorly, his mood is "depressed or angry." Patient reports decreased appetite. Patient contributes his low mood to his "family life" and financial stressors. 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 Patient denies any hopelessness, SI/HI and/or AVH. Patient endorses being Confucianism, a "bible Restoration." Patient does report having worries regarding his family life as he is estranged from his ex wife. Patient states that he does not want to see a therapist or take any medications because he "knows as much as a doctor." Patient reports that he reads and studies philosophy. Patient also reports that he turns to meditation, exercise and martial arts for his wellness.

## 2020-01-16 NOTE — BEHAVIORAL HEALTH ASSESSMENT NOTE - SUICIDE PROTECTIVE FACTORS
Yazdanism beliefs/Fear of death or the actual act of killing self/Identifies reasons for living/Has future plans/Responsibility to family and others

## 2020-01-16 NOTE — BEHAVIORAL HEALTH ASSESSMENT NOTE - SUMMARY
Patient is a 63 year old male with chronic history of etoh use disorder, no formal psych dx or tx however has been assessed by CL service on multiple admission for similar presentation, complicated pmhx including cardiac disease admitted to the hospital for alcohol detox. As per primary team patient is likely planned for discharge tomorrow if patient remains stable. Patient is provided with referrals, psychoeducation about medication including Vivitrol for etoh use, as well as severe risks of etoh use such as cardiac myopathy among others. However patient is not amenable to following up with outpatient care for psychiatric care and/or therapy at this time. Patient also declines rehab services.  Patient does not appear to have any acute mood and/or psychosis symptoms at this time. Primary team may provide him with information for [facility] and also encourage patient to consider engaging in services.

## 2020-01-16 NOTE — BEHAVIORAL HEALTH ASSESSMENT NOTE - NSBHADMITCOUNSEL_PSY_A_CORE
risk factor reduction/prognosis/instructions for management, treatment and follow up/client/family/caregiver education/diagnostic results/impressions and/or recommended studies/risks and benefits of treatment options/importance of adherence to chosen treatment

## 2020-01-16 NOTE — BEHAVIORAL HEALTH ASSESSMENT NOTE - CONSEQUENCES
As per chart, patient gets "shakes," Hx/o blackouts, no seizures known reports using Geeta, ecstasy in the 80's and 90's

## 2020-01-16 NOTE — BEHAVIORAL HEALTH ASSESSMENT NOTE - RISK ASSESSMENT
Low Acute Suicide Risk Patient has risk factors of chronic history of etoh use, age, gender which are mitigated by no hx of inpatient psych admissions, no SA/SIB hx, no formal psych history, being Anabaptism, and identifying reasons to live.

## 2020-01-16 NOTE — PROGRESS NOTE ADULT - SUBJECTIVE AND OBJECTIVE BOX
Pt c/p shakiness   after a shower     HR still elevated     PAST MEDICAL & SURGICAL HISTORY:  ETOH abuse  Hyperlipidemia  Heparin-induced thrombocytopenia: Antibody positive  Left ventricular thrombus  Atrial fibrillation  Hemorrhoid  ICD (implantable cardioverter-defibrillator) in place  Myocardial infarction involving other coronary artery  Gastritis  Atherosclerosis of coronary artery: Coronary artery disease  Automatic implantable cardiac defibrillator in situ: ICD (implantable cardioverter-defibrillator) in place      MEDICATIONS  (STANDING):  apixaban 5 milliGRAM(s) Oral two times a day  atorvastatin 40 milliGRAM(s) Oral at bedtime  chlordiazePOXIDE 25 milliGRAM(s) Oral every 12 hours  folic acid 1 milliGRAM(s) Oral daily  metoprolol succinate  milliGRAM(s) Oral daily  multivitamin 1 Tablet(s) Oral daily  pantoprazole    Tablet 40 milliGRAM(s) Oral before breakfast  thiamine 100 milliGRAM(s) Oral daily    MEDICATIONS  (PRN):  LORazepam   Injectable 2 milliGRAM(s) IV Push every 2 hours PRN Symptom-triggered: each CIWA -Ar score 8 or GREATER      ICU Vital Signs Last 24 Hrs  T(C): 37.1 (16 Jan 2020 05:10), Max: 37.1 (16 Jan 2020 05:10)  T(F): 98.8 (16 Jan 2020 05:10), Max: 98.8 (16 Jan 2020 05:10)  HR: 86 (16 Jan 2020 05:10) (86 - 90)  BP: 146/87 (16 Jan 2020 05:10) (133/80 - 146/87)  BP(mean): --  ABP: --  ABP(mean): --  RR: 18 (16 Jan 2020 05:10) (18 - 19)  SpO2: 98% (16 Jan 2020 05:10) (98% - 98%)      Lungs decreased  breath sounds at bases  CV  s1s2    abd soft  Etx stable                          12.5   4.42  )-----------( 92       ( 16 Jan 2020 05:41 )             40.0   01-16    140  |  105  |  14  ----------------------------<  96  4.6   |  24  |  1.04    Ca    9.2      16 Jan 2020 05:41  Phos  3.8     01-15  Mg     1.6     01-16    TPro  7.2  /  Alb  4.1  /  TBili  0.3  /  DBili  x   /  AST  24  /  ALT  19  /  AlkPhos  63  01-16    CXR  neg

## 2020-01-16 NOTE — DISCHARGE NOTE PROVIDER - HOSPITAL COURSE
Mr. Torrez is a 63-year-old male with a past medical history of HIT, ETOH abuse, paroxysmal A-fib with an LV thrombus, HLD, CAD status post PCI, HFrEF (35-40% 2019) status post AICD, hemorrhoids, gastritis, and a recent admission in August for alcohol withdrawal who presented with signs and symptoms of alcohol withdrawal. He was found to be in alcohol withdrawal and admitted for further management        Day of discharge physical exam unremarkable with the exception of a CIWA score of 5 at 6AM. A 12 point review of systems was negative with the exception of the following: Patient is up and showering but did note some spots in his vision when he got up from laying in bed overnight but they resolved. He was orthostatic negative at the time and did not have any balance issues.        Problem List/Main Diagnoses (system-based):     1. Alcohol Withdrawal - Librium Taper, Ativan PRN    2. Atrial Fibrillation - Eliquis    3. LV Thrombus - Echo with no LV thrombus - continue Eliquis    4. HFrEF - Toprol, Lipitor    5. CAD - Lipitor    6. HLD - Lipitor    7. Gastritis - Protonix        Inpatient treatment course: Admitted to telemetry with alcohol withdrawal and started on CIWA protocol. Librium tapered down until discharge    New medications: None    Labs to be followed outpatient: None    Exam to be followed outpatient: None

## 2020-01-16 NOTE — BEHAVIORAL HEALTH ASSESSMENT NOTE - DESCRIPTION (FIRST USE, LAST USE, QUANTITY, FREQUENCY, DURATION)
Patient reports that he has been drinking for 41 years but its has become more severe over the past 5 years after he  his ex wife of 10 years for cheating on him. Patient reports that he binges on alcohol every month or so. Most recently patient was drinking for about 10 days, on the most recent day of use he drank 1 pint and 4 "airplane bottles" of alcohol. Reports quit 1996

## 2020-01-16 NOTE — DISCHARGE NOTE PROVIDER - NSDCMRMEDTOKEN_GEN_ALL_CORE_FT
apixaban 5 mg oral tablet: 1 tab(s) orally 2 times a day   B Complex 50 oral tablet, extended release: 1 tab(s) orally once a day  folic acid 1 mg oral tablet: 1 tab(s) orally once a day  Lipitor 40 mg oral tablet: 1 tab(s) orally once a day (at bedtime)  Metoprolol Succinate  mg oral tablet, extended release: 1 tab(s) orally once a day   thiamine 100 mg oral tablet: 1 tab(s) orally once a day  Vitamin C 500 mg oral tablet: 1 tab(s) orally once a day

## 2020-01-16 NOTE — DISCHARGE NOTE NURSING/CASE MANAGEMENT/SOCIAL WORK - PATIENT PORTAL LINK FT
You can access the FollowMyHealth Patient Portal offered by [facility] by registering at the following website: [email or web address]. By joining [facility]’s FollowMyHealth portal, you will also be able to view your health information using other applications (apps) compatible with our system.

## 2020-01-16 NOTE — BEHAVIORAL HEALTH ASSESSMENT NOTE - NSBHLOC_PSY_A_CORE
2018        Irving Hawthorne       : 1984  [address] [place] [address]        To Whom It May Concern,    This is to certify that Irving was seen in the clinic on 2018. Please excuse Irving from work.            SIGNATURE:_________________________________________, 2018     DEWAYNE Guevara                                      [address] [address]    
Alert

## 2020-01-17 VITALS
RESPIRATION RATE: 17 BRPM | SYSTOLIC BLOOD PRESSURE: 137 MMHG | DIASTOLIC BLOOD PRESSURE: 85 MMHG | HEART RATE: 64 BPM | OXYGEN SATURATION: 100 % | TEMPERATURE: 98 F

## 2020-01-17 PROCEDURE — C8929: CPT

## 2020-01-17 PROCEDURE — 80053 COMPREHEN METABOLIC PANEL: CPT

## 2020-01-17 PROCEDURE — 80076 HEPATIC FUNCTION PANEL: CPT

## 2020-01-17 PROCEDURE — 80048 BASIC METABOLIC PNL TOTAL CA: CPT

## 2020-01-17 PROCEDURE — 83036 HEMOGLOBIN GLYCOSYLATED A1C: CPT

## 2020-01-17 PROCEDURE — 85730 THROMBOPLASTIN TIME PARTIAL: CPT

## 2020-01-17 PROCEDURE — 84484 ASSAY OF TROPONIN QUANT: CPT

## 2020-01-17 PROCEDURE — 71045 X-RAY EXAM CHEST 1 VIEW: CPT

## 2020-01-17 PROCEDURE — 99285 EMERGENCY DEPT VISIT HI MDM: CPT | Mod: 25

## 2020-01-17 PROCEDURE — 83690 ASSAY OF LIPASE: CPT

## 2020-01-17 PROCEDURE — 97116 GAIT TRAINING THERAPY: CPT

## 2020-01-17 PROCEDURE — 96374 THER/PROPH/DIAG INJ IV PUSH: CPT

## 2020-01-17 PROCEDURE — 80061 LIPID PANEL: CPT

## 2020-01-17 PROCEDURE — 84443 ASSAY THYROID STIM HORMONE: CPT

## 2020-01-17 PROCEDURE — 84100 ASSAY OF PHOSPHORUS: CPT

## 2020-01-17 PROCEDURE — 85610 PROTHROMBIN TIME: CPT

## 2020-01-17 PROCEDURE — 36415 COLL VENOUS BLD VENIPUNCTURE: CPT

## 2020-01-17 PROCEDURE — 83735 ASSAY OF MAGNESIUM: CPT

## 2020-01-17 PROCEDURE — 85027 COMPLETE CBC AUTOMATED: CPT

## 2020-01-17 PROCEDURE — 97161 PT EVAL LOW COMPLEX 20 MIN: CPT

## 2020-01-17 PROCEDURE — 85025 COMPLETE CBC W/AUTO DIFF WBC: CPT

## 2020-01-17 PROCEDURE — 82962 GLUCOSE BLOOD TEST: CPT

## 2020-01-17 PROCEDURE — 80307 DRUG TEST PRSMV CHEM ANLYZR: CPT

## 2020-01-17 RX ADMIN — Medication 1 TABLET(S): at 11:09

## 2020-01-17 RX ADMIN — Medication 100 MILLIGRAM(S): at 11:09

## 2020-01-17 RX ADMIN — PANTOPRAZOLE SODIUM 40 MILLIGRAM(S): 20 TABLET, DELAYED RELEASE ORAL at 06:17

## 2020-01-17 RX ADMIN — APIXABAN 5 MILLIGRAM(S): 2.5 TABLET, FILM COATED ORAL at 05:19

## 2020-01-17 RX ADMIN — Medication 1 MILLIGRAM(S): at 11:09

## 2020-01-17 RX ADMIN — Medication 100 MILLIGRAM(S): at 05:19

## 2020-01-17 NOTE — PROGRESS NOTE ADULT - PROBLEM SELECTOR PROBLEM 4
Chronic systolic heart failure

## 2020-01-17 NOTE — PROGRESS NOTE ADULT - PROBLEM SELECTOR PLAN 5
History of CAD with MI in 1996, not currently on ASA as with history of prior GIB  -no ASA given history of GIB  -c/w Lipitor 40 mg qHS  -c/w home medication, Toprol  mg daily
History of CAD with MI in 1996. No antiplatelets due to GI bleed history    -Continue Lipitor  -Continue Toprol XL
History of CAD with MI in 1996. No antiplatelets due to GI bleed history    -Continue Lipitor  -Continue Toprol XL
History of CAD with MI in 1996, not currently on antiplatelet therapy given hx of GIB  -no ASA given history of GIB  -c/w Lipitor 40 mg qhs  -c/w beta- blocker Toprol  mg daily  - No ACE/ARB given hx of allergy
History of CAD with MI in 1996, not currently on antiplatelet therapy given hx of GIB  -no ASA given history of GIB  -c/w Lipitor 40 mg qhs  -c/w beta- blocker Toprol  mg daily  - No ACE/ARB given hx of allergy

## 2020-01-17 NOTE — PROGRESS NOTE ADULT - PROBLEM SELECTOR PLAN 8
F: None  E: Replete PRN  N: DASH/TLC  DVT ppx: Eliquis, NO HEP in setting of HIT  CODE: FULL  Dispo: DC 1/17

## 2020-01-17 NOTE — PROGRESS NOTE ADULT - REASON FOR ADMISSION
EtOH withdrawal
Alcohol withdrawal
EtOH withdrawal

## 2020-01-17 NOTE — PROGRESS NOTE ADULT - PROBLEM SELECTOR PLAN 2
-c/w home medication Lipitor 40 mg qHS
History of A-fib on Eliquis and Toprol XL. CHADS-VASC 4, HAS-BLED 3.    -Continue Eliquis 5mg BID  -Continue Toprol XL 100mg QD
History of A-fib on Eliquis and Toprol XL. CHADS-VASC 4, HAS-BLED 3.    -Continue Eliquis 5mg BID  -Continue Toprol XL 100mg QD
History of Afib on eliquis and rate control with toprol XL.   CHADSVASc 4, HAS-BLED 3 (high risk for major bleeding)  EKG NSR with Q waves throughout anteroseptal leads with early repolarization in V1-3.   -c/w Eliquis 5 mg BID  -c/w Toprol  mg daily
History of Afib on eliquis and rate control with toprol XL.   CHADSVASc 4, HAS-BLED 3 (high risk for major bleeding)  EKG NSR with Q waves throughout anteroseptal leads with early repolarization in V1-3.   -c/w Eliquis 5 mg BID  -c/w Toprol  mg daily

## 2020-01-17 NOTE — PROGRESS NOTE ADULT - PROBLEM SELECTOR PLAN 3
History of Afib, CHADSVASc 4, HAS-BLED 3 (high risk for major bleeding), on Eliquis 5 mg BID and Toprol  mg daily, EKG with Q waves throughout anteroseptal leads with early repolarization in V1-3. Rate controlled  -c/w Eliquis 5 mg BID  -c/w Toprol  mg daily    #LV thrombus  Prior ECHO demonstrating LV thrombus, recurrent, will require life-long AC   -c/w home med Eliquis 5 mg BID  -Repeat ECHO while inpatient to re-assess LV thrombus
History of LV thrombus with no thrombus on most recent ECHO during this admission. Life-long AC required.    -Continue Eliquis 5mg BID
History of LV thrombus with no thrombus on most recent ECHO during this admission. Life-long AC required.    -Continue Eliquis 5mg BID
Hx of LV thrombus- echo in August negative, requires life-long AC on eliquis  Repeat echo (1/13/20): EF 30-35%, Akinesis of the mid to anteroseptum, mid to apical inferoseptum, all apical segments and true apex. No left ventricular thrombus noted  -c/w home med Eliquis 5 mg BID
Hx of LV thrombus- echo in August negative, requires life-long AC on eliquis  Repeat echo (1/13/20): EF 30-35%, Akinesis of the mid to anteroseptum, mid to apical inferoseptum, all apical segments and true apex. No left ventricular thrombus noted  -c/w home med Eliquis 5 mg BID

## 2020-01-17 NOTE — PROGRESS NOTE ADULT - SUBJECTIVE AND OBJECTIVE BOX
OVERNIGHT EVENTS: No acute events overnight.    SUBJECTIVE: Patient seen and examined at the bedside. He says he feels much better today and is ready to go home. He says he walked up and down the halls last night and felt much more steady.    Vital Signs Last 12 Hrs  T(F): 98.1 (01-17-20 @ 05:37), Max: 98.4 (01-16-20 @ 20:36)  HR: 64 (01-17-20 @ 05:37) (64 - 84)  BP: 137/85 (01-17-20 @ 05:37) (104/67 - 137/85)  BP(mean): --  RR: 17 (01-17-20 @ 05:37) (17 - 18)  SpO2: 100% (01-17-20 @ 05:37) (100% - 100%)  I&O's Summary    16 Jan 2020 07:01  -  17 Jan 2020 07:00  --------------------------------------------------------  IN: 0 mL / OUT: 400 mL / NET: -400 mL        PHYSICAL EXAM:  General: In no acute distress, resting comfortably in bed  HEENT: NCAT, PERRL, EOMI, no conjunctival pallor or scleral icterus, MMM  Neck: Supple, no JVD  Respiratory: Clear to auscultation bilaterally  Cardiovascular: RRR, normal S1 and S2  Vascular: 2+ radial and DP pulses  Abdomen: Soft, NT/ND. Bowel sounds present in all four quadrants  Extremities: Warm and well perfused. No clubbing, cyanosis, or edema noted  Skin: No gross skin abnormalities or rashes noted  Neuro: AAOx3 with no cranial nerve deficits. Strength and sensation intact throughout.    LABS: No new labs 1/17                        12.5   4.42  )-----------( 92       ( 16 Jan 2020 05:41 )             40.0     01-16    140  |  105  |  14  ----------------------------<  96  4.6   |  24  |  1.04    Ca    9.2      16 Jan 2020 05:41  Mg     1.6     01-16    TPro  7.2  /  Alb  4.1  /  TBili  0.3  /  DBili  x   /  AST  24  /  ALT  19  /  AlkPhos  63  01-16          RADIOLOGY & ADDITIONAL TESTS: Reviewed.    MEDICATIONS  (STANDING):  apixaban 5 milliGRAM(s) Oral two times a day  atorvastatin 40 milliGRAM(s) Oral at bedtime  folic acid 1 milliGRAM(s) Oral daily  metoprolol succinate  milliGRAM(s) Oral daily  multivitamin 1 Tablet(s) Oral daily  pantoprazole    Tablet 40 milliGRAM(s) Oral before breakfast  thiamine 100 milliGRAM(s) Oral daily    MEDICATIONS  (PRN):  LORazepam   Injectable 2 milliGRAM(s) IV Push every 2 hours PRN Symptom-triggered: each CIWA -Ar score 8 or GREATER      Allergies    Capoten (Short breath; Rash; Hives)  heparin (Other (Mod to Severe))  penicillin (Short breath; Rash; Hives)    Intolerances

## 2020-01-17 NOTE — PROGRESS NOTE ADULT - SUBJECTIVE AND OBJECTIVE BOX
Pt is improved   and feels better     PAST MEDICAL & SURGICAL HISTORY:  ETOH abuse  Hyperlipidemia  Heparin-induced thrombocytopenia: Antibody positive  Left ventricular thrombus  Atrial fibrillation  Hemorrhoid  ICD (implantable cardioverter-defibrillator) in place  Myocardial infarction involving other coronary artery  Gastritis  Atherosclerosis of coronary artery: Coronary artery disease  Automatic implantable cardiac defibrillator in situ: ICD (implantable cardioverter-defibrillator) in place    MEDICATIONS  (STANDING):  apixaban 5 milliGRAM(s) Oral two times a day  atorvastatin 40 milliGRAM(s) Oral at bedtime  folic acid 1 milliGRAM(s) Oral daily  metoprolol succinate  milliGRAM(s) Oral daily  multivitamin 1 Tablet(s) Oral daily  pantoprazole    Tablet 40 milliGRAM(s) Oral before breakfast  thiamine 100 milliGRAM(s) Oral daily    Home Medications:  B Complex 50 oral tablet, extended release: 1 tab(s) orally once a day (11 Sep 2019 20:46)  Lipitor 40 mg oral tablet: 1 tab(s) orally once a day (at bedtime) (12 Jan 2020 15:08)  thiamine 100 mg oral tablet: 1 tab(s) orally once a day (02 Oct 2019 07:10)  Vitamin C 500 mg oral tablet: 1 tab(s) orally once a day (11 Sep 2019 20:46)      lungs  clear     Cv  s1 s 2    ab dsoft  Etx stable                          12.5   4.42  )-----------( 92       ( 16 Jan 2020 05:41 )             40.0   01-16    140  |  105  |  14  ----------------------------<  96  4.6   |  24  |  1.04    Ca    9.2      16 Jan 2020 05:41  Mg     1.6     01-16    TPro  7.2  /  Alb  4.1  /  TBili  0.3  /  DBili  x   /  AST  24  /  ALT  19  /  AlkPhos  63  01-16

## 2020-01-17 NOTE — PROGRESS NOTE ADULT - PROBLEM SELECTOR PROBLEM 3
Atrial fibrillation
Left ventricular thrombus

## 2020-01-17 NOTE — PROGRESS NOTE ADULT - ASSESSMENT
62 y/o male former smoker PMH HIT,  ETOH abuse, paroxsymal AFib (hx of LV thrombus, on Eliquis, most recent echo 8/2019 without evidence of thrombus), HLD, CAD s/p PCI, HFrEF (35-40% 8/12/2019) s/p AICD (last interrogation 8/12/19- WNL), hemorrhoids, gastritis, with multiple prior admissions for EtOH withdrawal, presenting acutely intoxicated with EtOH level 185, admitted for EtOH withdrawal.
63-year-old male with a past medical history of HIT, ETOH abuse, paroxysmal A-fib with an LV thrombus, HLD, CAD status post PCI, HFrEF (35-40% 2019) status post AICD, hemorrhoids, gastritis, and a recent admission in August for alcohol withdrawal who presented with signs and symptoms of alcohol withdrawal.
63-year-old male with a past medical history of HIT, ETOH abuse, paroxysmal A-fib with an LV thrombus, HLD, CAD status post PCI, HFrEF (35-40% 2019) status post AICD, hemorrhoids, gastritis, and a recent admission in August for alcohol withdrawal who presented with signs and symptoms of alcohol withdrawal.
Alcohol Withdrawal      cont med RX     Cardiomyopathy  CAD   S/p PTCA placements  with   stents      med Rx      discharge when stable
Alcohol Withdrawal     cont current tx     Cardiomyopathy   CAD   HTN      Med management
CAD  CHF  stable      s/p Ethanolism      cont withdrawal rx
Cardiomyopathy CAD  Alcoholism  s/p withdrawal     cont RX       for d/c plan
Ethanolism   For  withdrawl protocol   CAD   Cardiomyopathy  LV Thrombus     cont current meds      L Mildred
63M former smoker PMH HIT,  ETOH abuse, paroxsymal AFib and hx of LV thrombus (on Eliquis), HLD, CAD s/p PCI, HFrEF (35-40% 8/12/2019) s/p AICD, hemorrhoids, gastritis, with multiple prior admissions for EtOH withdrawal, presented acutely intoxicated with EtOH level 185 and admitted for EtOH withdrawal in setting of tachycardia.
63M former smoker PMH HIT,  ETOH abuse, paroxsymal AFib and hx of LV thrombus (on Eliquis), HLD, CAD s/p PCI, HFrEF (35-40% 8/12/2019) s/p AICD, hemorrhoids, gastritis, with multiple prior admissions for EtOH withdrawal, presented acutely intoxicated with EtOH level 185 and admitted for EtOH withdrawal in setting of tachycardia.

## 2020-01-17 NOTE — PROGRESS NOTE ADULT - PROBLEM SELECTOR PLAN 4
HFrEF (30-35% 1/2020) status post AICD on Toprol, Lipitor, no ACE secondary to allergy    -Continue Toprol  -Continue Lipitor  -No ACE/ARB
HFrEF (30-35% 1/2020) status post AICD on Toprol, Lipitor, no ACE secondary to allergy    -Continue Toprol  -Continue Lipitor  -No ACE/ARB
History of HFrEF (EF 35-40% in Aug 2019), s/p AICD, on Toprol  mg daily, Lipitor 40 mg qHS, no ACE-i/ARB given history of allergy c/w angioedema  -no ACE-i/ARB given history of allergy c/w angioedema  -c/w Toprol  mg daily   -no ASA 81 mg daily given history of GIB  -c/w Lipitor 40 mg qHS  -Repeat ECHO while inpatient to re-assess EF and LV thrombus
Hx HFrEF (EF 35-40% in Aug 2019), s/p AICD, on Toprol  mg daily, Lipitor 40 mg qHS, no ACE-i/ARB given history of allergy c/w angioedema  Euvolemic on exam, lungs clear, no JVD, no LE edema  ECHO (1/13/20): EF at 30-35% Akinesis of the mid to anteroseptum, mid to apical inferoseptum, all apical segments and true apex. No left ventricular thrombus noted  -no ACE/ARB given history of allergy c/w angioedema  -c/w beta blocker: Toprol  mg daily   -c/w Lipitor 40 mg qHS
Hx HFrEF (EF 35-40% in Aug 2019), s/p AICD, on Toprol  mg daily, Lipitor 40 mg qHS, no ACE-i/ARB given history of allergy c/w angioedema  Euvolemic on exam, lungs clear, no JVD, no LE edema  ECHO (1/13/20): EF at 30-35% Akinesis of the mid to anteroseptum, mid to apical inferoseptum, all apical segments and true apex. No left ventricular thrombus noted  -no ACE/ARB given history of allergy c/w angioedema  -c/w beta blocker: Toprol  mg daily   -c/w Lipitor 40 mg qHS

## 2020-01-17 NOTE — PROGRESS NOTE ADULT - PROBLEM SELECTOR PLAN 6
History of HLD on Lipitor    -Continue Lipitor
History of HLD on Lipitor    -Continue Lipitor
Patient with history of gastritis, likely 2/2 chronic EtOH use, no endorsing any symptoms at this time  -Protonix 40 mg PO daily while inpatient
Hx of HLD on Lipitor  -c/w statin
Hx of HLD on Lipitor  -c/w statin

## 2020-01-21 NOTE — ED PROVIDER NOTE - PMH
Atherosclerosis of coronary artery  Coronary artery disease  ETOH abuse    Gastritis    Myocardial infarction involving other coronary artery    Pacemaker    Paroxysmal a-fib
Impulsivity

## 2020-01-22 DIAGNOSIS — Z79.01 LONG TERM (CURRENT) USE OF ANTICOAGULANTS: ICD-10-CM

## 2020-01-22 DIAGNOSIS — I48.0 PAROXYSMAL ATRIAL FIBRILLATION: ICD-10-CM

## 2020-01-22 DIAGNOSIS — E78.5 HYPERLIPIDEMIA, UNSPECIFIED: ICD-10-CM

## 2020-01-22 DIAGNOSIS — F41.9 ANXIETY DISORDER, UNSPECIFIED: ICD-10-CM

## 2020-01-22 DIAGNOSIS — I42.9 CARDIOMYOPATHY, UNSPECIFIED: ICD-10-CM

## 2020-01-22 DIAGNOSIS — F10.239 ALCOHOL DEPENDENCE WITH WITHDRAWAL, UNSPECIFIED: ICD-10-CM

## 2020-01-22 DIAGNOSIS — I25.10 ATHEROSCLEROTIC HEART DISEASE OF NATIVE CORONARY ARTERY WITHOUT ANGINA PECTORIS: ICD-10-CM

## 2020-01-22 DIAGNOSIS — I25.2 OLD MYOCARDIAL INFARCTION: ICD-10-CM

## 2020-01-22 DIAGNOSIS — Z79.82 LONG TERM (CURRENT) USE OF ASPIRIN: ICD-10-CM

## 2020-01-22 DIAGNOSIS — Z86.718 PERSONAL HISTORY OF OTHER VENOUS THROMBOSIS AND EMBOLISM: ICD-10-CM

## 2020-01-22 DIAGNOSIS — Z88.0 ALLERGY STATUS TO PENICILLIN: ICD-10-CM

## 2020-01-22 DIAGNOSIS — Z98.61 CORONARY ANGIOPLASTY STATUS: ICD-10-CM

## 2020-01-22 DIAGNOSIS — K29.20 ALCOHOLIC GASTRITIS WITHOUT BLEEDING: ICD-10-CM

## 2020-01-22 DIAGNOSIS — Z88.8 ALLERGY STATUS TO OTHER DRUGS, MEDICAMENTS AND BIOLOGICAL SUBSTANCES STATUS: ICD-10-CM

## 2020-01-22 DIAGNOSIS — I50.22 CHRONIC SYSTOLIC (CONGESTIVE) HEART FAILURE: ICD-10-CM

## 2020-01-22 DIAGNOSIS — Z87.891 PERSONAL HISTORY OF NICOTINE DEPENDENCE: ICD-10-CM

## 2020-01-22 DIAGNOSIS — Z95.810 PRESENCE OF AUTOMATIC (IMPLANTABLE) CARDIAC DEFIBRILLATOR: ICD-10-CM

## 2020-01-22 NOTE — PROGRESS NOTE ADULT - PROBLEM SELECTOR PLAN 1
Left message to return call    INR 1.8 today (goal INR as per Dr. Everett due to h/o GI bleeding)   Pt received high doses of Coumadin this admission to bring INR to 1.8.  Hold Coumadin and consider starting Heparin in AM as per Dr. Everett based on INR  Echo 1/8/18: EF 35%, LVH, apical and mid interventricular septum, apical anterior, apical inferior are all akinetic.  The true apex appears dyskinetic.  There is an apical LV thrombus measuring 2 x 1.7 cm. LV clot increased from 1cm in 9/2017.  Hypercoagulable workup sent

## 2020-03-07 NOTE — PROGRESS NOTE ADULT - PROBLEM SELECTOR PLAN 3
Echo on 10/15/18 showing EF 35%, LV apical thrombus 2.0 cm in diameter   -home apixaban 5mg po bid stopped 11/4 evening due to bleeding hemorrhoids. restarted AM 11/5 as no complaints of bleeding hemorrhoid and Hb stable and then stopped PM 11/5 as had another episode of bleeding hemorrhoid, Hb stable, vitals stable  -no hemorrhoidal bleeding today so restart apixaban at lower dose 2.5mg Po bid  -cannot use warfarin as had bleeding on it in past. 30y  s/p primary  section for arrest of dilation and category II tracing. Post-operative course complicated by gas-pain for which she received Dilaudid with alleviation of pain.  She had an uncomplicated surgery and postpartum course. She was discharged home in stable condition.

## 2020-03-09 NOTE — H&P ADULT - NSHPSOCIALHISTORY_GEN_ALL_CORE
97.2
Lives at home, has custody of 11 year old daughter on weekends.   Drinks 1 pint or so of vodka "every few days" but has had periods up to a few months where he has been sober. Spends much of his day at home in bed or on the couch watching TV and playing on his tablet. Social supports include his brother. Stressors include conflict with his ex-wife.   Denies tobacco, IV and recreational drug use. Remote hx of cocaine use.

## 2020-03-19 ENCOUNTER — TRANSCRIPTION ENCOUNTER (OUTPATIENT)
Age: 65
End: 2020-03-19

## 2020-03-19 ENCOUNTER — INPATIENT (INPATIENT)
Facility: HOSPITAL | Age: 65
LOS: 0 days | Discharge: HOME | DRG: 897 | End: 2020-03-19
Payer: MEDICARE

## 2020-03-19 VITALS
RESPIRATION RATE: 18 BRPM | HEART RATE: 83 BPM | TEMPERATURE: 98 F | DIASTOLIC BLOOD PRESSURE: 74 MMHG | SYSTOLIC BLOOD PRESSURE: 113 MMHG | OXYGEN SATURATION: 99 %

## 2020-03-19 VITALS
RESPIRATION RATE: 18 BRPM | TEMPERATURE: 99 F | WEIGHT: 160.06 LBS | HEART RATE: 103 BPM | OXYGEN SATURATION: 96 % | HEIGHT: 70 IN | DIASTOLIC BLOOD PRESSURE: 89 MMHG | SYSTOLIC BLOOD PRESSURE: 125 MMHG

## 2020-03-19 DIAGNOSIS — I25.10 ATHEROSCLEROTIC HEART DISEASE OF NATIVE CORONARY ARTERY WITHOUT ANGINA PECTORIS: ICD-10-CM

## 2020-03-19 DIAGNOSIS — R63.8 OTHER SYMPTOMS AND SIGNS CONCERNING FOOD AND FLUID INTAKE: ICD-10-CM

## 2020-03-19 DIAGNOSIS — S01.81XA LACERATION WITHOUT FOREIGN BODY OF OTHER PART OF HEAD, INITIAL ENCOUNTER: ICD-10-CM

## 2020-03-19 DIAGNOSIS — K59.00 CONSTIPATION, UNSPECIFIED: ICD-10-CM

## 2020-03-19 DIAGNOSIS — I50.22 CHRONIC SYSTOLIC (CONGESTIVE) HEART FAILURE: ICD-10-CM

## 2020-03-19 DIAGNOSIS — I51.3 INTRACARDIAC THROMBOSIS, NOT ELSEWHERE CLASSIFIED: ICD-10-CM

## 2020-03-19 DIAGNOSIS — I48.91 UNSPECIFIED ATRIAL FIBRILLATION: ICD-10-CM

## 2020-03-19 DIAGNOSIS — F10.230 ALCOHOL DEPENDENCE WITH WITHDRAWAL, UNCOMPLICATED: ICD-10-CM

## 2020-03-19 DIAGNOSIS — D64.9 ANEMIA, UNSPECIFIED: ICD-10-CM

## 2020-03-19 DIAGNOSIS — R94.5 ABNORMAL RESULTS OF LIVER FUNCTION STUDIES: ICD-10-CM

## 2020-03-19 PROBLEM — F10.10 ALCOHOL ABUSE, UNCOMPLICATED: Chronic | Status: ACTIVE | Noted: 2020-01-12

## 2020-03-19 PROBLEM — E78.5 HYPERLIPIDEMIA, UNSPECIFIED: Chronic | Status: ACTIVE | Noted: 2020-01-12

## 2020-03-19 LAB
ALBUMIN SERPL ELPH-MCNC: 4.6 G/DL — SIGNIFICANT CHANGE UP (ref 3.3–5)
ALP SERPL-CCNC: 80 U/L — SIGNIFICANT CHANGE UP (ref 40–120)
ALT FLD-CCNC: 39 U/L — SIGNIFICANT CHANGE UP (ref 10–45)
ANION GAP SERPL CALC-SCNC: 21 MMOL/L — HIGH (ref 5–17)
APTT BLD: 31.4 SEC — SIGNIFICANT CHANGE UP (ref 27.5–36.3)
AST SERPL-CCNC: 69 U/L — HIGH (ref 10–40)
BASOPHILS # BLD AUTO: 0.01 K/UL — SIGNIFICANT CHANGE UP (ref 0–0.2)
BASOPHILS NFR BLD AUTO: 0.2 % — SIGNIFICANT CHANGE UP (ref 0–2)
BILIRUB SERPL-MCNC: 1.6 MG/DL — HIGH (ref 0.2–1.2)
BUN SERPL-MCNC: 16 MG/DL — SIGNIFICANT CHANGE UP (ref 7–23)
CALCIUM SERPL-MCNC: 9.6 MG/DL — SIGNIFICANT CHANGE UP (ref 8.4–10.5)
CHLORIDE SERPL-SCNC: 91 MMOL/L — LOW (ref 96–108)
CO2 SERPL-SCNC: 24 MMOL/L — SIGNIFICANT CHANGE UP (ref 22–31)
CREAT SERPL-MCNC: 0.99 MG/DL — SIGNIFICANT CHANGE UP (ref 0.5–1.3)
EOSINOPHIL # BLD AUTO: 0 K/UL — SIGNIFICANT CHANGE UP (ref 0–0.5)
EOSINOPHIL NFR BLD AUTO: 0 % — SIGNIFICANT CHANGE UP (ref 0–6)
GAS PNL BLDV: SIGNIFICANT CHANGE UP
GLUCOSE SERPL-MCNC: 133 MG/DL — HIGH (ref 70–99)
HCT VFR BLD CALC: 38.9 % — LOW (ref 39–50)
HGB BLD-MCNC: 12.8 G/DL — LOW (ref 13–17)
IMM GRANULOCYTES NFR BLD AUTO: 0.6 % — SIGNIFICANT CHANGE UP (ref 0–1.5)
INR BLD: 1.26 — HIGH (ref 0.88–1.16)
LACTATE SERPL-SCNC: 2 MMOL/L — SIGNIFICANT CHANGE UP (ref 0.5–2)
LYMPHOCYTES # BLD AUTO: 0.81 K/UL — LOW (ref 1–3.3)
LYMPHOCYTES # BLD AUTO: 16.3 % — SIGNIFICANT CHANGE UP (ref 13–44)
MCHC RBC-ENTMCNC: 26.7 PG — LOW (ref 27–34)
MCHC RBC-ENTMCNC: 32.9 GM/DL — SIGNIFICANT CHANGE UP (ref 32–36)
MCV RBC AUTO: 81 FL — SIGNIFICANT CHANGE UP (ref 80–100)
MONOCYTES # BLD AUTO: 0.24 K/UL — SIGNIFICANT CHANGE UP (ref 0–0.9)
MONOCYTES NFR BLD AUTO: 4.8 % — SIGNIFICANT CHANGE UP (ref 2–14)
NEUTROPHILS # BLD AUTO: 3.87 K/UL — SIGNIFICANT CHANGE UP (ref 1.8–7.4)
NEUTROPHILS NFR BLD AUTO: 78.1 % — HIGH (ref 43–77)
NRBC # BLD: 0 /100 WBCS — SIGNIFICANT CHANGE UP (ref 0–0)
PLATELET # BLD AUTO: 64 K/UL — LOW (ref 150–400)
POTASSIUM SERPL-MCNC: 3.9 MMOL/L — SIGNIFICANT CHANGE UP (ref 3.5–5.3)
POTASSIUM SERPL-SCNC: 3.9 MMOL/L — SIGNIFICANT CHANGE UP (ref 3.5–5.3)
PROT SERPL-MCNC: 8 G/DL — SIGNIFICANT CHANGE UP (ref 6–8.3)
PROTHROM AB SERPL-ACNC: 14.5 SEC — HIGH (ref 10–12.9)
RBC # BLD: 4.8 M/UL — SIGNIFICANT CHANGE UP (ref 4.2–5.8)
RBC # FLD: 18.6 % — HIGH (ref 10.3–14.5)
SODIUM SERPL-SCNC: 136 MMOL/L — SIGNIFICANT CHANGE UP (ref 135–145)
WBC # BLD: 4.96 K/UL — SIGNIFICANT CHANGE UP (ref 3.8–10.5)
WBC # FLD AUTO: 4.96 K/UL — SIGNIFICANT CHANGE UP (ref 3.8–10.5)

## 2020-03-19 PROCEDURE — 85730 THROMBOPLASTIN TIME PARTIAL: CPT

## 2020-03-19 PROCEDURE — 80053 COMPREHEN METABOLIC PANEL: CPT

## 2020-03-19 PROCEDURE — 85025 COMPLETE CBC W/AUTO DIFF WBC: CPT

## 2020-03-19 PROCEDURE — 96374 THER/PROPH/DIAG INJ IV PUSH: CPT

## 2020-03-19 PROCEDURE — 70450 CT HEAD/BRAIN W/O DYE: CPT | Mod: 26

## 2020-03-19 PROCEDURE — 82803 BLOOD GASES ANY COMBINATION: CPT

## 2020-03-19 PROCEDURE — 93010 ELECTROCARDIOGRAM REPORT: CPT

## 2020-03-19 PROCEDURE — 99223 1ST HOSP IP/OBS HIGH 75: CPT | Mod: GC

## 2020-03-19 PROCEDURE — 85610 PROTHROMBIN TIME: CPT

## 2020-03-19 PROCEDURE — 82962 GLUCOSE BLOOD TEST: CPT

## 2020-03-19 PROCEDURE — 82553 CREATINE MB FRACTION: CPT

## 2020-03-19 PROCEDURE — 36415 COLL VENOUS BLD VENIPUNCTURE: CPT

## 2020-03-19 PROCEDURE — 99285 EMERGENCY DEPT VISIT HI MDM: CPT | Mod: 25

## 2020-03-19 PROCEDURE — 96376 TX/PRO/DX INJ SAME DRUG ADON: CPT

## 2020-03-19 PROCEDURE — 93005 ELECTROCARDIOGRAM TRACING: CPT

## 2020-03-19 PROCEDURE — 83605 ASSAY OF LACTIC ACID: CPT

## 2020-03-19 PROCEDURE — 84295 ASSAY OF SERUM SODIUM: CPT

## 2020-03-19 PROCEDURE — 82550 ASSAY OF CK (CPK): CPT

## 2020-03-19 PROCEDURE — 99285 EMERGENCY DEPT VISIT HI MDM: CPT

## 2020-03-19 PROCEDURE — 70450 CT HEAD/BRAIN W/O DYE: CPT

## 2020-03-19 PROCEDURE — 83735 ASSAY OF MAGNESIUM: CPT

## 2020-03-19 PROCEDURE — 84484 ASSAY OF TROPONIN QUANT: CPT

## 2020-03-19 PROCEDURE — 99221 1ST HOSP IP/OBS SF/LOW 40: CPT | Mod: GC

## 2020-03-19 PROCEDURE — 84132 ASSAY OF SERUM POTASSIUM: CPT

## 2020-03-19 PROCEDURE — 96375 TX/PRO/DX INJ NEW DRUG ADDON: CPT

## 2020-03-19 PROCEDURE — 84100 ASSAY OF PHOSPHORUS: CPT

## 2020-03-19 PROCEDURE — 82330 ASSAY OF CALCIUM: CPT

## 2020-03-19 RX ORDER — THIAMINE MONONITRATE (VIT B1) 100 MG
100 TABLET ORAL ONCE
Refills: 0 | Status: COMPLETED | OUTPATIENT
Start: 2020-03-19 | End: 2020-03-19

## 2020-03-19 RX ORDER — ASPIRIN/CALCIUM CARB/MAGNESIUM 324 MG
1 TABLET ORAL
Qty: 0 | Refills: 0 | DISCHARGE

## 2020-03-19 RX ORDER — SENNA PLUS 8.6 MG/1
2 TABLET ORAL AT BEDTIME
Refills: 0 | Status: DISCONTINUED | OUTPATIENT
Start: 2020-03-19 | End: 2020-03-19

## 2020-03-19 RX ORDER — SENNA PLUS 8.6 MG/1
2 TABLET ORAL
Qty: 0 | Refills: 0 | DISCHARGE
Start: 2020-03-19

## 2020-03-19 RX ORDER — POLYETHYLENE GLYCOL 3350 17 G/17G
17 POWDER, FOR SOLUTION ORAL DAILY
Refills: 0 | Status: DISCONTINUED | OUTPATIENT
Start: 2020-03-19 | End: 2020-03-19

## 2020-03-19 RX ORDER — FOLIC ACID 0.8 MG
1 TABLET ORAL DAILY
Refills: 0 | Status: DISCONTINUED | OUTPATIENT
Start: 2020-03-19 | End: 2020-03-19

## 2020-03-19 RX ORDER — THIAMINE MONONITRATE (VIT B1) 100 MG
100 TABLET ORAL DAILY
Refills: 0 | Status: DISCONTINUED | OUTPATIENT
Start: 2020-03-19 | End: 2020-03-19

## 2020-03-19 RX ORDER — APIXABAN 2.5 MG/1
5 TABLET, FILM COATED ORAL EVERY 12 HOURS
Refills: 0 | Status: DISCONTINUED | OUTPATIENT
Start: 2020-03-19 | End: 2020-03-19

## 2020-03-19 RX ORDER — SODIUM CHLORIDE 9 MG/ML
1000 INJECTION INTRAMUSCULAR; INTRAVENOUS; SUBCUTANEOUS ONCE
Refills: 0 | Status: COMPLETED | OUTPATIENT
Start: 2020-03-19 | End: 2020-03-19

## 2020-03-19 RX ORDER — PANTOPRAZOLE SODIUM 20 MG/1
40 TABLET, DELAYED RELEASE ORAL
Refills: 0 | Status: DISCONTINUED | OUTPATIENT
Start: 2020-03-19 | End: 2020-03-19

## 2020-03-19 RX ORDER — METOPROLOL TARTRATE 50 MG
100 TABLET ORAL DAILY
Refills: 0 | Status: DISCONTINUED | OUTPATIENT
Start: 2020-03-19 | End: 2020-03-19

## 2020-03-19 RX ORDER — ATORVASTATIN CALCIUM 80 MG/1
40 TABLET, FILM COATED ORAL AT BEDTIME
Refills: 0 | Status: DISCONTINUED | OUTPATIENT
Start: 2020-03-19 | End: 2020-03-19

## 2020-03-19 RX ORDER — MAGNESIUM SULFATE 500 MG/ML
4 VIAL (ML) INJECTION ONCE
Refills: 0 | Status: COMPLETED | OUTPATIENT
Start: 2020-03-19 | End: 2020-03-19

## 2020-03-19 RX ADMIN — Medication 1 TABLET(S): at 11:10

## 2020-03-19 RX ADMIN — Medication 100 MILLIGRAM(S): at 03:37

## 2020-03-19 RX ADMIN — SODIUM CHLORIDE 1000 MILLILITER(S): 9 INJECTION INTRAMUSCULAR; INTRAVENOUS; SUBCUTANEOUS at 04:27

## 2020-03-19 RX ADMIN — Medication 1 MILLIGRAM(S): at 11:10

## 2020-03-19 RX ADMIN — Medication 2 MILLIGRAM(S): at 02:30

## 2020-03-19 RX ADMIN — SODIUM CHLORIDE 1000 MILLILITER(S): 9 INJECTION INTRAMUSCULAR; INTRAVENOUS; SUBCUTANEOUS at 03:38

## 2020-03-19 RX ADMIN — Medication 100 MILLIGRAM(S): at 11:10

## 2020-03-19 RX ADMIN — Medication 50 MILLIGRAM(S): at 09:00

## 2020-03-19 RX ADMIN — Medication 50 MILLIGRAM(S): at 15:44

## 2020-03-19 RX ADMIN — Medication 100 GRAM(S): at 11:11

## 2020-03-19 RX ADMIN — Medication 2 MILLIGRAM(S): at 01:34

## 2020-03-19 RX ADMIN — Medication 2 MILLIGRAM(S): at 03:37

## 2020-03-19 NOTE — DISCHARGE NOTE NURSING/CASE MANAGEMENT/SOCIAL WORK - PATIENT PORTAL LINK FT
You can access the FollowMyHealth Patient Portal offered by [facility] by registering at the following website: [email or web address]. By joining [employer]’s FollowMyHealth portal, you will also be able to view your health information using other applications (apps) compatible with our system.

## 2020-03-19 NOTE — H&P ADULT - PROBLEM SELECTOR PLAN 4
Patient w/ history of chronic CHF w/ EF of 30-35% on Echo in 1/2020. Patient currently not fluid overloaded, not in acute exacerbation.  -No ACE/ARB w/ history of allergy and angioedema  -c/w atorvastatin  -c/w toprol

## 2020-03-19 NOTE — ED PROVIDER NOTE - NS ED ROS FT
CONSTITUTIONAL: sweats  NEURO: No headache. Feels very shaky.  EYES: No visual changes  ENT: No rhinorrhea or sore throat  PULM: No cough or dyspnea  CV: No chest pain or palpitations  GI:  Nauseated.  Constipated.  No abdominal pain, vomiting, or diarrhea  : No dysuria, hematuria, frequency  MSK: No neck pain or back pain, no joint pain  SKIN: itching left cheek

## 2020-03-19 NOTE — ED PROVIDER NOTE - CLINICAL SUMMARY MEDICAL DECISION MAKING FREE TEXT BOX
Alcohol withdrawal - no hx of withdrawal seizures, but evidence of severe withdrawal in ED with tachycardia, cold sweats, marked tremors.  Treated with ativan and IV fluiids, CIWA improved from 24 to 14.  Pt says possible head injury during intoxication a few days ago, has abrasion to face; coagulopathic probably from liver disease, will CT head. Alcohol withdrawal - no hx of withdrawal seizures, but evidence of severe withdrawal in ED with tachycardia, cold sweats, marked tremors.  Treated with ativan and IV fluiids, CIWA improved from 24 to 14.  No chest pain, and EKG unchanged from prior.  Pt says possible head injury during intoxication a few days ago, has abrasion to face; coagulopathic probably from liver disease, will CT head.

## 2020-03-19 NOTE — H&P ADULT - ASSESSMENT
Patient is a 65 yo M w/ PMHx of LV thrombus (resolved as per ECHO in January) on Eliquis, pAF, CAD s/p PCI, HFrEF 30-35% s/p AICD, HIT, hemorrhoids, gastritis, GIB, EtOH withdrawal (multiple admissions, last in January) who presents with alcohol withdrawal.

## 2020-03-19 NOTE — CONSULT NOTE ADULT - ASSESSMENT
64M PMH LV thrombus (resolved as per ECHO in January) on Eliquis, pAF, CAD s/p PCI, HFrEF 35%, HIT, HFrEF 35% s/p AICD, HIT, hemorrhoids, gastritis, EtOH withdrawal (multiple admissions, last in January), presenting with alcohol withdrawal. 64M PMH LV thrombus (resolved as per ECHO in January) on Eliquis, pAF, CAD s/p PCI, HFrEF 35%, HIT, HFrEF 35% s/p AICD, HIT, hemorrhoids, gastritis, EtOH withdrawal (multiple admissions, last in January), presenting with alcohol withdrawal. ICU consulted for CIWA >20.    #alcohol withdrawal  Multiple prior admissions for alcohol withdrawal, most recent in January (1/12-1/17). Binge drank up until 2 days ago. Last drink on 3/17 at _____ pm.   - s/p IV Ativan 2mg x3 in ED  - c/w Librium 50 q12h with PRN Ativan 2mg IVP for CIWA >8; adjust Librium frequently accordingly and attempt to taper    #AGMA  Pt with anion gap elevated initially 21, s/p 2L NS in ED. Lactate 2.0 however likely elevated prior to fluids vs. combined starvation ketosis.      Dispo: 64M PMH LV thrombus (resolved as per ECHO in January) on Eliquis, pAF, CAD s/p PCI, HFrEF 35%, HIT, HFrEF 35% s/p AICD, HIT, hemorrhoids, gastritis, EtOH withdrawal (multiple admissions, last in January), presenting with alcohol withdrawal. ICU consulted for CIWA >20.    #alcohol withdrawal  Multiple prior admissions for alcohol withdrawal, most recent in January (1/12-1/17). Binge drank up until 2 days ago. Last drink on 3/16 evening.   - CIWA 2 on my exam (mild tremors)  - s/p IV Ativan 2mg x3 in ED  - c/w Librium 50 q12h with PRN Ativan 2mg IVP for CIWA >8; adjust Librium frequently accordingly and attempt to taper    #AGMA  Pt with anion gap elevated initially 21, s/p 2L NS in ED. Lactate 2.0 however likely elevated prior to fluids vs. combined starvation ketosis.  - trend w/ BMP    #LFT elevation  AST about 2x ALT consistent with alcohol use. Also with T bili elevation.  - trend  - if remains elevated, would pursue RUQ US        Dispo: regional medical floor  Discussed with Dr. Mcgill

## 2020-03-19 NOTE — H&P ADULT - PROBLEM SELECTOR PLAN 8
Patient w/ normocytic anemia w/ hgb of 12.8. Likely 2/2 vitamin deficiency related to alcohol. Less likely 2/2 acute bleed as Hgb is stable from prior admission.  -trend CBC, maintain active T&S, transfuse for Hgb <7  -c/w folate, MV    #Thrombocytopenia  Patient w/ history of thrombocytopenia. History of HIT but no recent hospitalization. May be 2/2 alcohol use w/ liver dysfunction and vitamin deficiency.   -trend CBC, will transfuse for platelets <10K or <50K w/ active bleeding  -avoid heparin

## 2020-03-19 NOTE — H&P ADULT - PROBLEM SELECTOR PLAN 2
Patient w/ facial laceration on left cheek w/ dried blood. No current active bleeding. Patient does not recall cut but states he may have fallen while drinking. CT head w/o intracranial bleed or fracture.  -fall precautions

## 2020-03-19 NOTE — DISCHARGE NOTE PROVIDER - NSDCFUADDAPPT_GEN_ALL_CORE_FT
Please follow up with your PCP within 1 week following admission. It is advisable you call your PCP as office hours may be limited due to the COVID virus outbreak.

## 2020-03-19 NOTE — H&P ADULT - NSHPPHYSICALEXAM_GEN_ALL_CORE
Vital Signs Last 12 Hrs  T(F): 99.2 (03-19-20 @ 07:18), Max: 99.4 (03-19-20 @ 04:26)  HR: 89 (03-19-20 @ 07:18) (84 - 113)  BP: 132/87 (03-19-20 @ 07:18) (116/70 - 132/87)  BP(mean): --  RR: 18 (03-19-20 @ 07:18) (18 - 19)  SpO2: 99% (03-19-20 @ 07:18) (96% - 99%)    PHYSICAL EXAM:  Constitutional: NAD, comfortable in bed.  HEENT: NC, facial laceration on left cheek w/ dried blood. PERRLA, EOMI, no conjunctival pallor or scleral icterus, poor dentition, MMM, no pharyngeal erythema. Tongue w/ moderate fasciculations.   Neck: Supple, no JVD  Respiratory: Normal rate, rhythm, depth, effort. CTAB. No w/r/r.   Cardiovascular: RRR, normal S1 and S2, no m/r/g.   Gastrointestinal: +BS, soft NTND, no guarding or rebound tenderness, no palpable masses   Extremities: wwp; no cyanosis, clubbing or edema. Mild tremors of hands b/l  Vascular: Pulses equal and strong throughout.   Neurological: AAOx3, no CN deficits, strength and sensation intact throughout.   Skin: No gross skin abnormalities or rashes

## 2020-03-19 NOTE — H&P ADULT - PROBLEM SELECTOR PLAN 3
Patient with elevated Tbili and AST, likely 2/2 alcohol use. Discriminant factor 15.4, patient would likely not benefit from steroids at this time.   -repeat cmp to monitor for improvement

## 2020-03-19 NOTE — ED PROVIDER NOTE - PROGRESS NOTE DETAILS
[facility] [address] [facility]  14 CIWA 21, having visual hallucinations.  ICU consult called. CIWA 21, having possible visual hallucinations.  ICU consult called. Seen by ICU team, cleared for RMF.  Currently resting comfortably.  Sensorium clear, no tremor.  CIWA 6

## 2020-03-19 NOTE — DISCHARGE NOTE PROVIDER - NSDCMRMEDTOKEN_GEN_ALL_CORE_FT
apixaban 5 mg oral tablet: 1 tab(s) orally 2 times a day   B Complex 50 oral tablet, extended release: 1 tab(s) orally once a day  folic acid 1 mg oral tablet: 1 tab(s) orally once a day  Lipitor 40 mg oral tablet: 1 tab(s) orally once a day (at bedtime)  Metoprolol Succinate  mg oral tablet, extended release: 1 tab(s) orally once a day   thiamine 100 mg oral tablet: 1 tab(s) orally once a day  Vitamin C 500 mg oral tablet: 1 tab(s) orally once a day apixaban 5 mg oral tablet: 1 tab(s) orally 2 times a day   B Complex 50 oral tablet, extended release: 1 tab(s) orally once a day  chlordiazePOXIDE 25 mg oral capsule: At 2AM on 3/20, take 2 tabs and then  take 1 tab every 8 hours. On 3/21, 1 tab every 12 hours. On 3/22 , 1 tab.-for withdrawal MDD:5 tabs   folic acid 1 mg oral tablet: 1 tab(s) orally once a day  Innerclean oral tablet: 2 tab(s) orally once a day (at bedtime)  Lipitor 40 mg oral tablet: 1 tab(s) orally once a day (at bedtime)  Metoprolol Succinate  mg oral tablet, extended release: 1 tab(s) orally once a day   thiamine 100 mg oral tablet: 1 tab(s) orally once a day  Vitamin C 500 mg oral tablet: 1 tab(s) orally once a day

## 2020-03-19 NOTE — DISCHARGE NOTE PROVIDER - NSDCCPCAREPLAN_GEN_ALL_CORE_FT
PRINCIPAL DISCHARGE DIAGNOSIS  Diagnosis: Alcohol withdrawal syndrome without complication  Assessment and Plan of Treatment: You came to the emergency department with signs of aclohol withdrawal after you stopped drinking alcohol. You were admitted to the hospital and started on a Librium taper. You were monitored durnig your hospital stay and showed improvement in your symptoms and you are now stable for discharge. Continued alcohol use is detrimental to your health and we recommend that you continue seeking outpatient treatment for sobriety as you have in the past. Please continue to take your librium taper as prescribed and to completion to help with your alcohol withdrawal symptoms.      SECONDARY DISCHARGE DIAGNOSES  Diagnosis: Atherosclerosis of coronary artery  Assessment and Plan of Treatment: You were previously diagnosed with coronary artery disease. This is a narrowing of the arteries in your heart. Please continue to take your medications as prescribed and follow up with your PCP and cardiologist for further monitoring.    Diagnosis: Constipation  Assessment and Plan of Treatment: Please continue to take mirilax and senna to prevent constipation in the future.    Diagnosis: Atrial fibrillation  Assessment and Plan of Treatment: You were previously diagnosed with atrial fibrillation. This is an abnormal heart beat that, if left uncontrolled, can cause symptoms like chest pain and shortness of breath. Please follow up with your PMD for further management of your atrial fibrillation.    Diagnosis: Left ventricular thrombus  Assessment and Plan of Treatment: You were previously found to have a clot in your heart. This had resolved on your previous admission, but please continue to take Eliquis as prescribed to prevent this clot from forming again.    Diagnosis: Chronic systolic congestive heart failure  Assessment and Plan of Treatment: You were previously diagnosed with congestive heart failure. This means that your heart has difficulty pumping blood at a normal level. Please continue to take your medications as prescribed and follow up with your PCP and cardiologist for further management.    Diagnosis: Anemia  Assessment and Plan of Treatment: You were diagnosed with anemia. This means that you have a low number of red blood cells in your blood. Please follow up with your PCP for further monitoring and management.

## 2020-03-19 NOTE — ED PROVIDER NOTE - PHYSICAL EXAMINATION
CONSTITUTIONAL:   awake, alert.  very tremulous.    SKIN: Pale and sweaty.     HEAD: NC/AT.  EYES: Conjunctiva clear. EOMI. PERRL.    ENT: Dry blood in mouth.  Airway patent, OP without erythema, tonsillar swelling or exudate; uvula midline without swelling. Nasal mucosa clear, no rhinorrhea.   RESPIRATORY:  Breathing non-labored. No retractions or accessory muscle use.  Lungs CTA bilat.  CARDIOVASCULAR:  RRR, S1S2. No M/R/G.      GI:  Abdomen soft, nontender.  Rectal: no active bleeding on SHARRON.  Large stool ball in rectum (1 attempt at disimpaction).    MSK: Neck supple with painless ROM.  No lower extremity edema or calf tenderness.  No joint swelling or ROM limitation.  NEURO: Alert and oriented; CN II-XII grossly intact. Speech clear. 5/5 strength in all extremities.  Normal balance and gait.  + marked hand tremor and tongue fasciculations.

## 2020-03-19 NOTE — H&P ADULT - ATTENDING COMMENTS
Patient was seen and examined with the resident team today.  I agree with Dr. Borges's assessment and plan with the following exceptions/additions:     Briefly, this is a 65yo gentleman with a PMH of a resolved LV thrombus(on Eliquis), pAF, CAD s/p PCI, chronic systolic CHF (EF 30-35%) s/p AICD, HIT, hemorrhoids, gastritis, GIB and EtOH abuse c/b multiple admissions for withdrawal (most recent in 01/20) who p/w EtOH withdrawal with his last drink on the night of 3/16.  Of note, was seen by an ED provider and thought to have a CIWA of 23 but was then seen by Critical Care and thought to have a CIWA of 2.  Also reported to have had visual hallucinations by the ED provider, which the patient not denies and thinks his allegorical speech was misinterpreted.      At time of my assessment, CIWA score of 4 only for hand tremor. Denied HA, tactile/visual/auditory hallucinations, anxiety, agitation, diaphoresis and N/V.  Tolerating breakfast well and feeling fatigued by the benzodiazepines, particularly the Ativan.     -- Monitor after AM Librium dose.  -- If CIWA continues to be under 5 and patient feels safe for discharge, can discharge to complete Librium taper at home as patient has done this multiple times.  -- As for his BRBPR, likely 2/2 hemorrhoid bleed i/s/o constipation.  Will off a suppository.     Kaila Gutierrez  [phone]

## 2020-03-19 NOTE — H&P ADULT - PROBLEM SELECTOR PLAN 1
Patient w/ 40 year history of drinking 1pint per day, has been drinking daily for the past few weeks, last drink 3 days ago. Developed w/d symptoms of severe tremors that did not improve with taking home librium 25mg x2 and ativan 5mg x1. CIWA at presentation 23, received ativan 2mg IV x3 w/ improvement of symptoms.  -CIWA protocol   -CIWA currently 5 from tremor (patient denies hallucinating after presentation, reports this was a misunderstanding)  -start librium 50mg Q8 for now, will taper down as tolerated  -Librium 2mg IV PRN for CIWA >8  -thiamine, MV, folate

## 2020-03-19 NOTE — ED ADULT NURSE NOTE - NSIMPLEMENTINTERV_GEN_ALL_ED
Implemented All Fall Risk Interventions:  [place] to call system. Call bell, personal items and telephone within reach. Instruct patient to call for assistance. Room bathroom lighting operational. Non-slip footwear when patient is off stretcher. Physically safe environment: no spills, clutter or unnecessary equipment. Stretcher in lowest position, wheels locked, appropriate side rails in place. Provide visual cue, wrist band, yellow gown, etc. Monitor gait and stability. Monitor for mental status changes and reorient to person, place, and time. Review medications for side effects contributing to fall risk. Reinforce activity limits and safety measures with patient and family.

## 2020-03-19 NOTE — ED PROVIDER NOTE - OBJECTIVE STATEMENT
64 m PMHx LV thrombus (neg echo in January) on NOAC, PAF, CAD/PCI, HFrEF 35-40%, HIT, EtOH withdrawal  Was sober for a few weeks after last admission, started drinking again binge drinking for about a week before alcohol cessation about 2 days ago.  Had been drinking a pint of vodka plus 4 "airplane nips" bottles daily; Today feeling very shaky, took librium 25 mg in morning, 25 mg in afternoon, and ativan 5 mg PO around 8 pm without relief of symptoms.    Very constipated and stuck finger into rectum to self disimpact; had BRBPR in toilet after arriving in ED.  Also with dry blood in mouth, pt states bleeds from gingivitis.  He has a left cheek abrasion that he is unsure where it came from, but he thinks maybe he fell while intoxicated a few days ago; he says it is also itchy.  Denies hx of withdrawal seizures.

## 2020-03-19 NOTE — ED ADULT NURSE NOTE - OBJECTIVE STATEMENT
pt states he generally drinks 1 pint and 4 airport bottles a day and decided to stop yesterday. Pt denies hx of withdrawal seizures. states he has been to rehab before and relapsed. pt noted to be tremulous. pt is a+o x3 and speaking in full sentences.

## 2020-03-19 NOTE — H&P ADULT - PROBLEM SELECTOR PLAN 6
Patient w/ h/o paroxysmal Afib. Currently in NSR.   -c/w toprol 100mg daily  -c/w Eliquis 5mg BID    #HLD  -c/w atorvastatin

## 2020-03-19 NOTE — H&P ADULT - HISTORY OF PRESENT ILLNESS
Patient is a 63 yo M w/ PMHx of LV thrombus (resolved as per ECHO in January) on Eliquis, pAF, CAD s/p PCI, HFrEF 30-35% s/p AICD, HIT, hemorrhoids, gastritis, GIB, EtOH withdrawal (multiple admissions, last in January) who presents with alcohol withdrawal. The patient reports that he was initially sober following his discharge in January, but shortly after relapsed and started drinking heavily again. He states he has been daily drinking 1 pint of vodka and an additional 4 "nips" until 3 days ago when he stopped. The next day he developed tremors which got progressively worse so he took librium 25mg PO x2 and ativan 5mg x1 w/o improvement of symptoms. He called his PCP who advised him against taking multiple ativan 5mg po w/o supervision and advised him to present to the ED. The patient additionally states he has been constipated and has not had a BM in 3 days so he had to self-disimpact which resulted in a small amount of rectal bleeding that self-resolved. He reports he becomes constipated whenever he drinks and that he has a chronic hemorrhoid that occasionally bleeds. The patient otherwise denies fever, chills, HA, CP, palpitations, SOB, cough, N/V/D, abdominal pain, dysuria, hematuria, LE edema.      In ED, vitals were: T 99.4F, -113, /86 - 130/85, RR 18-19, O2 96-97%. Labs were s/f Hgb of 12.9, plt 64, Tbili 1.6, AST 69. CT head was negative for any acute pathology. Patient was reported to have a CIWA of 23 and was given IV Ativan 2mg x3, thiamine 100mg x1, 2L NS. ICU was consulted and patient was deemed safe for admission to regional medical floors.

## 2020-03-19 NOTE — H&P ADULT - PROBLEM SELECTOR PLAN 7
History of CAD. EKG unchanged from prior w/ Q waves in anteroseptal leads.  -c/w atorvastatin  -no aspirin w/ history of GIB 2/2 gastritis    #Gastritis  Patient w/ history of gastritis from alcohol.  -protonix 40mg daily

## 2020-03-19 NOTE — ED ADULT NURSE REASSESSMENT NOTE - NS ED NURSE REASSESS COMMENT FT1
pt tremors improved. Pt remains tachycardic. pt displaying symptom of visual hallucinations, Pt reports "I see a little white girl with long hair with her dog right there. They are playing. The dog is very beautiful. It would make a beautiful picture. They just went into that office". ADELAIDA Matthews made aware. Safety precautions in place. Close monitoring continues. The patient is a 33y Female complaining of vomiting.

## 2020-03-19 NOTE — H&P ADULT - PROBLEM SELECTOR PLAN 9
Patient reporting chronic constipation. Self-disimpacted 2 days ago w/ improvement of symptoms.  -senna, mirilax

## 2020-03-19 NOTE — DISCHARGE NOTE PROVIDER - HOSPITAL COURSE
Patient is a 63 yo M w/ PMHx of LV thrombus (resolved as per ECHO in January) on Eliquis, pAF, CAD s/p PCI, HFrEF 30-35% s/p AICD, HIT, hemorrhoids, gastritis, GIB, EtOH withdrawal (multiple admissions, last in January) who presents with alcohol withdrawal.        Inpatient treatment course:     Patient noted to have CIWA of 23 in ED. Given ativan IV 2mg x3, ICU consult called who noted CIWA to be 2 and patient was dispo'd to [facility]. Patient started on librium 50mg Q8 w/ improvement in alcohol w/d symptoms. Patient tolerating PO intake well with stable vitals. He reported he was amenable to completing librium taper at home.        Problem List/Main Diagnoses:     1. Alcohol Withdrawal - Librium Taper, Ativan PRN    2. Atrial Fibrillation - Eliquis    3. LV Thrombus - Echo with no LV thrombus 1/2020 - continue Eliquis    4. HFrEF - Toprol, Lipitor    5. CAD - Lipitor    6. HLD - Lipitor    7. Gastritis - Protonix        New medications: Patient discharged on librium taper. Patient is a 65 yo M w/ PMHx of LV thrombus (resolved as per ECHO in January) on Eliquis, pAF, CAD s/p PCI, HFrEF 30-35% s/p AICD, HIT, hemorrhoids, gastritis, GIB, EtOH withdrawal (multiple admissions, last in January) who presents with alcohol withdrawal.        Inpatient treatment course:     Patient noted to have CIWA of 23 in ED. Given ativan IV 2mg x3, ICU consult called who noted CIWA to be 2 and patient was dispo'd to [facility]. Patient started on librium 50mg Q8 w/ improvement in alcohol w/d symptoms. Patient tolerating PO intake well with stable vitals. He reported he was amenable to completing librium taper at home.        Problem List/Main Diagnoses:     1. Alcohol Withdrawal - Librium Taper, Ativan PRN    2. Atrial Fibrillation - Eliquis    3. LV Thrombus - Echo with no LV thrombus 1/2020 - continue Eliquis    4. HFrEF - Toprol, Lipitor    5. CAD - Lipitor    6. HLD - Lipitor    7. Gastritis - Protonix        New medications: Patient discharged on librium taper.

## 2020-03-19 NOTE — H&P ADULT - NSHPLABSRESULTS_GEN_ALL_CORE
LABS:                        12.8   4.96  )-----------( 64       ( 19 Mar 2020 02:00 )             38.9     03-19    136  |  91<L>  |  16  ----------------------------<  133<H>  3.9   |  24  |  0.99    Ca    9.6      19 Mar 2020 02:00  Phos  3.1     03-19  Mg     1.4     03-19    TPro  8.0  /  Alb  4.6  /  TBili  1.6<H>  /  DBili  x   /  AST  69<H>  /  ALT  39  /  AlkPhos  80  03-19  PT/INR - ( 19 Mar 2020 02:00 )   PT: 14.5 sec;   INR: 1.26     PTT - ( 19 Mar 2020 02:00 )  PTT:31.4 sec    RADIOLOGY & ADDITIONAL TESTS:  < from: CT Head No Cont (03.19.20 @ 03:49) >  IMPRESSION: No intracranial hemorrhage orcalvarial fracture. No substantial change since 8/26/2019.  < end of copied text >

## 2020-03-19 NOTE — H&P ADULT - NSHPSOCIALHISTORY_GEN_ALL_CORE
Former smoker w/ 19pk/yr smoking history. Reports drinking 1 pint of vodka daily for >40 years. Reports non-specific recreational drug use in the past.

## 2020-03-19 NOTE — CONSULT NOTE ADULT - SUBJECTIVE AND OBJECTIVE BOX
ICU CONSULT NOTE    CHIEF COMPLAINT:      HPI:  64M PMH LV thrombus (resolved as per ECHO in January) on Eliquis, pAF, CAD s/p PCI, HFrEF 35% s/p AICD, HIT, hemorrhoids, gastritis, EtOH withdrawal (multiple admissions, last in January), presenting with alcohol withdrawal. Pt has been binge drinking x 1 week and stopped drinking 2 days ago. Takes about 1.5 pints of vodka per day. Pt began to feel very shaky this morning and took home Librium 25mg x2 and Ativan 5mg PO without relief. Also with BRBPR after attempting to self-disimpact stool. Noted by ED provider to be diaphoretic with CIWA ~24, also with visual hallucinations.     ED Vitals: T 99.4F, -113, /86 - 130/85, RR 18-19, O2 96-97%   ED Adm: IV Ativan 2mg x3, thiamine 100mg x1, 2L NS      PAST MEDICAL & SURGICAL HISTORY:  ETOH abuse  Hyperlipidemia  Heparin-induced thrombocytopenia: Antibody positive  Left ventricular thrombus  Atrial fibrillation  Hemorrhoid  ICD (implantable cardioverter-defibrillator) in place  Myocardial infarction involving other coronary artery  Gastritis  Atherosclerosis of coronary artery: Coronary artery disease  Automatic implantable cardiac defibrillator in situ: ICD (implantable cardioverter-defibrillator) in place      REVIEW OF SYSTEMS: negative except as stated in HPI.    MEDICATIONS  (STANDING):    MEDICATIONS  (PRN):      Allergies    Capoten (Short breath; Rash; Hives)  heparin (Other (Mod to Severe))  penicillin (Short breath; Rash; Hives)    Intolerances        FAMILY HISTORY:  FH: alcoholism: Father, brother  FH: type 2 diabetes: father  FH: myocardial infarction: maternal grandfather, age 42      SOCIAL HISTORY:     Vital Signs Last 24 Hrs  T(C): 37.4 (19 Mar 2020 04:26), Max: 37.4 (19 Mar 2020 04:26)  T(F): 99.4 (19 Mar 2020 04:26), Max: 99.4 (19 Mar 2020 04:26)  HR: 113 (19 Mar 2020 04:26) (103 - 113)  BP: 125/86 (19 Mar 2020 04:26) (125/86 - 130/85)  BP(mean): --  RR: 19 (19 Mar 2020 04:26) (18 - 19)  SpO2: 97% (19 Mar 2020 04:26) (96% - 97%)    PHYSICAL EXAM:  GEN: alert, NAD, comfortable in bed  HEENT: PERRL, no relative afferent pupillary defect, EOMI, moist MM, no pharyngeal erythema or exudate  CV: RRR, S1/S2, no murmurs appreciated, no JVD, no carotid bruits  RESP: CTA bilaterally, good respiratory effort, good air movement, no wheezes/rales  ABD: soft, BS+, nontender, nondistended, no guarding/rebound  EXTREMITIES: WWP, pulses 2+ in all 4 extremities, no peripheral edema  MSK: full range of motion of all 4 extremities  SKIN: warm, dry, intact, no rashes  NEURO: A&Ox3, no focal deficits, strength 5/5 throughout, no sensory deficits  PSYC: normal mood/affect    LABS: reviewed.    CAPILLARY BLOOD GLUCOSE      POCT Blood Glucose.: 144 mg/dL (19 Mar 2020 00:39)                          12.8   4.96  )-----------( 64       ( 19 Mar 2020 02:00 )             38.9     03-19    136  |  91<L>  |  16  ----------------------------<  133<H>  3.9   |  24  |  0.99    Ca    9.6      19 Mar 2020 02:00    TPro  8.0  /  Alb  4.6  /  TBili  1.6<H>  /  DBili  x   /  AST  69<H>  /  ALT  39  /  AlkPhos  80  03-19    PT/INR - ( 19 Mar 2020 02:00 )   PT: 14.5 sec;   INR: 1.26          PTT - ( 19 Mar 2020 02:00 )  PTT:31.4 sec  LIVER FUNCTIONS - ( 19 Mar 2020 02:00 )  Alb: 4.6 g/dL / Pro: 8.0 g/dL / ALK PHOS: 80 U/L / ALT: 39 U/L / AST: 69 U/L / GGT: x                       RADIOLOGY AND ADDITIONAL TESTS: reviewed.    Chest XR:  EKG:  Echocardiogram: ICU CONSULT NOTE    CHIEF COMPLAINT:      HPI:  64M PMH LV thrombus (resolved as per ECHO in January) on Eliquis, pAF, CAD s/p PCI, HFrEF 35% s/p AICD, HIT, hemorrhoids, gastritis, EtOH withdrawal (multiple admissions, last in January), presenting with alcohol withdrawal. Pt has been binge drinking x 1 week and stopped drinking 2 days ago. Takes about 1.5 pints of vodka per day. Pt began to feel very shaky this morning and took home Librium 25mg x2 and Ativan 5mg PO without relief. Also with BRBPR after attempting to self-disimpact stool. Noted by ED provider to be diaphoretic with CIWA ~24, also with visual hallucinations.     ED Vitals: T 99.4F, -113, /86 - 130/85, RR 18-19, O2 96-97%   ED Adm: IV Ativan 2mg x3, thiamine 100mg x1, 2L NS      PAST MEDICAL & SURGICAL HISTORY:  ETOH abuse  Hyperlipidemia  Heparin-induced thrombocytopenia: Antibody positive  Left ventricular thrombus  Atrial fibrillation  Hemorrhoid  ICD (implantable cardioverter-defibrillator) in place  Myocardial infarction involving other coronary artery  Gastritis  Atherosclerosis of coronary artery: Coronary artery disease  Automatic implantable cardiac defibrillator in situ: ICD (implantable cardioverter-defibrillator) in place      REVIEW OF SYSTEMS: negative except as stated in HPI.    MEDICATIONS  (STANDING):    MEDICATIONS  (PRN):      Allergies    Capoten (Short breath; Rash; Hives)  heparin (Other (Mod to Severe))  penicillin (Short breath; Rash; Hives)    Intolerances        FAMILY HISTORY:  FH: alcoholism: Father, brother  FH: type 2 diabetes: father  FH: myocardial infarction: maternal grandfather, age 42      SOCIAL HISTORY:     Vital Signs Last 24 Hrs  T(C): 37.4 (19 Mar 2020 04:26), Max: 37.4 (19 Mar 2020 04:26)  T(F): 99.4 (19 Mar 2020 04:26), Max: 99.4 (19 Mar 2020 04:26)  HR: 113 (19 Mar 2020 04:26) (103 - 113)  BP: 125/86 (19 Mar 2020 04:26) (125/86 - 130/85)  BP(mean): --  RR: 19 (19 Mar 2020 04:26) (18 - 19)  SpO2: 97% (19 Mar 2020 04:26) (96% - 97%)    PHYSICAL EXAM:  GEN: alert, NAD, comfortable in bed with surgical mask; L cheek old scar  HEENT: PERRL, no relative afferent pupillary defect, EOMI, moist MM, no pharyngeal erythema or exudate  CV: RRR, S1/S2, no murmurs appreciated  RESP: CTA bilaterally, good respiratory effort, good air movement, no wheezes/rales  ABD: soft, BS+, nontender, nondistended, no guarding/rebound  EXTREMITIES: WWP, pulses 2+ in all 4 extremities, no peripheral edema  MSK: full range of motion of all 4 extremities  SKIN: warm, dry, intact, no rashes  NEURO: A&Ox3, no focal deficits  PSYC: normal mood/affect    LABS: reviewed.    CAPILLARY BLOOD GLUCOSE      POCT Blood Glucose.: 144 mg/dL (19 Mar 2020 00:39)                          12.8   4.96  )-----------( 64       ( 19 Mar 2020 02:00 )             38.9     03-19    136  |  91<L>  |  16  ----------------------------<  133<H>  3.9   |  24  |  0.99    Ca    9.6      19 Mar 2020 02:00    TPro  8.0  /  Alb  4.6  /  TBili  1.6<H>  /  DBili  x   /  AST  69<H>  /  ALT  39  /  AlkPhos  80  03-19    PT/INR - ( 19 Mar 2020 02:00 )   PT: 14.5 sec;   INR: 1.26          PTT - ( 19 Mar 2020 02:00 )  PTT:31.4 sec  LIVER FUNCTIONS - ( 19 Mar 2020 02:00 )  Alb: 4.6 g/dL / Pro: 8.0 g/dL / ALK PHOS: 80 U/L / ALT: 39 U/L / AST: 69 U/L / GGT: x                       RADIOLOGY AND ADDITIONAL TESTS: reviewed.    CT Head No Cont (03.19.20 @ 03:49)  IMPRESSION: No intracranial hemorrhage orcalvarial fracture. No substantial change since 8/26/2019.

## 2020-03-19 NOTE — ED ADULT NURSE NOTE - PMH
Atherosclerosis of coronary artery  Coronary artery disease  Atrial fibrillation    ETOH abuse    Gastritis    Hemorrhoid    Heparin-induced thrombocytopenia  Antibody positive  Hyperlipidemia    ICD (implantable cardioverter-defibrillator) in place    Left ventricular thrombus    Myocardial infarction involving other coronary artery

## 2020-03-19 NOTE — CONSULT NOTE ADULT - ATTENDING COMMENTS
etoh abuse , chronic systolic chf appears controlled.  etoh withdrawal with no delirium cudrrently.  no indication for tele icu at this time  agree with po and iv benzo as needed

## 2020-03-23 DIAGNOSIS — Z95.810 PRESENCE OF AUTOMATIC (IMPLANTABLE) CARDIAC DEFIBRILLATOR: ICD-10-CM

## 2020-03-23 DIAGNOSIS — R63.8 OTHER SYMPTOMS AND SIGNS CONCERNING FOOD AND FLUID INTAKE: ICD-10-CM

## 2020-03-23 DIAGNOSIS — D69.6 THROMBOCYTOPENIA, UNSPECIFIED: ICD-10-CM

## 2020-03-23 DIAGNOSIS — K29.70 GASTRITIS, UNSPECIFIED, WITHOUT BLEEDING: ICD-10-CM

## 2020-03-23 DIAGNOSIS — F10.230 ALCOHOL DEPENDENCE WITH WITHDRAWAL, UNCOMPLICATED: ICD-10-CM

## 2020-03-23 DIAGNOSIS — K64.9 UNSPECIFIED HEMORRHOIDS: ICD-10-CM

## 2020-03-23 DIAGNOSIS — S00.81XA ABRASION OF OTHER PART OF HEAD, INITIAL ENCOUNTER: ICD-10-CM

## 2020-03-23 DIAGNOSIS — I25.10 ATHEROSCLEROTIC HEART DISEASE OF NATIVE CORONARY ARTERY WITHOUT ANGINA PECTORIS: ICD-10-CM

## 2020-03-23 DIAGNOSIS — I48.0 PAROXYSMAL ATRIAL FIBRILLATION: ICD-10-CM

## 2020-03-23 DIAGNOSIS — Z88.0 ALLERGY STATUS TO PENICILLIN: ICD-10-CM
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
## 2020-03-23 DIAGNOSIS — I50.22 CHRONIC SYSTOLIC (CONGESTIVE) HEART FAILURE: ICD-10-CM

## 2020-03-23 DIAGNOSIS — E78.5 HYPERLIPIDEMIA, UNSPECIFIED: ICD-10-CM

## 2020-03-23 DIAGNOSIS — E87.2 ACIDOSIS: ICD-10-CM

## 2020-03-23 DIAGNOSIS — Z87.891 PERSONAL HISTORY OF NICOTINE DEPENDENCE: ICD-10-CM

## 2020-03-23 DIAGNOSIS — I25.2 OLD MYOCARDIAL INFARCTION: ICD-10-CM
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
## 2020-03-23 DIAGNOSIS — K59.09 OTHER CONSTIPATION: ICD-10-CM

## 2020-03-23 DIAGNOSIS — D64.89 OTHER SPECIFIED ANEMIAS: ICD-10-CM

## 2020-03-23 DIAGNOSIS — R44.1 VISUAL HALLUCINATIONS: ICD-10-CM

## 2020-03-23 DIAGNOSIS — K62.5 HEMORRHAGE OF ANUS AND RECTUM: ICD-10-CM

## 2020-06-18 NOTE — H&P ADULT. - REASON FOR ADMISSION
Patient currently pregnant and having issues with restless legs. Wanting to take magnesium supplements. Dr. Main is unsure of what a good dose would be for this and was wondering if one of the FP providers might be able to advise. Can you pass this along to one of the providers? Thank you. Alisha Minor RN     Alcohol withdrawal

## 2020-07-23 NOTE — BEHAVIORAL HEALTH ASSESSMENT NOTE - NSBHADMITCOUNSEL_PSY_A_CORE
None risk factor reduction/diagnostic results/impressions and/or recommended studies/importance of adherence to chosen treatment/prognosis/risks and benefits of treatment options/client/family/caregiver education/instructions for management, treatment and follow up

## 2020-09-28 NOTE — CONSULT NOTE ADULT - SUBJECTIVE AND OBJECTIVE BOX
Scheduled for 10/19 This is a 63yo M known to me from prior admit and egd  former smoker and drug abuser current ETOH abuser (2+ pints vodka day) with PMHx CAD s/p MI 2003, most recent PCI /at [facility] 7/2016 (JOSHUA to mLAD with residual 50 % OM1), chronic systolic CHF (EF 35% by Echo) s/p AICD in 2009 ([employer]) , pafib and LV thrombus (on warfarin, previously on ASA and Plavix only 2/2 to lower GIB 2016), CKD Stage III (Baseline Cr normal 0.90-1.1), with frequent admissions to [facility] for ETOH use/withdrawal and 12/2017 for ICD firing. Pt’s last hospitalization at [facility] 1/6-1/26/18 for palpitations and anorexia in setting of alcohol intoxication, workup included colonoscopy without active bleeding, s/p polypectomy x 2, discharged home on warfarin 12mg daily.  Patient apparently stopped coumadin and restarted drinking alcohol.    Pt presents  with epigastric pain with an episode of nausea and vomiting after one week of binge drinking.  Seen today and now denies GI complaints      REVIEW OF SYSTEMS:  Constitutional: No fever, weight loss or fatigue  ENMT:  No difficulty hearing, tinnitus, vertigo; No sinus or throat pain  Respiratory: No cough, wheezing, chills or hemoptysis  Cardiovascular: No chest pain, palpitations, dizziness or leg swelling  Gastrointestinal: No abdominal or epigastric pain. occasional heartburn  Skin: No itching, burning, rashes or lesions   Musculoskeletal: No joint pain or swelling; No muscle, back or extremity pain    PAST MEDICAL & SURGICAL HISTORY:  Thrombus in heart chamber: LV  ICD (implantable cardioverter-defibrillator) in place  Pacemaker  ETOH abuse  Paroxysmal a-fib  Myocardial infarction involving other coronary artery  Gastritis  Atherosclerosis of coronary artery: Coronary artery disease  Automatic implantable cardiac defibrillator in situ: ICD (implantable cardioverter-defibrillator) in place      FAMILY HISTORY:  No pertinent family history in first degree relatives      SOCIAL HISTORY:  Smoking Status: [ ] Current, [ ] Former, [ ] Never  Pack Years:    MEDICATIONS:  MEDICATIONS  (STANDING):  argatroban Infusion 2.4 MICROgram(s)/kG/Min (11.218 mL/Hr) IV Continuous <Continuous>  atorvastatin 40 milliGRAM(s) Oral at bedtime  folic acid 1 milliGRAM(s) Oral daily  lisinopril 20 milliGRAM(s) Oral daily  metoprolol tartrate 50 milliGRAM(s) Oral two times a day  multivitamin 1 Tablet(s) Oral daily  sucralfate 1 Gram(s) Oral four times a day  thiamine 100 milliGRAM(s) Oral daily    MEDICATIONS  (PRN):  chlordiazePOXIDE 50 milliGRAM(s) Oral every 1 hour PRN Symptom-triggered: each CIWA -Ar score 8 or GREATER  ondansetron Injectable 4 milliGRAM(s) IV Push every 4 hours PRN Nausea and/or Vomiting  sucralfate suspension 1 Gram(s) Oral four times a day PRN gi upset      Allergies    Capoten (Short breath; Rash; Hives)  penicillin (Short breath; Rash; Hives)    Intolerances        Vital Signs Last 24 Hrs  T(C): 36.6 (13 May 2018 08:57), Max: 36.6 (13 May 2018 08:57)  T(F): 97.8 (13 May 2018 08:57), Max: 97.8 (13 May 2018 08:57)  HR: 76 (13 May 2018 09:35) (62 - 83)  BP: 123/80 (13 May 2018 09:35) (118/78 - 134/82)  BP(mean): --  RR: 16 (13 May 2018 09:35) (16 - 19)  SpO2: 99% (13 May 2018 09:35) (98% - 99%)    05-12 @ 07:01 - 05-13 @ 07:00  --------------------------------------------------------  IN: 526.5 mL / OUT: 0 mL / NET: 526.5 mL    05-13 @ 07:01 - 05-13 @ 10:27  --------------------------------------------------------  IN: 493.6 mL / OUT: 0 mL / NET: 493.6 mL          PHYSICAL EXAM:    General: Well developed; well nourished; in no acute distress  HEENT: MMM, conjunctiva and sclera clear  Gastrointestinal: Soft, non-tender non-distended; Normal bowel sounds; No rebound or guarding  Extremities: Normal range of motion, No clubbing, cyanosis or edema  Neurological: Alert and oriented x3  Skin: Warm and dry. No obvious rash      LABS:                        11.2   3.6   )-----------( 109      ( 13 May 2018 06:13 )             34.4     05-13    134<L>  |  99  |  11  ----------------------------<  127<H>  4.5   |  24  |  0.99    Ca    9.4      13 May 2018 06:16  Mg     1.8     05-13    TPro  6.9  /  Alb  3.7  /  TBili  1.2  /  DBili  x   /  AST  54<H>  /  ALT  37  /  AlkPhos  56  05-11          RADIOLOGY & ADDITIONAL STUDIES:

## 2020-10-02 NOTE — ED PROVIDER NOTE - AXIS
Brief Operative Note    Pre-operative diagnosis: Infection of shoulder (H) [M00.9]  Post-operative diagnosis Same as pre-operative diagnosis    Procedure: Procedure(s):  ARTHROSCOPIC IRRIGATION AND DEBRIDEMENT SHOULDER  Surgeon: Surgeon(s) and Role:     * Umesh Berman MD - Primary     * Kateyrna Rubalcava PA-C - Assisting  Anesthesia: General   Estimated blood loss: Less than 10 ml  Drains: Hemovac  Specimens:   ID Type Source Tests Collected by Time Destination   1 : Right shoulder subacromial fluid Fluid Shoulder ANAEROBIC BACTERIAL CULTURE, FLUID CULTURE AEROBIC BACTERIAL Umesh Berman MD 10/2/2020  3:43 PM      Findings:   None.  Complications: None.  Implants: * No implants in log *         Normal

## 2020-10-02 NOTE — ED ADULT TRIAGE NOTE - SOURCE OF INFORMATION
Problem List  1. ACute hypoxic resp failure  2. Pneumonia, likely bacterial, but could be coinfection with viruses  3. Septic shock (baseline SBP in 80s, lowest in ED was 60s)  4. Patient has allergies to over 30 medications
Patient

## 2020-10-21 NOTE — BEHAVIORAL HEALTH ASSESSMENT NOTE - HPI (INCLUDE ILLNESS QUALITY, SEVERITY, DURATION, TIMING, CONTEXT, MODIFYING FACTORS, ASSOCIATED SIGNS AND SYMPTOMS)
HPI from admit note:  "61M PMH EtOH abuse, CAD (s/p MI 2003, s/p JOSHUA 7/16), CHF (EF 35-40%) s/p AICD, lower GIB (2016), p/w 1d. decreased PO intake, weakness, and hypotension measured on home BP machine. Patient states that he has had a limited appetite over the last few weeks and is only hungry when he is drinking. He drank 1 pint of vodka on 10/6 late afternoon and has not had any alcohol since. This morning, he woke up feeling weak, tired, and lightheaded upon standing. Endorses some associated dizziness, tremulousness, and epigastric pain. He attempted to drink water for a few hours but did not eat anything, and then took his blood pressure at home which he states was in the SBP 90s. At this time he "felt like he was going to pass out" so he went to ED. Patient denies fever, chills, nausea, vomiting, diarrhea, constipation, myaglias, arthralgias, chest pain, palpitations, shortness of breath, CARRERO, blood in the stool, LE edema. No recent sick contacts. Denies current tactile/audiovisual hallucinations, confusion, memory loss, vision changes, headache.    In the ED, patient's vitals were as follows: T98.4, HR , BP 87/65-->137/79, RR 16-20, SpO2 % on RA.   Labs in the ED notable for normal WBC, normocytic anemia [Hgb 11.0 (baseline ~9-10 on past admissions), MCV 73.9], mild hyponatremia (Na 134), hypochloremia (Cl 91), metabolic acidosis w/ elevated AG (HCO3 19, AG 24), DANUTA (BUN 25/Cr 1.51), elevated lactate 3.9, UA + ketones (no B hydroxybutyrate), trops negative, EtOH level 221.     In the ED, pt was given 1L NS bolus x1 and metoprolol 5mg IVP x1." (07 Oct 2017 23:05)    Psychiatric consult requested to address pt's EtOH Use Disorder and also try to convince him to pursue inpt rehab.    On interview now, pt, who is known to me from prior admits with similar issues, easily recognizes me.    He acknowledges increasing his EtOH intake, starting with a nip bottle, followed by a 1/2 pt per day, followed by 1 pt per day.    As on previous interviews, pt acknowledges that his EtOH use is a problem, but states that his caregiving for his daughter precludes him from pursuing inpt rehab. Pt does state that he would be willing to go to outpt rehab, including [facility]).    Efforts to convince pt to consider inpt rehab of minimal to very modest benefit: Pt reminded that if he does not address his EtOH in a consistent and strict fashion (i.e. with inpt rehab), he will suffer morbidity and perhaps mortality which will ultimately remove him from caregiving.
wine

## 2020-12-02 NOTE — PHYSICAL THERAPY INITIAL EVALUATION ADULT - SIT-TO-STAND BALANCE
89 year old Female w/ PMHX of HTN, HLD, and Afib on Coumadin presenting 11/30/2020 after unwitnessed at home. CTH revealed an acute parafalcine SDH and bilateral frontal SDH with scattered SAH. INR 6.4, given VitK and Kcentra. Repeat CTH in 12 hours essentially unchanged and patient neurologically stable. Repeat CTH 12/1 and today performed also stable. Patient currently denies any headache and states she feel perfectly fine without any complaints.       Vital Signs Last 24 Hrs  T(C): 36.7 (02 Dec 2020 08:00), Max: 36.9 (02 Dec 2020 04:53)  T(F): 98 (02 Dec 2020 08:00), Max: 98.5 (02 Dec 2020 04:53)  HR: 57 (02 Dec 2020 09:00) (57 - 81)  BP: 108/59 (02 Dec 2020 09:00) (91/48 - 135/57)  BP(mean): 71 (02 Dec 2020 09:00) (61 - 89)  RR: 21 (02 Dec 2020 09:00) (14 - 31)  SpO2: 100% (02 Dec 2020 09:00) (84% - 100%)      Exam:   NAD, GCS 15   AAOx3   FC  PERRL, EOMI  Face symmetric, w/ normal eye closure and smile, tongue midline  Speech clear and fluent  [place] IRA  MAEx4 AG with good strength throughout   Sensation intact to light touch     CBC Full  -  ( 02 Dec 2020 04:59 )  WBC Count : 7.96 K/uL  RBC Count : 3.52 M/uL  Hemoglobin : 9.7 g/dL  Hematocrit : 30.1 %  Platelet Count - Automated : 110 K/uL  Mean Cell Volume : 85.5 fl  Mean Cell Hemoglobin : 27.6 pg  Mean Cell Hemoglobin Concentration : 32.2 gm/dL    12-02    139  |  109<H>  |  38.0<H>  ----------------------------<  100<H>  4.4   |  22.0  |  0.93    Ca    9.5      02 Dec 2020 04:59  Phos  2.3     12-02  Mg     2.0     12-02    Neurosurgery Imaging:  PT/INR - ( 02 Dec 2020 04:59 )   PT: 13.6 sec;   INR: 1.18 ratio      PTT - ( 02 Dec 2020 04:59 )  PTT:29.3 sec           fair balance

## 2021-01-11 ENCOUNTER — APPOINTMENT (OUTPATIENT)
Dept: HEART AND VASCULAR | Facility: CLINIC | Age: 66
End: 2021-01-11

## 2021-01-17 ENCOUNTER — INPATIENT (INPATIENT)
Facility: HOSPITAL | Age: 66
LOS: 16 days | Discharge: NURSING, REHAB OR LONG-TERM CARE | DRG: 896 | End: 2021-02-03
Attending: PSYCHIATRY & NEUROLOGY | Admitting: PSYCHIATRY & NEUROLOGY
Payer: MEDICARE

## 2021-01-17 VITALS
RESPIRATION RATE: 20 BRPM | SYSTOLIC BLOOD PRESSURE: 133 MMHG | OXYGEN SATURATION: 96 % | TEMPERATURE: 99 F | HEIGHT: 70 IN | WEIGHT: 160.06 LBS | DIASTOLIC BLOOD PRESSURE: 86 MMHG | HEART RATE: 154 BPM

## 2021-01-17 DIAGNOSIS — I48.91 UNSPECIFIED ATRIAL FIBRILLATION: ICD-10-CM

## 2021-01-17 DIAGNOSIS — F10.239 ALCOHOL DEPENDENCE WITH WITHDRAWAL, UNSPECIFIED: ICD-10-CM

## 2021-01-17 DIAGNOSIS — I51.3 INTRACARDIAC THROMBOSIS, NOT ELSEWHERE CLASSIFIED: ICD-10-CM

## 2021-01-17 DIAGNOSIS — I25.10 ATHEROSCLEROTIC HEART DISEASE OF NATIVE CORONARY ARTERY WITHOUT ANGINA PECTORIS: ICD-10-CM

## 2021-01-17 DIAGNOSIS — I50.20 UNSPECIFIED SYSTOLIC (CONGESTIVE) HEART FAILURE: ICD-10-CM

## 2021-01-17 DIAGNOSIS — Z95.5 PRESENCE OF CORONARY ANGIOPLASTY IMPLANT AND GRAFT: Chronic | ICD-10-CM

## 2021-01-17 DIAGNOSIS — R63.8 OTHER SYMPTOMS AND SIGNS CONCERNING FOOD AND FLUID INTAKE: ICD-10-CM

## 2021-01-17 LAB
ALBUMIN SERPL ELPH-MCNC: 4.5 G/DL — SIGNIFICANT CHANGE UP (ref 3.3–5)
ALP SERPL-CCNC: 78 U/L — SIGNIFICANT CHANGE UP (ref 40–120)
ALT FLD-CCNC: 21 U/L — SIGNIFICANT CHANGE UP (ref 10–45)
ANION GAP SERPL CALC-SCNC: 16 MMOL/L — SIGNIFICANT CHANGE UP (ref 5–17)
ANION GAP SERPL CALC-SCNC: 21 MMOL/L — HIGH (ref 5–17)
AST SERPL-CCNC: 36 U/L — SIGNIFICANT CHANGE UP (ref 10–40)
BASE EXCESS BLDV CALC-SCNC: -4.4 MMOL/L — SIGNIFICANT CHANGE UP
BASOPHILS # BLD AUTO: 0.04 K/UL — SIGNIFICANT CHANGE UP (ref 0–0.2)
BASOPHILS NFR BLD AUTO: 0.7 % — SIGNIFICANT CHANGE UP (ref 0–2)
BILIRUB SERPL-MCNC: 0.6 MG/DL — SIGNIFICANT CHANGE UP (ref 0.2–1.2)
BUN SERPL-MCNC: 11 MG/DL — SIGNIFICANT CHANGE UP (ref 7–23)
BUN SERPL-MCNC: 9 MG/DL — SIGNIFICANT CHANGE UP (ref 7–23)
CALCIUM SERPL-MCNC: 7.4 MG/DL — LOW (ref 8.4–10.5)
CALCIUM SERPL-MCNC: 8.6 MG/DL — SIGNIFICANT CHANGE UP (ref 8.4–10.5)
CHLORIDE SERPL-SCNC: 105 MMOL/L — SIGNIFICANT CHANGE UP (ref 96–108)
CHLORIDE SERPL-SCNC: 99 MMOL/L — SIGNIFICANT CHANGE UP (ref 96–108)
CO2 SERPL-SCNC: 19 MMOL/L — LOW (ref 22–31)
CO2 SERPL-SCNC: 19 MMOL/L — LOW (ref 22–31)
CREAT SERPL-MCNC: 0.79 MG/DL — SIGNIFICANT CHANGE UP (ref 0.5–1.3)
CREAT SERPL-MCNC: 0.97 MG/DL — SIGNIFICANT CHANGE UP (ref 0.5–1.3)
EOSINOPHIL # BLD AUTO: 0.02 K/UL — SIGNIFICANT CHANGE UP (ref 0–0.5)
EOSINOPHIL NFR BLD AUTO: 0.4 % — SIGNIFICANT CHANGE UP (ref 0–6)
ETHANOL SERPL-MCNC: 204 MG/DL — HIGH (ref 0–10)
GLUCOSE SERPL-MCNC: 110 MG/DL — HIGH (ref 70–99)
GLUCOSE SERPL-MCNC: 233 MG/DL — HIGH (ref 70–99)
HCO3 BLDV-SCNC: 21 MMOL/L — SIGNIFICANT CHANGE UP (ref 20–27)
HCT VFR BLD CALC: 44.4 % — SIGNIFICANT CHANGE UP (ref 39–50)
HGB BLD-MCNC: 14.5 G/DL — SIGNIFICANT CHANGE UP (ref 13–17)
IMM GRANULOCYTES NFR BLD AUTO: 0.2 % — SIGNIFICANT CHANGE UP (ref 0–1.5)
LACTATE SERPL-SCNC: 2.4 MMOL/L — HIGH (ref 0.5–2)
LACTATE SERPL-SCNC: 3.5 MMOL/L — HIGH (ref 0.5–2)
LYMPHOCYTES # BLD AUTO: 2.07 K/UL — SIGNIFICANT CHANGE UP (ref 1–3.3)
LYMPHOCYTES # BLD AUTO: 38.3 % — SIGNIFICANT CHANGE UP (ref 13–44)
MCHC RBC-ENTMCNC: 27.9 PG — SIGNIFICANT CHANGE UP (ref 27–34)
MCHC RBC-ENTMCNC: 32.7 GM/DL — SIGNIFICANT CHANGE UP (ref 32–36)
MCV RBC AUTO: 85.5 FL — SIGNIFICANT CHANGE UP (ref 80–100)
MONOCYTES # BLD AUTO: 0.19 K/UL — SIGNIFICANT CHANGE UP (ref 0–0.9)
MONOCYTES NFR BLD AUTO: 3.5 % — SIGNIFICANT CHANGE UP (ref 2–14)
NEUTROPHILS # BLD AUTO: 3.07 K/UL — SIGNIFICANT CHANGE UP (ref 1.8–7.4)
NEUTROPHILS NFR BLD AUTO: 56.9 % — SIGNIFICANT CHANGE UP (ref 43–77)
NRBC # BLD: 0 /100 WBCS — SIGNIFICANT CHANGE UP (ref 0–0)
PCO2 BLDV: 40 MMHG — LOW (ref 41–51)
PH BLDV: 7.34 — SIGNIFICANT CHANGE UP (ref 7.32–7.43)
PLATELET # BLD AUTO: 179 K/UL — SIGNIFICANT CHANGE UP (ref 150–400)
PO2 BLDV: 48 MMHG — SIGNIFICANT CHANGE UP
POTASSIUM SERPL-MCNC: 3.7 MMOL/L — SIGNIFICANT CHANGE UP (ref 3.5–5.3)
POTASSIUM SERPL-MCNC: 3.8 MMOL/L — SIGNIFICANT CHANGE UP (ref 3.5–5.3)
POTASSIUM SERPL-SCNC: 3.7 MMOL/L — SIGNIFICANT CHANGE UP (ref 3.5–5.3)
POTASSIUM SERPL-SCNC: 3.8 MMOL/L — SIGNIFICANT CHANGE UP (ref 3.5–5.3)
PROT SERPL-MCNC: 8 G/DL — SIGNIFICANT CHANGE UP (ref 6–8.3)
RBC # BLD: 5.19 M/UL — SIGNIFICANT CHANGE UP (ref 4.2–5.8)
RBC # FLD: 16.1 % — HIGH (ref 10.3–14.5)
SAO2 % BLDV: 79 % — SIGNIFICANT CHANGE UP
SARS-COV-2 RNA SPEC QL NAA+PROBE: SIGNIFICANT CHANGE UP
SODIUM SERPL-SCNC: 139 MMOL/L — SIGNIFICANT CHANGE UP (ref 135–145)
SODIUM SERPL-SCNC: 140 MMOL/L — SIGNIFICANT CHANGE UP (ref 135–145)
WBC # BLD: 5.4 K/UL — SIGNIFICANT CHANGE UP (ref 3.8–10.5)
WBC # FLD AUTO: 5.4 K/UL — SIGNIFICANT CHANGE UP (ref 3.8–10.5)

## 2021-01-17 PROCEDURE — 99291 CRITICAL CARE FIRST HOUR: CPT

## 2021-01-17 PROCEDURE — 99223 1ST HOSP IP/OBS HIGH 75: CPT | Mod: GC

## 2021-01-17 PROCEDURE — 71045 X-RAY EXAM CHEST 1 VIEW: CPT | Mod: 26

## 2021-01-17 RX ORDER — SODIUM CHLORIDE 9 MG/ML
1000 INJECTION INTRAMUSCULAR; INTRAVENOUS; SUBCUTANEOUS ONCE
Refills: 0 | Status: COMPLETED | OUTPATIENT
Start: 2021-01-17 | End: 2021-01-17

## 2021-01-17 RX ORDER — LIDOCAINE 4 G/100G
10 CREAM TOPICAL ONCE
Refills: 0 | Status: COMPLETED | OUTPATIENT
Start: 2021-01-17 | End: 2021-01-17

## 2021-01-17 RX ORDER — MAGNESIUM SULFATE 500 MG/ML
2 VIAL (ML) INJECTION ONCE
Refills: 0 | Status: COMPLETED | OUTPATIENT
Start: 2021-01-17 | End: 2021-01-17

## 2021-01-17 RX ORDER — PANTOPRAZOLE SODIUM 20 MG/1
40 TABLET, DELAYED RELEASE ORAL ONCE
Refills: 0 | Status: COMPLETED | OUTPATIENT
Start: 2021-01-17 | End: 2021-01-17

## 2021-01-17 RX ORDER — ONDANSETRON 8 MG/1
4 TABLET, FILM COATED ORAL ONCE
Refills: 0 | Status: COMPLETED | OUTPATIENT
Start: 2021-01-17 | End: 2021-01-17

## 2021-01-17 RX ORDER — ATORVASTATIN CALCIUM 80 MG/1
80 TABLET, FILM COATED ORAL AT BEDTIME
Refills: 0 | Status: DISCONTINUED | OUTPATIENT
Start: 2021-01-17 | End: 2021-01-20

## 2021-01-17 RX ORDER — APIXABAN 2.5 MG/1
5 TABLET, FILM COATED ORAL ONCE
Refills: 0 | Status: COMPLETED | OUTPATIENT
Start: 2021-01-17 | End: 2021-01-17

## 2021-01-17 RX ORDER — ATORVASTATIN CALCIUM 80 MG/1
1 TABLET, FILM COATED ORAL
Qty: 0 | Refills: 0 | DISCHARGE

## 2021-01-17 RX ORDER — FOLIC ACID 0.8 MG
1 TABLET ORAL ONCE
Refills: 0 | Status: COMPLETED | OUTPATIENT
Start: 2021-01-17 | End: 2021-01-17

## 2021-01-17 RX ORDER — APIXABAN 2.5 MG/1
5 TABLET, FILM COATED ORAL EVERY 12 HOURS
Refills: 0 | Status: DISCONTINUED | OUTPATIENT
Start: 2021-01-17 | End: 2021-01-20

## 2021-01-17 RX ORDER — THIAMINE MONONITRATE (VIT B1) 100 MG
100 TABLET ORAL DAILY
Refills: 0 | Status: DISCONTINUED | OUTPATIENT
Start: 2021-01-18 | End: 2021-01-20

## 2021-01-17 RX ORDER — ASPIRIN/CALCIUM CARB/MAGNESIUM 324 MG
325 TABLET ORAL ONCE
Refills: 0 | Status: COMPLETED | OUTPATIENT
Start: 2021-01-17 | End: 2021-01-17

## 2021-01-17 RX ORDER — ASCORBIC ACID 60 MG
1 TABLET,CHEWABLE ORAL
Qty: 0 | Refills: 0 | DISCHARGE

## 2021-01-17 RX ORDER — METOPROLOL TARTRATE 50 MG
100 TABLET ORAL ONCE
Refills: 0 | Status: COMPLETED | OUTPATIENT
Start: 2021-01-17 | End: 2021-01-17

## 2021-01-17 RX ORDER — ISOSORBIDE DINITRATE 5 MG/1
1 TABLET ORAL
Qty: 0 | Refills: 0 | DISCHARGE

## 2021-01-17 RX ORDER — ASPIRIN/CALCIUM CARB/MAGNESIUM 324 MG
81 TABLET ORAL DAILY
Refills: 0 | Status: DISCONTINUED | OUTPATIENT
Start: 2021-01-18 | End: 2021-01-20

## 2021-01-17 RX ORDER — THIAMINE MONONITRATE (VIT B1) 100 MG
100 TABLET ORAL ONCE
Refills: 0 | Status: COMPLETED | OUTPATIENT
Start: 2021-01-17 | End: 2021-01-17

## 2021-01-17 RX ORDER — FOLIC ACID 0.8 MG
1 TABLET ORAL DAILY
Refills: 0 | Status: DISCONTINUED | OUTPATIENT
Start: 2021-01-18 | End: 2021-01-20

## 2021-01-17 RX ADMIN — Medication 30 MILLILITER(S): at 10:39

## 2021-01-17 RX ADMIN — Medication 2 GRAM(S): at 11:22

## 2021-01-17 RX ADMIN — Medication 50 MILLIGRAM(S): at 23:11

## 2021-01-17 RX ADMIN — SODIUM CHLORIDE 1000 MILLILITER(S): 9 INJECTION INTRAMUSCULAR; INTRAVENOUS; SUBCUTANEOUS at 12:40

## 2021-01-17 RX ADMIN — APIXABAN 5 MILLIGRAM(S): 2.5 TABLET, FILM COATED ORAL at 23:11

## 2021-01-17 RX ADMIN — Medication 50 MILLIGRAM(S): at 10:39

## 2021-01-17 RX ADMIN — Medication 2 MILLIGRAM(S): at 21:05

## 2021-01-17 RX ADMIN — SODIUM CHLORIDE 1000 MILLILITER(S): 9 INJECTION INTRAMUSCULAR; INTRAVENOUS; SUBCUTANEOUS at 12:00

## 2021-01-17 RX ADMIN — SODIUM CHLORIDE 1000 MILLILITER(S): 9 INJECTION INTRAMUSCULAR; INTRAVENOUS; SUBCUTANEOUS at 09:50

## 2021-01-17 RX ADMIN — ATORVASTATIN CALCIUM 80 MILLIGRAM(S): 80 TABLET, FILM COATED ORAL at 21:05

## 2021-01-17 RX ADMIN — Medication 2 MILLIGRAM(S): at 16:52

## 2021-01-17 RX ADMIN — Medication 100 MILLIGRAM(S): at 10:23

## 2021-01-17 RX ADMIN — SODIUM CHLORIDE 1000 MILLILITER(S): 9 INJECTION INTRAMUSCULAR; INTRAVENOUS; SUBCUTANEOUS at 10:40

## 2021-01-17 RX ADMIN — SODIUM CHLORIDE 1000 MILLILITER(S): 9 INJECTION INTRAMUSCULAR; INTRAVENOUS; SUBCUTANEOUS at 10:51

## 2021-01-17 RX ADMIN — Medication 2 MILLIGRAM(S): at 10:04

## 2021-01-17 RX ADMIN — SODIUM CHLORIDE 1000 MILLILITER(S): 9 INJECTION INTRAMUSCULAR; INTRAVENOUS; SUBCUTANEOUS at 11:50

## 2021-01-17 RX ADMIN — Medication 1 MILLIGRAM(S): at 10:22

## 2021-01-17 RX ADMIN — ONDANSETRON 4 MILLIGRAM(S): 8 TABLET, FILM COATED ORAL at 10:04

## 2021-01-17 RX ADMIN — APIXABAN 5 MILLIGRAM(S): 2.5 TABLET, FILM COATED ORAL at 10:04

## 2021-01-17 RX ADMIN — Medication 325 MILLIGRAM(S): at 10:04

## 2021-01-17 RX ADMIN — PANTOPRAZOLE SODIUM 40 MILLIGRAM(S): 20 TABLET, DELAYED RELEASE ORAL at 10:40

## 2021-01-17 RX ADMIN — Medication 100 MILLIGRAM(S): at 10:04

## 2021-01-17 RX ADMIN — Medication 50 GRAM(S): at 10:22

## 2021-01-17 NOTE — H&P ADULT - PROBLEM SELECTOR PLAN 1
Patient w/ 40 year history of drinking 1pint per day, has been drinking daily for several months, last drink hours prior to admission 1/16. Developed w/d symptoms of severe tremors, chest pain, sob, anxiety over the past 24 hours. Patient w/ 40 year history of drinking 1pint per day, has been drinking daily for several months, last drink hours prior to admission 1/16. Developed w/d symptoms of severe tremors, chest pain, sob, anxiety over the past 24 hours.  - c/w high-risk CIWA protocol  - PRN IV ativan 2mg Q2hrs for CIWA >8  - c/w standing librium 50mg Q12hrs  - c/w folic acid 1mg QD  - c/w thiamine 100mg QD

## 2021-01-17 NOTE — H&P ADULT - NSHPPHYSICALEXAM_GEN_ALL_CORE
T(C): 37.3 (01-17-21 @ 09:32), Max: 37.3 (01-17-21 @ 09:32)  HR: 76 (01-17-21 @ 14:43) (75 - 154)  BP: 124/87 (01-17-21 @ 14:43) (124/87 - 145/86)  RR: 18 (01-17-21 @ 14:43) (18 - 20)  SpO2: 96% (01-17-21 @ 14:43) (94% - 96%)    General: NAD, laying in bed, speaking in full sentences  HEENT: head NC/AT, no conjunctival injection, EOMI, MMM  Neck: supple, no JVD  Cardio: RRR, +S1/S2, no M/R/G  Resp: lungs CTAB, no cough/wheezes/rales/rhonchi  Abdo: soft, NT, ND, +bowel sounds x4, no organomegaly or palpable mass    Extremities: WWP, no edema/cyanosis/clubbing   Vasc: 2+ radial and DP pulses b/l  Neuro: A&Ox3  Psych: speech non-pressured, thoughts goal-oriented  Skin: dry, intact, no visible jaundice   MSK: no joint swelling T(C): 37.3 (01-17-21 @ 09:32), Max: 37.3 (01-17-21 @ 09:32)  HR: 76 (01-17-21 @ 14:43) (75 - 154)  BP: 124/87 (01-17-21 @ 14:43) (124/87 - 145/86)  RR: 18 (01-17-21 @ 14:43) (18 - 20)  SpO2: 96% (01-17-21 @ 14:43) (94% - 96%)    General: +anxious; +tremulous; speaking in full sentences; appears stated age  HEENT: head NC/AT, +muddy sclera b/l, PERRLA, EOMI, MMM, +poor dentition  Neck: supple, no JVD  Cardiac: RRR, +S1/S2, no M/R/G  Resp: lungs CTAB, no cough/wheezes/rales/rhonchi  Abdo: soft, NT, ND, +bowel sounds x4, no organomegaly or palpable mass    Extremities: WWP, +non-pitting edema in distal extremities; otherwise no cyanosis/clubbing   Vasc: 2+ radial and DP pulses b/l  Neuro: A&Ox3  Skin: dry, intact, no visible jaundice   MSK: no joint swelling

## 2021-01-17 NOTE — H&P ADULT - PROBLEM SELECTOR PLAN 3
This office note has been dictated. Medical Assistant/Nurse notes reviewed and accepted. Problem List Reviewed. Skin system in problem list updated. Digital photo(s) obtained with patient consent. PHYSICAL EXAM:    General Appearance:  Ulysses Bowers is a pleasant, well-developed, well-nourished, oriented x3 male with normal affect. DETAILED SKIN EXAM:    hair of scalp, scalp, ears, face, eyelids, lips, neck, chest, abdomen, back, right upper extremity, left upper extremity, right lower extremity, & left lower extremity. He declines skin exam of genitalia, groin and buttocks. Normal skin findings: There are scattered benign appearing lentigines, freckles, nevi, seborrheic keratoses, and cherry hemangiomas on areas examined. The locations of previously treated skin cancer(s) and/or precancerous skin lesion(s) show well healed treatment sites with no evidence of recurrence. Remainder of history, detailed skin exam, impression and treatment plan have been dictated. CURETTAGE & ELECTRODESSICATION   first pass and final defect 1.6 x 1.6 cm. Pt w/ known hx CAD s/p stent placement approximately 2 yrs ago. Pt follows-up with outpt cardiologist Dr. Kati Tenorio. On ASA 81mg, toprol 100mg QD + toprol 50mg QD (non-adherent?), isosorbide dinitrate (non-adherent); atorvastatin 80mg QHS. ECG on admission showing signs of MELVIN V2-24 and TWI V-V6, however these findings appear unchanged when compared to previous ECG in march 2020. Troponins non-elevated on ED arrival.  - c/w ASA 81mg, atorvastatin 80mg QHS  - will start PO protonix 20mg QD for GI ppx while pt on ASA  - formal med rec in AM  - will restart toprol in AM, HR currently WNL

## 2021-01-17 NOTE — H&P ADULT - NSICDXPASTMEDICALHX_GEN_ALL_CORE_FT
PAST MEDICAL HISTORY:  Atherosclerosis of coronary artery Coronary artery disease    Atrial fibrillation     CAD (coronary artery disease)     ETOH abuse     Gastritis     Hemorrhoid     Heparin-induced thrombocytopenia Antibody positive    Hyperlipidemia     ICD (implantable cardioverter-defibrillator) in place     Left ventricular thrombus     Myocardial infarction involving other coronary artery

## 2021-01-17 NOTE — ED ADULT NURSE NOTE - OBJECTIVE STATEMENT
Pt presents to ER with c/o CP, nausea, and tachycardia after trying to quit drinking alcohol last night. Pt reportedly drinks 1 pint vodka daily, and has been hospitalized for ETOH withdrawal many times, with 2 rehab admissions. Pt states he tried to quit "cold turkey" last night and then woke up with tremors and MSCP. Pt drank 1/2 pint vodka enroute to hospital to try to relieve symptoms. Arrives tachycardic to 150s. Placed on cardiac monitor, EKG performed, and labs drawn upon arrival. Hx AFIB, AICD placement. Reports not taking medications last few days while on drinking binge.

## 2021-01-17 NOTE — H&P ADULT - NSHPLABSRESULTS_GEN_ALL_CORE
LABS:                        14.5   5.40  )-----------( 179      ( 17 Jan 2021 10:03 )             44.4     01-17    140  |  105  |  9   ----------------------------<  110<H>  3.7   |  19<L>  |  0.79    Ca    7.4<L>      17 Jan 2021 12:03    TPro  8.0  /  Alb  4.5  /  TBili  0.6  /  DBili  x   /  AST  36  /  ALT  21  /  AlkPhos  78  01-17        CAPILLARY BLOOD GLUCOSE          CARDIAC MARKERS ( 17 Jan 2021 10:03 )  x     / <0.01 ng/mL / x     / x     / x                    LIVER FUNCTIONS - ( 17 Jan 2021 10:03 )  Alb: 4.5 g/dL / Pro: 8.0 g/dL / ALK PHOS: 78 U/L / ALT: 21 U/L / AST: 36 U/L / GGT: x                     I & O Summary:      Microbiology:        RADIOLOGY, EKG AND ADDITIONAL TESTS: Reviewed. LABS:                        14.5   5.40  )-----------( 179      ( 17 Jan 2021 10:03 )             44.4     01-17    140  |  105  |  9   ----------------------------<  110<H>  3.7   |  19<L>  |  0.79    Ca    7.4<L>      17 Jan 2021 12:03    TPro  8.0  /  Alb  4.5  /  TBili  0.6  /  DBili  x   /  AST  36  /  ALT  21  /  AlkPhos  78  01-17              CARDIAC MARKERS ( 17 Jan 2021 10:03 )  x     / <0.01 ng/mL / x     / x     / x                    LIVER FUNCTIONS - ( 17 Jan 2021 10:03 )  Alb: 4.5 g/dL / Pro: 8.0 g/dL / ALK PHOS: 78 U/L / ALT: 21 U/L / AST: 36 U/L / GGT: x                     I & O Summary:      Microbiology:        RADIOLOGY, EKG AND ADDITIONAL TESTS: Reviewed.

## 2021-01-17 NOTE — H&P ADULT - NSICDXPASTSURGICALHX_GEN_ALL_CORE_FT
PAST SURGICAL HISTORY:  Automatic implantable cardiac defibrillator in situ ICD (implantable cardioverter-defibrillator) in place    S/P coronary artery stent placement

## 2021-01-17 NOTE — ED PROVIDER NOTE - PROGRESS NOTE DETAILS
Pt reports sx improved some but still c/o anxiety.  Librium ordered.  Pt now indicates cp is more epigastric radiating up towards chest - will try gi meds in case pt w esophagitis/gastritis 2/2 etoh.  CXR nl.  HR improving. Pt feeling improved.  CIWA 2 after meds.  CP/sob improved.  Labs w small ag - repeat ordered after ivf; trop neg.  Plan on admit to f for etoh w/d. Initial lactate elevated.  Nl pH.  Repeat improving.  Pt resting comfortably, vs remain improved.

## 2021-01-17 NOTE — ED PROVIDER NOTE - CLINICAL SUMMARY MEDICAL DECISION MAKING FREE TEXT BOX
Pt c/o etoh w/d, cp, sob.  Pt w hr 150 - sinus tach on ekg.  ? rate related cp vs cardiac (h/o acs, afib).  EKG w/o stemi changes.  Pt w ciwa 9.  Plan benzos, ivf, mg/thiamine/folate, pt's normal am meds (metoprolol, eliquis), labs, cxr, covid, monitor.  Reassess.  Poss admit for etoh w/d.

## 2021-01-17 NOTE — ED PROVIDER NOTE - PMH
Atherosclerosis of coronary artery  Coronary artery disease  Atrial fibrillation    CAD (coronary artery disease)    ETOH abuse    Gastritis    Hemorrhoid    Heparin-induced thrombocytopenia  Antibody positive  Hyperlipidemia    ICD (implantable cardioverter-defibrillator) in place    Left ventricular thrombus    Myocardial infarction involving other coronary artery

## 2021-01-17 NOTE — H&P ADULT - PROBLEM SELECTOR PLAN 6
F: none  E: replete PRN  N: DASH/TLC  DVT ppx: eliquis  GI: PO protonix 20mg F: none  E: replete PRN  N: DASH/TLC  DVT ppx: eliquis 5mg BID  GI: PO protonix 20mg

## 2021-01-17 NOTE — ED PROVIDER NOTE - NEUROLOGICAL, MLM
Alert and oriented, slight tremor and tongue fasciculations, no focal deficits, no motor or sensory deficits.

## 2021-01-17 NOTE — ED PROVIDER NOTE - MUSCULOSKELETAL, MLM
Spine appears normal, no c/c/e/ttp bilat le, range of motion is not limited, no muscle or joint tenderness

## 2021-01-17 NOTE — ED PROVIDER NOTE - PSH
Automatic implantable cardiac defibrillator in situ  ICD (implantable cardioverter-defibrillator) in place  S/P coronary artery stent placement

## 2021-01-17 NOTE — ED PROVIDER NOTE - OBJECTIVE STATEMENT
66 yo male h/o LV thrombus (resolved as per ECHO in January) on Eliquis, pAF, CAD s/p PCI, HFrEF 30-35% s/p AICD, HIT, hemorrhoids, gastritis, GIB, EtOH abuse and freq admissions for withdrawal c/o alcohol withdrawal. Pt notes last drink 5 am and also some etoh on his way to the ER.  Pt c/o cp, sob, nausea, shakes, anxiety.  Pt reports poor po's and no meds x 2 d 2/2 binge drinking.  No v/d.  No cough, fever, uri sx.  Pt states cp sharp, has had prev related to withdrawal.  No ha, hallucinations.  Pt denies h/o w/d sz. 64 yo male h/o LV thrombus (resolved as per ECHO in January) on Eliquis, pAF, CAD s/p PCI, HFrEF 30-35% s/p AICD, HIT, hemorrhoids, gastritis, GIB, EtOH abuse and freq admissions for withdrawal c/o alcohol withdrawal. Pt notes last drink 5 am and also some etoh on his way to the ER.  Pt c/o cp, sob, nausea, shakes, anxiety.  Pt reports poor po's and no meds x 2 d 2/2 binge drinking.  No v/d.  No cough, fever, uri sx.  Pt states cp sharp, has had prev related to withdrawal.  No ha, hallucinations.  Pt denies h/o w/d sz.  No recent exertional cp or sob (pt states he boxes and works out regularly); pt reports recent stress ~ 6 mo ago normal.

## 2021-01-17 NOTE — H&P ADULT - PROBLEM SELECTOR PLAN 2
PMHx of LV thrombus (resolved on Jan 2020 echo). However per TTE report from Nov 2020 at [facility], showing large protruding fixed thrombus attached to LV apex.  - c/w eliquis 5mg BID  - will order repeat TTE to confirm presence of LV mural thrombus PMHx of LV thrombus (resolved on Jan 2020 echo). However per TTE report from Nov 2020 at [facility], showing large protruding fixed thrombus attached to LV apex.  - c/w eliquis 5mg BID  - will order repeat TTE to confirm presence of LV mural thrombus  - consider heme/onc consult if confirmed to have occurred in the setting of failed eliquis

## 2021-01-17 NOTE — ED ADULT NURSE NOTE - NSIMPLEMENTINTERV_GEN_ALL_ED
Implemented All Fall Risk Interventions:  [place] to call system. Call bell, personal items and telephone within reach. Instruct patient to call for assistance. Room bathroom lighting operational. Non-slip footwear when patient is off stretcher. Physically safe environment: no spills, clutter or unnecessary equipment. Stretcher in lowest position, wheels locked, appropriate side rails in place. Provide visual cue, wrist band, yellow gown, etc. Monitor gait and stability. Monitor for mental status changes and reorient to person, place, and time. Review medications for side effects contributing to fall risk. Reinforce activity limits and safety measures with patient and family.

## 2021-01-17 NOTE — H&P ADULT - HISTORY OF PRESENT ILLNESS
Mr. Torrez is a 65yo M w/ poor med adherence, PMHx of LV thrombus (resolved on Jan 2020 echo; reappeared on Nov 2020 echo) pAF on eliquis, CAD s/p PCI, HFrEF 30-35% s/p AICD, HIT, hemorrhoids, gastritis, GIB, hx multiple admissions for EtOH withdrawal (no hx seizures, DTs, or intubations), who presents due to concern for alcohol withdrawal. Pt last seen at [facility] in March 2020 when he was admitted for EtOH withdrawal w/ initial CIWA ~24 and associated visual hallucinations, which improved after being started on librium 50mg Q8hrs, and subsequently discharged on librium taper. Per outpatient records that pt himself brought with him to the ED, pt had additional episode EtOH withdrawal in November where he was seen at [facility] and subsequently discharged on librium taper 25mg BID. At that time, pt also underwent TTE imaging which showed LV EF 38% as well as a large protruding fixed thrombus attached to LV apex. Noted on discharge paperwork (11/22) to not be on ASA due to thrombocytopenia, not on warfarin due to prior poor adherence, and accordingly discharged on eliquis w/ a discharge diagnosis of afib/aflutter. Pt has since twice again been admitted at [facility] (12/8/21 and 1/6/21) for 1-2mo hx of persistent "funny feeling in my chest", determined to be 2/2 Afib/aflutter. Between those two admissions, pt had also been prescribed hydralazine and isosorbide dinitrate, however pt reports refusing to take them after reading about the side-effects in conjunction w/ alcohol-use. Pt says that for the past 1 month, he has been struggling to wean off his alcohol consumption which he estimates to be a ~1pint/day. He says for the past week he has been especially focused on quitting in anticipation of an appointment with his outpatient cardiologist. Today he reports experiencing sx consistent w/ his previous episodes of withdrawal including agitation, shaking, chest pain, sob, nausea, and a racing heart. He otherwise denies recent or active fever, chills, Mr. Torrez is a 63yo M w/ poor med adherence, PMHx of LV thrombus (resolved on Jan 2020 echo; reappeared on Nov 2020 echo) pAF on eliquis, CAD s/p PCI, HFrEF 30-35% s/p AICD, HIT, hemorrhoids, gastritis, GIB, hx multiple admissions for EtOH withdrawal (no hx seizures, DTs, or intubations), who presents due to concern for alcohol withdrawal. Pt last seen at [facility] in March 2020 when he was admitted for EtOH withdrawal w/ initial CIWA ~24 and associated visual hallucinations, which improved after being started on librium 50mg Q8hrs, and subsequently discharged on librium taper. Per outpatient records that pt himself brought with him to the ED, pt had additional episode EtOH withdrawal in November where he was seen at [facility] and subsequently discharged on librium taper 25mg BID. At that time, pt also underwent TTE imaging which showed LV EF 38% as well as a large protruding fixed thrombus attached to LV apex. Noted on discharge paperwork (11/22) to not be on ASA due to thrombocytopenia, not on warfarin due to prior poor adherence, and accordingly discharged on eliquis w/ a discharge diagnosis of afib/aflutter. Pt has since twice again been admitted at [facility] (12/8/21 and 1/6/21) for 1-2mo hx of persistent "funny feeling in my chest", determined to be 2/2 Afib/aflutter. Between those two admissions, pt had also been prescribed hydralazine and isosorbide dinitrate, however pt reports refusing to take them after reading about the side-effects in conjunction w/ alcohol-use. Pt says that for the past 1 month, he has been trying but struggling to wean off his alcohol consumption which he estimates to be a ~1pint/day. He says for the past week he has been especially focused on quitting in anticipation of an appointment with his outpatient cardiologist. Today he reports experiencing sx consistent w/ his previous episodes of withdrawal including agitation, shaking, chest pain, sob, nausea, and a racing heart. He otherwise denies recent or active fever, chills, Mr. Torrez is a 63yo M w/ poor med adherence, PMHx of LV thrombus (resolved on Jan 2020 echo; reappeared on Nov 2020 echo) pAF on eliquis, CAD s/p PCI, HFrEF 30-35% s/p AICD, HIT, hemorrhoids, gastritis, GIB, hx multiple admissions for EtOH withdrawal (no hx seizures, DTs, or intubations), who presents due to concern for alcohol withdrawal. Pt last seen at [facility] in March 2020 when he was admitted for EtOH withdrawal w/ initial CIWA ~24 and associated visual hallucinations, which improved after being started on librium 50mg Q8hrs, and subsequently discharged on librium taper. Per outpatient records that pt himself brought with him to the ED, pt had additional episode EtOH withdrawal in November where he was seen at [facility] and subsequently discharged on librium taper 25mg BID. At that time, pt also underwent TTE imaging which showed LV EF 38% as well as a large protruding fixed thrombus attached to LV apex. Noted on discharge paperwork (11/22) to not be on ASA due to thrombocytopenia, not on warfarin due to prior poor adherence, and accordingly discharged on eliquis w/ a discharge diagnosis of afib/aflutter. Pt has since twice again been admitted at [facility] (12/8/21 and 1/6/21) for 1-2mo hx of persistent "funny feeling in my chest", determined to be 2/2 Afib/aflutter. Between those two admissions, pt had also been prescribed hydralazine and isosorbide dinitrate, however pt reports refusing to take them after reading about the side-effects in conjunction w/ alcohol-use. Pt says that for the past 1 month, he has been trying but struggling to wean off his alcohol consumption which he estimates to be a ~1pint/day. He says for the past week he has been especially focused on quitting in anticipation of an appointment with his outpatient cardiologist. Today he reports experiencing sx consistent w/ his previous episodes of withdrawal including agitation, shaking, chest pain, sob, nausea, and a racing heart. He otherwise denies recent or active fever, chills, vomiting, abdominal pain, back pain, changes in vision or hearing, genitourinary sx, extremity pain or swelling. Mr. Torrez is a 63yo M w/ poor med adherence, PMHx of LV thrombus (resolved on Jan 2020 echo; reappeared on Nov 2020 echo) pAF on eliquis, CAD s/p PCI, HFrEF 30-35% s/p AICD, HIT, hemorrhoids, gastritis, GIB, hx multiple admissions for EtOH withdrawal (last drink 1/16; no hx seizures, DTs, or intubations), who presents due to concern for alcohol withdrawal. Pt last seen at [facility] in March 2020 when he was admitted for EtOH withdrawal w/ initial CIWA ~24 and associated visual hallucinations, which improved after being started on librium 50mg Q8hrs, and subsequently discharged on librium taper. Per outpatient records that pt himself brought with him to the ED, pt had additional episode EtOH withdrawal in November where he was seen at [facility] and subsequently discharged on librium taper 25mg BID. At that time, pt also underwent TTE imaging which showed LV EF 38% as well as a large protruding fixed thrombus attached to LV apex. Noted on discharge paperwork (11/22) to not be on ASA due to thrombocytopenia, not on warfarin due to prior poor adherence, and accordingly discharged on eliquis w/ a discharge diagnosis of afib/aflutter. Pt has since twice again been admitted at [facility] (12/8/21 and 1/6/21) for 1-2mo hx of persistent "funny feeling in my chest", determined to be 2/2 Afib/aflutter. Between those two admissions, pt had also been prescribed hydralazine and isosorbide dinitrate, however pt reports refusing to take them after reading about the side-effects in conjunction w/ alcohol-use. Pt says that for the past 1 month, he has been trying but struggling to wean off his alcohol consumption which he estimates to be a ~1pint/day. He says for the past week he has been especially focused on quitting in anticipation of an appointment with his outpatient cardiologist. Today he reports experiencing sx consistent w/ his previous episodes of withdrawal including agitation, shaking, chest pain, sob, nausea, and a racing heart. He otherwise denies recent or active fever, chills, vomiting, abdominal pain, back pain, changes in vision or hearing, genitourinary sx, extremity pain or swelling.    ED Course:  Vitals: T 99.7F oral, ->115->103->89; /86; RR 20; SpO2 96% on RA  Labs: CBC WNL, CMP WNL, troponin WNL; lactate 3.5->2.4; AG 21->16  Therapies: apixaban 5mg x1; ASA 325mg x1; librium 50mg x1; IV ativan 2mg x1; MgSo4 2g x1; PO thiamine 100mg x1; IV protonix 40mg x1; IV zofan 4mg x1; malaax 30ml x1; folic acid 1mg x1; IV lopressor 100mg x1; 1L NS x4  ECG: +RAD; ST elevations leads V2-V4; TWI V5-V6 (ECG findings unchanged from prior ECG performed on March 19th, 2020)

## 2021-01-17 NOTE — H&P ADULT - PROBLEM SELECTOR PLAN 4
Most recent TTE performed 11/20, showing LV EF 38% w/ moderately decreased LV function, grade I diastolic dysfunction, and moderately elevated pulmonary artery pressure. Prior to then EF of 30-35% on Echo in 1/2020.  - f/u repeat TTE  - c/w toprol in AM  - No ACE/ARB w/ history of allergy and angioedema

## 2021-01-18 LAB
ALBUMIN SERPL ELPH-MCNC: 3.8 G/DL — SIGNIFICANT CHANGE UP (ref 3.3–5)
ALP SERPL-CCNC: 60 U/L — SIGNIFICANT CHANGE UP (ref 40–120)
ALT FLD-CCNC: 14 U/L — SIGNIFICANT CHANGE UP (ref 10–45)
ANION GAP SERPL CALC-SCNC: 9 MMOL/L — SIGNIFICANT CHANGE UP (ref 5–17)
AST SERPL-CCNC: 26 U/L — SIGNIFICANT CHANGE UP (ref 10–40)
BASOPHILS # BLD AUTO: 0.03 K/UL — SIGNIFICANT CHANGE UP (ref 0–0.2)
BASOPHILS NFR BLD AUTO: 0.8 % — SIGNIFICANT CHANGE UP (ref 0–2)
BILIRUB SERPL-MCNC: 1.3 MG/DL — HIGH (ref 0.2–1.2)
BUN SERPL-MCNC: 8 MG/DL — SIGNIFICANT CHANGE UP (ref 7–23)
CALCIUM SERPL-MCNC: 8.3 MG/DL — LOW (ref 8.4–10.5)
CHLORIDE SERPL-SCNC: 100 MMOL/L — SIGNIFICANT CHANGE UP (ref 96–108)
CO2 SERPL-SCNC: 28 MMOL/L — SIGNIFICANT CHANGE UP (ref 22–31)
CREAT SERPL-MCNC: 0.97 MG/DL — SIGNIFICANT CHANGE UP (ref 0.5–1.3)
EOSINOPHIL # BLD AUTO: 0.08 K/UL — SIGNIFICANT CHANGE UP (ref 0–0.5)
EOSINOPHIL NFR BLD AUTO: 2.2 % — SIGNIFICANT CHANGE UP (ref 0–6)
GLUCOSE SERPL-MCNC: 86 MG/DL — SIGNIFICANT CHANGE UP (ref 70–99)
HCT VFR BLD CALC: 38.5 % — LOW (ref 39–50)
HGB BLD-MCNC: 12.4 G/DL — LOW (ref 13–17)
IMM GRANULOCYTES NFR BLD AUTO: 0.3 % — SIGNIFICANT CHANGE UP (ref 0–1.5)
LYMPHOCYTES # BLD AUTO: 1.26 K/UL — SIGNIFICANT CHANGE UP (ref 1–3.3)
LYMPHOCYTES # BLD AUTO: 34.2 % — SIGNIFICANT CHANGE UP (ref 13–44)
MAGNESIUM SERPL-MCNC: 2 MG/DL — SIGNIFICANT CHANGE UP (ref 1.6–2.6)
MCHC RBC-ENTMCNC: 27.8 PG — SIGNIFICANT CHANGE UP (ref 27–34)
MCHC RBC-ENTMCNC: 32.2 GM/DL — SIGNIFICANT CHANGE UP (ref 32–36)
MCV RBC AUTO: 86.3 FL — SIGNIFICANT CHANGE UP (ref 80–100)
MONOCYTES # BLD AUTO: 0.22 K/UL — SIGNIFICANT CHANGE UP (ref 0–0.9)
MONOCYTES NFR BLD AUTO: 6 % — SIGNIFICANT CHANGE UP (ref 2–14)
NEUTROPHILS # BLD AUTO: 2.08 K/UL — SIGNIFICANT CHANGE UP (ref 1.8–7.4)
NEUTROPHILS NFR BLD AUTO: 56.5 % — SIGNIFICANT CHANGE UP (ref 43–77)
NRBC # BLD: 0 /100 WBCS — SIGNIFICANT CHANGE UP (ref 0–0)
PHOSPHATE SERPL-MCNC: 2.2 MG/DL — LOW (ref 2.5–4.5)
PLATELET # BLD AUTO: 94 K/UL — LOW (ref 150–400)
POTASSIUM SERPL-MCNC: 4.3 MMOL/L — SIGNIFICANT CHANGE UP (ref 3.5–5.3)
POTASSIUM SERPL-SCNC: 4.3 MMOL/L — SIGNIFICANT CHANGE UP (ref 3.5–5.3)
PROT SERPL-MCNC: 6.5 G/DL — SIGNIFICANT CHANGE UP (ref 6–8.3)
RBC # BLD: 4.46 M/UL — SIGNIFICANT CHANGE UP (ref 4.2–5.8)
RBC # FLD: 15.6 % — HIGH (ref 10.3–14.5)
SODIUM SERPL-SCNC: 137 MMOL/L — SIGNIFICANT CHANGE UP (ref 135–145)
WBC # BLD: 3.68 K/UL — LOW (ref 3.8–10.5)
WBC # FLD AUTO: 3.68 K/UL — LOW (ref 3.8–10.5)

## 2021-01-18 PROCEDURE — 99233 SBSQ HOSP IP/OBS HIGH 50: CPT | Mod: GC

## 2021-01-18 RX ORDER — METOPROLOL TARTRATE 50 MG
100 TABLET ORAL DAILY
Refills: 0 | Status: DISCONTINUED | OUTPATIENT
Start: 2021-01-18 | End: 2021-01-20

## 2021-01-18 RX ADMIN — Medication 50 MILLIGRAM(S): at 21:56

## 2021-01-18 RX ADMIN — Medication 81 MILLIGRAM(S): at 10:23

## 2021-01-18 RX ADMIN — Medication 100 MILLIGRAM(S): at 10:23

## 2021-01-18 RX ADMIN — APIXABAN 5 MILLIGRAM(S): 2.5 TABLET, FILM COATED ORAL at 21:56

## 2021-01-18 RX ADMIN — Medication 1 MILLIGRAM(S): at 10:23

## 2021-01-18 RX ADMIN — Medication 1 TABLET(S): at 10:23

## 2021-01-18 RX ADMIN — APIXABAN 5 MILLIGRAM(S): 2.5 TABLET, FILM COATED ORAL at 10:23

## 2021-01-18 RX ADMIN — Medication 50 MILLIGRAM(S): at 10:22

## 2021-01-18 RX ADMIN — Medication 100 MILLIGRAM(S): at 11:46

## 2021-01-18 RX ADMIN — ATORVASTATIN CALCIUM 80 MILLIGRAM(S): 80 TABLET, FILM COATED ORAL at 21:56

## 2021-01-18 NOTE — PROGRESS NOTE ADULT - ASSESSMENT
63-year-old male with a past medical history of HIT, ETOH abuse, paroxysmal A-fib with an LV thrombus, HLD, CAD status post PCI, HFrEF (35-40% 2019) status post AICD, hemorrhoids, gastritis, and a recent admission in August for alcohol withdrawal who presented with signs and symptoms of alcohol withdrawal.

## 2021-01-18 NOTE — PROGRESS NOTE ADULT - SUBJECTIVE AND OBJECTIVE BOX
OVERNIGHT EVENTS: No acute events overnight.     SUBJECTIVE / INTERVAL HPI: Patient seen and examined at bedside. He is tremulous, though he notes he is not diaphoretic, has no chest pain or shortness of breath, no nausea, vomiting, diarrhea. He denies auditory or visual hallucinations.    VITAL SIGNS:  Vital Signs Last 24 Hrs  T(C): 36.9 (18 Jan 2021 05:18), Max: 37.3 (17 Jan 2021 09:32)  T(F): 98.4 (18 Jan 2021 05:18), Max: 99.2 (17 Jan 2021 09:32)  HR: 68 (18 Jan 2021 05:18) (68 - 154)  BP: 134/85 (18 Jan 2021 05:18) (123/83 - 145/86)  BP(mean): --  RR: 15 (18 Jan 2021 05:18) (15 - 20)  SpO2: 97% (18 Jan 2021 05:18) (94% - 97%)    PHYSICAL EXAM:    General: WDWN  HEENT: NC/AT; PERRL, anicteric sclera; MMM  Neck: supple  Cardiovascular: +S1/S2; RRR  Respiratory: CTA B/L; no W/R/R  Gastrointestinal: soft, NT/ND; +BSx4  Extremities: WWP; no edema, clubbing or cyanosis  Vascular: 2+ radial, DP/PT pulses B/L  Neurological: AAOx3; no focal deficits, tremor noted in bilateral UE and LE.    MEDICATIONS:  MEDICATIONS  (STANDING):  apixaban 5 milliGRAM(s) Oral every 12 hours  aspirin  chewable 81 milliGRAM(s) Oral daily  atorvastatin 80 milliGRAM(s) Oral at bedtime  chlordiazePOXIDE 50 milliGRAM(s) Oral every 12 hours  folic acid 1 milliGRAM(s) Oral daily  multivitamin 1 Tablet(s) Oral daily  thiamine 100 milliGRAM(s) Oral daily    MEDICATIONS  (PRN):  LORazepam   Injectable 2 milliGRAM(s) IV Push every 2 hours PRN Symptom-triggered: 2 point increase in CIWA -Ar score and a total score of 7 or LESS      ALLERGIES:  Allergies    Capoten (Short breath; Rash; Hives)  heparin (Other (Mod to Severe))  penicillin (Short breath; Rash; Hives)    Intolerances        LABS:                        12.4   3.68  )-----------( 94       ( 18 Jan 2021 06:20 )             38.5     01-18    137  |  100  |  8   ----------------------------<  86  4.3   |  28  |  0.97    Ca    8.3<L>      18 Jan 2021 06:20  Phos  2.2     01-18  Mg     2.0     01-18    TPro  6.5  /  Alb  3.8  /  TBili  1.3<H>  /  DBili  x   /  AST  26  /  ALT  14  /  AlkPhos  60  01-18        CAPILLARY BLOOD GLUCOSE          RADIOLOGY & ADDITIONAL TESTS: Reviewed.    ASSESSMENT:    PLAN:

## 2021-01-18 NOTE — PROGRESS NOTE ADULT - ATTENDING COMMENTS
pt seen and examined by me case d/w housestaff agree with above with following additives    tremulous  lungs clear    1- alcohol withdrawal  librium  monitor CIWA  2- hx LV Clot? - get echo today pt has poor outpt fu- will consult cards once done- for choice of AC in setting of LV clot  3- atrial fibrillation with rvr on admission- now well controlled  on ac

## 2021-01-18 NOTE — PROGRESS NOTE ADULT - PROBLEM SELECTOR PLAN 5
History of CAD with MI in 1996. No antiplatelets due to GI bleed history    -Continue Lipitor  -Continue Toprol XL

## 2021-01-18 NOTE — PROGRESS NOTE ADULT - PROBLEM SELECTOR PLAN 3
History of LV thrombus with no thrombus on most recent ECHO during this admission. Life-long AC required.    -Continue Eliquis 5mg BID

## 2021-01-18 NOTE — PROGRESS NOTE ADULT - PROBLEM SELECTOR PLAN 1
History of alcohol withdrawal. Alcohol 185 on admission. Last drink on day of admission 1/17. Started on Librium taper. This AM CIWA 4 for tremors.   -Librium 50 q 12  -Folic Acid/MVI/Thiamine  -Fall precautions  -PT says no needs

## 2021-01-18 NOTE — PROGRESS NOTE ADULT - PROBLEM SELECTOR PLAN 2
History of A-fib on Eliquis and Toprol XL. CHADS-VASC 4, HAS-BLED 3.    -Continue Eliquis 5mg BID  -Continue Toprol XL 100mg QD

## 2021-01-18 NOTE — PROGRESS NOTE ADULT - PROBLEM SELECTOR PLAN 4
HFrEF (30-35% 1/2020) status post AICD on Toprol, Lipitor, no ACE secondary to allergy    -Continue Toprol  -Continue Lipitor  -No ACE/ARB

## 2021-01-19 ENCOUNTER — TRANSCRIPTION ENCOUNTER (OUTPATIENT)
Age: 66
End: 2021-01-19

## 2021-01-19 LAB
ANION GAP SERPL CALC-SCNC: 12 MMOL/L — SIGNIFICANT CHANGE UP (ref 5–17)
BUN SERPL-MCNC: 9 MG/DL — SIGNIFICANT CHANGE UP (ref 7–23)
CALCIUM SERPL-MCNC: 8.6 MG/DL — SIGNIFICANT CHANGE UP (ref 8.4–10.5)
CHLORIDE SERPL-SCNC: 103 MMOL/L — SIGNIFICANT CHANGE UP (ref 96–108)
CO2 SERPL-SCNC: 21 MMOL/L — LOW (ref 22–31)
CREAT SERPL-MCNC: 0.85 MG/DL — SIGNIFICANT CHANGE UP (ref 0.5–1.3)
GLUCOSE SERPL-MCNC: 92 MG/DL — SIGNIFICANT CHANGE UP (ref 70–99)
HCT VFR BLD CALC: 41.2 % — SIGNIFICANT CHANGE UP (ref 39–50)
HGB BLD-MCNC: 13.5 G/DL — SIGNIFICANT CHANGE UP (ref 13–17)
MAGNESIUM SERPL-MCNC: 1.9 MG/DL — SIGNIFICANT CHANGE UP (ref 1.6–2.6)
MCHC RBC-ENTMCNC: 28.7 PG — SIGNIFICANT CHANGE UP (ref 27–34)
MCHC RBC-ENTMCNC: 32.8 GM/DL — SIGNIFICANT CHANGE UP (ref 32–36)
MCV RBC AUTO: 87.7 FL — SIGNIFICANT CHANGE UP (ref 80–100)
NRBC # BLD: 0 /100 WBCS — SIGNIFICANT CHANGE UP (ref 0–0)
PLATELET # BLD AUTO: 94 K/UL — LOW (ref 150–400)
POTASSIUM SERPL-MCNC: 4.2 MMOL/L — SIGNIFICANT CHANGE UP (ref 3.5–5.3)
POTASSIUM SERPL-SCNC: 4.2 MMOL/L — SIGNIFICANT CHANGE UP (ref 3.5–5.3)
RBC # BLD: 4.7 M/UL — SIGNIFICANT CHANGE UP (ref 4.2–5.8)
RBC # FLD: 15.2 % — HIGH (ref 10.3–14.5)
SODIUM SERPL-SCNC: 136 MMOL/L — SIGNIFICANT CHANGE UP (ref 135–145)
WBC # BLD: 2.98 K/UL — LOW (ref 3.8–10.5)
WBC # FLD AUTO: 2.98 K/UL — LOW (ref 3.8–10.5)

## 2021-01-19 PROCEDURE — 99233 SBSQ HOSP IP/OBS HIGH 50: CPT | Mod: GC

## 2021-01-19 RX ADMIN — Medication 25 MILLIGRAM(S): at 17:06

## 2021-01-19 RX ADMIN — Medication 100 MILLIGRAM(S): at 05:49

## 2021-01-19 RX ADMIN — Medication 100 MILLIGRAM(S): at 10:14

## 2021-01-19 RX ADMIN — ATORVASTATIN CALCIUM 80 MILLIGRAM(S): 80 TABLET, FILM COATED ORAL at 21:59

## 2021-01-19 RX ADMIN — Medication 1 MILLIGRAM(S): at 10:13

## 2021-01-19 RX ADMIN — APIXABAN 5 MILLIGRAM(S): 2.5 TABLET, FILM COATED ORAL at 10:13

## 2021-01-19 RX ADMIN — Medication 50 MILLIGRAM(S): at 10:12

## 2021-01-19 RX ADMIN — Medication 81 MILLIGRAM(S): at 10:14

## 2021-01-19 RX ADMIN — Medication 1 TABLET(S): at 10:14

## 2021-01-19 RX ADMIN — APIXABAN 5 MILLIGRAM(S): 2.5 TABLET, FILM COATED ORAL at 21:59

## 2021-01-19 NOTE — DISCHARGE NOTE PROVIDER - HOSPITAL COURSE
DO NOT DELETE!!!!      63yo M w/ poor med adherence, PMHx of LV thrombus (resolved on Jan 2020 echo; reappeared on Nov 2020 echo), pAF on eliquis, CAD s/p PCI, HFrEF 30-35% s/p AICD, HIT, hemorrhoids, gastritis, GIB, hx multiple admissions for EtOH withdrawal (no hx seizures, DTs, or intubations), who presents for EtOH withdrawal. While admitted, patient first received Librium 50mg q 12hours, and he was found to be persistently tremulous, so he received 25q8 of Librium. Additionally, he underwent echo to visualize his LV thrombus which demonstrated____________.      Alcohol withdrawal.    - Patient w/ 40 year history of drinking 1pint per day, has been drinking daily for several months, last drink hours prior to admission 1/16. Developed w/d symptoms of severe tremors, chest pain, sob, anxiety over the past 24 hours.  - c/w high-risk CIWA protocol  - PRN IV ativan 2mg Q2hrs for CIWA >8  - c/w standing librium 25q8  - c/w folic acid 1mg QD  - c/w thiamine 100mg QD.    Left ventricular thrombus.    - PMHx of LV thrombus (resolved on Jan 2020 echo). However per TTE report from Nov 2020 at [facility], showing large protruding fixed thrombus attached to LV apex.  - c/w eliquis 5mg BID  - will order repeat TTE to confirm presence of LV mural thrombus showing_______    CAD (coronary artery disease).    -Pt w/ known hx CAD s/p stent placement approximately 2 yrs ago. Pt follows-up with outpt cardiologist Dr. Kati Tenorio. On ASA 81mg, toprol 100mg QD + toprol 50mg QD (non-adherent?), isosorbide dinitrate (non-adherent); atorvastatin 80mg QHS. ECG on admission showing signs of MELVIN V2-24 and TWI V-V6, however these findings appear unchanged when compared to previous ECG in march 2020. Troponins non-elevated on ED arrival.  - c/w ASA 81mg, atorvastatin 80mg QHS    HFrEF (heart failure with reduced ejection fraction).  Plan: Most recent TTE performed 11/20, showing LV EF 38% w/ moderately decreased LV function, grade I diastolic dysfunction, and moderately elevated pulmonary artery pressure. Prior to then EF of 30-35% on Echo in 1/2020.  - c/w toprol  - No ACE/ARB w/ history of allergy and angioedema.     Atrial fibrillation.  Plan: Patient w/ h/o paroxysmal Afib. Currently in NSR.   -c/w toprol 100mg daily  -c/w Eliquis 5mg BID.     New Medications: None   DO NOT DELETE!!!!  INCOMPLETE, rest of hospital course and medical plan needs to be updated, also needs NIHSS on discharge    63yo M w/ poor med adherence, PMHx of LV thrombus (resolved on Jan 2020 echo; reappeared on Nov 2020 echo), pAF on eliquis, CAD s/p PCI, HFrEF 30-35% s/p AICD, HIT, hemorrhoids, gastritis, GIB, hx multiple admissions for EtOH withdrawal (no hx seizures, DTs, or intubations), who presents for EtOH withdrawal. While admitted, patient first received Librium 50mg q 12hours, and he was found to be persistently tremulous, so he received 25q8 of Librium. Additionally, he underwent echo to visualize his LV thrombus which demonstrated____________.  On 1/20 stroke code called for bilateral pinpoint pupils non reactive to light and unresponsiveness. all imaging negative. Utox was negative, though pt noted to be seen walking from elevator yesterday afternoon, so could have ingesting something else to cause this. ICU was consulted and determined no need for step up as all vital signs stable. Given Narcan 0.4 x 4. Argatroban drip was started for history of HIT, afib and LV thrombus. Bedside echo was done which showed LV thrombus measuring 2cm. Neurology was following for possible seizures, however, eeg was normal. Course was also complicated by possible aspiration and +staph aureus on MRSA swab. Additionally, patient had CTH which revealed subacute left thalamic infract, left posterior cerebral territory on 1/25, therefore, stroke team was notified. MRI was recommended and was able to be completed with EP turning off AICD for study which showed several recent infarcts. Therefore, patient was transferred to stroke service.     FILL IN REST OF HOSPITAL COURSE      Alcohol withdrawal.    - Patient w/ 40 year history of drinking 1pint per day, has been drinking daily for several months, last drink hours prior to admission 1/16. Developed w/d symptoms of severe tremors, chest pain, sob, anxiety over the past 24 hours.  - c/w high-risk CIWA protocol  - PRN IV ativan 2mg Q2hrs for CIWA >8  - c/w standing librium 25q8  - c/w folic acid 1mg QD  - c/w thiamine 100mg QD.    Left ventricular thrombus.    - PMHx of LV thrombus (resolved on Jan 2020 echo). However per TTE report from Nov 2020 at [facility], showing large protruding fixed thrombus attached to LV apex.  - c/w eliquis 5mg BID  - will order repeat TTE to confirm presence of LV mural thrombus showing_______    CAD (coronary artery disease).    -Pt w/ known hx CAD s/p stent placement approximately 2 yrs ago. Pt follows-up with outpt cardiologist Dr. Kati Tenorio. On ASA 81mg, toprol 100mg QD + toprol 50mg QD (non-adherent?), isosorbide dinitrate (non-adherent); atorvastatin 80mg QHS. ECG on admission showing signs of MELVIN V2-24 and TWI V-V6, however these findings appear unchanged when compared to previous ECG in march 2020. Troponins non-elevated on ED arrival.  - c/w ASA 81mg, atorvastatin 80mg QHS    HFrEF (heart failure with reduced ejection fraction).  Plan: Most recent TTE performed 11/20, showing LV EF 38% w/ moderately decreased LV function, grade I diastolic dysfunction, and moderately elevated pulmonary artery pressure. Prior to then EF of 30-35% on Echo in 1/2020.  - c/w toprol  - No ACE/ARB w/ history of allergy and angioedema.     Atrial fibrillation.  Plan: Patient w/ h/o paroxysmal Afib. Currently in NSR.   -c/w toprol 100mg daily  -c/w Eliquis 5mg BID.     During this hospital course, patient had a (ischemic/hemorrhagic) stroke located in (left/right.....) as seen on (MRI/CT).   The stroke etiology is likely secondary to:  [x]atrial fibrillation/left ventricular thrombus   []small vessel disease from atherosclerotic risk factors  []other:  []etiology workup still in progress    Patient had the following workup done in house:  [x] head imaging:   MR Head No Cont (01.26.21 @ 13:30)   IMPRESSION:  MRI is positive for recent infarctions in the posterior circulation, predominantly left sided in the thalamus, midbrain, cerebellum, mesial temporal lobe and additionally at small site of left occipital cortex. On the right, a smaller thalamic infarct is recent as well. No MR evidence of parenchymal hemorrhage, specifically at sites of infarct. No significant mass effect, or hydrocephalus though there is mild effacement of the 3rd ventricle from thalamic infarcts.    [x]vessel imaging:  CT Angio Head w/ IV Cont (01.26.21 @ 00:41)   IMPRESSION:  1. Small subacute left basal ganglia/thalamic infarction, see noncontrast head CT report.  2. No acute vascular findings.  [x] CT Angio Neck w/ IV Cont (01.26.21 @ 00:41)   IMPRESSION:  No stenosis or occlusion.    [x]echo   TTE Limited Echo w/o Cont (01.28.21 @ 14:49) >  CONCLUSIONS:   1. Limited study obtained for evaluation of left ventricular function.   2. Left ventricular systolic function is moderately reduced with a calculated ejection fraction of 30-35% with regional wall motion abnormalities. There is mid anteroseptal, apical, apical septal, apical lateral, apical anterior, apical inferior wall akinesis. Large 2.3 x 1.4 cm mobile echodensity noted at the apex. This likely represents a thrombus.    []labs  []other      Physical exam at discharge: NIHSS:  New Medications:        DO NOT DELETE!!!!      65yo M w/ poor med adherence, PMHx of LV thrombus (resolved on Jan 2020 echo; reappeared on Nov 2020 echo), pAF on eliquis, CAD s/p PCI, HFrEF 30-35% s/p AICD, HIT, hemorrhoids, gastritis, GIB, hx multiple admissions for EtOH withdrawal (no hx seizures, DTs, or intubations), who presents for EtOH withdrawal. While admitted, patient first received Librium 50mg q 12hours, and he was found to be persistently tremulous, so he received 25q8 of Librium. Additionally, he underwent echo to visualize his LV thrombus which demonstrated .  On 1/20 stroke code called for bilateral pinpoint pupils non reactive to light and unresponsiveness. all imaging negative. Utox was negative, though pt noted to be seen walking from elevator yesterday afternoon, so could have ingesting something else to cause this. ICU was consulted and determined no need for step up as all vital signs stable. Given Narcan 0.4 x 4. Argatroban drip was started for history of HIT, afib and LV thrombus. Bedside echo was done which showed LV thrombus measuring 2cm. Neurology was following for possible seizures, however, eeg was normal. Course was also complicated by possible aspiration and +staph aureus on MRSA swab. Additionally, patient had CTH which revealed subacute left thalamic infract, left posterior cerebral territory on 1/25, therefore, stroke team was notified. MRI was recommended and was able to be completed with EP turning off AICD for study which showed several recent infarcts. Therefore, patient was transferred to stroke service.     FILL IN REST OF HOSPITAL COURSE      Alcohol withdrawal.    - Patient w/ 40 year history of drinking 1pint per day, has been drinking daily for several months, last drink hours prior to admission 1/16. Developed w/d symptoms of severe tremors, chest pain, sob, anxiety over the past 24 hours.  - c/w high-risk CIWA protocol  - PRN IV ativan 2mg Q2hrs for CIWA >8  - c/w standing librium 25q8  - c/w folic acid 1mg QD  - c/w thiamine 100mg QD.    Left ventricular thrombus.    - PMHx of LV thrombus (resolved on Jan 2020 echo). However per TTE report from Nov 2020 at [facility], showing large protruding fixed thrombus attached to LV apex.  - c/w eliquis 5mg BID  - will order repeat TTE to confirm presence of LV mural thrombus showing_______    CAD (coronary artery disease).    -Pt w/ known hx CAD s/p stent placement approximately 2 yrs ago. Pt follows-up with outpt cardiologist Dr. Kati Tenorio. On ASA 81mg, toprol 100mg QD + toprol 50mg QD (non-adherent?), isosorbide dinitrate (non-adherent); atorvastatin 80mg QHS. ECG on admission showing signs of MELVIN V2-24 and TWI V-V6, however these findings appear unchanged when compared to previous ECG in march 2020. Troponins non-elevated on ED arrival.  - c/w ASA 81mg, atorvastatin 80mg QHS    HFrEF (heart failure with reduced ejection fraction).  Plan: Most recent TTE performed 11/20, showing LV EF 38% w/ moderately decreased LV function, grade I diastolic dysfunction, and moderately elevated pulmonary artery pressure. Prior to then EF of 30-35% on Echo in 1/2020.  - c/w toprol  - No ACE/ARB w/ history of allergy and angioedema.     Atrial fibrillation.  Plan: Patient w/ h/o paroxysmal Afib. Currently in NSR.   -c/w toprol 100mg daily  -c/w Eliquis 5mg BID.     During this hospital course, patient had a (ischemic/hemorrhagic) stroke located in (left/right.....) as seen on (MRI/CT).   The stroke etiology is likely secondary to:  [x]atrial fibrillation/left ventricular thrombus   []small vessel disease from atherosclerotic risk factors  []other:  []etiology workup still in progress    Patient had the following workup done in house:  [x] head imaging:   MR Head No Cont (01.26.21 @ 13:30)   IMPRESSION:  MRI is positive for recent infarctions in the posterior circulation, predominantly left sided in the thalamus, midbrain, cerebellum, mesial temporal lobe and additionally at small site of left occipital cortex. On the right, a smaller thalamic infarct is recent as well. No MR evidence of parenchymal hemorrhage, specifically at sites of infarct. No significant mass effect, or hydrocephalus though there is mild effacement of the 3rd ventricle from thalamic infarcts.    [x]vessel imaging:  CT Angio Head w/ IV Cont (01.26.21 @ 00:41)   IMPRESSION:  1. Small subacute left basal ganglia/thalamic infarction, see noncontrast head CT report.  2. No acute vascular findings.  [x] CT Angio Neck w/ IV Cont (01.26.21 @ 00:41)   IMPRESSION:  No stenosis or occlusion.    [x]echo   TTE Limited Echo w/o Cont (01.28.21 @ 14:49) >  CONCLUSIONS:   1. Limited study obtained for evaluation of left ventricular function.   2. Left ventricular systolic function is moderately reduced with a calculated ejection fraction of 30-35% with regional wall motion abnormalities. There is mid anteroseptal, apical, apical septal, apical lateral, apical anterior, apical inferior wall akinesis. Large 2.3 x 1.4 cm mobile echodensity noted at the apex. This likely represents a thrombus.    []labs  []other      Physical exam at discharge: NIHSS:  New Medications:        DO NOT DELETE!!!!  63yo M w/ poor med adherence, PMHx of LV thrombus (resolved on Jan 2020 echo; reappeared on Nov 2020 echo), pAF on eliquis, CAD s/p PCI, HFrEF 30-35% s/p AICD, HIT, hemorrhoids, gastritis, GIB, hx multiple admissions for EtOH withdrawal (no hx seizures, DTs, or intubations), who presents for EtOH withdrawal. While admitted, patient first received Librium 50mg q 12hours, and he was found to be persistently tremulous, so he received 25q8 of Librium. Additionally, he underwent echo to visualize his LV thrombus which demonstrated .  On 1/20 stroke code called for bilateral pinpoint pupils non reactive to light and unresponsiveness. all imaging negative. Utox was negative, though pt noted to be seen walking from elevator yesterday afternoon, so could have ingesting something else to cause this. ICU was consulted and determined no need for step up as all vital signs stable. Given Narcan 0.4 x 4. Argatroban drip was started for history of HIT, afib and LV thrombus. Bedside echo was done which showed LV thrombus measuring 2cm. Neurology was following for possible seizures, however, eeg was normal. Course was also complicated by possible aspiration and +staph aureus on MRSA swab. Additionally, patient had CTH which revealed subacute left thalamic infract, left posterior cerebral territory on 1/25, therefore, stroke team was notified. MRI was recommended and was able to be completed with EP turning off AICD for study which showed several recent infarcts. Therefore, patient was transferred to stroke service.     FILL IN REST OF HOSPITAL COURSE    Alcohol withdrawal.    - Patient w/ 40 year history of drinking 1pint per day, has been drinking daily for several months, last drink hours prior to admission 1/16. Developed w/d symptoms of severe tremors, chest pain, sob, anxiety over the past 24 hours.  - c/w high-risk CIWA protocol  - PRN IV ativan 2mg Q2hrs for CIWA >8  - c/w standing librium 25q8  - c/w folic acid 1mg QD  - c/w thiamine 100mg QD.    Left ventricular thrombus.    - PMHx of LV thrombus (resolved on Jan 2020 echo). However per TTE report from Nov 2020 at [facility], showing large protruding fixed thrombus attached to LV apex.  - c/w eliquis 5mg BID  - will order repeat TTE to confirm presence of LV mural thrombus showing_______    CAD (coronary artery disease).    -Pt w/ known hx CAD s/p stent placement approximately 2 yrs ago. Pt follows-up with outpt cardiologist Dr. Kati Tenorio. On ASA 81mg, toprol 100mg QD + toprol 50mg QD (non-adherent?), isosorbide dinitrate (non-adherent); atorvastatin 80mg QHS. ECG on admission showing signs of MELVIN V2-24 and TWI V-V6, however these findings appear unchanged when compared to previous ECG in march 2020. Troponins non-elevated on ED arrival.  - c/w ASA 81mg, atorvastatin 80mg QHS    HFrEF (heart failure with reduced ejection fraction).  Plan: Most recent TTE performed 11/20, showing LV EF 38% w/ moderately decreased LV function, grade I diastolic dysfunction, and moderately elevated pulmonary artery pressure. Prior to then EF of 30-35% on Echo in 1/2020.  - c/w toprol  - No ACE/ARB w/ history of allergy and angioedema.     Atrial fibrillation.  Plan: Patient w/ h/o paroxysmal Afib. Currently in NSR.   -c/w toprol 100mg daily  -c/w Eliquis 5mg BID.   *****************************************************************************  During this hospital course, patient had subacute ischemic infarctions within the left medial thalamus, right medial thalamus, and left cerebellar hemisphere noted on head CT, confirmed by MR Brain.   The stroke etiology is likely secondary to:  [x]atrial fibrillation/left ventricular thrombus   []small vessel disease from atherosclerotic risk factors    Patient had the following workup done in house:  [x] head imaging:   -CT Head No Cont (01.25.21 @ 19:52): Interval subacute left thalamic infarct, left posterior cerebral artery territory.  - MR Head No Cont (01.26.21 @ 13:30): Recent infarctions in the posterior circulation, predominantly left sided in the thalamus, midbrain, cerebellum, mesial temporal lobe and additionally at small site of left occipital cortex. On the right, a smaller thalamic infarct is recent as well. No MR evidence of parenchymal hemorrhage, specifically at sites of infarct. No significant mass effect, or hydrocephalus though there is mild effacement of the 3rd ventricle from thalamic infarcts.    [x]vessel imaging:  - CT Angio Head w/ IV Cont (01.26.21 @ 00:41): Small subacute left basal ganglia/thalamic infarction. No acute vascular findings.  - CT Angio Neck w/ IV Cont (01.26.21 @ 00:41): No stenosis or occlusion.    [x]echo  - TTE Limited Echo w/o Cont (01.28.21 @ 14:49): 1. Limited study obtained for evaluation of left ventricular function 2. Left ventricular systolic function is moderately reduced with a calculated ejection fraction of 30-35% with regional wall motion abnormalities. There is mid anteroseptal, apical, apical septal, apical lateral, apical anterior, apical inferior wall akinesis. Large 2.3 x 1.4 cm mobile echodensity noted at the apex. This likely represents a thrombus.  []labs  []other    Physical exam at discharge: Awake, oriented to self only. Pupils equally constricted with sluggish response to light. Moving all extremities spontaneously, normal bulk and tone. Right upper and lower extremities 4/5. Slight right lower extremity drift. No sensory deficits.     NIHSS at discharge: 2    New medications on discharge:  Labs to be followed up:  Imaging to be done as outpatient:  Further outpatient workup:           DO NOT DELETE!!!!  63yo M w/ poor med adherence, PMHx of LV thrombus (resolved on Jan 2020 echo; reappeared on Nov 2020 echo), pAF on eliquis, CAD s/p PCI, HFrEF 30-35% s/p AICD, HIT, hemorrhoids, gastritis, GIB, hx multiple admissions for EtOH withdrawal (no hx seizures, DTs, or intubations), who presents for EtOH withdrawal. While admitted, patient first received Librium 50mg q 12hours, and he was found to be persistently tremulous, so he received 25q8 of Librium. Additionally, he underwent echo to visualize his LV thrombus which demonstrated .    On 1/20 stroke code called for bilateral pinpoint pupils non reactive to light and unresponsiveness. all imaging negative. Utox was negative, though pt noted to be seen walking from elevator yesterday afternoon, so could have ingesting something else to cause this. ICU was consulted and determined no need for step up as all vital signs stable. Given Narcan 0.4 x 4. Argatroban drip was started for history of HIT, afib and LV thrombus. Bedside echo was done which showed LV thrombus measuring 2cm. Neurology was following for possible seizures, however, eeg was normal. Course was also complicated by possible aspiration and +staph aureus on MRSA swab. Additionally, patient had CTH which revealed subacute left thalamic infract, left posterior cerebral territory on 1/25, therefore, stroke team was notified. MRI was recommended and was able to be completed with EP turning off AICD for study which showed several recent infarcts. Therefore, patient was transferred to stroke service.     FILL IN REST OF HOSPITAL COURSE    Alcohol withdrawal.    - Patient w/ 40 year history of drinking 1pint per day, has been drinking daily for several months, last drink hours prior to admission 1/16. Developed w/d symptoms of severe tremors, chest pain, sob, anxiety over the past 24 hours.  - c/w high-risk CIWA protocol  - PRN IV ativan 2mg Q2hrs for CIWA >8  - c/w standing librium 25q8  - c/w folic acid 1mg QD  - c/w thiamine 100mg QD.    Left ventricular thrombus.    - PMHx of LV thrombus (resolved on Jan 2020 echo). However per TTE report from Nov 2020 at [facility], showing large protruding fixed thrombus attached to LV apex.  - c/w eliquis 5mg BID  - will order repeat TTE to confirm presence of LV mural thrombus showing_______    CAD (coronary artery disease).    -Pt w/ known hx CAD s/p stent placement approximately 2 yrs ago. Pt follows-up with outpt cardiologist Dr. Kati Tenorio. On ASA 81mg, toprol 100mg QD + toprol 50mg QD (non-adherent?), isosorbide dinitrate (non-adherent); atorvastatin 80mg QHS. ECG on admission showing signs of MELVIN V2-24 and TWI V-V6, however these findings appear unchanged when compared to previous ECG in march 2020. Troponins non-elevated on ED arrival.  - c/w ASA 81mg, atorvastatin 80mg QHS    HFrEF (heart failure with reduced ejection fraction).  Plan: Most recent TTE performed 11/20, showing LV EF 38% w/ moderately decreased LV function, grade I diastolic dysfunction, and moderately elevated pulmonary artery pressure. Prior to then EF of 30-35% on Echo in 1/2020.  - c/w toprol  - No ACE/ARB w/ history of allergy and angioedema.     Atrial fibrillation.  Plan: Patient w/ h/o paroxysmal Afib. Currently in NSR.   -c/w toprol 100mg daily  -c/w Eliquis 5mg BID.   *****************************************************************************  During this hospital course, patient had subacute ischemic infarctions within the left medial thalamus, right medial thalamus, and left cerebellar hemisphere noted on head CT, confirmed by MR Brain.   The stroke etiology is likely secondary to:  [x]atrial fibrillation/left ventricular thrombus   []small vessel disease from atherosclerotic risk factors    Patient had the following workup done in house:  [x] head imaging:   -CT Head No Cont (01.25.21 @ 19:52): Interval subacute left thalamic infarct, left posterior cerebral artery territory.  - MR Head No Cont (01.26.21 @ 13:30): Recent infarctions in the posterior circulation, predominantly left sided in the thalamus, midbrain, cerebellum, mesial temporal lobe and additionally at small site of left occipital cortex. On the right, a smaller thalamic infarct is recent as well. No MR evidence of parenchymal hemorrhage, specifically at sites of infarct. No significant mass effect, or hydrocephalus though there is mild effacement of the 3rd ventricle from thalamic infarcts.    [x]vessel imaging:  - CT Angio Head w/ IV Cont (01.26.21 @ 00:41): Small subacute left basal ganglia/thalamic infarction. No acute vascular findings.  - CT Angio Neck w/ IV Cont (01.26.21 @ 00:41): No stenosis or occlusion.    [x]echo  - TTE Limited Echo w/o Cont (01.28.21 @ 14:49): 1. Limited study obtained for evaluation of left ventricular function 2. Left ventricular systolic function is moderately reduced with a calculated ejection fraction of 30-35% with regional wall motion abnormalities. There is mid anteroseptal, apical, apical septal, apical lateral, apical anterior, apical inferior wall akinesis. Large 2.3 x 1.4 cm mobile echodensity noted at the apex. This likely represents a thrombus.  []labs  []other    Physical exam at discharge: Awake, oriented to self only. Pupils equally constricted with sluggish response to light. Moving all extremities spontaneously, normal bulk and tone. Right upper and lower extremities 4/5. Slight right lower extremity drift. No sensory deficits.     NIHSS at discharge: 2    New medications on discharge:  Labs to be followed up:  Imaging to be done as outpatient:  Further outpatient workup:           DO NOT DELETE!!!!  65yo M w/ poor med adherence, PMHx of LV thrombus (resolved on Jan 2020 echo; reappeared on Nov 2020 echo), pAF on eliquis, CAD s/p PCI, HFrEF 30-35% s/p AICD, HIT, hemorrhoids, gastritis, GIB, hx multiple admissions for EtOH withdrawal (no hx seizures, DTs, or intubations), who presents for EtOH withdrawal. While admitted, patient first received Librium 50mg q 12hours, and he was found to be persistently tremulous, so he received 25q8 of Librium. Additionally, he underwent echo to visualize his LV thrombus which demonstrated .    On 1/20 stroke code called for bilateral pinpoint pupils non reactive to light and unresponsiveness. all imaging negative. Utox was negative, though pt noted to be seen walking from elevator yesterday afternoon, so could have ingesting something else to cause this. ICU was consulted and determined no need for step up as all vital signs stable. Given Narcan 0.4 x 4. Argatroban drip was started for history of HIT, afib and LV thrombus. Bedside echo was done which showed LV thrombus measuring 2cm. Neurology was following for possible seizures, however, eeg was normal. Course was also complicated by possible aspiration and +staph aureus on MRSA swab. Additionally, patient had CTH which revealed subacute left thalamic infract, left posterior cerebral territory on 1/25, therefore, stroke team was notified. MRI was recommended and was able to be completed with EP turning off AICD for study which showed several recent infarcts. Therefore, patient was transferred to stroke service.     FILL IN REST OF HOSPITAL COURSE    Alcohol withdrawal.    - Patient w/ 40 year history of drinking 1pint per day, has been drinking daily for several months, last drink hours prior to admission 1/16. Developed w/d symptoms of severe tremors, chest pain, sob, anxiety over the past 24 hours.  - c/w high-risk CIWA protocol  - PRN IV ativan 2mg Q2hrs for CIWA >8  - c/w standing librium 25q8  - c/w folic acid 1mg QD  - c/w thiamine 100mg QD.    Left ventricular thrombus.    - PMHx of LV thrombus (resolved on Jan 2020 echo). However per TTE report from Nov 2020 at [facility], showing large protruding fixed thrombus attached to LV apex.  - c/w eliquis 5mg BID  - will order repeat TTE to confirm presence of LV mural thrombus showing_______    CAD (coronary artery disease).    -Pt w/ known hx CAD s/p stent placement approximately 2 yrs ago. Pt follows-up with outpt cardiologist Dr. Kati Tenorio. On ASA 81mg, toprol 100mg QD + toprol 50mg QD (non-adherent?), isosorbide dinitrate (non-adherent); atorvastatin 80mg QHS. ECG on admission showing signs of MELVIN V2-24 and TWI V-V6, however these findings appear unchanged when compared to previous ECG in march 2020. Troponins non-elevated on ED arrival.  - c/w ASA 81mg, atorvastatin 80mg QHS    HFrEF (heart failure with reduced ejection fraction).  Plan: Most recent TTE performed 11/20, showing LV EF 38% w/ moderately decreased LV function, grade I diastolic dysfunction, and moderately elevated pulmonary artery pressure. Prior to then EF of 30-35% on Echo in 1/2020.  - c/w toprol  - No ACE/ARB w/ history of allergy and angioedema.     Atrial fibrillation.  Plan: Patient w/ h/o paroxysmal Afib. Currently in NSR.   -c/w toprol 100mg daily  -c/w Eliquis 5mg BID.   *****************************************************************************  During this hospital course, patient had subacute ischemic infarctions within the left medial thalamus, right medial thalamus, and left cerebellar hemisphere noted on head CT, confirmed by MR Brain.   The stroke etiology is likely secondary to:  [x]atrial fibrillation/left ventricular thrombus   []small vessel disease from atherosclerotic risk factors    Patient had the following workup done in house:  [x] head imaging:   -CT Head No Cont (01.25.21 @ 19:52): Interval subacute left thalamic infarct, left posterior cerebral artery territory.  - MR Head No Cont (01.26.21 @ 13:30): Recent infarctions in the posterior circulation, predominantly left sided in the thalamus, midbrain, cerebellum, mesial temporal lobe and additionally at small site of left occipital cortex. On the right, a smaller thalamic infarct is recent as well. No MR evidence of parenchymal hemorrhage, specifically at sites of infarct. No significant mass effect, or hydrocephalus though there is mild effacement of the 3rd ventricle from thalamic infarcts.    [x]vessel imaging:  - CT Angio Head w/ IV Cont (01.26.21 @ 00:41): Small subacute left basal ganglia/thalamic infarction. No acute vascular findings.  - CT Angio Neck w/ IV Cont (01.26.21 @ 00:41): No stenosis or occlusion.    [x]echo  - TTE Limited Echo w/o Cont (01.28.21 @ 14:49): 1. Limited study obtained for evaluation of left ventricular function 2. Left ventricular systolic function is moderately reduced with a calculated ejection fraction of 30-35% with regional wall motion abnormalities. There is mid anteroseptal, apical, apical septal, apical lateral, apical anterior, apical inferior wall akinesis. Large 2.3 x 1.4 cm mobile echodensity noted at the apex. This likely represents a thrombus.  []labs  []other    Physical exam at discharge: Awake, oriented to self only. Mild aphasia. Pupils equally constricted, but reactive to light. Vertical upward gaze paralysis. Moving all extremities spontaneously. Right upper extremity 4/5. No sensory deficits. Unsteady gait.     NIHSS at discharge: 2    New medications on discharge:  Labs to be followed up:  Imaging to be done as outpatient:  Further outpatient workup:           DO NOT DELETE!!!!  65yo M w/ poor med adherence, PMHx of LV thrombus (resolved on Jan 2020 echo; reappeared on Nov 2020 echo), pAF on eliquis, CAD s/p PCI, HFrEF 30-35% s/p AICD, HIT, hemorrhoids, gastritis, GIB, hx multiple admissions for EtOH withdrawal (no hx seizures, DTs, or intubations), who presents for EtOH withdrawal. While admitted, patient first received Librium 50mg q 12hours, and he was found to be persistently tremulous, so he received 25q8 of Librium. Additionally, he underwent echo to visualize his LV thrombus which demonstrated .    On 1/20 stroke code called for bilateral pinpoint pupils non reactive to light and unresponsiveness. all imaging negative. Utox was negative, though pt noted to be seen walking from elevator yesterday afternoon, so could have ingesting something else to cause this. ICU was consulted and determined no need for step up as all vital signs stable. Given Narcan 0.4 x 4. Argatroban drip was started for history of HIT, afib and LV thrombus. Bedside echo was done which showed LV thrombus measuring 2cm. Neurology was following for possible seizures, however, eeg was normal. Course was also complicated by possible aspiration and +staph aureus on MRSA swab. Additionally, patient had CTH which revealed subacute left thalamic infract, left posterior cerebral territory on 1/25, therefore, stroke team was notified. MRI was recommended and was able to be completed with EP turning off AICD for study which showed several recent infarcts. Therefore, patient was transferred to stroke service.     FILL IN REST OF HOSPITAL COURSE    Alcohol withdrawal.    - Patient w/ 40 year history of drinking 1pint per day, has been drinking daily for several months, last drink hours prior to admission 1/16. Developed w/d symptoms of severe tremors, chest pain, sob, anxiety over the past 24 hours.  - c/w high-risk CIWA protocol  - PRN IV ativan 2mg Q2hrs for CIWA >8  - c/w standing librium 25q8  - c/w folic acid 1mg QD  - c/w thiamine 100mg QD.    Left ventricular thrombus.    - PMHx of LV thrombus (resolved on Jan 2020 echo). However per TTE report from Nov 2020 at [facility], showing large protruding fixed thrombus attached to LV apex.  - c/w eliquis 5mg BID  - will order repeat TTE to confirm presence of LV mural thrombus showing_______    CAD (coronary artery disease).    -Pt w/ known hx CAD s/p stent placement approximately 2 yrs ago. Pt follows-up with outpt cardiologist Dr. Kati Tenorio. On ASA 81mg, toprol 100mg QD + toprol 50mg QD (non-adherent?), isosorbide dinitrate (non-adherent); atorvastatin 80mg QHS. ECG on admission showing signs of MELVIN V2-24 and TWI V-V6, however these findings appear unchanged when compared to previous ECG in march 2020. Troponins non-elevated on ED arrival.  - c/w ASA 81mg, atorvastatin 80mg QHS    HFrEF (heart failure with reduced ejection fraction).  Plan: Most recent TTE performed 11/20, showing LV EF 38% w/ moderately decreased LV function, grade I diastolic dysfunction, and moderately elevated pulmonary artery pressure. Prior to then EF of 30-35% on Echo in 1/2020.  - c/w toprol  - No ACE/ARB w/ history of allergy and angioedema.     Atrial fibrillation.  Plan: Patient w/ h/o paroxysmal Afib. Currently in NSR.   -c/w toprol 100mg daily  -c/w Eliquis 5mg BID.   *****************************************************************************  During this hospital course, patient had subacute ischemic infarctions within the left medial thalamus, right medial thalamus, and left cerebellar hemisphere noted on head CT, confirmed by MR Brain.   The stroke etiology is likely secondary to:  [x]atrial fibrillation/left ventricular thrombus   []small vessel disease from atherosclerotic risk factors    Patient had the following workup done in house:  [x] head imaging:   -CT Head No Cont (01.25.21 @ 19:52): Interval subacute left thalamic infarct, left posterior cerebral artery territory.  - MR Head No Cont (01.26.21 @ 13:30): Recent infarctions in the posterior circulation, predominantly left sided in the thalamus, midbrain, cerebellum, mesial temporal lobe and additionally at small site of left occipital cortex. On the right, a smaller thalamic infarct is recent as well. No MR evidence of parenchymal hemorrhage, specifically at sites of infarct. No significant mass effect, or hydrocephalus though there is mild effacement of the 3rd ventricle from thalamic infarcts.    [x]vessel imaging:  - CT Angio Head w/ IV Cont (01.26.21 @ 00:41): Small subacute left basal ganglia/thalamic infarction. No acute vascular findings.  - CT Angio Neck w/ IV Cont (01.26.21 @ 00:41): No stenosis or occlusion.    [x]echo  - TTE Limited Echo w/o Cont (01.28.21 @ 14:49): 1. Limited study obtained for evaluation of left ventricular function 2. Left ventricular systolic function is moderately reduced with a calculated ejection fraction of 30-35% with regional wall motion abnormalities. There is mid anteroseptal, apical, apical septal, apical lateral, apical anterior, apical inferior wall akinesis. Large 2.3 x 1.4 cm mobile echodensity noted at the apex. This likely represents a thrombus.  []labs  []other    Physical exam at discharge: Awake, oriented to self only. Pupils equally constricted, but reactive to light. Vertical upward gaze paralysis. Moving all extremities spontaneously. Right upper extremity 4/5. No sensory deficits. Unsteady gait.     NIHSS at discharge: 2    New medications on discharge:  Labs to be followed up:  Imaging to be done as outpatient:  Further outpatient workup:           DO NOT DELETE  65yo M w/ poor med adherence, PMHx of LV thrombus (resolved on Jan 2020 echo; reappeared on Nov 2020 echo), pAF on eliquis, CAD s/p PCI, HFrEF 30-35% s/p AICD, HIT, hemorrhoids, gastritis, GIB, hx multiple admissions for EtOH withdrawal (no hx seizures, DTs, or intubations), who presents for AMS thought to be due to EtOH withdrawal and treated with librium. Patient remained altered with waxing and waning mental status. vEEG negative. Stroke code called for bilateral pinpoint pupils non reactive to light and unresponsiveness. all imaging negative. Given Narcan 0.4 x 4 with no response. TTE showed Regional wall motion abnormalities consistent with ischemic heart disease. Left ventricular systolic function is severely reduced with a calculated ejection fraction of 30-35% with severe regional wall motion abnormalities. Akinesis of the mid to anteroseptum, mid to apical inferoseptum, all apical segments and true apex. No left ventricular thrombus noted.  Repeat echo showed LV thrombus. Patient transitioned from eliquis to Argatroban drip due to inability to tolerate po due to waxing and waning mental status and history of HIT. Repeat CTH revealed subacute left thalamic infract, left posterior cerebral territory on 1/25. MRI showed recent infarctions in the posterior circulation, predominantly left sided in the thalamus, midbrain, cerebellum, mesial temporal lobe and additionally at small site of left occipital cortex. On the right, a smaller thalamic infarct is recent as well. Course was also complicated by possible aspiration and +staph aureus on MRSA swab treated with ceftriaxone. Patient's mental status improving and will be discharged to home with home PT.     FILL IN REST OF HOSPITAL COURSE    Alcohol withdrawal.    - Patient w/ 40 year history of drinking 1pint per day, has been drinking daily for several months, last drink hours prior to admission 1/16. Developed w/d symptoms of severe tremors, chest pain, sob, anxiety over the past 24 hours.  - c/w high-risk CIWA protocol  - PRN IV ativan 2mg Q2hrs for CIWA >8  - c/w standing librium 25q8  - c/w folic acid 1mg QD  - c/w thiamine 100mg QD.    Left ventricular thrombus.    - PMHx of LV thrombus (resolved on Jan 2020 echo). However per TTE report from Nov 2020 at [facility], showing large protruding fixed thrombus attached to LV apex.  - c/w eliquis 5mg BID  - will order repeat TTE to confirm presence of LV mural thrombus showing_______    CAD (coronary artery disease).    -Pt w/ known hx CAD s/p stent placement approximately 2 yrs ago. Pt follows-up with outpt cardiologist Dr. Kati Tenorio. On ASA 81mg, toprol 100mg QD + toprol 50mg QD (non-adherent?), isosorbide dinitrate (non-adherent); atorvastatin 80mg QHS. ECG on admission showing signs of MELVIN V2-24 and TWI V-V6, however these findings appear unchanged when compared to previous ECG in march 2020. Troponins non-elevated on ED arrival.  - c/w ASA 81mg, atorvastatin 80mg QHS    HFrEF (heart failure with reduced ejection fraction).  Plan: Most recent TTE performed 11/20, showing LV EF 38% w/ moderately decreased LV function, grade I diastolic dysfunction, and moderately elevated pulmonary artery pressure. Prior to then EF of 30-35% on Echo in 1/2020.  - c/w toprol  - No ACE/ARB w/ history of allergy and angioedema.     Atrial fibrillation.  Plan: Patient w/ h/o paroxysmal Afib. Currently in NSR.   -c/w toprol 100mg daily  -c/w Eliquis 5mg BID.   *****************************************************************************  During this hospital course, patient had subacute ischemic infarctions within the left medial thalamus, right medial thalamus, and left cerebellar hemisphere noted on head CT, confirmed by MR Brain.   The stroke etiology is likely secondary to:  [x]atrial fibrillation/left ventricular thrombus   []small vessel disease from atherosclerotic risk factors    Patient had the following workup done in house:  [x] head imaging:   -CT Head No Cont (01.25.21 @ 19:52): Interval subacute left thalamic infarct, left posterior cerebral artery territory.  - MR Head No Cont (01.26.21 @ 13:30): Recent infarctions in the posterior circulation, predominantly left sided in the thalamus, midbrain, cerebellum, mesial temporal lobe and additionally at small site of left occipital cortex. On the right, a smaller thalamic infarct is recent as well. No MR evidence of parenchymal hemorrhage, specifically at sites of infarct. No significant mass effect, or hydrocephalus though there is mild effacement of the 3rd ventricle from thalamic infarcts.    [x]vessel imaging:  - CT Angio Head w/ IV Cont (01.26.21 @ 00:41): Small subacute left basal ganglia/thalamic infarction. No acute vascular findings.  - CT Angio Neck w/ IV Cont (01.26.21 @ 00:41): No stenosis or occlusion.    [x]echo  - TTE Limited Echo w/o Cont (01.28.21 @ 14:49): 1. Limited study obtained for evaluation of left ventricular function 2. Left ventricular systolic function is moderately reduced with a calculated ejection fraction of 30-35% with regional wall motion abnormalities. There is mid anteroseptal, apical, apical septal, apical lateral, apical anterior, apical inferior wall akinesis. Large 2.3 x 1.4 cm mobile echodensity noted at the apex. This likely represents a thrombus.  []labs  []other    Physical exam at discharge: Awake, oriented to self only. Pupils equally constricted, but reactive to light. Vertical upward gaze paralysis. Moving all extremities spontaneously. Right upper extremity 4/5. No sensory deficits. Unsteady gait.     NIHSS at discharge: 2    New medications on discharge:  atorvastatin 80  Labs to be followed up: none  Imaging to be done as outpatient: none  Further outpatient workup: Dr. Brantley           DO NOT DELETE  63yo M w/ poor med adherence, PMHx of LV thrombus (resolved on Jan 2020 echo; reappeared on Nov 2020 echo), pAF on eliquis, CAD s/p PCI, HFrEF 30-35% s/p AICD, HIT, hemorrhoids, gastritis, GIB, hx multiple admissions for EtOH withdrawal (no hx seizures, DTs, or intubations), who presents for AMS thought to be due to EtOH withdrawal and treated with librium. Patient remained altered with waxing and waning mental status. vEEG negative. Stroke code called for bilateral pinpoint pupils non reactive to light and unresponsiveness. all imaging negative. Given Narcan 0.4 x 4 with no response. TTE showed Regional wall motion abnormalities consistent with ischemic heart disease. Left ventricular systolic function is severely reduced with a calculated ejection fraction of 30-35% with severe regional wall motion abnormalities. Akinesis of the mid to anteroseptum, mid to apical inferoseptum, all apical segments and true apex. No left ventricular thrombus noted.  Repeat echo showed LV thrombus. Patient transitioned from eliquis to Argatroban drip due to inability to tolerate po due to waxing and waning mental status and history of HIT. Repeat CTH revealed subacute left thalamic infract, left posterior cerebral territory on 1/25. MRI showed recent infarctions in the posterior circulation, predominantly left sided in the thalamus, midbrain, cerebellum, mesial temporal lobe and additionally at small site of left occipital cortex. On the right, a smaller thalamic infarct is recent as well. Course was also complicated by possible aspiration and +staph aureus on MRSA swab treated with ceftriaxone. Patient's mental status improving and will be discharged to home with home PT.     FILL IN REST OF HOSPITAL COURSE    (1) Alcohol withdrawal: on CIWA protocol with PRN ativan inpatient; continue folic acid, thiamine and MV at home    (2) Left ventricular thrombus: has history of LV thrombus, resolved on Jan 2020 echo, however reappeared on Nov 2020 ECHO ([facility]), and TTEx2 inpatient showed LV thrombus of same size (2.3x1.4cm) seen in the apex. Will be discharged on Eliquis 5 BID     (3) CAD (coronary artery disease): hx of CAD with stent placement 2 years ago; stop ASA 81 (given history of GIB, is also on Eliquis), start atorvastatin 80qHS at home **    (4) HFrEF (heart failure with reduced ejection fraction): TTE on this admission shows EF 30% with multiple akinetic segments (Entire anterior septum, mid inferolateral segment, apical inferior segment, and apex) shows; on discharge can continue home metoprolol     (5) Atrial fibrillation: h/o paroxysmal Afib: c/w toprol 100mg daily, c/w Eliquis 5mg BID.   *****************************************************************************  During this hospital course, patient had subacute ischemic infarctions within the left medial thalamus, right medial thalamus, and left cerebellar hemisphere noted on head CT, confirmed by MR Brain.   The stroke etiology is likely secondary to:  [x]atrial fibrillation/left ventricular thrombus   []small vessel disease from atherosclerotic risk factors    Patient had the following workup done in house:  [x] head imaging:   -CT Head No Cont (01.25.21 @ 19:52): Interval subacute left thalamic infarct, left posterior cerebral artery territory.  - MR Head No Cont (01.26.21 @ 13:30): Recent infarctions in the posterior circulation, predominantly left sided in the thalamus, midbrain, cerebellum, mesial temporal lobe and additionally at small site of left occipital cortex. On the right, a smaller thalamic infarct is recent as well. No MR evidence of parenchymal hemorrhage, specifically at sites of infarct. No significant mass effect, or hydrocephalus though there is mild effacement of the 3rd ventricle from thalamic infarcts.    [x]vessel imaging:  - CT Angio Head w/ IV Cont (01.26.21 @ 00:41): Small subacute left basal ganglia/thalamic infarction. No acute vascular findings.  - CT Angio Neck w/ IV Cont (01.26.21 @ 00:41): No stenosis or occlusion.    [x]echo  - TTE Limited Echo w/o Cont (01.28.21 @ 14:49): 1. Limited study obtained for evaluation of left ventricular function 2. Left ventricular systolic function is moderately reduced with a calculated ejection fraction of 30-35% with regional wall motion abnormalities. There is mid anteroseptal, apical, apical septal, apical lateral, apical anterior, apical inferior wall akinesis. Large 2.3 x 1.4 cm mobile echodensity noted at the apex. This likely represents a thrombus.  []labs  []other    Physical exam at discharge: Awake, oriented to self only. Pupils equally constricted, but reactive to light. Vertical upward gaze paralysis. Moving all extremities spontaneously. Right upper extremity 4/5. No sensory deficits. Unsteady gait.     NIHSS at discharge: 2    New medications on discharge:  atorvastatin 80  Labs to be followed up: none  Imaging to be done as outpatient: none  Further outpatient workup: Dr. Brantley           DO NOT DELETE  65yo M w/ poor med adherence, PMHx of LV thrombus (resolved on Jan 2020 echo; reappeared on Nov 2020 echo), pAF on eliquis, CAD s/p PCI, HFrEF 30-35% s/p AICD, HIT, hemorrhoids, gastritis, GIB, hx multiple admissions for EtOH withdrawal (no hx seizures, DTs, or intubations), who presents for AMS thought to be due to EtOH withdrawal and treated with librium. Patient remained altered with waxing and waning mental status. vEEG negative. Stroke code called for bilateral pinpoint pupils non reactive to light and unresponsiveness. all imaging negative. Given Narcan 0.4 x 4 with no response. TTE showed Regional wall motion abnormalities consistent with ischemic heart disease. Left ventricular systolic function is severely reduced with a calculated ejection fraction of 30-35% with severe regional wall motion abnormalities. Akinesis of the mid to anteroseptum, mid to apical inferoseptum, all apical segments and true apex. No left ventricular thrombus noted.  Repeat echo showed LV thrombus. Patient transitioned from eliquis to Argatroban drip due to inability to tolerate po due to waxing and waning mental status and history of HIT. Repeat CTH revealed subacute left thalamic infract, left posterior cerebral territory on 1/25. MRI showed recent infarctions in the posterior circulation, predominantly left sided in the thalamus, midbrain, cerebellum, mesial temporal lobe and additionally at small site of left occipital cortex. On the right, a smaller thalamic infarct is recent as well. Course was also complicated by possible aspiration and +staph aureus on MRSA swab treated with ceftriaxone. Patient's mental status improving and will be discharged to home with home PT.     FILL IN REST OF HOSPITAL COURSE    (1) Alcohol withdrawal: on CIWA protocol with PRN ativan inpatient; continue folic acid, thiamine and MV at home    (2) Left ventricular thrombus: has history of LV thrombus, resolved on Jan 2020 echo, however reappeared on Nov 2020 ECHO ([facility]), and TTEx2 inpatient showed LV thrombus of same size (2.3x1.4cm) seen in the apex. Will be discharged on Eliquis 5 BID     (3) CAD (coronary artery disease): hx of CAD with stent placement 2 years ago; stop ASA 81 (given history of GIB, is also on Eliquis), start atorvastatin 80qHS at home **    (4) HFrEF (heart failure with reduced ejection fraction): TTE on this admission shows EF 30% with multiple akinetic segments (Entire anterior septum, mid inferolateral segment, apical inferior segment, and apex) shows; on discharge can continue home metoprolol     (5) Atrial fibrillation: h/o paroxysmal Afib: c/w toprol 100mg daily, c/w Eliquis 5mg BID.   *****************************************************************************  During this hospital course, patient had subacute ischemic infarctions within the left medial thalamus, right medial thalamus, and left cerebellar hemisphere noted on head CT, confirmed by MR Brain.   The stroke etiology is likely secondary to:  [x]atrial fibrillation/left ventricular thrombus   []small vessel disease from atherosclerotic risk factors    Patient had the following workup done in house:  [x] head imaging:   -CT Head No Cont (01.25.21 @ 19:52): Interval subacute left thalamic infarct, left posterior cerebral artery territory.  -MR Head No Cont (01.26.21 @ 13:30): Recent infarctions in the posterior circulation, predominantly left sided in the thalamus, midbrain, cerebellum, mesial temporal lobe and additionally at small site of left occipital cortex. On the right, a smaller thalamic infarct is recent as well. No MR evidence of parenchymal hemorrhage, specifically at sites of infarct. No significant mass effect, or hydrocephalus though there is mild effacement of the 3rd ventricle from thalamic infarcts.    [x]vessel imaging:  -CT Angio Head w/ IV Cont (01.26.21 @ 00:41): Small subacute left basal ganglia/thalamic infarction. No acute vascular findings.  -CT Angio Neck w/ IV Cont (01.26.21 @ 00:41): No stenosis or occlusion.    [x]echo  -TTE Limited Echo w/o Cont (01.28.21 @ 14:49): 1. Limited study obtained for evaluation of left ventricular function 2. Left ventricular systolic function is moderately reduced with a calculated ejection fraction of 30-35% with regional wall motion abnormalities. There is mid anteroseptal, apical, apical septal, apical lateral, apical anterior, apical inferior wall akinesis. Large 2.3 x 1.4 cm mobile echodensity noted at the apex. This likely represents a thrombus.  []labs  []other    Physical exam at discharge: Awake, oriented to self only. Pupils equally constricted, but reactive to light. Vertical upward gaze paralysis. Moving all extremities spontaneously. Right upper extremity 4/5. No sensory deficits. Unsteady gait.     NIHSS at discharge: 2    New medications on discharge:  atorvastatin 80  Labs to be followed up: none  Imaging to be done as outpatient: none  Further outpatient workup: Dr. Brantley           DO NOT DELETE  65yo M w/ poor med adherence, PMHx of LV thrombus (resolved on Jan 2020 echo; reappeared on Nov 2020 echo), pAF on eliquis, CAD s/p PCI, HFrEF 30-35% s/p AICD, HIT, hemorrhoids, gastritis, GIB, hx multiple admissions for EtOH withdrawal (no hx seizures, DTs, or intubations), who presents for AMS thought to be due to EtOH withdrawal and treated with librium. Patient remained altered with waxing and waning mental status. VEEG negative. Stroke code called for bilateral pinpoint pupils non reactive to light and unresponsiveness. all imaging negative. Given Narcan 0.4 x 4 with no response. TTE showed Regional wall motion abnormalities consistent with ischemic heart disease. Left ventricular systolic function is severely reduced with a calculated ejection fraction of 30-35% with severe regional wall motion abnormalities. Akinesis of the mid to anteroseptum, mid to apical inferoseptum, all apical segments and true apex. No left ventricular thrombus noted.  Repeat echo showed LV thrombus. Patient initially on Argatroban drip due to hx of HIT, then transitioned to PO Eliquis. Repeat CTH revealed subacute left thalamic infract, left posterior cerebral territory on 1/25. MRI showed recent infarctions in the posterior circulation, predominantly left sided in the thalamus, midbrain, cerebellum, mesial temporal lobe and additionally at small site of left occipital cortex. On the right, a smaller thalamic infarct is recent as well. Course was also complicated by possible aspiration and +staph aureus on MRSA swab treated with ceftriaxone. Repeat VEEG negative. Patient's mental status improving however still waxing and waning - stable for stepdown to [facility] and eventual discharge to [facility].       (1) Alcohol withdrawal: on CIWA protocol with PRN ativan inpatient; continue folic acid, thiamine and MV at home    (2) Left ventricular thrombus: has history of LV thrombus, resolved on Jan 2020 echo, however reappeared on Nov 2020 ECHO ([facility]), and TTEx2 inpatient showed LV thrombus of same size (2.3x1.4cm) seen in the apex. Will be discharged on Eliquis 5 BID     (3) CAD (coronary artery disease): hx of CAD with stent placement 2 years ago; stop ASA 81 (given history of GIB, is also on Eliquis), start atorvastatin 80qHS at home **    (4) HFrEF (heart failure with reduced ejection fraction): TTE on this admission shows EF 30% with multiple akinetic segments (Entire anterior septum, mid inferolateral segment, apical inferior segment, and apex) shows; on discharge can continue home metoprolol     (5) Atrial fibrillation: h/o paroxysmal Afib: c/w toprol 100mg daily, c/w Eliquis 5mg BID.   *****************************************************************************  During this hospital course, patient had subacute ischemic infarctions within the left medial thalamus, right medial thalamus, and left cerebellar hemisphere noted on head CT, confirmed by MR Brain.   The stroke etiology is likely secondary to:  [x]atrial fibrillation/left ventricular thrombus   []small vessel disease from atherosclerotic risk factors    Patient had the following workup done in house:  [x] head imaging:   -CT Head No Cont (01.25.21 @ 19:52): Interval subacute left thalamic infarct, left posterior cerebral artery territory.  -MR Head No Cont (01.26.21 @ 13:30): Recent infarctions in the posterior circulation, predominantly left sided in the thalamus, midbrain, cerebellum, mesial temporal lobe and additionally at small site of left occipital cortex. On the right, a smaller thalamic infarct is recent as well. No MR evidence of parenchymal hemorrhage, specifically at sites of infarct. No significant mass effect, or hydrocephalus though there is mild effacement of the 3rd ventricle from thalamic infarcts.    [x]vessel imaging:  -CT Angio Head w/ IV Cont (01.26.21 @ 00:41): Small subacute left basal ganglia/thalamic infarction. No acute vascular findings.  -CT Angio Neck w/ IV Cont (01.26.21 @ 00:41): No stenosis or occlusion.    [x]echo  -TTE Limited Echo w/o Cont (01.28.21 @ 14:49): 1. Limited study obtained for evaluation of left ventricular function 2. Left ventricular systolic function is moderately reduced with a calculated ejection fraction of 30-35% with regional wall motion abnormalities. There is mid anteroseptal, apical, apical septal, apical lateral, apical anterior, apical inferior wall akinesis. Large 2.3 x 1.4 cm mobile echodensity noted at the apex. This likely represents a thrombus.    Physical exam at discharge: Awake, oriented to self only. Pupils equally constricted, but reactive to light. Vertical upward gaze paralysis. Moving all extremities spontaneously. Right upper extremity 4/5. No sensory deficits. Unsteady gait.     NIHSS at discharge: 2    New medications on discharge:  atorvastatin 80  Labs to be followed up: none  Imaging to be done as outpatient: none  Further outpatient workup: Dr. Brantley           DO NOT DELETE  65yo M w/ poor med adherence, PMHx of LV thrombus (resolved on Jan 2020 echo; reappeared on Nov 2020 echo), pAF on eliquis, CAD s/p PCI, HFrEF 30-35% s/p AICD, HIT, hemorrhoids, gastritis, GIB, hx multiple admissions for EtOH withdrawal (no hx seizures, DTs, or intubations), who presents for AMS thought to be due to EtOH withdrawal and treated with librium. Patient remained altered with waxing and waning mental status. VEEG negative. Stroke code called for bilateral pinpoint pupils non reactive to light and unresponsiveness. all imaging negative. Given Narcan 0.4 x 4 with no response. TTE showed Regional wall motion abnormalities consistent with ischemic heart disease. Left ventricular systolic function is severely reduced with a calculated ejection fraction of 30-35% with severe regional wall motion abnormalities. Akinesis of the mid to anteroseptum, mid to apical inferoseptum, all apical segments and true apex. No left ventricular thrombus noted.  Repeat echo showed LV thrombus. Patient initially on Argatroban drip due to hx of HIT, then transitioned to PO Eliquis. Repeat CTH revealed subacute left thalamic infract, left posterior cerebral territory on 1/25. MRI showed recent infarctions in the posterior circulation, predominantly left sided in the thalamus, midbrain, cerebellum, mesial temporal lobe and additionally at small site of left occipital cortex. On the right, a smaller thalamic infarct is recent as well. Course was also complicated by possible aspiration and +staph aureus on MRSA swab treated with ceftriaxone. Repeat VEEG negative. Patient's mental status improving however still waxing and waning - stable for stepdown to [facility] and eventual discharge to [facility].     (1) Alcohol withdrawal: on CIWA protocol with PRN ativan inpatient; continue folic acid, thiamine and MV at home    (2) Left ventricular thrombus: has history of LV thrombus, resolved on Jan 2020 echo, however reappeared on Nov 2020 ECHO ([facility]), and TTEx2 inpatient showed LV thrombus of same size (2.3x1.4cm) seen in the apex. Will be discharged on Eliquis 5 BID     (3) CAD (coronary artery disease): hx of CAD with stent placement 2 years ago; stop ASA 81 (given history of GIB, is also on Eliquis), start atorvastatin 80qHS at home **    (4) HFrEF (heart failure with reduced ejection fraction): TTE on this admission shows EF 30% with multiple akinetic segments (Entire anterior septum, mid inferolateral segment, apical inferior segment, and apex) shows; on discharge can continue home metoprolol     (5) Atrial fibrillation: h/o paroxysmal Afib: c/w toprol 100mg daily, c/w Eliquis 5mg BID.   *****************************************************************************  During this hospital course, patient had subacute ischemic infarctions within the left medial thalamus, right medial thalamus, and left cerebellar hemisphere noted on head CT, confirmed by MR Brain.   The stroke etiology is likely secondary to:  [x]atrial fibrillation/left ventricular thrombus   []small vessel disease from atherosclerotic risk factors    Patient had the following workup done in house:  [x] head imaging:   -CT Head No Cont (01.25.21 @ 19:52): Interval subacute left thalamic infarct, left posterior cerebral artery territory.  -MR Head No Cont (01.26.21 @ 13:30): Recent infarctions in the posterior circulation, predominantly left sided in the thalamus, midbrain, cerebellum, mesial temporal lobe and additionally at small site of left occipital cortex. On the right, a smaller thalamic infarct is recent as well. No MR evidence of parenchymal hemorrhage, specifically at sites of infarct. No significant mass effect, or hydrocephalus though there is mild effacement of the 3rd ventricle from thalamic infarcts.    [x]vessel imaging:  -CT Angio Head w/ IV Cont (01.26.21 @ 00:41): Small subacute left basal ganglia/thalamic infarction. No acute vascular findings.  -CT Angio Neck w/ IV Cont (01.26.21 @ 00:41): No stenosis or occlusion.    [x]echo  -TTE Limited Echo w/o Cont (01.28.21 @ 14:49): 1. Limited study obtained for evaluation of left ventricular function 2. Left ventricular systolic function is moderately reduced with a calculated ejection fraction of 30-35% with regional wall motion abnormalities. There is mid anteroseptal, apical, apical septal, apical lateral, apical anterior, apical inferior wall akinesis. Large 2.3 x 1.4 cm mobile echodensity noted at the apex. This likely represents a thrombus.    Physical exam at discharge: Awake, oriented to self only. Pupils equally constricted, but reactive to light. Vertical upward gaze paralysis. Moving all extremities spontaneously. Right upper extremity 4/5. No sensory deficits. Unsteady gait.     NIHSS at discharge: 2    New medications on discharge:  atorvastatin 80  Labs to be followed up: none  Imaging to be done as outpatient: none  Further outpatient workup: Dr. Brantley

## 2021-01-19 NOTE — DISCHARGE NOTE PROVIDER - NSDCFUSCHEDAPPT_GEN_ALL_CORE_FT
LAINA SHIPMAN ; 02/03/2021 ; [facility] [address] LAINA SHIPMAN ; 02/03/2021 ; [facility] [address]  LAINA SHIPMAN ; 02/08/2021 ; [facility] [address] LAINA SHIPMAN ; 02/08/2021 ; [facility] [address]

## 2021-01-19 NOTE — PROGRESS NOTE ADULT - PROBLEM SELECTOR PLAN 1
History of alcohol withdrawal. Alcohol 185 on admission. Last drink on day of admission 1/17. Started on Librium taper. This AM CIWA 4 for tremors.   -was on librium 50 q12, however due to persistent tremor,will increase to 50 q 8  -Folic Acid/MVI/Thiamine  -Fall precautions  -PT says no needs

## 2021-01-19 NOTE — DISCHARGE NOTE PROVIDER - PROVIDER TOKENS
PROVIDER:[TOKEN:[[id number]],FOLLOWUP:[2 weeks]] FREE:[LAST:[Maria L],FIRST:[Deedee GRAMAJO],PHONE:[phone]],FAX:[phone]],ADDRESS:[CARDIOLOGY  [address] [place] [address]],SCHEDULEDAPPT:[02/03/2021],SCHEDULEDAPPTTIME:[03:15 PM]],FREE:[LAST:[Ilana],FIRST:[True LEE],PHONE:[phone]],FAX:[phone]],ADDRESS:[INTERNAL MEDICINE  [address] [place] [address]],SCHEDULEDAPPT:[02/08/2021],SCHEDULEDAPPTTIME:[12:00 PM]] FREE:[LAST:[Maria L],FIRST:[Deedee GRAMAJO],PHONE:[phone]:[phone]],ADDRESS:[CARDIOLOGY  [address] [place] [address]],SCHEDULEDAPPT:[02/03/2021],SCHEDULEDAPPTTIME:[03:15 PM]],FREE:[LAST:[Ilana],FIRST:[True LEE],PHONE:[phone]],FAX:[phone]],ADDRESS:[INTERNAL MEDICINE  [address] [place] [address]],SCHEDULEDAPPT:[02/08/2021],SCHEDULEDAPPTTIME:[12:00 PM]],PROVIDER:[TOKEN:[[id number]],FOLLOWUP:[2 weeks]]

## 2021-01-19 NOTE — PROGRESS NOTE ADULT - PROBLEM SELECTOR PLAN 3
History of LV thrombus with no thrombus on most recent ECHO during this admission. Life-long AC required.  -Continue Eliquis 5mg BID  - follow TTE

## 2021-01-19 NOTE — DISCHARGE NOTE PROVIDER - NSDCFUADDAPPT_GEN_ALL_CORE_FT
Please bring your Insurance card, Photo ID and Discharge paperwork to the following appointments:     (1) Please follow up with your Cardiology Provider, Dr. Deedee Lisa at [address] [place] [address] on 02/03/2021 at 3:15pm.    Appointment was scheduled by Ms. FLORIDA Pagan, Referral Coordinator.    (2) Please follow up with your Primary Care Provider, Dr. True Preciado at [address] [place] [address] on 02/08/2021 at 12:00pm.    Appointment was scheduled by Ms. FLORIDA Pagan, Referral Coordinator. Please bring your Insurance card, Photo ID and Discharge paperwork to the following appointments:     (1) Please follow up with your Cardiology Provider, Dr. Deedee Lisa at [address] [place] [address] on 02/03/2021 at 3:15pm.    (2) Please follow up with your Primary Care Provider, Dr. True Preciado at [address] [place] [address] on 02/08/2021 at 12:00pm.    3) Please call      Please bring your Insurance card, Photo ID and Discharge paperwork to the following appointments:     (1) Please follow up with your Cardiology Provider, Dr. Deedee Lisa at [address] [place] [address] on 02/03/2021 at 3:15pm.    (2) Please follow up with your Primary Care Provider, Dr. True Preciado at [address] [place] [address] on 02/08/2021 at 12:00pm.    3) Please schedule an appointment with Dr. Brantley (stroke neurology) in the next 3 weeks. Please call this number: [phone] (option #[phone])

## 2021-01-19 NOTE — DISCHARGE NOTE PROVIDER - NSDCMRMEDTOKEN_GEN_ALL_CORE_FT
apixaban 5 mg oral tablet: 1 tab(s) orally 2 times a day   aspirin 81 mg oral tablet: 1 tab(s) orally once a day  atorvastatin 80 mg oral tablet: 1 tab(s) orally once a day (at bedtime)  folic acid 1 mg oral tablet: 1 tab(s) orally once a day  metoprolol succinate 50 mg oral tablet, extended release: 1 tab(s) orally once a day  Metoprolol Succinate  mg oral tablet, extended release: 1 tab(s) orally once a day   Multiple Vitamins oral tablet: 1 tab(s) orally once a day  thiamine 100 mg oral tablet: 1 tab(s) orally once a day   apixaban 5 mg oral tablet: 1 tab(s) orally 2 times a day   aspirin 81 mg oral tablet: 1 tab(s) orally once a day  atorvastatin 80 mg oral tablet: 1 tab(s) orally once a day (at bedtime)  folic acid 1 mg oral tablet: 1 tab(s) orally once a day  Metoprolol Tartrate 100 mg oral tablet: 1 tab(s) orally 2 times a day  Multiple Vitamins oral tablet: 1 tab(s) orally once a day  thiamine 100 mg oral tablet: 1 tab(s) orally once a day   apixaban 5 mg oral tablet: 1 tab(s) orally 2 times a day   aspirin 81 mg oral tablet: 1 tab(s) orally once a day  atorvastatin 80 mg oral tablet: 1 tab(s) orally once a day (at bedtime)  folic acid 1 mg oral tablet: 1 tab(s) orally once a day  Metoprolol Tartrate 100 mg oral tablet: 1 tab(s) orally 2 times a day  Multiple Vitamins oral tablet: 1 tab(s) orally once a day  Rolling walker: 1 application buccal once a day   thiamine 100 mg oral tablet: 1 tab(s) orally once a day   apixaban 5 mg oral tablet: 1 tab(s) orally 2 times a day   atorvastatin 80 mg oral tablet: 1 tab(s) orally once a day (at bedtime)  folic acid 1 mg oral tablet: 1 tab(s) orally once a day  Metoprolol Tartrate 100 mg oral tablet: 1 tab(s) orally 2 times a day  Multiple Vitamins oral tablet: 1 tab(s) orally once a day  Rolling walker: 1 application buccal once a day   thiamine 100 mg oral tablet: 1 tab(s) orally once a day   apixaban 5 mg oral tablet: 1 tab(s) orally 2 times a day   atorvastatin 80 mg oral tablet: 1 tab(s) orally once a day (at bedtime)  donepezil 5 mg oral tablet: 1 tab(s) orally once a day (at bedtime)  folic acid 1 mg oral tablet: 1 tab(s) orally once a day  Metoprolol Tartrate 100 mg oral tablet: 1 tab(s) orally 2 times a day  Multiple Vitamins oral tablet: 1 tab(s) orally once a day  Rolling walker: 1 application buccal once a day   thiamine 100 mg oral tablet: 1 tab(s) orally once a day

## 2021-01-19 NOTE — DISCHARGE NOTE PROVIDER - NSDCCPTREATMENT_GEN_ALL_CORE_FT
PRINCIPAL PROCEDURE  Procedure: Transthoracic echocardiogram  Findings and Treatment: CONCLUSIONS:   1. Technically difficult study.   2. Moderately-to-severely reduced left ventricular systolic function. The ejection fraction is    30%   3. Entire anterior septum, mid inferolateral segment, apical inferior segment, and apex are akinetic. Remaining segments are hypokinetic.   4. Normal right ventricular size and systolic function.   5. No significant valvular disease.   6. No pericardial effusion.   7. Compared to the previous TTE performed on 1/13/2020, a left ventricular thrombus is now visualized in the apex.      SECONDARY PROCEDURE  Procedure: EEG, awake and asleep  Findings and Treatment: FINAL Summary:  Normal continuous av-EEG study.  1)	The EEG remained normal throughout the study.  Final Clinical Correlation:  There were no findings of active epilepsy, however this alone does not rule out the diagnosis.    Procedure: CT angiogram neck w contrast  Findings and Treatment: IMPRESSION:  1. Small subacute left basal ganglia/thalamic infarction, see noncontrast head CT report.  2. No acute vascular findings.    Procedure: CT head wo con  Findings and Treatment: INTERPRETATION:  IBuster MD, have reviewed the images and the report and agree with the findings.  Agree that there are evolving subacute infarctions within the left medial thalamus, right medial thalamus, and left cerebellar hemisphere. No acute intracranial hemorrhage. No new demarcated territorial infarction.    Procedure: MRI head  Findings and Treatment: IMPRESSION:  MRI is positive for recent infarctions in the posterior circulation, predominantly left sided in the thalamus, midbrain, cerebellum, mesial temporal lobe and additionally at small site of left occipital cortex. On the right, a smaller thalamic infarct is recent as well. No MR evidence of parenchymal hemorrhage, specifically at sites of infarct. No significant mass effect, or hydrocephalus though there is mild effacement of the 3rd ventricle from thalamic infarcts.    Procedure: Limited echocardiography  Findings and Treatment: CONCLUSIONS:   1. Limited study obtained for evaluation of left ventricular function.   2. Left ventricular systolic function is moderately reduced with a calculated ejection fraction of 30-35% with regional wall motion abnormalities. There is mid anteroseptal, apical, apical septal, apical lateral, apical anterior, apical inferior wall akinesis. Large 2.3 x 1.4 cm mobile echodensity noted at the apex. This likely represents a thrombus.

## 2021-01-19 NOTE — DISCHARGE NOTE PROVIDER - CARE PROVIDERS DIRECT ADDRESSES
[email or web address] ,DirectAddress_Unknown,DirectAddress_Unknown ,DirectAddress_Unknown,[email or web address],[email or web address]

## 2021-01-19 NOTE — DISCHARGE NOTE PROVIDER - NSDCCPCAREPLAN_GEN_ALL_CORE_FT
PRINCIPAL DISCHARGE DIAGNOSIS  Diagnosis: Alcohol withdrawal  Assessment and Plan of Treatment: Alcohol has what doctors call a depressive effect on your system. It slows down brain function and changes the way your nerves send messages back and forth. Over time, your central nervous system adjusts to having alcohol around all the time. Your body works hard to keep your brain in a more awake state and to keep your nerves talking to one another. When the alcohol level suddenly drops, your brain stays in this keyed up state. That’s what causes withdrawal.  We treated your withdrawl with a medication called Librium, which we gave to you daily. We monitored you for withdrawl symptoms and made sure that when you left, you would not be at risk of seizing. It is important for you to consider quitting alcohol, as it not only can cause withdrawl, but it also can have deleterious effects on every single organ in your body. Please speak with your PCP about options for quitting, and make sure you follow up with your PCP within two weeks of discharge.         SECONDARY DISCHARGE DIAGNOSES  Diagnosis: Left ventricular thrombus  Assessment and Plan of Treatment: Left ventricular thrombus is a blood clot (thrombus) in the left ventricle of the heart. LVT is a common complication of acute myocardial infarction (AMI).  For this condition, we started you on a medication called Eliquis. You also underwent Echocardiogram that showed_____     PRINCIPAL DISCHARGE DIAGNOSIS  Diagnosis: Alcohol withdrawal  Assessment and Plan of Treatment: Alcohol has what doctors call a depressive effect on your system. It slows down brain function and changes the way your nerves send messages back and forth. Over time, your central nervous system adjusts to having alcohol around all the time. Your body works hard to keep your brain in a more awake state and to keep your nerves talking to one another. When the alcohol level suddenly drops, your brain stays in this keyed up state. That’s what causes withdrawal.  We treated your withdrawl with a medication called Librium, which we gave to you daily. We monitored you for withdrawl symptoms and made sure that when you left, you would not be at risk of seizing. It is important for you to consider quitting alcohol, as it not only can cause withdrawl, but it also can have deleterious effects on every single organ in your body. Please speak with your PCP about options for quitting, and make sure you follow up with your PCP within two weeks of discharge.         SECONDARY DISCHARGE DIAGNOSES  Diagnosis: Cerebrovascular accident (CVA)  Assessment and Plan of Treatment: During this hospital admission, you had an ischemic stroke. During an ischemic stroke, blood stops flowing to part of your brain because of a blockage in the blood vessel. This can damage areas in the brain that control other parts of the body.  Please take your [aspirin and plavix///Eliquis] for blood thinning and Atorvastatin for cholesterol medication/blood vessel protection as prescribed to prevent further strokes. Do not skip doses and do not run low on your medication. If you run low on your medication, please contact your doctor.  You will follow up outpatient with the stroke Nurse Practitioner as scheduled below.  Doing your regular tasks may be difficult after you've had a stroke, but you can learn new ways to manage your daily activities. In fact, doing daily activities may help you to regain muscle strength. Be patient, give yourself time to adjust, and appreciate the progress you make. For example, when showering or bathing, test the water temperature with a hand or foot that was not affected by the stroke, use grab bars, a shower seat, a hand-held showerhead, etc. It is normal to feel fatigue after a stroke, while some days may be worse than others, you will continue to improve.  Call 911 right away if you have any of the following symptoms of another stroke:  B: Balance: Sudden: Dizziness, loss of balance, or a sense of falling, difficulty with coordinating movement  E: Eyes: Sudden double vision or trouble seeing in one or both eyes  F: Face: Sudden uneven face  A: Arms (Legs): Sudden weakness, tingling, or loss of feeling on one side of your face or body  S: Speech: Sudden trouble talking or slurred speech, sudden difficulty understanding others  T: Time: Please call 911 right away and go to the emergency room  •Sudden, severe headache  •Blackouts or seizures      Diagnosis: Left ventricular thrombus  Assessment and Plan of Treatment: Left ventricular thrombus is a blood clot (thrombus) in the left ventricle of the heart. LVT is a common complication of acute myocardial infarction (AMI).  For this condition, we started you on a medication called Eliquis. You also underwent Echocardiogram that showed_____     PRINCIPAL DISCHARGE DIAGNOSIS  Diagnosis: Alcohol withdrawal  Assessment and Plan of Treatment: Alcohol has what doctors call a depressive effect on your system. It slows down brain function and changes the way your nerves send messages back and forth. Over time, your central nervous system adjusts to having alcohol around all the time. Your body works hard to keep your brain in a more awake state and to keep your nerves talking to one another. When the alcohol level suddenly drops, your brain stays in this keyed up state. That’s what causes withdrawal.  We treated your withdrawl with a medication called Librium, which we gave to you daily. We monitored you for withdrawl symptoms and made sure that when you left, you would not be at risk of seizing. It is important for you to consider quitting alcohol, as it not only can cause withdrawl, but it also can have deleterious effects on every single organ in your body. Please speak with your PCP about options for quitting, and make sure you follow up with your PCP within two weeks of discharge.         SECONDARY DISCHARGE DIAGNOSES  Diagnosis: Cerebrovascular accident (CVA)  Assessment and Plan of Treatment: During this hospital admission, you had an ischemic stroke. During an ischemic stroke, blood stops flowing to part of your brain because of a blockage in the blood vessel. This can damage areas in the brain that control other parts of the body.  Please take your Eliquis for blood thinning and Atorvastatin for cholesterol medication/blood vessel protection as prescribed to prevent further strokes. Do not skip doses and do not run low on your medication. If you run low on your medication, please contact your doctor.  You will follow up outpatient with Dr. Brantley.   Doing your regular tasks may be difficult after you've had a stroke, but you can learn new ways to manage your daily activities. In fact, doing daily activities may help you to regain muscle strength. Be patient, give yourself time to adjust, and appreciate the progress you make. For example, when showering or bathing, test the water temperature with a hand or foot that was not affected by the stroke, use grab bars, a shower seat, a hand-held showerhead, etc. It is normal to feel fatigue after a stroke, while some days may be worse than others, you will continue to improve.  Call 911 right away if you have any of the following symptoms of another stroke:  B: Balance: Sudden: Dizziness, loss of balance, or a sense of falling, difficulty with coordinating movement  E: Eyes: Sudden double vision or trouble seeing in one or both eyes  F: Face: Sudden uneven face  A: Arms (Legs): Sudden weakness, tingling, or loss of feeling on one side of your face or body  S: Speech: Sudden trouble talking or slurred speech, sudden difficulty understanding others  T: Time: Please call 911 right away and go to the emergency room  •Sudden, severe headache  •Blackouts or seizures      Diagnosis: Left ventricular thrombus  Assessment and Plan of Treatment: Left ventricular thrombus is a blood clot (thrombus) in the left ventricle of the heart. LVT is a common complication of acute myocardial infarction (AMI).  For this condition, we started you on a medication called Eliquis. You also underwent Echocardiogram that showed evidence of your LV thrombus.     PRINCIPAL DISCHARGE DIAGNOSIS  Diagnosis: Alcohol withdrawal  Assessment and Plan of Treatment: Alcohol has what doctors call a depressive effect on your system. It slows down brain function and changes the way your nerves send messages back and forth. Over time, your central nervous system adjusts to having alcohol around all the time. Your body works hard to keep your brain in a more awake state and to keep your nerves talking to one another. When the alcohol level suddenly drops, your brain stays in this keyed up state. That’s what causes withdrawal.  We treated your withdrawl with a medication called Librium, which we gave to you daily. We monitored you for withdrawl symptoms and made sure that when you left, you would not be at risk of seizing. It is important for you to consider quitting alcohol, as it not only can cause withdrawl, but it also can have deleterious effects on every single organ in your body. Please speak with your PCP about options for quitting, and make sure you follow up with your PCP within two weeks of discharge.         SECONDARY DISCHARGE DIAGNOSES  Diagnosis: Cerebrovascular accident (CVA)  Assessment and Plan of Treatment: During this hospital admission, you had an ischemic stroke. During an ischemic stroke, blood stops flowing to part of your brain because of a blockage in the blood vessel. This can damage areas in the brain that control other parts of the body.  Please take your Eliquis for blood thinning and Atorvastatin for cholesterol medication/blood vessel protection as prescribed to prevent further strokes. Do not skip doses and do not run low on your medication. If you run low on your medication, please contact your doctor.  You will follow up outpatient with Dr. Brantley.   Doing your regular tasks may be difficult after you've had a stroke, but you can learn new ways to manage your daily activities. In fact, doing daily activities may help you to regain muscle strength. Be patient, give yourself time to adjust, and appreciate the progress you make. For example, when showering or bathing, test the water temperature with a hand or foot that was not affected by the stroke, use grab bars, a shower seat, a hand-held showerhead, etc. It is normal to feel fatigue after a stroke, while some days may be worse than others, you will continue to improve. Please continue to take your new medication atorvastatin 80mg and donepezil 5mg every everning before going to bed.   Call 911 right away if you have any of the following symptoms of another stroke:  B: Balance: Sudden: Dizziness, loss of balance, or a sense of falling, difficulty with coordinating movement  E: Eyes: Sudden double vision or trouble seeing in one or both eyes  F: Face: Sudden uneven face  A: Arms (Legs): Sudden weakness, tingling, or loss of feeling on one side of your face or body  S: Speech: Sudden trouble talking or slurred speech, sudden difficulty understanding others  T: Time: Please call 911 right away and go to the emergency room  •Sudden, severe headache  •Blackouts or seizures      Diagnosis: Left ventricular thrombus  Assessment and Plan of Treatment: Left ventricular thrombus is a blood clot (thrombus) in the left ventricle of the heart. LVT is a common complication of acute myocardial infarction (AMI).  For this condition, we started you on a medication called Eliquis. You also underwent Echocardiogram that showed evidence of your LV thrombus. Please continue taking the Eliquis 5mg every 12 hours daily.

## 2021-01-19 NOTE — DISCHARGE NOTE PROVIDER - NSDCQMMRS_NEU_ALL_CORE
3 - Moderate disability. Requires some help, but able to walk unassisted. 2 - Slight disability. Able to lookafter own affairs without assistance, but unable to carry out all previous activities

## 2021-01-19 NOTE — DISCHARGE NOTE PROVIDER - CARE PROVIDER_API CALL
Ravinder Champion)  Internal Medicine  [address] [place] [address]  Phone: [phone]  Fax: [phone]  Follow Up Time: 2 weeks   Deedee Lisa.  CARDIOLOGY  [address] [place] [address]  Phone: [phone]  Fax: [phone]  Scheduled Appointment: 02/03/2021 03:15 PM    True Preciado.  INTERNAL MEDICINE  [address] [place] [address]  Phone: [phone]  Fax: [phone]  Scheduled Appointment: 02/08/2021 12:00 PM   Deedee Lisa.  CARDIOLOGY  [address] [place] [address]  Phone: [phone]  Fax: [phone]  Scheduled Appointment: 02/03/2021 03:15 PM    True Preciado  INTERNAL MEDICINE  [address] [place] [address]  Phone: [phone]  Fax: [phone]  Scheduled Appointment: 02/08/2021 12:00 PM    Janna Brantley)  Neurology; Vascular Neurology  [address] [place] [address]  Phone: [phone]  Fax: [phone]  Follow Up Time: 2 weeks

## 2021-01-19 NOTE — PROGRESS NOTE ADULT - SUBJECTIVE AND OBJECTIVE BOX
OVERNIGHT EVENTS: luiz    SUBJECTIVE / INTERVAL HPI: Patient seen and examined at bedside. he feels well, yet he is still tremulous.  he denies hallucinations, anxiety, diaphoresis, nausea, vomiting.     VITAL SIGNS:  Vital Signs Last 24 Hrs  T(C): 36.8 (19 Jan 2021 05:25), Max: 36.8 (19 Jan 2021 05:25)  T(F): 98.2 (19 Jan 2021 05:25), Max: 98.2 (19 Jan 2021 05:25)  HR: 73 (19 Jan 2021 05:25) (73 - 75)  BP: 119/79 (19 Jan 2021 05:25) (119/79 - 126/84)  BP(mean): --  RR: 18 (19 Jan 2021 05:25) (17 - 18)  SpO2: 98% (19 Jan 2021 05:25) (98% - 98%)    PHYSICAL EXAM:    General: WDWN  HEENT: NC/AT; PERRL, anicteric sclera; MMM  Neck: supple  Cardiovascular: +S1/S2; RRR  Respiratory: CTA B/L; no W/R/R  Gastrointestinal: soft, NT/ND; +BSx4  Extremities: WWP; no edema, clubbing or cyanosis  Vascular: 2+ radial, DP/PT pulses B/L  Neurological: AAOx3; no focal deficits. bilateral tremors noted on both ue and le. no tongue fascuculations.     MEDICATIONS:  MEDICATIONS  (STANDING):  apixaban 5 milliGRAM(s) Oral every 12 hours  aspirin  chewable 81 milliGRAM(s) Oral daily  atorvastatin 80 milliGRAM(s) Oral at bedtime  chlordiazePOXIDE 50 milliGRAM(s) Oral every 12 hours  folic acid 1 milliGRAM(s) Oral daily  metoprolol succinate  milliGRAM(s) Oral daily  multivitamin 1 Tablet(s) Oral daily  thiamine 100 milliGRAM(s) Oral daily    MEDICATIONS  (PRN):  LORazepam   Injectable 2 milliGRAM(s) IV Push every 2 hours PRN Symptom-triggered: 2 point increase in CIWA -Ar score and a total score of 7 or LESS      ALLERGIES:  Allergies    Capoten (Short breath; Rash; Hives)  heparin (Other (Mod to Severe))  penicillin (Short breath; Rash; Hives)    Intolerances        LABS:                        13.5   2.98  )-----------( 94       ( 19 Jan 2021 06:15 )             41.2     01-19    136  |  103  |  9   ----------------------------<  92  4.2   |  21<L>  |  0.85    Ca    8.6      19 Jan 2021 06:15  Phos  2.2     01-18  Mg     1.9     01-19    TPro  6.5  /  Alb  3.8  /  TBili  1.3<H>  /  DBili  x   /  AST  26  /  ALT  14  /  AlkPhos  60  01-18        CAPILLARY BLOOD GLUCOSE          RADIOLOGY & ADDITIONAL TESTS: Reviewed.    ASSESSMENT:    PLAN:

## 2021-01-20 PROBLEM — I25.10 ATHEROSCLEROTIC HEART DISEASE OF NATIVE CORONARY ARTERY WITHOUT ANGINA PECTORIS: Chronic | Status: ACTIVE | Noted: 2021-01-17

## 2021-01-20 LAB
ALBUMIN SERPL ELPH-MCNC: 3.9 G/DL — SIGNIFICANT CHANGE UP (ref 3.3–5)
ALP SERPL-CCNC: 58 U/L — SIGNIFICANT CHANGE UP (ref 40–120)
ALT FLD-CCNC: 13 U/L — SIGNIFICANT CHANGE UP (ref 10–45)
AMPHET UR-MCNC: NEGATIVE — SIGNIFICANT CHANGE UP
ANION GAP SERPL CALC-SCNC: 13 MMOL/L — SIGNIFICANT CHANGE UP (ref 5–17)
ANION GAP SERPL CALC-SCNC: 14 MMOL/L — SIGNIFICANT CHANGE UP (ref 5–17)
APTT BLD: 34.8 SEC — SIGNIFICANT CHANGE UP (ref 27.5–35.5)
APTT BLD: 61.8 SEC — HIGH (ref 27.5–35.5)
APTT BLD: 64.5 SEC — HIGH (ref 27.5–35.5)
AST SERPL-CCNC: 24 U/L — SIGNIFICANT CHANGE UP (ref 10–40)
BARBITURATES UR SCN-MCNC: NEGATIVE — SIGNIFICANT CHANGE UP
BASE EXCESS BLDA CALC-SCNC: -1.6 MMOL/L — SIGNIFICANT CHANGE UP (ref -2–3)
BENZODIAZ UR-MCNC: POSITIVE
BILIRUB SERPL-MCNC: 0.6 MG/DL — SIGNIFICANT CHANGE UP (ref 0.2–1.2)
BLD GP AB SCN SERPL QL: NEGATIVE — SIGNIFICANT CHANGE UP
BUN SERPL-MCNC: 10 MG/DL — SIGNIFICANT CHANGE UP (ref 7–23)
BUN SERPL-MCNC: 13 MG/DL — SIGNIFICANT CHANGE UP (ref 7–23)
CALCIUM SERPL-MCNC: 9.2 MG/DL — SIGNIFICANT CHANGE UP (ref 8.4–10.5)
CALCIUM SERPL-MCNC: 9.3 MG/DL — SIGNIFICANT CHANGE UP (ref 8.4–10.5)
CHLORIDE SERPL-SCNC: 101 MMOL/L — SIGNIFICANT CHANGE UP (ref 96–108)
CHLORIDE SERPL-SCNC: 104 MMOL/L — SIGNIFICANT CHANGE UP (ref 96–108)
CK MB CFR SERPL CALC: 2.1 NG/ML — SIGNIFICANT CHANGE UP (ref 0–6.7)
CK SERPL-CCNC: 68 U/L — SIGNIFICANT CHANGE UP (ref 30–200)
CK SERPL-CCNC: 84 U/L — SIGNIFICANT CHANGE UP (ref 30–200)
CO2 SERPL-SCNC: 22 MMOL/L — SIGNIFICANT CHANGE UP (ref 22–31)
CO2 SERPL-SCNC: 22 MMOL/L — SIGNIFICANT CHANGE UP (ref 22–31)
COCAINE METAB.OTHER UR-MCNC: NEGATIVE — SIGNIFICANT CHANGE UP
CREAT SERPL-MCNC: 0.8 MG/DL — SIGNIFICANT CHANGE UP (ref 0.5–1.3)
CREAT SERPL-MCNC: 0.89 MG/DL — SIGNIFICANT CHANGE UP (ref 0.5–1.3)
GAS PNL BLDA: SIGNIFICANT CHANGE UP
GLUCOSE BLDC GLUCOMTR-MCNC: 89 MG/DL — SIGNIFICANT CHANGE UP (ref 70–99)
GLUCOSE BLDC GLUCOMTR-MCNC: 96 MG/DL — SIGNIFICANT CHANGE UP (ref 70–99)
GLUCOSE BLDC GLUCOMTR-MCNC: 99 MG/DL — SIGNIFICANT CHANGE UP (ref 70–99)
GLUCOSE SERPL-MCNC: 100 MG/DL — HIGH (ref 70–99)
GLUCOSE SERPL-MCNC: 93 MG/DL — SIGNIFICANT CHANGE UP (ref 70–99)
HCO3 BLDA-SCNC: 22 MMOL/L — SIGNIFICANT CHANGE UP (ref 21–28)
HCT VFR BLD CALC: 39.9 % — SIGNIFICANT CHANGE UP (ref 39–50)
HGB BLD-MCNC: 13.1 G/DL — SIGNIFICANT CHANGE UP (ref 13–17)
INR BLD: 1.08 — SIGNIFICANT CHANGE UP (ref 0.88–1.16)
LACTATE SERPL-SCNC: 1.2 MMOL/L — SIGNIFICANT CHANGE UP (ref 0.5–2)
MAGNESIUM SERPL-MCNC: 1.7 MG/DL — SIGNIFICANT CHANGE UP (ref 1.6–2.6)
MCHC RBC-ENTMCNC: 28.3 PG — SIGNIFICANT CHANGE UP (ref 27–34)
MCHC RBC-ENTMCNC: 32.8 GM/DL — SIGNIFICANT CHANGE UP (ref 32–36)
MCV RBC AUTO: 86.2 FL — SIGNIFICANT CHANGE UP (ref 80–100)
METHADONE UR-MCNC: NEGATIVE — SIGNIFICANT CHANGE UP
NRBC # BLD: 0 /100 WBCS — SIGNIFICANT CHANGE UP (ref 0–0)
OPIATES UR-MCNC: NEGATIVE — SIGNIFICANT CHANGE UP
PCO2 BLDA: 35 MMHG — SIGNIFICANT CHANGE UP (ref 35–48)
PCP SPEC-MCNC: SIGNIFICANT CHANGE UP
PCP UR-MCNC: NEGATIVE — SIGNIFICANT CHANGE UP
PH BLDA: 7.42 — SIGNIFICANT CHANGE UP (ref 7.35–7.45)
PHOSPHATE SERPL-MCNC: 3.3 MG/DL — SIGNIFICANT CHANGE UP (ref 2.5–4.5)
PLATELET # BLD AUTO: 105 K/UL — LOW (ref 150–400)
PO2 BLDA: 86 MMHG — SIGNIFICANT CHANGE UP (ref 83–108)
POTASSIUM SERPL-MCNC: 3.9 MMOL/L — SIGNIFICANT CHANGE UP (ref 3.5–5.3)
POTASSIUM SERPL-MCNC: 4.2 MMOL/L — SIGNIFICANT CHANGE UP (ref 3.5–5.3)
POTASSIUM SERPL-SCNC: 3.9 MMOL/L — SIGNIFICANT CHANGE UP (ref 3.5–5.3)
POTASSIUM SERPL-SCNC: 4.2 MMOL/L — SIGNIFICANT CHANGE UP (ref 3.5–5.3)
PROT SERPL-MCNC: 7.1 G/DL — SIGNIFICANT CHANGE UP (ref 6–8.3)
PROTHROM AB SERPL-ACNC: 12.9 SEC — SIGNIFICANT CHANGE UP (ref 10.6–13.6)
RBC # BLD: 4.63 M/UL — SIGNIFICANT CHANGE UP (ref 4.2–5.8)
RBC # FLD: 15.6 % — HIGH (ref 10.3–14.5)
RH IG SCN BLD-IMP: POSITIVE — SIGNIFICANT CHANGE UP
SAO2 % BLDA: 97 % — SIGNIFICANT CHANGE UP (ref 95–100)
SODIUM SERPL-SCNC: 137 MMOL/L — SIGNIFICANT CHANGE UP (ref 135–145)
SODIUM SERPL-SCNC: 139 MMOL/L — SIGNIFICANT CHANGE UP (ref 135–145)
THC UR QL: NEGATIVE — SIGNIFICANT CHANGE UP
TROPONIN T SERPL-MCNC: <0.01 NG/ML — SIGNIFICANT CHANGE UP (ref 0–0.01)
WBC # BLD: 2.86 K/UL — LOW (ref 3.8–10.5)
WBC # FLD AUTO: 2.86 K/UL — LOW (ref 3.8–10.5)

## 2021-01-20 PROCEDURE — 70496 CT ANGIOGRAPHY HEAD: CPT | Mod: 26

## 2021-01-20 PROCEDURE — 70450 CT HEAD/BRAIN W/O DYE: CPT | Mod: 26,59

## 2021-01-20 PROCEDURE — 93306 TTE W/DOPPLER COMPLETE: CPT | Mod: 26

## 2021-01-20 PROCEDURE — 0042T: CPT

## 2021-01-20 PROCEDURE — 70498 CT ANGIOGRAPHY NECK: CPT | Mod: 26

## 2021-01-20 PROCEDURE — 99222 1ST HOSP IP/OBS MODERATE 55: CPT

## 2021-01-20 PROCEDURE — 99233 SBSQ HOSP IP/OBS HIGH 50: CPT | Mod: GC

## 2021-01-20 PROCEDURE — 71045 X-RAY EXAM CHEST 1 VIEW: CPT | Mod: 26

## 2021-01-20 RX ORDER — NALOXONE HYDROCHLORIDE 4 MG/.1ML
0.4 SPRAY NASAL ONCE
Refills: 0 | Status: COMPLETED | OUTPATIENT
Start: 2021-01-20 | End: 2021-01-20

## 2021-01-20 RX ORDER — NALOXONE HYDROCHLORIDE 4 MG/.1ML
1 SPRAY NASAL ONCE
Refills: 0 | Status: DISCONTINUED | OUTPATIENT
Start: 2021-01-20 | End: 2021-01-20

## 2021-01-20 RX ORDER — NALOXONE HYDROCHLORIDE 4 MG/.1ML
1 SPRAY NASAL ONCE
Refills: 0 | Status: COMPLETED | OUTPATIENT
Start: 2021-01-20 | End: 2021-01-20

## 2021-01-20 RX ORDER — NALOXONE HYDROCHLORIDE 4 MG/.1ML
0.4 SPRAY NASAL
Refills: 0 | Status: DISCONTINUED | OUTPATIENT
Start: 2021-01-20 | End: 2021-01-20

## 2021-01-20 RX ORDER — SODIUM CHLORIDE 9 MG/ML
1000 INJECTION, SOLUTION INTRAVENOUS
Refills: 0 | Status: DISCONTINUED | OUTPATIENT
Start: 2021-01-20 | End: 2021-01-22

## 2021-01-20 RX ORDER — ARGATROBAN 50 MG/50ML
2 INJECTION, SOLUTION INTRAVENOUS
Qty: 50 | Refills: 0 | Status: DISCONTINUED | OUTPATIENT
Start: 2021-01-20 | End: 2021-01-21

## 2021-01-20 RX ADMIN — Medication 25 MILLIGRAM(S): at 05:53

## 2021-01-20 RX ADMIN — Medication 100 MILLIGRAM(S): at 05:56

## 2021-01-20 RX ADMIN — ARGATROBAN 8.71 MICROGRAM(S)/KG/MIN: 50 INJECTION, SOLUTION INTRAVENOUS at 16:51

## 2021-01-20 RX ADMIN — NALOXONE HYDROCHLORIDE 0.4 MILLIGRAM(S): 4 SPRAY NASAL at 09:51

## 2021-01-20 RX ADMIN — NALOXONE HYDROCHLORIDE 0.4 MILLIGRAM(S): 4 SPRAY NASAL at 08:15

## 2021-01-20 RX ADMIN — NALOXONE HYDROCHLORIDE 0.4 MILLIGRAM(S): 4 SPRAY NASAL at 09:35

## 2021-01-20 RX ADMIN — SODIUM CHLORIDE 75 MILLILITER(S): 9 INJECTION, SOLUTION INTRAVENOUS at 15:43

## 2021-01-20 RX ADMIN — NALOXONE HYDROCHLORIDE 0.4 MILLIGRAM(S): 4 SPRAY NASAL at 08:38

## 2021-01-20 NOTE — CONSULT NOTE ADULT - SUBJECTIVE AND OBJECTIVE BOX
63 y/o M PMHx of non-compliance, hx of LV thrombus (resolved on Jan 2020, reappeared Nov 2020 echo), pAF (eliquis), CAD s/p PCI, HFrEF (EF 35% s/p ICD) is admitted with alcohol withdrawal. Cardiology consulted for HF regimen optimization.     Patient was somnolent and heavily sedated could not participate in conversations.   Previously patient has been seen by Dr. Levy as an inpatient. Per previous records, pt had an MI in 1996 and isn't on any antiplatelet therapy due to hx of GIB.     Home medications: ASA, Apixaban 5 BID, Atorvastatin,  ER daily   PAST MEDICAL & SURGICAL HISTORY:  CAD (coronary artery disease)      Allergies    Capoten (Short breath; Rash; Hives)  heparin (Other (Mod to Severe))  penicillin (Short breath; Rash; Hives)    Intolerances        MEDICATIONS  (STANDING):  aspirin  chewable 81 milliGRAM(s) Oral daily  atorvastatin 80 milliGRAM(s) Oral at bedtime  chlordiazePOXIDE 25 milliGRAM(s) Oral every 8 hours  folic acid 1 milliGRAM(s) Oral daily  metoprolol succinate  milliGRAM(s) Oral daily  multivitamin 1 Tablet(s) Oral daily  thiamine 100 milliGRAM(s) Oral daily    MEDICATIONS  (PRN):  LORazepam   Injectable 2 milliGRAM(s) IV Push every 2 hours PRN Symptom-triggered: 2 point increase in CIWA -Ar score and a total score of 7 or LESS      REVIEW OF SYSTEMS:  12 point ROS negative except for that stated in the HPI    PHYSICAL EXAM:  Vitals past 24 Hours: T(C): 36.7 (01-20-21 @ 13:29), Max: 36.9 (01-20-21 @ 06:26)  HR: 87 (01-20-21 @ 13:29) (66 - 87)  BP: 111/76 (01-20-21 @ 13:29) (111/76 - 147/861)  RR: 13 (01-20-21 @ 13:29) (12 - 18)  SpO2: 98% (01-20-21 @ 13:29) (97% - 99%)	    Daily     Daily     GEN: Somnelent, unarousable    HEENT: Moist mucous membranes  Neck: No JVD  Cardiovascular: Regular rate and rhythm, +S1 S2. No murmurs, rubs, or gallops appreciated  Respiratory: Lungs clear to auscultation bilaterally  Gastrointestinal:  Soft, non-tender, non-distended, normoactive bowel sounds  Skin: No rashes, No ecchymoses, No cyanosis  Neurologic: Non-focal, alert and oriented x3.   Extremities: No clubbing, cyanosis or edema. Warm, well-perfused extremities.   Vascular: Radial and dorsalis pedis pulses 2+ bilaterally    I&O's Detail    19 Jan 2021 07:01  -  20 Jan 2021 07:00  --------------------------------------------------------  IN:    Oral Fluid: 840 mL  Total IN: 840 mL    OUT:  Total OUT: 0 mL    Total NET: 840 mL            LABS:                        13.1   2.86  )-----------( 105      ( 20 Jan 2021 06:46 )             39.9     01-20    137  |  101  |  13  ----------------------------<  93  3.9   |  22  |  0.89    Ca    9.2      20 Jan 2021 06:46  Phos  3.3     01-20  Mg     1.7     01-20      CARDIAC MARKERS ( 20 Jan 2021 06:46 )  x     / <0.01 ng/mL / 84 U/L / x     / 2.1 ng/mL      PT/INR - ( 20 Jan 2021 09:17 )   PT: 12.9 sec;   INR: 1.08          PTT - ( 20 Jan 2021 09:17 )  PTT:34.8 sec    I&O's Summary    19 Jan 2021 07:01  -  20 Jan 2021 07:00  --------------------------------------------------------  IN: 840 mL / OUT: 0 mL / NET: 840 mL      ASSESSMENT/PLAN:  63 y/o M PMHx of non-compliance, hx of LV thrombus (resolved on Jan 2020, reappeared Nov 2020 echo), pAF (eliquis), CAD s/p PCI, HFrEF (EF 35% s/p ICD) is admitted with alcohol withdrawal. Cardiology consulted for HF regimen optimization.     HFrEF   - continue  ER daily   - repeat ECHO  - not a candidate for Entresto, ace-i/ arb given hx of allergy   - currently euvolemic on exam     Hx of CAD - continue ASA     pAF - continue Eliquis and MTP     Renee Benz MD   Cardiology fellow   Please reconsult with any further questions    63 y/o M PMHx of non-compliance, hx of LV thrombus (resolved on Jan 2020, reappeared Nov 2020 echo), pAF (eliquis), CAD s/p PCI, HFrEF (EF 35% s/p ICD) is admitted with alcohol withdrawal. Cardiology consulted for HF regimen optimization.     Patient was somnolent and heavily sedated could not participate in conversations.   Previously patient has been seen by Dr. Levy as an inpatient. Per previous records, pt had an MI in 1996 and isn't on any antiplatelet therapy due to hx of GIB.     Home medications: ASA, Apixaban 5 BID, Atorvastatin,  ER daily   PAST MEDICAL & SURGICAL HISTORY:  CAD (coronary artery disease)      Allergies    Capoten (Short breath; Rash; Hives)  heparin (Other (Mod to Severe))  penicillin (Short breath; Rash; Hives)    Intolerances        MEDICATIONS  (STANDING):  aspirin  chewable 81 milliGRAM(s) Oral daily  atorvastatin 80 milliGRAM(s) Oral at bedtime  chlordiazePOXIDE 25 milliGRAM(s) Oral every 8 hours  folic acid 1 milliGRAM(s) Oral daily  metoprolol succinate  milliGRAM(s) Oral daily  multivitamin 1 Tablet(s) Oral daily  thiamine 100 milliGRAM(s) Oral daily    MEDICATIONS  (PRN):  LORazepam   Injectable 2 milliGRAM(s) IV Push every 2 hours PRN Symptom-triggered: 2 point increase in CIWA -Ar score and a total score of 7 or LESS      REVIEW OF SYSTEMS:  12 point ROS negative except for that stated in the HPI    PHYSICAL EXAM:  Vitals past 24 Hours: T(C): 36.7 (01-20-21 @ 13:29), Max: 36.9 (01-20-21 @ 06:26)  HR: 87 (01-20-21 @ 13:29) (66 - 87)  BP: 111/76 (01-20-21 @ 13:29) (111/76 - 147/861)  RR: 13 (01-20-21 @ 13:29) (12 - 18)  SpO2: 98% (01-20-21 @ 13:29) (97% - 99%)	    Daily     Daily     GEN: Somnelent, unarousable    HEENT: Moist mucous membranes  Neck: No JVD  Cardiovascular: Regular rate and rhythm, +S1 S2. No murmurs, rubs, or gallops appreciated  Respiratory: Lungs clear to auscultation bilaterally  Gastrointestinal:  Soft, non-tender, non-distended, normoactive bowel sounds  Skin: No rashes, No ecchymoses, No cyanosis  Neurologic: Non-focal, alert and oriented x3.   Extremities: No clubbing, cyanosis or edema. Warm, well-perfused extremities.   Vascular: Radial and dorsalis pedis pulses 2+ bilaterally    I&O's Detail    19 Jan 2021 07:01  -  20 Jan 2021 07:00  --------------------------------------------------------  IN:    Oral Fluid: 840 mL  Total IN: 840 mL    OUT:  Total OUT: 0 mL    Total NET: 840 mL            LABS:                        13.1   2.86  )-----------( 105      ( 20 Jan 2021 06:46 )             39.9     01-20    137  |  101  |  13  ----------------------------<  93  3.9   |  22  |  0.89    Ca    9.2      20 Jan 2021 06:46  Phos  3.3     01-20  Mg     1.7     01-20      CARDIAC MARKERS ( 20 Jan 2021 06:46 )  x     / <0.01 ng/mL / 84 U/L / x     / 2.1 ng/mL      PT/INR - ( 20 Jan 2021 09:17 )   PT: 12.9 sec;   INR: 1.08          PTT - ( 20 Jan 2021 09:17 )  PTT:34.8 sec    I&O's Summary    19 Jan 2021 07:01  -  20 Jan 2021 07:00  --------------------------------------------------------  IN: 840 mL / OUT: 0 mL / NET: 840 mL      ASSESSMENT/PLAN:  63 y/o M PMHx of non-compliance, hx of LV thrombus (resolved on Jan 2020, reappeared Nov 2020 echo), pAF (eliquis), CAD s/p PCI, HFrEF (EF 35% s/p ICD) is admitted with alcohol withdrawal. Cardiology consulted for HF regimen optimization.     HFrEF   - continue  ER daily   - repeat ECHO  - not a candidate for Entresto, ace-i/ arb given hx of allergy   - currently euvolemic on exam     LV thrombus - per guidelines patient should ideally be on warfarin however given his history of non-compliance with therapy, would recommend continuing Eliquis as an outpatient    Hx of CAD - continue ASA     pAF - continue Eliquis and MTP     Renee Benz MD   Cardiology fellow   Please reconsult with any further questions    65 y/o M PMHx of non-compliance, hx of LV thrombus (resolved on Jan 2020, reappeared Nov 2020 echo), pAF (eliquis), CAD s/p PCI, HFrEF (EF 35% s/p ICD) is admitted with alcohol withdrawal. Cardiology consulted for HF regimen optimization.     Patient was somnolent and heavily sedated could not participate in conversations.   Previously patient has been seen by Dr. Levy as an inpatient. Per previous records, pt had an MI in 1996 and isn't on any antiplatelet therapy due to hx of GIB.     Home medications: ASA, Apixaban 5 BID, Atorvastatin,  ER daily   PAST MEDICAL & SURGICAL HISTORY:  CAD (coronary artery disease)      Allergies    Capoten (Short breath; Rash; Hives)  heparin (Other (Mod to Severe))  penicillin (Short breath; Rash; Hives)    Intolerances        MEDICATIONS  (STANDING):  aspirin  chewable 81 milliGRAM(s) Oral daily  atorvastatin 80 milliGRAM(s) Oral at bedtime  chlordiazePOXIDE 25 milliGRAM(s) Oral every 8 hours  folic acid 1 milliGRAM(s) Oral daily  metoprolol succinate  milliGRAM(s) Oral daily  multivitamin 1 Tablet(s) Oral daily  thiamine 100 milliGRAM(s) Oral daily    MEDICATIONS  (PRN):  LORazepam   Injectable 2 milliGRAM(s) IV Push every 2 hours PRN Symptom-triggered: 2 point increase in CIWA -Ar score and a total score of 7 or LESS      REVIEW OF SYSTEMS:  12 point ROS negative except for that stated in the HPI    PHYSICAL EXAM:  Vitals past 24 Hours: T(C): 36.7 (01-20-21 @ 13:29), Max: 36.9 (01-20-21 @ 06:26)  HR: 87 (01-20-21 @ 13:29) (66 - 87)  BP: 111/76 (01-20-21 @ 13:29) (111/76 - 147/861)  RR: 13 (01-20-21 @ 13:29) (12 - 18)  SpO2: 98% (01-20-21 @ 13:29) (97% - 99%)	    Daily     Daily     GEN: Somnelent, unarousable    HEENT: Moist mucous membranes  Neck: No JVD  Cardiovascular: Regular rate and rhythm, +S1 S2. No murmurs, rubs, or gallops appreciated  Respiratory: Lungs clear to auscultation bilaterally  Gastrointestinal:  Soft, non-tender, non-distended, normoactive bowel sounds  Skin: No rashes, No ecchymoses, No cyanosis  Neurologic: Non-focal, alert and oriented x3.   Extremities: No clubbing, cyanosis or edema. Warm, well-perfused extremities.   Vascular: Radial and dorsalis pedis pulses 2+ bilaterally    I&O's Detail    19 Jan 2021 07:01  -  20 Jan 2021 07:00  --------------------------------------------------------  IN:    Oral Fluid: 840 mL  Total IN: 840 mL    OUT:  Total OUT: 0 mL    Total NET: 840 mL            LABS:                        13.1   2.86  )-----------( 105      ( 20 Jan 2021 06:46 )             39.9     01-20    137  |  101  |  13  ----------------------------<  93  3.9   |  22  |  0.89    Ca    9.2      20 Jan 2021 06:46  Phos  3.3     01-20  Mg     1.7     01-20      CARDIAC MARKERS ( 20 Jan 2021 06:46 )  x     / <0.01 ng/mL / 84 U/L / x     / 2.1 ng/mL      PT/INR - ( 20 Jan 2021 09:17 )   PT: 12.9 sec;   INR: 1.08          PTT - ( 20 Jan 2021 09:17 )  PTT:34.8 sec    I&O's Summary    19 Jan 2021 07:01  -  20 Jan 2021 07:00  --------------------------------------------------------  IN: 840 mL / OUT: 0 mL / NET: 840 mL      ASSESSMENT/PLAN:  65 y/o M PMHx of non-compliance, hx of LV thrombus (resolved on Jan 2020, reappeared Nov 2020 echo), pAF (eliquis), CAD s/p PCI, HFrEF (EF 35% s/p ICD) is admitted with alcohol withdrawal. Cardiology consulted for HF regimen optimization.     HFrEF   - continue  ER daily   - repeat ECHO  - not a candidate for Entresto, ace-i/ arb given hx of allergy   - currently euvolemic on exam     LV thrombus - per guidelines patient should ideally be on warfarin however given his history of non-compliance with therapy, would recommend continuing Eliquis as an outpatient    Hx of CAD - continue ASA     pAF - continue Eliquis and MTP     Addendum: ECHO today with EF 30-35 with segmental wall motion abnormalities, all unchanged from prior ECHOs since 2018. In 2018, noted to have 2 cm LV apical thrombus, but not seen/resolved in ECHOs from 2019 and 2020. ECHO today again with 2 cm apical LV thrombus. Given hx HIT, agree with starting argatroban . Recommend repeat ECHO 3 months to look for resolution of thrombus with expectation of med compliance    Renee Benz MD   Cardiology fellow   Please reconsult with any further questions

## 2021-01-20 NOTE — CONSULT NOTE ADULT - SUBJECTIVE AND OBJECTIVE BOX
Patient is a 65y old  Male who presents with a chief complaint of EtOH withdrawal (19 Jan 2021 15:55)      Consult reason: pin point pupils/ lethargy    HPI:  Mr. Torrez is a 65yo M w/ poor med adherence, PMHx of LV thrombus (resolved on Jan 2020 echo; reappeared on Nov 2020 echo) pAF on eliquis, CAD s/p PCI, HFrEF 30-35% s/p AICD, HIT, hemorrhoids, gastritis, GIB, hx multiple admissions for EtOH withdrawal (last drink 1/16; no hx seizures, DTs, or intubations), who presents due to concern for alcohol withdrawal. Pt last seen at [facility] in March 2020 when he was admitted for EtOH withdrawal w/ initial CIWA ~24 and associated visual hallucinations, which improved after being started on librium 50mg Q8hrs, and subsequently discharged on librium taper. Per outpatient records that pt himself brought with him to the ED, pt had additional episode EtOH withdrawal in November where he was seen at [facility] and subsequently discharged on librium taper 25mg BID. At that time, pt also underwent TTE imaging which showed LV EF 38% as well as a large protruding fixed thrombus attached to LV apex. Noted on discharge paperwork (11/22) to not be on ASA due to thrombocytopenia, not on warfarin due to prior poor adherence, and accordingly discharged on eliquis w/ a discharge diagnosis of afib/aflutter. Pt has since twice again been admitted at [facility] (12/8/21 and 1/6/21) for 1-2mo hx of persistent "funny feeling in my chest", determined to be 2/2 Afib/aflutter. Between those two admissions, pt had also been prescribed hydralazine and isosorbide dinitrate, however pt reports refusing to take them after reading about the side-effects in conjunction w/ alcohol-use. Pt says that for the past 1 month, he has been trying but struggling to wean off his alcohol consumption which he estimates to be a ~1pint/day. He says for the past week he has been especially focused on quitting in anticipation of an appointment with his outpatient cardiologist. Today he reports experiencing sx consistent w/ his previous episodes of withdrawal including agitation, shaking, chest pain, sob, nausea, and a racing heart. He otherwise denies recent or active fever, chills, vomiting, abdominal pain, back pain, changes in vision or hearing, genitourinary sx, extremity pain or swelling.    Interval HPI: Patient was walking and talking yesterday with no issues. However, this morning at change of shift was found to be quite somnolent with pin point pupils. 1.6 mg of Narcan were given from 8-9 am with no change in mental status.   Allergies    Capoten (Short breath; Rash; Hives)  heparin (Other (Mod to Severe))  penicillin (Short breath; Rash; Hives)    Intolerances      Home Medications:  aspirin 81 mg oral tablet: 1 tab(s) orally once a day (17 Jan 2021 16:17)  atorvastatin 80 mg oral tablet: 1 tab(s) orally once a day (at bedtime) (17 Jan 2021 16:20)  metoprolol succinate 50 mg oral tablet, extended release: 1 tab(s) orally once a day (17 Jan 2021 16:19)  Multiple Vitamins oral tablet: 1 tab(s) orally once a day (17 Jan 2021 16:21)  thiamine 100 mg oral tablet: 1 tab(s) orally once a day (17 Jan 2021 10:02)      SOCIAL HX:     Smoking          ETOH/Illicit drugs          Occupation    PAST MEDICAL & SURGICAL HISTORY:  CAD (coronary artery disease)    ETOH abuse    Hyperlipidemia    Heparin-induced thrombocytopenia  Antibody positive    Left ventricular thrombus    Atrial fibrillation    Hemorrhoid    ICD (implantable cardioverter-defibrillator) in place    Myocardial infarction involving other coronary artery    Gastritis    Atherosclerosis of coronary artery  Coronary artery disease    S/P coronary artery stent placement    Automatic implantable cardiac defibrillator in situ  ICD (implantable cardioverter-defibrillator) in place        FAMILY HISTORY:  FH: alcoholism  Father, brother    FH: type 2 diabetes  father    FH: myocardial infarction  maternal grandfather, age 42    :    No known cardiovascular or pulmonary family history     ROS:  See HPI     PHYSICAL EXAM    ICU Vital Signs Last 24 Hrs  T(C): 36.4 (20 Jan 2021 10:30), Max: 36.9 (20 Jan 2021 06:26)  T(F): 97.6 (20 Jan 2021 10:30), Max: 98.5 (20 Jan 2021 06:26)  HR: 66 (20 Jan 2021 10:30) (66 - 86)  BP: 128/80 (20 Jan 2021 10:30) (118/81 - 147/861)  BP(mean): --  ABP: --  ABP(mean): --  RR: 13 (20 Jan 2021 10:30) (12 - 18)  SpO2: 99% (20 Jan 2021 10:30) (97% - 99%)      General: Not in distress  HEENT:  CHRISTOPHER              Lymphatic system: No LN  Lungs: Bilateral BS  Cardiovascular: Regular  Gastrointestinal: Soft, Positive BS  Musculoskeletal: No clubbing.  Moves all extremities.    Skin: Warm.  Intact  Neurological: No motor or sensory deficit       01-19-21 @ 07:01  -  01-20-21 @ 07:00  --------------------------------------------------------  IN:    Oral Fluid: 840 mL  Total IN: 840 mL    OUT:  Total OUT: 0 mL    Total NET: 840 mL          LABS:                          13.1   2.86  )-----------( 105      ( 20 Jan 2021 06:46 )             39.9                                               01-20    137  |  101  |  13  ----------------------------<  93  3.9   |  22  |  0.89    Ca    9.2      20 Jan 2021 06:46  Phos  3.3     01-20  Mg     1.7     01-20        PT/INR - ( 20 Jan 2021 09:17 )   PT: 12.9 sec;   INR: 1.08          PTT - ( 20 Jan 2021 09:17 )  PTT:34.8 sec                                           CARDIAC MARKERS ( 20 Jan 2021 06:46 )  x     / <0.01 ng/mL / 84 U/L / x     / 2.1 ng/mL                                                                                                                                                                          ABG - ( 20 Jan 2021 09:19 )  pH, Arterial: 7.37  pH, Blood: x     /  pCO2: 46    /  pO2: 179   / HCO3: 26    / Base Excess: -0.1  /  SaO2: 100                 CXR:    ECHO:    MEDICATIONS  (STANDING):  aspirin  chewable 81 milliGRAM(s) Oral daily  atorvastatin 80 milliGRAM(s) Oral at bedtime  chlordiazePOXIDE 25 milliGRAM(s) Oral every 8 hours  folic acid 1 milliGRAM(s) Oral daily  metoprolol succinate  milliGRAM(s) Oral daily  multivitamin 1 Tablet(s) Oral daily  thiamine 100 milliGRAM(s) Oral daily    MEDICATIONS  (PRN):  LORazepam   Injectable 2 milliGRAM(s) IV Push every 2 hours PRN Symptom-triggered: 2 point increase in CIWA -Ar score and a total score of 7 or LESS         Patient is a 65y old  Male who presents with a chief complaint of EtOH withdrawal (19 Jan 2021 15:55)      Consult reason: pin point pupils/ lethargy    HPI:  Mr. Torrez is a 64yo M w/ poor med adherence, PMHx of LV thrombus (resolved on Jan 2020 echo; reappeared on Nov 2020 echo) pAF on eliquis, CAD s/p PCI, HFrEF 30-35% s/p AICD, HIT, hemorrhoids, gastritis, GIB, hx multiple admissions for EtOH withdrawal (last drink 1/16; no hx seizures, DTs, or intubations), who presents due to concern for alcohol withdrawal. Pt last seen at [facility] in March 2020 when he was admitted for EtOH withdrawal w/ initial CIWA ~24 and associated visual hallucinations, which improved after being started on librium 50mg Q8hrs, and subsequently discharged on librium taper. Per outpatient records that pt himself brought with him to the ED, pt had additional episode EtOH withdrawal in November where he was seen at [facility] and subsequently discharged on librium taper 25mg BID. At that time, pt also underwent TTE imaging which showed LV EF 38% as well as a large protruding fixed thrombus attached to LV apex. Noted on discharge paperwork (11/22) to not be on ASA due to thrombocytopenia, not on warfarin due to prior poor adherence, and accordingly discharged on eliquis w/ a discharge diagnosis of afib/aflutter. Pt has since twice again been admitted at [facility] (12/8/21 and 1/6/21) for 1-2mo hx of persistent "funny feeling in my chest", determined to be 2/2 Afib/aflutter. Between those two admissions, pt had also been prescribed hydralazine and isosorbide dinitrate, however pt reports refusing to take them after reading about the side-effects in conjunction w/ alcohol-use. Pt says that for the past 1 month, he has been trying but struggling to wean off his alcohol consumption which he estimates to be a ~1pint/day. He says for the past week he has been especially focused on quitting in anticipation of an appointment with his outpatient cardiologist. Today he reports experiencing sx consistent w/ his previous episodes of withdrawal including agitation, shaking, chest pain, sob, nausea, and a racing heart. He otherwise denies recent or active fever, chills, vomiting, abdominal pain, back pain, changes in vision or hearing, genitourinary sx, extremity pain or swelling.    Interval HPI: Patient was walking and talking yesterday with no issues. However, this morning at change of shift was found to be quite somnolent with pin point pupils. 1.6 mg of Narcan were given from 8-9 am with no change in mental status. Patient responding to pain and groaning but otherwise note cooperative during exam.     Allergies    Capoten (Short breath; Rash; Hives)  heparin (Other (Mod to Severe))  penicillin (Short breath; Rash; Hives)    Intolerances      Home Medications:  aspirin 81 mg oral tablet: 1 tab(s) orally once a day (17 Jan 2021 16:17)  atorvastatin 80 mg oral tablet: 1 tab(s) orally once a day (at bedtime) (17 Jan 2021 16:20)  metoprolol succinate 50 mg oral tablet, extended release: 1 tab(s) orally once a day (17 Jan 2021 16:19)  Multiple Vitamins oral tablet: 1 tab(s) orally once a day (17 Jan 2021 16:21)  thiamine 100 mg oral tablet: 1 tab(s) orally once a day (17 Jan 2021 10:02)      SOCIAL HX:     Smoking          ETOH/Illicit drugs          Occupation    PAST MEDICAL & SURGICAL HISTORY:  CAD (coronary artery disease)    ETOH abuse    Hyperlipidemia    Heparin-induced thrombocytopenia  Antibody positive    Left ventricular thrombus    Atrial fibrillation    Hemorrhoid    ICD (implantable cardioverter-defibrillator) in place    Myocardial infarction involving other coronary artery    Gastritis    Atherosclerosis of coronary artery  Coronary artery disease    S/P coronary artery stent placement    Automatic implantable cardiac defibrillator in situ  ICD (implantable cardioverter-defibrillator) in place        FAMILY HISTORY:  FH: alcoholism  Father, brother    FH: type 2 diabetes  father    FH: myocardial infarction  maternal grandfather, age 42    :    No known cardiovascular or pulmonary family history     ROS:  See HPI     PHYSICAL EXAM    ICU Vital Signs Last 24 Hrs  T(C): 36.4 (20 Jan 2021 10:30), Max: 36.9 (20 Jan 2021 06:26)  T(F): 97.6 (20 Jan 2021 10:30), Max: 98.5 (20 Jan 2021 06:26)  HR: 66 (20 Jan 2021 10:30) (66 - 86)  BP: 128/80 (20 Jan 2021 10:30) (118/81 - 147/861)  BP(mean): --  ABP: --  ABP(mean): --  RR: 13 (20 Jan 2021 10:30) (12 - 18)  SpO2: 99% (20 Jan 2021 10:30) (97% - 99%)      General: somnolent, drooling  HEENT:  CHRISTOPHER              Lymphatic system: No LN  Lungs: Bilateral BS  Cardiovascular: RRR, no murmurs  Gastrointestinal: Soft, nontender, nondistended  Musculoskeletal: No clubbing.  Moves all extremities.    Skin: Warm.  Intact  Neurological: pin point pupils, responds to sternal rub, withdraws to pain, moving all extremities       01-19-21 @ 07:01  -  01-20-21 @ 07:00  --------------------------------------------------------  IN:    Oral Fluid: 840 mL  Total IN: 840 mL    OUT:  Total OUT: 0 mL    Total NET: 840 mL          LABS:                          13.1   2.86  )-----------( 105      ( 20 Jan 2021 06:46 )             39.9                                               01-20    137  |  101  |  13  ----------------------------<  93  3.9   |  22  |  0.89    Ca    9.2      20 Jan 2021 06:46  Phos  3.3     01-20  Mg     1.7     01-20        PT/INR - ( 20 Jan 2021 09:17 )   PT: 12.9 sec;   INR: 1.08          PTT - ( 20 Jan 2021 09:17 )  PTT:34.8 sec                                           CARDIAC MARKERS ( 20 Jan 2021 06:46 )  x     / <0.01 ng/mL / 84 U/L / x     / 2.1 ng/mL                                                                                                                                                                          ABG - ( 20 Jan 2021 09:19 )  pH, Arterial: 7.37  pH, Blood: x     /  pCO2: 46    /  pO2: 179   / HCO3: 26    / Base Excess: -0.1  /  SaO2: 100                 CXR:    ECHO:    MEDICATIONS  (STANDING):  aspirin  chewable 81 milliGRAM(s) Oral daily  atorvastatin 80 milliGRAM(s) Oral at bedtime  chlordiazePOXIDE 25 milliGRAM(s) Oral every 8 hours  folic acid 1 milliGRAM(s) Oral daily  metoprolol succinate  milliGRAM(s) Oral daily  multivitamin 1 Tablet(s) Oral daily  thiamine 100 milliGRAM(s) Oral daily    MEDICATIONS  (PRN):  LORazepam   Injectable 2 milliGRAM(s) IV Push every 2 hours PRN Symptom-triggered: 2 point increase in CIWA -Ar score and a total score of 7 or LESS

## 2021-01-20 NOTE — CONSULT NOTE ADULT - ASSESSMENT
63-year-old male with a past medical history of HIT, ETOH abuse, paroxysmal A-fib with an LV thrombus on eliquis, HLD, CAD status post PCI, HFrEF (35-40% 2019) status post AICD, hemorrhoids, gastritis, and a recent admission in August for alcohol withdrawal who was admitted to 7 uris for alcohol withdrawal. Patient being tapered on PO librium. Was last seen walking and interacting with staff at 10:00 pm last night, at 6:00 am was found what the nurse believed to be in a deep sleep. At 6:30 am, attempted to wake patient with no success. Patient was only responsive to deep noxious stimuli. Pupils pinpoint bilaterally Stroke code called. CT head, CTA and CTP negative. Patient given 2 doses of 0.4 mg naloxone with minimal response. FS 88.     1)Secondary stroke prevention  - Continue Eliquis     2) Stroke risk factors  - A1C: 5.3  - LDL: 134  - atrial fibrillation controlled on Eliquis       3) Further management  - obtain MRI brain without  - consider VEEG for r/o seizure activity   - recommend SBP goal <180  - recommend q4hr stroke neuro checks  - may need outpt neurology follow up    DVT prophylaxis   - SCDs

## 2021-01-20 NOTE — CONSULT NOTE ADULT - SUBJECTIVE AND OBJECTIVE BOX
Neurology Stroke Consult Note    Chief Complaint: pinpoint pupils, unresponsiveness   Last known well time/Time of onset of symptoms: 10:00 pm 1/19       HPI:  63-year-old male with a past medical history of HIT, ETOH abuse, paroxysmal A-fib with an LV thrombus on eliquis, HLD, CAD status post PCI, HFrEF (35-40% 2019) status post AICD, hemorrhoids, gastritis, and a recent admission in August for alcohol withdrawal who was admitted to [facility] for alcohol withdrawal. Patient being tapered on PO librium. Was last seen walking and interacting with staff at 10:00 pm last night, at 6:00 am was found what the nurse believed to be in a deep sleep. At 6:30 am, attempted to wake patient with no success. Patient was only responsive to deep noxious stimuli. Pupils pinpoint bilaterally Stroke code called. CT head, CTA and CTP negative. Patient given 2 doses of 0.4 mg naloxone with minimal response. FS 88.     PAST MEDICAL & SURGICAL HISTORY:  CAD (coronary artery disease)    ETOH abuse    Hyperlipidemia    Heparin-induced thrombocytopenia  Antibody positive    Left ventricular thrombus    Atrial fibrillation    Hemorrhoid    ICD (implantable cardioverter-defibrillator) in place    Myocardial infarction involving other coronary artery    Gastritis    Atherosclerosis of coronary artery  Coronary artery disease    S/P coronary artery stent placement    Automatic implantable cardiac defibrillator in situ  ICD (implantable cardioverter-defibrillator) in place        FAMILY HISTORY:  FH: alcoholism  Father, brother    FH: type 2 diabetes  father    FH: myocardial infarction  maternal grandfather, age 42        SOCIAL HISTORY:  Denies smoking, drinking, or drug use    ROS:  As above    MEDICATIONS  (STANDING):  aspirin  chewable 81 milliGRAM(s) Oral daily  atorvastatin 80 milliGRAM(s) Oral at bedtime  chlordiazePOXIDE 25 milliGRAM(s) Oral every 8 hours  folic acid 1 milliGRAM(s) Oral daily  metoprolol succinate  milliGRAM(s) Oral daily  multivitamin 1 Tablet(s) Oral daily  thiamine 100 milliGRAM(s) Oral daily    MEDICATIONS  (PRN):  LORazepam   Injectable 2 milliGRAM(s) IV Push every 2 hours PRN Symptom-triggered: 2 point increase in CIWA -Ar score and a total score of 7 or LESS      Allergies    Capoten (Short breath; Rash; Hives)  heparin (Other (Mod to Severe))  penicillin (Short breath; Rash; Hives)    Intolerances        Vital Signs Last 24 Hrs  T(C): 36.6 (20 Jan 2021 11:31), Max: 36.9 (20 Jan 2021 06:26)  T(F): 97.9 (20 Jan 2021 11:31), Max: 98.5 (20 Jan 2021 06:26)  HR: 73 (20 Jan 2021 11:31) (66 - 86)  BP: 119/89 (20 Jan 2021 11:31) (118/81 - 147/861)  BP(mean): --  RR: 12 (20 Jan 2021 11:31) (12 - 18)  SpO2: 98% (20 Jan 2021 11:31) (97% - 99%)    Physical exam:  General: Patient obtunded, responsive to deep noxious stimuli.   Cardiovascular: Regular rate and rhythm on telemetry.   Pulmonary: No use of accessory muscles  GI: Abdomen soft, non-distended, non-tender  Extremities: Radial and DP pulses +2, no edema    Neurologic:  -Mental status: Patient obtunded, responds with grunts to noxious stimuli. Does not follow commands.  -Cranial nerves:   II: Visual fields are full to confrontation.  III, IV, VI: Pupils pinpoint, minimally reactive.   VII: Face is grossly symmetric.   VIII: Hearing is grossly intact.   Motor: Normal bulk and tone. 2/5 strength through out. Unable to follow commands but moving all extremities non purposefully.   Sensation: Intact to noxious stimuli, withdrawing towards stimuli.   Reflexes: Downgoing toes bilaterally       NIHSS: 21      LABS:                        13.1   2.86  )-----------( 105      ( 20 Jan 2021 06:46 )             39.9     01-20    137  |  101  |  13  ----------------------------<  93  3.9   |  22  |  0.89    Ca    9.2      20 Jan 2021 06:46  Phos  3.3     01-20  Mg     1.7     01-20      PT/INR - ( 20 Jan 2021 09:17 )   PT: 12.9 sec;   INR: 1.08          PTT - ( 20 Jan 2021 09:17 )  PTT:34.8 sec      RADIOLOGY & ADDITIONAL TESTS:    < from: CT Angio Head w/ IV Cont (01.20.21 @ 08:57) >    IMPRESSION: No hemodynamically significant stenosis of the extracranial carotids and vertebral arteries.    < end of copied text >  < from: CT Perfusion w/ Maps w/ IV Cont (01.20.21 @ 08:56) >  IMPRESSION: Negative CT perfusion study.    < end of copied text >  < from: CT Head No Cont (03.19.20 @ 03:49) >  IMPRESSION: No intracranial hemorrhage orcalvarial fracture. No substantial change since 8/26/2019.    < end of copied text >

## 2021-01-20 NOTE — STROKE CODE NOTE - ACTIVATED STROKE TEAM
Met with patient and her daughter.  Family training today and they report it went well.  Still on track for d/c Friday.  We have ordered w/c and cushion and referred [facility].  Will follow.   Yes

## 2021-01-20 NOTE — PROGRESS NOTE ADULT - PROBLEM SELECTOR PLAN 3
History of LV thrombus with no thrombus on most recent ECHO during this admission. Life-long AC required.  -as patient may not be able to tolerate PO, will consider agatroban drip for anticoagulation  - follow TTE-likely will do informal echo at bedside

## 2021-01-20 NOTE — CHART NOTE - NSCHARTNOTEFT_GEN_A_CORE
EEG placed for 1 hour showing no epileptiform activities as per Dr. Rich. If any change in clinical status will call EEG on call for any further questions. Continue to monitor while on EEG. Patient vital signs stable, still with pinpoint pupils, protecting airway, reacts to deep noxious stimuli, not following commands or opening eyes

## 2021-01-20 NOTE — PROGRESS NOTE ADULT - PROBLEM SELECTOR PLAN 1
History of alcohol withdrawal. Alcohol 185 on admission. Last drink on day of admission 1/17. Started on Librium taper. This AM CIWA 4 for tremors.   -was on librium 50 q12, however due to persistent tremor,will increase to 50 q 8  -Folic Acid/MVI/Thiamine  -Fall precautions  -PT says no needs    #AMS-secondary to unknown etiology  -stroke code called, no evidence of stroke on imaging  -abg wnl  -utox wnl  -narcan 0.4 x 4 given, will continue to reassess respiratory status and mental status.   - icu consult-no need for step up currently

## 2021-01-20 NOTE — PROGRESS NOTE ADULT - SUBJECTIVE AND OBJECTIVE BOX
OVERNIGHT EVENTS: At around 6:00 AM patient was noted to be lethargic, responsive only to sternal rub and pain, with bilateral pinpt pupils not reactive to light. His vital signs were stable     SUBJECTIVE / INTERVAL HPI: Patient seen and examined at bedside.     VITAL SIGNS:  Vital Signs Last 24 Hrs  T(C): 36.6 (20 Jan 2021 11:31), Max: 36.9 (20 Jan 2021 06:26)  T(F): 97.9 (20 Jan 2021 11:31), Max: 98.5 (20 Jan 2021 06:26)  HR: 73 (20 Jan 2021 11:31) (66 - 86)  BP: 119/89 (20 Jan 2021 11:31) (118/81 - 147/861)  BP(mean): --  RR: 12 (20 Jan 2021 11:31) (12 - 18)  SpO2: 98% (20 Jan 2021 11:31) (97% - 99%)    PHYSICAL EXAM:    General: WDWN  HEENT: NC/AT; PERRL, anicteric sclera; MMM  Neck: supple  Cardiovascular: +S1/S2; RRR  Respiratory: CTA B/L; no W/R/R  Gastrointestinal: soft, NT/ND; +BSx4  Extremities: WWP; no edema, clubbing or cyanosis  Vascular: 2+ radial, DP/PT pulses B/L  Neurological: AAOx3; no focal deficits    MEDICATIONS:  MEDICATIONS  (STANDING):  aspirin  chewable 81 milliGRAM(s) Oral daily  atorvastatin 80 milliGRAM(s) Oral at bedtime  chlordiazePOXIDE 25 milliGRAM(s) Oral every 8 hours  folic acid 1 milliGRAM(s) Oral daily  metoprolol succinate  milliGRAM(s) Oral daily  multivitamin 1 Tablet(s) Oral daily  thiamine 100 milliGRAM(s) Oral daily    MEDICATIONS  (PRN):  LORazepam   Injectable 2 milliGRAM(s) IV Push every 2 hours PRN Symptom-triggered: 2 point increase in CIWA -Ar score and a total score of 7 or LESS      ALLERGIES:  Allergies    Capoten (Short breath; Rash; Hives)  heparin (Other (Mod to Severe))  penicillin (Short breath; Rash; Hives)    Intolerances        LABS:                        13.1   2.86  )-----------( 105      ( 20 Jan 2021 06:46 )             39.9     01-20    137  |  101  |  13  ----------------------------<  93  3.9   |  22  |  0.89    Ca    9.2      20 Jan 2021 06:46  Phos  3.3     01-20  Mg     1.7     01-20      PT/INR - ( 20 Jan 2021 09:17 )   PT: 12.9 sec;   INR: 1.08          PTT - ( 20 Jan 2021 09:17 )  PTT:34.8 sec    CAPILLARY BLOOD GLUCOSE      POCT Blood Glucose.: 89 mg/dL (20 Jan 2021 08:06)      RADIOLOGY & ADDITIONAL TESTS: Reviewed.    ASSESSMENT:    PLAN:  OVERNIGHT EVENTS: At around 6:00 AM patient was noted to be lethargic, responsive only to sternal rub and pain, with bilateral pinpt pupils not reactive to light. His vital signs were stable, and respiratory rate was noted to be 14.     SUBJECTIVE / INTERVAL HPI: Patient seen and examined at bedside. He was unresponsive to his name, did not follow commands, but responded to sternal rub and withdrew from pain. Stroke code was called, he underwent imaging of his head, which was negative. Utox was negative as well. He was give narcan 0.4 x 4 with some improvement in his mental status, and ICU consult was called for concern about airway protection.    VITAL SIGNS:  Vital Signs Last 24 Hrs  T(C): 36.6 (20 Jan 2021 11:31), Max: 36.9 (20 Jan 2021 06:26)  T(F): 97.9 (20 Jan 2021 11:31), Max: 98.5 (20 Jan 2021 06:26)  HR: 73 (20 Jan 2021 11:31) (66 - 86)  BP: 119/89 (20 Jan 2021 11:31) (118/81 - 147/861)  BP(mean): --  RR: 12 (20 Jan 2021 11:31) (12 - 18)  SpO2: 98% (20 Jan 2021 11:31) (97% - 99%)    PHYSICAL EXAM:  Cardiovascular: +S1/S2; RRR  Respiratory: CTA B/L; no W/R/R  Vascular: 2+ radial, DP/PT pulses B/L  Neurological: b/l non reactive pnpt pupils, unable to determine neuro exam.     MEDICATIONS:  MEDICATIONS  (STANDING):  aspirin  chewable 81 milliGRAM(s) Oral daily  atorvastatin 80 milliGRAM(s) Oral at bedtime  chlordiazePOXIDE 25 milliGRAM(s) Oral every 8 hours  folic acid 1 milliGRAM(s) Oral daily  metoprolol succinate  milliGRAM(s) Oral daily  multivitamin 1 Tablet(s) Oral daily  thiamine 100 milliGRAM(s) Oral daily    MEDICATIONS  (PRN):  LORazepam   Injectable 2 milliGRAM(s) IV Push every 2 hours PRN Symptom-triggered: 2 point increase in CIWA -Ar score and a total score of 7 or LESS      ALLERGIES:  Allergies    Capoten (Short breath; Rash; Hives)  heparin (Other (Mod to Severe))  penicillin (Short breath; Rash; Hives)    Intolerances        LABS:                        13.1   2.86  )-----------( 105      ( 20 Jan 2021 06:46 )             39.9     01-20    137  |  101  |  13  ----------------------------<  93  3.9   |  22  |  0.89    Ca    9.2      20 Jan 2021 06:46  Phos  3.3     01-20  Mg     1.7     01-20      PT/INR - ( 20 Jan 2021 09:17 )   PT: 12.9 sec;   INR: 1.08          PTT - ( 20 Jan 2021 09:17 )  PTT:34.8 sec    CAPILLARY BLOOD GLUCOSE      POCT Blood Glucose.: 89 mg/dL (20 Jan 2021 08:06)      RADIOLOGY & ADDITIONAL TESTS: Reviewed.    ASSESSMENT:    PLAN:

## 2021-01-20 NOTE — CONSULT NOTE ADULT - ASSESSMENT
Mr. Torrez is a 64yo M w/ poor med adherence, PMHx of LV thrombus (resolved on Jan 2020 echo; reappeared on Nov 2020 echo) pAF on eliquis, CAD s/p PCI, HFrEF 30-35% s/p AICD, HIT, hemorrhoids, gastritis, GIB, hx multiple admissions for EtOH withdrawal (last drink 1/16; no hx seizures, DTs, or intubations), who presented with alcohol withdrawal and has been on librium taper. ICU consulted in the setting of acute somnolence with pinpoint pupils.    #AMS  Patient was able to ambulate and converse with no issues yesterday. Found altered this AM (somnolent protecting airway) with pin point pupils. s/p 1.6 mg of Narcan with no change in clinical picture. CT head, angio, and perfusion all negative. EKG normal and cardiac enzymes negative. Differential includes drug ingestion vs. benzo overdose (unlikely) vs seizure (unlikely-patient responding to sternal rub).  -f/u Utox  -vEEG  -avoid flumazenil as can induce seizure  -No need for anymore narcan as he received a sufficient amount to illicit clinical response.   -ensure patient protecting airway and keep in upright position with frequent suctioning as patient with copious secretions.  -if vitals become unstable or patient unable to protect airway please reconsult and will consider patient for telemetry.

## 2021-01-21 LAB
ANION GAP SERPL CALC-SCNC: 12 MMOL/L — SIGNIFICANT CHANGE UP (ref 5–17)
APTT BLD: 24.5 SEC — LOW (ref 27.5–35.5)
APTT BLD: 80.6 SEC — HIGH (ref 27.5–35.5)
BUN SERPL-MCNC: 10 MG/DL — SIGNIFICANT CHANGE UP (ref 7–23)
CALCIUM SERPL-MCNC: 8.8 MG/DL — SIGNIFICANT CHANGE UP (ref 8.4–10.5)
CHLORIDE SERPL-SCNC: 104 MMOL/L — SIGNIFICANT CHANGE UP (ref 96–108)
CO2 SERPL-SCNC: 20 MMOL/L — LOW (ref 22–31)
CREAT SERPL-MCNC: 0.92 MG/DL — SIGNIFICANT CHANGE UP (ref 0.5–1.3)
GLUCOSE SERPL-MCNC: 89 MG/DL — SIGNIFICANT CHANGE UP (ref 70–99)
HCT VFR BLD CALC: 41.5 % — SIGNIFICANT CHANGE UP (ref 39–50)
HGB BLD-MCNC: 13.5 G/DL — SIGNIFICANT CHANGE UP (ref 13–17)
MAGNESIUM SERPL-MCNC: 1.6 MG/DL — SIGNIFICANT CHANGE UP (ref 1.6–2.6)
MCHC RBC-ENTMCNC: 27.6 PG — SIGNIFICANT CHANGE UP (ref 27–34)
MCHC RBC-ENTMCNC: 32.5 GM/DL — SIGNIFICANT CHANGE UP (ref 32–36)
MCV RBC AUTO: 84.9 FL — SIGNIFICANT CHANGE UP (ref 80–100)
NRBC # BLD: 0 /100 WBCS — SIGNIFICANT CHANGE UP (ref 0–0)
PHOSPHATE SERPL-MCNC: 3.4 MG/DL — SIGNIFICANT CHANGE UP (ref 2.5–4.5)
PLATELET # BLD AUTO: 108 K/UL — LOW (ref 150–400)
POTASSIUM SERPL-MCNC: 4.2 MMOL/L — SIGNIFICANT CHANGE UP (ref 3.5–5.3)
POTASSIUM SERPL-SCNC: 4.2 MMOL/L — SIGNIFICANT CHANGE UP (ref 3.5–5.3)
RBC # BLD: 4.89 M/UL — SIGNIFICANT CHANGE UP (ref 4.2–5.8)
RBC # FLD: 16 % — HIGH (ref 10.3–14.5)
SODIUM SERPL-SCNC: 136 MMOL/L — SIGNIFICANT CHANGE UP (ref 135–145)
WBC # BLD: 4.5 K/UL — SIGNIFICANT CHANGE UP (ref 3.8–10.5)
WBC # FLD AUTO: 4.5 K/UL — SIGNIFICANT CHANGE UP (ref 3.8–10.5)

## 2021-01-21 PROCEDURE — 99223 1ST HOSP IP/OBS HIGH 75: CPT

## 2021-01-21 PROCEDURE — 95720 EEG PHY/QHP EA INCR W/VEEG: CPT

## 2021-01-21 PROCEDURE — 99233 SBSQ HOSP IP/OBS HIGH 50: CPT | Mod: GC

## 2021-01-21 RX ORDER — MAGNESIUM SULFATE 500 MG/ML
2 VIAL (ML) INJECTION ONCE
Refills: 0 | Status: COMPLETED | OUTPATIENT
Start: 2021-01-21 | End: 2021-01-21

## 2021-01-21 RX ORDER — APIXABAN 2.5 MG/1
5 TABLET, FILM COATED ORAL EVERY 12 HOURS
Refills: 0 | Status: DISCONTINUED | OUTPATIENT
Start: 2021-01-21 | End: 2021-01-21

## 2021-01-21 RX ORDER — ARGATROBAN 50 MG/50ML
2.5 INJECTION, SOLUTION INTRAVENOUS
Qty: 50 | Refills: 0 | Status: DISCONTINUED | OUTPATIENT
Start: 2021-01-21 | End: 2021-01-22

## 2021-01-21 RX ADMIN — ARGATROBAN 8.71 MICROGRAM(S)/KG/MIN: 50 INJECTION, SOLUTION INTRAVENOUS at 15:26

## 2021-01-21 RX ADMIN — ARGATROBAN 8.71 MICROGRAM(S)/KG/MIN: 50 INJECTION, SOLUTION INTRAVENOUS at 06:36

## 2021-01-21 RX ADMIN — Medication 50 GRAM(S): at 14:54

## 2021-01-21 RX ADMIN — ARGATROBAN 8.71 MICROGRAM(S)/KG/MIN: 50 INJECTION, SOLUTION INTRAVENOUS at 10:39

## 2021-01-21 RX ADMIN — ARGATROBAN 10.9 MICROGRAM(S)/KG/MIN: 50 INJECTION, SOLUTION INTRAVENOUS at 21:13

## 2021-01-21 NOTE — CONSULT NOTE ADULT - SUBJECTIVE AND OBJECTIVE BOX
LAINA SHIPMAN  65y  Male    Pt is a 64 yo M with PMH poor medication adherence, LV thrombus (11/2020), pAfib (eliquis), CAD (s/p PCI), HFrEF (EF 30-35%, s/p AICD), HIT, hemorrhoids/gastritis, GIB, and ETOH abuse (c/b mult admissions for w/d, no sz/DTs/intubation) who p/w alcohol withdrawal symptoms of agitation, shaking, CP, SOB, N/V, and heart racing. Has been trying to self-wean from alcohol x1mo. Admitted to [facility] and started on CIWA protocol and librium taper with CIWAs in single digits. 1/20 morning with acute lethargy/somnolence, only withdrawing to significant noxious stimuli and noted to have 1mm pinpoint pupils BL. Given 2x narcan without improvement, c/f ingestion of unwitnessed substance though UDS neg. Stroke code called with NIHSS 21, CTH neg, CTA neg, CTP neg. Also noted to have tongue biting and incontinence c/f seizure in setting of alcohol w/d. Stroke and epilepsy following. Neuro consulted for continued lethargy.     PAST MEDICAL & SURGICAL HISTORY:  CAD (coronary artery disease)  ETOH abuse  Hyperlipidemia  Heparin-induced thrombocytopenia  Antibody positive  Left ventricular thrombus  Atrial fibrillation  Hemorrhoids  ICD (implantable cardioverter-defibrillator) in place  Myocardial infarction involving other coronary artery  Gastritis  Atherosclerosis of coronary artery  Coronary artery disease  S/P coronary artery stent placement  Automatic implantable cardiac defibrillator in situ  ICD (implantable cardioverter-defibrillator) in place    VITAL SIGNS:  T(C): 37.2 (01-21-21 @ 13:01), Max: 37.4 (01-20-21 @ 20:38)  T(F): 98.9 (01-21-21 @ 13:01), Max: 99.4 (01-20-21 @ 20:38)  HR: 110 (01-21-21 @ 13:01) (86 - 110)  BP: 122/81 (01-21-21 @ 13:01) (110/74 - 131/83)  BP(mean): --  RR: 16 (01-21-21 @ 13:01) (14 - 16)  SpO2: 99% (01-21-21 @ 13:01) (96% - 99%)  Wt(kg): --    PHYSICAL EXAM:  Constitutional: WDWN resting comfortably in bed; NAD; difficult to rouse, verbally responding to questions intermittently though not opening eyes  Head: NC/AT; vEEG in place  Eyes: PERRL, uncooperative with EOM testing; anicteric sclera  ENT: no nasal discharge; MMM  Neck: supple; no JVD  Respiratory: CTA B/L; no W/R/R  Cardiac: +S1/S2; RRR; no M/R/G  Gastrointestinal: soft, NT/ND; no rebound or guarding; +BSx4  Extremities: WWP, no clubbing or cyanosis; no peripheral edema  Neurologic:  -Mental status: lethargic and intermittently following commands. Falling asleep throughout interview.  -Cranial nerves: limited by pt participation. AOx1 (self)  III, IV, VI: Pupils equally constricted with sluggish response to light  VII: Face is symmetric with normal eye closure  VIII: Hearing is bilaterally intact  XII: Tongue protrudes midline  Motor: Moving all extremities spontaneously, moving from side to side in bed; asterixis BL UE when asked to hold out arms; mm strength 5/5 BL shoulders flexion/extension/abduction, 5/5 BL elbow flexion/extension, 5/5 BL handgrip, pronator drift neg BL UE, mm strength 5/5 L hip flexion/extension/abduction/adduction, 0/5 R hip/knee flexion/extension, 5/5 BL dorsiflexion/plantarflexion  Reflexes: Downgoing toes bilaterally; 2+ DTRs BL patellar/achilles    Consultant(s) Notes Reviewed:  [x ] YES  [ ] NO  Care Discussed with Consultants/Other Providers [ x] YES  [ ] NO    LABS:  CBC   01-21-21 @ 08:27  Hematcorit 41.5  Hemoglobin 13.5  Mean Cell Hemoglobin 27.6  Platelet Count-Automated 108  RBC Count 4.89  Red Cell Distrib Width 16.0  Wbc Count 4.50    BMP  01-21-21 @ 08:27  Anion Gap. Serum 12  Blood Urea Nitrogen,Serm 10  Calcium, Total Serum 8.8  Carbon Dioxide, Serum 20  Chloride, Serum 104  Creatinine, Serum 0.92  eGFR in  102  eGFR in Non Afican American 88  Gloucose, serum 89  Potassium, Serum 4.2  Sodium, Serum 136    01-20-21 @ 17:30  Anion Gap. Serum 13  Blood Urea Nitrogen,Serm 10  Calcium, Total Serum 9.3  Carbon Dioxide, Serum 22  Chloride, Serum 104  Creatinine, Serum 0.80  eGFR in African American 110  eGFR in Non Afican American 95  Gloucose, serum 100  Potassium, Serum 4.2  Sodium, Serum 139    01-20-21 @ 06:46  Anion Gap. Serum 14  Blood Urea Nitrogen,Serm 13  Calcium, Total Serum 9.2  Carbon Dioxide, Serum 22  Chloride, Serum 101  Creatinine, Serum 0.89  eGFR in  105  eGFR in Non Afican American 91  Gloucose, serum 93  Potassium, Serum 3.9  Sodium, Serum 137    01-19-21 @ 06:15  Anion Gap. Serum 12  Blood Urea Nitrogen,Serm 9  Calcium, Total Serum 8.6  Carbon Dioxide, Serum 21  Chloride, Serum 103  Creatinine, Serum 0.85  eGFR in  107  eGFR in Non Afican American 93  Gloucose, serum 92  Potassium, Serum 4.2  Sodium, Serum 136    CMP  01-21-21 @ 08:27  Afia Aminotransferase(ALT/SGPT)--  Albumin, Serum --  Alkaline Phosphatase, Serum --  Anion Gap, Serum 12  Aspartate Aminotransferase (AST/SGOT)--  Bilirubin Total, Serum --  Blood Urea Nitrogen, Serum 10  Calcium,Total Serum 8.8  Carbon Dioxide, Serum 20  Chloride, Serum 104  Creatinine, Serum 0.92  eGFR if  102  eGFR if Non African American 88  Glucose, Serum 89  Potassium, Serum 4.2  Protein Total, Serum --  Sodium, Serum 136    01-20-21 @ 17:30  Afia Aminotransferase(ALT/SGPT)13  Albumin, Serum 3.9  Alkaline Phosphatase, Serum 58  Anion Gap, Serum 13  Aspartate Aminotransferase (AST/SGOT)24  Bilirubin Total, Serum 0.6  Blood Urea Nitrogen, Serum 10  Calcium,Total Serum 9.3  Carbon Dioxide, Serum 22  Chloride, Serum 104  Creatinine, Serum 0.80  eGFR if  110  eGFR if Non African American 95  Glucose, Serum 100  Potassium, Serum 4.2  Protein Total, Serum 7.1  Sodium, Serum 139    01-20-21 @ 06:46  Afia Aminotransferase(ALT/SGPT)--  Albumin, Serum --  Alkaline Phosphatase, Serum --  Anion Gap, Serum 14  Aspartate Aminotransferase (AST/SGOT)--  Bilirubin Total, Serum --  Blood Urea Nitrogen, Serum 13  Calcium,Total Serum 9.2  Carbon Dioxide, Serum 22  Chloride, Serum 101  Creatinine, Serum 0.89  eGFR if  105  eGFR if Non African American 91  Glucose, Serum 93  Potassium, Serum 3.9  Protein Total, Serum --  Sodium, Serum 137    01-19-21 @ 06:15  Afia Aminotransferase(ALT/SGPT)--  Albumin, Serum --  Alkaline Phosphatase, Serum --  Anion Gap, Serum 12  Aspartate Aminotransferase (AST/SGOT)--  Bilirubin Total, Serum --  Blood Urea Nitrogen, Serum 9  Calcium,Total Serum 8.6  Carbon Dioxide, Serum 21  Chloride, Serum 103  Creatinine, Serum 0.85  eGFR if  107  eGFR if Non African American 93  Glucose, Serum 92  Potassium, Serum 4.2  Protein Total, Serum --  Sodium, Serum 136    PT/INR  PT/INR  01-20-21 @ 09:17  INR 1.08  Prothrombin Time Comment --  Prothrobin Time, [id number]      Amylase/Lipase            RADIOLOGY & ADDITIONAL TESTS:    Imaging Personally Reviewed:  [ ] YES  [ ] NO

## 2021-01-21 NOTE — PROGRESS NOTE ADULT - PROBLEM SELECTOR PLAN 4
HFrEF (30-35% 1/2020) status post AICD on Toprol, Lipitor, no ACE secondary to allergy    -Continue Toprol  -Continue Lipitor  -No ACE/ARB HFrEF (30-35% 1/2020) status post AICD on Toprol, Lipitor, no ACE secondary to allergy  -Continue Toprol  -Continue Lipitor  -No ACE/ARB

## 2021-01-21 NOTE — DIETITIAN INITIAL EVALUATION ADULT. - DIET TYPE
DASH/TLC (sodium and cholesterol restricted diet)
Normal vision: sees adequately in most situations; can see medication labels, newsprint

## 2021-01-21 NOTE — DIETITIAN INITIAL EVALUATION ADULT. - PROBLEM SELECTOR PLAN 2
PMHx of LV thrombus (resolved on Jan 2020 echo). However per TTE report from Nov 2020 at [facility], showing large protruding fixed thrombus attached to LV apex.  - c/w eliquis 5mg BID  - will order repeat TTE to confirm presence of LV mural thrombus  - consider heme/onc consult if confirmed to have occurred in the setting of failed eliquis

## 2021-01-21 NOTE — DIETITIAN INITIAL EVALUATION ADULT. - PROBLEM SELECTOR PLAN 3
Pt w/ known hx CAD s/p stent placement approximately 2 yrs ago. Pt follows-up with outpt cardiologist Dr. Kati Tenorio. On ASA 81mg, toprol 100mg QD + toprol 50mg QD (non-adherent?), isosorbide dinitrate (non-adherent); atorvastatin 80mg QHS. ECG on admission showing signs of MELVIN V2-24 and TWI V-V6, however these findings appear unchanged when compared to previous ECG in march 2020. Troponins non-elevated on ED arrival.  - c/w ASA 81mg, atorvastatin 80mg QHS  - will start PO protonix 20mg QD for GI ppx while pt on ASA  - formal med rec in AM  - will restart toprol in AM, HR currently WNL

## 2021-01-21 NOTE — CONSULT NOTE ADULT - ATTENDING COMMENTS
I did not see pt on 1/20
Initial attending contact date 1/20./21     . See fellow note written above for details. I reviewed the fellow documentation. I have personally seen and examined this patient. I reviewed vitals, labs, medications, cardiac studies, and additional imaging. I agree with the above fellow's findings and plans as written above
I was physically present for the key portions of the evaluation and managemnent (E/M) service provided.  I agree with the above history, physical, and plan which I have reviewed and edited where appropriate, with the exceptions as per my note.    pt was somnolent but had a nonfocal neurological exam.    plan as per above

## 2021-01-21 NOTE — PROGRESS NOTE ADULT - PROBLEM SELECTOR PLAN 7
History of GIB and gastritis likely from alcohol use. No signs of active bleeding.    -Continue Protonix 40mg History of GIB and gastritis likely from alcohol use. No signs of active bleeding.  -Continue Protonix 40mg

## 2021-01-21 NOTE — DIETITIAN INITIAL EVALUATION ADULT. - OTHER INFO
64y/o male with history of HIT ETOH abuse, Paroxysmal AFib with LV thrombus, HLD ,CAD ,HF, s/p AICD, gastritis admitted with ETOH intoxication. S/P stroke code with change in mental status.. Awake this am. Remains NPO .No N/V/D/C or pain.No BM reported.Noted weight have been stable since March 2020:72.8kg present weight:72.6kg.96% of IBW.Patient will have diet advancement today to DASH/RD to follow up with po tolerance.

## 2021-01-21 NOTE — PROGRESS NOTE ADULT - PROBLEM SELECTOR PLAN 5
History of CAD with MI in 1996. No antiplatelets due to GI bleed history    -Continue Lipitor  -Continue Toprol XL History of CAD with MI in 1996. No antiplatelets due to GI bleed history  -Continue Lipitor  -Continue Toprol XL

## 2021-01-21 NOTE — PROGRESS NOTE ADULT - SUBJECTIVE AND OBJECTIVE BOX
***INCOMPLETE***  INTERVAL HPI/OVERNIGHT EVENTS:      On further ROS, patient did not have complaint of: Headache, Blurred Vision, Cough, Dyspnea, Palpitations, Abdominal Pain, N/V, Weakness, Change in Sensation.     VITAL SIGNS:  T(F): 98.2 (01-21-21 @ 05:50)  HR: 86 (01-21-21 @ 05:50)  BP: 110/74 (01-21-21 @ 05:50)  RR: 14 (01-21-21 @ 05:50)  SpO2: 97% (01-21-21 @ 05:50)  Wt(kg): --    Input & Output:    01-20-21 @ 07:01  -  01-21-21 @ 07:00  --------------------------------------------------------  IN: 401.1 mL / OUT: 800 mL / NET: -398.9 mL        PHYSICAL EXAM:  General: Comfortable, pleasant/anxious/agitated, Ill-appearing, well-nourished/frail/cachectic, comfortable / in distress  Neurological: AAOx3, no focal deficits  HEENT: NC/AT; EOMI, PERRL, clear conjunctiva, no nasal or oropharyngeal discharge or exudates, MMM  Neck: supple, no cervical or post-auricular lymphadenopathy  Cardiovascular: +S1/S2, no murmurs/rubs/gallops, RRR  Respiratory: CTA B/L, no diminished breath sounds, no wheezes/rales/rhonchi, no increased work of breathing or accessory muscle use  Gastrointestinal: soft, NT/ND; active BSx4 quadrants  Genitourinary: no suprapubic tenderness, no CVA tenderness  Extremities: WWP; no edema, clubbing or cyanosis  Vascular: 2+ radial, DP/PT pulses B/L  Skin: no rashes  Lines/Drains:     MEDICATIONS  (STANDING):  argatroban Infusion 2 MICROgram(s)/kG/Min (8.71 mL/Hr) IV Continuous <Continuous>  dextrose 5% + sodium chloride 0.45%. 1000 milliLiter(s) (75 mL/Hr) IV Continuous <Continuous>    MEDICATIONS  (PRN):      Allergies    Capoten (Short breath; Rash; Hives)  heparin (Other (Mod to Severe))  penicillin (Short breath; Rash; Hives)    Intolerances        LABS:                        13.5   4.50  )-----------( 108      ( 21 Jan 2021 08:27 )             41.5     01-21    136  |  104  |  10  ----------------------------<  89  4.2   |  20<L>  |  0.92    Ca    8.8      21 Jan 2021 08:27  Phos  3.4     01-21  Mg     1.6     01-21    TPro  7.1  /  Alb  3.9  /  TBili  0.6  /  DBili  x   /  AST  24  /  ALT  13  /  AlkPhos  58  01-20    PT/INR - ( 20 Jan 2021 09:17 )   PT: 12.9 sec;   INR: 1.08          PTT - ( 20 Jan 2021 20:54 )  PTT:64.5 sec      RADIOLOGY & ADDITIONAL TESTS:   INTERVAL HPI/OVERNIGHT EVENTS:  ÓSCAR o/n. This AM pt remainedvery lethargic, only responding to painful stimuli with sluggishly responsive pinpoint pupils. In the late morning, pt woke up and was AAOx3 to person, place, and day of the week, however thought it was 1985. Pt able to follow most commands, pupils remianed pinpoint. Likelt transfer to Neuro service for continued care,    On further ROS, patient did not have complaint of: Headache, Blurred Vision, Cough, Dyspnea, Palpitations, Abdominal Pain, N/V, Weakness, Change in Sensation.     VITAL SIGNS:  T(F): 98.2 (01-21-21 @ 05:50)  HR: 86 (01-21-21 @ 05:50)  BP: 110/74 (01-21-21 @ 05:50)  RR: 14 (01-21-21 @ 05:50)  SpO2: 97% (01-21-21 @ 05:50)  Wt(kg): --    Input & Output:    01-20-21 @ 07:01  -  01-21-21 @ 07:00  --------------------------------------------------------  IN: 401.1 mL / OUT: 800 mL / NET: -398.9 mL        PHYSICAL EXAM:  General: Comfortable, pleasant/anxious/agitated, Ill-appearing, well-nourished/frail/cachectic, comfortable / in distress  Neurological: AAOx3, no focal deficits  HEENT: NC/AT; EOMI, PERRL, clear conjunctiva, no nasal or oropharyngeal discharge or exudates, MMM  Neck: supple, no cervical or post-auricular lymphadenopathy  Cardiovascular: +S1/S2, no murmurs/rubs/gallops, RRR  Respiratory: CTA B/L, no diminished breath sounds, no wheezes/rales/rhonchi, no increased work of breathing or accessory muscle use  Gastrointestinal: soft, NT/ND; active BSx4 quadrants  Genitourinary: no suprapubic tenderness, no CVA tenderness  Extremities: WWP; no edema, clubbing or cyanosis  Vascular: 2+ radial, DP/PT pulses B/L  Skin: no rashes  Lines/Drains:     MEDICATIONS  (STANDING):  argatroban Infusion 2 MICROgram(s)/kG/Min (8.71 mL/Hr) IV Continuous <Continuous>  dextrose 5% + sodium chloride 0.45%. 1000 milliLiter(s) (75 mL/Hr) IV Continuous <Continuous>    MEDICATIONS  (PRN):      Allergies    Capoten (Short breath; Rash; Hives)  heparin (Other (Mod to Severe))  penicillin (Short breath; Rash; Hives)    Intolerances        LABS:                        13.5   4.50  )-----------( 108      ( 21 Jan 2021 08:27 )             41.5     01-21    136  |  104  |  10  ----------------------------<  89  4.2   |  20<L>  |  0.92    Ca    8.8      21 Jan 2021 08:27  Phos  3.4     01-21  Mg     1.6     01-21    TPro  7.1  /  Alb  3.9  /  TBili  0.6  /  DBili  x   /  AST  24  /  ALT  13  /  AlkPhos  58  01-20    PT/INR - ( 20 Jan 2021 09:17 )   PT: 12.9 sec;   INR: 1.08          PTT - ( 20 Jan 2021 20:54 )  PTT:64.5 sec      RADIOLOGY & ADDITIONAL TESTS:   INTERVAL HPI/OVERNIGHT EVENTS:  ÓSCAR o/n. This AM pt remainedvery lethargic, only responding to painful stimuli with sluggishly responsive pinpoint pupils. In the late morning, pt woke up and was AAOx3 to person, place, and day of the week, however thought it was 1985. Pt able to follow most commands, pupils remianed pinpoint. Likelt transfer to Neuro service for continued care,    On further ROS, patient did not have complaint of: Headache, Blurred Vision, Cough, Dyspnea, Palpitations, Abdominal Pain, N/V, Weakness, Change in Sensation.     VITAL SIGNS:  T(F): 98.2 (01-21-21 @ 05:50)  HR: 86 (01-21-21 @ 05:50)  BP: 110/74 (01-21-21 @ 05:50)  RR: 14 (01-21-21 @ 05:50)  SpO2: 97% (01-21-21 @ 05:50)  Wt(kg): --    Input & Output:    01-20-21 @ 07:01  -  01-21-21 @ 07:00  --------------------------------------------------------  IN: 401.1 mL / OUT: 800 mL / NET: -398.9 mL        PHYSICAL EXAM:  General: lethargic, not responsive at first, awoke later in the AM, AAOx3  Neurological: AAOx3, no focal deficits  HEENT: NC/AT; pt could not follow instructions for eye exam, Pupils pinpoint and sluggishly reactive, clear conjunctiva but eyelids crusted shut from disuse-no discharge or exudate, no redness or itchiness, no nasal or oropharyngeal discharge or exudates, MMM with poor dentition  Neck: supple, no cervical or post-auricular lymphadenopathy  Cardiovascular: +S1/S2, no murmurs/rubs/gallops, RRR  Respiratory: CTA B/L, no diminished breath sounds, no wheezes/rales/rhonchi, no increased work of breathing or accessory muscle use  Gastrointestinal: soft, NT/ND; active BSx4 quadrants  Genitourinary: no suprapubic tenderness, no CVA tenderness  Extremities: WWP; no edema, clubbing or cyanosis  Vascular: 2+ radial, DP/PT pulses B/L     MEDICATIONS  (STANDING):  argatroban Infusion 2 MICROgram(s)/kG/Min (8.71 mL/Hr) IV Continuous <Continuous>  dextrose 5% + sodium chloride 0.45%. 1000 milliLiter(s) (75 mL/Hr) IV Continuous <Continuous>    MEDICATIONS  (PRN):      Allergies    Capoten (Short breath; Rash; Hives)  heparin (Other (Mod to Severe))  penicillin (Short breath; Rash; Hives)    Intolerances        LABS:                        13.5   4.50  )-----------( 108      ( 21 Jan 2021 08:27 )             41.5     01-21    136  |  104  |  10  ----------------------------<  89  4.2   |  20<L>  |  0.92    Ca    8.8      21 Jan 2021 08:27  Phos  3.4     01-21  Mg     1.6     01-21    TPro  7.1  /  Alb  3.9  /  TBili  0.6  /  DBili  x   /  AST  24  /  ALT  13  /  AlkPhos  58  01-20    PT/INR - ( 20 Jan 2021 09:17 )   PT: 12.9 sec;   INR: 1.08          PTT - ( 20 Jan 2021 20:54 )  PTT:64.5 sec      RADIOLOGY & ADDITIONAL TESTS:   INTERVAL HPI/OVERNIGHT EVENTS:  ÓSCAR o/n. This AM pt remainedvery lethargic, only responding to painful stimuli with sluggishly responsive pinpoint pupils. In the late morning, pt woke up and was AAOx3 to person, place, and day of the week, however thought it was 1985. Pt able to follow most commands, pupils remianed pinpoint. Likely transfer to Neuro service for continued care.    On further ROS, patient did not have complaint of: Headache, Blurred Vision, Cough, Dyspnea, Palpitations, Abdominal Pain, N/V, Weakness, Change in Sensation.     VITAL SIGNS:  T(F): 98.2 (01-21-21 @ 05:50)  HR: 86 (01-21-21 @ 05:50)  BP: 110/74 (01-21-21 @ 05:50)  RR: 14 (01-21-21 @ 05:50)  SpO2: 97% (01-21-21 @ 05:50)  Wt(kg): --    Input & Output:    01-20-21 @ 07:01  -  01-21-21 @ 07:00  --------------------------------------------------------  IN: 401.1 mL / OUT: 800 mL / NET: -398.9 mL        PHYSICAL EXAM:  General: lethargic, not responsive at first, awoke later in the AM, AAOx3  Neurological: AAOx3, no focal deficits  HEENT: NC/AT; pt could not follow instructions for eye exam, Pupils pinpoint and sluggishly reactive, clear conjunctiva but eyelids crusted shut from disuse-no discharge or exudate, no redness or itchiness, no nasal or oropharyngeal discharge or exudates, MMM with poor dentition  Neck: supple, no cervical or post-auricular lymphadenopathy  Cardiovascular: +S1/S2, no murmurs/rubs/gallops, RRR  Respiratory: CTA B/L, no diminished breath sounds, no wheezes/rales/rhonchi, no increased work of breathing or accessory muscle use  Gastrointestinal: soft, NT/ND; active BSx4 quadrants  Genitourinary: no suprapubic tenderness, no CVA tenderness  Extremities: WWP; no edema, clubbing or cyanosis  Vascular: 2+ radial, DP/PT pulses B/L     MEDICATIONS  (STANDING):  argatroban Infusion 2 MICROgram(s)/kG/Min (8.71 mL/Hr) IV Continuous <Continuous>  dextrose 5% + sodium chloride 0.45%. 1000 milliLiter(s) (75 mL/Hr) IV Continuous <Continuous>    MEDICATIONS  (PRN):      Allergies    Capoten (Short breath; Rash; Hives)  heparin (Other (Mod to Severe))  penicillin (Short breath; Rash; Hives)    Intolerances        LABS:                        13.5   4.50  )-----------( 108      ( 21 Jan 2021 08:27 )             41.5     01-21    136  |  104  |  10  ----------------------------<  89  4.2   |  20<L>  |  0.92    Ca    8.8      21 Jan 2021 08:27  Phos  3.4     01-21  Mg     1.6     01-21    TPro  7.1  /  Alb  3.9  /  TBili  0.6  /  DBili  x   /  AST  24  /  ALT  13  /  AlkPhos  58  01-20    PT/INR - ( 20 Jan 2021 09:17 )   PT: 12.9 sec;   INR: 1.08          PTT - ( 20 Jan 2021 20:54 )  PTT:64.5 sec      RADIOLOGY & ADDITIONAL TESTS:

## 2021-01-21 NOTE — PROGRESS NOTE ADULT - PROBLEM SELECTOR PLAN 8
F: None  E: Replete PRN  N: DASH/TLC  DVT ppx: Eliquis, NO HEP in setting of HIT  CODE: FULL  Dispo: DC 1/17 F: D5 1/2 NS 75 cc/hr  E: Replete PRN  N: DASH/TLC  DVT ppx: Argatroban drip 2 mcg/kg/min  GI ppx: None  CODE: FULL  Dispo: DC 1/17

## 2021-01-21 NOTE — DIETITIAN INITIAL EVALUATION ADULT. - PROBLEM SELECTOR PLAN 1
Patient w/ 40 year history of drinking 1pint per day, has been drinking daily for several months, last drink hours prior to admission 1/16. Developed w/d symptoms of severe tremors, chest pain, sob, anxiety over the past 24 hours.  - c/w high-risk CIWA protocol  - PRN IV ativan 2mg Q2hrs for CIWA >8  - c/w standing librium 50mg Q12hrs  - c/w folic acid 1mg QD  - c/w thiamine 100mg QD

## 2021-01-21 NOTE — PROGRESS NOTE ADULT - PROBLEM SELECTOR PLAN 3
History of LV thrombus with no thrombus on most recent ECHO during this admission. Life-long AC required.  -as patient may not be able to tolerate PO, will consider agatroban drip for anticoagulation  - follow TTE-likely will do informal echo at bedside History of LV thrombus with no thrombus on most recent ECHO during this admission. Life-long AC required.  - Pt on argatroban drip 2 mcg/kg/min with therapeutic PTTx2.  - follow TTE-likely will do informal echo at bedside

## 2021-01-21 NOTE — PROGRESS NOTE ADULT - PROBLEM SELECTOR PLAN 2
History of A-fib on Eliquis and Toprol XL. CHADS-VASC 4, HAS-BLED 3.  -as patient may not be able to tolerate PO, will consider agatroban drip for anticoagulation History of A-fib on Eliquis and Toprol XL. CHADS-VASC 4, HAS-BLED 3.  - Pt on argatroban drip 2 mcg/kg/min with therapeutic PTTx2.  - May reinstate Eliquis if mental status remains improved.

## 2021-01-21 NOTE — CONSULT NOTE ADULT - ASSESSMENT
Pt is a 64 yo M with PMH poor medication adherence, LV thrombus (11/2020), pAfib (eliquis), CAD (s/p PCI), HFrEF (EF 30-35%, s/p AICD), HIT, hemorrhoids/gastritis, GIB, and ETOH abuse (c/b mult admissions for w/d, no sz/DTs/intubation) who p/w alcohol withdrawal symptoms of agitation, shaking, CP, SOB, N/V, and heart racing. Admitted for alcohol w/d c/b 1/20 acute lethargy/somnolence and 1mm pinpoint pupils BL, tongue biting, and urinary incontinence. Stroke code called with w/u neg, stroke and EEG in place and epilepsy following. Neuro consulted for continued lethargy.     #Lethargy  - agree with MR brain non-con  - agree with EEG - overnight read neg sz activity    Discussed with attending. Neurology will continue to follow.   Pt is a 66 yo M with PMH poor medication adherence, LV thrombus (11/2020), pAfib (eliquis), CAD (s/p PCI), HFrEF (EF 30-35%, s/p AICD), HIT, hemorrhoids/gastritis, GIB, and ETOH abuse (c/b mult admissions for w/d, no sz/DTs/intubation) who p/w alcohol withdrawal symptoms of agitation, shaking, CP, SOB, N/V, and heart racing. Admitted for alcohol w/d c/b 1/20 acute lethargy/somnolence and 1mm pinpoint pupils BL, tongue biting, and urinary incontinence. Stroke code called with w/u neg, stroke and EEG in place and epilepsy following. Neuro consulted for continued lethargy. Less likely primary neuro etiology outside of stroke/seizure possibly in the setting of alcohol withdrawal.     #Lethargy  - agree with MR brain non-con  - agree with EEG - overnight read neg sz activity    Discussed with attending. Neurology will continue to follow.

## 2021-01-21 NOTE — EEG REPORT - NS EEG TEXT BOX
[facility] Department of Neurology  Inpatient Continuous video-Electroencephalogram    Patient Name:	LAINA SHIPMAN    :	1955  MRN:	[id number]    Study Start Date/Time:  2021, 4:22:36 PM  Study End Date/Time:	2021, 01:35 AM    Referred by: select    Brief Clinical History:  LAINA SHIPMAN is a 65 year old Male with unexplained comatose state; study performed to investigate for seizures or markers of epilepsy.    Diagnosis Code:   R56.9 convulsions/seizure  CPT: 12249 EEG with video 12-26h  Technical CPT: 21693 set-up +  56061 vEEG unmonitored 12-26h    Pertinent Medications:  n/a    Acquisition Details:  Electroencephalography was acquired using a minimum of 21 channels on an [employer] system v 8.5.1 with electrode placement according to the standard International 10-20 system following ACNS (American Clinical Neurophysiology Society) guidelines for Long-Term Video EEG monitoring.  Anterior temporal T1 and T2 electrodes were utilized whenever possible.   The [employer] automated spike & seizure detections were all reviewed in detail, in addition to extensive portions of raw EEG.    Day 1: 2021 @ 4:22:36 PM to next morning @ 07:00 am  Background:  continuous, with predominantly alpha and beta frequencies.  Symmetry:  No persistent asymmetries of voltage or frequency.  Posterior Dominant Rhythm:  8.5 Hz rudimentary.  Organization: Rudimentary.  Voltage:  Normal (20+ uV)  Variability: Yes. 		Reactivity: Yes.  N2 sleep: Absent.  Spontaneous Activity:  No epileptiform discharges.  Periodic/rhythmic activity:  None.  Events:  No electrographic seizures or significant clinical events.  Provocations:  Hyperventilation and Photic stimulation: was not performed.    FINAL Summary:  Abnormal continuous av-EEG study.  1)	No focal abnormalities or epileptiform activity, however there was no clear sustained awake state, and no N2 or other clear sleep transients.    Final Clinical Correlation:  There were no findings of active epilepsy, however this alone does not rule out the diagnosis.  Poor state changes are a non-specific indicator of cerebral dysfunction.      Alonso Rich MD  Attending Neurologist, [facility]

## 2021-01-21 NOTE — PROGRESS NOTE ADULT - PROBLEM SELECTOR PLAN 1
History of alcohol withdrawal. Alcohol 185 on admission. Last drink on day of admission 1/17. Started on Librium taper. This AM CIWA 4 for tremors.   -was on librium 50 q12, however due to persistent tremor,will increase to 50 q 8  -Folic Acid/MVI/Thiamine  -Fall precautions  -PT says no needs    #AMS-secondary to unknown etiology  -stroke code called, no evidence of stroke on imaging  -abg wnl  -utox wnl  -narcan 0.4 x 4 given, will continue to reassess respiratory status and mental status.   - icu consult-no need for step up currently History of alcohol withdrawal. Alcohol 185 on admission. Last drink on day of admission 1/17. Completed librium taper  -Folic Acid/MVI/Thiamine  -Fall precautions  -PT says no needs    #AMS-secondary to unknown etiology, likely drugs vs seizure  -stroke code called yesterday AM when lethargy first observed, no evidence of stroke on imaging  - vEEG placed and no current seizure activity  -abg wnl  -utox wnl  -narcan 0.4 x 4 given yesterday AM, will continue to reassess respiratory status and mental status.   - icu consulted yesterday but declined stepup given pt VSS stable and protecting his airway  - d/c'd librium i/s/o somnolence  - Pt NPO unless mentation remains AAOx3

## 2021-01-22 LAB
ALBUMIN SERPL ELPH-MCNC: 4.1 G/DL — SIGNIFICANT CHANGE UP (ref 3.3–5)
ALP SERPL-CCNC: 67 U/L — SIGNIFICANT CHANGE UP (ref 40–120)
ALT FLD-CCNC: 14 U/L — SIGNIFICANT CHANGE UP (ref 10–45)
ANION GAP SERPL CALC-SCNC: 14 MMOL/L — SIGNIFICANT CHANGE UP (ref 5–17)
AST SERPL-CCNC: 32 U/L — SIGNIFICANT CHANGE UP (ref 10–40)
BILIRUB DIRECT SERPL-MCNC: <0.2 MG/DL — SIGNIFICANT CHANGE UP (ref 0–0.2)
BILIRUB INDIRECT FLD-MCNC: SIGNIFICANT CHANGE UP MG/DL (ref 0.2–1)
BILIRUB SERPL-MCNC: 0.8 MG/DL — SIGNIFICANT CHANGE UP (ref 0.2–1.2)
BUN SERPL-MCNC: 11 MG/DL — SIGNIFICANT CHANGE UP (ref 7–23)
CALCIUM SERPL-MCNC: 9 MG/DL — SIGNIFICANT CHANGE UP (ref 8.4–10.5)
CHLORIDE SERPL-SCNC: 101 MMOL/L — SIGNIFICANT CHANGE UP (ref 96–108)
CO2 SERPL-SCNC: 23 MMOL/L — SIGNIFICANT CHANGE UP (ref 22–31)
CREAT SERPL-MCNC: 1.08 MG/DL — SIGNIFICANT CHANGE UP (ref 0.5–1.3)
GLUCOSE SERPL-MCNC: 76 MG/DL — SIGNIFICANT CHANGE UP (ref 70–99)
HCT VFR BLD CALC: 42.2 % — SIGNIFICANT CHANGE UP (ref 39–50)
HCT VFR BLD CALC: 42.5 % — SIGNIFICANT CHANGE UP (ref 39–50)
HGB BLD-MCNC: 13.5 G/DL — SIGNIFICANT CHANGE UP (ref 13–17)
HGB BLD-MCNC: 13.6 G/DL — SIGNIFICANT CHANGE UP (ref 13–17)
MAGNESIUM SERPL-MCNC: 2.1 MG/DL — SIGNIFICANT CHANGE UP (ref 1.6–2.6)
MCHC RBC-ENTMCNC: 27.4 PG — SIGNIFICANT CHANGE UP (ref 27–34)
MCHC RBC-ENTMCNC: 28 PG — SIGNIFICANT CHANGE UP (ref 27–34)
MCHC RBC-ENTMCNC: 32 GM/DL — SIGNIFICANT CHANGE UP (ref 32–36)
MCHC RBC-ENTMCNC: 32 GM/DL — SIGNIFICANT CHANGE UP (ref 32–36)
MCV RBC AUTO: 85.8 FL — SIGNIFICANT CHANGE UP (ref 80–100)
MCV RBC AUTO: 87.6 FL — SIGNIFICANT CHANGE UP (ref 80–100)
NRBC # BLD: 0 /100 WBCS — SIGNIFICANT CHANGE UP (ref 0–0)
PHOSPHATE SERPL-MCNC: 3.3 MG/DL — SIGNIFICANT CHANGE UP (ref 2.5–4.5)
PLATELET # BLD AUTO: 108 K/UL — LOW (ref 150–400)
PLATELET # BLD AUTO: 89 K/UL — LOW (ref 150–400)
POTASSIUM SERPL-MCNC: 4.4 MMOL/L — SIGNIFICANT CHANGE UP (ref 3.5–5.3)
POTASSIUM SERPL-SCNC: 4.4 MMOL/L — SIGNIFICANT CHANGE UP (ref 3.5–5.3)
PROT SERPL-MCNC: 7.7 G/DL — SIGNIFICANT CHANGE UP (ref 6–8.3)
RBC # BLD: 4.85 M/UL — SIGNIFICANT CHANGE UP (ref 4.2–5.8)
RBC # BLD: 4.92 M/UL — SIGNIFICANT CHANGE UP (ref 4.2–5.8)
RBC # FLD: 15.9 % — HIGH (ref 10.3–14.5)
RBC # FLD: 15.9 % — HIGH (ref 10.3–14.5)
SODIUM SERPL-SCNC: 138 MMOL/L — SIGNIFICANT CHANGE UP (ref 135–145)
WBC # BLD: 2.83 K/UL — LOW (ref 3.8–10.5)
WBC # BLD: 4.72 K/UL — SIGNIFICANT CHANGE UP (ref 3.8–10.5)
WBC # FLD AUTO: 2.83 K/UL — LOW (ref 3.8–10.5)
WBC # FLD AUTO: 4.72 K/UL — SIGNIFICANT CHANGE UP (ref 3.8–10.5)

## 2021-01-22 PROCEDURE — 99233 SBSQ HOSP IP/OBS HIGH 50: CPT | Mod: GC

## 2021-01-22 PROCEDURE — 95720 EEG PHY/QHP EA INCR W/VEEG: CPT

## 2021-01-22 PROCEDURE — 71045 X-RAY EXAM CHEST 1 VIEW: CPT | Mod: 26

## 2021-01-22 PROCEDURE — 71045 X-RAY EXAM CHEST 1 VIEW: CPT | Mod: 26,77

## 2021-01-22 PROCEDURE — 99232 SBSQ HOSP IP/OBS MODERATE 35: CPT

## 2021-01-22 RX ORDER — APIXABAN 2.5 MG/1
5 TABLET, FILM COATED ORAL EVERY 12 HOURS
Refills: 0 | Status: DISCONTINUED | OUTPATIENT
Start: 2021-01-22 | End: 2021-01-22

## 2021-01-22 RX ORDER — APIXABAN 2.5 MG/1
5 TABLET, FILM COATED ORAL EVERY 12 HOURS
Refills: 0 | Status: DISCONTINUED | OUTPATIENT
Start: 2021-01-22 | End: 2021-01-23

## 2021-01-22 RX ORDER — ATORVASTATIN CALCIUM 80 MG/1
80 TABLET, FILM COATED ORAL AT BEDTIME
Refills: 0 | Status: DISCONTINUED | OUTPATIENT
Start: 2021-01-22 | End: 2021-01-23

## 2021-01-22 RX ORDER — FOLIC ACID 0.8 MG
1 TABLET ORAL DAILY
Refills: 0 | Status: DISCONTINUED | OUTPATIENT
Start: 2021-01-22 | End: 2021-01-23

## 2021-01-22 RX ORDER — METOPROLOL TARTRATE 50 MG
1 TABLET ORAL
Qty: 0 | Refills: 0 | DISCHARGE

## 2021-01-22 RX ORDER — ACETAMINOPHEN 500 MG
1000 TABLET ORAL ONCE
Refills: 0 | Status: COMPLETED | OUTPATIENT
Start: 2021-01-22 | End: 2021-01-22

## 2021-01-22 RX ORDER — METOPROLOL TARTRATE 50 MG
100 TABLET ORAL DAILY
Refills: 0 | Status: DISCONTINUED | OUTPATIENT
Start: 2021-01-22 | End: 2021-01-22

## 2021-01-22 RX ORDER — METOPROLOL TARTRATE 50 MG
25 TABLET ORAL
Refills: 0 | Status: DISCONTINUED | OUTPATIENT
Start: 2021-01-22 | End: 2021-01-23

## 2021-01-22 RX ORDER — THIAMINE MONONITRATE (VIT B1) 100 MG
100 TABLET ORAL DAILY
Refills: 0 | Status: DISCONTINUED | OUTPATIENT
Start: 2021-01-22 | End: 2021-01-23

## 2021-01-22 RX ORDER — ATORVASTATIN CALCIUM 80 MG/1
1 TABLET, FILM COATED ORAL
Qty: 0 | Refills: 0 | DISCHARGE

## 2021-01-22 RX ADMIN — ARGATROBAN 10.9 MICROGRAM(S)/KG/MIN: 50 INJECTION, SOLUTION INTRAVENOUS at 00:58

## 2021-01-22 RX ADMIN — Medication 100 MILLIGRAM(S): at 17:34

## 2021-01-22 RX ADMIN — APIXABAN 5 MILLIGRAM(S): 2.5 TABLET, FILM COATED ORAL at 17:09

## 2021-01-22 RX ADMIN — ARGATROBAN 10.9 MICROGRAM(S)/KG/MIN: 50 INJECTION, SOLUTION INTRAVENOUS at 04:45

## 2021-01-22 RX ADMIN — ARGATROBAN 10.9 MICROGRAM(S)/KG/MIN: 50 INJECTION, SOLUTION INTRAVENOUS at 10:13

## 2021-01-22 RX ADMIN — Medication 2 MILLIGRAM(S): at 14:46

## 2021-01-22 RX ADMIN — Medication 1 MILLIGRAM(S): at 17:34
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
## 2021-01-22 NOTE — PROGRESS NOTE ADULT - SUBJECTIVE AND OBJECTIVE BOX
INTERVAL HPI/OVERNIGHT EVENTS:      On further ROS, patient did not have complaint of: Headache, Blurred Vision, Cough, Dyspnea, Palpitations, Abdominal Pain, N/V, Weakness, Change in Sensation.     VITAL SIGNS:  T(F): 98.1 (01-22-21 @ 05:43)  HR: 84 (01-22-21 @ 05:43)  BP: 112/77 (01-22-21 @ 05:43)  RR: 18 (01-22-21 @ 05:43)  SpO2: 99% (01-22-21 @ 05:43)  Wt(kg): --    Input & Output:    01-21-21 @ 07:01  -  01-22-21 @ 07:00  --------------------------------------------------------  IN: 109 mL / OUT: 500 mL / NET: -391 mL        PHYSICAL EXAM:  General: Comfortable, pleasant/anxious/agitated, Ill-appearing, well-nourished/frail/cachectic, comfortable / in distress  Neurological: AAOx3, no focal deficits  HEENT: NC/AT; EOMI, PERRL, clear conjunctiva, no nasal or oropharyngeal discharge or exudates, MMM  Neck: supple, no cervical or post-auricular lymphadenopathy  Cardiovascular: +S1/S2, no murmurs/rubs/gallops, RRR  Respiratory: CTA B/L, no diminished breath sounds, no wheezes/rales/rhonchi, no increased work of breathing or accessory muscle use  Gastrointestinal: soft, NT/ND; active BSx4 quadrants  Genitourinary: no suprapubic tenderness, no CVA tenderness  Extremities: WWP; no edema, clubbing or cyanosis  Vascular: 2+ radial, DP/PT pulses B/L  Skin: no rashes  Lines/Drains:     MEDICATIONS  (STANDING):  argatroban Infusion 2.5 MICROgram(s)/kG/Min (10.9 mL/Hr) IV Continuous <Continuous>  dextrose 5% + sodium chloride 0.45%. 1000 milliLiter(s) (75 mL/Hr) IV Continuous <Continuous>    MEDICATIONS  (PRN):      Allergies    Capoten (Short breath; Rash; Hives)  heparin (Other (Mod to Severe))  penicillin (Short breath; Rash; Hives)    Intolerances        LABS:                        13.6   4.72  )-----------( 108      ( 22 Jan 2021 06:39 )             42.5     01-22    138  |  101  |  11  ----------------------------<  76  4.4   |  23  |  1.08    Ca    9.0      22 Jan 2021 07:31  Phos  3.3     01-22  Mg     2.1     01-22    TPro  7.1  /  Alb  3.9  /  TBili  0.6  /  DBili  x   /  AST  24  /  ALT  13  /  AlkPhos  58  01-20    PT/INR - ( 20 Jan 2021 09:17 )   PT: 12.9 sec;   INR: 1.08          PTT - ( 21 Jan 2021 23:18 )  PTT:80.6 sec      RADIOLOGY & ADDITIONAL TESTS:   INTERVAL HPI/OVERNIGHT EVENTS:  This AM pt woke up more, pinpoint pupils remain. Pt AAOx2 to self and hospital, shaking and febrile to 101 rectal. Blood cultures sent, vEEG discontinued today and pt started back on alcohol withdrawal regimen.    On further ROS, patient did not have complaint of: Headache, Blurred Vision, Cough, Dyspnea, Palpitations, Abdominal Pain, N/V, Weakness, Change in Sensation.     VITAL SIGNS:  T(F): 98.1 (01-22-21 @ 05:43)  HR: 84 (01-22-21 @ 05:43)  BP: 112/77 (01-22-21 @ 05:43)  RR: 18 (01-22-21 @ 05:43)  SpO2: 99% (01-22-21 @ 05:43)  Wt(kg): --    Input & Output:    01-21-21 @ 07:01  -  01-22-21 @ 07:00  --------------------------------------------------------  IN: 109 mL / OUT: 500 mL / NET: -391 mL      PHYSICAL EXAM:  General: lethargic, not responsive at first, awoke later in the AM, AAOx2  Neurological: AAOx3, no focal deficits  HEENT: NC/AT; pt could not follow instructions for eye exam, Pupils pinpoint and sluggishly reactive, clear conjunctiva but eyelids crusted shut from disuse-no discharge or exudate, no redness or itchiness, no nasal or oropharyngeal discharge or exudates, MMM with poor dentition  Neck: supple, no cervical or post-auricular lymphadenopathy  Cardiovascular: +S1/S2, no murmurs/rubs/gallops, RRR  Respiratory: CTA B/L, no diminished breath sounds, no wheezes/rales/rhonchi, no increased work of breathing or accessory muscle use  Gastrointestinal: soft, NT/ND; active BSx4 quadrants  Genitourinary: no suprapubic tenderness, no CVA tenderness  Extremities: WWP; no edema, clubbing or cyanosis  Vascular: 2+ radial, DP/PT pulses B/L     MEDICATIONS  (STANDING):  argatroban Infusion 2.5 MICROgram(s)/kG/Min (10.9 mL/Hr) IV Continuous <Continuous>  dextrose 5% + sodium chloride 0.45%. 1000 milliLiter(s) (75 mL/Hr) IV Continuous <Continuous>    MEDICATIONS  (PRN):      Allergies    Capoten (Short breath; Rash; Hives)  heparin (Other (Mod to Severe))  penicillin (Short breath; Rash; Hives)    Intolerances        LABS:                        13.6   4.72  )-----------( 108      ( 22 Jan 2021 06:39 )             42.5     01-22    138  |  101  |  11  ----------------------------<  76  4.4   |  23  |  1.08    Ca    9.0      22 Jan 2021 07:31  Phos  3.3     01-22  Mg     2.1     01-22    TPro  7.1  /  Alb  3.9  /  TBili  0.6  /  DBili  x   /  AST  24  /  ALT  13  /  AlkPhos  58  01-20    PT/INR - ( 20 Jan 2021 09:17 )   PT: 12.9 sec;   INR: 1.08          PTT - ( 21 Jan 2021 23:18 )  PTT:80.6 sec      RADIOLOGY & ADDITIONAL TESTS:

## 2021-01-22 NOTE — PROGRESS NOTE ADULT - PROBLEM SELECTOR PLAN 3
History of LV thrombus with no thrombus on most recent ECHO during this admission. Life-long AC required.  - Pt on argatroban drip 2 mcg/kg/min with therapeutic PTTx2.  - follow TTE-likely will do informal echo at bedside History of LV thrombus with no thrombus on most recent ECHO during this admission. Life-long AC required.  - Eliquis 5 mg daily  - follow TTE-likely will do informal echo at bedside

## 2021-01-22 NOTE — PROGRESS NOTE ADULT - PROBLEM SELECTOR PLAN 2
History of A-fib on Eliquis and Toprol XL. CHADS-VASC 4, HAS-BLED 3.  - Pt on argatroban drip 2 mcg/kg/min with therapeutic PTTx2.  - May reinstate Eliquis if mental status remains improved. History of A-fib on Eliquis and Toprol XL. CHADS-VASC 4, HAS-BLED 3.  - Reinstate Eliquis today given improvement in pt mental status and ability to eat

## 2021-01-22 NOTE — EEG REPORT - NS EEG TEXT BOX
[facility] Department of Neurology  Inpatient Continuous video-Electroencephalogram    Patient Name:	LAINA SHIPMAN    :	1955  MRN:	[id number]    Study Start Date/Time:  2021, 4:22:36 PM  Study End Date/Time:	2021 11: 40 AM    Referred by: Dr. Bharat Hdez    Brief Clinical History:  LAINA SHIPMAN is a 65 year old Male with unexplained comatose state; study performed to investigate for seizures or markers of epilepsy.    Diagnosis Code:   R56.9 convulsions/seizure  CPT: 30081 EEG with video 12-26h  Technical CPT: 38071 set-up +  20143 vEEG unmonitored 12-26h    Pertinent Medications:  n/a    Acquisition Details:  Electroencephalography was acquired using a minimum of 21 channels on an [employer] Interactive Neurology system v 8.5.1 with electrode placement according to the standard International 10-20 system following ACNS (American Clinical Neurophysiology Society) guidelines for Long-Term Video EEG monitoring.  Anterior temporal T1 and T2 electrodes were utilized whenever possible.   The txtrTEK automated spike & seizure detections were all reviewed in detail, in addition to extensive portions of raw EEG.    Day 1: 2021 @ 4:22:36 PM to next morning @ 07:00 am  Background:  continuous, with predominantly alpha and beta frequencies.  Symmetry:  No persistent asymmetries of voltage or frequency.  Posterior Dominant Rhythm:  8.5 Hz rudimentary.  Organization: Rudimentary.  Voltage:  Normal (20+ uV)  Variability: Yes. 		Reactivity: Yes.  N2 sleep: Absent.  Spontaneous Activity:  No epileptiform discharges.  Periodic/rhythmic activity:  None.  Events:  No electrographic seizures or significant clinical events.  Provocations:  Hyperventilation and Photic stimulation: was not performed.    Daily Summary:  No focal abnormalities or epileptiform activity, however there was no clear sustained awake state, and no N2 or other clear sleep transients.    Alonso Rich MD  Attending Neurologist, [facility]      Day 2: 2021 @ 12: 37 pm to next morning @ 2021 11: 40 am.  Background:  continuous, with predominantly alpha and beta frequencies.  Symmetry:  No persistent asymmetries of voltage or frequency.  Posterior Dominant Rhythm:  8.5 Hz rudimentary.  Organization: Appropriate anterior-posterior gradient.  Voltage:  Normal (20+ uV)  Variability: Yes. 		Reactivity: Yes.  N2 sleep: Absent.  Spontaneous Activity:  No epileptiform discharges.  Periodic/rhythmic activity:  None.  Events:  No electrographic seizures or significant clinical events.  Provocations:  Hyperventilation and Photic stimulation: was not performed.    Daily Summary  The patient awoke at 13:02 then was back to sleep and awoke in the morning.  No abnormalities on EEG.    FINAL Summary:  Abnormal continuous av-EEG study.  1)	No focal abnormalities or epileptiform activity, however there was no clear sustained awake state for over 21 hours, and sleep architecture appeared poor, without N2 sleep transients.    Final Clinical Correlation:  There were no findings of active epilepsy, however this alone does not rule out the diagnosis.  Poor state changes over the first day were noted, and poor sleep architecture was noted into the second day, though not formally quantified.  These are non-specific indicators of cerebral dysfunction.      Alonso Rich MD  Attending Neurologist, [facility]

## 2021-01-22 NOTE — PROGRESS NOTE ADULT - PROBLEM SELECTOR PLAN 1
History of alcohol withdrawal. Alcohol 185 on admission. Last drink on day of admission 1/17. Completed librium taper  -Folic Acid/MVI/Thiamine  -Fall precautions  -PT says no needs    #AMS-secondary to unknown etiology, likely drugs vs seizure  -stroke code called yesterday AM when lethargy first observed, no evidence of stroke on imaging  - vEEG placed and no current seizure activity  -abg wnl  -utox wnl  -narcan 0.4 x 4 given yesterday AM, will continue to reassess respiratory status and mental status.   - icu consulted yesterday but declined stepup given pt VSS stable and protecting his airway  - d/c'd librium i/s/o somnolence  - Pt NPO unless mentation remains AAOx3 History of alcohol withdrawal. Alcohol 185 on admission. Last drink on day of admission 1/17. Librium taper d/c'd i/s/o AMS with NPO  -Folic Acid/MVI/Thiamine  -Fall precautions  -PT says no needs    #AMS-secondary to unknown etiology, likely drugs vs seizure  -stroke code called 1/20 AM when lethargy first observed, no evidence of stroke on imaging  - vEEG placed and no current seizure activity  -abg wnl  -utox wnl  -narcan 0.4 x 4 given 1/20 AM, will continue to reassess respiratory status and mental status.   - icu consulted yesterday but declined stepup given pt VSS stable and protecting his airway  - Use PRN ativan for withdrawal.  - Reinstating diet given improvement in mentation today

## 2021-01-22 NOTE — PROVIDER CONTACT NOTE (OTHER) - SITUATION
Pt while interacting with roommate called and stated that he noticed his speech is slurred and difficulty forming words.
Pt. had small amount of urine,bladder scan noted 418cc
Patient very hard to arouse

## 2021-01-22 NOTE — PROGRESS NOTE ADULT - ATTENDING COMMENTS
I was physically present for the key portions of the evaluation and managemnent (E/M) service provided.  I agree with the above history, physical, and plan which I have reviewed and edited where appropriate, with the exceptions as per my note.    still somewhat encephalopathic but improved LOC. awake but still disoriented. speech is normal. rest of exam is nonfocal except for slight LUE action tremor of unclear etiology.    AP: resolving encephalopathy, tremulousness.    - eeg negative and can discontinue.  - defer mri if continues to improve  - will follow

## 2021-01-22 NOTE — PROGRESS NOTE ADULT - SUBJECTIVE AND OBJECTIVE BOX
EPS Device interrogation    Indication: Pt with single chamber ICD implanted in 2009 and generator changed in 10/4/2019.  Here with etoh withdrawal. Team wants to do MRI brain to rule out stroke. Asking if device is MRI compatible.     Device model: [employer] MOMENTUM EL ICD D120.     Implanting Physician: Dr. Ramos     Functioning Mode: VVI 40 bpm.   Shock therapy starting at rate 220 bpm.     Underlying Rhythm: sinus tach currently around 130 bpm.     Pacemaker dependency: NO   1%    Battery status: 14 years    Lead testing results:   RV lead:    Sense:  24.3  mV.              Threshold:  0.9   V at  0.5  ms.           Impedance: 728      ohms  Shock coil Impedance: 95 ohms    Observations:  Normal single chamber ICD function.   No ICD shocks.   Pt in sinus tach.   Not pacing dependent.   Please contact EPS team during weekday (or Cardiology fellow on weekend) to have device reprogram just right before pt goes to MRI suite.  When pt in MRI suite, primary team intern or resident should be present when device is deactivated.

## 2021-01-22 NOTE — PROGRESS NOTE ADULT - PROBLEM SELECTOR PLAN 8
F: D5 1/2 NS 75 cc/hr  E: Replete PRN  N: DASH/TLC  DVT ppx: Argatroban drip 2 mcg/kg/min  GI ppx: None  CODE: FULL  Dispo: DC 1/17 F: D5 1/2 NS 75 cc/hr  E: Replete PRN  N: DASH/TLC  DVT ppx: Eliquis 5 mg  GI ppx: Protonix 40 mg  CODE: FULL  Dispo: DC 1/17

## 2021-01-22 NOTE — PROVIDER CONTACT NOTE (CHANGE IN STATUS NOTIFICATION) - ASSESSMENT
T:103.2 O, 102.8 Axillary  /72    O2 98% RA
Vital signs stable. Unresponsive. bilateral pinpoint pupils.

## 2021-01-22 NOTE — PROGRESS NOTE ADULT - ASSESSMENT
Pt is a 66 yo M with PMH poor medication adherence, LV thrombus (11/2020), pAfib (eliquis), CAD (s/p PCI), HFrEF (EF 30-35%, s/p AICD), HIT, hemorrhoids/gastritis, GIB, and ETOH abuse (c/b mult admissions for w/d, no sz/DTs/intubation) who p/w alcohol withdrawal symptoms of agitation, shaking, CP, SOB, N/V, and heart racing. Admitted for alcohol w/d c/b 1/20 acute lethargy/somnolence and 1mm pinpoint pupils BL, tongue biting, and urinary incontinence. Stroke code called with w/u neg, stroke and EEG in place and epilepsy following. Neuro consulted for continued lethargy. Less likely primary neuro etiology outside of stroke/seizure possibly in the setting of alcohol withdrawal.     #Lethargy  - recommend checking thiamine level  - EEG neg seizure activity, can discontinue study    Discussed with attending. Neurology will continue to follow.   Pt is a 64 yo M with PMH poor medication adherence, LV thrombus (11/2020), pAfib (eliquis), CAD (s/p PCI), HFrEF (EF 30-35%, s/p AICD), HIT, hemorrhoids/gastritis, GIB, and ETOH abuse (c/b mult admissions for w/d, no sz/DTs/intubation) who p/w alcohol withdrawal symptoms of agitation, shaking, CP, SOB, N/V, and heart racing. Admitted for alcohol w/d c/b 1/20 acute lethargy/somnolence and 1mm pinpoint pupils BL, tongue biting, and urinary incontinence. Stroke code called with w/u neg, stroke and EEG in place and epilepsy following. Neuro consulted for continued lethargy. Less likely primary neuro etiology outside of stroke/seizure possibly in the setting of alcohol withdrawal.     #Lethargy  - EEG neg seizure activity, can discontinue study    Discussed with attending. Neurology will continue to follow.

## 2021-01-23 LAB
ALBUMIN SERPL ELPH-MCNC: 3.6 G/DL — SIGNIFICANT CHANGE UP (ref 3.3–5)
ALP SERPL-CCNC: 55 U/L — SIGNIFICANT CHANGE UP (ref 40–120)
ALT FLD-CCNC: 15 U/L — SIGNIFICANT CHANGE UP (ref 10–45)
ANION GAP SERPL CALC-SCNC: 12 MMOL/L — SIGNIFICANT CHANGE UP (ref 5–17)
ANISOCYTOSIS BLD QL: SLIGHT — SIGNIFICANT CHANGE UP
APTT BLD: 70.6 SEC — HIGH (ref 27.5–35.5)
APTT BLD: 74.3 SEC — HIGH (ref 27.5–35.5)
AST SERPL-CCNC: 33 U/L — SIGNIFICANT CHANGE UP (ref 10–40)
BASOPHILS # BLD AUTO: 0 K/UL — SIGNIFICANT CHANGE UP (ref 0–0.2)
BASOPHILS # BLD AUTO: 0.03 K/UL — SIGNIFICANT CHANGE UP (ref 0–0.2)
BASOPHILS NFR BLD AUTO: 0 % — SIGNIFICANT CHANGE UP (ref 0–2)
BASOPHILS NFR BLD AUTO: 0.9 % — SIGNIFICANT CHANGE UP (ref 0–2)
BILIRUB SERPL-MCNC: 0.9 MG/DL — SIGNIFICANT CHANGE UP (ref 0.2–1.2)
BUN SERPL-MCNC: 12 MG/DL — SIGNIFICANT CHANGE UP (ref 7–23)
BURR CELLS BLD QL SMEAR: PRESENT — SIGNIFICANT CHANGE UP
CALCIUM SERPL-MCNC: 8.4 MG/DL — SIGNIFICANT CHANGE UP (ref 8.4–10.5)
CHLORIDE SERPL-SCNC: 101 MMOL/L — SIGNIFICANT CHANGE UP (ref 96–108)
CO2 SERPL-SCNC: 22 MMOL/L — SIGNIFICANT CHANGE UP (ref 22–31)
CREAT SERPL-MCNC: 1.17 MG/DL — SIGNIFICANT CHANGE UP (ref 0.5–1.3)
EOSINOPHIL # BLD AUTO: 0 K/UL — SIGNIFICANT CHANGE UP (ref 0–0.5)
EOSINOPHIL # BLD AUTO: 0 K/UL — SIGNIFICANT CHANGE UP (ref 0–0.5)
EOSINOPHIL NFR BLD AUTO: 0 % — SIGNIFICANT CHANGE UP (ref 0–6)
EOSINOPHIL NFR BLD AUTO: 0 % — SIGNIFICANT CHANGE UP (ref 0–6)
GIANT PLATELETS BLD QL SMEAR: PRESENT — SIGNIFICANT CHANGE UP
GIANT PLATELETS BLD QL SMEAR: PRESENT — SIGNIFICANT CHANGE UP
GLUCOSE SERPL-MCNC: 106 MG/DL — HIGH (ref 70–99)
HCT VFR BLD CALC: 38.8 % — LOW (ref 39–50)
HGB BLD-MCNC: 12.4 G/DL — LOW (ref 13–17)
LYMPHOCYTES # BLD AUTO: 0.17 K/UL — LOW (ref 1–3.3)
LYMPHOCYTES # BLD AUTO: 0.37 K/UL — LOW (ref 1–3.3)
LYMPHOCYTES # BLD AUTO: 13.1 % — SIGNIFICANT CHANGE UP (ref 13–44)
LYMPHOCYTES # BLD AUTO: 9.6 % — LOW (ref 13–44)
MAGNESIUM SERPL-MCNC: 1.5 MG/DL — LOW (ref 1.6–2.6)
MANUAL SMEAR VERIFICATION: SIGNIFICANT CHANGE UP
MANUAL SMEAR VERIFICATION: SIGNIFICANT CHANGE UP
MCHC RBC-ENTMCNC: 27.9 PG — SIGNIFICANT CHANGE UP (ref 27–34)
MCHC RBC-ENTMCNC: 32 GM/DL — SIGNIFICANT CHANGE UP (ref 32–36)
MCV RBC AUTO: 87.2 FL — SIGNIFICANT CHANGE UP (ref 80–100)
MONOCYTES # BLD AUTO: 0.07 K/UL — SIGNIFICANT CHANGE UP (ref 0–0.9)
MONOCYTES # BLD AUTO: 0.12 K/UL — SIGNIFICANT CHANGE UP (ref 0–0.9)
MONOCYTES NFR BLD AUTO: 2.6 % — SIGNIFICANT CHANGE UP (ref 2–14)
MONOCYTES NFR BLD AUTO: 6.9 % — SIGNIFICANT CHANGE UP (ref 2–14)
MRSA PCR RESULT.: NEGATIVE — SIGNIFICANT CHANGE UP
NEUTROPHILS # BLD AUTO: 1.49 K/UL — LOW (ref 1.8–7.4)
NEUTROPHILS # BLD AUTO: 2.31 K/UL — SIGNIFICANT CHANGE UP (ref 1.8–7.4)
NEUTROPHILS NFR BLD AUTO: 81.7 % — HIGH (ref 43–77)
NEUTROPHILS NFR BLD AUTO: 82.6 % — HIGH (ref 43–77)
NEUTS BAND # BLD: 0.9 % — SIGNIFICANT CHANGE UP (ref 0–8)
OVALOCYTES BLD QL SMEAR: SLIGHT — SIGNIFICANT CHANGE UP
OVALOCYTES BLD QL SMEAR: SLIGHT — SIGNIFICANT CHANGE UP
PHOSPHATE SERPL-MCNC: 2.6 MG/DL — SIGNIFICANT CHANGE UP (ref 2.5–4.5)
PLAT MORPH BLD: NORMAL — SIGNIFICANT CHANGE UP
PLAT MORPH BLD: NORMAL — SIGNIFICANT CHANGE UP
PLATELET # BLD AUTO: 74 K/UL — LOW (ref 150–400)
POIKILOCYTOSIS BLD QL AUTO: SLIGHT — SIGNIFICANT CHANGE UP
POIKILOCYTOSIS BLD QL AUTO: SLIGHT — SIGNIFICANT CHANGE UP
POLYCHROMASIA BLD QL SMEAR: SLIGHT — SIGNIFICANT CHANGE UP
POTASSIUM SERPL-MCNC: 3.8 MMOL/L — SIGNIFICANT CHANGE UP (ref 3.5–5.3)
POTASSIUM SERPL-SCNC: 3.8 MMOL/L — SIGNIFICANT CHANGE UP (ref 3.5–5.3)
PROT SERPL-MCNC: 6.7 G/DL — SIGNIFICANT CHANGE UP (ref 6–8.3)
RBC # BLD: 4.45 M/UL — SIGNIFICANT CHANGE UP (ref 4.2–5.8)
RBC # FLD: 15.9 % — HIGH (ref 10.3–14.5)
RBC BLD AUTO: ABNORMAL
RBC BLD AUTO: ABNORMAL
S AUREUS DNA NOSE QL NAA+PROBE: POSITIVE
SCHISTOCYTES BLD QL AUTO: SLIGHT — SIGNIFICANT CHANGE UP
SODIUM SERPL-SCNC: 135 MMOL/L — SIGNIFICANT CHANGE UP (ref 135–145)
VARIANT LYMPHS # BLD: 1.7 % — SIGNIFICANT CHANGE UP (ref 0–6)
WBC # BLD: 1.79 K/UL — LOW (ref 3.8–10.5)
WBC # FLD AUTO: 1.79 K/UL — LOW (ref 3.8–10.5)

## 2021-01-23 PROCEDURE — 99233 SBSQ HOSP IP/OBS HIGH 50: CPT | Mod: GC

## 2021-01-23 PROCEDURE — 71045 X-RAY EXAM CHEST 1 VIEW: CPT | Mod: 26

## 2021-01-23 PROCEDURE — 99233 SBSQ HOSP IP/OBS HIGH 50: CPT

## 2021-01-23 RX ORDER — SODIUM CHLORIDE 9 MG/ML
500 INJECTION INTRAMUSCULAR; INTRAVENOUS; SUBCUTANEOUS ONCE
Refills: 0 | Status: COMPLETED | OUTPATIENT
Start: 2021-01-23 | End: 2021-01-23

## 2021-01-23 RX ORDER — THIAMINE MONONITRATE (VIT B1) 100 MG
500 TABLET ORAL EVERY 8 HOURS
Refills: 0 | Status: DISCONTINUED | OUTPATIENT
Start: 2021-01-23 | End: 2021-01-23

## 2021-01-23 RX ORDER — MEROPENEM 1 G/30ML
1000 INJECTION INTRAVENOUS EVERY 8 HOURS
Refills: 0 | Status: COMPLETED | OUTPATIENT
Start: 2021-01-23 | End: 2021-01-23

## 2021-01-23 RX ORDER — VANCOMYCIN HCL 1 G
1000 VIAL (EA) INTRAVENOUS EVERY 12 HOURS
Refills: 0 | Status: DISCONTINUED | OUTPATIENT
Start: 2021-01-23 | End: 2021-01-23

## 2021-01-23 RX ORDER — ACETAMINOPHEN 500 MG
1000 TABLET ORAL ONCE
Refills: 0 | Status: DISCONTINUED | OUTPATIENT
Start: 2021-01-23 | End: 2021-01-23

## 2021-01-23 RX ORDER — SODIUM CHLORIDE 9 MG/ML
1000 INJECTION, SOLUTION INTRAVENOUS
Refills: 0 | Status: DISCONTINUED | OUTPATIENT
Start: 2021-01-23 | End: 2021-01-27

## 2021-01-23 RX ORDER — ARGATROBAN 50 MG/50ML
2 INJECTION, SOLUTION INTRAVENOUS
Qty: 50 | Refills: 0 | Status: DISCONTINUED | OUTPATIENT
Start: 2021-01-23 | End: 2021-01-29

## 2021-01-23 RX ORDER — THIAMINE MONONITRATE (VIT B1) 100 MG
500 TABLET ORAL EVERY 8 HOURS
Refills: 0 | Status: COMPLETED | OUTPATIENT
Start: 2021-01-23 | End: 2021-01-26

## 2021-01-23 RX ORDER — PIPERACILLIN AND TAZOBACTAM 4; .5 G/20ML; G/20ML
4.5 INJECTION, POWDER, LYOPHILIZED, FOR SOLUTION INTRAVENOUS EVERY 8 HOURS
Refills: 0 | Status: DISCONTINUED | OUTPATIENT
Start: 2021-01-23 | End: 2021-01-23

## 2021-01-23 RX ORDER — ACETAMINOPHEN 500 MG
650 TABLET ORAL ONCE
Refills: 0 | Status: DISCONTINUED | OUTPATIENT
Start: 2021-01-23 | End: 2021-01-26

## 2021-01-23 RX ORDER — THIAMINE MONONITRATE (VIT B1) 100 MG
100 TABLET ORAL EVERY 24 HOURS
Refills: 0 | Status: DISCONTINUED | OUTPATIENT
Start: 2021-01-23 | End: 2021-01-23

## 2021-01-23 RX ORDER — CEFTRIAXONE 500 MG/1
2000 INJECTION, POWDER, FOR SOLUTION INTRAMUSCULAR; INTRAVENOUS EVERY 24 HOURS
Refills: 0 | Status: COMPLETED | OUTPATIENT
Start: 2021-01-23 | End: 2021-01-29

## 2021-01-23 RX ORDER — MEROPENEM 1 G/30ML
1000 INJECTION INTRAVENOUS EVERY 8 HOURS
Refills: 0 | Status: DISCONTINUED | OUTPATIENT
Start: 2021-01-23 | End: 2021-01-23

## 2021-01-23 RX ORDER — METRONIDAZOLE 500 MG
500 TABLET ORAL EVERY 8 HOURS
Refills: 0 | Status: DISCONTINUED | OUTPATIENT
Start: 2021-01-23 | End: 2021-01-26

## 2021-01-23 RX ORDER — VANCOMYCIN HCL 1 G
VIAL (EA) INTRAVENOUS
Refills: 0 | Status: DISCONTINUED | OUTPATIENT
Start: 2021-01-23 | End: 2021-01-23

## 2021-01-23 RX ADMIN — Medication 400 MILLIGRAM(S): at 00:00

## 2021-01-23 RX ADMIN — Medication 100 MILLIGRAM(S): at 22:48

## 2021-01-23 RX ADMIN — SODIUM CHLORIDE 500 MILLILITER(S): 9 INJECTION INTRAMUSCULAR; INTRAVENOUS; SUBCUTANEOUS at 12:14

## 2021-01-23 RX ADMIN — ARGATROBAN 8.71 MICROGRAM(S)/KG/MIN: 50 INJECTION, SOLUTION INTRAVENOUS at 18:31

## 2021-01-23 RX ADMIN — Medication 100 MILLIGRAM(S): at 10:13

## 2021-01-23 RX ADMIN — MEROPENEM 100 MILLIGRAM(S): 1 INJECTION INTRAVENOUS at 01:39

## 2021-01-23 RX ADMIN — SODIUM CHLORIDE 50 MILLILITER(S): 9 INJECTION, SOLUTION INTRAVENOUS at 17:55

## 2021-01-23 RX ADMIN — Medication 250 MILLIGRAM(S): at 01:35

## 2021-01-23 RX ADMIN — Medication 105 MILLIGRAM(S): at 21:05

## 2021-01-23 RX ADMIN — MEROPENEM 100 MILLIGRAM(S): 1 INJECTION INTRAVENOUS at 10:06

## 2021-01-23 RX ADMIN — Medication 100 MILLIGRAM(S): at 15:45

## 2021-01-23 RX ADMIN — SODIUM CHLORIDE 3000 MILLILITER(S): 9 INJECTION INTRAMUSCULAR; INTRAVENOUS; SUBCUTANEOUS at 01:12

## 2021-01-23 RX ADMIN — Medication 1 DROP(S): at 15:22

## 2021-01-23 RX ADMIN — ARGATROBAN 8.71 MICROGRAM(S)/KG/MIN: 50 INJECTION, SOLUTION INTRAVENOUS at 09:16

## 2021-01-23 RX ADMIN — CEFTRIAXONE 100 MILLIGRAM(S): 500 INJECTION, POWDER, FOR SOLUTION INTRAMUSCULAR; INTRAVENOUS at 18:51

## 2021-01-23 RX ADMIN — Medication 250 MILLIGRAM(S): at 17:53

## 2021-01-23 NOTE — PROGRESS NOTE ADULT - SUBJECTIVE AND OBJECTIVE BOX
HPI: 64 yo M with PMH poor medication adherence, LV thrombus (11/2020), pAfib (eliquis), CAD (s/p PCI), HFrEF (EF 30-35%, s/p AICD), HIT, hemorrhoids/gastritis, GIB, and ETOH abuse (c/b mult admissions for w/d, no sz/DTs/intubation) who p/w alcohol withdrawal symptoms of agitation, shaking, CP, SOB, N/V, and heart racing. Admitted for alcohol w/d c/b 1/20 acute lethargy/somnolence and 1mm pinpoint pupils BL, tongue biting, and urinary incontinence. Stroke code called with w/u neg, stroke and EEG in place and epilepsy following. Neuro consulted for continued lethargy. Less likely primary neuro etiology outside of stroke/seizure possibly in the setting of alcohol withdrawal.     ROS: cannot    PMHX:  ALCOHOL WITHDRAWAL    ADVANCED ILLNESS    H/o or current diagnosis of HF- Contraindication to ACEI/ARBs    H/o or current diagnosis of HF- no contraindication to ACEI/ARBs    H/o or current diagnosis of HF- ACEI/ARB contraindication unknown    H/o or current diagnosis of HF- no contraindication to ACEI/ARBs    H/o or current diagnosis of HF- ACEI/ARB contraindication unknown    H/o or current diagnosis of HF- no contraindication to ACEI/ARBs    H/o or current diagnosis of HF- ACEI/ARB contraindication unknown    H/o or current diagnosis of HF- no contraindication to ACEI/ARBs    H/o or current diagnosis of HF- ACEI/ARB contraindication unknown    H/o or current diagnosis of HF- no contraindication to ACEI/ARBs    H/o or current diagnosis of HF- ACEI/ARB contraindication unknown    H/o or current diagnosis of HF- no contraindication to ACEI/ARBs    H/o or current diagnosis of HF- ACEI/ARB contraindication unknown    H/o or current diagnosis of HF- no contraindication to ACEI/ARBs    H/o or current diagnosis of HF- ACEI/ARB contraindication unknown    H/o or current diagnosis of HF- no contraindication to ACEI/ARBs    H/o or current diagnosis of HF- ACEI/ARB contraindication unknown    H/o or current diagnosis of HF- no contraindication to ACEI/ARBs    H/o or current diagnosis of HF- ACEI/ARB contraindication unknown    H/o or current diagnosis of HF- no contraindication to ACEI/ARBs    H/o or current diagnosis of HF- ACEI/ARB contraindication unknown    H/o or current diagnosis of HF- Contraindication to ACEI/ARBs    H/o or current diagnosis of HF- no contraindication to ACEI/ARBs    H/o or current diagnosis of HF- ACEI/ARB contraindication unknown    H/o or current diagnosis of HF- no contraindication to ACEI/ARBs    H/o or current diagnosis of HF- ACEI/ARB contraindication unknown    H/o or current diagnosis of HF- Contraindication to ACEI/ARBs    H/o or current diagnosis of HF- ACEI/ARB contraindication unknown    H/o or current diagnosis of HF- Contraindication to ACEI/ARBs    H/o or current diagnosis of HF- ACEI/ARB contraindication unknown    FH: alcoholism    FH: type 2 diabetes    FH: myocardial infarction    Family history of cardiac disorder in maternal grandfather (Grandparent)    No pertinent family history in first degree relatives    Handoff    MEWS Score    CAD (coronary artery disease)    ETOH abuse    Hyperlipidemia    Heparin-induced thrombocytopenia    Left ventricular thrombus    Atrial fibrillation    Hemorrhoid    Thrombus in heart chamber    ICD (implantable cardioverter-defibrillator) in place    Pacemaker    ETOH abuse    Paroxysmal a-fib    Myocardial infarction involving other coronary artery    Gastritis    Essential hypertension    Other specified cardiac dysrhythmias    Atherosclerosis of coronary artery    Pacemaker    Alcohol withdrawal    Nutrition, metabolism, and development symptoms    Atrial fibrillation    Left ventricular thrombus    HFrEF (heart failure with reduced ejection fraction)    CAD (coronary artery disease)    Alcohol withdrawal    S/P coronary artery stent placement    History of surgery to heart and great vessels, presenting hazards to health    Automatic implantable cardiac defibrillator in situ    CHEST PAIN    90+    Left ventricular thrombus    [email or web address]        MEDS:  acetaminophen  Suppository .. 650 milliGRAM(s) Rectal once  argatroban Infusion 2 MICROgram(s)/kG/Min IV Continuous <Continuous>  artificial  tears Solution 1 Drop(s) Both EYES daily  cefTRIAXone   IVPB 2000 milliGRAM(s) IV Intermittent every 24 hours  LORazepam   Injectable 2 milliGRAM(s) IV Push every 4 hours PRN  metroNIDAZOLE  IVPB 500 milliGRAM(s) IV Intermittent every 8 hours  thiamine Injectable 100 milliGRAM(s) IV Push every 24 hours      SHX: nc    Capoten (Short breath; Rash; Hives)  heparin (Other (Mod to Severe))  penicillin (Short breath; Rash; Hives)      FHX: nc    Vitals:  T(C): 37.2 (01-23-21 @ 15:30), Max: 39.6 (01-22-21 @ 21:18)  HR: 91 (01-23-21 @ 15:30) (91 - 127)  BP: 115/80 (01-23-21 @ 15:30) (92/64 - 115/80)  RR: 17 (01-23-21 @ 15:30) (16 - 20)  SpO2: 98% (01-23-21 @ 15:30) (96% - 98%)    Exam:  no neck rigidity, can bend neck forward and backward.  pt very sleepy but opens his eyes, tells me his name, tells me he is 28yo. he follows simple commands.  face symm, +occulocephalics, perrl  moves all 4 antigravity    EEG 1/2021 - No focal abnormalities or epileptiform activity,    AP: 64 yo M with PMH poor medication adherence, LV thrombus (11/2020), pAfib (eliquis), CAD (s/p PCI), HFrEF (EF 30-35%, s/p AICD), HIT, hemorrhoids/gastritis, GIB, and ETOH abuse (c/b mult admissions for w/d, no sz/DTs/intubation) who p/w alcohol withdrawal symptoms of agitation, shaking, CP, SOB, N/V, and heart racing. Admitted for alcohol w/d c/b 1/20 acute lethargy/somnolence and 1mm pinpoint pupils BL, tongue biting, and urinary incontinence. Stroke code called with w/u neg, stroke and EEG in place and epilepsy following. Neuro consulted for continued lethargy.    Today, more lethargic and has spiked fevers, suspected to have aspirated by primary team, now on abx.    - while CNS infection such as meningitis is possible, it would be a highly atypical clinical progression given lack of fevers until today. given the lack of focality on any of the prior exams, doubt HSV. however, would recommend to consider LP if no other source found.  - check brain MRI as planned  - increase thiamine to 500mg IV TID **  - will follow    discussed with primary att

## 2021-01-23 NOTE — PROGRESS NOTE ADULT - SUBJECTIVE AND OBJECTIVE BOX
Patient was seen and examined by me at bedside. I agree with resident's note, subjective, objective physical exam, assessment and plan with following modifications/additions.    Greater than 35 minutes spent on total encounter; more than 50% of the visit was spent counseling and/or coordinating care by the attending physician.    S: Febrile and hypotensive overnight started on meropenem and vanc. Minimally able to participate in interview, denies any headache, neck stiffness to me, no cough.     O: VS-febrile overnight, hypotensive improved now, Aox1 to person only, pinpoint pupils remain reactive, no UMN signs on exam, lungs clear anteriorly, CV RRR, no m/r/g, abd soft, nt ext wwp no sig le edema. no tremors noted. Labs/imaging reviewed- marked leukopenia, pending full infectious workup (UA), relative pancytopenia all lines down    A/P: 63-year-old male with a hx of BEVERLY, ETOH abuse no siezures/DT, paroxysmal A-fib and LV thrombus on eliquis, , HLD, CAD status post PCI, HFrEF (35-40% 2019) status post AICD, presented with alcohol withdrawal course c/b episode of AMS presumed siezure with post ictal period, now course c/b fever c/f aspiration PNA, slowly recovering mental status low c/f CVA per neuro  -Altered mental status 2/2 postictal state and s/p sedation for withdrawal- continue on CIWA today, swallow study today and if unsafe keep npo with aspiration precautions, IVF, appreciate neuro input (neg eeg 24 hours for continued seizure activity), MRI ordered but unlikely to explain symptoms  -Fever with c/f aspiration, UA pending- penicillin allergy, c/f vanc ceftriaxone flagyl for now, f/u pending bcx, low c/f CNS infection as fever and AMS developed during admission and AOx3 without fevers on arrival.   -LV thrombus- on argatroban for now while NPO, monitor PTT, monitor also plt levels  -HFrEF- euvolemic for now- IVF as needed, holding other home po CHF meds   -DVT px- as above    Bharat Hdez MD [phone]  Patient was seen and examined by me at bedside. I agree with resident's note, subjective, objective physical exam, assessment and plan with following modifications/additions.    Greater than 35 minutes spent on total encounter; more than 50% of the visit was spent counseling and/or coordinating care by the attending physician.    S: Febrile and hypotensive overnight started on meropenem and vanc. Minimally able to participate in interview, denies any headache, neck stiffness to me, no cough.     O: VS-febrile overnight, hypotensive improved now, Aox1 to person only, pinpoint pupils remain reactive, no UMN signs on exam, lungs clear anteriorly, CV RRR, no m/r/g, abd soft, nt ext wwp no sig le edema. no tremors noted. Labs/imaging reviewed- marked leukopenia, pending full infectious workup (UA), relative pancytopenia all lines down    A/P: 63-year-old male with a hx of BEVERLY, ETOH abuse no siezures/DT, paroxysmal A-fib and LV thrombus on eliquis, , HLD, CAD status post PCI, HFrEF (35-40% 2019) status post AICD, presented with alcohol withdrawal course c/b episode of AMS presumed siezure with post ictal period, now course c/b fever c/f aspiration PNA, slowly recovering mental status low c/f CVA per neuro  -Altered mental status 2/2 postictal state and s/p sedation for withdrawal- continue on CIWA today, swallow study today and if unsafe keep npo with aspiration precautions, IVF, appreciate neuro input (neg eeg 24 hours for continued seizure activity), MRI ordered but unlikely to explain symptoms  -Fever with c/f aspiration, UA pending- penicillin allergy, c/f vanc ceftriaxone flagyl for now, send sputum cx, f/u MRSA swab, f/u pending bcx, low c/f CNS infection as fever and AMS developed during admission and AOx3 without fevers on arrival.   -LV thrombus- on argatroban for now while NPO, monitor PTT, monitor also plt levels  -HFrEF- euvolemic for now- IVF as needed, holding other home po CHF meds   -DVT px- as above    Bharat Hdez MD [phone]  Patient was seen and examined by me at bedside. I agree with resident's note, subjective, objective physical exam, assessment and plan with following modifications/additions.    Greater than 35 minutes spent on total encounter; more than 50% of the visit was spent counseling and/or coordinating care by the attending physician.    S: Febrile and hypotensive overnight started on meropenem and vanc. Minimally able to participate in interview, denies any headache, neck stiffness to me, no cough.     O: VS-febrile overnight, hypotensive improved now, Aox1 to person only, pinpoint pupils remain reactive, no UMN signs on exam, lungs clear anteriorly, CV RRR, no m/r/g, abd soft, nt ext wwp no sig le edema. no tremors noted. Labs/imaging reviewed- marked leukopenia, pending full infectious workup (UA), relative pancytopenia all lines down    A/P: 63-year-old male with a hx of BEVERLY, ETOH abuse no siezures/DT, paroxysmal A-fib and LV thrombus on eliquis, , HLD, CAD status post PCI, HFrEF (35-40% 2019) status post AICD, presented with alcohol withdrawal course c/b episode of AMS presumed siezure with post ictal period, now course c/b fever c/f aspiration PNA, slowly recovering mental status low c/f CVA per neuro  -Altered mental status 2/2 postictal state and s/p sedation for withdrawal- continue on CIWA today (if continued sig withdrawal reconsult SDU re DT management in SDU/ICU), swallow study today and if unsafe keep npo with aspiration precautions, IVF, appreciate neuro input (neg eeg 24 hours for continued seizure activity), MRI ordered but unlikely to explain symptoms  -Fever with c/f aspiration, UA pending- penicillin allergy, c/f vanc ceftriaxone flagyl for now, send sputum cx, f/u MRSA swab, f/u pending bcx, low c/f CNS infection as fever and AMS developed during admission and AOx3 without fevers on arrival.   -LV thrombus- on argatroban for now while NPO, monitor PTT, monitor also plt levels  -HFrEF- euvolemic for now- IVF as needed, holding other home po CHF meds   -DVT px- as above  -Dispo- medically active, may need SDU soon    Bharat Hdez MD [phone]

## 2021-01-24 LAB
ALBUMIN SERPL ELPH-MCNC: 3.5 G/DL — SIGNIFICANT CHANGE UP (ref 3.3–5)
ALP SERPL-CCNC: 45 U/L — SIGNIFICANT CHANGE UP (ref 40–120)
ALT FLD-CCNC: 13 U/L — SIGNIFICANT CHANGE UP (ref 10–45)
ANION GAP SERPL CALC-SCNC: 10 MMOL/L — SIGNIFICANT CHANGE UP (ref 5–17)
ANISOCYTOSIS BLD QL: SLIGHT — SIGNIFICANT CHANGE UP
APPEARANCE UR: CLEAR — SIGNIFICANT CHANGE UP
APTT BLD: 92.3 SEC — HIGH (ref 27.5–35.5)
AST SERPL-CCNC: 25 U/L — SIGNIFICANT CHANGE UP (ref 10–40)
BACTERIA # UR AUTO: PRESENT /HPF
BASOPHILS # BLD AUTO: 0 K/UL — SIGNIFICANT CHANGE UP (ref 0–0.2)
BASOPHILS NFR BLD AUTO: 0 % — SIGNIFICANT CHANGE UP (ref 0–2)
BILIRUB SERPL-MCNC: 0.5 MG/DL — SIGNIFICANT CHANGE UP (ref 0.2–1.2)
BILIRUB UR-MCNC: NEGATIVE — SIGNIFICANT CHANGE UP
BUN SERPL-MCNC: 10 MG/DL — SIGNIFICANT CHANGE UP (ref 7–23)
BURR CELLS BLD QL SMEAR: PRESENT — SIGNIFICANT CHANGE UP
CALCIUM SERPL-MCNC: 8.9 MG/DL — SIGNIFICANT CHANGE UP (ref 8.4–10.5)
CHLORIDE SERPL-SCNC: 100 MMOL/L — SIGNIFICANT CHANGE UP (ref 96–108)
CO2 SERPL-SCNC: 24 MMOL/L — SIGNIFICANT CHANGE UP (ref 22–31)
COLOR SPEC: YELLOW — SIGNIFICANT CHANGE UP
CREAT SERPL-MCNC: 1.07 MG/DL — SIGNIFICANT CHANGE UP (ref 0.5–1.3)
DIFF PNL FLD: ABNORMAL
ELLIPTOCYTES BLD QL SMEAR: SLIGHT — SIGNIFICANT CHANGE UP
EOSINOPHIL # BLD AUTO: 0 K/UL — SIGNIFICANT CHANGE UP (ref 0–0.5)
EOSINOPHIL NFR BLD AUTO: 0 % — SIGNIFICANT CHANGE UP (ref 0–6)
EPI CELLS # UR: SIGNIFICANT CHANGE UP /HPF (ref 0–5)
GIANT PLATELETS BLD QL SMEAR: PRESENT — SIGNIFICANT CHANGE UP
GLUCOSE SERPL-MCNC: 97 MG/DL — SIGNIFICANT CHANGE UP (ref 70–99)
GLUCOSE UR QL: NEGATIVE — SIGNIFICANT CHANGE UP
HCT VFR BLD CALC: 38.2 % — LOW (ref 39–50)
HGB BLD-MCNC: 12.2 G/DL — LOW (ref 13–17)
INR BLD: 3.38 — HIGH (ref 0.88–1.16)
KETONES UR-MCNC: ABNORMAL MG/DL
LEUKOCYTE ESTERASE UR-ACNC: NEGATIVE — SIGNIFICANT CHANGE UP
LYMPHOCYTES # BLD AUTO: 0.31 K/UL — LOW (ref 1–3.3)
LYMPHOCYTES # BLD AUTO: 9.6 % — LOW (ref 13–44)
MACROCYTES BLD QL: SLIGHT — SIGNIFICANT CHANGE UP
MAGNESIUM SERPL-MCNC: 1.7 MG/DL — SIGNIFICANT CHANGE UP (ref 1.6–2.6)
MANUAL SMEAR VERIFICATION: SIGNIFICANT CHANGE UP
MCHC RBC-ENTMCNC: 27.5 PG — SIGNIFICANT CHANGE UP (ref 27–34)
MCHC RBC-ENTMCNC: 31.9 GM/DL — LOW (ref 32–36)
MCV RBC AUTO: 86 FL — SIGNIFICANT CHANGE UP (ref 80–100)
MICROCYTES BLD QL: SLIGHT — SIGNIFICANT CHANGE UP
MONOCYTES # BLD AUTO: 0.31 K/UL — SIGNIFICANT CHANGE UP (ref 0–0.9)
MONOCYTES NFR BLD AUTO: 9.6 % — SIGNIFICANT CHANGE UP (ref 2–14)
NEUTROPHILS # BLD AUTO: 2.47 K/UL — SIGNIFICANT CHANGE UP (ref 1.8–7.4)
NEUTROPHILS NFR BLD AUTO: 73 % — SIGNIFICANT CHANGE UP (ref 43–77)
NEUTS BAND # BLD: 2.6 % — SIGNIFICANT CHANGE UP (ref 0–8)
NITRITE UR-MCNC: NEGATIVE — SIGNIFICANT CHANGE UP
OVALOCYTES BLD QL SMEAR: SLIGHT — SIGNIFICANT CHANGE UP
PH UR: 6 — SIGNIFICANT CHANGE UP (ref 5–8)
PLAT MORPH BLD: ABNORMAL
PLATELET # BLD AUTO: 77 K/UL — LOW (ref 150–400)
POIKILOCYTOSIS BLD QL AUTO: SLIGHT — SIGNIFICANT CHANGE UP
POLYCHROMASIA BLD QL SMEAR: SLIGHT — SIGNIFICANT CHANGE UP
POTASSIUM SERPL-MCNC: 3.6 MMOL/L — SIGNIFICANT CHANGE UP (ref 3.5–5.3)
POTASSIUM SERPL-SCNC: 3.6 MMOL/L — SIGNIFICANT CHANGE UP (ref 3.5–5.3)
PROT SERPL-MCNC: 6.4 G/DL — SIGNIFICANT CHANGE UP (ref 6–8.3)
PROT UR-MCNC: NEGATIVE MG/DL — SIGNIFICANT CHANGE UP
PROTHROM AB SERPL-ACNC: 38.3 SEC — HIGH (ref 10.6–13.6)
RBC # BLD: 4.44 M/UL — SIGNIFICANT CHANGE UP (ref 4.2–5.8)
RBC # FLD: 15.1 % — HIGH (ref 10.3–14.5)
RBC BLD AUTO: ABNORMAL
RBC CASTS # UR COMP ASSIST: < 5 /HPF — SIGNIFICANT CHANGE UP
SODIUM SERPL-SCNC: 134 MMOL/L — LOW (ref 135–145)
SP GR SPEC: >=1.03 — SIGNIFICANT CHANGE UP (ref 1–1.03)
UROBILINOGEN FLD QL: 0.2 E.U./DL — SIGNIFICANT CHANGE UP
VARIANT LYMPHS # BLD: 5.2 % — SIGNIFICANT CHANGE UP (ref 0–6)
WBC # BLD: 3.27 K/UL — LOW (ref 3.8–10.5)
WBC # FLD AUTO: 3.27 K/UL — LOW (ref 3.8–10.5)
WBC UR QL: < 5 /HPF — SIGNIFICANT CHANGE UP

## 2021-01-24 PROCEDURE — 99233 SBSQ HOSP IP/OBS HIGH 50: CPT | Mod: GC

## 2021-01-24 PROCEDURE — 99232 SBSQ HOSP IP/OBS MODERATE 35: CPT

## 2021-01-24 RX ORDER — POTASSIUM CHLORIDE 20 MEQ
40 PACKET (EA) ORAL ONCE
Refills: 0 | Status: DISCONTINUED | OUTPATIENT
Start: 2021-01-24 | End: 2021-01-24

## 2021-01-24 RX ORDER — MAGNESIUM SULFATE 500 MG/ML
2 VIAL (ML) INJECTION ONCE
Refills: 0 | Status: COMPLETED | OUTPATIENT
Start: 2021-01-24 | End: 2021-01-24

## 2021-01-24 RX ADMIN — Medication 100 MILLIGRAM(S): at 07:23

## 2021-01-24 RX ADMIN — Medication 50 GRAM(S): at 09:49

## 2021-01-24 RX ADMIN — Medication 100 MILLIGRAM(S): at 22:18

## 2021-01-24 RX ADMIN — Medication 105 MILLIGRAM(S): at 06:34

## 2021-01-24 RX ADMIN — ARGATROBAN 8.71 MICROGRAM(S)/KG/MIN: 50 INJECTION, SOLUTION INTRAVENOUS at 22:17

## 2021-01-24 RX ADMIN — Medication 1 DROP(S): at 12:23

## 2021-01-24 RX ADMIN — SODIUM CHLORIDE 50 MILLILITER(S): 9 INJECTION, SOLUTION INTRAVENOUS at 22:17

## 2021-01-24 RX ADMIN — ARGATROBAN 8.71 MICROGRAM(S)/KG/MIN: 50 INJECTION, SOLUTION INTRAVENOUS at 13:23

## 2021-01-24 RX ADMIN — ARGATROBAN 8.71 MICROGRAM(S)/KG/MIN: 50 INJECTION, SOLUTION INTRAVENOUS at 00:46

## 2021-01-24 RX ADMIN — Medication 100 MILLIGRAM(S): at 14:11

## 2021-01-24 RX ADMIN — Medication 105 MILLIGRAM(S): at 20:38

## 2021-01-24 RX ADMIN — CEFTRIAXONE 100 MILLIGRAM(S): 500 INJECTION, POWDER, FOR SOLUTION INTRAMUSCULAR; INTRAVENOUS at 13:07

## 2021-01-24 RX ADMIN — Medication 105 MILLIGRAM(S): at 13:08

## 2021-01-24 NOTE — SWALLOW BEDSIDE ASSESSMENT ADULT - SLP PERTINENT HISTORY OF CURRENT PROBLEM
Admitted for alcohol withdrawal symptoms of agitation, shaking, CP, SOB, N/V, and heart racing. PMH poor medication adherence, LV thrombus (11/2020), pAfib (eliquis), CAD (s/p PCI), HFrEF (EF 30-35%, s/p AICD), HIT, hemorrhoids/gastritis, GIB, and ETOH abuse (c/b mult admissions for w/d, no sz/DTs/intubation). Stroke code called with w/u neg, stroke and EEG in place and epilepsy following. Neuro consulted for continued lethargy. Work-up on-going.

## 2021-01-24 NOTE — SWALLOW BEDSIDE ASSESSMENT ADULT - SWALLOW EVAL: DIAGNOSIS
Pt presented with a functional oropharyngeal swallow. However, pt is at risk for aspiration given fluctuating arousal/alertness per RN. Recs to initiate baseline diet ONLY as pt is awake, alert, attentive, with 1:1 supervision/assist with feeding, and strict aspiration precautions.

## 2021-01-24 NOTE — PROGRESS NOTE ADULT - SUBJECTIVE AND OBJECTIVE BOX
***INCOMPLETE***  INTERVAL HPI/OVERNIGHT EVENTS:      On further ROS, patient did not have complaint of: Headache, Blurred Vision, Cough, Dyspnea, Palpitations, Abdominal Pain, N/V, Weakness, Change in Sensation.     VITAL SIGNS:  T(F): 98.9 (01-24-21 @ 08:41)  HR: 98 (01-24-21 @ 08:41)  BP: 122/85 (01-24-21 @ 08:41)  RR: 18 (01-24-21 @ 08:41)  SpO2: 99% (01-24-21 @ 08:41)  Wt(kg): --    Input & Output:    01-23-21 @ 07:01  -  01-24-21 @ 07:00  --------------------------------------------------------  IN: 943.5 mL / OUT: 340 mL / NET: 603.5 mL    01-24-21 @ 07:01  -  01-24-21 @ 15:14  --------------------------------------------------------  IN: 769.6 mL / OUT: 0 mL / NET: 769.6 mL        PHYSICAL EXAM:  General: Comfortable, pleasant/anxious/agitated, Ill-appearing, well-nourished/frail/cachectic, comfortable / in distress  Neurological: AAOx3, no focal deficits  HEENT: NC/AT; EOMI, PERRL, clear conjunctiva, no nasal or oropharyngeal discharge or exudates, MMM  Neck: supple, no cervical or post-auricular lymphadenopathy  Cardiovascular: +S1/S2, no murmurs/rubs/gallops, RRR  Respiratory: CTA B/L, no diminished breath sounds, no wheezes/rales/rhonchi, no increased work of breathing or accessory muscle use  Gastrointestinal: soft, NT/ND; active BSx4 quadrants  Genitourinary: no suprapubic tenderness, no CVA tenderness  Extremities: WWP; no edema, clubbing or cyanosis  Vascular: 2+ radial, DP/PT pulses B/L  Skin: no rashes  Lines/Drains:     MEDICATIONS  (STANDING):  acetaminophen  Suppository .. 650 milliGRAM(s) Rectal once  argatroban Infusion 2 MICROgram(s)/kG/Min (8.71 mL/Hr) IV Continuous <Continuous>  artificial  tears Solution 1 Drop(s) Both EYES daily  cefTRIAXone   IVPB 2000 milliGRAM(s) IV Intermittent every 24 hours  dextrose 5%. 1000 milliLiter(s) (50 mL/Hr) IV Continuous <Continuous>  metroNIDAZOLE  IVPB 500 milliGRAM(s) IV Intermittent every 8 hours  thiamine IVPB 500 milliGRAM(s) IV Intermittent every 8 hours    MEDICATIONS  (PRN):  LORazepam   Injectable 2 milliGRAM(s) IV Push every 4 hours PRN CIWA      Allergies    Capoten (Short breath; Rash; Hives)  heparin (Other (Mod to Severe))  penicillin (Short breath; Rash; Hives)    Intolerances        LABS:                        12.2   3.27  )-----------( 77       ( 24 Jan 2021 06:05 )             38.2     01-24    134<L>  |  100  |  10  ----------------------------<  97  3.6   |  24  |  1.07    Ca    8.9      24 Jan 2021 06:05  Phos  2.6     01-23  Mg     1.7     01-24    TPro  6.4  /  Alb  3.5  /  TBili  0.5  /  DBili  x   /  AST  25  /  ALT  13  /  AlkPhos  45  01-24    PT/INR - ( 24 Jan 2021 06:05 )   PT: 38.3 sec;   INR: 3.38          PTT - ( 24 Jan 2021 06:05 )  PTT:92.3 sec  Urinalysis Basic - ( 24 Jan 2021 11:30 )    Color: Yellow / Appearance: Clear / SG: >=1.030 / pH: x  Gluc: x / Ketone: Trace mg/dL  / Bili: Negative / Urobili: 0.2 E.U./dL   Blood: x / Protein: NEGATIVE mg/dL / Nitrite: NEGATIVE   Leuk Esterase: NEGATIVE / RBC: < 5 /HPF / WBC < 5 /HPF   Sq Epi: x / Non Sq Epi: 0-5 /HPF / Bacteria: Present /HPF        RADIOLOGY & ADDITIONAL TESTS:   INTERVAL HPI/OVERNIGHT EVENTS:  ÓSCAR o/n, neurology to see pt today. Brooke AM remains lethargic but answers some questions, does not participate in exam    On further ROS, patient did not have complaint of: Headache, Blurred Vision, Cough, Dyspnea, Palpitations, Abdominal Pain, N/V, Weakness, Change in Sensation.     VITAL SIGNS:  T(F): 98.9 (01-24-21 @ 08:41)  HR: 98 (01-24-21 @ 08:41)  BP: 122/85 (01-24-21 @ 08:41)  RR: 18 (01-24-21 @ 08:41)  SpO2: 99% (01-24-21 @ 08:41)  Wt(kg): --    Input & Output:    01-23-21 @ 07:01  -  01-24-21 @ 07:00  --------------------------------------------------------  IN: 943.5 mL / OUT: 340 mL / NET: 603.5 mL    01-24-21 @ 07:01  -  01-24-21 @ 15:14  --------------------------------------------------------  IN: 769.6 mL / OUT: 0 mL / NET: 769.6 mL        PHYSICAL EXAM:  General: lethargic, minimally responsive, AAOx2  Neurological: AAOx2, no focal deficits, pt observed to move all 4 extremities  HEENT: NC/AT; pt could not follow instructions for eye exam, Pupils pinpoint and sluggishly reactive, clear conjunctiva but eyelids crusted-no discharge or exudate, no redness or itchiness, no nasal or oropharyngeal discharge or exudates, MMM with poor dentition  Neck: supple, no cervical or post-auricular lymphadenopathy  Cardiovascular: +S1/S2, no murmurs/rubs/gallops, RRR  Respiratory: CTA B/L, no diminished breath sounds, no wheezes/rales/rhonchi, no increased work of breathing or accessory muscle use  Gastrointestinal: soft, NT/ND; active BSx4 quadrants  Genitourinary: no suprapubic tenderness, no CVA tenderness  Extremities: WWP; no edema, clubbing or cyanosis  Vascular: 2+ radial, DP pulses B/L     MEDICATIONS  (STANDING):  acetaminophen  Suppository .. 650 milliGRAM(s) Rectal once  argatroban Infusion 2 MICROgram(s)/kG/Min (8.71 mL/Hr) IV Continuous <Continuous>  artificial  tears Solution 1 Drop(s) Both EYES daily  cefTRIAXone   IVPB 2000 milliGRAM(s) IV Intermittent every 24 hours  dextrose 5%. 1000 milliLiter(s) (50 mL/Hr) IV Continuous <Continuous>  metroNIDAZOLE  IVPB 500 milliGRAM(s) IV Intermittent every 8 hours  thiamine IVPB 500 milliGRAM(s) IV Intermittent every 8 hours    MEDICATIONS  (PRN):  LORazepam   Injectable 2 milliGRAM(s) IV Push every 4 hours PRN CIWA      Allergies    Capoten (Short breath; Rash; Hives)  heparin (Other (Mod to Severe))  penicillin (Short breath; Rash; Hives)    Intolerances        LABS:                        12.2   3.27  )-----------( 77       ( 24 Jan 2021 06:05 )             38.2     01-24    134<L>  |  100  |  10  ----------------------------<  97  3.6   |  24  |  1.07    Ca    8.9      24 Jan 2021 06:05  Phos  2.6     01-23  Mg     1.7     01-24    TPro  6.4  /  Alb  3.5  /  TBili  0.5  /  DBili  x   /  AST  25  /  ALT  13  /  AlkPhos  45  01-24    PT/INR - ( 24 Jan 2021 06:05 )   PT: 38.3 sec;   INR: 3.38          PTT - ( 24 Jan 2021 06:05 )  PTT:92.3 sec  Urinalysis Basic - ( 24 Jan 2021 11:30 )    Color: Yellow / Appearance: Clear / SG: >=1.030 / pH: x  Gluc: x / Ketone: Trace mg/dL  / Bili: Negative / Urobili: 0.2 E.U./dL   Blood: x / Protein: NEGATIVE mg/dL / Nitrite: NEGATIVE   Leuk Esterase: NEGATIVE / RBC: < 5 /HPF / WBC < 5 /HPF   Sq Epi: x / Non Sq Epi: 0-5 /HPF / Bacteria: Present /HPF        RADIOLOGY & ADDITIONAL TESTS:   INTERVAL HPI/OVERNIGHT EVENTS:  ÓSCAR o/n, neurology to see pt today. This AM remains lethargic but answers some questions, does not participate in exam    On further ROS, patient did not have complaint of: Headache, Blurred Vision, Cough, Dyspnea, Palpitations, Abdominal Pain, N/V, Weakness, Change in Sensation.     VITAL SIGNS:  T(F): 98.9 (01-24-21 @ 08:41)  HR: 98 (01-24-21 @ 08:41)  BP: 122/85 (01-24-21 @ 08:41)  RR: 18 (01-24-21 @ 08:41)  SpO2: 99% (01-24-21 @ 08:41)  Wt(kg): --    Input & Output:    01-23-21 @ 07:01  -  01-24-21 @ 07:00  --------------------------------------------------------  IN: 943.5 mL / OUT: 340 mL / NET: 603.5 mL    01-24-21 @ 07:01 - 01-24-21 @ 15:14  --------------------------------------------------------  IN: 769.6 mL / OUT: 0 mL / NET: 769.6 mL        PHYSICAL EXAM:  General: lethargic, minimally responsive, AAOx2  Neurological: AAOx2, no focal deficits, pt observed to move all 4 extremities  HEENT: NC/AT; pt could not follow instructions for eye exam, Pupils pinpoint and sluggishly reactive, clear conjunctiva but eyelids crusted-no discharge or exudate, no redness or itchiness, no nasal or oropharyngeal discharge or exudates, MMM with poor dentition  Neck: supple, no cervical or post-auricular lymphadenopathy  Cardiovascular: +S1/S2, no murmurs/rubs/gallops, RRR  Respiratory: CTA B/L, no diminished breath sounds, no wheezes/rales/rhonchi, no increased work of breathing or accessory muscle use  Gastrointestinal: soft, NT/ND; active BSx4 quadrants  Genitourinary: no suprapubic tenderness, no CVA tenderness  Extremities: WWP; no edema, clubbing or cyanosis  Vascular: 2+ radial, DP pulses B/L     MEDICATIONS  (STANDING):  acetaminophen  Suppository .. 650 milliGRAM(s) Rectal once  argatroban Infusion 2 MICROgram(s)/kG/Min (8.71 mL/Hr) IV Continuous <Continuous>  artificial  tears Solution 1 Drop(s) Both EYES daily  cefTRIAXone   IVPB 2000 milliGRAM(s) IV Intermittent every 24 hours  dextrose 5%. 1000 milliLiter(s) (50 mL/Hr) IV Continuous <Continuous>  metroNIDAZOLE  IVPB 500 milliGRAM(s) IV Intermittent every 8 hours  thiamine IVPB 500 milliGRAM(s) IV Intermittent every 8 hours    MEDICATIONS  (PRN):  LORazepam   Injectable 2 milliGRAM(s) IV Push every 4 hours PRN CIWA      Allergies    Capoten (Short breath; Rash; Hives)  heparin (Other (Mod to Severe))  penicillin (Short breath; Rash; Hives)    Intolerances        LABS:                        12.2   3.27  )-----------( 77       ( 24 Jan 2021 06:05 )             38.2     01-24    134<L>  |  100  |  10  ----------------------------<  97  3.6   |  24  |  1.07    Ca    8.9      24 Jan 2021 06:05  Phos  2.6     01-23  Mg     1.7     01-24    TPro  6.4  /  Alb  3.5  /  TBili  0.5  /  DBili  x   /  AST  25  /  ALT  13  /  AlkPhos  45  01-24    PT/INR - ( 24 Jan 2021 06:05 )   PT: 38.3 sec;   INR: 3.38          PTT - ( 24 Jan 2021 06:05 )  PTT:92.3 sec  Urinalysis Basic - ( 24 Jan 2021 11:30 )    Color: Yellow / Appearance: Clear / SG: >=1.030 / pH: x  Gluc: x / Ketone: Trace mg/dL  / Bili: Negative / Urobili: 0.2 E.U./dL   Blood: x / Protein: NEGATIVE mg/dL / Nitrite: NEGATIVE   Leuk Esterase: NEGATIVE / RBC: < 5 /HPF / WBC < 5 /HPF   Sq Epi: x / Non Sq Epi: 0-5 /HPF / Bacteria: Present /HPF        RADIOLOGY & ADDITIONAL TESTS:

## 2021-01-24 NOTE — SWALLOW BEDSIDE ASSESSMENT ADULT - PHARYNGEAL PHASE
No overt signs of aspiration. Laryngeal elevation appreciated upon palpation./Within functional limits

## 2021-01-24 NOTE — PROGRESS NOTE ADULT - PROBLEM SELECTOR PLAN 1
History of alcohol withdrawal. Alcohol 185 on admission. Last drink on day of admission 1/17. Librium taper d/c'd i/s/o AMS with NPO  -Folic Acid/MVI/Thiamine  -Fall precautions  -PT says no needs    #AMS-secondary to unknown etiology, likely drugs vs seizure  -stroke code called 1/20 AM when lethargy first observed, no evidence of stroke on imaging  - vEEG placed and no current seizure activity  -abg wnl  -utox wnl  -narcan 0.4 x 4 given 1/20 AM, will continue to reassess respiratory status and mental status.   - icu consulted yesterday but declined stepup given pt VSS stable and protecting his airway  - Use PRN ativan for withdrawal.  - Reinstating diet given improvement in mentation today History of alcohol withdrawal. Alcohol 185 on admission. Last drink on day of admission 1/17.   - ativan 2 mg Q4 PRN for CIWA>8  -Folic Acid/MVI/Thiamine  -Fall precautions  -PT says no needs    #AMS-secondary to unknown etiology, likely drugs vs seizure  -stroke code called 1/20 AM when lethargy first observed, no evidence of stroke on imaging  - vEEG placed and no current seizure activity  -abg wnl  -utox wnl  -narcan 0.4 x 4 given 1/20 AM, will continue to reassess respiratory status and mental status.   - Use PRN ativan for withdrawal.  - Reinstating diet given improvement in mentation

## 2021-01-24 NOTE — SWALLOW BEDSIDE ASSESSMENT ADULT - ADDITIONAL RECOMMENDATIONS
- Feed ONLY when awake, alert, attentive  - Do NOT force feed  - Thorough oral care  - Discontinue oral diet with any overt signs of aspiration, concerns, change in status

## 2021-01-24 NOTE — SWALLOW BEDSIDE ASSESSMENT ADULT - SLP GENERAL OBSERVATIONS
Pt received awake and alert, sustained arousal/alertness throughout session. Oriented x2, not to year. Pt followed 1-step directives, responded to yes/no questions.

## 2021-01-24 NOTE — PROGRESS NOTE ADULT - PROBLEM SELECTOR PLAN 8
F: D5 1/2 NS 75 cc/hr  E: Replete PRN  N: DASH/TLC  DVT ppx: Eliquis 5 mg  GI ppx: Protonix 40 mg  CODE: FULL  Dispo: DC 1/17 F: D5 1/2 NS 75 cc/hr  E: Replete PRN  N: Mechanical soft, thin liquids with supervision  DVT ppx: Eliquis 5 mg  GI ppx: Protonix 40 mg  CODE: FULL  Dispo: DC 1/17

## 2021-01-24 NOTE — PROGRESS NOTE ADULT - PROBLEM SELECTOR PLAN 2
History of A-fib on Eliquis and Toprol XL. CHADS-VASC 4, HAS-BLED 3.  - Reinstate Eliquis today given improvement in pt mental status and ability to eat History of A-fib on Eliquis and Toprol XL. CHADS-VASC 4, HAS-BLED 3.  - Reinstate Eliquis given improvement in pt mental status and ability to eat

## 2021-01-24 NOTE — SWALLOW BEDSIDE ASSESSMENT ADULT - SWALLOW EVAL: RECOMMENDED FEEDING/EATING TECHNIQUES
allow for swallow between intakes/alternate food with liquid/oral hygiene/position upright (90 degrees)/small sips/bites

## 2021-01-24 NOTE — PROGRESS NOTE ADULT - PROBLEM SELECTOR PLAN 3
History of LV thrombus with no thrombus on most recent ECHO during this admission. Life-long AC required.  - Eliquis 5 mg daily  - follow TTE-likely will do informal echo at bedside

## 2021-01-24 NOTE — PROGRESS NOTE ADULT - SUBJECTIVE AND OBJECTIVE BOX
HPI: 64 yo M with PMH poor medication adherence, LV thrombus (11/2020), pAfib (eliquis), CAD (s/p PCI), HFrEF (EF 30-35%, s/p AICD), HIT, hemorrhoids/gastritis, GIB, and ETOH abuse (c/b mult admissions for w/d, no sz/DTs/intubation) who p/w alcohol withdrawal symptoms of agitation, shaking, CP, SOB, N/V, and heart racing. Admitted for alcohol w/d c/b 1/20 acute lethargy/somnolence and 1mm pinpoint pupils BL, tongue biting, and urinary incontinence. Stroke code called with w/u neg, stroke and EEG in place and epilepsy following. Neuro consulted for continued lethargy. Less likely primary neuro etiology outside of stroke/seizure possibly in the setting of alcohol withdrawal.     ROS: no new complaints.    PMHX:  ALCOHOL WITHDRAWAL    ADVANCED ILLNESS    H/o or current diagnosis of HF- Contraindication to ACEI/ARBs    H/o or current diagnosis of HF- no contraindication to ACEI/ARBs    H/o or current diagnosis of HF- ACEI/ARB contraindication unknown    H/o or current diagnosis of HF- no contraindication to ACEI/ARBs    H/o or current diagnosis of HF- ACEI/ARB contraindication unknown    H/o or current diagnosis of HF- no contraindication to ACEI/ARBs    H/o or current diagnosis of HF- ACEI/ARB contraindication unknown    H/o or current diagnosis of HF- no contraindication to ACEI/ARBs    H/o or current diagnosis of HF- ACEI/ARB contraindication unknown    H/o or current diagnosis of HF- no contraindication to ACEI/ARBs    H/o or current diagnosis of HF- ACEI/ARB contraindication unknown    H/o or current diagnosis of HF- no contraindication to ACEI/ARBs    H/o or current diagnosis of HF- ACEI/ARB contraindication unknown    H/o or current diagnosis of HF- no contraindication to ACEI/ARBs    H/o or current diagnosis of HF- ACEI/ARB contraindication unknown    H/o or current diagnosis of HF- no contraindication to ACEI/ARBs    H/o or current diagnosis of HF- ACEI/ARB contraindication unknown    H/o or current diagnosis of HF- no contraindication to ACEI/ARBs    H/o or current diagnosis of HF- ACEI/ARB contraindication unknown    H/o or current diagnosis of HF- no contraindication to ACEI/ARBs    H/o or current diagnosis of HF- ACEI/ARB contraindication unknown    H/o or current diagnosis of HF- Contraindication to ACEI/ARBs    H/o or current diagnosis of HF- no contraindication to ACEI/ARBs    H/o or current diagnosis of HF- ACEI/ARB contraindication unknown    H/o or current diagnosis of HF- no contraindication to ACEI/ARBs    H/o or current diagnosis of HF- ACEI/ARB contraindication unknown    H/o or current diagnosis of HF- Contraindication to ACEI/ARBs    H/o or current diagnosis of HF- ACEI/ARB contraindication unknown    H/o or current diagnosis of HF- Contraindication to ACEI/ARBs    H/o or current diagnosis of HF- ACEI/ARB contraindication unknown    FH: alcoholism    FH: type 2 diabetes    FH: myocardial infarction    Family history of cardiac disorder in maternal grandfather (Grandparent)    No pertinent family history in first degree relatives    Handoff    MEWS Score    CAD (coronary artery disease)    ETOH abuse    Hyperlipidemia    Heparin-induced thrombocytopenia    Left ventricular thrombus    Atrial fibrillation    Hemorrhoid    Thrombus in heart chamber    ICD (implantable cardioverter-defibrillator) in place    Pacemaker    ETOH abuse    Paroxysmal a-fib    Myocardial infarction involving other coronary artery    Gastritis    Essential hypertension    Other specified cardiac dysrhythmias    Atherosclerosis of coronary artery    Pacemaker    Alcohol withdrawal    Nutrition, metabolism, and development symptoms    Atrial fibrillation    Left ventricular thrombus    HFrEF (heart failure with reduced ejection fraction)    CAD (coronary artery disease)    Alcohol withdrawal    S/P coronary artery stent placement    History of surgery to heart and great vessels, presenting hazards to health    Automatic implantable cardiac defibrillator in situ    CHEST PAIN    90+    Left ventricular thrombus    SysAdmin_VisitLink        MEDS:  acetaminophen  Suppository .. 650 milliGRAM(s) Rectal once  argatroban Infusion 2 MICROgram(s)/kG/Min IV Continuous <Continuous>  artificial  tears Solution 1 Drop(s) Both EYES daily  cefTRIAXone   IVPB 2000 milliGRAM(s) IV Intermittent every 24 hours  dextrose 5%. 1000 milliLiter(s) IV Continuous <Continuous>  LORazepam   Injectable 2 milliGRAM(s) IV Push every 4 hours PRN  metroNIDAZOLE  IVPB 500 milliGRAM(s) IV Intermittent every 8 hours  thiamine IVPB 500 milliGRAM(s) IV Intermittent every 8 hours    Capoten (Short breath; Rash; Hives)  heparin (Other (Mod to Severe))  penicillin (Short breath; Rash; Hives)      FHX: n    Vitals:  T(C): 37.2 (01-24-21 @ 08:41), Max: 37.7 (01-24-21 @ 06:34)  HR: 98 (01-24-21 @ 08:41) (88 - 98)  BP: 122/85 (01-24-21 @ 08:41) (110/77 - 122/85)  RR: 18 (01-24-21 @ 08:41) (16 - 18)  SpO2: 99% (01-24-21 @ 08:41) (98% - 99%)    Exam:  no neck rigidity, can bend neck forward and backward on command  pt sleepy but opens his eyes a bit more alert than yesterday, tells me his name, tells me he is 30yo. he follows simple commands.  face symm, +occulocephalics, perrl  moves all 4 antigravity on command    EEG 1/2021 - No focal abnormalities or epileptiform activity,    AP: 64 yo M with PMH poor medication adherence, LV thrombus (11/2020), pAfib (eliquis), CAD (s/p PCI), HFrEF (EF 30-35%, s/p AICD), HIT, hemorrhoids/gastritis, GIB, and ETOH abuse (c/b mult admissions for w/d, no sz/DTs/intubation) who p/w alcohol withdrawal symptoms of agitation, shaking, CP, SOB, N/V, and heart racing. Admitted for alcohol w/d c/b 1/20 acute lethargy/somnolence and 1mm pinpoint pupils BL, tongue biting, and urinary incontinence. Stroke code called with w/u neg, stroke and EEG in place and epilepsy following. Neuro consulted for continued lethargy.    Today, less lethargic and fever has improved while on abx. unclear etiology of lethargy, likely multifactorial.    - while CNS infection such as meningitis is possible, it would be a highly atypical clinical progression. given the lack of focality on any of the prior exams and apparent response to abx, doubt HSV. however, would recommend to consider LP if no other source found.  - check brain MRI as planned  - cont thiamine 500mg IV TID  - will follow    discussed with primary att

## 2021-01-25 LAB
ANION GAP SERPL CALC-SCNC: 9 MMOL/L — SIGNIFICANT CHANGE UP (ref 5–17)
APTT BLD: 63.6 SEC — HIGH (ref 27.5–35.5)
BUN SERPL-MCNC: 6 MG/DL — LOW (ref 7–23)
CALCIUM SERPL-MCNC: 8.5 MG/DL — SIGNIFICANT CHANGE UP (ref 8.4–10.5)
CHLORIDE SERPL-SCNC: 99 MMOL/L — SIGNIFICANT CHANGE UP (ref 96–108)
CO2 SERPL-SCNC: 24 MMOL/L — SIGNIFICANT CHANGE UP (ref 22–31)
CREAT SERPL-MCNC: 0.9 MG/DL — SIGNIFICANT CHANGE UP (ref 0.5–1.3)
CULTURE RESULTS: NO GROWTH — SIGNIFICANT CHANGE UP
GAS PNL BLDA: SIGNIFICANT CHANGE UP
GLUCOSE SERPL-MCNC: 107 MG/DL — HIGH (ref 70–99)
HCT VFR BLD CALC: 34.8 % — LOW (ref 39–50)
HGB BLD-MCNC: 11.5 G/DL — LOW (ref 13–17)
MAGNESIUM SERPL-MCNC: 1.8 MG/DL — SIGNIFICANT CHANGE UP (ref 1.6–2.6)
MCHC RBC-ENTMCNC: 27.8 PG — SIGNIFICANT CHANGE UP (ref 27–34)
MCHC RBC-ENTMCNC: 33 GM/DL — SIGNIFICANT CHANGE UP (ref 32–36)
MCV RBC AUTO: 84.1 FL — SIGNIFICANT CHANGE UP (ref 80–100)
NRBC # BLD: 0 /100 WBCS — SIGNIFICANT CHANGE UP (ref 0–0)
PHOSPHATE SERPL-MCNC: 3.4 MG/DL — SIGNIFICANT CHANGE UP (ref 2.5–4.5)
PLATELET # BLD AUTO: 91 K/UL — LOW (ref 150–400)
POTASSIUM SERPL-MCNC: 3.6 MMOL/L — SIGNIFICANT CHANGE UP (ref 3.5–5.3)
POTASSIUM SERPL-SCNC: 3.6 MMOL/L — SIGNIFICANT CHANGE UP (ref 3.5–5.3)
RBC # BLD: 4.14 M/UL — LOW (ref 4.2–5.8)
RBC # FLD: 15.1 % — HIGH (ref 10.3–14.5)
SODIUM SERPL-SCNC: 132 MMOL/L — LOW (ref 135–145)
SPECIMEN SOURCE: SIGNIFICANT CHANGE UP
WBC # BLD: 4.38 K/UL — SIGNIFICANT CHANGE UP (ref 3.8–10.5)
WBC # FLD AUTO: 4.38 K/UL — SIGNIFICANT CHANGE UP (ref 3.8–10.5)

## 2021-01-25 PROCEDURE — 70450 CT HEAD/BRAIN W/O DYE: CPT | Mod: 26

## 2021-01-25 PROCEDURE — 99233 SBSQ HOSP IP/OBS HIGH 50: CPT

## 2021-01-25 RX ORDER — MAGNESIUM SULFATE 500 MG/ML
1 VIAL (ML) INJECTION ONCE
Refills: 0 | Status: COMPLETED | OUTPATIENT
Start: 2021-01-25 | End: 2021-01-25

## 2021-01-25 RX ADMIN — Medication 1 DROP(S): at 11:21

## 2021-01-25 RX ADMIN — Medication 100 MILLIGRAM(S): at 06:18

## 2021-01-25 RX ADMIN — Medication 100 MILLIGRAM(S): at 13:34

## 2021-01-25 RX ADMIN — Medication 100 GRAM(S): at 09:32

## 2021-01-25 RX ADMIN — CEFTRIAXONE 100 MILLIGRAM(S): 500 INJECTION, POWDER, FOR SOLUTION INTRAMUSCULAR; INTRAVENOUS at 13:34

## 2021-01-25 RX ADMIN — Medication 100 MILLIGRAM(S): at 21:43

## 2021-01-25 RX ADMIN — Medication 105 MILLIGRAM(S): at 17:43

## 2021-01-25 RX ADMIN — Medication 105 MILLIGRAM(S): at 03:28

## 2021-01-25 RX ADMIN — ARGATROBAN 8.71 MICROGRAM(S)/KG/MIN: 50 INJECTION, SOLUTION INTRAVENOUS at 03:28

## 2021-01-25 RX ADMIN — Medication 105 MILLIGRAM(S): at 11:21

## 2021-01-25 NOTE — PROGRESS NOTE ADULT - ASSESSMENT
Pt is a 66 yo M with PMH poor medication adherence, LV thrombus (11/2020), pAfib (eliquis), CAD (s/p PCI), HFrEF (EF 30-35%, s/p AICD), HIT, hemorrhoids/gastritis, GIB, and ETOH abuse (c/b mult admissions for w/d, no sz/DTs/intubation) who p/w alcohol withdrawal symptoms of agitation, shaking, CP, SOB, N/V, and heart racing. Admitted for alcohol w/d c/b 1/20 acute lethargy/somnolence and 1mm pinpoint pupils BL, tongue biting, and urinary incontinence. Stroke code called with w/u neg, vEEG neg. Neuro consulted for continued lethargy. Less likely primary neuro etiology outside of stroke/seizure possibly in the setting of alcohol withdrawal. Now with slight improvement in lethargy.    #Lethargy  - slowly improving  - pending MR brain  - defer LP at this time given HDS, afebrile, and able to perform chin-chest without restriction/pain    Discussed with attending. Neurology will continue to follow.

## 2021-01-25 NOTE — PROVIDER CONTACT NOTE (OTHER) - ACTION/TREATMENT ORDERED:
straight cath ordered
Dr. Batista came to assess pt. Pt now stated he feels better.
No intervention at this time. Will continue to monitor.

## 2021-01-25 NOTE — PROVIDER CONTACT NOTE (OTHER) - ASSESSMENT
/81 Pulse 80 02 sat 95.
Pt's BP has been trending 110-130s but this shift BP was initially 100/69 HR 94 then in AM BP 95/69 HR 81. Pt was mainly drowsy overnight but aroused to voice. In terms of conversing, pt asked "where's my remote" and asked for food. Pt ate two yogurts with feeding assistance by PCA.
Very lethargic but arousable
No facial droop, speech clear, no deficit in both extremities. VS 98.6, 82, 137/80, 17, 97% POx RA.

## 2021-01-25 NOTE — CHART NOTE - NSCHARTNOTEFT_GEN_A_CORE
Pt is a 64 yo M with PMH poor medication adherence, LV thrombus (11/2020), pAfib (eliquis), CAD (s/p PCI), HFrEF (EF 30-35%, s/p AICD), HIT, hemorrhoids/gastritis, GIB, and ETOH abuse (c/b mult admissions for w/d, no sz/DTs/intubation) who p/w alcohol withdrawal symptoms of agitation, shaking, CP, SOB, N/V, and heart racing. Admitted for alcohol w/d c/b 1/20 acute lethargy/somnolence and 1mm pinpoint pupils BL, tongue biting, and urinary incontinence. Stroke code called with w/u neg, vEEG neg. Neuro consulted for continued lethargy. Less likely primary neuro etiology outside of stroke/seizure possibly in the setting of alcohol withdrawal. Now with slight improvement in lethargy.    Stroke team was called on 1/25 PM due to results of CTH.        CT Head No Cont (01.25.21 @ 19:52)     IMPRESSION:  1.  Interval subacute left thalamic infarct, left posterior cerebral artery territory.  2.  No intracranial hemorrhage.    Agree, there is development of rounded hypoattenuation in the LEFT thalamus ventrally most compatible with subacute infarct, with additional subacute infarct seen in the subthalamic region. Agree: There is no acute intracranial hemorrhage, specifically no hemorrhagic transformation of infarct.    In addition, smaller but abnormal hypoattenuation is also seen within the RIGHT medial thalamus on axial image 19. In addition, a small subacute infarct is now visible in the left cerebellar hemisphere on coronal image 20 and sagittal image 21.    Review of recent CTA from 01/20/2021 shows no intracranial steno-occlusive disease of either vertebral artery, the basilar artery or either posterior cerebral artery. Embolic etiology for infarcts is favored.    Stroke team assessed the patient.   Physical exam:  Neurologic:  -Mental status: lethargic, mumbles name, however, says he is on a boat. Not following commands  -Cranial nerves:   II: Visual fields are full to confrontation.  III, IV, VI: eyes midline, pupils about 2mm reactive to light   VII: Face is symmetric with normal eye closure and smile  Motor: spontaneously moves x4 extremities, does not lift extremities to command, however, when examiner lifts arms and legs holds them up with some drift bilaterally, right side tends to drift more prominently  Rapid alternating movements intact and symmetric  Sensation: withdraws to noxious x4 extremities, however, withdraws less on the right arm and right leg   Coordination: unable to assess  Reflexes: Downgoing toes bilaterally    Recommendations:  - Neuro checks q 8 hours  -  Repeat HCT for acute changes in neuro exam  - STAT MRI short stroke protocol - to determine it patient is having multiple acute strokes   - if MRI cannot be completed tonight due to need for AICD deactivation, order CTA of head and neck and CTP STAT   - continue Argatobran infusion - secondary stroke prevention    - Discussed with Dr. Brantley, stroke team will continue to follow.

## 2021-01-25 NOTE — PROGRESS NOTE ADULT - SUBJECTIVE AND OBJECTIVE BOX
***INCOMPLETE***  INTERVAL HPI/OVERNIGHT EVENTS:      On further ROS, patient did not have complaint of: Headache, Blurred Vision, Cough, Dyspnea, Palpitations, Abdominal Pain, N/V, Weakness, Change in Sensation.     VITAL SIGNS:  T(F): 98.4 (01-25-21 @ 06:09)  HR: 83 (01-25-21 @ 06:09)  BP: 95/69 (01-25-21 @ 06:09)  RR: 16 (01-25-21 @ 06:09)  SpO2: 97% (01-25-21 @ 06:09)  Wt(kg): --    Input & Output:    01-24-21 @ 07:01  -  01-25-21 @ 07:00  --------------------------------------------------------  IN: 1524 mL / OUT: 950 mL / NET: 574 mL        PHYSICAL EXAM:  General: Comfortable, pleasant/anxious/agitated, Ill-appearing, well-nourished/frail/cachectic, comfortable / in distress  Neurological: AAOx3, no focal deficits  HEENT: NC/AT; EOMI, PERRL, clear conjunctiva, no nasal or oropharyngeal discharge or exudates, MMM  Neck: supple, no cervical or post-auricular lymphadenopathy  Cardiovascular: +S1/S2, no murmurs/rubs/gallops, RRR  Respiratory: CTA B/L, no diminished breath sounds, no wheezes/rales/rhonchi, no increased work of breathing or accessory muscle use  Gastrointestinal: soft, NT/ND; active BSx4 quadrants  Genitourinary: no suprapubic tenderness, no CVA tenderness  Extremities: WWP; no edema, clubbing or cyanosis  Vascular: 2+ radial, DP/PT pulses B/L  Skin: no rashes  Lines/Drains:     MEDICATIONS  (STANDING):  acetaminophen  Suppository .. 650 milliGRAM(s) Rectal once  argatroban Infusion 2 MICROgram(s)/kG/Min (8.71 mL/Hr) IV Continuous <Continuous>  artificial  tears Solution 1 Drop(s) Both EYES daily  cefTRIAXone   IVPB 2000 milliGRAM(s) IV Intermittent every 24 hours  dextrose 5%. 1000 milliLiter(s) (50 mL/Hr) IV Continuous <Continuous>  metroNIDAZOLE  IVPB 500 milliGRAM(s) IV Intermittent every 8 hours  thiamine IVPB 500 milliGRAM(s) IV Intermittent every 8 hours    MEDICATIONS  (PRN):  LORazepam   Injectable 2 milliGRAM(s) IV Push every 4 hours PRN CIWA      Allergies    Capoten (Short breath; Rash; Hives)  heparin (Other (Mod to Severe))  penicillin (Short breath; Rash; Hives)    Intolerances        LABS:                        11.5   4.38  )-----------( 91       ( 25 Jan 2021 06:09 )             34.8     01-25    132<L>  |  99  |  6<L>  ----------------------------<  107<H>  3.6   |  24  |  0.90    Ca    8.5      25 Jan 2021 06:09  Phos  3.4     01-25  Mg     1.8     01-25    TPro  6.4  /  Alb  3.5  /  TBili  0.5  /  DBili  x   /  AST  25  /  ALT  13  /  AlkPhos  45  01-24    PT/INR - ( 24 Jan 2021 06:05 )   PT: 38.3 sec;   INR: 3.38          PTT - ( 24 Jan 2021 06:05 )  PTT:92.3 sec  Urinalysis Basic - ( 24 Jan 2021 11:30 )    Color: Yellow / Appearance: Clear / SG: >=1.030 / pH: x  Gluc: x / Ketone: Trace mg/dL  / Bili: Negative / Urobili: 0.2 E.U./dL   Blood: x / Protein: NEGATIVE mg/dL / Nitrite: NEGATIVE   Leuk Esterase: NEGATIVE / RBC: < 5 /HPF / WBC < 5 /HPF   Sq Epi: x / Non Sq Epi: 0-5 /HPF / Bacteria: Present /HPF        RADIOLOGY & ADDITIONAL TESTS:   INTERVAL HPI/OVERNIGHT EVENTS:  ÓSCAR o/n, pt pending MRI today, possibly LP if nothing on MRI    On further ROS, patient did not have complaint of: Headache, Blurred Vision, Cough, Dyspnea, Palpitations, Abdominal Pain, N/V, Weakness, Change in Sensation.     VITAL SIGNS:  T(F): 98.4 (01-25-21 @ 06:09)  HR: 83 (01-25-21 @ 06:09)  BP: 95/69 (01-25-21 @ 06:09)  RR: 16 (01-25-21 @ 06:09)  SpO2: 97% (01-25-21 @ 06:09)  Wt(kg): --    Input & Output:    01-24-21 @ 07:01  -  01-25-21 @ 07:00  --------------------------------------------------------  IN: 1524 mL / OUT: 950 mL / NET: 574 mL      PHYSICAL EXAM:  General: More awake this AM but still tired, AAOx2  Neurological: AAOx2, no focal deficits, pt observed to move all 4 extremities  HEENT: NC/AT; pt could not follow instructions for eye exam, PERRL, MMM with poor dentition  Neck: supple, no cervical or post-auricular lymphadenopathy  Cardiovascular: +S1/S2, no murmurs/rubs/gallops, RRR  Respiratory: CTA B/L, no diminished breath sounds, no wheezes/rales/rhonchi, no increased work of breathing or accessory muscle use  Gastrointestinal: soft, NT/ND; active BSx4 quadrants  Genitourinary: no suprapubic tenderness, no CVA tenderness  Extremities: WWP; no edema, clubbing or cyanosis  Vascular: 2+ radial, DP pulses B/L     MEDICATIONS  (STANDING):  acetaminophen  Suppository .. 650 milliGRAM(s) Rectal once  argatroban Infusion 2 MICROgram(s)/kG/Min (8.71 mL/Hr) IV Continuous <Continuous>  artificial  tears Solution 1 Drop(s) Both EYES daily  cefTRIAXone   IVPB 2000 milliGRAM(s) IV Intermittent every 24 hours  dextrose 5%. 1000 milliLiter(s) (50 mL/Hr) IV Continuous <Continuous>  metroNIDAZOLE  IVPB 500 milliGRAM(s) IV Intermittent every 8 hours  thiamine IVPB 500 milliGRAM(s) IV Intermittent every 8 hours    MEDICATIONS  (PRN):  LORazepam   Injectable 2 milliGRAM(s) IV Push every 4 hours PRN CIWA      Allergies    Capoten (Short breath; Rash; Hives)  heparin (Other (Mod to Severe))  penicillin (Short breath; Rash; Hives)    Intolerances        LABS:                        11.5   4.38  )-----------( 91       ( 25 Jan 2021 06:09 )             34.8     01-25    132<L>  |  99  |  6<L>  ----------------------------<  107<H>  3.6   |  24  |  0.90    Ca    8.5      25 Jan 2021 06:09  Phos  3.4     01-25  Mg     1.8     01-25    TPro  6.4  /  Alb  3.5  /  TBili  0.5  /  DBili  x   /  AST  25  /  ALT  13  /  AlkPhos  45  01-24    PT/INR - ( 24 Jan 2021 06:05 )   PT: 38.3 sec;   INR: 3.38          PTT - ( 24 Jan 2021 06:05 )  PTT:92.3 sec  Urinalysis Basic - ( 24 Jan 2021 11:30 )    Color: Yellow / Appearance: Clear / SG: >=1.030 / pH: x  Gluc: x / Ketone: Trace mg/dL  / Bili: Negative / Urobili: 0.2 E.U./dL   Blood: x / Protein: NEGATIVE mg/dL / Nitrite: NEGATIVE   Leuk Esterase: NEGATIVE / RBC: < 5 /HPF / WBC < 5 /HPF   Sq Epi: x / Non Sq Epi: 0-5 /HPF / Bacteria: Present /HPF        RADIOLOGY & ADDITIONAL TESTS:

## 2021-01-25 NOTE — PROGRESS NOTE ADULT - PROBLEM SELECTOR PLAN 8
F: D5 1/2 NS 75 cc/hr  E: Replete PRN  N: Mechanical soft, thin liquids with supervision  DVT ppx: Eliquis 5 mg  GI ppx: Protonix 40 mg  CODE: FULL  Dispo: DC 1/17

## 2021-01-25 NOTE — PROGRESS NOTE ADULT - SUBJECTIVE AND OBJECTIVE BOX
SUBJECTIVE HPI: Pt seen and examined at bedside, initially seen and refusing exam/wanting to sleep. Later seen with attending and eating breakfast, more participatory. No complaints.     MEDICATIONS  (STANDING):  acetaminophen  Suppository .. 650 milliGRAM(s) Rectal once  argatroban Infusion 2 MICROgram(s)/kG/Min (8.71 mL/Hr) IV Continuous <Continuous>  artificial  tears Solution 1 Drop(s) Both EYES daily  cefTRIAXone   IVPB 2000 milliGRAM(s) IV Intermittent every 24 hours  dextrose 5%. 1000 milliLiter(s) (50 mL/Hr) IV Continuous <Continuous>  metroNIDAZOLE  IVPB 500 milliGRAM(s) IV Intermittent every 8 hours  thiamine IVPB 500 milliGRAM(s) IV Intermittent every 8 hours    MEDICATIONS  (PRN):  LORazepam   Injectable 2 milliGRAM(s) IV Push every 4 hours PRN CIWA    Vital Signs Last 24 Hrs  T(C): 36.9 (25 Jan 2021 06:09), Max: 37.6 (24 Jan 2021 22:06)  T(F): 98.4 (25 Jan 2021 06:09), Max: 99.6 (24 Jan 2021 22:06)  HR: 83 (25 Jan 2021 06:09) (83 - 94)  BP: 95/69 (25 Jan 2021 06:09) (95/69 - 115/85)  BP(mean): --  RR: 16 (25 Jan 2021 06:09) (16 - 17)  SpO2: 97% (25 Jan 2021 06:09) (95% - 98%)    PHYSICAL EXAM:  Constitutional: WDWN resting comfortably in bed; NAD; eyes open and conversational  Head: NC/AT; able to perform head-chin without pain or restriction  Eyes: pupils constricted but reactive BL, EOMI; anicteric sclera  ENT: no nasal discharge; MMM; poor dentition  Neck: supple; no JVD  Respiratory: CTA B/L; no W/R/R  Cardiac: +S1/S2; RRR; no M/R/G  Gastrointestinal: soft, NT/ND; no rebound or guarding; +BSx4  Extremities: WWP, no clubbing or cyanosis; no peripheral edema  Neurologic:  -Mental status: alert and awake. conversational. remote memory intact. AOx2 (self, hospital)  -Cranial nerves:   II: visual fields are full to confrontation.  III, IV, VI: EOMI without nystagmus, some difficulty with upward gaze. Pupils equally constricted with sluggish response to light  V:  Facial sensation V1-V3 equal and intact   VII: Face is symmetric with normal eye closure and smile  VIII: Hearing is bilaterally intact  IX, X: Uvula is midline and soft palate rises symmetrically  XI: Head turning and shoulder shrug are intact.  XII: Tongue protrudes midline  Motor: Moving all extremities spontaneously, normal bulk and tone. mm strength 5/5 BL shoulders flexion/extension/abduction, 5/5 BL elbow flexion/extension, 5/5 BL handgrip, pronator drift neg BL UE, mm strength 5/5 L hip flexion/extension/abduction/adduction, 1/5 R hip/knee flexion/extension, 5/5 BL dorsiflexion/plantarflexion  Sensation: Intact to light touch bilaterally  Reflexes: Downgoing toes bilaterally; 2+ DTRs BL patellar/achilles    LABS:                        13.6   4.72  )-----------( 108      ( 22 Jan 2021 06:39 )             42.5     01-22    138  |  101  |  11  ----------------------------<  76  4.4   |  23  |  1.08    Ca    9.0      22 Jan 2021 07:31  Phos  3.3     01-22  Mg     2.1     01-22    TPro  7.1  /  Alb  3.9  /  TBili  0.6  /  DBili  x   /  AST  24  /  ALT  13  /  AlkPhos  58  01-20    PTT - ( 21 Jan 2021 23:18 )  PTT:80.6 sec    RADIOLOGY & ADDITIONAL STUDIES: Reviewed.

## 2021-01-25 NOTE — PROGRESS NOTE ADULT - PROBLEM SELECTOR PLAN 1
History of alcohol withdrawal. Alcohol 185 on admission. Last drink on day of admission 1/17.   - ativan 2 mg Q4 PRN for CIWA>8  -Folic Acid/MVI/Thiamine  -Fall precautions  -PT says no needs    #AMS-secondary to unknown etiology, likely drugs vs seizure  -stroke code called 1/20 AM when lethargy first observed, no evidence of stroke on imaging  - vEEG placed and no current seizure activity  -abg wnl  -utox wnl  -narcan 0.4 x 4 given 1/20 AM, will continue to reassess respiratory status and mental status.   - Use PRN ativan for withdrawal.  - Reinstating diet given improvement in mentation

## 2021-01-25 NOTE — PROGRESS NOTE ADULT - ATTENDING COMMENTS
Per Dr. Hdez, as of last week patient was alert and oriented x 3, fully conversant.  Today is drowsy but sitting up in bed eating breakfast with assistance of PCA, oriented x 2 and following simple but not complex commands -- improved from initial neurology consult exam but not yet back at baseline.  Differential includes prolonged postictal state, stroke (has known LV thrombus, on argatroban), toxic-metabolic encephalopathy.  Doubt meningitis/encephalitis.  MRI pending.  Will follow.

## 2021-01-25 NOTE — PROGRESS NOTE ADULT - PROBLEM SELECTOR PLAN 2
History of A-fib on Eliquis and Toprol XL. CHADS-VASC 4, HAS-BLED 3.  - Reinstate Eliquis given improvement in pt mental status and ability to eat

## 2021-01-26 DIAGNOSIS — I63.9 CEREBRAL INFARCTION, UNSPECIFIED: ICD-10-CM

## 2021-01-26 DIAGNOSIS — R41.82 ALTERED MENTAL STATUS, UNSPECIFIED: ICD-10-CM

## 2021-01-26 LAB
AMMONIA BLD-MCNC: <10 UMOL/L — LOW (ref 11–55)
ANION GAP SERPL CALC-SCNC: 11 MMOL/L — SIGNIFICANT CHANGE UP (ref 5–17)
APTT BLD: 74.4 SEC — HIGH (ref 27.5–35.5)
APTT BLD: 76.8 SEC — HIGH (ref 27.5–35.5)
BUN SERPL-MCNC: 5 MG/DL — LOW (ref 7–23)
CALCIUM SERPL-MCNC: 9.1 MG/DL — SIGNIFICANT CHANGE UP (ref 8.4–10.5)
CHLORIDE SERPL-SCNC: 102 MMOL/L — SIGNIFICANT CHANGE UP (ref 96–108)
CO2 SERPL-SCNC: 23 MMOL/L — SIGNIFICANT CHANGE UP (ref 22–31)
CREAT SERPL-MCNC: 0.89 MG/DL — SIGNIFICANT CHANGE UP (ref 0.5–1.3)
GLUCOSE SERPL-MCNC: 96 MG/DL — SIGNIFICANT CHANGE UP (ref 70–99)
HCT VFR BLD CALC: 40.5 % — SIGNIFICANT CHANGE UP (ref 39–50)
HGB BLD-MCNC: 12.8 G/DL — LOW (ref 13–17)
INR BLD: 1.87 — HIGH (ref 0.88–1.16)
MAGNESIUM SERPL-MCNC: 1.6 MG/DL — SIGNIFICANT CHANGE UP (ref 1.6–2.6)
MCHC RBC-ENTMCNC: 27.1 PG — SIGNIFICANT CHANGE UP (ref 27–34)
MCHC RBC-ENTMCNC: 31.6 GM/DL — LOW (ref 32–36)
MCV RBC AUTO: 85.6 FL — SIGNIFICANT CHANGE UP (ref 80–100)
NRBC # BLD: 0 /100 WBCS — SIGNIFICANT CHANGE UP (ref 0–0)
PHOSPHATE SERPL-MCNC: 3.7 MG/DL — SIGNIFICANT CHANGE UP (ref 2.5–4.5)
PLATELET # BLD AUTO: 122 K/UL — LOW (ref 150–400)
POTASSIUM SERPL-MCNC: 4.6 MMOL/L — SIGNIFICANT CHANGE UP (ref 3.5–5.3)
POTASSIUM SERPL-SCNC: 4.6 MMOL/L — SIGNIFICANT CHANGE UP (ref 3.5–5.3)
PROTHROM AB SERPL-ACNC: 21.8 SEC — HIGH (ref 10.6–13.6)
RBC # BLD: 4.73 M/UL — SIGNIFICANT CHANGE UP (ref 4.2–5.8)
RBC # FLD: 15.3 % — HIGH (ref 10.3–14.5)
SODIUM SERPL-SCNC: 136 MMOL/L — SIGNIFICANT CHANGE UP (ref 135–145)
WBC # BLD: 4.93 K/UL — SIGNIFICANT CHANGE UP (ref 3.8–10.5)
WBC # FLD AUTO: 4.93 K/UL — SIGNIFICANT CHANGE UP (ref 3.8–10.5)

## 2021-01-26 PROCEDURE — 99356: CPT | Mod: GC

## 2021-01-26 PROCEDURE — 70496 CT ANGIOGRAPHY HEAD: CPT | Mod: 26

## 2021-01-26 PROCEDURE — 70498 CT ANGIOGRAPHY NECK: CPT | Mod: 26

## 2021-01-26 PROCEDURE — 99232 SBSQ HOSP IP/OBS MODERATE 35: CPT

## 2021-01-26 PROCEDURE — 70450 CT HEAD/BRAIN W/O DYE: CPT | Mod: 26,59

## 2021-01-26 PROCEDURE — 0042T: CPT

## 2021-01-26 PROCEDURE — 99233 SBSQ HOSP IP/OBS HIGH 50: CPT | Mod: GC

## 2021-01-26 PROCEDURE — 70551 MRI BRAIN STEM W/O DYE: CPT | Mod: 26

## 2021-01-26 RX ORDER — MAGNESIUM SULFATE 500 MG/ML
2 VIAL (ML) INJECTION ONCE
Refills: 0 | Status: COMPLETED | OUTPATIENT
Start: 2021-01-26 | End: 2021-01-26

## 2021-01-26 RX ORDER — PANTOPRAZOLE SODIUM 20 MG/1
40 TABLET, DELAYED RELEASE ORAL DAILY
Refills: 0 | Status: DISCONTINUED | OUTPATIENT
Start: 2021-01-26 | End: 2021-01-28

## 2021-01-26 RX ORDER — ATORVASTATIN CALCIUM 80 MG/1
80 TABLET, FILM COATED ORAL AT BEDTIME
Refills: 0 | Status: DISCONTINUED | OUTPATIENT
Start: 2021-01-26 | End: 2021-02-03

## 2021-01-26 RX ADMIN — PANTOPRAZOLE SODIUM 40 MILLIGRAM(S): 20 TABLET, DELAYED RELEASE ORAL at 19:19

## 2021-01-26 RX ADMIN — ARGATROBAN 8.71 MICROGRAM(S)/KG/MIN: 50 INJECTION, SOLUTION INTRAVENOUS at 09:22

## 2021-01-26 RX ADMIN — ARGATROBAN 8.71 MICROGRAM(S)/KG/MIN: 50 INJECTION, SOLUTION INTRAVENOUS at 18:27

## 2021-01-26 RX ADMIN — Medication 105 MILLIGRAM(S): at 09:05

## 2021-01-26 RX ADMIN — Medication 105 MILLIGRAM(S): at 02:55

## 2021-01-26 RX ADMIN — Medication 100 MILLIGRAM(S): at 07:08

## 2021-01-26 RX ADMIN — Medication 50 GRAM(S): at 13:26

## 2021-01-26 RX ADMIN — ARGATROBAN 8.71 MICROGRAM(S)/KG/MIN: 50 INJECTION, SOLUTION INTRAVENOUS at 00:40

## 2021-01-26 RX ADMIN — ARGATROBAN 8.71 MICROGRAM(S)/KG/MIN: 50 INJECTION, SOLUTION INTRAVENOUS at 05:40

## 2021-01-26 RX ADMIN — Medication 1 DROP(S): at 13:27

## 2021-01-26 RX ADMIN — CEFTRIAXONE 100 MILLIGRAM(S): 500 INJECTION, POWDER, FOR SOLUTION INTRAMUSCULAR; INTRAVENOUS at 13:39

## 2021-01-26 RX ADMIN — Medication 100 MILLIGRAM(S): at 13:28

## 2021-01-26 RX ADMIN — SODIUM CHLORIDE 50 MILLILITER(S): 9 INJECTION, SOLUTION INTRAVENOUS at 00:40

## 2021-01-26 NOTE — PROGRESS NOTE ADULT - SUBJECTIVE AND OBJECTIVE BOX
SUBJECTIVE HPI: Pt seen and examined at bedside resting comfortably. CT scan from yesterday shows multiple subacute infarcts. Pending MR brain today.    MEDICATIONS  (STANDING):  acetaminophen  Suppository .. 650 milliGRAM(s) Rectal once  argatroban Infusion 2 MICROgram(s)/kG/Min (8.71 mL/Hr) IV Continuous <Continuous>  artificial  tears Solution 1 Drop(s) Both EYES daily  cefTRIAXone   IVPB 2000 milliGRAM(s) IV Intermittent every 24 hours  dextrose 5%. 1000 milliLiter(s) (50 mL/Hr) IV Continuous <Continuous>  magnesium sulfate  IVPB 2 Gram(s) IV Intermittent once  metroNIDAZOLE  IVPB 500 milliGRAM(s) IV Intermittent every 8 hours    MEDICATIONS  (PRN):  LORazepam   Injectable 2 milliGRAM(s) IV Push every 4 hours PRN CIWA    Vital Signs Last 24 Hrs  T(C): 36.9 (26 Jan 2021 04:54), Max: 37.1 (25 Jan 2021 21:46)  T(F): 98.5 (26 Jan 2021 04:54), Max: 98.7 (25 Jan 2021 21:46)  HR: 70 (26 Jan 2021 04:54) (70 - 84)  BP: 109/72 (26 Jan 2021 04:54) (109/72 - 118/85)  BP(mean): --  RR: 17 (26 Jan 2021 04:54) (16 - 17)  SpO2: 97% (26 Jan 2021 04:54) (95% - 98%)    PHYSICAL EXAM:  Constitutional: WDWN resting comfortably in bed; NAD; eyes open and conversational  Head: NC/AT; able to perform head-chin without pain or restriction  Eyes: pupils constricted but reactive BL, EOMI; anicteric sclera  ENT: no nasal discharge; MMM; poor dentition  Neck: supple; no JVD  Respiratory: CTA B/L; no W/R/R  Cardiac: +S1/S2; RRR; no M/R/G  Gastrointestinal: soft, NT/ND; no rebound or guarding; +BSx4  Extremities: WWP, no clubbing or cyanosis; no peripheral edema  Neurologic:  -Mental status: alert and awake. conversational. remote memory intact. AOx2 (self, hospital)  -Cranial nerves:   II: visual fields are full to confrontation.  III, IV, VI: EOMI without nystagmus, some difficulty with upward gaze. Pupils equally constricted with sluggish response to light  V:  Facial sensation V1-V3 equal and intact   VII: Face is symmetric with normal eye closure and smile  VIII: Hearing is bilaterally intact  IX, X: Uvula is midline and soft palate rises symmetrically  XI: Head turning and shoulder shrug are intact.  XII: Tongue protrudes midline  Motor: Moving all extremities spontaneously, normal bulk and tone. mm strength 5/5 BL shoulders flexion/extension/abduction, 5/5 BL elbow flexion/extension, 5/5 BL handgrip, pronator drift neg BL UE, mm strength 5/5 L hip flexion/extension/abduction/adduction, 1/5 R hip/knee flexion/extension, 5/5 BL dorsiflexion/plantarflexion  Sensation: Intact to light touch bilaterally  Reflexes: Downgoing toes bilaterally; 2+ DTRs BL patellar/achilles    LABS:                        12.8   4.93  )-----------( 122      ( 26 Jan 2021 09:13 )             40.5     01-26    136  |  102  |  5<L>  ----------------------------<  96  4.6   |  23  |  0.89    Ca    9.1      26 Jan 2021 09:13  Phos  3.7     01-26  Mg     1.6     01-26      PT/INR - ( 26 Jan 2021 09:13 )   PT: 21.8 sec;   INR: 1.87          PTT - ( 26 Jan 2021 09:13 )  PTT:76.8 sec    CAPILLARY BLOOD GLUCOSE          RADIOLOGY & ADDITIONAL TESTS: Reviewed.

## 2021-01-26 NOTE — PROGRESS NOTE ADULT - SUBJECTIVE AND OBJECTIVE BOX
**Incomplete Note**  OVERNIGHT EVENTS:    SUBJECTIVE / INTERVAL HPI: Patient seen and examined at bedside.     VITAL SIGNS:  Vital Signs Last 24 Hrs  T(C): 36.9 (26 Jan 2021 15:27), Max: 37.1 (25 Jan 2021 21:46)  T(F): 98.4 (26 Jan 2021 15:27), Max: 98.7 (25 Jan 2021 21:46)  HR: 83 (26 Jan 2021 15:27) (70 - 83)  BP: 104/78 (26 Jan 2021 15:27) (104/78 - 118/85)  BP(mean): --  RR: 18 (26 Jan 2021 15:27) (16 - 18)  SpO2: 96% (26 Jan 2021 15:27) (95% - 97%)    01-25-21 @ 07:01  -  01-26-21 @ 07:00  --------------------------------------------------------  IN: 804.4 mL / OUT: 950 mL / NET: -145.6 mL    01-26-21 @ 07:01  -  01-26-21 @ 17:15  --------------------------------------------------------  IN: 100 mL / OUT: 0 mL / NET: 100 mL        PHYSICAL EXAM:    General: WDWN  HEENT: NC/AT; PERRL, anicteric sclera; MMM  Neck: supple  Cardiovascular: +S1/S2; RRR  Respiratory: CTA B/L; no W/R/R  Gastrointestinal: soft, NT/ND; +BSx4  Extremities: WWP; no edema, clubbing or cyanosis  Vascular: 2+ radial, DP/PT pulses B/L  Neurological: AAOx3; no focal deficits    MEDICATIONS:  MEDICATIONS  (STANDING):  acetaminophen  Suppository .. 650 milliGRAM(s) Rectal once  argatroban Infusion 2 MICROgram(s)/kG/Min (8.71 mL/Hr) IV Continuous <Continuous>  artificial  tears Solution 1 Drop(s) Both EYES daily  cefTRIAXone   IVPB 2000 milliGRAM(s) IV Intermittent every 24 hours  dextrose 5%. 1000 milliLiter(s) (50 mL/Hr) IV Continuous <Continuous>  metroNIDAZOLE  IVPB 500 milliGRAM(s) IV Intermittent every 8 hours    MEDICATIONS  (PRN):  LORazepam   Injectable 2 milliGRAM(s) IV Push every 4 hours PRN CIWA      ALLERGIES:  Allergies    Capoten (Short breath; Rash; Hives)  heparin (Other (Mod to Severe))  penicillin (Short breath; Rash; Hives)    Intolerances        LABS:                        12.8   4.93  )-----------( 122      ( 26 Jan 2021 09:13 )             40.5     01-26    136  |  102  |  5<L>  ----------------------------<  96  4.6   |  23  |  0.89    Ca    9.1      26 Jan 2021 09:13  Phos  3.7     01-26  Mg     1.6     01-26      PT/INR - ( 26 Jan 2021 09:13 )   PT: 21.8 sec;   INR: 1.87          PTT - ( 26 Jan 2021 09:13 )  PTT:76.8 sec    CAPILLARY BLOOD GLUCOSE            RADIOLOGY & ADDITIONAL TESTS: Reviewed.    ASSESSMENT:    PLAN:  **Incomplete Note**  TRANSFER ACCEPTANCE NOTE [facility] TO [address] [place] [address] COURSE:   63yo M w/ poor med adherence, PMHx of LV thrombus (resolved on Jan 2020 echo; reappeared on Nov 2020 echo) pAF on eliquis, CAD s/p PCI, HFrEF 30-35% s/p AICD, HIT, hemorrhoids, gastritis, GIB, hx multiple admissions for EtOH withdrawal (last drink 1/16; no hx seizures, DTs, or intubations), admitted for alcohol withdrawal on 1/17. On day of discharge stroke code called 1/20 for pinpoint pubils, decreased mentation and somnolence. Stroke code negative. Patient deemed aspiration risk so started on argotrban gtt (due to history of HIT) and eliquis discontinued. On 1/22 pt noted to be awake, febrile, AAOx2, ripping off clothes and given ativen IV for possible alcohol withdrawal/DT and iniated on ceftriaxone and flagyll for aspiration PNA coverage. 1/23, pt returned to sleeping with persistent pinpoint pupils, and restarted on argotraban gtt. Patient able to be aroused, follow commands, AAOx2 (name and location), but consistently sleepy. Repeat CT head on 1/25 showed new interval subacute left thalamic infarct, left posterior cerebral artery territory. Urgent MRI head showed MRI is positive for recent infarctions in the posterior circulation, predominantly left sided in the thalamus, midbrain, cerebellum, mesial temporal lobe and additionally at small site of left occipital cortex. Pt is followed by Dr. Everett for hematology. Dr. Levy notified of patient status and stated pt to be on service medicine. Transferred to [facility] for neuro checks and hemorrhagic conversion surveillance.     OVERNIGHT EVENTS:    SUBJECTIVE / INTERVAL HPI: Patient seen and examined at bedside.     VITAL SIGNS:  Vital Signs Last 24 Hrs  T(C): 36.9 (26 Jan 2021 15:27), Max: 37.1 (25 Jan 2021 21:46)  T(F): 98.4 (26 Jan 2021 15:27), Max: 98.7 (25 Jan 2021 21:46)  HR: 83 (26 Jan 2021 15:27) (70 - 83)  BP: 104/78 (26 Jan 2021 15:27) (104/78 - 118/85)  BP(mean): --  RR: 18 (26 Jan 2021 15:27) (16 - 18)  SpO2: 96% (26 Jan 2021 15:27) (95% - 97%)    01-25-21 @ 07:01  -  01-26-21 @ 07:00  --------------------------------------------------------  IN: 804.4 mL / OUT: 950 mL / NET: -145.6 mL    01-26-21 @ 07:01  - 01-26-21 @ 17:15  --------------------------------------------------------  IN: 100 mL / OUT: 0 mL / NET: 100 mL        PHYSICAL EXAM:    General: WDWN  HEENT: NC/AT; PERRL, anicteric sclera; MMM  Neck: supple  Cardiovascular: +S1/S2; RRR  Respiratory: CTA B/L; no W/R/R  Gastrointestinal: soft, NT/ND; +BSx4  Extremities: WWP; no edema, clubbing or cyanosis  Vascular: 2+ radial, DP/PT pulses B/L  Neurological: AAOx3; no focal deficits    MEDICATIONS:  MEDICATIONS  (STANDING):  acetaminophen  Suppository .. 650 milliGRAM(s) Rectal once  argatroban Infusion 2 MICROgram(s)/kG/Min (8.71 mL/Hr) IV Continuous <Continuous>  artificial  tears Solution 1 Drop(s) Both EYES daily  cefTRIAXone   IVPB 2000 milliGRAM(s) IV Intermittent every 24 hours  dextrose 5%. 1000 milliLiter(s) (50 mL/Hr) IV Continuous <Continuous>  metroNIDAZOLE  IVPB 500 milliGRAM(s) IV Intermittent every 8 hours    MEDICATIONS  (PRN):  LORazepam   Injectable 2 milliGRAM(s) IV Push every 4 hours PRN CIWA      ALLERGIES:  Allergies    Capoten (Short breath; Rash; Hives)  heparin (Other (Mod to Severe))  penicillin (Short breath; Rash; Hives)    Intolerances        LABS:                        12.8   4.93  )-----------( 122      ( 26 Jan 2021 09:13 )             40.5     01-26    136  |  102  |  5<L>  ----------------------------<  96  4.6   |  23  |  0.89    Ca    9.1      26 Jan 2021 09:13  Phos  3.7     01-26  Mg     1.6     01-26      PT/INR - ( 26 Jan 2021 09:13 )   PT: 21.8 sec;   INR: 1.87          PTT - ( 26 Jan 2021 09:13 )  PTT:76.8 sec    CAPILLARY BLOOD GLUCOSE            RADIOLOGY & ADDITIONAL TESTS: Reviewed.    ASSESSMENT:    PLAN:  TRANSFER ACCEPTANCE NOTE [facility] TO [address] [place] [address] COURSE:   63yo M w/ poor med adherence, PMHx of LV thrombus (resolved on Jan 2020 echo; reappeared on Nov 2020 echo) pAF on eliquis, CAD s/p PCI, HFrEF 30-35% s/p AICD, HIT, hemorrhoids, gastritis, GIB, hx multiple admissions for EtOH withdrawal (last drink 1/16; no hx seizures, DTs, or intubations), admitted for alcohol withdrawal on 1/17. On day of discharge stroke code called 1/20 for pinpoint pubils, decreased mentation and somnolence. Stroke code negative. Patient deemed aspiration risk so started on argotrban gtt (due to history of HIT) and eliquis discontinued. On 1/22 pt noted to be awake, febrile, AAOx2, ripping off clothes and given ativen IV for possible alcohol withdrawal/DT and iniated on ceftriaxone and flagyll for aspiration PNA coverage. 1/23, pt returned to sleeping with persistent pinpoint pupils, and restarted on argotraban gtt. Patient able to be aroused, follow commands, AAOx2 (name and location), but consistently sleepy. Repeat CT head on 1/25 showed new interval subacute left thalamic infarct, left posterior cerebral artery territory. Urgent MRI head showed MRI is positive for recent infarctions in the posterior circulation, predominantly left sided in the thalamus, midbrain, cerebellum, mesial temporal lobe and additionally at small site of left occipital cortex. Pt is followed by Dr. Everett for hematology but recommended inpatient hematology service follow the patient. Dr. Levy notified of patient status and stated pt to be on service medicine. Transferred to [facility] for neuro checks and hemorrhagic conversion surveillance.     OVERNIGHT EVENTS:    SUBJECTIVE / INTERVAL HPI: Patient seen and examined at bedside.     VITAL SIGNS:  Vital Signs Last 24 Hrs  T(C): 36.9 (26 Jan 2021 15:27), Max: 37.1 (25 Jan 2021 21:46)  T(F): 98.4 (26 Jan 2021 15:27), Max: 98.7 (25 Jan 2021 21:46)  HR: 83 (26 Jan 2021 15:27) (70 - 83)  BP: 104/78 (26 Jan 2021 15:27) (104/78 - 118/85)  BP(mean): --  RR: 18 (26 Jan 2021 15:27) (16 - 18)  SpO2: 96% (26 Jan 2021 15:27) (95% - 97%)    01-25-21 @ 07:01  -  01-26-21 @ 07:00  --------------------------------------------------------  IN: 804.4 mL / OUT: 950 mL / NET: -145.6 mL    01-26-21 @ 07:01  -  01-26-21 @ 17:15  --------------------------------------------------------  IN: 100 mL / OUT: 0 mL / NET: 100 mL        PHYSICAL EXAM:    General: Comfortable, pleasant/anxious/agitated, Ill-appearing, well-nourished/frail/cachectic, comfortable / in distress  Neurological: AAOx3, no focal deficits  HEENT: NC/AT; EOMI, PERRL, clear conjunctiva, no nasal or oropharyngeal discharge or exudates, MMM  Neck: supple, no cervical or post-auricular lymphadenopathy  Cardiovascular: +S1/S2, no murmurs/rubs/gallops, RRR  Respiratory: CTA B/L, no diminished breath sounds, no wheezes/rales/rhonchi, no increased work of breathing or accessory muscle use  Gastrointestinal: soft, NT/ND; active BSx4 quadrants  Genitourinary: no suprapubic tenderness, no CVA tenderness  Extremities: WWP; no edema, clubbing or cyanosis  Vascular: 2+ radial, DP/PT pulses B/L    MEDICATIONS:  MEDICATIONS  (STANDING):  acetaminophen  Suppository .. 650 milliGRAM(s) Rectal once  argatroban Infusion 2 MICROgram(s)/kG/Min (8.71 mL/Hr) IV Continuous <Continuous>  artificial  tears Solution 1 Drop(s) Both EYES daily  cefTRIAXone   IVPB 2000 milliGRAM(s) IV Intermittent every 24 hours  dextrose 5%. 1000 milliLiter(s) (50 mL/Hr) IV Continuous <Continuous>  metroNIDAZOLE  IVPB 500 milliGRAM(s) IV Intermittent every 8 hours    MEDICATIONS  (PRN):  LORazepam   Injectable 2 milliGRAM(s) IV Push every 4 hours PRN CIWA      ALLERGIES:  Allergies    Capoten (Short breath; Rash; Hives)  heparin (Other (Mod to Severe))  penicillin (Short breath; Rash; Hives)    Intolerances        LABS:                        12.8   4.93  )-----------( 122      ( 26 Jan 2021 09:13 )             40.5     01-26    136  |  102  |  5<L>  ----------------------------<  96  4.6   |  23  |  0.89    Ca    9.1      26 Jan 2021 09:13  Phos  3.7     01-26  Mg     1.6     01-26      PT/INR - ( 26 Jan 2021 09:13 )   PT: 21.8 sec;   INR: 1.87          PTT - ( 26 Jan 2021 09:13 )  PTT:76.8 sec    CAPILLARY BLOOD GLUCOSE            RADIOLOGY & ADDITIONAL TESTS: Reviewed.    ASSESSMENT:    PLAN:

## 2021-01-26 NOTE — PROGRESS NOTE ADULT - PROBLEM SELECTOR PLAN 1
History of alcohol withdrawal. Alcohol 185 on admission. Last drink on day of admission 1/17.   - ativan 2 mg Q4 PRN for CIWA>8  -Folic Acid/MVI/Thiamine  -Fall precautions  -PT says no needs    #AMS-secondary to unknown etiology, likely drugs vs seizure  -stroke code called 1/20 AM when lethargy first observed, no evidence of stroke on imaging  - vEEG placed and no current seizure activity  -abg wnl  -utox wnl  -narcan 0.4 x 4 given 1/20 AM, will continue to reassess respiratory status and mental status.   - Use PRN ativan for withdrawal.  - Reinstating diet given improvement in mentation Pt with CTA performed o/n which showed small subacute left basal ganglia/thalamic infarction. Pt went for MRI this AM which showed recent infarctions in the posterior circulation, predominantly left sided in the thalamus, midbrain, cerebellum, mesial temporal lobe and additionally at small site of left occipital cortex. On the right, a smaller thalamic infarct is recent as well. No MR evidence of parenchymal hemorrhage, specifically at sites of infarct.  - Pt being transferred to [facility] stroke team for further management  - stroke code called 1/20 AM when lethargy first observed, no evidence of stroke on that imaging

## 2021-01-26 NOTE — PROGRESS NOTE ADULT - PROBLEM SELECTOR PLAN 4
HFrEF (30-35% 1/2020) status post AICD on Toprol, Lipitor, no ACE secondary to allergy  -Continue Toprol  -Continue Lipitor  -No ACE/ARB History of A-fib on Eliquis and Toprol XL. CHADS-VASC 4, HAS-BLED 3.  - Reinstate Eliquis given improvement in pt mental status and ability to eat

## 2021-01-26 NOTE — PROGRESS NOTE ADULT - PROBLEM SELECTOR PLAN 3
History of LV thrombus with no thrombus on most recent ECHO during this admission. Life-long AC required.  - Eliquis 5 mg daily  - follow TTE-likely will do informal echo at bedside History of alcohol withdrawal. Alcohol 185 on admission. Last drink on day of admission 1/17.   - ativan 2 mg Q4 PRN for CIWA>8  - Folic Acid/MVI/Thiamine  - Fall precautions  - PT says no needs    #AMS-secondary to unknown etiology, likely drugs vs seizure  -stroke code called 1/20 AM when lethargy first observed, no evidence of stroke on imaging  - vEEG placed and no current seizure activity  -abg wnl  -utox wnl  -narcan 0.4 x 4 given 1/20 AM, will continue to reassess respiratory status and mental status.   - Use PRN ativan for withdrawal.  - Reinstating diet given improvement in mentation

## 2021-01-26 NOTE — PROGRESS NOTE ADULT - ATTENDING COMMENTS
Resting comfortably in bed at time of visit  Per RN/aide at bedside patient was more awake earlier (for breakfast) and was conversant with roommate   MRI done today shows recent infarctions in the posterior circulation, predominantly left sided in the thalamus, midbrain, cerebellum, mesial temporal lobe and additionally at small site of left occipital cortex. On the right, a smaller thalamic infarct is recent as well. No MR evidence of parenchymal hemorrhage, specifically at sites of infarct. No significant mass effect, or hydrocephalus though there is mild effacement of the 3rd ventricle from thalamic infarcts.  Being transferred under Stroke Team on telemetry unit  Hematology to see patient, c/w argatroban for now  D/w Stroke  Rest as above Resting comfortably in bed at time of visit  Per RN/aide at bedside patient was more awake earlier (for breakfast) and was conversant with roommate   MRI done today shows recent infarctions in the posterior circulation, predominantly left sided in the thalamus, midbrain, cerebellum, mesial temporal lobe and additionally at small site of left occipital cortex. On the right, a smaller thalamic infarct is recent as well. No MR evidence of parenchymal hemorrhage, specifically at sites of infarct. No significant mass effect, or hydrocephalus though there is mild effacement of the 3rd ventricle from thalamic infarcts.  Being transferred under Stroke Team on telemetry unit  Hematology to see patient, c/w argatroban for now  Complete 7 days of ceftriaxone for possible aspiration pneumonia  D/w Stroke  Rest as above

## 2021-01-26 NOTE — OCCUPATIONAL THERAPY INITIAL EVALUATION ADULT - ADDITIONAL COMMENTS
Pt reports he lives with his family in a walk-up apartment building with "several" stairs required. Pt is a Pt reports he lives with his family in a walk-up apartment building with "several" stairs required.

## 2021-01-26 NOTE — PROGRESS NOTE ADULT - ASSESSMENT
Pt is a 66 yo M with PMH poor medication adherence, LV thrombus (11/2020), pAfib (eliquis), CAD (s/p PCI), HFrEF (EF 30-35%, s/p AICD), HIT, hemorrhoids/gastritis, GIB, and ETOH abuse (c/b mult admissions for w/d, no sz/DTs/intubation) who p/w alcohol withdrawal symptoms of agitation, shaking, CP, SOB, N/V, and heart racing. Admitted for alcohol w/d c/b 1/20 acute lethargy/somnolence and 1mm pinpoint pupils BL, tongue biting, and urinary incontinence. Stroke code called with w/u neg, vEEG neg. Neuro consulted for continued lethargy. Less likely primary neuro etiology outside of stroke/seizure possibly in the setting of alcohol withdrawal. Now with slight improvement in lethargy. 1/25 CTH showing multiple subacute infarcts.     #Lethargy  - pending MR brain  - further management per stroke team    Discussed with attending. Neurology will sign off at this time. Please reconsult with additional questions.

## 2021-01-26 NOTE — PROGRESS NOTE ADULT - PROBLEM SELECTOR PLAN 9
F: D5 1/2 NS 75 cc/hr  E: Replete PRN  N: Mechanical soft, thin liquids with supervision  DVT ppx: Eliquis 5 mg  GI ppx: Protonix 40 mg  CODE: FULL  Dispo: DC 1/17 F: D5 1/2 NS 75 cc/hr  E: Replete PRN  N: Mechanical soft, thin liquids with supervision  DVT ppx: argotraban gtt  GI ppx: Protonix 40 mg IV  CODE: FULL  Dispo: DC 1/17 F: D5 1/2 NS 75 cc/hr  E: Replete PRN  N: NPO  DVT ppx: argotraban gtt  GI ppx: Protonix 40 mg IV  CODE: FULL  Dispo: Pending, full code, 7L

## 2021-01-26 NOTE — PROGRESS NOTE ADULT - PROBLEM SELECTOR PLAN 2
History of LV thrombus with no thrombus on most recent ECHO during this admission. Life-long AC required.  - Eliquis 5 mg daily  - follow TTE-likely will do informal echo at bedside #AMS-secondary to unknown etiology, likely drugs vs seizure  -stroke code called 1/20 AM when lethargy first observed, no evidence of stroke on imaging  - vEEG placed and no current seizure activity  -abg wnl  -utox wnl  -narcan 0.4 x 4 given 1/20 AM, will continue to reassess respiratory status and mental status.   - NPo at this time given patients AMS

## 2021-01-26 NOTE — PROGRESS NOTE ADULT - PROBLEM SELECTOR PLAN 8
F: D5 1/2 NS 75 cc/hr  E: Replete PRN  N: Mechanical soft, thin liquids with supervision  DVT ppx: Eliquis 5 mg  GI ppx: Protonix 40 mg  CODE: FULL  Dispo: DC 1/17 History of GIB and gastritis likely from alcohol use. No signs of active bleeding.  -Continue Protonix 40mg

## 2021-01-26 NOTE — PROGRESS NOTE ADULT - PROBLEM SELECTOR PLAN 4
History of A-fib on Eliquis and Toprol XL. CHADS-VASC 4, HAS-BLED 3.  - Reinstate Eliquis given improvement in pt mental status and ability to eat History of A-fib on Eliquis and Toprol XL. CHADS-VASC 4, HAS-BLED 3.  - argotraban gtt at this time, will restart eliquis when patient able to tolerate po

## 2021-01-26 NOTE — PROGRESS NOTE ADULT - PROBLEM SELECTOR PLAN 6
History of CAD with MI in 1996. No antiplatelets due to GI bleed history  -Continue Lipitor  -Continue Toprol XL History of CAD with MI in 1996. No antiplatelets due to GI bleed history  -holding Lipitor for npo

## 2021-01-26 NOTE — PROGRESS NOTE ADULT - PROBLEM SELECTOR PLAN 5
HFrEF (30-35% 1/2020) status post AICD on Toprol, Lipitor, no ACE secondary to allergy  -Continue Toprol  -Continue Lipitor  -No ACE/ARB HFrEF (30-35% 1/2020) status post AICD on Toprol, Lipitor, no ACE secondary to allergy  -holding Toprol for npo  -holding Lipitor npo  -No ACE/ARB

## 2021-01-26 NOTE — PROGRESS NOTE ADULT - SUBJECTIVE AND OBJECTIVE BOX
***INCOMPLETE***  INTERVAL HPI/OVERNIGHT EVENTS:      On further ROS, patient did not have complaint of: Headache, Blurred Vision, Cough, Dyspnea, Palpitations, Abdominal Pain, N/V, Weakness, Change in Sensation.     VITAL SIGNS:  T(F): 98.5 (01-26-21 @ 04:54)  HR: 70 (01-26-21 @ 04:54)  BP: 109/72 (01-26-21 @ 04:54)  RR: 17 (01-26-21 @ 04:54)  SpO2: 97% (01-26-21 @ 04:54)  Wt(kg): --    Input & Output:    01-25-21 @ 07:01  -  01-26-21 @ 07:00  --------------------------------------------------------  IN: 804.4 mL / OUT: 950 mL / NET: -145.6 mL    01-26-21 @ 07:01  -  01-26-21 @ 11:18  --------------------------------------------------------  IN: 100 mL / OUT: 0 mL / NET: 100 mL        PHYSICAL EXAM:  General: Comfortable, pleasant/anxious/agitated, Ill-appearing, well-nourished/frail/cachectic, comfortable / in distress  Neurological: AAOx3, no focal deficits  HEENT: NC/AT; EOMI, PERRL, clear conjunctiva, no nasal or oropharyngeal discharge or exudates, MMM  Neck: supple, no cervical or post-auricular lymphadenopathy  Cardiovascular: +S1/S2, no murmurs/rubs/gallops, RRR  Respiratory: CTA B/L, no diminished breath sounds, no wheezes/rales/rhonchi, no increased work of breathing or accessory muscle use  Gastrointestinal: soft, NT/ND; active BSx4 quadrants  Genitourinary: no suprapubic tenderness, no CVA tenderness  Extremities: WWP; no edema, clubbing or cyanosis  Vascular: 2+ radial, DP/PT pulses B/L  Skin: no rashes  Lines/Drains:     MEDICATIONS  (STANDING):  acetaminophen  Suppository .. 650 milliGRAM(s) Rectal once  argatroban Infusion 2 MICROgram(s)/kG/Min (8.71 mL/Hr) IV Continuous <Continuous>  artificial  tears Solution 1 Drop(s) Both EYES daily  cefTRIAXone   IVPB 2000 milliGRAM(s) IV Intermittent every 24 hours  dextrose 5%. 1000 milliLiter(s) (50 mL/Hr) IV Continuous <Continuous>  magnesium sulfate  IVPB 2 Gram(s) IV Intermittent once  metroNIDAZOLE  IVPB 500 milliGRAM(s) IV Intermittent every 8 hours    MEDICATIONS  (PRN):  LORazepam   Injectable 2 milliGRAM(s) IV Push every 4 hours PRN CIWA      Allergies    Capoten (Short breath; Rash; Hives)  heparin (Other (Mod to Severe))  penicillin (Short breath; Rash; Hives)    Intolerances        LABS:                        12.8   4.93  )-----------( 122      ( 26 Jan 2021 09:13 )             40.5     01-26    136  |  102  |  5<L>  ----------------------------<  96  4.6   |  23  |  0.89    Ca    9.1      26 Jan 2021 09:13  Phos  3.7     01-26  Mg     1.6     01-26      PT/INR - ( 26 Jan 2021 09:13 )   PT: 21.8 sec;   INR: 1.87          PTT - ( 26 Jan 2021 09:13 )  PTT:76.8 sec  Urinalysis Basic - ( 24 Jan 2021 11:30 )    Color: Yellow / Appearance: Clear / SG: >=1.030 / pH: x  Gluc: x / Ketone: Trace mg/dL  / Bili: Negative / Urobili: 0.2 E.U./dL   Blood: x / Protein: NEGATIVE mg/dL / Nitrite: NEGATIVE   Leuk Esterase: NEGATIVE / RBC: < 5 /HPF / WBC < 5 /HPF   Sq Epi: x / Non Sq Epi: 0-5 /HPF / Bacteria: Present /HPF        RADIOLOGY & ADDITIONAL TESTS:   Mr. Torrez is a 63yo M w/ poor med adherence, PMHx of LV thrombus (resolved on Jan 2020 echo; reappeared on Nov 2020 echo) pAF on eliquis, CAD s/p PCI, HFrEF 30-35% s/p AICD, HIT, hemorrhoids, gastritis, GIB, hx multiple admissions for EtOH withdrawal (last drink 1/16; no hx seizures, DTs, or intubations), who presented due to concern for alcohol withdrawal including agitation, shaking, chest pain, sob, nausea, and a racing heart.        On further ROS, patient did not have complaint of: Headache, Blurred Vision, Cough, Dyspnea, Palpitations, Abdominal Pain, N/V, Weakness, Change in Sensation.     VITAL SIGNS:  T(F): 98.5 (01-26-21 @ 04:54)  HR: 70 (01-26-21 @ 04:54)  BP: 109/72 (01-26-21 @ 04:54)  RR: 17 (01-26-21 @ 04:54)  SpO2: 97% (01-26-21 @ 04:54)  Wt(kg): --    Input & Output:    01-25-21 @ 07:01 - 01-26-21 @ 07:00  --------------------------------------------------------  IN: 804.4 mL / OUT: 950 mL / NET: -145.6 mL    01-26-21 @ 07:01 - 01-26-21 @ 11:18  --------------------------------------------------------  IN: 100 mL / OUT: 0 mL / NET: 100 mL        PHYSICAL EXAM:  General: Comfortable, pleasant/anxious/agitated, Ill-appearing, well-nourished/frail/cachectic, comfortable / in distress  Neurological: AAOx3, no focal deficits  HEENT: NC/AT; EOMI, PERRL, clear conjunctiva, no nasal or oropharyngeal discharge or exudates, MMM  Neck: supple, no cervical or post-auricular lymphadenopathy  Cardiovascular: +S1/S2, no murmurs/rubs/gallops, RRR  Respiratory: CTA B/L, no diminished breath sounds, no wheezes/rales/rhonchi, no increased work of breathing or accessory muscle use  Gastrointestinal: soft, NT/ND; active BSx4 quadrants  Genitourinary: no suprapubic tenderness, no CVA tenderness  Extremities: WWP; no edema, clubbing or cyanosis  Vascular: 2+ radial, DP/PT pulses B/L  Skin: no rashes  Lines/Drains:     MEDICATIONS  (STANDING):  acetaminophen  Suppository .. 650 milliGRAM(s) Rectal once  argatroban Infusion 2 MICROgram(s)/kG/Min (8.71 mL/Hr) IV Continuous <Continuous>  artificial  tears Solution 1 Drop(s) Both EYES daily  cefTRIAXone   IVPB 2000 milliGRAM(s) IV Intermittent every 24 hours  dextrose 5%. 1000 milliLiter(s) (50 mL/Hr) IV Continuous <Continuous>  magnesium sulfate  IVPB 2 Gram(s) IV Intermittent once  metroNIDAZOLE  IVPB 500 milliGRAM(s) IV Intermittent every 8 hours    MEDICATIONS  (PRN):  LORazepam   Injectable 2 milliGRAM(s) IV Push every 4 hours PRN CIWA      Allergies    Capoten (Short breath; Rash; Hives)  heparin (Other (Mod to Severe))  penicillin (Short breath; Rash; Hives)    Intolerances        LABS:                        12.8   4.93  )-----------( 122      ( 26 Jan 2021 09:13 )             40.5     01-26    136  |  102  |  5<L>  ----------------------------<  96  4.6   |  23  |  0.89    Ca    9.1      26 Jan 2021 09:13  Phos  3.7     01-26  Mg     1.6     01-26      PT/INR - ( 26 Jan 2021 09:13 )   PT: 21.8 sec;   INR: 1.87          PTT - ( 26 Jan 2021 09:13 )  PTT:76.8 sec  Urinalysis Basic - ( 24 Jan 2021 11:30 )    Color: Yellow / Appearance: Clear / SG: >=1.030 / pH: x  Gluc: x / Ketone: Trace mg/dL  / Bili: Negative / Urobili: 0.2 E.U./dL   Blood: x / Protein: NEGATIVE mg/dL / Nitrite: NEGATIVE   Leuk Esterase: NEGATIVE / RBC: < 5 /HPF / WBC < 5 /HPF   Sq Epi: x / Non Sq Epi: 0-5 /HPF / Bacteria: Present /HPF        RADIOLOGY & ADDITIONAL TESTS:   ****TRANSFER FROM [facility] TO [facility]******  Mr. Torrez is a 65yo M w/ poor med adherence, PMHx of LV thrombus (resolved on Jan 2020 echo; reappeared on Nov 2020 echo) pAF on eliquis, CAD s/p PCI, HFrEF 30-35% s/p AICD, HIT, hemorrhoids, gastritis, GIB, hx multiple admissions for EtOH withdrawal (last drink 1/16; no hx seizures, DTs, or intubations), who presented due to concern for alcohol withdrawal with symptoms including agitation, shaking, chest pain, sob, nausea, and a racing heart. On admission he was AAOx3 and started        On further ROS, patient did not have complaint of: Headache, Blurred Vision, Cough, Dyspnea, Palpitations, Abdominal Pain, N/V, Weakness, Change in Sensation.     VITAL SIGNS:  T(F): 98.5 (01-26-21 @ 04:54)  HR: 70 (01-26-21 @ 04:54)  BP: 109/72 (01-26-21 @ 04:54)  RR: 17 (01-26-21 @ 04:54)  SpO2: 97% (01-26-21 @ 04:54)  Wt(kg): --    Input & Output:    01-25-21 @ 07:01 - 01-26-21 @ 07:00  --------------------------------------------------------  IN: 804.4 mL / OUT: 950 mL / NET: -145.6 mL    01-26-21 @ 07:01 - 01-26-21 @ 11:18  --------------------------------------------------------  IN: 100 mL / OUT: 0 mL / NET: 100 mL        PHYSICAL EXAM:  General: Comfortable, pleasant/anxious/agitated, Ill-appearing, well-nourished/frail/cachectic, comfortable / in distress  Neurological: AAOx3, no focal deficits  HEENT: NC/AT; EOMI, PERRL, clear conjunctiva, no nasal or oropharyngeal discharge or exudates, MMM  Neck: supple, no cervical or post-auricular lymphadenopathy  Cardiovascular: +S1/S2, no murmurs/rubs/gallops, RRR  Respiratory: CTA B/L, no diminished breath sounds, no wheezes/rales/rhonchi, no increased work of breathing or accessory muscle use  Gastrointestinal: soft, NT/ND; active BSx4 quadrants  Genitourinary: no suprapubic tenderness, no CVA tenderness  Extremities: WWP; no edema, clubbing or cyanosis  Vascular: 2+ radial, DP/PT pulses B/L  Skin: no rashes  Lines/Drains:     MEDICATIONS  (STANDING):  acetaminophen  Suppository .. 650 milliGRAM(s) Rectal once  argatroban Infusion 2 MICROgram(s)/kG/Min (8.71 mL/Hr) IV Continuous <Continuous>  artificial  tears Solution 1 Drop(s) Both EYES daily  cefTRIAXone   IVPB 2000 milliGRAM(s) IV Intermittent every 24 hours  dextrose 5%. 1000 milliLiter(s) (50 mL/Hr) IV Continuous <Continuous>  magnesium sulfate  IVPB 2 Gram(s) IV Intermittent once  metroNIDAZOLE  IVPB 500 milliGRAM(s) IV Intermittent every 8 hours    MEDICATIONS  (PRN):  LORazepam   Injectable 2 milliGRAM(s) IV Push every 4 hours PRN CIWA      Allergies    Capoten (Short breath; Rash; Hives)  heparin (Other (Mod to Severe))  penicillin (Short breath; Rash; Hives)    Intolerances        LABS:                        12.8   4.93  )-----------( 122      ( 26 Jan 2021 09:13 )             40.5     01-26    136  |  102  |  5<L>  ----------------------------<  96  4.6   |  23  |  0.89    Ca    9.1      26 Jan 2021 09:13  Phos  3.7     01-26  Mg     1.6     01-26      PT/INR - ( 26 Jan 2021 09:13 )   PT: 21.8 sec;   INR: 1.87          PTT - ( 26 Jan 2021 09:13 )  PTT:76.8 sec  Urinalysis Basic - ( 24 Jan 2021 11:30 )    Color: Yellow / Appearance: Clear / SG: >=1.030 / pH: x  Gluc: x / Ketone: Trace mg/dL  / Bili: Negative / Urobili: 0.2 E.U./dL   Blood: x / Protein: NEGATIVE mg/dL / Nitrite: NEGATIVE   Leuk Esterase: NEGATIVE / RBC: < 5 /HPF / WBC < 5 /HPF   Sq Epi: x / Non Sq Epi: 0-5 /HPF / Bacteria: Present /HPF        RADIOLOGY & ADDITIONAL TESTS:   ****TRANSFER FROM [facility] TO [facility]******  63yo M w/ poor med adherence, PMHx of LV thrombus (resolved on Jan 2020 echo; reappeared on Nov 2020 echo) pAF on eliquis, CAD s/p PCI, HFrEF 30-35% s/p AICD, HIT, hemorrhoids, gastritis, GIB, hx multiple admissions for EtOH withdrawal (last drink 1/16; no hx seizures, DTs, or intubations), admitted for alcohol withdrawal on 1/17. On day of discharge stroke code called 1/20 for pinpoint pubils, decreased mentation and somnolence. Stroke code negative. Patient deemed aspiration risk so started on argotrban gtt (due to history of HIT) and eliquis discontinued. On 1/22 pt noted to be awake, febrile, AAOx2, ripping off clothes and given ativen IV for possible alcohol withdrawal/DT and iniated on ceftriaxone and flagyll for aspiration PNA coverage. 1/23, pt returned to sleeping with persistent pinpoint pupils, and restarted on argotraban gtt. Patient able to be aroused, follow commands, AAOx2 (name and location), but consistently sleepy. Repeat CT head on 1/25 showed new interval subacute left thalamic infarct, left posterior cerebral artery territory. Urgent MRI head showed MRI is positive for recent infarctions in the posterior circulation, predominantly left sided in the thalamus, midbrain, cerebellum, mesial temporal lobe and additionally at small site of left occipital cortex. Pt is followed by Dr. Everett for hematology. Dr. Levy notified of patient status and stated pt to be on service medicine. Transferred to [id number] for neuro checks and hemorrhagic conversion surveillance.         On further ROS, patient did not have complaint of: Headache, Blurred Vision, Cough, Dyspnea, Palpitations, Abdominal Pain, N/V, Weakness, Change in Sensation.     VITAL SIGNS:  T(F): 98.5 (01-26-21 @ 04:54)  HR: 70 (01-26-21 @ 04:54)  BP: 109/72 (01-26-21 @ 04:54)  RR: 17 (01-26-21 @ 04:54)  SpO2: 97% (01-26-21 @ 04:54)  Wt(kg): --    Input & Output:    01-25-21 @ 07:01 - 01-26-21 @ 07:00  --------------------------------------------------------  IN: 804.4 mL / OUT: 950 mL / NET: -145.6 mL    01-26-21 @ 07:01 - 01-26-21 @ 11:18  --------------------------------------------------------  IN: 100 mL / OUT: 0 mL / NET: 100 mL        PHYSICAL EXAM:  General: Comfortable, pleasant/anxious/agitated, Ill-appearing, well-nourished/frail/cachectic, comfortable / in distress  Neurological: AAOx3, no focal deficits  HEENT: NC/AT; EOMI, PERRL, clear conjunctiva, no nasal or oropharyngeal discharge or exudates, MMM  Neck: supple, no cervical or post-auricular lymphadenopathy  Cardiovascular: +S1/S2, no murmurs/rubs/gallops, RRR  Respiratory: CTA B/L, no diminished breath sounds, no wheezes/rales/rhonchi, no increased work of breathing or accessory muscle use  Gastrointestinal: soft, NT/ND; active BSx4 quadrants  Genitourinary: no suprapubic tenderness, no CVA tenderness  Extremities: WWP; no edema, clubbing or cyanosis  Vascular: 2+ radial, DP/PT pulses B/L  Skin: no rashes  Lines/Drains:     MEDICATIONS  (STANDING):  acetaminophen  Suppository .. 650 milliGRAM(s) Rectal once  argatroban Infusion 2 MICROgram(s)/kG/Min (8.71 mL/Hr) IV Continuous <Continuous>  artificial  tears Solution 1 Drop(s) Both EYES daily  cefTRIAXone   IVPB 2000 milliGRAM(s) IV Intermittent every 24 hours  dextrose 5%. 1000 milliLiter(s) (50 mL/Hr) IV Continuous <Continuous>  magnesium sulfate  IVPB 2 Gram(s) IV Intermittent once  metroNIDAZOLE  IVPB 500 milliGRAM(s) IV Intermittent every 8 hours    MEDICATIONS  (PRN):  LORazepam   Injectable 2 milliGRAM(s) IV Push every 4 hours PRN CIWA      Allergies    Capoten (Short breath; Rash; Hives)  heparin (Other (Mod to Severe))  penicillin (Short breath; Rash; Hives)    Intolerances        LABS:                        12.8   4.93  )-----------( 122      ( 26 Jan 2021 09:13 )             40.5     01-26    136  |  102  |  5<L>  ----------------------------<  96  4.6   |  23  |  0.89    Ca    9.1      26 Jan 2021 09:13  Phos  3.7     01-26  Mg     1.6     01-26      PT/INR - ( 26 Jan 2021 09:13 )   PT: 21.8 sec;   INR: 1.87          PTT - ( 26 Jan 2021 09:13 )  PTT:76.8 sec  Urinalysis Basic - ( 24 Jan 2021 11:30 )    Color: Yellow / Appearance: Clear / SG: >=1.030 / pH: x  Gluc: x / Ketone: Trace mg/dL  / Bili: Negative / Urobili: 0.2 E.U./dL   Blood: x / Protein: NEGATIVE mg/dL / Nitrite: NEGATIVE   Leuk Esterase: NEGATIVE / RBC: < 5 /HPF / WBC < 5 /HPF   Sq Epi: x / Non Sq Epi: 0-5 /HPF / Bacteria: Present /HPF        RADIOLOGY & ADDITIONAL TESTS:

## 2021-01-26 NOTE — CONSULT NOTE ADULT - ASSESSMENT
per Neurology    66 yo M with PMH poor medication adherence, LV thrombus (11/2020), pAfib (eliquis), CAD (s/p PCI), HFrEF (EF 30-35%, s/p AICD), HIT, hemorrhoids/gastritis, GIB, and ETOH abuse (c/b mult admissions for w/d, no sz/DTs/intubation) who p/w alcohol withdrawal symptoms of agitation, shaking, CP, SOB, N/V, and heart racing. Admitted for alcohol w/d c/b 1/20 acute lethargy/somnolence and 1mm pinpoint pupils BL, tongue biting, and urinary incontinence. Stroke code called with w/u neg, vEEG neg. Neuro consulted for continued lethargy. Less likely primary neuro etiology outside of stroke/seizure possibly in the setting of alcohol withdrawal. Now with slight improvement in lethargy. 1/25 CTH showing multiple subacute infarcts.     #Lethargy  - pending MR brain  - further management per stroke team    per Internal Medicine    63-year-old male with a past medical history of HIT, ETOH abuse, paroxysmal A-fib with an LV thrombus, HLD, CAD status post PCI, HFrEF (35-40% 2019) status post AICD, hemorrhoids, gastritis, and a recent admission in August for alcohol withdrawal who presented with signs and symptoms of alcohol withdrawal.     Problem/Plan - 1:  ·  Problem: Alcohol withdrawal.  Plan: History of alcohol withdrawal. Alcohol 185 on admission. Last drink on day of admission 1/17.   - ativan 2 mg Q4 PRN for CIWA>8  -Folic Acid/MVI/Thiamine  -Fall precautions  -PT says no needs    #AMS-secondary to unknown etiology, likely drugs vs seizure  -stroke code called 1/20 AM when lethargy first observed, no evidence of stroke on imaging  - vEEG placed and no current seizure activity  -abg wnl  -utox wnl  -narcan 0.4 x 4 given 1/20 AM, will continue to reassess respiratory status and mental status.   - Use PRN ativan for withdrawal.  - Reinstating diet given improvement in mentation.     Problem/Plan - 2:  ·  Problem: Atrial fibrillation.  Plan: History of A-fib on Eliquis and Toprol XL. CHADS-VASC 4, HAS-BLED 3.  - Reinstate Eliquis given improvement in pt mental status and ability to eat.     Problem/Plan - 3:  ·  Problem: Left ventricular thrombus.  Plan: History of LV thrombus with no thrombus on most recent ECHO during this admission. Life-long AC required.  - Eliquis 5 mg daily  - follow TTE-likely will do informal echo at bedside.     Problem/Plan - 4:  ·  Problem: Chronic systolic heart failure.  Plan: HFrEF (30-35% 1/2020) status post AICD on Toprol, Lipitor, no ACE secondary to allergy  -Continue Toprol  -Continue Lipitor  -No ACE/ARB.     Problem/Plan - 5:  ·  Problem: Atherosclerosis of coronary artery.  Plan: History of CAD with MI in 1996. No antiplatelets due to GI bleed history  -Continue Lipitor  -Continue Toprol XL.     Problem/Plan - 6:  Problem: Hyperlipidemia. Plan: History of HLD on Lipitor  -Continue Lipitor.    Problem/Plan - 7:  ·  Problem: Gastritis.  Plan: History of GIB and gastritis likely from alcohol use. No signs of active bleeding.  -Continue Protonix 40mg.     Problem/Plan - 8:  ·  Problem: Nutrition, metabolism, and development symptoms.  Plan: F: D5 1/2 NS 75 cc/hr  E: Replete PRN  N: Mechanical soft, thin liquids with supervision  DVT ppx: Eliquis 5 mg  GI ppx: Protonix 40 mg  CODE: FULL  Dispo: DC 1/17.

## 2021-01-26 NOTE — PHYSICAL THERAPY INITIAL EVALUATION ADULT - PERTINENT HX OF CURRENT PROBLEM, REHAB EVAL
65 year old male admitted for ETOH withdrawal. troke code called on 1/20, head CT was negative, CT Angio head and neck showing no large vessel occlusion or significant stenosis. Pt had repeat HCT done yesterday showing evolving subacute infarctions within the left medial thalamus, right medial thalamus and left cerebellar hemisphere. CTA and CTP showing no significant stenosis, occlusion or perfusion deficit.

## 2021-01-26 NOTE — PHYSICAL THERAPY INITIAL EVALUATION ADULT - ADDITIONAL COMMENTS
Pt. lives in residence with steps to negotiate. Pt. is a poor historian and presents with some lethargy.

## 2021-01-26 NOTE — PHYSICAL THERAPY INITIAL EVALUATION ADULT - GENERAL OBSERVATIONS, REHAB EVAL
Pt. received in bed in no acute distress, +IV, enhanced supervision, [place] catheter (removed during session). Pt. without complaints.

## 2021-01-26 NOTE — OCCUPATIONAL THERAPY INITIAL EVALUATION ADULT - GROSSLY INTACT, SENSORY
Left UE/Right UE/Grossly Intact CN testing - vision intact and peripheral fields intact however, no vertical gaze noted but can track laterally, pt with difficulty reading signs in hallway; face symmetric, tongue with slight deviation to right - fasciculations noted./Left UE/Right UE/Grossly Intact

## 2021-01-26 NOTE — PROGRESS NOTE ADULT - PROBLEM SELECTOR PLAN 5
History of CAD with MI in 1996. No antiplatelets due to GI bleed history  -Continue Lipitor  -Continue Toprol XL HFrEF (30-35% 1/2020) status post AICD on Toprol, Lipitor, no ACE secondary to allergy  -Continue Toprol  -Continue Lipitor  -No ACE/ARB

## 2021-01-26 NOTE — PROGRESS NOTE ADULT - ATTENDING COMMENTS
I agree with the above history, physical, and plan which I have reviewed and edited where appropriate, with the exceptions as per my note.    I was unable to see pt today as off the floor.    Discussed at length with housestaff, reviewed CTH personally which revealed several strokes, plan to continue argatroban, get brain MRI, and consult stroke team for further management. call with questions.

## 2021-01-26 NOTE — PROGRESS NOTE ADULT - PROBLEM SELECTOR PLAN 3
History of alcohol withdrawal. Alcohol 185 on admission. Last drink on day of admission 1/17.   - ativan 2 mg Q4 PRN for CIWA>8  - Folic Acid/MVI/Thiamine  - Fall precautions  - PT says no needs    #AMS-secondary to unknown etiology, likely drugs vs seizure  -stroke code called 1/20 AM when lethargy first observed, no evidence of stroke on imaging  - vEEG placed and no current seizure activity  -abg wnl  -utox wnl  -narcan 0.4 x 4 given 1/20 AM, will continue to reassess respiratory status and mental status.   - Use PRN ativan for withdrawal.  - Reinstating diet given improvement in mentation History of LV thrombus with no thrombus on most recent ECHO during this admission. Life-long AC required. Takes eliquis 5mg BID at home  - argatroban gtt as patient unable to tolerate po  -  Echo 1/20: Moderately-to-severely reduced left ventricular systolic function. The ejection fraction is 30%. Entire anterior septum, mid inferolateral segment, apical inferior segment, and apex are akinetic. Remaining segments are hypokinetic. LV thrombus seen

## 2021-01-26 NOTE — PROGRESS NOTE ADULT - PROBLEM SELECTOR PLAN 7
History of GIB and gastritis likely from alcohol use. No signs of active bleeding.  -Continue Protonix 40mg History of HLD on Lipitor  -Continue Lipitor

## 2021-01-26 NOTE — CONSULT NOTE ADULT - ASSESSMENT
63yo M w/ poor med adherence, PMHx of LV thrombus (resolved on Jan 2020 echo; reappeared on Nov 2020 echo), pAF on eliquis, CAD s/p PCI, HFrEF 30-35% s/p AICD, HIT, hemorrhoids, gastritis, GIB, hx multiple admissions for EtOH withdrawal (no hx seizures, DTs, or intubations), who presents for EtOH withdrawal. Hospital course complicated by MRSA sputum, Interval subacute left thalamic infarct, left posterior cerebral artery territory and TTE found to have new LV thrombus.    Plan: No surgical intervention  - continue anticoagulation  - hematology consult due to multiple episodes of LV thrombus to r/u hypercoagulable disease 63yo M w/ poor med adherence, PMHx of LV thrombus (resolved on Jan 2020 echo; reappeared on Nov 2020 echo), pAF on eliquis, CAD s/p PCI, HFrEF 30-35% s/p AICD, HIT, hemorrhoids, gastritis, GIB, hx multiple admissions for EtOH withdrawal (no hx seizures, DTs, or intubations), who presents for EtOH withdrawal. Hospital course complicated by MRSA sputum, Interval subacute left thalamic infarct, left posterior cerebral artery territory and TTE found to have new LV thrombus.    Plan: No surgical intervention  - continue anticoagulation  - hematology consult due to multiple episodes of LV thrombus to r/u hypercoagulable disease      CT Surgery will sign off at this time, please reconsult with any questions or concerns.     Discussed with Dr. Garibay and Primary team

## 2021-01-26 NOTE — PROGRESS NOTE ADULT - SUBJECTIVE AND OBJECTIVE BOX
Neurology Stroke Progress Note    INTERVAL HPI/OVERNIGHT EVENTS:  Repeat CTH done overnight identifying evolving subacute infarctions within the left medial thalamus, right medial thalamus, and left cerebellar hemisphere.   Pt going for MRI (with EP deactivating AICD before and reactivating after MRI)  Pt seen and examined. He is lethargic with weakness worse on the right side. Pt complaining of mild HA. ROS otherwise negative.     MEDICATIONS  (STANDING):  acetaminophen  Suppository .. 650 milliGRAM(s) Rectal once  argatroban Infusion 2 MICROgram(s)/kG/Min (8.71 mL/Hr) IV Continuous <Continuous>  artificial  tears Solution 1 Drop(s) Both EYES daily  cefTRIAXone   IVPB 2000 milliGRAM(s) IV Intermittent every 24 hours  dextrose 5%. 1000 milliLiter(s) (50 mL/Hr) IV Continuous <Continuous>  magnesium sulfate  IVPB 2 Gram(s) IV Intermittent once  metroNIDAZOLE  IVPB 500 milliGRAM(s) IV Intermittent every 8 hours    MEDICATIONS  (PRN):  LORazepam   Injectable 2 milliGRAM(s) IV Push every 4 hours PRN CIWA      Allergies    Capoten (Short breath; Rash; Hives)  heparin (Other (Mod to Severe))  penicillin (Short breath; Rash; Hives)    Intolerances        ROS: As per HPI, otherwise negative    Vital Signs Last 24 Hrs  T(C): 36.9 (26 Jan 2021 04:54), Max: 37.1 (25 Jan 2021 21:46)  T(F): 98.5 (26 Jan 2021 04:54), Max: 98.7 (25 Jan 2021 21:46)  HR: 70 (26 Jan 2021 04:54) (70 - 84)  BP: 109/72 (26 Jan 2021 04:54) (109/72 - 118/85)  BP(mean): --  RR: 17 (26 Jan 2021 04:54) (16 - 17)  SpO2: 97% (26 Jan 2021 04:54) (95% - 98%)    Physical exam:  General: lethargic, no acute distress  CV: RRR, no murmurs  Pulm: CTA bilaterally  Neurologic:    NIHSS:        LABS:                        12.8   4.93  )-----------( 122      ( 26 Jan 2021 09:13 )             40.5     01-26    136  |  102  |  5<L>  ----------------------------<  96  4.6   |  23  |  0.89    Ca    9.1      26 Jan 2021 09:13  Phos  3.7     01-26  Mg     1.6     01-26      PT/INR - ( 26 Jan 2021 09:13 )   PT: 21.8 sec;   INR: 1.87          PTT - ( 26 Jan 2021 09:13 )  PTT:76.8 sec      RADIOLOGY & ADDITIONAL TESTS:  < from: CT Head No Cont (01.26.21 @ 00:41) >  INTERPRETATION:  I, Buster Woo MD, have reviewed the images and the report and agree with the findings.  Agree that there are evolving subacute infarctions within the left medial thalamus, right medial thalamus, and left cerebellar hemisphere. No acute intracranial hemorrhage. No new demarcated territorial infarction.  < end of copied text >    < from: CT Angio Neck w/ IV Cont (01.26.21 @ 00:41) >  IMPRESSION:  No stenosis or occlusion.  < end of copied text >    < from: CT Angio Head w/ IV Cont (01.26.21 @ 00:41) >  IMPRESSION:  1. Small subacute left basal ganglia/thalamic infarction, see noncontrast head CT report.  2. No acute vascular findings.  < end of copied text >    < from: CT Perfusion w/ Maps w/ IV Cont (01.26.21 @ 00:41) >  IMPRESSION: No definite perfusion abnormality.  < end of copied text >    Assessment and Plan  65y yo Male PMH poor medication adherence, LV thrombus (11/2020), pAfib (eliquis), CAD (s/p PCI), HFrEF (EF 30-35%, s/p AICD), HIT, hemorrhoids/gastritis, GIB, and ETOH abuse (c/b mult admissions for w/d, no sz/DTs/intubation) who p/w alcohol withdrawal symptoms of agitation, shaking, CP, SOB, N/V, and heart racing. Admitted for alcohol w/d c/b 1/20 acute lethargy/somnolence and 1mm pinpoint pupils BL, tongue biting, and urinary incontinence. Stroke code called on 1/20, head CT was negative, CT Angio head and neck showing no large vessel occlusion or significant stenosis. Pt had repeat HCT done yesterday showing evolving subacute infarctions within the left medial thalamus, right medial thalamus and left cerebellar hemisphere. CTA and CTP showing no significant stenosis, occlusion or perfusion deficit. Likely cardioembolic etiology vs hypercoagulable.     MRI brain: _______________________    Recommendations:    1)Secondary stroke prevention  - continue Argatobran infusion  -Atorvastatin 80    2) Stroke risk factors  -  - Atrial fibrillation  -HbA1C 5.3    3) Further workup   Hypercoagulable workup given recurrent LV thrombus  Neurochecks q __________, vitals q________    DVT prophylaxis   -Argatroban and SCDs Neurology Stroke Progress Note    INTERVAL HPI/OVERNIGHT EVENTS:  Repeat CTH done overnight identifying evolving subacute infarctions within the left medial thalamus, right medial thalamus, and left cerebellar hemisphere.   Pt going for MRI (with EP deactivating AICD before and reactivating after MRI)  Pt seen and examined. He is lethargic with weakness worse on the right side. Pt complaining of mild HA. ROS otherwise negative.     MEDICATIONS  (STANDING):  acetaminophen  Suppository .. 650 milliGRAM(s) Rectal once  argatroban Infusion 2 MICROgram(s)/kG/Min (8.71 mL/Hr) IV Continuous <Continuous>  artificial  tears Solution 1 Drop(s) Both EYES daily  cefTRIAXone   IVPB 2000 milliGRAM(s) IV Intermittent every 24 hours  dextrose 5%. 1000 milliLiter(s) (50 mL/Hr) IV Continuous <Continuous>  magnesium sulfate  IVPB 2 Gram(s) IV Intermittent once  metroNIDAZOLE  IVPB 500 milliGRAM(s) IV Intermittent every 8 hours    MEDICATIONS  (PRN):  LORazepam   Injectable 2 milliGRAM(s) IV Push every 4 hours PRN CIWA      Allergies    Capoten (Short breath; Rash; Hives)  heparin (Other (Mod to Severe))  penicillin (Short breath; Rash; Hives)    Intolerances        ROS: As per HPI, otherwise negative    Vital Signs Last 24 Hrs  T(C): 36.9 (26 Jan 2021 04:54), Max: 37.1 (25 Jan 2021 21:46)  T(F): 98.5 (26 Jan 2021 04:54), Max: 98.7 (25 Jan 2021 21:46)  HR: 70 (26 Jan 2021 04:54) (70 - 84)  BP: 109/72 (26 Jan 2021 04:54) (109/72 - 118/85)  BP(mean): --  RR: 17 (26 Jan 2021 04:54) (16 - 17)  SpO2: 97% (26 Jan 2021 04:54) (95% - 98%)    Physical exam:  General: lethargic, no acute distress  CV: RRR, no murmurs  Pulm: CTA bilaterally  Neurologic:  Neurologic:  -Mental status: lethargic, oriented x3, following commands  -Cranial nerves:   II: Visual fields are full to confrontation.  III, IV, VI: eyes midline, pupils about 2mm reactive to light   VII: Face is symmetric with normal eye closure and smile  Motor: Right arm 4/5, left arm 5-/5, right leg 4/5, left leg 5/5  Sensation: decreased sensation R side  Coordination: unable to assess  Reflexes: Downgoing toes bilaterally    NIHSS: 6      LABS:                        12.8   4.93  )-----------( 122      ( 26 Jan 2021 09:13 )             40.5     01-26    136  |  102  |  5<L>  ----------------------------<  96  4.6   |  23  |  0.89    Ca    9.1      26 Jan 2021 09:13  Phos  3.7     01-26  Mg     1.6     01-26      PT/INR - ( 26 Jan 2021 09:13 )   PT: 21.8 sec;   INR: 1.87          PTT - ( 26 Jan 2021 09:13 )  PTT:76.8 sec      RADIOLOGY & ADDITIONAL TESTS:  < from: CT Head No Cont (01.26.21 @ 00:41) >  INTERPRETATION:  I, Buster Woo MD, have reviewed the images and the report and agree with the findings.  Agree that there are evolving subacute infarctions within the left medial thalamus, right medial thalamus, and left cerebellar hemisphere. No acute intracranial hemorrhage. No new demarcated territorial infarction.  < end of copied text >    < from: CT Angio Neck w/ IV Cont (01.26.21 @ 00:41) >  IMPRESSION:  No stenosis or occlusion.  < end of copied text >    < from: CT Angio Head w/ IV Cont (01.26.21 @ 00:41) >  IMPRESSION:  1. Small subacute left basal ganglia/thalamic infarction, see noncontrast head CT report.  2. No acute vascular findings.  < end of copied text >    < from: CT Perfusion w/ Maps w/ IV Cont (01.26.21 @ 00:41) >  IMPRESSION: No definite perfusion abnormality.  < end of copied text >    Assessment and Plan  65y yo Male PMH poor medication adherence, LV thrombus (11/2020), pAfib (eliquis), CAD (s/p PCI), HFrEF (EF 30-35%, s/p AICD), HIT, hemorrhoids/gastritis, GIB, and ETOH abuse (c/b mult admissions for w/d, no sz/DTs/intubation) who p/w alcohol withdrawal symptoms of agitation, shaking, CP, SOB, N/V, and heart racing. Admitted for alcohol w/d c/b 1/20 acute lethargy/somnolence and 1mm pinpoint pupils BL, tongue biting, and urinary incontinence. Stroke code called on 1/20, head CT was negative, CT Angio head and neck showing no large vessel occlusion or significant stenosis. Pt had repeat HCT done yesterday showing evolving subacute infarctions within the left medial thalamus, right medial thalamus and left cerebellar hemisphere. CTA and CTP showing no significant stenosis, occlusion or perfusion deficit. Likely cardioembolic etiology vs hypercoagulable.     MRI brain: showing recent embolic appearing strokes  Pt being stepped up for telemonitoring, transferred to stroke service  Recommendations:    1)Secondary stroke prevention  - continue Argatobran infusion  -Atorvastatin 80    2) Stroke risk factors  -  - Atrial fibrillation  -HbA1C 5.3    3) Further workup   Hypercoagulable workup given recurrent LV thrombus  Neurochecks q 4, vitals q4    DVT prophylaxis -  -Argatroban and SCDs

## 2021-01-26 NOTE — PROGRESS NOTE ADULT - PROBLEM SELECTOR PLAN 6
History of HLD on Lipitor  -Continue Lipitor History of CAD with MI in 1996. No antiplatelets due to GI bleed history  -Continue Lipitor  -Continue Toprol XL

## 2021-01-26 NOTE — CONSULT NOTE ADULT - SUBJECTIVE AND OBJECTIVE BOX
Patient is a 65y old  Male who presents with a chief complaint of EtOH withdrawal (26 Jan 2021 13:10)       HPI:  Mr. Torrez is a 63yo M w/ poor med adherence, PMHx of LV thrombus (resolved on Jan 2020 echo; reappeared on Nov 2020 echo) pAF on eliquis, CAD s/p PCI, HFrEF 30-35% s/p AICD, HIT, hemorrhoids, gastritis, GIB, hx multiple admissions for EtOH withdrawal (last drink 1/16; no hx seizures, DTs, or intubations), who presents due to concern for alcohol withdrawal. Pt last seen at [facility] in March 2020 when he was admitted for EtOH withdrawal w/ initial CIWA ~24 and associated visual hallucinations, which improved after being started on librium 50mg Q8hrs, and subsequently discharged on librium taper. Per outpatient records that pt himself brought with him to the ED, pt had additional episode EtOH withdrawal in November where he was seen at [facility] and subsequently discharged on librium taper 25mg BID. At that time, pt also underwent TTE imaging which showed LV EF 38% as well as a large protruding fixed thrombus attached to LV apex. Noted on discharge paperwork (11/22) to not be on ASA due to thrombocytopenia, not on warfarin due to prior poor adherence, and accordingly discharged on eliquis w/ a discharge diagnosis of afib/aflutter. Pt has since twice again been admitted at [facility] (12/8/21 and 1/6/21) for 1-2mo hx of persistent "funny feeling in my chest", determined to be 2/2 Afib/aflutter. Between those two admissions, pt had also been prescribed hydralazine and isosorbide dinitrate, however pt reports refusing to take them after reading about the side-effects in conjunction w/ alcohol-use. Pt says that for the past 1 month, he has been trying but struggling to wean off his alcohol consumption which he estimates to be a ~1pint/day. He says for the past week he has been especially focused on quitting in anticipation of an appointment with his outpatient cardiologist. Today he reports experiencing sx consistent w/ his previous episodes of withdrawal including agitation, shaking, chest pain, sob, nausea, and a racing heart. He otherwise denies recent or active fever, chills, vomiting, abdominal pain, back pain, changes in vision or hearing, genitourinary sx, extremity pain or swelling.    ED Course:  Vitals: T 99.7F oral, ->115->103->89; /86; RR 20; SpO2 96% on RA  Labs: CBC WNL, CMP WNL, troponin WNL; lactate 3.5->2.4; AG 21->16  Therapies: apixaban 5mg x1; ASA 325mg x1; librium 50mg x1; IV ativan 2mg x1; MgSo4 2g x1; PO thiamine 100mg x1; IV protonix 40mg x1; IV zofan 4mg x1; malaax 30ml x1; folic acid 1mg x1; IV lopressor 100mg x1; 1L NS x4  ECG: +RAD; ST elevations leads V2-V4; TWI V5-V6 (ECG findings unchanged from prior ECG performed on March 19th, 2020)   (17 Jan 2021 16:24)      PAST MEDICAL & SURGICAL HISTORY:  CAD (coronary artery disease)    ETOH abuse    Hyperlipidemia    Heparin-induced thrombocytopenia  Antibody positive    Left ventricular thrombus    Atrial fibrillation    Hemorrhoid    ICD (implantable cardioverter-defibrillator) in place    Myocardial infarction involving other coronary artery    Gastritis    Atherosclerosis of coronary artery  Coronary artery disease    S/P coronary artery stent placement    Automatic implantable cardiac defibrillator in situ  ICD (implantable cardioverter-defibrillator) in place        MEDICATIONS  (STANDING):  acetaminophen  Suppository .. 650 milliGRAM(s) Rectal once  argatroban Infusion 2 MICROgram(s)/kG/Min (8.71 mL/Hr) IV Continuous <Continuous>  artificial  tears Solution 1 Drop(s) Both EYES daily  cefTRIAXone   IVPB 2000 milliGRAM(s) IV Intermittent every 24 hours  dextrose 5%. 1000 milliLiter(s) (50 mL/Hr) IV Continuous <Continuous>  metroNIDAZOLE  IVPB 500 milliGRAM(s) IV Intermittent every 8 hours    MEDICATIONS  (PRN):  LORazepam   Injectable 2 milliGRAM(s) IV Push every 4 hours PRN CIWA        FAMILY HISTORY:  FH: alcoholism  Father, brother    FH: type 2 diabetes  father    FH: myocardial infarction  maternal grandfather, age 42        CBC Full  -  ( 26 Jan 2021 09:13 )  WBC Count : 4.93 K/uL  RBC Count : 4.73 M/uL  Hemoglobin : 12.8 g/dL  Hematocrit : 40.5 %  Platelet Count - Automated : 122 K/uL  Mean Cell Volume : 85.6 fl  Mean Cell Hemoglobin : 27.1 pg  Mean Cell Hemoglobin Concentration : 31.6 gm/dL  Auto Neutrophil # : x  Auto Lymphocyte # : x  Auto Monocyte # : x  Auto Eosinophil # : x  Auto Basophil # : x  Auto Neutrophil % : x  Auto Lymphocyte % : x  Auto Monocyte % : x  Auto Eosinophil % : x  Auto Basophil % : x      01-26    136  |  102  |  5<L>  ----------------------------<  96  4.6   |  23  |  0.89    Ca    9.1      26 Jan 2021 09:13  Phos  3.7     01-26  Mg     1.6     01-26              Radiology:    < from: Xray Chest 1 View- PORTABLE-Urgent (Xray Chest 1 View- PORTABLE-Urgent .) (01.23.21 @ 09:05) >    EXAM:  XR CHEST PORTABLE URGENT 1V                          PROCEDURE DATE:  01/23/2021          INTERPRETATION:  Clinical History: Pneumonia    Frontal examination of the chest demonstrates the heart to be within normal limits in transverse diameter. No acute infiltrates. Pacemaker overlies left chest wall. Tip of pacer wire overlies right ventricular region. No acute infiltrates. Visualized osseous structures are within normal limits.    Impression: No acute infiltrates        < from: CT Head No Cont (01.26.21 @ 00:41) >  EXAM:  CT BRAIN                          PROCEDURE DATE:  01/26/2021          INTERPRETATION:  Buster BAIG MD, have reviewed the images and the report and agree with the findings.    Agree that there are evolving subacute infarctions within the left medial thalamus, right medial thalamus, and left cerebellar hemisphere. No acute intracranial hemorrhage. No new demarcated territorial infarction.      ******PRELIMINARY REPORT******      Addendum: Small right medial thalamic and left cerebellar hemisphere infarcts are also again noted, unchanged.    Pratik Santoro MD. Radiology Resident.    PROCEDURE INFORMATION:  Exam: CT Head Without Contrast  Exam date and time: 1/26/2021 12:13 AM  Age: 65 years old    Clinical indication: Abnormalfindings; Abnormal radiologic findings of head/skull; Ischemia    TECHNIQUE:  Imaging protocol: Computed tomography of the head without contrast.    COMPARISON:  CT HEAD 1/25/2021 7:28 PM    FINDINGS:  Brain: Subacute left thalamic infarction. Mild generalized volume loss of the brain. No intracranial hemorrhage.  Cerebral ventricles: No ventriculomegaly.  Bones/joints: Unremarkable. No acute fracture.  Paranasal sinuses: Right maxillary sinus mucous retention cyst versus polyp.  Mastoid air cells: Unremarkable.  Soft tissues: Unremarkable.    IMPRESSION:    Subacute left thalamic infarction. Stable compared to prior study.              Vital Signs Last 24 Hrs  T(C): 36.9 (26 Jan 2021 04:54), Max: 37.1 (25 Jan 2021 21:46)  T(F): 98.5 (26 Jan 2021 04:54), Max: 98.7 (25 Jan 2021 21:46)  HR: 70 (26 Jan 2021 04:54) (70 - 75)  BP: 109/72 (26 Jan 2021 04:54) (109/72 - 118/85)  BP(mean): --  RR: 17 (26 Jan 2021 04:54) (16 - 17)  SpO2: 97% (26 Jan 2021 04:54) (95% - 97%)    REVIEW OF SYSTEMS: as per HPI          Physical Exam:  WDWN 66 yo  gentleman lying in bed, NAD    Head: normocephalic, atraumatic    Eyes: PERRLA, EOMI, no nystagmus, sclera anicteric    ENT: nasal discharge, uvula midline, no oropharyngeal erythema/exudate    Neck: supple, negative JVD, negative carotid bruits, no thyromegaly    Chest: CTA bilaterally, neg wheeze/ rhonchi/ rales/ crackles/ egophany    Cardiovascular: regular rate and rhythm, neg murmurs/rubs/gallops    Abdomen: soft, non distended, non tender to palpation in all 4 quadrants, negative rebound/guarding, normal bowel sounds    Extremities: WWP, neg cyanosis/clubbing/edema, negative calf tenderness to palpation, negative Kee's sign    Musculoskeletal:    Skin:      :     Neurologic Exam:    Alert and oriented x 2 to person, place, date/year, speech fluent w/o dysarthria, follows commands, recent and remote memory intact, repetition intact, comprehension intact,  attention/concentration intact, fund of knowledge appropriate    Cranial Nerves:     II:                       pupils equal, round and reactive to light, visual fields intact   III/ IV/VI:            extraocular movements intact, neg nystagmus, neg ptosis  V:                       facial sensation intact, V1-3 normal  VII:                     face symmetric, no droop, normal eye closure and smile  VIII:                    hearing intact to finger rub bilaterally  IX/ X:                 soft palate rise symmetrical  XI:                      head turning, shoulder shrug normal  XII:                     tongue midline    Motor Exam:    Right UE:    no focal weakness > 3+/5                     pronator drift: neg    Left UE:      no focal weakness > 3+/5                    pronator drift: neg        Right LE:     no focal weakness > 3+/5    Left LE:     no focal weakness > 3+/5               Sensation: Intact to light touch x 4 extremities                     No neglect or extinction on double simultaneous testing.                       DTR:                        biceps/brachioradialis: equal bilaterally          R:       L:             patella/ankle: equal bilaterally          R:      L:             neg clonus          neg Babinski                        Finger to Nose:  neg dysmetria bilaterally          R:            L:     Heel to shin: wnl bilaterally          R:            L:     Rapid Alternating movements:  neg dysdiadochokinesia bilaterally          R:            L:     Joint Position Sense: wnl bilaterally          R:           L:     Romberg:  not tested    Tandem Walking:  not tested    Gait:  not tested        PM&R Impression:    1) s/p multiple subacute infarcts  2) ETOH withdrawal    Plan:    1) Physical therapy focusing on therapeutic exercises, bed mobility/transfer out of bed evaluation, progressive ambulation with assistive devices prn.    2) Anticipated Disposition Plan/Recs:    d/c home with no post discharge rehab needs

## 2021-01-26 NOTE — PROGRESS NOTE ADULT - PROBLEM SELECTOR PLAN 2
History of A-fib on Eliquis and Toprol XL. CHADS-VASC 4, HAS-BLED 3.  - Reinstate Eliquis given improvement in pt mental status and ability to eat History of LV thrombus with no thrombus on most recent ECHO during this admission. Life-long AC required.  - Eliquis 5 mg daily  - follow TTE-likely will do informal echo at bedside

## 2021-01-26 NOTE — OCCUPATIONAL THERAPY INITIAL EVALUATION ADULT - VISUAL ASSESSMENT: TRACKING
vision intact and peripheral fields intact - no vertical gaze noted but can track laterally, pt with difficulty reading signs in hallway/left to right/right to left/down gaze

## 2021-01-26 NOTE — PHYSICAL THERAPY INITIAL EVALUATION ADULT - MODALITIES TREATMENT COMMENTS
CN testing - vision intact and peripheral fields intact - no vertical gaze noted but can track laterally, pt with difficulty reading signs in hallway; face symmetric, tongue with slight deviation to right - fasciculations noted.

## 2021-01-26 NOTE — CONSULT NOTE ADULT - SUBJECTIVE AND OBJECTIVE BOX
Surgeon:    Requesting Physician:    HISTORY OF PRESENT ILLNESS :  65yo M w/ poor med adherence, PMHx of LV thrombus (resolved on Jan 2020 echo; reappeared on Nov 2020 echo), pAF on eliquis, CAD s/p PCI, HFrEF 30-35% s/p AICD, HIT, hemorrhoids, gastritis, GIB, hx multiple admissions for EtOH withdrawal (no hx seizures, DTs, or intubations), who presents for EtOH withdrawal. Hospital course complicated by MRSA sputum, Interval subacute left thalamic infarct, left posterior cerebral artery territory and TTE found to have new LV thrombus. Placed on argatroban IV for anti-coaggulation.    PAST MEDICAL & SURGICAL HISTORY:  CAD (coronary artery disease)    ETOH abuse    Hyperlipidemia    Heparin-induced thrombocytopenia  Antibody positive    Left ventricular thrombus    Atrial fibrillation    Hemorrhoid    ICD (implantable cardioverter-defibrillator) in place    Myocardial infarction involving other coronary artery    Gastritis    Atherosclerosis of coronary artery  Coronary artery disease    S/P coronary artery stent placement    Automatic implantable cardiac defibrillator in situ  ICD (implantable cardioverter-defibrillator) in place        MEDICATIONS  (STANDING):  acetaminophen  Suppository .. 650 milliGRAM(s) Rectal once  argatroban Infusion 2 MICROgram(s)/kG/Min (8.71 mL/Hr) IV Continuous <Continuous>  artificial  tears Solution 1 Drop(s) Both EYES daily  cefTRIAXone   IVPB 2000 milliGRAM(s) IV Intermittent every 24 hours  dextrose 5%. 1000 milliLiter(s) (50 mL/Hr) IV Continuous <Continuous>  metroNIDAZOLE  IVPB 500 milliGRAM(s) IV Intermittent every 8 hours  thiamine IVPB 500 milliGRAM(s) IV Intermittent every 8 hours    MEDICATIONS  (PRN):  LORazepam   Injectable 2 milliGRAM(s) IV Push every 4 hours PRN CIWA      Allergies    Capoten (Short breath; Rash; Hives)  heparin (Other (Mod to Severe))  penicillin (Short breath; Rash; Hives)    Intolerances        SOCIAL HISTORY:  Smoker:  YES / NO        PACK YEARS:                         WHEN QUIT?  ETOH use:  YES / NO               FREQUENCY / QUANTITY:  Ilicit Drug use:  YES / NO  Occupation:  Assisted device use (Cane / Walker):  Live with:    FAMILY HISTORY:  FH: alcoholism  Father, brother    FH: type 2 diabetes  father    FH: myocardial infarction  maternal grandfather, age 42        Review of Systems (Need 10):  CONSTITUTIONAL:  fevers / chills                                    NEURO:    confusion                                                                                  EYES:  Denies blurry vision, discharge, pain, loss of vision                                                                                    ENMT:  Denies difficulty hearing, vertigo, dysphagia, epistaxis, recent dental work                                       CV:  Denies chest pain, palpitations, CARRERO, orthopnea                                                                                           RESPIRATORY:  Denies Wheezing, SOB, cough / sputum, hemoptysis                                                               GI:  Denies nausea, vomiting, diarrhea, constipation, melena                                                                          : Denies hematuria, dysuria, urgency, incontinence                                                                                          MUSCULOSKELETAL  Denies arthritis, joint swelling, muscle weakness                                                             SKIN/BREAST:  Denies rash, itching, hair loss, masses                                                                                              PSYCH:  Denies depression, anxiety, suicidal ideation                                                                                                HEME/LYMPH:  Denies bruises easily, enlarged lymph nodes, tender lymph nodes                                          ENDOCRINE:  Denies cold intolerance, heat intolerance, polydipsia                                                                      Vital Signs Last 24 Hrs  T(C): 36.9 (26 Jan 2021 04:54), Max: 37.1 (25 Jan 2021 21:46)  T(F): 98.5 (26 Jan 2021 04:54), Max: 98.7 (25 Jan 2021 21:46)  HR: 70 (26 Jan 2021 04:54) (70 - 84)  BP: 109/72 (26 Jan 2021 04:54) (95/69 - 118/85)  BP(mean): --  RR: 17 (26 Jan 2021 04:54) (16 - 17)  SpO2: 97% (26 Jan 2021 04:54) (95% - 98%)    Physical Exam (Need 8)  CONSTITUTIONAL:                                                                          WNL  NEURO:                                                                                             WNL                      EYES:                                                                                                  WNL  ENMT:                                                                                                WNL  CV:                                                                                                      WNL  RESPIRATORY:                                                                                  WNL  GI:                                                                                                       WNL  : AGUILAR + / -                                                                                 WNL  MUSKULOSKELETAL:                                                                       WNL  SKIN / BREAST:                                                                                 WNL                                                          LABS:                        11.5   4.38  )-----------( 91       ( 25 Jan 2021 06:09 )             34.8     01-25    132<L>  |  99  |  6<L>  ----------------------------<  107<H>  3.6   |  24  |  0.90    Ca    8.5      25 Jan 2021 06:09  Phos  3.4     01-25  Mg     1.8     01-25    TPro  6.4  /  Alb  3.5  /  TBili  0.5  /  DBili  x   /  AST  25  /  ALT  13  /  AlkPhos  45  01-24    PT/INR - ( 24 Jan 2021 06:05 )   PT: 38.3 sec;   INR: 3.38          PTT - ( 25 Jan 2021 20:18 )  PTT:63.6 sec  Urinalysis Basic - ( 24 Jan 2021 11:30 )    Color: Yellow / Appearance: Clear / SG: >=1.030 / pH: x  Gluc: x / Ketone: Trace mg/dL  / Bili: Negative / Urobili: 0.2 E.U./dL   Blood: x / Protein: NEGATIVE mg/dL / Nitrite: NEGATIVE   Leuk Esterase: NEGATIVE / RBC: < 5 /HPF / WBC < 5 /HPF   Sq Epi: x / Non Sq Epi: 0-5 /HPF / Bacteria: Present /HPF              RADIOLOGY & ADDITIONAL STUDIES:  CAROTID U/S:    CXR:    CT Scan: see report    EKG: AF    TTE : see report

## 2021-01-26 NOTE — PROGRESS NOTE ADULT - PROBLEM SELECTOR PLAN 9
F: D5 1/2 NS 75 cc/hr  E: Replete PRN  N: NPO  DVT ppx: argotraban gtt  GI ppx: Protonix 40 mg IV  CODE: FULL  Dispo: Pending, full code, 7L F: D5 1/2 NS 75 cc/hr  E: Replete PRN  N: NPO  DVT ppx: argotraban gtt  GI ppx: Protonix 40 mg IV  CODE: FULL

## 2021-01-27 ENCOUNTER — FORM ENCOUNTER (OUTPATIENT)
Age: 66
End: 2021-01-27

## 2021-01-27 LAB
ALBUMIN SERPL ELPH-MCNC: 3.4 G/DL — SIGNIFICANT CHANGE UP (ref 3.3–5)
ALP SERPL-CCNC: 54 U/L — SIGNIFICANT CHANGE UP (ref 40–120)
ALT FLD-CCNC: 7 U/L — LOW (ref 10–45)
ANION GAP SERPL CALC-SCNC: 12 MMOL/L — SIGNIFICANT CHANGE UP (ref 5–17)
APTT BLD: 68.2 SEC — HIGH (ref 27.5–35.5)
AST SERPL-CCNC: 13 U/L — SIGNIFICANT CHANGE UP (ref 10–40)
BASOPHILS # BLD AUTO: 0.03 K/UL — SIGNIFICANT CHANGE UP (ref 0–0.2)
BASOPHILS NFR BLD AUTO: 0.7 % — SIGNIFICANT CHANGE UP (ref 0–2)
BILIRUB SERPL-MCNC: 0.2 MG/DL — SIGNIFICANT CHANGE UP (ref 0.2–1.2)
BUN SERPL-MCNC: 7 MG/DL — SIGNIFICANT CHANGE UP (ref 7–23)
CALCIUM SERPL-MCNC: 8.9 MG/DL — SIGNIFICANT CHANGE UP (ref 8.4–10.5)
CHLORIDE SERPL-SCNC: 101 MMOL/L — SIGNIFICANT CHANGE UP (ref 96–108)
CO2 SERPL-SCNC: 23 MMOL/L — SIGNIFICANT CHANGE UP (ref 22–31)
CREAT SERPL-MCNC: 0.94 MG/DL — SIGNIFICANT CHANGE UP (ref 0.5–1.3)
CULTURE RESULTS: SIGNIFICANT CHANGE UP
CULTURE RESULTS: SIGNIFICANT CHANGE UP
EOSINOPHIL # BLD AUTO: 0.06 K/UL — SIGNIFICANT CHANGE UP (ref 0–0.5)
EOSINOPHIL NFR BLD AUTO: 1.4 % — SIGNIFICANT CHANGE UP (ref 0–6)
FERRITIN SERPL-MCNC: 148 NG/ML — SIGNIFICANT CHANGE UP (ref 30–400)
FOLATE SERPL-MCNC: 15.3 NG/ML — SIGNIFICANT CHANGE UP
GLUCOSE SERPL-MCNC: 110 MG/DL — HIGH (ref 70–99)
HCT VFR BLD CALC: 37.9 % — LOW (ref 39–50)
HGB BLD-MCNC: 12.3 G/DL — LOW (ref 13–17)
IMM GRANULOCYTES NFR BLD AUTO: 0.5 % — SIGNIFICANT CHANGE UP (ref 0–1.5)
INR BLD: 1.87 — HIGH (ref 0.88–1.16)
IRON SATN MFR SERPL: 12 % — LOW (ref 16–55)
IRON SATN MFR SERPL: 28 UG/DL — LOW (ref 45–165)
LDH SERPL L TO P-CCNC: 243 U/L — HIGH (ref 50–242)
LYMPHOCYTES # BLD AUTO: 1.53 K/UL — SIGNIFICANT CHANGE UP (ref 1–3.3)
LYMPHOCYTES # BLD AUTO: 36.2 % — SIGNIFICANT CHANGE UP (ref 13–44)
MAGNESIUM SERPL-MCNC: 1.7 MG/DL — SIGNIFICANT CHANGE UP (ref 1.6–2.6)
MCHC RBC-ENTMCNC: 27.9 PG — SIGNIFICANT CHANGE UP (ref 27–34)
MCHC RBC-ENTMCNC: 32.5 GM/DL — SIGNIFICANT CHANGE UP (ref 32–36)
MCV RBC AUTO: 85.9 FL — SIGNIFICANT CHANGE UP (ref 80–100)
MONOCYTES # BLD AUTO: 0.55 K/UL — SIGNIFICANT CHANGE UP (ref 0–0.9)
MONOCYTES NFR BLD AUTO: 13 % — SIGNIFICANT CHANGE UP (ref 2–14)
NEUTROPHILS # BLD AUTO: 2.04 K/UL — SIGNIFICANT CHANGE UP (ref 1.8–7.4)
NEUTROPHILS NFR BLD AUTO: 48.2 % — SIGNIFICANT CHANGE UP (ref 43–77)
NRBC # BLD: 0 /100 WBCS — SIGNIFICANT CHANGE UP (ref 0–0)
PLATELET # BLD AUTO: 153 K/UL — SIGNIFICANT CHANGE UP (ref 150–400)
POTASSIUM SERPL-MCNC: 4.3 MMOL/L — SIGNIFICANT CHANGE UP (ref 3.5–5.3)
POTASSIUM SERPL-SCNC: 4.3 MMOL/L — SIGNIFICANT CHANGE UP (ref 3.5–5.3)
PROT SERPL-MCNC: 6.8 G/DL — SIGNIFICANT CHANGE UP (ref 6–8.3)
PROTHROM AB SERPL-ACNC: 21.8 SEC — HIGH (ref 10.6–13.6)
RBC # BLD: 4.41 M/UL — SIGNIFICANT CHANGE UP (ref 4.2–5.8)
RBC # FLD: 15.5 % — HIGH (ref 10.3–14.5)
SODIUM SERPL-SCNC: 136 MMOL/L — SIGNIFICANT CHANGE UP (ref 135–145)
SPECIMEN SOURCE: SIGNIFICANT CHANGE UP
SPECIMEN SOURCE: SIGNIFICANT CHANGE UP
TIBC SERPL-MCNC: 225 UG/DL — SIGNIFICANT CHANGE UP (ref 220–430)
TRANSFERRIN SERPL-MCNC: 186 MG/DL — LOW (ref 200–360)
UIBC SERPL-MCNC: 197 UG/DL — SIGNIFICANT CHANGE UP (ref 110–370)
VIT B12 SERPL-MCNC: 485 PG/ML — SIGNIFICANT CHANGE UP (ref 232–1245)
WBC # BLD: 4.23 K/UL — SIGNIFICANT CHANGE UP (ref 3.8–10.5)
WBC # FLD AUTO: 4.23 K/UL — SIGNIFICANT CHANGE UP (ref 3.8–10.5)

## 2021-01-27 PROCEDURE — 99233 SBSQ HOSP IP/OBS HIGH 50: CPT

## 2021-01-27 RX ORDER — MAGNESIUM SULFATE 500 MG/ML
1 VIAL (ML) INJECTION ONCE
Refills: 0 | Status: COMPLETED | OUTPATIENT
Start: 2021-01-27 | End: 2021-01-27

## 2021-01-27 RX ADMIN — Medication 100 GRAM(S): at 11:20

## 2021-01-27 RX ADMIN — Medication 1 DROP(S): at 12:30

## 2021-01-27 RX ADMIN — PANTOPRAZOLE SODIUM 40 MILLIGRAM(S): 20 TABLET, DELAYED RELEASE ORAL at 11:17

## 2021-01-27 RX ADMIN — CEFTRIAXONE 100 MILLIGRAM(S): 500 INJECTION, POWDER, FOR SOLUTION INTRAMUSCULAR; INTRAVENOUS at 13:09

## 2021-01-27 RX ADMIN — ATORVASTATIN CALCIUM 80 MILLIGRAM(S): 80 TABLET, FILM COATED ORAL at 21:20

## 2021-01-27 NOTE — PROGRESS NOTE ADULT - SUBJECTIVE AND OBJECTIVE BOX
Pt is well known to me   s/p HX CAD  PTCA with Stent Cardiomyopathy  LV Thrombus   I LEARNED JUST  LATE YESTERDAY OF PTs  ADMISSION TO HOSPITAL      Pt  awake  and verbal but has no recollection of Why he was admitted     Pt has long HX of  Intractable Alcoholism  s/p Multiple admissions for Withdrawl    S/p In patient Rehab at [facility] previously   Now presents with increased  lethargy  pinpoint pupils    decreased affect  tongue biting   urinary incontinence   CT B Head  small  R medial  and Left cerebellar infarct   evolving subacute infarction s  left medial thalamus   r medial thalamus   no hemorrhage     PAST MEDICAL & SURGICAL HISTORY:  CAD (coronary artery disease)    ETOH abuse    Hyperlipidemia    Heparin-induced thrombocytopenia  Antibody positive    Left ventricular thrombus    Atrial fibrillation    Hemorrhoid    ICD (implantable cardioverter-defibrillator) in place    Myocardial infarction involving other coronary artery    Gastritis    Atherosclerosis of coronary artery  Coronary artery disease    S/P coronary artery stent placement    Automatic implantable cardiac defibrillator in situ  ICD (implantable cardioverter-defibrillator) in place        ICU Vital Signs Last 24 Hrs  T(C): 36.8 (27 Jan 2021 09:32), Max: 36.9 (26 Jan 2021 15:27)  T(F): 98.2 (27 Jan 2021 09:32), Max: 98.4 (26 Jan 2021 15:27)  HR: 72 (27 Jan 2021 08:30) (70 - 89)  BP: 105/63 (27 Jan 2021 08:30) (97/65 - 105/63)  BP(mean): 78 (27 Jan 2021 08:30) (76 - 81)  ABP: --  ABP(mean): --  RR: 17 (27 Jan 2021 08:30) (16 - 18)  SpO2: 96% (27 Jan 2021 08:30) (95% - 97%)      lungs  decreased  breath sounds at base  CV  s1s2     abd soft  Ext stable                          12.3   4.23  )-----------( 153      ( 27 Jan 2021 06:04 )             37.9       01-27    136  |  101  |  7   ----------------------------<  110<H>  4.3   |  23  |  0.94    Ca    8.9      27 Jan 2021 06:04  Phos  3.7     01-26  Mg     1.7     01-27    TPro  6.8  /  Alb  3.4  /  TBili  0.2  /  DBili  x   /  AST  13  /  ALT  7<L>  /  AlkPhos  54  01-27      PT/INR - ( 27 Jan 2021 06:04 )   PT: 21.8 sec;   INR: 1.87          PTT - ( 27 Jan 2021 06:04 )  PTT:68.2 sec    CTA   Head Neck  no carotid stenosis     Heme  consult re Hypercoagulable state in progeress

## 2021-01-27 NOTE — PROGRESS NOTE ADULT - PROBLEM SELECTOR PLAN 6
History of CAD with MI in 1996. No antiplatelets due to GI bleed history  -holding Lipitor for npo HFrEF (30-35% 1/2020) status post AICD on Toprol, Lipitor, no ACE secondary to allergy. History of CAD with MI in 1996.   -can restart Toprol as needed   -restarted lipitor   -No ACE/ARB due to allergy, no antiplatelets due to GIB history

## 2021-01-27 NOTE — PROGRESS NOTE ADULT - PROBLEM SELECTOR PLAN 9
F: D5 1/2 NS 75 cc/hr  E: Replete PRN  N: NPO  DVT ppx: argotraban gtt  GI ppx: Protonix 40 mg IV  CODE: FULL F: none  E: Replete PRN  N: dysphagia diet   DVT ppx: argotraban gtt  GI ppx: Protonix 40 mg IV  CODE: FULL

## 2021-01-27 NOTE — PROGRESS NOTE ADULT - SUBJECTIVE AND OBJECTIVE BOX
**Incomplete Note**  OVERNIGHT EVENTS:    SUBJECTIVE / INTERVAL HPI: Patient seen and examined at bedside.     VITAL SIGNS:  Vital Signs Last 24 Hrs  T(C): 36.7 (27 Jan 2021 02:05), Max: 36.9 (26 Jan 2021 15:27)  T(F): 98.1 (27 Jan 2021 02:05), Max: 98.4 (26 Jan 2021 15:27)  HR: 70 (27 Jan 2021 04:15) (70 - 89)  BP: 101/66 (27 Jan 2021 04:15) (97/65 - 104/78)  BP(mean): 79 (27 Jan 2021 04:15) (76 - 81)  RR: 16 (27 Jan 2021 04:15) (16 - 18)  SpO2: 95% (27 Jan 2021 04:15) (95% - 97%)    01-25-21 @ 07:01  -  01-26-21 @ 07:00  --------------------------------------------------------  IN: 804.4 mL / OUT: 950 mL / NET: -145.6 mL    01-26-21 @ 07:01  -  01-27-21 @ 06:52  --------------------------------------------------------  IN: 134.8 mL / OUT: 900 mL / NET: -765.2 mL        PHYSICAL EXAM:    General: WDWN  HEENT: NC/AT; PERRL, anicteric sclera; MMM  Neck: supple  Cardiovascular: +S1/S2; RRR  Respiratory: CTA B/L; no W/R/R  Gastrointestinal: soft, NT/ND; +BSx4  Extremities: WWP; no edema, clubbing or cyanosis  Vascular: 2+ radial, DP/PT pulses B/L  Neurological: AAOx3; no focal deficits    MEDICATIONS:  MEDICATIONS  (STANDING):  argatroban Infusion 2 MICROgram(s)/kG/Min (8.71 mL/Hr) IV Continuous <Continuous>  artificial  tears Solution 1 Drop(s) Both EYES daily  atorvastatin 80 milliGRAM(s) Oral at bedtime  cefTRIAXone   IVPB 2000 milliGRAM(s) IV Intermittent every 24 hours  dextrose 5%. 1000 milliLiter(s) (50 mL/Hr) IV Continuous <Continuous>  pantoprazole  Injectable 40 milliGRAM(s) IV Push daily    MEDICATIONS  (PRN):      ALLERGIES:  Allergies    Capoten (Short breath; Rash; Hives)  heparin (Other (Mod to Severe))  penicillin (Short breath; Rash; Hives)    Intolerances        LABS:                        12.3   4.23  )-----------( 153      ( 27 Jan 2021 06:04 )             37.9     01-27    136  |  101  |  7   ----------------------------<  110<H>  4.3   |  23  |  0.94    Ca    8.9      27 Jan 2021 06:04  Phos  3.7     01-26  Mg     1.7     01-27    TPro  6.8  /  Alb  3.4  /  TBili  0.2  /  DBili  x   /  AST  13  /  ALT  7<L>  /  AlkPhos  54  01-27    PT/INR - ( 27 Jan 2021 06:04 )   PT: 21.8 sec;   INR: 1.87          PTT - ( 27 Jan 2021 06:04 )  PTT:68.2 sec    CAPILLARY BLOOD GLUCOSE            RADIOLOGY & ADDITIONAL TESTS: Reviewed.    ASSESSMENT:    PLAN:  OVERNIGHT EVENTS: PTT therapeutic     SUBJECTIVE / INTERVAL HPI: Patient seen and examined at bedside. Patient was somnolent on initial exam and could not answer some questions however on repeat exam was seen eating at the side of his bed and was able to answer all questions. He says he feels his R hand is weak. Does not have any chest pain or shortness of breath, or headache.     VITAL SIGNS:  Vital Signs Last 24 Hrs  T(C): 36.7 (27 Jan 2021 02:05), Max: 36.9 (26 Jan 2021 15:27)  T(F): 98.1 (27 Jan 2021 02:05), Max: 98.4 (26 Jan 2021 15:27)  HR: 70 (27 Jan 2021 04:15) (70 - 89)  BP: 101/66 (27 Jan 2021 04:15) (97/65 - 104/78)  BP(mean): 79 (27 Jan 2021 04:15) (76 - 81)  RR: 16 (27 Jan 2021 04:15) (16 - 18)  SpO2: 95% (27 Jan 2021 04:15) (95% - 97%)    01-25-21 @ 07:01  -  01-26-21 @ 07:00  --------------------------------------------------------  IN: 804.4 mL / OUT: 950 mL / NET: -145.6 mL    01-26-21 @ 07:01  -  01-27-21 @ 06:52  --------------------------------------------------------  IN: 134.8 mL / OUT: 900 mL / NET: -765.2 mL      PHYSICAL EXAM:    Constitutional: WDWN resting comfortably in bed; NAD; eyes open and conversational  Head: NC/AT; able to perform head-chin without pain or restriction  Eyes: pupils constricted but reactive BL, EOMI; anicteric sclera  ENT: no nasal discharge; MMM; poor dentition  Neck: supple; no JVD  Respiratory: CTA B/L; no W/R/R  Cardiac: +S1/S2; RRR; no M/R/G  Gastrointestinal: soft, NT/ND; no rebound or guarding; +BSx4  Extremities: WWP, no clubbing or cyanosis; no peripheral edema  Neurologic:  -Mental status: alert and awake. conversational. remote memory intact. AOx2 (self, hospital)  -Cranial nerves:   II: visual fields are full to confrontation.  III, IV, VI: EOMI without nystagmus, some difficulty with upward gaze. Pupils equally constricted with sluggish response to light  V:  Facial sensation V1-V3 equal and intact   VII: Face is symmetric with normal eye closure and smile  VIII: Hearing is bilaterally intact  IX, X: Uvula is midline and soft palate rises symmetrically  XI: Head turning and shoulder shrug are intact.  XII: Tongue protrudes midline  Motor: Moving all extremities spontaneously, normal bulk and tone. mm strength 5/5 BL shoulders flexion/extension/abduction, 5/5 BL elbow flexion/extension, 5/5 BL handgrip, pronator drift neg BL UE, mm strength 5/5 L hip flexion/extension/abduction/adduction, 1/5 R hip/knee flexion/extension, 5/5 BL dorsiflexion/plantarflexion. Has some difficulty with fine motor movement of the RUE.   Sensation: Intact to light touch bilaterally  Reflexes: Downgoing toes bilaterally; 2+ DTRs BL patellar/achilles      MEDICATIONS:  MEDICATIONS  (STANDING):  argatroban Infusion 2 MICROgram(s)/kG/Min (8.71 mL/Hr) IV Continuous <Continuous>  artificial  tears Solution 1 Drop(s) Both EYES daily  atorvastatin 80 milliGRAM(s) Oral at bedtime  cefTRIAXone   IVPB 2000 milliGRAM(s) IV Intermittent every 24 hours  dextrose 5%. 1000 milliLiter(s) (50 mL/Hr) IV Continuous <Continuous>  pantoprazole  Injectable 40 milliGRAM(s) IV Push daily    MEDICATIONS  (PRN):      ALLERGIES:  Allergies    Capoten (Short breath; Rash; Hives)  heparin (Other (Mod to Severe))  penicillin (Short breath; Rash; Hives)    Intolerances        LABS:                        12.3   4.23  )-----------( 153      ( 27 Jan 2021 06:04 )             37.9     01-27    136  |  101  |  7   ----------------------------<  110<H>  4.3   |  23  |  0.94    Ca    8.9      27 Jan 2021 06:04  Phos  3.7     01-26  Mg     1.7     01-27    TPro  6.8  /  Alb  3.4  /  TBili  0.2  /  DBili  x   /  AST  13  /  ALT  7<L>  /  AlkPhos  54  01-27    PT/INR - ( 27 Jan 2021 06:04 )   PT: 21.8 sec;   INR: 1.87          PTT - ( 27 Jan 2021 06:04 )  PTT:68.2 sec    CAPILLARY BLOOD GLUCOSE            RADIOLOGY & ADDITIONAL TESTS: Reviewed.    ASSESSMENT:    PLAN:  OVERNIGHT EVENTS: PTT therapeutic     SUBJECTIVE / INTERVAL HPI: Patient seen and examined at bedside. Patient was somnolent on initial exam and could not answer some questions however on repeat exam was seen eating at the side of his bed and was able to answer all questions. He says he feels his R hand is weak. Does not have any chest pain or shortness of breath, or headache.     VITAL SIGNS:  Vital Signs Last 24 Hrs  T(C): 36.7 (27 Jan 2021 02:05), Max: 36.9 (26 Jan 2021 15:27)  T(F): 98.1 (27 Jan 2021 02:05), Max: 98.4 (26 Jan 2021 15:27)  HR: 70 (27 Jan 2021 04:15) (70 - 89)  BP: 101/66 (27 Jan 2021 04:15) (97/65 - 104/78)  BP(mean): 79 (27 Jan 2021 04:15) (76 - 81)  RR: 16 (27 Jan 2021 04:15) (16 - 18)  SpO2: 95% (27 Jan 2021 04:15) (95% - 97%)    01-25-21 @ 07:01  -  01-26-21 @ 07:00  --------------------------------------------------------  IN: 804.4 mL / OUT: 950 mL / NET: -145.6 mL    01-26-21 @ 07:01  -  01-27-21 @ 06:52  --------------------------------------------------------  IN: 134.8 mL / OUT: 900 mL / NET: -765.2 mL      PHYSICAL EXAM:    Constitutional: WDWN resting comfortably in bed; NAD; eyes open and conversational  Head: NC/AT; able to perform head-chin without pain or restriction  Eyes: pupils constricted but reactive BL, EOMI; anicteric sclera  ENT: no nasal discharge; MMM; poor dentition  Neck: supple; no JVD  Respiratory: CTA B/L; no W/R/R  Cardiac: +S1/S2; RRR; no M/R/G  Gastrointestinal: soft, NT/ND; no rebound or guarding; +BSx4  Extremities: WWP, no clubbing or cyanosis; no peripheral edema  Neurologic:  -Mental status: alert and awake. conversational. remote memory intact. AOx2 (self, hospital)  -Cranial nerves:   II: visual fields are full to confrontation.  III, IV, VI: EOMI without nystagmus, some difficulty with upward gaze. Pupils equally constricted with sluggish response to light  V:  Facial sensation V1-V3 equal and intact   VII: Face is symmetric with normal eye closure and smile  VIII: Hearing is bilaterally intact  IX, X: Uvula is midline and soft palate rises symmetrically  XI: Head turning and shoulder shrug are intact.  XII: Tongue protrudes midline  Motor: Moving all extremities spontaneously, normal bulk and tone. mm strength 5/5 BL shoulders flexion/extension/abduction, 5/5 BL elbow flexion/extension, 5/5 BL handgrip, pronator drift neg BL UE, mm strength 5/5 L hip flexion/extension/abduction/adduction, 1/5 R hip/knee flexion/extension (but improved), 5/5 BL dorsiflexion/plantarflexion.   Poor fine motor movement of the RUE.   Sensation: Intact to light touch bilaterally  Reflexes: Downgoing toes bilaterally; 2+ DTRs BL patellar/achilles      MEDICATIONS:  MEDICATIONS  (STANDING):  argatroban Infusion 2 MICROgram(s)/kG/Min (8.71 mL/Hr) IV Continuous <Continuous>  artificial  tears Solution 1 Drop(s) Both EYES daily  atorvastatin 80 milliGRAM(s) Oral at bedtime  cefTRIAXone   IVPB 2000 milliGRAM(s) IV Intermittent every 24 hours  dextrose 5%. 1000 milliLiter(s) (50 mL/Hr) IV Continuous <Continuous>  pantoprazole  Injectable 40 milliGRAM(s) IV Push daily    MEDICATIONS  (PRN):      ALLERGIES:  Allergies    Capoten (Short breath; Rash; Hives)  heparin (Other (Mod to Severe))  penicillin (Short breath; Rash; Hives)    Intolerances        LABS:                        12.3   4.23  )-----------( 153      ( 27 Jan 2021 06:04 )             37.9     01-27    136  |  101  |  7   ----------------------------<  110<H>  4.3   |  23  |  0.94    Ca    8.9      27 Jan 2021 06:04  Phos  3.7     01-26  Mg     1.7     01-27    TPro  6.8  /  Alb  3.4  /  TBili  0.2  /  DBili  x   /  AST  13  /  ALT  7<L>  /  AlkPhos  54  01-27    PT/INR - ( 27 Jan 2021 06:04 )   PT: 21.8 sec;   INR: 1.87          PTT - ( 27 Jan 2021 06:04 )  PTT:68.2 sec    CAPILLARY BLOOD GLUCOSE

## 2021-01-27 NOTE — CONSULT NOTE ADULT - REASON FOR ADMISSION
EtOH withdrawal

## 2021-01-27 NOTE — PROGRESS NOTE ADULT - SUBJECTIVE AND OBJECTIVE BOX
Patient is a 65y old  Male who presents with a chief complaint of EtOH withdrawal (27 Jan 2021 10:19)      INTERVAL HPI/OVERNIGHT EVENTS:  Seen by me this afternoon, slightly sleepy but easily arousable. No new complaints. Denies headache or dizziness.    Review of Systems: 12 point review of systems otherwise negative    MEDICATIONS  (STANDING):  argatroban Infusion 2 MICROgram(s)/kG/Min (8.71 mL/Hr) IV Continuous <Continuous>  artificial  tears Solution 1 Drop(s) Both EYES daily  atorvastatin 80 milliGRAM(s) Oral at bedtime  cefTRIAXone   IVPB 2000 milliGRAM(s) IV Intermittent every 24 hours  pantoprazole  Injectable 40 milliGRAM(s) IV Push daily    MEDICATIONS  (PRN):      Allergies    Capoten (Short breath; Rash; Hives)  heparin (Other (Mod to Severe))  penicillin (Short breath; Rash; Hives)    Intolerances          Vital Signs Last 24 Hrs  T(C): 37.1 (27 Jan 2021 17:15), Max: 37.1 (27 Jan 2021 17:15)  T(F): 98.7 (27 Jan 2021 17:15), Max: 98.7 (27 Jan 2021 17:15)  HR: 84 (27 Jan 2021 16:45) (70 - 84)  BP: 105/65 (27 Jan 2021 16:45) (100/69 - 105/65)  BP(mean): 79 (27 Jan 2021 16:45) (78 - 84)  RR: 18 (27 Jan 2021 16:45) (16 - 18)  SpO2: 99% (27 Jan 2021 16:45) (95% - 99%)  CAPILLARY BLOOD GLUCOSE          01-26 @ 07:01  -  01-27 @ 07:00  --------------------------------------------------------  IN: 830.5 mL / OUT: 900 mL / NET: -69.5 mL        Physical Exam:    Daily     Daily   General:  Well appearing, NAD, not cachetic, follows commands  HEENT:  Nonicteric, PERRLA  CV:  RRR, no murmur, no JVD  Lungs:  CTA B/L, no wheezes, rales, rhonchi  Abdomen:  Soft, non-tender, no distended, positive BS, no hepatosplenomegaly  Extremities:  2+ pulses, no c/c, no edema  Skin:  Warm and dry, no rashes  :  No feliz  Neuro:  AAOx1-2 (partially to date), moves all 4 extremities  No Restraints    LABS:                        12.3   4.23  )-----------( 153      ( 27 Jan 2021 06:04 )             37.9     01-27    136  |  101  |  7   ----------------------------<  110<H>  4.3   |  23  |  0.94    Ca    8.9      27 Jan 2021 06:04  Phos  3.7     01-26  Mg     1.7     01-27    TPro  6.8  /  Alb  3.4  /  TBili  0.2  /  DBili  x   /  AST  13  /  ALT  7<L>  /  AlkPhos  54  01-27    PT/INR - ( 27 Jan 2021 06:04 )   PT: 21.8 sec;   INR: 1.87          PTT - ( 27 Jan 2021 06:04 )  PTT:68.2 sec        RADIOLOGY & ADDITIONAL TESTS:  Reviewed by me

## 2021-01-27 NOTE — PROGRESS NOTE ADULT - ASSESSMENT
63-year-old male with a past medical history of HIT, ETOH abuse, paroxysmal A-fib with an LV thrombus, HLD, CAD status post PCI, HFrEF (35-40% 2019) status post AICD, hemorrhoids, gastritis, and a recent admission in August for alcohol withdrawal who presented with signs and symptoms of alcohol withdrawal. 63-year-old male with a past medical history of HIT, ETOH abuse, paroxysmal A-fib with an LV thrombus, HLD, CAD status post PCI, HFrEF (35-40% 2019) status post AICD, hemorrhoids, gastritis, and a recent admission in August for alcohol withdrawal who presented with signs and symptoms of alcohol withdrawal. Found to have multiple infarctions involving L thalamus, L posterior cerebral artery - transferred to [facility] for more frequent monitoring and hemorrhagic conversion surveillance.

## 2021-01-27 NOTE — PROGRESS NOTE ADULT - ASSESSMENT
Acute on Chronic Alcoholism   New CVA  as described above     Cardiomyopathy  LV  Thrombus   CAD     Cont  med RX  and supportive care

## 2021-01-27 NOTE — CONSULT NOTE ADULT - CONSULT REQUESTED DATE/TIME
20-Jan-2021 08:02
20-Jan-2021 11:27
20-Jan-2021 14:29
21-Jan-2021 09:35
26-Jan-2021 05:32
26-Jan-2021 14:30
27-Jan-2021 08:24

## 2021-01-27 NOTE — CONSULT NOTE ADULT - SUBJECTIVE AND OBJECTIVE BOX
LENGTH OF HOSPITAL STAY: 10d    CHIEF COMPLAINT:   Patient is a 65y old  Male who presents with a chief complaint of EtOH withdrawal (27 Jan 2021 06:52)    HISTORY OF PRESENTING ILLNESS:   Mr. Torrez is a 65yo M w/ poor med adherence, PMHx of LV thrombus (resolved on Jan 2020 echo; reappeared on Nov 2020 echo) pAF on eliquis, CAD s/p PCI, HFrEF 30-35% s/p AICD, HIT, hemorrhoids, gastritis, GIB, hx multiple admissions for EtOH withdrawal (last drink 1/16; no hx seizures, DTs, or intubations), who presents due to concern for alcohol withdrawal. Pt last seen at [facility] in March 2020 when he was admitted for EtOH withdrawal w/ initial CIWA ~24 and associated visual hallucinations, which improved after being started on librium 50mg Q8hrs, and subsequently discharged on librium taper. Per outpatient records that pt himself brought with him to the ED, pt had additional episode EtOH withdrawal in November where he was seen at [facility] and subsequently discharged on librium taper 25mg BID. At that time, pt also underwent TTE imaging which showed LV EF 38% as well as a large protruding fixed thrombus attached to LV apex. Noted on discharge paperwork (11/22) to not be on ASA due to thrombocytopenia, not on warfarin due to prior poor adherence, and accordingly discharged on eliquis w/ a discharge diagnosis of afib/aflutter. Pt has since twice again been admitted at [facility] (12/8/21 and 1/6/21) for 1-2mo hx of persistent "funny feeling in my chest", determined to be 2/2 Afib/aflutter. Between those two admissions, pt had also been prescribed hydralazine and isosorbide dinitrate, however pt reports refusing to take them after reading about the side-effects in conjunction w/ alcohol-use. Pt says that for the past 1 month, he has been trying but struggling to wean off his alcohol consumption which he estimates to be a ~1pint/day. He says for the past week he has been especially focused on quitting in anticipation of an appointment with his outpatient cardiologist. Today he reports experiencing sx consistent w/ his previous episodes of withdrawal including agitation, shaking, chest pain, sob, nausea, and a racing heart. He otherwise denies recent or active fever, chills, vomiting, abdominal pain, back pain, changes in vision or hearing, genitourinary sx, extremity pain or swelling.    ED Course:  Vitals: T 99.7F oral, ->115->103->89; /86; RR 20; SpO2 96% on RA  Labs: CBC WNL, CMP WNL, troponin WNL; lactate 3.5->2.4; AG 21->16  Therapies: apixaban 5mg x1; ASA 325mg x1; librium 50mg x1; IV ativan 2mg x1; MgSo4 2g x1; PO thiamine 100mg x1; IV protonix 40mg x1; IV zofan 4mg x1; malaax 30ml x1; folic acid 1mg x1; IV lopressor 100mg x1; 1L NS x4  ECG: +RAD; ST elevations leads V2-V4; TWI V5-V6 (ECG findings unchanged from prior ECG performed on March 19th, 2020)   (17 Jan 2021 16:24)    PAST MEDICAL & SURGICAL HISTORY:  CAD (coronary artery disease)  ETOH abuse  Hyperlipidemia  Heparin-induced thrombocytopenia  Antibody positive  Left ventricular thrombus  Atrial fibrillation  Hemorrhoid  ICD (implantable cardioverter-defibrillator) in place  Myocardial infarction involving other coronary artery  Gastritis  Atherosclerosis of coronary artery  Coronary artery disease  S/P coronary artery stent placement  Automatic implantable cardiac defibrillator in situ  ICD (implantable cardioverter-defibrillator) in place    SOCIAL HISTORY:    ALLERGIES:  Capoten (Short breath; Rash; Hives)  heparin (Other (Mod to Severe))  penicillin (Short breath; Rash; Hives)    MEDICATIONS:  STANDING MEDICATIONS  argatroban Infusion 2 MICROgram(s)/kG/Min IV Continuous <Continuous>  artificial  tears Solution 1 Drop(s) Both EYES daily  atorvastatin 80 milliGRAM(s) Oral at bedtime  cefTRIAXone   IVPB 2000 milliGRAM(s) IV Intermittent every 24 hours  dextrose 5%. 1000 milliLiter(s) IV Continuous <Continuous>  magnesium sulfate  IVPB 1 Gram(s) IV Intermittent once  pantoprazole  Injectable 40 milliGRAM(s) IV Push daily    PRN MEDICATIONS    VITALS:   T(F): 98.1  HR: 70  BP: 101/66  RR: 16  SpO2: 95%    LABS:                        12.3   4.23  )-----------( 153      ( 27 Jan 2021 06:04 )             37.9     01-27    136  |  101  |  7   ----------------------------<  110<H>  4.3   |  23  |  0.94    Ca    8.9      27 Jan 2021 06:04  Phos  3.7     01-26  Mg     1.7     01-27    TPro  6.8  /  Alb  3.4  /  TBili  0.2  /  DBili  x   /  AST  13  /  ALT  7<L>  /  AlkPhos  54  01-27    PT/INR - ( 27 Jan 2021 06:04 )   PT: 21.8 sec;   INR: 1.87       PTT - ( 27 Jan 2021 06:04 )  PTT:68.2 sec    ABG - ( 25 Jan 2021 17:33 )  pH, Arterial: 7.44  pH, Blood: x     /  pCO2: 38    /  pO2: 83    / HCO3: 25    / Base Excess: 1.2   /  SaO2: 96        Culture - Urine (collected 24 Jan 2021 11:49)  Source: .Urine None  Final Report (25 Jan 2021 12:00):    No growth    RADIOLOGY:  < from: MR Head No Cont (01.26.21 @ 13:30) >  MRI is positive for recent infarctions in the posterior circulation, predominantly left sided in the thalamus, midbrain, cerebellum, mesial temporal lobe and additionally at small site of left occipital cortex. On the right, a smaller thalamic infarct is recent as well. No MR evidence of parenchymal hemorrhage, specifically at sites of infarct. No significant mass effect, or hydrocephalus though there is mild effacement of the 3rd ventricle from thalamic infarcts.    < end of copied text >    < from: CT Head No Cont (01.26.21 @ 00:41) >  Agree that there are evolving subacute infarctions within the left medial thalamus, right medial thalamus, and left cerebellar hemisphere. No acute intracranial hemorrhage. No new demarcated territorial infarction.    < end of copied text >    < from: CT Perfusion w/ Maps w/ IV Cont (01.26.21 @ 00:41) >  Nodefinite perfusion abnormality.    < end of copied text >    < from: CT Angio Head w/ IV Cont (01.26.21 @ 00:41) >  Partially imaged left-sided pacemaker.  Evolving left cerebellar subacute infarction and and bilateral thalamic subacute infarction    < end of copied text >    < from: CT Head No Cont (01.25.21 @ 19:52) >  with additional subacute infarct seen in the subthalamic region. Agree: There is no acute intracranial hemorrhage, specifically no hemorrhagic transformation of infarct.    In addition, smaller but abnormal hypoattenuation is also seen within the RIGHT medial thalamus on axial image 19. In addition, a small subacute infarct is now visible in the left cerebellar hemisphere on coronal image 20 and sagittal image 21.    Review of recent CTA from 01/20/2021 shows no intracranial steno-occlusive disease of either vertebral artery, the basilar artery or either posterior cerebral artery. Embolic etiology for infarcts is favored.    < end of copied text >    < from: TTE Echo Complete w/o Contrast w/ Doppler (01.20.21 @ 14:22) >  7. Compared to the previous TTE performed on 1/13/2020, a left ventricular thrombus is now visualized in the apex.  < from: TTE Echo w/Cont Complete (01.13.20 @ 12:50) >  Regional wall motion abnormalities consistent with ischemic heart disease.Left ventricular systolic function isseverely reduced with a calculated ejection fraction of 30-35% with severe regional wall motion abnormalities. Akinesis of the mid to anteroseptum, mid to apical inferoseptum, all apical segments and true apex. No left ventricular thrombus noted.    < end of copied text >    < end of copied text >    < from: TTE Echo w/Cont Complete (01.13.20 @ 12:50) >  No left ventricular thrombus noted.    < end of copied text >    PHYSICAL EXAM:  GEN: No acute distress  HEENT:   LUNGS: Clear to auscultation bilaterally   HEART: S1/S2 present. RRR.   ABD: Soft, non-tender, non-distended. Bowel sounds present  EXT:  NEURO: AAOX3     LENGTH OF HOSPITAL STAY: 10d    CHIEF COMPLAINT:   Patient is a 65y old  Male who presents with a chief complaint of EtOH withdrawal (27 Jan 2021 06:52)    HISTORY OF PRESENTING ILLNESS:   Mr. Torrez is a 65yo M w/ poor med adherence, PMHx of LV thrombus (resolved on Jan 2020 echo; reappeared on Nov 2020 echo) pAF on eliquis, CAD s/p PCI, HFrEF 30-35% s/p AICD, HIT, hemorrhoids, gastritis, GIB, hx multiple admissions for EtOH withdrawal (last drink 1/16; no hx seizures, DTs, or intubations), who presents due to concern for alcohol withdrawal. Pt last seen at [facility] in March 2020 when he was admitted for EtOH withdrawal w/ initial CIWA ~24 and associated visual hallucinations, which improved after being started on librium 50mg Q8hrs, and subsequently discharged on librium taper. Per outpatient records that pt himself brought with him to the ED, pt had additional episode EtOH withdrawal in November where he was seen at [facility] and subsequently discharged on librium taper 25mg BID. At that time, pt also underwent TTE imaging which showed LV EF 38% as well as a large protruding fixed thrombus attached to LV apex. Noted on discharge paperwork (11/22) to not be on ASA due to thrombocytopenia, not on warfarin due to prior poor adherence, and accordingly discharged on eliquis w/ a discharge diagnosis of afib/aflutter. Pt has since twice again been admitted at [facility] (12/8/21 and 1/6/21) for 1-2mo hx of persistent "funny feeling in my chest", determined to be 2/2 Afib/aflutter. Between those two admissions, pt had also been prescribed hydralazine and isosorbide dinitrate, however pt reports refusing to take them after reading about the side-effects in conjunction w/ alcohol-use. Pt says that for the past 1 month, he has been trying but struggling to wean off his alcohol consumption which he estimates to be a ~1pint/day. He says for the past week he has been especially focused on quitting in anticipation of an appointment with his outpatient cardiologist. Today he reports experiencing sx consistent w/ his previous episodes of withdrawal including agitation, shaking, chest pain, sob, nausea, and a racing heart. He otherwise denies recent or active fever, chills, vomiting, abdominal pain, back pain, changes in vision or hearing, genitourinary sx, extremity pain or swelling.    ED Course:  Vitals: T 99.7F oral, ->115->103->89; /86; RR 20; SpO2 96% on RA  Labs: CBC WNL, CMP WNL, troponin WNL; lactate 3.5->2.4; AG 21->16  Therapies: apixaban 5mg x1; ASA 325mg x1; librium 50mg x1; IV ativan 2mg x1; MgSo4 2g x1; PO thiamine 100mg x1; IV protonix 40mg x1; IV zofan 4mg x1; malaax 30ml x1; folic acid 1mg x1; IV lopressor 100mg x1; 1L NS x4  ECG: +RAD; ST elevations leads V2-V4; TWI V5-V6 (ECG findings unchanged from prior ECG performed on March 19th, 2020)   (17 Jan 2021 16:24)    PAST MEDICAL & SURGICAL HISTORY:  CAD (coronary artery disease)  ETOH abuse  Hyperlipidemia  Heparin-induced thrombocytopenia  Antibody positive  Left ventricular thrombus  Atrial fibrillation  Hemorrhoid  ICD (implantable cardioverter-defibrillator) in place  Myocardial infarction involving other coronary artery  Gastritis  Atherosclerosis of coronary artery  Coronary artery disease  S/P coronary artery stent placement  Automatic implantable cardiac defibrillator in situ  ICD (implantable cardioverter-defibrillator) in place    SOCIAL HISTORY:  Unable to obtain    ALLERGIES:  Capoten (Short breath; Rash; Hives)  heparin (Other (Mod to Severe))  penicillin (Short breath; Rash; Hives)    MEDICATIONS:  STANDING MEDICATIONS  argatroban Infusion 2 MICROgram(s)/kG/Min IV Continuous <Continuous>  artificial  tears Solution 1 Drop(s) Both EYES daily  atorvastatin 80 milliGRAM(s) Oral at bedtime  cefTRIAXone   IVPB 2000 milliGRAM(s) IV Intermittent every 24 hours  dextrose 5%. 1000 milliLiter(s) IV Continuous <Continuous>  magnesium sulfate  IVPB 1 Gram(s) IV Intermittent once  pantoprazole  Injectable 40 milliGRAM(s) IV Push daily    PRN MEDICATIONS    VITALS:   T(F): 98.1  HR: 70  BP: 101/66  RR: 16  SpO2: 95%    LABS:                        12.3   4.23  )-----------( 153      ( 27 Jan 2021 06:04 )             37.9     01-27    136  |  101  |  7   ----------------------------<  110<H>  4.3   |  23  |  0.94    Ca    8.9      27 Jan 2021 06:04  Phos  3.7     01-26  Mg     1.7     01-27    TPro  6.8  /  Alb  3.4  /  TBili  0.2  /  DBili  x   /  AST  13  /  ALT  7<L>  /  AlkPhos  54  01-27    PT/INR - ( 27 Jan 2021 06:04 )   PT: 21.8 sec;   INR: 1.87       PTT - ( 27 Jan 2021 06:04 )  PTT:68.2 sec    ABG - ( 25 Jan 2021 17:33 )  pH, Arterial: 7.44  pH, Blood: x     /  pCO2: 38    /  pO2: 83    / HCO3: 25    / Base Excess: 1.2   /  SaO2: 96        Culture - Urine (collected 24 Jan 2021 11:49)  Source: .Urine None  Final Report (25 Jan 2021 12:00):    No growth    RADIOLOGY:  < from: MR Head No Cont (01.26.21 @ 13:30) >  MRI is positive for recent infarctions in the posterior circulation, predominantly left sided in the thalamus, midbrain, cerebellum, mesial temporal lobe and additionally at small site of left occipital cortex. On the right, a smaller thalamic infarct is recent as well. No MR evidence of parenchymal hemorrhage, specifically at sites of infarct. No significant mass effect, or hydrocephalus though there is mild effacement of the 3rd ventricle from thalamic infarcts.    < end of copied text >    < from: CT Head No Cont (01.26.21 @ 00:41) >  Agree that there are evolving subacute infarctions within the left medial thalamus, right medial thalamus, and left cerebellar hemisphere. No acute intracranial hemorrhage. No new demarcated territorial infarction.    < end of copied text >    < from: CT Perfusion w/ Maps w/ IV Cont (01.26.21 @ 00:41) >  Nodefinite perfusion abnormality.    < end of copied text >    < from: CT Angio Head w/ IV Cont (01.26.21 @ 00:41) >  Partially imaged left-sided pacemaker.  Evolving left cerebellar subacute infarction and and bilateral thalamic subacute infarction    < end of copied text >    < from: CT Head No Cont (01.25.21 @ 19:52) >  with additional subacute infarct seen in the subthalamic region. Agree: There is no acute intracranial hemorrhage, specifically no hemorrhagic transformation of infarct.    In addition, smaller but abnormal hypoattenuation is also seen within the RIGHT medial thalamus on axial image 19. In addition, a small subacute infarct is now visible in the left cerebellar hemisphere on coronal image 20 and sagittal image 21.    Review of recent CTA from 01/20/2021 shows no intracranial steno-occlusive disease of either vertebral artery, the basilar artery or either posterior cerebral artery. Embolic etiology for infarcts is favored.    < end of copied text >    < from: TTE Echo Complete w/o Contrast w/ Doppler (01.20.21 @ 14:22) >  7. Compared to the previous TTE performed on 1/13/2020, a left ventricular thrombus is now visualized in the apex.  < from: TTE Echo w/Cont Complete (01.13.20 @ 12:50) >  Regional wall motion abnormalities consistent with ischemic heart disease.Left ventricular systolic function isseverely reduced with a calculated ejection fraction of 30-35% with severe regional wall motion abnormalities. Akinesis of the mid to anteroseptum, mid to apical inferoseptum, all apical segments and true apex. No left ventricular thrombus noted.    < end of copied text >    < end of copied text >    < from: TTE Echo w/Cont Complete (01.13.20 @ 12:50) >  No left ventricular thrombus noted.    < end of copied text >    PHYSICAL EXAM:  GEN: No acute distress  HEENT: NCAT  LUNGS: Clear to auscultation bilaterally   HEART: S1/S2 present. RRR.   ABD: Soft, non-tender, non-distended. Bowel sounds present  EXT: RLE 4/5 < LLE 5/5  NEURO: AAOX2, slurred words, occasional disoriented thoughts

## 2021-01-27 NOTE — PROGRESS NOTE ADULT - PROBLEM SELECTOR PLAN 5
HFrEF (30-35% 1/2020) status post AICD on Toprol, Lipitor, no ACE secondary to allergy  -holding Toprol for npo  -holding Lipitor npo  -No ACE/ARB HFrEF (30-35% 1/2020) status post AICD on Toprol, Lipitor, no ACE secondary to allergy. History of CAD with MI in 1996.   -can restart Toprol as needed   -restarted lipitor   -No ACE/ARB due to allergy, no antiplatelets due to GIB history

## 2021-01-27 NOTE — PROGRESS NOTE ADULT - PROBLEM SELECTOR PLAN 2
#AMS-secondary to unknown etiology, likely drugs vs seizure  -stroke code called 1/20 AM when lethargy first observed, no evidence of stroke on imaging  - vEEG placed and no current seizure activity  -abg wnl  -utox wnl  -narcan 0.4 x 4 given 1/20 AM, will continue to reassess respiratory status and mental status.   - NPo at this time given patients AMS #AMS-secondary to unknown etiology, likely drugs vs seizure  -stroke code called 1/20 AM when lethargy first observed, no evidence of stroke on imaging  - vEEG placed and no current seizure activity  -abg wnl  -utox wnl  -narcan 0.4 x 4 given 1/20 AM, will continue to reassess respiratory status and mental status.   - Patient has fluctuating mental status, will restart EEG   - There may also be a component of narcolepsy involved - patient's brother states that he has "sleeping episodes"

## 2021-01-27 NOTE — CONSULT NOTE ADULT - ASSESSMENT
63-year-old male with a past medical history of HIT, ETOH abuse, paroxysmal A-fib with an LV thrombus, HLD, CAD status post PCI, HFrEF (35-40% 2019) status post AICD, hemorrhoids, gastritis, and a recent admission in August for alcohol withdrawal who presented with signs and symptoms of alcohol withdrawal. Stroke code called 1/20 AM when lethargy first observed, no evidence of stroke on imaging. CTA performed which showed small subacute left basal ganglia/thalamic infarction. Pt went for MRI 1/26 which showed recent infarctions in the posterior circulation, predominantly left sided in the thalamus, midbrain, cerebellum, mesial temporal lobe and additionally at small site of left occipital cortex. Heme Onc consulted for hypercoagulable work-up.    #) DIAGNOSIS  - Grade 2 thrombocytopenia, likely consumptive secondary to acute thrombosis, improving [now Grade 1]  - Hx of HIT   - Paroxsymal AFib with LV thrombus    #) PLAN  - Neuroimaging consistent with embolic phenomena, likely of cardiac origin given ischemic heart disease with akinesis of multiple portions of the cardiac chamber, including all apical segments and true apex where the LV thrombus was visualized (01/20/21). Acquired hypercoagulable state is generated by relative hemodynamic stasis.   - Patient was started on Eliquis 5 mg for non-valvular AFib. Dose was started on 01/17 09:47 - 01/19 21:59. Stroke code was then activated on 01/20 08:02. Because of continued lethargy and inability to take PO, he was started on Argatroban infusion due to history of HIT on 01/20 at 2 ug/kg/min from 15:07 - 01/21 18:25. Baseline PTT and 2 subsequent therapeutic steady-state levels were detected. Next PTT on 01/21 17:20 was subtherapeutic [24.5 sec]. He was then switched to 2.5 ug/kg/min from 01/21 18:50 to 01/22 12:50. However, no repeat steady-state measurement was obtained 2 hrs post-dosing change. He was then switched back to Eliquis because of perceived ability to tolerate PO, receiving first dose on 01/22 17:09. There was approximately a 4 hour gap between stopping Argatroban gtt and initiating an oral anticoagulant. Note that he became more thrombocytopenic from this point forward, likely secondary to suspected active thrombosis. His mental status deteriorated shortly and he was restarted on Argatroban 2 ug/kg/min on 01/23 07:58. No baseline PTT was obtained prior to initiating the infusion.   - It is possible that the apical LV thrombus could embolize despite appropriate anticoagulation; however, possibility exists that failure to adherence for proper therapeutic dosing of Argatroban and frequent discontinuation of Argatroban to Eliquis have led to transient hypercoagulable state that may have provoked the ischemic neurologic event.   - Can consider repeat 2D Echo to attempt to revisualize LV thrombus.   - No further hypercoagulable work-up at this time. Lifelong heparin avoidance.    - Continue with Argatroban gtt per protocol with switch back to DOAC when able.     To discuss with Dr. Bryant 63-year-old male with a past medical history of HIT, ETOH abuse, paroxysmal A-fib with an LV thrombus, HLD, CAD status post PCI, HFrEF (35-40% 2019) status post AICD, hemorrhoids, gastritis, and a recent admission in August for alcohol withdrawal who presented with signs and symptoms of alcohol withdrawal. Stroke code called 1/20 AM when lethargy first observed, no evidence of stroke on imaging. CTA performed which showed small subacute left basal ganglia/thalamic infarction. Pt went for MRI 1/26 which showed recent infarctions in the posterior circulation, predominantly left sided in the thalamus, midbrain, cerebellum, mesial temporal lobe and additionally at small site of left occipital cortex. Heme Onc consulted for hypercoagulable work-up.    #) DIAGNOSIS  - Grade 2 thrombocytopenia, likely consumptive secondary to acute thrombosis, improving [now Grade 1]  - Hx of HIT   - Paroxsymal AFib with LV thrombus    #) PLAN  - Neuroimaging consistent with embolic phenomena, likely of cardiac origin given ischemic heart disease with akinesis of multiple portions of the cardiac chamber, including all apical segments and true apex where the LV thrombus was visualized (01/20/21). Acquired hypercoagulable state is generated by relative hemodynamic stasis.   - Patient was started on Eliquis 5 mg for non-valvular AFib. Dose was started on 01/17 09:47 - 01/19 21:59. Stroke code was then activated on 01/20 08:02. Because of continued lethargy and inability to take PO, he was started on Argatroban infusion due to history of HIT on 01/20 at 2 ug/kg/min from 15:07 - 01/21 18:25. Baseline PTT and 2 subsequent therapeutic steady-state levels were detected. Next PTT on 01/21 17:20 was subtherapeutic [24.5 sec]. He was then switched to 2.5 ug/kg/min from 01/21 18:50 to 01/22 12:50. However, no repeat steady-state measurement was obtained 2 hrs post-dosing change. He was then switched back to Eliquis because of perceived ability to tolerate PO, receiving first dose on 01/22 17:09. There was approximately a 4 hour gap between stopping Argatroban gtt and initiating an oral anticoagulant. Note that he became more thrombocytopenic from this point forward, likely secondary to active thrombosis. His mental status deteriorated shortly and he was restarted on Argatroban 2 ug/kg/min on 01/23 07:58. No baseline PTT was obtained prior to initiating the infusion.   - It is possible that the apical LV thrombus could embolize despite appropriate anticoagulation. However, possibility exists that failure to adhere to proper Argatroban dosing guidelines and frequent discontinuation of Argatroban to Eliquis have led to transient hypercoagulable state which may have provoked the ischemic neurologic event.   - Can consider repeat 2D Echo to attempt to revisualize LV thrombus.   - No further hypercoagulable work-up at this time. Lifelong heparin avoidance.    - Continue with Argatroban gtt per protocol with switch back to DOAC when able.     To discuss with Dr. Bryant 63-year-old male with a past medical history of HIT, ETOH abuse, paroxysmal A-fib with an LV thrombus, HLD, CAD status post PCI, HFrEF (35-40% 2019) status post AICD, hemorrhoids, gastritis, and a recent admission in August for alcohol withdrawal who presented with signs and symptoms of alcohol withdrawal. Stroke code called 1/20 AM when lethargy first observed, no evidence of stroke on imaging. CTA performed which showed small subacute left basal ganglia/thalamic infarction. Pt went for MRI 1/26 which showed recent infarctions in the posterior circulation, predominantly left sided in the thalamus, midbrain, cerebellum, mesial temporal lobe and additionally at small site of left occipital cortex. Heme Onc consulted for hypercoagulable work-up.    #) DIAGNOSIS  - Grade 2 thrombocytopenia, likely consumptive secondary to acute thrombosis, improving [now Grade 1]  - Hx of HIT   - Paroxsymal AFib with LV thrombus    #) PLAN  - Neuroimaging consistent with embolic phenomena, likely of cardiac origin given ischemic heart disease with akinesis of multiple portions of the cardiac chamber, including all apical segments and true apex where the LV thrombus was visualized (01/20/21). Acquired hypercoagulable state is generated by relative hemodynamic stasis.   - Patient was started on Eliquis 5 mg for non-valvular AFib. Dose was started on 01/17 09:47 - 01/19 21:59. Stroke code was then activated on 01/20 08:02. Because of continued lethargy and inability to take PO, he was started on Argatroban infusion due to history of HIT on 01/20 at 2 ug/kg/min from 15:07 - 01/21 18:25. Baseline PTT and 2 subsequent therapeutic steady-state levels were detected. Next PTT on 01/21 17:20 was subtherapeutic [24.5 sec]. He was then switched to 2.5 ug/kg/min from 01/21 18:50 to 01/22 12:50. However, no repeat steady-state measurement was obtained 2 hrs post-dosing change. He was then switched back to Eliquis because of perceived ability to tolerate PO, receiving first dose on 01/22 17:09. There was approximately a 4 hour gap between stopping Argatroban gtt and initiating an oral anticoagulant. Note that he became more thrombocytopenic from this point forward, likely secondary to active thrombosis. His mental status deteriorated shortly and he was restarted on Argatroban 2 ug/kg/min on 01/23 07:58. No baseline PTT was obtained prior to initiating the infusion.   - It is possible that the apical LV thrombus could embolize despite appropriate anticoagulation. However, possibility exists that failure to adhere to proper Argatroban dosing guidelines and frequent discontinuation of Argatroban to Eliquis have led to transient hypercoagulable state which may have provoked the ischemic neurologic event.   - Can consider repeat 2D Echo to attempt to revisualize LV thrombus.   - No further hypercoagulable work-up at this time. Lifelong heparin avoidance.    - Continue with Argatroban gtt per protocol with switch to Coumadin when able.   - Please send PF4-Ab and IVANNA testing.     To discuss with Dr. Bryant 63-year-old male with a past medical history of HIT, ETOH abuse, paroxysmal A-fib with an LV thrombus, HLD, CAD status post PCI, HFrEF (35-40% 2019) status post AICD, hemorrhoids, gastritis, and a recent admission in August for alcohol withdrawal who presented with signs and symptoms of alcohol withdrawal. Stroke code called 1/20 AM when lethargy first observed, no evidence of stroke on imaging. CTA performed which showed small subacute left basal ganglia/thalamic infarction. Pt went for MRI 1/26 which showed recent infarctions in the posterior circulation, predominantly left sided in the thalamus, midbrain, cerebellum, mesial temporal lobe and additionally at small site of left occipital cortex. Heme Onc consulted for hypercoagulable work-up.    #) DIAGNOSIS  - Grade 2 thrombocytopenia, likely consumptive secondary to acute thrombosis, improving [now Grade 1]  - Hx of HIT   - Paroxsymal AFib with LV thrombus    #) PLAN  - Neuroimaging consistent with embolic phenomena, likely of cardiac origin given ischemic heart disease with akinesis of multiple portions of the cardiac chamber, including all apical segments and true apex where the LV thrombus was visualized (01/20/21). Acquired hypercoagulable state is generated by relative hemodynamic stasis.   - Patient was started on Eliquis 5 mg for non-valvular AFib. Dose was started on 01/17 09:47 - 01/19 21:59. Stroke code was then activated on 01/20 08:02. Because of continued lethargy and inability to take PO, he was started on Argatroban infusion due to history of HIT on 01/20 at 2 ug/kg/min from 15:07 - 01/21 18:25. Baseline PTT and 2 subsequent therapeutic steady-state levels were detected. Next PTT on 01/21 17:20 was subtherapeutic [24.5 sec]. He was then switched to 2.5 ug/kg/min from 01/21 18:50 to 01/22 12:50. However, no repeat steady-state measurement was obtained 2 hrs post-dosing change. He was then switched back to Eliquis because of perceived ability to tolerate PO, receiving first dose on 01/22 17:09. There was approximately a 4 hour gap between stopping Argatroban gtt and initiating an oral anticoagulant. Note that he became more thrombocytopenic from this point forward, likely secondary to active thrombosis. His mental status deteriorated shortly and he was restarted on Argatroban 2 ug/kg/min on 01/23 07:58. No baseline PTT was obtained prior to initiating the infusion.   - It is possible that the apical LV thrombus could embolize despite appropriate anticoagulation. However, possibility exists that failure to adhere to proper Argatroban dosing guidelines and frequent discontinuation of Argatroban to Eliquis have led to transient hypercoagulable state which may have provoked the ischemic neurologic event.   - Can consider repeat 2D Echo to attempt to revisualize LV thrombus.   - No further hypercoagulable work-up at this time. Lifelong heparin avoidance.    - Continue with Argatroban gtt per protocol with switch to Coumadin when able.   - Please send PF4-Ab and IVANNA testing.     Discussed with Manny

## 2021-01-27 NOTE — CONSULT NOTE ADULT - CONSULT REASON
Hypercoagulable State
Heart failure
pin point pupils/ lethargy
unresponsiveness, pinpoint pupils
LV thrombus
Rehab evaluation
Somnolence

## 2021-01-27 NOTE — PROGRESS NOTE ADULT - PROBLEM SELECTOR PLAN 1
Pt with CTA performed o/n which showed small subacute left basal ganglia/thalamic infarction. Pt went for MRI 1/26 which showed recent infarctions in the posterior circulation, predominantly left sided in the thalamus, midbrain, cerebellum, mesial temporal lobe and additionally at small site of left occipital cortex. On the right, a smaller thalamic infarct is recent as well. No MR evidence of parenchymal   - c/w argotraban gtt at this time (goal 53.25-99)  - need 2 therapeutic in 2 hours, and then continue once every 24 hours. Pt with CTA performed o/n which showed small subacute left basal ganglia/thalamic infarction. Pt went for MRI 1/26 which showed recent infarctions in the posterior circulation, predominantly left sided in the thalamus, midbrain, cerebellum, mesial temporal lobe and additionally at small site of left occipital cortex. On the right, a smaller thalamic infarct is recent as well.   - Source of stroke may have been multiple transient hypercoagulable states while on argatroban drip  - c/w argotraban gtt at this time (goal 53.25-99)   - q24H PTT checks

## 2021-01-27 NOTE — PROGRESS NOTE ADULT - PROBLEM SELECTOR PLAN 4
History of A-fib on Eliquis and Toprol XL. CHADS-VASC 4, HAS-BLED 3.  - argotraban gtt at this time, will restart eliquis when patient able to tolerate po History of A-fib on Eliquis and Toprol XL. CHADS-VASC 4, HAS-BLED 3.  - argotraban gtt at this time, will restart Coumadin when patient able to tolerate po (eliquis can cause HIT)

## 2021-01-27 NOTE — PROGRESS NOTE ADULT - PROBLEM SELECTOR PLAN 3
History of LV thrombus with no thrombus on most recent ECHO during this admission. Life-long AC required. Takes eliquis 5mg BID at home  - argatroban gtt as patient unable to tolerate po  -  Echo 1/20: Moderately-to-severely reduced left ventricular systolic function. The ejection fraction is 30%. Entire anterior septum, mid inferolateral segment, apical inferior segment, and apex are akinetic. Remaining segments are hypokinetic. LV thrombus seen History of LV thrombus with no thrombus on most recent ECHO during this admission. Life-long AC required. Takes eliquis 5mg BID at home  - argatroban gtt as patient unable to tolerate po. Will switch to Coumadin once patient can tolerate PO. Per heme/onc - no eliquis (higher incidence of HIT with this)  -  Echo 1/20: Moderately-to-severely reduced left ventricular systolic function. The ejection fraction is 30%. Entire anterior septum, mid inferolateral segment, apical inferior segment, and apex are akinetic. Remaining segments are hypokinetic. LV thrombus seen  -Can consider ECHO again if needed

## 2021-01-27 NOTE — PROGRESS NOTE ADULT - ASSESSMENT
65 year old male with,    # Multiple recent B/L infarcts/CVA: care per Stroke. Unclear etiology. VEEG to be placed. F/up TSH. Prior TTE shows known LV thrombus. C/w argatroban for now-apprec Hem recs. C/w statin. PT/OT -- > no needs.    # Aspiration pneumonia: c/w IV ceftriaxone total of 7 days-thru 1/29.    # Metabolic encephalopathy: likely multifactorial, alcohol withdrawal/CVA/pneumonia related. Slowly improving. F/up VEEG.    # History of HIT: c/w argatroban as above, apprec Hem recs. F/up PF4/IVANNA. Thrombocytopenia has resolved.    # CAD/Chronic systolic CHF: not in acute exacerbation. Cards f/up noted. C/w statin.    # Paroxysmal atrial fibrillation: c/w argatroban as above.    # Alcohol withdrawal: has resolved. Alcohol dependence-etoh cessation encouraged.    D/w Stroke Team, will continue to follow up with you.

## 2021-01-28 LAB
ANION GAP SERPL CALC-SCNC: 11 MMOL/L — SIGNIFICANT CHANGE UP (ref 5–17)
APTT BLD: 76.6 SEC — HIGH (ref 27.5–35.5)
BASOPHILS # BLD AUTO: 0.04 K/UL — SIGNIFICANT CHANGE UP (ref 0–0.2)
BASOPHILS NFR BLD AUTO: 0.8 % — SIGNIFICANT CHANGE UP (ref 0–2)
BUN SERPL-MCNC: 9 MG/DL — SIGNIFICANT CHANGE UP (ref 7–23)
CALCIUM SERPL-MCNC: 9.1 MG/DL — SIGNIFICANT CHANGE UP (ref 8.4–10.5)
CHLORIDE SERPL-SCNC: 101 MMOL/L — SIGNIFICANT CHANGE UP (ref 96–108)
CO2 SERPL-SCNC: 24 MMOL/L — SIGNIFICANT CHANGE UP (ref 22–31)
CREAT SERPL-MCNC: 0.88 MG/DL — SIGNIFICANT CHANGE UP (ref 0.5–1.3)
CULTURE RESULTS: SIGNIFICANT CHANGE UP
CULTURE RESULTS: SIGNIFICANT CHANGE UP
D DIMER BLD IA.RAPID-MCNC: 184 NG/ML DDU — SIGNIFICANT CHANGE UP
EOSINOPHIL # BLD AUTO: 0.1 K/UL — SIGNIFICANT CHANGE UP (ref 0–0.5)
EOSINOPHIL NFR BLD AUTO: 2 % — SIGNIFICANT CHANGE UP (ref 0–6)
FIBRINOGEN PPP-MCNC: <100 MG/DL — CRITICAL LOW (ref 258–438)
GLUCOSE SERPL-MCNC: 103 MG/DL — HIGH (ref 70–99)
HCT VFR BLD CALC: 39.3 % — SIGNIFICANT CHANGE UP (ref 39–50)
HCV RNA SERPL NAA DL=5-ACNC: SIGNIFICANT CHANGE UP IU/ML
HCV RNA SPEC NAA+PROBE-LOG IU: SIGNIFICANT CHANGE UP LOG10IU/ML
HEPARIN-PF4 AB RESULT: <0.6 U/ML — SIGNIFICANT CHANGE UP (ref 0–0.9)
HGB BLD-MCNC: 12.6 G/DL — LOW (ref 13–17)
IMM GRANULOCYTES NFR BLD AUTO: 1 % — SIGNIFICANT CHANGE UP (ref 0–1.5)
INR BLD: 1.74 — HIGH (ref 0.88–1.16)
INR BLD: 1.9 — HIGH (ref 0.88–1.16)
LYMPHOCYTES # BLD AUTO: 1.73 K/UL — SIGNIFICANT CHANGE UP (ref 1–3.3)
LYMPHOCYTES # BLD AUTO: 35.4 % — SIGNIFICANT CHANGE UP (ref 13–44)
MAGNESIUM SERPL-MCNC: 1.8 MG/DL — SIGNIFICANT CHANGE UP (ref 1.6–2.6)
MCHC RBC-ENTMCNC: 27.3 PG — SIGNIFICANT CHANGE UP (ref 27–34)
MCHC RBC-ENTMCNC: 32.1 GM/DL — SIGNIFICANT CHANGE UP (ref 32–36)
MCV RBC AUTO: 85.1 FL — SIGNIFICANT CHANGE UP (ref 80–100)
MONOCYTES # BLD AUTO: 0.53 K/UL — SIGNIFICANT CHANGE UP (ref 0–0.9)
MONOCYTES NFR BLD AUTO: 10.8 % — SIGNIFICANT CHANGE UP (ref 2–14)
NEUTROPHILS # BLD AUTO: 2.44 K/UL — SIGNIFICANT CHANGE UP (ref 1.8–7.4)
NEUTROPHILS NFR BLD AUTO: 50 % — SIGNIFICANT CHANGE UP (ref 43–77)
NRBC # BLD: 0 /100 WBCS — SIGNIFICANT CHANGE UP (ref 0–0)
PF4 HEPARIN CMPLX AB SER-ACNC: NEGATIVE — SIGNIFICANT CHANGE UP
PHOSPHATE SERPL-MCNC: 3.5 MG/DL — SIGNIFICANT CHANGE UP (ref 2.5–4.5)
PLATELET # BLD AUTO: 199 K/UL — SIGNIFICANT CHANGE UP (ref 150–400)
POTASSIUM SERPL-MCNC: 4.3 MMOL/L — SIGNIFICANT CHANGE UP (ref 3.5–5.3)
POTASSIUM SERPL-SCNC: 4.3 MMOL/L — SIGNIFICANT CHANGE UP (ref 3.5–5.3)
PROTHROM AB SERPL-ACNC: 20.3 SEC — HIGH (ref 10.6–13.6)
PROTHROM AB SERPL-ACNC: 22.1 SEC — HIGH (ref 10.6–13.6)
RBC # BLD: 4.62 M/UL — SIGNIFICANT CHANGE UP (ref 4.2–5.8)
RBC # FLD: 15.7 % — HIGH (ref 10.3–14.5)
SODIUM SERPL-SCNC: 136 MMOL/L — SIGNIFICANT CHANGE UP (ref 135–145)
SPECIMEN SOURCE: SIGNIFICANT CHANGE UP
SPECIMEN SOURCE: SIGNIFICANT CHANGE UP
TSH SERPL-MCNC: 1.14 UIU/ML — SIGNIFICANT CHANGE UP (ref 0.35–4.94)
WBC # BLD: 4.89 K/UL — SIGNIFICANT CHANGE UP (ref 3.8–10.5)
WBC # FLD AUTO: 4.89 K/UL — SIGNIFICANT CHANGE UP (ref 3.8–10.5)

## 2021-01-28 PROCEDURE — 99233 SBSQ HOSP IP/OBS HIGH 50: CPT

## 2021-01-28 PROCEDURE — 93308 TTE F-UP OR LMTD: CPT | Mod: 26

## 2021-01-28 PROCEDURE — 95718 EEG PHYS/QHP 2-12 HR W/VEEG: CPT

## 2021-01-28 RX ORDER — MAGNESIUM SULFATE 500 MG/ML
1 VIAL (ML) INJECTION ONCE
Refills: 0 | Status: DISCONTINUED | OUTPATIENT
Start: 2021-01-28 | End: 2021-01-28

## 2021-01-28 RX ORDER — PANTOPRAZOLE SODIUM 20 MG/1
40 TABLET, DELAYED RELEASE ORAL EVERY 24 HOURS
Refills: 0 | Status: DISCONTINUED | OUTPATIENT
Start: 2021-01-28 | End: 2021-02-03

## 2021-01-28 RX ORDER — MAGNESIUM SULFATE 500 MG/ML
2 VIAL (ML) INJECTION ONCE
Refills: 0 | Status: COMPLETED | OUTPATIENT
Start: 2021-01-28 | End: 2021-01-28

## 2021-01-28 RX ADMIN — Medication 50 GRAM(S): at 10:30

## 2021-01-28 RX ADMIN — CEFTRIAXONE 100 MILLIGRAM(S): 500 INJECTION, POWDER, FOR SOLUTION INTRAMUSCULAR; INTRAVENOUS at 13:18

## 2021-01-28 RX ADMIN — ARGATROBAN 8.71 MICROGRAM(S)/KG/MIN: 50 INJECTION, SOLUTION INTRAVENOUS at 16:55

## 2021-01-28 RX ADMIN — ATORVASTATIN CALCIUM 80 MILLIGRAM(S): 80 TABLET, FILM COATED ORAL at 21:54

## 2021-01-28 RX ADMIN — Medication 1 DROP(S): at 12:52

## 2021-01-28 RX ADMIN — PANTOPRAZOLE SODIUM 40 MILLIGRAM(S): 20 TABLET, DELAYED RELEASE ORAL at 10:30

## 2021-01-28 RX ADMIN — ARGATROBAN 8.71 MICROGRAM(S)/KG/MIN: 50 INJECTION, SOLUTION INTRAVENOUS at 10:30

## 2021-01-28 RX ADMIN — ARGATROBAN 8.71 MICROGRAM(S)/KG/MIN: 50 INJECTION, SOLUTION INTRAVENOUS at 22:31

## 2021-01-28 NOTE — PROGRESS NOTE ADULT - ASSESSMENT
New  CVA  as noted   subacute left basal ganglia infarct   thalamic midbrain cerebellar and temporal  occipital infarction s      cont med management     LEOPOLDO Levy

## 2021-01-28 NOTE — PROGRESS NOTE ADULT - SUBJECTIVE AND OBJECTIVE BOX
Pt remains confused   doesnt  recognize  me     c/o visual defect     PAST MEDICAL & SURGICAL HISTORY:  CAD (coronary artery disease)    ETOH abuse    Hyperlipidemia    Heparin-induced thrombocytopenia  Antibody positive    Left ventricular thrombus    Atrial fibrillation    Hemorrhoid    ICD (implantable cardioverter-defibrillator) in place    Myocardial infarction involving other coronary artery    Gastritis    Atherosclerosis of coronary artery  Coronary artery disease    S/P coronary artery stent placement    Automatic implantable cardiac defibrillator in situ  ICD (implantable cardioverter-defibrillator) in place      ICU Vital Signs Last 24 Hrs  T(C): 36.8 (28 Jan 2021 05:17), Max: 37.1 (27 Jan 2021 17:15)  T(F): 98.2 (28 Jan 2021 05:17), Max: 98.7 (27 Jan 2021 17:15)  HR: 74 (28 Jan 2021 08:32) (74 - 90)  BP: 108/71 (28 Jan 2021 08:32) (96/67 - 108/71)  BP(mean): 84 (28 Jan 2021 08:32) (76 - 84)  ABP: --  ABP(mean): --  RR: 18 (28 Jan 2021 08:32) (16 - 18)  SpO2: 96% (28 Jan 2021 08:32) (95% - 99%)    MEDICATIONS  (STANDING):  argatroban Infusion 2 MICROgram(s)/kG/Min (8.71 mL/Hr) IV Continuous <Continuous>  artificial  tears Solution 1 Drop(s) Both EYES daily  atorvastatin 80 milliGRAM(s) Oral at bedtime  cefTRIAXone   IVPB 2000 milliGRAM(s) IV Intermittent every 24 hours  pantoprazole    Tablet 40 milliGRAM(s) Oral every 24 hours    MEDICATIONS  (PRN):        Lung s  clear  CXR  neg     CV   s1s2     abd soft   Ext stable      EEG in progress   ECHO       no valvular diaease   EF 30 %  severe LV dysfunction   LV thrombus at apex   Heme consult noted                   MEDICATIONS  (STANDING):  argatroban Infusion 2 MICROgram(s)/kG/Min (8.71 mL/Hr) IV Continuous <Continuous>  artificial  tears Solution 1 Drop(s) Both EYES daily  atorvastatin 80 milliGRAM(s) Oral at bedtime  cefTRIAXone   IVPB 2000 milliGRAM(s) IV Intermittent every 24 hours  pantoprazole    Tablet 40 milliGRAM(s) Oral every 24 hours    MEDICATIONS  (PRN):

## 2021-01-28 NOTE — PROGRESS NOTE ADULT - ASSESSMENT
per Internal Medicine    63-year-old male with a past medical history of HIT, ETOH abuse, paroxysmal A-fib with an LV thrombus, HLD, CAD status post PCI, HFrEF (35-40% 2019) status post AICD, hemorrhoids, gastritis, and a recent admission in August for alcohol withdrawal who presented with signs and symptoms of alcohol withdrawal. Found to have multiple infarctions involving L thalamus, L posterior cerebral artery - transferred to [facility] for more frequent monitoring and hemorrhagic conversion surveillance.       Problem/Plan - 1:  ·  Problem: Stroke.  Plan: Pt with CTA performed o/n which showed small subacute left basal ganglia/thalamic infarction. Pt went for MRI 1/26 which showed recent infarctions in the posterior circulation, predominantly left sided in the thalamus, midbrain, cerebellum, mesial temporal lobe and additionally at small site of left occipital cortex. On the right, a smaller thalamic infarct is recent as well.   - Source of stroke may have been multiple transient hypercoagulable states while on argatroban drip  - c/w argotraban gtt at this time (goal 53.25-99) - can switch to oral coumadin when patient is able to tolerate   - q24H PTT checks.    Problem/Plan - 2:  ·  Problem: AMS (altered mental status).  Plan: Secondary to unknown etiology - could be related to stroke vs narcolepsy  -stroke code called 1/20 AM when lethargy first observed - vEEG on 1/21 did not show any seizure activity. abg wnl, utox wnl  -Imaging from 1/25 and 1/26 shows evidence of multiple strokes (brainstem and bilateral thalamic strokes)  -As of 1/27, patient has fluctuating mental status, will restart EEG   -There may also be a component of narcolepsy involved - patient's brother states that he has "sleeping episodes". Could possibly benefit from modafinil.    Problem/Plan - 3:  ·  Problem: Left ventricular thrombus.  Plan: History of LV thrombus with no thrombus on most recent ECHO during this admission. Life-long AC required. Takes eliquis 5mg BID at home  - argatroban gtt as patient unable to tolerate po. Will bridge to Coumadin once patient can tolerate PO. Per heme/onc - no eliquis (higher incidence of HIT with this)  -  Echo 1/20: Moderately-to-severely reduced left ventricular systolic function. The ejection fraction is 30%. Entire anterior septum, mid inferolateral segment, apical inferior segment, and apex are akinetic. Remaining segments are hypokinetic. LV thrombus seen  -Can consider ECHO again if needed  -F/u IVANNA and anti-PFA antibody for history of HIT.    Problem/Plan - 4:  ·  Problem: Atrial fibrillation.  Plan: History of A-fib on Eliquis and Toprol XL. CHADS-VASC 4, HAS-BLED 3.  - argotraban gtt at this time, will bridge to Coumadin when patient able to tolerate po (eliquis can cause HIT).    Problem/Plan - 5:  ·  Problem: Chronic systolic heart failure.  Plan: HFrEF (30-35% 1/2020) status post AICD on Toprol, Lipitor, no ACE secondary to allergy. History of CAD with MI in 1996.   -can restart Toprol as needed   -restarted lipitor   -No ACE/ARB due to allergy, no antiplatelets due to GIB history.     Problem/Plan - 6:  Problem: Atherosclerosis of coronary artery. Plan: HFrEF (30-35% 1/2020) status post AICD on Toprol, Lipitor, no ACE secondary to allergy. History of CAD with MI in 1996.   -can restart Toprol as needed   -restarted lipitor   -No ACE/ARB due to allergy, no antiplatelets due to GIB history.    Problem/Plan - 7:  ·  Problem: Gastritis. Plan: History of GIB and gastritis likely from alcohol use. No signs of active bleeding.  -Continue Protonix 40mg.     Problem/Plan - 8:  ·  Problem: Nutrition, metabolism, and development symptoms. Plan: F: none  E: Replete PRN  N: dysphagia diet   DVT ppx: argotraban gtt  GI ppx: Protonix 40mg PO  CODE: FULL.

## 2021-01-28 NOTE — PROGRESS NOTE ADULT - SUBJECTIVE AND OBJECTIVE BOX
Patient is a 65y old  Male who presents with a chief complaint of EtOH withdrawal (28 Jan 2021 15:36)      INTERVAL HPI/OVERNIGHT EVENTS:  Seen by me this afternoon, sitting in bed, more conversant at time of visit and awake/alert and interactive. Brother at bedside. Blurry vision from left eye.    Review of Systems: 12 point review of systems otherwise negative    MEDICATIONS  (STANDING):  argatroban Infusion 2 MICROgram(s)/kG/Min (8.71 mL/Hr) IV Continuous <Continuous>  artificial  tears Solution 1 Drop(s) Both EYES daily  atorvastatin 80 milliGRAM(s) Oral at bedtime  cefTRIAXone   IVPB 2000 milliGRAM(s) IV Intermittent every 24 hours  pantoprazole    Tablet 40 milliGRAM(s) Oral every 24 hours    MEDICATIONS  (PRN):      Allergies    Capoten (Short breath; Rash; Hives)  heparin (Other (Mod to Severe))  penicillin (Short breath; Rash; Hives)    Intolerances          Vital Signs Last 24 Hrs  T(C): 36.8 (28 Jan 2021 17:46), Max: 37.1 (27 Jan 2021 21:15)  T(F): 98.2 (28 Jan 2021 17:46), Max: 98.7 (27 Jan 2021 21:15)  HR: 86 (28 Jan 2021 16:11) (74 - 90)  BP: 110/83 (28 Jan 2021 16:11) (96/67 - 110/83)  BP(mean): 89 (28 Jan 2021 16:11) (76 - 89)  RR: 18 (28 Jan 2021 16:11) (16 - 18)  SpO2: 97% (28 Jan 2021 16:11) (95% - 97%)  CAPILLARY BLOOD GLUCOSE          01-27 @ 07:01  -  01-28 @ 07:00  --------------------------------------------------------  IN: 95.7 mL / OUT: 1130 mL / NET: -1034.3 mL        Physical Exam:    Daily     Daily   General:   Well appearing, NAD, not cachetic, more interactive today  HEENT:  Nonicteric, PERRLA  CV:  RRR, no murmur, no JVD  Lungs:  CTA B/L, no wheezes, rales, rhonchi  Abdomen:  Soft, non-tender, no distended, positive BS  Extremities:  2+ pulses, no c/c, no edema  Skin:  Warm and dry, no rashes  :  No feliz  Neuro: AOx2 (partially to date), moves all 4 extremities  No Restraints    LABS:                        12.6   4.89  )-----------( 199      ( 28 Jan 2021 05:47 )             39.3     01-28    136  |  101  |  9   ----------------------------<  103<H>  4.3   |  24  |  0.88    Ca    9.1      28 Jan 2021 05:47  Phos  3.5     01-28  Mg     1.8     01-28    TPro  6.8  /  Alb  3.4  /  TBili  0.2  /  DBili  x   /  AST  13  /  ALT  7<L>  /  AlkPhos  54  01-27    PT/INR - ( 28 Jan 2021 17:26 )   PT: 20.3 sec;   INR: 1.74          PTT - ( 28 Jan 2021 05:47 )  PTT:76.6 sec        RADIOLOGY & ADDITIONAL TESTS:  Reviewed by me

## 2021-01-28 NOTE — PROGRESS NOTE ADULT - ASSESSMENT
65 year old male with,    # Multiple recent B/L infarcts/CVA: care per Stroke. Unclear etiology. F/up VEEG reading. Prior TTE shows known LV thrombus, repeating TTE today --> large mobile echodensity noted at the apex-thrombus. Regional wall motion abnormalities, EF 30-35%. C/w argatroban, to be transitioned to DOAC Vs warfarin after conversation with Cards per Hem recs. C/w statin. PT/OT -- > no needs.    # Aspiration pneumonia: c/w IV ceftriaxone total of 7 days-thru 1/29.    # Metabolic encephalopathy: waxing and waning, likely multifactorial, alcohol withdrawal/CVA/pneumonia related. F/up VEEG as above.    # History of HIT: c/w argatroban as above, apprec Hem recs. F/up PF4-negative. Thrombocytopenia has resolved.    # CAD/Chronic systolic CHF: not in acute exacerbation. Cards f/up noted. C/w statin.    # Paroxysmal atrial fibrillation: c/w argatroban as above.    # Alcohol withdrawal: has resolved. Alcohol dependence-etoh cessation encouraged.    D/w Stroke Team, will continue to follow up with you.  D/w brother at bedside as well.

## 2021-01-28 NOTE — PROGRESS NOTE ADULT - PROBLEM SELECTOR PLAN 8
History of GIB and gastritis likely from alcohol use. No signs of active bleeding.  -Continue Protonix 40mg F: none  E: Replete PRN  N: dysphagia diet   DVT ppx: argotraban gtt  GI ppx: Protonix 40mg PO  CODE: FULL

## 2021-01-28 NOTE — PROGRESS NOTE ADULT - PROBLEM SELECTOR PLAN 3
History of LV thrombus with no thrombus on most recent ECHO during this admission. Life-long AC required. Takes eliquis 5mg BID at home  - argatroban gtt as patient unable to tolerate po. Will switch to Coumadin once patient can tolerate PO. Per heme/onc - no eliquis (higher incidence of HIT with this)  -  Echo 1/20: Moderately-to-severely reduced left ventricular systolic function. The ejection fraction is 30%. Entire anterior septum, mid inferolateral segment, apical inferior segment, and apex are akinetic. Remaining segments are hypokinetic. LV thrombus seen  -Can consider ECHO again if needed History of LV thrombus with no thrombus on most recent ECHO during this admission. Life-long AC required. Takes eliquis 5mg BID at home  - argatroban gtt as patient unable to tolerate po. Will bridge to Coumadin once patient can tolerate PO. Per heme/onc - no eliquis (higher incidence of HIT with this)  -  Echo 1/20: Moderately-to-severely reduced left ventricular systolic function. The ejection fraction is 30%. Entire anterior septum, mid inferolateral segment, apical inferior segment, and apex are akinetic. Remaining segments are hypokinetic. LV thrombus seen  -Can consider ECHO again if needed  -F/u IVANNA and anti-PFA antibody for history of HIT

## 2021-01-28 NOTE — PROGRESS NOTE ADULT - ASSESSMENT
63-year-old male with a past medical history of HIT, ETOH abuse, paroxysmal A-fib with an LV thrombus, HLD, CAD status post PCI, HFrEF (35-40% 2019) status post AICD, hemorrhoids, gastritis, and a recent admission in August for alcohol withdrawal who presented with signs and symptoms of alcohol withdrawal. Found to have multiple infarctions involving L thalamus, L posterior cerebral artery - transferred to [facility] for more frequent monitoring and hemorrhagic conversion surveillance.

## 2021-01-28 NOTE — PROGRESS NOTE ADULT - SUBJECTIVE AND OBJECTIVE BOX
**Incomplete Note**  OVERNIGHT EVENTS:    SUBJECTIVE / INTERVAL HPI: Patient seen and examined at bedside.     VITAL SIGNS:  Vital Signs Last 24 Hrs  T(C): 36.8 (28 Jan 2021 05:17), Max: 37.1 (27 Jan 2021 17:15)  T(F): 98.2 (28 Jan 2021 05:17), Max: 98.7 (27 Jan 2021 17:15)  HR: 76 (28 Jan 2021 04:22) (72 - 90)  BP: 106/66 (28 Jan 2021 04:22) (96/67 - 108/68)  BP(mean): 81 (28 Jan 2021 04:22) (76 - 84)  RR: 18 (28 Jan 2021 04:22) (16 - 18)  SpO2: 96% (28 Jan 2021 04:22) (95% - 99%)    01-26-21 @ 07:01 - 01-27-21 @ 07:00  --------------------------------------------------------  IN: 830.5 mL / OUT: 900 mL / NET: -69.5 mL    01-27-21 @ 07:01 - 01-28-21 @ 06:54  --------------------------------------------------------  IN: 26.1 mL / OUT: 900 mL / NET: -873.9 mL        PHYSICAL EXAM:    General: WDWN  HEENT: NC/AT; PERRL, anicteric sclera; MMM  Neck: supple  Cardiovascular: +S1/S2; RRR  Respiratory: CTA B/L; no W/R/R  Gastrointestinal: soft, NT/ND; +BSx4  Extremities: WWP; no edema, clubbing or cyanosis  Vascular: 2+ radial, DP/PT pulses B/L  Neurological: AAOx3; no focal deficits    MEDICATIONS:  MEDICATIONS  (STANDING):  argatroban Infusion 2 MICROgram(s)/kG/Min (8.71 mL/Hr) IV Continuous <Continuous>  artificial  tears Solution 1 Drop(s) Both EYES daily  atorvastatin 80 milliGRAM(s) Oral at bedtime  cefTRIAXone   IVPB 2000 milliGRAM(s) IV Intermittent every 24 hours  magnesium sulfate  IVPB 2 Gram(s) IV Intermittent once  pantoprazole  Injectable 40 milliGRAM(s) IV Push daily    MEDICATIONS  (PRN):      ALLERGIES:  Allergies    Capoten (Short breath; Rash; Hives)  heparin (Other (Mod to Severe))  penicillin (Short breath; Rash; Hives)    Intolerances        LABS:                        12.6   4.89  )-----------( 199      ( 28 Jan 2021 05:47 )             39.3     01-28    136  |  101  |  9   ----------------------------<  103<H>  4.3   |  24  |  0.88    Ca    9.1      28 Jan 2021 05:47  Phos  3.5     01-28  Mg     1.8     01-28    TPro  6.8  /  Alb  3.4  /  TBili  0.2  /  DBili  x   /  AST  13  /  ALT  7<L>  /  AlkPhos  54  01-27    PT/INR - ( 28 Jan 2021 05:47 )   PT: 22.1 sec;   INR: 1.90          PTT - ( 28 Jan 2021 05:47 )  PTT:76.6 sec    CAPILLARY BLOOD GLUCOSE            RADIOLOGY & ADDITIONAL TESTS: Reviewed.    ASSESSMENT:    PLAN:  OVERNIGHT EVENTS: VEEG placed     SUBJECTIVE / INTERVAL HPI: Patient seen and examined at bedside. Patient was trying to take of VEEG, stating that it was uncomfortable, however patient was told why he needs it and he understood. He was able to tell his name, and date of birth, but could not tell the hospital name. He denies any headache, blurry vision, N/V/D/C, chest pain, SOB. He feels that his R hand is weaker than his left and that he has some trouble with fine motor movements like eating food.     VITAL SIGNS:  Vital Signs Last 24 Hrs  T(C): 36.8 (28 Jan 2021 05:17), Max: 37.1 (27 Jan 2021 17:15)  T(F): 98.2 (28 Jan 2021 05:17), Max: 98.7 (27 Jan 2021 17:15)  HR: 76 (28 Jan 2021 04:22) (72 - 90)  BP: 106/66 (28 Jan 2021 04:22) (96/67 - 108/68)  BP(mean): 81 (28 Jan 2021 04:22) (76 - 84)  RR: 18 (28 Jan 2021 04:22) (16 - 18)  SpO2: 96% (28 Jan 2021 04:22) (95% - 99%)    01-26-21 @ 07:01  -  01-27-21 @ 07:00  --------------------------------------------------------  IN: 830.5 mL / OUT: 900 mL / NET: -69.5 mL    01-27-21 @ 07:01  -  01-28-21 @ 06:54  --------------------------------------------------------  IN: 26.1 mL / OUT: 900 mL / NET: -873.9 mL    PHYSICAL EXAM:    Constitutional: WDWN resting comfortably in bed; NAD; eyes open and conversational  Head: NC/AT; able to perform head-chin without pain or restriction  Eyes: pupils constricted but reactive BL, EOMI; anicteric sclera  ENT: no nasal discharge; MMM; poor dentition  Neck: supple; no JVD  Respiratory: CTA B/L; no W/R/R  Cardiac: +S1/S2; RRR; no M/R/G  Gastrointestinal: soft, NT/ND; no rebound or guarding; +BSx4  Extremities: WWP, no clubbing or cyanosis; no peripheral edema  Neurologic:  -Mental status: alert and awake. conversational. remote memory intact. AOx1 (self)  -Cranial nerves:   II: visual fields are full to confrontation.  III, IV, VI: EOMI without nystagmus, some difficulty with upward gaze. Pupils equally constricted with sluggish response to light  V:  Facial sensation V1-V3 equal and intact   VII: Face is symmetric with normal eye closure and smile  VIII: Hearing is bilaterally intact  IX, X: Uvula is midline and soft palate rises symmetrically  XI: Head turning and shoulder shrug are intact.  XII: Tongue protrudes midline  Motor: Moving all extremities spontaneously, normal bulk and tone. Normal muscle strength 5/5 L shoulders flexion/extension/abduction and elbow flexion/extension, and handgrip. 4/5 in all movement of R side. Pronator drift neg BL UE, mm strength 5/5 L hip flexion/extension/abduction/adduction, 4/5 R hip/knee flexion/extension (but improved), 5/5 BL dorsiflexion/plantarflexion. Poor fine motor movement of the RUE.   Sensation: Intact to light touch bilaterally. Proprioception intact bilaterally.   Reflexes: Downgoing toes bilaterally; 2+ DTRs BL patellar/achilles    MEDICATIONS:  MEDICATIONS  (STANDING):  argatroban Infusion 2 MICROgram(s)/kG/Min (8.71 mL/Hr) IV Continuous <Continuous>  artificial  tears Solution 1 Drop(s) Both EYES daily  atorvastatin 80 milliGRAM(s) Oral at bedtime  cefTRIAXone   IVPB 2000 milliGRAM(s) IV Intermittent every 24 hours  magnesium sulfate  IVPB 2 Gram(s) IV Intermittent once  pantoprazole  Injectable 40 milliGRAM(s) IV Push daily    MEDICATIONS  (PRN):      ALLERGIES:  Allergies    Capoten (Short breath; Rash; Hives)  heparin (Other (Mod to Severe))  penicillin (Short breath; Rash; Hives)    Intolerances        LABS:                        12.6   4.89  )-----------( 199      ( 28 Jan 2021 05:47 )             39.3     01-28    136  |  101  |  9   ----------------------------<  103<H>  4.3   |  24  |  0.88    Ca    9.1      28 Jan 2021 05:47  Phos  3.5     01-28  Mg     1.8     01-28    TPro  6.8  /  Alb  3.4  /  TBili  0.2  /  DBili  x   /  AST  13  /  ALT  7<L>  /  AlkPhos  54  01-27    PT/INR - ( 28 Jan 2021 05:47 )   PT: 22.1 sec;   INR: 1.90          PTT - ( 28 Jan 2021 05:47 )  PTT:76.6 sec    CAPILLARY BLOOD GLUCOSE            RADIOLOGY & ADDITIONAL TESTS: Reviewed.

## 2021-01-28 NOTE — PROGRESS NOTE ADULT - PROBLEM SELECTOR PLAN 1
Pt with CTA performed o/n which showed small subacute left basal ganglia/thalamic infarction. Pt went for MRI 1/26 which showed recent infarctions in the posterior circulation, predominantly left sided in the thalamus, midbrain, cerebellum, mesial temporal lobe and additionally at small site of left occipital cortex. On the right, a smaller thalamic infarct is recent as well.   - Source of stroke may have been multiple transient hypercoagulable states while on argatroban drip  - c/w argotraban gtt at this time (goal 53.25-99)   - q24H PTT checks Pt with CTA performed o/n which showed small subacute left basal ganglia/thalamic infarction. Pt went for MRI 1/26 which showed recent infarctions in the posterior circulation, predominantly left sided in the thalamus, midbrain, cerebellum, mesial temporal lobe and additionally at small site of left occipital cortex. On the right, a smaller thalamic infarct is recent as well.   - Source of stroke may have been multiple transient hypercoagulable states while on argatroban drip  - c/w argotraban gtt at this time (goal 53.25-99) - can switch to oral coumadin when patient is able to tolerate   - q24H PTT checks

## 2021-01-28 NOTE — EEG REPORT - NS EEG TEXT BOX
Study Start Date/Time:  1/28/2021, 12:56:21 AM  Study End Date/Time:  1/28/2021, 8: 20 AM  Day 1: 1/28/2021 @ 12:56:21 AM to @ 08: 20 AM  Background:  continuous, with predominantly alpha and beta frequencies.  Symmetry:  No persistent asymmetries of voltage or frequency.  Posterior Dominant Rhythm:  11 Hz symmetric, well-organized, and well-modulated.  Organization: Rudimentary.  Voltage:  Normal (20+ uV)  Variability: Yes. 		Reactivity: Yes.  N2 sleep: Symmetric, synchronous spindles and K complexes.  Spontaneous Activity:  No epileptiform discharges.  Periodic/rhythmic activity:  None.  Events:  No electrographic seizures or significant clinical events.  Provocations:  Hyperventilation and Photic stimulation: was not performed.    Daily Summary:    No epileptiform activity and no significant clinical events occurred.  Unremarkable awake and sleep overnight EEG recording.  From 7 am to 08: 20 am, limited study due to excessive electrode artifact at T3, T5 and P3. Most of the leads were off after 8: 20 am.    FINAL Summary:  Normal continuous av-EEG study.  1)	The EEG remained normal throughout the study.    Final Clinical Correlation:  There were no findings of active epilepsy, however this alone does not rule out the diagnosis.    Study Start Date/Time:  1/28/2021, 12:56:21 AM  Study End Date/Time:  1/28/2021, 8: 20 AM  Day 1: 1/28/2021 @ 12:56:21 AM to @ 08: 20 AM  Background:  continuous, with predominantly alpha and beta frequencies.  Symmetry:  No persistent asymmetries of voltage or frequency.  Posterior Dominant Rhythm:  11 Hz symmetric, well-organized, and well-modulated.  Organization: Rudimentary.  Voltage:  Normal (20+ uV)  Variability: Yes. 		Reactivity: Yes.  N2 sleep: Symmetric, synchronous spindles and K complexes.  Spontaneous Activity:  No epileptiform discharges.  Periodic/rhythmic activity:  None.  Events:  No electrographic seizures or significant clinical events.  Provocations:  Hyperventilation and Photic stimulation: was not performed.     Daily Summary:    No epileptiform activity and no significant clinical events occurred.  Unremarkable awake and sleep overnight EEG recording.  From 7 am to 08: 20 am, limited study due to excessive electrode artifact at T3, T5 and P3. Most of the leads were off after 8: 20 am.    FINAL Summary:  Normal continuous av-EEG study.  1)	The EEG remained normal throughout the study.    Final Clinical Correlation:  There were no findings of active epilepsy, however this alone does not rule out the diagnosis.

## 2021-01-28 NOTE — PROGRESS NOTE ADULT - PROBLEM SELECTOR PLAN 4
History of A-fib on Eliquis and Toprol XL. CHADS-VASC 4, HAS-BLED 3.  - argotraban gtt at this time, will restart Coumadin when patient able to tolerate po (eliquis can cause HIT) History of A-fib on Eliquis and Toprol XL. CHADS-VASC 4, HAS-BLED 3.  - argotraban gtt at this time, will bridge to Coumadin when patient able to tolerate po (eliquis can cause HIT)

## 2021-01-28 NOTE — CHART NOTE - NSCHARTNOTEFT_GEN_A_CORE
Goals reviewed at interdisciplinary rounds with case management, social work, physical therapy, occupational therapy, and speech language pathology.    Please see specific therapy  notes for in depth goals.    Highlights of goals are:    Patient will:   Physical therapy  [] remain on bedrest  [] get out of bed to chair [] ambulate with assistance [x] ambulate without assistance  [x]today       [] by discharge    Occupational therapy  [x] N/a, independent [] balance training  []go from supine to seated independently [] balance sitting at the edge of the bed to do grooming task []stand at sink to perform grooming task  [] dress self   [] gain strength to feed self  [] other:  [x]today         []by discharge    Speech therapy  [] N/a, no speech deficit [] vocalize [] respond to yes/no questions given cues with 80% accuracy [] name common objects [] express basic needs/wants [x] improve enunciation of words and phrases []other:  []today        [x] by discharge      Swallow therapy  [x] tolerate regular diet [] remain NPO, receive oral care to minimize aspirating bacteria mouth bacteria [] tolerate pureed diet  [] tolerate mechanical soft diet  [] tolerate tube feeds [] tolerate thin liquids [] tolerate nectar thick liquids [] other:  [] with assistance  [x] today       [] by discharge    Patient is able to walk independently, has good family support. Patient to be discharged to home.

## 2021-01-28 NOTE — PROGRESS NOTE ADULT - SUBJECTIVE AND OBJECTIVE BOX
LENGTH OF HOSPITAL STAY: 11d    SUBJECTIVE: No acute overnight events    HISTORY OF PRESENTING ILLNESS:   Mr. Torrez is a 63yo M w/ poor med adherence, PMHx of LV thrombus (resolved on Jan 2020 echo; reappeared on Nov 2020 echo) pAF on eliquis, CAD s/p PCI, HFrEF 30-35% s/p AICD, HIT, hemorrhoids, gastritis, GIB, hx multiple admissions for EtOH withdrawal (last drink 1/16; no hx seizures, DTs, or intubations), who presents due to concern for alcohol withdrawal. Pt last seen at [facility] in March 2020 when he was admitted for EtOH withdrawal w/ initial CIWA ~24 and associated visual hallucinations, which improved after being started on librium 50mg Q8hrs, and subsequently discharged on librium taper. Per outpatient records that pt himself brought with him to the ED, pt had additional episode EtOH withdrawal in November where he was seen at [facility] and subsequently discharged on librium taper 25mg BID. At that time, pt also underwent TTE imaging which showed LV EF 38% as well as a large protruding fixed thrombus attached to LV apex. Noted on discharge paperwork (11/22) to not be on ASA due to thrombocytopenia, not on warfarin due to prior poor adherence, and accordingly discharged on eliquis w/ a discharge diagnosis of afib/aflutter. Pt has since twice again been admitted at [facility] (12/8/21 and 1/6/21) for 1-2mo hx of persistent "funny feeling in my chest", determined to be 2/2 Afib/aflutter. Between those two admissions, pt had also been prescribed hydralazine and isosorbide dinitrate, however pt reports refusing to take them after reading about the side-effects in conjunction w/ alcohol-use. Pt says that for the past 1 month, he has been trying but struggling to wean off his alcohol consumption which he estimates to be a ~1pint/day. He says for the past week he has been especially focused on quitting in anticipation of an appointment with his outpatient cardiologist. Today he reports experiencing sx consistent w/ his previous episodes of withdrawal including agitation, shaking, chest pain, sob, nausea, and a racing heart. He otherwise denies recent or active fever, chills, vomiting, abdominal pain, back pain, changes in vision or hearing, genitourinary sx, extremity pain or swelling.    ED Course:  Vitals: T 99.7F oral, ->115->103->89; /86; RR 20; SpO2 96% on RA  Labs: CBC WNL, CMP WNL, troponin WNL; lactate 3.5->2.4; AG 21->16  Therapies: apixaban 5mg x1; ASA 325mg x1; librium 50mg x1; IV ativan 2mg x1; MgSo4 2g x1; PO thiamine 100mg x1; IV protonix 40mg x1; IV zofan 4mg x1; malaax 30ml x1; folic acid 1mg x1; IV lopressor 100mg x1; 1L NS x4  ECG: +RAD; ST elevations leads V2-V4; TWI V5-V6 (ECG findings unchanged from prior ECG performed on March 19th, 2020)   (17 Jan 2021 16:24)    PAST MEDICAL & SURGICAL HISTORY:  CAD (coronary artery disease)  ETOH abuse  Hyperlipidemia  Heparin-induced thrombocytopenia  Antibody positive  Left ventricular thrombus  Atrial fibrillation  Hemorrhoid  ICD (implantable cardioverter-defibrillator) in place  Myocardial infarction involving other coronary artery  Gastritis  Atherosclerosis of coronary artery  Coronary artery disease  S/P coronary artery stent placement  Automatic implantable cardiac defibrillator in situ  ICD (implantable cardioverter-defibrillator) in place    ALLERGIES:  Capoten (Short breath; Rash; Hives)  heparin (Other (Mod to Severe))  penicillin (Short breath; Rash; Hives)    MEDICATIONS:  STANDING MEDICATIONS  argatroban Infusion 2 MICROgram(s)/kG/Min IV Continuous <Continuous>  artificial  tears Solution 1 Drop(s) Both EYES daily  atorvastatin 80 milliGRAM(s) Oral at bedtime  cefTRIAXone   IVPB 2000 milliGRAM(s) IV Intermittent every 24 hours  pantoprazole    Tablet 40 milliGRAM(s) Oral every 24 hours    PRN MEDICATIONS    VITALS:   T(F): 98.1  HR: 82  BP: 108/69  RR: 18  SpO2: 95%    LABS:                        12.6   4.89  )-----------( 199      ( 28 Jan 2021 05:47 )             39.3     01-28    136  |  101  |  9   ----------------------------<  103<H>  4.3   |  24  |  0.88    Ca    9.1      28 Jan 2021 05:47  Phos  3.5     01-28  Mg     1.8     01-28    TPro  6.8  /  Alb  3.4  /  TBili  0.2  /  DBili  x   /  AST  13  /  ALT  7<L>  /  AlkPhos  54  01-27    PT/INR - ( 28 Jan 2021 05:47 )   PT: 22.1 sec;   INR: 1.90       PTT - ( 28 Jan 2021 05:47 )  PTT:76.6 sec    RADIOLOGY:  Reviewed    PHYSICAL EXAM:  GEN: No acute distress  HEENT: NCAT  LUNGS: Clear to auscultation bilaterally   HEART: S1/S2 present. RRR.   ABD: Soft, non-tender, non-distended. Bowel sounds present  EXT: RLE 4/5 < LLE 5/5  NEURO: AAOX2, slurred words, occasional disoriented thoughts

## 2021-01-28 NOTE — PROGRESS NOTE ADULT - ATTENDING COMMENTS
Mr Torrez was seen at the bedside    Agree with the fellow note above    Discuss with Cardiology if DOAC can be used for LV thrombus.  Continue on Argatroban until cardiology input.  If not will transition to Coumadin.

## 2021-01-28 NOTE — PROGRESS NOTE ADULT - PROBLEM SELECTOR PLAN 6
HFrEF (30-35% 1/2020) status post AICD on Toprol, Lipitor, no ACE secondary to allergy. History of CAD with MI in 1996.   -can restart Toprol as needed   -restarted lipitor   -No ACE/ARB due to allergy, no antiplatelets due to GIB history

## 2021-01-28 NOTE — CHART NOTE - NSCHARTNOTEFT_GEN_A_CORE
Admitting Diagnosis:   Patient is a 65y old  Male who presents with a chief complaint of EtOH withdrawal (28 Jan 2021 14:15)      PAST MEDICAL & SURGICAL HISTORY:  CAD (coronary artery disease)    ETOH abuse    Hyperlipidemia    Heparin-induced thrombocytopenia  Antibody positive    Left ventricular thrombus    Atrial fibrillation    Hemorrhoid    ICD (implantable cardioverter-defibrillator) in place    Myocardial infarction involving other coronary artery    Gastritis    Atherosclerosis of coronary artery  Coronary artery disease    S/P coronary artery stent placement    Automatic implantable cardiac defibrillator in situ  ICD (implantable cardioverter-defibrillator) in place        Current Nutrition Order:  Soft/Thin Liquids     PO Intake: Good (%) [ X  ]  Fair (50-75%) [   ] Poor (<25%) [   ]    GI Issues: No N/V/C/D reported at this time. +BM 1/28    Pain: Mild headache endorsed     Skin Integrity: Moses 16; no edema  Intact pressure-wise    Labs:   01-28    136  |  101  |  9   ----------------------------<  103<H>  4.3   |  24  |  0.88    Ca    9.1      28 Jan 2021 05:47  Phos  3.5     01-28  Mg     1.8     01-28    TPro  6.8  /  Alb  3.4  /  TBili  0.2  /  DBili  x   /  AST  13  /  ALT  7<L>  /  AlkPhos  54  01-27    CAPILLARY BLOOD GLUCOSE          Medications:  MEDICATIONS  (STANDING):  argatroban Infusion 2 MICROgram(s)/kG/Min (8.71 mL/Hr) IV Continuous <Continuous>  artificial  tears Solution 1 Drop(s) Both EYES daily  atorvastatin 80 milliGRAM(s) Oral at bedtime  cefTRIAXone   IVPB 2000 milliGRAM(s) IV Intermittent every 24 hours  pantoprazole    Tablet 40 milliGRAM(s) Oral every 24 hours    MEDICATIONS  (PRN):      Weight: 72.6kg  Weight Change: No new weights recorded since admit     Nutrition Focused Physical Exam: Completed [   ]  Not Pertinent [  X ]    Estimated energy needs: Height 70"; ABW 72.6kg; IBW 75.2kg; 97%IBW; BMI 22.9  ActualBW used for calculations as pt between % of IBW. Needs estimated for age and adjusted for CVA   Calories: 25-30 kcal/kg = 7285-1380 kcal/day  Protein: 1.0-1.2 g/kg = 73-87g protein/day  Fluids: 25-30 mL/kg = 8219-1475 ml/day    Subjective:  63-year-old male with a past medical history of HIT, ETOH abuse, paroxysmal A-fib with an LV thrombus, HLD, CAD status post PCI, HFrEF (35-40% 2019) status post AICD, hemorrhoids, gastritis, and a recent admission in August for alcohol withdrawal who presented with signs and symptoms of alcohol withdrawal. Found to have multiple infarctions involving L thalamus, L posterior cerebral artery on MRI head 1/26- transferred to [facility] for more frequent monitoring and hemorrhagic conversion surveillance. CTA performed o/n which showed small subacute left basal ganglia/thalamic infarction. vEEG ordered to r/o seizure activity, as pt has had fluctuating mental status.     Pt seen in room, awake, alert, being prepared to go to echo. Was sleeping throughout morning. Observed ~70% intake of lunch tray- pt endorsed having a good appetite at this time. He denied N/V/C/D and last BM was this morning. He reported mild headache. Afebrile this AM. Lytes WNL, BG 103mg/dL. Will continue to follow per RD protocol.     Previous Nutrition Diagnosis:  Inadequate Oral Intake R/T inability to meet EER 2/2 NPO AEB: 0%of EER met.   Active [   ]  Resolved [ X  ]    If resolved, new PES: Increased protein needs RT increased demand for protein intake AEB CVA     Goal: Pt will consistently meet >75% of EER with no s/s intolerance    Recommendations:  1. Recommend addition of DASH diet restriction  2. Reinforce diet ed   3. Recommend MVI, B1, FA  4. Pain and bowel regimens per team  5. Monitor lytes and replete prn. POC BG q6hr. Recommend checking cholesterol panel   *paged MD to discuss     Education: Mediterranean/heart healthy nutrition briefly reviewed but cut short as pt leaving for test     Risk Level: High [   ] Moderate [ X  ] Low [   ]

## 2021-01-28 NOTE — PROGRESS NOTE ADULT - ASSESSMENT
63-year-old male with a past medical history of HIT, ETOH abuse, paroxysmal A-fib with an LV thrombus, HLD, CAD status post PCI, HFrEF (35-40% 2019) status post AICD, hemorrhoids, gastritis, and a recent admission in August for alcohol withdrawal who presented with signs and symptoms of alcohol withdrawal. Stroke code called 1/20 AM when lethargy first observed, no evidence of stroke on imaging. CTA performed which showed small subacute left basal ganglia/thalamic infarction. Pt went for MRI 1/26 which showed recent infarctions in the posterior circulation, predominantly left sided in the thalamus, midbrain, cerebellum, mesial temporal lobe and additionally at small site of left occipital cortex. Heme Onc consulted for hypercoagulable work-up.    #) DIAGNOSIS  - Grade 2 thrombocytopenia, likely consumptive secondary to acute thrombosis, improving [now Grade 1]  - Hx of HIT   - Paroxsymal AFib with LV thrombus  - Prolonged PT/PTT    #) PLAN    #) Hypercoagulable state   - Neuroimaging consistent with embolic phenomena, likely of cardiac origin given ischemic heart disease with akinesis of multiple portions of the cardiac chamber, including all apical segments and true apex where the LV thrombus was visualized (01/20/21). Acquired hypercoagulable state is generated by relative hemodynamic stasis.   - Patient was started on Eliquis 5 mg for non-valvular AFib. Dose was started on 01/17 09:47 - 01/19 21:59. Stroke code was then activated on 01/20 08:02. Because of continued lethargy and inability to take PO, he was started on Argatroban infusion due to history of HIT on 01/20 at 2 ug/kg/min from 15:07 - 01/21 18:25. Baseline PTT and 2 subsequent therapeutic steady-state levels were detected. Next PTT on 01/21 17:20 was subtherapeutic [24.5 sec]. He was then switched to 2.5 ug/kg/min from 01/21 18:50 to 01/22 12:50. However, no repeat steady-state measurement was obtained 2 hrs post-dosing change. He was then switched back to Eliquis because of perceived ability to tolerate PO, receiving first dose on 01/22 17:09. There was approximately a 4 hour gap between stopping Argatroban gtt and initiating an oral anticoagulant. Note that he became more thrombocytopenic from this point forward, likely secondary to active thrombosis. His mental status deteriorated shortly and he was restarted on Argatroban 2 ug/kg/min on 01/23 07:58. No baseline PTT was obtained prior to initiating the infusion.   - It is possible that the apical LV thrombus could embolize despite appropriate anticoagulation. However, possibility exists that failure to adhere to proper Argatroban dosing guidelines and frequent discontinuation of Argatroban to Eliquis have led to transient hypercoagulable state which may have provoked the ischemic neurologic event.   - Can consider repeat 2D Echo to attempt to revisualize LV thrombus.   - No further hypercoagulable work-up at this time. Lifelong heparin avoidance.    - Continue with Argatroban gtt per protocol with switch to Coumadin when able.   - PF4-Ab negative. Follow-up IVANNA testing.     #) Coagulopathy  - Prolonged INR up to 3.38 on 01/24 may be explained by initiation of Argatroban. Coagulation panel was normal on 01/20. The degree of INR prolongation with Argatroban is not well characterized, but can be as high as INR 4-8 on certain dosing regimens.   - Send mixing studies, fibrinogen, D-dimer today.     To discuss with Dr. Bryant   63-year-old male with a past medical history of HIT, ETOH abuse, paroxysmal A-fib with an LV thrombus, HLD, CAD status post PCI, HFrEF (35-40% 2019) status post AICD, hemorrhoids, gastritis, and a recent admission in August for alcohol withdrawal who presented with signs and symptoms of alcohol withdrawal. Stroke code called 1/20 AM when lethargy first observed, no evidence of stroke on imaging. CTA performed which showed small subacute left basal ganglia/thalamic infarction. Pt went for MRI 1/26 which showed recent infarctions in the posterior circulation, predominantly left sided in the thalamus, midbrain, cerebellum, mesial temporal lobe and additionally at small site of left occipital cortex. Heme Onc consulted for hypercoagulable work-up.    #) DIAGNOSIS  - Grade 2 thrombocytopenia, likely consumptive secondary to acute thrombosis, improving [now Grade 1]  - Hx of HIT   - Paroxsymal AFib with LV thrombus  - Prolonged PT/PTT    #) PLAN    #) Hypercoagulable state   - Neuroimaging consistent with embolic phenomena, likely of cardiac origin given ischemic heart disease with akinesis of multiple portions of the cardiac chamber, including all apical segments and true apex where the LV thrombus was visualized (01/20/21). Acquired hypercoagulable state is generated by relative hemodynamic stasis.   - Patient was started on Eliquis 5 mg for non-valvular AFib. Dose was started on 01/17 09:47 - 01/19 21:59. Stroke code was then activated on 01/20 08:02. Because of continued lethargy and inability to take PO, he was started on Argatroban infusion due to history of HIT on 01/20 at 2 ug/kg/min from 15:07 - 01/21 18:25. Baseline PTT and 2 subsequent therapeutic steady-state levels were detected. Next PTT on 01/21 17:20 was subtherapeutic [24.5 sec]. He was then switched to 2.5 ug/kg/min from 01/21 18:50 to 01/22 12:50. However, no repeat steady-state measurement was obtained 2 hrs post-dosing change. He was then switched back to Eliquis because of perceived ability to tolerate PO, receiving first dose on 01/22 17:09. There was approximately a 4 hour gap between stopping Argatroban gtt and initiating an oral anticoagulant. Note that he became more thrombocytopenic from this point forward, likely secondary to active thrombosis. His mental status deteriorated shortly and he was restarted on Argatroban 2 ug/kg/min on 01/23 07:58. No baseline PTT was obtained prior to initiating the infusion.   - No further hypercoagulable work-up at this time. Lifelong heparin avoidance.    - Continue with Argatroban gtt per protocol with switch to Coumadin when able.   - PF4-Ab negative. Follow-up IVANNA testing.     #) LV Thrombus  - Repeat Echo shows LV thrombus of same size in the same location as previously demonstrated. Recent embolic stroke was likely secondary to transient hypercoagulable state  - Request Cardiology input on optimal anticoagulation strategy for LV thrombus (DOAC vs Coumadin).     #) Coagulopathy  - Prolonged INR up to 3.38 on 01/24 may be explained by initiation of Argatroban. Coagulation panel was normal on 01/20. The degree of INR prolongation with Argatroban is not well characterized, but can be as high as INR 4-8 on certain dosing regimens.   - Send mixing studies, fibrinogen, D-dimer today.     Discussed with Dr. Bryant

## 2021-01-28 NOTE — PROGRESS NOTE ADULT - PROBLEM SELECTOR PLAN 9
F: none  E: Replete PRN  N: dysphagia diet   DVT ppx: argotraban gtt  GI ppx: Protonix 40 mg IV  CODE: FULL

## 2021-01-28 NOTE — PROGRESS NOTE ADULT - PROBLEM SELECTOR PLAN 2
#AMS-secondary to unknown etiology, likely drugs vs seizure  -stroke code called 1/20 AM when lethargy first observed, no evidence of stroke on imaging  - vEEG placed and no current seizure activity  -abg wnl  -utox wnl  -narcan 0.4 x 4 given 1/20 AM, will continue to reassess respiratory status and mental status.   - Patient has fluctuating mental status, will restart EEG   - There may also be a component of narcolepsy involved - patient's brother states that he has "sleeping episodes" Secondary to unknown etiology - could be related to stroke vs narcolepsy  -stroke code called 1/20 AM when lethargy first observed - vEEG on 1/21 did not show any seizure activity. abg wnl, utox wnl  -Imaging from 1/25 and 1/26 shows evidence of multiple strokes (brainstem and bilateral thalamic strokes)  -As of 1/27, patient has fluctuating mental status, will restart EEG   -There may also be a component of narcolepsy involved - patient's brother states that he has "sleeping episodes". Could possibly benefit from modafinil

## 2021-01-28 NOTE — PROGRESS NOTE ADULT - SUBJECTIVE AND OBJECTIVE BOX
Physical Medicine and Rehabilitation Progress Note:    Patient is a 65y old  Male who presents with a chief complaint of EtOH withdrawal (28 Jan 2021 10:39)      HPI:  Mr. Torrez is a 65yo M w/ poor med adherence, PMHx of LV thrombus (resolved on Jan 2020 echo; reappeared on Nov 2020 echo) pAF on eliquis, CAD s/p PCI, HFrEF 30-35% s/p AICD, HIT, hemorrhoids, gastritis, GIB, hx multiple admissions for EtOH withdrawal (last drink 1/16; no hx seizures, DTs, or intubations), who presents due to concern for alcohol withdrawal. Pt last seen at [facility] in March 2020 when he was admitted for EtOH withdrawal w/ initial CIWA ~24 and associated visual hallucinations, which improved after being started on librium 50mg Q8hrs, and subsequently discharged on librium taper. Per outpatient records that pt himself brought with him to the ED, pt had additional episode EtOH withdrawal in November where he was seen at [facility] and subsequently discharged on librium taper 25mg BID. At that time, pt also underwent TTE imaging which showed LV EF 38% as well as a large protruding fixed thrombus attached to LV apex. Noted on discharge paperwork (11/22) to not be on ASA due to thrombocytopenia, not on warfarin due to prior poor adherence, and accordingly discharged on eliquis w/ a discharge diagnosis of afib/aflutter. Pt has since twice again been admitted at [facility] (12/8/21 and 1/6/21) for 1-2mo hx of persistent "funny feeling in my chest", determined to be 2/2 Afib/aflutter. Between those two admissions, pt had also been prescribed hydralazine and isosorbide dinitrate, however pt reports refusing to take them after reading about the side-effects in conjunction w/ alcohol-use. Pt says that for the past 1 month, he has been trying but struggling to wean off his alcohol consumption which he estimates to be a ~1pint/day. He says for the past week he has been especially focused on quitting in anticipation of an appointment with his outpatient cardiologist. Today he reports experiencing sx consistent w/ his previous episodes of withdrawal including agitation, shaking, chest pain, sob, nausea, and a racing heart. He otherwise denies recent or active fever, chills, vomiting, abdominal pain, back pain, changes in vision or hearing, genitourinary sx, extremity pain or swelling.    ED Course:  Vitals: T 99.7F oral, ->115->103->89; /86; RR 20; SpO2 96% on RA  Labs: CBC WNL, CMP WNL, troponin WNL; lactate 3.5->2.4; AG 21->16  Therapies: apixaban 5mg x1; ASA 325mg x1; librium 50mg x1; IV ativan 2mg x1; MgSo4 2g x1; PO thiamine 100mg x1; IV protonix 40mg x1; IV zofan 4mg x1; malaax 30ml x1; folic acid 1mg x1; IV lopressor 100mg x1; 1L NS x4  ECG: +RAD; ST elevations leads V2-V4; TWI V5-V6 (ECG findings unchanged from prior ECG performed on March 19th, 2020)   (17 Jan 2021 16:24)                            12.6   4.89  )-----------( 199      ( 28 Jan 2021 05:47 )             39.3       01-28    136  |  101  |  9   ----------------------------<  103<H>  4.3   |  24  |  0.88    Ca    9.1      28 Jan 2021 05:47  Phos  3.5     01-28  Mg     1.8     01-28    TPro  6.8  /  Alb  3.4  /  TBili  0.2  /  DBili  x   /  AST  13  /  ALT  7<L>  /  AlkPhos  54  01-27    Vital Signs Last 24 Hrs  T(C): 36.7 (28 Jan 2021 13:43), Max: 37.1 (27 Jan 2021 17:15)  T(F): 98.1 (28 Jan 2021 13:43), Max: 98.7 (27 Jan 2021 17:15)  HR: 82 (28 Jan 2021 12:13) (74 - 90)  BP: 108/69 (28 Jan 2021 12:13) (96/67 - 108/71)  BP(mean): 84 (28 Jan 2021 12:13) (76 - 84)  RR: 18 (28 Jan 2021 12:13) (16 - 18)  SpO2: 95% (28 Jan 2021 12:13) (95% - 99%)    MEDICATIONS  (STANDING):  argatroban Infusion 2 MICROgram(s)/kG/Min (8.71 mL/Hr) IV Continuous <Continuous>  artificial  tears Solution 1 Drop(s) Both EYES daily  atorvastatin 80 milliGRAM(s) Oral at bedtime  cefTRIAXone   IVPB 2000 milliGRAM(s) IV Intermittent every 24 hours  pantoprazole    Tablet 40 milliGRAM(s) Oral every 24 hours    MEDICATIONS  (PRN):    Currently Undergoing Physical/ Occupational Therapy at bedside.    Functional Status Assessment:       Cognitive/Perceptual/Neuro  Cognitive/Neuro/Behavioral [WDL Definition: Alert; opens eyes spontaneously; arouses to voice or touch; oriented x 4; follows commands; speech spontaneous, logical; purposeful motor response; behavior appropriate to situation]: WDL except  Level of Consciousness: alert;  confused  Arousal Level: arouses to voice  Orientation: disoriented to;  place;  time;  situation  Speech: hoarse;  illogical  Mood/Behavior: calm;  cooperative    Language Assistance  Preferred Language to Address Healthcare Preferred Language to Address Healthcare: English    Therapeutic Interventions      Bed Mobility  Bed Mobility Training Rolling/Turning: supervision;  bed rails  Bed Mobility Training Scooting: supervision;  bed rails  Bed Mobility Training Sit-to-Supine: supervision;  verbal cues;  bed rails  Bed Mobility Training Supine-to-Sit: supervision;  verbal cues;  bed rails  Bed Mobility Training Limitations: decreased ability to use arms for pushing/pulling;  decreased ability to use legs for bridging/pushing;  impaired ability to control trunk for mobility;  impaired balance;  decreased strength    Sit-Stand Transfer Training  Transfer Training Sit-to-Stand Transfer: supervision;  verbal cues;  nonverbal cues (demo/gestures);  weight-bearing as tolerated   IV pole  Transfer Training Stand-to-Sit Transfer: supervision;  verbal cues;  weight-bearing as tolerated   IV pole  Sit-to-Stand Transfer Training Transfer Safety Analysis: decreased balance;  decreased step length;  decreased strength;  impaired balance    Gait Training  Gait Training: contact guard;  stand-by assist;  supervision;  verbal cues;  nonverbal cues (demo/gestures);  1 person assist;  weight-bearing as tolerated   100 ft with IV pole + 100 ft without Assistive Device   Gait Analysis: 2-point gait   decreased renetta;  increased time in double stance;  shuffling;  decreased step length;  decreased stride length;  decreased toe clearance;  decreased toe-to-floor clearance;  decreased trunk rotation;  decreased strength;  impaired balance    Therapeutic Exercise  Therapeutic Exercise Detail: seated: LAQs, ankle pumps x10 each           PM&R Impression: as above    Current Disposition Plan Recommendations:    d/c home with no post discharge rehab needs

## 2021-01-29 LAB
ANION GAP SERPL CALC-SCNC: 10 MMOL/L — SIGNIFICANT CHANGE UP (ref 5–17)
APTT 50/50 2HOUR INCUB: SIGNIFICANT CHANGE UP SEC (ref 27.5–36.5)
APTT 50/50 MIX COMMENT: SIGNIFICANT CHANGE UP
APTT BLD: 38.8 SEC — HIGH (ref 27.5–35.5)
APTT BLD: 56 SECS — HIGH (ref 27.5–36.5)
APTT BLD: 60.8 SEC — HIGH (ref 27.5–35.5)
BUN SERPL-MCNC: 13 MG/DL — SIGNIFICANT CHANGE UP (ref 7–23)
CALCIUM SERPL-MCNC: 10 MG/DL — SIGNIFICANT CHANGE UP (ref 8.4–10.5)
CHLORIDE SERPL-SCNC: 99 MMOL/L — SIGNIFICANT CHANGE UP (ref 96–108)
CO2 SERPL-SCNC: 27 MMOL/L — SIGNIFICANT CHANGE UP (ref 22–31)
CREAT SERPL-MCNC: 1.08 MG/DL — SIGNIFICANT CHANGE UP (ref 0.5–1.3)
GLUCOSE SERPL-MCNC: 93 MG/DL — SIGNIFICANT CHANGE UP (ref 70–99)
HCT VFR BLD CALC: 41.9 % — SIGNIFICANT CHANGE UP (ref 39–50)
HGB BLD-MCNC: 13.3 G/DL — SIGNIFICANT CHANGE UP (ref 13–17)
INR BLD: 1.19 — HIGH (ref 0.88–1.16)
MAGNESIUM SERPL-MCNC: 2.2 MG/DL — SIGNIFICANT CHANGE UP (ref 1.6–2.6)
MCHC RBC-ENTMCNC: 27.2 PG — SIGNIFICANT CHANGE UP (ref 27–34)
MCHC RBC-ENTMCNC: 31.7 GM/DL — LOW (ref 32–36)
MCV RBC AUTO: 85.7 FL — SIGNIFICANT CHANGE UP (ref 80–100)
NRBC # BLD: 0 /100 WBCS — SIGNIFICANT CHANGE UP (ref 0–0)
PHOSPHATE SERPL-MCNC: 3.8 MG/DL — SIGNIFICANT CHANGE UP (ref 2.5–4.5)
PLATELET # BLD AUTO: 262 K/UL — SIGNIFICANT CHANGE UP (ref 150–400)
POTASSIUM SERPL-MCNC: 4.5 MMOL/L — SIGNIFICANT CHANGE UP (ref 3.5–5.3)
POTASSIUM SERPL-SCNC: 4.5 MMOL/L — SIGNIFICANT CHANGE UP (ref 3.5–5.3)
PROTHROM AB SERPL-ACNC: 14.2 SEC — HIGH (ref 10.6–13.6)
PT 50/50: 16.4 SECS — HIGH (ref 10.6–14.7)
RBC # BLD: 4.89 M/UL — SIGNIFICANT CHANGE UP (ref 4.2–5.8)
RBC # FLD: 15.9 % — HIGH (ref 10.3–14.5)
SODIUM SERPL-SCNC: 136 MMOL/L — SIGNIFICANT CHANGE UP (ref 135–145)
SRA INTERP SER-IMP: SIGNIFICANT CHANGE UP
THROMBIN TIME: >300 SEC — HIGH (ref 17.6–24)
WBC # BLD: 4.67 K/UL — SIGNIFICANT CHANGE UP (ref 3.8–10.5)
WBC # FLD AUTO: 4.67 K/UL — SIGNIFICANT CHANGE UP (ref 3.8–10.5)

## 2021-01-29 PROCEDURE — 36415 COLL VENOUS BLD VENIPUNCTURE: CPT

## 2021-01-29 PROCEDURE — 99233 SBSQ HOSP IP/OBS HIGH 50: CPT

## 2021-01-29 PROCEDURE — 36000 PLACE NEEDLE IN VEIN: CPT

## 2021-01-29 PROCEDURE — 76937 US GUIDE VASCULAR ACCESS: CPT | Mod: 26

## 2021-01-29 RX ORDER — OXYCODONE HYDROCHLORIDE 5 MG/1
10 TABLET ORAL EVERY 6 HOURS
Refills: 0 | Status: DISCONTINUED | OUTPATIENT
Start: 2021-01-29 | End: 2021-01-29

## 2021-01-29 RX ORDER — APIXABAN 2.5 MG/1
5 TABLET, FILM COATED ORAL EVERY 12 HOURS
Refills: 0 | Status: DISCONTINUED | OUTPATIENT
Start: 2021-01-29 | End: 2021-02-03

## 2021-01-29 RX ADMIN — CEFTRIAXONE 100 MILLIGRAM(S): 500 INJECTION, POWDER, FOR SOLUTION INTRAMUSCULAR; INTRAVENOUS at 13:07

## 2021-01-29 RX ADMIN — ATORVASTATIN CALCIUM 80 MILLIGRAM(S): 80 TABLET, FILM COATED ORAL at 21:45

## 2021-01-29 RX ADMIN — APIXABAN 5 MILLIGRAM(S): 2.5 TABLET, FILM COATED ORAL at 17:34

## 2021-01-29 RX ADMIN — ARGATROBAN 8.71 MICROGRAM(S)/KG/MIN: 50 INJECTION, SOLUTION INTRAVENOUS at 03:52

## 2021-01-29 RX ADMIN — APIXABAN 5 MILLIGRAM(S): 2.5 TABLET, FILM COATED ORAL at 09:22

## 2021-01-29 RX ADMIN — PANTOPRAZOLE SODIUM 40 MILLIGRAM(S): 20 TABLET, DELAYED RELEASE ORAL at 13:02

## 2021-01-29 NOTE — PROGRESS NOTE ADULT - PROBLEM SELECTOR PLAN 4
History of A-fib on Eliquis and Toprol XL. CHADS-VASC 4, HAS-BLED 3.  - argotraban gtt at this time, will bridge to Coumadin when patient able to tolerate po (eliquis can cause HIT) History of A-fib on Eliquis and Toprol XL. CHADS-VASC 4, HAS-BLED 3.  -Switched argatroban gtt to Eliquis 5 BID

## 2021-01-29 NOTE — PROGRESS NOTE ADULT - PROBLEM SELECTOR PLAN 2
Secondary to unknown etiology - could be related to stroke vs narcolepsy  -stroke code called 1/20 AM when lethargy first observed - vEEG on 1/21 did not show any seizure activity. abg wnl, utox wnl  -Imaging from 1/25 and 1/26 shows evidence of multiple strokes (brainstem and bilateral thalamic strokes)  -As of 1/27, patient has fluctuating mental status, will restart EEG   -There may also be a component of narcolepsy involved - patient's brother states that he has "sleeping episodes". Could possibly benefit from modafinil Secondary to unknown etiology - most likely related to his multiple strokes   -stroke code called 1/20 AM when lethargy first observed - vEEG on 1/21 did not show any seizure activity. abg wnl, utox wnl  -Imaging from 1/25 and 1/26 shows evidence of multiple strokes (brainstem and bilateral thalamic strokes)  -As of 1/27, patient has fluctuating mental status - EEG started briefly, but was normal

## 2021-01-29 NOTE — PROVIDER CONTACT NOTE (CHANGE IN STATUS NOTIFICATION) - SITUATION
Patient found upon morning assessment, sleepy, lethargic, and difficult to arouse. VSS. Patient responds only to painful stimuli. MD at bedside. stroke code called.
PT pulled off leads and removed IV access.  MD explained why patient cannot leave in the middle of the night.  PT settled safely back in bed.  Two RNs attempted to place new IVs twice each, unable to place.
Pt is partially responding to painful stimuli, with pinpoint pupil, refused to open mouth on command. Temperature elevated above normal.

## 2021-01-29 NOTE — PROGRESS NOTE ADULT - SUBJECTIVE AND OBJECTIVE BOX
**Incomplete Note**  OVERNIGHT EVENTS:    SUBJECTIVE / INTERVAL HPI: Patient seen and examined at bedside.     VITAL SIGNS:  Vital Signs Last 24 Hrs  T(C): 36 (29 Jan 2021 05:35), Max: 37.1 (28 Jan 2021 21:32)  T(F): 96.8 (29 Jan 2021 05:35), Max: 98.7 (28 Jan 2021 21:32)  HR: 80 (29 Jan 2021 03:56) (74 - 96)  BP: 102/78 (29 Jan 2021 03:56) (99/55 - 110/83)  BP(mean): 86 (29 Jan 2021 03:56) (71 - 89)  RR: 18 (29 Jan 2021 03:56) (18 - 18)  SpO2: 98% (29 Jan 2021 03:56) (95% - 99%)    01-27-21 @ 07:01 - 01-28-21 @ 07:00  --------------------------------------------------------  IN: 95.7 mL / OUT: 1130 mL / NET: -1034.3 mL    01-28-21 @ 07:01 - 01-29-21 @ 06:49  --------------------------------------------------------  IN: 102.2 mL / OUT: 1475 mL / NET: -1372.8 mL        PHYSICAL EXAM:    General: WDWN  HEENT: NC/AT; PERRL, anicteric sclera; MMM  Neck: supple  Cardiovascular: +S1/S2; RRR  Respiratory: CTA B/L; no W/R/R  Gastrointestinal: soft, NT/ND; +BSx4  Extremities: WWP; no edema, clubbing or cyanosis  Vascular: 2+ radial, DP/PT pulses B/L  Neurological: AAOx3; no focal deficits    MEDICATIONS:  MEDICATIONS  (STANDING):  argatroban Infusion 2 MICROgram(s)/kG/Min (8.71 mL/Hr) IV Continuous <Continuous>  artificial  tears Solution 1 Drop(s) Both EYES daily  atorvastatin 80 milliGRAM(s) Oral at bedtime  cefTRIAXone   IVPB 2000 milliGRAM(s) IV Intermittent every 24 hours  pantoprazole    Tablet 40 milliGRAM(s) Oral every 24 hours    MEDICATIONS  (PRN):      ALLERGIES:  Allergies    Capoten (Short breath; Rash; Hives)  heparin (Other (Mod to Severe))  penicillin (Short breath; Rash; Hives)    Intolerances        LABS:                        13.3   4.67  )-----------( 262      ( 29 Jan 2021 05:56 )             41.9     01-29    136  |  99  |  13  ----------------------------<  93  4.5   |  27  |  1.08    Ca    10.0      29 Jan 2021 05:56  Phos  3.8     01-29  Mg     2.2     01-29      PT/INR - ( 29 Jan 2021 05:56 )   PT: 14.2 sec;   INR: 1.19          PTT - ( 29 Jan 2021 05:56 )  PTT:38.8 sec    CAPILLARY BLOOD GLUCOSE            RADIOLOGY & ADDITIONAL TESTS: Reviewed.    ASSESSMENT:    PLAN:  OVERNIGHT EVENTS: Patient removed IV's and wanted to leave, however redirectable. IV's replaced overnight and argatroban restarted (was off for a total of 30 minutes).     SUBJECTIVE / INTERVAL HPI: Patient seen and examined at bedside. Patient states he wants to go home, he lives with his brother a woman named "Pratima"     VITAL SIGNS:  Vital Signs Last 24 Hrs  T(C): 36 (29 Jan 2021 05:35), Max: 37.1 (28 Jan 2021 21:32)  T(F): 96.8 (29 Jan 2021 05:35), Max: 98.7 (28 Jan 2021 21:32)  HR: 80 (29 Jan 2021 03:56) (74 - 96)  BP: 102/78 (29 Jan 2021 03:56) (99/55 - 110/83)  BP(mean): 86 (29 Jan 2021 03:56) (71 - 89)  RR: 18 (29 Jan 2021 03:56) (18 - 18)  SpO2: 98% (29 Jan 2021 03:56) (95% - 99%)    01-27-21 @ 07:01  -  01-28-21 @ 07:00  --------------------------------------------------------  IN: 95.7 mL / OUT: 1130 mL / NET: -1034.3 mL    01-28-21 @ 07:01 - 01-29-21 @ 06:49  --------------------------------------------------------  IN: 102.2 mL / OUT: 1475 mL / NET: -1372.8 mL        PHYSICAL EXAM:    General: WDWN  HEENT: NC/AT; PERRL, anicteric sclera; MMM  Neck: supple  Cardiovascular: +S1/S2; RRR  Respiratory: CTA B/L; no W/R/R  Gastrointestinal: soft, NT/ND; +BSx4  Extremities: WWP; no edema, clubbing or cyanosis  Vascular: 2+ radial, DP/PT pulses B/L  Neurological: AAOx3; no focal deficits    MEDICATIONS:  MEDICATIONS  (STANDING):  argatroban Infusion 2 MICROgram(s)/kG/Min (8.71 mL/Hr) IV Continuous <Continuous>  artificial  tears Solution 1 Drop(s) Both EYES daily  atorvastatin 80 milliGRAM(s) Oral at bedtime  cefTRIAXone   IVPB 2000 milliGRAM(s) IV Intermittent every 24 hours  pantoprazole    Tablet 40 milliGRAM(s) Oral every 24 hours    MEDICATIONS  (PRN):      ALLERGIES:  Allergies    Capoten (Short breath; Rash; Hives)  heparin (Other (Mod to Severe))  penicillin (Short breath; Rash; Hives)    Intolerances        LABS:                        13.3   4.67  )-----------( 262      ( 29 Jan 2021 05:56 )             41.9     01-29    136  |  99  |  13  ----------------------------<  93  4.5   |  27  |  1.08    Ca    10.0      29 Jan 2021 05:56  Phos  3.8     01-29  Mg     2.2     01-29      PT/INR - ( 29 Jan 2021 05:56 )   PT: 14.2 sec;   INR: 1.19          PTT - ( 29 Jan 2021 05:56 )  PTT:38.8 sec    CAPILLARY BLOOD GLUCOSE            RADIOLOGY & ADDITIONAL TESTS: Reviewed.    ASSESSMENT:    PLAN:  OVERNIGHT EVENTS: Patient removed IV's and wanted to leave, however redirectable. IV's replaced overnight and argatroban restarted (was off for a total of 30 minutes).     SUBJECTIVE / INTERVAL HPI: Patient seen and examined at bedside. Patient states he wants to go home, he lives with his brother a woman named "Pratima". He states his vision is blurry and that he feels unsteady on his feet. He denies any chest pain, shortness of breath, N/V/D. He has been eating well and going to the bathroom.     VITAL SIGNS:  Vital Signs Last 24 Hrs  T(C): 36 (29 Jan 2021 05:35), Max: 37.1 (28 Jan 2021 21:32)  T(F): 96.8 (29 Jan 2021 05:35), Max: 98.7 (28 Jan 2021 21:32)  HR: 80 (29 Jan 2021 03:56) (74 - 96)  BP: 102/78 (29 Jan 2021 03:56) (99/55 - 110/83)  BP(mean): 86 (29 Jan 2021 03:56) (71 - 89)  RR: 18 (29 Jan 2021 03:56) (18 - 18)  SpO2: 98% (29 Jan 2021 03:56) (95% - 99%)    01-27-21 @ 07:01  -  01-28-21 @ 07:00  --------------------------------------------------------  IN: 95.7 mL / OUT: 1130 mL / NET: -1034.3 mL    01-28-21 @ 07:01  -  01-29-21 @ 06:49  --------------------------------------------------------  IN: 102.2 mL / OUT: 1475 mL / NET: -1372.8 mL    PHYSICAL EXAM:    Constitutional: WDWN resting comfortably in bed; NAD; eyes open and conversational  Head: NC/AT; able to perform head-chin without pain or restriction  Eyes: pupils constricted but reactive BL, EOMI; anicteric sclera  ENT: no nasal discharge; MMM; poor dentition  Neck: supple; no JVD  Respiratory: CTA B/L; no W/R/R  Cardiac: +S1/S2; RRR; no M/R/G  Gastrointestinal: soft, NT/ND; no rebound or guarding; +BSx4  Extremities: WWP, no clubbing or cyanosis; no peripheral edema  Neurologic:  -Mental status: alert and awake. conversational. remote memory intact. AOx1 (self)  -Cranial nerves:   II: visual fields are full to confrontation.  III, IV, VI: EOMI without nystagmus, some difficulty with upward gaze. Pupils equally constricted with sluggish response to light  V:  Facial sensation V1-V3 equal and intact   VII: Face is symmetric with normal eye closure and smile  VIII: Hearing is bilaterally intact  IX, X: Uvula is midline and soft palate rises symmetrically  XI: Head turning and shoulder shrug are intact.  XII: Tongue protrudes midline  Motor: Moving all extremities spontaneously, normal bulk and tone. Normal muscle strength 5/5 L shoulders flexion/extension/abduction and elbow flexion/extension, and handgrip. 4/5 in all movement of R side. Pronator drift neg BL UE, mm strength 5/5 L hip flexion/extension/abduction/adduction, 4/5 R hip/knee flexion/extension (but improved), 5/5 BL dorsiflexion/plantarflexion. Poor fine motor movement of the RUE.   Sensation: Intact to light touch bilaterally. Proprioception intact bilaterally.   Reflexes: Downgoing toes bilaterally; 2+ DTRs BL patellar/achilles    MEDICATIONS:  MEDICATIONS  (STANDING):  argatroban Infusion 2 MICROgram(s)/kG/Min (8.71 mL/Hr) IV Continuous <Continuous>  artificial  tears Solution 1 Drop(s) Both EYES daily  atorvastatin 80 milliGRAM(s) Oral at bedtime  cefTRIAXone   IVPB 2000 milliGRAM(s) IV Intermittent every 24 hours  pantoprazole    Tablet 40 milliGRAM(s) Oral every 24 hours    MEDICATIONS  (PRN):      ALLERGIES:  Allergies    Capoten (Short breath; Rash; Hives)  heparin (Other (Mod to Severe))  penicillin (Short breath; Rash; Hives)    Intolerances        LABS:                        13.3   4.67  )-----------( 262      ( 29 Jan 2021 05:56 )             41.9     01-29    136  |  99  |  13  ----------------------------<  93  4.5   |  27  |  1.08    Ca    10.0      29 Jan 2021 05:56  Phos  3.8     01-29  Mg     2.2     01-29      PT/INR - ( 29 Jan 2021 05:56 )   PT: 14.2 sec;   INR: 1.19          PTT - ( 29 Jan 2021 05:56 )  PTT:38.8 sec    CAPILLARY BLOOD GLUCOSE            RADIOLOGY & ADDITIONAL TESTS: Reviewed.

## 2021-01-29 NOTE — PROGRESS NOTE ADULT - SUBJECTIVE AND OBJECTIVE BOX
Patient is a 65y old  Male who presents with a chief complaint of EtOH withdrawal (29 Jan 2021 09:54)      INTERVAL HPI/OVERNIGHT EVENTS:  Seen by me this afternoon, seems to be a bit more alert and interactive today although remains confused. +Blurry vision. No other new neurologic deficits.    Review of Systems: 12 point review of systems otherwise negative    MEDICATIONS  (STANDING):  apixaban 5 milliGRAM(s) Oral every 12 hours  artificial  tears Solution 1 Drop(s) Both EYES daily  atorvastatin 80 milliGRAM(s) Oral at bedtime  pantoprazole    Tablet 40 milliGRAM(s) Oral every 24 hours    MEDICATIONS  (PRN):      Allergies    Capoten (Short breath; Rash; Hives)  heparin (Other (Mod to Severe))  penicillin (Short breath; Rash; Hives)    Intolerances          Vital Signs Last 24 Hrs  T(C): 36.3 (29 Jan 2021 17:52), Max: 37.1 (28 Jan 2021 21:32)  T(F): 97.3 (29 Jan 2021 17:52), Max: 98.7 (28 Jan 2021 21:32)  HR: 104 (29 Jan 2021 16:54) (72 - 104)  BP: 109/80 (29 Jan 2021 16:54) (99/55 - 129/79)  BP(mean): 88 (29 Jan 2021 16:54) (71 - 96)  RR: 17 (29 Jan 2021 16:54) (17 - 18)  SpO2: 98% (29 Jan 2021 03:56) (98% - 99%)  CAPILLARY BLOOD GLUCOSE          01-28 @ 07:01  -  01-29 @ 07:00  --------------------------------------------------------  IN: 154.4 mL / OUT: 1475 mL / NET: -1320.6 mL        Physical Exam:    Daily     Daily   General:  Well appearing, NAD, engaging more today but remains confused  HEENT:  Nonicteric, PERRLA  CV:  RRR, no murmur, no JVD  Lungs:  CTA B/L, no wheezes, rales, rhonchi  Abdomen:  Soft, non-tender, no distended, positive BS  Extremities:  2+ pulses, no c/c, no edema  Skin:  Warm and dry, no rashes  :  No feliz  Neuro:  AOx2 (partially to date), moves all 4 extremities  No Restraints    LABS:                        13.3   4.67  )-----------( 262      ( 29 Jan 2021 05:56 )             41.9     01-29    136  |  99  |  13  ----------------------------<  93  4.5   |  27  |  1.08    Ca    10.0      29 Jan 2021 05:56  Phos  3.8     01-29  Mg     2.2     01-29      PT/INR - ( 29 Jan 2021 05:56 )   PT: 14.2 sec;   INR: 1.19          PTT - ( 29 Jan 2021 06:57 )  PTT:60.8 sec        RADIOLOGY & ADDITIONAL TESTS:  Reviewed by me

## 2021-01-29 NOTE — PROGRESS NOTE ADULT - PROBLEM SELECTOR PLAN 8
F: none  E: Replete PRN  N: dysphagia diet   DVT ppx: argotraban gtt  GI ppx: Protonix 40mg PO  CODE: FULL F: none  E: Replete PRN  N: dysphagia diet   DVT ppx: Eliquis 5 BID  GI ppx: Protonix 40mg PO  CODE: FULL

## 2021-01-29 NOTE — PROGRESS NOTE ADULT - ASSESSMENT
63-year-old male with a past medical history of HIT, ETOH abuse, paroxysmal A-fib with an LV thrombus, HLD, CAD status post PCI, HFrEF (35-40% 2019) status post AICD, hemorrhoids, gastritis, and a recent admission in August for alcohol withdrawal who presented with signs and symptoms of alcohol withdrawal. Found to have multiple infarctions involving L thalamus, L posterior cerebral artery - transferred to [facility] for more frequent monitoring and hemorrhagic conversion surveillance.

## 2021-01-29 NOTE — PROGRESS NOTE ADULT - PROBLEM SELECTOR PLAN 3
History of LV thrombus with no thrombus on most recent ECHO during this admission. Life-long AC required. Takes eliquis 5mg BID at home  - argatroban gtt as patient unable to tolerate po. Will bridge to Coumadin once patient can tolerate PO. Per heme/onc - no eliquis (higher incidence of HIT with this)  -  Echo 1/20: Moderately-to-severely reduced left ventricular systolic function. The ejection fraction is 30%. Entire anterior septum, mid inferolateral segment, apical inferior segment, and apex are akinetic. Remaining segments are hypokinetic. LV thrombus seen  -Can consider ECHO again if needed  -F/u IVANNA and anti-PFA antibody for history of HIT History of LV thrombus with no thrombus on most recent ECHO during this admission. Life-long AC required. Takes Eliquis 5mg BID at home  - Switched argatroban gtt to Eliquis 5 BID  - Echo 1/20: Moderately-to-severely reduced left ventricular systolic function. The ejection fraction is 30%. Entire anterior septum, mid inferolateral segment, apical inferior segment, and apex are akinetic. Remaining segments are hypokinetic. LV thrombus seen  -Repeat TTE 1/28: thrombus in the apex of LV   -F/u IVANNA  -PF4-AB is negative

## 2021-01-29 NOTE — PROGRESS NOTE ADULT - ASSESSMENT
65 year old male with,    # Multiple recent B/L infarcts/CVA: care per Stroke. Unclear etiology. Prior TTE shows known LV thrombus, repeated TTE from yesterday --> large mobile echodensity noted at the apex-thrombus. Regional wall motion abnormalities, EF 30-35%. VEEG reading-no active epilepsy. C/w statin, transitioned back to eliquis today. PT/OT -- > ZEINAB Vs HPT.    # Aspiration pneumonia: c/w IV ceftriaxone total of 7 days, last day is today.    # Metabolic encephalopathy: waxing and waning, likely multifactorial, alcohol withdrawal/CVA/pneumonia related. VEEG without e/o epilepsy.    # History of HIT: transitioned to eliquis today. Apprec Hem recs. PF4 is negative. Thrombocytopenia has resolved.    # CAD/Chronic systolic CHF: not in acute exacerbation. Cards f/up noted. C/w statin.    # Paroxysmal atrial fibrillation: eliquis resumed today as above.    # Alcohol withdrawal: has resolved. Alcohol dependence-etoh cessation encouraged.    D/w Stroke Team, will continue to follow up with you.  Planned for discharge home tomorrow with [facility] (patient lives with son).       65 year old male with,    # Multiple recent B/L infarcts/CVA: care per Stroke. Current symptoms of blurry vision explained by location of CVA affecting posterior circulation. Unclear etiology. Prior TTE shows known LV thrombus, repeated TTE from yesterday --> large mobile echodensity noted at the apex-thrombus. Regional wall motion abnormalities, EF 30-35%. VEEG reading-no active epilepsy. C/w statin, transitioned back to eliquis today. PT/OT -- > ZEINAB Vs HPT.    # Aspiration pneumonia: c/w IV ceftriaxone total of 7 days, last day is today.    # Metabolic encephalopathy: waxing and waning, likely multifactorial, alcohol withdrawal/CVA/pneumonia related. VEEG without e/o epilepsy.    # History of HIT: transitioned to eliquis today. Apprec Hem recs. PF4 is negative. Thrombocytopenia has resolved.    # CAD/Chronic systolic CHF: not in acute exacerbation. Cards f/up noted. C/w statin.    # Paroxysmal atrial fibrillation: eliquis resumed today as above.    # Alcohol withdrawal: has resolved. Alcohol dependence-etoh cessation encouraged.    D/w Stroke Team, will continue to follow up with you.  Planned for discharge home tomorrow with [facility] (patient lives with son).

## 2021-01-29 NOTE — PROGRESS NOTE ADULT - PROBLEM SELECTOR PLAN 1
Pt with CTA performed o/n which showed small subacute left basal ganglia/thalamic infarction. Pt went for MRI 1/26 which showed recent infarctions in the posterior circulation, predominantly left sided in the thalamus, midbrain, cerebellum, mesial temporal lobe and additionally at small site of left occipital cortex. On the right, a smaller thalamic infarct is recent as well.   - Source of stroke may have been multiple transient hypercoagulable states while on argatroban drip  - c/w argotraban gtt at this time (goal 53.25-99) - can switch to oral coumadin when patient is able to tolerate   - q24H PTT checks Pt with CTA performed o/n which showed small subacute left basal ganglia/thalamic infarction. Pt went for MRI 1/26 which showed recent infarctions in the posterior circulation, predominantly left sided in the thalamus, midbrain, cerebellum, mesial temporal lobe and additionally at small site of left occipital cortex. On the right, a smaller thalamic infarct is recent as well.   - Source of stroke may have been multiple transient hypercoagulable states while on argatroban drip  - switch to Eliquis 5 BID

## 2021-01-29 NOTE — PROCEDURE NOTE - NSICDXPROCEDURE_GEN_ALL_CORE_FT
PROCEDURES:  Collection of venous blood by venipuncture 29-Jan-2021 05:46:30  Dee Louis  Ultrasound guided venous access 29-Jan-2021 05:46:10  Dee Louis  Insertion, needle, vein 29-Jan-2021 05:46:01  Dee Louis

## 2021-01-29 NOTE — PROGRESS NOTE ADULT - SUBJECTIVE AND OBJECTIVE BOX
Pt  is s/p Multiple infarcts  as perviously described   LV Thrombus   Cardiomyopathy    Pt remains  confused   with visual disturbance   Doesnt  call me by my name       PAST MEDICAL & SURGICAL HISTORY:  CAD (coronary artery disease)    ETOH abuse    Hyperlipidemia    Heparin-induced thrombocytopenia  Antibody positive    Left ventricular thrombus    Atrial fibrillation    Hemorrhoid    ICD (implantable cardioverter-defibrillator) in place    Myocardial infarction involving other coronary artery    Gastritis    Atherosclerosis of coronary artery  Coronary artery disease    S/P coronary artery stent placement    Automatic implantable cardiac defibrillator in situ  ICD (implantable cardioverter-defibrillator) in place        ICU Vital Signs Last 24 Hrs  T(C): 36 (29 Jan 2021 05:35), Max: 37.1 (28 Jan 2021 21:32)  T(F): 96.8 (29 Jan 2021 05:35), Max: 98.7 (28 Jan 2021 21:32)  HR: 72 (29 Jan 2021 08:00) (72 - 96)  BP: 101/71 (29 Jan 2021 08:00) (99/55 - 110/83)  BP(mean): 81 (29 Jan 2021 08:00) (71 - 89)  ABP: --  ABP(mean): --  RR: 18 (29 Jan 2021 08:00) (18 - 18)  SpO2: 98% (29 Jan 2021 03:56) (95% - 99%)      Lungs clear  CV   s1s2     abd soft  Ex t  stable                          13.3   4.67  )-----------( 262      ( 29 Jan 2021 05:56 )             41.9   01-29    136  |  99  |  13  ----------------------------<  93  4.5   |  27  |  1.08    Ca    10.0      29 Jan 2021 05:56  Phos  3.8     01-29  Mg     2.2     01-29        PT/INR - ( 29 Jan 2021 05:56 )   PT: 14.2 sec;   INR: 1.19          PTT - ( 29 Jan 2021 06:57 )  PTT:60.8 sec    MEDICATIONS  (STANDING):  apixaban 5 milliGRAM(s) Oral every 12 hours  artificial  tears Solution 1 Drop(s) Both EYES daily  atorvastatin 80 milliGRAM(s) Oral at bedtime  cefTRIAXone   IVPB 2000 milliGRAM(s) IV Intermittent every 24 hours  pantoprazole    Tablet 40 milliGRAM(s) Oral every 24 hours    MEDICATIONS  (PRN):

## 2021-01-29 NOTE — PROVIDER CONTACT NOTE (CHANGE IN STATUS NOTIFICATION) - ACTION/TREATMENT ORDERED:
EKG done, narcan given x3. Patient on monitor. Stroke code called. Pt brought to CT.
IV Tylenol for fever because of risk for aspiration.
MD contacted ultrasound to place IV.  Argatroban currently held r/t no IV acecss.  MD made aware.  Medication will b restarted once access is established.

## 2021-01-30 LAB
ANION GAP SERPL CALC-SCNC: 11 MMOL/L — SIGNIFICANT CHANGE UP (ref 5–17)
APTT BLD: 38.1 SEC — HIGH (ref 27.5–35.5)
BUN SERPL-MCNC: 16 MG/DL — SIGNIFICANT CHANGE UP (ref 7–23)
CALCIUM SERPL-MCNC: 9 MG/DL — SIGNIFICANT CHANGE UP (ref 8.4–10.5)
CHLORIDE SERPL-SCNC: 101 MMOL/L — SIGNIFICANT CHANGE UP (ref 96–108)
CO2 SERPL-SCNC: 25 MMOL/L — SIGNIFICANT CHANGE UP (ref 22–31)
CREAT SERPL-MCNC: 1.19 MG/DL — SIGNIFICANT CHANGE UP (ref 0.5–1.3)
GLUCOSE SERPL-MCNC: 100 MG/DL — HIGH (ref 70–99)
HCT VFR BLD CALC: 40.2 % — SIGNIFICANT CHANGE UP (ref 39–50)
HGB BLD-MCNC: 12.5 G/DL — LOW (ref 13–17)
INR BLD: 1.15 — SIGNIFICANT CHANGE UP (ref 0.88–1.16)
MAGNESIUM SERPL-MCNC: 1.8 MG/DL — SIGNIFICANT CHANGE UP (ref 1.6–2.6)
MCHC RBC-ENTMCNC: 27.3 PG — SIGNIFICANT CHANGE UP (ref 27–34)
MCHC RBC-ENTMCNC: 31.1 GM/DL — LOW (ref 32–36)
MCV RBC AUTO: 87.8 FL — SIGNIFICANT CHANGE UP (ref 80–100)
NRBC # BLD: 0 /100 WBCS — SIGNIFICANT CHANGE UP (ref 0–0)
PHOSPHATE SERPL-MCNC: 3.9 MG/DL — SIGNIFICANT CHANGE UP (ref 2.5–4.5)
PLATELET # BLD AUTO: 291 K/UL — SIGNIFICANT CHANGE UP (ref 150–400)
POTASSIUM SERPL-MCNC: 4.3 MMOL/L — SIGNIFICANT CHANGE UP (ref 3.5–5.3)
POTASSIUM SERPL-SCNC: 4.3 MMOL/L — SIGNIFICANT CHANGE UP (ref 3.5–5.3)
PROTHROM AB SERPL-ACNC: 13.7 SEC — HIGH (ref 10.6–13.6)
RBC # BLD: 4.58 M/UL — SIGNIFICANT CHANGE UP (ref 4.2–5.8)
RBC # FLD: 15.8 % — HIGH (ref 10.3–14.5)
SODIUM SERPL-SCNC: 137 MMOL/L — SIGNIFICANT CHANGE UP (ref 135–145)
WBC # BLD: 3.85 K/UL — SIGNIFICANT CHANGE UP (ref 3.8–10.5)
WBC # FLD AUTO: 3.85 K/UL — SIGNIFICANT CHANGE UP (ref 3.8–10.5)

## 2021-01-30 PROCEDURE — 99233 SBSQ HOSP IP/OBS HIGH 50: CPT

## 2021-01-30 PROCEDURE — 99233 SBSQ HOSP IP/OBS HIGH 50: CPT | Mod: GC

## 2021-01-30 RX ORDER — ASPIRIN/CALCIUM CARB/MAGNESIUM 324 MG
1 TABLET ORAL
Qty: 0 | Refills: 0 | DISCHARGE

## 2021-01-30 RX ORDER — MAGNESIUM SULFATE 500 MG/ML
1 VIAL (ML) INJECTION ONCE
Refills: 0 | Status: COMPLETED | OUTPATIENT
Start: 2021-01-30 | End: 2021-01-30

## 2021-01-30 RX ORDER — DONEPEZIL HYDROCHLORIDE 10 MG/1
5 TABLET, FILM COATED ORAL AT BEDTIME
Refills: 0 | Status: DISCONTINUED | OUTPATIENT
Start: 2021-01-30 | End: 2021-02-03

## 2021-01-30 RX ADMIN — DONEPEZIL HYDROCHLORIDE 5 MILLIGRAM(S): 10 TABLET, FILM COATED ORAL at 21:29

## 2021-01-30 RX ADMIN — ATORVASTATIN CALCIUM 80 MILLIGRAM(S): 80 TABLET, FILM COATED ORAL at 21:29

## 2021-01-30 RX ADMIN — PANTOPRAZOLE SODIUM 40 MILLIGRAM(S): 20 TABLET, DELAYED RELEASE ORAL at 13:22

## 2021-01-30 RX ADMIN — Medication 100 GRAM(S): at 07:22

## 2021-01-30 RX ADMIN — APIXABAN 5 MILLIGRAM(S): 2.5 TABLET, FILM COATED ORAL at 17:07

## 2021-01-30 RX ADMIN — APIXABAN 5 MILLIGRAM(S): 2.5 TABLET, FILM COATED ORAL at 05:56

## 2021-01-30 NOTE — PROGRESS NOTE ADULT - PROBLEM SELECTOR PLAN 4
History of A-fib on Eliquis and Toprol XL. CHADS-VASC 4, HAS-BLED 3.  -Switched argatroban gtt to Eliquis 5 BID

## 2021-01-30 NOTE — PROGRESS NOTE ADULT - SUBJECTIVE AND OBJECTIVE BOX
OVERNIGHT EVENTS:    SUBJECTIVE / INTERVAL HPI: Patient seen and examined at bedside. Hospital course reviewed. Was initially planned for d/c home today but given family situation and waxing and waning mental status he now slated for possible ZEINAB.     VITAL SIGNS:  Vital Signs Last 24 Hrs  T(C): 36.4 (30 Jan 2021 06:42), Max: 36.7 (29 Jan 2021 14:04)  T(F): 97.6 (30 Jan 2021 06:42), Max: 98 (29 Jan 2021 14:04)  HR: 96 (30 Jan 2021 08:16) (80 - 104)  BP: 114/79 (30 Jan 2021 08:16) (90/61 - 120/79)  BP(mean): 92 (30 Jan 2021 08:16) (70 - 96)  RR: 18 (30 Jan 2021 08:16) (16 - 18)  SpO2: 97% (30 Jan 2021 08:16) (95% - 97%)    PHYSICAL EXAM:    General: drowsy, dysarthric, aphasic  HEENT: NC/AT;  Neck: supple  Cardiovascular: +S1/S2; RRR  Respiratory: CTA b/l; no W/R/R  Gastrointestinal: soft, NT/ND; +BSx4  Extremities: WWP;  Neuro: R arm weakness    MEDICATIONS:  MEDICATIONS  (STANDING):  apixaban 5 milliGRAM(s) Oral every 12 hours  artificial  tears Solution 1 Drop(s) Both EYES daily  atorvastatin 80 milliGRAM(s) Oral at bedtime  donepezil 5 milliGRAM(s) Oral at bedtime  pantoprazole    Tablet 40 milliGRAM(s) Oral every 24 hours    MEDICATIONS  (PRN):      ALLERGIES:  Allergies    Capoten (Short breath; Rash; Hives)  heparin (Other (Mod to Severe))  penicillin (Short breath; Rash; Hives)    Intolerances        LABS:                        12.5   3.85  )-----------( 291      ( 30 Jan 2021 06:00 )             40.2     01-30    137  |  101  |  16  ----------------------------<  100<H>  4.3   |  25  |  1.19    Ca    9.0      30 Jan 2021 06:00  Phos  3.9     01-30  Mg     1.8     01-30      PT/INR - ( 30 Jan 2021 06:00 )   PT: 13.7 sec;   INR: 1.15          PTT - ( 30 Jan 2021 06:00 )  PTT:38.1 sec    CAPILLARY BLOOD GLUCOSE          RADIOLOGY & ADDITIONAL TESTS: Reviewed.    ASSESSMENT:    PLAN:

## 2021-01-30 NOTE — PROGRESS NOTE ADULT - PROBLEM SELECTOR PLAN 8
F: none  E: Replete PRN  N: dysphagia diet   DVT ppx: Eliquis 5 BID  GI ppx: Protonix 40mg PO  CODE: FULL

## 2021-01-30 NOTE — PROGRESS NOTE ADULT - ASSESSMENT
65 year old male with,    # Multiple recent B/L infarcts/CVA: care per Stroke. Current symptoms of blurry vision explained by location of CVA affecting posterior circulation. etiology 2/2 LV thrombus, C/w statin, c/w Eliquis  PT/OT -- > ZEINAB     # Aspiration pneumonia: s/p ceftriaxone     # Metabolic encephalopathy: waxing and waning, likely multifactorial, alcohol withdrawal/CVA/pneumonia related. VEEG without e/o epilepsy.    # History of HIT: transitioned to eliquis today. Apprec Hem recs. PF4 is negative. Thrombocytopenia has resolved.    # CAD/Chronic systolic CHF: not in acute exacerbation. Cards f/up noted. C/w statin.    # Paroxysmal atrial fibrillation: eliquis, can restart metoprolol for rate control    # Alcohol withdrawal: has resolved. Alcohol dependence-etoh cessation encouraged.

## 2021-01-30 NOTE — PROGRESS NOTE ADULT - SUBJECTIVE AND OBJECTIVE BOX
OVERNIGHT EVENTS: ÓSCAR    SUBJECTIVE / INTERVAL HPI: Patient seen and examined at bedside. Patient was more awake and alert today. Was trying to take off hospital gown and change into his home clothes because he wanted to leave, but was re-directable. He denies any chest pain or shortness of breath, headache. He was able to tell his name, birthday and location.     VITAL SIGNS:  Vital Signs Last 24 Hrs  T(C): 36.6 (30 Jan 2021 14:34), Max: 36.6 (29 Jan 2021 21:34)  T(F): 97.8 (30 Jan 2021 14:34), Max: 97.8 (29 Jan 2021 21:34)  HR: 74 (30 Jan 2021 12:16) (74 - 98)  BP: 103/65 (30 Jan 2021 12:16) (90/61 - 114/79)  BP(mean): 78 (30 Jan 2021 12:16) (70 - 92)  RR: 17 (30 Jan 2021 12:16) (16 - 18)  SpO2: 97% (30 Jan 2021 12:16) (95% - 97%)    01-30-21 @ 07:01  -  01-30-21 @ 17:01  --------------------------------------------------------  IN: 0 mL / OUT: 300 mL / NET: -300 mL    PHYSICAL EXAM:    Constitutional: WDWN resting comfortably in bed; NAD; eyes open and conversational  Head: NC/AT; able to perform head-chin without pain or restriction  Eyes: pupils constricted but reactive BL, EOMI; anicteric sclera  ENT: no nasal discharge; MMM; poor dentition  Neck: supple; no JVD  Respiratory: CTA B/L; no W/R/R  Cardiac: +S1/S2; RRR; no M/R/G  Gastrointestinal: soft, NT/ND; no rebound or guarding; +BSx4  Extremities: WWP, no clubbing or cyanosis; no peripheral edema  Neurologic:  -Mental status: alert and awake. conversational. remote memory intact. AOx1 (self)  -Cranial nerves:   II: visual fields are full to confrontation.  III, IV, VI: EOMI without nystagmus, some difficulty with upward gaze. Pupils equally constricted with sluggish response to light  V:  Facial sensation V1-V3 equal and intact   VII: Face is symmetric with normal eye closure and smile  VIII: Hearing is bilaterally intact  IX, X: Uvula is midline and soft palate rises symmetrically  XI: Head turning and shoulder shrug are intact.  XII: Tongue protrudes midline  Motor: Moving all extremities spontaneously, normal bulk and tone. Normal muscle strength 5/5 L shoulders flexion/extension/abduction and elbow flexion/extension, and handgrip. 4/5 in all movement of R side. Pronator drift neg BL UE, mm strength 5/5 L hip flexion/extension/abduction/adduction, 4/5 R hip/knee flexion/extension (but improved), 5/5 BL dorsiflexion/plantarflexion. Poor fine motor movement of the RUE, however improving.   Sensation: Intact to light touch bilaterally. Proprioception intact bilaterally.   Reflexes: Downgoing toes bilaterally; 2+ DTRs BL patellar/achilles      MEDICATIONS:  MEDICATIONS  (STANDING):  apixaban 5 milliGRAM(s) Oral every 12 hours  artificial  tears Solution 1 Drop(s) Both EYES daily  atorvastatin 80 milliGRAM(s) Oral at bedtime  donepezil 5 milliGRAM(s) Oral at bedtime  pantoprazole    Tablet 40 milliGRAM(s) Oral every 24 hours    MEDICATIONS  (PRN):      ALLERGIES:  Allergies    Capoten (Short breath; Rash; Hives)  heparin (Other (Mod to Severe))  penicillin (Short breath; Rash; Hives)    Intolerances        LABS:                        12.5   3.85  )-----------( 291      ( 30 Jan 2021 06:00 )             40.2     01-30    137  |  101  |  16  ----------------------------<  100<H>  4.3   |  25  |  1.19    Ca    9.0      30 Jan 2021 06:00  Phos  3.9     01-30  Mg     1.8     01-30      PT/INR - ( 30 Jan 2021 06:00 )   PT: 13.7 sec;   INR: 1.15          PTT - ( 30 Jan 2021 06:00 )  PTT:38.1 sec    CAPILLARY BLOOD GLUCOSE            RADIOLOGY & ADDITIONAL TESTS: Reviewed.

## 2021-01-30 NOTE — PROGRESS NOTE ADULT - SUBJECTIVE AND OBJECTIVE BOX
Pt is in Bed   lethargic   significantly impaired after sustaining multiple   cerebral infarctions as previously described     PAST MEDICAL & SURGICAL HISTORY:  CAD (coronary artery disease)    ETOH abuse    Hyperlipidemia    Heparin-induced thrombocytopenia  Antibody positive    Left ventricular thrombus    Atrial fibrillation    Hemorrhoid    ICD (implantable cardioverter-defibrillator) in place    Myocardial infarction involving other coronary artery    Gastritis    Atherosclerosis of coronary artery  Coronary artery disease    S/P coronary artery stent placement    Automatic implantable cardiac defibrillator in situ  ICD (implantable cardioverter-defibrillator) in place    ICU Vital Signs Last 24 Hrs  T(C): 36.4 (30 Jan 2021 06:42), Max: 36.7 (29 Jan 2021 14:04)  T(F): 97.6 (30 Jan 2021 06:42), Max: 98 (29 Jan 2021 14:04)  HR: 96 (30 Jan 2021 08:16) (76 - 104)  BP: 114/79 (30 Jan 2021 08:16) (90/61 - 129/79)  BP(mean): 92 (30 Jan 2021 08:16) (70 - 96)  ABP: --  ABP(mean): --  RR: 18 (30 Jan 2021 08:16) (16 - 18)  SpO2: 97% (30 Jan 2021 08:16) (95% - 97%)      MEDICATIONS  (STANDING):  apixaban 5 milliGRAM(s) Oral every 12 hours  artificial  tears Solution 1 Drop(s) Both EYES daily  atorvastatin 80 milliGRAM(s) Oral at bedtime  pantoprazole    Tablet 40 milliGRAM(s) Oral every 24 hours    MEDICATIONS  (PRN):      Lungs decreased breath sounds at bases  CV  s1s2     abd soft  Ext  stable                          12.5   3.85  )-----------( 291      ( 30 Jan 2021 06:00 )             40.2   01-30    137  |  101  |  16  ----------------------------<  100<H>  4.3   |  25  |  1.19    Ca    9.0      30 Jan 2021 06:00  Phos  3.9     01-30  Mg     1.8     01-30    PT/INR - ( 30 Jan 2021 06:00 )   PT: 13.7 sec;   INR: 1.15          PTT - ( 30 Jan 2021 06:00 )  PTT:38.1 sec    ECHO EF  30 %  moderatedly reduced LVF   apical thrombus    apical akinesis as noted     MRI  thalamic  midbrain cerebellum temporal and occipital infarctions           CT Head  subacute thalamic infarction

## 2021-01-30 NOTE — PROGRESS NOTE ADULT - ASSESSMENT
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

## 2021-01-30 NOTE — PROGRESS NOTE ADULT - PROBLEM SELECTOR PLAN 2
Secondary to unknown etiology - most likely related to his multiple strokes   -stroke code called 1/20 AM when lethargy first observed - vEEG on 1/21 did not show any seizure activity. abg wnl, utox wnl  -Imaging from 1/25 and 1/26 shows evidence of multiple strokes (brainstem and bilateral thalamic strokes)  -As of 1/27, patient has fluctuating mental status - EEG started briefly, but was normal  -Started Donepezil 5mg nightly to improve cognition

## 2021-01-30 NOTE — PROGRESS NOTE ADULT - ASSESSMENT
63-year-old male with a past medical history of HIT, ETOH abuse, paroxysmal A-fib with an LV thrombus, HLD, CAD status post PCI, HFrEF (35-40% 2019) status post AICD, hemorrhoids, gastritis, and a recent admission in August for alcohol withdrawal who presented with signs and symptoms of alcohol withdrawal. Found to have multiple infarctions involving L thalamus, L posterior cerebral artery - transferred to [facility] for more frequent monitoring and hemorrhagic conversion surveillance. Now pending ZEINAB placement.

## 2021-01-30 NOTE — PROGRESS NOTE ADULT - PROBLEM SELECTOR PLAN 1
Pt with CTA performed o/n which showed small subacute left basal ganglia/thalamic infarction. Pt went for MRI 1/26 which showed recent infarctions in the posterior circulation, predominantly left sided in the thalamus, midbrain, cerebellum, mesial temporal lobe and additionally at small site of left occipital cortex. On the right, a smaller thalamic infarct is recent as well.   - Source of stroke may have been multiple transient hypercoagulable states while on argatroban drip  - switch to Eliquis 5 BID    #Poor Functional Status   Patient noted to have trouble with ADLs - has residual RUE>RLE weakness and difficulty with fine motor movements. Is also intermittently confused - has waxing/waning mental status with intermittent episodes of trying to leave hospital. Have also walked with patient - is extremely unsteady on his feet.   -1st PT eval - home with no needs. 2nd eval - home with home PT  -PT will come re-evaluate patient   -Patient will greatly benefit from ZEINAB. Per discussion with brother and son - patient lives at home with his son and granddaughter (8 years old). Son works night shift and takes care of his daughter so will not be able to take care of patient. Brother will also not be able to take care of patient - has no family assist at this time.  -F/u PT re-evaluation

## 2021-01-30 NOTE — PROGRESS NOTE ADULT - PROBLEM SELECTOR PLAN 3
History of LV thrombus with no thrombus on most recent ECHO during this admission. Life-long AC required. Takes Eliquis 5mg BID at home  - Switched argatroban gtt to Eliquis 5 BID  - Echo 1/20: Moderately-to-severely reduced left ventricular systolic function. The ejection fraction is 30%. Entire anterior septum, mid inferolateral segment, apical inferior segment, and apex are akinetic. Remaining segments are hypokinetic. LV thrombus seen  -Repeat TTE 1/28: thrombus in the apex of LV   -PF4-AB and IVANNA are negative

## 2021-01-31 ENCOUNTER — FORM ENCOUNTER (OUTPATIENT)
Age: 66
End: 2021-01-31

## 2021-01-31 LAB
ANION GAP SERPL CALC-SCNC: 14 MMOL/L — SIGNIFICANT CHANGE UP (ref 5–17)
APTT BLD: 36.9 SEC — HIGH (ref 27.5–35.5)
BUN SERPL-MCNC: 13 MG/DL — SIGNIFICANT CHANGE UP (ref 7–23)
CALCIUM SERPL-MCNC: 9.5 MG/DL — SIGNIFICANT CHANGE UP (ref 8.4–10.5)
CHLORIDE SERPL-SCNC: 102 MMOL/L — SIGNIFICANT CHANGE UP (ref 96–108)
CO2 SERPL-SCNC: 23 MMOL/L — SIGNIFICANT CHANGE UP (ref 22–31)
CREAT SERPL-MCNC: 1.22 MG/DL — SIGNIFICANT CHANGE UP (ref 0.5–1.3)
GLUCOSE SERPL-MCNC: 103 MG/DL — HIGH (ref 70–99)
HCT VFR BLD CALC: 39.5 % — SIGNIFICANT CHANGE UP (ref 39–50)
HGB BLD-MCNC: 12.5 G/DL — LOW (ref 13–17)
INR BLD: 1.19 — HIGH (ref 0.88–1.16)
MAGNESIUM SERPL-MCNC: 1.8 MG/DL — SIGNIFICANT CHANGE UP (ref 1.6–2.6)
MCHC RBC-ENTMCNC: 27.7 PG — SIGNIFICANT CHANGE UP (ref 27–34)
MCHC RBC-ENTMCNC: 31.6 GM/DL — LOW (ref 32–36)
MCV RBC AUTO: 87.4 FL — SIGNIFICANT CHANGE UP (ref 80–100)
NRBC # BLD: 0 /100 WBCS — SIGNIFICANT CHANGE UP (ref 0–0)
PHOSPHATE SERPL-MCNC: 4 MG/DL — SIGNIFICANT CHANGE UP (ref 2.5–4.5)
PLATELET # BLD AUTO: 297 K/UL — SIGNIFICANT CHANGE UP (ref 150–400)
POTASSIUM SERPL-MCNC: 4.2 MMOL/L — SIGNIFICANT CHANGE UP (ref 3.5–5.3)
POTASSIUM SERPL-SCNC: 4.2 MMOL/L — SIGNIFICANT CHANGE UP (ref 3.5–5.3)
PROTHROM AB SERPL-ACNC: 14.2 SEC — HIGH (ref 10.6–13.6)
RBC # BLD: 4.52 M/UL — SIGNIFICANT CHANGE UP (ref 4.2–5.8)
RBC # FLD: 15.7 % — HIGH (ref 10.3–14.5)
SODIUM SERPL-SCNC: 139 MMOL/L — SIGNIFICANT CHANGE UP (ref 135–145)
WBC # BLD: 3.88 K/UL — SIGNIFICANT CHANGE UP (ref 3.8–10.5)
WBC # FLD AUTO: 3.88 K/UL — SIGNIFICANT CHANGE UP (ref 3.8–10.5)

## 2021-01-31 PROCEDURE — 99233 SBSQ HOSP IP/OBS HIGH 50: CPT | Mod: GC

## 2021-01-31 RX ORDER — METOPROLOL TARTRATE 50 MG
100 TABLET ORAL
Refills: 0 | Status: DISCONTINUED | OUTPATIENT
Start: 2021-01-31 | End: 2021-02-03

## 2021-01-31 RX ORDER — MAGNESIUM SULFATE 500 MG/ML
2 VIAL (ML) INJECTION ONCE
Refills: 0 | Status: COMPLETED | OUTPATIENT
Start: 2021-01-31 | End: 2021-01-31

## 2021-01-31 RX ADMIN — DONEPEZIL HYDROCHLORIDE 5 MILLIGRAM(S): 10 TABLET, FILM COATED ORAL at 21:50

## 2021-01-31 RX ADMIN — APIXABAN 5 MILLIGRAM(S): 2.5 TABLET, FILM COATED ORAL at 05:16

## 2021-01-31 RX ADMIN — Medication 50 GRAM(S): at 12:09

## 2021-01-31 RX ADMIN — PANTOPRAZOLE SODIUM 40 MILLIGRAM(S): 20 TABLET, DELAYED RELEASE ORAL at 12:09

## 2021-01-31 RX ADMIN — Medication 100 MILLIGRAM(S): at 17:33

## 2021-01-31 RX ADMIN — APIXABAN 5 MILLIGRAM(S): 2.5 TABLET, FILM COATED ORAL at 17:33

## 2021-01-31 RX ADMIN — Medication 1 DROP(S): at 12:09

## 2021-01-31 RX ADMIN — ATORVASTATIN CALCIUM 80 MILLIGRAM(S): 80 TABLET, FILM COATED ORAL at 21:50

## 2021-01-31 NOTE — PROGRESS NOTE ADULT - PROBLEM SELECTOR PLAN 5
HFrEF (30-35% 1/2020) status post AICD on Toprol, Lipitor, no ACE secondary to allergy. History of CAD with MI in 1996.   -can restart Toprol as needed   -restarted lipitor   -No ACE/ARB due to allergy, no antiplatelets due to GIB history HFrEF (30-35% 1/2020) status post AICD on Toprol, Lipitor, no ACE secondary to allergy. History of CAD with MI in 1996.   -1/31 restarted beta blocker- lopressor 100mg BID  -restarted lipitor   -No ACE/ARB due to allergy, no antiplatelets due to GIB history

## 2021-01-31 NOTE — PROGRESS NOTE ADULT - PROBLEM SELECTOR PLAN 4
History of A-fib on Eliquis and Toprol XL. CHADS-VASC 4, HAS-BLED 3.  -Switched argatroban gtt to Eliquis 5 BID History of A-fib on Eliquis and Toprol XL. CHADS-VASC 4, HAS-BLED 3.  -Switched argatroban gtt to Eliquis 5 BID; 1/31 restarted lopressor 100mg BID

## 2021-01-31 NOTE — PROGRESS NOTE ADULT - ASSESSMENT
63-year-old male with a past medical history of HIT, ETOH abuse, paroxysmal A-fib with an LV thrombus, HLD, CAD status post PCI, HFrEF (35-40% 2019) status post AICD, hemorrhoids, gastritis, and a recent admission in August for alcohol withdrawal who presented with signs and symptoms of alcohol withdrawal. Found to have multiple infarctions involving L thalamus, L posterior cerebral artery - transferred to [facility] for more frequent monitoring and hemorrhagic conversion surveillance. Now pending ZEINAB placement.

## 2021-01-31 NOTE — PROGRESS NOTE ADULT - PROBLEM SELECTOR PLAN 1
Pt with CTA performed o/n which showed small subacute left basal ganglia/thalamic infarction. Pt went for MRI 1/26 which showed recent infarctions in the posterior circulation, predominantly left sided in the thalamus, midbrain, cerebellum, mesial temporal lobe and additionally at small site of left occipital cortex. On the right, a smaller thalamic infarct is recent as well.   - Source of stroke may have been multiple transient hypercoagulable states while on argatroban drip  - switched to Eliquis 5 BID 1/29    #Poor Functional Status   Patient noted to have trouble with ADLs - has residual RUE>RLE weakness and difficulty with fine motor movements. Is also intermittently confused - has waxing/waning mental status with intermittent episodes of trying to leave hospital. Have also walked with patient - is extremely unsteady on his feet.   -1st PT eval - home with no needs. 2nd eval - home with home PT  -PT will come re-evaluate patient   -Patient will greatly benefit from ZEINAB. Per discussion with brother and son - patient lives at home with his son and granddaughter (8 years old). Son works night shift and takes care of his daughter so will not be able to take care of patient. Brother will also not be able to take care of patient - has no family assist at this time.  -F/u PT re-evaluation

## 2021-01-31 NOTE — PROGRESS NOTE ADULT - ASSESSMENT
65 year old male with,    # Multiple recent B/L infarcts/CVA: care per Stroke. Current symptoms of blurry vision explained by location of CVA affecting posterior circulation. etiology 2/2 LV thrombus, C/w statin, c/w Eliquis  PT/OT -- > ZEINAB     # Aspiration pneumonia: s/p ceftriaxone     # Metabolic encephalopathy: waxing and waning, likely multifactorial, alcohol withdrawal/CVA/pneumonia related. VEEG without e/o epilepsy.    # History of HIT: transitioned to eliquis today. Apprec Hem recs. PF4 is negative. Thrombocytopenia has resolved.    # CAD/Chronic systolic CHF: not in acute exacerbation. Cards f/up noted. C/w statin.    # Paroxysmal atrial fibrillation: eliquis, can restart metoprolol for rate control    # Alcohol withdrawal: has resolved. Alcohol dependence-etoh cessation encouraged.    For ZEINAB tomorrow.

## 2021-01-31 NOTE — PROGRESS NOTE ADULT - PROBLEM SELECTOR PLAN 8
F: none  E: Replete PRN  N: dysphagia diet   DVT ppx: Eliquis 5 BID  GI ppx: Protonix 40mg PO  CODE: FULL F: none  E: Replete PRN  N: dysphagia diet   DVT ppx: Eliquis 5 BID  GI ppx: Protonix 40mg PO  CODE: FULL  Dispo: ZEINAB when accepted

## 2021-01-31 NOTE — PROGRESS NOTE ADULT - SUBJECTIVE AND OBJECTIVE BOX
IN PROGRESS OVERNIGHT EVENTS: NAEO    SUBJECTIVE / INTERVAL HPI: Patient seen and examined at bedside. This morning, didn't have any complaints, just said he was resting.     VITAL SIGNS:  Vital Signs Last 24 Hrs  T(C): 36.8 (31 Jan 2021 10:00), Max: 36.9 (30 Jan 2021 22:23)  T(F): 98.2 (31 Jan 2021 10:00), Max: 98.5 (30 Jan 2021 22:23)  HR: 68 (31 Jan 2021 08:16) (68 - 95)  BP: 110/64 (31 Jan 2021 08:16) (97/63 - 112/67)  BP(mean): 81 (31 Jan 2021 08:16) (74 - 86)  RR: 18 (31 Jan 2021 08:16) (17 - 18)  SpO2: 96% (31 Jan 2021 08:16) (95% - 98%)    PHYSICAL EXAM:  Constitutional: WDWN resting comfortably in bed; NAD; eyes open and conversational  Head: NC/AT; able to perform head-chin without pain or restriction  Eyes: pupils constricted but reactive BL, EOMI; anicteric sclera  ENT: no nasal discharge; MMM; poor dentition  Neck: supple; no JVD  Respiratory: CTA B/L; no W/R/R  Cardiac: +S1/S2; RRR; no M/R/G  Gastrointestinal: soft, NT/ND; no rebound or guarding; +BSx4  Extremities: WWP, no clubbing or cyanosis; no peripheral edema  Neurologic:  -Mental status: alert and awake. conversational. remote memory intact. AOx1 (self); mumbled an answer for location/year, couldn't discern  -Cranial nerves:   II: visual fields are full to confrontation.  III, IV, VI: EOMI without nystagmus, difficulty with upward gaze. Pupils equally constricted with sluggish response to light  V:  Facial sensation V1-V3 equal and intact   VII: Face is symmetric with normal eye closure and smile  VIII: Hearing is bilaterally intact  IX, X: Uvula is midline and soft palate rises symmetrically  XI: Head turning and shoulder shrug are intact.  XII: Tongue protrudes midline  Motor: Moving all extremities spontaneously, normal bulk and tone. Normal muscle strength 5/5 L shoulders flexion/extension/abduction and elbow flexion/extension, and handgrip. 4/5 in all movement of R side. Pronator drift neg BL UE, mm strength 5/5 L hip flexion/extension/abduction/adduction, 4/5 R hip/knee flexion/extension (but improved), 5/5 BL dorsiflexion/plantarflexion. Poor fine motor movement of the RUE, however improving.   Sensation: Intact to light touch bilaterally. Proprioception intact bilaterally.   Reflexes: Downgoing toes bilaterally; 2+ DTRs BL patellar/achilles    MEDICATIONS:  MEDICATIONS  (STANDING):  apixaban 5 milliGRAM(s) Oral every 12 hours  artificial  tears Solution 1 Drop(s) Both EYES daily  atorvastatin 80 milliGRAM(s) Oral at bedtime  donepezil 5 milliGRAM(s) Oral at bedtime  magnesium sulfate  IVPB 2 Gram(s) IV Intermittent once  pantoprazole    Tablet 40 milliGRAM(s) Oral every 24 hours    MEDICATIONS  (PRN):    ALLERGIES:  Allergies    Capoten (Short breath; Rash; Hives)  heparin (Other (Mod to Severe))  penicillin (Short breath; Rash; Hives)    Intolerances    LABS:                        12.5   3.88  )-----------( 297      ( 31 Jan 2021 06:33 )             39.5     01-31    139  |  102  |  13  ----------------------------<  103<H>  4.2   |  23  |  1.22    Ca    9.5      31 Jan 2021 06:33  Phos  4.0     01-31  Mg     1.8     01-31    PT/INR - ( 31 Jan 2021 06:33 )   PT: 14.2 sec;   INR: 1.19       PTT - ( 31 Jan 2021 06:33 )  PTT:36.9 sec    CAPILLARY BLOOD GLUCOSE    RADIOLOGY & ADDITIONAL TESTS: Reviewed.   OVERNIGHT EVENTS: NAEO    SUBJECTIVE / INTERVAL HPI: Patient seen and examined at bedside. This morning, didn't have any complaints, just said he was resting.     VITAL SIGNS:  Vital Signs Last 24 Hrs  T(C): 36.8 (31 Jan 2021 10:00), Max: 36.9 (30 Jan 2021 22:23)  T(F): 98.2 (31 Jan 2021 10:00), Max: 98.5 (30 Jan 2021 22:23)  HR: 68 (31 Jan 2021 08:16) (68 - 95)  BP: 110/64 (31 Jan 2021 08:16) (97/63 - 112/67)  BP(mean): 81 (31 Jan 2021 08:16) (74 - 86)  RR: 18 (31 Jan 2021 08:16) (17 - 18)  SpO2: 96% (31 Jan 2021 08:16) (95% - 98%)    PHYSICAL EXAM:  Constitutional: WDWN resting comfortably in bed; NAD; eyes open and conversational  Head: NC/AT; able to perform head-chin without pain or restriction  Eyes: pupils constricted but reactive BL, EOMI; anicteric sclera  ENT: no nasal discharge; MMM; poor dentition  Neck: supple; no JVD  Respiratory: CTA B/L; no W/R/R  Cardiac: +S1/S2; RRR; no M/R/G  Gastrointestinal: soft, NT/ND; no rebound or guarding; +BSx4  Extremities: WWP, no clubbing or cyanosis; no peripheral edema  Neurologic:  -Mental status: alert and awake. conversational. remote memory intact. AOx1 (self); mumbled an answer for location/year, couldn't discern answer  -Cranial nerves:   II: visual fields are full to confrontation.  III, IV, VI: EOMI without nystagmus, difficulty with upward gaze. Pupils equally constricted with sluggish response to light  V:  Facial sensation V1-V3 equal and intact   VII: Face is symmetric with normal eye closure and smile  VIII: Hearing is bilaterally intact  IX, X: Uvula is midline and soft palate rises symmetrically  XI: Head turning and shoulder shrug are intact.  XII: Tongue protrudes midline  Motor: Moving all extremities spontaneously, normal bulk and tone. Normal muscle strength 5/5 L shoulders flexion/extension/abduction and elbow flexion/extension, and handgrip. 4/5 in all movement of R side. Pronator drift neg BL UE, mm strength 5/5 L hip flexion/extension/abduction/adduction, 4/5 R hip/knee flexion/extension (but improved), 5/5 BL dorsiflexion/plantarflexion. Poor fine motor movement of the RUE, however improving.   Sensation: Intact to light touch bilaterally. Proprioception intact bilaterally.   Reflexes: Downgoing toes bilaterally; 2+ DTRs BL patellar/achilles    MEDICATIONS:  MEDICATIONS  (STANDING):  apixaban 5 milliGRAM(s) Oral every 12 hours  artificial  tears Solution 1 Drop(s) Both EYES daily  atorvastatin 80 milliGRAM(s) Oral at bedtime  donepezil 5 milliGRAM(s) Oral at bedtime  magnesium sulfate  IVPB 2 Gram(s) IV Intermittent once  pantoprazole    Tablet 40 milliGRAM(s) Oral every 24 hours    MEDICATIONS  (PRN):    ALLERGIES:  Allergies    Capoten (Short breath; Rash; Hives)  heparin (Other (Mod to Severe))  penicillin (Short breath; Rash; Hives)    Intolerances    LABS:                        12.5   3.88  )-----------( 297      ( 31 Jan 2021 06:33 )             39.5     01-31    139  |  102  |  13  ----------------------------<  103<H>  4.2   |  23  |  1.22    Ca    9.5      31 Jan 2021 06:33  Phos  4.0     01-31  Mg     1.8     01-31    PT/INR - ( 31 Jan 2021 06:33 )   PT: 14.2 sec;   INR: 1.19       PTT - ( 31 Jan 2021 06:33 )  PTT:36.9 sec    CAPILLARY BLOOD GLUCOSE    RADIOLOGY & ADDITIONAL TESTS: Reviewed.

## 2021-01-31 NOTE — PROGRESS NOTE ADULT - SUBJECTIVE AND OBJECTIVE BOX
OVERNIGHT EVENTS:    SUBJECTIVE / INTERVAL HPI: Patient seen and examined at bedside. ÓSCAR. Feels okay.     VITAL SIGNS:  Vital Signs Last 24 Hrs  T(C): 36.8 (31 Jan 2021 10:00), Max: 36.9 (30 Jan 2021 22:23)  T(F): 98.2 (31 Jan 2021 10:00), Max: 98.5 (30 Jan 2021 22:23)  HR: 68 (31 Jan 2021 08:16) (68 - 95)  BP: 110/64 (31 Jan 2021 08:16) (97/63 - 112/67)  BP(mean): 81 (31 Jan 2021 08:16) (74 - 86)  RR: 18 (31 Jan 2021 08:16) (18 - 18)  SpO2: 96% (31 Jan 2021 08:16) (95% - 98%)    PHYSICAL EXAM:    General: drowsy, dysarthric, aphasic  HEENT: NC/AT;  Neck: supple  Cardiovascular: +S1/S2; RRR  Respiratory: CTA b/l; no W/R/R  Gastrointestinal: soft, NT/ND; +BSx4  Extremities: WWP;  Neuro: R arm weakness 4/5 strength    MEDICATIONS:  MEDICATIONS  (STANDING):  apixaban 5 milliGRAM(s) Oral every 12 hours  artificial  tears Solution 1 Drop(s) Both EYES daily  atorvastatin 80 milliGRAM(s) Oral at bedtime  donepezil 5 milliGRAM(s) Oral at bedtime  pantoprazole    Tablet 40 milliGRAM(s) Oral every 24 hours    MEDICATIONS  (PRN):      ALLERGIES:  Allergies    Capoten (Short breath; Rash; Hives)  heparin (Other (Mod to Severe))  penicillin (Short breath; Rash; Hives)    Intolerances        LABS:                        12.5   3.88  )-----------( 297      ( 31 Jan 2021 06:33 )             39.5     01-31    139  |  102  |  13  ----------------------------<  103<H>  4.2   |  23  |  1.22    Ca    9.5      31 Jan 2021 06:33  Phos  4.0     01-31  Mg     1.8     01-31      PT/INR - ( 31 Jan 2021 06:33 )   PT: 14.2 sec;   INR: 1.19          PTT - ( 31 Jan 2021 06:33 )  PTT:36.9 sec    CAPILLARY BLOOD GLUCOSE          RADIOLOGY & ADDITIONAL TESTS: Reviewed.    ASSESSMENT:    PLAN:

## 2021-02-01 LAB
ANION GAP SERPL CALC-SCNC: 9 MMOL/L — SIGNIFICANT CHANGE UP (ref 5–17)
BLD GP AB SCN SERPL QL: NEGATIVE — SIGNIFICANT CHANGE UP
BUN SERPL-MCNC: 16 MG/DL — SIGNIFICANT CHANGE UP (ref 7–23)
CALCIUM SERPL-MCNC: 9.7 MG/DL — SIGNIFICANT CHANGE UP (ref 8.4–10.5)
CHLORIDE SERPL-SCNC: 103 MMOL/L — SIGNIFICANT CHANGE UP (ref 96–108)
CO2 SERPL-SCNC: 26 MMOL/L — SIGNIFICANT CHANGE UP (ref 22–31)
CREAT SERPL-MCNC: 1.13 MG/DL — SIGNIFICANT CHANGE UP (ref 0.5–1.3)
GLUCOSE SERPL-MCNC: 93 MG/DL — SIGNIFICANT CHANGE UP (ref 70–99)
HCT VFR BLD CALC: 41.7 % — SIGNIFICANT CHANGE UP (ref 39–50)
HGB BLD-MCNC: 13 G/DL — SIGNIFICANT CHANGE UP (ref 13–17)
MAGNESIUM SERPL-MCNC: 1.9 MG/DL — SIGNIFICANT CHANGE UP (ref 1.6–2.6)
MCHC RBC-ENTMCNC: 27.4 PG — SIGNIFICANT CHANGE UP (ref 27–34)
MCHC RBC-ENTMCNC: 31.2 GM/DL — LOW (ref 32–36)
MCV RBC AUTO: 87.8 FL — SIGNIFICANT CHANGE UP (ref 80–100)
NRBC # BLD: 0 /100 WBCS — SIGNIFICANT CHANGE UP (ref 0–0)
PHOSPHATE SERPL-MCNC: 3.9 MG/DL — SIGNIFICANT CHANGE UP (ref 2.5–4.5)
PLATELET # BLD AUTO: 332 K/UL — SIGNIFICANT CHANGE UP (ref 150–400)
POTASSIUM SERPL-MCNC: 4.8 MMOL/L — SIGNIFICANT CHANGE UP (ref 3.5–5.3)
POTASSIUM SERPL-SCNC: 4.8 MMOL/L — SIGNIFICANT CHANGE UP (ref 3.5–5.3)
RBC # BLD: 4.75 M/UL — SIGNIFICANT CHANGE UP (ref 4.2–5.8)
RBC # FLD: 15.9 % — HIGH (ref 10.3–14.5)
RH IG SCN BLD-IMP: POSITIVE — SIGNIFICANT CHANGE UP
SODIUM SERPL-SCNC: 138 MMOL/L — SIGNIFICANT CHANGE UP (ref 135–145)
WBC # BLD: 4 K/UL — SIGNIFICANT CHANGE UP (ref 3.8–10.5)
WBC # FLD AUTO: 4 K/UL — SIGNIFICANT CHANGE UP (ref 3.8–10.5)

## 2021-02-01 PROCEDURE — 99233 SBSQ HOSP IP/OBS HIGH 50: CPT

## 2021-02-01 RX ORDER — MAGNESIUM SULFATE 500 MG/ML
1 VIAL (ML) INJECTION ONCE
Refills: 0 | Status: COMPLETED | OUTPATIENT
Start: 2021-02-01 | End: 2021-02-01

## 2021-02-01 RX ORDER — KETOROLAC TROMETHAMINE 30 MG/ML
15 SYRINGE (ML) INJECTION ONCE
Refills: 0 | Status: DISCONTINUED | OUTPATIENT
Start: 2021-02-01 | End: 2021-02-01

## 2021-02-01 RX ADMIN — APIXABAN 5 MILLIGRAM(S): 2.5 TABLET, FILM COATED ORAL at 17:54

## 2021-02-01 RX ADMIN — DONEPEZIL HYDROCHLORIDE 5 MILLIGRAM(S): 10 TABLET, FILM COATED ORAL at 21:46

## 2021-02-01 RX ADMIN — ATORVASTATIN CALCIUM 80 MILLIGRAM(S): 80 TABLET, FILM COATED ORAL at 21:46

## 2021-02-01 RX ADMIN — Medication 100 GRAM(S): at 07:52

## 2021-02-01 RX ADMIN — Medication 100 MILLIGRAM(S): at 17:54

## 2021-02-01 RX ADMIN — APIXABAN 5 MILLIGRAM(S): 2.5 TABLET, FILM COATED ORAL at 05:25

## 2021-02-01 RX ADMIN — PANTOPRAZOLE SODIUM 40 MILLIGRAM(S): 20 TABLET, DELAYED RELEASE ORAL at 11:52

## 2021-02-01 RX ADMIN — Medication 1 DROP(S): at 11:52

## 2021-02-01 RX ADMIN — Medication 100 MILLIGRAM(S): at 05:25

## 2021-02-01 NOTE — PROGRESS NOTE ADULT - ASSESSMENT
63-year-old male with a past medical history of HIT, ETOH abuse, paroxysmal A-fib with an LV thrombus, HLD, CAD status post PCI, HFrEF (35-40% 2019) status post AICD, hemorrhoids, gastritis, and a recent admission in August for alcohol withdrawal who presented with signs and symptoms of alcohol withdrawal. Found to have multiple infarctions involving L thalamus, L posterior cerebral artery - transferred to [facility] for more frequent monitoring and hemorrhagic conversion surveillance. Now pending ZEINAB placement.

## 2021-02-01 NOTE — CHART NOTE - NSCHARTNOTEFT_GEN_A_CORE
Goals reviewed at interdisciplinary rounds with case management, social work, physical therapy, occupational therapy, and speech language pathology.    Please see specific therapy  notes for in depth goals.   Highlights of goals are:    Patient will:   Physical therapy  [] remain on bedrest  [] get out of bed to chair [x] ambulate with assistance [] ambulate without assistance  []today       [x] by discharge    Occupational therapy  [] N/a, independent [x] balance training  []go from supine to seated independently [] balance sitting at the edge of the bed to do grooming task []stand at sink to perform grooming task  [] dress self   [] gain strength to feed self  [] other:  []today         [x]by discharge    Speech therapy  [] N/a, no speech deficit [] vocalize [] respond to yes/no questions given cues with 80% accuracy [] name common objects [x] express basic needs/wants  []other:  [x]today        [] by discharge      Swallow therapy  [] tolerate regular diet [] remain NPO, receive oral care to minimize aspirating bacteria mouth bacteria [] tolerate pureed diet  [] tolerate mechanical soft diet  [] tolerate tube feeds [x] tolerate thin liquids [] tolerate nectar thick liquids [] other:  [] with assistance  [] today       [x] by discharge

## 2021-02-01 NOTE — PROGRESS NOTE ADULT - PROBLEM SELECTOR PLAN 8
F: none  E: Replete PRN  N: dysphagia diet   DVT ppx: Eliquis 5 BID  GI ppx: Protonix 40mg PO  CODE: FULL  Dispo: ZEINAB when accepted

## 2021-02-01 NOTE — PROGRESS NOTE ADULT - SUBJECTIVE AND OBJECTIVE BOX
Patient is a 65y old  Male who presents with a chief complaint of EtOH withdrawal (01 Feb 2021 13:51)      INTERVAL HPI/OVERNIGHT EVENTS:  Seen by me this afternoon. Resting comfortably in bed, confused. Still c/o blurry vision. No new neurologic deficits.    Review of Systems: 12 point review of systems otherwise negative    MEDICATIONS  (STANDING):  apixaban 5 milliGRAM(s) Oral every 12 hours  artificial  tears Solution 1 Drop(s) Both EYES daily  atorvastatin 80 milliGRAM(s) Oral at bedtime  donepezil 5 milliGRAM(s) Oral at bedtime  metoprolol tartrate 100 milliGRAM(s) Oral two times a day  pantoprazole    Tablet 40 milliGRAM(s) Oral every 24 hours    MEDICATIONS  (PRN):      Allergies    Capoten (Short breath; Rash; Hives)  heparin (Other (Mod to Severe))  penicillin (Short breath; Rash; Hives)    Intolerances          Vital Signs Last 24 Hrs  T(C): 36.9 (01 Feb 2021 13:20), Max: 36.9 (31 Jan 2021 17:22)  T(F): 98.5 (01 Feb 2021 13:20), Max: 98.5 (01 Feb 2021 13:20)  HR: 78 (01 Feb 2021 13:20) (64 - 80)  BP: 115/71 (01 Feb 2021 13:20) (96/61 - 124/83)  BP(mean): 87 (01 Feb 2021 11:55) (74 - 97)  RR: 18 (01 Feb 2021 13:20) (16 - 19)  SpO2: 97% (01 Feb 2021 13:20) (95% - 99%)  CAPILLARY BLOOD GLUCOSE          01-31 @ 07:01  -  02-01 @ 07:00  --------------------------------------------------------  IN: 200 mL / OUT: 800 mL / NET: -600 mL        Physical Exam:    Daily     Daily   General: Well appearing, NAD, kind of sleepy at time of visit and confused  HEENT:  Nonicteric, PERRLA  CV:  RRR, no murmur, no JVD  Lungs:  CTA B/L, no wheezes, rales, rhonchi  Abdomen:  Soft, non-tender, no distended, positive BS  Extremities:  2+ pulses, no c/c, no edema  Skin:  Warm and dry, no rashes  :  No feliz  Neuro: AOx1, moves all 4 extremities  No Restraints      LABS:                        13.0   4.00  )-----------( 332      ( 01 Feb 2021 06:17 )             41.7     02-01    138  |  103  |  16  ----------------------------<  93  4.8   |  26  |  1.13    Ca    9.7      01 Feb 2021 06:17  Phos  3.9     02-01  Mg     1.9     02-01      PT/INR - ( 31 Jan 2021 06:33 )   PT: 14.2 sec;   INR: 1.19          PTT - ( 31 Jan 2021 06:33 )  PTT:36.9 sec        RADIOLOGY & ADDITIONAL TESTS:  Reviewed by me

## 2021-02-01 NOTE — PROGRESS NOTE ADULT - SUBJECTIVE AND OBJECTIVE BOX
O/N Events: ÓSCAR   Subjective/ROS: Denies HA, CP, SOB, n/v, changes in bowel/urinary habits.  12pt ROS otherwise negative.    VITALS  Vital Signs Last 24 Hrs  T(C): 36.9 (01 Feb 2021 13:20), Max: 36.9 (31 Jan 2021 17:22)  T(F): 98.5 (01 Feb 2021 13:20), Max: 98.5 (01 Feb 2021 13:20)  HR: 78 (01 Feb 2021 13:20) (64 - 80)  BP: 115/71 (01 Feb 2021 13:20) (96/61 - 124/83)  BP(mean): 87 (01 Feb 2021 11:55) (74 - 97)  RR: 18 (01 Feb 2021 13:20) (16 - 19)  SpO2: 97% (01 Feb 2021 13:20) (95% - 99%)    CAPILLARY BLOOD GLUCOSE    Constitutional: WDWN resting comfortably in bed; NAD; eyes open and conversational  Head: NC/AT; able to perform head-chin without pain or restriction  Eyes: pupils constricted but reactive BL, EOMI; anicteric sclera  ENT: no nasal discharge; MMM; poor dentition  Neck: supple; no JVD  Respiratory: CTA B/L; no W/R/R  Cardiac: +S1/S2; RRR; no M/R/G  Gastrointestinal: soft, NT/ND; no rebound or guarding; +BSx4  Extremities: WWP, no clubbing or cyanosis; no peripheral edema  Neurologic:  -Mental status: alert and awake. conversational. Remote memory intact. AOx1-2 (self, time); often mumbles his answer, waxing/waning mental status   -Cranial nerves:   II: visual fields are full to confrontation.  III, IV, VI: EOMI without nystagmus, difficulty with upward gaze. Pupils equally constricted with sluggish response to light  V:  Facial sensation V1-V3 equal and intact   VII: Face is symmetric with normal eye closure and smile  VIII: Hearing is bilaterally intact  IX, X: Uvula is midline and soft palate rises symmetrically  XI: Head turning and shoulder shrug are intact.  XII: Tongue protrudes midline  Motor: Moving all extremities spontaneously, normal bulk and tone. Normal muscle strength 5/5 L shoulders flexion/extension/abduction and elbow flexion/extension, and handgrip. 4/5 in all movement of R side. Pronator drift neg BL UE, mm strength 5/5 L hip flexion/extension/abduction/adduction, 4/5 R hip/knee flexion/extension (but improved), 5/5 BL dorsiflexion/plantarflexion. Poor fine motor movement of the RUE, however improving.   Sensation: Intact to light touch bilaterally. Proprioception intact bilaterally.   Reflexes: Downgoing toes bilaterally; 2+ DTRs BL patellar/achilles      MEDICATIONS  (STANDING):  apixaban 5 milliGRAM(s) Oral every 12 hours  artificial  tears Solution 1 Drop(s) Both EYES daily  atorvastatin 80 milliGRAM(s) Oral at bedtime  donepezil 5 milliGRAM(s) Oral at bedtime  metoprolol tartrate 100 milliGRAM(s) Oral two times a day  pantoprazole    Tablet 40 milliGRAM(s) Oral every 24 hours    MEDICATIONS  (PRN):      Capoten (Short breath; Rash; Hives)  heparin (Other (Mod to Severe))  penicillin (Short breath; Rash; Hives)      LABS                        13.0   4.00  )-----------( 332      ( 01 Feb 2021 06:17 )             41.7     02-01    138  |  103  |  16  ----------------------------<  93  4.8   |  26  |  1.13    Ca    9.7      01 Feb 2021 06:17  Phos  3.9     02-01  Mg     1.9     02-01      PT/INR - ( 31 Jan 2021 06:33 )   PT: 14.2 sec;   INR: 1.19          PTT - ( 31 Jan 2021 06:33 )  PTT:36.9 sec   Transfer Acceptance from [facility] to [facility]     O/N Events: ÓSCAR   Subjective/ROS: Denies HA, CP, SOB, n/v, changes in bowel/urinary habits.  12pt ROS otherwise negative.    VITALS  Vital Signs Last 24 Hrs  T(C): 36.9 (01 Feb 2021 13:20), Max: 36.9 (31 Jan 2021 17:22)  T(F): 98.5 (01 Feb 2021 13:20), Max: 98.5 (01 Feb 2021 13:20)  HR: 78 (01 Feb 2021 13:20) (64 - 80)  BP: 115/71 (01 Feb 2021 13:20) (96/61 - 124/83)  BP(mean): 87 (01 Feb 2021 11:55) (74 - 97)  RR: 18 (01 Feb 2021 13:20) (16 - 19)  SpO2: 97% (01 Feb 2021 13:20) (95% - 99%)    CAPILLARY BLOOD GLUCOSE    Constitutional: WDWN resting comfortably in bed; NAD; eyes open and conversational  Head: NC/AT; able to perform head-chin without pain or restriction  Eyes: pupils constricted but reactive BL, EOMI; anicteric sclera  ENT: no nasal discharge; MMM; poor dentition  Neck: supple; no JVD  Respiratory: CTA B/L; no W/R/R  Cardiac: +S1/S2; RRR; no M/R/G  Gastrointestinal: soft, NT/ND; no rebound or guarding; +BSx4  Extremities: WWP, no clubbing or cyanosis; no peripheral edema  Neurologic:  -Mental status: alert and awake. conversational. Remote memory intact. AOx1-2 (self, time); often mumbles his answer, waxing/waning mental status   -Cranial nerves:   II: visual fields are full to confrontation.  III, IV, VI: EOMI without nystagmus, difficulty with upward gaze. Pupils equally constricted with sluggish response to light  V:  Facial sensation V1-V3 equal and intact   VII: Face is symmetric with normal eye closure and smile  VIII: Hearing is bilaterally intact  IX, X: Uvula is midline and soft palate rises symmetrically  XI: Head turning and shoulder shrug are intact.  XII: Tongue protrudes midline  Motor: Moving all extremities spontaneously, normal bulk and tone. Normal muscle strength 5/5 L shoulders flexion/extension/abduction and elbow flexion/extension, and handgrip. 4/5 in all movement of R side. Pronator drift neg BL UE, mm strength 5/5 L hip flexion/extension/abduction/adduction, 4/5 R hip/knee flexion/extension (but improved), 5/5 BL dorsiflexion/plantarflexion. Poor fine motor movement of the RUE, however improving.   Sensation: Intact to light touch bilaterally. Proprioception intact bilaterally.   Reflexes: Downgoing toes bilaterally; 2+ DTRs BL patellar/achilles      MEDICATIONS  (STANDING):  apixaban 5 milliGRAM(s) Oral every 12 hours  artificial  tears Solution 1 Drop(s) Both EYES daily  atorvastatin 80 milliGRAM(s) Oral at bedtime  donepezil 5 milliGRAM(s) Oral at bedtime  metoprolol tartrate 100 milliGRAM(s) Oral two times a day  pantoprazole    Tablet 40 milliGRAM(s) Oral every 24 hours    MEDICATIONS  (PRN):      Capoten (Short breath; Rash; Hives)  heparin (Other (Mod to Severe))  penicillin (Short breath; Rash; Hives)      LABS                        13.0   4.00  )-----------( 332      ( 01 Feb 2021 06:17 )             41.7     02-01    138  |  103  |  16  ----------------------------<  93  4.8   |  26  |  1.13    Ca    9.7      01 Feb 2021 06:17  Phos  3.9     02-01  Mg     1.9     02-01      PT/INR - ( 31 Jan 2021 06:33 )   PT: 14.2 sec;   INR: 1.19          PTT - ( 31 Jan 2021 06:33 )  PTT:36.9 sec   Transfer Acceptance [facility]     63yo M w/ poor med adherence, PMHx of LV thrombus (resolved on Jan 2020 echo; reappeared on Nov 2020 echo), pAF on eliquis, CAD s/p PCI, HFrEF 30-35% s/p AICD, HIT, hemorrhoids, gastritis, GIB, hx multiple admissions for EtOH withdrawal (no hx seizures, DTs, or intubations), who presents for AMS thought to be due to EtOH withdrawal and treated with librium. Patient remained altered with waxing and waning mental status. VEEG negative. Stroke code called for bilateral pinpoint pupils non reactive to light and unresponsiveness. all imaging negative. Given Narcan 0.4 x 4 with no response. TTE showed Regional wall motion abnormalities consistent with ischemic heart disease. Left ventricular systolic function is severely reduced with a calculated ejection fraction of 30-35% with severe regional wall motion abnormalities. Akinesis of the mid to anteroseptum, mid to apical inferoseptum, all apical segments and true apex. No left ventricular thrombus noted.  Repeat echo showed LV thrombus. Patient initially on Argatroban drip due to hx of HIT, then transitioned to PO Eliquis. Repeat CTH revealed subacute left thalamic infract, left posterior cerebral territory on 1/25. MRI showed recent infarctions in the posterior circulation, predominantly left sided in the thalamus, midbrain, cerebellum, mesial temporal lobe and additionally at small site of left occipital cortex. On the right, a smaller thalamic infarct is recent as well. Course was also complicated by possible aspiration and +staph aureus on MRSA swab treated with ceftriaxone. Repeat VEEG negative. Patient's mental status improving however still waxing and waning - stable for stepdown to [facility] and eventual discharge to [facility].     O/N Events: ÓSCAR   Subjective/ROS: Denies HA, CP, SOB, n/v, changes in bowel/urinary habits.  12pt ROS otherwise negative.    VITALS  Vital Signs Last 24 Hrs  T(C): 36.9 (01 Feb 2021 13:20), Max: 36.9 (31 Jan 2021 17:22)  T(F): 98.5 (01 Feb 2021 13:20), Max: 98.5 (01 Feb 2021 13:20)  HR: 78 (01 Feb 2021 13:20) (64 - 80)  BP: 115/71 (01 Feb 2021 13:20) (96/61 - 124/83)  BP(mean): 87 (01 Feb 2021 11:55) (74 - 97)  RR: 18 (01 Feb 2021 13:20) (16 - 19)  SpO2: 97% (01 Feb 2021 13:20) (95% - 99%)    CAPILLARY BLOOD GLUCOSE    Constitutional: WDWN resting comfortably in bed; NAD; eyes open and conversational  Head: NC/AT; able to perform head-chin without pain or restriction  Eyes: pupils constricted but reactive BL, EOMI; anicteric sclera  ENT: no nasal discharge; MMM; poor dentition  Neck: supple; no JVD  Respiratory: CTA B/L; no W/R/R  Cardiac: +S1/S2; RRR; no M/R/G  Gastrointestinal: soft, NT/ND; no rebound or guarding; +BSx4  Extremities: WWP, no clubbing or cyanosis; no peripheral edema  Neurologic:  -Mental status: alert and awake. conversational. Remote memory intact. AOx1-2 (self, time); often mumbles his answer, waxing/waning mental status   -Cranial nerves:   II: visual fields are full to confrontation.  III, IV, VI: EOMI without nystagmus, difficulty with upward gaze. Pupils equally constricted with sluggish response to light  V:  Facial sensation V1-V3 equal and intact   VII: Face is symmetric with normal eye closure and smile  VIII: Hearing is bilaterally intact  IX, X: Uvula is midline and soft palate rises symmetrically  XI: Head turning and shoulder shrug are intact.  XII: Tongue protrudes midline  Motor: Moving all extremities spontaneously, normal bulk and tone. Normal muscle strength 5/5 L shoulders flexion/extension/abduction and elbow flexion/extension, and handgrip. 4/5 in all movement of R side. Pronator drift neg BL UE, mm strength 5/5 L hip flexion/extension/abduction/adduction, 4/5 R hip/knee flexion/extension (but improved), 5/5 BL dorsiflexion/plantarflexion. Poor fine motor movement of the RUE, however improving.   Sensation: Intact to light touch bilaterally. Proprioception intact bilaterally.   Reflexes: Downgoing toes bilaterally; 2+ DTRs BL patellar/achilles      MEDICATIONS  (STANDING):  apixaban 5 milliGRAM(s) Oral every 12 hours  artificial  tears Solution 1 Drop(s) Both EYES daily  atorvastatin 80 milliGRAM(s) Oral at bedtime  donepezil 5 milliGRAM(s) Oral at bedtime  metoprolol tartrate 100 milliGRAM(s) Oral two times a day  pantoprazole    Tablet 40 milliGRAM(s) Oral every 24 hours    MEDICATIONS  (PRN):      Capoten (Short breath; Rash; Hives)  heparin (Other (Mod to Severe))  penicillin (Short breath; Rash; Hives)      LABS                        13.0   4.00  )-----------( 332      ( 01 Feb 2021 06:17 )             41.7     02-01    138  |  103  |  16  ----------------------------<  93  4.8   |  26  |  1.13    Ca    9.7      01 Feb 2021 06:17  Phos  3.9     02-01  Mg     1.9     02-01      PT/INR - ( 31 Jan 2021 06:33 )   PT: 14.2 sec;   INR: 1.19          PTT - ( 31 Jan 2021 06:33 )  PTT:36.9 sec

## 2021-02-01 NOTE — PROGRESS NOTE ADULT - PROBLEM SELECTOR PLAN 8
F: none  E: Replete PRN  N: dysphagia diet   DVT ppx: Eliquis 5 BID  GI ppx: Protonix 40mg PO  CODE: FULL  Dispo: ZEINAB when accepted F: none  E: Replete PRN  N: dysphagia diet   DVT ppx: Eliquis 5 BID  GI ppx: Protonix 40mg PO  CODE: FULL  Dispo: ZEINAB when accepted, [facility]

## 2021-02-01 NOTE — PROGRESS NOTE ADULT - ASSESSMENT
63-year-old male with a past medical history of HIT, ETOH abuse, paroxysmal A-fib with an LV thrombus, HLD, CAD status post PCI, HFrEF (35-40% 2019) status post AICD, hemorrhoids, gastritis, and a recent admission in August for alcohol withdrawal who presented with signs and symptoms of alcohol withdrawal. Found to have multiple infarctions involving L thalamus, L posterior cerebral artery - transferred to [facility] for more frequent monitoring and hemorrhagic conversion surveillance. Now pending ZEINAB placement.

## 2021-02-01 NOTE — PROGRESS NOTE ADULT - PROBLEM SELECTOR PLAN 4
History of A-fib on Eliquis and Toprol XL. CHADS-VASC 4, HAS-BLED 3.  -Switched argatroban gtt to Eliquis 5 BID; 1/31 restarted lopressor 100mg BID

## 2021-02-01 NOTE — PROGRESS NOTE ADULT - PROBLEM SELECTOR PLAN 1
Pt with CTA performed o/n which showed small subacute left basal ganglia/thalamic infarction. Pt went for MRI 1/26 which showed recent infarctions in the posterior circulation, predominantly left sided in the thalamus, midbrain, cerebellum, mesial temporal lobe and additionally at small site of left occipital cortex. On the right, a smaller thalamic infarct is recent as well.   - Source of stroke may have been multiple transient hypercoagulable states while on argatroban drip  - switched to Eliquis 5 BID 1/29    #Poor Functional Status   Patient noted to have trouble with ADLs - has residual RUE>RLE weakness and difficulty with fine motor movements. Is also intermittently confused - has waxing/waning mental status with intermittent episodes of trying to leave hospital. Have also walked with patient - is extremely unsteady on his feet.   -1st PT eval - home with no needs. 2nd eval - home with home PT  -PT re-eval on 1/30 - stated patient would greatly benefit from ZEINAB  -Patient will greatly benefit from ZEINAB. Per discussion with brother and son - patient lives at home with his son and granddaughter (8 years old). Son works night shift and takes care of his daughter so will not be able to take care of patient. Brother will also not be able to take care of patient - has no family assist at this time.  -F/u social work recommendations

## 2021-02-01 NOTE — PROGRESS NOTE ADULT - PROBLEM SELECTOR PROBLEM 5
PUD CCM    INTERVAL HPI/OVERNIGHT EVENTS:    Still on nonrebreather.  ESR noted.  Steroids are ordered.  Hopefully her Aa gradient will decrease.  All new data reviewed, including VS, lab, imaging, Rx and documentation.    PAST MEDICAL & SURGICAL HISTORY:  Essential hypertension  Hypertension    Type 2 diabetes mellitus  Diabetes    Hyperlipidemia  Hyperlipidemia    Disorder of kidney and ureter  Renal insufficiency    Peripheral vascular disease  PVD (peripheral vascular disease)    Anxiety state  Anxiety    Atherosclerosis of renal artery  with resulting stent placement    Atherosclerosis of renal artery  Renal artery stenosis    FAMILY HISTORY:  Family history of ischemic heart disease  Family history of coronary artery disease.    SOCIAL HISTORY:    REVIEW OF SYSTEMS:  Constitutional: () weight change, (-) fever,  () chills, () fatigue, (-) night sweats  Eyes: (-) discharge, () eye pain, () visual change  ENT:  (-) hearing difficulty, () vertigo, () sinus pain,  () throat pain, () epistaxis, () dysphagia, () hoarseness  Neck: (-) pain, () stiffness, () swelling  Respiratory: (+++) cough, () wheezing, () hemoptysis      Cardiovascular: (-) chest pain, ()palpitations, () dizziness   Gastrointestinal: (-) abdominal pain, () nausea, () vomiting, () hematemesis, () diarrhea,  () constipation, () melena  Genitourinary:  (-) dysuria, () frequency, () hematuria, () incontinence      Neurologic: (-) headache, () memory loss, () loss of strength, () numbness, () tremor     Skin: (-) itching, () burning, () rash, () lesions   Lymphatic: (-) enlarged lymph nodes  Endocrine: (-) hair loss, () temperature intolerance         Musculoskeletal: (-) back pain, () joint pain,  () extremity pain  Psychiatric: (-) visual change, () auditory change, () depression, () anxiety, () suicidal  Sleep: (-) disorder, () insomnia, () sleep deprivation  Heme/Lymph: () easy bruising, () bleeding gums            Allergy and Immunologic: () hives, () eczema    Vital Signs Last 24 Hrs  T(C): 36.2 (19 Jan 2021 09:44), Max: 37.5 (18 Jan 2021 18:09)  T(F): 97.1 (19 Jan 2021 09:44), Max: 99.5 (18 Jan 2021 18:09)  HR: 74 (19 Jan 2021 08:44) (72 - 82)  BP: 148/62 (19 Jan 2021 08:44) (130/59 - 165/71)  BP(mean): --  RR: 24 (19 Jan 2021 08:44) (18 - 24)  SpO2: 94% (19 Jan 2021 08:44) (93% - 97%) ON NONREBREATHER    I&O's Detail    18 Jan 2021 07:01  -  19 Jan 2021 07:00  --------------------------------------------------------  IN:    IV PiggyBack: 250 mL    Oral Fluid: 1020 mL  Total IN: 1270 mL    OUT:    Voided (mL): 1700 mL  Total OUT: 1700 mL    Total NET: -430 mL      19 Jan 2021 07:01  -  19 Jan 2021 11:48  --------------------------------------------------------  IN:    IV PiggyBack: 100 mL  Total IN: 100 mL    OUT:    Voided (mL): 200 mL  Total OUT: 200 mL    Total NET: -100 mL          PHYSICAL EXAM:    Well nourished, well developed, comfortable, - acute distress; vital signs are monitored continuously  Eyes: PERRLA, EOMI, -conjunctivitis, -scleritis   Head: no focal deficit, normocephalic,  no trauma  ENMT: moist tongue, no thrush, -nasal discharge, -hoarseness, normal hearing, -cough, -hemoptysis, trachea midline  Neck: supple, - lymphadenopathy,  -masses, -JVD  Respiratory: bilateral diminished breath sounds, -wheezing, -rhonchi, -rales, -crackles  Chest: -accessory muscle use, -paradoxical breathing  Cardiovascular: regular rate and sinus rhythm, S1 S2 normal, -S3, -S4, -murmur, -gallop, -rub  Gastrointestinal: soft, nontender, nondistended, normal bowel sounds, no hepatosplenomegaly  Genitourinary: -flank pain, -dysuria  Extremities: -clubbing, -cyanosis, -edema    Vascular: peripheral pulses palpable 2+ bilaterally  Neurological: alert, oriented x 3, no focal deficit, -tremor   Skin: warm, dry, -erythema, iv sites intact  Lymph nodes; no cervical, supraclavicular or axillary adenopathy  Psychiatric: cooperative, appropriate mood      MEDICATIONS  (STANDING):  albuterol/ipratropium for Nebulization 3 milliLiter(s) Nebulizer every 6 hours  amLODIPine   Tablet 10 milliGRAM(s) Oral daily  atorvastatin 20 milliGRAM(s) Oral at bedtime  azithromycin  IVPB 500 milliGRAM(s) IV Intermittent every 24 hours  budesonide  80 MICROgram(s)/formoterol 4.5 MICROgram(s) Inhaler 2 Puff(s) Inhalation two times a day  donepezil 5 milliGRAM(s) Oral at bedtime  fluticasone propionate 50 MICROgram(s)/spray Nasal Spray 1 Spray(s) Both Nostrils two times a day  heparin   Injectable 5000 Unit(s) SubCutaneous every 12 hours  influenza  Vaccine (HIGH DOSE) 0.7 milliLiter(s) IntraMuscular once  iron sucrose IVPB 200 milliGRAM(s) IV Intermittent every 24 hours  levothyroxine 50 MICROGram(s) Oral daily  metoprolol tartrate 50 milliGRAM(s) Oral two times a day  piperacillin/tazobactam IVPB.. 2.25 Gram(s) IV Intermittent every 6 hours  sevelamer carbonate 800 milliGRAM(s) Oral three times a day with meals    MEDICATIONS  (PRN):  benzonatate 100 milliGRAM(s) Oral every 8 hours PRN Cough  guaiFENesin   Syrup  (Sugar-Free) 100 milliGRAM(s) Oral every 6 hours PRN Cough      Allergies    No Known Allergies    Intolerances        LABS:                        8.3    11.47 )-----------( 230      ( 19 Jan 2021 07:05 )             27.2     01-19    136  |  99  |  70<H>  ----------------------------<  114<H>  4.5   |  22  |  3.84<H>    Ca    8.5      19 Jan 2021 07:05  Phos  5.0     01-19  Mg     1.8     01-19    TPro  5.9<L>  /  Alb  2.7<L>  /  TBili  x   /  DBili  x   /  AST  x   /  ALT  x   /  AlkPhos  x   01-18    +DVT prophylaxis  5000 q12  +Sleep  NO ISSUES   +Nutrition goals  POOR APPETITE  -Pain DENIED  -Decubital ulcer  +GI prophylaxis (PPV, coagulopathy, Hx)  +Aspiration prophylaxis (45 degrees)  Ventilator synchronized   MAY NEED BiPAP  Tracheal cuff pressure  +Sedation/analgesia stopped once  +ID (phos, CH, mupi, SB)  -Delirium  +Cardiac Beta/ACEI-ARB on hold/ASA on hold/statin  +Prevention  +Education  +Medication reviewed (drug-drug interactions, PDA)  Medical devices  URINE SPONGE, NONREBREATHER  Discussed with Justin Brunner PA, CCRN    RADIOLOGY & ADDITIONAL STUDIES:    CXR with bilateral infiltrates, no change    EXAM:  CT CHEST                          PROCEDURE DATE:  10/14/2020      INTERPRETATION:  CT SCAN OF CHEST    History: 85-year-old female with shortness of breath and pleural effusion    Technique: CT scan of chest performed from lung apices through lung bases. Axial, coronal, and sagittal multiplanar reformatted images were produced. Thin section axial images and axial MIPS were also produced. Intravenous contrast material was not administered, as ordered.    Comparison: Chest radiograph dated 10/13/2020. No prior thoracic CT examinations.    Findings:    Lungs and large airways: Normal.    Pleura:  There are small bibasilar pleural effusions right greater than left. There is interlobular septal thickening which extends into the interior of the lung to suggest a component of pulmonary venous congestion. Moreover there are peripheral reticular opacities with some associated groundglass component within the peripheral aspects of both lungs. More peripheral groundglass and consolidation is seen within the right upper left upper and right middle lobes. There is some pulmonary fibrosis with traction bronchiectasis and bronchiolectasis without jesús honeycomb lung formation. There is air trapping.    Mediastinum and hilar regions: Increased number of mildly enlarged prevascular (1.1 cm in short axis), right paratracheal (1.1 cm in short axis), and subcarinal (1.7 cm in short axis).    Heart and pericardium:  Cardiomegaly. No pericardial effusion. Extensive calcification of the mitral annulus.    Vessels:  Severe coronary artery calcification/stents. Severe callus  5. Atheromatous plaque formation throughout the aortic arch descending and proximal abdominal aorta and major side branches with extensive calcification of the origin of the renal arteries right greater than left. Calcification of the origin of the brachycephalic vessels are noted. No evidence of aneurysm    Chest wall and lower neck:  Punctate microcalcifications are noted in the breasts bilaterally left greater than right. Clinical and mammographic correlation.    Upper abdomen: Cholelithiasis. Extensive arterial vascular calcification of the major side branches of the abdominal aorta as above.    Bones: Moderate multilevel degenerative disc disease involving the lower thoracic spine. Poststernotomy.      Impression:    1. Cardiomegaly. Small bibasilar pleural effusions and interlobular septal thickening compatible with pulmonary venous congestion.  2. More peripheral reticular, groundglass and some patchy areas of peripheral consolidation more pronounced in the upper and midlung zones are noted. There may be some bronchiectasis/bronchiolectasis to suggest pulmonary fibrosis. No honeycomb lung formation. Air-trapping is noted. These findings are not consistent with UIP/IPF. Abbreviated differential includes atypical infectious and/or inflammatory etiologies such as chronic hypersensitivity pneumonia, stage IV sarcoidosis, pneumoconiosis, and subacute and/or chronic sequela of atypical and viral pneumonias such as Covid 19.  3. Mild mediastinal lymphadenopathy.    EXAM:  CT CHEST                          PROCEDURE DATE:  01/19/2021      INTERPRETATION:  CT of the CHEST without intravenous contrast dated 1/19/2021 2:31 PM    INDICATION: Worsening Hypoxia    TECHNIQUE: CT of the chest was performed withoutintravenous contrast.  Intravenous contrast was not used Axial and coronal and sagittal images were produced and reviewed.    PRIOR STUDIES: CT chest 10/14/2020    FINDINGS: Cardiomegaly as before. Dense mitral valve annular calcification. Coronary artery calcifications. Status post CABG. The main pulmonary artery measures 3.1 cm diameter.  No pericardial effusion is seen.  Evaluation of the vasculature is limited without intravenous contrast, but the great vessels are grossly unremarkable.  Evaluation for adenopathy is limited without intravenous contrast. Within that limitation, mild mediastinal lymphadenopathy is seen. No axillary adenopathy. Abandoned epicardial pacer wire. Low-density of cardiac chambers relative to the myocardium suggesting anemia.    Small bilateral pleural effusions are seen. Evaluation of the pulmonary parenchyma demonstrates underlying interstitial fibrosis in the upper and mid zones with bilateral mosaic attenuation. There is now new bilateral groundglass opacities  in the upper and mid and lower zones with thickening of interlobular septa at the bilateral upper zones i.e. crazy paving  appearance.. Coalescence of findings with consolidation seen in the superior segment right lower lobe.    Limited evaluation of the upper abdomen demonstrates radiopaque cholelithiasis.    Evaluation of the osseous structures demonstrates degenerative changes.      IMPRESSION: New bilateral groundglass lung opacities superimposed on chronic interstitial pulmonary fibrosis probably due to chronic hypersensitivity pneumonitis or sarcoid. The groundglass lung opacities could  be due to pulmonary edema, pulmonary hemorrhage, alveolar proteinosis, organizing pneumonia or atypical infection.      Assessment and Plan:    Problem/Plan - 1:  ·  Problem: Hypoxia.  Plan: Now on nonrebreather. New infiltrates may respond to steroids. Very high ESR noted.  Etiology unclear, lungs are well aerated. Mild rales RLL.     Problem/Plan - 2:  ·  Problem: Anemia. Will get PRBC with iv lasix. Will discuss EPO.     Problem/Plan - 3:  ·  Problem: NAV (acute kidney injury).  Plan: Furosemide on hold.      Problem/Plan - 4:  ·  Problem: History of subarachnoid hemorrhage. New CT without bleed.     Problem/Plan - 5:  ·  Problem: Type 2 diabetes mellitus.      Problem/Plan - 6:  Problem: COPD (chronic obstructive pulmonary disease). Plan: Continue bronchodilators. IV steroid will help also.     Problem/Plan - 7:  ·  Problem: CAD (coronary artery disease).  Plan: BB. ASA 81     Problem/Plan - 8:  ·  Problem: Acute on chronic diastolic congestive heart failure.      Problem/Plan - 9:  ·  Problem: CAP (community acquired pneumonia).  Plan: Bilateral infiltrates are severe. Had vanco and pip/tazo.     Problem/Plan - 10:  Problem: Cough. Plan; Perhaps from pulmonary fibrosis also. Nonproductive cough.           Chronic systolic heart failure

## 2021-02-01 NOTE — PROGRESS NOTE ADULT - SUBJECTIVE AND OBJECTIVE BOX
**Incomplete Note**  OVERNIGHT EVENTS:    SUBJECTIVE / INTERVAL HPI: Patient seen and examined at bedside.     VITAL SIGNS:  Vital Signs Last 24 Hrs  T(C): 36.5 (01 Feb 2021 05:08), Max: 36.9 (31 Jan 2021 17:22)  T(F): 97.7 (01 Feb 2021 05:08), Max: 98.4 (31 Jan 2021 17:22)  HR: 72 (01 Feb 2021 05:17) (64 - 78)  BP: 111/79 (01 Feb 2021 05:17) (96/61 - 111/79)  BP(mean): 92 (01 Feb 2021 05:17) (74 - 92)  RR: 17 (01 Feb 2021 05:17) (16 - 19)  SpO2: 98% (01 Feb 2021 05:17) (96% - 99%)    01-30-21 @ 07:01  -  01-31-21 @ 07:00  --------------------------------------------------------  IN: 50 mL / OUT: 450 mL / NET: -400 mL        PHYSICAL EXAM:    General: WDWN  HEENT: NC/AT; PERRL, anicteric sclera; MMM  Neck: supple  Cardiovascular: +S1/S2; RRR  Respiratory: CTA B/L; no W/R/R  Gastrointestinal: soft, NT/ND; +BSx4  Extremities: WWP; no edema, clubbing or cyanosis  Vascular: 2+ radial, DP/PT pulses B/L  Neurological: AAOx3; no focal deficits    MEDICATIONS:  MEDICATIONS  (STANDING):  apixaban 5 milliGRAM(s) Oral every 12 hours  artificial  tears Solution 1 Drop(s) Both EYES daily  atorvastatin 80 milliGRAM(s) Oral at bedtime  donepezil 5 milliGRAM(s) Oral at bedtime  metoprolol tartrate 100 milliGRAM(s) Oral two times a day  pantoprazole    Tablet 40 milliGRAM(s) Oral every 24 hours    MEDICATIONS  (PRN):      ALLERGIES:  Allergies    Capoten (Short breath; Rash; Hives)  heparin (Other (Mod to Severe))  penicillin (Short breath; Rash; Hives)    Intolerances        LABS:                        13.0   4.00  )-----------( 332      ( 01 Feb 2021 06:17 )             41.7     02-01    138  |  103  |  16  ----------------------------<  93  4.8   |  26  |  1.13    Ca    9.7      01 Feb 2021 06:17  Phos  3.9     02-01  Mg     1.9     02-01      PT/INR - ( 31 Jan 2021 06:33 )   PT: 14.2 sec;   INR: 1.19          PTT - ( 31 Jan 2021 06:33 )  PTT:36.9 sec    CAPILLARY BLOOD GLUCOSE            RADIOLOGY & ADDITIONAL TESTS: Reviewed.    ASSESSMENT:    PLAN:  OVERNIGHT EVENTS: ÓSCAR    SUBJECTIVE / INTERVAL HPI: Patient seen and examined at bedside. Patient feels well and has no complaints, is hungry for breakfast. Denies any chest pain, shortness of breath, abdominal pain.     VITAL SIGNS:  Vital Signs Last 24 Hrs  T(C): 36.5 (01 Feb 2021 05:08), Max: 36.9 (31 Jan 2021 17:22)  T(F): 97.7 (01 Feb 2021 05:08), Max: 98.4 (31 Jan 2021 17:22)  HR: 72 (01 Feb 2021 05:17) (64 - 78)  BP: 111/79 (01 Feb 2021 05:17) (96/61 - 111/79)  BP(mean): 92 (01 Feb 2021 05:17) (74 - 92)  RR: 17 (01 Feb 2021 05:17) (16 - 19)  SpO2: 98% (01 Feb 2021 05:17) (96% - 99%)    01-30-21 @ 07:01  -  01-31-21 @ 07:00  --------------------------------------------------------  IN: 50 mL / OUT: 450 mL / NET: -400 mL    PHYSICAL EXAM:    Constitutional: WDWN resting comfortably in bed; NAD; eyes open and conversational  Head: NC/AT; able to perform head-chin without pain or restriction  Eyes: pupils constricted but reactive BL, EOMI; anicteric sclera  ENT: no nasal discharge; MMM; poor dentition  Neck: supple; no JVD  Respiratory: CTA B/L; no W/R/R  Cardiac: +S1/S2; RRR; no M/R/G  Gastrointestinal: soft, NT/ND; no rebound or guarding; +BSx4  Extremities: WWP, no clubbing or cyanosis; no peripheral edema  Neurologic:  -Mental status: alert and awake. conversational. Remote memory intact. AOx1-2 (self, time); often mumbles his answer, waxing/waning mental status   -Cranial nerves:   II: visual fields are full to confrontation.  III, IV, VI: EOMI without nystagmus, difficulty with upward gaze. Pupils equally constricted with sluggish response to light  V:  Facial sensation V1-V3 equal and intact   VII: Face is symmetric with normal eye closure and smile  VIII: Hearing is bilaterally intact  IX, X: Uvula is midline and soft palate rises symmetrically  XI: Head turning and shoulder shrug are intact.  XII: Tongue protrudes midline  Motor: Moving all extremities spontaneously, normal bulk and tone. Normal muscle strength 5/5 L shoulders flexion/extension/abduction and elbow flexion/extension, and handgrip. 4/5 in all movement of R side. Pronator drift neg BL UE, mm strength 5/5 L hip flexion/extension/abduction/adduction, 4/5 R hip/knee flexion/extension (but improved), 5/5 BL dorsiflexion/plantarflexion. Poor fine motor movement of the RUE, however improving.   Sensation: Intact to light touch bilaterally. Proprioception intact bilaterally.   Reflexes: Downgoing toes bilaterally; 2+ DTRs BL patellar/achilles    MEDICATIONS:  MEDICATIONS  (STANDING):  apixaban 5 milliGRAM(s) Oral every 12 hours  artificial  tears Solution 1 Drop(s) Both EYES daily  atorvastatin 80 milliGRAM(s) Oral at bedtime  donepezil 5 milliGRAM(s) Oral at bedtime  metoprolol tartrate 100 milliGRAM(s) Oral two times a day  pantoprazole    Tablet 40 milliGRAM(s) Oral every 24 hours    MEDICATIONS  (PRN):      ALLERGIES:  Allergies    Capoten (Short breath; Rash; Hives)  heparin (Other (Mod to Severe))  penicillin (Short breath; Rash; Hives)    Intolerances        LABS:                        13.0   4.00  )-----------( 332      ( 01 Feb 2021 06:17 )             41.7     02-01    138  |  103  |  16  ----------------------------<  93  4.8   |  26  |  1.13    Ca    9.7      01 Feb 2021 06:17  Phos  3.9     02-01  Mg     1.9     02-01      PT/INR - ( 31 Jan 2021 06:33 )   PT: 14.2 sec;   INR: 1.19          PTT - ( 31 Jan 2021 06:33 )  PTT:36.9 sec    CAPILLARY BLOOD GLUCOSE            RADIOLOGY & ADDITIONAL TESTS: Reviewed.   TRANSFER NOTE [facility] TO [facility]    HOSPITAL COURSE:   65yo M w/ poor med adherence, PMHx of LV thrombus (resolved on Jan 2020 echo; reappeared on Nov 2020 echo), pAF on eliquis, CAD s/p PCI, HFrEF 30-35% s/p AICD, HIT, hemorrhoids, gastritis, GIB, hx multiple admissions for EtOH withdrawal (no hx seizures, DTs, or intubations), who presents for AMS thought to be due to EtOH withdrawal and treated with librium. Patient remained altered with waxing and waning mental status. VEEG negative. Stroke code called for bilateral pinpoint pupils non reactive to light and unresponsiveness. all imaging negative. Given Narcan 0.4 x 4 with no response. TTE showed Regional wall motion abnormalities consistent with ischemic heart disease. Left ventricular systolic function is severely reduced with a calculated ejection fraction of 30-35% with severe regional wall motion abnormalities. Akinesis of the mid to anteroseptum, mid to apical inferoseptum, all apical segments and true apex. No left ventricular thrombus noted.  Repeat echo showed LV thrombus. Patient initially on Argatroban drip due to hx of HIT, then transitioned to PO Eliquis. Repeat CTH revealed subacute left thalamic infract, left posterior cerebral territory on 1/25. MRI showed recent infarctions in the posterior circulation, predominantly left sided in the thalamus, midbrain, cerebellum, mesial temporal lobe and additionally at small site of left occipital cortex. On the right, a smaller thalamic infarct is recent as well. Course was also complicated by possible aspiration and +staph aureus on MRSA swab treated with ceftriaxone. Patient's mental status improving however still waxing and waning - stable for stepdown to [facility] and eventual discharge to [facility].     OVERNIGHT EVENTS: ÓSCAR    SUBJECTIVE / INTERVAL HPI: Patient seen and examined at bedside. Patient feels well and has no complaints, is hungry for breakfast. Denies any chest pain, shortness of breath, abdominal pain.     VITAL SIGNS:  Vital Signs Last 24 Hrs  T(C): 36.5 (01 Feb 2021 05:08), Max: 36.9 (31 Jan 2021 17:22)  T(F): 97.7 (01 Feb 2021 05:08), Max: 98.4 (31 Jan 2021 17:22)  HR: 72 (01 Feb 2021 05:17) (64 - 78)  BP: 111/79 (01 Feb 2021 05:17) (96/61 - 111/79)  BP(mean): 92 (01 Feb 2021 05:17) (74 - 92)  RR: 17 (01 Feb 2021 05:17) (16 - 19)  SpO2: 98% (01 Feb 2021 05:17) (96% - 99%)    01-30-21 @ 07:01  -  01-31-21 @ 07:00  --------------------------------------------------------  IN: 50 mL / OUT: 450 mL / NET: -400 mL    PHYSICAL EXAM:    Constitutional: WDWN resting comfortably in bed; NAD; eyes open and conversational  Head: NC/AT; able to perform head-chin without pain or restriction  Eyes: pupils constricted but reactive BL, EOMI; anicteric sclera  ENT: no nasal discharge; MMM; poor dentition  Neck: supple; no JVD  Respiratory: CTA B/L; no W/R/R  Cardiac: +S1/S2; RRR; no M/R/G  Gastrointestinal: soft, NT/ND; no rebound or guarding; +BSx4  Extremities: WWP, no clubbing or cyanosis; no peripheral edema  Neurologic:  -Mental status: alert and awake. conversational. Remote memory intact. AOx1-2 (self, time); often mumbles his answer, waxing/waning mental status   -Cranial nerves:   II: visual fields are full to confrontation.  III, IV, VI: EOMI without nystagmus, difficulty with upward gaze. Pupils equally constricted with sluggish response to light  V:  Facial sensation V1-V3 equal and intact   VII: Face is symmetric with normal eye closure and smile  VIII: Hearing is bilaterally intact  IX, X: Uvula is midline and soft palate rises symmetrically  XI: Head turning and shoulder shrug are intact.  XII: Tongue protrudes midline  Motor: Moving all extremities spontaneously, normal bulk and tone. Normal muscle strength 5/5 L shoulders flexion/extension/abduction and elbow flexion/extension, and handgrip. 4/5 in all movement of R side. Pronator drift neg BL UE, mm strength 5/5 L hip flexion/extension/abduction/adduction, 4/5 R hip/knee flexion/extension (but improved), 5/5 BL dorsiflexion/plantarflexion. Poor fine motor movement of the RUE, however improving.   Sensation: Intact to light touch bilaterally. Proprioception intact bilaterally.   Reflexes: Downgoing toes bilaterally; 2+ DTRs BL patellar/achilles    MEDICATIONS:  MEDICATIONS  (STANDING):  apixaban 5 milliGRAM(s) Oral every 12 hours  artificial  tears Solution 1 Drop(s) Both EYES daily  atorvastatin 80 milliGRAM(s) Oral at bedtime  donepezil 5 milliGRAM(s) Oral at bedtime  metoprolol tartrate 100 milliGRAM(s) Oral two times a day  pantoprazole    Tablet 40 milliGRAM(s) Oral every 24 hours    MEDICATIONS  (PRN):      ALLERGIES:  Allergies    Capoten (Short breath; Rash; Hives)  heparin (Other (Mod to Severe))  penicillin (Short breath; Rash; Hives)    Intolerances        LABS:                        13.0   4.00  )-----------( 332      ( 01 Feb 2021 06:17 )             41.7     02-01    138  |  103  |  16  ----------------------------<  93  4.8   |  26  |  1.13    Ca    9.7      01 Feb 2021 06:17  Phos  3.9     02-01  Mg     1.9     02-01      PT/INR - ( 31 Jan 2021 06:33 )   PT: 14.2 sec;   INR: 1.19          PTT - ( 31 Jan 2021 06:33 )  PTT:36.9 sec    CAPILLARY BLOOD GLUCOSE            RADIOLOGY & ADDITIONAL TESTS: Reviewed.   TRANSFER NOTE [facility] TO [facility]    HOSPITAL COURSE:   65yo M w/ poor med adherence, PMHx of LV thrombus (resolved on Jan 2020 echo; reappeared on Nov 2020 echo), pAF on eliquis, CAD s/p PCI, HFrEF 30-35% s/p AICD, HIT, hemorrhoids, gastritis, GIB, hx multiple admissions for EtOH withdrawal (no hx seizures, DTs, or intubations), who presents for AMS thought to be due to EtOH withdrawal and treated with librium. Patient remained altered with waxing and waning mental status. VEEG negative. Stroke code called for bilateral pinpoint pupils non reactive to light and unresponsiveness. all imaging negative. Given Narcan 0.4 x 4 with no response. TTE showed Regional wall motion abnormalities consistent with ischemic heart disease. Left ventricular systolic function is severely reduced with a calculated ejection fraction of 30-35% with severe regional wall motion abnormalities. Akinesis of the mid to anteroseptum, mid to apical inferoseptum, all apical segments and true apex. No left ventricular thrombus noted.  Repeat echo showed LV thrombus. Patient initially on Argatroban drip due to hx of HIT, then transitioned to PO Eliquis. Repeat CTH revealed subacute left thalamic infract, left posterior cerebral territory on 1/25. MRI showed recent infarctions in the posterior circulation, predominantly left sided in the thalamus, midbrain, cerebellum, mesial temporal lobe and additionally at small site of left occipital cortex. On the right, a smaller thalamic infarct is recent as well. Course was also complicated by possible aspiration and +staph aureus on MRSA swab treated with ceftriaxone. Repeat VEEG negative. Patient's mental status improving however still waxing and waning - stable for stepdown to [facility] and eventual discharge to [facility].     OVERNIGHT EVENTS: ÓSCAR    SUBJECTIVE / INTERVAL HPI: Patient seen and examined at bedside. Patient feels well and has no complaints, is hungry for breakfast. Denies any chest pain, shortness of breath, abdominal pain.     VITAL SIGNS:  Vital Signs Last 24 Hrs  T(C): 36.5 (01 Feb 2021 05:08), Max: 36.9 (31 Jan 2021 17:22)  T(F): 97.7 (01 Feb 2021 05:08), Max: 98.4 (31 Jan 2021 17:22)  HR: 72 (01 Feb 2021 05:17) (64 - 78)  BP: 111/79 (01 Feb 2021 05:17) (96/61 - 111/79)  BP(mean): 92 (01 Feb 2021 05:17) (74 - 92)  RR: 17 (01 Feb 2021 05:17) (16 - 19)  SpO2: 98% (01 Feb 2021 05:17) (96% - 99%)    01-30-21 @ 07:01  -  01-31-21 @ 07:00  --------------------------------------------------------  IN: 50 mL / OUT: 450 mL / NET: -400 mL    PHYSICAL EXAM:    Constitutional: WDWN resting comfortably in bed; NAD; eyes open and conversational  Head: NC/AT; able to perform head-chin without pain or restriction  Eyes: pupils constricted but reactive BL, EOMI; anicteric sclera  ENT: no nasal discharge; MMM; poor dentition  Neck: supple; no JVD  Respiratory: CTA B/L; no W/R/R  Cardiac: +S1/S2; RRR; no M/R/G  Gastrointestinal: soft, NT/ND; no rebound or guarding; +BSx4  Extremities: WWP, no clubbing or cyanosis; no peripheral edema  Neurologic:  -Mental status: alert and awake. conversational. Remote memory intact. AOx1-2 (self, time); often mumbles his answer, waxing/waning mental status   -Cranial nerves:   II: visual fields are full to confrontation.  III, IV, VI: EOMI without nystagmus, difficulty with upward gaze. Pupils equally constricted with sluggish response to light  V:  Facial sensation V1-V3 equal and intact   VII: Face is symmetric with normal eye closure and smile  VIII: Hearing is bilaterally intact  IX, X: Uvula is midline and soft palate rises symmetrically  XI: Head turning and shoulder shrug are intact.  XII: Tongue protrudes midline  Motor: Moving all extremities spontaneously, normal bulk and tone. Normal muscle strength 5/5 L shoulders flexion/extension/abduction and elbow flexion/extension, and handgrip. 4/5 in all movement of R side. Pronator drift neg BL UE, mm strength 5/5 L hip flexion/extension/abduction/adduction, 4/5 R hip/knee flexion/extension (but improved), 5/5 BL dorsiflexion/plantarflexion. Poor fine motor movement of the RUE, however improving.   Sensation: Intact to light touch bilaterally. Proprioception intact bilaterally.   Reflexes: Downgoing toes bilaterally; 2+ DTRs BL patellar/achilles    MEDICATIONS:  MEDICATIONS  (STANDING):  apixaban 5 milliGRAM(s) Oral every 12 hours  artificial  tears Solution 1 Drop(s) Both EYES daily  atorvastatin 80 milliGRAM(s) Oral at bedtime  donepezil 5 milliGRAM(s) Oral at bedtime  metoprolol tartrate 100 milliGRAM(s) Oral two times a day  pantoprazole    Tablet 40 milliGRAM(s) Oral every 24 hours    MEDICATIONS  (PRN):      ALLERGIES:  Allergies    Capoten (Short breath; Rash; Hives)  heparin (Other (Mod to Severe))  penicillin (Short breath; Rash; Hives)    Intolerances        LABS:                        13.0   4.00  )-----------( 332      ( 01 Feb 2021 06:17 )             41.7     02-01    138  |  103  |  16  ----------------------------<  93  4.8   |  26  |  1.13    Ca    9.7      01 Feb 2021 06:17  Phos  3.9     02-01  Mg     1.9     02-01      PT/INR - ( 31 Jan 2021 06:33 )   PT: 14.2 sec;   INR: 1.19          PTT - ( 31 Jan 2021 06:33 )  PTT:36.9 sec    CAPILLARY BLOOD GLUCOSE            RADIOLOGY & ADDITIONAL TESTS: Reviewed.

## 2021-02-01 NOTE — PROGRESS NOTE ADULT - PROBLEM SELECTOR PLAN 1
Pt with CTA performed o/n which showed small subacute left basal ganglia/thalamic infarction. Pt went for MRI 1/26 which showed recent infarctions in the posterior circulation, predominantly left sided in the thalamus, midbrain, cerebellum, mesial temporal lobe and additionally at small site of left occipital cortex. On the right, a smaller thalamic infarct is recent as well.   - Source of stroke may have been multiple transient hypercoagulable states while on argatroban drip  - switched to Eliquis 5 BID 1/29    #Poor Functional Status   Patient noted to have trouble with ADLs - has residual RUE>RLE weakness and difficulty with fine motor movements. Is also intermittently confused - has waxing/waning mental status with intermittent episodes of trying to leave hospital. Have also walked with patient - is extremely unsteady on his feet.   -1st PT eval - home with no needs. 2nd eval - home with home PT  -PT will come re-evaluate patient   -Patient will greatly benefit from ZEINAB. Per discussion with brother and son - patient lives at home with his son and granddaughter (8 years old). Son works night shift and takes care of his daughter so will not be able to take care of patient. Brother will also not be able to take care of patient - has no family assist at this time.  -F/u PT re-evaluation Pt with CTA performed o/n which showed small subacute left basal ganglia/thalamic infarction. Pt went for MRI 1/26 which showed recent infarctions in the posterior circulation, predominantly left sided in the thalamus, midbrain, cerebellum, mesial temporal lobe and additionally at small site of left occipital cortex. On the right, a smaller thalamic infarct is recent as well.   - Source of stroke may have been multiple transient hypercoagulable states while on argatroban drip  - switched to Eliquis 5 BID 1/29    #Poor Functional Status   Patient noted to have trouble with ADLs - has residual RUE>RLE weakness and difficulty with fine motor movements. Is also intermittently confused - has waxing/waning mental status with intermittent episodes of trying to leave hospital. Have also walked with patient - is extremely unsteady on his feet.   -1st PT eval - home with no needs. 2nd eval - home with home PT  -PT re-eval on 1/30 - stated patient would greatly benefit from ZEINAB  -Patient will greatly benefit from ZEINAB. Per discussion with brother and son - patient lives at home with his son and granddaughter (8 years old). Son works night shift and takes care of his daughter so will not be able to take care of patient. Brother will also not be able to take care of patient - has no family assist at this time.  -F/u social work recommendations

## 2021-02-01 NOTE — PROGRESS NOTE ADULT - PROBLEM SELECTOR PLAN 6
HFrEF (30-35% 1/2020) status post AICD on Toprol, Lipitor, no ACE secondary to allergy. History of CAD with MI in 1996.   -1/31 restarted beta blocker- lopressor 100mg BID  -restarted lipitor   -No ACE/ARB due to allergy, no antiplatelets due to GIB history

## 2021-02-02 LAB
ALBUMIN SERPL ELPH-MCNC: 3.7 G/DL — SIGNIFICANT CHANGE UP (ref 3.3–5)
ALP SERPL-CCNC: 53 U/L — SIGNIFICANT CHANGE UP (ref 40–120)
ALT FLD-CCNC: 21 U/L — SIGNIFICANT CHANGE UP (ref 10–45)
ANION GAP SERPL CALC-SCNC: 13 MMOL/L — SIGNIFICANT CHANGE UP (ref 5–17)
AST SERPL-CCNC: 28 U/L — SIGNIFICANT CHANGE UP (ref 10–40)
BILIRUB SERPL-MCNC: 0.3 MG/DL — SIGNIFICANT CHANGE UP (ref 0.2–1.2)
BUN SERPL-MCNC: 15 MG/DL — SIGNIFICANT CHANGE UP (ref 7–23)
CALCIUM SERPL-MCNC: 9.2 MG/DL — SIGNIFICANT CHANGE UP (ref 8.4–10.5)
CHLORIDE SERPL-SCNC: 103 MMOL/L — SIGNIFICANT CHANGE UP (ref 96–108)
CO2 SERPL-SCNC: 23 MMOL/L — SIGNIFICANT CHANGE UP (ref 22–31)
CREAT SERPL-MCNC: 1.01 MG/DL — SIGNIFICANT CHANGE UP (ref 0.5–1.3)
GLUCOSE SERPL-MCNC: 84 MG/DL — SIGNIFICANT CHANGE UP (ref 70–99)
HCT VFR BLD CALC: 43.3 % — SIGNIFICANT CHANGE UP (ref 39–50)
HGB BLD-MCNC: 13.3 G/DL — SIGNIFICANT CHANGE UP (ref 13–17)
MAGNESIUM SERPL-MCNC: 1.6 MG/DL — SIGNIFICANT CHANGE UP (ref 1.6–2.6)
MCHC RBC-ENTMCNC: 27.2 PG — SIGNIFICANT CHANGE UP (ref 27–34)
MCHC RBC-ENTMCNC: 30.7 GM/DL — LOW (ref 32–36)
MCV RBC AUTO: 88.5 FL — SIGNIFICANT CHANGE UP (ref 80–100)
NRBC # BLD: 0 /100 WBCS — SIGNIFICANT CHANGE UP (ref 0–0)
PHOSPHATE SERPL-MCNC: 3.6 MG/DL — SIGNIFICANT CHANGE UP (ref 2.5–4.5)
PLATELET # BLD AUTO: 307 K/UL — SIGNIFICANT CHANGE UP (ref 150–400)
POTASSIUM SERPL-MCNC: 4.6 MMOL/L — SIGNIFICANT CHANGE UP (ref 3.5–5.3)
POTASSIUM SERPL-SCNC: 4.6 MMOL/L — SIGNIFICANT CHANGE UP (ref 3.5–5.3)
PROT SERPL-MCNC: 7.5 G/DL — SIGNIFICANT CHANGE UP (ref 6–8.3)
RBC # BLD: 4.89 M/UL — SIGNIFICANT CHANGE UP (ref 4.2–5.8)
RBC # FLD: 15.6 % — HIGH (ref 10.3–14.5)
SARS-COV-2 RNA SPEC QL NAA+PROBE: SIGNIFICANT CHANGE UP
SODIUM SERPL-SCNC: 139 MMOL/L — SIGNIFICANT CHANGE UP (ref 135–145)
WBC # BLD: 4.7 K/UL — SIGNIFICANT CHANGE UP (ref 3.8–10.5)
WBC # FLD AUTO: 4.7 K/UL — SIGNIFICANT CHANGE UP (ref 3.8–10.5)

## 2021-02-02 PROCEDURE — 99233 SBSQ HOSP IP/OBS HIGH 50: CPT

## 2021-02-02 RX ORDER — MAGNESIUM SULFATE 500 MG/ML
2 VIAL (ML) INJECTION ONCE
Refills: 0 | Status: COMPLETED | OUTPATIENT
Start: 2021-02-02 | End: 2021-02-02

## 2021-02-02 RX ADMIN — APIXABAN 5 MILLIGRAM(S): 2.5 TABLET, FILM COATED ORAL at 06:17

## 2021-02-02 RX ADMIN — APIXABAN 5 MILLIGRAM(S): 2.5 TABLET, FILM COATED ORAL at 17:01

## 2021-02-02 RX ADMIN — Medication 50 GRAM(S): at 17:00

## 2021-02-02 RX ADMIN — PANTOPRAZOLE SODIUM 40 MILLIGRAM(S): 20 TABLET, DELAYED RELEASE ORAL at 11:03

## 2021-02-02 RX ADMIN — Medication 100 MILLIGRAM(S): at 17:01

## 2021-02-02 RX ADMIN — Medication 1 DROP(S): at 11:40

## 2021-02-02 RX ADMIN — DONEPEZIL HYDROCHLORIDE 5 MILLIGRAM(S): 10 TABLET, FILM COATED ORAL at 22:22

## 2021-02-02 RX ADMIN — Medication 100 MILLIGRAM(S): at 06:17

## 2021-02-02 RX ADMIN — ATORVASTATIN CALCIUM 80 MILLIGRAM(S): 80 TABLET, FILM COATED ORAL at 22:21

## 2021-02-02 NOTE — PROGRESS NOTE ADULT - SUBJECTIVE AND OBJECTIVE BOX
Patient is a 65y old  Male who presents with a chief complaint of EtOH withdrawal (02 Feb 2021 15:17)      INTERVAL HPI/OVERNIGHT EVENTS:  Seen by me this morning, remains confused although fully alert and awake. No new neurologic deficits. +Blurry vision.    Review of Systems: 12 point review of systems otherwise negative    MEDICATIONS  (STANDING):  apixaban 5 milliGRAM(s) Oral every 12 hours  artificial  tears Solution 1 Drop(s) Both EYES daily  atorvastatin 80 milliGRAM(s) Oral at bedtime  donepezil 5 milliGRAM(s) Oral at bedtime  metoprolol tartrate 100 milliGRAM(s) Oral two times a day  pantoprazole    Tablet 40 milliGRAM(s) Oral every 24 hours    MEDICATIONS  (PRN):      Allergies    Capoten (Short breath; Rash; Hives)  heparin (Other (Mod to Severe))  penicillin (Short breath; Rash; Hives)    Intolerances          Vital Signs Last 24 Hrs  T(C): 36.8 (02 Feb 2021 12:18), Max: 36.8 (01 Feb 2021 21:33)  T(F): 98.3 (02 Feb 2021 12:18), Max: 98.3 (02 Feb 2021 12:18)  HR: 61 (02 Feb 2021 12:18) (60 - 71)  BP: 109/75 (02 Feb 2021 12:18) (109/75 - 133/91)  BP(mean): --  RR: 18 (02 Feb 2021 12:18) (18 - 20)  SpO2: 96% (02 Feb 2021 12:18) (96% - 99%)  CAPILLARY BLOOD GLUCOSE            Physical Exam:    Daily     Daily   General: Well appearing, NAD, confused  HEENT:  Nonicteric, PERRLA  CV:  RRR, no murmur, no JVD  Lungs:  CTA B/L, no wheezes, rales, rhonchi  Abdomen:  Soft, non-tender, no distended, positive BS  Extremities:  2+ pulses, no c/c, no edema  Skin:  Warm and dry, no rashes  :  No feliz  Neuro: AOx1, moves all 4 extremities  No Restraints    LABS:                        13.3   4.70  )-----------( 307      ( 02 Feb 2021 11:33 )             43.3     02-02    139  |  103  |  15  ----------------------------<  84  4.6   |  23  |  1.01    Ca    9.2      02 Feb 2021 11:33  Phos  3.6     02-02  Mg     1.6     02-02    TPro  7.5  /  Alb  3.7  /  TBili  0.3  /  DBili  x   /  AST  28  /  ALT  21  /  AlkPhos  53  02-02            RADIOLOGY & ADDITIONAL TESTS:  Reviewed by me

## 2021-02-02 NOTE — PROGRESS NOTE ADULT - ASSESSMENT
63-year-old male with a past medical history of HIT, ETOH abuse, paroxysmal A-fib with an LV thrombus, HLD, CAD status post PCI, HFrEF (35-40% 2019) status post AICD, hemorrhoids, gastritis, and a recent admission in August for alcohol withdrawal who presented with signs and symptoms of alcohol withdrawal. Found to have multiple infarctions involving L thalamus, L posterior cerebral artery - transferred to [facility] for more frequent monitoring and hemorrhagic conversion surveillance. Now pending ZEINAB placement.

## 2021-02-02 NOTE — PROGRESS NOTE ADULT - SUBJECTIVE AND OBJECTIVE BOX
Pt is more alert and awake but confused   Calls me Dr Metcalf       PAST MEDICAL & SURGICAL HISTORY:  CAD (coronary artery disease)    ETOH abuse    Hyperlipidemia    Heparin-induced thrombocytopenia  Antibody positive    Left ventricular thrombus    Atrial fibrillation    Hemorrhoid    ICD (implantable cardioverter-defibrillator) in place    Myocardial infarction involving other coronary artery    Gastritis    Atherosclerosis of coronary artery  Coronary artery disease    S/P coronary artery stent placement    Automatic implantable cardiac defibrillator in situ  ICD (implantable cardioverter-defibrillator) in place      ICU Vital Signs Last 24 Hrs  T(C): 36.6 (02 Feb 2021 05:28), Max: 37.1 (01 Feb 2021 18:00)  T(F): 97.9 (02 Feb 2021 05:28), Max: 98.7 (01 Feb 2021 18:00)  HR: 67 (02 Feb 2021 06:17) (60 - 78)  BP: 118/81 (02 Feb 2021 05:28) (111/73 - 133/91)  BP(mean): 87 (01 Feb 2021 11:55) (87 - 87)  ABP: --  ABP(mean): --  RR: 20 (02 Feb 2021 05:28) (18 - 20)  SpO2: 96% (02 Feb 2021 05:28) (95% - 99%)      Lungs clear  CV  s1s2     abd soft  ext stable                          13.0   4.00  )-----------( 332      ( 01 Feb 2021 06:17 )             41.7       02-01    138  |  103  |  16  ----------------------------<  93  4.8   |  26  |  1.13    Ca    9.7      01 Feb 2021 06:17  Phos  3.9     02-01  Mg     1.9     02-01            MEDICATIONS  (STANDING):  apixaban 5 milliGRAM(s) Oral every 12 hours  artificial  tears Solution 1 Drop(s) Both EYES daily  atorvastatin 80 milliGRAM(s) Oral at bedtime  donepezil 5 milliGRAM(s) Oral at bedtime  metoprolol tartrate 100 milliGRAM(s) Oral two times a day  pantoprazole    Tablet 40 milliGRAM(s) Oral every 24 hours    MEDICATIONS  (PRN):

## 2021-02-02 NOTE — PROGRESS NOTE ADULT - ASSESSMENT
65 year old male with,    # Multiple recent B/L infarcts/CVA: care per Stroke. Current symptoms of blurry vision explained by location of CVA affecting posterior circulation. Etiology 2/2 LV thrombus, C/w statin/Eliquis. PT/OT --> ZEINAB.    # Aspiration pneumonia: completed course of Abx last week.    # Metabolic encephalopathy: waxing and waning, likely multifactorial, alcohol withdrawal/CVA and pneumonia related. VEEG without e/o epilepsy.    # History of HIT: transitioned to eliquis, to be continued. Apprec Hem recs. PF4 is negative. Thrombocytopenia has resolved.    # CAD/Chronic systolic CHF: not in acute exacerbation. Cards f/up noted. C/w statin.    # Paroxysmal atrial fibrillation: c/w eliquis and metoprolol.    # Alcohol withdrawal: has resolved. Alcohol dependence-etoh cessation encouraged.    D/w Stroke Team, will continue to follow up with you.  D/w GIANA, working on DC plan to ZEINAB.

## 2021-02-02 NOTE — PROGRESS NOTE ADULT - ASSESSMENT
per Internal Medicine    65 year old male with,    # Multiple recent B/L infarcts/CVA: care per Stroke. Current symptoms of blurry vision explained by location of CVA affecting posterior circulation. Etiology 2/2 LV thrombus, C/w statin/Eliquis. PT/OT -- > ZEINAB.    # Aspiration pneumonia: completed course of Abx last Friday.    # Metabolic encephalopathy: waxing and waning, likely multifactorial, alcohol withdrawal/CVA/pneumonia related. VEEG without e/o epilepsy.    # History of HIT: transitioned to eliquis, to be continued. Apprec Hem recs. PF4 is negative. Thrombocytopenia has resolved.    # CAD/Chronic systolic CHF: not in acute exacerbation. Cards f/up noted. C/w statin.    # Paroxysmal atrial fibrillation: c/w eliquis and metoprolol.    # Alcohol withdrawal: has resolved. Alcohol dependence-etoh cessation encouraged.    per Neurology    Problem/Plan - 1:  ·  Problem: Stroke.  Plan: Pt with CTA performed o/n which showed small subacute left basal ganglia/thalamic infarction. Pt went for MRI 1/26 which showed recent infarctions in the posterior circulation, predominantly left sided in the thalamus, midbrain, cerebellum, mesial temporal lobe and additionally at small site of left occipital cortex. On the right, a smaller thalamic infarct is recent as well.   - Source of stroke may have been multiple transient hypercoagulable states while on argatroban drip  - switched to Eliquis 5 BID 1/29    #Poor Functional Status   Patient noted to have trouble with ADLs - has residual RUE>RLE weakness and difficulty with fine motor movements. Is also intermittently confused - has waxing/waning mental status with intermittent episodes of trying to leave hospital. Have also walked with patient - is extremely unsteady on his feet.   -1st PT eval - home with no needs. 2nd eval - home with home PT  -PT re-eval on 1/30 - stated patient would greatly benefit from ZEINAB  -Patient will greatly benefit from ZEINAB. Per discussion with brother and son - patient lives at home with his son and granddaughter (8 years old). Son works night shift and takes care of his daughter so will not be able to take care of patient. Brother will also not be able to take care of patient - has no family assist at this time.  -F/u social work recommendations.     Problem/Plan - 2:  ·  Problem: AMS (altered mental status).  Plan: Secondary to unknown etiology - most likely related to his multiple strokes   -stroke code called 1/20 AM when lethargy first observed - vEEG on 1/21 did not show any seizure activity. abg wnl, utox wnl  -Imaging from 1/25 and 1/26 shows evidence of multiple strokes (brainstem and bilateral thalamic strokes)  -As of 1/27, patient has fluctuating mental status - EEG started briefly, but was normal  -Started Donepezil 5mg nightly to improve cognition.     Problem/Plan - 3:  ·  Problem: Left ventricular thrombus.  Plan: History of LV thrombus with no thrombus on most recent ECHO during this admission. Life-long AC required. Takes Eliquis 5mg BID at home  - Switched argatroban gtt to Eliquis 5 BID  - Echo 1/20: Moderately-to-severely reduced left ventricular systolic function. The ejection fraction is 30%. Entire anterior septum, mid inferolateral segment, apical inferior segment, and apex are akinetic. Remaining segments are hypokinetic. LV thrombus seen  -Repeat TTE 1/28: thrombus in the apex of LV   -PF4-AB and IVANNA are negative.     Problem/Plan - 4:  ·  Problem: Atrial fibrillation.  Plan: History of A-fib on Eliquis and Toprol XL. CHADS-VASC 4, HAS-BLED 3.  -Switched argatroban gtt to Eliquis 5 BID; 1/31 restarted lopressor 100mg BID.     Problem/Plan - 5:  ·  Problem: Chronic systolic heart failure.  Plan: HFrEF (30-35% 1/2020) status post AICD on Toprol, Lipitor, no ACE secondary to allergy. History of CAD with MI in 1996.   -1/31 restarted beta blocker- lopressor 100mg BID  -restarted lipitor   -No ACE/ARB due to allergy, no antiplatelets due to GIB history.     Problem/Plan - 6:  Problem: Atherosclerosis of coronary artery. Plan: HFrEF (30-35% 1/2020) status post AICD on Toprol, Lipitor, no ACE secondary to allergy. History of CAD with MI in 1996.   -1/31 restarted beta blocker- lopressor 100mg BID  -restarted lipitor   -No ACE/ARB due to allergy, no antiplatelets due to GIB history.    Problem/Plan - 7:  ·  Problem: Gastritis.  Plan: History of GIB and gastritis likely from alcohol use. No signs of active bleeding.  -Continue Protonix 40mg.     Problem/Plan - 8:  ·  Problem: Nutrition, metabolism, and development symptoms.  Plan: F: none  E: Replete PRN  N: dysphagia diet   DVT ppx: Eliquis 5 BID  GI ppx: Protonix 40mg PO  CODE: FULL

## 2021-02-02 NOTE — PROGRESS NOTE ADULT - SUBJECTIVE AND OBJECTIVE BOX
O/N Events:  Subjective/ROS: Denies HA, CP, SOB, n/v, changes in bowel/urinary habits.  12pt ROS otherwise negative.    VITALS  Vital Signs Last 24 Hrs  T(C): 37.1 (02 Feb 2021 20:35), Max: 37.1 (02 Feb 2021 20:35)  T(F): 98.7 (02 Feb 2021 20:35), Max: 98.7 (02 Feb 2021 20:35)  HR: 80 (02 Feb 2021 20:35) (60 - 80)  BP: 119/76 (02 Feb 2021 20:35) (109/75 - 133/91)  BP(mean): --  RR: 18 (02 Feb 2021 20:35) (18 - 20)  SpO2: 97% (02 Feb 2021 20:35) (96% - 99%)    CAPILLARY BLOOD GLUCOSE    PHYSICAL EXAM  General: A&Ox1 NAD  Head: NC/AT; MMM; PERRL; EOMI;  Neck: Supple; no JVD  Respiratory: CTA B/L; no wheezes/crackles   Cardiovascular: Regular rhythm/rate; S1/S2   Gastrointestinal: Soft; NTND; normoactive BS  Extremities: WWP; no edema/cyanosis  Constitutional: WDWN resting comfortably in bed; NAD; eyes open and conversational  Head: NC/AT; able to perform head-chin without pain or restriction  Eyes: pupils constricted but reactive BL, EOMI; anicteric sclera  ENT: no nasal discharge; MMM; poor dentition  Neck: supple; no JVD  Respiratory: CTA B/L; no W/R/R  Cardiac: +S1/S2; RRR; no M/R/G  Gastrointestinal: soft, NT/ND; no rebound or guarding; +BSx4  Extremities: WWP, no clubbing or cyanosis; no peripheral edema  Neurologic:  -Mental status: alert and awake. conversational. Remote memory intact. AOx1-2 (self, time); often mumbles his answer, waxing/waning mental status   -Cranial nerves:   II: visual fields are full to confrontation.  III, IV, VI: EOMI without nystagmus, difficulty with upward gaze. Pupils equally constricted with sluggish response to light  V:  Facial sensation V1-V3 equal and intact   VII: Face is symmetric with normal eye closure and smile  VIII: Hearing is bilaterally intact  IX, X: Uvula is midline and soft palate rises symmetrically  XI: Head turning and shoulder shrug are intact.  XII: Tongue protrudes midline  Motor: Moving all extremities spontaneously, normal bulk and tone. Normal muscle strength 5/5 L shoulders flexion/extension/abduction and elbow flexion/extension, and handgrip. 4/5 in all movement of R side. Pronator drift neg BL UE, mm strength 5/5 L hip flexion/extension/abduction/adduction, 4/5 R hip/knee flexion/extension (but improved), 5/5 BL dorsiflexion/plantarflexion. Poor fine motor movement of the RUE, however improving.   Sensation: Intact to light touch bilaterally. Proprioception intact bilaterally.   Reflexes: Downgoing toes bilaterally; 2+ DTRs BL patellar/achilles    MEDICATIONS  (STANDING):  apixaban 5 milliGRAM(s) Oral every 12 hours  artificial  tears Solution 1 Drop(s) Both EYES daily  atorvastatin 80 milliGRAM(s) Oral at bedtime  donepezil 5 milliGRAM(s) Oral at bedtime  metoprolol tartrate 100 milliGRAM(s) Oral two times a day  pantoprazole    Tablet 40 milliGRAM(s) Oral every 24 hours    MEDICATIONS  (PRN):      Capoten (Short breath; Rash; Hives)  heparin (Other (Mod to Severe))  penicillin (Short breath; Rash; Hives)      LABS                        13.3   4.70  )-----------( 307      ( 02 Feb 2021 11:33 )             43.3     02-02    139  |  103  |  15  ----------------------------<  84  4.6   |  23  |  1.01    Ca    9.2      02 Feb 2021 11:33  Phos  3.6     02-02  Mg     1.6     02-02    TPro  7.5  /  Alb  3.7  /  TBili  0.3  /  DBili  x   /  AST  28  /  ALT  21  /  AlkPhos  53  02-02              IMAGING/EKG/ETC  EKG:  Xray:  CT:  MRI:

## 2021-02-02 NOTE — PROGRESS NOTE ADULT - ASSESSMENT
CAD  Cardiomyopathy  LV Thrombus  Multiple new CVA Infarcts     Cont med management     Placement for care   Pt unable to care for himself

## 2021-02-02 NOTE — PROGRESS NOTE ADULT - SUBJECTIVE AND OBJECTIVE BOX
Physical Medicine and Rehabilitation Progress Note:    Patient is a 65y old  Male who presents with a chief complaint of EtOH withdrawal (02 Feb 2021 11:20)      HPI:  Mr. Torrez is a 65yo M w/ poor med adherence, PMHx of LV thrombus (resolved on Jan 2020 echo; reappeared on Nov 2020 echo) pAF on eliquis, CAD s/p PCI, HFrEF 30-35% s/p AICD, HIT, hemorrhoids, gastritis, GIB, hx multiple admissions for EtOH withdrawal (last drink 1/16; no hx seizures, DTs, or intubations), who presents due to concern for alcohol withdrawal. Pt last seen at [facility] in March 2020 when he was admitted for EtOH withdrawal w/ initial CIWA ~24 and associated visual hallucinations, which improved after being started on librium 50mg Q8hrs, and subsequently discharged on librium taper. Per outpatient records that pt himself brought with him to the ED, pt had additional episode EtOH withdrawal in November where he was seen at [facility] and subsequently discharged on librium taper 25mg BID. At that time, pt also underwent TTE imaging which showed LV EF 38% as well as a large protruding fixed thrombus attached to LV apex. Noted on discharge paperwork (11/22) to not be on ASA due to thrombocytopenia, not on warfarin due to prior poor adherence, and accordingly discharged on eliquis w/ a discharge diagnosis of afib/aflutter. Pt has since twice again been admitted at [facility] (12/8/21 and 1/6/21) for 1-2mo hx of persistent "funny feeling in my chest", determined to be 2/2 Afib/aflutter. Between those two admissions, pt had also been prescribed hydralazine and isosorbide dinitrate, however pt reports refusing to take them after reading about the side-effects in conjunction w/ alcohol-use. Pt says that for the past 1 month, he has been trying but struggling to wean off his alcohol consumption which he estimates to be a ~1pint/day. He says for the past week he has been especially focused on quitting in anticipation of an appointment with his outpatient cardiologist. Today he reports experiencing sx consistent w/ his previous episodes of withdrawal including agitation, shaking, chest pain, sob, nausea, and a racing heart. He otherwise denies recent or active fever, chills, vomiting, abdominal pain, back pain, changes in vision or hearing, genitourinary sx, extremity pain or swelling.    ED Course:  Vitals: T 99.7F oral, ->115->103->89; /86; RR 20; SpO2 96% on RA  Labs: CBC WNL, CMP WNL, troponin WNL; lactate 3.5->2.4; AG 21->16  Therapies: apixaban 5mg x1; ASA 325mg x1; librium 50mg x1; IV ativan 2mg x1; MgSo4 2g x1; PO thiamine 100mg x1; IV protonix 40mg x1; IV zofan 4mg x1; malaax 30ml x1; folic acid 1mg x1; IV lopressor 100mg x1; 1L NS x4  ECG: +RAD; ST elevations leads V2-V4; TWI V5-V6 (ECG findings unchanged from prior ECG performed on March 19th, 2020)   (17 Jan 2021 16:24)                            13.3   4.70  )-----------( 307      ( 02 Feb 2021 11:33 )             43.3       02-02    139  |  103  |  15  ----------------------------<  84  4.6   |  23  |  1.01    Ca    9.2      02 Feb 2021 11:33  Phos  3.6     02-02  Mg     1.6     02-02    TPro  7.5  /  Alb  3.7  /  TBili  0.3  /  DBili  x   /  AST  28  /  ALT  21  /  AlkPhos  53  02-02    Vital Signs Last 24 Hrs  T(C): 36.8 (02 Feb 2021 12:18), Max: 37.1 (01 Feb 2021 18:00)  T(F): 98.3 (02 Feb 2021 12:18), Max: 98.7 (01 Feb 2021 18:00)  HR: 61 (02 Feb 2021 12:18) (60 - 76)  BP: 109/75 (02 Feb 2021 12:18) (109/75 - 133/91)  BP(mean): --  RR: 18 (02 Feb 2021 12:18) (18 - 20)  SpO2: 96% (02 Feb 2021 12:18) (95% - 99%)    MEDICATIONS  (STANDING):  apixaban 5 milliGRAM(s) Oral every 12 hours  artificial  tears Solution 1 Drop(s) Both EYES daily  atorvastatin 80 milliGRAM(s) Oral at bedtime  donepezil 5 milliGRAM(s) Oral at bedtime  metoprolol tartrate 100 milliGRAM(s) Oral two times a day  pantoprazole    Tablet 40 milliGRAM(s) Oral every 24 hours    MEDICATIONS  (PRN):    Currently Undergoing Physical/ Occupational Therapy at bedside.    Functional Status Assessment:   2/1/2021      Therapeutic Interventions      Bed Mobility  Bed Mobility Training Rehab Potential: fair, will monitor progress closely  Bed Mobility Training Symptoms Noted During/After Treatment: fatigue  Bed Mobility Training Rolling/Turning: supervision  Bed Mobility Training Scooting: contact guard  Bed Mobility Training Sit-to-Supine: minimum assist (75% patient effort);  nonverbal cues (demo/gestures);  verbal cues;  1 person assist  Bed Mobility Training Supine-to-Sit: minimum assist (75% patient effort);  verbal cues;  nonverbal cues (demo/gestures);  1 person assist;  bed rails  Bed Mobility Training Limitations: decreased ability to use arms for pushing/pulling;  decreased ability to use legs for bridging/pushing;  decreased strength;  impaired balance;  cognitive, decreased safety awareness    Sit-Stand Transfer Training  Sit-to-Stand Transfer Training Rehab Potential: fair, will monitor progress closely  Sit-to-Stand Transfer Training Symptoms Noted During/After Treatment: fatigue  Transfer Training Sit-to-Stand Transfer: minimum assist (75% patient effort);  verbal cues;  nonverbal cues (demo/gestures);  1 person assist;  rolling walker  Transfer Training Stand-to-Sit Transfer: contact guard;  rolling walker  Sit-to-Stand Transfer Training Transfer Safety Analysis: decreased strength;  impaired balance;  cognitive, decreased safety awareness    Gait Training  Gait Training Rehab Potential: fair, will monitor progress closely  Gait Training Symptoms Noted During/After Treatment: fatigue  Gait Training: minimum assist (75% patient effort);  verbal cues;  1 person assist;  nonverbal cues (demo/gestures);  rolling walker;  50 feet  Gait Analysis: decreased renetta;  increased time in double stance;  decreased toe clearance;  decreased strength;  cognitive, decreased safety awareness          PM&R Impression: as above    Current Disposition Plan Recommendations:    subacute rehab placement

## 2021-02-02 NOTE — PROGRESS NOTE ADULT - PROBLEM SELECTOR PLAN 1
Pt with CTA performed o/n which showed small subacute left basal ganglia/thalamic infarction. Pt went for MRI 1/26 which showed recent infarctions in the posterior circulation, predominantly left sided in the thalamus, midbrain, cerebellum, mesial temporal lobe and additionally at small site of left occipital cortex. On the right, a smaller thalamic infarct is recent as well.   - Source of stroke may have been multiple transient hypercoagulable states while on argatroban drip  - switched to Eliquis 5 BID 1/29    #Poor Functional Status   Patient noted to have trouble with ADLs - has residual RUE>RLE weakness and difficulty with fine motor movements. Is also intermittently confused - has waxing/waning mental status with intermittent episodes of trying to leave hospital. Have also walked with patient - is extremely unsteady on his feet.   -1st PT eval - home with no needs. 2nd eval - home with home PT  -PT re-eval on 1/30 - stated patient would greatly benefit from ZEINAB  -Patient will greatly benefit from ZIENAB. Per discussion with brother and son - patient lives at home with his son and granddaughter (8 years old). Son works night shift and takes care of his daughter so will not be able to take care of patient. Brother will also not be able to take care of patient - has no family assist at this time.  -F/u social work recommendations

## 2021-02-03 ENCOUNTER — TRANSCRIPTION ENCOUNTER (OUTPATIENT)
Age: 66
End: 2021-02-03

## 2021-02-03 VITALS — HEART RATE: 94 BPM | DIASTOLIC BLOOD PRESSURE: 91 MMHG | SYSTOLIC BLOOD PRESSURE: 125 MMHG

## 2021-02-03 LAB
ANION GAP SERPL CALC-SCNC: 9 MMOL/L — SIGNIFICANT CHANGE UP (ref 5–17)
APTT BLD: 34.3 SEC — SIGNIFICANT CHANGE UP (ref 27.5–35.5)
BUN SERPL-MCNC: 17 MG/DL — SIGNIFICANT CHANGE UP (ref 7–23)
CALCIUM SERPL-MCNC: 9.4 MG/DL — SIGNIFICANT CHANGE UP (ref 8.4–10.5)
CHLORIDE SERPL-SCNC: 102 MMOL/L — SIGNIFICANT CHANGE UP (ref 96–108)
CO2 SERPL-SCNC: 28 MMOL/L — SIGNIFICANT CHANGE UP (ref 22–31)
CREAT SERPL-MCNC: 1.18 MG/DL — SIGNIFICANT CHANGE UP (ref 0.5–1.3)
GLUCOSE SERPL-MCNC: 90 MG/DL — SIGNIFICANT CHANGE UP (ref 70–99)
HCT VFR BLD CALC: 40.5 % — SIGNIFICANT CHANGE UP (ref 39–50)
HGB BLD-MCNC: 12.6 G/DL — LOW (ref 13–17)
MAGNESIUM SERPL-MCNC: 2 MG/DL — SIGNIFICANT CHANGE UP (ref 1.6–2.6)
MCHC RBC-ENTMCNC: 27.2 PG — SIGNIFICANT CHANGE UP (ref 27–34)
MCHC RBC-ENTMCNC: 31.1 GM/DL — LOW (ref 32–36)
MCV RBC AUTO: 87.3 FL — SIGNIFICANT CHANGE UP (ref 80–100)
NRBC # BLD: 0 /100 WBCS — SIGNIFICANT CHANGE UP (ref 0–0)
PHOSPHATE SERPL-MCNC: 3.8 MG/DL — SIGNIFICANT CHANGE UP (ref 2.5–4.5)
PLATELET # BLD AUTO: 301 K/UL — SIGNIFICANT CHANGE UP (ref 150–400)
POTASSIUM SERPL-MCNC: 4.1 MMOL/L — SIGNIFICANT CHANGE UP (ref 3.5–5.3)
POTASSIUM SERPL-SCNC: 4.1 MMOL/L — SIGNIFICANT CHANGE UP (ref 3.5–5.3)
RBC # BLD: 4.64 M/UL — SIGNIFICANT CHANGE UP (ref 4.2–5.8)
RBC # FLD: 15.6 % — HIGH (ref 10.3–14.5)
SODIUM SERPL-SCNC: 139 MMOL/L — SIGNIFICANT CHANGE UP (ref 135–145)
WBC # BLD: 4.22 K/UL — SIGNIFICANT CHANGE UP (ref 3.8–10.5)
WBC # FLD AUTO: 4.22 K/UL — SIGNIFICANT CHANGE UP (ref 3.8–10.5)

## 2021-02-03 PROCEDURE — 87641 MR-STAPH DNA AMP PROBE: CPT

## 2021-02-03 PROCEDURE — 82728 ASSAY OF FERRITIN: CPT

## 2021-02-03 PROCEDURE — 96361 HYDRATE IV INFUSION ADD-ON: CPT

## 2021-02-03 PROCEDURE — 97116 GAIT TRAINING THERAPY: CPT

## 2021-02-03 PROCEDURE — 82550 ASSAY OF CK (CPK): CPT

## 2021-02-03 PROCEDURE — 84443 ASSAY THYROID STIM HORMONE: CPT

## 2021-02-03 PROCEDURE — 82803 BLOOD GASES ANY COMBINATION: CPT

## 2021-02-03 PROCEDURE — 84132 ASSAY OF SERUM POTASSIUM: CPT

## 2021-02-03 PROCEDURE — 36415 COLL VENOUS BLD VENIPUNCTURE: CPT

## 2021-02-03 PROCEDURE — 85610 PROTHROMBIN TIME: CPT

## 2021-02-03 PROCEDURE — 70450 CT HEAD/BRAIN W/O DYE: CPT

## 2021-02-03 PROCEDURE — 0042T: CPT

## 2021-02-03 PROCEDURE — 83550 IRON BINDING TEST: CPT

## 2021-02-03 PROCEDURE — 85025 COMPLETE CBC W/AUTO DIFF WBC: CPT

## 2021-02-03 PROCEDURE — 80048 BASIC METABOLIC PNL TOTAL CA: CPT

## 2021-02-03 PROCEDURE — 92610 EVALUATE SWALLOWING FUNCTION: CPT

## 2021-02-03 PROCEDURE — 83735 ASSAY OF MAGNESIUM: CPT

## 2021-02-03 PROCEDURE — 86900 BLOOD TYPING SEROLOGIC ABO: CPT

## 2021-02-03 PROCEDURE — 83615 LACTATE (LD) (LDH) ENZYME: CPT

## 2021-02-03 PROCEDURE — 82607 VITAMIN B-12: CPT

## 2021-02-03 PROCEDURE — 85732 THROMBOPLASTIN TIME PARTIAL: CPT

## 2021-02-03 PROCEDURE — 85730 THROMBOPLASTIN TIME PARTIAL: CPT

## 2021-02-03 PROCEDURE — 86022 PLATELET ANTIBODIES: CPT

## 2021-02-03 PROCEDURE — 86901 BLOOD TYPING SEROLOGIC RH(D): CPT

## 2021-02-03 PROCEDURE — 80053 COMPREHEN METABOLIC PANEL: CPT

## 2021-02-03 PROCEDURE — 99285 EMERGENCY DEPT VISIT HI MDM: CPT | Mod: 25

## 2021-02-03 PROCEDURE — 85379 FIBRIN DEGRADATION QUANT: CPT

## 2021-02-03 PROCEDURE — 70551 MRI BRAIN STEM W/O DYE: CPT

## 2021-02-03 PROCEDURE — 82140 ASSAY OF AMMONIA: CPT

## 2021-02-03 PROCEDURE — 82330 ASSAY OF CALCIUM: CPT

## 2021-02-03 PROCEDURE — 95714 VEEG EA 12-26 HR UNMNTR: CPT

## 2021-02-03 PROCEDURE — 80076 HEPATIC FUNCTION PANEL: CPT

## 2021-02-03 PROCEDURE — 93306 TTE W/DOPPLER COMPLETE: CPT

## 2021-02-03 PROCEDURE — 85027 COMPLETE CBC AUTOMATED: CPT

## 2021-02-03 PROCEDURE — 71045 X-RAY EXAM CHEST 1 VIEW: CPT

## 2021-02-03 PROCEDURE — 93321 DOPPLER ECHO F-UP/LMTD STD: CPT

## 2021-02-03 PROCEDURE — U0005: CPT

## 2021-02-03 PROCEDURE — 83540 ASSAY OF IRON: CPT

## 2021-02-03 PROCEDURE — 87522 HEPATITIS C REVRS TRNSCRPJ: CPT

## 2021-02-03 PROCEDURE — 84100 ASSAY OF PHOSPHORUS: CPT

## 2021-02-03 PROCEDURE — 80307 DRUG TEST PRSMV CHEM ANLYZR: CPT

## 2021-02-03 PROCEDURE — 95711 VEEG 2-12 HR UNMONITORED: CPT

## 2021-02-03 PROCEDURE — 87640 STAPH A DNA AMP PROBE: CPT

## 2021-02-03 PROCEDURE — 82962 GLUCOSE BLOOD TEST: CPT

## 2021-02-03 PROCEDURE — 97530 THERAPEUTIC ACTIVITIES: CPT

## 2021-02-03 PROCEDURE — 84295 ASSAY OF SERUM SODIUM: CPT

## 2021-02-03 PROCEDURE — 82746 ASSAY OF FOLIC ACID SERUM: CPT

## 2021-02-03 PROCEDURE — U0003: CPT

## 2021-02-03 PROCEDURE — 81001 URINALYSIS AUTO W/SCOPE: CPT

## 2021-02-03 PROCEDURE — 95700 EEG CONT REC W/VID EEG TECH: CPT

## 2021-02-03 PROCEDURE — 85611 PROTHROMBIN TEST: CPT

## 2021-02-03 PROCEDURE — 96365 THER/PROPH/DIAG IV INF INIT: CPT

## 2021-02-03 PROCEDURE — 99233 SBSQ HOSP IP/OBS HIGH 50: CPT

## 2021-02-03 PROCEDURE — 82553 CREATINE MB FRACTION: CPT

## 2021-02-03 PROCEDURE — 84484 ASSAY OF TROPONIN QUANT: CPT

## 2021-02-03 PROCEDURE — 87086 URINE CULTURE/COLONY COUNT: CPT

## 2021-02-03 PROCEDURE — 87040 BLOOD CULTURE FOR BACTERIA: CPT

## 2021-02-03 PROCEDURE — 70496 CT ANGIOGRAPHY HEAD: CPT

## 2021-02-03 PROCEDURE — 96375 TX/PRO/DX INJ NEW DRUG ADDON: CPT

## 2021-02-03 PROCEDURE — 84466 ASSAY OF TRANSFERRIN: CPT

## 2021-02-03 PROCEDURE — 86850 RBC ANTIBODY SCREEN: CPT

## 2021-02-03 PROCEDURE — 83605 ASSAY OF LACTIC ACID: CPT

## 2021-02-03 PROCEDURE — 85670 THROMBIN TIME PLASMA: CPT

## 2021-02-03 PROCEDURE — 70498 CT ANGIOGRAPHY NECK: CPT

## 2021-02-03 PROCEDURE — 85384 FIBRINOGEN ACTIVITY: CPT

## 2021-02-03 PROCEDURE — 97162 PT EVAL MOD COMPLEX 30 MIN: CPT

## 2021-02-03 PROCEDURE — 97535 SELF CARE MNGMENT TRAINING: CPT

## 2021-02-03 RX ORDER — DONEPEZIL HYDROCHLORIDE 10 MG/1
1 TABLET, FILM COATED ORAL
Qty: 0 | Refills: 0 | DISCHARGE
Start: 2021-02-03

## 2021-02-03 RX ADMIN — Medication 100 MILLIGRAM(S): at 17:23

## 2021-02-03 RX ADMIN — PANTOPRAZOLE SODIUM 40 MILLIGRAM(S): 20 TABLET, DELAYED RELEASE ORAL at 11:40

## 2021-02-03 RX ADMIN — APIXABAN 5 MILLIGRAM(S): 2.5 TABLET, FILM COATED ORAL at 05:26

## 2021-02-03 RX ADMIN — Medication 100 MILLIGRAM(S): at 05:26

## 2021-02-03 RX ADMIN — Medication 1 DROP(S): at 11:40

## 2021-02-03 RX ADMIN — APIXABAN 5 MILLIGRAM(S): 2.5 TABLET, FILM COATED ORAL at 17:24

## 2021-02-03 NOTE — PROGRESS NOTE ADULT - PROVIDER SPECIALTY LIST ADULT
Electrophysiology
Hospitalist
Cardiology
Hospitalist
Internal Medicine
Neurology
Rehab Medicine
Cardiology
Cardiology
Hospitalist
Hospitalist
Neurology
Cardiology
Heme/Onc
Hospitalist
Hospitalist
Neurology
Rehab Medicine
Neurology
Internal Medicine
Internal Medicine
Hospitalist
Internal Medicine
Internal Medicine
Neurology
Internal Medicine
Neurology
Neurology

## 2021-02-03 NOTE — DISCHARGE NOTE NURSING/CASE MANAGEMENT/SOCIAL WORK - PATIENT PORTAL LINK FT
You can access the FollowMyHealth Patient Portal offered by [facility] by registering at the following website: [email or web address]. By joining [facility] FollowMyHealth portal, you will also be able to view your health information using other applications (apps) compatible with our system.

## 2021-02-03 NOTE — PROGRESS NOTE ADULT - REASON FOR ADMISSION
EtOH withdrawal

## 2021-02-03 NOTE — PROGRESS NOTE ADULT - ASSESSMENT
CAD  Cardiomyopathy   CVA  LV Thrombus   Hypercoagulable   state        For discharge today     LEOPOLDO Levy MD

## 2021-02-03 NOTE — PROGRESS NOTE ADULT - SUBJECTIVE AND OBJECTIVE BOX
Pt is stable  but unchanged re confusion    s/p CVA    PAST MEDICAL & SURGICAL HISTORY:  CAD (coronary artery disease)    ETOH abuse    Hyperlipidemia    Heparin-induced thrombocytopenia  Antibody positive    Left ventricular thrombus    Atrial fibrillation    Hemorrhoid    ICD (implantable cardioverter-defibrillator) in place    Myocardial infarction involving other coronary artery    Gastritis    Atherosclerosis of coronary artery  Coronary artery disease    S/P coronary artery stent placement    Automatic implantable cardiac defibrillator in situ  ICD (implantable cardioverter-defibrillator) in place    ICU Vital Signs Last 24 Hrs  T(C): 36.8 (03 Feb 2021 11:47), Max: 37.1 (02 Feb 2021 20:35)  T(F): 98.3 (03 Feb 2021 11:47), Max: 98.7 (02 Feb 2021 20:35)  HR: 62 (03 Feb 2021 11:47) (57 - 80)  BP: 116/77 (03 Feb 2021 11:47) (116/77 - 137/84)  BP(mean): --  ABP: --  ABP(mean): --  RR: 18 (03 Feb 2021 11:47) (18 - 19)  SpO2: 99% (03 Feb 2021 11:47) (97% - 99%)      MEDICATIONS  (STANDING):  apixaban 5 milliGRAM(s) Oral every 12 hours  artificial  tears Solution 1 Drop(s) Both EYES daily  atorvastatin 80 milliGRAM(s) Oral at bedtime  donepezil 5 milliGRAM(s) Oral at bedtime  metoprolol tartrate 100 milliGRAM(s) Oral two times a day  pantoprazole    Tablet 40 milliGRAM(s) Oral every 24 hours    MEDICATIONS  (PRN):      Home Medications:  atorvastatin 80 mg oral tablet: 1 tab(s) orally once a day (at bedtime) (22 Jan 2021 17:12)  donepezil 5 mg oral tablet: 1 tab(s) orally once a day (at bedtime) (03 Feb 2021 14:17)  Metoprolol Tartrate 100 mg oral tablet: 1 tab(s) orally 2 times a day (22 Jan 2021 17:12)  Multiple Vitamins oral tablet: 1 tab(s) orally once a day (22 Jan 2021 17:12)  thiamine 100 mg oral tablet: 1 tab(s) orally once a day (17 Jan 2021 10:02)      Lungs clear  Cv   s1s2     abd soft     Ext stable                          12.6   4.22  )-----------( 301      ( 03 Feb 2021 06:28 )             40.5       02-03    139  |  102  |  17  ----------------------------<  90  4.1   |  28  |  1.18    Ca    9.4      03 Feb 2021 06:28  Phos  3.8     02-03  Mg     2.0     02-03    TPro  7.5  /  Alb  3.7  /  TBili  0.3  /  DBili  x   /  AST  28  /  ALT  21  /  AlkPhos  53  02-02      Pt going to [facility]

## 2021-02-03 NOTE — PROGRESS NOTE ADULT - SUBJECTIVE AND OBJECTIVE BOX
Patient is a 65y old  Male who presents with a chief complaint of EtOH withdrawal (03 Feb 2021 16:01)      INTERVAL HPI/OVERNIGHT EVENTS:  Seen by me this morning, doing well. Remains confused. Amenable to going to [facility]. No new neurologic deficits. Denies blurry vision today.    Review of Systems: 12 point review of systems otherwise negative    MEDICATIONS  (STANDING):  apixaban 5 milliGRAM(s) Oral every 12 hours  artificial  tears Solution 1 Drop(s) Both EYES daily  atorvastatin 80 milliGRAM(s) Oral at bedtime  donepezil 5 milliGRAM(s) Oral at bedtime  metoprolol tartrate 100 milliGRAM(s) Oral two times a day  pantoprazole    Tablet 40 milliGRAM(s) Oral every 24 hours    MEDICATIONS  (PRN):      Allergies    Capoten (Short breath; Rash; Hives)  heparin (Other (Mod to Severe))  penicillin (Short breath; Rash; Hives)    Intolerances          Vital Signs Last 24 Hrs  T(C): 36.8 (03 Feb 2021 11:47), Max: 37.1 (02 Feb 2021 20:35)  T(F): 98.3 (03 Feb 2021 11:47), Max: 98.7 (02 Feb 2021 20:35)  HR: 94 (03 Feb 2021 17:27) (57 - 94)  BP: 125/91 (03 Feb 2021 17:27) (116/77 - 137/84)  BP(mean): --  RR: 18 (03 Feb 2021 11:47) (18 - 19)  SpO2: 99% (03 Feb 2021 11:47) (97% - 99%)  CAPILLARY BLOOD GLUCOSE            Physical Exam:    Daily     Daily   General:  Well appearing, NAD, not cachetic, confused  HEENT:  Nonicteric, PERRLA  CV:  RRR, no murmur, no JVD  Lungs:  CTA B/L, no wheezes, rales, rhonchi  Abdomen:  Soft, non-tender, no distended, positive BS, no hepatosplenomegaly  Extremities:  2+ pulses, no c/c, no edema  Skin:  Warm and dry, no rashes  :  No feliz  Neuro: AOx1 (to person only), moves all 4 extremities  No Restraints    LABS:                        12.6   4.22  )-----------( 301      ( 03 Feb 2021 06:28 )             40.5     02-03    139  |  102  |  17  ----------------------------<  90  4.1   |  28  |  1.18    Ca    9.4      03 Feb 2021 06:28  Phos  3.8     02-03  Mg     2.0     02-03    TPro  7.5  /  Alb  3.7  /  TBili  0.3  /  DBili  x   /  AST  28  /  ALT  21  /  AlkPhos  53  02-02    PTT - ( 03 Feb 2021 06:28 )  PTT:34.3 sec        RADIOLOGY & ADDITIONAL TESTS:  Reviewed by me

## 2021-02-03 NOTE — DISCHARGE NOTE NURSING/CASE MANAGEMENT/SOCIAL WORK - NSDCFUADDAPPT_GEN_ALL_CORE_FT
Please bring your Insurance card, Photo ID and Discharge paperwork to the following appointments:     (1) Please follow up with your Cardiology Provider, Dr. Deedee Lisa at [address] [place] [address] on 02/03/2021 at 3:15pm.    (2) Please follow up with your Primary Care Provider, Dr. True Preciado at [address] [place] [address] on 02/08/2021 at 12:00pm.    3) Please schedule an appointment with Dr. Brantley (stroke neurology) in the next 3 weeks. Please call this number: [phone] (option #4)

## 2021-02-03 NOTE — PROGRESS NOTE ADULT - ASSESSMENT
65 year old male with,    # Multiple recent B/L infarcts/CVA: care per Stroke. Recent symptoms of blurry vision explained by location of CVA affecting posterior circulation. Etiology 2/2 LV thrombus, C/w statin/Eliquis. PT/OT --> ZEINAB.    # Aspiration pneumonia: completed course of Abx last week.    # Metabolic encephalopathy: waxing and waning, likely multifactorial, alcohol withdrawal/CVA and pneumonia related. VEEG without e/o epilepsy.    # History of HIT: transitioned to eliquis, to be continued. Apprec Hem recs. PF4 is negative. Thrombocytopenia has resolved.    # CAD/Chronic systolic CHF: not in acute exacerbation. Cards f/up noted. C/w statin.    # Paroxysmal atrial fibrillation: c/w eliquis and metoprolol.    # Alcohol withdrawal: has resolved. Alcohol dependence-etoh cessation encouraged.    D/w Stroke Team, stable for discharge to [facility] today.  D/w SW.  Encouraged compliance with meds, follow up's and etoh cessation.

## 2021-02-08 ENCOUNTER — APPOINTMENT (OUTPATIENT)
Dept: INTERNAL MEDICINE | Facility: CLINIC | Age: 66
End: 2021-02-08

## 2021-02-12 DIAGNOSIS — K29.20 ALCOHOLIC GASTRITIS WITHOUT BLEEDING: ICD-10-CM
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
## 2021-02-12 DIAGNOSIS — I42.9 CARDIOMYOPATHY, UNSPECIFIED: ICD-10-CM

## 2021-02-12 DIAGNOSIS — J69.0 PNEUMONITIS DUE TO INHALATION OF FOOD AND VOMIT: ICD-10-CM

## 2021-02-12 DIAGNOSIS — R07.89 OTHER CHEST PAIN: ICD-10-CM

## 2021-02-12 DIAGNOSIS — B95.62 METHICILLIN RESISTANT STAPHYLOCOCCUS AUREUS INFECTION AS THE CAUSE OF DISEASES CLASSIFIED ELSEWHERE: ICD-10-CM

## 2021-02-12 DIAGNOSIS — I25.2 OLD MYOCARDIAL INFARCTION: ICD-10-CM
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
## 2021-02-12 DIAGNOSIS — E78.5 HYPERLIPIDEMIA, UNSPECIFIED: ICD-10-CM

## 2021-02-12 DIAGNOSIS — Z88.0 ALLERGY STATUS TO PENICILLIN: ICD-10-CM

## 2021-02-12 DIAGNOSIS — D75.82 HEPARIN INDUCED THROMBOCYTOPENIA (HIT): ICD-10-CM

## 2021-02-12 DIAGNOSIS — I63.332 CEREBRAL INFARCTION DUE TO THROMBOSIS OF LEFT POSTERIOR CEREBRAL ARTERY: ICD-10-CM

## 2021-02-12 DIAGNOSIS — G40.509 EPILEPTIC SEIZURES RELATED TO EXTERNAL CAUSES, NOT INTRACTABLE, WITHOUT STATUS EPILEPTICUS: ICD-10-CM

## 2021-02-12 DIAGNOSIS — A41.9 SEPSIS, UNSPECIFIED ORGANISM: ICD-10-CM

## 2021-02-12 DIAGNOSIS — R29.721 NIHSS SCORE 21: ICD-10-CM

## 2021-02-12 DIAGNOSIS — D68.69 OTHER THROMBOPHILIA: ICD-10-CM

## 2021-02-12 DIAGNOSIS — I50.22 CHRONIC SYSTOLIC (CONGESTIVE) HEART FAILURE: ICD-10-CM

## 2021-02-12 DIAGNOSIS — I48.0 PAROXYSMAL ATRIAL FIBRILLATION: ICD-10-CM

## 2021-02-12 DIAGNOSIS — Z82.49 FAMILY HISTORY OF ISCHEMIC HEART DISEASE AND OTHER DISEASES OF THE CIRCULATORY SYSTEM: ICD-10-CM

## 2021-02-12 DIAGNOSIS — T45.515A ADVERSE EFFECT OF ANTICOAGULANTS, INITIAL ENCOUNTER: ICD-10-CM

## 2021-02-12 DIAGNOSIS — R29.702 NIHSS SCORE 2: ICD-10-CM

## 2021-02-12 DIAGNOSIS — F11.10 OPIOID ABUSE, UNCOMPLICATED: ICD-10-CM

## 2021-02-12 DIAGNOSIS — G93.41 METABOLIC ENCEPHALOPATHY: ICD-10-CM

## 2021-02-12 DIAGNOSIS — Z81.1 FAMILY HISTORY OF ALCOHOL ABUSE AND DEPENDENCE: ICD-10-CM

## 2021-02-12 DIAGNOSIS — I25.10 ATHEROSCLEROTIC HEART DISEASE OF NATIVE CORONARY ARTERY WITHOUT ANGINA PECTORIS: ICD-10-CM

## 2021-02-12 DIAGNOSIS — Z88.8 ALLERGY STATUS TO OTHER DRUGS, MEDICAMENTS AND BIOLOGICAL SUBSTANCES: ICD-10-CM

## 2021-02-12 DIAGNOSIS — I51.3 INTRACARDIAC THROMBOSIS, NOT ELSEWHERE CLASSIFIED: ICD-10-CM

## 2021-02-12 DIAGNOSIS — Z95.810 PRESENCE OF AUTOMATIC (IMPLANTABLE) CARDIAC DEFIBRILLATOR: ICD-10-CM
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
## 2021-02-12 DIAGNOSIS — F10.231 ALCOHOL DEPENDENCE WITH WITHDRAWAL DELIRIUM: ICD-10-CM

## 2021-02-12 DIAGNOSIS — Z95.5 PRESENCE OF CORONARY ANGIOPLASTY IMPLANT AND GRAFT: ICD-10-CM

## 2021-02-12 DIAGNOSIS — R64 CACHEXIA: ICD-10-CM

## 2021-02-12 DIAGNOSIS — Z83.3 FAMILY HISTORY OF DIABETES MELLITUS: ICD-10-CM

## 2021-02-18 ENCOUNTER — LABORATORY RESULT (OUTPATIENT)
Age: 66
End: 2021-02-18

## 2021-02-18 ENCOUNTER — APPOINTMENT (OUTPATIENT)
Dept: NEUROLOGY | Facility: CLINIC | Age: 66
End: 2021-02-18
Payer: MEDICARE

## 2021-02-18 VITALS
DIASTOLIC BLOOD PRESSURE: 74 MMHG | HEART RATE: 86 BPM | SYSTOLIC BLOOD PRESSURE: 104 MMHG | OXYGEN SATURATION: 97 % | HEIGHT: 70 IN | TEMPERATURE: 96.7 F

## 2021-02-18 DIAGNOSIS — F01.50 VASCULAR DEMENTIA W/OUT BEHAVIORAL DISTURBANCE: ICD-10-CM

## 2021-02-18 DIAGNOSIS — I63.12 CEREBRAL INFARCTION DUE TO EMBOLISM OF BASILAR ARTERY: ICD-10-CM

## 2021-02-18 DIAGNOSIS — I51.3 INTRACARDIAC THROMBOSIS, NOT ELSEWHERE CLASSIFIED: ICD-10-CM

## 2021-02-18 PROCEDURE — 99215 OFFICE O/P EST HI 40 MIN: CPT

## 2021-02-18 PROCEDURE — 99072 ADDL SUPL MATRL&STAF TM PHE: CPT

## 2021-02-18 RX ORDER — METOPROLOL TARTRATE 100 MG/1
100 TABLET, FILM COATED ORAL DAILY
Refills: 0 | Status: ACTIVE | COMMUNITY

## 2021-02-18 RX ORDER — APIXABAN 5 MG/1
5 TABLET, FILM COATED ORAL
Refills: 0 | Status: ACTIVE | COMMUNITY

## 2021-02-18 RX ORDER — ATORVASTATIN CALCIUM 80 MG/1
80 TABLET, FILM COATED ORAL DAILY
Refills: 0 | Status: ACTIVE | COMMUNITY

## 2021-02-18 RX ORDER — FOLIC ACID 1 MG/1
1 TABLET ORAL DAILY
Refills: 0 | Status: ACTIVE | COMMUNITY

## 2021-02-19 LAB
ALBUMIN SERPL ELPH-MCNC: 4.5 G/DL
ALP BLD-CCNC: 76 U/L
ALT SERPL-CCNC: 15 U/L
ANION GAP SERPL CALC-SCNC: 13 MMOL/L
AST SERPL-CCNC: 23 U/L
B BURGDOR IGG+IGM SER QL IB: NORMAL
BASOPHILS # BLD AUTO: 0.03 K/UL
BASOPHILS NFR BLD AUTO: 0.7 %
BILIRUB SERPL-MCNC: 0.4 MG/DL
BUN SERPL-MCNC: 13 MG/DL
CALCIUM SERPL-MCNC: 9.9 MG/DL
CHLORIDE SERPL-SCNC: 104 MMOL/L
CO2 SERPL-SCNC: 24 MMOL/L
CREAT SERPL-MCNC: 1.27 MG/DL
EOSINOPHIL # BLD AUTO: 0.15 K/UL
EOSINOPHIL NFR BLD AUTO: 3.3 %
ESTIMATED AVERAGE GLUCOSE: 105 MG/DL
GLUCOSE SERPL-MCNC: 71 MG/DL
HBA1C MFR BLD HPLC: 5.3 %
HCT VFR BLD CALC: 47 %
HGB BLD-MCNC: 14.7 G/DL
HIV1+2 AB SPEC QL IA.RAPID: NONREACTIVE
IMM GRANULOCYTES NFR BLD AUTO: 0 %
LYMPHOCYTES # BLD AUTO: 2.11 K/UL
LYMPHOCYTES NFR BLD AUTO: 46.6 %
MAN DIFF?: NORMAL
MCHC RBC-ENTMCNC: 27.3 PG
MCHC RBC-ENTMCNC: 31.3 GM/DL
MCV RBC AUTO: 87.2 FL
MONOCYTES # BLD AUTO: 0.41 K/UL
MONOCYTES NFR BLD AUTO: 9.1 %
NEUTROPHILS # BLD AUTO: 1.83 K/UL
NEUTROPHILS NFR BLD AUTO: 40.3 %
PLATELET # BLD AUTO: 157 K/UL
POTASSIUM SERPL-SCNC: 5.1 MMOL/L
PROT SERPL-MCNC: 7.6 G/DL
RBC # BLD: 5.39 M/UL
RBC # FLD: 14.9 %
SODIUM SERPL-SCNC: 141 MMOL/L
T PALLIDUM AB SER QL IA: NEGATIVE
TSH SERPL-ACNC: 1.18 UIU/ML
VIT B12 SERPL-MCNC: 402 PG/ML
WBC # FLD AUTO: 4.53 K/UL

## 2021-02-22 LAB — ZINC SERPL-MCNC: 61 UG/DL

## 2021-02-27 LAB
COPPER SERPL-MCNC: 127 UG/DL
VIT B1 SERPL-MCNC: 192.9 NMOL/L
VIT B6 SERPL-MCNC: 4.1 UG/L

## 2021-02-27 NOTE — ASSESSMENT
[FreeTextEntry1] : Jus Torrez: 65 yr h/o LHM a.fib, ICMP-35%-s/p stent, ICD, EtOH use, HLP, former tobacco; admitted to [facility] in Jan/2021. MRI brain-left cerebellar/bilateral thalami & left occipital scattered strokes+. TTE-2.3 cm mobile echodensity at the apex, 30% EF.\par \par Neuro exam: AAO x3 (states his age as 38, year as 32[birth year], states [facility], serial 7's-3/5, remembers 0/3 at 3 min, gave incorrect SNF name), CN 2-12 grossly intact, motor-LUE-4+/5, sensory-grossly intact, gait-difficulty with tandem. \par \par Posterior circulation strokes from LV thrombus/a.fib/ICMP, vascular dementia vs. other\par -on eliquis (pt was seen by cardiology inpatient-will need f/u)\par -check dementia/neuropathy labs (start Vit B6 supplementation followed by daily MVT-task sent).\par -continue rehab-cognitive & PT\par -risk factor mgt through PCP (pt/family expressed understanding): BP<120/80(need ACEI+/-others), LDL bet 50 & 70 with statins, A1C<7.0, medication adherence, diet/exercise\par -recommended to seek immediate medical attentions with any signs/symptoms of stroke(BEFAST)-call 911\par \par \par

## 2021-03-02 ENCOUNTER — APPOINTMENT (OUTPATIENT)
Dept: HEART AND VASCULAR | Facility: CLINIC | Age: 66
End: 2021-03-02

## 2021-03-03 NOTE — ED ADULT NURSE NOTE - NS ED NURSE RECORD ANOTHER VITAL SIGN
Anticoagulation Management    Unable to reach pt today.    Today's INR result of 2.6 is therapeutic (goal INR of 2.0-3.0).  Result received from: Clinic Lab    Follow up required to confirm warfarin dose taken and assess for changes    Vm not set up yet, will need to try again later       Anticoagulation clinic to follow up    Venessa Hood RN       Yes

## 2021-03-30 NOTE — PATIENT PROFILE ADULT - NSPROREFERSVCHOMECARDIO_GEN_A_NUR
complains of pain/discomfort
decreased balance during turns/decreased safety awareness/losing balance/decreased sequencing ability/decreased step length/decreased weight-shifting ability
yes

## 2021-04-22 NOTE — DISCHARGE NOTE ADULT - NS MD DC PLAN IMMU FLU REF OTH
Detail Level: Simple Concentration Of Kenalog Solution Injected (Mg/Ml): 10.0 Total Volume (Ccs): 0.2 Medical Necessity Clause: This procedure was medically necessary because the lesions that were treated were: Include Z78.9 (Other Specified Conditions Influencing Health Status) As An Associated Diagnosis?: No Administered By (Optional): AB Kenalog Preparation: Kenalog X Size Of Lesion In Cm (Optional): 0 Size Of Lesion (Optional): 3 Treatment Number (Optional): 1 Expiration Date For Kenalog (Optional): March 2022 Lot # For Kenalog (Optional): [id number] Consent: The risks of atrophy were reviewed with the patient. Refused

## 2021-06-17 NOTE — ED PROVIDER NOTE - INPATIENT RESIDENT/ACP NOTIFIED DATE
Valtrex Counseling: I discussed with the patient the risks of valacyclovir including but not limited to kidney damage, nausea, vomiting and severe allergy.  The patient understands that if the infection seems to be worsening or is not improving, they are to call. 06-Jan-2018 11:47

## 2021-06-29 NOTE — BEHAVIORAL HEALTH ASSESSMENT NOTE - NS ED BHA MED ROS PSYCHIATRIC
[employer] Short Term Disability form updated and faxed on 6/29/2021, extending leave until 10/4/2021.   See HPI

## 2021-09-17 NOTE — PROGRESS NOTE ADULT - PROBLEM/PLAN-3
DISPLAY PLAN FREE TEXT
no

## 2021-09-20 NOTE — PATIENT PROFILE ADULT - LAST ORAL INTAKE
Vaccine Information Sheet, \"Influenza - Inactivated\"  given to Chares Pallas, or parent/legal guardian of  Chares Pallas and verbalized understanding. Patient responses:    Have you ever had a reaction to a flu vaccine? No  Are you able to eat eggs without adverse effects? Yes  Do you have any current illness? No  Have you ever had [place] Syndrome? No    Flu vaccine given per order. Please see immunization tab. <--- Click to Launch ICDx for PreOp, PostOp and Procedure 14-Oct-2018

## 2021-10-01 NOTE — H&P ADULT - PROBLEM/PLAN-3
Time-based billing (NON-critical care) Time-based billing (NON-critical care) Time-based billing (NON-critical care) Time-based billing (NON-critical care) DISPLAY PLAN FREE TEXT Time-based billing (NON-critical care)

## 2021-11-12 NOTE — DISCHARGE NOTE PROVIDER - NSFTFHOMEHTHYNRD_GEN_ALL_CORE
From: Kal Portal  To: Magan Branch MD  Sent: 11/12/2021 12:25 PM CST  Subject: Shots    Question: Sofya Ok had the Covid-19 immunizations already. Wondering what you thought about the booster shot. I can’t decide if I should get it.    Also, what about th
Yes

## 2021-12-26 NOTE — PATIENT PROFILE ADULT. - ARE SIGNIFICANT INDICATORS COMPLETE.
Encounter Date: 12/26/2021    SCRIBE #1 NOTE: I, Fatmata Huerta, am scribing for, and in the presence of, Zhang Flower MD.       History     Chief Complaint   Patient presents with    Sore Throat    Generalized Body Aches    COVID-19 Concerns     Started today      Time seen by provider: 5:08 PM on 12/26/2021    Robby Gonzales is a 23 y.o. male who presents to the ED for COVID-19 concerns with an onset of fever for 1 day and generalized body aches. The patient denies any other symptoms at this time. He reports recent contact with his mother who has been sick and partner here also sick. No pertinent PMHx or PSHx.    The history is provided by the patient.     Review of patient's allergies indicates:  No Known Allergies  No past medical history on file.  No past surgical history on file.  No family history on file.     Review of Systems   Constitutional: Positive for fever. Negative for activity change, appetite change and fatigue.   HENT: Negative for congestion, rhinorrhea and sore throat.    Eyes: Negative for visual disturbance.   Respiratory: Negative for cough, chest tightness, shortness of breath and wheezing.    Cardiovascular: Negative for chest pain and palpitations.   Gastrointestinal: Negative for diarrhea, nausea and vomiting.   Musculoskeletal: Positive for myalgias (generalized). Negative for arthralgias.   Skin: Negative for rash.   Neurological: Negative for weakness, light-headedness and headaches.       Physical Exam     Initial Vitals [12/26/21 1612]   BP Pulse Resp Temp SpO2   119/82 99 18 (!) 100.6 °F (38.1 °C) 100 %      MAP       --         Physical Exam    Nursing note and vitals reviewed.  Constitutional: Vital signs are normal. He appears well-developed and well-nourished.   HENT:   Head: Normocephalic and atraumatic.   Mouth/Throat: Uvula is midline, oropharynx is clear and moist and mucous membranes are normal.   Eyes: Pupils are equal, round, and reactive to light.   Neck: Neck  supple.   Cardiovascular: Normal rate, regular rhythm, normal heart sounds and intact distal pulses. Exam reveals no gallop and no friction rub.    No murmur heard.  Pulmonary/Chest: Breath sounds normal. He has no wheezes. He has no rhonchi. He has no rales.   Abdominal: Abdomen is soft. Normal appearance and bowel sounds are normal. There is no abdominal tenderness.   Musculoskeletal:      Cervical back: Neck supple.     Neurological: He is alert and oriented to person, place, and time. He has normal strength.   Skin: Skin is warm, dry and intact.   Psychiatric: He has a normal mood and affect. His speech is normal and behavior is normal.         ED Course   Procedures  Labs Reviewed   INFLUENZA A & B BY MOLECULAR   SARS-COV-2 (COVID-19) QUALITATIVE PCR          Imaging Results    None          Medications   ibuprofen tablet 600 mg (600 mg Oral Given 12/26/21 1725)     Medical Decision Making:   History:   Old Medical Records: I decided to obtain old medical records.  Differential Diagnosis:   Viral URI  Pharyngitis  Strep pharyngitis   Clinical Tests:   Lab Tests: Reviewed and Ordered       APC / Resident Notes:   Patient is a 23 y.o. male who presents to the ED 12/26/2021 who underwent emergent evaluation for sore throat and body aches for 1 day.  Patient requesting COVID-19 testing.  Multiple sick contacts recently.  Patient likely has COVID-19.  Routine COVID-19 test pending.  Influenza test negative.  Do not think influenza.  Based upon the history and physical exam the patient does not appear to have a serious bacterial infection such as pneumonia, sepsis, otitis media, bacterial sinusitis, strep pharyngitis, parapharyngeal or peritonsillar abscess, meningitis.  Patient appears very well and I have given specific return precautions to the patient and family members.  The patient can take over the counter medications and does not appear to need antibiotics at this time.            Scribe Attestation:   Scribe  #1: I performed the above scribed service and the documentation accurately describes the services I performed. I attest to the accuracy of the note.    Attending Attestation:           Physician Attestation for Scribe:  Physician Attestation Statement for Scribe #1: I, Maria T Garcia, reviewed documentation, as scribed by in my presence, and it is both accurate and complete.     Comments: I, Maria T Garcia, BRYANNA-C, personally performed the services described in this documentation. All medical record entries made by the scribe were at my direction and in my presence.  I have reviewed the chart and agree that the record reflects my personal performance and is accurate and complete. Maria T Garcia NP-FARA.  8:05 PM 12/26/2021                   Clinical Impression:   Final diagnoses:  [J06.9] Viral URI (Primary)          ED Disposition Condition    Discharge Stable        ED Prescriptions     None        Follow-up Information     Follow up With Specialties Details Why Contact [facility]  In 1 week  [address] [place] [id number]  [phone]      [facility] Emergency Dept Emergency Medicine  As needed, If symptoms worsen [address] [place] [id number]  [phone]           Maria T Garcia NP  12/26/21 2006     Yes

## 2022-04-06 NOTE — ED ADULT NURSE NOTE - NEURO ASSESSMENT
SURGERY VISIT    REASON FOR VISIT:  Post-op visit after open incisional ventral hernia repair with Phasix 4 x 6\" mesh with primary closure of external oblique over the mesh repair on 03/24/2022.    ASSESSMENT AND PLAN:  Mr. Ramachandran is a 45 year old male with:    Doing well after hernia surgery.    We spent the majority of this visit discussing ongoing limitations of lifting, pushing, pulling and straining no more than 15-20 pounds for the next four weeks.  We discussed activity such as walking, using a stairstep machine or walking up and down stairs, which is encouraged at this time and gradually increasing to exercise bike or light jogging in 2 weeks.                                                Follow-up  I will be happy to see the patient on an as-needed basis in the future but no scheduled follow-up is required.                              HISTORY OF PRESENT ILLNESS:  The patient reports feeling well.  Off pain medications at this time.  No fevers or chills. No nausea or vomiting.  The patient has not experienced any significant constipation or diarrhea and is urinating without difficulty.  There is no drainage from the incision sites.    PHYSICAL EXAM:  General:  No acute distress.   Neurologic:  Alert and oriented to person, place and time.  Skin:  Warm, dry.   Surgical site:  Clean, dry.  No surrounding hematoma, ecchymosis or erythema. Surgical glue is beginning to peel off from around the incision.  No evidence of recurrent hernia. Post operative seroma of hernia site without redness or pain.   WDL

## 2022-04-12 NOTE — H&P ADULT - NSHPSOCIALHISTORY_GEN_ALL_CORE
Patient changed to a virtual visit tomorrow with Kelsie DRAPER   Etoh:  Smoke:  Drug Use:  Work: Etoh: Longstanding history of etoh abuse, has cut down significantly but admits to drinking at most 3 shots of vodka at times.   Smoke: Denies  Drug Use: Denies  Work: On disability

## 2022-05-19 NOTE — DISCHARGE NOTE ADULT - SECONDARY DIAGNOSIS.
[facility]    Medicine Progress Note - Hospitalist Service, GOLD TEAM 5    Date of Admission:  5/4/2022    Assessment & Plan          Robert Gallo is a 30 year old male with no significant past medical history who initially presented as transfer from [facility] for management of necrotizing fasciitis of neck with mediastinal involvement. He is now s/p I&D of bilateral parapharyngeal spaces, sublingual space, bilateral neck, extraction of teeth and bilateral VATS and PEG tube placement (5/4). Returned to OR on 5/13 for paraesophageal fluid collection. Internal medicine was consulted for evaluation of possible other sites of infection in setting of ongoing fevers, transaminitis. Overall, LFT pattern has remained stable to improved and fever curve is improving. Low suspicion for additional source of infection at this time.     Recommendations:   - Recommend continuing to monitor LFTs every 48 hours to ensure remain stable or improve  - Monitor platelets daily - suspect this is a reactive process  - He should follow-up with a PCP within 1 week and have repeat LFTs and CBC/platelets    Internal Medicine will sign off at this time. Please reach out with any further questions or concerns and we are happy to see him again if needed.    The patient's care was discussed with the Attending Physician, Dr. Peraza, Patient and Primary Team via this note.    Anali Hall PA-C  Hospitalist Service, GOLD TEAM 5  [facility]     Necrotizing Fasciitis of Neck with Mediastinal Involvement s/p Emergent VATS, Right Anterior Mediastinotomy, and I&D of Bilateral Parapharyngeal Spaces, Bilateral Neck, and Sublingual Space  Presented with necrotizing fasciitis of neck/mediastinum following a dental infection. Initial debridement on 5/4, with repeat oral and neck explorations on 5/13 due to persistent purulence and fevers. Polymicrobial  infection growing Strep anginosus, Staph epidermidis, Prevotella, Candida dubliniensis. CRP downtrending.  - ENT primary  - Antibiotics plan per ID  - Pending BC with NGTD   - Continue wound cares per ENT      Transaminitis   Slightly elevated Alk phos, AST, ALT on 5/15. Uptrending from 5/9. Previous liver studies WNLs on 5/3. Hepatitis serologies negative, HIV non-reactive. GGT slightly elevated. Patient w/o RUQ abdominal pain, only endorses some discomfort at PEG tube site. PEG site does not appear acutely infected. Unclear etiology, initially suspected related to unasyn use however improving despite ongoing use with slight fluctuations at times.  - Recommend monitoring LFTs every 48 hours inpatient or if develops RUQ abdominal pain/jaundice/fevers  - Please notify IM if LFTs trending up again     Poor Dentition s/p Extraction of Teeth #19, #20  Patient with poor dentition and large areas of decay. Patient recently treated for dental infection prior to presentation at [facility]. OMFS following remotely. Dental recommending extraction of multiple teeth secondary to caries and periapical abscesses     Severe Protein-Calorie Malnutrition   Moderate-severe oropharyngeal dysphagia  S/p PEG placement  Per chart review, patient with significant weight loss in weeks prior to admission. Underwent SLP evaluation. Tolerating thickened liquids  -Nutrition, SLP following  - TF per RD      Thrombocytosis  Plts mildly elevated. WNL at baseline. Suspect this is a reactive process.  - Recommend monitoring with daily CBC with platelets   - Will need repeat labs with PCP within 1 week     Acute Hypoxic Respiratory Failure, resolved   Intubated 5/3 - 5/9, currently on RA. Respiratory failure 2/2 upper airway obstruction from above infections with pulmonary edema contributing. Received diuresis while in ICU     Acute Blood Loss Anemia, resolved   Has not required blood transfusions. Hgb overall stable.     Methamphetamine  Use  Patient notes intermittent use, declines inpatient chemical dependency/Addiction medicine consultations at this time. Readdressed and remains non-interested in addiction medicine.    ______________________________________________________________________    Interval History   Doing well today. Tolerating thickened liquids. Denies any abdominal pain. Fever curve improving.    4 point ROS is otherwise negative.    Data reviewed today: I reviewed all medications, new labs and imaging results over the last 24 hours. I personally reviewed no images or EKG's today.    Physical Exam   Vital Signs: Temp: 98  F (36.7  C) Temp src: Oral BP: 129/69 Pulse: 88   Resp: 16 SpO2: 98 % O2 Device: None (Room air)    Weight: 216 lbs 14.92 oz    General Appearance:  Awake. Alert and oriented x4. Sitting up in bed. No acute distress.  HEENT: Normocephalic.Dressing to anterior neck. Mucous membranes moist.  Respiratory: Normal work of breathing on room air. Lungs CTAB. No wheezes.  Cardiovascular: RRR. S1, S2. No murmurs. Pedal pulses 2+ No lower extremity edema.  GI: Abdomen non-distended. Normoactive. PEG in place. Soft, non-tender abdomen.  Skin: Warm, dry. No rashes on exposed skin.  Neuro: No focal deficits. CN II-XII grossly intact.        Data   Recent Labs   Lab 05/19/22  1230 05/19/22  0826 05/19/22  0613 05/18/22  0808 05/18/22  0635 05/17/22  1220 05/17/22  0936 05/17/22  0805   WBC  --   --  5.0  --  6.6  --  7.5 8.1   HGB  --   --  9.5*  --  9.9*  --  10.5* 6.9*   MCV  --   --  95  --  95  --  94 95   PLT  --   --  482*  --  494*  --  516* 600*   INR  --   --   --   --   --   --   --  1.13   NA  --   --  141  --  138  --   --  136   POTASSIUM  --   --  3.8  --  3.7  --   --  3.8   CHLORIDE  --   --  105  --  103  --   --  104   CO2  --   --  26  --  26  --   --  24   BUN  --   --  16  --  17  --   --  14   CR  --   --  0.78  --  0.77  --   --  0.78   ANIONGAP  --   --  10  --  9  --   --  8   OLYA  --   --  9.0  --  9.5   --   --  8.9   * 98 96   < > 105*   < >  --  99   ALBUMIN  --   --  2.5*  --   --   --   --  2.5*   PROTTOTAL  --   --  8.6  --   --   --   --  8.7   BILITOTAL  --   --  0.6  --   --   --   --  0.4   ALKPHOS  --   --  142  --   --   --   --  157*   ALT  --   --  102*  --   --   --   --  110*   AST  --   --  62*  --   --   --   --  44    < > = values in this interval not displayed.     Recent Results (from the past 24 hour(s))   XR Video Swallow with SLP or OT    Narrative    EXAMINATION:  Modified Barium Swallow Study with Speech Pathology on  5/18/2022 2:23 PM.    INDICATION: Neck and mediastinal necrotizing fasciitis     COMPARISON: Fluoroscopic swallow study 5/12/2022    TECHNIQUE: Real time fluoroscopic acquisitions of the oropharynx and  upper esophagus were obtained after the oral administration of  multiple barium consistencies.    Total fluoroscopy time: 2.4 minutes    FINDINGS:   The patient swallows without difficulty. Pharyngeal motility is  normal.   On thin liquid consistency penetration and aspiration is visualized.  On mildly thickened liquid and puree consistencies there is  penetration without aspiration. No penetration or aspiration is  visualized with moderately thick liquid or cracker consistency.   The esophagus demonstrates normal motility.       Impression    IMPRESSION: Penetration and aspiration with thin liquid consistency.  Penetration also visualized with mildly thickened liquid and puree  consistencies.    Please see the speech pathologist report for further details regarding  the modified barium swallow study portion of the examination.    I, KRISTOPHER TREADWELL MD, attest that I was immediately available to  provide guidance and assistance during the entirety of the procedure.      I have personally reviewed the examination and initial interpretation  and I agree with the findings.    KRISTOPHER TREADWELL MD         SYSTEM ID:  [id number]   XR Chest Port 1 View    Narrative    Exam: XR CHEST  PORT 1 VIEW, 5/18/2022 5:10 PM    Indication: Increased shortness of breath    Comparison: 5/12/2022    Findings:   Cardiac silhouette is normal size. Streaky opacities in the left  greater than right lung bases are slightly improved from prior. No new  focal airspace opacities. No pneumothorax. Small left pleural  effusion. No acute osseous abnormalities. Surgical changes over the  low neck.      Impression    Impression:   1. Streaky bibasilar opacities are improved from prior and likely  represent improving atelectasis.  2. Small left pleural effusion.    I have personally reviewed the examination and initial interpretation  and I agree with the findings.    CANDE PICHARDO MD         SYSTEM ID:  [id number]        Thrombus in heart chamber Paroxysmal a-fib Chronic systolic heart failure Coronary artery disease involving native coronary artery of native heart, angina presence unspecified Hyperlipidemia, unspecified hyperlipidemia type Gastritis Hemorrhoid

## 2022-08-30 NOTE — ED PROVIDER NOTE - CPE EDP RESP NORM
normal... FAMILY HISTORY:  Grandparent  Still living? No  FH: breast cancer, Age at diagnosis: Age Unknown

## 2022-09-08 NOTE — ED ADULT TRIAGE NOTE - OTHER COMPLAINTS
pt reports n/v/d x2 days with fevers, reports family have similar s/s. no pt reports n/v/d x2 days with fevers, and lightheadedness. reports family have similar s/s. pt reports n/v/d x2 days with fevers, and lightheadedness. reports family have similar s/s. reports hx cardiac stents and chf

## 2022-09-13 NOTE — PROGRESS NOTE ADULT - PROBLEM SELECTOR PLAN 1
----- Message from Milan Rose MD sent at 9/9/2022 11:49 AM CDT -----  Please notify patient's abnormal result follow up with me 2-3 weeks.  
History of alcohol withdrawal. Alcohol 185 on admission. Last drink 1/12 after 1L of Vodka a day for 10 days. Started on Librium taper.    -Continue Librium 25mg BID  -Ativan PRN for CIWA >8  -Folic Acid/MVI/Thiamine  -Fall precautions  -PT says no needs
History of alcohol withdrawal. Alcohol 185 on admission. Last drink 1/12 after 1L of Vodka a day for 10 days. Started on Librium taper.    -Librium taper completed  -Folic Acid/MVI/Thiamine  -Fall precautions  -PT says no needs
Long standing history of EtOH abuse, no prior history of DTs, EtOH level 185 upon arrival to the ED, pt endorsing drinking >1 pint vodka daily for the last 4 days, last drink 4 am 1/12. CIWA 15 upon arrival to ED, s/p Librium 50 mg PO x1 and Ativan 2 mg IVP with improvement in CIWA to 8.   -CIWA high risk protocol  -Ativan 2 mg IVP PRN for CIWA >8  -Folic Acid, MVI, Thiamine daily  -dysphagia screening  -fall precautions  -PT consult  -tapering Librium from 50 q8 to 50q12h; CIWA 5 this AM for anxiety and tremors
Hx of EtOH abuse, ETOH at 185 on admission- with last drink on day of admission (1/12). Drinks >1 pint vodka daily, states up to last 10 days. Started on librium 50mg BID for CIWA up to 15, now at 0-1- for mild anxiety, no tongue fasciculations, no tremors.   - Decrease Librium to 25mg BID  -CIWA high risk protocol  -Ativan 2 mg IVP PRN for CIWA >8  - c/w Folic Acid, MVI, Thiamine daily  -fall precautions  -PT consult: no needs
Hx of EtOH abuse, ETOH at 185 on admission- with last drink on day of admission (1/12). Drinks >1 pint vodka daily, states up to last 10 days. Started on librium 50mg BID for CIWA up to 15, now at 2 for hand tremors.   - Decrease Librium to 25mg BID  -CIWA high risk protocol  -Ativan 2 mg IVP PRN for CIWA >8  - c/w Folic Acid, MVI, Thiamine daily  -fall precautions  -PT consult: no needs

## 2022-11-02 NOTE — ED ADULT NURSE NOTE - SCORE
"We do not have an after hours answering service.  If you need medical assistance after hours or weekends and holidays, you will need to report to the nearest emergency room or urgent care.      If I have ordered any lab tests or imaging studies (Xray/MRI/CT Scan/ Ultrasound), you should receive a call with your results within  Seven business days.  If you do not , please call my office for results. I do not believe "No News is Good News" .    If you need prescription refills between appointments, please ask your pharmacist to send us a request to renew, as this is the most efficient way to get your medication refilled.  If needed, you can contact our office during business hours to request a renewal.      This document was created using a voice recognition transcribing system. Incorrect words or phrases may have been missed during proof reading. Please interpret accordingly or contact us for clarification if necessary.     " 2

## 2022-11-15 NOTE — ED ADULT NURSE NOTE - FALLEN IN THE PAST
no PAST MEDICAL HISTORY:  CAD (coronary artery disease)     H/O: HTN (hypertension)     HLD (hyperlipidemia)

## 2023-03-01 NOTE — PROGRESS NOTE ADULT - PROBLEM SELECTOR PLAN 6
[FreeTextEntry1] : Problem #1 peripheral arterial disease\par - Holliday 2, intermittent moderate claudication\par - continue statin and aspirin\par - continue to try to walk daily for 30 min or more\par - follow up in 6 months for PAD surveillance - Currently euvolemic  - Continue Metoprolol XL 100mg QD  - Pt w/ h/o Captopril allergy, no other ACE/ARB at this time per Dr. Levy  -  Echo (8/12/2019):  Apical septum and true apex appear akinetic (was present on -Echo 8/8/2018). Hypokinesis of remaining apical segments and mid septum. EF 35-40%, AV mildly thickened, trace valvular AR, MV mildly thickened, Trace MR, trace TR, trace KS. No obvious thrombus.

## 2023-03-01 NOTE — DISCHARGE NOTE ADULT - ABILITY TO HEAR (WITH HEARING AID OR HEARING APPLIANCE IF NORMALLY USED):
Adequate: hears normal conversation without difficulty [FreeTextEntry1] : GENERAL: alert, cooperative, in NAD, short stature with asymmetry between limb length and trunk proportion\par SKIN: The skin is intact, warm, pink and dry over the area examined.\par EYES: Normal conjunctiva, normal eyelids and pupils were equal and round.\par ENT: normal ears, normal nose and normal lips.\par \par Bilateral lower extremities:\par No bony deformities, signs of trauma, or erythema noted. \par No visible swelling, asymmetry, or muscle atrophy. \par No tenderness in bony prominences or soft tissue. \par Full active and passive ROM of the hips, knees and ankles\par Dorsiflexion +5 with knees extended\par No signs of joint stiffness or crepitus.  \par Abductor strength is 4/5. Sensation intact to light touch. \par + Galeazzi \par  Negative Hugo Test\par 1cm leg length discrepancy left longer than right. There is no tenderness or limited ROM of the lower back or knee. \par Mild spinal asymmetry noted of examination of the back\par Walks with a waddling trendelenberg gait, no pain no

## 2023-03-09 NOTE — ED ADULT TRIAGE NOTE - PAIN: PRESENCE, MLM
Addended by: CHEYENNE ROSENBERG on: 3/9/2023 04:22 PM     Modules accepted: Orders    
complains of pain/discomfort

## 2023-03-14 NOTE — PROGRESS NOTE BEHAVIORAL HEALTH - NSBHFUPSUICINTERVAL_PSY_A_CORE
none known
4 = No assist / stand by assistance
none known
none known

## 2023-03-28 NOTE — BEHAVIORAL HEALTH ASSESSMENT NOTE - DESCRIPTION
----- Message from Negin Marquez, CRT sent at 3/28/2023  8:23 AM CDT -----  Regarding: Follow up ER.  3/27/2023 Dizziness  Can you please assist with follow up for this gentleman.  He was seen in the ER 3/27/2023 for dizziness/vertigo.    You can contact his daughter Katie @ [phone].      Thanks,    Negin Marquez   Emergency Room Navigator/Case Management  [phone]        
S/w daughter and scheduled appt at next available with C[facility].   
See HPI

## 2023-04-19 NOTE — ED ADULT NURSE NOTE - TEMPLATE LIST FOR HEAD TO TOE ASSESSMENT
General
1. Continue with DASH/TLC diet. Diet consistency defer to SLP/team.   2. Recommend a speech and swallow evaluation to determine diet consistency.  3. Recommend adding Ensure Clear 8oz 2x/day (480kcal, 16gm protein) and Ensure Plus High Protein 8oz 1x/day (350kcal, 20gm protein) for nutrient support.   4. Consider adding multivitamin for micronutrient coverage.   5. Consider adding probiotics to promote gut health.   6. Monitor weight, labs, po intake and tolerance, bowel movement, skin integrity.   7. Encourage PO intake and honor food preferences as able.

## 2023-07-10 NOTE — PATIENT PROFILE ADULT. - FUNCTIONAL SCREEN CURRENT LEVEL: SWALLOWING (IF SCORE 2 OR MORE FOR ANY ITEM, CONSULT REHAB SERVICES), MLM)
Pt. Arrives with complaints of chest pain and left arm numbness that started at 1900 today. (0) swallows foods/liquids without difficulty

## 2023-07-10 NOTE — PROGRESS NOTE ADULT - PROVIDER SPECIALTY LIST ADULT
Cardiology Ok to refill lipitor?  Last seen 2-5-19  Last filled 4-11-18 #90 with 3  Pending appt 4-25-19    199.9

## 2023-07-11 NOTE — ED ADULT NURSE NOTE - NS ED NURSE PATIENT LEFT UNIT TIME
There is a referral in the chart on Dr. Nguyen Hitchcock pt per Dr. Jayjay Joseph dx: Palpitations. Event monitor ordered and EP number given to pt. Last seen in [facility] on: 12/22/20. Scheduling advise please. 12:10

## 2023-07-28 NOTE — PROVIDER CONTACT NOTE (OTHER) - RECOMMENDATIONS
To Do:  End of Shift Summary  For vital signs and complete assessments, please see documentation flowsheets.     Pertinent assessments: VSS on RA. A&Ox4. Up Ax1 w/ delfin steady. Trach site CDI.   Denies pain. Output from ostomy. Pt. Has suspected yeast infection MD notified. No new orders as of now. Oncoming nurse aware and is following up.     Major Shift Events:Uneventful    Treatment Plan:  Pt. Medically stable to discharge. Waiting for ride.     Bedside Nurse: Alisson Dan RN     Continue to monitor pt.

## 2023-09-18 NOTE — PATIENT PROFILE ADULT - NSPROGENPREVTRANSF_GEN_A_NUR
Need Psych eval for depression /anxiety  Trial Elavil 25 mg 1-2 hours before bedtime  Imporve sleep hygiene   Avoid excess meds   F/u 3 months       no

## 2023-09-23 NOTE — DISCHARGE NOTE ADULT - FUNCTIONAL SCREEN CURRENT LEVEL: BATHING, MLM
.Please take antibiotics as prescribed for your bladder infection. Be sure to take for the full course, even if you are feeling better.   Continue to use Tylenol or ibuprofen for fever control at home.   Follow-up with your primary care doctor within 2-3 days for reassessment.  Return to the Emergency Department if you have uncontrolled fever, chest pain, shortness of breath, dizziness, weakness, or for any other concerns.   (0) independent

## 2023-10-26 NOTE — ED ADULT TRIAGE NOTE - CCCP TRG CHIEF CMPLNT
[facility]. Pt and wife live in split level home, pt is independent for mobility, no assistive devices, drives. Pts PCP is Dr Hager, prescriptions through [facility] in [place]. Pt has both LW and medical POA which is his wife, she states she will bring in copies of ppwk. Discussion around dc planning with wife stating plan is for pt to return home without skilled needs at this time.        alcohol intoxication

## 2023-11-02 NOTE — PATIENT PROFILE ADULT - NSPROSPHOSPCHAPLAINYN_GEN_A_NUR
- Will follow BMs and adjust bowel regimen to target soft, formed stools q1-2 days, per pt's regular schedule. no

## 2023-11-30 NOTE — PROGRESS NOTE ADULT - PROBLEM SELECTOR PROBLEM 6
Hepatology Follow-up Clinic note  William Bolton   Date of Birth 1980      Virtual Visit Details    Type of service:  Video Visit   2:40 - 3:15 pm  Distant Location (provider location):  Off-site  Platform used for Video Visit: Carol    Reason for follow-up: MAFLD         Assessment/plan:   William Bolton is a 43 year old male with History of elevated ALT/AST and imaging findings of hepatic steatosis. Transaminases have been mildly elevated dating back to Liver function also normal without stigmata of advanced liver disease.     # Complaints today Significant fatigue/MARK, out of proportion to activity for the past few months:  - TSH, CBC, iron panel within the week  - Take home BP when having symptoms   - Echocardiogram in the near future   - Follow up with PCP in the near future for additional work-up     #Metabolic associated fatty liver disease (MALFD):   - We reviewed natural history of nonalcoholic fatty liver disease and cirrhosis.   - Will obtain a fibrosis scan to evaluate any fibrosis, which is important in risk stratification of likelihood of on-going fibrosis without weight loss.   - Recommend FIB-4 yearly with PCP. FIB-4 Calculation: 0.6 at 12/8/2023  8:42 AM  - After acute symptoms improved, aim for gradual weight loss.   - Maintain good control of cholesterol (No contraindication to starting a statin with LFT elevations)   - Optimize blood glucose as needed. Could consider GLP-1 inhibitor for both insulin resistance and help with weight loss  - Improve nutrition: continue limiting carbohydrates, especially simple carbohydrates, and following Mediterranean eating patten  - Increase physical activity as able, ideally 150 minutes weekly (currently limited due to symptoms of fatigue and SOB)   - Limit alcohol intake to not more than 3 drinks a week    - Consider/recommend follow up with non-invasive elastography or FibroScan in 3-5 years     - Follow up with Hepatology in six months     Precious Fernandez  CHRISTIAN   [facility] Hepatology clinic    Total time for E/M services performed on the date of the encounter 45 minutes.  This included review of previous: clinic visits, hospital records, lab results, imaging studies, and procedural documentation. Time also includes patient visit, documentation and discussion with other providers.  The findings from this review are summarized in the above note.   -----------------------------------------------------       HPI:   William Bolton is a 43 year old male presenting for follow-up.     Dx: MAFLD  Risk factors: HTN, NATHAN, DM Type 2, obesity  FibroScan: no   Bx: none previously     Patient was last seen by me on 11/30/2022.  No recent hospitalizations or ER visits. Started Metformin within the past year.     Appetite is okay. Try to cut down red meats.  Weight is relatively stable.  More salads on hand, especially for lunch. Increase fruits. Some increase in vegetables.     Currently not doing any physical activity due to symptoms of SOB, which he reached out about and [employer] message a few weeks ago.     For the past few months, experiencing a lot of fatigue, lightheaded, have it most days when standing up.  He mentions being out of breath, Sometimes minimal movements. Short of breath, going up stairs. Get up and won't balance for small amount of time. No spinning. Have trouble lifting son, which would not typically be a problem.     Use CPAP every night. History of iron deficiency anemia, last EGD and colonoscopy in 2019.  Normal esophagus and normal duodenum, both biopsed. He was found to have one tubular adenoma in colon. If anemia was not resolved, it was recommended to move forward with small bowel capsule study .    Regular bowel movements. No change stool cailber. Patient denies jaundice, lower extremity edema, abdominal distension or confusion.      Patient also denies melena, hematochezia or hematemesis.Patient denies fevers, sweats or chills.    Since last  visit, in regards to alcohol, has cut down, currently may have 3 drinks at work outing. Might go a few weeks without any alcohol.     Dad had primary sclerosing cholangitis.     Previous work-up:   Lab Results   Component Value Date    HEPBANG Nonreactive 11/09/2022    HCVAB Nonreactive 11/09/2022    COLLIN 28 07/19/2019    IRONSAT 5 (L) 05/16/2019    TTG 0.7 11/14/2023    TTGG <0.6 11/14/2023     11/14/2023    KIMBERLY Negative 11/14/2023    SMOOTHMUS 2 11/14/2023    Y7AALEC Whole Blood 11/14/2023    A1A 164 11/14/2023    S0RDVEX Negative 11/14/2023    G0APGCX Negative 11/14/2023    L1DOPLYE Not Applicable 11/14/2023    G8DRLECDU See Note 11/14/2023    TSH 0.95 11/14/2019    CHOL 127 11/14/2023    HDL 34 (L) 11/14/2023    LDL 78 11/14/2023    TRIG 77 11/14/2023    A1C 7.2 (H) 11/14/2023      Recent Labs   Lab Test 11/14/23  0747 11/25/22  0951 11/09/22  0812 04/29/22  0959 11/12/21  0742 05/16/19  1506 07/17/18  0908   ALKPHOS 71 82 75 79 69 67 88   ALT 53 51* 136* 157* 125* 58 129*   AST 42 34 60* 83* 58* 29 73*   BILITOTAL 0.4 0.5 0.5 0.6 0.5 0.4 0.5      Medical hx Surgical hx   Past Medical History:   Diagnosis Date    Abnormal bone xray - sclerosis of phalynx of right great toe 7/31/17 - suggest repeat imaging at annual visit. 7/31/2017    Depressive disorder     Diabetes mellitus, type 2 (H) 7/3/2023    Hypertension 2019    Sleep apnea     Past Surgical History:   Procedure Laterality Date    COLONOSCOPY  06/13/2019    Dr. Navarro [facility]    ESOPHAGOSCOPY, GASTROSCOPY, DUODENOSCOPY (EGD), COMBINED N/A 05/23/2019    Procedure: ESOPHAGOGASTRODUODENOSCOPY, WITH BIOPSY;  Surgeon: Say Navarro MD;  Location:  GI    HEAD & NECK SURGERY      wisdom teeth removed                 Medications:     Current Outpatient Medications   Medication    amphetamine-dextroamphetamine (ADDERALL XR) 25 MG 24 hr capsule    [START ON 12/29/2023] amphetamine-dextroamphetamine (ADDERALL XR) 25 MG 24 hr capsule    [START ON  1/29/2024] amphetamine-dextroamphetamine (ADDERALL XR) 25 MG 24 hr capsule    amphetamine-dextroamphetamine (ADDERALL XR) 25 MG 24 hr capsule    amphetamine-dextroamphetamine (ADDERALL) 5 MG tablet    lisinopril (ZESTRIL) 30 MG tablet    metFORMIN (GLUCOPHAGE) 500 MG tablet    rosuvastatin (CRESTOR) 40 MG tablet     No current facility-administered medications for this visit.            Allergies:   No Known Allergies         Review of Systems:   10 points ROS was obtained and highlighted in the HPI, otherwise negative.          Physical Exam:   GENERAL: healthy, alert and no distress  EYES: Eyes grossly normal to inspection, conjunctivae and sclerae normal  RESP: no audible wheeze, cough, or visible cyanosis.  No visible retractions or increased work of breathing.  Able to speak fully in complete sentences  NEURO: Cranial nerves grossly intact, mentation intact and speech normal  PSYCH: mentation appears normal, affect normal/bright, judgement and insight intact, normal speech and appearance well-groomed         Data:   Reviewed in person and significant for:    Lab Results   Component Value Date     11/14/2023     09/15/2020      Lab Results   Component Value Date    POTASSIUM 4.8 11/14/2023    POTASSIUM 4.1 11/09/2022    POTASSIUM 3.9 09/15/2020     Lab Results   Component Value Date    CHLORIDE 103 11/14/2023    CHLORIDE 107 11/09/2022    CHLORIDE 102 09/15/2020     Lab Results   Component Value Date    CO2 22 11/14/2023    CO2 27 11/09/2022    CO2 25 09/15/2020     Lab Results   Component Value Date    BUN 9.2 11/14/2023    BUN 10 11/09/2022    BUN 10 09/15/2020     Lab Results   Component Value Date    CR 0.83 11/14/2023    CR 0.96 09/15/2020       Lab Results   Component Value Date    WBC 7.6 11/25/2022    WBC 6.8 07/19/2019    WBC 7.0 07/19/2019     Lab Results   Component Value Date    HGB 15.4 11/25/2022    HGB 13.2 07/19/2019    HGB 13.5 07/19/2019     Lab Results   Component Value Date    HCT  "45.5 11/25/2022    HCT 42.2 07/19/2019    HCT 44.5 07/19/2019     Lab Results   Component Value Date    MCV 87 11/25/2022    MCV 76 07/19/2019    MCV 79 07/19/2019     Lab Results   Component Value Date     11/25/2022     07/19/2019     07/19/2019       Lab Results   Component Value Date    AST 42 11/14/2023    AST 29 05/16/2019     Lab Results   Component Value Date    ALT 53 11/14/2023    ALT 58 05/16/2019     No results found for: \"BILICONJ\"   Lab Results   Component Value Date    BILITOTAL 0.4 11/14/2023    BILITOTAL 0.4 05/16/2019       Lab Results   Component Value Date    ALBUMIN 4.3 11/14/2023    ALBUMIN 3.8 11/09/2022    ALBUMIN 3.9 05/16/2019     Lab Results   Component Value Date    PROTTOTAL 6.7 11/14/2023    PROTTOTAL 7.5 05/16/2019      Lab Results   Component Value Date    ALKPHOS 71 11/14/2023    ALKPHOS 67 05/16/2019       Lab Results   Component Value Date    INR 1.03 11/25/2022     US ABDOMEN LIMITED 10/17/2022 8:09 AM     CLINICAL HISTORY: Elevated liver enzymes.  TECHNIQUE: Limited abdominal ultrasound.  COMPARISON: None.     FINDINGS:  GALLBLADDER: The gallbladder is unremarkable. No gallstones, wall  thickening, or pericholecystic fluid. Negative sonographic Molina's  sign.     BILE DUCTS: There is no biliary dilatation. The common duct measures 3  mm. Portions of the common duct could not be visualized due to  overlying bowel gas.     LIVER: Diffuse fatty infiltration of the liver. No focal hepatic  masses.     RIGHT KIDNEY: Unremarkable. No hydronephrosis.     PANCREAS: The visualized portions of the pancreas are normal.     No ascites.                                                                      IMPRESSION:  1. Diffuse fatty infiltration of the liver.  2. Otherwise unremarkable right upper quadrant ultrasound.     " Atherosclerosis of coronary artery

## 2024-01-02 NOTE — PATIENT PROFILE ADULT - NSPROMUTANXFEARADDRESSFT_GEN_A_NUR
A Courtesy call was made to pt post ED visit. Pt states doing well and has coming follow up appt with PCP on Friday. No questions  at the time of phone call.  Blood cx reviewed and no growth. Urine cx reviewed as below and Rx for Keflex 50mg BID x 7 days sent to pt's pharmacy.     60,000 ,000 CFU/mL Streptococcus agalactiae (Strep Group B) Abnormal      The presence of Streptococcus agalactiae (Strep Group B) is likely of limited clinical significance in urinary tract infections, however, its presence is reported in women of childbearing age in the event that they are or may become pregnant.              medications and information

## 2024-01-07 NOTE — ED ADULT NURSE NOTE - NSSUSCREENINGQ2_ED_ALL_ED
Pt to ED via triage by EMS c/o UTI dx with high HR and fevers. Per patient she was recently admitted for a sinus infection, dx with blood clots. Hx of urosepsis. Was put on levaquin for UTI. Pt is paralyzed from waist down.  
No

## 2024-01-16 NOTE — ED ADULT NURSE NOTE - BREATHING, MLM
Spontaneous, unlabored and symmetrical Colitis    Colitis is a condition in which the colon is inflamed. It can cause diarrhea, blood in the stool, and abdominal pain. Colitis can last a short time (be acute), or it may last a long time (become chronic).    What are the causes?  This condition may be caused by:  Infections from viruses or bacteria.  A reaction to medicine.  Certain autoimmune diseases, such as Crohn's disease or ulcerative colitis.  Radiation treatment.  Decreased blood flow to the bowel (ischemia).  What are the signs or symptoms?  Symptoms of this condition include:  Diarrhea, blood in the stool, or black, tarry stool.  Pain in the joints or abdominal pain.  Fever or fatigue.  Vomiting.  Weight loss.  Bloating.  Having fewer bowel movements than usual.  A strong and sudden urge to have a bowel movement.  Feeling like the bowel is not empty after a bowel movement.  How is this diagnosed?  This condition may be diagnosed based on a stool test and a blood test. You may also have other tests, such as:  X-rays.  CT scan.  Colonoscopy.  Endoscopy.  Biopsy.  How is this treated?  Treatment for this condition depends on the cause. This condition may be treated with:  Steps to rest the bowel, such as not eating or drinking for a period of time.  Fluids that are given through an IV.  Medicine for pain and diarrhea.  Antibiotic medicines.  Cortisone medicines.  Surgery.  Follow these instructions at home:  Eating and drinking      Follow instructions from your health care provider about eating or drinking restrictions.  Drink enough fluid to keep your urine pale yellow.  Work with a dietitian to determine whether certain foods cause your condition to flare up.  Avoid foods or drinks that cause flare-ups.  Eat a well-balanced diet.  General instructions    If you were prescribed an antibiotic medicine, take it as told by your health care provider. Do not stop taking the antibiotic even if you start to feel better.  Take over-the-counter and prescription medicines only as told by your health care provider.  Keep all follow-up visits. This is important.  Contact a health care provider if:  Your symptoms do not go away.  You develop new symptoms.  Get help right away if:  You have a fever that does not go away with treatment.  You develop chills.  You have extreme weakness, fainting, or dehydration.  You vomit repeatedly.  You develop severe pain in your abdomen.  You pass bloody or tarry stool.  Summary  Colitis is a condition in which the colon is inflamed. Colitis can last a short time (be acute), or it may last a long time (become chronic).  Treatment for this condition depends on the cause and may include resting the bowel, taking medicines, or having surgery.  If you were prescribed an antibiotic medicine, take it as told by your health care provider. Do not stop taking the antibiotic even if you start to feel better.  Get help right away if you develop severe pain in your abdomen.  Keep all follow-up visits. This is important.  This information is not intended to replace advice given to you by your health care provider. Make sure you discuss any questions you have with your health care provider.

## 2024-01-18 NOTE — PATIENT PROFILE ADULT - BRADEN NUTRITION
[x] [facility]  Outpatient Rehabilitation & Therapy  [address], [place] [address]    Physical Therapy Daily Treatment Note      Date:  2024  Patient Name:  Prince Gutierrez    :  1942  MRN: [id number]  Physician: Howard Knott MD                               Insurance: [facility] Medicare-BMN  Medical Diagnosis: OA right hip (s/p Rt THR)                   Rehab Codes: M25.651, M25.551, R53.1, R26.2  Onset date: 10/25/23             Next Dr's appt.: 24  Visit# / total visits:   Cancels/No Shows: 0/0    Subjective:    Pain:  [x] Yes  [] No Location: Rt hip Pain Rating: (0-10 scale) 0/10,   Pain altered Tx:  [x] No  [] Yes  Action: partial treatment completed today  Comments: Pain free beginning session. Saw MD today. States everything looks good, just may take extra time to fully recover. He did suggest \" lift for left LE due to right hip being a little higher (which patient just got today- feels this has helped ). States he discussed with MD problems he has been having lately and it was questioned if maybe he is doing too much.    Past Medical History:   Diagnosis Date    Arthritis     Atrial fibrillation (HCC)     CAD (coronary artery disease) 02/2022    x3 stents    Essential tremor     Glaucoma     Hyperlipidemia     Hypertension     Scoliosis     Thyroid disease     Under care of team     cardiology- Dr. Mchugh     Past Surgical History:   Procedure Laterality Date    CARDIAC SURGERY      CHOLECYSTECTOMY      COLONOSCOPY      CORONARY ANGIOPLASTY WITH STENT PLACEMENT  02/2022    x3    CORONARY ARTERY BYPASS GRAFT  2022    JOINT REPLACEMENT Left     hip    TONSILLECTOMY      TOTAL HIP ARTHROPLASTY Right 10/25/2023    RIGHT HIP ANTERIOR TOTAL ARTHROPLASTY WITH BIOMET AND PRE-OP CPNB    TOTAL HIP ARTHROPLASTY Right 10/25/2023    RIGHT HIP ANTERIOR TOTAL ARTHROPLASTY WITH BIOMET AND PRE-OP CPNB performed by Howard Knott MD at [facility] OR  (3) adequate

## 2024-01-26 NOTE — OCCUPATIONAL THERAPY INITIAL EVALUATION ADULT - REHAB POTENTIAL, OT EVAL
Recommend following up with Marion Sena, JOSE RAMON - CNP and Urology for renal mass   2.   Renal US   3.   Continue current medications   4.   Labs- Fasting lipids and CMP   5.   Follow up with Dr. Yeung in 2-3 months       Your provider has ordered testing for further evaluation.  An order/prescription has been included in your paper work.   To schedule outpatient testing, contact Central Scheduling by calling [employer] ([phone]).   
good, to achieve stated therapy goals

## 2024-01-28 NOTE — DISCHARGE NOTE NURSING/CASE MANAGEMENT/SOCIAL WORK - NSDPDISTO_GEN_ALL_CORE
- Newly found  - CHADSVASC 4-5 --> discussed with daughter r/b of AC  - Patient now in sinus rhythm.   - Had recent cardiac monitor for one week, but results not known.   - For now will defer full dose AC as patient is weak and fall risk, has been sinus. She will need to follow up closely with OP cardiologist  - Continue toprol XL 100mg BID. Home

## 2024-02-05 NOTE — BEHAVIORAL HEALTH ASSESSMENT NOTE - NS ED [facility] MED ROS HEMATOLOGIC LYMPHATIC
Suction nose as needed for congestion or difficulty breathing. Can use NoseFrida for suctioning. Make sure your child is feeding well and has good urine output. Seek medical care for difficulty breathing not improved after suctioning, poor intake, decreased urine output, lethargy or fevers.    
No complaints

## 2024-04-17 NOTE — ED ADULT NURSE NOTE - HOW OFTEN DO YOU HAVE A DRINK CONTAINING ALCOHOL?
1. Vaginal itching  New patient with recurrent strep, advised treating today with topical and one dose of diflucan, advised checking with pcp for preventive visit  - clotrimazole (LOTRIMIN) 1 % vaginal cream; Place 1 Applicatorful vaginally at bedtime for 20 days  Dispense: 1 g; Refill: 0  - Wet prep - Clinic Collect    2. Vaginal discomfort  Finish BV med and yeast  - UA Macroscopic with reflex to Microscopic and Culture - Lab Collect; Future  - Chlamydia trachomatis/Neisseria gonorrhoeae by PCR - Clinic Collect  - UA Macroscopic with reflex to Microscopic and Culture - Lab Collect  - Wet prep - Clinic Collect  - UA Microscopic with Reflex to Culture    3. Dysuria  UA neg  - Wet prep - Clinic Collect    4. Breast pain, right  History of breast pain, advise no family history of cancer, she is unsure if it is related to her period , normal exam , advised imaging test for now, preg test neg  - HCG qualitative urine; Future  - HCG qualitative urine  - US Breast Right Limited 1-3 Quadrants; Future    5. Yeast infection of the vagina    - fluconazole (DIFLUCAN) 150 MG tablet; Take 1 tablet (150 mg) by mouth once for 1 dose  Dispense: 1 tablet; Refill: 0    6. Cervical cancer screening  Due In her chart  - Pap Screen reflex to HPV if ASCUS - recommend age 25 - 29      Subjective   Rebecca is a 30 year old, presenting for the following health issues:  Vaginal Problem (Discharge, painful intercourse. Already treated BV after virtual visit)      4/17/2024    12:11 PM   Additional Questions   Roomed by Saray kyle she had improvement after treatment. Lingering BV symptoms  Finished yeast treatment.   Took all  but three of the BV pills.   [place] has been uncomfortable for about one week or maybe two     Requesting breat exam and Pelvic exam.  Patint is dealing with right breast discomfort.   Really tender a lot of the time.  She got an ultrasound in the past and it was normal    Vaginal Problem     History  of Present Illness       Reason for visit:  Pelvic exam after ongoing issues  Symptom onset:  1-2 weeks ago  Symptoms include:  Discharge, painful intercourse  Symptom intensity:  Moderate    She eats 0-1 servings of fruits and vegetables daily.She consumes 1 sweetened beverage(s) daily.She exercises with enough effort to increase her heart rate 9 or less minutes per day.  She exercises with enough effort to increase her heart rate 3 or less days per week. She is missing 7 dose(s) of medications per week.           Review of Systems  Constitutional, HEENT, cardiovascular, pulmonary, GI, , musculoskeletal, neuro, skin, endocrine and psych systems are negative, except as otherwise noted.      Objective    LMP 03/01/2024 (Within Days)   There is no height or weight on file to calculate BMI.  Physical Exam   GENERAL: alert and no distress  NECK: no adenopathy, no asymmetry, masses, or scars  RESP: lungs clear to auscultation - no rales, rhonchi or wheezes  BREAST: normal without masses, tenderness or nipple discharge and no palpable axillary masses or adenopathy  CV: regular rate and rhythm, normal S1 S2, no S3 or S4, no murmur, click or rub, no peripheral edema  ABDOMEN: soft, nontender, no hepatosplenomegaly, no masses and bowel sounds normal   (female) w/bimanual: normal female external genitalia, normal urethral meatus, normal vaginal mucosa, and normal cervix/adnexa/uterus without masses or discharge  MS: no gross musculoskeletal defects noted, no edema  SKIN: no suspicious lesions or rashes  NEURO: Normal strength and tone, mentation intact and speech normal  PSYCH: mentation appears normal, affect normal/bright    Results for orders placed or performed in visit on 04/17/24   UA Macroscopic with reflex to Microscopic and Culture - Lab Collect     Status: Abnormal    Specimen: Urine, Midstream   Result Value Ref Range    Color Urine Yellow Colorless, Straw, Light Yellow, Yellow    Appearance Urine Clear Clear     Glucose Urine Negative Negative mg/dL    Bilirubin Urine Negative Negative    Ketones Urine Negative Negative mg/dL    Specific Gravity Urine 1.010 1.003 - 1.035    Blood Urine Trace (A) Negative    pH Urine 5.5 5.0 - 7.0    Protein Albumin Urine Negative Negative mg/dL    Urobilinogen Urine 0.2 0.2, 1.0 E.U./dL    Nitrite Urine Negative Negative    Leukocyte Esterase Urine Negative Negative   HCG qualitative urine     Status: Normal   Result Value Ref Range    hCG Urine Qualitative Negative Negative   UA Microscopic with Reflex to Culture     Status: Abnormal   Result Value Ref Range    Bacteria Urine Few (A) None Seen /HPF    RBC Urine 0-2 0-2 /HPF /HPF    WBC Urine None Seen 0-5 /HPF /HPF    Narrative    Urine Culture not indicated   Wet prep - Clinic Collect     Status: Abnormal    Specimen: Vagina; Swab   Result Value Ref Range    Trichomonas Absent Absent    Yeast Present (A) Absent    Clue Cells Absent Absent    WBCs/high power field 3+ (A) None         Signed Electronically by: Geni Alberts DO     Four or more times a week

## 2024-05-28 NOTE — ED ADULT NURSE NOTE - ED COMFORT CARE
Note Text (......Xxx Chief Complaint.): This diagnosis correlates with the Detail Level: Zone Render Risk Assessment In Note?: no Other (Free Text): Cancer / Nevi Interval History: This is a 71 year old female with a history of:\\n squamous cell carcinoma in situ on the left medial superior chest. It was first diagnosed on\\n09/26/2022. She is denying any recurrence.\\n1.\\n basal cell carcinoma, nodular and ulcerated on the left superior upper back. It was first diagnosed on\\n09/26/2022. She is denying any recurrence.\\n2.\\n basal cell carcinoma, nodular type on the left superior lateral forehead. It was first diagnosed on\\n05/22/2023. She is denying any recurrence\\n\\n2012 BCC low back Patient informed/Explanation of wait

## 2024-05-29 NOTE — DISCHARGE NOTE ADULT - INSTRUCTIONS
Discharge Summary  [facility] [place]    Patient Name Brandan Dyson   MRN: [id number]   YOB: 1964       Admit date: 5/23/2024   Discharge date:  5/29/2024       Disposition:                   Home    Admitting Physician: Rehan Hung MD   Primary care provider: Yulissa Webster APNP  Discharge Physician: Sky Salgado MD    Primary Discharge Diagnoses:    Brandan Dyson is a 59 year old male with past medical history significant for obstructive sleep apnea, noncompliant with CPAP, type 2 diabetes mellitus, polyneuropathy, chronic atrial fibrillation on Xarelto, transferred to our hospital from [facility] for MRI, stress test.  And further workup.  Patient was admitted to the referring hospital with dizziness, having an episode of syncope prior to admission.  He had FELIPE on presentation which has resolved with IV fluids, his echocardiogram revealed ejection fraction 45% declined compared to 2 years prior to admission, troponin negative, orthostatic vitals were negative.  Patient upon planning to discharge was having gait imbalance and was wobbly which is new for him hence was transferred to our hospital for MRI and further workup.  When evaluated by me patient is feeling fine, denies any complaints except gait imbalance.  He is awake alert and oriented.  Denies any chest pain, shortness for breath.  No nausea or vomiting.  Continues to have occasional episode of dizziness when he stands up.    5/29:  Patient was admitted, had extensive workup for recurrent syncope, MRI of the brain was negative, CT of the head and CTA of the head and neck were negative.  EEG negative.  Stress test negative.  It was felt that patient's symptoms are most likely secondary to medications, his new medications were DC, including Elavil, Cymbalta, Lyrica, and Flomax.  Blood pressure was elevated, blood pressure medications adjusted.  Patient is feeling better, no further recurrent episodes of syncope.  He will be  discharged to home.  He will have 30 days event monitor.  He is to follow up with Cardiology, Pulmonary for sleep study evaluation and follow up.  He was advised to not to drive until cleared by his physicians.      Principal Problem (Resolved):    Syncope, unspecified syncope type  Active Problems:    Essential hypertension    Type 2 diabetes mellitus with microalbuminuria, without long-term current use of insulin  (CMD)    Atrial fibrillation  (CMD)    Diabetic peripheral neuropathy  (CMD)    Hypokalemia    Hypomagnesemia    Acute kidney injury superimposed on chronic kidney disease (CMD)      Past Medical History:    Hypertension                                                  Diabetes mellitus  (CMD)                                      Hyperlipemia                                                  Psoriasis                                                     Family history of colon cancer                  11/10/2014      Comment: sister    Bronchitis                                                      Comment: childhood    Urinary tract infection                                       Fracture                                                        Comment: clavicle, patella    Diverticulosis of colon (without mention of he* 03/10/2015      Comment: scattered    Benign neoplasm of colon                        03/10/2015      Comment: hyperplastic polyps    Redundant colon                                 03/10/2015      Comment: and elongated    Acute hypoxemic respiratory failure due to COV* 01/16/2022    Diabetic peripheral neuropathy  (CMD)           02/08/2024    Consultations:  IP CONSULT TO SMOKING CESSATION PROGRAM  IP CONSULT TO SOCIAL WORK  IP CONSULT TO DIABETES EDUCATOR  IP CONSULT TO CARDIOLOGY      Hospital Course:    Syncope  FELIPE, acidemia- resolved   Ground-level falls  Permanent Chronic nonvalvular atrial fibrillation on Xarelto and Cardizem and beta-blocker  - Likely close dehydration; long drive  the day before from [place]; 2 days prior to a long drive from [place]  - EKG shows rate controlled atrial fibrillation; denies palpitation; denies chest pain  - After 1 L still symptomatic reporting dizziness/presyncope  - Orthostatic vitals negative.   - Telemetry: Afib   - Resolved acute renal injury, Cr back to baseline   - Keep potassium above 4, magnesium above 2  - Echo: Mildly decreased left ventricular systolic function, mild hypokinesis of the anterior and anteroseptal segments. Left ventricular ejection fraction.   - PT/OT evaluation  - Resume Cardizem CD 240mg daily and Metoprolol 100mg bid if BP allows. Continue Xarelto  - MRI brain negative   - Neuro consult felt the symptoms may due to medication SE; DC Elavil, and Cymbalta, lower dose of Lyrica to 150mg BID   - 2 episodes of dizziness, nearly syncope since, will hold Lyrica; Obtain CTA head and neck now.  - Stat EEG   - Cardiac stress test: normal.   5/28:  EEG today to rule out seizures.  Continue PT OT.  DC all suspected medications to contribute to current symptoms, Elavil, Cymbalta, Lyrica, Flomax.     Chronic systolic congestive heart failure:  - Echo revealed reduced EF to 45% from 55% prior; likely due to Afib  - Trop wnl, proBNP 500  - Consult cardiology; recommend afib control, outpt event monitor   - Obtained Stress test; completed 2 days stress test today, Normal stress test.  - Monitoring on tele     KATARZYNA intolerant to CPAP  - Not tolerates CPAP. F/U with pulm      Non-insulin-dependent type II DM  - hold home medication:Trulicity, glipizide     - ADA diet   - Hb A1 C 7.6  - BS at 200; increased hs Lantus to 15 U; plus meal time Humalog 3 U, Accu Chek ac and hs plus SSI.   - DM educator consult as needed.   5/28:  Will avoid aggressive control in view of recurrent syncope     Peripheral neuropathy  - Resume PTA duloxetine and pregabalin after med rec     Mixed dyslipidemia  - PTA statin     Hypertension  - 5/28:blood pressure 139/107  today, continue diltiazem 2 daily, mg daily, metoprolol 100 mg.  Hydrochlorothiazide DC secondary to recurrent syncope.  Blood pressure rechecked 144/87.  Continue to monitor closely.     BPH  - DC Flomax as it can cause syncope.       Code status: Full Resuscitation    Discharge Labs:  Recent Labs   Lab 24  0612 24  0601 24  0539   SODIUM 141 140 141   POTASSIUM 4.2 4.5 3.9   CHLORIDE 103 104 103   CO2 33* 32 30   BUN 9 11 12   CREATININE 1.03 1.00 0.96   GLUCOSE 198* 231* 147*   MG 1.7 1.8 1.6*   WBC 5.8 5.4 6.5   HGB 12.6* 12.1* 12.1*   HCT 36.7* 35.2* 35.0*    150 151       Microbiology Results       None            Significant Diagnostic Studies and Procedures:  EEG - routine    Result Date: 2024  Impression: There was no EEG tracing made available for interpretation in this 31-minute off-hours Corticap recording. The entire record for filled with continuous bio-calibration signal without any EEG.  Contacted the attending physician and requested to a order a new EEG the next day during regular working hours to obtain a routine EEG with standard electrodes conducted by an EEG technologist to obtain an interpretable study if pt not actively having seizures. Therefore no charges were requested for this study.    EEG - routine    Result Date: 2024  Narrative: Routine EEG Test Results Patient:  Brandan Dysno Date:  2024 :  1964  MRN:  [id number]  Referring Physician:  No ref. provider found Patient Information:  Brandan Dyson, 59 year old, male with seizure like event. Recording Description: This is a 21 channel Digital recorded tracing in a patient who is awake and asleep.  Scalp electrodes were placed according to International 10-20 system. Background: The waking background was characterized by a reactive, organized mixture of jose roberto and beta frequencies with a symmetric 9 Hertz posterior dominant rhythm (PDR).  The anterior to posterior gradient of frequency  and amplitude was preserved.  The normal sleep elements were present.  There were no abnormal generalized or focal slowing present. Activation Procedure: Hyperventilation was performed.  No abnormalities were seen. Photic stimulation was performed.  No abnormalities were seen. Spontaneous Activity: No epileptiform discharges were seen. Event Description:  No clinical nor electrographic seizures were recorded. EEG Interpretation: This is a normal awake and asleep EEG. No abnormalities including seizures or epileptiform discharges were seen.  This EEG does not rule out epilepsy in this patient and if clinical concern for seizure is high then prolonged EEG may be beneficial. Tra Ayala MD     CTA HEAD AND NECK    Result Date: 5/27/2024  Narrative: EXAMINATION: CTA HEAD AND NECK HISTORY: syncope episodes COMPARISON: None. TECHNIQUE: Using a multidetector, multislice helical scanner, CTA of the intracranial and extracranial circulation after administration of 100 cc of Omnipaque 350 intravenously.  Subsequently, venous phase CT head was performed with standard technique.  MIP and 3D reconstructions are rendered and archived to PACS. FINDINGS: NECK CTA: AORTIC ARCH:  No hemodynamically substantial stenosis of the major branch vessel origins. RIGHT CAROTID SYSTEM: COMMON CAROTID ARTERY: No hemodynamically substantial stenosis. CERVICAL INTERNAL CAROTID ARTERY: No hemodynamically substantial stenosis. LEFT CAROTID SYSTEM: COMMON CAROTID ARTERY: No hemodynamically substantial stenosis. CERVICAL INTERNAL CAROTID ARTERY: No hemodynamically substantial stenosis. CERVICAL RIGHT VERTEBRAL ARTERY: No hemodynamically substantial stenosis. CERVICAL LEFT VERTEBRAL ARTERY: No hemodynamically substantial stenosis. HEAD CTA: RIGHT ANTERIOR CIRCULATION: INTERNAL CAROTID ARTERY: No hemodynamically substantial stenosis. MIDDLE CEREBRAL ARTERY: No hemodynamically substantial stenosis. ANTERIOR CEREBRAL ARTERY: No hemodynamically  substantial stenosis. LEFT ANTERIOR CIRCULATION: INTERNAL CAROTID ARTERY: No hemodynamically substantial stenosis. MIDDLE CEREBRAL ARTERY: No hemodynamically substantial stenosis. ANTERIOR CEREBRAL ARTERY: No hemodynamically substantial stenosis. Trifurcated. POSTERIOR CIRCULATION: RIGHT VERTEBRAL ARTERY: No hemodynamically substantial stenosis. LEFT VERTEBRAL ARTERY: No hemodynamically substantial stenosis. BASILAR ARTERY: No hemodynamically substantial stenosis. RIGHT POSTERIOR CEREBRAL ARTERY: No hemodynamically substantial stenosis. LEFT POSTERIOR CEREBRAL ARTERY: No hemodynamically substantial stenosis. Fetal origin. ANEURYSM/AVM: No aneurysm. No vascular nidus to suggest a high-grade arteriovenous malformation. POSTCONTRAST BRAIN AND SKULL: No abnormal intracranial contrast enhancement. NECK AND UPPER THORAX: No mass or lymphadenopathy in the neck. No airspaces opacities in the visualized lung apices. Mild to moderate cervical degenerative changes.     Impression: IMPRESSION: Normal CTA of the head and neck. Electronically Signed by: Montez Perez MD Signed on: 5/27/2024 5:53 PM Created on Workstation ID: [id number] Signed on Workstation ID: [id number]    CT HEAD WO CONTRAST    Result Date: 5/27/2024  Narrative: EXAM:  CT HEAD WO CONTRAST CLINICAL HISTORY:  Recurrent syncope. COMPARISON: MRI 5/26/2024. TECHNIQUE:  Thin-section axial unenhanced CT images were obtained from the skull base to the vertex. Data were reformatted into coronal and sagittal series for review. FINDINGS: Brain: No acute intracranial hemorrhage. No mass effect or midline shift. No CT evidence of large territory infarct. Ventricles: No evidence of acute hydrocephalus Bone/skull base/sinuses: The bilateral mastoid air cells and middle ears are clear. The paranasal sinuses are without air-fluid level. Patchy opacification is noted. No acute osseous abnormality or suspicious osseous lesion.     Impression: IMPRESSION: 1. No acute  intracranial process Electronically Signed by: Donaldo Grande MD Signed on: 5/27/2024 5:22 PM Created on Workstation ID: [id number] Signed on Workstation ID: [id number]    Stress Test with Myocardial Perfusion    Result Date: 5/27/2024  Narrative: Procedure: Pharmacologic myocardial perfusion imaging Stress/Rest Single-Isotope SPECT Imaging with pharmacologic stress and gated SPECT imaging. Indication: diagnosis of coronary disease Clinical History: 59 year old presents with syncope Description of Procedure: Pharmacologic stress testing was performed with Lexiscan. Additionally, low-level exercise was performed along with the vasodilator infusion. The heart rate was 69 at baseline and neelam to 92 beats per minute during the Lexiscan infusion. This corresponds with 56% of the maximum predicted heart rate. Blood pressure response was normal during the stress procedure. The patient developed symptoms typical of a Lexiscan infusion. The resting electrocardiogram demonstrated normal sinus rhythm with normal intervals and durations.  There were no Q-waves noted.  There were no ST changes noted.. Myocardial perfusion imaging was performed at rest 30 minutes following the injection of 10 mCi of Sestamibi. At peak pharmacologic effect, the patient was injected with 30 mCi of Sestamibi. Gating post-stress tomographic imaging was performed 30 minutes after stress. Findings: The overall quality of the study is good. Left ventricular cavity is noted to be normal on the rest and stress studies. The EDV was 105cc. SPECT images demonstrate homogeneous tracer distribution throughout the myocardium. Gated SPECT imaging reveals normal myocardial thickening and wall motion. The left ventricular ejection fraction was calculated to be 52%.  The TID was 1. Impression: Myocardial perfusion imaging is Normal. Overall left ventricular systolic function was Normal without regional wall motion abnormalities (as noted above). Dr. Gerber Tamez  Nuclear perfusion imaging is at best 85% sensitive and specific for coronary artery disease and as such the above result does not confirm or refute a diagnosis of coronary artery disease.  Clinical judgement should be the main factor in deciding how to proceed with the above results     MRI BRAIN WO CONTRAST    Result Date: 5/26/2024  Narrative: EXAM: MRI BRAIN WO CONTRAST DATE OF EXAM: 5/26/2024 12:16 PM. CLINICAL HISTORY: Gait imbalance R55 Syncope and collapse TECHNIQUE: The patient was scanned on a 1.5 Olga Siemens Magnetom Aera magnet. Sagittal T1, axial T1, fat-saturated T2, FLAIR, DWI, gradient T2, coronal T2.  CONTRAST: None. COMPARISON: CT scan of the head 5/23/2024. FINDINGS: The ventricles are normal in size, shape, and position. There is no evidence for mass or mass effect. White matter signal intensity within normal limits for the patient's age.  No areas of abnormally increased or decreased signal intensity are seen. The diffusion-weighted images are normal. The posterior fossa and brainstem are unremarkable.  Normal flow voids are seen in the major intracranial vessels. There are no abnormal extraaxial fluid collections or masses. The bilateral mastoid air cells are clear.     Impression: IMPRESSION: 1.  Normal MRI of the brain. No evidence for acute ischemia/infarct or mass effect. Electronically Signed by: Juan Pablo Canseco MD Signed on: 5/26/2024 1:38 PM Created on Workstation ID: [id number] Signed on Workstation ID: [id number]    TRANSTHORACIC ECHO (TTE) LIMITED W/ W/O IMAGING AGENT    Result Date: 5/24/2024  Narrative: Final Impressions   * Limited study.   * Last full evaluation was 2 years ago.   * Area of focus is not defined in the order.   * Mildly decreased left ventricular systolic function, mild hypokinesis of the anterior and anteroseptal segments.   * Left ventricular ejection fraction; 45 %.   * Normal right ventricular systolic pressure; 20 mmHg.   * No evidence of obstructive valvular  disease.   * No pericardial effusion. Patient Info Name:     Brandan Dyson Age:     59 years :     1964 Gender:     Male MRN:     [id number] Accession #:     [id number] Ht:     188 cm Wt:     141 kg BSA:     2.77 m2 HR:     79 bpm BP:     130 /     71 mmHg Heart Rhythm:     Atrial Fibrillation Technical Quality:     Fair Exam Date:     2024 8:06 AM Patient Status:     U Exam Type:     TRANSTHORACIC ECHO (TTE) LIMITED W/ W/O IMAGING AGENT Exam Location:     [facility] Study Info Indications     SYNCOPE - Limited echo or Follow up with Definity contrast. Echo Doppler limited add on. Echo Doppler color flow add on. Contrast/Agitated Saline ------------------------------ Contrast/Ag. Saline:     Definity Amount:     2.00 ml Administered By:     Mariam Conklin RDCS,  RVT Staff Sonographer:     Mariam Conklin RDCS, RVT Ordering Physician:     Rehan Hung Left Ventricle   Normal left ventricular chamber size. Mildly decreased left ventricular systolic function, mild hypokinesis of the anterior and anteroseptal segments. Left ventricular ejection fraction; 45 %. Right Ventricle   Right ventricle is not well visualized. Normal right ventricular systolic pressure; 20 mmHg. LV EF:     45 % Aortic Valve   Aortic valve sclerosis without stenosis or regurgitation. Aortic valve area 2.2 cm2. Aortic valve mean gradient 3 mmHg. Aortic valve dimensionless index, (VTIs): 0.81. Other Findings   Definity contrast administered to better visualize endocardial definition. Pericardium/Pleural   No pericardial effusion. Left Ventricular Outflow Tract ---------------------------------------------------------------------- Name                                 Value        Normal ---------------------------------------------------------------------- LVOT Doppler ---------------------------------------------------------------------- LVOT Peak Velocity                 0.9 m/s               LVOT Peak Gradient                   3 mmHg               LVOT Mean Gradient                  2 mmHg               LVOT VTI                           17.9 cm               LVOT VTI/AV VTI Ratio                 0.81               LVOT Stroke Volume                 48.1 ml               LVOT Stroke Volume Index        17.4 ml/m2               LVOT CI                         1.37 L/min/m2 Mitral Valve ---------------------------------------------------------------------- Name                                 Value        Normal ---------------------------------------------------------------------- MV Doppler ---------------------------------------------------------------------- MV Peak Gradient                    4 mmHg               MV Mean Gradient                    2 mmHg               MV Area (Cont Eq VTI)              2.0 cm2 Aorta ---------------------------------------------------------------------- Name                                 Value        Normal ---------------------------------------------------------------------- Ascending Aorta ---------------------------------------------------------------------- Prox Asc Ao Diameter                3.4 cm       2.6-3.4 Prox Asc Ao Diameter Index       1.2 cm/m2       1.3-1.7 Venous ---------------------------------------------------------------------- Name                                 Value        Normal ---------------------------------------------------------------------- IVC/SVC ----------------------------------------------------------------------  IVC Diameter Percent Change (2D)                                  88 %          >=50 Aortic Valve ---------------------------------------------------------------------- Name                                 Value        Normal ---------------------------------------------------------------------- AV Doppler ---------------------------------------------------------------------- AV Peak Velocity                   1.1 m/s               AV Peak Gradient                     4 mmHg               AV Mean Gradient                    3 mmHg           <20 AV VTI                             22.1 cm               AV Area (Cont Eq VTI)              2.2 cm2               AV Area Index (Cont Eq VTI)     0.78 cm2/m2               AV Area (Cont Eq Zachary)              2.3 cm2               AV Area Index (Cont Eq Zachary)     0.82 cm2/m2               AV V1/V2 Ratio                        0.84 Ventricles ---------------------------------------------------------------------- Name                                 Value        Normal ---------------------------------------------------------------------- LV Dimensions 2D/MM ----------------------------------------------------------------------  IVS Diastolic Thickness (2D)                                1.4 cm       0.6-1.0 LVID Diastole (2D)                  5.5 cm       4.2-5.8  LVIW Diastolic Thickness (2D)                                0.7 cm       0.6-1.0 LVID Systole (2D)                   4.2 cm       2.5-4.0 LVOT Diameter                       1.8 cm               LV Mass (2D Cubed)                 225.3 g    88.0-224.0  Relative Wall Thickness (2D)                                  0.27               LV Fractional Shortening/Ejection Fraction 2D/MM ----------------------------------------------------------------------  LV Diastolic Volume (BP MOD)                                167 ml         LV Systolic Volume (BP MOD)          77 ml         21-61 LV EF (BP MOD)                        45 %         52-72 RV Systolic Pressure ---------------------------------------------------------------------- TR Peak Velocity                   2.1 m/s               RA Pressure                         3 mmHg           <=3 RV Systolic Pressure               20 mmHg           <36 Atria ---------------------------------------------------------------------- Name                                 Value        Normal  ---------------------------------------------------------------------- LA Dimensions ---------------------------------------------------------------------- LA Area (4C)                      32.3 cm2               LA Area (2C)                      26.5 cm2               LA Volume Index (BP A-L)          38 ml/m2          <=34 Report Signatures Finalized by Sahil Matos DO on 05/24/2024 10:50 AM    XR CHEST AP OR PA    Result Date: 5/24/2024  Narrative: EXAM:  XR CHEST AP OR PA CLINICAL HISTORY: cough COMPARISON: CXR dated August 27, 2022.. CT chest dated February 8, 2024. FINDINGS: No focal consolidations, sizable pleural effusions or demonstrable pneumothorax. Cardiac size is mildly prominent. No acute osseous findings.     Impression: IMPRESSION: 1.  No acute airspace disease. Electronically Signed by: Stuart Fregoso DO Signed on: 5/24/2024 12:05 AM Created on Workstation ID: [id number] Signed on Workstation ID: [id number]    XR KNEE 3 VIEWS RIGHT    Result Date: 5/23/2024  Narrative: EXAM: XR KNEE 3 VIEWS RIGHT EXAM DATE: 5/23/2024 6:52 PM HISTORY:  trauma COMPARISON: June 13, 2023. FINDINGS:  No fracture or dislocation. No radiopaque foreign body. No joint effusion. Chronic calcification along the medial aspect of the fibula which is likely related to remote injury at to the interosseous ligament. Slight spurring of the medial compartment. Minimal spurring at the femoral patellar articulation.     Impression: IMPRESSION: No acute findings. Slight degenerative changes. Electronically Signed by: Donaldo Aguilera MD Signed on: 5/23/2024 7:28 PM Created on Workstation ID: [id number] Signed on Workstation ID: [id number]    CT HEAD WO CONTRAST    Result Date: 5/23/2024  Narrative: EXAM: CT HEAD WO CONTRAST EXAM DATE: 5/23/2024 6:51 PM HISTORY:  chi, syncope, xarelto. COMPARISON: August 27, 2022 TECHNIQUE:  Axial, nonenhanced images. Sagittal and coronal reformats. FINDINGS:  No intracranial hemorrhage, mass effect, or midline  shift. The gray/white junction is well preserved. No focal extraaxial fluid collection. No CT evidence for evolving infarct in a major intracranial vascular territory, although MRI is more sensitive for detection of acute ischemia. No calvarial fracture. No significant sinus disease at the visualized skull base. Old area of scalp thickening in the left parietal region which may be related to remote trauma. This is stable.     Impression: IMPRESSION: No acute intracranial findings. Electronically Signed by: Donaldo Aguilera MD Signed on: 5/23/2024 7:26 PM Created on Workstation ID: [id number] Signed on Workstation ID: [id number]      Pending  Results:  Unresulted Labs (From admission, onward)       Start     Ordered    05/30/24 0600  Magnesium  AM DRAW,   Routine         05/29/24 0708    05/29/24 0613  Gold Top  ONE TIME,   Routine         05/29/24 0612    05/29/24 0613  Light Blue Top  ONE TIME,   Routine         05/29/24 0612    05/24/24 0700  Metered Blood Glucose 4 times daily, before meals and bedtime  4 TIMES DAILY BEFORE MEALS & N,   Routine       05/23/24 2126                  Unresulted Procedure (From admission, onward)      None            Discharge Exam:    Blood pressure (!) 158/94, pulse 94, temperature 97.6 °F (36.4 °C), temperature source Oral, resp. rate 16, height 6' 2\" (1.88 m), weight (!) 143 kg (315 lb 4.1 oz), SpO2 98%.  General - Patient is alert, oriented and in no acute distress.   HEENT - Normocephalic and atraumatic.  Pupils equally round and reactive to light. Sclerae are anicteric. No conjunctivitis or subconjunctival lesions.  External auditory canals and nasal mucosa are clear.  Oropharynx is clear, normal lips teeth and gums with moist mucous membranes.  No posterior oropharyngeal erythema or exudates or thrush.    Neck - Soft and supple, no carotid bruits.  No thyromegaly.  Coronary - Regular rate and rhythm without murmurs, rubs or gallops.   Pulmonary - Normal respiratory effort.  Lungs  are clear to auscultation bilaterally without wheezes rubs or rhonchi.   Abdomen - Soft, non-tender and non-distended. Bowel sounds are normoactive. No guarding or rebound tenderness. No Hepatosplenomegaly, palpable masses or hernias.  No suprapubic tenderness.  Extremities  -  Warm without clubbing, cyanosis or edema.  Normal range of motion.  Skin - No rashes or lesions. Warm and dry.  No decubitus ulcers.  Neurologic - Alert and oriented to person, place and time.  CNs II-XII are intact. Strength, sensation, and tone are grossly intact.  No focal deficits.     Patient Discharge Instructions:   1. Activity: activity as tolerated  2. Diet: Consistent Carb Moderate (45-75 Gm/Meal), Cardiac Diet  3. Wound Care:   Wound Knee Right (Active)   Date First Assessed/Time First Assessed: 05/23/24 2312   Present on Original Admission: Yes  Location: Knee  Laterality: Right      Assessments 5/23/2024 11:12 PM 5/29/2024  6:25 AM   Present at Time of This Admission Yes --   Dressing Assessment -- Open to Air   Dressing Activity Applied --   Wound Exudate Serosanguineous;Small --   Cleansing Agent Normal saline --   Wound Bed/Tissue Type Red;Pink --   Wound Edge Well defined Scabbed   Wound Dressing Foam with borders --       No associated orders.        4. Discharge Medications:  Current Discharge Medication List        START taking these medications    Details   hydrALAZINE (APRESOLINE) 50 MG tablet Take 1 tablet by mouth in the morning and 1 tablet at noon and 1 tablet in the evening.  Qty: 90 tablet, Refills: 1      losartan (COZAAR) 50 MG tablet Take 2 tablets by mouth daily.  Qty: 30 tablet, Refills: 1      guaiFENesin 1200 MG 12 hr tablet Take 1 tablet by mouth every 12 hours.  Qty: 30 tablet, Refills: 0      terbinafine (LamISIL AT) 1 % cream Apply topically 2 times daily.  Qty: 30 g, Refills: 1           CONTINUE these medications which have CHANGED    Details   metformin (GLUCOPHAGE) 1000 MG tablet Take 1 tablet by  mouth in the morning and 1 tablet in the evening. Take with meals.    Associated Diagnoses: Type 2 diabetes mellitus with other specified complication, without long-term current use of insulin  (CMD)      terbinafine (LamISIL) 250 MG tablet Take 1 tablet by mouth daily. Begin taking on June 14, 2024. Hold until seen by your podiatrist due to side effect  Qty: 30 tablet, Refills: 0    Associated Diagnoses: Onychodystrophy           CONTINUE these medications which have NOT CHANGED    Details   dilTIAZem (TIAZAC) 240 MG 24 hr capsule Take 240 mg by mouth daily.      rivaroxaban (Xarelto) 20 MG Tab Take 1 tablet by mouth daily.  Qty: 90 tablet, Refills: 3      pravastatin (PRAVACHOL) 40 MG tablet Take 1 tablet by mouth daily.  Qty: 90 tablet, Refills: 3    Associated Diagnoses: Mixed hyperlipidemia      metoPROLOL tartrate (LOPRESSOR) 100 MG tablet Take 1 tablet by mouth in the morning and 1 tablet in the evening.  Qty: 180 tablet, Refills: 3      MAGNESIUM PO Take 1 tablet by mouth daily.      CINNAMON PO Take 1 tablet by mouth in the morning and 1 tablet in the evening.      allopurinol (ZYLOPRIM) 100 MG tablet Take 1 tablet by mouth daily.  Qty: 90 tablet, Refills: 1      glipiZIDE (GLUCOTROL) 10 MG tablet Take 1 tablet by mouth in the morning and 1 tablet in the evening. Take before meals. DX: E11.29  Qty: 180 tablet, Refills: 1    Associated Diagnoses: Type 2 diabetes mellitus with other specified complication, without long-term current use of insulin  (CMD)      VITAMIN D PO Take 1 capsule by mouth daily.      Multiple Vitamins-Minerals (MENS MULTIVITAMIN PLUS) TABS Take 1 tablet by mouth daily.  Qty: 30 tablet      Omega-3 Fatty Acids (FISH OIL) 1000 MG capsule Take 1 g by mouth in the morning and 1 g in the evening.      dulaglutide (Trulicity) 3 MG/0.5ML pen-injector Inject 3 mg into the skin every 7 days.      tadalafil (CIALIS) 20 MG tablet Take 20 mg by mouth daily as needed for Erectile Dysfunction.            STOP taking these medications       ibuprofen (MOTRIN) 200 MG tablet Comments:   Reason for Stopping:         aspirin 81 MG chewable tablet Comments:   Reason for Stopping:         dulaglutide (Trulicity) 4.5 MG/0.5ML pen-injector Comments:   Reason for Stopping:         DULoxetine (CYMBALTA) 60 MG capsule Comments:   Reason for Stopping:         amitriptyline (ELAVIL) 25 MG tablet Comments:   Reason for Stopping:         pregabalin (LYRICA) 150 MG capsule Comments:   Reason for Stopping:         acarbose (PRECOSE) 50 MG tablet Comments:   Reason for Stopping:         lisinopril-hydroCHLOROthiazide (ZESTORETIC) 20-25 MG per tablet Comments:   Reason for Stopping:         Ascorbic Acid (vitamin C) 500 MG tablet Comments:   Reason for Stopping:         tamsulosin (FLOMAX) 0.4 MG Cap Comments:   Reason for Stopping:                 ALLERGIES:  Patient has no known allergies.     Follow-up:  Yulissa Webster APNP  [address] [facility] [address]  [phone]    Follow up in 1 week(s)  HOSP F/U: Appointment is scheduled for Wed, 6/05/2024 at 9:00am.    Molly Salinas PA-C  [address] [place] [address] [phone]    Follow up in 14 day(s)      Future Appointments   Date Time Provider Department Center   6/5/2024  9:00 AM Yulissa Webster APNP [facility] RIP   6/27/2024  9:30 AM Emmett Goss MD [facility]   8/5/2024  7:00 AM RIP LAB [facility]   8/12/2024  8:00 AM Yulissa Webster APNP [facility] RIP   9/13/2024  1:00 PM Chance He RN [facility]   11/21/2024  3:00 PM Nicolasa Alvarez APNP [facility]     Time spent on discharge was 42 minutes.  Discharge discussed with  patient, RN.    Signed:  Sky Salgado MD  5/29/2024  1:29 PM    The 21st Century Cures Act makes medical notes like these available to patients in the interest of transparency. However, be advised this is a medical document. It is intended as peer to peer communication. It is written in medical language. It may appear blunt  or direct. Medical documents are intended to carry relevant information, facts as evident, and the clinical opinion of the practitioner.     DASH diet

## 2024-06-10 NOTE — DISCHARGE NOTE ADULT - ABILITY TO HEAR (WITH HEARING AID OR HEARING APPLIANCE IF NORMALLY USED):
Adequate: hears normal conversation without difficulty
PAST SURGICAL HISTORY:  No significant past surgical history

## 2024-06-12 NOTE — PATIENT PROFILE ADULT. - NS PRO CONTRA FLU 1
Addended by: MAYUR ROSSI on: 6/12/2024 10:59 AM     Modules accepted: Level of Service     out of season (available sept 1 thru apr 2 only)

## 2024-06-20 NOTE — PATIENT PROFILE ADULT. - ALCOHOL USE HISTORY SINGLE SELECT
Called patient and left a message regarding stool results. Indicated Alejandro should contact office if any questions.   yes...

## 2024-06-21 NOTE — ED ADULT NURSE NOTE - OBJECTIVE STATEMENT
[de-identified] : The patient reports left toe tingling and a sensation of heaviness in his left foot since October. He was initially advised to monitor and modify activities while using anti-inflammatories. Symptoms have worsened, and this past weekend, he developed low back pain after reaching for something. He describes his pain as left greater than right sciatica, constant, worse in the morning and with walking. Pain is currently 3 out of 10 and was 8 out of 10 at worst in the last two weeks. Motrin PRN provides mild relief. He denies any numbness, weakness, or bowel or bladder dysfunction. pt presents with tachypnea, RR 30,  and tachycardia HR in 140's, with nonrebreather. Speaks in short sentences, anxious, body tremors.  ethanol smell noted. Pt has Hx of daily drinking alcohol, and states last drink was today. denies hallucinations. Placed on CM, Ativan 1 mg and Librium 50 mg given. HR went to 104, and resp to 20, O2 95% on RA, pt is calm. Hands tremors only when arms extended.

## 2024-08-16 NOTE — ED PROVIDER NOTE - CONDITION AT DISCHARGE:
----- Message from GLEN Altman sent at 8/15/2024  3:25 PM CDT -----  Colonoscopy:     The pathology results Tubular adenoma    Recall: 10 yrs    Increase fiber: No  
Satisfactory

## 2024-10-11 NOTE — ED ADULT NURSE NOTE - NS ED NURSE REPORT GIVEN DT
Clinic Care Coordination Contact    Situation: Patient chart reviewed by care coordinator.    Background: patient was to the ED for facial swelling     Assessment: pt was diagnosed with facial cellulitis and given ABX.  Discharged back home with instructions of when to return.     Plan/Recommendations: will not call related to ED as pt discharged in stable condition.       JAQUELIN Mensah   [facility] - Care Coordination  [facility]   [phone]     06-Jan-2018 12:05

## 2024-10-16 NOTE — ED ADULT NURSE NOTE - SUICIDE SCREENING QUESTION 2
Instructions:   Wound has healed.  File callouses as needed.  Apply vaseline twice daily.  Left and 4th - 5th toe webspace  Cleanse with normal saline  Place Vashe moistened gauze on wound bed for 60 seconds  Apply Triamcinolone to closed wound.   Separate toes with folded gauze.  Change 3 times weekly.   No

## 2024-11-13 NOTE — CONSULT NOTE ADULT - SUBJECTIVE AND OBJECTIVE BOX
HPI:  Mr. Torrez is a 61yo male with pmhx of alcohol use, CAD with MI (1998 x2, ), s/p ICD (), LV thrombus on coumadin (previously on ASA and Plavix but switched to coumadin d/t GIB in ), CHF (EF 35% in May 2018) and CKD stage 3 (baseline Cr 0.9-1.1) who presented to the ED with chest pain. He states that his chest pain began this morning at 4:30AM. He states that it was tight and focused at the left of his sternum. He states that it eased up through the morning, was relieved by drinking a couple shots of vodka, which he started at 8AM and nothing made the pain worse. He states that the pain was 5/10 at its worst and that he currently has no chest pain. He denies any associated symptoms with the chest pain. He reports that 4 days ago, an upsetting event happened in his neighborhood and he started drinking after not having had a drink for about 2 months. He said in these 4 days, he had about an ounce of vodka a day and he also stopped taking his coumadin. He reports that he did take his prescribed 100mg of Metoprolol this morning. He states that his last drink was between 8AM and 10AM today.    He was recently hospitalized at [facility] in May 2018 for epigastric pain. His hospital course was complicated by heparin-induced thrombocytopenia and BRBPR which was attributed to hemorrhoids. He was d/ryder on Fundaparinox injections x7 days and warfarin 15mg daily + 2mg every other day. Today he reports that he takes 12mg coumadin daily. He also has been hospitalized many times for alcohol intoxication and withdrawal.    In the ED, pt was tachycardic and otherwise vitals were stable (98.6, 124/90, 106, 18, 97% RA). Labs showed Trop <0.01, Anion gap of 19, AST 49 and serum alcohol 273. EKG showed Q waves in V1-3, unchanged from EKGs in May 2018.    Pt denies n/v, tremors, diaphoresis, feeling nervous or agitated, he denies feeling, seeing or hearing things that aren't there and he is orientedx3. He reports a small headache. This gives him a CIWA score of 1-2. He states that he has been experiencing photophobia for 4-6 months. He reports diarrhea for about 2 days. He currently denies n/v, dizziness, weakness, SOB, chest pain, orthopnea, dyspnea on exertion. (2018 16:32)    FAMILY HISTORY:  Family history of cardiac disorder in maternal grandfather (Grandparent):  of MI at 41yo    MEDICATIONS  (STANDING):  aspirin enteric coated 81 milliGRAM(s) Oral daily  atorvastatin 40 milliGRAM(s) Oral at bedtime  dextrose 5%. 1000 milliLiter(s) (50 mL/Hr) IV Continuous <Continuous>  dextrose 50% Injectable 12.5 Gram(s) IV Push once  dextrose 50% Injectable 25 Gram(s) IV Push once  dextrose 50% Injectable 25 Gram(s) IV Push once  folic acid 1 milliGRAM(s) Oral daily  insulin lispro (HumaLOG) corrective regimen sliding scale   SubCutaneous Before meals and at bedtime  lisinopril 2.5 milliGRAM(s) Oral daily  metoprolol succinate  milliGRAM(s) Oral daily  multivitamin 1 Tablet(s) Oral daily  thiamine 100 milliGRAM(s) Oral daily    MEDICATIONS  (PRN):  dextrose 40% Gel 15 Gram(s) Oral once PRN Blood Glucose LESS THAN 70 milliGRAM(s)/deciliter  glucagon  Injectable 1 milliGRAM(s) IntraMuscular once PRN Glucose LESS THAN 70 milligrams/deciliter    Vital Signs Last 24 Hrs  T(C): 36.8 (2018 09:55), Max: 36.8 (2018 21:49)  T(F): 98.2 (2018 09:55), Max: 98.2 (2018 21:49)  HR: 89 (2018 17:05) (78 - 89)  BP: 138/96 (2018 17:05) (113/75 - 138/96)  BP(mean): 116 (2018 17:05) (93 - 116)  RR: 18 (2018 17:05) (16 - 18)  SpO2: 97% (2018 17:05) (95% - 98%)PHYSICAL EXAM:    Constitutional:    Neck:    Breasts:    Respiratory:    Cardiovascular:    Gastrointestinal:    Extremities:    Neurological:    Skin:    Lymph Nodes:      Labs:  CBC Full  -  ( 2018 07:21 )  WBC Count : 4.6 K/uL  Hemoglobin : 12.8 g/dL  Hematocrit : 41.2 %  Platelet Count - Automated : 201 K/uL  Mean Cell Volume : 74.9 fL  Mean Cell Hemoglobin : 23.3 pg  Mean Cell Hemoglobin Concentration : 31.1 g/dL  Auto Neutrophil # : x  Auto Lymphocyte # : x  Auto Monocyte # : x  Auto Eosinophil # : x  Auto Basophil # : x  Auto Neutrophil % : x  Auto Lymphocyte % : x  Auto Monocyte % : x  Auto Eosinophil % : x  Auto Basophil % : x        135  |  94<L>  |  14  ----------------------------<  111<H>  3.9   |  27  |  1.03    Ca    9.6      2018 07:21  Phos  3.4       Mg     1.6         TPro  8.1  /  Alb  4.5  /  TBili  0.9  /  DBili  x   /  AST  49<H>  /  ALT  42  /  AlkPhos  82  -      Radiology:  HEALTH ISSUES - R/O PROBLEM Dx:  CHF (congestive heart failure): R/O CHF (congestive heart failure)  Chest pain: R/O Chest pain    Assessmant:  Middle aged BM with FactorVlll elevation above 200, Low Antithrombin lll ( although uninterpretable since  patient got Heparin) and a h/o ventricular blood clot which now has resolved  Patient has a h/o GI bleeding and iron deficiency anemia ( currently Hg 13,8)  Recomend   Plavix 75 mg od      Thank you  Liana Everett MD Continuous Video EEG

## 2024-12-19 NOTE — H&P ADULT. - PMH
Atherosclerosis of coronary artery  Coronary artery disease  ETOH abuse    Gastritis    Myocardial infarction involving other coronary artery    Pacemaker    Paroxysmal a-fib 1.87

## 2025-01-14 NOTE — PHYSICAL THERAPY INITIAL EVALUATION ADULT - MARITAL STATUS
Your labs and x-rays were reassuring today.  I feel like your pain and cough is related to some chest wall pain and possibly an oncoming acute respiratory illness.  I did prescribe you a steroid pack that I want you to start taking.  If your symptoms do not improve over the next couple days please follow-up for a repeat x-ray.  It is okay to take Tylenol ibuprofen as needed.  Do not take any more ibuprofen today as you did get the Toradol injection while you are being seen in the emergency department.  Please follow-up with primary care provider as needed.  I hope you feel better soon.   Single

## 2025-01-23 NOTE — ED PROVIDER NOTE - PRINCIPAL DIAGNOSIS
Problem: Wound:  Goal: Will show signs of wound healing; wound closure and no evidence of infection  Description: Will show signs of wound healing; wound closure and no evidence of infection  Outcome: Progressing     Problem: Venous:  Goal: Signs of wound healing will improve  Description: Signs of wound healing will improve  Outcome: Progressing     Problem: Smoking cessation:  Goal: Ability to formulate a plan to maintain a tobacco-free life will be supported  Description: Ability to formulate a plan to maintain a tobacco-free life will be supported  Outcome: Progressing     Problem: Compression therapy:  Goal: Will be free from complications associated with compression therapy  Description: Will be free from complications associated with compression therapy  Outcome: Progressing     
Atrial fibrillation with RVR

## 2025-02-26 NOTE — ED ADULT TRIAGE NOTE - LOCATION:
Neurology Clinic Follow up Note    Patient ID:   Luis Salazar  1969  55 y.o. male  [id number]      Chief Complaint   Patient presents with    Follow-up     TN of the right side       Last Appointment With Me:    11/18/24  \" 55 year old male without significant past medical history here for follow up to discuss right sided trigeminal neuralgia. His exam is nonfocal and essentially normal. He has taken Oxcarbazepine for 4-5 weeks in total, and recently weaned himself off of the medication 3 days prior, with resolution of TN pain. His TN pain previously followed V2, V3 distribution on the right. He recently completed labs with his primary care provider for an annual physical and reports labs were normal, will request records to monitor Na, CBC today. We discussed when to resume Oxcarbazepine in the future, e.g if he has recurrence of pain after a dental cleaning or procedure. We also discussed side effects of Oxcarbazepine of mental fog, which he has been experiencing over the past few weeks. He also takes Seroquel at night which we discussed may be contributing to known side effect of mental fog with Oxcarbazepine. I anticipate his mental fog would resolve in time, and recommend following up with his Psychiatrist in the case that concentration does not improve over the next 2 weeks. He understands. We will see him back for his scheduled appointment with Dr Larios in 3/10/2025 per his request. I will see him in 8/2025.  \"    Interval History:   He is not having any TN pain currently, off Oxcarbazepine since ~11/15/24. He is due for a dental exam this spring which has previously triggered TN flares. He will have this done at [facility] and the provider there is aware of his history of TN on the right side. He is here today because his insurance is likely to discontinue his coverage with [facility]. He is here today to try to understand what to do if a flare occurs in summer, fall. He plans to travel to [place] over 
Left arm;

## 2025-04-25 NOTE — ED ADULT NURSE NOTE - CAS TRG GENERAL NORM CIRC DET
----- Message from Aidan Morris DO sent at 4/25/2025  3:36 PM CDT -----  Normal CBC, CMP, and magnesium were normal  Uric acid was 6.7 borderline high-gout cause of his painful swollen toe symptoms-finish prednisone taper-take with food, low purine diet, no alcohol, can also use indomethacin prn  To notify if worsening/no improvement in symptoms     
Left VM for patient that results sent via [employer] [facility] and to return call with any questions.   
Strong peripheral pulses

## 2025-05-14 NOTE — H&P ADULT - NSHPPOAPULMEMBOLUS_GEN_A_CORE
How Severe Are Your Spot(S)?: mild What Is The Reason For Today's Visit?: Full Body Skin Examination What Is The Reason For Today's Visit? (Being Monitored For X): concerning skin lesions on a periodic basis no

## 2025-05-20 NOTE — PATIENT PROFILE ADULT. - CAREGIVER
1035 pt tolerated tx. Port connected to pump infusing without difficulty, RUN noted on pump and counting down prior to d/c from clinic. Pt instructed to return to clinic at 8:30 am on 5/22/25. Pt verbalized understanding. Pt d/c from clinic.   Declines

## 2025-06-14 NOTE — PHYSICAL THERAPY INITIAL EVALUATION ADULT - SITTING BALANCE: DYNAMIC
CHIEF COMPLAINT  Chief Complaint   Patient presents with    Eye Problem     Left eye irritated, x 3 days.      Subjective:   Vazquez Beckwith is a 71 y.o. male who presents to urgent care with concerns for left eye irritation and drainage x 3 days.  Patient denies any acute vision changes.  No photophobia.  No fevers or chills.  Denies any concern for foreign body.  Denies any contact use.        Review of Systems   Eyes:  Positive for discharge and redness. Negative for blurred vision, double vision and photophobia.       PAST MEDICAL HISTORY  Patient Active Problem List    Diagnosis Date Noted    Cellulitis 11/13/2021    Bacteremia 11/10/2021    COPD (chronic obstructive pulmonary disease) ([facility]) 11/10/2021    Acute respiratory failure ([facility]) 11/10/2021    Type 2 diabetes mellitus with hyperglycemia, without long-term current use of insulin ([facility]) 11/10/2021    Adrenal cyst ([facility]) 11/10/2021    Renal cyst 11/10/2021    Tobacco use disorder 11/10/2021       SURGICAL HISTORY   has a past surgical history that includes other orthopedic surgery and other.    ALLERGIES  Allergies[1]    CURRENT MEDICATIONS  Home Medications       Reviewed by Sam Romano'samaria (Medical Assistant) on 06/14/25 at 1620  Med List Status: <None>     Medication Last Dose Status   albuterol 108 (90 Base) MCG/ACT Aero Soln inhalation aerosol prn Active   amLODIPine (NORVASC) 10 MG Tab 11/10/2021 Active   amoxicillin (AMOXIL) 875 MG tablet  Active   apixaban (ELIQUIS) 5mg Tab  Active   budesonide-formoterol (SYMBICORT) 80-4.5 MCG/ACT Aerosol 11/9/2021 Active   ciprofloxacin-dexamethasone (CIPRODEX, PATIENT SUPPLIED,) SUSP 2/13/2013 Active   cyclobenzaprine (FLEXERIL) 10 MG TABS  Active   cyclobenzaprine (FLEXERIL) 10 MG TABS  Active   levothyroxine (SYNTHROID) 200 MCG Tab 11/10/2021 Active   levothyroxine (SYNTHROID) 200 MCG TABS 2/13/2013 Active   MEDROL 4 MG TABS  Active   metFORMIN (GLUCOPHAGE) 500 MG Tab 11/9/2021 Active  "  methylPREDNISolone (MEDROL) 4 MG TABS  Active   oxycodone-acetaminophen (PERCOCET) 5-325 MG TABS  Active   oxycodone-acetaminophen (PERCOCET) 5-325 MG TABS  Active   sulfamethoxazole-trimethoprim (BACTRIM) 400-80 MG TABS 2/13/2013 Active                    SOCIAL HISTORY  Social History     Tobacco Use    Smoking status: Every Day     Current packs/day: 1.00     Types: Cigarettes    Smokeless tobacco: Never   Substance and Sexual Activity    Alcohol use: Yes     Comment: occas    Drug use: Not Currently     Comment: luis whitten    Sexual activity: Not on file       FAMILY HISTORY  No family history on file.      Medications, Allergies, and current problem list reviewed today in Epic.     Objective:     BP (!) 140/64 (BP Location: Right arm, Patient Position: Sitting, BP Cuff Size: Adult long)   Pulse 90   Temp 36.5 °C (97.7 °F) (Temporal)   Resp 14   Ht 1.803 m (5' 11\")   Wt (!) 133 kg (294 lb)   SpO2 91%     Physical Exam  Vitals reviewed.   Constitutional:       General: He is not in acute distress.     Appearance: Normal appearance. He is not ill-appearing or toxic-appearing.   HENT:      Head: Normocephalic.      Mouth/Throat:      Mouth: Mucous membranes are moist.      Pharynx: Oropharynx is clear.   Eyes:      General: Lids are normal. Gaze aligned appropriately.         Left eye: Discharge present.     Conjunctiva/sclera:      Left eye: Left conjunctiva is injected.      Pupils: Pupils are equal, round, and reactive to light.   Cardiovascular:      Pulses: Normal pulses.   Pulmonary:      Effort: Pulmonary effort is normal.   Skin:     General: Skin is warm.   Neurological:      General: No focal deficit present.      Mental Status: He is alert.   Psychiatric:         Mood and Affect: Mood normal.         Assessment/Plan:     Diagnosis and associated orders:     1. Bacterial conjunctivitis of left eye  polymixin-trimethoprim (POLYTRIM) 95955-9.1 UNIT/ML-% Solution         Comments/MDM: "     Provided patient with instructions on bacterial conjunctivitis. Instructed patient to apply antibiotic gtts/ointment as prescribed, and to touch the tip of the applicator directly to the eye. Avoid touching the affected eye & then the unaffected eye. Recommend good hand washing as this is easily spread through contact. Wash and avoid re- use of washcloths or linens.  Return to clinic with any worsening or concerning symptoms.  Patient comfortable plan.       Differential diagnosis, natural history, supportive care, and indications for immediate follow-up discussed.    Advised the patient to follow-up with the primary care physician for recheck, reevaluation, and consideration of further management.    Please note that this dictation was created using voice recognition software. I have made a reasonable attempt to correct obvious errors, but I expect that there are errors of grammar and possibly content that I did not discover before finalizing the note.    This note was electronically signed by HANSEL Obrien       [1] No Known Allergies     good balance